# Patient Record
Sex: FEMALE | Race: WHITE | Employment: OTHER | URBAN - METROPOLITAN AREA
[De-identification: names, ages, dates, MRNs, and addresses within clinical notes are randomized per-mention and may not be internally consistent; named-entity substitution may affect disease eponyms.]

---

## 2017-01-09 PROBLEM — Z95.2 AORTIC VALVE REPLACED: Status: ACTIVE | Noted: 2017-01-09

## 2017-01-09 PROBLEM — Z79.01 WARFARIN ANTICOAGULATION: Status: ACTIVE | Noted: 2017-01-09

## 2017-01-31 PROBLEM — Z87.39 HISTORY OF GOUT: Status: ACTIVE | Noted: 2017-01-31

## 2017-02-09 ENCOUNTER — APPOINTMENT (OUTPATIENT)
Dept: GENERAL RADIOLOGY | Age: 77
DRG: 270 | End: 2017-02-09
Attending: EMERGENCY MEDICINE
Payer: MEDICARE

## 2017-02-09 ENCOUNTER — HOSPITAL ENCOUNTER (EMERGENCY)
Age: 77
Discharge: HOME | DRG: 270 | End: 2017-02-09
Attending: EMERGENCY MEDICINE
Payer: MEDICARE

## 2017-02-09 VITALS
RESPIRATION RATE: 18 BRPM | HEIGHT: 60 IN | TEMPERATURE: 97.9 F | BODY MASS INDEX: 35.73 KG/M2 | DIASTOLIC BLOOD PRESSURE: 50 MMHG | SYSTOLIC BLOOD PRESSURE: 102 MMHG | OXYGEN SATURATION: 99 % | HEART RATE: 69 BPM | WEIGHT: 182 LBS

## 2017-02-09 DIAGNOSIS — E86.0 DEHYDRATION: Primary | ICD-10-CM

## 2017-02-09 DIAGNOSIS — R53.81 MALAISE AND FATIGUE: ICD-10-CM

## 2017-02-09 DIAGNOSIS — R53.83 MALAISE AND FATIGUE: ICD-10-CM

## 2017-02-09 LAB
ALBUMIN SERPL BCP-MCNC: 2.6 G/DL (ref 3.2–4.6)
ALBUMIN/GLOB SERPL: 1 {RATIO} (ref 1.2–3.5)
ALP SERPL-CCNC: 72 U/L (ref 50–136)
ALT SERPL-CCNC: 18 U/L (ref 12–65)
ANION GAP BLD CALC-SCNC: 5 MMOL/L (ref 7–16)
AST SERPL W P-5'-P-CCNC: 32 U/L (ref 15–37)
BASOPHILS # BLD AUTO: 0 K/UL (ref 0–0.2)
BASOPHILS # BLD: 0 % (ref 0–2)
BILIRUB SERPL-MCNC: 0.5 MG/DL (ref 0.2–1.1)
BNP SERPL-MCNC: 180 PG/ML
BUN SERPL-MCNC: 29 MG/DL (ref 8–23)
CALCIUM SERPL-MCNC: 8.2 MG/DL (ref 8.3–10.4)
CHLORIDE SERPL-SCNC: 104 MMOL/L (ref 98–107)
CO2 SERPL-SCNC: 34 MMOL/L (ref 21–32)
CREAT SERPL-MCNC: 1.68 MG/DL (ref 0.6–1)
DIFFERENTIAL METHOD BLD: ABNORMAL
EOSINOPHIL # BLD: 0.2 K/UL (ref 0–0.8)
EOSINOPHIL NFR BLD: 3 % (ref 0.5–7.8)
ERYTHROCYTE [DISTWIDTH] IN BLOOD BY AUTOMATED COUNT: 15.8 % (ref 11.9–14.6)
GLOBULIN SER CALC-MCNC: 2.7 G/DL (ref 2.3–3.5)
GLUCOSE SERPL-MCNC: 95 MG/DL (ref 65–100)
HCT VFR BLD AUTO: 28.4 % (ref 35.8–46.3)
HGB BLD-MCNC: 8.6 G/DL (ref 11.7–15.4)
IMM GRANULOCYTES # BLD: 0 K/UL (ref 0–0.5)
IMM GRANULOCYTES NFR BLD AUTO: 0.2 % (ref 0–5)
INR PPP: 1.4 (ref 0.9–1.2)
LACTATE BLD-SCNC: 0.8 MMOL/L (ref 0.5–1.9)
LYMPHOCYTES # BLD AUTO: 17 % (ref 13–44)
LYMPHOCYTES # BLD: 0.8 K/UL (ref 0.5–4.6)
MAGNESIUM SERPL-MCNC: 2.3 MG/DL (ref 1.8–2.4)
MCH RBC QN AUTO: 30.3 PG (ref 26.1–32.9)
MCHC RBC AUTO-ENTMCNC: 30.3 G/DL (ref 31.4–35)
MCV RBC AUTO: 100 FL (ref 79.6–97.8)
MONOCYTES # BLD: 0.3 K/UL (ref 0.1–1.3)
MONOCYTES NFR BLD AUTO: 6 % (ref 4–12)
NEUTS SEG # BLD: 3.5 K/UL (ref 1.7–8.2)
NEUTS SEG NFR BLD AUTO: 74 % (ref 43–78)
PLATELET # BLD AUTO: 121 K/UL (ref 150–450)
PMV BLD AUTO: 10.9 FL (ref 10.8–14.1)
POTASSIUM SERPL-SCNC: 3.5 MMOL/L (ref 3.5–5.1)
PROCALCITONIN SERPL-MCNC: <0.1 NG/ML
PROT SERPL-MCNC: 5.3 G/DL (ref 6.3–8.2)
PROTHROMBIN TIME: 15.3 SEC (ref 9.6–12)
RBC # BLD AUTO: 2.84 M/UL (ref 4.05–5.25)
SODIUM SERPL-SCNC: 143 MMOL/L (ref 136–145)
TROPONIN I BLD-MCNC: 0.01 NG/ML (ref 0–0.08)
TROPONIN I BLD-MCNC: 0.02 NG/ML (ref 0–0.08)
WBC # BLD AUTO: 4.7 K/UL (ref 4.3–11.1)

## 2017-02-09 RX ORDER — PROMETHAZINE HYDROCHLORIDE 25 MG/1
25 TABLET ORAL
Qty: 12 TAB | Refills: 0 | Status: SHIPPED | OUTPATIENT
Start: 2017-02-09 | End: 2017-04-03 | Stop reason: SDUPTHER

## 2017-02-09 RX ADMIN — SODIUM CHLORIDE 1000 ML: 900 INJECTION, SOLUTION INTRAVENOUS at 19:42

## 2017-02-09 NOTE — ED TRIAGE NOTES
Pt c/o generalized weakness \"all week\". Reports headache and eye pain. Admits to chest pain. Sent from PCP office with hypotension.

## 2017-02-09 NOTE — ED PROVIDER NOTES
HPI Comments: Presents from primary care doctor's office with complaint of low blood pressure. Patient reports she doesn't feel well but otherwise her symptoms are relatively vague. Family reports she's been getting increasingly weak over the past week. She has a history of chronic chest pain and shortness of breath reports increased worsening shortness of breath over the past week. She reports feeling jittery on her insides and that her vision occasionally feels like it has floaters. She is on oxygen all the time. Her torsemide was also recently increased. Patient is a 77 y.o. female presenting with fatigue. The history is provided by the patient. Fatigue   This is a new problem. The current episode started more than 1 week ago. The problem has been gradually worsening. There was no focality noted. Primary symptoms include visual change. Pertinent negatives include no focal weakness, no loss of sensation, no loss of balance, no speech difficulty, no movement disorder, no agitation, no mental status change, no unresponsiveness and no disorientation. There has been no fever. Associated symptoms include shortness of breath and chest pain. Pertinent negatives include no vomiting, no altered mental status and no nausea.         Past Medical History:   Diagnosis Date    Abnormal glucose 8/5/2016    Abscess 8/5/2016    Acute encephalopathy 7/8/2016    Acute renal failure ([address] 12/3/2009    Afib ([address]     Anemia     Ankle swelling 8/5/2016    Anxiety 8/5/2016    Aortic Valve Bioprosthesis Present 3/7/2009    ARF (acute renal failure) ([address] 2/24/2010    Arthritis     Asthma     Atopic dermatitis 8/5/2016    Atrial fibrillation ([address] 3/7/2009    Autonomic orthostatic hypotension 8/5/2016    AV block 10/17/2011    Back pain 8/5/2016    Bradycardia (Symptomatic) 11/7/2011    CAD (coronary artery disease)     Cancer (HCC) 1990     breast (left)    Cardiac pacemaker 11/2/2015    Cardiogenic shock ([facility] 10/17/2011    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    Cellulitis 8/5/2016    Chest pain 8/5/2016    Chronic atrial fibrillation (HCC) 10/17/2011    Chronic depression 8/5/2016    Chronic depression 8/5/2016    Chronic obstructive pulmonary disease ([facility] [place] [facility] 3/8/2009    Chronic pain     CKD (chronic kidney disease) stage 3, GFR 30-59 ml/min 12/4/2009    CKD (chronic kidney disease) stage 3, GFR 30-59 ml/min 12/4/2009    Congestive heart failure (CHF) ([facility] [place] [facility] 11/2/2015    COPD      O2 AT 3 L NC    CRI (chronic renal insufficiency) 8/5/2016    Degeneration of cervical intervertebral disc 8/5/2016    Degenerative arthritis of left knee 2/27/2009    Dehydration 2/7/3156    Diastolic heart failure (HCC)     Digoxin toxicity 2/24/2010    Disorder of sweat glands 8/5/2016    Diverticulosis of large intestine without diverticulitis 2015    Dyspnea 8/5/2016    Eczema 8/5/2016    Epigastric abdominal pain 2/24/2010    GERD (gastroesophageal reflux disease)     Gout 8/5/2016    H/O mitral valve repair, 2003 6/27/2016    Heart failure ([facility] [place] [facility]     HTN (hypertension) 8/5/2016    Hx of colonic polyp 2015     adenoma    Hyperkalemia 10/17/2011    Hyperlipidemia 8/5/2016    Hypertension     Hypokalemia 2/24/2010    Hypothyroidism 12/4/2009    Ill-defined condition      CARPEL TUNNEL SYNDROME, BILAT HANDS    Knee pain 8/5/2016    Leg cramps 8/5/2016    Mitral stenosis with insufficiency 11/2/2015     Prior MV repair 2003.     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    Rheumatic heart disease 11/2/2015    Right hip pain 8/5/2016    RLS (restless legs syndrome) 2016    Sick sinus syndrome ([facility] 2016    Skin infection 2016    Sleep apnea 2015    Tachycardia 2016    Thrombocytopenia, unspecified ([facility] 2012    Thyroid disease     Urticaria 2016    Vertigo 2016       Past Surgical History:   Procedure Laterality Date    Hx appendectomy      Hx cholecystectomy      Hx gyn       hysterectomy still have ovaries    Hx mastectomy       left mastectomy    Hx pacemaker      Hx breast reconstruction      Pr cardiac surg procedure unlist       valve repair and replacement    Hx orthopaedic       knee surgery         Family History:   Problem Relation Age of Onset    Heart Disease Mother     Cancer Father      Throat    Heart Disease Father     Cancer Sister      Breast, Colon    Heart Disease Sister     Breast Cancer Sister 79      from disease    Cancer Maternal Grandmother     Cancer Brother     Diabetes Brother     Heart Disease Brother     Psychiatric Disorder Paternal Grandfather     Cancer Maternal Aunt      Breast    Diabetes Son     Alcohol abuse Neg Hx     Arthritis-rheumatoid Neg Hx     Asthma Neg Hx     Bleeding Prob Neg Hx     Elevated Lipids Neg Hx     Headache Neg Hx     Migraines Neg Hx     Hypertension Neg Hx     Lung Disease Neg Hx     Mental Retardation Neg Hx     Stroke Neg Hx        Social History     Social History    Marital status:      Spouse name: N/A    Number of children: N/A    Years of education: N/A     Occupational History    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    [employer]      6 years     Social History Main Topics    Smoking status: Former Smoker     Types: Cigarettes     Quit date: 1996    Smokeless tobacco: Never Used      Comment: quit     Alcohol use No    Drug use: No    Sexual activity: Not Currently     Other Topics Concern    Not on file     Social History Narrative         ALLERGIES: Adhesive tape;  Benadryl [diphenhydramine hcl]; Demerol [meperidine]; Morphine; Sulfa (sulfonamide antibiotics); and Lorazepam    Review of Systems   Constitutional: Positive for fatigue. Negative for chills and fever. Respiratory: Positive for shortness of breath. Cardiovascular: Positive for chest pain. Gastrointestinal: Negative for nausea and vomiting. Skin: Negative for rash and wound. Neurological: Negative for focal weakness, speech difficulty and loss of balance. Psychiatric/Behavioral: Negative for agitation. All other systems reviewed and are negative. Vitals:    02/09/17 1657   BP: 110/69   Pulse: 65   Resp: 18   Temp: 97.5 °F (36.4 °C)   SpO2: 95%   Weight: 82.6 kg (182 lb)   Height: 5' (1.524 m)            Physical Exam   Constitutional: She is oriented to person, place, and time. She appears well-developed and well-nourished. No distress. HENT:   Head: Normocephalic and atraumatic. Neck: Normal range of motion. Neck supple. Cardiovascular: Normal rate and regular rhythm. Pulmonary/Chest: She has rales (bilateral bases). Abdominal: Soft. She exhibits no distension. There is no tenderness. There is no rebound and no guarding. Musculoskeletal: Normal range of motion. She exhibits edema. Neurological: She is alert and oriented to person, place, and time. No cranial nerve deficit. Skin: Skin is warm and dry. She is not diaphoretic. No erythema. Psychiatric: She has a normal mood and affect. Her behavior is normal.   Nursing note and vitals reviewed. MDM  Number of Diagnoses or Management Options  Dehydration:   Malaise and fatigue:   Diagnosis management comments: Patient's blood pressure was improved with some fluids. I suspect her extra torsemide has caused her some dehydration. She felt better afterwards.   She has a constellation of symptoms that have been going on for a long period of time and there is no source of infection for her low blood pressure and no signs of sepsis I suspect that this is just dehydration. [facility] cardiology was called for a follow-up appointment next week and her extra torsemide was stopped. Her granddaughter who is a nurse here says that she is acting completely normal for her. Discussed at length with patient and family and recommended she start wearing her CARA hose.        Amount and/or Complexity of Data Reviewed  Clinical lab tests: ordered and reviewed  Review and summarize past medical records: yes  Discuss the patient with other providers: yes  Independent visualization of images, tracings, or specimens: yes (A. Fib with no ST elevation occasional pacing and no significant changes found.)    Risk of Complications, Morbidity, and/or Mortality  Presenting problems: high  Diagnostic procedures: moderate  Management options: high    Patient Progress  Patient progress: improved    ED Course       Procedures

## 2017-02-10 ENCOUNTER — PATIENT OUTREACH (OUTPATIENT)
Dept: CASE MANAGEMENT | Age: 77
End: 2017-02-10

## 2017-02-10 LAB
ATRIAL RATE: 73 BPM
CALCULATED P AXIS, ECG09: NORMAL DEGREES
CALCULATED R AXIS, ECG10: 79 DEGREES
CALCULATED T AXIS, ECG11: -92 DEGREES
DIAGNOSIS, 93000: NORMAL
DIASTOLIC BP, ECG02: NORMAL MMHG
P-R INTERVAL, ECG05: NORMAL MS
Q-T INTERVAL, ECG07: 408 MS
QRS DURATION, ECG06: 140 MS
QTC CALCULATION (BEZET), ECG08: 437 MS
SYSTOLIC BP, ECG01: NORMAL MMHG
VENTRICULAR RATE, ECG03: 69 BPM

## 2017-02-10 NOTE — DISCHARGE INSTRUCTIONS
Dehydration: Care Instructions  Your Care Instructions  Dehydration happens when your body loses too much fluid. This might happen when you do not drink enough water or you lose large amounts of fluids from your body because of diarrhea, vomiting, or sweating. Severe dehydration can be life-threatening. Water and minerals called electrolytes help put your body fluids back in balance. Learn the early signs of fluid loss, and drink more fluids to prevent dehydration. Follow-up care is a key part of your treatment and safety. Be sure to make and go to all appointments, and call your doctor if you are having problems. It's also a good idea to know your test results and keep a list of the medicines you take. How can you care for yourself at home? · To prevent dehydration, drink plenty of fluids, enough so that your urine is light yellow or clear like water. Choose water and other caffeine-free clear liquids until you feel better. If you have kidney, heart, or liver disease and have to limit fluids, talk with your doctor before you increase the amount of fluids you drink. · If you do not feel like eating or drinking, try taking small sips of water, sports drinks, or other rehydration drinks. · Get plenty of rest.  To prevent dehydration  · Add more fluids to your diet and daily routine, unless your doctor has told you not to. · During hot weather, drink more fluids. Drink even more fluids if you exercise a lot. Stay away from drinks with alcohol or caffeine. · Watch for the symptoms of dehydration. These include:  ¨ A dry, sticky mouth. ¨ Dark yellow urine, and not much of it. ¨ Dry and sunken eyes. ¨ Feeling very tired. · Learn what problems can lead to dehydration. These include:  ¨ Diarrhea, fever, and vomiting. ¨ Any illness with a fever, such as pneumonia or the flu. ¨ Activities that cause heavy sweating, such as endurance races and heavy outdoor work in hot or humid weather.   ¨ Alcohol or drug abuse or withdrawal.  ¨ Certain medicines, such as cold and allergy pills (antihistamines), diet pills (diuretics), and laxatives. ¨ Certain diseases, such as diabetes, cancer, and heart or kidney disease. When should you call for help? Call 911 anytime you think you may need emergency care. For example, call if:  · You passed out (lost consciousness). Call your doctor now or seek immediate medical care if:  · You are confused and cannot think clearly. · You are dizzy or lightheaded, or you feel like you may faint. · You have signs of needing more fluids. You have sunken eyes and a dry mouth, and you pass only a little dark urine. · You cannot keep fluids down. Watch closely for changes in your health, and be sure to contact your doctor if:  · You are not making tears. · Your skin is very dry and sags slowly back into place after you pinch it. · Your mouth and eyes are very dry. Where can you learn more? Go to http://onesimo-dasia.info/. Enter I129 in the search box to learn more about \"Dehydration: Care Instructions. \"  Current as of: May 27, 2016  Content Version: 11.1  © 6949-1681 [employer], [employer]. Care instructions adapted under license by "[employer] (which disclaims liability or warranty for this information). If you have questions about a medical condition or this instruction, always ask your healthcare professional. [place] [address] any warranty or liability for your use of this information.

## 2017-02-10 NOTE — ED NOTES
I have reviewed discharge instructions with the patient. The patient verbalized understanding. Discharge medications reviewed with patient and appropriate educational materials and side effects teaching were provided. Pt transferred to vehicle via wheelchair.

## 2017-02-12 ENCOUNTER — APPOINTMENT (OUTPATIENT)
Dept: GENERAL RADIOLOGY | Age: 77
DRG: 270 | End: 2017-02-12
Attending: EMERGENCY MEDICINE
Payer: MEDICARE

## 2017-02-12 ENCOUNTER — HOSPITAL ENCOUNTER (INPATIENT)
Age: 77
LOS: 16 days | Discharge: NURSING, REHAB OR LONG-TERM CARE | DRG: 270 | End: 2017-02-28
Attending: EMERGENCY MEDICINE | Admitting: HOSPITALIST
Payer: MEDICARE

## 2017-02-12 ENCOUNTER — APPOINTMENT (OUTPATIENT)
Dept: ULTRASOUND IMAGING | Age: 77
DRG: 270 | End: 2017-02-12
Attending: EMERGENCY MEDICINE
Payer: MEDICARE

## 2017-02-12 DIAGNOSIS — J96.11 CHRONIC RESPIRATORY FAILURE WITH HYPOXIA AND HYPERCAPNIA (HCC): ICD-10-CM

## 2017-02-12 DIAGNOSIS — D61.818 PANCYTOPENIA (HCC): ICD-10-CM

## 2017-02-12 DIAGNOSIS — I50.43 ACUTE ON CHRONIC COMBINED SYSTOLIC AND DIASTOLIC CONGESTIVE HEART FAILURE (HCC): Primary | ICD-10-CM

## 2017-02-12 DIAGNOSIS — R06.09 DYSPNEA ON EXERTION: ICD-10-CM

## 2017-02-12 DIAGNOSIS — J96.12 CHRONIC RESPIRATORY FAILURE WITH HYPOXIA AND HYPERCAPNIA (HCC): ICD-10-CM

## 2017-02-12 DIAGNOSIS — I95.89 OTHER SPECIFIED HYPOTENSION: ICD-10-CM

## 2017-02-12 DIAGNOSIS — I48.20 CHRONIC ATRIAL FIBRILLATION (HCC): Chronic | ICD-10-CM

## 2017-02-12 DIAGNOSIS — R53.1 GENERALIZED WEAKNESS: ICD-10-CM

## 2017-02-12 DIAGNOSIS — I50.41 ACUTE COMBINED SYSTOLIC AND DIASTOLIC CONGESTIVE HEART FAILURE (HCC): ICD-10-CM

## 2017-02-12 PROBLEM — Z79.01 WARFARIN ANTICOAGULATION: Chronic | Status: ACTIVE | Noted: 2017-01-09

## 2017-02-12 PROBLEM — Z95.2 AORTIC VALVE REPLACED: Chronic | Status: ACTIVE | Noted: 2017-01-09

## 2017-02-12 PROBLEM — I50.9 ACUTE CHF (CONGESTIVE HEART FAILURE) (HCC): Status: ACTIVE | Noted: 2017-02-12

## 2017-02-12 PROBLEM — I27.20 PULMONARY HYPERTENSION (HCC): Status: ACTIVE | Noted: 2017-02-12

## 2017-02-12 LAB
ALBUMIN SERPL BCP-MCNC: 2.4 G/DL (ref 3.2–4.6)
ALBUMIN/GLOB SERPL: 0.9 {RATIO} (ref 1.2–3.5)
ALP SERPL-CCNC: 76 U/L (ref 50–136)
ALT SERPL-CCNC: 17 U/L (ref 12–65)
ANION GAP BLD CALC-SCNC: 6 MMOL/L (ref 7–16)
APPEARANCE UR: ABNORMAL
AST SERPL W P-5'-P-CCNC: 27 U/L (ref 15–37)
ATRIAL RATE: 340 BPM
BACTERIA URNS QL MICRO: ABNORMAL /HPF
BASOPHILS # BLD AUTO: 0 K/UL (ref 0–0.2)
BASOPHILS # BLD: 1 % (ref 0–2)
BILIRUB SERPL-MCNC: 0.5 MG/DL (ref 0.2–1.1)
BILIRUB UR QL: NEGATIVE
BNP SERPL-MCNC: 162 PG/ML
BUN SERPL-MCNC: 25 MG/DL (ref 8–23)
CALCIUM SERPL-MCNC: 7.7 MG/DL (ref 8.3–10.4)
CALCULATED P AXIS, ECG09: NORMAL DEGREES
CALCULATED R AXIS, ECG10: 68 DEGREES
CALCULATED T AXIS, ECG11: -65 DEGREES
CASTS URNS QL MICRO: 0 /LPF
CHLORIDE SERPL-SCNC: 106 MMOL/L (ref 98–107)
CO2 SERPL-SCNC: 34 MMOL/L (ref 21–32)
COLOR UR: ABNORMAL
CREAT SERPL-MCNC: 1.48 MG/DL (ref 0.6–1)
CRYSTALS URNS QL MICRO: 0 /LPF
DIAGNOSIS, 93000: NORMAL
DIASTOLIC BP, ECG02: NORMAL MMHG
DIFFERENTIAL METHOD BLD: ABNORMAL
EOSINOPHIL # BLD: 0.2 K/UL (ref 0–0.8)
EOSINOPHIL NFR BLD: 5 % (ref 0.5–7.8)
EPI CELLS #/AREA URNS HPF: ABNORMAL /HPF
ERYTHROCYTE [DISTWIDTH] IN BLOOD BY AUTOMATED COUNT: 15.6 % (ref 11.9–14.6)
FLUAV AG NPH QL IA: NEGATIVE
FLUBV AG NPH QL IA: NEGATIVE
GLOBULIN SER CALC-MCNC: 2.6 G/DL (ref 2.3–3.5)
GLUCOSE SERPL-MCNC: 87 MG/DL (ref 65–100)
GLUCOSE UR STRIP.AUTO-MCNC: NEGATIVE MG/DL
HCT VFR BLD AUTO: 27.8 % (ref 35.8–46.3)
HGB BLD-MCNC: 8.2 G/DL (ref 11.7–15.4)
HGB UR QL STRIP: NEGATIVE
IMM GRANULOCYTES # BLD: 0 K/UL (ref 0–0.5)
IMM GRANULOCYTES NFR BLD AUTO: 0.3 % (ref 0–5)
INR PPP: 1.9 (ref 0.9–1.2)
KETONES UR QL STRIP.AUTO: NEGATIVE MG/DL
LACTATE BLD-SCNC: 1 MMOL/L (ref 0.5–1.9)
LEUKOCYTE ESTERASE UR QL STRIP.AUTO: ABNORMAL
LYMPHOCYTES # BLD AUTO: 16 % (ref 13–44)
LYMPHOCYTES # BLD: 0.5 K/UL (ref 0.5–4.6)
MAGNESIUM SERPL-MCNC: 2.3 MG/DL (ref 1.8–2.4)
MCH RBC QN AUTO: 30.3 PG (ref 26.1–32.9)
MCHC RBC AUTO-ENTMCNC: 29.5 G/DL (ref 31.4–35)
MCV RBC AUTO: 102.6 FL (ref 79.6–97.8)
MONOCYTES # BLD: 0.2 K/UL (ref 0.1–1.3)
MONOCYTES NFR BLD AUTO: 7 % (ref 4–12)
MUCOUS THREADS URNS QL MICRO: ABNORMAL /LPF
NEUTS SEG # BLD: 2.5 K/UL (ref 1.7–8.2)
NEUTS SEG NFR BLD AUTO: 71 % (ref 43–78)
NITRITE UR QL STRIP.AUTO: NEGATIVE
OTHER OBSERVATIONS,UCOM: ABNORMAL
P-R INTERVAL, ECG05: NORMAL MS
PH UR STRIP: 6 [PH] (ref 5–9)
PLATELET # BLD AUTO: 100 K/UL (ref 150–450)
PMV BLD AUTO: 10.4 FL (ref 10.8–14.1)
POTASSIUM SERPL-SCNC: 3.7 MMOL/L (ref 3.5–5.1)
PROCALCITONIN SERPL-MCNC: <0.1 NG/ML
PROT SERPL-MCNC: 5 G/DL (ref 6.3–8.2)
PROT UR STRIP-MCNC: NEGATIVE MG/DL
PROTHROMBIN TIME: 20.8 SEC (ref 9.6–12)
Q-T INTERVAL, ECG07: 422 MS
QRS DURATION, ECG06: 132 MS
QTC CALCULATION (BEZET), ECG08: 477 MS
RBC # BLD AUTO: 2.71 M/UL (ref 4.05–5.25)
RBC #/AREA URNS HPF: ABNORMAL /HPF
SODIUM SERPL-SCNC: 146 MMOL/L (ref 136–145)
SP GR UR REFRACTOMETRY: 1.01
SP GR UR REFRACTOMETRY: 1.01 (ref 1–1.02)
SYSTOLIC BP, ECG01: NORMAL MMHG
TROPONIN I SERPL-MCNC: 0.02 NG/ML (ref 0.02–0.05)
UROBILINOGEN UR QL STRIP.AUTO: 0.2 EU/DL (ref 0.2–1)
VENTRICULAR RATE, ECG03: 77 BPM
WBC # BLD AUTO: 3.4 K/UL (ref 4.3–11.1)
WBC URNS QL MICRO: ABNORMAL /HPF

## 2017-02-12 PROCEDURE — 81015 MICROSCOPIC EXAM OF URINE: CPT | Performed by: EMERGENCY MEDICINE

## 2017-02-12 PROCEDURE — 87804 INFLUENZA ASSAY W/OPTIC: CPT | Performed by: EMERGENCY MEDICINE

## 2017-02-12 PROCEDURE — 83605 ASSAY OF LACTIC ACID: CPT

## 2017-02-12 PROCEDURE — 96361 HYDRATE IV INFUSION ADD-ON: CPT | Performed by: EMERGENCY MEDICINE

## 2017-02-12 PROCEDURE — 87040 BLOOD CULTURE FOR BACTERIA: CPT | Performed by: EMERGENCY MEDICINE

## 2017-02-12 PROCEDURE — 93970 EXTREMITY STUDY: CPT

## 2017-02-12 PROCEDURE — 85610 PROTHROMBIN TIME: CPT | Performed by: EMERGENCY MEDICINE

## 2017-02-12 PROCEDURE — 74011250636 HC RX REV CODE- 250/636: Performed by: EMERGENCY MEDICINE

## 2017-02-12 PROCEDURE — 93005 ELECTROCARDIOGRAM TRACING: CPT | Performed by: EMERGENCY MEDICINE

## 2017-02-12 PROCEDURE — 83735 ASSAY OF MAGNESIUM: CPT | Performed by: EMERGENCY MEDICINE

## 2017-02-12 PROCEDURE — 86580 TB INTRADERMAL TEST: CPT | Performed by: NURSE PRACTITIONER

## 2017-02-12 PROCEDURE — 84145 PROCALCITONIN (PCT): CPT | Performed by: EMERGENCY MEDICINE

## 2017-02-12 PROCEDURE — 99285 EMERGENCY DEPT VISIT HI MDM: CPT | Performed by: EMERGENCY MEDICINE

## 2017-02-12 PROCEDURE — 71010 XR CHEST PORT: CPT

## 2017-02-12 PROCEDURE — 74011250637 HC RX REV CODE- 250/637: Performed by: NURSE PRACTITIONER

## 2017-02-12 PROCEDURE — 65660000000 HC RM CCU STEPDOWN

## 2017-02-12 PROCEDURE — 85025 COMPLETE CBC W/AUTO DIFF WBC: CPT | Performed by: EMERGENCY MEDICINE

## 2017-02-12 PROCEDURE — 74011000258 HC RX REV CODE- 258: Performed by: HOSPITALIST

## 2017-02-12 PROCEDURE — 76450000000

## 2017-02-12 PROCEDURE — 80053 COMPREHEN METABOLIC PANEL: CPT | Performed by: EMERGENCY MEDICINE

## 2017-02-12 PROCEDURE — 81003 URINALYSIS AUTO W/O SCOPE: CPT | Performed by: EMERGENCY MEDICINE

## 2017-02-12 PROCEDURE — 96360 HYDRATION IV INFUSION INIT: CPT | Performed by: EMERGENCY MEDICINE

## 2017-02-12 PROCEDURE — 74011000302 HC RX REV CODE- 302: Performed by: NURSE PRACTITIONER

## 2017-02-12 PROCEDURE — 74011250636 HC RX REV CODE- 250/636: Performed by: NURSE PRACTITIONER

## 2017-02-12 PROCEDURE — 83880 ASSAY OF NATRIURETIC PEPTIDE: CPT | Performed by: EMERGENCY MEDICINE

## 2017-02-12 PROCEDURE — 84484 ASSAY OF TROPONIN QUANT: CPT | Performed by: NURSE PRACTITIONER

## 2017-02-12 RX ORDER — DOCUSATE SODIUM 100 MG/1
100 CAPSULE, LIQUID FILLED ORAL
Status: DISCONTINUED | OUTPATIENT
Start: 2017-02-12 | End: 2017-02-13

## 2017-02-12 RX ORDER — PANTOPRAZOLE SODIUM 40 MG/1
40 TABLET, DELAYED RELEASE ORAL
Status: DISCONTINUED | OUTPATIENT
Start: 2017-02-13 | End: 2017-02-28 | Stop reason: HOSPADM

## 2017-02-12 RX ORDER — POTASSIUM CHLORIDE 1500 MG/1
20 TABLET, FILM COATED, EXTENDED RELEASE ORAL 2 TIMES DAILY
COMMUNITY
End: 2018-01-01

## 2017-02-12 RX ORDER — SPIRONOLACTONE 25 MG/1
25 TABLET ORAL DAILY
Status: DISCONTINUED | OUTPATIENT
Start: 2017-02-13 | End: 2017-02-25

## 2017-02-12 RX ORDER — LEVALBUTEROL INHALATION SOLUTION 1.25 MG/3ML
1.25 SOLUTION RESPIRATORY (INHALATION)
Status: DISCONTINUED | OUTPATIENT
Start: 2017-02-12 | End: 2017-02-24

## 2017-02-12 RX ORDER — LEVOTHYROXINE SODIUM 88 UG/1
88 TABLET ORAL
Status: DISCONTINUED | OUTPATIENT
Start: 2017-02-13 | End: 2017-02-28 | Stop reason: HOSPADM

## 2017-02-12 RX ORDER — SODIUM CHLORIDE 9 MG/ML
50 INJECTION, SOLUTION INTRAVENOUS ONCE
Status: COMPLETED | OUTPATIENT
Start: 2017-02-12 | End: 2017-02-12

## 2017-02-12 RX ORDER — POLYVINYL ALCOHOL 14 MG/ML
1 SOLUTION/ DROPS OPHTHALMIC AS NEEDED
Status: DISCONTINUED | OUTPATIENT
Start: 2017-02-12 | End: 2017-02-28 | Stop reason: HOSPADM

## 2017-02-12 RX ORDER — ACETAMINOPHEN 325 MG/1
650 TABLET ORAL
Status: DISCONTINUED | OUTPATIENT
Start: 2017-02-12 | End: 2017-02-28 | Stop reason: HOSPADM

## 2017-02-12 RX ORDER — SODIUM CHLORIDE 450 MG/100ML
50 INJECTION, SOLUTION INTRAVENOUS CONTINUOUS
Status: DISCONTINUED | OUTPATIENT
Start: 2017-02-12 | End: 2017-02-13

## 2017-02-12 RX ORDER — DIAZEPAM 5 MG/1
2.5 TABLET ORAL
Status: DISCONTINUED | OUTPATIENT
Start: 2017-02-12 | End: 2017-02-12

## 2017-02-12 RX ORDER — SODIUM CHLORIDE 0.9 % (FLUSH) 0.9 %
5-10 SYRINGE (ML) INJECTION EVERY 8 HOURS
Status: DISCONTINUED | OUTPATIENT
Start: 2017-02-12 | End: 2017-02-24 | Stop reason: SDUPTHER

## 2017-02-12 RX ORDER — SODIUM CHLORIDE 0.9 % (FLUSH) 0.9 %
5-10 SYRINGE (ML) INJECTION AS NEEDED
Status: DISCONTINUED | OUTPATIENT
Start: 2017-02-12 | End: 2017-02-28 | Stop reason: HOSPADM

## 2017-02-12 RX ORDER — POTASSIUM CHLORIDE 750 MG/1
20 TABLET, EXTENDED RELEASE ORAL 2 TIMES DAILY WITH MEALS
Status: DISCONTINUED | OUTPATIENT
Start: 2017-02-13 | End: 2017-02-12

## 2017-02-12 RX ORDER — NITROGLYCERIN 80 MG/1
1 PATCH TRANSDERMAL DAILY
COMMUNITY
End: 2018-01-04

## 2017-02-12 RX ORDER — ROPINIROLE 1 MG/1
2 TABLET, FILM COATED ORAL
Status: DISCONTINUED | OUTPATIENT
Start: 2017-02-12 | End: 2017-02-28 | Stop reason: HOSPADM

## 2017-02-12 RX ORDER — HYDROCORTISONE 25 MG/G
CREAM TOPICAL
Status: DISCONTINUED | OUTPATIENT
Start: 2017-02-12 | End: 2017-02-28 | Stop reason: HOSPADM

## 2017-02-12 RX ORDER — ONDANSETRON 2 MG/ML
4 INJECTION INTRAMUSCULAR; INTRAVENOUS
Status: DISCONTINUED | OUTPATIENT
Start: 2017-02-12 | End: 2017-02-28 | Stop reason: HOSPADM

## 2017-02-12 RX ORDER — ROPINIROLE 2 MG/1
2 TABLET, FILM COATED ORAL
COMMUNITY
End: 2017-04-20 | Stop reason: SDUPTHER

## 2017-02-12 RX ORDER — WARFARIN 2.5 MG/1
2.5 TABLET ORAL EVERY EVENING
Status: DISCONTINUED | OUTPATIENT
Start: 2017-02-12 | End: 2017-02-13

## 2017-02-12 RX ORDER — NITROGLYCERIN 80 MG/1
1 PATCH TRANSDERMAL DAILY
Status: DISCONTINUED | OUTPATIENT
Start: 2017-02-13 | End: 2017-02-12 | Stop reason: SDUPTHER

## 2017-02-12 RX ORDER — PROMETHAZINE HYDROCHLORIDE 25 MG/1
25 TABLET ORAL
Status: DISCONTINUED | OUTPATIENT
Start: 2017-02-12 | End: 2017-02-22

## 2017-02-12 RX ORDER — POLYETHYLENE GLYCOL 3350 17 G/17G
17 POWDER, FOR SOLUTION ORAL
Status: DISCONTINUED | OUTPATIENT
Start: 2017-02-12 | End: 2017-02-16

## 2017-02-12 RX ORDER — NITROGLYCERIN 80 MG/1
1 PATCH TRANSDERMAL DAILY
Status: DISCONTINUED | OUTPATIENT
Start: 2017-02-13 | End: 2017-02-13

## 2017-02-12 RX ORDER — SODIUM CHLORIDE 9 MG/ML
50 INJECTION, SOLUTION INTRAVENOUS CONTINUOUS
Status: DISCONTINUED | OUTPATIENT
Start: 2017-02-12 | End: 2017-02-12

## 2017-02-12 RX ORDER — DILTIAZEM HYDROCHLORIDE 240 MG/1
240 CAPSULE, COATED, EXTENDED RELEASE ORAL DAILY
Status: DISCONTINUED | OUTPATIENT
Start: 2017-02-13 | End: 2017-02-14

## 2017-02-12 RX ORDER — SERTRALINE HYDROCHLORIDE 50 MG/1
50 TABLET, FILM COATED ORAL
Status: DISCONTINUED | OUTPATIENT
Start: 2017-02-12 | End: 2017-02-28 | Stop reason: HOSPADM

## 2017-02-12 RX ADMIN — SODIUM CHLORIDE 50 ML/HR: 900 INJECTION, SOLUTION INTRAVENOUS at 19:39

## 2017-02-12 RX ADMIN — SODIUM CHLORIDE 50 ML/HR: 450 INJECTION, SOLUTION INTRAVENOUS at 22:05

## 2017-02-12 RX ADMIN — SODIUM CHLORIDE 1000 ML: 900 INJECTION, SOLUTION INTRAVENOUS at 15:02

## 2017-02-12 RX ADMIN — WARFARIN SODIUM 2.5 MG: 2.5 TABLET ORAL at 20:03

## 2017-02-12 RX ADMIN — SERTRALINE HYDROCHLORIDE 50 MG: 50 TABLET ORAL at 21:45

## 2017-02-12 RX ADMIN — Medication 10 ML: at 21:47

## 2017-02-12 RX ADMIN — ROPINIROLE 2 MG: 2 TABLET, FILM COATED ORAL at 21:45

## 2017-02-12 RX ADMIN — DIAZEPAM 2.5 MG: 5 TABLET ORAL at 21:45

## 2017-02-12 RX ADMIN — TUBERCULIN PURIFIED PROTEIN DERIVATIVE 5 UNITS: 5 INJECTION INTRADERMAL at 19:47

## 2017-02-12 NOTE — H&P
Ms. Elfego Michelle is a very pleasant 76 yo F  Who complains of increased SOb, nonproductive cough and b/l LE swelling in the last 1-2 months. Known with chronic diastolic CHF, CAD, bradycardia s/p pacemaker, , s/p bioprosthetic AVR,Chronic Afib on coumadin, severe pulmonary HTN with PAP 69 mmHg, chronic respiratory failure on NC 3L 24h/7 , COPD, GERD, HTN, HLP, Obesity, BOBBY - not using CPAP. Seen by her cardiologist, Lia Diaz on 1/9/17 and Demadex was increased to 40 mg PO daily, except MWF when was BID. Ms. Elfego Michelle states that her leg swelling improved, although her cough and SOB persisted. Seen by PCP on 1/31  and placed on oral steroids and Albuterol that she couldn't complete because the albuterol made ermias jittery. No improvement with treatment. Seen at [facility] ED on 2/9  with low BP and and thought that she is dehydrated due to increased diuretics and received IV fluids. She felt better for one day, although because of increased in her symptoms she presented to ED today. BP was 80/51 mmHg and received 1L NC bolus in Ed per ED provider. CXR w/o acute pathology. No spikes of fever since her symptoms started. Influenza screen negative. WBC:3.4. Cr:1.48, around her baseline. CV:irregular irregularity. No JVD. +2 pitting edema b/l LE   Resp: Decreased air entry b/l at the base. No wheezing. No rales or crackles. Abd: soft, non-tender, no rebound tenderness. Hold Torsemide until tomorrow. Will get Echocardiogram. Gentle IV hydration at 50 ml/h to keep MAP>85 mmHg. Hold BB due to hypotension. Hold benzodiazepines at bedtime, can exacerbate  - untreated BOBBY and involved in severe pulmonary hypertension. Cardiology and palliative care consulted - appreciate the help.        See full H&P note per FELISHA Centeno MD  02/12/17

## 2017-02-12 NOTE — IP AVS SNAPSHOT
[address] 
[phone] Patient: Ty Coffman MRN: [id number] You are allergic to the following Allergen Reactions Adhesive Tape Rash Benadryl (Diphenhydramine Hcl) Other (comments) Jitters Demerol (Meperidine) Anxiety Morphine Other (comments) Confusion Sulfa (Sulfonamide Antibiotics) Anxiety Lorazepam Anxiety Immunizations Administered for This Admission Name Date  
 TB Skin Test (PPD) Intradermal 2/12/2017 Recent Documentation Height  
  
  
  
  
  
 1.524 m Emergency Contacts Name Discharge Info Relation Home Work Mobile Devante Mcnamara [22] [phone] Nayan Hanna  Other Relative [6] [phone] Dago Bergeron  Other Relative [6] [phone] About your hospitalization You were admitted on:  February 12, 2017 You last received care in the:  [facility] 2 CV STEPDOWN You were discharged on:  February 28, 2017 Unit phone number:  [phone] Why you were hospitalized Your primary diagnosis was:  Acute On Chronic Systolic Heart Failure Due To Valvular Disease (Hcc) Your diagnoses also included:  Pulmonary Hypertension (Hcc), Sleep Apnea, Hypotension, Chronic Diastolic Congestive Heart Failure (Hcc), Aortic Valve Prosthesis Present, Arf (Acute Renal Failure) (Hcc), Aortic Valve Replaced, Chronic Atrial Fibrillation (Hcc), Flo (Obstructive Sleep Apnea), Warfarin Anticoagulation Providers Seen During Your Hospitalizations Provider Role Specialty Primary office phone Dafne Lim MD Attending Provider Emergency Medicine [phone] Jf Loving MD Attending Provider [facility] [phone] January Nicole MD Attending Provider Internal Medicine [phone] Your Primary Care Physician (PCP) Primary Care Physician Office Phone Office Fax [phone] Aubrey Keyes [phone] Follow-up Information Follow up With Details Comments Contact Info [facility] Cardiology Go to Dr. Susan Arnold on March 7 (tuesday) at 1:45PM. [address] [place]  
[address] [facility] [address] [address] [facility] 
[phone] Pamela Sanchez MD On 3/22/2017 10:45 AM [facility] Internal Medicine [place] [address] 
[phone] Jamshid Lomas MD On 3/16/2017 9:45 AM with Dr. Jim Henriquez [address] [facility] [place] [address] 
[phone] Your Appointments Tuesday March 07, 2017  1:15 PM [facility] HOSPITAL FOLLOW-UP with Siobhan Song MD  
[facility] ([address]) [address] [place]  
[address] [place] [facility]  
[phone] Thursday March 16, 2017  9:45 AM EDT Global Post Op with Jamshid Lomas MD  
[facility] [address] [place] [address] [phone] Wednesday March 22, 2017 10:45 AM EDT Follow Up with Pamela Sanchez MD  
[facility] ([address] [facility]) [address]   
[phone] Current Discharge Medication List  
  
START taking these medications Dose & Instructions Dispensing Information Comments Morning Noon Evening Bedtime  
 gabapentin 100 mg capsule Commonly known as:  NEURONTIN Your next dose is: Today, Tomorrow Other:  _________ Dose:  100 mg Take 1 Cap by mouth three (3) times daily for 30 days. Quantity:  90 Cap Refills:  0  
     
   
   
   
  
 guaiFENesin-dextromethorphan 100-10 mg/5 mL syrup Commonly known as:  ROBITUSSIN DM Your next dose is: Today, Tomorrow Other:  _________ Dose:  5 mL Take 5 mL by mouth every six (6) hours as needed for up to 10 days. Quantity:  1 Bottle Refills:  0  
     
   
   
   
  
 hydrocortisone 25 mg Supp Commonly known as:  ANUSOL-HC Your next dose is: Today, Tomorrow Other:  _________ Dose:  25 mg Insert 1 Suppository into rectum every twelve (12) hours for 10 days. Quantity:  20 Suppository Refills:  0 CONTINUE these medications which have CHANGED Dose & Instructions Dispensing Information Comments Morning Noon Evening Bedtime  
 polyethylene glycol 17 gram/dose powder Commonly known as:  Lelan Situ What changed:   
- when to take this 
- reasons to take this Your next dose is: Today, Tomorrow Other:  _________ Dose:  17 g Take 17 g by mouth two (2) times a day for 30 days. Quantity:  1020 g Refills:  0  
     
   
   
   
  
 torsemide 20 mg tablet Commonly known as:  DEMADEX What changed:  how much to take Your next dose is: Today, Tomorrow Other:  _________ Dose:  40 mg Take 2 Tabs by mouth daily. Quantity:  30 Tab Refills:  0  
     
   
   
   
  
 warfarin 5 mg tablet Commonly known as:  COUMADIN What changed:   
- medication strength 
- how much to take 
- additional instructions Your next dose is: Today, Tomorrow Other:  _________ Dose:  5 mg Take 1 Tab by mouth nightly. Quantity:  7 Tab Refills:  0 CONTINUE these medications which have NOT CHANGED Dose & Instructions Dispensing Information Comments Morning Noon Evening Bedtime  
 allopurinol 100 mg tablet Commonly known as:  Irving Night Your next dose is: Today, Tomorrow Other:  _________ Dose:  100 mg Take 100 mg by mouth two (2) times a day. Refills:  0  
     
   
   
   
  
 calcium carbonate 600 mg calcium (1,500 mg) tablet Commonly known as:  Morro Valencia Your next dose is: Today, Tomorrow Other:  _________ Dose:  600 mg Take 600 mg by mouth nightly. Refills:  0  
     
   
   
   
  
 cholecalciferol 1,000 unit Cap Commonly known as:  VITAMIN D3 Your next dose is: Today, Tomorrow Other:  _________ Take  by mouth daily. Refills:  0  
     
   
   
   
  
 diazePAM 5 mg tablet Commonly known as:  VALIUM Your next dose is: Today, Tomorrow Other:  _________ Dose:  2.5 mg Take 0.5 Tabs by mouth nightly. Max Daily Amount: 2.5 mg.  
 Quantity:  10 Tab Refills:  0 DOCUSATE SODIUM Your next dose is: Today, Tomorrow Other:  _________ Dose:  1 Cap Take 1 Cap by mouth two (2) times a day. Refills:  0  
     
   
   
   
  
 hydrocortisone 2.5 % rectal cream  
Commonly known as:  ANUSOL-HC Your next dose is: Today, Tomorrow Other:  _________ Apply small amount to hemorrhoids/perianal skin TID PRN Quantity:  30 g Refills:  3  
     
   
   
   
  
 levothyroxine 88 mcg tablet Commonly known as:  SYNTHROID Your next dose is: Today, Tomorrow Other:  _________ Take  by mouth Daily (before breakfast). Refills:  0  
     
   
   
   
  
 * nitroglycerin 0.4 mg SL tablet Commonly known as:  NITROSTAT Your next dose is: Today, Tomorrow Other:  _________  
   
   
 by SubLINGual route every five (5) minutes as needed for Chest Pain. Refills:  0  
     
   
   
   
  
 * nitroglycerin 0.4 mg/hr Commonly known as:  IHBWDBDZ Your next dose is: Today, Tomorrow Other:  _________ Dose:  1 Patch 1 Patch by TransDERmal route daily. Refills:  0 Omeprazole delayed release 20 mg tablet Commonly known as:  PRILOSEC D/R Your next dose is: Today, Tomorrow Other:  _________  Dose:  20 mg  
 Take 20 mg by mouth daily. prn Refills:  0 OXYGEN-AIR DELIVERY SYSTEMS Your next dose is: Today, Tomorrow Other:  _________  
   
   
 by Does Not Apply route. 2-2.5 lpm cont. Refills:  0  
     
   
   
   
  
 potassium chloride SR 20 mEq tablet Commonly known as:  K-TAB Your next dose is: Today, Tomorrow Other:  _________ Dose:  20 mEq Take 20 mEq by mouth two (2) times a day. Refills:  0  
     
   
   
   
  
 pravastatin 40 mg tablet Commonly known as:  PRAVACHOL Your next dose is: Today, Tomorrow Other:  _________ Dose:  40 mg Take 1 Tab by mouth daily. Indications: hyperlipidemia Quantity:  90 Tab Refills:  3  
     
   
   
   
  
 promethazine 25 mg tablet Commonly known as:  PHENERGAN Your next dose is: Today, Tomorrow Other:  _________ Dose:  25 mg Take 1 Tab by mouth every six (6) hours as needed. Quantity:  12 Tab Refills:  0 [address] Your next dose is: Today, Tomorrow Other:  _________ Apply  to eye. Refills:  0  
     
   
   
   
  
 rOPINIRole 2 mg tablet Commonly known as:  Sherin Eliel Your next dose is: Today, Tomorrow Other:  _________ Dose:  2 mg Take 2 mg by mouth nightly. Refills:  0  
     
   
   
   
  
 spironolactone 25 mg tablet Commonly known as:  ALDACTONE Your next dose is: Today, Tomorrow Other:  _________ Dose:  25 mg Take 1 Tab by mouth daily. Quantity:  90 Tab Refills:  2 STIOLTO RESPIMAT IN Your next dose is: Today, Tomorrow Other:  _________ Take  by inhalation. NEW-PATIENT NOT TAKING, STATES IT MAKES HER JITTERY Refills:  0  
     
   
   
   
  
 VITAMIN B-12 250 mcg tablet Generic drug:  cyanocobalamin Your next dose is: Today, Tomorrow Other:  _________ Dose:  1000 mcg Take 1,000 mcg by mouth daily. Refills:  0  
     
   
   
   
  
 ZOLOFT 50 mg tablet Generic drug:  sertraline Your next dose is: Today, Tomorrow Other:  _________ Dose:  50 mg Take 50 mg by mouth daily. Refills:  0  
     
   
   
   
  
 * Notice: This list has 2 medication(s) that are the same as other medications prescribed for you. Read the directions carefully, and ask your doctor or other care provider to review them with you. STOP taking these medications   
 dilTIAZem  mg ER capsule Commonly known as:  CARDIZEM CD  
   
  
 TOPROL XL 50 mg XL tablet Generic drug:  metoprolol succinate Where to Get Your Medications These medications were sent to [address] [address] Phone:  [phone]  
  gabapentin 100 mg capsule  
 guaiFENesin-dextromethorphan 100-10 mg/5 mL syrup  
 hydrocortisone 25 mg Supp  
 polyethylene glycol 17 gram/dose powder  
 torsemide 20 mg tablet  
 warfarin 5 mg tablet Information on where to get these meds will be given to you by the nurse or doctor. ! Ask your nurse or doctor about these medications  
  diazePAM 5 mg tablet Discharge Instructions Ã¯Â»Â Anticoagulants: After Your Visit Your Care Instructions Your doctor prescribed an anticoagulant medicine. Anticoagulants, often called blood thinners, prevent new blood clots from forming and keep existing clots from getting larger. They do not actually thin the blood, but they make the blood take longer to clot. This lowers the risk of a blood clot moving to the lungs (pulmonary embolism) or moving to the brain and causing a stroke. Blood thinners come in two forms. Heparin is given by shot, either under your skin or through a needle in your vein, and starts working right away. Warfarin (Coumadin) comes in pill form and takes longer to work. Your doctor may have you begin taking both forms at the same time. As soon as the pills start to work, you will stop the shots but continue to take the pills. If you have a blood clot in your leg, you may need to take warfarin for several months. People who have heart conditions such as atrial fibrillation often need to take it for the rest of their lives. The right dose of this medicine is different for each person. You will need regular blood tests to see if your dose is correct. Follow-up care is a key part of your treatment and safety. Be sure to make and go to all appointments, and call your doctor if you are having problems. Itâs also a good idea to know your test results and keep a list of the medicines you take. How do you take blood thinners? Take your medicines exactly as prescribed. Call your doctor if you think you are having a problem with your medicine. Call your doctor if you are not sure what to do if you missed a dose of blood thinner. Your doctor can tell you exactly what to do so you do not take too much or too little blood thinner. Then you will be as safe as possible. Some general rules for what to do if you miss a dose: If you remember it in the same day, take the missed dose. Then go back to your regular schedule. If it is the next day, or almost time to take the next dose, do not take the missed dose. Do not double the dose to make up for the missed one. At your next regularly scheduled time, take your normal dose. If you miss your dose for 2 or more days, call your doctor. To help you stay on schedule, use a calendar to remind you when to take your blood thinner. When you take the medicine, note it on the calendar. If you are going to give yourself shots, your doctor will give you instructions for how to safely inject the medicine. Follow the directions carefully. Do not take any vitamins, over-the-counter medicines, or herbal products without talking to your doctor first. 
Avoid contact sports and other activities that could lead to injury. Make your home safe and take other measures to reduce your risk of falling. Always wear a seat belt while in a car. Do not suddenly change your intake of vitamin Kârich foods, such as broccoli, cabbage, asparagus, lettuce, and spinach. This will help blood thinners work evenly from day to day. Limit alcohol to 2 drinks a day for men and 1 drink a day for women. Alcohol may interfere with blood thinners. It also increases your risk of falls, which can cause bruising and bleeding. Tell your dentist, pharmacist, and other health professionals that you take blood thinners. Wear medical alert jewelry that says you take blood thinners. You can buy this at most [employer]. When should you call for help? Call 911 anytime you think you may need emergency care. For example, call if: You cough up blood. You vomit blood or what looks like coffee grounds. You pass maroon or very bloody stools. Call your doctor now or seek immediate medical care if: 
You have new bruises or blood spots under your skin. You have a nosebleed. Your gums bleed when you brush your teeth. You have blood in your urine. Your stools are black and tarlike or have streaks of blood. You have vaginal bleeding when you are not having your period, or heavy period bleeding. Watch closely for changes in your health, and be sure to contact your doctor if: 
You have questions about your medicine. Where can you learn more? Go to [employer] Enter W251 in the search box to learn more about \"Anticoagulants: After Your Visit. \" Â© 0115-4672 Healthwise, Incorporated. Care instructions adapted under license by Elizabeth Yo (which disclaims liability or warranty for this information).  This care instruction is for use with your licensed healthcare professional. If you have questions about a medical condition or this instruction, always ask your healthcare professional. [address] any warranty or liability for your use of this information. Content Version: 9.4.88684; Last Revised: July 1, 2009 Heart-Healthy Diet: Care Instructions Your Care Instructions A heart-healthy diet has lots of vegetables, fruits, nuts, beans, and whole grains, and is low in salt. It limits foods that are high in saturated fat, such as meats, cheeses, and fried foods. It may be hard to change your diet, but even small changes can lower your risk of heart attack and heart disease. Follow-up care is a key part of your treatment and safety. Be sure to make and go to all appointments, and call your doctor if you are having problems. It's also a good idea to know your test results and keep a list of the medicines you take. How can you care for yourself at home? Watch your portions Learn what a serving is. A \"serving\" and a \"portion\" are not always the same thing. Make sure that you are not eating larger portions than are recommended. For example, a serving of pasta is ½ cup. A serving size of meat is 2 to 3 ounces. A 3-ounce serving is about the size of a deck of cards. Measure serving sizes until you are good at [place]" them. Keep in mind that restaurants often serve portions that are 2 or 3 times the size of one serving. To keep your energy level up and keep you from feeling hungry, eat often but in smaller portions. Eat only the number of calories you need to stay at a healthy weight. If you need to lose weight, eat fewer calories than your body burns (through exercise and other physical activity). Eat more fruits and vegetables Eat a variety of fruit and vegetables every day. Dark green, deep orange, red, or yellow fruits and vegetables are especially good for you. Examples include spinach, carrots, peaches, and berries. Keep carrots, celery, and other veggies handy for snacks. Buy fruit that is in season and store it where you can see it so that you will be tempted to eat it. Cook dishes that have a lot of veggies in them, such as stir-fries and soups. Limit saturated and trans fat Read food labels, and try to avoid saturated and trans fats. They increase your risk of heart disease. Trans fat is found in many processed foods such as cookies and crackers. Use olive or canola oil when you cook. Try cholesterol-lowering spreads, such as Benecol or Take Control. Bake, broil, grill, or steam foods instead of frying them. Choose lean meats instead of high-fat meats such as hot dogs and sausages. Cut off all visible fat when you prepare meat. Eat fish, skinless poultry, and meat alternatives such as soy products instead of high-fat meats. Soy products, such as tofu, may be especially good for your heart. Choose low-fat or fat-free milk and dairy products. Eat fish Eat at least two servings of fish a week. Certain fish, such as salmon and tuna, contain omega-3 fatty acids, which may help reduce your risk of heart attack. Eat foods high in fiber Eat a variety of grain products every day. Include whole-grain foods that have lots of fiber and nutrients. Examples of whole-grain foods include oats, whole wheat bread, and brown rice. Buy whole-grain breads and cereals, instead of white bread or pastries. Limit salt and sodium Limit how much salt and sodium you eat to help lower your blood pressure. Taste food before you salt it. Add only a little salt when you think you need it. With time, your taste buds will adjust to less salt. Eat fewer snack items, fast foods, and other high-salt, processed foods. Check food labels for the amount of sodium in packaged foods. Choose low-sodium versions of canned goods (such as soups, vegetables, and beans). Limit sugar Limit drinks and foods with added sugar. These include candy, desserts, and soda pop. Limit alcohol Limit alcohol to no more than 2 drinks a day for men and 1 drink a day for women. Too much alcohol can cause health problems. When should you call for help? Watch closely for changes in your health, and be sure to contact your doctor if: You would like help planning heart-healthy meals. Where can you learn more? Go to http://onesimo-dasia.info/. Enter V137 in the search box to learn more about \"Heart-Healthy Diet: Care Instructions. \" Current as of: January 27, 2016 Content Version: 11.1 © 7832-4173 [employer]. Care instructions adapted under license by [employer] (which disclaims liability or warranty for this information). If you have questions about a medical condition or this instruction, always ask your healthcare professional. [place] [address] any warranty or liability for your use of this information. Heart Failure: Care Instructions Your Care Instructions Heart failure occurs when your heart does not pump as much blood as the body needs. Failure does not mean that the heart has stopped pumping but rather that it is not pumping as well as it should. Over time, this causes fluid buildup in your lungs and other parts of your body. Fluid buildup can cause shortness of breath, fatigue, swollen ankles, and other problems. By taking medicines regularly, reducing sodium (salt) in your diet, checking your weight every day, and making lifestyle changes, you can feel better and live longer. Follow-up care is a key part of your treatment and safety. Be sure to make and go to all appointments, and call your doctor if you are having problems. It's also a good idea to know your test results and keep a list of the medicines you take. How can you care for yourself at home? Medicines · Be safe with medicines. Take your medicines exactly as prescribed. Call your doctor if you think you are having a problem with your medicine. · Do not take any vitamins, over-the-counter medicine, or herbal products without talking to your doctor first. Rosalie Wilson not take ibuprofen (Advil or Motrin) and naproxen (Aleve) without talking to your doctor first. They could make your heart failure worse. · You may be taking some of the following medicine. ¨ Beta-blockers can slow heart rate, decrease blood pressure, and improve your condition. Taking a beta-blocker may lower your chance of needing to be hospitalized. ¨ Angiotensin-converting enzyme inhibitors (ACEIs) reduce the heart's workload, lower blood pressure, and reduce swelling. Taking an ACEI may lower your chance of needing to be hospitalized again. ¨ Angiotensin II receptor blockers (ARBs) work like ACEIs. Your doctor may prescribe them instead of ACEIs. ¨ Diuretics, also called water pills, reduce swelling. ¨ Potassium supplements replace this important mineral, which is sometimes lost with diuretics. ¨ Aspirin and other blood thinners prevent blood clots, which can cause a stroke or heart attack. You will get more details on the specific medicines your doctor prescribes. Diet · Your doctor may suggest that you limit sodium to 2,000 milligrams (mg) a day or less. That is less than 1 teaspoon of salt a day, including all the salt you eat in cooking or in packaged foods. People get most of their sodium from processed foods. Fast food and restaurant meals also tend to be very high in sodium. · Ask your doctor how much liquid you can drink each day. You may have to limit liquids. Weight · Weigh yourself without clothing at the same time each day. Record your weight. Call your doctor if you gain more than 3 pounds in 2 to 3 days. A sudden weight gain may mean that your heart failure is getting worse. Activity level · Start light exercise (if your doctor says it is okay). Even if you can only do a small amount, exercise will help you get stronger, have more energy, and manage your weight and your stress. Walking is an easy way to get exercise. Start out by walking a little more than you did before. Bit by bit, increase the amount you walk. · When you exercise, watch for signs that your heart is working too hard. You are pushing yourself too hard if you cannot talk while you are exercising. If you become short of breath or dizzy or have chest pain, stop, sit down, and rest. 
· If you feel \"wiped out\" the day after you exercise, walk slower or for a shorter distance until you can work up to a better pace. · Get enough rest at night. Sleeping with 1 or 2 pillows under your upper body and head may help you breathe easier. Lifestyle changes · Do not smoke. Smoking can make a heart condition worse. If you need help quitting, talk to your doctor about stop-smoking programs and medicines. These can increase your chances of quitting for good. Quitting smoking may be the most important step you can take to protect your heart. · Limit alcohol to 2 drinks a day for men and 1 drink a day for women. Too much alcohol can cause health problems. · Avoid getting sick from colds and the flu. Get a pneumococcal vaccine shot. If you have had one before, ask your doctor whether you need another dose. Get a flu shot each year. If you must be around people with colds or the flu, wash your hands often. When should you call for help? Call 911 if you have symptoms of sudden heart failure such as: 
· You have severe trouble breathing. · You cough up pink, foamy mucus. · You have a new irregular or rapid heartbeat. Call your doctor now or seek immediate medical care if: 
· You have new or increased shortness of breath. · You are dizzy or lightheaded, or you feel like you may faint. · You have sudden weight gain, such as 3 pounds or more in 2 to 3 days. · You have increased swelling in your legs, ankles, or feet. · You are suddenly so tired or weak that you cannot do your usual activities. Watch closely for changes in your health, and be sure to contact your doctor if: 
· You develop new symptoms. Where can you learn more? Go to http://onesimo-dasia.info/. Enter E099 in the search box to learn more about \"Heart Failure: Care Instructions. \" Current as of: January 27, 2016 Content Version: 11.1 © 7476-6324 [employer]. Care instructions adapted under license by [employer] (which disclaims liability or warranty for this information). If you have questions about a medical condition or this instruction, always ask your healthcare professional. [address] any warranty or liability for your use of this information. Discharge Orders None ACO Transitions of Care Introducing [facility] [address] offers a voluntary care coordination program to provide high quality service and care to [facility] fee-for-service beneficiaries. [facility] was designed to help you enhance your health and well-being through the following services: ? Transitions of Care  support for individuals who are transitioning from one care setting to another (example: Hospital to home). ? Chronic and Complex Care Coordination  support for individuals and caregivers of those with serious or chronic illnesses or with more than one chronic (ongoing) condition and those who take a number of different medications. If you meet specific medical criteria, a [facility] may call you directly to coordinate your care with your primary care physician and your other care providers.  
 
For questions about the [facility] programs, please, contact your physicians office. For general questions or additional information about Accountable Care Organizations: 
Please visit www.medicare.gov/acos. html or call 1-800-MEDICARE (5-172.556.5537) TTY users should call 8-244.146.1275. [facility] Announcement We are excited to announce that we are making your provider's discharge notes available to you in [facility]. You will see these notes when they are completed and signed by the physician that discharged you from your recent hospital stay. If you have any questions or concerns about any information you see in [facility], please call the Health Information Department where you were seen or reach out to your Primary Care Provider for more information about your plan of care. Introducing [facility]! [facility] introduces [facility] patient portal. Now you can access parts of your medical record, email your doctor's office, and request medication refills online. 1. In your internet browser, go to [email or web address] 2. Click on the First Time User? Click Here link in the Sign In box. You will see the New Member Sign Up page. 3. Enter your [facility] Access Code exactly as it appears below. You will not need to use this code after youve completed the sign-up process. If you do not sign up before the expiration date, you must request a new code. · [facility] Access Code: [email or web address] Expires: 4/9/2017  1:17 PM 
 
4. Enter the last four digits of your Social Security Number (xxxx) and Date of Birth (mm/dd/yyyy) as indicated and click Submit. You will be taken to the next sign-up page. 5. Create a [facility] ID. This will be your [facility] login ID and cannot be changed, so think of one that is secure and easy to remember. 6. Create a [facility] password. You can change your password at any time. 7. Enter your Password Reset Question and Answer. This can be used at a later time if you forget your password. 8. Enter your e-mail address. You will receive e-mail notification when new information is available in [address] 9. Click Sign Up. You can now view and download portions of your medical record. 10. Click the Download Summary menu link to download a portable copy of your medical information. If you have questions, please visit the Frequently Asked Questions section of the [employer] website. Remember, [employer] is NOT to be used for urgent needs. For medical emergencies, dial 911. Now available from your iPhone and Android! General Information Please provide this summary of care documentation to your next provider. Patient Signature:  ____________________________________________________________ Date:  ____________________________________________________________  
  
[facility] Provider Signature:  ____________________________________________________________ Date:  ____________________________________________________________

## 2017-02-12 NOTE — Clinical Note
Patient Class[de-identified] Observation [664] Type of Bed: Medical [8] Reason for Observation: dyspnea, hypotension Admitting Physician: Bang Santa [[id number]] Attending Physician: Bang Santa [[id number]]  Diagnosis: dyspnea, hypotension,

## 2017-02-12 NOTE — IP AVS SNAPSHOT
Current Discharge Medication List  
  
Take these medications at their scheduled times Dose & Instructions Dispensing Information Comments Morning Noon Evening Bedtime  
 allopurinol 100 mg tablet Commonly known as:  Mayi Abbasi Your next dose is: Today, Tomorrow Other:  ____________ Dose:  100 mg Take 100 mg by mouth two (2) times a day. Refills:  0  
     
   
   
   
  
 calcium carbonate 600 mg calcium (1,500 mg) tablet Commonly known as:  Blondie Rich Your next dose is: Today, Tomorrow Other:  ____________ Dose:  600 mg Take 600 mg by mouth nightly. Refills:  0  
     
   
   
   
  
 cholecalciferol 1,000 unit Cap Commonly known as:  VITAMIN D3 Your next dose is: Today, Tomorrow Other:  ____________ Take  by mouth daily. Refills:  0  
     
   
   
   
  
 diazePAM 5 mg tablet Commonly known as:  VALIUM Your next dose is: Today, Tomorrow Other:  ____________ Dose:  2.5 mg Take 0.5 Tabs by mouth nightly. Max Daily Amount: 2.5 mg.  
 Quantity:  10 Tab Refills:  0 DOCUSATE SODIUM Your next dose is: Today, Tomorrow Other:  ____________ Dose:  1 Cap Take 1 Cap by mouth two (2) times a day. Refills:  0  
     
   
   
   
  
 gabapentin 100 mg capsule Commonly known as:  NEURONTIN Your next dose is: Today, Tomorrow Other:  ____________ Dose:  100 mg Take 1 Cap by mouth three (3) times daily for 30 days. Quantity:  90 Cap Refills:  0  
     
   
   
   
  
 hydrocortisone 25 mg Supp Commonly known as:  ANUSOL-HC Your next dose is: Today, Tomorrow Other:  ____________ Dose:  25 mg Insert 1 Suppository into rectum every twelve (12) hours for 10 days. Quantity:  20 Suppository Refills:  0  
     
   
   
   
  
 levothyroxine 88 mcg tablet Commonly known as:  SYNTHROID  
 Your next dose is: Today, Tomorrow Other:  ____________ Take  by mouth Daily (before breakfast). Refills:  0  
     
   
   
   
  
 * nitroglycerin 0.4 mg/hr Commonly known as:  FONOSXBJ Your next dose is: Today, Tomorrow Other:  ____________ Dose:  1 Patch 1 Patch by TransDERmal route daily. Refills:  0 Omeprazole delayed release 20 mg tablet Commonly known as:  PRILOSEC D/R Your next dose is: Today, Tomorrow Other:  ____________ Dose:  20 mg Take 20 mg by mouth daily. prn Refills:  0  
     
   
   
   
  
 polyethylene glycol 17 gram/dose powder Commonly known as:  Auranna Patel Your next dose is: Today, Tomorrow Other:  ____________ Dose:  17 g Take 17 g by mouth two (2) times a day for 30 days. Quantity:  1020 g Refills:  0  
     
   
   
   
  
 potassium chloride SR 20 mEq tablet Commonly known as:  K-TAB Your next dose is: Today, Tomorrow Other:  ____________ Dose:  20 mEq Take 20 mEq by mouth two (2) times a day. Refills:  0  
     
   
   
   
  
 pravastatin 40 mg tablet Commonly known as:  PRAVACHOL Your next dose is: Today, Tomorrow Other:  ____________ Dose:  40 mg Take 1 Tab by mouth daily. Indications: hyperlipidemia Quantity:  90 Tab Refills:  3  
     
   
   
   
  
 rOPINIRole 2 mg tablet Commonly known as:  [address] Your next dose is: Today, Tomorrow Other:  ____________ Dose:  2 mg Take 2 mg by mouth nightly. Refills:  0  
     
   
   
   
  
 spironolactone 25 mg tablet Commonly known as:  ALDACTONE Your next dose is: Today, Tomorrow Other:  ____________ Dose:  25 mg Take 1 Tab by mouth daily. Quantity:  90 Tab Refills:  2  
     
   
   
   
  
 torsemide 20 mg tablet Commonly known as:  DEMADEX Your next dose is: Today, Tomorrow Other:  ____________ Dose:  40 mg Take 2 Tabs by mouth daily. Quantity:  30 Tab Refills:  0  
     
   
   
   
  
 VITAMIN B-12 250 mcg tablet Generic drug:  cyanocobalamin Your next dose is: Today, Tomorrow Other:  ____________ Dose:  1000 mcg Take 1,000 mcg by mouth daily. Refills:  0  
     
   
   
   
  
 warfarin 5 mg tablet Commonly known as:  COUMADIN Your next dose is: Today, Tomorrow Other:  ____________ Dose:  5 mg Take 1 Tab by mouth nightly. Quantity:  7 Tab Refills:  0  
     
   
   
   
  
 ZOLOFT 50 mg tablet Generic drug:  sertraline Your next dose is: Today, Tomorrow Other:  ____________ Dose:  50 mg Take 50 mg by mouth daily. Refills:  0  
     
   
   
   
  
 * Notice: This list has 1 medication(s) that are the same as other medications prescribed for you. Read the directions carefully, and ask your doctor or other care provider to review them with you. Take these medications as needed Dose & Instructions Dispensing Information Comments Morning Noon Evening Bedtime  
 guaiFENesin-dextromethorphan 100-10 mg/5 mL syrup Commonly known as:  ROBITUSSIN DM Your next dose is: Today, Tomorrow Other:  ____________ Dose:  5 mL Take 5 mL by mouth every six (6) hours as needed for up to 10 days. Quantity:  1 Bottle Refills:  0  
     
   
   
   
  
 * nitroglycerin 0.4 mg SL tablet Commonly known as:  NITROSTAT Your next dose is: Today, Tomorrow Other:  ____________  
   
   
 by SubLINGual route every five (5) minutes as needed for Chest Pain. Refills:  0  
     
   
   
   
  
 promethazine 25 mg tablet Commonly known as:  PHENERGAN Your next dose is: Today, Tomorrow Other:  ____________ Dose:  25 mg Take 1 Tab by mouth every six (6) hours as needed. Quantity:  12 Tab Refills:  0  
     
   
   
   
  
 * Notice: This list has 1 medication(s) that are the same as other medications prescribed for you. Read the directions carefully, and ask your doctor or other care provider to review them with you. Take these medications as directed Dose & Instructions Dispensing Information Comments Morning Noon Evening Bedtime  
 hydrocortisone 2.5 % rectal cream  
Commonly known as:  ANUSOL-HC Your next dose is: Today, Tomorrow Other:  ____________ Apply small amount to hemorrhoids/perianal skin TID PRN Quantity:  30 g Refills:  3 OXYGEN-AIR DELIVERY SYSTEMS Your next dose is: Today, Tomorrow Other:  ____________  
   
   
 by Does Not Apply route. 2-2.5 lpm cont. Refills:  0 [address] Your next dose is: Today, Tomorrow Other:  ____________ Apply  to eye. Refills:  0 STIOLTO RESPIMAT IN Your next dose is: Today, Tomorrow Other:  ____________ Take  by inhalation. NEW-PATIENT NOT TAKING, STATES IT MAKES HER JITTERY Refills:  0 Where to Get Your Medications These medications were sent to [address] [address] Phone:  [phone]  
  gabapentin 100 mg capsule  
 guaiFENesin-dextromethorphan 100-10 mg/5 mL syrup  
 hydrocortisone 25 mg Supp  
 polyethylene glycol 17 gram/dose powder  
 torsemide 20 mg tablet  
 warfarin 5 mg tablet Information about where to get these medications is not yet available ! Ask your nurse or doctor about these medications  
  diazePAM 5 mg tablet

## 2017-02-12 NOTE — ED PROVIDER NOTES
HPI Comments: presents with complaint of increased fatigue and low blood pressure. The patient was seen earlier in the week, low blood pressure. She is given fluids and felt a lot better. Son states she did well for a few days but has progressively gotten more weak and dyspneic. States she has difficulty walking more than a few feet without getting short of breath. Weeks ago she did walk to the bathroom again the hallway without issue. She has a history of severe CHF and COPD and is on chronic oxygen therapy. She complains of bilateral lower extremity pain and swelling. Chest has a history of chronic anemia and gets transfusions occasionally. Son lives with her and reports intermittent confusion but currently she is alert and oriented. She just states that she feels weak all over. At last visit her torosemide was decreased from twice a day 3 days week+daily to just daily. I saw the patient last time and her granddaughter is a nurse here. Patient is a 77 y.o. female presenting with fatigue. The history is provided by the patient. Fatigue   This is a recurrent problem. The current episode started 12 to 24 hours ago. The problem has been rapidly worsening. Primary symptoms include disorientation (occasional). Pertinent negatives include no focal weakness. There has been no fever. Associated symptoms include shortness of breath and chest pain (chronic).         Past Medical History:   Diagnosis Date    Abnormal glucose 8/5/2016    Abscess 8/5/2016    Acute encephalopathy 7/8/2016    Acute renal failure ([facility] 12/3/2009    Afib ([facility]     Anemia     Ankle swelling 8/5/2016    Anxiety 8/5/2016    Aortic Valve Bioprosthesis Present 3/7/2009    ARF (acute renal failure) ([facility]) 2/24/2010    Arthritis     Asthma     Atopic dermatitis 8/5/2016    Atrial fibrillation ([facility]) 3/7/2009    Autonomic orthostatic hypotension 8/5/2016    AV block 10/17/2011    Back pain 8/5/2016    Bradycardia (Symptomatic) 11/7/2011    CAD (coronary artery disease)     [facility]     breast (left)    Cardiac pacemaker 11/2/2015    Cardiogenic shock ([facility] 10/17/2011    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    Cellulitis 8/5/2016    Chest pain 8/5/2016    Chronic atrial fibrillation (HCC) 10/17/2011    Chronic depression 8/5/2016    Chronic depression 8/5/2016    Chronic obstructive pulmonary disease ([facility] 3/8/2009    Chronic pain     CKD (chronic kidney disease) stage 3, GFR 30-59 ml/min 12/4/2009    CKD (chronic kidney disease) stage 3, GFR 30-59 ml/min 12/4/2009    Congestive heart failure (CHF) ([facility] 11/2/2015    COPD      O2 AT 3 L NC    CRI (chronic renal insufficiency) 8/5/2016    Degeneration of cervical intervertebral disc 8/5/2016    Degenerative arthritis of left knee 2/27/2009    Dehydration 1/0/4637    Diastolic heart failure (HCC)     Digoxin toxicity 2/24/2010    Disorder of sweat glands 8/5/2016    Diverticulosis of large intestine without diverticulitis 2015    Dyspnea 8/5/2016    Eczema 8/5/2016    Epigastric abdominal pain 2/24/2010    GERD (gastroesophageal reflux disease)     Gout 8/5/2016    H/O mitral valve repair, 2003 6/27/2016    Heart failure ([facility]     HTN (hypertension) 8/5/2016    Hx of colonic polyp 2015     adenoma    Hyperkalemia 10/17/2011    Hyperlipidemia 8/5/2016    Hypertension     Hypokalemia 2/24/2010    Hypothyroidism 12/4/2009    Ill-defined condition      CARPEL TUNNEL SYNDROME, BILAT HANDS    Knee pain 8/5/2016    Leg cramps 8/5/2016    Mitral stenosis with insufficiency 11/2/2015     Prior MV repair 2003.      Nausea and vomiting 2/24/2010    Obesity 11/2/2015    BOBBY (obstructive sleep apnea) 12/4/2009    Osteoarthritis 8/5/2016    Osteopenia 8/5/2016    Other long term (current) drug therapy 8/5/2016    Palpitations 11/2/2015    Rash 8/5/2016    Rectal bleeding 10/27/2011    Rectocele 2015    Respiratory insufficiency 2015    Rheumatic aortic stenosis 2015    Rheumatic heart disease 2015    Right hip pain 2016    RLS (restless legs syndrome) 2016    Sick sinus syndrome ([facility] 2016    Skin infection 2016    Sleep apnea 2015    Tachycardia 2016    Thrombocytopenia, unspecified ([facility] 2012    Thyroid disease     Urticaria 2016    Vertigo 2016       Past Surgical History:   Procedure Laterality Date    Hx appendectomy      Hx cholecystectomy      Hx gyn       hysterectomy still have ovaries    Hx mastectomy       left mastectomy    Hx pacemaker      Hx breast reconstruction      Pr cardiac surg procedure unlist       valve repair and replacement    Hx orthopaedic       knee surgery         Family History:   Problem Relation Age of Onset    Heart Disease Mother     Cancer Father      Throat    Heart Disease Father     Cancer Sister      Breast, Colon    Heart Disease Sister     Breast Cancer Sister 79      from disease    Cancer Maternal Grandmother     Cancer Brother     Diabetes Brother     Heart Disease Brother     Psychiatric Disorder Paternal Grandfather     Cancer Maternal Aunt      Breast    Diabetes Son     Alcohol abuse Neg Hx     Arthritis-rheumatoid Neg Hx     Asthma Neg Hx     Bleeding Prob Neg Hx     Elevated Lipids Neg Hx     Headache Neg Hx     Migraines Neg Hx     Hypertension Neg Hx     Lung Disease Neg Hx     Mental Retardation Neg Hx     Stroke Neg Hx        Social History     Social History    Marital status:      Spouse name: N/A    Number of children: N/A    Years of education: N/A     Occupational History    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    cotton mill      6 years     Social History Main Topics    Smoking status: Former Smoker     Types: Cigarettes     Quit date: 1996    Smokeless tobacco: Never Used      Comment: quit     Alcohol use No    Drug use: No    Sexual activity: Not Currently     Other Topics Concern    Not on file     Social History Narrative         ALLERGIES: Adhesive tape; Benadryl [diphenhydramine hcl]; Demerol [meperidine]; Morphine; Sulfa (sulfonamide antibiotics); and Lorazepam    Review of Systems   Constitutional: Positive for fatigue. Negative for chills and fever. Respiratory: Positive for shortness of breath. Cardiovascular: Positive for chest pain (chronic) and leg swelling. Skin: Negative for rash and wound. Neurological: Negative for focal weakness. All other systems reviewed and are negative. Vitals:    02/12/17 1517 02/12/17 1527 02/12/17 1534 02/12/17 1548   BP: (!) 82/59 (!) 82/59 (!) 88/58 104/49   Pulse: 79 75 77 74   Resp: 20 19 23 24   Temp:       SpO2: 99% 97% 99%    Weight:       Height:                Physical Exam   Constitutional: She is oriented to person, place, and time. She appears well-developed and well-nourished. She appears distressed (mild resp distress). HENT:   Head: Normocephalic and atraumatic. Neck: Normal range of motion. Neck supple. Cardiovascular: Normal rate. An irregularly irregular rhythm present. Pulmonary/Chest: She is in respiratory distress. She has no wheezes. She has rales. Abdominal: Soft. She exhibits no distension. There is no tenderness. There is no rebound and no guarding. Musculoskeletal: Normal range of motion. She exhibits edema (3-4+). Neurological: She is alert and oriented to person, place, and time. No cranial nerve deficit. Skin: Skin is warm and dry. She is not diaphoretic. Psychiatric: She has a normal mood and affect. Her behavior is normal.   Nursing note and vitals reviewed. MDM  Number of Diagnoses or Management Options  Diagnosis management comments: Patient with intravascular dehydration and extravascular volume overload. She certainly seems more dyspneic today and her legs are more swollen.   I have already attempted to manage her as an outpatient and her quick return indicates that this will take more time and med changes and can be done at home. Her chest x-ray is unchanged and there no signs of infection. Her blood pressure responded to a liter of fluid. She will need medical management of her multiple medications and possible diuresis.   Discussed with the hospitalist.       Amount and/or Complexity of Data Reviewed  Clinical lab tests: ordered and reviewed  Decide to obtain previous medical records or to obtain history from someone other than the patient: yes (son)  Review and summarize past medical records: yes (EF 50%, aVR and MVR repair)  Discuss the patient with other providers: yes  Independent visualization of images, tracings, or specimens: yes (afib with  Right bundle  Unchanged from previous evaluation)    Risk of Complications, Morbidity, and/or Mortality  Presenting problems: high  Diagnostic procedures: moderate  Management options: high    Patient Progress  Patient progress: improved    ED Course       Procedures

## 2017-02-12 NOTE — PROGRESS NOTES
Bedside and Verbal shift change report given to Sue Woo RN (oncoming nurse) by self Mary alvarado). Report included the following information SBAR, Recent Results and Med Rec Status.

## 2017-02-12 NOTE — ED TRIAGE NOTES
Pt arrives POV from home c/o generalized weakness and SBP of 85 at home via wrist cuff. Pt denies pain. States seen here recently for same. Also c/o n/v yesterday. Also reports productive cough.

## 2017-02-12 NOTE — PROGRESS NOTES
TRANSFER - IN REPORT:    Verbal report received from Chuy Molina RN on Reina Chowdary being received from ED for routine progression of care. Report consisted of patients Situation, Background, Assessment and Recommendations(SBAR). Information from the following report(s) SBAR, Recent Results, Med Rec Status and Cardiac Rhythm Afib controlled was reviewed. Opportunity for questions and clarification was provided. Assessment completed upon patients arrival to unit and care assumed. Patient received to room 321. Patient connected to monitor and assessment completed. Plan of care reviewed. Patient oriented to room and call light. Patient aware to use call light to communicate any chest pain or needs. Admission skin assessment completed with second RN and reveals the following: No skin breakdown noted. Varicose veins present on bilateral legs. Skin folds noted on abdomen and back. No breakdown noted under folds. Bilateral lower legs present with non-pitting edema, +2.

## 2017-02-13 LAB
ANION GAP BLD CALC-SCNC: 7 MMOL/L (ref 7–16)
BACTERIA SPEC CULT: NORMAL
BASOPHILS # BLD AUTO: 0 K/UL (ref 0–0.2)
BASOPHILS # BLD: 1 % (ref 0–2)
BNP SERPL-MCNC: 164 PG/ML
BUN SERPL-MCNC: 25 MG/DL (ref 8–23)
CALCIUM SERPL-MCNC: 7.6 MG/DL (ref 8.3–10.4)
CHLORIDE SERPL-SCNC: 108 MMOL/L (ref 98–107)
CO2 SERPL-SCNC: 32 MMOL/L (ref 21–32)
CREAT SERPL-MCNC: 1.29 MG/DL (ref 0.6–1)
DIFFERENTIAL METHOD BLD: ABNORMAL
EOSINOPHIL # BLD: 0.1 K/UL (ref 0–0.8)
EOSINOPHIL NFR BLD: 4 % (ref 0.5–7.8)
ERYTHROCYTE [DISTWIDTH] IN BLOOD BY AUTOMATED COUNT: 15.5 % (ref 11.9–14.6)
FERRITIN SERPL-MCNC: 374 NG/ML (ref 8–388)
GLUCOSE SERPL-MCNC: 107 MG/DL (ref 65–100)
HCT VFR BLD AUTO: 26.8 % (ref 35.8–46.3)
HGB BLD-MCNC: 7.9 G/DL (ref 11.7–15.4)
HGB RETIC QN AUTO: 29 PG (ref 29–35)
IMM GRANULOCYTES # BLD: 0 K/UL (ref 0–0.5)
IMM GRANULOCYTES NFR BLD AUTO: 0 % (ref 0–5)
IMM RETICS NFR: 7.4 % (ref 3–15.9)
INR PPP: 1.8 (ref 0.9–1.2)
IRON SATN MFR SERPL: 14 %
IRON SERPL-MCNC: 38 UG/DL (ref 35–150)
LYMPHOCYTES # BLD AUTO: 16 % (ref 13–44)
LYMPHOCYTES # BLD: 0.5 K/UL (ref 0.5–4.6)
MAGNESIUM SERPL-MCNC: 2.3 MG/DL (ref 1.8–2.4)
MCH RBC QN AUTO: 30.5 PG (ref 26.1–32.9)
MCHC RBC AUTO-ENTMCNC: 29.5 G/DL (ref 31.4–35)
MCV RBC AUTO: 103.5 FL (ref 79.6–97.8)
MM INDURATION POC: 0 MM (ref 0–5)
MONOCYTES # BLD: 0.2 K/UL (ref 0.1–1.3)
MONOCYTES NFR BLD AUTO: 6 % (ref 4–12)
NEUTS SEG # BLD: 2.4 K/UL (ref 1.7–8.2)
NEUTS SEG NFR BLD AUTO: 73 % (ref 43–78)
PHOSPHATE SERPL-MCNC: 3.5 MG/DL (ref 2.3–3.7)
PLATELET # BLD AUTO: 99 K/UL (ref 150–450)
PMV BLD AUTO: 10.7 FL (ref 10.8–14.1)
POTASSIUM SERPL-SCNC: 3.6 MMOL/L (ref 3.5–5.1)
PPD POC: NORMAL NEGATIVE
PROTHROMBIN TIME: 20 SEC (ref 9.6–12)
RBC # BLD AUTO: 2.59 M/UL (ref 4.05–5.25)
RETICS # AUTO: 0.08 M/UL (ref 0.02–0.08)
RETICS/RBC NFR AUTO: 2.9 % (ref 0.3–2)
SERVICE CMNT-IMP: NORMAL
SODIUM SERPL-SCNC: 147 MMOL/L (ref 136–145)
TIBC SERPL-MCNC: 265 UG/DL (ref 250–450)
TSH SERPL DL<=0.005 MIU/L-ACNC: 1.27 UIU/ML (ref 0.36–3.74)
WBC # BLD AUTO: 3.3 K/UL (ref 4.3–11.1)

## 2017-02-13 PROCEDURE — 84100 ASSAY OF PHOSPHORUS: CPT | Performed by: NURSE PRACTITIONER

## 2017-02-13 PROCEDURE — 83880 ASSAY OF NATRIURETIC PEPTIDE: CPT | Performed by: NURSE PRACTITIONER

## 2017-02-13 PROCEDURE — 83540 ASSAY OF IRON: CPT | Performed by: HOSPITALIST

## 2017-02-13 PROCEDURE — C8929 TTE W OR WO FOL WCON,DOPPLER: HCPCS

## 2017-02-13 PROCEDURE — 74011250637 HC RX REV CODE- 250/637: Performed by: FAMILY MEDICINE

## 2017-02-13 PROCEDURE — 74011250637 HC RX REV CODE- 250/637: Performed by: HOSPITALIST

## 2017-02-13 PROCEDURE — 97162 PT EVAL MOD COMPLEX 30 MIN: CPT

## 2017-02-13 PROCEDURE — 85046 RETICYTE/HGB CONCENTRATE: CPT | Performed by: EMERGENCY MEDICINE

## 2017-02-13 PROCEDURE — 36415 COLL VENOUS BLD VENIPUNCTURE: CPT | Performed by: NURSE PRACTITIONER

## 2017-02-13 PROCEDURE — 80048 BASIC METABOLIC PNL TOTAL CA: CPT | Performed by: NURSE PRACTITIONER

## 2017-02-13 PROCEDURE — 84443 ASSAY THYROID STIM HORMONE: CPT | Performed by: NURSE PRACTITIONER

## 2017-02-13 PROCEDURE — 74011250636 HC RX REV CODE- 250/636: Performed by: HOSPITALIST

## 2017-02-13 PROCEDURE — 74011250637 HC RX REV CODE- 250/637: Performed by: NURSE PRACTITIONER

## 2017-02-13 PROCEDURE — 82728 ASSAY OF FERRITIN: CPT | Performed by: HOSPITALIST

## 2017-02-13 PROCEDURE — 74011250636 HC RX REV CODE- 250/636: Performed by: NURSE PRACTITIONER

## 2017-02-13 PROCEDURE — 97165 OT EVAL LOW COMPLEX 30 MIN: CPT

## 2017-02-13 PROCEDURE — 85610 PROTHROMBIN TIME: CPT | Performed by: NURSE PRACTITIONER

## 2017-02-13 PROCEDURE — 74011000258 HC RX REV CODE- 258: Performed by: HOSPITALIST

## 2017-02-13 PROCEDURE — 83735 ASSAY OF MAGNESIUM: CPT | Performed by: NURSE PRACTITIONER

## 2017-02-13 PROCEDURE — 85025 COMPLETE CBC W/AUTO DIFF WBC: CPT | Performed by: NURSE PRACTITIONER

## 2017-02-13 PROCEDURE — 65660000000 HC RM CCU STEPDOWN

## 2017-02-13 PROCEDURE — 74011000250 HC RX REV CODE- 250: Performed by: HOSPITALIST

## 2017-02-13 PROCEDURE — 87641 MR-STAPH DNA AMP PROBE: CPT | Performed by: INTERNAL MEDICINE

## 2017-02-13 RX ORDER — WARFARIN 1 MG/1
1 TABLET ORAL
Status: COMPLETED | OUTPATIENT
Start: 2017-02-13 | End: 2017-02-13

## 2017-02-13 RX ORDER — AMOXICILLIN 250 MG
1 CAPSULE ORAL 2 TIMES DAILY
Status: DISCONTINUED | OUTPATIENT
Start: 2017-02-13 | End: 2017-02-21

## 2017-02-13 RX ORDER — DIAZEPAM 5 MG/1
2.5 TABLET ORAL
Status: ON HOLD | COMMUNITY
End: 2017-02-28

## 2017-02-13 RX ORDER — SODIUM CHLORIDE 450 MG/100ML
50 INJECTION, SOLUTION INTRAVENOUS CONTINUOUS
Status: DISCONTINUED | OUTPATIENT
Start: 2017-02-14 | End: 2017-02-14

## 2017-02-13 RX ORDER — SERTRALINE HYDROCHLORIDE 50 MG/1
50 TABLET, FILM COATED ORAL DAILY
COMMUNITY
End: 2017-09-11 | Stop reason: SDUPTHER

## 2017-02-13 RX ORDER — ALLOPURINOL 100 MG/1
100 TABLET ORAL 2 TIMES DAILY
COMMUNITY
End: 2017-06-19 | Stop reason: SDUPTHER

## 2017-02-13 RX ORDER — TRAZODONE HYDROCHLORIDE 50 MG/1
50 TABLET ORAL
Status: DISCONTINUED | OUTPATIENT
Start: 2017-02-13 | End: 2017-02-28 | Stop reason: HOSPADM

## 2017-02-13 RX ORDER — SODIUM CHLORIDE 450 MG/100ML
50 INJECTION, SOLUTION INTRAVENOUS CONTINUOUS
Status: DISCONTINUED | OUTPATIENT
Start: 2017-02-13 | End: 2017-02-13

## 2017-02-13 RX ORDER — METOPROLOL SUCCINATE 50 MG/1
50 TABLET, EXTENDED RELEASE ORAL
COMMUNITY
End: 2017-02-28

## 2017-02-13 RX ORDER — GABAPENTIN 100 MG/1
100 CAPSULE ORAL 3 TIMES DAILY
Status: DISCONTINUED | OUTPATIENT
Start: 2017-02-14 | End: 2017-02-28 | Stop reason: HOSPADM

## 2017-02-13 RX ORDER — TORSEMIDE 20 MG/1
20 TABLET ORAL DAILY
COMMUNITY
End: 2017-02-28

## 2017-02-13 RX ORDER — WARFARIN 2.5 MG/1
2.5 TABLET ORAL
Status: DISCONTINUED | OUTPATIENT
Start: 2017-02-13 | End: 2017-02-14

## 2017-02-13 RX ADMIN — SPIRONOLACTONE 25 MG: 25 TABLET, FILM COATED ORAL at 08:43

## 2017-02-13 RX ADMIN — WARFARIN SODIUM 2.5 MG: 2.5 TABLET ORAL at 22:08

## 2017-02-13 RX ADMIN — PANTOPRAZOLE SODIUM 40 MG: 40 TABLET, DELAYED RELEASE ORAL at 05:59

## 2017-02-13 RX ADMIN — PRAVASTATIN SODIUM 1 TABLET: 20 TABLET ORAL at 12:54

## 2017-02-13 RX ADMIN — Medication 10 ML: at 22:09

## 2017-02-13 RX ADMIN — SODIUM CHLORIDE 50 ML/HR: 450 INJECTION, SOLUTION INTRAVENOUS at 18:00

## 2017-02-13 RX ADMIN — DOCUSATE SODIUM 100 MG: 100 CAPSULE ORAL at 08:47

## 2017-02-13 RX ADMIN — DILTIAZEM HYDROCHLORIDE 240 MG: 240 CAPSULE, COATED, EXTENDED RELEASE ORAL at 08:43

## 2017-02-13 RX ADMIN — SERTRALINE HYDROCHLORIDE 50 MG: 50 TABLET ORAL at 22:08

## 2017-02-13 RX ADMIN — LEVOTHYROXINE SODIUM 88 MCG: 0.09 TABLET ORAL at 05:59

## 2017-02-13 RX ADMIN — Medication 10 ML: at 06:00

## 2017-02-13 RX ADMIN — TRAZODONE HYDROCHLORIDE 50 MG: 50 TABLET ORAL at 22:08

## 2017-02-13 RX ADMIN — PERFLUTREN 1 ML: 6.52 INJECTION, SUSPENSION INTRAVENOUS at 10:07

## 2017-02-13 RX ADMIN — WARFARIN SODIUM 1 MG: 1 TABLET ORAL at 12:54

## 2017-02-13 RX ADMIN — ONDANSETRON 4 MG: 2 INJECTION INTRAMUSCULAR; INTRAVENOUS at 23:34

## 2017-02-13 RX ADMIN — ROPINIROLE 2 MG: 2 TABLET, FILM COATED ORAL at 22:08

## 2017-02-13 RX ADMIN — PRAVASTATIN SODIUM 1 TABLET: 20 TABLET ORAL at 17:01

## 2017-02-13 NOTE — PROGRESS NOTES
Warfarin dosing per pharmacist    Daniel Bedoya is a 77 y.o. female. Height: 5' (152.4 cm)    Weight: 84.6 kg (186 lb 8 oz)    Indication:  Bio-prosthetic valve, A. Fib    Goal INR:  2-3    Home dose:  2.5 mg hs    Risk factors or significant drug interactions:  allopurinol    Other anticoagulants:  none    Daily Monitoring  Date  INR     Warfarin dose HGB              Notes  2/12  1.9  2.5 mg  --  2/13  1.8   2.5 mg + 1 mg X1 7.9    Pharmacy consulted to manage warfarin on 2/13 by Dr. Toña Gonzalez    77 YOF with a pmh of A. Fib and bioprosthetic valve. INR below goal.  Will give a warfarin 1m 1X dose and continue warfarin 2.5 mg hs. Trend INR daily and adjust prn.      Thank you,  Armando Kidd, PharmD, BCPS  Clinical Pharmacist  [phone]

## 2017-02-13 NOTE — PROGRESS NOTES
Cardiology Consult Dictated, job ID #[id number]  We will follow, prognosis poor. Will need palliative measures.     Duncna Paul

## 2017-02-13 NOTE — PROGRESS NOTES
Problem: Self Care Deficits Care Plan (Adult)  Goal: *Acute Goals and Plan of Care (Insert Text)  1. Patient will complete lower body bathing and dressing with supervsion and adaptive equipment as needed. 2. Patient will complete toileting with supervision. 3. Patient will tolerate 30 minutes of OT treatment with 3 rest breaks to increase activity tolerance for ADLs. 4. Patient will complete functional transfers with supervision and adaptive equipment as needed. Timeframe: 7 visits       OCCUPATIONAL THERAPY: Initial Assessment 2/13/2017  INPATIENT: Hospital Day: 2  [place] / [place] / Product Type: Medicare /      NAME/AGE/GENDER: Reina Chowdary is a 77 y.o. female       PRIMARY DIAGNOSIS:  dyspnea, hypotension,  Acute CHF (congestive heart failure) ([facility]) Acute CHF (congestive heart failure) ([facility]) Acute CHF (congestive heart failure) ([facility]        ICD-10: Treatment Diagnosis:        · Generalized Muscle Weakness (M62.81)  · Other lack of cordination (R27.8)  · Dizziness and Giddiness (R42)   Precautions/Allergies:         Adhesive tape; Benadryl [diphenhydramine hcl]; Demerol [meperidine]; Morphine; Sulfa (sulfonamide antibiotics); and Lorazepam       ASSESSMENT:      Ms. Elfego Michelle presents with PT transferring to chair with CGA, agreeable to OT evaluation. Pt is pleasant, oriented and appropriate for age. At baseline, pt is independent with ADLs. Her son is present in the home for bathing secondary to safety. Currently, pt is SOB with an increase in heart rate with activity; requires minimal assistance for ADLs. Pt presents with decreased activity tolerance, and decreased independence for self care, functional mobility, and ADL's. Ms. Elfego Michelle requires skilled OT to maximize independence with self care tasks and functional transfers. Pt left sitting up in chair with all needs in reach, pt instructed to call for assistance; RN aware.         This section established at most recent assessment   PROBLEM LIST (Impairments causing functional limitations):  1. Decreased Strength  2. Decreased ADL/Functional Activities  3. Decreased Transfer Abilities  4. Decreased Ambulation Ability/Technique  5. Decreased Balance  6. Decreased Activity Tolerance  7. Decreased Work Simplification/Energy Conservation Techniques  8. Increased Fatigue  9. Increased Shortness of Breath  10. Decreased [place] with Home Exercise Program    INTERVENTIONS PLANNED: (Benefits and precautions of occupational therapy have been discussed with the patient.)  1. Activities of daily living training  2. Adaptive equipment training  3. Balance training  4. Clothing management  5. Donning&doffing training  6. Group therapy  7. Theraputic activity  8. Theraputic exercise  9. Energy conservation      TREATMENT PLAN: Frequency/Duration: Follow patient 3x/week to address above goals. Rehabilitation Potential For Stated Goals: GOOD      RECOMMENDED REHABILITATION/EQUIPMENT: (at time of discharge pending progress): to be determined. OCCUPATIONAL PROFILE AND HISTORY:   History of Present Injury/Illness (Reason for Referral):  Ms. Christel Kramer is a very pleasant 78 yo F Who complains of increased SOb, nonproductive cough and b/l LE swelling in the last 1-2 months. Known with chronic diastolic CHF, CAD, bradycardia s/p pacemaker, , s/p bioprosthetic AVR,Chronic Afib on coumadin, severe pulmonary HTN with PAP 69 mmHg, chronic respiratory failure on NC 3L 24h/7 , COPD, GERD, HTN, HLP, Obesity, BOBBY - not using CPAP. Seen by her cardiologist, Ibrahima Pyle on 1/9/17 and Demadex was increased to 40 mg PO daily, except MWF when was BID. Ms. Christel Kramer states that her leg swelling improved, although her cough and SOB persisted. Seen by PCP on 1/31 and placed on oral steroids and Albuterol that she couldn't complete because the albuterol made ermias jittery. No improvement with treatment.  Seen at [facility] ED on 2/9 with low BP and and thought that she is dehydrated due to increased diuretics and received IV fluids. She felt better for one day, although because of increased in her symptoms she presented to ED today. BP was 80/51 mmHg and received 1L NC bolus in Ed per ED provider. CXR w/o acute pathology. No spikes of fever since her symptoms started. Influenza screen negative. WBC:3.4. Cr:1.48, around her baseline. CV:irregular irregularity. No JVD. +2 pitting edema b/l LE   Resp: Decreased air entry b/l at the base. No wheezing. No rales or crackles. Abd: soft, non-tender, no rebound tenderness. Past Medical History/Comorbidities:   Ms. Kortney Ballard  has a past medical history of Abnormal glucose (8/5/2016); Abscess (8/5/2016); Acute encephalopathy (7/8/2016); Acute renal failure ([address] (12/3/2009); Afib ([address] Anemia; Ankle swelling (8/5/2016); Anxiety (8/5/2016); Aortic Valve Bioprosthesis Present (3/7/2009); ARF (acute renal failure) ([address] (2/24/2010); Arthritis; Asthma; Atopic dermatitis (8/5/2016); Atrial fibrillation ([address] (3/7/2009); Autonomic orthostatic hypotension (8/5/2016); AV block (10/17/2011); Back pain (8/5/2016); Bradycardia (Symptomatic) (11/7/2011); CAD (coronary artery disease); Cancer ([address] (1990); Cardiac pacemaker (11/2/2015); Cardiogenic shock ([address] (10/17/2011); 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 (8/5/2016); Cellulitis (8/5/2016); Chest pain (8/5/2016); Chronic atrial fibrillation ([address] (10/17/2011); Chronic depression (8/5/2016); Chronic depression (8/5/2016); Chronic obstructive pulmonary disease ([address] (3/8/2009); Chronic pain; CKD (chronic kidney disease) stage 3, GFR 30-59 ml/min (12/4/2009); Congest[facility]) [facility] (11/2/2015); COPD; CRI (chronic renal insufficiency) (8/5/2016); Degeneration of cervical intervertebral disc (8/5/2016); Degenerative arthritis of left knee (2/27/2009); Dehydration (8/5/2016); Diastolic heart failure ([facility] Digoxin toxicity (2/24/2010);  Disorder of sweat glands (8/5/2016); Diverticulosis of large intestine without diverticulitis (2015); Dyspnea (8/5/2016); Eczema (8/5/2016); Epigastric abdominal pain (2/24/2010); GERD (gastroesophageal reflux disease); Gout (8/5/2016); H/O mitral valve repair, 2003 (6/27/2016); Heart failure ([address] HTN (hypertension) (8/5/2016); colonic polyp (2015); Hyperkalemia (10/17/2011); Hyperlipidemia (8/5/2016); Hypertension; Hypokalemia (2/24/2010); Hypothyroidism (12/4/2009); Ill-defined condition; Knee pain (8/5/2016); Leg cramps (8/5/2016); Mitral stenosis with insufficiency (11/2/2015); Nausea and vomiting (2/24/2010); Obesity (11/2/2015); BOBBY (obstructive sleep apnea) (12/4/2009); Osteoarthritis (8/5/2016); Osteopenia (8/5/2016); Other long term (current) drug therapy (8/5/2016); Palpitations (11/2/2015); Rash (8/5/2016); Rectal bleeding (10/27/2011); Rectocele (2015); Respiratory insufficiency (11/2/2015); 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 She also has no past medical history of Aneurysm ([address] Autoimmune disease ([address] Coagulation disorder ([address] DEMENTIA; Diabetes ([address] Endocarditis; Infectious disease; Liver disease; Other ill-defined conditions(799.89); PUD (peptic ulcer disease); Seizures ([facility] Stroke [facility]); or Thromboembolus ([facility] Ms. Aidee Coleman  has a past surgical history that includes appendectomy; cholecystectomy (2005); gyn (1981); mastectomy (1990); pacemaker; breast reconstruction; cardiac surg procedure unlist; and orthopaedic.   Social History/Living Environment:   Home Environment: Private residence  # Steps to Enter: 1  One/Two Story Residence: One story  Living Alone: No  Support Systems: Child(brit), Adventism / gene community, Family member(s), Friends \ neighbors  Patient Expects to be Discharged to[de-identified] Private residence  Current DME Used/Available at Home: Shower chair  Tub or Shower Type: Tub/Shower combination  Prior Level of Function/Work/Activity:  independent      Number of Personal Factors/Comorbidities that affect the Plan of Care: Extensive review of physical, cognitive, and psychosocial performance (3+):  HIGH COMPLEXITY   ASSESSMENT OF OCCUPATIONAL PERFORMANCE[de-identified]   Activities of Daily Living:         Require assistance  Basic ADLs (From Assessment) Complex ADLs (From Assessment)   Basic ADL  Feeding: Independent  Oral Facial Hygiene/Grooming: Setup  Bathing: Minimum assistance  Upper Body Dressing: Setup  Lower Body Dressing: Minimum assistance  Toileting: Contact guard assistance     Grooming/Bathing/Dressing Activities of Daily Living     Cognitive Retraining  Safety/Judgement: Fall prevention                 Functional Transfers  Toilet Transfer : Minimum assistance  Shower Transfer: Minimum assistance     Bed/Mat Mobility  Rolling: Supervision  Supine to Sit: Supervision  Sit to Stand: Contact guard assistance  Bed to Chair: Minimum assistance          Most Recent Physical Functioning:   Gross Assessment:  AROM: Generally decreased, functional  Strength: Generally decreased, functional               Posture:  Posture (WDL): Exceptions to WDL  Posture Assessment:  Forward head  Balance:  Sitting: Intact  Sitting - Static: Good (unsupported)  Sitting - Dynamic: Fair (occasional)  Standing: Impaired  Standing - Static: Fair  Standing - Dynamic : Fair Bed Mobility:  Rolling: Supervision  Supine to Sit: Supervision  Wheelchair Mobility:     Transfers:  Sit to Stand: Contact guard assistance  Stand to Sit: Contact guard assistance  Bed to Chair: Minimum assistance                 Patient Vitals for the past 6 hrs:       BP BP Patient Position SpO2 O2 Flow Rate (L/min) Pulse   02/13/17 0730 120/78 Head of bed elevated (Comment degrees) 98 % - 75   17 0843 - - - 2 l/min -   17 1150 (!) 95/37 Sitting 100 % - 83   17 1304 (!) 86/51 - - - -        Mental Status  Neurologic State: Alert  Orientation Level: Oriented X4  Cognition: Follows commands, Appropriate for age attention/concentration  Perception: Appears intact  Perseveration: No perseveration noted  Safety/Judgement: Fall prevention                               Physical Skills Involved:  1. Balance  2. Mobility  3. Strength  4. Endurance  5. Fine or Gross Motor Coordination Cognitive Skills Affected (resulting in the inability to perform in a timely and safe manner):  1. Problem Solving  2. Learning  3. Remembering Psychosocial Skills Affected:  1. Active Use of Coping Strategies  2. Environmental Adaptations   Number of elements that affect the Plan of Care: 3-5:  MODERATE COMPLEXITY   CLINICAL DECISION MAKIN[address] [place] [id number] AM-PAC 6 Clicks   Basic Mobility Inpatient Short Form  How much help from another person does the patient currently need. .. Total A Lot A Little None   1. Putting on and taking off regular lower body clothing?   [ ] 1   [ ] 2   [X] 3   [ ] 4   2. Bathing (including washing, rinsing, drying)? [ ] 1   [ ] 2   [X] 3   [ ] 4   3. Toileting, which includes using toilet, bedpan or urinal?   [ ] 1   [ ] 2   [X] 3   [ ] 4   4. Putting on and taking off regular upper body clothing?   [ ] 1   [ ] 2   [ ] 3   [X] 4   5. Taking care of personal grooming such as brushing teeth? [ ] 1   [ ] 2   [ ] 3   [X] 4   6. Eating meals? [ ] 1   [ ] 2   [ ] 3   [X] 4   © , Trustees of [address] [place] [id number], under license to [employer]. All rights reserved    Score:  Initial: 21 Most Recent: X (Date: -- )     Interpretation of Tool:  Represents activities that are increasingly more difficult (i.e. Bed mobility, Transfers, Gait).        Score 24 23 22-20 19-15 14-10 9-7 6       Modifier CH CI CJ CK CL CM CN · Self Care:               - CURRENT STATUS:    CJ - 20%-39% impaired, limited or restricted               - GOAL STATUS:           CI - 1%-19% impaired, limited or restricted               - D/C STATUS:                       ---------------To be determined---------------  [place] / [place] / Product Type: Medicare /       Medical Necessity:     · Patient demonstrates good rehab potential due to higher previous functional level. Reason for Services/Other Comments:  · Patient continues to require skilled intervention due to decreased ability to perform self care. Use of outcome tool(s) and clinical judgement create a POC that gives a: MODERATE COMPLEXITY             TREATMENT:   (In addition to Assessment/Re-Assessment sessions the following treatments were rendered)      Pre-treatment Symptoms/Complaints:  Decreased ability to perform ADLs, self care, and functional mobility; decreased tolerance for activities  Pain: Initial:   Pain Intensity 1: 0  Post Session:        Assessment/Reassessment only, no treatment provided today     Braces/Orthotics/Lines/Etc:   · O2 Device: Nasal cannula  Treatment/Session Assessment:    · Response to Treatment:  Agrees to therapy  · Interdisciplinary Collaboration:  · Physical Therapist  · Occupational Therapist  · Registered Nurse  · After treatment position/precautions:  · Up in chair  · Bed/Chair-wheels locked  · Call light within reach  · RN notified  · Compliance with Program/Exercises: Will assess as treatment progresses. · Recommendations/Intent for next treatment session: \"Next visit will focus on advancements to more challenging activities and reduction in assistance provided\".   Total Treatment Duration:  OT Patient Time In/Time Out  Time In: 1020  Time Out: R Tawny Pardo [address], OTR/L

## 2017-02-13 NOTE — CONSULTS
[address]       Name:  Petrona Suresh   MR#:  [id number]   :  1940   Account #:  [de-identified]   Date of Adm:  2017       CARDIOLOGY CONSULTATION     DATE OF CONSULTATION: 2017     REFERRING PHYSICIAN: Dr. Karie Davies     PRIMARY CARDIOLOGIST: Dr. Wong Fuel: Dr. Rivas [place]: Dr. Cassidy Canales: Worsening shortness of breath. HISTORY OF PRESENT ILLNESS: Ms. Kaya Vaughn is a 71-year-old white   female with history of a bioprosthetic aortic valve in  (normal   heart catheterization in ), who has recently been followed   by Dr. Marsha Stubbs in our office for an increased transvalvular   gradient, peak gradient 64 and mean gradient of 46 mmHg by echo   in 2016, and elevated mitral valve gradient status post   mitral valve repair with a low normal ejection fraction. The   patient also has severe pulmonary hypertension. The patient is   felt to be in need of redo cardiac surgery; however, she has   been deemed not to be a redo candidate given her other comorbid   conditions. She was seen by Dr. Marsha Stubbs in the office last month   and her Demadex was increased. She has had ongoing and worsening   shortness of breath. She now has New York Heart Association   class IV symptoms. She has had worsening exertional fatigue and   overall failure to thrive. She lives at home with her son. She   denies any recent fever, chills. She admits to a nonproductive   cough. She does have chronic atrial fibrillation and has been   maintained on Coumadin. She denies any chest pain or pressure. She currently is short of breath at rest with conversation. PAST MEDICAL HISTORY    1. Severe valvular heart disease with prior bioprosthetic aortic   valve replacement and mitral valve repair. 2. Chronic atrial fibrillation. 3. Restless leg syndrome. 4. Obstructive sleep apnea.     5. Hypercholesterolemia. 6. Hypertension. 7. Chronic obstructive pulmonary disease, on home oxygen. 8. Chronic diastolic heart failure. 9. Chronic renal insufficiency. 10. Chronic depression. PAST SURGICAL HISTORY    1. Bioprosthetic aortic valve replacement with prior mitral valve   repair. 2. Pacemaker placement in the past.    3. Prior mastectomy. 4. Knee surgery. 5. Hysterectomy. 6. Cholecystectomy. 7. Prior breast reconstruction. 8. Appendectomy. ALLERGIES   1. BENADRYL. 2. DEMEROL. 3. MORPHINE. 4. SULFA. 5. LORAZEPAM.    HOME MEDICATIONS    1. Potassium. 2. Requip. 3. Zoloft. 4. Aldactone. 5. Demadex 3 days a week. 6. Coumadin. 7. Valium. 8. Pravachol. 9. Diltiazem. 10. Synthroid. 11. Vitamin D3.    CURRENT MEDICATIONS    1. Valium. 2. Cardizem. 3. Synthroid. 4. Protonix. 5. Potassium. 6. Zoloft. 7. Aldactone. 8. Coumadin. SOCIAL HISTORY: The patient lives at home with her son. She does   not smoke or drink alcohol. FAMILY HISTORY: Noncontributory at this time. REVIEW OF SYSTEMS   CONSTITUTIONAL: Positive for malaise and fatigue. Positive for   weakness. HEAD/NECK: No headaches or pain in her neck. EYES: No vision changes. EARS: No hearing changes. CARDIOVASCULAR: No recent palpitations, syncope. No recent chest   pain. RESPIRATORY: Positive for nonproductive cough. The patient   denies any hemoptysis. GASTROINTESTINAL: No recent melena or other bleeding. GENITOURINARY: No history of renal stones, prior history of   chronic renal insufficiency. SKIN: No rash, nonhealing wounds. PSYCHIATRIC: Stable anxiety and depression. HEMATOLOGIC: No history of clotting or bleeding disorders. PHYSICAL EXAMINATION   VITAL SIGNS: Temperature afebrile, pulse 77, respirations 22,   blood pressure 90/50, 97% on 2 liters.     GENERAL: This is a thin, ill-appearing white female lying in bed   with shortness of breath at rest. She is alert and oriented x3. HEENT: Normocephalic, atraumatic, moist mucous membranes. NECK: Positive JVD to the jaw. CARDIOVASCULAR: Irregularly irregular with a 3/6 systolic murmur. PULMONARY: Decreased breath sounds in the bases of both lungs. : No costovertebral tenderness. ABDOMEN: Soft, nontender. EXTREMITIES: No obvious cyanosis, clubbing or swelling. PSYCHIATRIC: Flat affect. NEUROLOGIC: No obvious sensory or motor changes. PERTINENT LABORATORIES: Hemoglobin 8.2, white count 3.4,   platelets 848. INR 1.9. Creatinine 1.5, potassium 3.7. . Lactic acid 1.0. Portable chest x-ray, 02/12/2017, shows no acute findings in the   chest.     A 12-lead EKG: Not able to currently review. ASSESSMENT    1. Shortness of breath. 2. Status post bioprosthetic aortic valve with severe aortic valve   stenosis. 3. Severe pulmonary hypertension. 4. Chronic renal insufficiency. 5. Chronic atrial fibrillation. 6. Chronic anemia. 7. Thrombocytopenia. 8. Hypokalemia. 9. Failure to thrive. 10. Chronic diastolic heart failure. PLAN    1. Shortness of breath. The patient now has New York Heart   Association class IV heart failure symptoms. This is likely from   her severe aortic valve stenosis or significant mitral valve   disease and severe pulmonary hypertension. She has been   clinically worsening over the last several months. She has been   deemed to not be a surgical candidate by Dr. Valentina Vanegas and I think   this is very reasonable. We will need to consult Palliative Care   in the morning and talk more with the patient and the family   about a long-term plan. I view her prognosis as poor at this   time. We will check an echocardiogram in the morning and follow   closely. I would be careful with overdiuresis at this time. We   will reevaluate in the morning. 2. Aortic valve stenosis.  The patient has a prior bioprosthetic   aortic valve with increased transvalvular gradient consistent   with severe AS. She is not a candidate for intervention at this   time. 3. Severe pulmonary hypertension. The patient has severe pulmonary   hypertension, we will reassess with an echo on Monday morning. 4. Chronic renal insufficiency. The patient's creatinine appears   to be at baseline and we will follow. 5. Chronic atrial fibrillation. Continue rate control with   Cardizem and other medications. Will follow closely. 6. Anemia. The patient appears to have chronic anemia. She   continues to be on Coumadin. Will have to follow closely. Her   hemoglobin is currently at 8.2. PROGNOSIS: The patient's overall prognosis seems poor given her   advanced valvular heart disease and severe pulmonary   hypertension, now with New York Heart Association class IV   symptoms. We will follow closely during this hospitalization. Likely will need to move towards palliative care at this time. Thank you very much for this consultation. Will follow along   closely with you.          DO ESPERANZA Alvarado / [place]   D:  02/12/2017   20:26   T:  02/12/2017   21:09   Job #:  [id number]

## 2017-02-13 NOTE — PROGRESS NOTES
Hospitalist Progress Note    2017  Admit Date: 2017  2:32 PM   NAME: Kacie Carrera   :     DOS:              17  MRN:  [id number]   Attending: Jeffrey Roman MD  PCP:  Kelly Mireles MD  Treatment Team: Attending Provider: Jeffrey Roman MD; Consulting Provider: Tala See MD; Consulting Provider: Lily Cortes DO; Utilization Review: Scott Alfaro; Consulting Provider: Michaela Hanna NP    DNR     SUBJECTIVE:   As previously documented: \" Ms. Makayla Ramos is a very pleasant 78 yo F Who complains of increased SOb, nonproductive cough and b/l LE swelling in the last 1-2 months. Known with chronic diastolic CHF, CAD, bradycardia s/p pacemaker, , s/p bioprosthetic AVR,Chronic Afib on coumadin, severe pulmonary HTN with PAP 69 mmHg, chronic respiratory failure on NC 3L 24h/7 , COPD, GERD, HTN, HLP, Obesity, BOBBY - not using CPAP. Seen by her cardiologist, Enma Thompson on 17 and Demadex was increased to 40 mg PO daily, except MWF when was BID. Ms. Makayla Ramos states that her leg swelling improved, although her cough and SOB persisted. Seen by PCP on  and placed on oral steroids and Albuterol that she couldn't complete because the albuterol made ermias jittery. No improvement with treatment. Seen at [facility] ED on  with low BP and and thought that she is dehydrated due to increased diuretics and received IV fluids. She felt better for one day, although because of increased in her symptoms she presented to ED today. BP was 80/51 mmHg and received 1L NC bolus in Ed per ED provider. CXR w/o acute pathology. No spikes of fever since her symptoms started. Influenza screen negative. WBC:3.4. Cr:1.48, around her baseline. \"       17   Ms. Kacie Carrera  Is feeling  A little better. Still with SOB and b/l pitting edema. Continues to have low BP. Denies any chest pain or abdominal pain. Complains of RLS symptoms of lower extremities. Afebrile.  Family at bedside including son and granddaughter. Discussed with Ms. Nicole Hunter and states she wants to be DNR. 10+ ROS reviewed and negative except for positive in HPI.    Allergies   Allergen Reactions    Adhesive Tape Rash    Benadryl [Diphenhydramine Hcl] Other (comments)     Jitters      Demerol [Meperidine] Anxiety    Morphine Other (comments)     Confusion    Sulfa (Sulfonamide Antibiotics) Anxiety    Lorazepam Anxiety     Current Facility-Administered Medications   Medication Dose Route Frequency Provider Last Rate Last Dose    senna-docusate (PERICOLACE) 8.6-50 mg per tablet 1 Tab  1 Tab Oral BID Antionette Ortiz, NP   1 Tab at 02/13/17 1701    warfarin (COUMADIN) 2.5 mg - Pharmacy Dosing  2.5 mg Oral QHS Gege Guerra MD   2.5 mg at 02/13/17 2208    0.45% sodium chloride infusion  50 mL/hr IntraVENous CONTINUOUS Gege Guerra MD 50 mL/hr at 02/13/17 1800 50 mL/hr at 02/13/17 1800    traZODone (DESYREL) tablet 50 mg  50 mg Oral QHS PRN Sharona Elena MD   50 mg at 02/13/17 2208    [START ON 2/14/2017] gabapentin (NEURONTIN) capsule 100 mg  100 mg Oral TID Gege Guerra MD        sodium chloride (NS) flush 5-10 mL  5-10 mL IntraVENous Q8H Jace Grey MD   10 mL at 02/13/17 2209    sodium chloride (NS) flush 5-10 mL  5-10 mL IntraVENous PRN Jace Grey MD        ondansetron [facility]) injection 4 mg  4 mg IntraVENous Q4H PRN Gabriel Montague NP        acetaminophen (TYLENOL) tablet 650 mg  650 mg Oral Q4H PRN Gabriel Montague NP        levalbuterol (XOPENEX) nebulizer soln 1.25 mg/3 mL  1.25 mg Nebulization Q4H PRN Gabriel Montague NP        polyvinyl alcohol (LIQUIFILM TEARS) 1.4 % ophthalmic solution 1 Drop  1 Drop Both Eyes PRN Gabriel Montague NP        hydrocortisone (ANUSOL-HC) 2.5 % rectal cream   PeriANAL BID PRN Gabriel Montague NP        levothyroxine (SYNTHROID) tablet 88 mcg  88 mcg Oral ACB Azeb Pino NP   88 mcg at 02/13/17 0559    pantoprazole (PROTONIX) tablet 40 mg  40 mg Oral ACB Azeb Pino NP   40 mg at 17 0559    polyethylene glycol (MIRALAX) packet 17 g  17 g Oral DAILY PRN Neydamarianela Fisher NP        promethazine (PHENERGAN) tablet 25 mg  25 mg Oral Q6H PRN Mitchell Natalia, FELISHA        rOPINIRole (REQUIP) tablet 2 mg  2 mg Oral QHS Azeb Pino NP   2 mg at 17    sertraline (ZOLOFT) tablet 50 mg  50 mg Oral QHS Azeb Pino NP   50 mg at 17    dilTIAZem CD (CARDIZEM CD) capsule 240 mg  240 mg Oral DAILY [facility], NP   240 mg at 17 0843    spironolactone (ALDACTONE) tablet 25 mg  25 mg Oral DAILY [facility], NP   25 mg at 17 0843             PHYSICAL EXAM     Visit Vitals    /44 (BP Patient Position: Head of bed elevated (Comment degrees))    Pulse 96    Temp 97.1 °F (36.2 °C)    Resp 18    Ht 5' (1.524 m)    Wt 84.6 kg (186 lb 8 oz)    SpO2 99%    Breastfeeding No    BMI 36.42 kg/m2      Temp (24hrs), Av.8 °F (36.6 °C), Min:97.1 °F (36.2 °C), Max:98.3 °F (36.8 °C)    Oxygen Therapy  O2 Sat (%): 99 % (17)  Pulse via Oximetry: 72 beats per minute (17)  O2 Device: Nasal cannula (17)  O2 Flow Rate (L/min): 3 l/min (17)    Intake/Output Summary (Last 24 hours) at 177  Last data filed at 17   Gross per 24 hour   Intake            932.5 ml   Output              850 ml   Net             82.5 ml        Physical Exam:  General:         AAOx3. NAD. Afebrile. Cooperative. Obese. HEENT:               NCAT. No obvious deformity. Nares normal. No drainage  Lungs:                 Decreased air entry b/l at the base. No wheezing/rhonchi/rales  Cardiovascular:   Irregular irregularity. No m/r/g. +2 pitting edema  b/l. +2 PT/DT pulses b/l. Abdomen:       S/nt/nd. Bowel sounds normal. .   Skin:         No rashes or lesions. Not Jaundiced  Neurologic:    AAOx3. CN II- XII grossly WNL.  No gross focal deficit. Moves all extremities  Psychiatric:         Good mood. Normal affect.          DIAGNOSTIC STUDIES      Data Review:   Recent Results (from the past 24 hour(s))   MRSA SCREEN - PCR (NASAL)    Collection Time: 02/13/17 12:50 AM   Result Value Ref Range    Special Requests: NO SPECIAL REQUESTS      Culture result:        MRSA target DNA is not detected (presumptive not colonized with MRSA)   METABOLIC PANEL, BASIC    Collection Time: 02/13/17  6:33 AM   Result Value Ref Range    Sodium 147 (H) 136 - 145 mmol/L    Potassium 3.6 3.5 - 5.1 mmol/L    Chloride 108 (H) 98 - 107 mmol/L    CO2 32 21 - 32 mmol/L    Anion gap 7 7 - 16 mmol/L    Glucose 107 (H) 65 - 100 mg/dL    BUN 25 (H) 8 - 23 MG/DL    Creatinine 1.29 (H) 0.6 - 1.0 MG/DL    GFR est AA 52 (L) >60 ml/min/1.73m2    GFR est non-AA 43 (L) >60 ml/min/1.73m2    Calcium 7.6 (L) 8.3 - 10.4 MG/DL   MAGNESIUM    Collection Time: 02/13/17  6:33 AM   Result Value Ref Range    Magnesium 2.3 1.8 - 2.4 mg/dL   PHOSPHORUS    Collection Time: 02/13/17  6:33 AM   Result Value Ref Range    Phosphorus 3.5 2.3 - 3.7 MG/DL   BNP    Collection Time: 02/13/17  6:33 AM   Result Value Ref Range     pg/mL   TSH 3RD GENERATION    Collection Time: 02/13/17  6:33 AM   Result Value Ref Range    TSH 1.270 0.358 - 3.740 uIU/mL   PROTHROMBIN TIME + INR    Collection Time: 02/13/17  6:33 AM   Result Value Ref Range    Prothrombin time 20.0 (H) 9.6 - 12.0 sec    INR 1.8 (H) 0.9 - 1.2     CBC WITH AUTOMATED DIFF    Collection Time: 02/13/17  6:33 AM   Result Value Ref Range    WBC 3.3 (L) 4.3 - 11.1 K/uL    RBC 2.59 (L) 4.05 - 5.25 M/uL    HGB 7.9 (L) 11.7 - 15.4 g/dL    HCT 26.8 (L) 35.8 - 46.3 %    .5 (H) 79.6 - 97.8 FL    MCH 30.5 26.1 - 32.9 PG    MCHC 29.5 (L) 31.4 - 35.0 g/dL    RDW 15.5 (H) 11.9 - 14.6 %    PLATELET 99 (L) 298 - 450 K/uL    MPV 10.7 (L) 10.8 - 14.1 FL    DF AUTOMATED      NEUTROPHILS 73 43 - 78 %    LYMPHOCYTES 16 13 - 44 %    MONOCYTES 6 4.0 - 12.0 % EOSINOPHILS 4 0.5 - 7.8 %    BASOPHILS 1 0.0 - 2.0 %    IMMATURE GRANULOCYTES 0.0 0.0 - 5.0 %    ABS. NEUTROPHILS 2.4 1.7 - 8.2 K/UL    ABS. LYMPHOCYTES 0.5 0.5 - 4.6 K/UL    ABS. MONOCYTES 0.2 0.1 - 1.3 K/UL    ABS. EOSINOPHILS 0.1 0.0 - 0.8 K/UL    ABS. BASOPHILS 0.0 0.0 - 0.2 K/UL    ABS. IMM. GRANS. 0.0 0.0 - 0.5 K/UL   FERRITIN    Collection Time: 02/13/17  6:33 AM   Result Value Ref Range    Ferritin 374 8 - 388 NG/ML   TRANSFERRIN SATURATION    Collection Time: 02/13/17  6:33 AM   Result Value Ref Range    Iron 38 35 - 150 ug/dL    TIBC 265 250 - 450 ug/dL    Transferrin Saturation 14 (L) >20 %   RETICULOCYTE COUNT    Collection Time: 02/13/17 12:27 PM   Result Value Ref Range    Reticulocyte count 2.9 (H) 0.3 - 2.0 %    Absolute Retic Cnt. 0.0768 0.0164 - 0.0776 M/ul    Immature Retic Fraction 7.4 3.0 - 15.9 %    Retic Hgb Conc. 29 29 - 35 pg   PLEASE READ & DOCUMENT PPD TEST IN 24 HRS    Collection Time: 02/13/17  7:50 PM   Result Value Ref Range    PPD Neg Negative    mm Induration 0 mm     Imaging /Procedures /Studies:    CXR Results  (Last 48 hours)               02/12/17 1604  XR CHEST PORT Final result    Impression:  Impression:  No acute findings in the chest.                       Narrative:  Portable Chest  X-ray  2/12/2017 at 1600 hours       Indication: Productive cough. Altered mental status. Comparison:  2/9/2017       Findings:  Portable AP view demonstrates gross, stable cardiomegaly without   pulmonary edema. No focal infiltrate. No definite pleural effusion. Left lower   lobe difficult to evaluate because of heart size. Sternotomy wires and prosthetic cardiac valves. Cardiac pacemaking device   grossly unchanged. Echocardiogram: SUMMARY:  -  Left ventricle: Systolic function was normal. Ejection fraction was  estimated in the range of 55 % to 60 %. There were no regional wall motion  abnormalities. Wall thickness was mildly to moderately increased.   -  Right ventricle: The ventricle was dilated. Systolic function was reduced. There was severe pulmonary artery hypertension.  -  Left atrium: The atrium was markedly dilated. -  Right atrium: The atrium was markedly dilated. -  Inferior vena cava, hepatic veins: The inferior vena cava was dilated. -  Aortic valve: A bioprosthesis was present. It exhibited stenosis. There   Was moderate stenosis. The aortic valve area by the continuity equation was 1.1   cm2.  -  Mitral valve: There was moderate to marked annular calcification. There   Was moderate-marked calcification. A annular ring prosthesis was present. It  exhibited stenosis and reduced motion. There was moderate stenosis. There was  mild regurgitation. The mitral valve area by pressure half time was 1.7 cm2.  -  Tricuspid valve: There was severe regurgitation. -  Pulmonic valve: There was mild to moderate regurgitation. -  Pericardium: There was a left pleural effusion. Labs and Studies from previous 24 hours have been personally reviewed by myself    ASSESSMENT      Active Hospital Problems    Diagnosis Date Noted    Acute CHF (congestive heart failure) ([facility] [address] 02/12/2017    Pulmonary hypertension ([facility] [address] 02/12/2017    Aortic valve replaced 01/09/2017    Warfarin anticoagulation 01/09/2017    Chronic diastolic congestive heart failure ([address] 09/09/2016    Sleep apnea 11/02/2015    Chronic atrial fibrillation ([address] 10/17/2011    ARF (acute renal failure) ([facility] [address] 02/24/2010    BOBBY (obstructive sleep apnea) 12/04/2009    Aortic Valve Bioprosthesis Present 03/07/2009    Hypotension 03/01/2009       Plan:  SOB:  Likely due to her severe pulmonary hypertension  and possible cor pulmonale along with her known aortic valve stenosis, mitral valve disease. RSVP:65-70 mmHg. Pulmonary HTN: discussed with patient, her son and granddaughter - refused further evaluation inclusive CTA chest and VQ scan. They will like more comfort care efforts.  Will consult palliative care. Hypotension: will place on gentle NS @50 ml/h. BB on hold. Permanent AFib on Cardizem , BB and Coumadin - BB on hold due to low BP    Anemia: stable - check anemia panel. Monitor     RLS: start Gabapentin 100 mg TID    DVT Prophylaxis: Coumadin  CODE Status: DNR - dicussed with Ms. Gaines Discussed with: patient/son family.  Care team   Medical Risk: high  Disposition: pending    Anthony Rivera MD  02/13/17

## 2017-02-13 NOTE — PROGRESS NOTES
[facility] CARDIOLOGY PROGRESS NOTE           2/13/2017 7:51 AM    Admit Date: 2/12/2017      Subjective:   No inc sob      Objective:      Vitals:    02/12/17 1811 02/12/17 2057 02/13/17 0047 02/13/17 0526   BP: 90/53 136/73 (!) 85/51 107/58   Pulse: 77 83 97 97   Resp: 22 20 20 20   Temp: 97.7 °F (36.5 °C) 97.8 °F (36.6 °C) 98.3 °F (36.8 °C) 97.7 °F (36.5 °C)   SpO2: 97% 98% 95% 97%   Weight: 84 kg (185 lb 1.6 oz)   84.6 kg (186 lb 8 oz)   Height: 5' (1.524 m)          Physical Exam:  General-No Acute Distress  Neck- supple, no JVD  CV- irregular rate and rhythm, + systolic murmur  Lung- clear bilaterally  Abd- soft, nontender, nondistended  Ext- no edema bilaterally. Skin- warm and dry    Data Review:   Recent Labs      02/12/17   1450   NA  146*   K  3.7   MG  2.3   BUN  25*   CREA  1.48*   GLU  87   WBC  3.4*   HGB  8.2*   HCT  27.8*   PLT  100*   INR  1.9*   TROIQ  0.02       Assessment/Plan:     Dyspnea  Severe bAVR stenosis  Mitral stenosis  Permanent a fib  Chronic bleeding hemorrhoids  PA htn  Chronic anemia  ///  Cont current rx. Social Service to see. DNR.       Becca Hercules MD  2/13/2017 7:51 AM

## 2017-02-13 NOTE — PROGRESS NOTES
Bedside and Verbal shift change report given to Rodrick Felty, RN (oncoming nurse) by self (offgoing nurse). Report included the following information SBAR, Kardex, MAR and Recent Results.

## 2017-02-13 NOTE — H&P
Hospitalist Admission History and Physical       Chief Complaint   Patient presents with    Fatigue       Subjective:   Patient is a pleasant, alert, oriented chronically ill appearing  77 y.o.  female who presented for the second time in a week complaining of increased shortness of breath, and extreme weakness. She has an extensive cardiac history including CHF, SSS, aortic post bioprosthetic valve, chronic atrial fib on dig and coumadin, AV block, pacemaker, hx cardiogenic shock, mitral valve repair, rheumatic aortic stenosis. She also has CKD 3 and severe COPD on 24/7 home oxygen at 3 liters. Last echo as noted below in July 2016. Of late, her doctors have been adjusting diuretics to keep edema in her legs down while being mindful of her current hypotension. She had been taking torsemide 40 mg bid on M-W-F, which was decreased to 20 mg daily three weeks ago. Family reports more distal edema in the lower legs. Currently, she is so short of breath, it is difficult to converse. She is also morbidly obese. Admission blood pressure was 85/51 with oxygen saturation of 86% on room air. Attentive son, with whom patient lives is at bedside. [address] daughter is an RN in the ER. Additional extensive history as noted below.      Past Medical History   Diagnosis Date    Abnormal glucose 8/5/2016    Abscess 8/5/2016    Acute encephalopathy 7/8/2016    Acute renal failure ([address] 12/3/2009    Afib ([address]     Anemia     Ankle swelling 8/5/2016    Anxiety 8/5/2016    Aortic Valve Bioprosthesis Present 3/7/2009    ARF (acute renal failure) ([address] 2/24/2010    Arthritis     Asthma     Atopic dermatitis 8/5/2016    Atrial fibrillation ([address] 3/7/2009    Autonomic orthostatic hypotension 8/5/2016    AV block 10/17/2011    Back pain 8/5/2016    Bradycardia (Symptomatic) 11/7/2011    CAD (coronary artery disease)     Cancer (HCC) 1990     breast (left)    Cardiac pacemaker 11/2/2015    Cardiogenic shock ([facility] 10/17/2011    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    Cellulitis 8/5/2016    Chest pain 8/5/2016    Chronic atrial fibrillation (HCC) 10/17/2011    Chronic depression 8/5/2016    Chronic depression 8/5/2016    Chronic obstructive pulmonary disease ([facility] 3/8/2009    Chronic pain     CKD (chronic kidney disease) stage 3, GFR 30-59 ml/min 12/4/2009    Congestive heart failure (CHF) ([facility] 11/2/2015    COPD      O2 AT 3 L NC    CRI (chronic renal insufficiency) 8/5/2016    Degeneration of cervical intervertebral disc 8/5/2016    Degenerative arthritis of left knee 2/27/2009    Dehydration 1/7/1624    Diastolic heart failure (HCC)     Digoxin toxicity 2/24/2010    Disorder of sweat glands 8/5/2016    Diverticulosis of large intestine without diverticulitis 2015    Dyspnea 8/5/2016    Eczema 8/5/2016    Epigastric abdominal pain 2/24/2010    GERD (gastroesophageal reflux disease)     Gout 8/5/2016    H/O mitral valve repair, 2003 6/27/2016    Heart failure ([facility]     HTN (hypertension) 8/5/2016    Hx of colonic polyp 2015     adenoma    Hyperkalemia 10/17/2011    Hyperlipidemia 8/5/2016    Hypertension     Hypokalemia 2/24/2010    Hypothyroidism 12/4/2009    Ill-defined condition      CARPEL TUNNEL SYNDROME, BILAT HANDS    Knee pain 8/5/2016    Leg cramps 8/5/2016    Mitral stenosis with insufficiency 11/2/2015     Prior MV repair 2003.     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    Rheumatic heart disease 11/2/2015    Right hip pain 8/5/2016    RLS (restless legs syndrome) 8/5/2016    Sick sinus syndrome ([facility] 2016    Skin infection 2016    Sleep apnea 2015    Tachycardia 2016    Thrombocytopenia, unspecified ([facility] 2012    Thyroid disease     Urticaria 2016    Vertigo 2016      Past Surgical History   Procedure Laterality Date    Hx appendectomy      Hx cholecystectomy      Hx gyn  1981     hysterectomy still have ovaries    Hx mastectomy       left mastectomy    Hx pacemaker      Hx breast reconstruction      Pr cardiac surg procedure unlist       valve repair and replacement    Hx orthopaedic       knee surgery      Family History   Problem Relation Age of Onset    Heart Disease Mother     Cancer Father      Throat    Heart Disease Father     Cancer Sister      Breast, Colon    Heart Disease Sister     Breast Cancer Sister 79      from disease    Cancer Maternal Grandmother     Cancer Brother     Diabetes Brother     Heart Disease Brother     Psychiatric Disorder Paternal Grandfather     Cancer Maternal Aunt      Breast    Diabetes Son     Alcohol abuse Neg Hx     Arthritis-rheumatoid Neg Hx     Asthma Neg Hx     Bleeding Prob Neg Hx     Elevated Lipids Neg Hx     Headache Neg Hx     Migraines Neg Hx     Hypertension Neg Hx     Lung Disease Neg Hx     Mental Retardation Neg Hx     Stroke Neg Hx      Social History   Substance Use Topics    Smoking status: Former Smoker     Types: Cigarettes     Quit date: 1996    Smokeless tobacco: Never Used      Comment: quit     Alcohol use No      History   Drug Use No     Allergies   Allergen Reactions    Adhesive Tape Rash    Benadryl [Diphenhydramine Hcl] Other (comments)     Jitters      Demerol [Meperidine] Anxiety    Morphine Other (comments)     Confusion    Sulfa (Sulfonamide Antibiotics) Anxiety    Lorazepam Anxiety     Prior to Admission medications    Medication Sig Start Date End Date Taking?  Authorizing Provider   nitroglycerin (NITRODUR) 0.4 mg/hr 1 Patch by TransDERmal route daily. Yes Historical Provider   potassium chloride SR (K-TAB) 20 mEq tablet Take 20 mEq by mouth two (2) times a day. Yes Historical Provider   rOPINIRole (REQUIP) 2 mg tablet Take 2 mg by mouth nightly. Yes Historical Provider   TIOTROPIUM BR/OLODATEROL HCL (STIOLTO RESPIMAT IN) Take  by inhalation. NEW-PATIENT NOT TAKING, STATES IT MAKES HER JITTERY   Yes Historical Provider   promethazine (PHENERGAN) 25 mg tablet Take 1 Tab by mouth every six (6) hours as needed. 2/9/17   Ayad Moore MD   sertraline (ZOLOFT) 50 mg tablet Take 1 Tab by mouth daily. Patient taking differently: Take 50 mg by mouth nightly. 1/31/17   Joe Mathis MD   spironolactone (ALDACTONE) 25 mg tablet Take 1 Tab by mouth daily. 1/31/17   Joe Mathis MD   allopurinol (ZYLOPRIM) 100 mg tablet Take 2 Tabs by mouth daily. Patient taking differently: Take 100 mg by mouth two (2) times a day. 1/31/17   Joe Mathis MD   torsemide (DEMADEX) 20 mg tablet 2 po  q day, and 2 tabs BID on M, W, F  Patient taking differently: Take 20 mg by mouth daily. 2 po  q day, and 2 tabs BID on M, W, F 1/31/17   Joe Mathis MD   hydrocortisone (ANUSOL-HC) 2.5 % rectal cream Apply small amount to hemorrhoids/perianal skin TID PRN 1/17/17   Shiv Taylor MD   warfarin (COUMADIN) 2.5 mg tablet Take 2.5 mg by mouth nightly. Use as directed     Historical Provider   diazePAM (VALIUM) 5 mg tablet Take 0.5 Tabs by mouth two (2) times daily as needed for Anxiety. Max Daily Amount: 5 mg. Indications: ANXIETY  Patient taking differently: Take 2.5 mg by mouth nightly. Indications: ANXIETY 12/21/16   Laura Lieberman MD   pravastatin (PRAVACHOL) 40 mg tablet Take 1 Tab by mouth daily. Indications: hyperlipidemia 12/9/16   Laura Lieberman MD   dilTIAZem CD (CARDIZEM CD) 240 mg ER capsule Take 1 Cap by mouth daily.  12/9/16   Laura Lieberman MD   polyethylene glycol (MIRALAX) 17 gram/dose powder Take 17 g by mouth daily as needed. Historical Provider   calcium carbonate (CALTREX) 600 mg (1,500 mg) tablet Take 600 mg by mouth nightly. Historical Provider   levothyroxine (SYNTHROID) 88 mcg tablet Take  by mouth Daily (before breakfast). Historical Provider   cholecalciferol (VITAMIN D3) 1,000 unit cap Take  by mouth daily. Historical Provider   CARBOXYMETHYLCELLULOS/GLYCERIN (REFRESH OPTIVE OP) Apply  to eye. Historical Provider   DOCUSATE SODIUM Take 1 Cap by mouth two (2) times a day. Historical Provider   metoprolol succinate (TOPROL-XL) 50 mg XL tablet Take 1 Tab by mouth daily. Indications: PREVENTION OF A. FIB POST CARDIO-THORACIC SURGERY  Patient taking differently: Take 50 mg by mouth nightly. Indications: PREVENTION OF A. FIB POST CARDIO-THORACIC SURGERY 5/9/16   Alena Sarmiento MD   Omeprazole delayed release (PRILOSEC D/R) 20 mg tablet Take 20 mg by mouth daily. prn     Historical Provider   OXYGEN-AIR DELIVERY SYSTEMS by Does Not Apply route. 2-2.5 lpm cont. Historical Provider   cyanocobalamin (VITAMIN B-12) 250 mcg tablet Take 1,000 mcg by mouth daily. Quita Shipman MD   nitroglycerin (NITROSTAT) 0.4 mg SL tablet by SubLINGual route every five (5) minutes as needed for Chest Pain.     Quita Shipman MD     PHP:  [id number] Radha Spann MD  Cardiology:  [facility]    Review of Systems  Patient is unable    Objective:     Data Review:   Recent Results (from the past 24 hour(s))   POC LACTIC ACID    Collection Time: 02/12/17  2:46 PM   Result Value Ref Range    Lactic Acid (POC) 1.0 0.5 - 1.9 mmol/L   EKG, 12 LEAD, INITIAL    Collection Time: 02/12/17  2:48 PM   Result Value Ref Range    Systolic BP  mmHg    Diastolic BP  mmHg    Ventricular Rate 77 BPM    Atrial Rate 340 BPM    P-R Interval  ms    QRS Duration 132 ms    Q-T Interval 422 ms    QTC Calculation (Bezet) 477 ms    Calculated P Axis  degrees    Calculated R Axis 68 degrees    Calculated T Axis -65 degrees    Diagnosis !! AGE AND GENDER SPECIFIC ECG ANALYSIS !! Atrial fibrillation  Right bundle branch block  Abnormal ECG  When compared with ECG of 09-FEB-2017 16:52,  No significant change was found  Confirmed by [facility] ROD MD (), RAMONA COLE ([id number]) on 2/12/2017 8:38:00 PM     CBC WITH AUTOMATED DIFF    Collection Time: 02/12/17  2:50 PM   Result Value Ref Range    WBC 3.4 (L) 4.3 - 11.1 K/uL    RBC 2.71 (L) 4.05 - 5.25 M/uL    HGB 8.2 (L) 11.7 - 15.4 g/dL    HCT 27.8 (L) 35.8 - 46.3 %    .6 (H) 79.6 - 97.8 FL    MCH 30.3 26.1 - 32.9 PG    MCHC 29.5 (L) 31.4 - 35.0 g/dL    RDW 15.6 (H) 11.9 - 14.6 %    PLATELET 860 (L) 852 - 450 K/uL    MPV 10.4 (L) 10.8 - 14.1 FL    DF AUTOMATED      NEUTROPHILS 71 43 - 78 %    LYMPHOCYTES 16 13 - 44 %    MONOCYTES 7 4.0 - 12.0 %    EOSINOPHILS 5 0.5 - 7.8 %    BASOPHILS 1 0.0 - 2.0 %    IMMATURE GRANULOCYTES 0.3 0.0 - 5.0 %    ABS. NEUTROPHILS 2.5 1.7 - 8.2 K/UL    ABS. LYMPHOCYTES 0.5 0.5 - 4.6 K/UL    ABS. MONOCYTES 0.2 0.1 - 1.3 K/UL    ABS. EOSINOPHILS 0.2 0.0 - 0.8 K/UL    ABS. BASOPHILS 0.0 0.0 - 0.2 K/UL    ABS. IMM. GRANS. 0.0 0.0 - 0.5 K/UL   METABOLIC PANEL, COMPREHENSIVE    Collection Time: 02/12/17  2:50 PM   Result Value Ref Range    Sodium 146 (H) 136 - 145 mmol/L    Potassium 3.7 3.5 - 5.1 mmol/L    Chloride 106 98 - 107 mmol/L    CO2 34 (H) 21 - 32 mmol/L    Anion gap 6 (L) 7 - 16 mmol/L    Glucose 87 65 - 100 mg/dL    BUN 25 (H) 8 - 23 MG/DL    Creatinine 1.48 (H) 0.6 - 1.0 MG/DL    GFR est AA 44 (L) >60 ml/min/1.73m2    GFR est non-AA 37 (L) >60 ml/min/1.73m2    Calcium 7.7 (L) 8.3 - 10.4 MG/DL    Bilirubin, total 0.5 0.2 - 1.1 MG/DL    ALT (SGPT) 17 12 - 65 U/L    AST (SGOT) 27 15 - 37 U/L    Alk.  phosphatase 76 50 - 136 U/L    Protein, total 5.0 (L) 6.3 - 8.2 g/dL    Albumin 2.4 (L) 3.2 - 4.6 g/dL    Globulin 2.6 2.3 - 3.5 g/dL    A-G Ratio 0.9 (L) 1.2 - 3.5     PROCALCITONIN    Collection Time: 02/12/17  2:50 PM   Result Value Ref Range    Procalcitonin <0.1 ng/mL MAGNESIUM    Collection Time: 02/12/17  2:50 PM   Result Value Ref Range    Magnesium 2.3 1.8 - 2.4 mg/dL   BNP    Collection Time: 02/12/17  2:50 PM   Result Value Ref Range     pg/mL   PROTHROMBIN TIME + INR    Collection Time: 02/12/17  2:50 PM   Result Value Ref Range    Prothrombin time 20.8 (H) 9.6 - 12.0 sec    INR 1.9 (H) 0.9 - 1.2     TROPONIN I    Collection Time: 02/12/17  2:50 PM   Result Value Ref Range    Troponin-I, Qt. 0.02 0.02 - 0.05 NG/ML   INFLUENZA A & B AG (RAPID TEST)    Collection Time: 02/12/17  3:28 PM   Result Value Ref Range    Influenza A Ag NEGATIVE  NEG      Influenza B Ag NEGATIVE  NEG     URINALYSIS W/ RFLX MICROSCOPIC    Collection Time: 02/12/17  3:45 PM   Result Value Ref Range    Color STRAW      Appearance CLOUDY      Specific gravity 1.009 1.001 - 1.023      Specific gravity 1.009      pH (UA) 6.0 5.0 - 9.0      Protein NEGATIVE  NEG mg/dL    Glucose NEGATIVE  mg/dL    Ketone NEGATIVE  NEG mg/dL    Bilirubin NEGATIVE  NEG      Blood NEGATIVE  NEG      Urobilinogen 0.2 0.2 - 1.0 EU/dL    Nitrites NEGATIVE  NEG      Leukocyte Esterase TRACE (A) NEG     URINE MICROSCOPIC    Collection Time: 02/12/17  3:45 PM   Result Value Ref Range    WBC 0-3 0 /hpf    RBC 0-3 0 /hpf    Epithelial cells 5-10 0 /hpf    Bacteria TRACE 0 /hpf    Casts 0 0 /lpf    Crystals 0 0 /LPF    Mucus 1+ (H) 0 /lpf    Other observations RESULTS VERIFIED MANUALLY     2/9/17:  CXR:  There are hazy infiltrates in both lung bases. Small effusions may also be present. There is stable cardia megaly. Sternotomy changes and pacemaker are present. IMPRESSION: Bibasilar infiltrates and effusions, likely congestive failure     2/12/17:  PCXR:  Portable AP view demonstrates gross, stable cardiomegaly without pulmonary edema. No focal infiltrate. No definite pleural effusion. Left lower lobe difficult to evaluate because of heart size. Sternotomy wires and prosthetic cardiac valves.  Cardiac pacemaking device grossly unchanged. Impression: No acute findings in the chest.    2/12/17:  BILATERAL VENOUS ULTRASOUND: The bilateral common femoral, profunda, greater saphenous, femoral, popliteal,  posterior tibial, and peroneal veins are visualized and appear unremarkable. They demonstrate normal Doppler signal and compressibility. IMPRESSION: No sonographic evidence of DVT      ECHOCARDIOGRAM: 7/19/16:  Low-normal left ventricular systolic function. Mild concentric LV hypertrophy. Dilated right ventricle. Pacemaker in the right heart. Biatrial enlargement. There is a mildly to moderately stenotic mechanical aortic valve prosthesis with an elevated transvalvular gradient. There is a mechanical aortic valve prosthesis with mild regurgitation. There is mild mitral regurgitation. PHYSICAL EXAM:    Visit Vitals    /73 (BP 1 Location: Right arm, BP Patient Position: Head of bed elevated (Comment degrees))  Comment (BP Patient Position): 30 degrees    Pulse 83    Temp 97.8 °F (36.6 °C)    Resp 20    Ht 5' (1.524 m)    Wt 84 kg (185 lb 1.6 oz)    SpO2 98%    BMI 36.15 kg/m2     General appearance: Oriented and alert, cooperative, short of breath with minimal conversation. Morbidly obese. Chronically ill appearing. Head: Normocephalic, without obvious abnormality, atraumatic  Eyes: conjunctivae/corneas clear. PERRL  Neck: supple, symmetrical, trachea midline, mild JVD  Lungs: Diminished throughout with rales and wet sounding chest.    Heart: Irregular rate and rhythm, S1, S2 normal, + murmur, + click  Abdomen: Pendulous, soft, non-tender. Bowel sounds normal. No masses,  no organomegaly. Denies  or GI problems  Extremities: bilateral lower extremity pitting edema, +3-4, left greater than right.   Upper extremities normal, atraumatic, no cyanosis or edema  Skin: Skin color pale, texture, turgor normal. No rashes or lesions  Neurologic: Grossly normal, no unilateral deficit    Assessment:   Generalized weakness  Hypoxia  SOB  Acute on chronic diastolic CHF   Persistent Hypotension   Aortic Valve Bioprosthesis Present   BOBBY   Acute on chronic renal failure  Chronic atrial fibrillation on coumadin and dig  Aortic valve replaced   Coagulopathy on coumdin  Pulmonary hypertension   Pacemaker  Morbid obesity  Chronic anemia    Plan:   Admit to telemetry for Dr Renee Hutchison  Cardiology consult, discussed with Dr Santosh Boo at 3 liters cont  Echocardiogram in am  Repeat labs in am   1/2 NS at 50 ml/hr  SCDs for DVT prophylaxis  Palliative care consult  Full code  Home meds reviewed and reconciled by me  Consult case management and SW for discharge planning  xopenex aerosols prn  PPD  PT, OT consults  Lactic acid q 2 hours until less than 2.0  Overnight oximetry  Strict I+O  Coumadin 2.5 q HS

## 2017-02-13 NOTE — PROGRESS NOTES
Problem: Nutrition Deficit  Goal: *Optimize nutritional status  Nutrition:  Nutrition Consult for General Nutrition Management & Supplements. (FELISHA Pino)         Assessment:  Anthropometrics:  Ht - 5'0\", wgt - 84.6 kg (3rd floor telemetry 2/13/17), BMI - 36.2 c/w obesity class II, edema - 1+. Macronutrient Needs:  Estimated calorie needs - 7265-2625 sayra/day (20-22 sayra/kg/day) (166% IBW)  Estimated protein needs - 36-45 gm pro/day (0.8-1 gm pro/kgIBW/day) (GFR 43 ml/min)  Intake/Comparative Standards:              No data available. Pertinent Labs:              Sodium 147, BUN 25, creatinine 1.29, GFR 43. Pertinent Medications:              Aldactone, Pericolace. Diet:              Cardiac. Food/Nutrition History:              77year old lady with a h/o valvular disease, s/p AVR admitted with CHF. The patient reports that she \"just ballooned up\" in regards to her weight. She reports that she doesn't cook with salt. She also reports difficulty \"keeping food down\". She describes eating a few bites, \"but the food coming back up\". Her intake at lunch was diminished due to her dislike of the items on her tray. I brought her a turkey sandwich meal and Ensure Enlive and she proceeded to start to eat it. Her skin is intact and she turns herself. Diagnosis (Nutrition): Inadequate oral intake related to decreased ability to consume sufficient oral intake as evidenced by SOB and complaints of difficulty swallowing with s/s of a stricture/hernia. Intervention:  Meals and Snacks: Cardiac. A copy of the menu was given to the patient to assist her in choosing appropriate meals to optimize her oral intake. Medical Food Supplement Therapy: Commercial Beverage: Strawberry Ensure Enlive with all trays. Monitoring/Evaluation:  Monitor clinical course, POC, oral intake. Jamilah Webster.  [facility]  [phone]

## 2017-02-13 NOTE — PROGRESS NOTES
Problem: Mobility Impaired (Adult and Pediatric)  Goal: *Acute Goals and Plan of Care (Insert Text)  LTG:  (1.)Ms. Bogdan Siegel will move from supine to sit and sit to supine and roll side to side in bed with INDEPENDENT within 7 day(s). (2.)Ms. Bogdan Siegel will transfer from bed to chair and chair to bed with MODIFIED INDEPENDENCE using the least restrictive device within 5 day(s). (3.)Ms. Bogdan Siegel will ambulate with MODIFIED INDEPENDENCE for 50 feet with the least restrictive device within 5 day(s). (4.)Ms. Bogdan Siegel will perform standing static and dynamic balance activities x 8 minutes with SUPERVISION to improve safety within 5 day(s). (5.)Ms. Bogdan Siegel will tolerate 25 minutes of therapeutic activity/exercise with one rest break within 5 day(s). ________________________________________________________________________________________________      PHYSICAL THERAPY: INITIAL ASSESSMENT, AM 2/13/2017  INPATIENT: Hospital Day: 2  [place] / [place] / Product Type: Medicare /      NAME/AGE/GENDER: Shira Marquez is a 77 y.o. female       PRIMARY DIAGNOSIS: dyspnea, hypotension,  Acute CHF (congestive heart failure) ([facility]) Acute CHF (congestive heart failure) ([facility]) Acute CHF (congestive heart failure) ([facility]        ICD-10: Treatment Diagnosis:       · Generalized Muscle Weakness (M62.81)  · shortness of breath   Precaution/Allergies:  Adhesive tape; Benadryl [diphenhydramine hcl]; Demerol [meperidine]; Morphine; Sulfa (sulfonamide antibiotics); and Lorazepam       ASSESSMENT:      Ms. Bogdan Siegel presents with shortness of breath with activity and overall general fatigue. Patient was admitted due to above and was supine in bed on arrival to room and agreeable to PT evaluation. Patient was able to perform bed mobility with head of bed elevated and supervision. Patient needs additional time for all mobility due to shortness of breath with all activity. Patient ambulated with rolling walker in room on 3L O2. Patient's HR started at 75 bpm, but increased to 120s during mobility. Patient is close to baseline independence; but would benefit from continued skilled acute PT and  PT follow up at discharge. This section established at most recent assessment   PROBLEM LIST (Impairments causing functional limitations):  1. Decreased Strength  2. Decreased ADL/Functional Activities  3. Decreased Transfer Abilities  4. Decreased Ambulation Ability/Technique  5. Decreased Balance  6. Decreased Activity Tolerance  7. Increased Fatigue  8. Increased Shortness of Breath  9. Decreased Knowledge of Precautions  10. Decreased [place] with Home Exercise Program    INTERVENTIONS PLANNED: (Benefits and precautions of physical therapy have been discussed with the patient.)  1. Balance Exercise  2. Bed Mobility  3. Gait Training  4. Home Exercise Program (HEP)  5. Therapeutic Activites  6. Therapeutic Exercise/Strengthening  7. Transfer Training  8. Group Therapy      TREATMENT PLAN: Frequency/Duration: 3 times a week for duration of hospital stay  Rehabilitation Potential For Stated Goals: EXCELLENT      RECOMMENDED REHABILITATION/EQUIPMENT: (at time of discharge pending progress): Continue Skilled Therapy and Home Health: Physical Therapy. HISTORY:   History of Present Injury/Illness (Reason for Referral):  Per H&P:Ms. Ziyad Peña is a very pleasant 76 yo F Who complains of increased SOb, nonproductive cough and b/l LE swelling in the last 1-2 months. Known with chronic diastolic CHF, CAD, bradycardia s/p pacemaker, , s/p bioprosthetic AVR,Chronic Afib on coumadin, severe pulmonary HTN with PAP 69 mmHg, chronic respiratory failure on NC 3L 24h/7 , COPD, GERD, HTN, HLP, Obesity, BOBBY - not using CPAP. Seen by her cardiologist, Phu Ramos on 1/9/17 and Demadex was increased to 40 mg PO daily, except MWF when was BID. Ms. Ziyad Peña states that her leg swelling improved, although her cough and SOB persisted.  Seen by PCP on 1/31 and placed on oral steroids and Albuterol that she couldn't complete because the albuterol made ermias jittery. No improvement with treatment. Seen at [facility] ED on 2/9 with low BP and and thought that she is dehydrated due to increased diuretics and received IV fluids. She felt better for one day, although because of increased in her symptoms she presented to ED today. BP was 80/51 mmHg and received 1L NC bolus in Ed per ED provider. CXR w/o acute pathology. No spikes of fever since her symptoms started. Influenza screen negative. WBC:3.4. Cr:1.48, around her baseline. CV:irregular irregularity. No JVD. +2 pitting edema b/l LE   Resp: Decreased air entry b/l at the base. No wheezing. No rales or crackles. Abd: soft, non-tender, no rebound tenderness. Hold Torsemide until tomorrow. Will get Echocardiogram. Gentle IV hydration at 50 ml/h to keep MAP>85 mmHg. Hold BB due to hypotension. Hold benzodiazepines at bedtime, can exacerbate - untreated BOBBY and involved in severe pulmonary hypertension. Cardiology and palliative care consulted - appreciate the help. Past Medical History/Comorbidities:   Ms. Hai Brambila  has a past medical history of Abnormal glucose (8/5/2016); Abscess (8/5/2016); Acute encephalopathy (7/8/2016); Acute renal failure ([address] (12/3/2009); Afib ([address] Anemia; Ankle swelling (8/5/2016); Anxiety (8/5/2016); Aortic Valve Bioprosthesis Present (3/7/2009); ARF (acute renal failure) ([address] (2/24/2010); Arthritis; Asthma; Atopic dermatitis (8/5/2016); Atrial fibrillation ([address] (3/7/2009); Autonomic orthostatic hypotension (8/5/2016); AV block (10/17/2011); Back pain (8/5/2016); Bradycardia (Symptomatic) (11/7/2011); CAD (coronary artery disease); Cancer ([address] (1990); Cardiac pacemaker (11/2/2015); Cardiogenic shock ([address] (10/17/2011); 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 (8/5/2016); Cellulitis (8/5/2016); Chest pain (8/5/2016); Chronic atrial fibrillation ([facility] (10/17/2011);  Chronic depression (8/5/2016); Chronic depression (8/5/2016); Chronic obstructive pulmonary disease ([facility] (3/8/2009); Chronic pain; CKD (chronic kidney disease) stage 3, GFR 30-59 ml/min (12/4/2009); Congestive heart failure (CHF) ([facility] (11/2/2015); COPD; CRI (chronic renal insufficiency) (8/5/2016); Degeneration of cervical intervertebral disc (8/5/2016); Degenerative arthritis of left knee (2/27/2009); Dehydration (8/5/2016); Diastolic heart failure ([facility] Digoxin toxicity (2/24/2010); Disorder of sweat glands (8/5/2016); Diverticulosis of large intestine without diverticulitis (2015); Dyspnea (8/5/2016); Eczema (8/5/2016); Epigastric abdominal pain (2/24/2010); GERD (gastroesophageal reflux disease); Gout (8/5/2016); H/O mitral valve repair, 2003 (6/27/2016); Heart failure ([facility] HTN (hypertension) (8/5/2016); colonic polyp (2015); Hyperkalemia (10/17/2011); Hyperlipidemia (8/5/2016); Hypertension; Hypokalemia (2/24/2010); Hypothyroidism (12/4/2009); Ill-defined condition; Knee pain (8/5/2016); Leg cramps (8/5/2016); Mitral stenosis with insufficiency (11/2/2015); Nausea and vomiting (2/24/2010); Obesity (11/2/2015); BOBBY (obstructive sleep apnea) (12/4/2009); Osteoarthritis (8/5/2016); Osteopenia (8/5/2016); Other long term (current) drug therapy (8/5/2016); Palpitations (11/2/2015); Rash (8/5/2016); Rectal bleeding (10/27/2011); Rectocele (2015); Respiratory insufficiency (11/2/2015); 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 She also has no past medical history of Aneurysm ([address] Autoimmune disease ([address] Coagulation disorder ([address] DEMENTIA; Diabetes ([address] Endocarditis;  Infectious disease; Liver disease; Other ill-defined conditions(799.89); PUD (peptic ulcer disease); Seizures ([facility] Stroke [facility]); or Thromboembolus ([facility] Ms. Ely Worthington  has a past surgical history that includes appendectomy; cholecystectomy (2005); gyn (1981); mastectomy (1990); pacemaker; breast reconstruction; cardiac surg procedure unlist; and orthopaedic. Social History/Living Environment:   Home Environment: Private residence  # Steps to Enter: 1  One/Two Story Residence: One story  Living Alone: No  Support Systems: Child(brit), Congregation / gene community, Family member(s), Friends \ neighbors  Patient Expects to be Discharged to[de-identified] Private residence  Current DME Used/Available at Home: Shower chair  Tub or Shower Type: Tub/Shower combination  Prior Level of Function/Work/Activity:  Patient lives with son who works during the day. Patient has family and neighbors that would be able to check on patient. Personal Factors:          Age:  77 y.o. Number of Personal Factors/Comorbidities that affect the Plan of Care: 3+: HIGH COMPLEXITY   EXAMINATION:   Most Recent Physical Functioning:   Gross Assessment:  AROM: Generally decreased, functional  Strength: Generally decreased, functional  Coordination: Generally decreased, functional  Sensation: Intact (pt reports restless leg syndrome)               Posture:  Posture (WDL): Exceptions to WDL  Posture Assessment:  Forward head  Balance:  Sitting: Intact  Sitting - Static: Good (unsupported)  Sitting - Dynamic: Fair (occasional)  Standing: Impaired  Standing - Static: Fair  Standing - Dynamic : Fair Bed Mobility:  Rolling: Supervision  Supine to Sit: Supervision  Wheelchair Mobility:     Transfers:  Sit to Stand: Contact guard assistance  Stand to Sit: Contact guard assistance  Bed to Chair: Minimum assistance  Gait:     Base of Support: Widened  Speed/Carol: Slow  Step Length: Left shortened;Right shortened  Distance (ft): 15 Feet (ft)  Assistive Device: Walker, rolling  Ambulation - Level of Assistance: Contact guard assistance  Interventions: Verbal cues; Tactile cues; Safety awareness training       Body Structures Involved:  1. Heart  2. Lungs Body Functions Affected:  1. Cardio  2. Movement Related Activities and Participation Affected:  1. Mobility  2. Self Care  3. Domestic Life   Number of elements that affect the Plan of Care: 4+: HIGH COMPLEXITY   CLINICAL PRESENTATION:   Presentation: Evolving clinical presentation with changing clinical characteristics: MODERATE COMPLEXITY   CLINICAL DECISION MAKIN [facility] Mobility Inpatient Short Form  How much difficulty does the patient currently have. .. Unable A Lot A Little None   1. Turning over in bed (including adjusting bedclothes, sheets and blankets)? [ ] 1   [ ] 2   [ ] 3   [X] 4   2. Sitting down on and standing up from a chair with arms ( e.g., wheelchair, bedside commode, etc.)   [ ] 1   [ ] 2   [X] 3   [ ] 4   3. Moving from lying on back to sitting on the side of the bed? [ ] 1   [ ] 2   [ ] 3   [X] 4   How much help from another person does the patient currently need. .. Total A Lot A Little None   4. Moving to and from a bed to a chair (including a wheelchair)? [ ] 1   [ ] 2   [X] 3   [ ] 4   5. Need to walk in hospital room? [ ] 1   [ ] 2   [X] 3   [ ] 4   6. Climbing 3-5 steps with a railing? [ ] 1   [X] 2   [ ] 3   [ ] 4   © , Trustees of [address] [place] [address], under license to [employer]. All rights reserved    Score:  Initial: 19 Most Recent: X (Date: -- )     Interpretation of Tool:  Represents activities that are increasingly more difficult (i.e. Bed mobility, Transfers, Gait).        Score 24 23 22-20 19-15 14-10 9-7 6       Modifier CH CI CJ CK CL CM CN         · Mobility - Walking and Moving Around:               - CURRENT STATUS:    CK - 40%-59% impaired, limited or restricted               - GOAL STATUS:           CJ - 20%-39% impaired, limited or restricted               - D/C STATUS:                       ---------------To be determined---------------  [place] / [place] / Product Type: Medicare /       Medical Necessity:     · Patient demonstrates good rehab potential due to higher previous functional level. · Skilled intervention continues to be required due to decline in overall functional mobility and activity tolerance. Reason for Services/Other Comments:  · Patient continues to require present interventions due to patient's inability to perform functional mobility at previous indepencence level. Use of outcome tool(s) and clinical judgement create a POC that gives a: Questionable prediction of patient's progress: MODERATE COMPLEXITY                 TREATMENT:   (In addition to Assessment/Re-Assessment sessions the following treatments were rendered)   Pre-treatment Symptoms/Complaints:  \"I can try\"  Pain: Initial:   Pain Intensity 1: 0  Post Session:  No pain noted, shortness of breath with mobility      Assessment/Reassessment only, no treatment provided today     Braces/Orthotics/Lines/Etc:   · O2 Device: Nasal cannula  Treatment/Session Assessment:    · Response to Treatment:  Tolerated well, [facility]  · Interdisciplinary Collaboration:  · Physical Therapist  · Registered Nurse  · After treatment position/precautions:  · Up in chair  · Bed/Chair-wheels locked  · Call light within reach  · RN notified  · Compliance with Program/Exercises: compliant all of the time. · Recommendations/Intent for next treatment session: \"Next visit will focus on advancements to more challenging activities and reduction in assistance provided\".   Total Treatment Duration:  PT Patient Time In/Time Out  Time In: 1004  Time Out: 1020     Ruthie Garcia DPT

## 2017-02-13 NOTE — PROGRESS NOTES
Bedside and Verbal shift change report given to self (oncoming nurse) by Yovana Snow (offgoing nurse). Report included the following information SBAR, Kardex, MAR and Recent Results.

## 2017-02-13 NOTE — CONSULTS
Palliative Care    Patient: Susanna Proctor MRN: [id number]  SSN: [id number]    YOB: 1940  Age: 77 y.o. Sex: female       Date of Request: 2/12/2017  Date of Consult:  2/13/2017  Reason for Consult:  family support and education and goals of care  Requesting Physician: Nava Thornton NP     Assessment/Plan:     Principal Diagnosis:    Dyspnea  R06.00  Additional Diagnoses:   · Constipation, Unspecified  K59.00  · Debility, Unspecified  R53.81  · Fatigue, Lethargy  R53.83  · Counseling, Encounter for Medical Advice  Z71.9  · Encounter for Palliative Care  Z51.5    Palliative Performance Scale (PPS):  PPS: 79    Medical Decision Making:   Reviewed and summarized chart from admission to present  Discussed case with appropriate providers. Reviewed laboratory and x-ray data: CBC, BMP, BNP, UA, CXR, US    Patient sitting in recliner, no family present. Introduced the role of palliative care and events of hospitalization. Patient with good understanding of her multiple, chronic, incurrable conditions. Patient states she did not realize that she is quite as advances as she is. I asked patient about her goals. She states she \"thanks the Lord\" every day that she gets up. She feels that she still has a purpose. She gets a lot of fulfillment and juliet out of talking to other people, and helping them if she can. Patient lives with her son. Per patient, she will have home health after discharge. Will talk more with her when family is present about long-term goals. Patient reports dyspnea, but states this has \"eased\" just by sitting and talking with someone. Patient with complaints of constipation in the setting of hemorrhoids. Will change Colace prn to Pericolace BID. Will continue to follow. Will discuss findings with members of the interdisciplinary team.      Thank you for this referral.   The Palliative Care team is available from 8 am to 4:30 pm Monday-Friday.   Medical management during other hours is per the primary attending service. .    Subjective:     History obtained from:  Patient, Family and Chart    Chief Complaint: Acute on chronic heart failure and dyspnea  History of Present Illness:  Ms. Radha Sommers is a 78 yo female with PMH of severe aortic stenosis, CAD, a fib, COPD, CKD, heart failure, GERD, gout, arthritis, HTN, severe pulmonary HTN, HLD, BOBBY, rheumatic heart disease, debility, and other history as listed below. Patient presented to [facility] ER from home on 2/12 with hypotension, one day history of nausea/vomiting and productive cough. She also reported dyspnea. Patient with lower extremity edema. Patient admitted for further management of heart failure.      Advance Directive: No       Code Status:  Full Code            Health Care Power of : Unknown- will address next visit    Past Medical History   Diagnosis Date    Abnormal glucose 8/5/2016    Abscess 8/5/2016    Acute encephalopathy 7/8/2016    Acute renal failure ([address] 12/3/2009    Afib ([address]     Anemia     Ankle swelling 8/5/2016    Anxiety 8/5/2016    Aortic Valve Bioprosthesis Present 3/7/2009    ARF (acute renal failure) ([facility]) 2/24/2010    Arthritis     Asthma     Atopic dermatitis 8/5/2016    Atrial fibrillation ([facility]) 3/7/2009    Autonomic orthostatic hypotension 8/5/2016    AV block 10/17/2011    Back pain 8/5/2016    Bradycardia (Symptomatic) 11/7/2011    CAD (coronary artery disease)     Cancer ([facility]) 1990     breast (left)    Cardiac pacemaker 11/2/2015    Cardiogenic shock ([facility] 10/17/2011    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    Cellulitis 8/5/2016    Chest pain 8/5/2016    Chronic atrial fibrillation ([address] 10/17/2011    Chronic depression 8/5/2016    Chronic depression 8/5/2016    Chronic obstructive pulmonary disease (HCC) 3/8/2009    Chronic pain     CKD (chronic kidney disease) stage 3, GFR 30-59 ml/min 12/4/2009    Congestive heart failure (CHF) ([address] 11/2/2015    COPD      O2 AT 3 L NC    CRI (chronic renal insufficiency) 8/5/2016    Degeneration of cervical intervertebral disc 8/5/2016    Degenerative arthritis of left knee 2/27/2009    Dehydration 5/3/6121    Diastolic heart failure (HCC)     Digoxin toxicity 2/24/2010    Disorder of sweat glands 8/5/2016    Diverticulosis of large intestine without diverticulitis 2015    Dyspnea 8/5/2016    Eczema 8/5/2016    Epigastric abdominal pain 2/24/2010    GERD (gastroesophageal reflux disease)     Gout 8/5/2016    H/O mitral valve repair, 2003 6/27/2016    Heart failure ([address]     HTN (hypertension) 8/5/2016    Hx of colonic polyp 2015     adenoma    Hyperkalemia 10/17/2011    Hyperlipidemia 8/5/2016    Hypertension     Hypokalemia 2/24/2010    Hypothyroidism 12/4/2009    Ill-defined condition      CARPEL TUNNEL SYNDROME, BILAT HANDS    Knee pain 8/5/2016    Leg cramps 8/5/2016    Mitral stenosis with insufficiency 11/2/2015     Prior MV repair 2003.     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    Rheumatic heart disease 11/2/2015    Right hip pain 8/5/2016    RLS (restless legs syndrome) 8/5/2016    Sick sinus syndrome ([address] 2/13/2016    Skin infection 8/5/2016    Sleep apnea 11/2/2015    Tachycardia 6/27/2016    Thrombocytopenia, unspecified ([address] 8/22/2012    Thyroid disease     Urticaria 8/5/2016    Vertigo 8/5/2016      Past Surgical History   Procedure Laterality Date    Hx appendectomy      Hx cholecystectomy  2005    Hx gyn  1981     hysterectomy still have ovaries    Hx mastectomy  1990     left mastectomy    Hx pacemaker      Hx breast reconstruction      Pr cardiac surg procedure unlist       valve repair and replacement    Hx orthopaedic       knee surgery     Family History   Problem Relation Age of Onset    Heart Disease Mother     Cancer Father      Throat    Heart Disease Father     Cancer Sister      Breast, Colon    Heart Disease Sister     Breast Cancer Sister 79      from disease    Cancer Maternal Grandmother     Cancer Brother     Diabetes Brother     Heart Disease Brother     Psychiatric Disorder Paternal Grandfather     Cancer Maternal Aunt      Breast    Diabetes Son     Alcohol abuse Neg Hx     Arthritis-rheumatoid Neg Hx     Asthma Neg Hx     Bleeding Prob Neg Hx     Elevated Lipids Neg Hx     Headache Neg Hx     Migraines Neg Hx     Hypertension Neg Hx     Lung Disease Neg Hx     Mental Retardation Neg Hx     Stroke Neg Hx       Social History   Substance Use Topics    Smoking status: Former Smoker     Types: Cigarettes     Quit date: 1996    Smokeless tobacco: Never Used      Comment: quit     Alcohol use No     Prior to Admission medications    Medication Sig Start Date End Date Taking? Authorizing Provider   allopurinol (ZYLOPRIM) 100 mg tablet Take 100 mg by mouth two (2) times a day. Yes Historical Provider   diazePAM (VALIUM) 5 mg tablet Take 2.5 mg by mouth nightly. Yes Historical Provider   metoprolol succinate (TOPROL XL) 50 mg XL tablet Take 50 mg by mouth nightly. Yes Historical Provider   sertraline (ZOLOFT) 50 mg tablet Take 50 mg by mouth daily. Yes Historical Provider   torsemide (DEMADEX) 20 mg tablet Take 20 mg by mouth daily. Yes Historical Provider   nitroglycerin (NITRODUR) 0.4 mg/hr 1 Patch by TransDERmal route daily. Yes Historical Provider   potassium chloride SR (K-TAB) 20 mEq tablet Take 20 mEq by mouth two (2) times a day. Yes Historical Provider   rOPINIRole (REQUIP) 2 mg tablet Take 2 mg by mouth nightly.    Yes Historical Provider TIOTROPIUM BR/OLODATEROL HCL (STIOLTO RESPIMAT IN) Take  by inhalation. NEW-PATIENT NOT TAKING, STATES IT MAKES HER JITTERY   Yes Historical Provider   promethazine (PHENERGAN) 25 mg tablet Take 1 Tab by mouth every six (6) hours as needed. 2/9/17  Yes Jeanmarie Keller MD   spironolactone (ALDACTONE) 25 mg tablet Take 1 Tab by mouth daily. 1/31/17  Yes Javier Solorzano MD   hydrocortisone (ANUSOL-HC) 2.5 % rectal cream Apply small amount to hemorrhoids/perianal skin TID PRN 1/17/17  Yes Ana Fernandez MD   warfarin (COUMADIN) 2.5 mg tablet Take 2.5 mg by mouth nightly. Use as directed    Yes Historical Provider   pravastatin (PRAVACHOL) 40 mg tablet Take 1 Tab by mouth daily. Indications: hyperlipidemia 12/9/16  Yes Azalia Hu MD   dilTIAZem CD (CARDIZEM CD) 240 mg ER capsule Take 1 Cap by mouth daily. 12/9/16  Yes Azalia Hu MD   polyethylene glycol (MIRALAX) 17 gram/dose powder Take 17 g by mouth daily as needed. Yes Historical Provider   calcium carbonate (CALTREX) 600 mg (1,500 mg) tablet Take 600 mg by mouth nightly. Yes Historical Provider   levothyroxine (SYNTHROID) 88 mcg tablet Take  by mouth Daily (before breakfast). Yes Historical Provider   cholecalciferol (VITAMIN D3) 1,000 unit cap Take  by mouth daily. Yes Historical Provider   CARBOXYMETHYLCELLULOS/GLYCERIN (REFRESH OPTIVE OP) Apply  to eye. Yes Historical Provider   DOCUSATE SODIUM Take 1 Cap by mouth two (2) times a day. Yes Historical Provider   Omeprazole delayed release (PRILOSEC D/R) 20 mg tablet Take 20 mg by mouth daily. prn    Yes Historical Provider   OXYGEN-AIR DELIVERY SYSTEMS by Does Not Apply route. 2-2.5 lpm cont. Yes Historical Provider   cyanocobalamin (VITAMIN B-12) 250 mcg tablet Take 1,000 mcg by mouth daily. Yes Quita Shipman MD   nitroglycerin (NITROSTAT) 0.4 mg SL tablet by SubLINGual route every five (5) minutes as needed for Chest Pain.    Yes Quita Shipman MD       Allergies   Allergen Reactions    Adhesive Tape Rash    Benadryl [Diphenhydramine Hcl] Other (comments)     Jitters      Demerol [Meperidine] Anxiety    Morphine Other (comments)     Confusion    Sulfa (Sulfonamide Antibiotics) Anxiety    Lorazepam Anxiety        Review of Systems:  A comprehensive review of systems was negative except for:   Constitutional: Positive for fatigue. Respiratory: Positive for dyspnea with any exertion. Gastrointestinal: Positive for constipation. Objective:     Visit Vitals    /78 (BP 1 Location: Right arm, BP Patient Position: Head of bed elevated (Comment degrees))    Pulse 75    Temp 97.9 °F (36.6 °C)    Resp 20    Ht 5' (1.524 m)    Wt 84.6 kg (186 lb 8 oz)    SpO2 98%    Breastfeeding No    BMI 36.42 kg/m2        Physical Exam:    General:  Elderly and debilitated appearing. Cooperative. No acute distress. Eyes:  Conjunctivae/corneas clear    Nose: Nares normal. Septum midline. O2 via NC. Neck: Supple, symmetrical, trachea midline. Lungs:   Diminished bilaterally, unlabored. Heart:  Regular rate and rhythm, no murmur    Abdomen:   Soft, non-tender, non-distended   Extremities: Normal, atraumatic, no cyanosis. 2+ edema. Skin: Skin color, texture, turgor normal. No rash. Neurologic: Nonfocal.   Psych: Alert and oriented.       Assessment:     Hospital Problems  Date Reviewed: 2/12/2017          Codes Class Noted POA    * (Principal)Acute CHF (congestive heart failure) ([facility] ICD-10-CM: I50.9  ICD-9-CM: 428.0  2/12/2017 Unknown        Pulmonary hypertension ([facility] ICD-10-CM: I27.2  ICD-9-CM: 416.8  2/12/2017 Yes        Aortic valve replaced (Chronic) ICD-10-CM: Z95.2  ICD-9-CM: V43.3  1/9/2017 Yes        Warfarin anticoagulation (Chronic) ICD-10-CM: Z79.01  ICD-9-CM: V58.61  1/9/2017 Yes        Chronic diastolic congestive heart failure (HCC) (Chronic) ICD-10-CM: I50.32  ICD-9-CM: 428.32, 428.0  9/9/2016 Yes        Sleep apnea ICD-10-CM: G47.30  ICD-9-CM: 780.57 11/2/2015 Yes        Chronic atrial fibrillation (HCC) (Chronic) ICD-10-CM: I48.2  ICD-9-CM: 427.31  10/17/2011 Yes        ARF (acute renal failure) (HCC) (Chronic) ICD-10-CM: N17.9  ICD-9-CM: 584.9  2/24/2010 Yes        BOBBY (obstructive sleep apnea) (Chronic) ICD-10-CM: G47.33  ICD-9-CM: 327.23  12/4/2009 Yes        Aortic Valve Bioprosthesis Present (Chronic) ICD-10-CM: Z95.2  ICD-9-CM: V43.3  3/7/2009 Yes        Hypotension (Chronic) ICD-10-CM: I95.9  ICD-9-CM: 458.9  3/1/2009 Yes              Signed By: Willian Cade NP     February 13, 2017

## 2017-02-14 ENCOUNTER — HOME HEALTH ADMISSION (OUTPATIENT)
Dept: HOME HEALTH SERVICES | Facility: HOME HEALTH | Age: 77
End: 2017-02-14

## 2017-02-14 LAB
ANION GAP BLD CALC-SCNC: 7 MMOL/L (ref 7–16)
BACTERIA SPEC CULT: NORMAL
BACTERIA SPEC CULT: NORMAL
BUN SERPL-MCNC: 22 MG/DL (ref 8–23)
CALCIUM SERPL-MCNC: 8.1 MG/DL (ref 8.3–10.4)
CHLORIDE SERPL-SCNC: 107 MMOL/L (ref 98–107)
CO2 SERPL-SCNC: 32 MMOL/L (ref 21–32)
CREAT SERPL-MCNC: 1.17 MG/DL (ref 0.6–1)
ERYTHROCYTE [DISTWIDTH] IN BLOOD BY AUTOMATED COUNT: 15.1 % (ref 11.9–14.6)
GLUCOSE SERPL-MCNC: 95 MG/DL (ref 65–100)
HCT VFR BLD AUTO: 25.8 % (ref 35.8–46.3)
HGB BLD-MCNC: 7.8 G/DL (ref 11.7–15.4)
INR PPP: 2 (ref 0.9–1.2)
MCH RBC QN AUTO: 30.2 PG (ref 26.1–32.9)
MCHC RBC AUTO-ENTMCNC: 30.2 G/DL (ref 31.4–35)
MCV RBC AUTO: 100 FL (ref 79.6–97.8)
MM INDURATION POC: 0 MM (ref 0–5)
PLATELET # BLD AUTO: 92 K/UL (ref 150–450)
PMV BLD AUTO: 10.8 FL (ref 10.8–14.1)
POTASSIUM SERPL-SCNC: 3.8 MMOL/L (ref 3.5–5.1)
PPD POC: NORMAL NEGATIVE
PROTHROMBIN TIME: 22.1 SEC (ref 9.6–12)
RBC # BLD AUTO: 2.58 M/UL (ref 4.05–5.25)
SERVICE CMNT-IMP: NORMAL
SERVICE CMNT-IMP: NORMAL
SODIUM SERPL-SCNC: 146 MMOL/L (ref 136–145)
WBC # BLD AUTO: 3.2 K/UL (ref 4.3–11.1)

## 2017-02-14 PROCEDURE — 74011000258 HC RX REV CODE- 258: Performed by: HOSPITALIST

## 2017-02-14 PROCEDURE — 74011250637 HC RX REV CODE- 250/637: Performed by: NURSE PRACTITIONER

## 2017-02-14 PROCEDURE — 85610 PROTHROMBIN TIME: CPT | Performed by: NURSE PRACTITIONER

## 2017-02-14 PROCEDURE — 77030012890

## 2017-02-14 PROCEDURE — 74011250637 HC RX REV CODE- 250/637: Performed by: INTERNAL MEDICINE

## 2017-02-14 PROCEDURE — 65660000000 HC RM CCU STEPDOWN

## 2017-02-14 PROCEDURE — 74011250637 HC RX REV CODE- 250/637: Performed by: FAMILY MEDICINE

## 2017-02-14 PROCEDURE — 74011250637 HC RX REV CODE- 250/637: Performed by: HOSPITALIST

## 2017-02-14 PROCEDURE — 74011000250 HC RX REV CODE- 250: Performed by: NURSE PRACTITIONER

## 2017-02-14 PROCEDURE — 85027 COMPLETE CBC AUTOMATED: CPT | Performed by: NURSE PRACTITIONER

## 2017-02-14 PROCEDURE — 36415 COLL VENOUS BLD VENIPUNCTURE: CPT | Performed by: NURSE PRACTITIONER

## 2017-02-14 PROCEDURE — 74011000258 HC RX REV CODE- 258: Performed by: NURSE PRACTITIONER

## 2017-02-14 PROCEDURE — 80048 BASIC METABOLIC PNL TOTAL CA: CPT | Performed by: NURSE PRACTITIONER

## 2017-02-14 RX ORDER — DILTIAZEM HYDROCHLORIDE 30 MG/1
30 TABLET, FILM COATED ORAL EVERY 6 HOURS
Status: DISCONTINUED | OUTPATIENT
Start: 2017-02-14 | End: 2017-02-16

## 2017-02-14 RX ORDER — AMIODARONE HYDROCHLORIDE 200 MG/1
200 TABLET ORAL 2 TIMES DAILY
Status: DISCONTINUED | OUTPATIENT
Start: 2017-02-14 | End: 2017-02-17

## 2017-02-14 RX ORDER — DIAZEPAM 5 MG/1
2.5 TABLET ORAL
Status: DISCONTINUED | OUTPATIENT
Start: 2017-02-14 | End: 2017-02-28 | Stop reason: HOSPADM

## 2017-02-14 RX ORDER — SODIUM CHLORIDE 0.9 % (FLUSH) 0.9 %
5-10 SYRINGE (ML) INJECTION AS NEEDED
Status: DISCONTINUED | OUTPATIENT
Start: 2017-02-14 | End: 2017-02-19

## 2017-02-14 RX ORDER — SODIUM CHLORIDE 0.9 % (FLUSH) 0.9 %
5-10 SYRINGE (ML) INJECTION EVERY 8 HOURS
Status: DISCONTINUED | OUTPATIENT
Start: 2017-02-14 | End: 2017-02-19

## 2017-02-14 RX ORDER — DILTIAZEM HYDROCHLORIDE 120 MG/1
120 CAPSULE, COATED, EXTENDED RELEASE ORAL 2 TIMES DAILY
Status: DISCONTINUED | OUTPATIENT
Start: 2017-02-14 | End: 2017-02-14

## 2017-02-14 RX ADMIN — Medication 10 ML: at 22:00

## 2017-02-14 RX ADMIN — PANTOPRAZOLE SODIUM 40 MG: 40 TABLET, DELAYED RELEASE ORAL at 05:25

## 2017-02-14 RX ADMIN — ROPINIROLE 2 MG: 2 TABLET, FILM COATED ORAL at 21:56

## 2017-02-14 RX ADMIN — Medication 10 ML: at 21:59

## 2017-02-14 RX ADMIN — SODIUM CHLORIDE 50 ML/HR: 450 INJECTION, SOLUTION INTRAVENOUS at 01:36

## 2017-02-14 RX ADMIN — Medication 10 ML: at 05:26

## 2017-02-14 RX ADMIN — DILTIAZEM HYDROCHLORIDE 30 MG: 30 TABLET, FILM COATED ORAL at 11:35

## 2017-02-14 RX ADMIN — DILTIAZEM HYDROCHLORIDE 30 MG: 30 TABLET, FILM COATED ORAL at 17:11

## 2017-02-14 RX ADMIN — PRAVASTATIN SODIUM 1 TABLET: 20 TABLET ORAL at 17:11

## 2017-02-14 RX ADMIN — ACETAMINOPHEN 650 MG: 325 TABLET, FILM COATED ORAL at 11:14

## 2017-02-14 RX ADMIN — DILTIAZEM HYDROCHLORIDE 30 MG: 30 TABLET, FILM COATED ORAL at 23:48

## 2017-02-14 RX ADMIN — DIAZEPAM 2.5 MG: 5 TABLET ORAL at 21:57

## 2017-02-14 RX ADMIN — GABAPENTIN 100 MG: 100 CAPSULE ORAL at 08:20

## 2017-02-14 RX ADMIN — SPIRONOLACTONE 25 MG: 25 TABLET, FILM COATED ORAL at 08:20

## 2017-02-14 RX ADMIN — AMIODARONE HYDROCHLORIDE 200 MG: 200 TABLET ORAL at 10:43

## 2017-02-14 RX ADMIN — LEVOTHYROXINE SODIUM 88 MCG: 0.09 TABLET ORAL at 05:26

## 2017-02-14 RX ADMIN — AMIODARONE HYDROCHLORIDE 200 MG: 200 TABLET ORAL at 17:11

## 2017-02-14 RX ADMIN — GABAPENTIN 100 MG: 100 CAPSULE ORAL at 17:11

## 2017-02-14 RX ADMIN — SERTRALINE HYDROCHLORIDE 50 MG: 50 TABLET ORAL at 21:56

## 2017-02-14 RX ADMIN — GABAPENTIN 100 MG: 100 CAPSULE ORAL at 21:57

## 2017-02-14 RX ADMIN — PRAVASTATIN SODIUM 1 TABLET: 20 TABLET ORAL at 08:20

## 2017-02-14 RX ADMIN — TRAZODONE HYDROCHLORIDE 50 MG: 50 TABLET ORAL at 21:56

## 2017-02-14 RX ADMIN — SODIUM CHLORIDE 2.5 MG/HR: 900 INJECTION, SOLUTION INTRAVENOUS at 01:36

## 2017-02-14 NOTE — PROGRESS NOTES
Bedside and Verbal shift change report given to Darian Ponce RN (oncoming nurse) by self (offgoing nurse). Report included the following information SBAR, Kardex, MAR and Recent Results.

## 2017-02-14 NOTE — PROGRESS NOTES
Palliative Care Progress Note    Patient: Percy Dodson MRN: [id number]  SSN: [id number]    YOB: 1940  Age: 77 y.o. Sex: female       Assessment/Plan:     Chief Complaint/Interval History: Dyspnea at rest     Principal Diagnosis:    · Dyspnea  R06.00    Additional Diagnoses:   · Anxiety  F41.1  · Fatigue, Lethargy  R53.83  · Frailty  R54  · Counseling, Encounter for Medical Advice  Z71.9  · Encounter for Palliative Care  Z51.5    Palliative Performance Scale (PPS)  PPS: 79    Medical Decision Making:   Reviewed and summarized notes over previous 24 hours. Discussed case with appropriate providers: Dr. Andres Dominique  Reviewed laboratory and x-ray data: CBC, BMP    Patient resting in bed, dyspneic at rest.  Patient's son is at the bedside. Patient's son states that he and patient have talked last night and this morning. He states that patient's goal is to get out of the hospital and go home and \"enjoy her life\". Patient affirms these goals/priorities. Patient had mentioned plan for home health to me yesterday. Introduced the hospice philosophy, as patient's goals line up with this, and at some point, patient may not be able to participate in therapies as she'd like to. They verbalize understanding. Current plan is to have home health at discharge with a transition to hospice at some point. Patient does have dyspnea at rest.  She takes Valium 2.5mg at home. Will provide this for anxiety and dyspnea. Reassured patient of our ongoing care. Will continue to follow. Will discuss findings with members of the interdisciplinary team.         More than 50% of this 25 minute visit was spent counseling and coordination of care as outlined above. Subjective:     Review of Systems:  A comprehensive review of systems was negative except for:   Constitutional: Positive for fatigue.   Respiratory: Positive for dyspnea at rest today     Objective:     Visit Vitals    BP 95/55 (BP 1 Location: Right arm, BP Patient Position: Sitting)    Pulse (!) 117    Temp 97.5 °F (36.4 °C)    Resp 16    Ht 5' (1.524 m)    Wt 84.4 kg (186 lb)    SpO2 99%    Breastfeeding No    BMI 36.33 kg/m2       Physical Exam:    General:  Elderly, frail, and debilitated. Cooperative. No acute distress. Eyes:  Conjunctivae/corneas clear    Nose: Nares normal. Septum midline. O2 via NC. Neck: Supple, symmetrical, trachea midline, no JVD   Lungs:   Diminished to auscultation bilaterally, mildly labored. Heart:  Irregular rate and rhythm. Abdomen:   Soft, non-tender, non-distended. Extremities: Normal, atraumatic, no cyanosis. Trace edema. Skin: Skin color, texture, turgor normal. No rash. Neurologic: Nonfocal.   Psych: Alert and oriented.      Signed By: Myron Zee NP     February 14, 2017

## 2017-02-14 NOTE — PROGRESS NOTES
Bedside and Verbal shift change report given to self (oncoming nurse) by Aurelia Jacobs RN (offgoing nurse). Report included the following information SBAR, Kardex, MAR and Recent Results.

## 2017-02-14 NOTE — PROGRESS NOTES
PT Note:  Attempted PT. Patient lying in bed with dyspnea, states she just got back from going to restroom and is unable to participate right now. Will attempt another time/day as schedule permits.   JARRED Tejeda, PT

## 2017-02-14 NOTE — PROGRESS NOTES
[facility] CARDIOLOGY PROGRESS NOTE           2/14/2017 9:38 AM    Admit Date: 2/12/2017      Subjective:   No cp or inc sob    ROS:  Cardiovascular:  As noted above    Objective:      Vitals:    02/13/17 1940 02/14/17 0025 02/14/17 0441 02/14/17 0748   BP: 102/44 119/67 90/57 (!) 85/43   Pulse: 96 97 97 (!) 109   Resp: 18 18 18 18   Temp: 97.1 °F (36.2 °C) 97.4 °F (36.3 °C) 97.7 °F (36.5 °C) 97.8 °F (36.6 °C)   SpO2: 99% 99% 99% 98%   Weight:   84.4 kg (186 lb)    Height:           Physical Exam:  General-No Acute Distress  Neck- supple, no JVD  CV- irrregular rate, + as murmur   Abd- soft, nontender, nondistended  Ext- no edema bilaterally. Skin- warm and dry    Data Review:   Recent Labs      02/14/17   0711  02/13/17   0633  02/12/17   1450   NA  146*  147*  146*   K  3.8  3.6  3.7   MG   --   2.3  2.3   BUN  22  25*  25*   CREA  1.17*  1.29*  1.48*   GLU  95  107*  87   WBC  3.2*  3.3*  3.4*   HGB  7.8*  7.9*  8.2*   HCT  25.8*  26.8*  27.8*   PLT  92*  99*  100*   INR  2.0*  1.8*  1.9*   TROIQ   --    --   0.02       Assessment/Plan:   Dyspnea  Severe bAVR stenosis  Mitral stenosis  Permanent a fib  Chronic bleeding hemorrhoids  PA htn  Chronic anemia  ///  Her hr is poorly controlled. Will add amiodarone for rate control and change diltiazem to po. Warfarin is stopped w/ add of amio to avoid coadulopathy and hopefully consider a switch to Eliquis when her INR drifts to baseline.        Kezia Spears MD  2/14/2017 9:38 AM

## 2017-02-14 NOTE — PROGRESS NOTES
MR called and stated that patients HR was in the 200s for about 30-45 seconds. Patient currently sustaining HR in 120-140s.  FELISHA Spain called and orders obtained for cardizem gtt at low dose of 2.5mg.

## 2017-02-14 NOTE — PROGRESS NOTES
EDUIN new screneing. Pt adm with severe pulmonary hypertension and possible cor pulmonale along with her known aortic valve stenosis, mitral valve disease. Her adult son lives with her. He works during the day and is in and out. Pt reports that she has many [employer] friends and family available to check on her daily. Reports that she ambulates independently at baseline and has not required assistance with ADLs in the past. Affirms that she has family who will assist her if needed. Transportation is provided by sister in law or son. Has a PCP and Rx plan. Home DME: BSC, SC, walker, nebulizer, home oxygen (continuous ) [employer]. Pt states that she wants to return home. PT worked with pt yesterday and recommended  PT.  [facility], PT/OT. Expressed preference for [facility]. Referral made. EDUIN following.

## 2017-02-14 NOTE — PROGRESS NOTES
MD cope, patient requesting medication for sleep, orders obtained from Dr. Carri Copeland for Trazodone 50mg PRN QHS

## 2017-02-14 NOTE — PROGRESS NOTES
Hospitalist Progress Note    2017  Admit Date: 2017  2:32 PM   NAME: Arelis Choi   :     DOS:              17  MRN:  [id number]   Attending: Devon Zhang MD  PCP:  Jenni Waters MD  Treatment Team: Attending Provider: Viviano Favre, MD; Consulting Provider: Joana Joe MD; Consulting Provider: Uche Zepeda DO; Utilization Review: Ela Calvillo; Consulting Provider: Valerio Morejon NP    DNR     SUBJECTIVE:   As previously documented: \" Ms. Ghazal Sweeney is a very pleasant 78 yo F Who complains of increased SOb, nonproductive cough and b/l LE swelling in the last 1-2 months. Known with chronic diastolic CHF, CAD, bradycardia s/p pacemaker, , s/p bioprosthetic AVR,Chronic Afib on coumadin, severe pulmonary HTN with PAP 69 mmHg, chronic respiratory failure on NC 3L 24h/7 , COPD, GERD, HTN, HLP, Obesity, BOBBY - not using CPAP. Seen by her cardiologist, Jennifer Chisholm on 17 and Demadex was increased to 40 mg PO daily, except MWF when was BID. Ms. Ghazal Sweeney states that her leg swelling improved, although her cough and SOB persisted. Seen by PCP on  and placed on oral steroids and Albuterol that she couldn't complete because the albuterol made ermias jittery. No improvement with treatment. Seen at [facility] ED on  with low BP and and thought that she is dehydrated due to increased diuretics and received IV fluids. She felt better for one day, although because of increased in her symptoms she presented to ED today. BP was 80/51 mmHg and received 1L NC bolus in Ed per ED provider. CXR w/o acute pathology. No spikes of fever since her symptoms started. Influenza screen negative. WBC:3.4. Cr:1.48, around her baseline. \"       17   Ms. Arelis Choi  Seen- very drowsy and lethargic. Got some tylenol earlier- states it made her sleepy. Still with SOB and b/l pitting edema. Continues to have low BP. Denies any chest pain or abdominal pain.  Complains of RLS symptoms of lower extremities. Afebrile. Daughter in law at bedside. 10+ ROS reviewed and negative except for positive in HPI.    Allergies   Allergen Reactions    Adhesive Tape Rash    Benadryl [Diphenhydramine Hcl] Other (comments)     Jitters      Demerol [Meperidine] Anxiety    Morphine Other (comments)     Confusion    Sulfa (Sulfonamide Antibiotics) Anxiety    Lorazepam Anxiety     Current Facility-Administered Medications   Medication Dose Route Frequency Provider Last Rate Last Dose    amiodarone (CORDARONE) tablet 200 mg  200 mg Oral BID Paula Kwong MD   200 mg at 02/14/17 1043    sodium chloride (NS) flush 5-10 mL  5-10 mL IntraVENous Thao Fregoso MD        sodium chloride (NS) flush 5-10 mL  5-10 mL IntraVENous PRN Paula Kwong MD        dilTIAZem (CARDIZEM) IR tablet 30 mg  30 mg Oral Q6H Paula Kwong MD   30 mg at 02/14/17 1135    diazePAM (VALIUM) tablet 2.5 mg  2.5 mg Oral Q8H PRN Mohsen Andrews NP        senna-docusate (PERICOLACE) 8.6-50 mg per tablet 1 Tab  1 Tab Oral BID Mohsen Andrews NP   1 Tab at 02/14/17 0820    traZODone (DESYREL) tablet 50 mg  50 mg Oral QHS PRN Amilcar Gaytan MD   50 mg at 02/13/17 2208    gabapentin (NEURONTIN) capsule 100 mg  100 mg Oral TID Rodri Gilmore MD   100 mg at 02/14/17 0820    sodium chloride (NS) flush 5-10 mL  5-10 mL IntraVENous Q8H Yari De Anda MD   10 mL at 02/14/17 0526    sodium chloride (NS) flush 5-10 mL  5-10 mL IntraVENous PRN Yari De Anda MD        ondansetron [facility]) injection 4 mg  4 mg IntraVENous Q4H PRN Jennifer Medel NP   4 mg at 02/13/17 2334    acetaminophen (TYLENOL) tablet 650 mg  650 mg Oral Q4H PRN Jennifer Medel NP   650 mg at 02/14/17 1114    levalbuterol (XOPENEX) nebulizer soln 1.25 mg/3 mL  1.25 mg Nebulization Q4H PRN Elenora Lizy, NP        polyvinyl alcohol (LIQUIFILM TEARS) 1.4 % ophthalmic solution 1 Drop  1 Drop Both Eyes PRN Jennifer Medel NP       [facility] hydrocortisone (ANUSOL-HC) 2.5 % rectal cream   PeriANAL BID PRN Fina Saha NP        levothyroxine (SYNTHROID) tablet 88 mcg  88 mcg Oral ACB Azeb Pino NP   88 mcg at 17 0526    pantoprazole (PROTONIX) tablet 40 mg  40 mg Oral ACB Azeb Pino NP   40 mg at 17 0525    polyethylene glycol (MIRALAX) packet 17 g  17 g Oral DAILY PRN Fina Saha NP        promethazine (PHENERGAN) tablet 25 mg  25 mg Oral Q6H PRN Fina Saha NP        rOPINIRole (REQUIP) tablet 2 mg  2 mg Oral QHS Azeb Pino NP   2 mg at 17 220    sertraline (ZOLOFT) tablet 50 mg  50 mg Oral QHS Azeb Pino NP   50 mg at 17 2208    spironolactone (ALDACTONE) tablet 25 mg  25 mg Oral DAILY Fina Saha NP   25 mg at 17 0820             PHYSICAL EXAM     Visit Vitals    BP 95/55 (BP 1 Location: Right arm, BP Patient Position: Sitting)    Pulse (!) 117    Temp 97.5 °F (36.4 °C)    Resp 16    Ht 5' (1.524 m)    Wt 84.4 kg (186 lb)    SpO2 99%    Breastfeeding No    BMI 36.33 kg/m2      Temp (24hrs), Av.5 °F (36.4 °C), Min:97.1 °F (36.2 °C), Max:97.8 °F (36.6 °C)    Oxygen Therapy  O2 Sat (%): 99 % (17 1118)  Pulse via Oximetry: 72 beats per minute (17 1742)  O2 Device: Room air (17 1118)  O2 Flow Rate (L/min): 3 l/min (17 0820)    Intake/Output Summary (Last 24 hours) at 17 1521  Last data filed at 17 1438   Gross per 24 hour   Intake          1087.92 ml   Output             1525 ml   Net          -437.08 ml        Physical Exam:  General:         AAOx3. NAD. Afebrile. Cooperative. Obese. HEENT:               NCAT. No obvious deformity. Nares normal. No drainage  Lungs:                 Decreased air entry b/l at the base. No wheezing/rhonchi/rales  Cardiovascular:   Irregular irregularity. No m/r/g. +2 pitting edema  b/l. +2 PT/DT pulses b/l. Abdomen:       S/nt/nd.   Bowel sounds normal. .   Skin: No rashes or lesions. Not Jaundiced  Neurologic:    AAOx3. CN II- XII grossly WNL. No gross focal deficit. Moves all extremities  Psychiatric:         Good mood. Normal affect. DIAGNOSTIC STUDIES      Data Review:   Recent Results (from the past 24 hour(s))   PLEASE READ & DOCUMENT PPD TEST IN 24 HRS    Collection Time: 02/13/17  7:50 PM   Result Value Ref Range    PPD Neg Negative    mm Induration 0 mm   PROTHROMBIN TIME + INR    Collection Time: 02/14/17  7:11 AM   Result Value Ref Range    Prothrombin time 22.1 (H) 9.6 - 12.0 sec    INR 2.0 (H) 0.9 - 1.2     CBC W/O DIFF    Collection Time: 02/14/17  7:11 AM   Result Value Ref Range    WBC 3.2 (L) 4.3 - 11.1 K/uL    RBC 2.58 (L) 4.05 - 5.25 M/uL    HGB 7.8 (L) 11.7 - 15.4 g/dL    HCT 25.8 (L) 35.8 - 46.3 %    .0 (H) 79.6 - 97.8 FL    MCH 30.2 26.1 - 32.9 PG    MCHC 30.2 (L) 31.4 - 35.0 g/dL    RDW 15.1 (H) 11.9 - 14.6 %    PLATELET 92 (L) 621 - 450 K/uL    MPV 10.8 10.8 - 51.0 FL   METABOLIC PANEL, BASIC    Collection Time: 02/14/17  7:11 AM   Result Value Ref Range    Sodium 146 (H) 136 - 145 mmol/L    Potassium 3.8 3.5 - 5.1 mmol/L    Chloride 107 98 - 107 mmol/L    CO2 32 21 - 32 mmol/L    Anion gap 7 7 - 16 mmol/L    Glucose 95 65 - 100 mg/dL    BUN 22 8 - 23 MG/DL    Creatinine 1.17 (H) 0.6 - 1.0 MG/DL    GFR est AA 58 (L) >60 ml/min/1.73m2    GFR est non-AA 48 (L) >60 ml/min/1.73m2    Calcium 8.1 (L) 8.3 - 10.4 MG/DL     Imaging /Procedures /Studies:    CXR Results  (Last 48 hours)               02/12/17 1604  XR CHEST PORT Final result    Impression:  Impression:  No acute findings in the chest.                       Narrative:  Portable Chest  X-ray  2/12/2017 at 1600 hours       Indication: Productive cough. Altered mental status. Comparison:  2/9/2017       Findings:  Portable AP view demonstrates gross, stable cardiomegaly without   pulmonary edema. No focal infiltrate. No definite pleural effusion.  Left lower   lobe difficult to evaluate because of heart size. Sternotomy wires and prosthetic cardiac valves. Cardiac pacemaking device   grossly unchanged. Echocardiogram: SUMMARY:  -  Left ventricle: Systolic function was normal. Ejection fraction was  estimated in the range of 55 % to 60 %. There were no regional wall motion  abnormalities. Wall thickness was mildly to moderately increased. -  Right ventricle: The ventricle was dilated. Systolic function was reduced. There was severe pulmonary artery hypertension.  -  Left atrium: The atrium was markedly dilated. -  Right atrium: The atrium was markedly dilated. -  Inferior vena cava, hepatic veins: The inferior vena cava was dilated. -  Aortic valve: A bioprosthesis was present. It exhibited stenosis. There   Was moderate stenosis. The aortic valve area by the continuity equation was 1.1   cm2.  -  Mitral valve: There was moderate to marked annular calcification. There   Was moderate-marked calcification. A annular ring prosthesis was present. It  exhibited stenosis and reduced motion. There was moderate stenosis. There was  mild regurgitation. The mitral valve area by pressure half time was 1.7 cm2.  -  Tricuspid valve: There was severe regurgitation. -  Pulmonic valve: There was mild to moderate regurgitation. -  Pericardium: There was a left pleural effusion.       Labs and Studies from previous 24 hours have been personally reviewed by myself Ray Problems    Diagnosis Date Noted    Acute CHF (congestive heart failure) ([address] 02/12/2017    Pulmonary hypertension ([address] 02/12/2017    Aortic valve replaced 01/09/2017    Warfarin anticoagulation 01/09/2017    Chronic diastolic congestive heart failure ([address] 09/09/2016    Sleep apnea 11/02/2015    Chronic atrial fibrillation ([address] 10/17/2011    ARF (acute renal failure) ([address] 02/24/2010    BOBBY (obstructive sleep apnea) 12/04/2009    Aortic Valve Bioprosthesis Present 03/07/2009    Hypotension 03/01/2009       Plan:  SOB:  Likely due to her severe pulmonary hypertension  and possible cor pulmonale along with her known aortic valve stenosis, mitral valve disease. RSVP:65-70 mmHg. Pulmonary HTN: patient and her family have refused further evaluation inclusive CTA chest and VQ scan. They will like more comfort care efforts. Palliative care- following. Hypotension: continue gentle NS @50 ml/h. BB on hold. Permanent AFib on Cardizem - amiodarone added today by cards, BB and Coumadin - BB on hold due to low BP    Anemia: stable - check anemia panel. Monitor     RLS: start Gabapentin 100 mg TID    DVT Prophylaxis: Coumadin  CODE Status: DNR - dicussed with MsRaeann Perisabelle Discussed with: patient family.  Care team   Medical Risk: high  Disposition: pending-     Devon Zhang MD  02/14/17

## 2017-02-15 LAB
ANION GAP BLD CALC-SCNC: 7 MMOL/L (ref 7–16)
BUN SERPL-MCNC: 24 MG/DL (ref 8–23)
CALCIUM SERPL-MCNC: 8.6 MG/DL (ref 8.3–10.4)
CHLORIDE SERPL-SCNC: 106 MMOL/L (ref 98–107)
CO2 SERPL-SCNC: 32 MMOL/L (ref 21–32)
CREAT SERPL-MCNC: 1.12 MG/DL (ref 0.6–1)
ERYTHROCYTE [DISTWIDTH] IN BLOOD BY AUTOMATED COUNT: 15 % (ref 11.9–14.6)
GLUCOSE SERPL-MCNC: 94 MG/DL (ref 65–100)
HCT VFR BLD AUTO: 30.2 % (ref 35.8–46.3)
HGB BLD-MCNC: 8.8 G/DL (ref 11.7–15.4)
INR PPP: 2.1 (ref 0.9–1.2)
MCH RBC QN AUTO: 30 PG (ref 26.1–32.9)
MCHC RBC AUTO-ENTMCNC: 29.1 G/DL (ref 31.4–35)
MCV RBC AUTO: 103.1 FL (ref 79.6–97.8)
PLATELET # BLD AUTO: 107 K/UL (ref 150–450)
PMV BLD AUTO: 11 FL (ref 10.8–14.1)
POTASSIUM SERPL-SCNC: 4.2 MMOL/L (ref 3.5–5.1)
PROTHROMBIN TIME: 23.5 SEC (ref 9.6–12)
RBC # BLD AUTO: 2.93 M/UL (ref 4.05–5.25)
SODIUM SERPL-SCNC: 145 MMOL/L (ref 136–145)
WBC # BLD AUTO: 3.8 K/UL (ref 4.3–11.1)

## 2017-02-15 PROCEDURE — 74011250637 HC RX REV CODE- 250/637: Performed by: INTERNAL MEDICINE

## 2017-02-15 PROCEDURE — 80048 BASIC METABOLIC PNL TOTAL CA: CPT | Performed by: NURSE PRACTITIONER

## 2017-02-15 PROCEDURE — 65660000000 HC RM CCU STEPDOWN

## 2017-02-15 PROCEDURE — 97530 THERAPEUTIC ACTIVITIES: CPT

## 2017-02-15 PROCEDURE — 74011250637 HC RX REV CODE- 250/637: Performed by: NURSE PRACTITIONER

## 2017-02-15 PROCEDURE — 85610 PROTHROMBIN TIME: CPT | Performed by: NURSE PRACTITIONER

## 2017-02-15 PROCEDURE — 74011250637 HC RX REV CODE- 250/637: Performed by: HOSPITALIST

## 2017-02-15 PROCEDURE — 85027 COMPLETE CBC AUTOMATED: CPT | Performed by: NURSE PRACTITIONER

## 2017-02-15 PROCEDURE — 36415 COLL VENOUS BLD VENIPUNCTURE: CPT | Performed by: NURSE PRACTITIONER

## 2017-02-15 RX ORDER — DIGOXIN 125 MCG
0.12 TABLET ORAL DAILY
Status: DISCONTINUED | OUTPATIENT
Start: 2017-02-15 | End: 2017-02-17

## 2017-02-15 RX ADMIN — Medication 5 ML: at 23:54

## 2017-02-15 RX ADMIN — DILTIAZEM HYDROCHLORIDE 30 MG: 30 TABLET, FILM COATED ORAL at 11:49

## 2017-02-15 RX ADMIN — DIGOXIN 0.12 MG: 125 TABLET ORAL at 15:34

## 2017-02-15 RX ADMIN — AMIODARONE HYDROCHLORIDE 200 MG: 200 TABLET ORAL at 17:29

## 2017-02-15 RX ADMIN — PRAVASTATIN SODIUM 1 TABLET: 20 TABLET ORAL at 17:29

## 2017-02-15 RX ADMIN — Medication 5 ML: at 13:13

## 2017-02-15 RX ADMIN — GABAPENTIN 100 MG: 100 CAPSULE ORAL at 08:37

## 2017-02-15 RX ADMIN — GABAPENTIN 100 MG: 100 CAPSULE ORAL at 15:34

## 2017-02-15 RX ADMIN — LEVOTHYROXINE SODIUM 88 MCG: 0.09 TABLET ORAL at 05:49

## 2017-02-15 RX ADMIN — AMIODARONE HYDROCHLORIDE 200 MG: 200 TABLET ORAL at 08:37

## 2017-02-15 RX ADMIN — ROPINIROLE 2 MG: 2 TABLET, FILM COATED ORAL at 23:55

## 2017-02-15 RX ADMIN — ACETAMINOPHEN 650 MG: 325 TABLET, FILM COATED ORAL at 23:55

## 2017-02-15 RX ADMIN — SPIRONOLACTONE 25 MG: 25 TABLET, FILM COATED ORAL at 08:37

## 2017-02-15 RX ADMIN — DILTIAZEM HYDROCHLORIDE 30 MG: 30 TABLET, FILM COATED ORAL at 05:50

## 2017-02-15 RX ADMIN — Medication 10 ML: at 05:51

## 2017-02-15 RX ADMIN — GABAPENTIN 100 MG: 100 CAPSULE ORAL at 23:55

## 2017-02-15 RX ADMIN — DILTIAZEM HYDROCHLORIDE 30 MG: 30 TABLET, FILM COATED ORAL at 23:55

## 2017-02-15 RX ADMIN — SERTRALINE HYDROCHLORIDE 50 MG: 50 TABLET ORAL at 23:55

## 2017-02-15 RX ADMIN — DILTIAZEM HYDROCHLORIDE 30 MG: 30 TABLET, FILM COATED ORAL at 17:29

## 2017-02-15 RX ADMIN — PANTOPRAZOLE SODIUM 40 MG: 40 TABLET, DELAYED RELEASE ORAL at 05:50

## 2017-02-15 RX ADMIN — PRAVASTATIN SODIUM 1 TABLET: 20 TABLET ORAL at 08:37

## 2017-02-15 NOTE — PROGRESS NOTES
SW spoke with son, Ciro Sanchez. He is agreeable to [id number] [facility], PT/OT with [facility] hosp[facility]. Prefers [facility]. Also provided son with info re non-medical home care agencies in the area to provide supplemental aide services in the home. Will order a BSC and have it delivered to pt's room.

## 2017-02-15 NOTE — PROGRESS NOTES
Bedside and Verbal shift change report given to Monico Chou RN (oncoming nurse) by self (offgoing nurse). Report included the following information SBAR, Kardex, MAR and Recent Results.

## 2017-02-15 NOTE — PROGRESS NOTES
2/15/2017 5:24 PM    Admit Date: 2/12/2017    Admit Diagnosis: dyspnea, hypotension,;Acute CHF (congestive heart failure) *      Subjective:   No cp or sob      Objective:      Visit Vitals    BP 98/72 (BP 1 Location: Left arm, BP Patient Position: Head of bed elevated (Comment degrees))    Pulse (!) 118    Temp 98 °F (36.7 °C)    Resp 20    Ht 5' (1.524 m)    Wt 84.6 kg (186 lb 6.4 oz)    SpO2 96%    Breastfeeding No    BMI 36.4 kg/m2       Physical Exam:  General-Well Developed, Well Nourished, No Acute Distress, Alert & Oriented x 3, appropriate mood. Neck- supple, no JVD  CV- irregular rate and rhythm no MRG  Lung- clear bilaterally  Abd- soft, nontender, nondistended  Ext- no edema bilaterally. Skin- warm and dry        Data Review:   Recent Labs      02/15/17   0627   NA  145   K  4.2   BUN  24*   CREA  1.12*   WBC  3.8*   HGB  8.8*   HCT  30.2*   PLT  107*   INR  2.1*       Assessment/Plan:     Principal Problem:    Acute CHF (congestive heart failure) ([address] (2/12/2017)Improved with current therapy. Will continue medications- bnp in am      Active Problems:    Hypotension (3/1/2009)      Aortic Valve Bioprosthesis Present (3/7/2009)      BOBBY (obstructive sleep apnea) (12/4/2009)      ARF (acute renal failure) ([address] (2/24/2010)      Chronic atrial fibrillation (HCC) (10/17/2011)rate still eleated- not optimal but will try low dose dig- may need to consider pm and avn ablation      Sleep apnea (11/2/2015)      Chronic diastolic congestive heart failure ([address] (9/9/2016)Improved with current therapy.  Will continue medications      Aortic valve replaced (1/9/2017)      Warfarin anticoagulation (1/9/2017)      Pulmonary hypertension ([address] (2/12/2017)

## 2017-02-15 NOTE — PROGRESS NOTES
Palliative Care Progress Note    Patient: Allie Dodson MRN: [id number]  SSN: [id number]    YOB: 1940  Age: 77 y.o. Sex: female       Assessment/Plan:     Chief Complaint/Interval History: Dyspnea at rest     Principal Diagnosis:    · Dyspnea  R06.00    Additional Diagnoses:   · Anxiety  F41.1  · Constipation, Unspecified  K59.00  · Fatigue, Lethargy  R53.83  · Frailty  R54  · Counseling, Encounter for Medical Advice  Z71.9  · Encounter for Palliative Care  Z51.5    Palliative Performance Scale (PPS)  PPS: 79    Medical Decision Making:   Reviewed and summarized notes over previous 24 hours. Discussed case with appropriate providers: SARITA Ortiz  Reviewed laboratory and x-ray data: CBC, BMP    Patient resting in bed, no family present. Patient dyspneic with talking or any exertion, currently rates her dyspnea 6/10. Patient received Valium at bedtime yesterday- she states she slept all night and feels much better. Patient also reports better control of constipation with addition of Pericolace, she had two small BMs yesterday. 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 She is very appreciative of being able to talk and share things things. Encouraged patient to rest.  Discussed with Low Lance. Current plan after discharge is for home health with [facility]. Will continue to follow intermittently. Will discuss findings with members of the interdisciplinary team.         More than 50% of this 35 minute visit was spent counseling and coordination of care as outlined above. Subjective:     Review of Systems:  A comprehensive review of systems was negative except for:   Constitutional: Positive for fatigue. Respiratory: Positive for dyspnea with conversation or any exertion.      Objective:     Visit Vitals    BP (!) 70/39 (BP 1 Location: Left arm, BP Patient Position: Head of bed elevated (Comment degrees))    Pulse (!) 116    Temp 97.9 °F (36.6 °C)    Resp 20    Ht 5' (1.524 m)    Wt 84.6 kg (186 lb 6.4 oz)    SpO2 98%    Breastfeeding No    BMI 36.4 kg/m2       Physical Exam:    General:  Elderly, frail, and debilitated. Cooperative. No acute distress. Eyes:  Conjunctivae/corneas clear    Nose: Nares normal. Septum midline. O2 via NC. Neck: Supple, symmetrical, trachea midline. Lungs:   Diminished to auscultation bilaterally, mildly labored. Heart:  Irregular rate and rhythm. Abdomen:   Soft, non-tender, non-distended. Extremities: Normal, atraumatic, no cyanosis. Trace edema. Skin: Skin color, texture, turgor normal. No rash. Neurologic: Nonfocal.   Psych: Alert and oriented.      Signed By: Willian Cade NP     February 15, 2017

## 2017-02-15 NOTE — PROGRESS NOTES
Hospitalist Progress Note    2/15/2017  Admit Date: 2017  2:32 PM   NAME: Maurilio Link   :  [id number]   DOS:              02/15/17  MRN:  [id number]   Attending: Gil Pedro MD  PCP:  Cristopher Caputo MD  Treatment Team: Attending Provider: Gil Pedro MD; Consulting Provider: Kristie Duverney, MD; Consulting Provider: Krystle Terrell DO; Utilization Review: Jhonatan Horner; Consulting Provider: Malachi Grimaldo NP; Care Manager: SARITA Martini    DNR     SUBJECTIVE:   As previously documented: \" Ms. Bunny Johnston is a very pleasant 78 yo F Who complains of increased SOb, nonproductive cough and b/l LE swelling in the last 1-2 months. Known with chronic diastolic CHF, CAD, bradycardia s/p pacemaker, , s/p bioprosthetic AVR,Chronic Afib on coumadin, severe pulmonary HTN with PAP 69 mmHg, chronic respiratory failure on NC 3L 24h/7 , COPD, GERD, HTN, HLP, Obesity, BOBBY - not using CPAP. Seen by her cardiologist, Gerson Guerrier on 17 and Demadex was increased to 40 mg PO daily, except MWF when was BID. Ms. Bunny Johnston states that her leg swelling improved, although her cough and SOB persisted. Seen by PCP on  and placed on oral steroids and Albuterol that she couldn't complete because the albuterol made ermias jittery. No improvement with treatment. Seen at [facility] ED on  with low BP and and thought that she is dehydrated due to increased diuretics and received IV fluids. She felt better for one day, although because of increased in her symptoms she presented to ED today. BP was 80/51 mmHg and received 1L NC bolus in Ed per ED provider. CXR w/o acute pathology. No spikes of fever since her symptoms started. Influenza screen negative. WBC:3.4. Cr:1.48, around her baseline. \"       02/15/17   Ms. Maurilio Link Awake - alert - feeling better. Heart rate still uncontrolled- oscillating between the 80'- 140's with or without activity.  Nursing states all rate controlled meds given yesterday and none held. Discussed with pt we would like to discharge but if she goes home without hospice will likely be re-admitted shortly because on uncontrolled HR. She wants to go home will think - says she was not aware she could go home with hospice. Continues to have low BP but holding steady. Denies any chest pain or abdominal pain. Afebrile. 10+ ROS reviewed and negative except for positive in HPI.    Allergies   Allergen Reactions    Adhesive Tape Rash    Benadryl [Diphenhydramine Hcl] Other (comments)     Jitters      Demerol [Meperidine] Anxiety    Morphine Other (comments)     Confusion    Sulfa (Sulfonamide Antibiotics) Anxiety    Lorazepam Anxiety     Current Facility-Administered Medications   Medication Dose Route Frequency Provider Last Rate Last Dose    amiodarone (CORDARONE) tablet 200 mg  200 mg Oral BID Marco A Oliver MD   200 mg at 02/15/17 0516    sodium chloride (NS) flush 5-10 mL  5-10 mL IntraVENous Q8H Marco A Oliver MD   5 mL at 02/15/17 1313    sodium chloride (NS) flush 5-10 mL  5-10 mL IntraVENous PRN Marco A Oliver MD        dilTIAZem (CARDIZEM) IR tablet 30 mg  30 mg Oral Q6H Marco A Oliver MD   30 mg at 02/15/17 1149    diazePAM (VALIUM) tablet 2.5 mg  2.5 mg Oral Q8H PRN Love Valente NP   2.5 mg at 02/14/17 2157    senna-docusate (PERICOLACE) 8.6-50 mg per tablet 1 Tab  1 Tab Oral BID Love Valente NP   1 Tab at 02/15/17 3718    traZODone (DESYREL) tablet 50 mg  50 mg Oral QHS PRN Jolie Boston MD   50 mg at 02/14/17 2156    gabapentin (NEURONTIN) capsule 100 mg  100 mg Oral TID Ananya Sanches MD   100 mg at 02/15/17 0837    sodium chloride (NS) flush 5-10 mL  5-10 mL IntraVENous Q8H Dilia Martin MD   10 mL at 02/15/17 0551    sodium chloride (NS) flush 5-10 mL  5-10 mL IntraVENous PRN Dilia Martin MD        ondansetron [facility]) injection 4 mg  4 mg IntraVENous Q4H PRN Vincent Cesar, NP   4 mg at 02/13/17 3444    acetaminophen (TYLENOL) tablet 650 mg  650 mg Oral Q4H PRN Alberta Reid NP   650 mg at 17 1114    levalbuterol (XOPENEX) nebulizer soln 1.25 mg/3 mL  1.25 mg Nebulization Q4H PRN Alberta Reid NP        polyvinyl alcohol (LIQUIFILM TEARS) 1.4 % ophthalmic solution 1 Drop  1 Drop Both Eyes PRN Alberta Reid NP        hydrocortisone (ANUSOL-HC) 2.5 % rectal cream   PeriANAL BID PRN Alberta Reid NP        levothyroxine (SYNTHROID) tablet 88 mcg  88 mcg Oral ACB Azeb Pino NP   88 mcg at 02/15/17 0549    pantoprazole (PROTONIX) tablet 40 mg  40 mg Oral ACB Azeb Pino NP   40 mg at 02/15/17 0550    polyethylene glycol (MIRALAX) packet 17 g  17 g Oral DAILY PRN Alberta Reid NP        promethazine (PHENERGAN) tablet 25 mg  25 mg Oral Q6H PRN Alberta Reid NP        rOPINIRole (REQUIP) tablet 2 mg  2 mg Oral QHS Azeb Pino NP   2 mg at 17 2156    sertraline (ZOLOFT) tablet 50 mg  50 mg Oral QHS Azeb Pino NP   50 mg at 17 2156    spironolactone (ALDACTONE) tablet 25 mg  25 mg Oral DAILY Azeb Pino NP   25 mg at 02/15/17 0837             PHYSICAL EXAM     Visit Vitals    /40 (BP 1 Location: Left arm, BP Patient Position: At rest)    Pulse (!) 126    Temp 97.9 °F (36.6 °C)    Resp 20    Ht 5' (1.524 m)    Wt 84.6 kg (186 lb 6.4 oz)    SpO2 97%    Breastfeeding No    BMI 36.4 kg/m2      Temp (24hrs), Av.7 °F (36.5 °C), Min:97.5 °F (36.4 °C), Max:98.1 °F (36.7 °C)    Oxygen Therapy  O2 Sat (%): 97 % (02/15/17 1130)  Pulse via Oximetry: 72 beats per minute (17 1742)  O2 Device: Nasal cannula (02/15/17 1130)  O2 Flow Rate (L/min): 2 l/min (02/15/17 0837)    Intake/Output Summary (Last 24 hours) at 02/15/17 1317  Last data filed at 02/15/17 1133   Gross per 24 hour   Intake              890 ml   Output              901 ml   Net              -11 ml        Physical Exam:  General:         AAOx3. NAD. Afebrile. Cooperative. Obese. HEENT:               NCAT. No obvious deformity. Nares normal. No drainage  Lungs:                 Decreased air entry b/l at the base. No wheezing/rhonchi/rales  Cardiovascular:   Irregular irregularity. No m/r/g. +2 pitting edema  b/l. +2 PT/DT pulses b/l. Abdomen:       S/nt/nd. Bowel sounds normal. .   Skin:         No rashes or lesions. Not Jaundiced  Neurologic:    AAOx3. CN II- XII grossly WNL. No gross focal deficit. Moves all extremities  Psychiatric:         Good mood. Normal affect.          DIAGNOSTIC STUDIES      Data Review:   Recent Results (from the past 24 hour(s))   PLEASE READ & DOCUMENT PPD TEST IN 48 HRS    Collection Time: 02/14/17  7:50 PM   Result Value Ref Range    PPD NEG Negative    mm Induration 0 mm   PROTHROMBIN TIME + INR    Collection Time: 02/15/17  6:27 AM   Result Value Ref Range    Prothrombin time 23.5 (H) 9.6 - 12.0 sec    INR 2.1 (H) 0.9 - 1.2     CBC W/O DIFF    Collection Time: 02/15/17  6:27 AM   Result Value Ref Range    WBC 3.8 (L) 4.3 - 11.1 K/uL    RBC 2.93 (L) 4.05 - 5.25 M/uL    HGB 8.8 (L) 11.7 - 15.4 g/dL    HCT 30.2 (L) 35.8 - 46.3 %    .1 (H) 79.6 - 97.8 FL    MCH 30.0 26.1 - 32.9 PG    MCHC 29.1 (L) 31.4 - 35.0 g/dL    RDW 15.0 (H) 11.9 - 14.6 %    PLATELET 741 (L) 421 - 450 K/uL    MPV 11.0 10.8 - 91.4 FL   METABOLIC PANEL, BASIC    Collection Time: 02/15/17  6:27 AM   Result Value Ref Range    Sodium 145 136 - 145 mmol/L    Potassium 4.2 3.5 - 5.1 mmol/L    Chloride 106 98 - 107 mmol/L    CO2 32 21 - 32 mmol/L    Anion gap 7 7 - 16 mmol/L    Glucose 94 65 - 100 mg/dL    BUN 24 (H) 8 - 23 MG/DL    Creatinine 1.12 (H) 0.6 - 1.0 MG/DL    GFR est AA >60 >60 ml/min/1.73m2    GFR est non-AA 50 (L) >60 ml/min/1.73m2    Calcium 8.6 8.3 - 10.4 MG/DL     Imaging /Procedures /Studies:    CXR Results  (Last 48 hours)    None        Echocardiogram: SUMMARY:  -  Left ventricle: Systolic function was normal. Ejection fraction was  estimated in the range of 55 % to 60 %. There were no regional wall motion  abnormalities. Wall thickness was mildly to moderately increased. -  Right ventricle: The ventricle was dilated. Systolic function was reduced. There was severe pulmonary artery hypertension.  -  Left atrium: The atrium was markedly dilated. -  Right atrium: The atrium was markedly dilated. -  Inferior vena cava, hepatic veins: The inferior vena cava was dilated. -  Aortic valve: A bioprosthesis was present. It exhibited stenosis. There   Was moderate stenosis. The aortic valve area by the continuity equation was 1.1   cm2.  -  Mitral valve: There was moderate to marked annular calcification. There   Was moderate-marked calcification. A annular ring prosthesis was present. It  exhibited stenosis and reduced motion. There was moderate stenosis. There was  mild regurgitation. The mitral valve area by pressure half time was 1.7 cm2.  -  Tricuspid valve: There was severe regurgitation. -  Pulmonic valve: There was mild to moderate regurgitation. -  Pericardium: There was a left pleural effusion. Labs and Studies from previous 24 hours have been personally reviewed by myself    ASSESSMENT      Active Hospital Problems    Diagnosis Date Noted    Acute CHF (congestive heart failure) ([address] 02/12/2017    Pulmonary hypertension ([address] 02/12/2017    Aortic valve replaced 01/09/2017    Warfarin anticoagulation 01/09/2017    Chronic diastolic congestive heart failure ([address] 09/09/2016    Sleep apnea 11/02/2015    Chronic atrial fibrillation ([address] 10/17/2011    ARF (acute renal failure) ([address] 02/24/2010    BOBBY (obstructive sleep apnea) 12/04/2009    Aortic Valve Bioprosthesis Present 03/07/2009    Hypotension 03/01/2009       Plan:  SOB:  Likely due to her severe pulmonary hypertension  and possible cor pulmonale along with her known aortic valve stenosis, mitral valve disease. RSVP:65-70 mmHg.     Pulmonary HTN: patient and her family have refused further evaluation inclusive CTA chest and VQ scan. They will like more comfort care efforts. Palliative care- following. Hypotension: improved to day- BB in hold- will d/w cardiology trying again    Permanent AFib on Cardizem & amiodarone- there has not been much change in HR- may want to consider low dose lopressor for hr - will d/w cards    Anemia: stable - check anemia panel. Monitor     RLS: continue Gabapentin 100 mg TID    DVT Prophylaxis: Coumadin  CODE Status: DNR  Plan of Care Discussed with: patient family. Care team   Medical Risk: high  Disposition: pending- pt willing to consider home hospice she says- as long as she goes home. Says children want to do any and everything she may need at home.      Elton Almonte MD  02/15/17

## 2017-02-15 NOTE — PROGRESS NOTES
Bedside and Verbal shift change report given to self (oncoming nurse) by  Jeanna Jung RN (offgoing nurse). Report included the following information SBAR, Kardex, MAR and Recent Results.

## 2017-02-15 NOTE — PROGRESS NOTES
's initial visit with Ms. Malka Ma requested by staff. Conveyed care and concern and offered pastoral interventions, including affirmation of gene, guided imagery, and prayer. Ms. Malka Ma expressed appreciation for this visit.      Porfirio Shen 69  Board Certified

## 2017-02-15 NOTE — PROGRESS NOTES
Problem: Mobility Impaired (Adult and Pediatric)  Goal: *Acute Goals and Plan of Care (Insert Text)  LTG:  (1.)Ms. Ziyad Peña will move from supine to sit and sit to supine and roll side to side in bed with INDEPENDENT within 7 day(s). (2.)Ms. Ziyad Peña will transfer from bed to chair and chair to bed with MODIFIED INDEPENDENCE using the least restrictive device within 5 day(s). (3.)Ms. Ziyad Peña will ambulate with MODIFIED INDEPENDENCE for 50 feet with the least restrictive device within 5 day(s). (4.)Ms. Ziyad Peña will perform standing static and dynamic balance activities x 8 minutes with SUPERVISION to improve safety within 5 day(s). (5.)Ms. Ziyad Peña will tolerate 25 minutes of therapeutic activity/exercise with one rest break within 5 day(s). ________________________________________________________________________________________________      PHYSICAL THERAPY: Daily Note, Treatment Day: 1st and PM 2/15/2017  INPATIENT: Hospital Day: 4  [place] / [place] / Product Type: Medicare /      NAME/AGE/GENDER: Jewel Lubin is a 77 y.o. female       PRIMARY DIAGNOSIS: dyspnea, hypotension,  Acute CHF (congestive heart failure) ([facility]) Acute CHF (congestive heart failure) ([facility] Acute CHF (congestive heart failure) ([facility] [address]        ICD-10: Treatment Diagnosis:       · Generalized Muscle Weakness (M62.81)  · shortness of breath   Precaution/Allergies:  Adhesive tape; Benadryl [diphenhydramine hcl]; Demerol [meperidine]; Morphine; Sulfa (sulfonamide antibiotics); and Lorazepam       ASSESSMENT:      Ms. Ziyad Peña presents with shortness of breath with activity and overall general fatigue. Patient was admitted due to above and was supine in bed on arrival to room and agreeable to PT evaluation. Patient was able to perform bed mobility with head of bed elevated and stand by assistance. Patient needs additional time for all mobility due to shortness of breath with all activity.   Patient ambulated with rolling walker in room on 3L O2. Patient's HR started at 75 bpm, but increased to 138 during mobility. She was educated on assistive device use to ambulate into restroom with CGA and verbal cues. She used restroom, performed jose care independently and then ambulated back out to sit edge of bed to rest. O2 sats post activity were 82% on 3 LO2, however improved to 97% with pursed lip breathing and rest. Patient too fatigued to perform exercises or more mobility this PM. No progress noted this session. Patient is close to baseline independence; but would benefit from continued skilled acute PT and  PT follow up at discharge. This section established at most recent assessment   PROBLEM LIST (Impairments causing functional limitations):  1. Decreased Strength  2. Decreased ADL/Functional Activities  3. Decreased Transfer Abilities  4. Decreased Ambulation Ability/Technique  5. Decreased Balance  6. Decreased Activity Tolerance  7. Increased Fatigue  8. Increased Shortness of Breath  9. Decreased Knowledge of Precautions  10. Decreased [place] with Home Exercise Program    INTERVENTIONS PLANNED: (Benefits and precautions of physical therapy have been discussed with the patient.)  1. Balance Exercise  2. Bed Mobility  3. Gait Training  4. Home Exercise Program (HEP)  5. Therapeutic Activites  6. Therapeutic Exercise/Strengthening  7. Transfer Training  8. Group Therapy      TREATMENT PLAN: Frequency/Duration: 3 times a week for duration of hospital stay  Rehabilitation Potential For Stated Goals: EXCELLENT      RECOMMENDED REHABILITATION/EQUIPMENT: (at time of discharge pending progress): Continue Skilled Therapy and Home Health: Physical Therapy. HISTORY:   History of Present Injury/Illness (Reason for Referral):  Per H&P:Ms. Arleen Alva is a very pleasant 78 yo F Who complains of increased SOb, nonproductive cough and b/l LE swelling in the last 1-2 months.  Known with chronic diastolic CHF, CAD, bradycardia s/p pacemaker, , s/p bioprosthetic AVR,Chronic Afib on coumadin, severe pulmonary HTN with PAP 69 mmHg, chronic respiratory failure on NC 3L 24h/7 , COPD, GERD, HTN, HLP, Obesity, BOBBY - not using CPAP. Seen by her cardiologist, Harvinder Conley on 1/9/17 and Demadex was increased to 40 mg PO daily, except MWF when was BID. Ms. Ely Worthington states that her leg swelling improved, although her cough and SOB persisted. Seen by PCP on 1/31 and placed on oral steroids and Albuterol that she couldn't complete because the albuterol made ermias jittery. No improvement with treatment. Seen at [facility] ED on 2/9 with low BP and and thought that she is dehydrated due to increased diuretics and received IV fluids. She felt better for one day, although because of increased in her symptoms she presented to ED today. BP was 80/51 mmHg and received 1L NC bolus in Ed per ED provider. CXR w/o acute pathology. No spikes of fever since her symptoms started. Influenza screen negative. WBC:3.4. Cr:1.48, around her baseline. CV:irregular irregularity. No JVD. +2 pitting edema b/l LE   Resp: Decreased air entry b/l at the base. No wheezing. No rales or crackles. Abd: soft, non-tender, no rebound tenderness. Hold Torsemide until tomorrow. Will get Echocardiogram. Gentle IV hydration at 50 ml/h to keep MAP>85 mmHg. Hold BB due to hypotension. Hold benzodiazepines at bedtime, can exacerbate - untreated BOBBY and involved in severe pulmonary hypertension. Cardiology and palliative care consulted - appreciate the help. Past Medical History/Comorbidities:   Ms. Ely Worthington  has a past medical history of Abnormal glucose (8/5/2016); Abscess (8/5/2016); Acute encephalopathy (7/8/2016); Acute renal failure ([address] (12/3/2009); Afib ([address] Anemia; Ankle swelling (8/5/2016); Anxiety (8/5/2016); Aortic Valve Bioprosthesis Present (3/7/2009); ARF (acute renal failure) ([address] (2/24/2010); Arthritis; Asthma; Atopic dermatitis (8/5/2016);  Atrial fibrillation ([facility] [address] (3/7/2009); Autonomic orthostatic hypotension (8/5/2016); AV block (10/17/2011); Back pain (8/5/2016); Bradycardia (Symptomatic) (11/7/2011); CAD (coronary artery disease); Cancer ([address] (1990); Cardiac pacemaker (11/2/2015); Cardiogenic shock ([address] (10/17/2011); 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 (8/5/2016); Cellulitis (8/5/2016); Chest pain (8/5/2016); Chronic atrial fibrillation ([facility] [address] (10/17/2011); Chronic depression (8/5/2016); Chronic depression (8/5/2016); Chronic obstructive pulmonary disease ([address] (3/8/2009); Chronic pain; CKD (chronic kidney disease) stage 3, GFR 30-59 ml/min (12/4/2009); Congestive heart failure (CHF) ([facility] [address] (11/2/2015); COPD; CRI (chronic renal insufficiency) (8/5/2016); Degeneration of cervical intervertebral disc (8/5/2016); Degenerative arthritis of left knee (2/27/2009); Dehydration (8/5/2016); Diastolic heart failure ([address] Digoxin toxicity (2/24/2010); Disorder of sweat glands (8/5/2016); Diverticulosis of large intestine without diverticulitis (2015); Dyspnea (8/5/2016); Eczema (8/5/2016); Epigastric abdominal pain (2/24/2010); GERD (gastroesophageal reflux disease); Gout (8/5/2016); H/O mitral valve repair, 2003 (6/27/2016); Heart failure ([address] HTN (hypertension) (8/5/2016); colonic polyp (2015); Hyperkalemia (10/17/2011); Hyperlipidemia (8/5/2016); Hypertension; Hypokalemia (2/24/2010); Hypothyroidism (12/4/2009); Ill-defined condition; Knee pain (8/5/2016); Leg cramps (8/5/2016); Mitral stenosis with insufficiency (11/2/2015); Nausea and vomiting (2/24/2010); Obesity (11/2/2015); BOBBY (obstructive sleep apnea) (12/4/2009); Osteoarthritis (8/5/2016); Osteopenia (8/5/2016); Other long term (current) drug therapy (8/5/2016); Palpitations (11/2/2015); Rash (8/5/2016); Rectal bleeding (10/27/2011); Rectocele (2015); Respiratory insufficiency (11/2/2015); 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 She also has no past medical history of Aneurysm ([facility] [address] Autoimmune disease ([facility] [address] Coagulation disorder ([facility] [address] DEMENTIA; Diabetes ([facility] [address] Endocarditis; Infectious disease; Liver disease; Other ill-defined conditions(799.89); PUD (peptic ulcer disease); Seizures ([facility] [address] Stroke [facility]); or Thromboembolus ([facility] [address] Ms. Annabelle Hernandez  has a past surgical history that includes appendectomy; cholecystectomy (2005); gyn (1981); mastectomy (1990); pacemaker; breast reconstruction; cardiac surg procedure unlist; and orthopaedic. Social History/Living Environment:   Home Environment: Private residence  # Steps to Enter: 1  One/Two Story Residence: One story  Living Alone: No  Support Systems: Child(brit), Jewish / gene community, Family member(s), Friends \ neighbors  Patient Expects to be Discharged to[de-identified] Private residence  Current DME Used/Available at Home: Shower chair  Tub or Shower Type: Tub/Shower combination  Prior Level of Function/Work/Activity:  Patient lives with son who works during the day. Patient has family and neighbors that would be able to check on patient. Personal Factors:          Age:  77 y.o.    Number of Personal Factors/Comorbidities that affect the Plan of Care: 3+: HIGH COMPLEXITY   EXAMINATION:   Most Recent Physical Functioning:   Gross Assessment:                  Posture:     Balance:  Sitting: Intact  Standing: Impaired  Standing - Static: Fair  Standing - Dynamic : Fair Bed Mobility:  Rolling: Stand-by asssistance  Supine to Sit: Stand-by asssistance  Sit to Supine: Stand-by asssistance  Scooting: Stand-by asssistance  Interventions: Safety awareness training;Verbal cues  Wheelchair Mobility:     Transfers:  Sit to Stand: Contact guard assistance  Stand to Sit: Contact guard assistance  Interventions: Manual cues; Safety awareness training;Verbal cues; Visual cues  Gait:     Base of Support: Center of gravity altered; Widened  Speed/Carol: Shuffled;Slow;Pace decreased (<100 feet/min)  Step Length: Right shortened;Left shortened  Distance (ft): 15 Feet (ft)  Assistive Device: Walker, rolling  Ambulation - Level of Assistance: Contact guard assistance  Interventions: Safety awareness training;Verbal cues; Visual/Demos       Body Structures Involved:  1. Heart  2. Lungs Body Functions Affected:  1. Cardio  2. Movement Related Activities and Participation Affected:  1. Mobility  2. Self Care  3. Domestic Life   Number of elements that affect the Plan of Care: 4+: HIGH COMPLEXITY   CLINICAL PRESENTATION:   Presentation: Evolving clinical presentation with changing clinical characteristics: MODERATE COMPLEXITY   CLINICAL DECISION MAKIN [facility] Inpatient Short Form  How much difficulty does the patient currently have. .. Unable A Lot A Little None   1. Turning over in bed (including adjusting bedclothes, sheets and blankets)? [ ] 1   [ ] 2   [ ] 3   [X] 4   2. Sitting down on and standing up from a chair with arms ( e.g., wheelchair, bedside commode, etc.)   [ ] 1   [ ] 2   [X] 3   [ ] 4   3. Moving from lying on back to sitting on the side of the bed? [ ] 1   [ ] 2   [ ] 3   [X] 4   How much help from another person does the patient currently need. .. Total A Lot A Little None   4. Moving to and from a bed to a chair (including a wheelchair)? [ ] 1   [ ] 2   [X] 3   [ ] 4   5. Need to walk in hospital room? [ ] 1   [ ] 2   [X] 3   [ ] 4   6. Climbing 3-5 steps with a railing? [ ] 1   [X] 2   [ ] 3   [ ] 4   © , Trustees of [address] [place] [address] [address] [id number], under license to [email or web address].  All rights reserved    Score:  Initial: 19 Most Recent: X (Date: -- )     Interpretation of Tool:  Represents activities that are increasingly more difficult (i.e. Bed mobility, Transfers, Gait). Score 24 23 22-20 19-15 14-10 9-7 6       Modifier CH CI CJ CK CL CM CN         · Mobility - Walking and Moving Around:               - CURRENT STATUS:    CK - 40%-59% impaired, limited or restricted               - GOAL STATUS:           CJ - 20%-39% impaired, limited or restricted               - D/C STATUS:                       ---------------To be determined---------------  [place] / [place] / Product Type: Medicare /       Medical Necessity:     · Patient demonstrates good rehab potential due to higher previous functional level. · Skilled intervention continues to be required due to decline in overall functional mobility and activity tolerance. Reason for Services/Other Comments:  · Patient continues to require present interventions due to patient's inability to perform functional mobility at previous indepencence level. Use of outcome tool(s) and clinical judgement create a POC that gives a: Questionable prediction of patient's progress: MODERATE COMPLEXITY                 TREATMENT:   (In addition to Assessment/Re-Assessment sessions the following treatments were rendered)   Pre-treatment Symptoms/Complaints:  \"I can try\"  Pain: Initial:   Pain Intensity 1: 0  Post Session:  No pain noted, shortness of breath with mobility      Therapeutic Activity: (    10 minutes): Therapeutic activities including Bed transfers, Chair transfers and Ambulation on level ground to improve mobility, strength, balance and activity tolerance. Required minimal Safety awareness training;Verbal cues; Visual/Demos to promote static and dynamic balance in standing and pursed lip breathing.      Braces/Orthotics/Lines/Etc:   · O2 Device: Nasal cannula  Treatment/Session Assessment:    · Response to Treatment:  Tolerated well, agreeable to [facility] PT  · Interdisciplinary Collaboration:  · Physical Therapist  · Registered Nurse  · After treatment position/precautions:  · Supine in bed, Bed/Chair-wheels locked, Bed in low position, Call light within reach, RN notified and Visitors at bedside  · Compliance with Program/Exercises: compliant all of the time. · Recommendations/Intent for next treatment session: \"Next visit will focus on advancements to more challenging activities and reduction in assistance provided\".   Total Treatment Duration:  PT Patient Time In/Time Out  Time In: 2363  Time Out: [address]., DPT

## 2017-02-16 LAB
ANION GAP BLD CALC-SCNC: 4 MMOL/L (ref 7–16)
BASOPHILS # BLD AUTO: 0 K/UL (ref 0–0.2)
BASOPHILS # BLD: 0 % (ref 0–2)
BUN SERPL-MCNC: 26 MG/DL (ref 8–23)
CALCIUM SERPL-MCNC: 8.7 MG/DL (ref 8.3–10.4)
CHLORIDE SERPL-SCNC: 107 MMOL/L (ref 98–107)
CO2 SERPL-SCNC: 34 MMOL/L (ref 21–32)
CREAT SERPL-MCNC: 1.07 MG/DL (ref 0.6–1)
DIFFERENTIAL METHOD BLD: ABNORMAL
EOSINOPHIL # BLD: 0.1 K/UL (ref 0–0.8)
EOSINOPHIL NFR BLD: 3 % (ref 0.5–7.8)
ERYTHROCYTE [DISTWIDTH] IN BLOOD BY AUTOMATED COUNT: 14.7 % (ref 11.9–14.6)
ERYTHROCYTE [DISTWIDTH] IN BLOOD BY AUTOMATED COUNT: 14.8 % (ref 11.9–14.6)
GLUCOSE SERPL-MCNC: 96 MG/DL (ref 65–100)
HCT VFR BLD AUTO: 25.7 % (ref 35.8–46.3)
HCT VFR BLD AUTO: 26.6 % (ref 35.8–46.3)
HGB BLD-MCNC: 7.7 G/DL (ref 11.7–15.4)
HGB BLD-MCNC: 7.9 G/DL (ref 11.7–15.4)
IMM GRANULOCYTES # BLD: 0 K/UL (ref 0–0.5)
IMM GRANULOCYTES NFR BLD AUTO: 0.3 % (ref 0–5)
INR PPP: 1.7 (ref 0.9–1.2)
LYMPHOCYTES # BLD AUTO: 13 % (ref 13–44)
LYMPHOCYTES # BLD: 0.5 K/UL (ref 0.5–4.6)
MCH RBC QN AUTO: 30.4 PG (ref 26.1–32.9)
MCH RBC QN AUTO: 30.4 PG (ref 26.1–32.9)
MCHC RBC AUTO-ENTMCNC: 29.7 G/DL (ref 31.4–35)
MCHC RBC AUTO-ENTMCNC: 30 G/DL (ref 31.4–35)
MCV RBC AUTO: 101.6 FL (ref 79.6–97.8)
MCV RBC AUTO: 102.3 FL (ref 79.6–97.8)
MM INDURATION POC: 0 MM (ref 0–5)
MONOCYTES # BLD: 0.3 K/UL (ref 0.1–1.3)
MONOCYTES NFR BLD AUTO: 7 % (ref 4–12)
NEUTS SEG # BLD: 2.9 K/UL (ref 1.7–8.2)
NEUTS SEG NFR BLD AUTO: 77 % (ref 43–78)
PLATELET # BLD AUTO: 98 K/UL (ref 150–450)
PLATELET # BLD AUTO: 99 K/UL (ref 150–450)
PMV BLD AUTO: 10.5 FL (ref 10.8–14.1)
PMV BLD AUTO: 10.8 FL (ref 10.8–14.1)
POTASSIUM SERPL-SCNC: 4.1 MMOL/L (ref 3.5–5.1)
PPD POC: NORMAL NEGATIVE
PROTHROMBIN TIME: 18.3 SEC (ref 9.6–12)
RBC # BLD AUTO: 2.53 M/UL (ref 4.05–5.25)
RBC # BLD AUTO: 2.6 M/UL (ref 4.05–5.25)
SODIUM SERPL-SCNC: 145 MMOL/L (ref 136–145)
WBC # BLD AUTO: 3.8 K/UL (ref 4.3–11.1)
WBC # BLD AUTO: 3.8 K/UL (ref 4.3–11.1)

## 2017-02-16 PROCEDURE — 74011250637 HC RX REV CODE- 250/637: Performed by: NURSE PRACTITIONER

## 2017-02-16 PROCEDURE — 85027 COMPLETE CBC AUTOMATED: CPT | Performed by: NURSE PRACTITIONER

## 2017-02-16 PROCEDURE — 85610 PROTHROMBIN TIME: CPT | Performed by: NURSE PRACTITIONER

## 2017-02-16 PROCEDURE — 74011000250 HC RX REV CODE- 250: Performed by: NURSE PRACTITIONER

## 2017-02-16 PROCEDURE — 74011250637 HC RX REV CODE- 250/637: Performed by: INTERNAL MEDICINE

## 2017-02-16 PROCEDURE — 97530 THERAPEUTIC ACTIVITIES: CPT

## 2017-02-16 PROCEDURE — 74011000250 HC RX REV CODE- 250: Performed by: INTERNAL MEDICINE

## 2017-02-16 PROCEDURE — 74011250636 HC RX REV CODE- 250/636: Performed by: INTERNAL MEDICINE

## 2017-02-16 PROCEDURE — 97110 THERAPEUTIC EXERCISES: CPT

## 2017-02-16 PROCEDURE — 85025 COMPLETE CBC W/AUTO DIFF WBC: CPT | Performed by: INTERNAL MEDICINE

## 2017-02-16 PROCEDURE — 80048 BASIC METABOLIC PNL TOTAL CA: CPT | Performed by: NURSE PRACTITIONER

## 2017-02-16 PROCEDURE — 36415 COLL VENOUS BLD VENIPUNCTURE: CPT | Performed by: NURSE PRACTITIONER

## 2017-02-16 PROCEDURE — 74011250637 HC RX REV CODE- 250/637: Performed by: HOSPITALIST

## 2017-02-16 PROCEDURE — 65660000000 HC RM CCU STEPDOWN

## 2017-02-16 RX ORDER — METOPROLOL TARTRATE 5 MG/5ML
5 INJECTION INTRAVENOUS ONCE
Status: COMPLETED | OUTPATIENT
Start: 2017-02-16 | End: 2017-02-16

## 2017-02-16 RX ORDER — DILTIAZEM HYDROCHLORIDE 60 MG/1
60 TABLET, FILM COATED ORAL EVERY 6 HOURS
Status: DISCONTINUED | OUTPATIENT
Start: 2017-02-16 | End: 2017-02-17

## 2017-02-16 RX ORDER — FUROSEMIDE 10 MG/ML
40 INJECTION INTRAMUSCULAR; INTRAVENOUS EVERY 12 HOURS
Status: DISCONTINUED | OUTPATIENT
Start: 2017-02-16 | End: 2017-02-19

## 2017-02-16 RX ORDER — FUROSEMIDE 40 MG/1
40 TABLET ORAL 2 TIMES DAILY
Status: DISCONTINUED | OUTPATIENT
Start: 2017-02-16 | End: 2017-02-16

## 2017-02-16 RX ORDER — POLYETHYLENE GLYCOL 3350 17 G/17G
17 POWDER, FOR SOLUTION ORAL DAILY
Status: DISCONTINUED | OUTPATIENT
Start: 2017-02-16 | End: 2017-02-28 | Stop reason: HOSPADM

## 2017-02-16 RX ADMIN — FUROSEMIDE 40 MG: 10 INJECTION, SOLUTION INTRAMUSCULAR; INTRAVENOUS at 13:48

## 2017-02-16 RX ADMIN — POLYETHYLENE GLYCOL 3350 17 G: 17 POWDER, FOR SOLUTION ORAL at 13:48

## 2017-02-16 RX ADMIN — SPIRONOLACTONE 25 MG: 25 TABLET, FILM COATED ORAL at 09:19

## 2017-02-16 RX ADMIN — PANTOPRAZOLE SODIUM 40 MG: 40 TABLET, DELAYED RELEASE ORAL at 09:19

## 2017-02-16 RX ADMIN — DILTIAZEM HYDROCHLORIDE 30 MG: 30 TABLET, FILM COATED ORAL at 13:48

## 2017-02-16 RX ADMIN — Medication 10 ML: at 22:37

## 2017-02-16 RX ADMIN — GABAPENTIN 100 MG: 100 CAPSULE ORAL at 16:21

## 2017-02-16 RX ADMIN — PRAVASTATIN SODIUM 1 TABLET: 20 TABLET ORAL at 17:20

## 2017-02-16 RX ADMIN — Medication 5 ML: at 05:26

## 2017-02-16 RX ADMIN — LEVOTHYROXINE SODIUM 88 MCG: 0.09 TABLET ORAL at 05:29

## 2017-02-16 RX ADMIN — FUROSEMIDE 40 MG: 10 INJECTION, SOLUTION INTRAMUSCULAR; INTRAVENOUS at 22:35

## 2017-02-16 RX ADMIN — AMIODARONE HYDROCHLORIDE 200 MG: 200 TABLET ORAL at 09:19

## 2017-02-16 RX ADMIN — GABAPENTIN 100 MG: 100 CAPSULE ORAL at 22:35

## 2017-02-16 RX ADMIN — ROPINIROLE 2 MG: 2 TABLET, FILM COATED ORAL at 22:35

## 2017-02-16 RX ADMIN — AMIODARONE HYDROCHLORIDE 200 MG: 200 TABLET ORAL at 17:20

## 2017-02-16 RX ADMIN — DIGOXIN 0.12 MG: 125 TABLET ORAL at 09:19

## 2017-02-16 RX ADMIN — SERTRALINE HYDROCHLORIDE 50 MG: 50 TABLET ORAL at 22:35

## 2017-02-16 RX ADMIN — GABAPENTIN 100 MG: 100 CAPSULE ORAL at 09:19

## 2017-02-16 RX ADMIN — POLYVINYL ALCOHOL 1 DROP: 14 SOLUTION/ DROPS OPHTHALMIC at 13:51

## 2017-02-16 RX ADMIN — PRAVASTATIN SODIUM 1 TABLET: 20 TABLET ORAL at 09:19

## 2017-02-16 RX ADMIN — DILTIAZEM HYDROCHLORIDE 30 MG: 30 TABLET, FILM COATED ORAL at 05:27

## 2017-02-16 RX ADMIN — METOPROLOL TARTRATE 5 MG: 5 INJECTION INTRAVENOUS at 16:05

## 2017-02-16 RX ADMIN — DILTIAZEM HYDROCHLORIDE 60 MG: 60 TABLET, FILM COATED ORAL at 17:20

## 2017-02-16 NOTE — PROGRESS NOTES
Problem: Mobility Impaired (Adult and Pediatric)  Goal: *Acute Goals and Plan of Care (Insert Text)  LTG:  (1.)Ms. Christel Kramer will move from supine to sit and sit to supine and roll side to side in bed with INDEPENDENT within 7 day(s). (2.)Ms. Christel Kramer will transfer from bed to chair and chair to bed with MODIFIED INDEPENDENCE using the least restrictive device within 5 day(s). (3.)Ms. Christel Kramer will ambulate with MODIFIED INDEPENDENCE for 50 feet with the least restrictive device within 5 day(s). (4.)Ms. Christel Kramer will perform standing static and dynamic balance activities x 8 minutes with SUPERVISION to improve safety within 5 day(s). (5.)Ms. Christel Kramer will tolerate 25 minutes of therapeutic activity/exercise with one rest break within 5 day(s). ________________________________________________________________________________________________      PHYSICAL THERAPY: Daily Note, Treatment Day: 2nd and AM 2/16/2017  INPATIENT: Hospital Day: 5  [place] / [place] / Product Type: Medicare /      NAME/AGE/GENDER: Chano Suresh is a 77 y.o. female       PRIMARY DIAGNOSIS: dyspnea, hypotension,  Acute CHF (congestive heart failure) ([facility]) Acute CHF (congestive heart failure) ([facility]) Acute CHF (congestive heart failure) ([facility]        ICD-10: Treatment Diagnosis:       · Generalized Muscle Weakness (M62.81)  · shortness of breath   Precaution/Allergies:  Adhesive tape; Benadryl [diphenhydramine hcl]; Demerol [meperidine]; Morphine; Sulfa (sulfonamide antibiotics); and Lorazepam       ASSESSMENT:      Ms. Christel Kramer presents with shortness of breath with activity and overall general fatigue. Patient was admitted due to above and was sitting in chair on arrival to room and agreeable to PT. Patient stood with SBA and ambulated 25' with rolling walker in room on 3L O2. Patient's HR increased to 135 during mobility.   Patient rested then perform LE exercises, with extra time for rest breaks and cueing for pursed lip breathing. SpO2 100%. Slight progress with gait/activity tolerance this session. Will continue to progress towards goals. This section established at most recent assessment   PROBLEM LIST (Impairments causing functional limitations):  1. Decreased Strength  2. Decreased ADL/Functional Activities  3. Decreased Transfer Abilities  4. Decreased Ambulation Ability/Technique  5. Decreased Balance  6. Decreased Activity Tolerance  7. Increased Fatigue  8. Increased Shortness of Breath  9. Decreased Knowledge of Precautions  10. Decreased Myra with Home Exercise Program    INTERVENTIONS PLANNED: (Benefits and precautions of physical therapy have been discussed with the patient.)  1. Balance Exercise  2. Bed Mobility  3. Gait Training  4. Home Exercise Program (HEP)  5. Therapeutic Activites  6. Therapeutic Exercise/Strengthening  7. Transfer Training  8. Group Therapy      TREATMENT PLAN: Frequency/Duration: 3 times a week for duration of hospital stay  Rehabilitation Potential For Stated Goals: EXCELLENT      RECOMMENDED REHABILITATION/EQUIPMENT: (at time of discharge pending progress): Continue Skilled Therapy. HISTORY:   History of Present Injury/Illness (Reason for Referral):  Per H&P:Ms. Radha Sommers is a very pleasant 78 yo F Who complains of increased SOb, nonproductive cough and b/l LE swelling in the last 1-2 months. Known with chronic diastolic CHF, CAD, bradycardia s/p pacemaker, , s/p bioprosthetic AVR,Chronic Afib on coumadin, severe pulmonary HTN with PAP 69 mmHg, chronic respiratory failure on NC 3L 24h/7 , COPD, GERD, HTN, HLP, Obesity, BOBBY - not using CPAP. Seen by her cardiologist, Vale Jimenez on 1/9/17 and Demadex was increased to 40 mg PO daily, except MWF when was BID. Ms. Radha Sommers states that her leg swelling improved, although her cough and SOB persisted.  Seen by PCP on 1/31 and placed on oral steroids and Albuterol that she couldn't complete because the albuterol made ermias jittery. No improvement with treatment. Seen at [facility] ED on 2/9 with low BP and and thought that she is dehydrated due to increased diuretics and received IV fluids. She felt better for one day, although because of increased in her symptoms she presented to ED today. BP was 80/51 mmHg and received 1L NC bolus in Ed per ED provider. CXR w/o acute pathology. No spikes of fever since her symptoms started. Influenza screen negative. WBC:3.4. Cr:1.48, around her baseline. CV:irregular irregularity. No JVD. +2 pitting edema b/l LE   Resp: Decreased air entry b/l at the base. No wheezing. No rales or crackles. Abd: soft, non-tender, no rebound tenderness. Hold Torsemide until tomorrow. Will get Echocardiogram. Gentle IV hydration at 50 ml/h to keep MAP>85 mmHg. Hold BB due to hypotension. Hold benzodiazepines at bedtime, can exacerbate - untreated BOBBY and involved in severe pulmonary hypertension. Cardiology and palliative care consulted - appreciate the help. Past Medical History/Comorbidities:   Ms. Noble Peacock  has a past medical history of Abnormal glucose (8/5/2016); Abscess (8/5/2016); Acute encephalopathy (7/8/2016); Acute renal failure ([address] (12/3/2009); Afib ([address] Anemia; Ankle swelling (8/5/2016); Anxiety (8/5/2016); Aortic Valve Bioprosthesis Present (3/7/2009); ARF (acute renal failure) ([address] (2/24/2010); Arthritis; Asthma; Atopic dermatitis (8/5/2016); Atrial fibrillation ([address] (3/7/2009); Autonomic orthostatic hypotension (8/5/2016); AV block (10/17/2011); Back pain (8/5/2016); Bradycardia (Symptomatic) (11/7/2011); CAD (coronary artery disease); Cancer ([address] (1990); Cardiac pacemaker (11/2/2015); Cardiogenic shock ([address] (10/17/2011); 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 (8/5/2016); Cellulitis (8/5/2016); Chest pain (8/5/2016); Chronic [facility] fibrillation ([facility] (10/17/2011); Chronic depression (8/5/2016); Chronic depression (8/5/2016);  Chronic obstructive pulmonary disease (HCC) (3/8/2009); Chronic pain; CKD (chronic kidney disease) stage 3, GFR 30-59 ml/min (12/4/2009); Congestive heart failure (CHF) ([facility] (11/2/2015); COPD; CRI (chronic renal insufficiency) (8/5/2016); Degeneration of cervical intervertebral disc (8/5/2016); Degenerative arthritis of left knee (2/27/2009); Dehydration (8/5/2016); Diastolic heart failure ([facility] Digoxin toxicity (2/24/2010); Disorder of sweat glands (8/5/2016); Diverticulosis of large intestine without diverticulitis (2015); Dyspnea (8/5/2016); Eczema (8/5/2016); Epigastric abdominal pain (2/24/2010); GERD (gastroesophageal reflux disease); Gout (8/5/2016); H/O mitral valve repair, 2003 (6/27/2016); Heart failure ([facility] HTN (hypertension) (8/5/2016); colonic polyp (2015); Hyperkalemia (10/17/2011); Hyperlipidemia (8/5/2016); Hypertension; Hypokalemia (2/24/2010); Hypothyroidism (12/4/2009); Ill-defined condition; Knee pain (8/5/2016); Leg cramps (8/5/2016); Mitral stenosis with insufficiency (11/2/2015); Nausea and vomiting (2/24/2010); Obesity (11/2/2015); BOBBY (obstructive sleep apnea) (12/4/2009); Osteoarthritis (8/5/2016); Osteopenia (8/5/2016); Other long term (current) drug therapy (8/5/2016); Palpitations (11/2/2015); Rash (8/5/2016); Rectal bleeding (10/27/2011); Rectocele (2015); Respiratory insufficiency (11/2/2015); 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 She also has no past medical history of Aneurysm ([facility] Autoimmune disease ([facility] Coagulation disorder ([facility] DEMENTIA; Diabetes ([address] Endocarditis; Infectious disease; Liver disease; Other ill-defined conditions(799.89); PUD (peptic ulcer disease);  Seizures ([address] Stroke [facility]); or Thromboembolus ([facility] Ms. Donald Chávez  has a past surgical history that includes appendectomy; cholecystectomy (2005); gyn (1981); mastectomy (1990); pacemaker; breast reconstruction; cardiac surg procedure unlist; and orthopaedic. Social History/Living Environment:   Home Environment: Private residence  # Steps to Enter: 1  One/Two Story Residence: One story  Living Alone: No  Support Systems: Child(brit), [employer] / gene community, Family member(s), Friends \ neighbors  Patient Expects to be Discharged to[de-identified] Private residence  Current DME Used/Available at Home: Shower chair  Tub or Shower Type: Tub/Shower combination  Prior Level of Function/Work/Activity:  Patient lives with son who works during the day. Patient has family and neighbors that would be able to check on patient. Personal Factors:          Age:  77 y.o. Number of Personal Factors/Comorbidities that affect the Plan of Care: 3+: HIGH COMPLEXITY   EXAMINATION:   Most Recent Physical Functioning:   Gross Assessment:                  Posture:     Balance:  Sitting: Intact  Standing: Impaired  Standing - Static: Fair  Standing - Dynamic : Fair Bed Mobility:     Wheelchair Mobility:     Transfers:  Sit to Stand: Stand-by asssistance  Stand to Sit: Stand-by asssistance  Duration: 15 Minutes  Gait:     Base of Support: Center of gravity altered  Speed/Carol: Slow;Shuffled  Step Length: Right shortened;Left shortened  Distance (ft): 25 Feet (ft)  Assistive Device: Walker, rolling  Ambulation - Level of Assistance: Stand-by asssistance  Interventions: Safety awareness training;Verbal cues       Body Structures Involved:  1. Heart  2. Lungs Body Functions Affected:  1. Cardio  2. Movement Related Activities and Participation Affected:  1. Mobility  2. Self Care  3.  Domestic Life   Number of elements that affect the Plan of Care: 4+: HIGH COMPLEXITY   CLINICAL PRESENTATION:   Presentation: Evolving clinical presentation with changing clinical characteristics: MODERATE COMPLEXITY   CLINICAL DECISION MAKIN[address] [place] [id number] AM-PAC 6 Clicks   Basic Mobility Inpatient Short Form  How much difficulty does the patient currently have. .. Unable A Lot A Little None   1. Turning over in bed (including adjusting bedclothes, sheets and blankets)? [ ] 1   [ ] 2   [ ] 3   [X] 4   2. Sitting down on and standing up from a chair with arms ( e.g., wheelchair, bedside commode, etc.)   [ ] 1   [ ] 2   [X] 3   [ ] 4   3. Moving from lying on back to sitting on the side of the bed? [ ] 1   [ ] 2   [ ] 3   [X] 4   How much help from another person does the patient currently need. .. Total A Lot A Little None   4. Moving to and from a bed to a chair (including a wheelchair)? [ ] 1   [ ] 2   [X] 3   [ ] 4   5. Need to walk in hospital room? [ ] 1   [ ] 2   [X] 3   [ ] 4   6. Climbing 3-5 steps with a railing? [ ] 1   [X] 2   [ ] 3   [ ] 4   © 2007, Trustees of [address] [place] [id number], under license to [employer]. All rights reserved    Score:  Initial: 19 Most Recent: X (Date: -- )     Interpretation of Tool:  Represents activities that are increasingly more difficult (i.e. Bed mobility, Transfers, Gait). Score 24 23 22-20 19-15 14-10 9-7 6       Modifier CH CI CJ CK CL CM CN         · Mobility - Walking and Moving Around:               - CURRENT STATUS:    CK - 40%-59% impaired, limited or restricted               - GOAL STATUS:           CJ - 20%-39% impaired, limited or restricted               - D/C STATUS:                       ---------------To be determined---------------  [place] / [place] / Product Type: Medicare /       Medical Necessity:     · Patient demonstrates good rehab potential due to higher previous functional level. · Skilled intervention continues to be required due to decline in overall functional mobility and activity tolerance.   Reason for Services/Other Comments:  · Patient continues to require present interventions due to patient's inability to perform functional mobility at previous indepencence level. Use of outcome tool(s) and clinical judgement create a POC that gives a: Questionable prediction of patient's progress: MODERATE COMPLEXITY                 TREATMENT:   (In addition to Assessment/Re-Assessment sessions the following treatments were rendered)   Pre-treatment Symptoms/Complaints:   Pain: Initial:   Pain Intensity 1: 0  Post Session:  No pain noted, shortness of breath with mobility      Therapeutic Activity: (  15 Minutes ):  Therapeutic activities including Chair transfers and Ambulation on level ground to improve mobility, strength, balance and activity tolerance. Required minimal Safety awareness training;Verbal cues to promote static and dynamic balance in standing and pursed lip breathing. Therapeutic Exercise: (10 Minutes):  Exercises per grid below to improve mobility, strength and coordination. Required minimal visual and verbal cues to promote proper body breathing techniques and to stay on task. Progressed complexity of movement as indicated. DATE: 2/16/17       Ambulation        Hip Flexion x10 AB       Long Arc Quads x10 AB       Knee Squeezes        Ankle DF/PF x10 AB                                        Key:  A=active, AA=active assisted, P=passive, B=bilaterally, R=right, L=left   DF=dorsiflexion, PF=plantarflexion      Braces/Orthotics/Lines/Etc:   · O2 Device: Nasal cannula  Treatment/Session Assessment:    · Response to Treatment:  Dyspneic with minimal exertion. · Interdisciplinary Collaboration:  · Physical Therapist  · Registered Nurse  · After treatment position/precautions:  · Up in chair, Bed/Chair-wheels locked, Call light within reach and Family at bedside  · Compliance with Program/Exercises: compliant all of the time. · Recommendations/Intent for next treatment session:   \"Next visit will focus on advancements to more challenging activities and reduction in assistance provided\".   Total Treatment Duration:  PT Patient Time In/Time Out  Time In: 1048  Time Out: 1100 Garret Soliz, PT

## 2017-02-16 NOTE — PROGRESS NOTES
Problem: Falls - Risk of  Goal: *Absence of falls  Outcome: Progressing Towards Goal  Patient Alert, oriented x3,  proper use of call light, pages for assistance before getting  OOB. Nonskid socks on feet prior to getting OOB. Staff compliant with keeping bed in low, locked position; with both left and right upper side rails raised/ elevated. Bedside table and personal items within reach: Telephone, eye glasses, Lip Balm, Water Pitcher, Reading Material and Kleenex.

## 2017-02-16 NOTE — PROGRESS NOTES
Assisted OOB, up to [facility], dyspnea on exertion, heart rate 120's increased to 140's. Pivoted from [facility] back to bed, fairly tolerated activity.

## 2017-02-16 NOTE — PROGRESS NOTES
Patient has order for IP Consult to Cardiac Rehab for diagnosis of heart failure with EF = 55-60%. Patient will not be seen at hospital today but will be contacted after discharge if referral to [address] [place] [address] is received with a qualifying diagnosis.

## 2017-02-16 NOTE — PROGRESS NOTES
Lopressor 5 mg SIVP  Given per MD order: Dr. Humaira Messer on floor. Pt. Informed of POC and verbalizes understanding to call with any assistance OOB, SOB, dizziness, discomfort, or any concerns. Family at bedside. NPO after MN for AV Ablation. Afib controlled at 70 after lopressor IV. Continue to monitor. VS flow sheet.

## 2017-02-16 NOTE — PROGRESS NOTES
Hospitalist Progress Note    2017  Admit Date: 2017  2:32 PM   NAME: Milagros Ugalde   :     DOS:              17  MRN:  [id number]   Attending: Lisa Castro MD  PCP:  Sirisha Mayers MD  Treatment Team: Attending Provider: Lisa Castro MD; Consulting Provider: Ania Hermosillo MD; Consulting Provider: Clau Carbajal DO; Utilization Review: Radha Dewey; Consulting Provider: Yecenia Layne NP; Care Manager: Luz Marina Acosta; Consulting Provider: Susana Painter MD    DNR     SUBJECTIVE:   As previously documented: \" Ms. Rod Jaquez is a very pleasant 76 yo F Who complains of increased SOb, nonproductive cough and b/l LE swelling in the last 1-2 months. Known with chronic diastolic CHF, CAD, bradycardia s/p pacemaker, , s/p bioprosthetic AVR,Chronic Afib on coumadin, severe pulmonary HTN with PAP 69 mmHg, chronic respiratory failure on NC 3L 24h/7 , COPD, GERD, HTN, HLP, Obesity, BOBBY - not using CPAP. Seen by her cardiologist, Radha Hayes on 17 and Demadex was increased to 40 mg PO daily, except MWF when was BID. Ms. Rod Jaquez states that her leg swelling improved, although her cough and SOB persisted. Seen by PCP on  and placed on oral steroids and Albuterol that she couldn't complete because the albuterol made ermias jittery. No improvement with treatment. Seen at [facility] ED on  with low BP and and thought that she is dehydrated due to increased diuretics and received IV fluids. She felt better for one day, although because of increased in her symptoms she presented to ED today. BP was 80/51 mmHg and received 1L NC bolus in Ed per ED provider. CXR w/o acute pathology. No spikes of fever since her symptoms started. Influenza screen negative. WBC:3.4. Cr:1.48, around her baseline. \"       17   Ms. [address] alert - states she is feeling better.  Heart rate still uncontrolled- only minimally improved after dig- now in the 80's to 126 yesterday she was oscillating between the 80'- 140's with or without activity. We were opting for home with hospice but the pt was re-evaluated by cardiology today - plan for an ablation of the av node with electrophysiology in an attempt to control the heart rate better and decrease meds. Continues to have low BP. Denies any chest pain or abdominal pain. Afebrile. 10+ ROS reviewed and negative except for positive in HPI.    Allergies   Allergen Reactions    Adhesive Tape Rash    Benadryl [Diphenhydramine Hcl] Other (comments)     Jitters      Demerol [Meperidine] Anxiety    Morphine Other (comments)     Confusion    Sulfa (Sulfonamide Antibiotics) Anxiety    Lorazepam Anxiety     Current Facility-Administered Medications   Medication Dose Route Frequency Provider Last Rate Last Dose    polyethylene glycol (MIRALAX) packet 17 g  17 g Oral DAILY Iman Jaimes NP   17 g at 02/16/17 1348    furosemide (LASIX) injection 40 mg  40 mg IntraVENous MD Yifan   40 mg at 02/16/17 1348    dilTIAZem (CARDIZEM) IR tablet 60 mg  60 mg Oral Q6H Theoplis Cabot, MD        digoxin (LANOXIN) tablet 0.125 mg  0.125 mg Oral DAILY Wing uGthrie MD   0.125 mg at 02/16/17 0919    amiodarone (CORDARONE) tablet 200 mg  200 mg Oral BID Juliet Estes MD   200 mg at 02/16/17 0919    sodium chloride (NS) flush 5-10 mL  5-10 mL IntraVENous Q8H Juliet Estes MD   5 mL at 02/16/17 0526    sodium chloride (NS) flush 5-10 mL  5-10 mL IntraVENous PRN Juliet Estes MD        diazePAM (VALIUM) tablet 2.5 mg  2.5 mg Oral Q8H PRN Myron Zee NP   2.5 mg at 02/14/17 2157    senna-docusate (PERICOLACE) 8.6-50 mg per tablet 1 Tab  1 Tab Oral BID Myron Zee NP   1 Tab at 02/16/17 0919    traZODone (DESYREL) tablet 50 mg  50 mg Oral QHS PRN Dick Redman MD   50 mg at 02/14/17 2156    gabapentin (NEURONTIN) capsule 100 mg  100 mg Oral TID Helen Nevarez MD   100 mg at 02/16/17 1621    sodium chloride (NS) flush 5-10 mL  5-10 mL IntraVENous Q8H Jennifer Aguilar MD   10 mL at 02/15/17 0551    sodium chloride (NS) flush 5-10 mL  5-10 mL IntraVENous PRN Jennifer Aguilar MD        ondansetron [facility]) injection 4 mg  4 mg IntraVENous Q4H PRN Aliser Beverlyet, NP   4 mg at 17 2334    acetaminophen (TYLENOL) tablet 650 mg  650 mg Oral Q4H PRN Ana Paulet, NP   650 mg at 02/15/17 2355    levalbuterol (XOPENEX) nebulizer soln 1.25 mg/3 mL  1.25 mg Nebulization Q4H PRN Isidor Hatchet, NP        polyvinyl alcohol (LIQUIFILM TEARS) 1.4 % ophthalmic solution 1 Drop  1 Drop Both Eyes PRN Isidor Hatchet, NP   1 Drop at 17 1351    hydrocortisone (ANUSOL-HC) 2.5 % rectal cream   PeriANAL BID PRN Isidor Hatchet, NP        levothyroxine (SYNTHROID) tablet 88 mcg  88 mcg Oral ACB Azeb Pino NP   88 mcg at 17 0529    pantoprazole (PROTONIX) tablet 40 mg  40 mg Oral ACB Azeb Pino NP   40 mg at 17 0919    promethazine (PHENERGAN) tablet 25 mg  25 mg Oral Q6H PRN Isidor Hatchet, NP        rOPINIRole (REQUIP) tablet 2 mg  2 mg Oral QHS Azeb Pino NP   2 mg at 02/15/17 235    sertraline (ZOLOFT) tablet 50 mg  50 mg Oral QHS Azeb Pino, NP   50 mg at 02/15/17 235    spironolactone (ALDACTONE) tablet 25 mg  25 mg Oral DAILY Aliser Hatchet, NP   25 mg at 17 0919             PHYSICAL EXAM     Visit Vitals    BP (!) 82/46    Pulse 71    Temp 97.4 °F (36.3 °C)    Resp 20    Ht 5' (1.524 m)    Wt 84.5 kg (186 lb 6.4 oz)    SpO2 94%    Breastfeeding No    BMI 36.4 kg/m2      Temp (24hrs), Av.8 °F (36.6 °C), Min:97.4 °F (36.3 °C), Max:98 °F (36.7 °C)    Oxygen Therapy  O2 Sat (%): 94 % (17 1120)  Pulse via Oximetry: 72 beats per minute (17 1742)  O2 Device: Nasal cannula (17 1120)  O2 Flow Rate (L/min): 2 l/min (02/15/17 2000)    Intake/Output Summary (Last 24 hours) at 17 1632  Last data filed at 02/16/17 1524   Gross per 24 hour   Intake              670 ml   Output             1100 ml   Net             -430 ml        Physical Exam:  General:         AAOx3. NAD. Afebrile. Cooperative. Obese. HEENT:               NCAT. No obvious deformity. Nares normal. No drainage  Lungs:                 Decreased air entry b/l at the base. Wheezing but improved  Cardiovascular:   Irregular irregularity. No m/r/g. +2 pitting edema  b/l. +2 PT/DT pulses b/l. Abdomen:       S/nt/nd. Bowel sounds normal. .   Skin:         No rashes or lesions. Not Jaundiced  Neurologic:    AAOx3. CN II- XII grossly WNL. No gross focal deficit. Moves all extremities  Psychiatric:         Good mood. Normal affect.          DIAGNOSTIC STUDIES      Data Review:   Recent Results (from the past 24 hour(s))   PLEASE READ & DOCUMENT PPD TEST IN 72 HRS    Collection Time: 02/15/17  7:30 PM   Result Value Ref Range    PPD neg Negative    mm Induration 0 mm   PROTHROMBIN TIME + INR    Collection Time: 02/16/17  6:26 AM   Result Value Ref Range    Prothrombin time 18.3 (H) 9.6 - 12.0 sec    INR 1.7 (H) 0.9 - 1.2     CBC W/O DIFF    Collection Time: 02/16/17  6:26 AM   Result Value Ref Range    WBC 3.8 (L) 4.3 - 11.1 K/uL    RBC 2.53 (L) 4.05 - 5.25 M/uL    HGB 7.7 (L) 11.7 - 15.4 g/dL    HCT 25.7 (L) 35.8 - 46.3 %    .6 (H) 79.6 - 97.8 FL    MCH 30.4 26.1 - 32.9 PG    MCHC 30.0 (L) 31.4 - 35.0 g/dL    RDW 14.7 (H) 11.9 - 14.6 %    PLATELET 99 (L) 267 - 450 K/uL    MPV 10.5 (L) 10.8 - 12.5 FL   METABOLIC PANEL, BASIC    Collection Time: 02/16/17  6:26 AM   Result Value Ref Range    Sodium 145 136 - 145 mmol/L    Potassium 4.1 3.5 - 5.1 mmol/L    Chloride 107 98 - 107 mmol/L    CO2 34 (H) 21 - 32 mmol/L    Anion gap 4 (L) 7 - 16 mmol/L    Glucose 96 65 - 100 mg/dL    BUN 26 (H) 8 - 23 MG/DL    Creatinine 1.07 (H) 0.6 - 1.0 MG/DL    GFR est AA >60 >60 ml/min/1.73m2    GFR est non-AA 53 (L) >60 ml/min/1.73m2    Calcium 8.7 8.3 - 10.4 MG/DL   CBC WITH AUTOMATED DIFF    Collection Time: 02/16/17 10:52 AM   Result Value Ref Range    WBC 3.8 (L) 4.3 - 11.1 K/uL    RBC 2.60 (L) 4.05 - 5.25 M/uL    HGB 7.9 (L) 11.7 - 15.4 g/dL    HCT 26.6 (L) 35.8 - 46.3 %    .3 (H) 79.6 - 97.8 FL    MCH 30.4 26.1 - 32.9 PG    MCHC 29.7 (L) 31.4 - 35.0 g/dL    RDW 14.8 (H) 11.9 - 14.6 %    PLATELET 98 (L) 024 - 450 K/uL    MPV 10.8 10.8 - 14.1 FL    DF AUTOMATED      NEUTROPHILS 77 43 - 78 %    LYMPHOCYTES 13 13 - 44 %    MONOCYTES 7 4.0 - 12.0 %    EOSINOPHILS 3 0.5 - 7.8 %    BASOPHILS 0 0.0 - 2.0 %    IMMATURE GRANULOCYTES 0.3 0.0 - 5.0 %    ABS. NEUTROPHILS 2.9 1.7 - 8.2 K/UL    ABS. LYMPHOCYTES 0.5 0.5 - 4.6 K/UL    ABS. MONOCYTES 0.3 0.1 - 1.3 K/UL    ABS. EOSINOPHILS 0.1 0.0 - 0.8 K/UL    ABS. BASOPHILS 0.0 0.0 - 0.2 K/UL    ABS. IMM. GRANS. 0.0 0.0 - 0.5 K/UL     Imaging /Procedures /Studies:    CXR Results  (Last 48 hours)    None        Echocardiogram: SUMMARY:  -  Left ventricle: Systolic function was normal. Ejection fraction was  estimated in the range of 55 % to 60 %. There were no regional wall motion  abnormalities. Wall thickness was mildly to moderately increased. -  Right ventricle: The ventricle was dilated. Systolic function was reduced. There was severe pulmonary artery hypertension.  -  Left atrium: The atrium was markedly dilated. -  Right atrium: The atrium was markedly dilated. -  Inferior vena cava, hepatic veins: The inferior vena cava was dilated. -  Aortic valve: A bioprosthesis was present. It exhibited stenosis. There   Was moderate stenosis. The aortic valve area by the continuity equation was 1.1   cm2.  -  Mitral valve: There was moderate to marked annular calcification. There   Was moderate-marked calcification. A annular ring prosthesis was present. It  exhibited stenosis and reduced motion. There was moderate stenosis. There was  mild regurgitation.  The mitral valve area by pressure half time was 1.7 cm2.  -  Tricuspid valve: There was severe regurgitation. -  Pulmonic valve: There was mild to moderate regurgitation. -  Pericardium: There was a left pleural effusion. Labs and Studies from previous 24 hours have been personally reviewed by myself    ASSESSMENT      Active Hospital Problems    Diagnosis Date Noted    Acute CHF (congestive heart failure) ([facility] 02/12/2017    Pulmonary hypertension ([facility] 02/12/2017    Aortic valve replaced 01/09/2017    Warfarin anticoagulation 01/09/2017    Chronic diastolic congestive heart failure ([facility] 09/09/2016    Sleep apnea 11/02/2015    Chronic atrial fibrillation ([facility] 10/17/2011    ARF (acute renal failure) ([facility] 02/24/2010    BOBBY (obstructive sleep apnea) 12/04/2009    Aortic Valve Bioprosthesis Present 03/07/2009    Hypotension 03/01/2009       Plan:  SOB:  Likely due to her severe pulmonary hypertension  and possible cor pulmonale along with her known aortic valve stenosis, mitral valve disease. RSVP:65-70 mmHg. And compounded with pulmonary edema- additional lasix given by cardiology today    Pulmonary HTN: patient and her family had initially refused further evaluation inclusive CTA chest and VQ scan. They wanted more comfort care efforts. And Palliative care- following. However they have agreed for AV node ablation with cardiology tomorrow     Hypotension: still low - BB in hold-     Permanent AFib on Cardizem & amiodarone- there has not been much change in HR- despite dig yesterday- planing for av node ablation tomorrow    Anemia: stable - will Monitor     RLS: continue Gabapentin 100 mg TID    DVT Prophylaxis: Coumadin  CODE Status: DNR  Plan of Care Discussed with: patient family. Care team   Medical Risk: high  Disposition: pending- pt willing to consider home hospice she says- as long as she goes home. Says children want to do any and everything she may need at home.      Yair Alfonso MD  02/16/17

## 2017-02-16 NOTE — PROGRESS NOTES
Patient resting quietly in bed. Slept at long intervals throughout the shift, no distress noted during hourly bedside checks. No request or needs at present.

## 2017-02-16 NOTE — PROGRESS NOTES
Pt. Without c/o this a.m. Dyspnea noted with exertion. Admitting diagnosis Acute CHF and Afib. Afib at 110 on the monitor. Continuation of care.

## 2017-02-16 NOTE — PROGRESS NOTES
[facility] CARDIOLOGY PROGRESS NOTE           2/16/2017 1:26 PM    Admit Date: 2/12/2017      Subjective:   No cp or inc sob    ROS:  Cardiovascular:  As noted above    Objective:      Vitals:    02/16/17 0037 02/16/17 0503 02/16/17 0725 02/16/17 1120   BP: 111/70 94/57 112/56 117/60   Pulse: (!) 120 (!) 112 (!) 117 (!) 115   Resp: 20 20 20 20   Temp: 97.8 °F (36.6 °C) 98 °F (36.7 °C) 97.6 °F (36.4 °C) 97.4 °F (36.3 °C)   SpO2: 97% 97% 97% 94%   Weight:  84.5 kg (186 lb 6.4 oz)     Height:           Physical Exam:  General-No Acute Distress  Neck- supple, no JVD  CV- irregular rate and rhythm, mrapid  Lung- clear bilaterally  Abd- soft, nontender, nondistended  Ext- + thigh pitting edema bilaterally. Skin- warm and dry    Data Review:   Recent Labs      02/16/17   1052  02/16/17   0626  02/15/17   0627   NA   --   145  145   K   --   4.1  4.2   BUN   --   26*  24*   CREA   --   1.07*  1.12*   GLU   --   96  94   WBC  3.8*  3.8*  3.8*   HGB  7.9*  7.7*  8.8*   HCT  26.6*  25.7*  30.2*   PLT  98*  99*  107*   INR   --   1.7*  2.1*       Assessment/Plan:     Principal Problem:    Acute CHF (congestive heart failure) ([facility]) (2/12/2017)    Active Problems:    Hypotension (3/1/2009)      Aortic Valve Bioprosthesis Present (3/7/2009)      BOBBY (obstructive sleep apnea) (12/4/2009)      ARF (acute renal failure) ([address] (2/24/2010)      Chronic atrial fibrillation (HCC) (10/17/2011)      Sleep apnea (11/2/2015)      Chronic diastolic congestive heart failure ([address] (9/9/2016)      Aortic valve replaced (1/9/2017)      Warfarin anticoagulation (1/9/2017)      Pulmonary hypertension ([address] (2/12/2017)      ///  She is fluid overloaded. Her heart rate is poorly controlled  Lasix is resumed.   EP to see to consider an AV node ablation    Jackeline Sanderson MD  2/16/2017 1:26 PM

## 2017-02-16 NOTE — PROGRESS NOTES
Bedside and Verbal shift change report given to Self (oncoming nurse) by Kandace Lucas RN (offgoing nurse). Report included the following information Kardex, Intake/Output, MAR, Recent Results and Cardiac Rhythm A-FIB, Controlled to uncontrolled. Amari Lindo

## 2017-02-16 NOTE — PROGRESS NOTES
Palliative Care Progress Note    Patient: Ty Coffman MRN: [id number]  SSN: [id number]    YOB: 1940  Age: 77 y.o. Sex: female       Assessment/Plan:     Chief Complaint/Interval History: Dyspnea at rest, lower extremity edema; irregular heart rhythm; c/o constipation     Principal Diagnosis:    · Dyspnea  R06.00-reports improvement    Additional Diagnoses:   · Anxiety  F41.1-reports improvement: continue low dose valium  · Constipation, Unspecified  K59.00  · Fatigue, Lethargy  R53.83  · Frailty  R54  · Counseling, Encounter for Medical Advice  Z71.9  · Encounter for Palliative Care  Z51.5  · Constipation: start bowel regimen; pericolace BID with miralax daily until BM, then miralax prn    Palliative Performance Scale (PPS)  PPS: 60    Medical Decision Making:   Reviewed and summarized notes over previous 24 hours. Discussed case with appropriate providers: SARITA Berumen  Reviewed laboratory and x-ray data: CBC, BMP    Current plan is for pt to be dc home with HH/bridge to hospice. Pt states that she has been told by all her doctors that her organs are \"bad\". She states she was told that she need to \"get her ducks in a row\". She states that this doesn't bother her. She feels as if she is able to give meaningful gifts to her family and make plans for how she wants to spend the rest of her life. Will discuss findings with members of the interdisciplinary team.       More than 50% of this 35 minute visit was spent counseling and coordination of care as outlined above. Subjective:     Review of Systems:  A comprehensive review of systems was negative except for:   Constitutional: Positive for fatigue. Respiratory: Positive for dyspnea with conversation or any exertion.   Cardiac: lower extremity edema  GI: reports hard stools/still constipated     Objective:     Visit Vitals    /56 (BP 1 Location: Right arm, BP Patient Position: Head of bed elevated (Comment degrees))    Pulse (!) 117    Temp 97.6 °F (36.4 °C)    Resp 20    Ht 5' (1.524 m)    Wt 186 lb 6.4 oz (84.5 kg)    SpO2 97%    Breastfeeding No    BMI 36.4 kg/m2       Physical Exam:    General:  Elderly, frail, and debilitated. Cooperative. No acute distress. UP in chair. Eyes:  Conjunctivae/corneas clear    Nose: Nares normal. Septum midline. O2 via NC. Neck: Supple, symmetrical, trachea midline. Lungs:   Diminished to auscultation bilaterally, mildly labored with speech   Heart:  Irregular rate and rhythm. Abdomen:   Soft, non-tender, non-distended. Extremities: Normal, atraumatic, no cyanosis. 2+ pitting bilateral lower extremity edema; LUE edema   Skin: Skin color, texture, turgor normal. No rash.    Neurologic: Nonfocal.   Psych: Alert and orientedx3     Signed By: Vasu Karimi NP     February 16, 2017

## 2017-02-17 ENCOUNTER — ANESTHESIA EVENT (OUTPATIENT)
Dept: SURGERY | Age: 77
DRG: 270 | End: 2017-02-17
Payer: MEDICARE

## 2017-02-17 ENCOUNTER — ANESTHESIA (OUTPATIENT)
Dept: SURGERY | Age: 77
DRG: 270 | End: 2017-02-17
Payer: MEDICARE

## 2017-02-17 LAB
ANION GAP BLD CALC-SCNC: 9 MMOL/L (ref 7–16)
BACTERIA SPEC CULT: NORMAL
BACTERIA SPEC CULT: NORMAL
BASOPHILS # BLD AUTO: 0 K/UL (ref 0–0.2)
BASOPHILS # BLD: 0 % (ref 0–2)
BUN SERPL-MCNC: 32 MG/DL (ref 8–23)
CALCIUM SERPL-MCNC: 8.6 MG/DL (ref 8.3–10.4)
CHLORIDE SERPL-SCNC: 104 MMOL/L (ref 98–107)
CO2 SERPL-SCNC: 33 MMOL/L (ref 21–32)
CREAT SERPL-MCNC: 1.06 MG/DL (ref 0.6–1)
DIFFERENTIAL METHOD BLD: ABNORMAL
EOSINOPHIL # BLD: 0.1 K/UL (ref 0–0.8)
EOSINOPHIL NFR BLD: 4 % (ref 0.5–7.8)
ERYTHROCYTE [DISTWIDTH] IN BLOOD BY AUTOMATED COUNT: 14.6 % (ref 11.9–14.6)
GLUCOSE SERPL-MCNC: 76 MG/DL (ref 65–100)
HCT VFR BLD AUTO: 28.6 % (ref 35.8–46.3)
HGB BLD-MCNC: 8.3 G/DL (ref 11.7–15.4)
IMM GRANULOCYTES # BLD: 0 K/UL (ref 0–0.5)
IMM GRANULOCYTES NFR BLD AUTO: 0.3 % (ref 0–5)
INR PPP: 1.4 (ref 0.9–1.2)
LYMPHOCYTES # BLD AUTO: 16 % (ref 13–44)
LYMPHOCYTES # BLD: 0.6 K/UL (ref 0.5–4.6)
MAGNESIUM SERPL-MCNC: 2.4 MG/DL (ref 1.8–2.4)
MCH RBC QN AUTO: 29.7 PG (ref 26.1–32.9)
MCHC RBC AUTO-ENTMCNC: 29 G/DL (ref 31.4–35)
MCV RBC AUTO: 102.5 FL (ref 79.6–97.8)
MONOCYTES # BLD: 0.2 K/UL (ref 0.1–1.3)
MONOCYTES NFR BLD AUTO: 7 % (ref 4–12)
NEUTS SEG # BLD: 2.6 K/UL (ref 1.7–8.2)
NEUTS SEG NFR BLD AUTO: 73 % (ref 43–78)
PLATELET # BLD AUTO: 116 K/UL (ref 150–450)
PMV BLD AUTO: 10.7 FL (ref 10.8–14.1)
POTASSIUM SERPL-SCNC: 3.8 MMOL/L (ref 3.5–5.1)
PROTHROMBIN TIME: 15 SEC (ref 9.6–12)
RBC # BLD AUTO: 2.79 M/UL (ref 4.05–5.25)
SERVICE CMNT-IMP: NORMAL
SERVICE CMNT-IMP: NORMAL
SODIUM SERPL-SCNC: 146 MMOL/L (ref 136–145)
WBC # BLD AUTO: 3.5 K/UL (ref 4.3–11.1)

## 2017-02-17 PROCEDURE — 36415 COLL VENOUS BLD VENIPUNCTURE: CPT | Performed by: NURSE PRACTITIONER

## 2017-02-17 PROCEDURE — 74011250636 HC RX REV CODE- 250/636: Performed by: INTERNAL MEDICINE

## 2017-02-17 PROCEDURE — C1894 INTRO/SHEATH, NON-LASER: HCPCS

## 2017-02-17 PROCEDURE — 74011250637 HC RX REV CODE- 250/637: Performed by: INTERNAL MEDICINE

## 2017-02-17 PROCEDURE — 93650 ICAR CATH ABLTJ AV NODE FUNC: CPT

## 2017-02-17 PROCEDURE — 76060000032 HC ANESTHESIA 0.5 TO 1 HR: Performed by: INTERNAL MEDICINE

## 2017-02-17 PROCEDURE — 74011250637 HC RX REV CODE- 250/637: Performed by: NURSE PRACTITIONER

## 2017-02-17 PROCEDURE — 74011250637 HC RX REV CODE- 250/637: Performed by: HOSPITALIST

## 2017-02-17 PROCEDURE — 83735 ASSAY OF MAGNESIUM: CPT | Performed by: INTERNAL MEDICINE

## 2017-02-17 PROCEDURE — 80048 BASIC METABOLIC PNL TOTAL CA: CPT | Performed by: INTERNAL MEDICINE

## 2017-02-17 PROCEDURE — 65660000000 HC RM CCU STEPDOWN

## 2017-02-17 PROCEDURE — 77010033678 HC OXYGEN DAILY

## 2017-02-17 PROCEDURE — 94760 N-INVAS EAR/PLS OXIMETRY 1: CPT

## 2017-02-17 PROCEDURE — 4A0234Z MEASUREMENT OF CARDIAC ELECTRICAL ACTIVITY, PERCUTANEOUS APPROACH: ICD-10-PCS | Performed by: INTERNAL MEDICINE

## 2017-02-17 PROCEDURE — 02583ZZ DESTRUCTION OF CONDUCTION MECHANISM, PERCUTANEOUS APPROACH: ICD-10-PCS | Performed by: INTERNAL MEDICINE

## 2017-02-17 PROCEDURE — 4A023FZ MEASUREMENT OF CARDIAC RHYTHM, PERCUTANEOUS APPROACH: ICD-10-PCS | Performed by: INTERNAL MEDICINE

## 2017-02-17 PROCEDURE — 74011250636 HC RX REV CODE- 250/636

## 2017-02-17 PROCEDURE — C1733 CATH, EP, OTHR THAN COOL-TIP: HCPCS

## 2017-02-17 PROCEDURE — 93279 PRGRMG DEV EVAL PM/LDLS PM: CPT

## 2017-02-17 PROCEDURE — 85610 PROTHROMBIN TIME: CPT | Performed by: NURSE PRACTITIONER

## 2017-02-17 PROCEDURE — 85025 COMPLETE CBC W/AUTO DIFF WBC: CPT | Performed by: INTERNAL MEDICINE

## 2017-02-17 RX ORDER — MAGNESIUM CITRATE
296 SOLUTION, ORAL ORAL AS NEEDED
Status: DISCONTINUED | OUTPATIENT
Start: 2017-02-17 | End: 2017-02-28 | Stop reason: HOSPADM

## 2017-02-17 RX ORDER — PROPOFOL 10 MG/ML
INJECTION, EMULSION INTRAVENOUS
Status: DISCONTINUED | OUTPATIENT
Start: 2017-02-17 | End: 2017-02-17 | Stop reason: HOSPADM

## 2017-02-17 RX ORDER — SODIUM CHLORIDE 0.9 % (FLUSH) 0.9 %
5-10 SYRINGE (ML) INJECTION AS NEEDED
Status: DISCONTINUED | OUTPATIENT
Start: 2017-02-17 | End: 2017-02-19

## 2017-02-17 RX ORDER — SODIUM CHLORIDE, SODIUM LACTATE, POTASSIUM CHLORIDE, CALCIUM CHLORIDE 600; 310; 30; 20 MG/100ML; MG/100ML; MG/100ML; MG/100ML
INJECTION, SOLUTION INTRAVENOUS
Status: DISCONTINUED | OUTPATIENT
Start: 2017-02-17 | End: 2017-02-17 | Stop reason: HOSPADM

## 2017-02-17 RX ORDER — PROPOFOL 10 MG/ML
INJECTION, EMULSION INTRAVENOUS AS NEEDED
Status: DISCONTINUED | OUTPATIENT
Start: 2017-02-17 | End: 2017-02-17 | Stop reason: HOSPADM

## 2017-02-17 RX ORDER — SODIUM CHLORIDE 0.9 % (FLUSH) 0.9 %
5-10 SYRINGE (ML) INJECTION EVERY 8 HOURS
Status: DISCONTINUED | OUTPATIENT
Start: 2017-02-17 | End: 2017-02-19

## 2017-02-17 RX ADMIN — GABAPENTIN 100 MG: 100 CAPSULE ORAL at 22:35

## 2017-02-17 RX ADMIN — Medication 10 ML: at 05:46

## 2017-02-17 RX ADMIN — PROPOFOL 10 MG: 10 INJECTION, EMULSION INTRAVENOUS at 17:41

## 2017-02-17 RX ADMIN — SODIUM CHLORIDE, SODIUM LACTATE, POTASSIUM CHLORIDE, CALCIUM CHLORIDE: 600; 310; 30; 20 INJECTION, SOLUTION INTRAVENOUS at 17:35

## 2017-02-17 RX ADMIN — PRAVASTATIN SODIUM 1 TABLET: 20 TABLET ORAL at 08:50

## 2017-02-17 RX ADMIN — FUROSEMIDE 40 MG: 10 INJECTION, SOLUTION INTRAMUSCULAR; INTRAVENOUS at 22:34

## 2017-02-17 RX ADMIN — Medication 10 ML: at 22:36

## 2017-02-17 RX ADMIN — PROPOFOL 25 MCG/KG/MIN: 10 INJECTION, EMULSION INTRAVENOUS at 17:41

## 2017-02-17 RX ADMIN — Medication: at 12:30

## 2017-02-17 RX ADMIN — Medication 5 ML: at 14:39

## 2017-02-17 RX ADMIN — DILTIAZEM HYDROCHLORIDE 60 MG: 60 TABLET, FILM COATED ORAL at 01:04

## 2017-02-17 RX ADMIN — LEVOTHYROXINE SODIUM 88 MCG: 0.09 TABLET ORAL at 05:45

## 2017-02-17 RX ADMIN — PANTOPRAZOLE SODIUM 40 MG: 40 TABLET, DELAYED RELEASE ORAL at 08:50

## 2017-02-17 RX ADMIN — Medication 10 ML: at 09:25

## 2017-02-17 RX ADMIN — ACETAMINOPHEN 650 MG: 325 TABLET, FILM COATED ORAL at 23:35

## 2017-02-17 RX ADMIN — SERTRALINE HYDROCHLORIDE 50 MG: 50 TABLET ORAL at 22:34

## 2017-02-17 RX ADMIN — DILTIAZEM HYDROCHLORIDE 60 MG: 60 TABLET, FILM COATED ORAL at 05:46

## 2017-02-17 RX ADMIN — ROPINIROLE 2 MG: 2 TABLET, FILM COATED ORAL at 22:34

## 2017-02-17 RX ADMIN — GABAPENTIN 100 MG: 100 CAPSULE ORAL at 08:50

## 2017-02-17 NOTE — PROGRESS NOTES
Palliative Care Progress Note    Patient: Marcy Sauceda MRN: [id number]  SSN: [id number]    YOB: 1940  Age: 77 y.o. Sex: female       Assessment/Plan:     Chief Complaint/Interval History: Dyspnea at rest, lower extremity edema; irregular heart rhythm; c/o constipation; for AV node ablation today; has had hypotension. Principal Diagnosis:    · Dyspnea  R06.00-reports improvement    Additional Diagnoses:   · Anxiety  F41.1-reports improvement: continue low dose valium  · Constipation, Unspecified  K59.00  · Fatigue, Lethargy  R53.83  · Frailty  R54  · Counseling, Encounter for Medical Advice  Z71.9  · Encounter for Palliative Care  Z51.5  · Constipation:  pericolace BID with miralax daily until BM, then miralax prn; pt takes mag citrate at home prn which she states is effective. Will order this prn. Palliative Performance Scale (PPS)  PPS: 60    Medical Decision Making:   Reviewed and summarized notes over previous 24 hours. Discussed case with appropriate providers: SARITA Arce  Reviewed laboratory and x-ray data: CBC, BMP    Current plan remains for pt to be dc home with HH/bridge to hospice. Pt to have AV node ablation today. Will discuss findings with members of the interdisciplinary team.       More than 50% of this 15 minute visit was spent counseling and coordination of care as outlined above. Subjective:     Review of Systems:  A comprehensive review of systems was negative except for:   Constitutional: Positive for fatigue. Respiratory: Positive for dyspnea with conversation or any exertion. Cardiac: lower extremity edema  GI: reports hard stools/still constipated     Objective:     Visit Vitals    BP (!) 99/39    Pulse 89    Temp 97.2 °F (36.2 °C)    Resp 18    Ht 5' (1.524 m)    Wt 181 lb 8 oz (82.3 kg)    SpO2 99%    Breastfeeding No    BMI 35.45 kg/m2       Physical Exam:    General:  Elderly, frail, and debilitated. Cooperative. No acute distress. Multiple family members at bedside. Eyes:  Conjunctivae/corneas clear    Nose: Nares normal. Septum midline. O2 via NC. Neck: Supple, symmetrical, trachea midline. Lungs:   Diminished to auscultation bilaterally, mildly labored with speech   Heart:  Irregular rate and rhythm. Abdomen:   Soft, non-tender, non-distended. Extremities: Normal, atraumatic, no cyanosis. 2+ pitting bilateral lower extremity edema; LUE edema   Skin: Skin color, texture, turgor normal. No rash.    Neurologic: Nonfocal.   Psych: Alert and orientedx3     Signed By: Mariela Lindsay NP     February 17, 2017

## 2017-02-17 NOTE — PROCEDURES
PRE-ELECTROPHYSIOLOGY STUDY DIAGNOSES  1. Atrial Fibrillation  2. Tachy/Wil syndrome  3. Previous device implant     PROCEDURE PERFORMED  1. AV Node ablation. 2. Nitza-Procedure Device reprogramming     Anesthesia: MAC     Estimated Blood Loss: Less than 10 mL     Specimens: * No specimens in log *      PROCEDURE IN DETAIL: The patient was brought into the EP lab in a fasting  state. The right groin was prepped and draped in the usual  sterile fashion. Access was obtained in the right femoral vein via the  Seldinger technique over which an 8 -[place] sheath was placed. Through the 8-[place] sheath, a large curved 8 mm Blazer II ablation catheter was  placed. The AV node was mapped and ablated with 61 W of energy, 60 degree heat for  60 seconds. The patient was monitored for 10 minutes. The device was reprogrammed.        The sheaths were then pulled. Hemostasis was achieved.  The patient recovered from their sedation, transferred from the lab in a stable condition.     IMPRESSION: Successful catheter mapping ablation of AV node to form complete AV Block

## 2017-02-17 NOTE — PROGRESS NOTES
[facility] CARDIOLOGY PROGRESS NOTE           2/17/2017 8:02 AM    Admit Date: 2/12/2017      Subjective:   No cp or inc sob    ROS:  Cardiovascular:  As noted above    Objective:      Vitals:    02/16/17 1730 02/16/17 2004 02/17/17 0047 02/17/17 0520   BP: 97/58 102/48 101/50 104/59   Pulse: 87 70 84 96   Resp: 20 14 19 18   Temp: 97 °F (36.1 °C) 97.5 °F (36.4 °C) 96.9 °F (36.1 °C) 97.1 °F (36.2 °C)   SpO2: 96% 97% 97% 95%   Weight:    82.3 kg (181 lb 8 oz)   Height:           Physical Exam:  General-No Acute Distress  Neck- supple, no JVD  CV- irregular rate and rhythm no MRG  Lung- clear bilaterally  Abd- soft, nontender, nondistended  Ext- no edema bilaterally.   Skin- warm and dry    Data Review:   Recent Labs      02/17/17   0616  02/16/17   1052  02/16/17   0626  02/15/17   0627   NA   --    --   145  145   K   --    --   4.1  4.2   BUN   --    --   26*  24*   CREA   --    --   1.07*  1.12*   GLU   --    --   96  94   WBC   --   3.8*  3.8*  3.8*   HGB   --   7.9*  7.7*  8.8*   HCT   --   26.6*  25.7*  30.2*   PLT   --   98*  99*  107*   INR  1.4*   --   1.7*  2.1*       Assessment/Plan:     Principal Problem:    Acute CHF (congestive heart failure) ([facility]) (2/12/2017)    Active Problems:    Hypotension (3/1/2009)      Aortic Valve Bioprosthesis Present (3/7/2009)      BOBBY (obstructive sleep apnea) (12/4/2009)      ARF (acute renal failure) ([facility]) (2/24/2010)      Chronic atrial fibrillation ([facility]) (10/17/2011)      Sleep apnea (11/2/2015)      Chronic diastolic congestive heart failure ([facility] [address] (9/9/2016)      Aortic valve replaced (1/9/2017)      Warfarin anticoagulation (1/9/2017)      Pulmonary hypertension ([facility] (2/12/2017)    ///  Her HR is down this AM but an AVNA is still going to be helpful for overall management and medication withdrawal.      Leonela Perdomo MD  2/17/2017 8:02 AM

## 2017-02-17 NOTE — PROGRESS NOTES
[address]  Face to Face Encounter    Patients Name: Jovan Lincoln    YOB: 1940    Ordering Physician: Dr. Nancy Orosco  Primary Diagnosis: dyspnea, hypotension,  Acute CHF (congestive heart failure) ([facility] [address]  A-FIB    Date of Face to Face:   2/17/2017                                  Face to Face Encounter findings are related to primary reason for home care:   yes. 1. I certify that the patient needs intermittent care as follows: skilled nursing care:  skilled observation/assessment, patient education ; PT/OT - eval and treat; bridge to hospice    2. I certify that this patient is homebound, that is: 1) patient requires the use of a walker device, special transportation, or assistance of another to leave the home; or 2) patient's condition makes leaving the home medically contraindicated; and 3) patient has a normal inability to leave the home and leaving the home requires considerable and taxing effort. Patient may leave the home for infrequent and short duration for medical reasons, and occasional absences for non-medical reasons. Homebound status is due to the following functional limitations: Patient with strength deficits limiting the performance of all ADL's without caregiver assistance or the use of an assistive device. 3. I certify that this patient is under my care and that I, or a nurse practitioner or  [id number], or clinical nurse specialist, or certified nurse midwife, working with me, had a Face-to-Face Encounter that meets the physician Face-to-Face Encounter requirements. The following are the clinical findings from the [address] [place] [address] encounter that support the need for skilled services and is a summary of the encounter:  See hospital chart.       SARITA Castro  2/17/2017      THE FOLLOWING TO BE COMPLETED BY THE COMMUNITY PHYSICIAN:    I concur with the findings described above from the [facility] encounter that this patient is homebound and in need of a skilled service.     Certifying Physician: _____________________________________      Printed Certifying Physician Name: _____________________________________    Date: _________________

## 2017-02-17 NOTE — PROGRESS NOTES
Problem: Nutrition Deficit  Goal: *Optimize nutritional status  Nutrition  Reason for assessment: F/U day 5  Assessment:   Diet order(s): NPO for procedure  Food/Nutrition Patient History:  Patient reports that her appetite is \"much better\" compared to her appetite upon admission. Supplementation was not added to diet order. Patient reports that her son has brought ensure from home. Patient is NPO for AV node ablation today. Labs are unremarkable for glucose 76, K 3.8. Patient is receiving 40 mg lasix BID. RN notes patient with 3+ pitting edema. Anthropometrics:Height: 5' (152.4 cm),  Weight: 82.3 kg (181 lb 8 oz), Weight Source: Standing scale (comment), Body mass index is 35.45 kg/(m^2). BMI class of obesity class II. Macronutrient Needs:*revised  Estimated calorie needs - 8442-7935 sayra/day (18-22 sayra/kg/day)   Estimated protein needs - 36-45 gm pro/day (0.8-1 gm pro/kgIBW/day) (GFR 54 ml/min)  Intake/Comparative Standards: Per RD meal rounds: NPO. Average intake for past 5 day(s)/10 recorded meal(s): 72%. This potentially meets ~97% of kcal and ~100% of protein needs     Nutrition Diagnosis: No nutrition diagnosis at this time.       Intervention:  Meals and snacks: Per MD  Nutrition Supplement Therapy: add ensure enlive BID     Alberta Pelayo, [address] [place] [address], , [phone]

## 2017-02-17 NOTE — PROGRESS NOTES
Report received from [facility]. Procedural findings communicated. Intra procedural  medication administration reviewed. Progression of care discussed.      Patient received into [facility] 7 post AV node ablation    Access site without bleeding or swelling yes    Dressing dry and intact yes    Patient instructed to limit movement to right lower extremity    Routine post procedural vital signs and site assessment initiated yes

## 2017-02-17 NOTE — PROGRESS NOTES
TRANSFER - OUT REPORT:    Verbal report given to Dereck Andujar RN(name) on Kindra Hernández  being transferred to telemetry room 321(unit) for routine progression of care       Report consisted of patients Situation, Background, Assessment and   Recommendations(SBAR). Information from the following report(s) Procedure Summary was reviewed with the receiving nurse. Lines:   Peripheral IV 02/12/17 Right Antecubital (Active)   Site Assessment Clean, dry, & intact 2/17/2017  2:38 PM   Phlebitis Assessment 0 2/17/2017  2:38 PM   Infiltration Assessment 0 2/17/2017  2:38 PM   Dressing Status Old drainage;Dry; Intact 2/17/2017  2:38 PM   Dressing Type Transparent;Tape 2/17/2017  2:38 PM   Hub Color/Line Status Patent; Flushed 2/17/2017  2:38 PM   Alcohol Cap Used No 2/17/2017  2:38 PM        Opportunity for questions and clarification was provided. AMOS Tran and Max Amador RN verified right groin site prior to patient transported back to room 321, site with no bleeding, no swelling, no hematoma. Dressing clean, dry and intact.

## 2017-02-17 NOTE — PROGRESS NOTES
Hospitalist Progress Note    2017  Admit Date: 2017  2:32 PM   NAME: Michael Bennett   :     DOS:              17  MRN:  [id number]   Attending: Veronica Alegre MD  PCP:  Tramaine Romano MD  Treatment Team: Attending Provider: Veronica Alegre MD; Consulting Provider: Jenise Curiel MD; Consulting Provider: Chantel Mcmullen DO; Utilization Review: Jaspal Parker; Consulting Provider: Sharron Frankel NP; Care Manager: Elizabeth Zamudio; Consulting Provider: Rusty Galvez MD    DNR     SUBJECTIVE:   As previously documented: \" Ms. Tramaine Hayes is a very pleasant 76 yo F Who complains of increased SOb, nonproductive cough and b/l LE swelling in the last 1-2 months. Known with chronic diastolic CHF, CAD, bradycardia s/p pacemaker, , s/p bioprosthetic AVR,Chronic Afib on coumadin, severe pulmonary HTN with PAP 69 mmHg, chronic respiratory failure on NC 3L 24h/ , COPD, GERD, HTN, HLP, Obesity, BOBBY - not using CPAP. Seen by her cardiologist, Cassie Joy on 17 and Demadex was increased to 40 mg PO daily, except MWF when was BID. Ms. Tramaine Hayes states that her leg swelling improved, although her cough and SOB persisted. Seen by PCP on  and placed on oral steroids and Albuterol that she couldn't complete because the albuterol made ermias jittery. No improvement with treatment. Seen at [facility] ED on  with low BP and and thought that she is dehydrated due to increased diuretics and received IV fluids. She felt better for one day, although because of increased in her symptoms she presented to ED today. BP was 80/51 mmHg and received 1L NC bolus in Ed per ED provider. CXR w/o acute pathology. No spikes of fever since her symptoms started. Influenza screen negative. WBC:3.4. Cr:1.48, around her baseline. \"       17   Ms. [address] alert - states she is feeling better. Heart rate is improved- 80's to low 100's.  We were opting for home with hospice but the pt was re-evaluated by cardiology - plan for an ablation of the av node with electrophysiology today in an attempt to control the heart rate better and decrease meds. Continues to have low BP. Denies any chest pain or abdominal pain. Afebrile. 10+ ROS reviewed and negative except for positive in HPI.    Allergies   Allergen Reactions    Adhesive Tape Rash    Benadryl [Diphenhydramine Hcl] Other (comments)     Jitters      Demerol [Meperidine] Anxiety    Morphine Other (comments)     Confusion    Sulfa (Sulfonamide Antibiotics) Anxiety    Lorazepam Anxiety     Current Facility-Administered Medications   Medication Dose Route Frequency Provider Last Rate Last Dose    lip protectant (BLISTEX) ointment   Topical PRN Gil Pedro MD        magnesium citrate solution 296 mL  296 mL Oral PRN Mariela Lindsay NP        polyethylene glycol (MIRALAX) packet 17 g  17 g Oral DAILY Mariela Lindsay NP   Stopped at 02/17/17 0731    furosemide (LASIX) injection 40 mg  40 mg IntraVENous Q12H Hank Arias MD   Stopped at 02/17/17 1019    dilTIAZem (CARDIZEM) IR tablet 60 mg  60 mg Oral Q6H Jenifer Esquivel MD   Stopped at 02/17/17 1200    digoxin (LANOXIN) tablet 0.125 mg  0.125 mg Oral DAILY Pratibha Jacobs MD   Stopped at 02/17/17 0730    amiodarone (CORDARONE) tablet 200 mg  200 mg Oral BID Hank Arias MD   Stopped at 02/17/17 0730    sodium chloride (NS) flush 5-10 mL  5-10 mL IntraVENous Q8H Hank Arias MD   5 mL at 02/17/17 1439    sodium chloride (NS) flush 5-10 mL  5-10 mL IntraVENous PRN Hank Arias MD   10 mL at 02/17/17 0925    diazePAM (VALIUM) tablet 2.5 mg  2.5 mg Oral Q8H PRN Malachi Grimaldo NP   2.5 mg at 02/14/17 2157    senna-docusate (PERICOLACE) 8.6-50 mg per tablet 1 Tab  1 Tab Oral BID Malachi Grimaldo NP   1 Tab at 02/17/17 0850    traZODone (DESYREL) tablet 50 mg  50 mg Oral QHS PRN Sharri Sagastume MD   50 mg at 02/14/17 2156    gabapentin (NEURONTIN) capsule 100 mg  100 mg Oral TID Ld Shultz MD   100 mg at 17 0850    sodium chloride (NS) flush 5-10 mL  5-10 mL IntraVENous Q8H Carlos Enrique Lake MD   10 mL at 17 0546    sodium chloride (NS) flush 5-10 mL  5-10 mL IntraVENous PRN Carlos Enrique Lake MD        ondansetron [facility]) injection 4 mg  4 mg IntraVENous Q4H PRN Satya Pen, NP   4 mg at 17 2334    acetaminophen (TYLENOL) tablet 650 mg  650 mg Oral Q4H PRN Satya Pen, NP   650 mg at 02/15/17 2355    levalbuterol (XOPENEX) nebulizer soln 1.25 mg/3 mL  1.25 mg Nebulization Q4H PRN Satya Pen, NP        polyvinyl alcohol (LIQUIFILM TEARS) 1.4 % ophthalmic solution 1 Drop  1 Drop Both Eyes PRN Satya Pen, NP   1 Drop at 17 1351    hydrocortisone (ANUSOL-HC) 2.5 % rectal cream   PeriANAL BID PRN Satya Pen, NP        levothyroxine (SYNTHROID) tablet 88 mcg  88 mcg Oral ACB Azeb Pino NP   88 mcg at 17 0545    pantoprazole (PROTONIX) tablet 40 mg  40 mg Oral ACB Azeb Pino NP   40 mg at 17 0850    promethazine (PHENERGAN) tablet 25 mg  25 mg Oral Q6H PRN Satya Pen, NP        rOPINIRole (REQUIP) tablet 2 mg  2 mg Oral QHS Satya Pen, NP   2 mg at 17 2235    sertraline (ZOLOFT) tablet 50 mg  50 mg Oral QHS Azeb Pino NP   50 mg at 17 2235    spironolactone (ALDACTONE) tablet 25 mg  25 mg Oral DAILY Satya Collins, NP   Stopped at 17 1020             PHYSICAL EXAM     Visit Vitals    BP 93/46 (BP 1 Location: Right arm, BP Patient Position: At rest)    Pulse 84    Temp 98 °F (36.7 °C)    Resp 16    Ht 5' (1.524 m)    Wt 82.3 kg (181 lb 8 oz)    SpO2 99%    Breastfeeding No    BMI 35.45 kg/m2      Temp (24hrs), Av.3 °F (36.3 °C), Min:96.9 °F (36.1 °C), Max:98 °F (36.7 °C)    Oxygen Therapy  O2 Sat (%): 99 % (17 1337)  Pulse via Oximetry: 72 beats per minute (17 1742)  O2 Device: Nasal cannula (02/17/17 0845)  O2 Flow Rate (L/min): 2 l/min (02/17/17 0845)    Intake/Output Summary (Last 24 hours) at 02/17/17 1512  Last data filed at 02/17/17 1013   Gross per 24 hour   Intake              300 ml   Output             1740 ml   Net            -1440 ml        Physical Exam:  General:         AAOx3. NAD. Afebrile. Cooperative. Obese. HEENT:               NCAT. No obvious deformity. Nares normal. No drainage  Lungs:                 Decreased air entry b/l at the base. Wheezing but improved  Cardiovascular:   Irregular irregularity. No m/r/g. +2 pitting edema  b/l. +2 PT/DT pulses b/l. Abdomen:       S/nt/nd. Bowel sounds normal. .   Skin:         No rashes or lesions. Not Jaundiced  Neurologic:    AAOx3. CN II- XII grossly WNL. No gross focal deficit. Moves all extremities  Psychiatric:         Good mood. Normal affect. DIAGNOSTIC STUDIES      Data Review:   Recent Results (from the past 24 hour(s))   PROTHROMBIN TIME + INR    Collection Time: 02/17/17  6:16 AM   Result Value Ref Range    Prothrombin time 15.0 (H) 9.6 - 12.0 sec    INR 1.4 (H) 0.9 - 1.2     CBC WITH AUTOMATED DIFF    Collection Time: 02/17/17 10:24 AM   Result Value Ref Range    WBC 3.5 (L) 4.3 - 11.1 K/uL    RBC 2.79 (L) 4.05 - 5.25 M/uL    HGB 8.3 (L) 11.7 - 15.4 g/dL    HCT 28.6 (L) 35.8 - 46.3 %    .5 (H) 79.6 - 97.8 FL    MCH 29.7 26.1 - 32.9 PG    MCHC 29.0 (L) 31.4 - 35.0 g/dL    RDW 14.6 11.9 - 14.6 %    PLATELET 892 (L) 438 - 450 K/uL    MPV 10.7 (L) 10.8 - 14.1 FL    DF AUTOMATED      NEUTROPHILS 73 43 - 78 %    LYMPHOCYTES 16 13 - 44 %    MONOCYTES 7 4.0 - 12.0 %    EOSINOPHILS 4 0.5 - 7.8 %    BASOPHILS 0 0.0 - 2.0 %    IMMATURE GRANULOCYTES 0.3 0.0 - 5.0 %    ABS. NEUTROPHILS 2.6 1.7 - 8.2 K/UL    ABS. LYMPHOCYTES 0.6 0.5 - 4.6 K/UL    ABS. MONOCYTES 0.2 0.1 - 1.3 K/UL    ABS. EOSINOPHILS 0.1 0.0 - 0.8 K/UL    ABS. BASOPHILS 0.0 0.0 - 0.2 K/UL    ABS. IMM.  GRANS. 0.0 0.0 - 0.5 K/UL   METABOLIC PANEL, BASIC Collection Time: 02/17/17 10:24 AM   Result Value Ref Range    Sodium 146 (H) 136 - 145 mmol/L    Potassium 3.8 3.5 - 5.1 mmol/L    Chloride 104 98 - 107 mmol/L    CO2 33 (H) 21 - 32 mmol/L    Anion gap 9 7 - 16 mmol/L    Glucose 76 65 - 100 mg/dL    BUN 32 (H) 8 - 23 MG/DL    Creatinine 1.06 (H) 0.6 - 1.0 MG/DL    GFR est AA >60 >60 ml/min/1.73m2    GFR est non-AA 54 (L) >60 ml/min/1.73m2    Calcium 8.6 8.3 - 10.4 MG/DL   MAGNESIUM    Collection Time: 02/17/17 10:24 AM   Result Value Ref Range    Magnesium 2.4 1.8 - 2.4 mg/dL     Imaging /Procedures /Studies:    CXR Results  (Last 48 hours)    None        Echocardiogram: SUMMARY:  -  Left ventricle: Systolic function was normal. Ejection fraction was  estimated in the range of 55 % to 60 %. There were no regional wall motion  abnormalities. Wall thickness was mildly to moderately increased. -  Right ventricle: The ventricle was dilated. Systolic function was reduced. There was severe pulmonary artery hypertension.  -  Left atrium: The atrium was markedly dilated. -  Right atrium: The atrium was markedly dilated. -  Inferior vena cava, hepatic veins: The inferior vena cava was dilated. -  Aortic valve: A bioprosthesis was present. It exhibited stenosis. There   Was moderate stenosis. The aortic valve area by the continuity equation was 1.1   cm2.  -  Mitral valve: There was moderate to marked annular calcification. There   Was moderate-marked calcification. A annular ring prosthesis was present. It  exhibited stenosis and reduced motion. There was moderate stenosis. There was  mild regurgitation. The mitral valve area by pressure half time was 1.7 cm2.  -  Tricuspid valve: There was severe regurgitation. -  Pulmonic valve: There was mild to moderate regurgitation. -  Pericardium: There was a left pleural effusion.       Labs and Studies from previous 24 hours have been personally reviewed by myself    ASSESSMENT      Active Hospital Problems    Diagnosis Date Noted    Acute CHF (congestive heart failure) ([facility] [address] 02/12/2017    Pulmonary hypertension ([facility] [address] 02/12/2017    Aortic valve replaced 01/09/2017    Warfarin anticoagulation 01/09/2017    Chronic diastolic congestive heart failure ([facility] [address] 09/09/2016    Sleep apnea 11/02/2015    Chronic atrial fibrillation ([facility] [address] 10/17/2011    [facility] 02/24/2010    BOBBY (obstructive sleep apnea) 12/04/2009    Aortic Valve Bioprosthesis Present 03/07/2009    Hypotension 03/01/2009       Plan:  SOB:  Likely due to her severe pulmonary hypertension  and possible cor pulmonale along with her known aortic valve stenosis, mitral valve disease. RSVP:65-70 mmHg. And compounded with pulmonary edema- improved    Pulmonary HTN: patient and her family had initially refused further evaluation inclusive CTA chest and VQ scan. They wanted more comfort care efforts. And Palliative care- following. However they have agreed for AV node ablation with cardiology today    Hypotension: still low - BB in hold-     Permanent AFib on Cardizem & amiodarone and digoxin- HR is improved but we are planning for av node ablation today to try and reduce meds    Anemia: stable - will Monitor     RLS: continue Gabapentin 100 mg TID    DVT Prophylaxis: Coumadin  CODE Status: DNR  Plan of Care Discussed with: patient family. Care team   Medical Risk: high  Disposition: pending- pt willing to consider home hospice she says- as long as she goes home. Says children want to do any and everything she may need at home.      Darlen Pallas, MD  02/17/17

## 2017-02-17 NOTE — ANESTHESIA PREPROCEDURE EVALUATION
Anesthetic History   No history of anesthetic complications            Review of Systems / Medical History  Patient summary reviewed and pertinent labs reviewed    Pulmonary    COPD: severe      Shortness of breath (Severe Pulm Htn)         Neuro/Psych         Psychiatric history     Cardiovascular    Hypertension        Dysrhythmias : SVT  Pacemaker and CAD    Exercise tolerance: <4 METS  Comments: Preserved EF  S/p AVR and MVR with subsequent Mod AS and Mod MS,  Severe TR,    GI/Hepatic/Renal                Endo/Other      Hypothyroidism  Obesity, arthritis and anemia     Other Findings   Comments: 3L Oxygen at home           Physical Exam    Airway  Mallampati: II  TM Distance: 4 - 6 cm  Neck ROM: normal range of motion   Mouth opening: Normal     Cardiovascular    Rhythm: irregular  Rate: normal         Dental  No notable dental hx       Pulmonary    Rhonchi:bilateral  Decreased breath sounds: bibasilar           Abdominal  GI exam deferred       Other Findings            Anesthetic Plan    ASA: 4  Anesthesia type: MAC          Induction: Intravenous  Anesthetic plan and risks discussed with: Patient and Family      Discussed extensively with Ms. Kate Tejeda, her son, and Dr. Segundo Ott.  Will plan extremely light MAC while preserving spontaneous ventilation     Anesthetic History               Review of Systems / Medical History  Patient summary reviewed and pertinent labs reviewed    Pulmonary    COPD (home O2): severe               Neuro/Psych              Cardiovascular    Hypertension: well controlled  Valvular problems/murmurs (S/P AVR, severe TR, mod MR and mod MS): tricuspid insufficiency, mitral stenosis and mitral insufficiency      Dysrhythmias : atrial fibrillation  Pacemaker (has AICD)         GI/Hepatic/Renal     GERD: well controlled           Endo/Other      Hypothyroidism: well controlled       Other Findings            Physical Exam    Airway  Mallampati: II  TM Distance: 4 - 6 cm  Neck ROM: normal range of motion   Mouth opening: Normal     Cardiovascular    Rhythm: irregular  Rate: abnormal         Dental    Dentition: Full lower dentures and Full upper dentures     Pulmonary  Breath sounds clear to auscultation               Abdominal         Other Findings            Anesthetic Plan    ASA: 4  Anesthesia type: total IV anesthesia          Induction: Intravenous  Anesthetic plan and risks discussed with: Patient            Anesthetic History               Review of Systems / Medical History  Patient summary reviewed and pertinent labs reviewed    Pulmonary    COPD (home O2): severe               Neuro/Psych              Cardiovascular    Hypertension: well controlled  Valvular problems/murmurs (S/P AVR, severe TR, mod MR and mod MS): tricuspid insufficiency, mitral stenosis and mitral insufficiency      Dysrhythmias : atrial fibrillation  Pacemaker (has AICD)         GI/Hepatic/Renal     GERD: well controlled           Endo/Other      Hypothyroidism: well controlled       Other Findings            Physical Exam    Airway  Mallampati: II  TM Distance: 4 - 6 cm  Neck ROM: normal range of motion   Mouth opening: Normal     Cardiovascular    Rhythm: irregular  Rate: abnormal         Dental    Dentition: Full lower dentures and Full upper dentures     Pulmonary  Breath sounds clear to auscultation               Abdominal         Other Findings            Anesthetic Plan    ASA: 4  Anesthesia type: total IV anesthesia          Induction: Intravenous  Anesthetic plan and risks discussed with: Patient            Anesthetic History               Review of Systems / Medical History  Patient summary reviewed and pertinent labs reviewed    Pulmonary    COPD (home O2): severe               Neuro/Psych              Cardiovascular    Hypertension: well controlled  Valvular problems/murmurs (S/P AVR, severe TR, mod MR and mod MS): tricuspid insufficiency, mitral stenosis and mitral insufficiency      Dysrhythmias : atrial fibrillation  Pacemaker (has AICD)         GI/Hepatic/Renal     GERD: well controlled           Endo/Other      Hypothyroidism: well controlled       Other Findings            Physical Exam    Airway  Mallampati: II  TM Distance: 4 - 6 cm  Neck ROM: normal range of motion   Mouth opening: Normal     Cardiovascular    Rhythm: irregular  Rate: abnormal         Dental    Dentition: Full lower dentures and Full upper dentures     Pulmonary  Breath sounds clear to auscultation               Abdominal         Other Findings            Anesthetic Plan    ASA: 4  Anesthesia type: total IV anesthesia          Induction: Intravenous  Anesthetic plan and risks discussed with: Patient

## 2017-02-17 NOTE — PROGRESS NOTES
Patients BP re-assessed and was noted to be 99/39. Dr. Jaye Emanuel was paged.    Allison Mckeon NP notified of patient's BP Lasix and aldactone still on hold currently and will discuss with Dr. Jaye Emanuel

## 2017-02-17 NOTE — PROGRESS NOTES
TRANSFER - OUT REPORT:    Verbal report given to AMOS Murdock on Joel Maldonado  being transferred to [facility] for ordered procedure       Report consisted of patients Situation, Background, Assessment and Recommendations(SBAR). Information from the following report(s) SBAR, MAR, Recent Results and Cardiac Rhythm Afib was reviewed with the receiving nurse. Opportunity for questions and clarification was provided.

## 2017-02-17 NOTE — PROGRESS NOTES
Spoke with [facility] NP and notified her of BP 86/47. Orders received to reassess pt's BP and evaluate medication administration at that time. Currently pt has not received aldactone or IVP lasix, the pt did receive Cardizem IR 60mg at 0500 this am with a SBP in the low 100s. Will continue to monitor the pt closely.

## 2017-02-17 NOTE — CONSULTS
[facility] Cardiology/Electrophyiology Consult                Date of  Admission: 2/12/2017  2:32 PM         CC/Reason for consult: Atrial Fibrillation      Daniel Bedoya is a 77 y.o. female admitted for dyspnea, hypotension,;Acute CHF (congestive heart failure) *. Patient with perm A. Fib. She has had difficult to control rates and is unable to tolerate meds secondary to hypotension.     Patient Active Problem List   Diagnosis Code    Degenerative arthritis of left knee M17.9    Hypotension I95.9    Aortic Valve Bioprosthesis Present Z95.2    Chronic obstructive pulmonary disease (HCC) J44.9    CKD (chronic kidney disease) stage 3, GFR 30-59 ml/min N18.3    Hypothyroidism E03.9    BOBBY (obstructive sleep apnea) G47.33    Nausea and vomiting R11.2    Epigastric abdominal pain R10.13    Digoxin toxicity T46.0X1A    ARF (acute renal failure) ([facility]) N17.9    Hypokalemia E87.6    AV block I44.30    Cardiogenic shock ([facility]) R57.0    Chronic atrial fibrillation ([facility]) I48.2    Hyperkalemia E87.5    Rectal bleeding K62.5    Anemia D64.9    Bradycardia (Symptomatic) R00.1    Thrombocytopenia, unspecified ([facility]) D69.6    Obesity E66.9    Mitral stenosis with insufficiency I05.2    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    Rheumatic heart disease I09.9    Sleep apnea G47.30    Palpitations R00.2    Respiratory insufficiency R06.89    Cardiac pacemaker Z95.0    Sick sinus syndrome (HCC) X64.2    Diastolic heart failure ([facility]) I50.30    Tachycardia R00.0    H/O mitral valve repair, 2003 Z98.890    Acute encephalopathy G93.40    CRI (chronic renal insufficiency) N18.9    Chronic depression F32.9    Osteopenia M85.80    RLS (restless legs syndrome) G25.81    Dyspnea R06.00    Hyperlipidemia E78.5    Gout M10.9    Anxiety F41.9    CAD (coronary artery disease) I25.10    HTN (hypertension) I10    GERD (gastroesophageal reflux disease) K21.9    Right hip pain M25.551    Edema R60.9    Chest pain R07.9  Other long term (current) drug therapy Z79.899    Localized edema R60.0    Chronic diastolic congestive heart failure (HCC) I50.32    Iron deficiency anemia D50.9    Non morbid obesity due to excess calories E66.09    Hypoxemia R09.02    Aortic valve replaced Z95.2    Warfarin anticoagulation Z79.01    History of gout Z87.39    Acute CHF (congestive heart failure) (HCC) I50.9    Pulmonary hypertension (HCC) I27.2       Past Medical History   Diagnosis Date    Abnormal glucose 8/5/2016    Abscess 8/5/2016    Acute encephalopathy 7/8/2016    Acute renal failure ([address] 12/3/2009    Afib ([address]     Anemia     Ankle swelling 8/5/2016    Anxiety 8/5/2016    Aortic Valve Bioprosthesis Present 3/7/2009    ARF (acute renal failure) ([address] 2/24/2010    Arthritis     Asthma     Atopic dermatitis 8/5/2016    Atrial fibrillation (HCC) 3/7/2009    Autonomic orthostatic hypotension 8/5/2016    AV block 10/17/2011    Back pain 8/5/2016    Bradycardia (Symptomatic) 11/7/2011    CAD (coronary artery disease)     Cancer (HCC) 1990     breast (left)    Cardiac pacemaker 11/2/2015    Cardiogenic shock ([address] 10/17/2011    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    Cellulitis 8/5/2016    Chest pain 8/5/2016    Chronic atrial fibrillation ([address] 10/17/2011    Chronic depression 8/5/2016    Chronic depression 8/5/2016    Chronic obstructive pulmonary disease ([address] 3/8/2009    Chronic pain     CKD (chronic kidney disease) stage 3, GFR 30-59 ml/min 12/4/2009    Congestive heart failure (CHF) ([address] 11/2/2015    COPD      O2 AT 3 L NC    CRI (chronic renal insufficiency) 8/5/2016    Degeneration of cervical intervertebral disc 8/5/2016    Degenerative arthritis of left knee 2/27/2009    Dehydration 5/7/9599    Diastolic heart failure ([address]     Digoxin toxicity 2/24/2010    Disorder of sweat glands 8/5/2016    Diverticulosis of large intestine without diverticulitis 2015    Dyspnea 8/5/2016    Eczema 8/5/2016    Epigastric abdominal pain 2/24/2010    GERD (gastroesophageal reflux disease)     Gout 8/5/2016    H/O mitral valve repair, 2003 6/27/2016    Heart failure ([address]     HTN (hypertension) 8/5/2016    Hx of colonic polyp 2015     adenoma    Hyperkalemia 10/17/2011    Hyperlipidemia 8/5/2016    Hypertension     Hypokalemia 2/24/2010    Hypothyroidism 12/4/2009    Ill-defined condition      CARPEL TUNNEL SYNDROME, BILAT HANDS    Knee pain 8/5/2016    Leg cramps 8/5/2016    Mitral stenosis with insufficiency 11/2/2015     Prior MV repair 2003.     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    Rheumatic heart disease 11/2/2015    Right hip pain 8/5/2016    RLS (restless legs syndrome) 8/5/2016    Sick sinus syndrome ([address] 2/13/2016    Skin infection 8/5/2016    Sleep apnea 11/2/2015    Tachycardia 6/27/2016    Thrombocytopenia, unspecified ([address] 8/22/2012    Thyroid disease     Urticaria 8/5/2016    Vertigo 8/5/2016      Past Surgical History   Procedure Laterality Date    Hx appendectomy      Hx cholecystectomy  2005    Hx gyn  1981     hysterectomy still have ovaries    Hx mastectomy  1990     left mastectomy    Hx pacemaker      Hx breast reconstruction      Pr cardiac surg procedure unlist       valve repair and replacement    Hx orthopaedic       knee surgery     Allergies   Allergen Reactions    Adhesive Tape Rash    Benadryl [Diphenhydramine Hcl] Other (comments)     Jitters      Demerol [Meperidine] Anxiety    Morphine Other (comments)     Confusion    Sulfa (Sulfonamide Antibiotics) Anxiety    Lorazepam Anxiety      Family History   Problem Relation Age of Onset    Heart Disease Mother     Cancer Father      Throat    Heart Disease Father     Cancer Sister      Breast, Colon    Heart Disease Sister     Breast Cancer Sister 79      from disease    Cancer Maternal Grandmother     Cancer Brother     Diabetes Brother     Heart Disease Brother     Psychiatric Disorder Paternal Grandfather     Cancer Maternal Aunt      Breast    Diabetes Son     Alcohol abuse Neg Hx     Arthritis-rheumatoid Neg Hx     Asthma Neg Hx     Bleeding Prob Neg Hx     Elevated Lipids Neg Hx     Headache Neg Hx     Migraines Neg Hx     Hypertension Neg Hx     Lung Disease Neg Hx     Mental Retardation Neg Hx     Stroke Neg Hx         Current Facility-Administered Medications   Medication Dose Route Frequency    polyethylene glycol (MIRALAX) packet 17 g  17 g Oral DAILY    furosemide (LASIX) injection 40 mg  40 mg IntraVENous Q12H    dilTIAZem (CARDIZEM) IR tablet 60 mg  60 mg Oral Q6H    digoxin (LANOXIN) tablet 0.125 mg  0.125 mg Oral DAILY    amiodarone (CORDARONE) tablet 200 mg  200 mg Oral BID    sodium chloride (NS) flush 5-10 mL  5-10 mL IntraVENous Q8H    sodium chloride (NS) flush 5-10 mL  5-10 mL IntraVENous PRN    diazePAM (VALIUM) tablet 2.5 mg  2.5 mg Oral Q8H PRN    senna-docusate (PERICOLACE) 8.6-50 mg per tablet 1 Tab  1 Tab Oral BID    traZODone (DESYREL) tablet 50 mg  50 mg Oral QHS PRN    gabapentin (NEURONTIN) capsule 100 mg  100 mg Oral TID    sodium chloride (NS) flush 5-10 mL  5-10 mL IntraVENous Q8H    sodium chloride (NS) flush 5-10 mL  5-10 mL IntraVENous PRN    ondansetron (ZOFRAN) injection 4 mg  4 mg IntraVENous Q4H PRN    acetaminophen (TYLENOL) tablet 650 mg  650 mg Oral Q4H PRN    levalbuterol (XOPENEX) nebulizer soln 1.25 mg/3 mL  1.25 mg Nebulization Q4H PRN    polyvinyl alcohol (LIQUIFILM TEARS) 1.4 % ophthalmic solution 1 Drop  1 Drop Both Eyes PRN    hydrocortisone (ANUSOL-HC) 2.5 % rectal cream   PeriANAL BID PRN    levothyroxine (SYNTHROID) tablet 88 mcg  88 mcg Oral ACB    pantoprazole (PROTONIX) tablet 40 mg  40 mg Oral ACB    promethazine (PHENERGAN) tablet 25 mg  25 mg Oral Q6H PRN    rOPINIRole (REQUIP) tablet 2 mg  2 mg Oral QHS    sertraline (ZOLOFT) tablet 50 mg  50 mg Oral QHS    spironolactone (ALDACTONE) tablet 25 mg  25 mg Oral DAILY       Review of Symptoms:  General: no recent weight loss/gain, weakness, fatigue, fever or chills  Skin: no rashes, lumps, or other skin changes  HEENT: no headache, dizziness, lightheadedness, vision changes, hearing changes, tinnitus, vertigo, sinus pressure/pain, bleeding gums, sore throat, or hoarseness  Neck: no swollen glands, goiter, pain or stiffness  Respiratory: no cough, sputum, hemoptysis, dyspnea, wheezing  Cardiovascular: no chest pain or discomfort, palpitations, dyspnea, orthopnea, paroxysmal nocturnal dyspnea, peripheral edema  Gastrointestinal: no trouble swallowing, heartburn, change of appetite, nausea, change in bowel habits, pain with defecation, rectal bleeding or black/tarry stools, hemorrhoids, constipation, diarrhea, abdominal pain, jaundice, liver or gallbladder problems  Urinary: no frequency, urgency , hematuria, burning/pain with urination, recent flank pain, polyuria, nocturia, or difficulty urinating  Genital: no vaginal or pelvic infections  Peripheral Vascular: no claudication, leg cramps, prior DVTs, swelling of calves, legs, or feet, color change, or swelling with redness or tenderness  Musculoskeletal: no muscle or joint pain/stiffness, joint swelling, erythema of joints, or back pain  Psychiatric: no depression, mental disorders, or excessive stress  Neurological: no sensory or motor loss, seizures, syncope, tremors, numbness, tingling, no changes in mood, attention, or speech, no changes in orientation, memory, insight, or judgment. no headache, dizziness, vertigo. Hematologic: no anemia, easy bruising or bleeding  Endocrine: no thyroid problems, heat or cold intolerance, excessive sweating, polyuria, polydipsia, or history of diabetes. Physical Exam  Vitals:    02/16/17 1627 02/16/17 1631 02/16/17 1720 02/16/17 1730   BP: (!) 85/48 (!) 82/46 99/55 97/58   Pulse: 76 71 78 87   Resp: 20   20   Temp:    97 °F (36.1 °C)   SpO2:    96%   Weight:       Height:           Physical Exam:  General appearance - Alert, well appearing, and in no distress   Mental status - Affect appropriate to mood. Eyes - Sclerae anicteric,  ENMT - Hearing grossly normal bilaterally, Dental hygiene good. Neck - Carotids upstroke normal bilaterally, no bruits, no JVD. Resp - Clear to auscultation, no wheezes, rales or rhonchi, symmetric air entry. Heart - Normal rate, irregular rhythm, normal S1, S2, no murmurs, rubs, clicks or gallops. GI - Soft, nontender, nondistended, no masses or organomegaly. Neurological - Grossly intact - normal speech, no focal findings  Musculoskeletal - No joint tenderness, deformity or swelling, no muscular tenderness noted. Extremities - Peripheral pulses normal, no pedal edema, no clubbing or cyanosis. Skin - Normal coloration and turgor. Psych -  oriented to person, place, and time.          Labs:   Recent Labs      02/16/17   1052  02/16/17   0626  02/15/17   0627   NA   --   145  145   K   --   4.1  4.2   BUN   --   26*  24*   CREA   --   1.07*  1.12*   GLU   --   96  94   WBC  3.8*  3.8*  3.8*   HGB  7.9*  7.7*  8.8*   HCT  26.6*  25.7*  30.2*   PLT  98*  99*  107*   INR   --   1.7*  2.1*        Assessment:      Principal Problem:    Acute CHF (congestive heart failure) ([facility]) (2/12/2017)    Active Problems:    Hypotension (3/1/2009)      Aortic Valve Bioprosthesis Present (3/7/2009)      BOBBY (obstructive sleep apnea) (12/4/2009)      ARF (acute renal failure) ([facility]) (2/24/2010)      Chronic atrial fibrillation (HCC) (10/17/2011)      Sleep apnea (11/2/2015)      Chronic diastolic congestive heart failure ([facility] (9/9/2016)      Aortic valve replaced (1/9/2017)      Warfarin anticoagulation (1/9/2017)      Pulmonary hypertension ([facility] (2/12/2017)           Plan:   1. Atrial Fibrillation. Difficult to control rates and unable to tolerate meds secondary to hypotension. Plan for AV Node ablation of Friday. We discussed the ablation. I described the procedures in detail including risks, alternatives, and benefits. We also discussed that risks include bleeding, vascular damage, stroke, MI, esophageal damage, cardiac perforation, and even death. Thank you very much for this referral. We appreciate the opportunity to participate in this patient's care. We will follow along with above stated plan. Gerson Jalloh. Faith Walker M.D., F.A.C.C, F.H.R.S.   Cardiology/Electrophysiology

## 2017-02-18 LAB
ANION GAP BLD CALC-SCNC: 10 MMOL/L (ref 7–16)
BASOPHILS # BLD AUTO: 0 K/UL (ref 0–0.2)
BASOPHILS # BLD: 0 % (ref 0–2)
BUN SERPL-MCNC: 29 MG/DL (ref 8–23)
CALCIUM SERPL-MCNC: 8.4 MG/DL (ref 8.3–10.4)
CHLORIDE SERPL-SCNC: 107 MMOL/L (ref 98–107)
CO2 SERPL-SCNC: 31 MMOL/L (ref 21–32)
CREAT SERPL-MCNC: 0.96 MG/DL (ref 0.6–1)
DIFFERENTIAL METHOD BLD: ABNORMAL
EOSINOPHIL # BLD: 0 K/UL (ref 0–0.8)
EOSINOPHIL NFR BLD: 0 % (ref 0.5–7.8)
ERYTHROCYTE [DISTWIDTH] IN BLOOD BY AUTOMATED COUNT: 14.8 % (ref 11.9–14.6)
GLUCOSE SERPL-MCNC: 93 MG/DL (ref 65–100)
HCT VFR BLD AUTO: 26.5 % (ref 35.8–46.3)
HGB BLD-MCNC: 7.9 G/DL (ref 11.7–15.4)
IMM GRANULOCYTES # BLD: 0 K/UL (ref 0–0.5)
IMM GRANULOCYTES NFR BLD AUTO: 0.2 % (ref 0–5)
INR PPP: 1.2 (ref 0.9–1.2)
LYMPHOCYTES # BLD AUTO: 8 % (ref 13–44)
LYMPHOCYTES # BLD: 0.4 K/UL (ref 0.5–4.6)
MAGNESIUM SERPL-MCNC: 2.1 MG/DL (ref 1.8–2.4)
MCH RBC QN AUTO: 30.5 PG (ref 26.1–32.9)
MCHC RBC AUTO-ENTMCNC: 29.8 G/DL (ref 31.4–35)
MCV RBC AUTO: 102.3 FL (ref 79.6–97.8)
MONOCYTES # BLD: 0.2 K/UL (ref 0.1–1.3)
MONOCYTES NFR BLD AUTO: 3 % (ref 4–12)
NEUTS SEG # BLD: 4.9 K/UL (ref 1.7–8.2)
NEUTS SEG NFR BLD AUTO: 89 % (ref 43–78)
PLATELET # BLD AUTO: 114 K/UL (ref 150–450)
PMV BLD AUTO: 10.7 FL (ref 10.8–14.1)
POTASSIUM SERPL-SCNC: 3.6 MMOL/L (ref 3.5–5.1)
PROTHROMBIN TIME: 13.4 SEC (ref 9.6–12)
RBC # BLD AUTO: 2.59 M/UL (ref 4.05–5.25)
SODIUM SERPL-SCNC: 148 MMOL/L (ref 136–145)
WBC # BLD AUTO: 5.5 K/UL (ref 4.3–11.1)

## 2017-02-18 PROCEDURE — 74011250637 HC RX REV CODE- 250/637: Performed by: HOSPITALIST

## 2017-02-18 PROCEDURE — 80048 BASIC METABOLIC PNL TOTAL CA: CPT | Performed by: NURSE PRACTITIONER

## 2017-02-18 PROCEDURE — 97530 THERAPEUTIC ACTIVITIES: CPT

## 2017-02-18 PROCEDURE — 85610 PROTHROMBIN TIME: CPT | Performed by: NURSE PRACTITIONER

## 2017-02-18 PROCEDURE — 74011250637 HC RX REV CODE- 250/637: Performed by: NURSE PRACTITIONER

## 2017-02-18 PROCEDURE — 83735 ASSAY OF MAGNESIUM: CPT | Performed by: NURSE PRACTITIONER

## 2017-02-18 PROCEDURE — 36415 COLL VENOUS BLD VENIPUNCTURE: CPT | Performed by: NURSE PRACTITIONER

## 2017-02-18 PROCEDURE — 74011250636 HC RX REV CODE- 250/636: Performed by: INTERNAL MEDICINE

## 2017-02-18 PROCEDURE — 85025 COMPLETE CBC W/AUTO DIFF WBC: CPT | Performed by: NURSE PRACTITIONER

## 2017-02-18 PROCEDURE — 65660000000 HC RM CCU STEPDOWN

## 2017-02-18 RX ADMIN — ROPINIROLE 2 MG: 2 TABLET, FILM COATED ORAL at 21:39

## 2017-02-18 RX ADMIN — FUROSEMIDE 40 MG: 10 INJECTION, SOLUTION INTRAMUSCULAR; INTRAVENOUS at 09:13

## 2017-02-18 RX ADMIN — GABAPENTIN 100 MG: 100 CAPSULE ORAL at 17:20

## 2017-02-18 RX ADMIN — ACETAMINOPHEN 650 MG: 325 TABLET, FILM COATED ORAL at 20:11

## 2017-02-18 RX ADMIN — PRAVASTATIN SODIUM 1 TABLET: 20 TABLET ORAL at 17:20

## 2017-02-18 RX ADMIN — PANTOPRAZOLE SODIUM 40 MG: 40 TABLET, DELAYED RELEASE ORAL at 09:16

## 2017-02-18 RX ADMIN — Medication 5 ML: at 20:12

## 2017-02-18 RX ADMIN — FUROSEMIDE 40 MG: 10 INJECTION, SOLUTION INTRAMUSCULAR; INTRAVENOUS at 20:11

## 2017-02-18 RX ADMIN — SERTRALINE HYDROCHLORIDE 50 MG: 50 TABLET ORAL at 21:39

## 2017-02-18 RX ADMIN — GABAPENTIN 100 MG: 100 CAPSULE ORAL at 21:39

## 2017-02-18 RX ADMIN — Medication 10 ML: at 17:42

## 2017-02-18 RX ADMIN — SPIRONOLACTONE 25 MG: 25 TABLET, FILM COATED ORAL at 09:16

## 2017-02-18 RX ADMIN — Medication 5 ML: at 05:04

## 2017-02-18 RX ADMIN — GABAPENTIN 100 MG: 100 CAPSULE ORAL at 09:16

## 2017-02-18 RX ADMIN — LEVOTHYROXINE SODIUM 88 MCG: 0.09 TABLET ORAL at 05:03

## 2017-02-18 RX ADMIN — Medication 5 ML: at 13:27

## 2017-02-18 RX ADMIN — PRAVASTATIN SODIUM 1 TABLET: 20 TABLET ORAL at 09:16

## 2017-02-18 RX ADMIN — POLYETHYLENE GLYCOL 3350 17 G: 17 POWDER, FOR SOLUTION ORAL at 09:16

## 2017-02-18 NOTE — ANESTHESIA POSTPROCEDURE EVALUATION
Post-Anesthesia Evaluation and Assessment    Patient: Reina Chowdary MRN: [id number]  SSN: [id number]    YOB: 1940  Age: 77 y.o. Sex: female       Cardiovascular Function/Vital Signs  Visit Vitals    BP 96/52 (BP 1 Location: Right arm, BP Patient Position: Supine)    Pulse 81    Temp 36.3 °C (97.3 °F)    Resp 17    Ht 5' (1.524 m)    Wt 82.3 kg (181 lb 8 oz)    SpO2 96%    Breastfeeding No    BMI 35.45 kg/m2       Patient is status post MAC anesthesia for Procedure(s):  A-V NODE ABLATION . Nausea/Vomiting: None    Postoperative hydration reviewed and adequate. Pain:  Pain Scale 1: Numeric (0 - 10) (02/17/17 1820)  Pain Intensity 1: 0 (02/17/17 1820)   Managed    Neurological Status:   Neuro  Neurologic State: Alert (02/17/17 0845)  Orientation Level: Oriented X4 (02/17/17 0845)  Cognition: Follows commands (02/17/17 0845)   At baseline    Mental Status and Level of Consciousness: Arousable    Pulmonary Status:   O2 Device: Nasal cannula (02/17/17 1820)   Adequate oxygenation and airway patent    Complications related to anesthesia: None    Post-anesthesia assessment completed.  No concerns    Signed By: Boone Lopez MD     February 17, 2017

## 2017-02-18 NOTE — PROGRESS NOTES
Problem: Mobility Impaired (Adult and Pediatric)  Goal: *Acute Goals and Plan of Care (Insert Text)  LTG:  (1.)Ms. Donald Chávez will move from supine to sit and sit to supine and roll side to side in bed with INDEPENDENT within 7 day(s). (2.)Ms. Donald Chávez will transfer from bed to chair and chair to bed with MODIFIED INDEPENDENCE using the least restrictive device within 5 day(s). (3.)Ms. Donald Chávez will ambulate with MODIFIED INDEPENDENCE for 50 feet with the least restrictive device within 5 day(s). (4.)Ms. Donald Chávez will perform standing static and dynamic balance activities x 8 minutes with SUPERVISION to improve safety within 5 day(s). (5.)Ms. Donald Chávez will tolerate 25 minutes of therapeutic activity/exercise with one rest break within 5 day(s). ________________________________________________________________________________________________      PHYSICAL THERAPY: Daily Note, Treatment Day: 3rd and PM 2/18/2017  INPATIENT: Hospital Day: 7  [place] / [place] / Product Type: Medicare /      NAME/AGE/GENDER: Yeimy Medrano is a 77 y.o. female       PRIMARY DIAGNOSIS: dyspnea, hypotension,  Acute CHF (congestive heart failure) ([facility])  A-FIB Acute CHF (congestive heart failure) ([facility]) Acute CHF (congestive heart failure) ([facility])  Procedure(s) (LRB):  A-V NODE ABLATION  (N/A)  1 Day Post-Op  ICD-10: Treatment Diagnosis:       · Generalized Muscle Weakness (M62.81)  · shortness of breath   Precaution/Allergies:  Adhesive tape; Benadryl [diphenhydramine hcl]; Demerol [meperidine]; Morphine; Sulfa (sulfonamide antibiotics); and Lorazepam       ASSESSMENT:      Ms. Donald Chávez presents with shortness of breath with activity and overall general fatigue. Patient was admitted due to above and was supine in bed on arrival to room and agreeable to PT with encouragement. Patient got to EOB with CGA and stood with SBA and ambulated 39' with rolling walker on 3L O2.  Right LE seemed to buckle slightly during gt once but pt able to recover without difficulty. Patient rested at EOB then perform bilateral LE exercises, with extra time for rest breaks and cueing for pursed lip breathing. SpO2 96%. Returned supine with SBA. Slight progress with gait/activity tolerance this session. Will continue to progress towards goals. This section established at most recent assessment   PROBLEM LIST (Impairments causing functional limitations):  1. Decreased Strength  2. Decreased ADL/Functional Activities  3. Decreased Transfer Abilities  4. Decreased Ambulation Ability/Technique  5. Decreased Balance  6. Decreased Activity Tolerance  7. Increased Fatigue  8. Increased Shortness of Breath  9. Decreased Knowledge of Precautions  10. Decreased [place] with Home Exercise Program    INTERVENTIONS PLANNED: (Benefits and precautions of physical therapy have been discussed with the patient.)  1. Balance Exercise  2. Bed Mobility  3. Gait Training  4. Home Exercise Program (HEP)  5. Therapeutic Activites  6. Therapeutic Exercise/Strengthening  7. Transfer Training  8. Group Therapy      TREATMENT PLAN: Frequency/Duration: 3 times a week for duration of hospital stay  Rehabilitation Potential For Stated Goals: EXCELLENT      RECOMMENDED REHABILITATION/EQUIPMENT: (at time of discharge pending progress): Continue Skilled Therapy. HISTORY:   History of Present Injury/Illness (Reason for Referral):  Per H&P:Ms. Lucille Thapa is a very pleasant 76 yo F Who complains of increased SOb, nonproductive cough and b/l LE swelling in the last 1-2 months. Known with chronic diastolic CHF, CAD, bradycardia s/p pacemaker, , s/p bioprosthetic AVR,Chronic Afib on coumadin, severe pulmonary HTN with PAP 69 mmHg, chronic respiratory failure on NC 3L 24h/7 , COPD, GERD, HTN, HLP, Obesity, BOBBY - not using CPAP. Seen by her cardiologist, oJhanna Briggs on 1/9/17 and Demadex was increased to 40 mg PO daily, except MWF when was BID.  Ms. Lucille Thapa states that her leg swelling improved, although her cough and SOB persisted. Seen by PCP on 1/31 and placed on oral steroids and Albuterol that she couldn't complete because the albuterol made ermias jittery. No improvement with treatment. Seen at [facility] ED on 2/9 with low BP and and thought that she is dehydrated due to increased diuretics and received IV fluids. She felt better for one day, although because of increased in her symptoms she presented to ED today. BP was 80/51 mmHg and received 1L NC bolus in Ed per ED provider. CXR w/o acute pathology. No spikes of fever since her symptoms started. Influenza screen negative. WBC:3.4. Cr:1.48, around her baseline. CV:irregular irregularity. No JVD. +2 pitting edema b/l LE   Resp: Decreased air entry b/l at the base. No wheezing. No rales or crackles. Abd: soft, non-tender, no rebound tenderness. Hold Torsemide until tomorrow. Will get Echocardiogram. Gentle IV hydration at 50 ml/h to keep MAP>85 mmHg. Hold BB due to hypotension. Hold benzodiazepines at bedtime, can exacerbate - untreated BOBBY and involved in severe pulmonary hypertension. Cardiology and palliative care consulted - appreciate the help. Past Medical History/Comorbidities:   Ms. 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/2016); AV block (10/17/2011); Back pain (8/5/2016); Bradycardia (Symptomatic) (11/7/2011); CAD (coronary artery disease); Cancer ([address] (1990); Cardiac pacemaker (11/2/2015); Cardiogenic shock ([facility] (10/17/2011); 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 (8/5/2016); Cellulitis (8/5/2016);  Chest pain (8/5/2016); Chronic atrial fibrillation ([facility] (10/17/2011); Chronic depression (8/5/2016); Chronic depression (8/5/2016); Chronic obstructive pulmonary disease ([facility] (3/8/2009); Chronic pain; CKD (chronic kidney disease) stage 3, GFR 30-59 ml/min (12/4/2009); Congestive heart failure (CHF) ([facility] (11/2/2015); COPD; CRI (chronic renal insufficiency) (8/5/2016); Degeneration of cervical intervertebral disc (8/5/2016); Degenerative arthritis of left knee (2/27/2009); Dehydration (8/5/2016); Diastolic heart failure ([facility] Digoxin toxicity (2/24/2010); Disorder of sweat glands (8/5/2016); Diverticulosis of large intestine without diverticulitis (2015); Dyspnea (8/5/2016); Eczema (8/5/2016); Epigastric abdominal pain (2/24/2010); GERD (gastroesophageal reflux disease); Gout (8/5/2016); H/O mitral valve repair, 2003 (6/27/2016); Heart failure ([facility] HTN (hypertension) (8/5/2016); colonic polyp (2015); Hyperkalemia (10/17/2011); Hyperlipidemia (8/5/2016); Hypertension; Hypokalemia (2/24/2010); Hypothyroidism (12/4/2009); Ill-defined condition; Knee pain (8/5/2016); Leg cramps (8/5/2016); Mitral stenosis with insufficiency (11/2/2015); Nausea and vomiting (2/24/2010); Obesity (11/2/2015); BOBBY (obstructive sleep apnea) (12/4/2009); Osteoarthritis (8/5/2016); Osteopenia (8/5/2016); Other long term (current) drug therapy (8/5/2016); Palpitations (11/2/2015); Rash (8/5/2016); Rectal bleeding (10/27/2011); Rectocele (2015); Respiratory insufficiency (11/2/2015); 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 She also has no past medical history of Aneurysm ([facility] Autoimmune disease ([facility]  Coagulation disorder ([facility] DEMENTIA; Diabetes ([facility] Endocarditis; Infectious disease; Liver disease; Other ill-defined conditions(799.89); PUD (peptic ulcer disease); Seizures ([facility] Stroke [facility]); or Thromboembolus ([facility] Ms. Dick Peraza  has a past surgical history that includes appendectomy; cholecystectomy (2005); gyn (1981); mastectomy (1990); pacemaker; breast reconstruction; cardiac surg procedure unlist; and orthopaedic. Social History/Living Environment:   Home Environment: Private residence  # Steps to Enter: 1  One/Two Story Residence: One story  Living Alone: No  Support Systems: Child(brit), Christianity / gene community, Family member(s), Friends \ neighbors  Patient Expects to be Discharged to[de-identified] Private residence  Current DME Used/Available at Home: Shower chair  Tub or Shower Type: Tub/Shower combination  Prior Level of Function/Work/Activity:  Patient lives with son who works during the day. Patient has family and neighbors that would be able to check on patient. Personal Factors:          Age:  77 y.o. Number of Personal Factors/Comorbidities that affect the Plan of Care: 3+: HIGH COMPLEXITY   EXAMINATION:   Most Recent Physical Functioning:   Gross Assessment:                  Posture:  Posture (WDL): Exceptions to WDL  Posture Assessment: Forward head, Rounded shoulders  Balance:  Sitting: Intact  Sitting - Static: Good (unsupported)  Sitting - Dynamic: Fair (occasional)  Standing: Impaired  Standing - Static: Fair  Standing - Dynamic : Fair Bed Mobility:  Supine to Sit: Stand-by asssistance  Sit to Supine: Contact guard assistance  Wheelchair Mobility:     Transfers:  Sit to Stand: Contact guard assistance  Stand to Sit: Stand-by asssistance  Gait:     Base of Support: Center of gravity altered  Speed/Carol: Shuffled; Slow  Step Length: Left shortened;Right shortened  Distance (ft): 45 Feet (ft)  Assistive Device: Walker, rolling  Ambulation - Level of Assistance: Contact guard assistance  Interventions: Safety awareness training;Verbal cues       Body Structures Involved:  1. Heart  2. Lungs Body Functions Affected:  1. Cardio  2. Movement Related Activities and Participation Affected:  1. Mobility  2. Self Care  3. Domestic Life   Number of elements that affect the Plan of Care: 4+: HIGH COMPLEXITY   CLINICAL PRESENTATION:   Presentation: Evolving clinical presentation with changing clinical characteristics: MODERATE COMPLEXITY   CLINICAL DECISION MAKIN [facility] Mobility Inpatient Short Form  How much difficulty does the patient currently have. .. Unable A Lot A Little None   1. Turning over in bed (including adjusting bedclothes, sheets and blankets)? [ ] 1   [ ] 2   [ ] 3   [X] 4   2. Sitting down on and standing up from a chair with arms ( e.g., wheelchair, bedside commode, etc.)   [ ] 1   [ ] 2   [X] 3   [ ] 4   3. Moving from lying on back to sitting on the side of the bed? [ ] 1   [ ] 2   [ ] 3   [X] 4   How much help from another person does the patient currently need. .. Total A Lot A Little None   4. Moving to and from a bed to a chair (including a wheelchair)? [ ] 1   [ ] 2   [X] 3   [ ] 4   5. Need to walk in hospital room? [ ] 1   [ ] 2   [X] 3   [ ] 4   6. Climbing 3-5 steps with a railing? [ ] 1   [X] 2   [ ] 3   [ ] 4   © , Trustees of [address] [place], [place] [address], under license to [employer]. All rights reserved    Score:  Initial: 19 Most Recent: X (Date: -- )     Interpretation of Tool:  Represents activities that are increasingly more difficult (i.e. Bed mobility, Transfers, Gait).        Score 24 23 22-20 19-15 14-10 9-7 6       Modifier CH CI CJ CK CL CM CN         · Mobility - Walking and Moving Around:               - CURRENT STATUS:    CK - 40%-59% impaired, limited or restricted               - GOAL STATUS:           CJ - 20%-39% impaired, limited or restricted               - D/C STATUS:                       ---------------To be determined---------------  [place] / [place] / Product Type: Medicare /       Medical Necessity:     · Patient demonstrates good rehab potential due to higher previous functional level. · Skilled intervention continues to be required due to decline in overall functional mobility and activity tolerance. Reason for Services/Other Comments:  · Patient continues to require present interventions due to patient's inability to perform functional mobility at previous indepencence level. Use of outcome tool(s) and clinical judgement create a POC that gives a: Questionable prediction of patient's progress: MODERATE COMPLEXITY                 TREATMENT:      Pre-treatment Symptoms/Complaints:   Pain: Initial: 0     Post Session: C/o of some pain in right groin but did not rate, shortness of breath with mobility      Therapeutic Activity: ( 25 min   ):  Therapeutic activities including bed transfers, LE ex and Ambulation on level ground to improve mobility, strength, balance and activity tolerance. Required minimal Safety awareness training;Verbal cues to promote static and dynamic balance in standing and pursed lip breathing. Therapeutic Exercise: ( ):  Exercises per grid below to improve mobility, strength and coordination. Required minimal visual and verbal cues to promote proper body breathing techniques and to stay on task. Progressed complexity of movement as indicated. DATE: 2/16/17 2/18/17      Ambulation        Hip Flexion x10 AB        Long Arc Quads x10 AB X 15 B      Knee Squeezes        Ankle DF/PF x10 AB X 15 B                                       Key:  A=active, AA=active assisted, P=passive, B=bilaterally, R=right, L=left   DF=dorsiflexion, PF=plantarflexion      Braces/Orthotics/Lines/Etc:   · O2 Device: Nasal cannula 3L/min  Treatment/Session Assessment:    · Response to Treatment:  Dyspneic with minimal exertion.   · Interdisciplinary Collaboration:  · Physical Therapy Assistant and Registered Nurse  · After treatment position/precautions:  · Supine in bed, Bed/Chair-wheels locked, Call light within reach, RN notified and Family at bedside  · Compliance with Program/Exercises: compliant all of the time. · Recommendations/Intent for next treatment session: \"Next visit will focus on advancements to more challenging activities and reduction in assistance provided\".   Total Treatment Duration:  PT Patient Time In/Time Out  Time In: 1450  Time Out: 400 Monica Fournier, PTA

## 2017-02-18 NOTE — PROGRESS NOTES
Hospitalist Progress Note    2017  Admit Date: 2017  2:32 PM   NAME: Ron Arboleda   :  [id number]   DOS:              17  MRN:  [id number]   Attending: Bassam Corral MD  PCP:  Bassem Egan MD  Treatment Team: Attending Provider: Bassam Corral MD; Consulting Provider: Romina Harris MD; Consulting Provider: Alpesh Choudhary DO; Utilization Review: Karon Gaytan; Consulting Provider: Mohsen Andrews NP; Care Manager: Patrick Krueger; Consulting Provider: Ciro Cooper MD    DNR     SUBJECTIVE:   As previously documented: \" Ms. Ernesto Monroe is a very pleasant 78 yo F Who complains of increased SOb, nonproductive cough and b/l LE swelling in the last 1-2 months. Known with chronic diastolic CHF, CAD, bradycardia s/p pacemaker, , s/p bioprosthetic AVR,Chronic Afib on coumadin, severe pulmonary HTN with PAP 69 mmHg, chronic respiratory failure on NC 3L 24h/ , COPD, GERD, HTN, HLP, Obesity, BOBBY - not using CPAP. Seen by her cardiologist, Poli Paulino on 17 and Demadex was increased to 40 mg PO daily, except MWF when was BID. Ms. Ernesto Monroe states that her leg swelling improved, although her cough and SOB persisted. Seen by PCP on  and placed on oral steroids and Albuterol that she couldn't complete because the albuterol made ermias jittery. No improvement with treatment. Seen at [facility] ED on  with low BP and and thought that she is dehydrated due to increased diuretics and received IV fluids. She felt better for one day, although because of increased in her symptoms she presented to ED today. BP was 80/51 mmHg and received 1L NC bolus in Ed per ED provider. CXR w/o acute pathology. No spikes of fever since her symptoms started. Influenza screen negative. WBC:3.4. Cr:1.48, around her baseline. \"       17   Ms. [address] alert - states she is feeling better. Heart rate is improved- 70's s/p ablation. BP improved as well.       10+ ROS reviewed and negative except for positive in HPI.    Allergies   Allergen Reactions    Adhesive Tape Rash    Benadryl [Diphenhydramine Hcl] Other (comments)     Jitters      Demerol [Meperidine] Anxiety    Morphine Other (comments)     Confusion    Sulfa (Sulfonamide Antibiotics) Anxiety    Lorazepam Anxiety     Current Facility-Administered Medications   Medication Dose Route Frequency Provider Last Rate Last Dose    lip protectant (BLISTEX) ointment   Topical PRN Bernice Acevedo MD        magnesium citrate solution 296 mL  296 mL Oral PRN Selma Bunting, NP        sodium chloride (NS) flush 5-10 mL  5-10 mL IntraVENous Q8H Linda Lion MD   5 mL at 02/18/17 1327    sodium chloride (NS) flush 5-10 mL  5-10 mL IntraVENous PRN Linda Lion MD        polyethylene glycol (MIRALAX) packet 17 g  17 g Oral DAILY Selma Bunting, NP   17 g at 02/18/17 0916    furosemide (LASIX) injection 40 mg  40 mg IntraVENous MD Yifan   40 mg at 02/18/17 0913    sodium chloride (NS) flush 5-10 mL  5-10 mL IntraVENous Q8H Chadwick Siu MD   5 mL at 02/18/17 0504    sodium chloride (NS) flush 5-10 mL  5-10 mL IntraVENous PRN Chadwick Siu MD   10 mL at 02/17/17 4645    diazePAM (VALIUM) tablet 2.5 mg  2.5 mg Oral Q8H PRN Chris Alegre NP   2.5 mg at 02/14/17 2157    senna-docusate (PERICOLACE) 8.6-50 mg per tablet 1 Tab  1 Tab Oral BID Chris Alegre NP   1 Tab at 02/18/17 3274    traZODone (DESYREL) tablet 50 mg  50 mg Oral QHS PRN Jaleel Corey MD   50 mg at 02/14/17 2156    gabapentin (NEURONTIN) capsule 100 mg  100 mg Oral TID Madhav Zayas MD   100 mg at 02/18/17 0916    sodium chloride (NS) flush 5-10 mL  5-10 mL IntraVENous Q8H Sonia Hopkins MD   10 mL at 02/17/17 0546    sodium chloride (NS) flush 5-10 mL  5-10 mL IntraVENous PRN Sonia Hopkins MD        ondansetron [facility]) injection 4 mg  4 mg IntraVENous Q4H PRN Fina Saha NP   4 mg at 02/13/17 1832    acetaminophen (TYLENOL) tablet 650 mg  650 mg Oral Q4H PRN Isidor Hatchet, NP   650 mg at 17 2335    levalbuterol (XOPENEX) nebulizer soln 1.25 mg/3 mL  1.25 mg Nebulization Q4H PRN Isidor Hatchet, NP        polyvinyl alcohol (LIQUIFILM TEARS) 1.4 % ophthalmic solution 1 Drop  1 Drop Both Eyes PRN Isidor Hatchet, NP   1 Drop at 17 1351    hydrocortisone (ANUSOL-HC) 2.5 % rectal cream   PeriANAL BID PRN Isidor Hatchet, FELISHA        levothyroxine (SYNTHROID) tablet 88 mcg  88 mcg Oral ACB Azeb Pino NP   88 mcg at 17 0503    pantoprazole (PROTONIX) tablet 40 mg  40 mg Oral ACB Azeb Pino NP   40 mg at 17 0916    promethazine (PHENERGAN) tablet 25 mg  25 mg Oral Q6H PRN Isidor Hatchet, NP        rOPINIRole (REQUIP) tablet 2 mg  2 mg Oral QHS Azeb Pino NP   2 mg at 17 2234    sertraline (ZOLOFT) tablet 50 mg  50 mg Oral QHS Azeb Pino NP   50 mg at 17 2234    spironolactone (ALDACTONE) tablet 25 mg  25 mg Oral DAILY Azeb Pino NP   25 mg at 17 0916             PHYSICAL EXAM     Visit Vitals    /69 (BP 1 Location: Left arm, BP Patient Position: Sitting)    Pulse 84    Temp 97.6 °F (36.4 °C)    Resp 17    Ht 5' (1.524 m)    Wt 83.2 kg (183 lb 8 oz)    SpO2 99%    Breastfeeding No    BMI 35.84 kg/m2      Temp (24hrs), Av.6 °F (36.4 °C), Min:97.3 °F (36.3 °C), Max:97.7 °F (36.5 °C)    Oxygen Therapy  O2 Sat (%): 99 % (17 1135)  Pulse via Oximetry: 86 beats per minute (17 2020)  O2 Device: Nasal cannula (17 09)  O2 Flow Rate (L/min): 2 l/min (17 0923)    Intake/Output Summary (Last 24 hours) at 17 1512  Last data filed at 17 1315   Gross per 24 hour   Intake               50 ml   Output              900 ml   Net             -850 ml        Physical Exam:  General:         AAOx3. NAD. Afebrile. Cooperative. Obese. HEENT:               NCAT.  No obvious deformity. Nares normal. No drainage  Lungs:                 Decreased air entry b/l at the base. Wheezing but improved  Cardiovascular:   Irregular irregularity. No m/r/g. +2 pitting edema  b/l. +2 PT/DT pulses b/l. Abdomen:       S/nt/nd. Bowel sounds normal. .   Skin:         No rashes or lesions. Not Jaundiced  Neurologic:    AAOx3. CN II- XII grossly WNL. No gross focal deficit. Moves all extremities  Psychiatric:         Good mood. Normal affect. DIAGNOSTIC STUDIES      Data Review:   Recent Results (from the past 24 hour(s))   PROTHROMBIN TIME + INR    Collection Time: 02/18/17  6:32 AM   Result Value Ref Range    Prothrombin time 13.4 (H) 9.6 - 12.0 sec    INR 1.2 0.9 - 1.2     CBC WITH AUTOMATED DIFF    Collection Time: 02/18/17  6:32 AM   Result Value Ref Range    WBC 5.5 4.3 - 11.1 K/uL    RBC 2.59 (L) 4.05 - 5.25 M/uL    HGB 7.9 (L) 11.7 - 15.4 g/dL    HCT 26.5 (L) 35.8 - 46.3 %    .3 (H) 79.6 - 97.8 FL    MCH 30.5 26.1 - 32.9 PG    MCHC 29.8 (L) 31.4 - 35.0 g/dL    RDW 14.8 (H) 11.9 - 14.6 %    PLATELET 198 (L) 813 - 450 K/uL    MPV 10.7 (L) 10.8 - 14.1 FL    DF AUTOMATED      NEUTROPHILS 89 (H) 43 - 78 %    LYMPHOCYTES 8 (L) 13 - 44 %    MONOCYTES 3 (L) 4.0 - 12.0 %    EOSINOPHILS 0 (L) 0.5 - 7.8 %    BASOPHILS 0 0.0 - 2.0 %    IMMATURE GRANULOCYTES 0.2 0.0 - 5.0 %    ABS. NEUTROPHILS 4.9 1.7 - 8.2 K/UL    ABS. LYMPHOCYTES 0.4 (L) 0.5 - 4.6 K/UL    ABS. MONOCYTES 0.2 0.1 - 1.3 K/UL    ABS. EOSINOPHILS 0.0 0.0 - 0.8 K/UL    ABS. BASOPHILS 0.0 0.0 - 0.2 K/UL    ABS. IMM.  GRANS. 0.0 0.0 - 0.5 K/UL   METABOLIC PANEL, BASIC    Collection Time: 02/18/17  6:32 AM   Result Value Ref Range    Sodium 148 (H) 136 - 145 mmol/L    Potassium 3.6 3.5 - 5.1 mmol/L    Chloride 107 98 - 107 mmol/L    CO2 31 21 - 32 mmol/L    Anion gap 10 7 - 16 mmol/L    Glucose 93 65 - 100 mg/dL    BUN 29 (H) 8 - 23 MG/DL    Creatinine 0.96 0.6 - 1.0 MG/DL    GFR est AA >60 >60 ml/min/1.73m2    GFR est non-AA >60 >60 ml/min/1.73m2    Calcium 8.4 8.3 - 10.4 MG/DL   MAGNESIUM    Collection Time: 02/18/17  6:32 AM   Result Value Ref Range    Magnesium 2.1 1.8 - 2.4 mg/dL     Imaging /Procedures /Studies:    CXR Results  (Last 48 hours)    None        Echocardiogram: SUMMARY:  -  Left ventricle: Systolic function was normal. Ejection fraction was  estimated in the range of 55 % to 60 %. There were no regional wall motion  abnormalities. Wall thickness was mildly to moderately increased. -  Right ventricle: The ventricle was dilated. Systolic function was reduced. There was severe pulmonary artery hypertension.  -  Left atrium: The atrium was markedly dilated. -  Right atrium: The atrium was markedly dilated. -  Inferior vena cava, hepatic veins: The inferior vena cava was dilated. -  Aortic valve: A bioprosthesis was present. It exhibited stenosis. There   Was moderate stenosis. The aortic valve area by the continuity equation was 1.1   cm2.  -  Mitral valve: There was moderate to marked annular calcification. There   Was moderate-marked calcification. A annular ring prosthesis was present. It  exhibited stenosis and reduced motion. There was moderate stenosis. There was  mild regurgitation. The mitral valve area by pressure half time was 1.7 cm2.  -  Tricuspid valve: There was severe regurgitation. -  Pulmonic valve: There was mild to moderate regurgitation. -  Pericardium: There was a left pleural effusion.       Labs and Studies from previous 24 hours have been personally reviewed by myself Ray Prather    Diagnosis Date Noted    Acute CHF (congestive heart failure) ([facility] 02/12/2017    Pulmonary hypertension ([facility] 02/12/2017    Aortic valve replaced 01/09/2017    Warfarin anticoagulation 01/09/2017    Chronic diastolic congestive heart failure ([address] 09/09/2016    Sleep apnea 11/02/2015    Chronic atrial fibrillation ([address] 10/17/2011    ARF (acute renal failure) ([address] 02/24/2010    BOBBY (obstructive sleep apnea) 12/04/2009    Aortic Valve Bioprosthesis Present 03/07/2009    Hypotension 03/01/2009       Plan:  SOB:  Likely due to her severe pulmonary hypertension  and possible cor pulmonale along with her known aortic valve stenosis, mitral valve disease. RSVP:65-70 mmHg. And compounded with pulmonary edema- improved    Pulmonary HTN: patient and her family had initially refused further evaluation inclusive CTA chest and VQ scan. They wanted more comfort care efforts. And Palliative care- following. However they have agreed for AV node ablation with cardiology and that went well. Hypotension: BP is currently improved. Permanent AFib - resolved s/p ablation     Anemia: stable - will Monitor     RLS: continue Gabapentin 100 mg TID    Hypernatremia- encourage free water intake. Fluid restrict to 1.5L daily    DVT Prophylaxis: held because of anemia  CODE Status: DNR  Plan of Care Discussed with: patient family.  Care team   Medical Risk: high  Disposition: pending- likely home with home health when ok from cardiology point of view    Comfort Lester MD  02/18/17

## 2017-02-18 NOTE — PROGRESS NOTES
[facility] CARDIOLOGY PROGRESS NOTE           2/18/2017 11:18 AM    Admit Date: 2/12/2017    Admit Diagnosis: dyspnea, hypotension,;Acute CHF (congestive heart failure) *      Subjective:   Patient reports feeling much better. Swelling is improving    Objective:     Vitals:    02/18/17 0508 02/18/17 0546 02/18/17 0720 02/18/17 0913   BP:  113/56 111/54 107/55   Pulse:  82 82 84   Resp:  16 22    Temp:  97.4 °F (36.3 °C) 97.7 °F (36.5 °C)    SpO2:  97% 98%    Weight: 183 lb 8 oz (83.2 kg)      Height:           Physical Exam:  General-Well Developed, Well Nourished, No Acute Distress, Alert & Oriented x 3, appropriate mood. Neck- supple, no JVD  CV- regular rate and rhythm no MRG  Lung- clear bilaterally  Abd- soft, nontender, nondistended  Ext- +1 edema bilaterally.   Skin- warm and dry    Current Facility-Administered Medications   Medication Dose Route Frequency    lip protectant (BLISTEX) ointment   Topical PRN    magnesium citrate solution 296 mL  296 mL Oral PRN    sodium chloride (NS) flush 5-10 mL  5-10 mL IntraVENous Q8H    sodium chloride (NS) flush 5-10 mL  5-10 mL IntraVENous PRN    polyethylene glycol (MIRALAX) packet 17 g  17 g Oral DAILY    furosemide (LASIX) injection 40 mg  40 mg IntraVENous Q12H    sodium chloride (NS) flush 5-10 mL  5-10 mL IntraVENous Q8H    sodium chloride (NS) flush 5-10 mL  5-10 mL IntraVENous PRN    diazePAM (VALIUM) tablet 2.5 mg  2.5 mg Oral Q8H PRN    senna-docusate (PERICOLACE) 8.6-50 mg per tablet 1 Tab  1 Tab Oral BID    traZODone (DESYREL) tablet 50 mg  50 mg Oral QHS PRN    gabapentin (NEURONTIN) capsule 100 mg  100 mg Oral TID    sodium chloride (NS) flush 5-10 mL  5-10 mL IntraVENous Q8H    sodium chloride (NS) flush 5-10 mL  5-10 mL IntraVENous PRN    ondansetron (ZOFRAN) injection 4 mg  4 mg IntraVENous Q4H PRN    acetaminophen (TYLENOL) tablet 650 mg  650 mg Oral Q4H PRN    levalbuterol (XOPENEX) nebulizer soln 1.25 mg/3 mL  1.25 mg Nebulization Q4H PRN    polyvinyl alcohol (LIQUIFILM TEARS) 1.4 % ophthalmic solution 1 Drop  1 Drop Both Eyes PRN    hydrocortisone (ANUSOL-HC) 2.5 % rectal cream   PeriANAL BID PRN    levothyroxine (SYNTHROID) tablet 88 mcg  88 mcg Oral ACB    pantoprazole (PROTONIX) tablet 40 mg  40 mg Oral ACB    promethazine (PHENERGAN) tablet 25 mg  25 mg Oral Q6H PRN    rOPINIRole (REQUIP) tablet 2 mg  2 mg Oral QHS    sertraline (ZOLOFT) tablet 50 mg  50 mg Oral QHS    spironolactone (ALDACTONE) tablet 25 mg  25 mg Oral DAILY     Data Review:   Recent Results (from the past 24 hour(s))   PROTHROMBIN TIME + INR    Collection Time: 02/18/17  6:32 AM   Result Value Ref Range    Prothrombin time 13.4 (H) 9.6 - 12.0 sec    INR 1.2 0.9 - 1.2     CBC WITH AUTOMATED DIFF    Collection Time: 02/18/17  6:32 AM   Result Value Ref Range    WBC 5.5 4.3 - 11.1 K/uL    RBC 2.59 (L) 4.05 - 5.25 M/uL    HGB 7.9 (L) 11.7 - 15.4 g/dL    HCT 26.5 (L) 35.8 - 46.3 %    .3 (H) 79.6 - 97.8 FL    MCH 30.5 26.1 - 32.9 PG    MCHC 29.8 (L) 31.4 - 35.0 g/dL    RDW 14.8 (H) 11.9 - 14.6 %    PLATELET 208 (L) 967 - 450 K/uL    MPV 10.7 (L) 10.8 - 14.1 FL    DF AUTOMATED      NEUTROPHILS 89 (H) 43 - 78 %    LYMPHOCYTES 8 (L) 13 - 44 %    MONOCYTES 3 (L) 4.0 - 12.0 %    EOSINOPHILS 0 (L) 0.5 - 7.8 %    BASOPHILS 0 0.0 - 2.0 %    IMMATURE GRANULOCYTES 0.2 0.0 - 5.0 %    ABS. NEUTROPHILS 4.9 1.7 - 8.2 K/UL    ABS. LYMPHOCYTES 0.4 (L) 0.5 - 4.6 K/UL    ABS. MONOCYTES 0.2 0.1 - 1.3 K/UL    ABS. EOSINOPHILS 0.0 0.0 - 0.8 K/UL    ABS. BASOPHILS 0.0 0.0 - 0.2 K/UL    ABS. IMM.  GRANS. 0.0 0.0 - 0.5 K/UL   METABOLIC PANEL, BASIC    Collection Time: 02/18/17  6:32 AM   Result Value Ref Range    Sodium 148 (H) 136 - 145 mmol/L    Potassium 3.6 3.5 - 5.1 mmol/L    Chloride 107 98 - 107 mmol/L    CO2 31 21 - 32 mmol/L    Anion gap 10 7 - 16 mmol/L    Glucose 93 65 - 100 mg/dL    BUN 29 (H) 8 - 23 MG/DL    Creatinine 0.96 0.6 - 1.0 MG/DL    GFR est AA >60 >60 ml/min/1.73m2    GFR est non-AA >60 >60 ml/min/1.73m2    Calcium 8.4 8.3 - 10.4 MG/DL   MAGNESIUM    Collection Time: 02/18/17  6:32 AM   Result Value Ref Range    Magnesium 2.1 1.8 - 2.4 mg/dL     Assessment:     Principal Problem:    Acute CHF (congestive heart failure) ([address] (2/12/2017)    Active Problems:    Hypotension (3/1/2009)      Aortic Valve Bioprosthesis Present (3/7/2009)      BOBBY (obstructive sleep apnea) (12/4/2009)      ARF (acute renal failure) ([facility] (2/24/2010)      Chronic atrial fibrillation (HCC) (10/17/2011)      Sleep apnea (11/2/2015)      Chronic diastolic congestive heart failure ([address] (9/9/2016)      Aortic valve replaced (1/9/2017)      Warfarin anticoagulation (1/9/2017)      Pulmonary hypertension ([facility] (2/12/2017)      Plan:   1. A. Fib - Rates stable after AV Node ablation. No Anticoagulation secondary to Anemia  2. Acute on Chronic Diastolic heart failure - iv lasix for additional 24 hrs. 3. Anemia - Chronic in nature    Maryanne Bess. Ema Fong M.D., F.A.C.C, F.H.R.S.   Cardiology/Electrophysiology

## 2017-02-19 LAB
ANION GAP BLD CALC-SCNC: 6 MMOL/L (ref 7–16)
BASOPHILS # BLD AUTO: 0 K/UL (ref 0–0.2)
BASOPHILS # BLD: 0 % (ref 0–2)
BUN SERPL-MCNC: 27 MG/DL (ref 8–23)
CALCIUM SERPL-MCNC: 8 MG/DL (ref 8.3–10.4)
CHLORIDE SERPL-SCNC: 104 MMOL/L (ref 98–107)
CO2 SERPL-SCNC: 36 MMOL/L (ref 21–32)
CREAT SERPL-MCNC: 0.97 MG/DL (ref 0.6–1)
DIFFERENTIAL METHOD BLD: ABNORMAL
EOSINOPHIL # BLD: 0.1 K/UL (ref 0–0.8)
EOSINOPHIL NFR BLD: 3 % (ref 0.5–7.8)
ERYTHROCYTE [DISTWIDTH] IN BLOOD BY AUTOMATED COUNT: 14.7 % (ref 11.9–14.6)
GLUCOSE SERPL-MCNC: 101 MG/DL (ref 65–100)
HCT VFR BLD AUTO: 25.5 % (ref 35.8–46.3)
HGB BLD-MCNC: 7.6 G/DL (ref 11.7–15.4)
IMM GRANULOCYTES # BLD: 0 K/UL (ref 0–0.5)
IMM GRANULOCYTES NFR BLD AUTO: 0.7 % (ref 0–5)
INR PPP: 1.2 (ref 0.9–1.2)
LYMPHOCYTES # BLD AUTO: 13 % (ref 13–44)
LYMPHOCYTES # BLD: 0.5 K/UL (ref 0.5–4.6)
MAGNESIUM SERPL-MCNC: 2.2 MG/DL (ref 1.8–2.4)
MCH RBC QN AUTO: 30.2 PG (ref 26.1–32.9)
MCHC RBC AUTO-ENTMCNC: 29.8 G/DL (ref 31.4–35)
MCV RBC AUTO: 101.2 FL (ref 79.6–97.8)
MONOCYTES # BLD: 0.4 K/UL (ref 0.1–1.3)
MONOCYTES NFR BLD AUTO: 9 % (ref 4–12)
NEUTS SEG # BLD: 3.1 K/UL (ref 1.7–8.2)
NEUTS SEG NFR BLD AUTO: 74 % (ref 43–78)
PLATELET # BLD AUTO: 114 K/UL (ref 150–450)
PMV BLD AUTO: 11 FL (ref 10.8–14.1)
POTASSIUM SERPL-SCNC: 3.8 MMOL/L (ref 3.5–5.1)
PROTHROMBIN TIME: 12.6 SEC (ref 9.6–12)
RBC # BLD AUTO: 2.52 M/UL (ref 4.05–5.25)
SODIUM SERPL-SCNC: 146 MMOL/L (ref 136–145)
WBC # BLD AUTO: 4.1 K/UL (ref 4.3–11.1)

## 2017-02-19 PROCEDURE — 85025 COMPLETE CBC W/AUTO DIFF WBC: CPT | Performed by: NURSE PRACTITIONER

## 2017-02-19 PROCEDURE — P9016 RBC LEUKOCYTES REDUCED: HCPCS | Performed by: INTERNAL MEDICINE

## 2017-02-19 PROCEDURE — 80048 BASIC METABOLIC PNL TOTAL CA: CPT | Performed by: NURSE PRACTITIONER

## 2017-02-19 PROCEDURE — 85610 PROTHROMBIN TIME: CPT | Performed by: NURSE PRACTITIONER

## 2017-02-19 PROCEDURE — 36415 COLL VENOUS BLD VENIPUNCTURE: CPT | Performed by: NURSE PRACTITIONER

## 2017-02-19 PROCEDURE — 36430 TRANSFUSION BLD/BLD COMPNT: CPT

## 2017-02-19 PROCEDURE — 74011250637 HC RX REV CODE- 250/637: Performed by: NURSE PRACTITIONER

## 2017-02-19 PROCEDURE — 83735 ASSAY OF MAGNESIUM: CPT | Performed by: NURSE PRACTITIONER

## 2017-02-19 PROCEDURE — 77030013131 HC IV BLD ST ICUM -A

## 2017-02-19 PROCEDURE — 86923 COMPATIBILITY TEST ELECTRIC: CPT | Performed by: INTERNAL MEDICINE

## 2017-02-19 PROCEDURE — 74011250637 HC RX REV CODE- 250/637: Performed by: INTERNAL MEDICINE

## 2017-02-19 PROCEDURE — 65660000000 HC RM CCU STEPDOWN

## 2017-02-19 PROCEDURE — 86900 BLOOD TYPING SEROLOGIC ABO: CPT | Performed by: INTERNAL MEDICINE

## 2017-02-19 PROCEDURE — 74011250637 HC RX REV CODE- 250/637: Performed by: HOSPITALIST

## 2017-02-19 PROCEDURE — 30243N1 TRANSFUSION OF NONAUTOLOGOUS RED BLOOD CELLS INTO CENTRAL VEIN, PERCUTANEOUS APPROACH: ICD-10-PCS | Performed by: INTERNAL MEDICINE

## 2017-02-19 RX ORDER — SODIUM CHLORIDE 9 MG/ML
250 INJECTION, SOLUTION INTRAVENOUS AS NEEDED
Status: DISCONTINUED | OUTPATIENT
Start: 2017-02-19 | End: 2017-02-28 | Stop reason: HOSPADM

## 2017-02-19 RX ORDER — TORSEMIDE 20 MG/1
40 TABLET ORAL DAILY
Status: DISCONTINUED | OUTPATIENT
Start: 2017-02-19 | End: 2017-02-28 | Stop reason: HOSPADM

## 2017-02-19 RX ADMIN — SPIRONOLACTONE 25 MG: 25 TABLET, FILM COATED ORAL at 09:04

## 2017-02-19 RX ADMIN — ROPINIROLE 2 MG: 2 TABLET, FILM COATED ORAL at 21:39

## 2017-02-19 RX ADMIN — ACETAMINOPHEN 650 MG: 325 TABLET, FILM COATED ORAL at 04:53

## 2017-02-19 RX ADMIN — Medication 5 ML: at 15:00

## 2017-02-19 RX ADMIN — LEVOTHYROXINE SODIUM 88 MCG: 0.09 TABLET ORAL at 05:33

## 2017-02-19 RX ADMIN — Medication 10 ML: at 21:39

## 2017-02-19 RX ADMIN — PRAVASTATIN SODIUM 1 TABLET: 20 TABLET ORAL at 09:02

## 2017-02-19 RX ADMIN — Medication 5 ML: at 05:34

## 2017-02-19 RX ADMIN — GABAPENTIN 100 MG: 100 CAPSULE ORAL at 18:15

## 2017-02-19 RX ADMIN — SERTRALINE HYDROCHLORIDE 50 MG: 50 TABLET ORAL at 21:39

## 2017-02-19 RX ADMIN — GABAPENTIN 100 MG: 100 CAPSULE ORAL at 09:04

## 2017-02-19 RX ADMIN — POLYETHYLENE GLYCOL 3350 17 G: 17 POWDER, FOR SOLUTION ORAL at 09:04

## 2017-02-19 RX ADMIN — Medication 5 ML: at 07:56

## 2017-02-19 RX ADMIN — PANTOPRAZOLE SODIUM 40 MG: 40 TABLET, DELAYED RELEASE ORAL at 05:34

## 2017-02-19 RX ADMIN — GABAPENTIN 100 MG: 100 CAPSULE ORAL at 21:39

## 2017-02-19 RX ADMIN — TORSEMIDE 40 MG: 20 TABLET ORAL at 09:02

## 2017-02-19 NOTE — PROGRESS NOTES
[facility] CARDIOLOGY PROGRESS NOTE           2/19/2017 7:57 AM    Admit Date: 2/12/2017      Subjective:   Doing well. Feeling better. Ambulating    ROS:  Cardiovascular:  As noted above    Objective:      Vitals:    02/19/17 0054 02/19/17 0432 02/19/17 0447 02/19/17 0755   BP: 109/67  111/72 96/45   Pulse: 87  84 82   Resp: 16  18 18   Temp: 97.6 °F (36.4 °C)  97.9 °F (36.6 °C) 96.6 °F (35.9 °C)   SpO2: 100%  98% 99%   Weight:  82.4 kg (181 lb 9.6 oz)     Height:           Physical Exam:  General-No Acute Distress    Data Review:   Recent Labs      02/19/17   0605  02/18/17   0632  02/17/17   1024   NA   --   148*  146*   K   --   3.6  3.8   MG   --   2.1  2.4   BUN   --   29*  32*   CREA   --   0.96  1.06*   GLU   --   93  76   WBC  4.1*  5.5  3.5*   HGB  7.6*  7.9*  8.3*   HCT  25.5*  26.5*  28.6*   PLT  114*  114*  116*   INR  1.2  1.2   --        Assessment/Plan:     Dyspnea  Severe bAVR stenosis  Mitral stenosis  Permanent a fib  Chronic bleeding hemorrhoids  PA htn  Chronic anemia  ///  Change diuretic to po. Give 2 units of blood.   Ask Dr. Angelina Mcnally to review re hemorrhoidal options          Becca Hercules MD  2/19/2017 7:57 AM

## 2017-02-19 NOTE — CONSULTS
Gae Goldberg, M.D. Colon and Rectal Surgeon  [facility] [address], [address] [facility] Martin Garibay   Phone: [phone] Fax: Brooke Bethea  MRN: [id number]    Requesting Provider: Dr Susanna Jessica    Primary Care Physician: Aruna Mclean MD    Reason for Consult: Rectal bleeding, anemia, hemorrhoids    HISTORY OF PRESENT ILLNESS:  Yeimy Medrano is well known to me. She is a 77y.o. year old female who I met 9/21/15 for evaluation of rectal bleeding, anemia, and hemorrhoids. The patient reported a several year history of small volume bright red rectal bleeding, which had increased in severity and frequency in the recent past. Because of the bleeding, she was taken off of her Coumadin. She had a previous colonoscopy 5 years prior with hyperplastic polyps but also had a history of polyps. She had no family history of significance. She also reported pain with passage of stools, perianal itching, as well as abdominal pain/cramping. She denied difficulty cleaning. She denied nausea/vomiting. She had moderate, urge related, fecal incontinence to flatus on a daily basis, solid on a daily basis, and liquids on a several times weekly basis. She was wearing pads every day and had a history of 2 episiotomies from vaginal deliveries.     She reported relative constipation with daily bowel movements, but frequently hard stools. She reported straining in the past, improved prior to seeing me. She reported a frequent sense of incomplete evacuation. She reported sitting on the commode 30-40 min. She was taking MiraLax on a regular basis. On exam, she had external hemorrhoids in the right anterior column, with prolapsing internal hemorrhoids associated with it. She had low resting tone and weak squeeze. Anoscopy confirmed internal hemorrhoids, and I placed a hemorrhoidal band on the right anterior column. I gave her a regimen of fiber supplements, stool softeners, laxatives. We talked about medical and surgical treatment options for her hemorrhoids. I recommend colonoscopy as it had been a long time since her last one.     I took her for colonoscopy 10/4/15. She had a fair to poor prep. She had 2 tubular adenomas in the ascending colon and one tubular adenoma in the descending colon. I performed hemorrhoial banding 9/2015 and 9/2016. I recommended she continue a dietary regimen of fiber supplementation, fluid hydration, and MiraLAX. We again banded her RA column 10/6/16. I last saw her 1/31/17 and we banded her RL and LA columns. She is currently hospitalized and recently underwent AV node ablation. She feels much better. Her SOB is improved. I was asked to see her in house regarding her hemorrhoids. She states that she has not seen any bleeding since the last banding. She is currently off blood thinners. She still feels her hemorrhoids \"hanging out. \"  She is working hard not to strain and minimizing time on the commode. She remains on laxatives to keep the stools soft. She has soft \"skinny\" BMs.       REVIEW OF SYSTEMS:   Constitutional: No fevers/chills, +weakness, +fatigue, +lightheadedness, no night sweats, +insomnia, no appetite changes, no weight changes, +memory problems. Eyes: +changes in vision, no diplopia, no tearing, no scotomata, no pain   Ears/Nose/Throat/Mouth:  No change in hearing, no tinnitus, no bleeding, no epistaxis, no nasal discharge, +sinusitis. Respiratory: +cough, +dyspnea, +wheezing, +hemoptysis, +sleep apnea   Cardiovascular: +chest pains, no chest pressure, +chest tightness, no palpitations   Gastrointestinal: No abdominal pains, no bowel habit changes, no nausea, no emesis, no hematochezia, no melena, +cramping, +constipation, no diarrhea, no incontinence, +jaundice, no diverticulosis.  Otherwise per HPI   Genitourinary: No dysuria, no hematuria, +urinary frequency, no nocturia, no recent UTIs   Musculoskeletal: +back/neck pain, +arthritis, +joint pain/swelling, +muscle pains   Skin: No rashes, no itching, no ulcers   Hematologic:  +easy bruising, +anemia   Neurologic: No headaches, no dizziness, no seizures   Psychiatric: No depressive symptoms, +anxiety symptoms, no changes in mood, no substance abuse     PAST MEDICAL HISTORY:  COPD, BOBBY not on CPAP, asthma, HTN, CAD, GERD, depression, diverticulosis, hypothyroid, HLD, anemia, anxiety, gout, RLS, CHF, CKD, allergies, h/o breast cancer, osteoarthritis, personal history of colon polyps, afib, osteoarthritis,     PAST SURGICAL HISTORY:  Lap cholecystectomy, hysterectomy, one ovary removed, appendectomy, cardiac valve repair/replacement, left mastectomy with reconstruction, pacemaker, left TKA, colonoscopy 2015, recent AV ablation    ALLERGIES: tape, Benadryl, Demerol, morphine, Ativan    HOME MEDICATIONS:   Prior to Admission Medications   Prescriptions Last Dose Informant Patient Reported? Taking? CARBOXYMETHYLCELLULOS/GLYCERIN (REFRESH OPTIVE OP) 2/12/2017  Yes Yes   Sig: Apply  to eye. DOCUSATE SODIUM 2/12/2017  Yes Yes   Sig: Take 1 Cap by mouth two (2) times a day. OXYGEN-AIR DELIVERY SYSTEMS 2/12/2017  Yes Yes   Sig: by Does Not Apply route. 2-2.5 lpm cont. Omeprazole delayed release (PRILOSEC D/R) 20 mg tablet 2/12/2017  Yes Yes   Sig: Take 20 mg by mouth daily. prn    TIOTROPIUM BR/OLODATEROL HCL (STIOLTO RESPIMAT IN) 2/12/2017  Yes Yes   Sig: Take  by inhalation. NEW-PATIENT NOT TAKING, STATES IT MAKES HER JITTERY   allopurinol (ZYLOPRIM) 100 mg tablet 2/12/2017 at Unknown time  Yes Yes   Sig: Take 100 mg by mouth two (2) times a day. calcium carbonate (CALTREX) 600 mg (1,500 mg) tablet 2/12/2017  Yes Yes   Sig: Take 600 mg by mouth nightly. cholecalciferol (VITAMIN D3) 1,000 unit cap 2/12/2017  Yes Yes   Sig: Take  by mouth daily. cyanocobalamin (VITAMIN B-12) 250 mcg tablet 2/12/2017  Yes Yes   Sig: Take 1,000 mcg by mouth daily.    diazePAM (VALIUM) 5 mg tablet 2017 at Unknown time  Yes Yes   Sig: Take 2.5 mg by mouth nightly. dilTIAZem CD (CARDIZEM CD) 240 mg ER capsule 2017  No Yes   Sig: Take 1 Cap by mouth daily. hydrocortisone (ANUSOL-HC) 2.5 % rectal cream 2017  No Yes   Sig: Apply small amount to hemorrhoids/perianal skin TID PRN   levothyroxine (SYNTHROID) 88 mcg tablet 2017  Yes Yes   Sig: Take  by mouth Daily (before breakfast). metoprolol succinate (TOPROL XL) 50 mg XL tablet 2017 at Unknown time  Yes Yes   Sig: Take 50 mg by mouth nightly. nitroglycerin (NITRODUR) 0.4 mg/hr 2017  Yes Yes   Si Patch by TransDERmal route daily. nitroglycerin (NITROSTAT) 0.4 mg SL tablet 2017 at Unknown time  Yes Yes   Sig: by SubLINGual route every five (5) minutes as needed for Chest Pain.   polyethylene glycol (MIRALAX) 17 gram/dose powder 2017  Yes Yes   Sig: Take 17 g by mouth daily as needed. potassium chloride SR (K-TAB) 20 mEq tablet 2017  Yes Yes   Sig: Take 20 mEq by mouth two (2) times a day. pravastatin (PRAVACHOL) 40 mg tablet 2017  No Yes   Sig: Take 1 Tab by mouth daily. Indications: hyperlipidemia   promethazine (PHENERGAN) 25 mg tablet 2017  No Yes   Sig: Take 1 Tab by mouth every six (6) hours as needed. rOPINIRole (REQUIP) 2 mg tablet 2017  Yes Yes   Sig: Take 2 mg by mouth nightly. sertraline (ZOLOFT) 50 mg tablet 2017 at Unknown time  Yes Yes   Sig: Take 50 mg by mouth daily. spironolactone (ALDACTONE) 25 mg tablet 2017  No Yes   Sig: Take 1 Tab by mouth daily. torsemide (DEMADEX) 20 mg tablet 2017 at Unknown time  Yes Yes   Sig: Take 20 mg by mouth daily. warfarin (COUMADIN) 2.5 mg tablet 2017  Yes Yes   Sig: Take 2.5 mg by mouth nightly. Use as directed       Facility-Administered Medications: None       SOCIAL HISTORY: single, . Lives in [place]. She has 2 children and 8 grandchildren. She previously worked in a [employer] and later for [employer]. She is currently retired. Previous 3 pack per day x20 year tobacco use. Quit in 1996. No alcohol use. PREVENTION: last colonoscopy January 2010[facility]--polyps removed--5 year recall    FAMILY HISTORY: No colon or rectal cancer. No history of polyps. No breast, prostate, ovary, endometrial, gastric, or pancreatic cancers. Father had throat cancer and heart disease. Mother with heart disease. Brother with DM, CAD    PHYSICAL EXAM:  Blood pressure 108/55, pulse 83, temperature 96.6 °F (35.9 °C), resp. rate 18, height 5' (1.524 m), weight 181 lb 9.6 oz (82.4 kg), SpO2 99 %, not currently breastfeeding. Body mass index is 35.47 kg/(m^2). The patient was evaluated in the presence of a medical assistant  General: Normotensive, in no acute distress, well developed, well nourished appearing. She move slowly and uses continuous oxygen. Looks much better than last time I saw her in the office   Head:  AT/NC, no lesions   Eyes:  PERRLA, EOM's full, conjunctivae clear   Neck:  Supple, no masses, no lymphadenopathy, no thyromegaly, no carotid bruits   Chest:  Lungs diffusely decreased but otherwise clear, no rales, no rhonchi, no wheezes   Heart:  regular, afib, no rubs, no gallops   Abdomen:  Soft, no tenderness, no rebound, no guarding, no masses, nondistended. Well-healed lower midline incision   Back:  Normal curvature, no tenderness. Negative Suarez's punch. Extremities:  FROM, no deformities, no edema, no erythema   Neuro:  Physiologic, oriented x3, full affect, no localizing findings   Skin:  Normal, no rashes, no lesions noted. Rectal: External exam shows no excoriation, no fissures, no fistula openings, no erythema, and no abscesses. There is minimal external hemorrhoidal skin redundancy, with a slight RA prominence. There is no prolapsing component, but the mixed component of the disease still engorges slightly with Valsalva.  Superior gluteal cleft reveals no pits or signs suggestive of prior pilonidal disease. Palpation of the perianal tissues and ischiorectal fossae shows no tenderness or fluctuance. Valsalva reveals no prolapse of tissue. Anal wink is present.      SHONDA/anoscopy deferred. From 1/31/17:\"shows low resting tone and slightly weak voluntary squeeze. Valsalva reveals appropriate relaxation. No presacral masses. Rectocele is small-moderate and  present. Stool in the vault is absent. Stool is heme negative, grossly. Anoscopy was performed to evaluate the internal hemorrhoids. No fissures, no fistula openings, no distal anorectal polyps or masses. Slight irritation in the RL and LA positions. Otherwise, mucosa is unremarkable. \"     Colonoscopy note 10/4/15 reviewed by me--poor prep, ascending polyps 7 and 4 mm, both tubular adenomas. Descending polyp 5 mm, tubular adenoma    ASSESSMENT:  Internal hemorrhoids with complications of prolapse and bleeding--improved   S/p RA banding 9/21/15   S/p RA banding 9/12/16   S/p RA banding 10/6/16   S/p RL and LA banding 1/31/17  Blood in stool, due to above--resolved  Chronic constipation--improved  Personal history of colon polyps  Full incontinence of feces--improved  Anal/rectal pain--  Diffuse abdominal pain    RECOMMENDATIONS:  --she looks as good as she ever has on my exam today. I am glad the cardiac procedure has helped her.  --she currently has no bleeding, so I would not recommend any procedure. Will only consider reintervention for symptom recurrence. --she has no external hemorrhoids and no internal prolapse, despite her report of tissue \"swelling and hanging down. \"  I showed her son and explained it to him and her. I reassured her that everything looks good today. --Continue high-fiber diet, fluid hydration, and stool softeners. Continue twice daily MiraLAX.   --She may feel free to continue to use the over-the-counter creams as she is doing.  --Continue to minimize time on the commode and minimize straining  --continue to work on Kegel exercises regarding her fecal incontinence  --as previously discussed, given her multiple chronic medical comorbidities and impaired pulmonary function, I think any operative intervention would be a significant risk, and I would like to avoid that if at all possible. --Her last colonoscopy was 10/2015, but her prep was poor and she had polyps. Based on the polyps found and the quality of the prep, I recommended a 2-3 year recall. --she wishes to follow up with me in the office on an as-needed basis for recurrence of symptoms. Shiv Taylor MD  2/19/2017       Elements of this note have been created with speech-recognition software. As a result, errors in speech recognition may have occurred.

## 2017-02-19 NOTE — PROGRESS NOTES
Hospitalist Progress Note    2017  Admit Date: 2017  2:32 PM   NAME: Ty Coffman   :     DOS:              17  MRN:  [id number]   Attending: January Nicole MD  PCP:  Ruma Ponce MD  Treatment Team: Attending Provider: January Nicole MD; Consulting Provider: Ananda Ross MD; Consulting Provider: Maryetta Epley, DO; Utilization Review: Gladis Richardson; Consulting Provider: Mirian Orona NP; Care Manager: Anu Sprague; Consulting Provider: Elizabeth Conde MD; Consulting Provider: Shi Mcbride MD    DNR     SUBJECTIVE:   As previously documented: \" Ms. Bogdan Siegel is a very pleasant 78 yo F Who complains of increased SOb, nonproductive cough and b/l LE swelling in the last 1-2 months. Known with chronic diastolic CHF, CAD, bradycardia s/p pacemaker, , s/p bioprosthetic AVR,Chronic Afib on coumadin, severe pulmonary HTN with PAP 69 mmHg, chronic respiratory failure on NC 3L 24h/ , COPD, GERD, HTN, HLP, Obesity, BOBBY - not using CPAP. Seen by her cardiologist, Hussain Purcell on 17 and Demadex was increased to 40 mg PO daily, except MWF when was BID. Ms. Bogdan Siegel states that her leg swelling improved, although her cough and SOB persisted. Seen by PCP on  and placed on oral steroids and Albuterol that she couldn't complete because the albuterol made ermias jittery. No improvement with treatment. Seen at [facility] ED on  with low BP and and thought that she is dehydrated due to increased diuretics and received IV fluids. She felt better for one day, although because of increased in her symptoms she presented to ED today. BP was 80/51 mmHg and received 1L NC bolus in Ed per ED provider. CXR w/o acute pathology. No spikes of fever since her symptoms started. Influenza screen negative. WBC:3.4. Cr:1.48, around her baseline. \"       17   Ms. [address] alert - states she is feeling better. Heart rate is improved- 70's- 90's s/p ablation.  BP improved as well. Lungs are clearer today. 10+ ROS reviewed and negative except for positive in HPI.    Allergies   Allergen Reactions    Adhesive Tape Rash    Benadryl [Diphenhydramine Hcl] Other (comments)     Jitters      Demerol [Meperidine] Anxiety    Morphine Other (comments)     Confusion    Sulfa (Sulfonamide Antibiotics) Anxiety    Lorazepam Anxiety     Current Facility-Administered Medications   Medication Dose Route Frequency Provider Last Rate Last Dose    0.9% sodium chloride infusion 250 mL  250 mL IntraVENous PRN Erika Blackman MD        torsemide [facility]) tablet 40 mg  40 mg Oral DAILY Erika Blackman MD   40 mg at 02/19/17 0902    lip protectant (BLISTEX) ointment   Topical PRN Priscilla Oh MD        magnesium citrate solution 296 mL  296 mL Oral PRN Cherly Harada, NP        sodium chloride (NS) flush 5-10 mL  5-10 mL IntraVENous Q8H Fatimah Owen MD   5 mL at 02/19/17 0534    sodium chloride (NS) flush 5-10 mL  5-10 mL IntraVENous PRN Fatimah Owen MD   5 mL at 02/19/17 0756    polyethylene glycol (MIRALAX) packet 17 g  17 g Oral DAILY Cherly Harada, NP   17 g at 02/19/17 0904    sodium chloride (NS) flush 5-10 mL  5-10 mL IntraVENous Q8H Erika Blackman MD   5 mL at 02/18/17 0504    sodium chloride (NS) flush 5-10 mL  5-10 mL IntraVENous PRN Erika Blackman MD   10 mL at 02/17/17 8710    diazePAM (VALIUM) tablet 2.5 mg  2.5 mg Oral Q8H PRN Gigi Dillon NP   2.5 mg at 02/14/17 2157    senna-docusate (PERICOLACE) 8.6-50 mg per tablet 1 Tab  1 Tab Oral BID Gigi Dillon NP   1 Tab at 02/19/17 0902    traZODone (DESYREL) tablet 50 mg  50 mg Oral QHS PRN Aaron Mcdonnell MD   50 mg at 02/14/17 2156    gabapentin (NEURONTIN) capsule 100 mg  100 mg Oral TID Evelia Merlin, MD   100 mg at 02/19/17 0904    sodium chloride (NS) flush 5-10 mL  5-10 mL IntraVENous Q8H Nika Estevez MD   10 mL at 02/17/17 0546    sodium chloride (NS) flush 5-10 mL  5-10 mL IntraVENous PRN Kolby Knutson MD        ondansetron [facility]) injection 4 mg  4 mg IntraVENous Q4H PRN Oralia Musa NP   4 mg at 17 2334    acetaminophen (TYLENOL) tablet 650 mg  650 mg Oral Q4H PRN Oralia Musa NP   650 mg at 17 0453    levalbuterol (XOPENEX) nebulizer soln 1.25 mg/3 mL  1.25 mg Nebulization Q4H PRN Oralia Musa NP        polyvinyl alcohol (LIQUIFILM TEARS) 1.4 % ophthalmic solution 1 Drop  1 Drop Both Eyes PRN Oralia Musa NP   1 Drop at 17 1351    hydrocortisone (ANUSOL-HC) 2.5 % rectal cream   PeriANAL BID PRN Oralia Musa NP        levothyroxine (SYNTHROID) tablet 88 mcg  88 mcg Oral ACB Azeb Pino NP   88 mcg at 17 0533    pantoprazole (PROTONIX) tablet 40 mg  40 mg Oral ACB Azeb Pino NP   40 mg at 17 0534    promethazine (PHENERGAN) tablet 25 mg  25 mg Oral Q6H PRN Oralia Musa NP        rOPINIRole (REQUIP) tablet 2 mg  2 mg Oral QHS Oralia Musa NP   2 mg at 17 2139    sertraline (ZOLOFT) tablet 50 mg  50 mg Oral QHS Azeb Pino NP   50 mg at 17 2139    spironolactone (ALDACTONE) tablet 25 mg  25 mg Oral DAILY Oralia Musa NP   25 mg at 17 0904             PHYSICAL EXAM     Visit Vitals    /57 (BP 1 Location: Left arm, BP Patient Position: At rest)    Pulse 83    Temp 97.5 °F (36.4 °C)    Resp 18    Ht 5' (1.524 m)    Wt 82.4 kg (181 lb 9.6 oz)    SpO2 100%    Breastfeeding No    BMI 35.47 kg/m2      Temp (24hrs), Av.3 °F (36.3 °C), Min:96.6 °F (35.9 °C), Max:97.9 °F (36.6 °C)    Oxygen Therapy  O2 Sat (%): 100 % (17 1150)  Pulse via Oximetry: 86 beats per minute (17 2020)  O2 Device: Nasal cannula (17 1150)  O2 Flow Rate (L/min): 3.5 l/min (17 0748)    Intake/Output Summary (Last 24 hours) at 17 1229  Last data filed at 17 1142   Gross per 24 hour   Intake             1560 ml   Output 1400 ml   Net              160 ml        Physical Exam:  General:         AAOx3. NAD. Afebrile. Cooperative. Obese. HEENT:               NCAT. No obvious deformity. Nares normal. No drainage  Lungs:                 Decreased air entry b/l at the base. Wheezing much improved  Cardiovascular:   Irregular irregularity. No m/r/g. +2 pitting edema  b/l. +2 PT/DT pulses b/l. Abdomen:       S/nt/nd. Bowel sounds normal. .   Skin:         No rashes or lesions. Not Jaundiced  Neurologic:    AAOx3. CN II- XII grossly WNL. No gross focal deficit. Moves all extremities  Psychiatric:         Good mood. Normal affect. DIAGNOSTIC STUDIES      Data Review:   Recent Results (from the past 24 hour(s))   PROTHROMBIN TIME + INR    Collection Time: 02/19/17  6:05 AM   Result Value Ref Range    Prothrombin time 12.6 (H) 9.6 - 12.0 sec    INR 1.2 0.9 - 1.2     CBC WITH AUTOMATED DIFF    Collection Time: 02/19/17  6:05 AM   Result Value Ref Range    WBC 4.1 (L) 4.3 - 11.1 K/uL    RBC 2.52 (L) 4.05 - 5.25 M/uL    HGB 7.6 (L) 11.7 - 15.4 g/dL    HCT 25.5 (L) 35.8 - 46.3 %    .2 (H) 79.6 - 97.8 FL    MCH 30.2 26.1 - 32.9 PG    MCHC 29.8 (L) 31.4 - 35.0 g/dL    RDW 14.7 (H) 11.9 - 14.6 %    PLATELET 259 (L) 406 - 450 K/uL    MPV 11.0 10.8 - 14.1 FL    DF AUTOMATED      NEUTROPHILS 74 43 - 78 %    LYMPHOCYTES 13 13 - 44 %    MONOCYTES 9 4.0 - 12.0 %    EOSINOPHILS 3 0.5 - 7.8 %    BASOPHILS 0 0.0 - 2.0 %    IMMATURE GRANULOCYTES 0.7 0.0 - 5.0 %    ABS. NEUTROPHILS 3.1 1.7 - 8.2 K/UL    ABS. LYMPHOCYTES 0.5 0.5 - 4.6 K/UL    ABS. MONOCYTES 0.4 0.1 - 1.3 K/UL    ABS. EOSINOPHILS 0.1 0.0 - 0.8 K/UL    ABS. BASOPHILS 0.0 0.0 - 0.2 K/UL    ABS. IMM.  GRANS. 0.0 0.0 - 0.5 K/UL   METABOLIC PANEL, BASIC    Collection Time: 02/19/17  6:05 AM   Result Value Ref Range    Sodium 146 (H) 136 - 145 mmol/L    Potassium 3.8 3.5 - 5.1 mmol/L    Chloride 104 98 - 107 mmol/L    CO2 36 (H) 21 - 32 mmol/L    Anion gap 6 (L) 7 - 16 mmol/L Glucose 101 (H) 65 - 100 mg/dL    BUN 27 (H) 8 - 23 MG/DL    Creatinine 0.97 0.6 - 1.0 MG/DL    GFR est AA >60 >60 ml/min/1.73m2    GFR est non-AA 60 (L) >60 ml/min/1.73m2    Calcium 8.0 (L) 8.3 - 10.4 MG/DL   MAGNESIUM    Collection Time: 02/19/17  6:05 AM   Result Value Ref Range    Magnesium 2.2 1.8 - 2.4 mg/dL     Imaging /Procedures /Studies:    CXR Results  (Last 48 hours)    None        Echocardiogram: SUMMARY:  -  Left ventricle: Systolic function was normal. Ejection fraction was  estimated in the range of 55 % to 60 %. There were no regional wall motion  abnormalities. Wall thickness was mildly to moderately increased. -  Right ventricle: The ventricle was dilated. Systolic function was reduced. There was severe pulmonary artery hypertension.  -  Left atrium: The atrium was markedly dilated. -  Right atrium: The atrium was markedly dilated. -  Inferior vena cava, hepatic veins: The inferior vena cava was dilated. -  Aortic valve: A bioprosthesis was present. It exhibited stenosis. There   Was moderate stenosis. The aortic valve area by the continuity equation was 1.1   cm2.  -  Mitral valve: There was moderate to marked annular calcification. There   Was moderate-marked calcification. A annular ring prosthesis was present. It  exhibited stenosis and reduced motion. There was moderate stenosis. There was  mild regurgitation. The mitral valve area by pressure half time was 1.7 cm2.  -  Tricuspid valve: There was severe regurgitation. -  Pulmonic valve: There was mild to moderate regurgitation. -  Pericardium: There was a left pleural effusion.       Labs and Studies from previous 24 hours have been personally reviewed by myself    Casey Seals 96 Problems    Diagnosis Date Noted    Acute CHF (congestive heart failure) ([facility] 02/12/2017    Pulmonary hypertension ([facility] [address] [facility] 02/12/2017    Aortic valve replaced 01/09/2017    Warfarin anticoagulation 01/09/2017    Chronic diastolic congestive heart failure ([email or web address] 09/09/2016    Sleep apnea 11/02/2015    Chronic atrial fibrillation ([email or web address] 10/17/2011    ARF (acute renal failure) ([facility] 02/24/2010    BOBBY (obstructive sleep apnea) 12/04/2009    Aortic Valve Bioprosthesis Present 03/07/2009    Hypotension 03/01/2009       Plan:  SOB:  Likely due to her severe pulmonary hypertension  and possible cor pulmonale along with her known aortic valve stenosis, mitral valve disease. RSVP:65-70 mmHg. And compounded with pulmonary edema- improved    Pulmonary HTN: patient and her family had initially refused further evaluation inclusive CTA chest and VQ scan. They wanted more comfort care efforts. And Palliative care- following. However they have agreed for AV node ablation with cardiology and that went well. Hypotension: BP is currently improved. Permanent AFib - resolved s/p ablation as per cardiology    Anemia: - being transfused 2 units of prbcs today    RLS: continue Gabapentin 100 mg TID    Hypernatremia- encourage free water intake. Fluid restrict to 1.5L daily    DVT Prophylaxis: held because of anemia  CODE Status: DNR  Plan of Care Discussed with: patient family.  Care team   Medical Risk: high  Disposition: pending- likely home with home health when ok from cardiology point of view    Frederick Severe, MD  02/19/17

## 2017-02-19 NOTE — PROGRESS NOTES
Bedside and Verbal shift change report received from [facility]. Report included the following information SBAR, Kardex, Procedure Summary, Intake/Output, MAR, Recent Results and Cardiac Rhythm PACED.

## 2017-02-19 NOTE — PROGRESS NOTES
Notified by Monitor Room that pt is having NSVT some runs 20 beats long some runs 3-10 beats long. Pt assessed. States she feels fine.  Asymptomatic no needs voiced

## 2017-02-19 NOTE — PROGRESS NOTES
Verbal bedside report given to [address], oncoming RN. Patient's situation, background, assessment and recommendations provided. Opportunity for questions provided. Oncoming RN assumed care of patient.

## 2017-02-19 NOTE — PROGRESS NOTES
Dr. Florencio Krishnamurthy and Margot Brooks, FELISHA notified of patient's EKG changes. No new orders at this time.

## 2017-02-20 ENCOUNTER — APPOINTMENT (OUTPATIENT)
Dept: ULTRASOUND IMAGING | Age: 77
DRG: 270 | End: 2017-02-20
Attending: INTERNAL MEDICINE
Payer: MEDICARE

## 2017-02-20 ENCOUNTER — ANESTHESIA EVENT (OUTPATIENT)
Dept: SURGERY | Age: 77
DRG: 270 | End: 2017-02-20
Payer: MEDICARE

## 2017-02-20 ENCOUNTER — ANESTHESIA (OUTPATIENT)
Dept: SURGERY | Age: 77
DRG: 270 | End: 2017-02-20
Payer: MEDICARE

## 2017-02-20 ENCOUNTER — APPOINTMENT (OUTPATIENT)
Dept: GENERAL RADIOLOGY | Age: 77
DRG: 270 | End: 2017-02-20
Attending: SURGERY
Payer: MEDICARE

## 2017-02-20 LAB
ABO + RH BLD: NORMAL
ABO + RH BLD: NORMAL
APTT PPP: 24.2 SEC (ref 23.5–31.7)
BASOPHILS # BLD AUTO: 0 K/UL (ref 0–0.2)
BASOPHILS # BLD: 0 % (ref 0–2)
BLD PROD TYP BPU: NORMAL
BLD PROD TYP BPU: NORMAL
BLOOD GROUP ANTIBODIES SERPL: NORMAL
BLOOD GROUP ANTIBODIES SERPL: NORMAL
BPU ID: NORMAL
BPU ID: NORMAL
CROSSMATCH RESULT,%XM: NORMAL
CROSSMATCH RESULT,%XM: NORMAL
DIFFERENTIAL METHOD BLD: ABNORMAL
EOSINOPHIL # BLD: 0.1 K/UL (ref 0–0.8)
EOSINOPHIL NFR BLD: 1 % (ref 0.5–7.8)
ERYTHROCYTE [DISTWIDTH] IN BLOOD BY AUTOMATED COUNT: 16.2 % (ref 11.9–14.6)
HCT VFR BLD AUTO: 35.3 % (ref 35.8–46.3)
HCT VFR BLD AUTO: 35.4 % (ref 35.8–46.3)
HGB BLD-MCNC: 10.7 G/DL (ref 11.7–15.4)
HGB BLD-MCNC: 10.8 G/DL (ref 11.7–15.4)
IMM GRANULOCYTES # BLD: 0 K/UL (ref 0–0.5)
IMM GRANULOCYTES NFR BLD AUTO: 0.2 % (ref 0–5)
INR PPP: 1 (ref 0.9–1.2)
LYMPHOCYTES # BLD AUTO: 7 % (ref 13–44)
LYMPHOCYTES # BLD: 0.4 K/UL (ref 0.5–4.6)
MAGNESIUM SERPL-MCNC: 2.2 MG/DL (ref 1.8–2.4)
MCH RBC QN AUTO: 30.4 PG (ref 26.1–32.9)
MCHC RBC AUTO-ENTMCNC: 30.6 G/DL (ref 31.4–35)
MCV RBC AUTO: 99.4 FL (ref 79.6–97.8)
MONOCYTES # BLD: 0.4 K/UL (ref 0.1–1.3)
MONOCYTES NFR BLD AUTO: 7 % (ref 4–12)
NEUTS SEG # BLD: 4.6 K/UL (ref 1.7–8.2)
NEUTS SEG NFR BLD AUTO: 85 % (ref 43–78)
PLATELET # BLD AUTO: 109 K/UL (ref 150–450)
PMV BLD AUTO: 10.8 FL (ref 10.8–14.1)
PROTHROMBIN TIME: 11.4 SEC (ref 9.6–12)
RBC # BLD AUTO: 3.55 M/UL (ref 4.05–5.25)
SPECIMEN EXP DATE BLD: NORMAL
SPECIMEN EXP DATE BLD: NORMAL
STATUS OF UNIT,%ST: NORMAL
STATUS OF UNIT,%ST: NORMAL
UNIT DIVISION, %UDIV: 0
UNIT DIVISION, %UDIV: 0
WBC # BLD AUTO: 5.5 K/UL (ref 4.3–11.1)

## 2017-02-20 PROCEDURE — 74011250636 HC RX REV CODE- 250/636

## 2017-02-20 PROCEDURE — C1757 CATH, THROMBECTOMY/EMBOLECT: HCPCS | Performed by: SURGERY

## 2017-02-20 PROCEDURE — C1769 GUIDE WIRE: HCPCS | Performed by: SURGERY

## 2017-02-20 PROCEDURE — 77030018673: Performed by: SURGERY

## 2017-02-20 PROCEDURE — 77030002996 HC SUT SLK J&J -A: Performed by: SURGERY

## 2017-02-20 PROCEDURE — C1894 INTRO/SHEATH, NON-LASER: HCPCS | Performed by: SURGERY

## 2017-02-20 PROCEDURE — 77030002933 HC SUT MCRYL J&J -A: Performed by: SURGERY

## 2017-02-20 PROCEDURE — 83735 ASSAY OF MAGNESIUM: CPT | Performed by: NURSE PRACTITIONER

## 2017-02-20 PROCEDURE — 86900 BLOOD TYPING SEROLOGIC ABO: CPT | Performed by: ANESTHESIOLOGY

## 2017-02-20 PROCEDURE — 76060000034 HC ANESTHESIA 1.5 TO 2 HR: Performed by: SURGERY

## 2017-02-20 PROCEDURE — 77030011640 HC PAD GRND REM COVD -A: Performed by: SURGERY

## 2017-02-20 PROCEDURE — 77030002986 HC SUT PROL J&J -A: Performed by: SURGERY

## 2017-02-20 PROCEDURE — 74011250637 HC RX REV CODE- 250/637: Performed by: NURSE PRACTITIONER

## 2017-02-20 PROCEDURE — 85610 PROTHROMBIN TIME: CPT | Performed by: NURSE PRACTITIONER

## 2017-02-20 PROCEDURE — 76210000063 HC OR PH I REC FIRST 0.5 HR: Performed by: SURGERY

## 2017-02-20 PROCEDURE — 85018 HEMOGLOBIN: CPT | Performed by: NURSE PRACTITIONER

## 2017-02-20 PROCEDURE — 36415 COLL VENOUS BLD VENIPUNCTURE: CPT | Performed by: NURSE PRACTITIONER

## 2017-02-20 PROCEDURE — 74011250636 HC RX REV CODE- 250/636: Performed by: SURGERY

## 2017-02-20 PROCEDURE — 74011250636 HC RX REV CODE- 250/636: Performed by: INTERNAL MEDICINE

## 2017-02-20 PROCEDURE — 85025 COMPLETE CBC W/AUTO DIFF WBC: CPT | Performed by: INTERNAL MEDICINE

## 2017-02-20 PROCEDURE — 74011000250 HC RX REV CODE- 250: Performed by: SURGERY

## 2017-02-20 PROCEDURE — 76010000162 HC OR TIME 1.5 TO 2 HR INTENSV-TIER 1: Performed by: SURGERY

## 2017-02-20 PROCEDURE — 77030010514 HC APPL CLP LIG COVD -B: Performed by: SURGERY

## 2017-02-20 PROCEDURE — 97530 THERAPEUTIC ACTIVITIES: CPT

## 2017-02-20 PROCEDURE — 74011000250 HC RX REV CODE- 250

## 2017-02-20 PROCEDURE — 85730 THROMBOPLASTIN TIME PARTIAL: CPT | Performed by: INTERNAL MEDICINE

## 2017-02-20 PROCEDURE — 77030034888 HC SUT PROL 2 J&J -B: Performed by: SURGERY

## 2017-02-20 PROCEDURE — 93922 UPR/L XTREMITY ART 2 LEVELS: CPT

## 2017-02-20 PROCEDURE — 77030031139 HC SUT VCRL2 J&J -A: Performed by: SURGERY

## 2017-02-20 PROCEDURE — 65610000006 HC RM INTENSIVE CARE

## 2017-02-20 PROCEDURE — 77030002987 HC SUT PROL J&J -B: Performed by: SURGERY

## 2017-02-20 PROCEDURE — 74011250637 HC RX REV CODE- 250/637: Performed by: INTERNAL MEDICINE

## 2017-02-20 PROCEDURE — 74011250637 HC RX REV CODE- 250/637: Performed by: HOSPITALIST

## 2017-02-20 RX ORDER — CEFAZOLIN SODIUM IN 0.9 % NACL 2 G/50 ML
2 INTRAVENOUS SOLUTION, PIGGYBACK (ML) INTRAVENOUS
Status: COMPLETED | OUTPATIENT
Start: 2017-02-20 | End: 2017-02-20

## 2017-02-20 RX ORDER — ALBUTEROL SULFATE 0.83 MG/ML
2.5 SOLUTION RESPIRATORY (INHALATION) AS NEEDED
Status: DISCONTINUED | OUTPATIENT
Start: 2017-02-20 | End: 2017-02-20 | Stop reason: HOSPADM

## 2017-02-20 RX ORDER — HEPARIN SODIUM 5000 [USP'U]/100ML
18-36 INJECTION, SOLUTION INTRAVENOUS
Status: DISCONTINUED | OUTPATIENT
Start: 2017-02-20 | End: 2017-02-21

## 2017-02-20 RX ORDER — FENTANYL CITRATE 50 UG/ML
INJECTION, SOLUTION INTRAMUSCULAR; INTRAVENOUS AS NEEDED
Status: DISCONTINUED | OUTPATIENT
Start: 2017-02-20 | End: 2017-02-20 | Stop reason: HOSPADM

## 2017-02-20 RX ORDER — PROPOFOL 10 MG/ML
INJECTION, EMULSION INTRAVENOUS AS NEEDED
Status: DISCONTINUED | OUTPATIENT
Start: 2017-02-20 | End: 2017-02-20 | Stop reason: HOSPADM

## 2017-02-20 RX ORDER — BUPIVACAINE HYDROCHLORIDE 2.5 MG/ML
INJECTION, SOLUTION EPIDURAL; INFILTRATION; INTRACAUDAL AS NEEDED
Status: DISCONTINUED | OUTPATIENT
Start: 2017-02-20 | End: 2017-02-20 | Stop reason: HOSPADM

## 2017-02-20 RX ORDER — HEPARIN SODIUM 5000 [USP'U]/ML
INJECTION, SOLUTION INTRAVENOUS; SUBCUTANEOUS AS NEEDED
Status: DISCONTINUED | OUTPATIENT
Start: 2017-02-20 | End: 2017-02-20 | Stop reason: HOSPADM

## 2017-02-20 RX ORDER — HYDROMORPHONE HYDROCHLORIDE 2 MG/ML
0.5 INJECTION, SOLUTION INTRAMUSCULAR; INTRAVENOUS; SUBCUTANEOUS
Status: DISCONTINUED | OUTPATIENT
Start: 2017-02-20 | End: 2017-02-20 | Stop reason: HOSPADM

## 2017-02-20 RX ORDER — SODIUM CHLORIDE, SODIUM LACTATE, POTASSIUM CHLORIDE, CALCIUM CHLORIDE 600; 310; 30; 20 MG/100ML; MG/100ML; MG/100ML; MG/100ML
INJECTION, SOLUTION INTRAVENOUS
Status: DISCONTINUED | OUTPATIENT
Start: 2017-02-20 | End: 2017-02-20 | Stop reason: HOSPADM

## 2017-02-20 RX ORDER — SODIUM CHLORIDE 0.9 % (FLUSH) 0.9 %
5-10 SYRINGE (ML) INJECTION AS NEEDED
Status: DISCONTINUED | OUTPATIENT
Start: 2017-02-20 | End: 2017-02-20 | Stop reason: HOSPADM

## 2017-02-20 RX ORDER — KETOROLAC TROMETHAMINE 15 MG/ML
15 INJECTION, SOLUTION INTRAMUSCULAR; INTRAVENOUS
Status: DISCONTINUED | OUTPATIENT
Start: 2017-02-20 | End: 2017-02-21

## 2017-02-20 RX ORDER — SODIUM CHLORIDE, SODIUM LACTATE, POTASSIUM CHLORIDE, CALCIUM CHLORIDE 600; 310; 30; 20 MG/100ML; MG/100ML; MG/100ML; MG/100ML
50 INJECTION, SOLUTION INTRAVENOUS CONTINUOUS
Status: DISCONTINUED | OUTPATIENT
Start: 2017-02-20 | End: 2017-02-20 | Stop reason: HOSPADM

## 2017-02-20 RX ORDER — LIDOCAINE HYDROCHLORIDE 20 MG/ML
INJECTION, SOLUTION EPIDURAL; INFILTRATION; INTRACAUDAL; PERINEURAL AS NEEDED
Status: DISCONTINUED | OUTPATIENT
Start: 2017-02-20 | End: 2017-02-20 | Stop reason: HOSPADM

## 2017-02-20 RX ORDER — PROPOFOL 10 MG/ML
INJECTION, EMULSION INTRAVENOUS
Status: DISCONTINUED | OUTPATIENT
Start: 2017-02-20 | End: 2017-02-20 | Stop reason: HOSPADM

## 2017-02-20 RX ADMIN — HEPARIN SODIUM AND DEXTROSE 18 UNITS/KG/HR: 5000; 5 INJECTION INTRAVENOUS at 18:25

## 2017-02-20 RX ADMIN — FENTANYL CITRATE 25 MCG: 50 INJECTION, SOLUTION INTRAMUSCULAR; INTRAVENOUS at 20:17

## 2017-02-20 RX ADMIN — PROPOFOL 30 MG: 10 INJECTION, EMULSION INTRAVENOUS at 20:03

## 2017-02-20 RX ADMIN — Medication 5 ML: at 14:00

## 2017-02-20 RX ADMIN — HEPARIN SODIUM 4000 UNITS: 5000 INJECTION, SOLUTION INTRAVENOUS; SUBCUTANEOUS at 20:42

## 2017-02-20 RX ADMIN — PANTOPRAZOLE SODIUM 40 MG: 40 TABLET, DELAYED RELEASE ORAL at 05:24

## 2017-02-20 RX ADMIN — TORSEMIDE 40 MG: 20 TABLET ORAL at 08:35

## 2017-02-20 RX ADMIN — Medication 10 ML: at 05:24

## 2017-02-20 RX ADMIN — CEFAZOLIN 2 G: 1 INJECTION, POWDER, FOR SOLUTION INTRAMUSCULAR; INTRAVENOUS; PARENTERAL at 20:03

## 2017-02-20 RX ADMIN — SODIUM CHLORIDE, SODIUM LACTATE, POTASSIUM CHLORIDE, CALCIUM CHLORIDE: 600; 310; 30; 20 INJECTION, SOLUTION INTRAVENOUS at 19:57

## 2017-02-20 RX ADMIN — SPIRONOLACTONE 25 MG: 25 TABLET, FILM COATED ORAL at 08:35

## 2017-02-20 RX ADMIN — PROPOFOL 50 MCG/KG/MIN: 10 INJECTION, EMULSION INTRAVENOUS at 20:03

## 2017-02-20 RX ADMIN — FENTANYL CITRATE 25 MCG: 50 INJECTION, SOLUTION INTRAMUSCULAR; INTRAVENOUS at 20:13

## 2017-02-20 RX ADMIN — GABAPENTIN 100 MG: 100 CAPSULE ORAL at 08:35

## 2017-02-20 RX ADMIN — LEVOTHYROXINE SODIUM 88 MCG: 0.09 TABLET ORAL at 05:24

## 2017-02-20 RX ADMIN — ACETAMINOPHEN 650 MG: 325 TABLET, FILM COATED ORAL at 14:15

## 2017-02-20 RX ADMIN — POLYETHYLENE GLYCOL 3350 17 G: 17 POWDER, FOR SOLUTION ORAL at 08:36

## 2017-02-20 RX ADMIN — ACETAMINOPHEN 650 MG: 325 TABLET, FILM COATED ORAL at 05:24

## 2017-02-20 RX ADMIN — LIDOCAINE HYDROCHLORIDE 60 MG: 20 INJECTION, SOLUTION EPIDURAL; INFILTRATION; INTRACAUDAL; PERINEURAL at 20:03

## 2017-02-20 RX ADMIN — FENTANYL CITRATE 50 MCG: 50 INJECTION, SOLUTION INTRAMUSCULAR; INTRAVENOUS at 20:08

## 2017-02-20 NOTE — CONSULTS
[address] [facility]   [address], [address] [facility]. [address] FAX: [phone]    Vascular Surgery Consult    Subjective:      Per HPI: Known with chronic diastolic CHF, CAD, bradycardia s/p pacemaker, , s/p bioprosthetic AVR,Chronic Afib on coumadin, severe pulmonary HTN with PAP 69 mmHg, chronic respiratory failure on NC 3L 24h/7 , COPD, GERD, HTN, HLP, Obesity, BOBBY - not using CPAP. Seen by her cardiologist, Vale Jimenez on 1/9/17 and Demadex was increased to 40 mg PO daily, except MWF when was BID. Ms. Radha Sommers states that her leg swelling improved, although her cough and SOB persisted. Seen by PCP on 1/31 and placed on oral steroids and Albuterol that she couldn't complete because the albuterol made ermias jittery. No improvement with treatment. Seen at [facility] ED on 2/9 with low BP and and thought that she is dehydrated due to increased diuretics and received IV fluids. She felt better for one day, although because of increased in her symptoms she presented to ED on 2/12/17. BP was 80/51 mmHg and received 1L NC bolus in Ed per ED provider. CXR w/o acute pathology. No spikes of fever since her symptoms started. Influenza screen negative. WBC:3.4. Cr:1.48, around her baseline. Patient underwent a 1. AV Node ablation. 2. Nitza-Procedure Device reprogramming by Dr. Maureen Quiroz on 2/17/17. She has been doing well until today when her right foot became numb and cold.  We are asked to evaluate the patient.        Past Medical History   Diagnosis Date    Abnormal glucose 8/5/2016    Abscess 8/5/2016    Acute encephalopathy 7/8/2016    Acute renal failure ([address] 12/3/2009    Afib [facility]     Anemia     Ankle swelling 8/5/2016    Anxiety 8/5/2016    Aortic Valve Bioprosthesis Present 3/7/2009    ARF (acute renal failure) (HCC) 2/24/2010    Arthritis     Asthma     Atopic dermatitis 8/5/2016    Atrial fibrillation (HCC) 3/7/2009    Autonomic orthostatic hypotension 8/5/2016    AV block 10/17/2011    Back pain 8/5/2016    Bradycardia (Symptomatic) 11/7/2011    CAD (coronary artery disease)     Cancer (HCC) 1990     breast (left)    Cardiac pacemaker 11/2/2015    Cardiogenic shock (HCC) 10/17/2011    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    Cellulitis 8/5/2016    Chest pain 8/5/2016    Chronic atrial fibrillation (HCC) 10/17/2011    Chronic depression 8/5/2016    Chronic depression 8/5/2016    Chronic obstructive pulmonary disease ([address] 3/8/2009    Chronic pain     CKD (chronic kidney disease) stage 3, GFR 30-59 ml/min 12/4/2009    Congestive heart failure (CHF) ([address] 11/2/2015    COPD      O2 AT 3 L NC    CRI (chronic renal insufficiency) 8/5/2016    Degeneration of cervical intervertebral disc 8/5/2016    Degenerative arthritis of left knee 2/27/2009    Dehydration 3/1/1877    Diastolic heart failure (HCC)     Digoxin toxicity 2/24/2010    Disorder of sweat glands 8/5/2016    Diverticulosis of large intestine without diverticulitis 2015    Dyspnea 8/5/2016    Eczema 8/5/2016    Epigastric abdominal pain 2/24/2010    GERD (gastroesophageal reflux disease)     Gout 8/5/2016    H/O mitral valve repair, 2003 6/27/2016    Heart failure ([address]     HTN (hypertension) 8/5/2016    Hx of colonic polyp 2015     adenoma    Hyperkalemia 10/17/2011    Hyperlipidemia 8/5/2016    Hypertension     Hypokalemia 2/24/2010    Hypothyroidism 12/4/2009    Ill-defined condition      CARPEL TUNNEL SYNDROME, BILAT HANDS    Knee pain 8/5/2016    Leg cramps 8/5/2016    Mitral stenosis with insufficiency 11/2/2015     Prior MV repair 2003.      Nausea and vomiting 2/24/2010    Obesity 11/2/2015    BOBBY (obstructive sleep apnea) 12/4/2009    Osteoarthritis 8/5/2016    Osteopenia 8/5/2016    Other long term (current) drug therapy 8/5/2016    Palpitations 11/2/2015    Rash 8/5/2016    Rectal bleeding 10/27/2011    Rectocele 2015    Respiratory insufficiency 11/2/2015    Rheumatic aortic stenosis 2015    Rheumatic heart disease 2015    Right hip pain 2016    RLS (restless legs syndrome) 2016    Sick sinus syndrome ([facility] 2016    Skin infection 2016    Sleep apnea 2015    Tachycardia 2016    Thrombocytopenia, unspecified ([facility] [address] [facility] 2012    Thyroid disease     Urticaria 2016    Vertigo 2016     Past Surgical History   Procedure Laterality Date    Hx appendectomy      Hx cholecystectomy      Hx gyn       hysterectomy still have ovaries    Hx mastectomy       left mastectomy    Hx pacemaker      Hx breast reconstruction      Pr cardiac surg procedure unlist       valve repair and replacement    Hx orthopaedic       knee surgery      Family History   Problem Relation Age of Onset    Heart Disease Mother     Cancer Father      Throat    Heart Disease Father     Cancer Sister      Breast, Colon    Heart Disease Sister     Breast Cancer Sister 79      from disease    Cancer Maternal Grandmother     Cancer Brother     Diabetes Brother     Heart Disease Brother     Psychiatric Disorder Paternal Grandfather     Cancer Maternal Aunt      Breast    Diabetes Son     Alcohol abuse Neg Hx     Arthritis-rheumatoid Neg Hx     Asthma Neg Hx     Bleeding Prob Neg Hx     Elevated Lipids Neg Hx     Headache Neg Hx     Migraines Neg Hx     Hypertension Neg Hx     Lung Disease Neg Hx     Mental Retardation Neg Hx     Stroke Neg Hx      Social History     Social History    Marital status:      Spouse name: N/A    Number of children: N/A    Years of education: N/A     Occupational History    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    cotton mill      6 years     Social History Main Topics    Smoking status: Former Smoker     Types: Cigarettes     Quit date: 1996    Smokeless tobacco: Never Used      Comment: quit     Alcohol use No    Drug use: No    Sexual activity: Not Currently     Other Topics Concern    None     Social History Narrative      Current Facility-Administered Medications   Medication Dose Route Frequency    meperidine (DEMEROL) injection 12.5 mg  12.5 mg IntraVENous Q4H PRN    0.9% sodium chloride infusion 250 mL  250 mL IntraVENous PRN    torsemide (DEMADEX) tablet 40 mg  40 mg Oral DAILY    lip protectant (BLISTEX) ointment   Topical PRN    magnesium citrate solution 296 mL  296 mL Oral PRN    polyethylene glycol (MIRALAX) packet 17 g  17 g Oral DAILY    diazePAM (VALIUM) tablet 2.5 mg  2.5 mg Oral Q8H PRN    senna-docusate (PERICOLACE) 8.6-50 mg per tablet 1 Tab  1 Tab Oral BID    traZODone (DESYREL) tablet 50 mg  50 mg Oral QHS PRN    gabapentin (NEURONTIN) capsule 100 mg  100 mg Oral TID    sodium chloride (NS) flush 5-10 mL  5-10 mL IntraVENous Q8H    sodium chloride (NS) flush 5-10 mL  5-10 mL IntraVENous PRN    ondansetron (ZOFRAN) injection 4 mg  4 mg IntraVENous Q4H PRN    acetaminophen (TYLENOL) tablet 650 mg  650 mg Oral Q4H PRN    levalbuterol (XOPENEX) nebulizer soln 1.25 mg/3 mL  1.25 mg Nebulization Q4H PRN    polyvinyl alcohol (LIQUIFILM TEARS) 1.4 % ophthalmic solution 1 Drop  1 Drop Both Eyes PRN    hydrocortisone (ANUSOL-HC) 2.5 % rectal cream   PeriANAL BID PRN    levothyroxine (SYNTHROID) tablet 88 mcg  88 mcg Oral ACB    pantoprazole (PROTONIX) tablet 40 mg  40 mg Oral ACB    promethazine (PHENERGAN) tablet 25 mg  25 mg Oral Q6H PRN    rOPINIRole (REQUIP) tablet 2 mg  2 mg Oral QHS    sertraline (ZOLOFT) tablet 50 mg  50 mg Oral QHS    spironolactone (ALDACTONE) tablet 25 mg  25 mg Oral DAILY      Allergies   Allergen Reactions    Adhesive Tape Rash    Benadryl [Diphenhydramine Hcl] Other (comments)     Jitters      Demerol [Meperidine] Anxiety    Morphine Other (comments)     Confusion    Sulfa (Sulfonamide Antibiotics) Anxiety    Lorazepam Anxiety       Review of Systems:  A comprehensive review of systems was negative except for that written in the History of Present Illness. Objective:     Patient Vitals for the past 8 hrs:   BP Temp Pulse Resp SpO2   17 1145 134/89 97.1 °F (36.2 °C) 84 20 96 %     Temp (24hrs), Av.9 °F (36.6 °C), Min:96.9 °F (36.1 °C), Max:98.5 °F (36.9 °C)      Physical Exam:  GENERAL: alert, cooperative, no distress, appears stated age, LUNG: clear to auscultation bilaterally, HEART: regular rate and rhythm, S1, S2 normal, no murmur, click, rub or gallop, EXTREMITIES:  Right foot cool to touch, slightly mottled, unable to palpate a distal pulse. no edema. LLE- warm, normal in color    Assessment/Plan:   Continue present treatment  -Arterial duplex to be performed    Dr. Adelina Baires will review the imaging and make recommendations based on the results. Thank you for allowing us to participate in the care of Mrs. Ghazal Sweeney. We will follow along. I have seen and examined the patient with the Nurse Practitioner and agree with the above assessment and plan. Acute R CFA thrombus likely of cardiac origin given hx of afib not on anticoagulation. R foot profoundly ischemic. Has retained motor function but has digital paraesthesia. Duplex:     RIGHT LOWER EXTREMITY: Segmental occlusion in the distal common femoral artery  with relatively hypoechoic appearance. No echogenic plaque in this area of  occlusion. Proximal superficial femoral artery occlusion. Smooth, linear,  echogenic wall just below the SFA origin consistent with a metallic stent. Retrograde flow in the profunda femoral artery. Low anterograde flow in the  popliteal artery (10 cm/s). No flow in the peroneal, anterior tibial, or  posterior tibial arteries.     LEFT LOWER EXTREMITY: Peak systolic velocities-- CFA = 97, PFA = 49, SFA = 119,  popliteal = 61, peroneal = 40, PTA = 64, KEVIN = 85 cm/sec.  Triphasic waveform  throughout.     ABDOMEN: Maximum aorta diameter 2.0 cm.       IMPRESSION: Distal right common femoral artery thrombotic occlusion with  extension into the superficial femoral artery. Retrograde flow in the right  profunda femoral artery. Exceedingly slow flow in the right popliteal artery. No  flow in the right tibial arteries. Plan to take to OR for Femoral thrombo-embolectomy tonight. Have discussed the procedure in detail with the family and patient and they agree with the plan. Will attempt to use locl anesthesia plus MAC to avoid general anesthetic as she has significant CV co-morbidities. Signed By: Yaneli Vásqeuz NP     February 20, 2017       Elements of this note have been dictated using speech recognition software. As a result, errors of speech recognition may have occurred.

## 2017-02-20 NOTE — PROGRESS NOTES
OT Note:    OT attempted to see patient this afternoon for therapy. Pt was about to work with PT at this time. OT will re-attempt to see patient at a later date/time.     Thanks,  Kathy Rodriguez

## 2017-02-20 NOTE — PROGRESS NOTES
Verbal bedside report given to lavinia Luque RN. Patient's situation, background, assessment and recommendations provided. Opportunity for questions provided. Oncoming RN assumed care of patient.

## 2017-02-20 NOTE — PROGRESS NOTES
Hospitalist Progress Note    2017  Admit Date: 2017  2:32 PM   NAME: Penny Baez   :     DOS:              17  MRN:  [id number]   Attending: Blayne Morton MD  PCP:  Vinnie Solitario MD  Treatment Team: Attending Provider: Blayne Morton MD; Consulting Provider: Gonzalez Dunn MD; Consulting Provider: Yesenia Bush DO; Utilization Review: [facility]; Consulting Provider: Myron Zee NP; Care Manager: Gretta Church, [place]; Consulting Provider: Theoplis Cabot, MD    DNR     SUBJECTIVE:   As previously documented: \" Ms. Benjamin Prieto is a very pleasant 76 yo F Who complains of increased SOb, nonproductive cough and b/l LE swelling in the last 1-2 months. Known with chronic diastolic CHF, CAD, bradycardia s/p pacemaker, , s/p bioprosthetic AVR,Chronic Afib on coumadin, severe pulmonary HTN with PAP 69 mmHg, chronic respiratory failure on NC 3L 24h/ , COPD, GERD, HTN, HLP, Obesity, BOBBY - not using CPAP. Seen by her cardiologist, Mason Roman on 17 and Demadex was increased to 40 mg PO daily, except MWF when was BID. Ms. Benjamin Prieto states that her leg swelling improved, although her cough and SOB persisted. Seen by PCP on  and placed on oral steroids and Albuterol that she couldn't complete because the albuterol made ermias jittery. No improvement with treatment. Seen at [facility] ED on  with low BP and and thought that she is dehydrated due to increased diuretics and received IV fluids. She felt better for one day, although because of increased in her symptoms she presented to ED today. BP was 80/51 mmHg and received 1L NC bolus in Ed per ED provider. CXR w/o acute pathology. No spikes of fever since her symptoms started. Influenza screen negative. WBC:3.4. Cr:1.48, around her baseline. \"     The pt.s' Hr was difficult to control and she and her family were opting for home with hospice.  However an ablation was done in a last ditch effort by Patrick Wright after d/w Dr. Chidi Daniel. This worked and her hr has been improved. Cardiology has been gently diuresing with plans to eventually discharge her home when ready from their standpoint. Unfortunately now with a rt cold foot and foot pain. vascular consulted. 02/20/17   Ms. [address] alert - states she is feeling better. Heart rate is improved- 70's- 90's s/p ablation. Now complaining of sudden onset rt foot and leg pain since this morning. Not like her RLS symptoms. Foot is cold- with a faint pulse. 10+ ROS reviewed and negative except for positive in HPI.    Allergies   Allergen Reactions    Adhesive Tape Rash    Benadryl [Diphenhydramine Hcl] Other (comments)     Jitters      Demerol [Meperidine] Anxiety    Morphine Other (comments)     Confusion    Sulfa (Sulfonamide Antibiotics) Anxiety    Lorazepam Anxiety     Current Facility-Administered Medications   Medication Dose Route Frequency Provider Last Rate Last Dose    meperidine (DEMEROL) injection 12.5 mg  12.5 mg IntraVENous Q4H PRN Frederick Severe, MD        0.9% sodium chloride infusion 250 mL  250 mL IntraVENous PRN MarcoA ntonio Cuevas MD        torsemide [facility]) tablet 40 mg  40 mg Oral DAILY Marco Antonio Cuevas MD   40 mg at 02/20/17 9627    lip protectant (BLISTEX) ointment   Topical PRN Frederick Severe, MD        magnesium citrate solution 296 mL  296 mL Oral PRN Vasu Karimi NP        polyethylene glycol (MIRALAX) packet 17 g  17 g Oral DAILY Vasu Karimi NP   17 g at 02/20/17 1957    diazePAM (VALIUM) tablet 2.5 mg  2.5 mg Oral Q8H PRN Marcellus Quinteros NP   2.5 mg at 02/14/17 2157    senna-docusate (PERICOLACE) 8.6-50 mg per tablet 1 Tab  1 Tab Oral BID Marcellus Quinteros NP   Stopped at 02/19/17 1800    traZODone (DESYREL) tablet 50 mg  50 mg Oral QHS PRN Shady Cordero MD   50 mg at 02/14/17 2156    gabapentin (NEURONTIN) capsule 100 mg  100 mg Oral TID Austin Michael MD   100 mg at 02/20/17 0835    sodium chloride (NS) flush 5-10 mL  5-10 mL IntraVENous Q8H German Newton MD   5 mL at 17 1400    sodium chloride (NS) flush 5-10 mL  5-10 mL IntraVENous PRN German Newton MD        [facility]ron [facility]) injection 4 mg  4 mg IntraVENous Q4H PRN Mary Webster NP   4 mg at 17 2334    acetaminophen (TYLENOL) tablet 650 mg  650 mg Oral Q4H PRN Mary Webster NP   650 mg at 17 1415    levalbuterol (XOPENEX) nebulizer soln 1.25 mg/3 mL  1.25 mg Nebulization Q4H PRN Mary Webster NP        polyvinyl alcohol (LIQUIFILM TEARS) 1.4 % ophthalmic solution 1 Drop  1 Drop Both Eyes PRN Mary Webster NP   1 Drop at 17 1351    hydrocortisone (ANUSOL-HC) 2.5 % rectal cream   PeriANAL BID PRN Mary Webster NP        levothyroxine (SYNTHROID) tablet 88 mcg  88 mcg Oral ACB Azeb Pino NP   88 mcg at 17 0524    pantoprazole (PROTONIX) tablet 40 mg  40 mg Oral FARIDEHB Azeb Pino NP   40 mg at 17 0524    promethazine (PHENERGAN) tablet 25 mg  25 mg Oral Q6H PRN Mary Webster NP        rOPINIRole (REQUIP) tablet 2 mg  2 mg Oral QHS Mary Webster NP   2 mg at 179    sertraline (ZOLOFT) tablet 50 mg  50 mg Oral QHS Azeb Pino NP   50 mg at 17 2139    spironolactone (ALDACTONE) tablet 25 mg  25 mg Oral DAILY Mary Webster NP   25 mg at 17 0835             PHYSICAL EXAM     Visit Vitals    /89 (BP 1 Location: Right arm, BP Patient Position: Sitting)    Pulse 84    Temp 97.1 °F (36.2 °C)    Resp 20    Ht 5' (1.524 m)    Wt 83.5 kg (184 lb)    SpO2 96%    Breastfeeding No    BMI 35.94 kg/m2      Temp (24hrs), Av.9 °F (36.6 °C), Min:96.9 °F (36.1 °C), Max:98.5 °F (36.9 °C)    Oxygen Therapy  O2 Sat (%): 96 % (17 1145)  Pulse via Oximetry: 86 beats per minute (17)  O2 Device: Nasal cannula (17 1145)  O2 Flow Rate (L/min): 3.5 l/min (17 0725)    Intake/Output Summary (Last 24 hours) at 02/20/17 1543  Last data filed at 02/20/17 1115   Gross per 24 hour   Intake              320 ml   Output             1050 ml   Net             -730 ml        Physical Exam:  General:         AAOx3. NAD. Afebrile. Cooperative. Obese. HEENT:               NCAT. No obvious deformity. Nares normal. No drainage  Lungs:                 Decreased air entry b/l at the base. Wheezing much improved  Cardiovascular:   Irregular irregularity. No m/r/g. +2 pitting edema  b/l. +2 PT/DT pulses b/l. Abdomen:       S/nt/nd. Bowel sounds normal. .   Skin:         No rashes or lesions. Not Jaundiced  Neurologic:    AAOx3. CN II- XII grossly WNL. No gross focal deficit. Moves all extremities  Psychiatric:         Good mood. Normal affect. DIAGNOSTIC STUDIES      Data Review:   Recent Results (from the past 24 hour(s))   PROTHROMBIN TIME + INR    Collection Time: 02/20/17  6:10 AM   Result Value Ref Range    Prothrombin time 11.4 9.6 - 12.0 sec    INR 1.0 0.9 - 1.2     MAGNESIUM    Collection Time: 02/20/17  6:10 AM   Result Value Ref Range    Magnesium 2.2 1.8 - 2.4 mg/dL   HGB & HCT    Collection Time: 02/20/17  6:10 AM   Result Value Ref Range    HGB 10.7 (L) 11.7 - 15.4 g/dL    HCT 35.4 (L) 35.8 - 46.3 %     Imaging /Procedures /Studies:    CXR Results  (Last 48 hours)    None        Echocardiogram: SUMMARY:  -  Left ventricle: Systolic function was normal. Ejection fraction was  estimated in the range of 55 % to 60 %. There were no regional wall motion  abnormalities. Wall thickness was mildly to moderately increased. -  Right ventricle: The ventricle was dilated. Systolic function was reduced. There was severe pulmonary artery hypertension.  -  Left atrium: The atrium was markedly dilated. -  Right atrium: The atrium was markedly dilated. -  Inferior vena cava, hepatic veins: The inferior vena cava was dilated. -  Aortic valve: A bioprosthesis was present. It exhibited stenosis.  There Was moderate stenosis. The aortic valve area by the continuity equation was 1.1   cm2.  -  Mitral valve: There was moderate to marked annular calcification. There   Was moderate-marked calcification. A annular ring prosthesis was present. It  exhibited stenosis and reduced motion. There was moderate stenosis. There was  mild regurgitation. The mitral valve area by pressure half time was 1.7 cm2.  -  Tricuspid valve: There was severe regurgitation. -  Pulmonic valve: There was mild to moderate regurgitation. -  Pericardium: There was a left pleural effusion. Labs and Studies from previous 24 hours have been personally reviewed by myself    ASSESSMENT      Active Hospital Problems    Diagnosis Date Noted    Acute CHF (congestive heart failure) ([facility] 02/12/2017    Pulmonary hypertension ([facility] 02/12/2017    Aortic valve replaced 01/09/2017    Warfarin anticoagulation 01/09/2017    Chronic diastolic congestive heart failure ([facility] 09/09/2016    Sleep apnea 11/02/2015    Chronic atrial fibrillation ([facility] 10/17/2011    ARF (acute renal failure) ([facility] 02/24/2010    BOBBY (obstructive sleep apnea) 12/04/2009    Aortic Valve Bioprosthesis Present 03/07/2009    Hypotension 03/01/2009       Plan:  SOB:  Likely due to her severe pulmonary hypertension and possible cor pulmonale along with her known aortic valve stenosis, mitral valve disease. RSVP:65-70 mmHg. And compounded with pulmonary edema- improved    Pulmonary HTN: patient and her family had initially refused further evaluation inclusive CTA chest and VQ scan. They wanted more comfort care efforts. And Palliative care- following. However they have agreed for AV node ablation with cardiology and that went well. Hypotension: BP is currently improved. Permanent AFib - resolved s/p ablation as per cardiology    Anemia: - being transfused 2 units of prbcs today    RLS: continue Gabapentin 100 mg TID    Hypernatremia- encourage free water intake.  Fluid restrict to 1.5L daily    Cold rt foot and pain- demerol, arterial dopplers, vascular consult. DVT Prophylaxis: held because of anemia  CODE Status: DNR  Plan of Care Discussed with: patient family.  Care team   Medical Risk: high  Disposition: pending- family now wants rehab- case mgt aware    Yanira Peters MD  02/20/17

## 2017-02-20 NOTE — PROGRESS NOTES
SW spoke with pt's son. He has changed his mind and now wants pt to go to rehab. Preference expressed for [facility] and [facility]. Cristina Castro. Referrals made. ADDENDUM:   Rehab beds pending at both facilities. MD, please advise re anticipated dc date.

## 2017-02-20 NOTE — PROGRESS NOTES
LMOM for mother to call clinic back to schedule lab only appointment on 12/8 for hgb + lead.   Belinda Borrero RN     Palliative Care Progress Note    Patient: Jovan Seay MRN: [id number]  SSN: [id number]    YOB: 1940  Age: 77 y.o. Sex: female       Assessment/Plan:     Chief Complaint/Interval History: c/o headache today, as well as fatigue; Hgb improved s/p PRBCs and rate controlled s/p ablation. Principal Diagnosis:    · Dyspnea  R06.00-ongoing with exertion    Additional Diagnoses:   · Anxiety  F41.1- continue low dose valium  · Constipation, Unspecified  K59.00-continue miralax and pericolace  · Fatigue, Lethargy  R53.83  · Frailty  R54  · Counseling, Encounter for Medical Advice  Z71.9  · Encounter for Palliative Care  Z51.5    Palliative Performance Scale (PPS)  PPS: 60    Medical Decision Making:   Reviewed and summarized notes over previous 24 hours. Discussed case with appropriate providers: SARITA Mojica, Cherylle Kayser, Pa  Reviewed laboratory and x-ray data: CBC, BMP    Met with pt and son. Plan is currently home with home health. Pt states she thinks that she will eventually have hospice, but wants [facility] with PT first.  Discussed the benefit of this with pt and son who both state that they think this will be what they do. Explained that the use of hospice services does not indicate that pt has \"days\" to live and that many heart failure spend months on hospice with good symptom control and a better quality of life. Will discuss findings with members of the interdisciplinary team.       More than 50% of this 25 minute visit was spent counseling and coordination of care as outlined above. Subjective:     Review of Systems:  A comprehensive review of systems was negative except for:   Constitutional: Positive for fatigue, headache  Respiratory: Positive for dyspnea with conversation or any exertion.   Cardiac: lower extremity edema improved  GI: reports hard stools/still constipated     Objective:     Visit Vitals    BP 93/46 (BP 1 Location: Right arm, BP Patient Position: Head of bed elevated (Comment degrees))    Pulse 84    Temp 96.9 °F (36.1 °C)    Resp 20    Ht 5' (1.524 m)    Wt 83.5 kg (184 lb)    SpO2 97%    Breastfeeding No    BMI 35.94 kg/m2       Physical Exam:    General:  Elderly, frail, and debilitated. Cooperative. No acute distress. Son at bedside. Eyes:  Conjunctivae/corneas clear    Nose: Nares normal. Septum midline. O2 via NC. Neck: Supple, symmetrical, trachea midline. Lungs:   Diminished to auscultation bilaterally, mildly labored with speech   Heart:  Regular rate and rhythm with intermittent pvc's. Abdomen:   Soft, non-tender, non-distended. Extremities: Normal, atraumatic, no cyanosis. Edema to bilateral lower extremities trace. Skin: Skin color, texture, turgor normal. No rash.    Neurologic: Nonfocal.   Psych: Alert and orientedx3     Signed By: Iman Jaimes NP     February 20, 2017

## 2017-02-20 NOTE — PROGRESS NOTES
Patient complaint of increased pain in right foot. Tashi Mullen paged and orders for PRN tylenol to be given and will access when arrival to floor. Patient right leg warm, foot and ankle slightly cool to the touch and pedal pulse present, weak. Will continue to monitor and continue with medical plan of care.

## 2017-02-20 NOTE — PROGRESS NOTES
Problem: Mobility Impaired (Adult and Pediatric)  Goal: *Acute Goals and Plan of Care (Insert Text)  LTG:  (1.)Ms. Donald Chávez will move from supine to sit and sit to supine and roll side to side in bed with INDEPENDENT within 7 day(s). (2.)Ms. Donald Chávez will transfer from bed to chair and chair to bed with MODIFIED INDEPENDENCE using the least restrictive device within 5 day(s). (3.)Ms. Donald Chávez will ambulate with MODIFIED INDEPENDENCE for 50 feet with the least restrictive device within 5 day(s). (4.)Ms. Donald Chávez will perform standing static and dynamic balance activities x 8 minutes with SUPERVISION to improve safety within 5 day(s). (5.)Ms. Donald Chávez will tolerate 25 minutes of therapeutic activity/exercise with one rest break within 5 day(s). ________________________________________________________________________________________________      PHYSICAL THERAPY: Daily Note, Treatment Day: 4th and AM 2/20/2017  INPATIENT: Hospital Day: 9  [place] / [place] / Product Type: Medicare /      NAME/AGE/GENDER: Yeimy Medrano is a 77 y.o. female       PRIMARY DIAGNOSIS: dyspnea, hypotension,  Acute CHF (congestive heart failure) ([facility])  A-FIB Acute CHF (congestive heart failure) (HCC) Acute CHF (congestive heart failure) ([facility])  Procedure(s) (LRB):  A-V NODE ABLATION  (N/A)  3 Days Post-Op  ICD-10: Treatment Diagnosis:       · Generalized Muscle Weakness (M62.81)  · shortness of breath   Precaution/Allergies:  Adhesive tape; Benadryl [diphenhydramine hcl]; Demerol [meperidine]; Morphine; Sulfa (sulfonamide antibiotics); and Lorazepam       ASSESSMENT:      Ms. Donald Chávez presents with shortness of breath with activity and overall general fatigue. Pt is sitting up on EOB on arrival.  She is agreeable to walk. She states her right leg is cramping and going numb. RN aware. Pt ambulated around the end of bed and back 45 feet with CGA. Pt performed ankle pumps.   She was positioned in bed with pillow under legs for comfort. Pt left with family members present and needs in reach. This section established at most recent assessment   PROBLEM LIST (Impairments causing functional limitations):  1. Decreased Strength  2. Decreased ADL/Functional Activities  3. Decreased Transfer Abilities  4. Decreased Ambulation Ability/Technique  5. Decreased Balance  6. Decreased Activity Tolerance  7. Increased Fatigue  8. Increased Shortness of Breath  9. Decreased Knowledge of Precautions  10. Decreased [place] with Home Exercise Program    INTERVENTIONS PLANNED: (Benefits and precautions of physical therapy have been discussed with the patient.)  1. Balance Exercise  2. Bed Mobility  3. Gait Training  4. Home Exercise Program (HEP)  5. Therapeutic Activites  6. Therapeutic Exercise/Strengthening  7. Transfer Training  8. Group Therapy      TREATMENT PLAN: Frequency/Duration: 3 times a week for duration of hospital stay  Rehabilitation Potential For Stated Goals: EXCELLENT      RECOMMENDED REHABILITATION/EQUIPMENT: (at time of discharge pending progress): Continue Skilled Therapy. HISTORY:   History of Present Injury/Illness (Reason for Referral):  Per H&P:Ms. Bogdan Siegel is a very pleasant 76 yo F Who complains of increased SOb, nonproductive cough and b/l LE swelling in the last 1-2 months. Known with chronic diastolic CHF, CAD, bradycardia s/p pacemaker, , s/p bioprosthetic AVR,Chronic Afib on coumadin, severe pulmonary HTN with PAP 69 mmHg, chronic respiratory failure on NC 3L 24h/7 , COPD, GERD, HTN, HLP, Obesity, BOBBY - not using CPAP. Seen by her cardiologist, Hussain Purcell on 1/9/17 and Demadex was increased to 40 mg PO daily, except MWF when was BID. Ms. Bogdan Siegel states that her leg swelling improved, although her cough and SOB persisted. Seen by PCP on 1/31 and placed on oral steroids and Albuterol that she couldn't complete because the albuterol made ermias jittery. No improvement with treatment.  Seen at [facility] ED on 2/9 with low BP and and thought that she is dehydrated due to increased diuretics and received IV fluids. She felt better for one day, although because of increased in her symptoms she presented to ED today. BP was 80/51 mmHg and received 1L NC bolus in Ed per ED provider. CXR w/o acute pathology. No spikes of fever since her symptoms started. Influenza screen negative. WBC:3.4. Cr:1.48, around her baseline. CV:irregular irregularity. No JVD. +2 pitting edema b/l LE   Resp: Decreased air entry b/l at the base. No wheezing. No rales or crackles. Abd: soft, non-tender, no rebound tenderness. Hold Torsemide until tomorrow. Will get Echocardiogram. Gentle IV hydration at 50 ml/h to keep MAP>85 mmHg. Hold BB due to hypotension. Hold benzodiazepines at bedtime, can exacerbate - untreated BOBBY and involved in severe pulmonary hypertension. Cardiology and palliative care consulted - appreciate the help. Past Medical History/Comorbidities:   Ms. Rod Jaquez  has a past medical history of Abnormal glucose (8/5/2016); Abscess (8/5/2016); Acute encephalopathy (7/8/2016); Acute renal failure ([address] (12/3/2009); Afib ([address] Anemia; Ankle swelling (8/5/2016); Anxiety (8/5/2016); Aortic Valve Bioprosthesis Present (3/7/2009); ARF (acute renal failure) ([address] (2/24/2010); Arthritis; Asthma; Atopic dermatitis (8/5/2016); Atrial fibrillation ([address] (3/7/2009); Autonomic orthostatic hypotension (8/5/2016); AV block (10/17/2011); Back pain (8/5/2016); Bradycardia (Symptomatic) (11/7/2011); CAD (coronary artery disease); Cancer ([address] (1990); Cardiac pacemaker (11/2/2015); Cardiogenic shock ([address] (10/17/2011); 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 (8/5/2016); Cellulitis (8/5/2016); Chest pain (8/5/2016); Chronic atrial fibrillation ([facility] (10/17/2011); Chronic depression (8/5/2016); Chronic depression (8/5/2016); Chronic obstructive pulmonary disease ([facility] (3/8/2009);  Chronic pain; CKD (chronic kidney disease) stage 3, GFR 30-59 ml/min (12/4/2009); Congestive heart failure (CHF) ([facility] (11/2/2015); COPD; CRI (chronic renal insufficiency) (8/5/2016); Degeneration of cervical intervertebral disc (8/5/2016); Degenerative arthritis of left knee (2/27/2009); Dehydration (8/5/2016); Diastolic heart failure ([facility] Digoxin toxicity (2/24/2010); Disorder of sweat glands (8/5/2016); Diverticulosis of large intestine without diverticulitis (2015); Dyspnea (8/5/2016); Eczema (8/5/2016); Epigastric abdominal pain (2/24/2010); GERD (gastroesophageal reflux disease); Gout (8/5/2016); H/O mitral valve repair, 2003 (6/27/2016); Heart failure ([facility] HTN (hypertension) (8/5/2016); colonic polyp (2015); Hyperkalemia (10/17/2011); Hyperlipidemia (8/5/2016); Hypertension; Hypokalemia (2/24/2010); Hypothyroidism (12/4/2009); Ill-defined condition; Knee pain (8/5/2016); Leg cramps (8/5/2016); Mitral stenosis with insufficiency (11/2/2015); Nausea and vomiting (2/24/2010); Obesity (11/2/2015); BOBBY (obstructive sleep apnea) (12/4/2009); Osteoarthritis (8/5/2016); Osteopenia (8/5/2016); Other long term (current) drug therapy (8/5/2016); Palpitations (11/2/2015); Rash (8/5/2016); Rectal bleeding (10/27/2011); Rectocele (2015); Respiratory insufficiency (11/2/2015); 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 She also has no past medical history of Aneurysm ([address] Autoimmune disease ([address] Coagulation disorder ([facility] DEMENTIA; Diabetes ([address] Endocarditis; Infectious disease; Liver disease; Other ill-defined conditions(799.89); PUD (peptic ulcer disease); Seizures ([facility] Stroke [facility]); or Thromboembolus ([address]   Ms. Makayla Ramos  has a past surgical history that includes appendectomy; cholecystectomy (2005); gyn (1981); mastectomy (1990); pacemaker; breast reconstruction; cardiac surg procedure unlist; and orthopaedic. Social History/Living Environment:   Home Environment: Private residence  # Steps to Enter: 1  One/Two Story Residence: One story  Living Alone: No  Support Systems: Child(brit), [employer] / gene community, Family member(s), Friends \ neighbors  Patient Expects to be Discharged to[de-identified] Private residence  Current DME Used/Available at Home: Shower chair  Tub or Shower Type: Tub/Shower combination  Prior Level of Function/Work/Activity:  Patient lives with son who works during the day. Patient has family and neighbors that would be able to check on patient. Personal Factors:          Age:  77 y.o. Number of Personal Factors/Comorbidities that affect the Plan of Care: 3+: HIGH COMPLEXITY   EXAMINATION:   Most Recent Physical Functioning:   Gross Assessment:                  Posture:     Balance:    Bed Mobility:  Sit to Supine: Stand-by asssistance  Wheelchair Mobility:     Transfers:  Sit to Stand: Contact guard assistance  Stand to Sit: Stand-by asssistance  Gait:     Base of Support: Center of gravity altered  Speed/Carol: Pace decreased (<100 feet/min); Shuffled  Step Length: Left shortened;Right shortened  Distance (ft): 45 Feet (ft)  Assistive Device: Walker, rolling  Ambulation - Level of Assistance: Contact guard assistance  Interventions: Safety awareness training;Verbal cues       Body Structures Involved:  1. Heart  2. Lungs Body Functions Affected:  1. Cardio  2. Movement Related Activities and Participation Affected:  1. Mobility  2. Self Care  3.  Domestic Life   Number of elements that affect the Plan of Care: 4+: HIGH COMPLEXITY   CLINICAL PRESENTATION:   Presentation: Evolving clinical presentation with changing clinical characteristics: MODERATE COMPLEXITY   CLINICAL DECISION MAKING:   Dejuan Basic Mobility Inpatient Short Form  How much difficulty does the patient currently have. .. Unable A Lot A Little None   1. Turning over in bed (including adjusting bedclothes, sheets and blankets)? [ ] 1   [ ] 2   [ ] 3   [X] 4   2. Sitting down on and standing up from a chair with arms ( e.g., wheelchair, bedside commode, etc.)   [ ] 1   [ ] 2   [X] 3   [ ] 4   3. Moving from lying on back to sitting on the side of the bed? [ ] 1   [ ] 2   [ ] 3   [X] 4   How much help from another person does the patient currently need. .. Total A Lot A Little None   4. Moving to and from a bed to a chair (including a wheelchair)? [ ] 1   [ ] 2   [X] 3   [ ] 4   5. Need to walk in hospital room? [ ] 1   [ ] 2   [X] 3   [ ] 4   6. Climbing 3-5 steps with a railing? [ ] 1   [X] 2   [ ] 3   [ ] 4   © 2007, Trustees of [address] [place] [address], under license to [employer]. All rights reserved    Score:  Initial: 19 Most Recent: X (Date: -- )     Interpretation of Tool:  Represents activities that are increasingly more difficult (i.e. Bed mobility, Transfers, Gait). Score 24 23 22-20 19-15 14-10 9-7 6       Modifier CH CI CJ CK CL CM CN         · Mobility - Walking and Moving Around:               - CURRENT STATUS:    CK - 40%-59% impaired, limited or restricted               - GOAL STATUS:           CJ - 20%-39% impaired, limited or restricted               - D/C STATUS:                       ---------------To be determined---------------  [place] / [place] / Product Type: Medicare /       Medical Necessity:     · Patient demonstrates good rehab potential due to higher previous functional level. · Skilled intervention continues to be required due to decline in overall functional mobility and activity tolerance.   Reason for Services/Other Comments:  · Patient continues to require present interventions due to patient's inability to perform functional mobility at previous indepencence level. Use of outcome tool(s) and clinical judgement create a POC that gives a: Questionable prediction of patient's progress: MODERATE COMPLEXITY                 TREATMENT:      Pre-treatment Symptoms/Complaints:   Pain: Initial: 0     Post Session: C/o of some pain in right groin but did not rate, shortness of breath with mobility      Therapeutic Activity: ( 25 min   ):  Therapeutic activities including bed transfers, LE ex and Ambulation on level ground to improve mobility, strength, balance and activity tolerance. Required minimal Safety awareness training;Verbal cues to promote static and dynamic balance in standing and pursed lip breathing. DATE: 2/16/17 2/18/17 2/20/17     Ambulation        Hip Flexion x10 AB        Long Arc Quads x10 AB X 15 B      Knee Squeezes        Ankle DF/PF x10 AB X 15 B 15 B                                      Key:  A=active, AA=active assisted, P=passive, B=bilaterally, R=right, L=left   DF=dorsiflexion, PF=plantarflexion      Braces/Orthotics/Lines/Etc:   · O2 Device: Nasal cannula 3L/min  Treatment/Session Assessment:    · Response to Treatment:  Dyspneic with minimal exertion. · Interdisciplinary Collaboration:  · Registered Nurse and Certified Nursing Assistant/Patient Care Technician  · After treatment position/precautions:  · Supine in bed, Bed/Chair-wheels locked, Call light within reach, RN notified and Family at bedside  · Compliance with Program/Exercises: compliant all of the time. · Recommendations/Intent for next treatment session: \"Next visit will focus on advancements to more challenging activities and reduction in assistance provided\".   Total Treatment Duration:  PT Patient Time In/Time Out  Time In: 1430  Time Out: 1500     Lion Alanis PTA

## 2017-02-20 NOTE — PROGRESS NOTES
CHF teaching started post introduction to pt/family; aware of diagnosis. Planner/scale @ BS and will follow. Smoking/ ETOH/Illicit drug use cessation covered. Pt/family aware that I can not prescribe nor adjust  medications: 15mins  Palliative Care score:  Start 2L/D Fluid restriction  CHF teaching continues to pt/family. Emphasis on taking prescription meds as ordered, to keep F/U appts and to call MD STAT if any of the following occur:   If you gain 2 lbs in one day or 5 lbs in a week, and short of breath.  If you can not lay flat without developing short of breath or rapid breathing at night; or if it wakes you up. Develop a cough or wheezing.  If you notice swollen hands/feet/ankles or stomach with a bloated/ full feeling.  If you become confused or mentally fuzzy or dizzy.  If you notice a rapid or change in your heart rate.  If you become more exhausted all the time and unable to do the same level of activity without stopping to catch your breath. Drink no more than 8 cups a day in 8 oz. cups. Your Heart can not handle any more. Stay away from salt (limit anything with salt or sodium in it). Limit to 250mg per serving.   Pt/family verbalizes understanding, will follow to reinforce teaching skills: 20 mins

## 2017-02-20 NOTE — PROGRESS NOTES
Bedside and Verbal shift change report received from [facility]. Report included the following information SBAR, Kardex, Procedure Summary, Intake/Output, MAR, Recent Results and Cardiac Rhythm PACED/NSR.

## 2017-02-20 NOTE — PROGRESS NOTES
[facility] CARDIOLOGY PROGRESS NOTE           2/20/2017 8:42 AM    Admit Date: 2/12/2017      Subjective:   She states that she has a headache. Overall,she reports feeling better. ROS:  GEN:  No fever or chills  Cardiovascular:  As noted above:no chest pain or palpitations. Pulmonary:  As noted above:SOB improved. Neuro:  No new focal motor or sensory loss    Objective:      Vitals:    02/19/17 2306 02/20/17 0106 02/20/17 0504 02/20/17 0730   BP: 113/61 110/61 119/67 93/46   Pulse: 82 82 84 84   Resp: 18 18 18 20   Temp: 97.9 °F (36.6 °C) 98.5 °F (36.9 °C) 97.5 °F (36.4 °C) 96.9 °F (36.1 °C)   SpO2: 100% 96% 98% 97%   Weight:  83.5 kg (184 lb)     Height:           Physical Exam:  General-no distress  Neck- supple, no JVD  CV- regular rate and rhythm with 2/6 arsenio  Lung- clear bilaterally  Abd- soft, nontender, nondistended  Ext- 2+ edema bilaterally. Skin- warm and dry  Psychiatric:  Normal mood and affect. Neurologic:  Alert and oriented X 3      Data Review:   Recent Labs      02/20/17   0610  02/19/17   0605  02/18/17   0632   NA   --   146*  148*   K   --   3.8  3.6   MG  2.2  2.2  2.1   BUN   --   27*  29*   CREA   --   0.97  0.96   GLU   --   101*  93   WBC   --   4.1*  5.5   HGB  10.7*  7.6*  7.9*   HCT  35.4*  25.5*  26.5*   PLT   --   114*  114*   INR  1.0  1.2  1.2       TELEMETRY:  V-PACED    Assessment/Plan:     Principal Problem:    Acute CHF (congestive heart failure) ([facility] (2/12/2017): Improved. Continue diuretic therapy. Active Problems:    Hypotension (3/1/2009)      Aortic Valve Bioprosthesis Present (3/7/2009)      BOBBY (obstructive sleep apnea) (12/4/2009)      ARF (acute renal failure) ([facility]) (2/24/2010):resolved      Chronic atrial fibrillation ([facility] (10/17/2011):Rate is controlled.       Sleep apnea (11/2/2015)      Chronic diastolic congestive heart failure ([facility] (9/9/2016)      Aortic valve replaced (1/9/2017)      Warfarin anticoagulation (1/9/2017):off anticoagulation.       Pulmonary hypertension ([facility] (2/12/2017)                Mele Mena MD  2/20/2017 8:42 AM

## 2017-02-21 LAB
ANION GAP BLD CALC-SCNC: 7 MMOL/L (ref 7–16)
APTT PPP: 42.9 SEC (ref 23.5–31.7)
APTT PPP: 97.2 SEC (ref 23.5–31.7)
BUN SERPL-MCNC: 21 MG/DL (ref 8–23)
CALCIUM SERPL-MCNC: 8.5 MG/DL (ref 8.3–10.4)
CHLORIDE SERPL-SCNC: 104 MMOL/L (ref 98–107)
CO2 SERPL-SCNC: 39 MMOL/L (ref 21–32)
CREAT SERPL-MCNC: 0.92 MG/DL (ref 0.6–1)
ERYTHROCYTE [DISTWIDTH] IN BLOOD BY AUTOMATED COUNT: 16 % (ref 11.9–14.6)
GLUCOSE BLD STRIP.AUTO-MCNC: 118 MG/DL (ref 65–100)
GLUCOSE SERPL-MCNC: 98 MG/DL (ref 65–100)
HCT VFR BLD AUTO: 34 % (ref 35.8–46.3)
HCT VFR BLD AUTO: 35.5 % (ref 35.8–46.3)
HGB BLD-MCNC: 10.2 G/DL (ref 11.7–15.4)
HGB BLD-MCNC: 10.6 G/DL (ref 11.7–15.4)
INR PPP: 1.1 (ref 0.9–1.2)
MAGNESIUM SERPL-MCNC: 2.2 MG/DL (ref 1.8–2.4)
MCH RBC QN AUTO: 29.7 PG (ref 26.1–32.9)
MCHC RBC AUTO-ENTMCNC: 29.9 G/DL (ref 31.4–35)
MCV RBC AUTO: 99.4 FL (ref 79.6–97.8)
PLATELET # BLD AUTO: 95 K/UL (ref 150–450)
PMV BLD AUTO: 10.6 FL (ref 10.8–14.1)
POTASSIUM SERPL-SCNC: 3.7 MMOL/L (ref 3.5–5.1)
PROTHROMBIN TIME: 12 SEC (ref 9.6–12)
RBC # BLD AUTO: 3.57 M/UL (ref 4.05–5.25)
SODIUM SERPL-SCNC: 150 MMOL/L (ref 136–145)
WBC # BLD AUTO: 7.4 K/UL (ref 4.3–11.1)

## 2017-02-21 PROCEDURE — 97605 NEG PRS WND THER DME<=50SQCM: CPT

## 2017-02-21 PROCEDURE — 85730 THROMBOPLASTIN TIME PARTIAL: CPT | Performed by: SURGERY

## 2017-02-21 PROCEDURE — 77030019952 HC CANSTR VAC ASST KCON -B

## 2017-02-21 PROCEDURE — 74011250637 HC RX REV CODE- 250/637: Performed by: NURSE PRACTITIONER

## 2017-02-21 PROCEDURE — 74011250636 HC RX REV CODE- 250/636: Performed by: NURSE PRACTITIONER

## 2017-02-21 PROCEDURE — 83735 ASSAY OF MAGNESIUM: CPT | Performed by: NURSE PRACTITIONER

## 2017-02-21 PROCEDURE — 74011250637 HC RX REV CODE- 250/637: Performed by: HOSPITALIST

## 2017-02-21 PROCEDURE — 74011250637 HC RX REV CODE- 250/637: Performed by: INTERNAL MEDICINE

## 2017-02-21 PROCEDURE — 97164 PT RE-EVAL EST PLAN CARE: CPT

## 2017-02-21 PROCEDURE — 80048 BASIC METABOLIC PNL TOTAL CA: CPT | Performed by: NURSE PRACTITIONER

## 2017-02-21 PROCEDURE — 77010033678 HC OXYGEN DAILY

## 2017-02-21 PROCEDURE — 65660000004 HC RM CVT STEPDOWN

## 2017-02-21 PROCEDURE — 74011250636 HC RX REV CODE- 250/636: Performed by: INTERNAL MEDICINE

## 2017-02-21 PROCEDURE — 36415 COLL VENOUS BLD VENIPUNCTURE: CPT | Performed by: EMERGENCY MEDICINE

## 2017-02-21 PROCEDURE — 85610 PROTHROMBIN TIME: CPT | Performed by: NURSE PRACTITIONER

## 2017-02-21 PROCEDURE — 77030019934 HC DRSG VAC ASST KCON -B

## 2017-02-21 PROCEDURE — 74011250636 HC RX REV CODE- 250/636: Performed by: SURGERY

## 2017-02-21 PROCEDURE — 85027 COMPLETE CBC AUTOMATED: CPT | Performed by: EMERGENCY MEDICINE

## 2017-02-21 PROCEDURE — 85018 HEMOGLOBIN: CPT | Performed by: HOSPITALIST

## 2017-02-21 PROCEDURE — 82962 GLUCOSE BLOOD TEST: CPT

## 2017-02-21 PROCEDURE — 74011000258 HC RX REV CODE- 258: Performed by: SURGERY

## 2017-02-21 PROCEDURE — 74750000023 HC WOUND THERAPY

## 2017-02-21 PROCEDURE — 74011250637 HC RX REV CODE- 250/637: Performed by: SURGERY

## 2017-02-21 RX ORDER — HEPARIN SODIUM 5000 [USP'U]/100ML
18-36 INJECTION, SOLUTION INTRAVENOUS
Status: DISCONTINUED | OUTPATIENT
Start: 2017-02-21 | End: 2017-02-21 | Stop reason: SDUPTHER

## 2017-02-21 RX ORDER — SODIUM CHLORIDE 0.9 % (FLUSH) 0.9 %
5-10 SYRINGE (ML) INJECTION EVERY 8 HOURS
Status: DISCONTINUED | OUTPATIENT
Start: 2017-02-21 | End: 2017-02-21

## 2017-02-21 RX ORDER — DEXTROSE MONOHYDRATE 50 MG/ML
50 INJECTION, SOLUTION INTRAVENOUS CONTINUOUS
Status: DISCONTINUED | OUTPATIENT
Start: 2017-02-21 | End: 2017-02-21

## 2017-02-21 RX ORDER — NALOXONE HYDROCHLORIDE 0.4 MG/ML
0.4 INJECTION, SOLUTION INTRAMUSCULAR; INTRAVENOUS; SUBCUTANEOUS AS NEEDED
Status: DISCONTINUED | OUTPATIENT
Start: 2017-02-20 | End: 2017-02-28 | Stop reason: HOSPADM

## 2017-02-21 RX ORDER — ONDANSETRON 2 MG/ML
4 INJECTION INTRAMUSCULAR; INTRAVENOUS
Status: DISCONTINUED | OUTPATIENT
Start: 2017-02-20 | End: 2017-02-21

## 2017-02-21 RX ORDER — SODIUM CHLORIDE 0.9 % (FLUSH) 0.9 %
5-10 SYRINGE (ML) INJECTION AS NEEDED
Status: DISCONTINUED | OUTPATIENT
Start: 2017-02-20 | End: 2017-02-28 | Stop reason: HOSPADM

## 2017-02-21 RX ORDER — ASPIRIN 81 MG/1
81 TABLET ORAL DAILY
Status: DISCONTINUED | OUTPATIENT
Start: 2017-02-21 | End: 2017-02-28 | Stop reason: HOSPADM

## 2017-02-21 RX ORDER — MORPHINE SULFATE 10 MG/ML
5 INJECTION, SOLUTION INTRAMUSCULAR; INTRAVENOUS
Status: DISCONTINUED | OUTPATIENT
Start: 2017-02-20 | End: 2017-02-21

## 2017-02-21 RX ORDER — HEPARIN SODIUM 5000 [USP'U]/100ML
18-36 INJECTION, SOLUTION INTRAVENOUS
Status: DISCONTINUED | OUTPATIENT
Start: 2017-02-21 | End: 2017-02-22

## 2017-02-21 RX ORDER — AMOXICILLIN 250 MG
1 CAPSULE ORAL EVERY 12 HOURS
Status: DISCONTINUED | OUTPATIENT
Start: 2017-02-21 | End: 2017-02-28 | Stop reason: HOSPADM

## 2017-02-21 RX ORDER — DEXTROSE MONOHYDRATE AND SODIUM CHLORIDE 5; .9 G/100ML; G/100ML
1000 INJECTION, SOLUTION INTRAVENOUS CONTINUOUS
Status: DISCONTINUED | OUTPATIENT
Start: 2017-02-21 | End: 2017-02-21

## 2017-02-21 RX ORDER — CEFAZOLIN SODIUM IN 0.9 % NACL 2 G/50 ML
2 INTRAVENOUS SOLUTION, PIGGYBACK (ML) INTRAVENOUS EVERY 8 HOURS
Status: COMPLETED | OUTPATIENT
Start: 2017-02-21 | End: 2017-02-21

## 2017-02-21 RX ORDER — HYDROCODONE BITARTRATE AND ACETAMINOPHEN 5; 325 MG/1; MG/1
1 TABLET ORAL
Status: DISCONTINUED | OUTPATIENT
Start: 2017-02-20 | End: 2017-02-28 | Stop reason: HOSPADM

## 2017-02-21 RX ADMIN — TORSEMIDE 40 MG: 20 TABLET ORAL at 09:35

## 2017-02-21 RX ADMIN — GABAPENTIN 100 MG: 100 CAPSULE ORAL at 17:13

## 2017-02-21 RX ADMIN — Medication 10 ML: at 13:46

## 2017-02-21 RX ADMIN — GABAPENTIN 100 MG: 100 CAPSULE ORAL at 09:35

## 2017-02-21 RX ADMIN — ROPINIROLE 2 MG: 2 TABLET, FILM COATED ORAL at 21:16

## 2017-02-21 RX ADMIN — Medication 10 ML: at 00:18

## 2017-02-21 RX ADMIN — Medication 10 ML: at 06:46

## 2017-02-21 RX ADMIN — SERTRALINE HYDROCHLORIDE 50 MG: 50 TABLET ORAL at 00:16

## 2017-02-21 RX ADMIN — SODIUM CHLORIDE 250 ML: 900 INJECTION, SOLUTION INTRAVENOUS at 13:57

## 2017-02-21 RX ADMIN — Medication 10 ML: at 00:17

## 2017-02-21 RX ADMIN — STANDARDIZED SENNA CONCENTRATE AND DOCUSATE SODIUM 1 TABLET: 8.6; 5 TABLET, FILM COATED ORAL at 21:17

## 2017-02-21 RX ADMIN — PANTOPRAZOLE SODIUM 40 MG: 40 TABLET, DELAYED RELEASE ORAL at 06:46

## 2017-02-21 RX ADMIN — ROPINIROLE 2 MG: 2 TABLET, FILM COATED ORAL at 00:16

## 2017-02-21 RX ADMIN — HEPARIN SODIUM 18 UNITS/KG/HR: 10000 INJECTION, SOLUTION INTRAVENOUS; SUBCUTANEOUS at 04:14

## 2017-02-21 RX ADMIN — SPIRONOLACTONE 25 MG: 25 TABLET, FILM COATED ORAL at 09:36

## 2017-02-21 RX ADMIN — SERTRALINE HYDROCHLORIDE 50 MG: 50 TABLET ORAL at 21:16

## 2017-02-21 RX ADMIN — CEFAZOLIN 2 G: 1 INJECTION, POWDER, FOR SOLUTION INTRAMUSCULAR; INTRAVENOUS; PARENTERAL at 04:14

## 2017-02-21 RX ADMIN — POLYETHYLENE GLYCOL 3350 17 G: 17 POWDER, FOR SOLUTION ORAL at 09:35

## 2017-02-21 RX ADMIN — LEVOTHYROXINE SODIUM 88 MCG: 0.09 TABLET ORAL at 06:45

## 2017-02-21 RX ADMIN — HEPARIN SODIUM AND DEXTROSE 22 UNITS/KG/HR: 5000; 5 INJECTION INTRAVENOUS at 21:21

## 2017-02-21 RX ADMIN — PRAVASTATIN SODIUM 1 TABLET: 20 TABLET ORAL at 09:35

## 2017-02-21 RX ADMIN — ASPIRIN 81 MG: 81 TABLET, COATED ORAL at 09:35

## 2017-02-21 RX ADMIN — GABAPENTIN 100 MG: 100 CAPSULE ORAL at 00:16

## 2017-02-21 RX ADMIN — GABAPENTIN 100 MG: 100 CAPSULE ORAL at 21:16

## 2017-02-21 RX ADMIN — DEXTROSE MONOHYDRATE AND SODIUM CHLORIDE 1000 ML: 5; .9 INJECTION, SOLUTION INTRAVENOUS at 01:51

## 2017-02-21 RX ADMIN — Medication 10 ML: at 21:17

## 2017-02-21 RX ADMIN — CEFAZOLIN 2 G: 1 INJECTION, POWDER, FOR SOLUTION INTRAMUSCULAR; INTRAVENOUS; PARENTERAL at 13:52

## 2017-02-21 NOTE — WOUND CARE
Patient seen for start of VAC therapy over intact right groin incision. Noted small bloody drainage at distal end. Patient tolerated well. Will continue to follow. Patient stated surgeon wanted it on for 5 days. Will remove when ordered.

## 2017-02-21 NOTE — ANESTHESIA PREPROCEDURE EVALUATION
Anesthetic History   No history of anesthetic complications            Review of Systems / Medical History  Patient summary reviewed and pertinent labs reviewed    Pulmonary    COPD: severe    Sleep apnea  Shortness of breath (Severe Pulm Htn)         Neuro/Psych         Psychiatric history     Cardiovascular    Hypertension        Dysrhythmias : SVT  Pacemaker and CAD    Exercise tolerance: <4 METS  Comments: Preserved EF  S/p AVR and MVR with subsequent Mod AS and Mod MS,  Severe TR,    GI/Hepatic/Renal                Endo/Other      Hypothyroidism  Obesity, arthritis and anemia     Other Findings   Comments: 3L Oxygen at home           Physical Exam    Airway  Mallampati: II  TM Distance: 4 - 6 cm  Neck ROM: normal range of motion   Mouth opening: Normal     Cardiovascular    Rhythm: irregular  Rate: normal         Dental  No notable dental hx       Pulmonary    Rhonchi:bilateral  Decreased breath sounds: bibasilar           Abdominal  GI exam deferred       Other Findings            Anesthetic Plan    ASA: 4  Anesthesia type: MAC          Induction: Intravenous  Anesthetic plan and risks discussed with: Patient and Family      Discussed extensively with Ms. Bogdan Siegel, her son, and Dr. Peggy Greenfield.  Will plan extremely light MAC while preserving spontaneous ventilation     Anesthetic History               Review of Systems / Medical History  Patient summary reviewed and pertinent labs reviewed    Pulmonary    COPD (home O2): severe               Neuro/Psych              Cardiovascular    Hypertension: well controlled  Valvular problems/murmurs (S/P AVR, severe TR, mod MR and mod MS): tricuspid insufficiency, mitral stenosis and mitral insufficiency      Dysrhythmias : atrial fibrillation  Pacemaker (has AICD)         GI/Hepatic/Renal     GERD: well controlled           Endo/Other      Hypothyroidism: well controlled       Other Findings            Physical Exam    Airway  Mallampati: II  TM Distance: 4 - 6 cm  Neck ROM: normal range of motion   Mouth opening: Normal     Cardiovascular    Rhythm: irregular  Rate: abnormal         Dental    Dentition: Full lower dentures and Full upper dentures     Pulmonary  Breath sounds clear to auscultation               Abdominal         Other Findings            Anesthetic Plan    ASA: 4  Anesthesia type: total IV anesthesia          Induction: Intravenous  Anesthetic plan and risks discussed with: Patient            Anesthetic History               Review of Systems / Medical History  Patient summary reviewed and pertinent labs reviewed    Pulmonary    COPD (home O2): severe               Neuro/Psych              Cardiovascular    Hypertension: well controlled  Valvular problems/murmurs (S/P AVR, severe TR, mod MR and mod MS): tricuspid insufficiency, mitral stenosis and mitral insufficiency      Dysrhythmias : atrial fibrillation  Pacemaker (has AICD)         GI/Hepatic/Renal     GERD: well controlled           Endo/Other      Hypothyroidism: well controlled       Other Findings            Physical Exam    Airway  Mallampati: II  TM Distance: 4 - 6 cm  Neck ROM: normal range of motion   Mouth opening: Normal     Cardiovascular    Rhythm: irregular  Rate: abnormal         Dental    Dentition: Full lower dentures and Full upper dentures     Pulmonary  Breath sounds clear to auscultation               Abdominal         Other Findings            Anesthetic Plan    ASA: 4  Anesthesia type: total IV anesthesia          Induction: Intravenous  Anesthetic plan and risks discussed with: Patient            Anesthetic History               Review of Systems / Medical History  Patient summary reviewed and pertinent labs reviewed    Pulmonary    COPD (home O2): severe               Neuro/Psych              Cardiovascular    Hypertension: well controlled  Valvular problems/murmurs (S/P AVR, severe TR, mod MR and mod MS): tricuspid insufficiency, mitral stenosis and mitral insufficiency      Dysrhythmias : atrial fibrillation  Pacemaker (has AICD)         GI/Hepatic/Renal     GERD: well controlled           Endo/Other      Hypothyroidism: well controlled       Other Findings            Physical Exam    Airway  Mallampati: II  TM Distance: 4 - 6 cm  Neck ROM: normal range of motion   Mouth opening: Normal     Cardiovascular    Rhythm: irregular  Rate: abnormal         Dental    Dentition: Full lower dentures and Full upper dentures     Pulmonary  Breath sounds clear to auscultation               Abdominal         Other Findings            Anesthetic Plan    ASA: 4  Anesthesia type: total IV anesthesia          Induction: Intravenous  Anesthetic plan and risks discussed with: Patient

## 2017-02-21 NOTE — PROGRESS NOTES
Hospitalist Progress Note    2017  Admit Date: 2017  2:32 PM   NAME: Arelis Choi   :     DOS:              17  MRN:  [id number]   Attending: Marylee Alamin, MD  PCP:  Jenni Waters MD  Treatment Team: Attending Provider: Devon Zhang MD; Consulting Provider: Joana Joe MD; Consulting Provider: Uche Zepeda DO; Utilization Review: Ela Calvillo; Consulting Provider: Valerio Morejon NP; Care Manager: Roberto Wiley; Consulting Provider: Yoana Escobedo MD; Consulting Provider: Cheryle Girt, MD    DNR     SUBJECTIVE:   As previously documented: \" Ms. Ghazal Sweeney is a very pleasant 76 yo F who complains of increased SOb, nonproductive cough and b/l LE swelling in the last 1-2 months. Known with chronic diastolic CHF, CAD, bradycardia s/p pacemaker, , s/p bioprosthetic AVR,Chronic Afib on coumadin, severe pulmonary HTN with PAP 69 mmHg, chronic respiratory failure on NC 3L 24h/7 , COPD, GERD, HTN, HLP, Obesity, BOBBY - not using CPAP. Seen by her cardiologist, Jennifer Chisholm on 17 and Demadex was increased to 40 mg PO daily, except MWF when was BID. Ms. Ghazal Sweeney states that her leg swelling improved, although her cough and SOB persisted. Seen by PCP on  and placed on oral steroids and Albuterol that she couldn't complete because the albuterol made ermias jittery. No improvement with treatment. Seen at [facility] ED on  with low BP and and thought that she is dehydrated due to increased diuretics and received IV fluids. She felt better for one day, although because of increased in her symptoms she presented to ED today. BP was 80/51 mmHg and received 1L NC bolus in Ed per ED provider. CXR w/o acute pathology. No spikes of fever since her symptoms started. Influenza screen negative. WBC:3.4. Cr:1.48, around her baseline. Pt.s' Hr was difficult to control and she and her family were opting for home with hospice.  However an ablation was done in a last ditch effort by Kristie Barajas after d/w Dr. Ruben Bosworth. Cardiology has been gently diuresing with plans to eventually discharge her home when ready from their standpoint. Unfortunately  On 2/20 she developed right cold foot and foot pain. vascular consulted and she had thrombectomy on 2/20/2017. .           02/21/17   Ms. Reina Chowdary is feeling better. Antonio any SOB, CP or abdominal . Denies any leg pain. S/p thrombectomy 2/20/2017. Afebrile. No family at bedside      10+ ROS reviewed and negative except for positive in HPI.    Allergies   Allergen Reactions    Adhesive Tape Rash    Benadryl [Diphenhydramine Hcl] Other (comments)     Jitters      Demerol [Meperidine] Anxiety    Morphine Other (comments)     Confusion    Sulfa (Sulfonamide Antibiotics) Anxiety    Lorazepam Anxiety     Current Facility-Administered Medications   Medication Dose Route Frequency Provider Last Rate Last Dose    dextrose 5% and 0.9% NaCl infusion 1,000 mL  1,000 mL IntraVENous CONTINUOUS Neto Amaya MD   Stopped at 02/21/17 0530    sodium chloride (NS) flush 5-10 mL  5-10 mL IntraVENous Q8H Neto Amaya MD   10 mL at 02/21/17 0646    sodium chloride (NS) flush 5-10 mL  5-10 mL IntraVENous PRN Neto Amaya MD        [facility]) injection 0.4 mg  0.4 mg IntraVENous PRN Neto Amaya MD        aspirin delayed-release tablet 81 mg  81 mg Oral DAILY Neto Amaya MD        HYDROcodone-acetaminophen [facility]) 5-325 mg per tablet 1 Tab  1 Tab Oral Q4H PRN Neto Amaya MD        morphine injection 5 mg  5 mg IntraVENous Q4H PRN Neto Amaya MD        ondansetron [facility]) injection 4 mg  4 mg IntraVENous Q4H PRN Neto Amaya MD        ceFAZolin in 0.9% NS (ANCEF) IVPB soln 2 g  2 g IntraVENous Q8H Neto Amaya  mL/hr at 02/21/17 0414 2 g at 02/21/17 0414    heparin (porcine) 25,000 Units in dextrose 5% 500 mL infusion  18-36 Units/kg/hr (Order-Specific) IntraVENous TITRATE Neto Amaya MD 30.1 mL/hr at 02/21/17 0414 18 Units/kg/hr at 02/21/17 0414    ketorolac (TORADOL) injection 15 mg  15 mg IntraVENous Q6H PRN Fernandez Aburto MD        0.9% sodium chloride infusion 250 mL  250 mL IntraVENous PRN Jackeline Sanderson MD        torsemide [facility]) tablet 40 mg  40 mg Oral DAILY Jackeline Sanderson MD   40 mg at 02/20/17 7879    lip protectant (BLISTEX) ointment   Topical PRN Fernandez Aburto MD        magnesium citrate solution 296 mL  296 mL Oral PRN Chino Passy, NP        polyethylene glycol (MIRALAX) packet 17 g  17 g Oral DAILY Chino Passy, NP   17 g at 02/20/17 9547    diazePAM (VALIUM) tablet 2.5 mg  2.5 mg Oral Q8H PRN Antionette Media, NP   2.5 mg at 02/14/17 2157    senna-docusate (PERICOLACE) 8.6-50 mg per tablet 1 Tab  1 Tab Oral BID Antionette Media, NP   Stopped at 02/19/17 1800    traZODone (DESYREL) tablet 50 mg  50 mg Oral QHS PRN Sharona Elena MD   50 mg at 02/14/17 2156    gabapentin (NEURONTIN) capsule 100 mg  100 mg Oral TID Gege Guerra MD   100 mg at 02/21/17 0016    sodium chloride (NS) flush 5-10 mL  5-10 mL IntraVENous Q8H Jace Gery MD   10 mL at 02/21/17 0646    sodium chloride (NS) flush 5-10 mL  5-10 mL IntraVENous PRN Jace Grey MD        ondansetron [facility]) injection 4 mg  4 mg IntraVENous Q4H PRN Gabriel Clari, NP   4 mg at 02/13/17 2334    acetaminophen (TYLENOL) tablet 650 mg  650 mg Oral Q4H PRN Gabriel Clari, NP   650 mg at 02/20/17 1415    levalbuterol (XOPENEX) nebulizer soln 1.25 mg/3 mL  1.25 mg Nebulization Q4H PRN Gabriel Clari, NP        polyvinyl alcohol (LIQUIFILM TEARS) 1.4 % ophthalmic solution 1 Drop  1 Drop Both Eyes PRN Gabriel Clari, NP   1 Drop at 02/16/17 1351    hydrocortisone (ANUSOL-HC) 2.5 % rectal cream   PeriANAL BID PRN Gabriel Montague NP        levothyroxine (SYNTHROID) tablet 88 mcg  88 mcg Oral ACB Azeb Pino NP   88 mcg at 02/21/17 0645    pantoprazole (PROTONIX) tablet 40 mg  40 mg Oral ACB Fernando Membreno JanelleFELISHA savage   40 mg at 17 0646    promethazine (PHENERGAN) tablet 25 mg  25 mg Oral Q6H PRN Yanci Marinelli NP        rOPINIRole (REQUIP) tablet 2 mg  2 mg Oral QHS Azeb PinoFELISHA   2 mg at 17 0016    sertraline (ZOLOFT) tablet 50 mg  50 mg Oral QHS Azeb Pino, NP   50 mg at 17 0016    spironolactone (ALDACTONE) tablet 25 mg  25 mg Oral DAILY Azeb Pino, NP   25 mg at 17 0835         PHYSICAL EXAM     Visit Vitals    /58    Pulse 80    Temp 98 °F (36.7 °C)    Resp 17    Ht 5' (1.524 m)    Wt 77.9 kg (171 lb 11.8 oz)    SpO2 100%    Breastfeeding No    BMI 33.54 kg/m2      Temp (24hrs), Av.8 °F (36.6 °C), Min:96.9 °F (36.1 °C), Max:98.4 °F (36.9 °C)    Oxygen Therapy  O2 Sat (%): 100 % (17 0530)  Pulse via Oximetry: 81 beats per minute (17 0530)  O2 Device: Nasal cannula (17 030)  O2 Flow Rate (L/min): 2 l/min (17 0300)    Intake/Output Summary (Last 24 hours) at 17 8995  Last data filed at 17 0100   Gross per 24 hour   Intake              370 ml   Output             1100 ml   Net             -730 ml        Physical Exam:  General:         AAOx3. NAD. Afebrile. Cooperative   HEENT:               NCAT. No obvious deformity. Nares normal. No drainage  Lungs:                  CTABL. No wheezing/rhonchi/rales NC 2L  Cardiovascular:   RRR. No m/r/g. Trace pedal edema b/l. +2 PT/DT pulses b/l. Abdomen:       S/nt/nd. Bowel sounds normal. .   Skin:         No rashes or lesions. Not Jaundiced  Neurologic:    AAOx3. CN II- XII grossly WNL. No gross focal deficit. Moves all extremities. \  Psychiatric:         Good mood. Normal affect.              DIAGNOSTIC STUDIES      Data Review:   Recent Results (from the past 24 hour(s))   PTT    Collection Time: 17  6:26 PM   Result Value Ref Range    aPTT 24.2 23.5 - 31.7 SEC   CBC WITH AUTOMATED DIFF    Collection Time: 17  6:26 PM   Result Value Ref Range    WBC 5.5 4.3 - 11.1 K/uL    RBC 3.55 (L) 4.05 - 5.25 M/uL    HGB 10.8 (L) 11.7 - 15.4 g/dL    HCT 35.3 (L) 35.8 - 46.3 %    MCV 99.4 (H) 79.6 - 97.8 FL    MCH 30.4 26.1 - 32.9 PG    MCHC 30.6 (L) 31.4 - 35.0 g/dL    RDW 16.2 (H) 11.9 - 14.6 %    PLATELET 450 (L) 532 - 450 K/uL    MPV 10.8 10.8 - 14.1 FL    DF AUTOMATED      NEUTROPHILS 85 (H) 43 - 78 %    LYMPHOCYTES 7 (L) 13 - 44 %    MONOCYTES 7 4.0 - 12.0 %    EOSINOPHILS 1 0.5 - 7.8 %    BASOPHILS 0 0.0 - 2.0 %    IMMATURE GRANULOCYTES 0.2 0.0 - 5.0 %    ABS. NEUTROPHILS 4.6 1.7 - 8.2 K/UL    ABS. LYMPHOCYTES 0.4 (L) 0.5 - 4.6 K/UL    ABS. MONOCYTES 0.4 0.1 - 1.3 K/UL    ABS. EOSINOPHILS 0.1 0.0 - 0.8 K/UL    ABS. BASOPHILS 0.0 0.0 - 0.2 K/UL    ABS. IMM. GRANS. 0.0 0.0 - 0.5 K/UL   TYPE & SCREEN    Collection Time: 02/20/17  7:35 PM   Result Value Ref Range    Crossmatch Expiration 02/23/2017     ABO/Rh(D) A POSITIVE     Antibody screen NEG    PROTHROMBIN TIME + INR    Collection Time: 02/21/17  3:55 AM   Result Value Ref Range    Prothrombin time 12.0 9.6 - 12.0 sec    INR 1.1 0.9 - 1.2     MAGNESIUM    Collection Time: 02/21/17  3:55 AM   Result Value Ref Range    Magnesium 2.2 1.8 - 2.4 mg/dL   METABOLIC PANEL, BASIC    Collection Time: 02/21/17  3:55 AM   Result Value Ref Range    Sodium 150 (H) 136 - 145 mmol/L    Potassium 3.7 3.5 - 5.1 mmol/L    Chloride 104 98 - 107 mmol/L    CO2 39 (H) 21 - 32 mmol/L    Anion gap 7 7 - 16 mmol/L    Glucose 98 65 - 100 mg/dL    BUN 21 8 - 23 MG/DL    Creatinine 0.92 0.6 - 1.0 MG/DL    GFR est AA >60 >60 ml/min/1.73m2    GFR est non-AA >60 >60 ml/min/1.73m2    Calcium 8.5 8.3 - 10.4 MG/DL     Imaging /Procedures /Studies:    CXR Results  (Last 48 hours)                    02/12/17 1604   XR CHEST PORT Final result     Impression:   Impression: No acute findings in the chest.                             Narrative:   Portable Chest X-ray 2/12/2017 at 1600 hours        Indication: Productive cough.  Altered mental status.        Comparison: 2/9/2017        Findings: Portable AP view demonstrates gross, stable cardiomegaly without   pulmonary edema. No focal infiltrate. No definite pleural effusion. Left lower   lobe difficult to evaluate because of heart size. Sternotomy wires and prosthetic cardiac valves. Cardiac pacemaking device   grossly unchanged.                  Echocardiogram: SUMMARY:  -  Left ventricle: Systolic function was normal. Ejection fraction was  estimated in the range of 55 % to 60 %. There were no regional wall motion  abnormalities. Wall thickness was mildly to moderately increased. -  Right ventricle: The ventricle was dilated. Systolic function was reduced. There was severe pulmonary artery hypertension.  -  Left atrium: The atrium was markedly dilated. -  Right atrium: The atrium was markedly dilated. -  Inferior vena cava, hepatic veins: The inferior vena cava was dilated. -  Aortic valve: A bioprosthesis was present. It exhibited stenosis. There   Was moderate stenosis. The aortic valve area by the continuity equation was 1.1   cm2.  -  Mitral valve: There was moderate to marked annular calcification. There   Was moderate-marked calcification. A annular ring prosthesis was present. It  exhibited stenosis and reduced motion. There was moderate stenosis. There was  mild regurgitation. The mitral valve area by pressure half time was 1.7 cm2.  -  Tricuspid valve: There was severe regurgitation. -  Pulmonic valve: There was mild to moderate regurgitation. -  Pericardium: There was a left pleural effusion. US arterial LE 2/20/17 IMPRESSION: Distal right common femoral artery thrombotic occlusion with  extension into the superficial femoral artery. Retrograde flow in the right  profunda femoral artery. Exceedingly slow flow in the right popliteal artery.  No  flow in the right tibial arteries    Labs and Studies from previous 24 hours have been personally reviewed by myself    ASSESSMENT Active Hospital Problems    Diagnosis Date Noted    Acute CHF (congestive heart failure) ([facility] 02/12/2017    [facility] 02/12/2017    Aortic valve replaced 01/09/2017    Warfarin anticoagulation 01/09/2017    Chronic diastolic congestive heart failure ([facility] 09/09/2016    Sleep apnea 11/02/2015    Chronic atrial fibrillation ([facility] 10/17/2011    ARF (acute renal failure) ([facility] 02/24/2010    BOBBY (obstructive sleep apnea) 12/04/2009    Aortic Valve Bioprosthesis Present 03/07/2009    Hypotension 03/01/2009       Plan:  SOB: Likely due to her severe pulmonary hypertension and possible cor pulmonale along with her known aortic valve stenosis, mitral valve disease. RSVP:65-70 mmHg. Improved with diuretics     Pulmonary HTN: patient and her family had initially refused further evaluation inclusive CTA chest and VQ scan. They wanted more comfort care efforts. Palliative care- following. However they have agreed for AV node ablation with cardiology      Hypotension: resolved     Permanent AFib - resolved s/p ablation and pacemaker as per cardiology. Will restart coumadin per vascular surgery/cardiology    Bioprostetic AV stenosis     Anemia: - s/p 2PRBC transfusion 2/19  - stable  - serial H/H while on heparin ggt     RLS: continue Gabapentin 100 mg TID     Hypernatremia- encourage free water intake. Fluid restrict to 1.5L daily     R CFA thrombosis - s/p thrombectomy  2/20/17. On heparin ggt. appreciate vascular surgery help.       DVT Prophylaxis: heparin ggt  CODE Status: DNR  Plan of Care Discussed with: patient.  Care team   Medical Risk: high  Disposition: pending rehabehab- case mgt aware        Ld Shultz MD  02/21/17

## 2017-02-21 NOTE — PROGRESS NOTES
Palliative Care Progress Note    Patient: Liliana Martinez MRN: [id number]  SSN: [id number]    YOB: 1940  Age: 77 y.o. Sex: female       Assessment/Plan:     Chief Complaint/Interval History: s/p thrombectomy of right leg. Reports she feels better      Principal Diagnosis:    · Debility, Unspecified  R53.81    Additional Diagnoses:   · Anxiety  F41.1- continue low dose valium  · Constipation, Unspecified  K59.00-continue miralax and pericolace  · Dyspnea R06.00  · Fatigue, Lethargy  R53.83  · Frailty  R54  · Counseling, Encounter for Medical Advice  Z71.9  · Encounter for Palliative Care  Z51.5    Palliative Performance Scale (PPS)  PPS: 60    Medical Decision Making:   Reviewed and summarized notes over previous 24 hours. Discussed case with appropriate providers:  Reviewed laboratory and x-ray data: CBC, BMP    Pt sitting in chair, no distress noted. Pt reports she feels a lot better today. Noted events from yesterday- pt developed ischemia in the right leg, and underwent a right thrombectomy with vascular surgery last night. She denies pain at present, and right leg is currently warm and pink. Noted plans for STR post discharge. Will continue to follow. Will discuss findings with members of the interdisciplinary team.       More than 50% of this 15 minute visit was spent counseling and coordination of care as outlined above. Subjective:     Review of Systems:  A comprehensive review of systems was negative except for:   Constitutional: Positive for fatigue  Respiratory: Positive for dyspnea with conversation or any exertion. Objective:     Visit Vitals    /60    Pulse 83    Temp 98.4 °F (36.9 °C)    Resp 15    Ht 5' (1.524 m)    Wt 77.9 kg (171 lb 11.8 oz)    SpO2 100%    Breastfeeding No    BMI 33.54 kg/m2       Physical Exam:    General:  Elderly, frail, and debilitated. Cooperative. No acute distress.     Eyes:  Conjunctivae/corneas clear    Nose: Nares normal. Septum midline. O2 via NC. Neck: Supple, symmetrical, trachea midline. Lungs:   Diminished to auscultation bilaterally, mildly labored with speech   Heart:  Regular rate and rhythm with intermittent pvc's. Abdomen:   Soft, non-tender, non-distended. Extremities: Normal, atraumatic, no cyanosis. Edema to bilateral lower extremities trace. Skin: Skin color, texture, turgor normal. No rash.    Neurologic: Nonfocal.   Psych: Alert and oriented x3     Signed By: Judit Bond NP     February 21, 2017

## 2017-02-21 NOTE — PROGRESS NOTES
Problem: Mobility Impaired (Adult and Pediatric)  Goal: *Acute Goals and Plan of Care (Insert Text)  Goals Updated 2/21/17  LTG:  (1.)Ms. Kate Tejeda will move from supine to sit and sit to supine and roll side to side in bed with INDEPENDENT within 7 day(s). (2.)Ms. Kate Tejeda will transfer from bed to chair and chair to bed with MODIFIED INDEPENDENCE using the least restrictive device within 7 day(s). (3.)Ms. Kate Tejeda will ambulate with MODIFIED INDEPENDENCE for 100 feet with the least restrictive device within 7 day(s). (4.)Ms. Kate Tejeda will perform standing static and dynamic balance activities x 8 minutes with SUPERVISION to improve safety within 7 day(s). (5.)Ms. Kate Tejeda will tolerate 25 minutes of therapeutic activity/exercise with one rest break within 5 day(s). ________________________________________________________________________________________________      PHYSICAL THERAPY: Daily Note, Re-evaluation and PM 2/21/2017  INPATIENT: Hospital Day: 10  [place] / [place] / Product Type: Medicare /      NAME/AGE/GENDER: Nanette Herrera is a 77 y.o. female       PRIMARY DIAGNOSIS: dyspnea, hypotension,  Acute CHF (congestive heart failure) ([facility])  A-FIB  Ischemic foot Acute CHF (congestive heart failure) ([facility]) Acute CHF (congestive heart failure) ([facility])  Procedure(s) (LRB):  THROMBECTOMY/EMBOLECTOMY LOWER EXTREMITY (Right)  1 Day Post-Op  ICD-10: Treatment Diagnosis:       · Generalized Muscle Weakness (M62.81)  · shortness of breath   Precaution/Allergies:  Adhesive tape; Benadryl [diphenhydramine hcl]; Demerol [meperidine]; Morphine; Sulfa (sulfonamide antibiotics); and Lorazepam       ASSESSMENT:      Ms. Kate Tejeda is now status post Thrombectomy on R LE and is stable again for PT treatment. Patient was supine in bed with wound care RN performing wound vac. Patient was agreeable to mobility now with vac in place.   Patient commented mild pain at surgical site, but noted R LE feeling better today and having no numbness. Patient returned to bed and positioned with needs in reach. Patient's goals updated, will continue to plan of care and also rehab post acute care stay. This section established at most recent assessment   PROBLEM LIST (Impairments causing functional limitations):  1. Decreased Strength  2. Decreased ADL/Functional Activities  3. Decreased Transfer Abilities  4. Decreased Ambulation Ability/Technique  5. Decreased Balance  6. Decreased Activity Tolerance  7. Increased Fatigue  8. Increased Shortness of Breath  9. Decreased Knowledge of Precautions  10. Decreased [place] with Home Exercise Program    INTERVENTIONS PLANNED: (Benefits and precautions of physical therapy have been discussed with the patient.)  1. Balance Exercise  2. Bed Mobility  3. Gait Training  4. Home Exercise Program (HEP)  5. Therapeutic Activites  6. Therapeutic Exercise/Strengthening  7. Transfer Training  8. Group Therapy      TREATMENT PLAN: Frequency/Duration: 3 times a week for duration of hospital stay  Rehabilitation Potential For Stated Goals: EXCELLENT      RECOMMENDED REHABILITATION/EQUIPMENT: (at time of discharge pending progress): Continue Skilled Therapy. HISTORY:   History of Present Injury/Illness (Reason for Referral):  Per H&P:Ms. Twyla Ponce is a very pleasant 76 yo F Who complains of increased SOb, nonproductive cough and b/l LE swelling in the last 1-2 months. Known with chronic diastolic CHF, CAD, bradycardia s/p pacemaker, , s/p bioprosthetic AVR,Chronic Afib on coumadin, severe pulmonary HTN with PAP 69 mmHg, chronic respiratory failure on NC 3L 24h/7 , COPD, GERD, HTN, HLP, Obesity, BOBBY - not using CPAP. Seen by her cardiologist, Shruti Cornell on 1/9/17 and Demadex was increased to 40 mg PO daily, except MWF when was BID. Ms. Twyla Ponce states that her leg swelling improved, although her cough and SOB persisted.  Seen by PCP on 1/31 and placed on oral steroids and Albuterol that she couldn't complete because the albuterol made ermias jittery. No improvement with treatment. Seen at [facility] ED on 2/9 with low BP and and thought that she is dehydrated due to increased diuretics and received IV fluids. She felt better for one day, although because of increased in her symptoms she presented to ED today. BP was 80/51 mmHg and received 1L NC bolus in Ed per ED provider. CXR w/o acute pathology. No spikes of fever since her symptoms started. Influenza screen negative. WBC:3.4. Cr:1.48, around her baseline. CV:irregular irregularity. No JVD. +2 pitting edema b/l LE   Resp: Decreased air entry b/l at the base. No wheezing. No rales or crackles. Abd: soft, non-tender, no rebound tenderness. Hold Torsemide until tomorrow. Will get Echocardiogram. Gentle IV hydration at 50 ml/h to keep MAP>85 mmHg. Hold BB due to hypotension. Hold benzodiazepines at bedtime, can exacerbate - untreated BOBBY and involved in severe pulmonary hypertension. Cardiology and palliative care consulted - appreciate the help. Past Medical History/Comorbidities:   Ms. Elfego Michelle  has a past medical history of Abnormal glucose (8/5/2016); Abscess (8/5/2016); Acute encephalopathy (7/8/2016); Acute renal failure ([address] (12/3/2009); Afib ([address] Anemia; Ankle swelling (8/5/2016); Anxiety (8/5/2016); Aortic Valve Bioprosthesis Present (3/7/2009); ARF (acute renal failure) ([address] (2/24/2010); Arthritis; Asthma; Atopic dermatitis (8/5/2016); Atrial fibrillation ([address] (3/7/2009); Autonomic orthostatic hypotension (8/5/2016); AV block (10/17/2011); Back pain (8/5/2016); Bradycardia (Symptomatic) (11/7/2011); CAD (coronary artery disease); Cancer ([address] (1990); Cardiac pacemaker (11/2/2015); Cardiogenic shock ([address] (10/17/2011); 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 (8/5/2016); Cellulitis (8/5/2016); Chest pain (8/5/2016); Chronic atrial fibrillation ([facility] (10/17/2011); Chronic depression (8/5/2016);  Chronic depression (8/5/2016); Chronic obstructive pulmonary disease ([facility] (3/8/2009); Chronic pain; CKD (chronic kidney disease) stage 3, GFR 30-59 ml/min (12/4/2009); Congestive heart failure (CHF) ([facility] (11/2/2015); COPD; CRI (chronic renal insufficiency) (8/5/2016); Degeneration of cervical intervertebral disc (8/5/2016); Degenerative arthritis of left knee (2/27/2009); Dehydration (8/5/2016); Diastolic heart failure ([facility] Digoxin toxicity (2/24/2010); Disorder of sweat glands (8/5/2016); Diverticulosis of large intestine without diverticulitis (2015); Dyspnea (8/5/2016); Eczema (8/5/2016); Epigastric abdominal pain (2/24/2010); GERD (gastroesophageal reflux disease); Gout (8/5/2016); H/O mitral valve repair, 2003 (6/27/2016); Heart failure ([facility] HTN (hypertension) (8/5/2016); colonic polyp (2015); Hyperkalemia (10/17/2011); Hyperlipidemia (8/5/2016); Hypertension; Hypokalemia (2/24/2010); Hypothyroidism (12/4/2009); Ill-defined condition; Knee pain (8/5/2016); Leg cramps (8/5/2016); Mitral stenosis with insufficiency (11/2/2015); Nausea and vomiting (2/24/2010); Obesity (11/2/2015); BOBBY (obstructive sleep apnea) (12/4/2009); Osteoarthritis (8/5/2016); Osteopenia (8/5/2016); Other long term (current) drug therapy (8/5/2016); Palpitations (11/2/2015); Rash (8/5/2016); Rectal bleeding (10/27/2011); Rectocele (2015); Respiratory insufficiency (11/2/2015); 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 She also has no past medical history of Aneurysm ([facility] Autoimmune disease ([facility] Coagulation disorder ([facility] DEMENTIA; Diabetes ([facility] Endocarditis; Infectious disease; Liver disease;  Other ill-defined conditions(799.89); PUD (peptic ulcer disease); Seizures ([facility] Stroke [facility]); or Thromboembolus ([facility] Ms. Ziyad Peña  has a past surgical history that includes appendectomy; cholecystectomy (2005); gyn (1981); mastectomy (1990); pacemaker; breast reconstruction; cardiac surg procedure unlist; and orthopaedic. Social History/Living Environment:   Home Environment: Private residence  # Steps to Enter: 1  One/Two Story Residence: One story  Living Alone: No  Support Systems: Child(brit), Gnosticism / gene community, Family member(s), Friends \ neighbors  Patient Expects to be Discharged to[de-identified] Private residence  Current DME Used/Available at Home: Shower chair  Tub or Shower Type: Tub/Shower combination  Prior Level of Function/Work/Activity:  Patient lives with son who works during the day. Patient has family and neighbors that would be able to check on patient. Personal Factors:          Age:  77 y.o. Number of Personal Factors/Comorbidities that affect the Plan of Care: 3+: HIGH COMPLEXITY   EXAMINATION:   Most Recent Physical Functioning:   Gross Assessment:  AROM: Generally decreased, functional  Strength: Generally decreased, functional  Coordination: Generally decreased, functional  Sensation: Intact (R leg sensation back to normal)               Posture:  Posture (WDL): Exceptions to WDL  Posture Assessment:  Forward head, Rounded shoulders  Balance:  Sitting: Intact  Sitting - Static: Good (unsupported)  Standing: Impaired  Standing - Static: Fair  Standing - Dynamic : Fair Bed Mobility:  Rolling: Minimum assistance  Supine to Sit: Minimum assistance  Wheelchair Mobility:     Transfers:  Sit to Stand: Contact guard assistance  Stand to Sit: Contact guard assistance  Gait:     Base of Support: Widened  Speed/Carol: Slow  Step Length: Right shortened;Left shortened  Distance (ft): 15 Feet (ft)  Assistive Device: Walker, rolling  Ambulation - Level of Assistance: Contact guard assistance  Interventions: Safety awareness training;Verbal cues    PT Reassessment Table:   Initial Physical Functioning: Most Recent Physical Functioning:   Gross Assessment:  AROM: Generally decreased, functional  Strength: Generally decreased, functional  Coordination: Generally decreased, functional  Sensation: Intact (pt reports restless leg syndrome) Gross Assessment:   AROM: Generally decreased, functional  Strength: Generally decreased, functional  Coordination: Generally decreased, functional  Sensation: Intact (R leg sensation back to normal)   Posture:   Posture (WDL): Exceptions to WDL  Posture Assessment: Forward head Posture:   Posture (WDL): Exceptions to WDL  Posture Assessment:  Forward head;Rounded shoulders   Balance:   Sitting: Intact  Sitting - Static: Good (unsupported)  Sitting - Dynamic: Fair (occasional)  Standing: Impaired  Standing - Static: Fair  Standing - Dynamic : Fair Balance:   Sitting: Intact  Sitting - Static: Good (unsupported)  Standing: Impaired  Standing - Static: Fair  Standing - Dynamic : Fair   Bed Mobility:   Rolling: Supervision  Supine to Sit: Supervision  Sit to Supine: Stand-by asssistance  Scooting: Stand-by asssistance  Interventions: Safety awareness training, Verbal cues Bed Mobility:   Rolling: Minimum assistance  Supine to Sit: Minimum assistance   Wheelchair Mobility:    Wheelchair Mobility:      Transfers:   Sit to Stand: Contact guard assistance  Stand to Sit: Contact guard assistance  Bed to Chair: Minimum assistance  Interventions: Manual cues, Safety awareness training, Verbal cues, Visual cues  Duration: 15 Minutes Transfers:   Sit to Stand: Contact guard assistance  Stand to Sit: Contact guard assistance   Gait:   Base of Support: Widened  Speed/Carol: Slow  Step Length: Left shortened, Right shortened  Ambulation - Level of Assistance: Contact guard assistance  Distance (ft): 15 Feet (ft)  Assistive Device: Walker, rolling  Interventions: Verbal cues, Tactile cues, Safety awareness training Gait:   Base of Support: Widened  Speed/Carol: Slow  Step Length: Right shortened;Left shortened  Ambulation - Level of Assistance: Contact guard assistance  Distance (ft): 15 Feet (ft)  Assistive Device: Walker, rolling  Interventions: Safety awareness training;Verbal cues      Body Structures Involved:  1. Heart  2. Lungs Body Functions Affected:  1. Cardio  2. Movement Related Activities and Participation Affected:  1. Mobility  2. Self Care  3. Domestic Life   Number of elements that affect the Plan of Care: 4+: HIGH COMPLEXITY   CLINICAL PRESENTATION:   Presentation: Evolving clinical presentation with changing clinical characteristics: MODERATE COMPLEXITY   CLINICAL DECISION MAKIN [facility] Mobility Inpatient Short Form  How much difficulty does the patient currently have. .. Unable A Lot A Little None   1. Turning over in bed (including adjusting bedclothes, sheets and blankets)? [ ] 1   [ ] 2   [ ] 3   [X] 4   2. Sitting down on and standing up from a chair with arms ( e.g., wheelchair, bedside commode, etc.)   [ ] 1   [ ] 2   [X] 3   [ ] 4   3. Moving from lying on back to sitting on the side of the bed? [ ] 1   [ ] 2   [ ] 3   [X] 4   How much help from another person does the patient currently need. .. Total A Lot A Little None   4. Moving to and from a bed to a chair (including a wheelchair)? [ ] 1   [ ] 2   [X] 3   [ ] 4   5. Need to walk in hospital room? [ ] 1   [ ] 2   [X] 3   [ ] 4   6. Climbing 3-5 steps with a railing? [ ] 1   [X] 2   [ ] 3   [ ] 4   © , Trustees of [address] [place] [address] [address] [address], under license to [employer]. All rights reserved    Score:  Initial: 19 Most Recent: X (Date: -- )     Interpretation of Tool:  Represents activities that are increasingly more difficult (i.e. Bed mobility, Transfers, Gait).        Score 24 23 22-20 19-15 14-10 9-7 6       Modifier CH CI CJ CK CL CM CN         · Mobility - Walking and Moving Around:               - CURRENT STATUS:    CK - 40%-59% impaired, limited or restricted               - GOAL STATUS:           CJ - 20%-39% impaired, limited or restricted               - D/C STATUS:                       ---------------To be determined---------------  [place] / [place] / Product Type: Medicare /       Medical Necessity:     · Patient demonstrates good rehab potential due to higher previous functional level. · Skilled intervention continues to be required due to decline in overall functional mobility and activity tolerance. Reason for Services/Other Comments:  · Patient continues to require present interventions due to patient's inability to perform functional mobility at previous indepencence level. Use of outcome tool(s) and clinical judgement create a POC that gives a: Questionable prediction of patient's progress: MODERATE COMPLEXITY                 TREATMENT:      Pre-treatment Symptoms/Complaints: \"I can give it a try\"  Pain: Initial: 0  Pain Intensity 1: 2  Pain Intervention(s) 1: Ambulation/Increased Activity, Nurse notified, Repositioned  Post Session: mild groin pain noted      Therapeutic Activity: (    ):  Therapeutic activities including bed transfers, LE ex and Ambulation on level ground to improve mobility, strength, balance and activity tolerance. Required minimal Safety awareness training;Verbal cues to promote static and dynamic balance in standing and pursed lip breathing.      DATE: 2/16/17 2/18/17 2/20/17     Ambulation        Hip Flexion x10 AB        Long Arc Quads x10 AB X 15 B      Knee Squeezes        Ankle DF/PF x10 AB X 15 B 15 B                                      Key:  A=active, AA=active assisted, P=passive, B=bilaterally, R=right, L=left   DF=dorsiflexion, PF=plantarflexion      Braces/Orthotics/Lines/Etc:   · O2 Device: Nasal cannula 3L/min  Treatment/Session Assessment:    · Response to Treatment:  Dyspneic with minimal exertion. · Interdisciplinary Collaboration:  · Registered Nurse and Certified Nursing Assistant/Patient Care Technician  · After treatment position/precautions:  · Supine in bed, Bed/Chair-wheels locked, Call light within reach, RN notified and Family at bedside  · Compliance with Program/Exercises: compliant all of the time. · Recommendations/Intent for next treatment session: \"Next visit will focus on advancements to more challenging activities and reduction in assistance provided\".   Total Treatment Duration:  PT Patient Time In/Time Out  Time In: 1445  Time Out: [address] DPT

## 2017-02-21 NOTE — PROGRESS NOTES
IV heparin rate has been adjusted based on the most recent PTT results. Lab Results   Component Value Date/Time    aPTT 42.9 02/21/2017 11:22 AM   Rate increased by 4 units. New dose 22 units. No bolus given. Next PTT in 6 hours.

## 2017-02-21 NOTE — PROGRESS NOTES
Bedside and Verbal shift change report given to self (oncoming nurse) by Laurie Westbrook RN (offgoing nurse). Report included the following information SBAR, Kardex, MAR and Recent Results.      Pt to go to surgery soon

## 2017-02-21 NOTE — PROGRESS NOTES
Bedside shift report received from [facility], RN, for continued care. Lines and wounds verified.

## 2017-02-21 NOTE — PROGRESS NOTES
TRANSFER - IN REPORT:    Verbal report received from Elkin Rossi RN(name) on Luca Ortiz  being received from CVICU(unit) for routine post - op      Report consisted of patients Situation, Background, Assessment and   Recommendations(SBAR). Information from the following report(s) SBAR, Kardex, OR Summary, MAR and Recent Results was reviewed with the receiving nurse. Opportunity for questions and clarification was provided. Assessment completed upon patients arrival to unit and care assumed. Dual skin assessment with Esther Ramirez RN. Allevyn peeled back then reapplied. No redness or breakdown noted to sacrum. Has incision with dressing intact to right groin. Small red scratch to center of back.

## 2017-02-21 NOTE — PROGRESS NOTES
[facility] Ramon   [address] [place] [address] FAX: [phone]        Vascular Surgery Progress Note    Admit Date: 2017  POD 1 Day Post-Op    Procedure:  Procedure(s):  THROMBECTOMY/EMBOLECTOMY LOWER EXTREMITY      Subjective:     Patient has no new complaints. Patient states she feels much better today and is having no pain. Objective:     Blood pressure 117/60, pulse 83, temperature 98.4 °F (36.9 °C), resp. rate 15, height 5' (1.524 m), weight 171 lb 11.8 oz (77.9 kg), SpO2 100 %, not currently breastfeeding.     Temp (24hrs), Av °F (36.7 °C), Min:97.1 °F (36.2 °C), Max:98.4 °F (36.9 °C)      Physical Exam:  GENERAL: alert, cooperative, no distress, appears stated age, LUNG:  clear to auscultation bilaterally, HEART:  regular rate and rhythm, S1, S2 normal, no murmur, click, rub or gallop, INCISION:  right leg and groin-wound vac to be placed and EXTREMITIES:  Right Lower Extremity:  warm foot, palpable pulses    Labs:   Recent Labs      17   0710  17   0355   HGB  10.6*   --    WBC  7.4   --    K   --   3.7   GLU   --   98       Data Review: images and reports reviewed  Current Facility-Administered Medications   Medication Dose Route Frequency    sodium chloride (NS) flush 5-10 mL  5-10 mL IntraVENous Q8H    sodium chloride (NS) flush 5-10 mL  5-10 mL IntraVENous PRN    naloxone (NARCAN) injection 0.4 mg  0.4 mg IntraVENous PRN    aspirin delayed-release tablet 81 mg  81 mg Oral DAILY    HYDROcodone-acetaminophen (NORCO) 5-325 mg per tablet 1 Tab  1 Tab Oral Q4H PRN    morphine injection 5 mg  5 mg IntraVENous Q4H PRN    ondansetron (ZOFRAN) injection 4 mg  4 mg IntraVENous Q4H PRN    ceFAZolin in 0.9% NS (ANCEF) IVPB soln 2 g  2 g IntraVENous Q8H    heparin (porcine) 25,000 Units in dextrose 5% 500 mL infusion  18-36 Units/kg/hr (Order-Specific) IntraVENous TITRATE    dextrose 5% infusion  50 mL/hr IntraVENous CONTINUOUS    ketorolac (TORADOL) injection 15 mg  15 mg IntraVENous Q6H PRN    0.9% sodium chloride infusion 250 mL  250 mL IntraVENous PRN    torsemide (DEMADEX) tablet 40 mg  40 mg Oral DAILY    lip protectant (BLISTEX) ointment   Topical PRN    magnesium citrate solution 296 mL  296 mL Oral PRN    polyethylene glycol (MIRALAX) packet 17 g  17 g Oral DAILY    diazePAM (VALIUM) tablet 2.5 mg  2.5 mg Oral Q8H PRN    senna-docusate (PERICOLACE) 8.6-50 mg per tablet 1 Tab  1 Tab Oral BID    traZODone (DESYREL) tablet 50 mg  50 mg Oral QHS PRN    gabapentin (NEURONTIN) capsule 100 mg  100 mg Oral TID    sodium chloride (NS) flush 5-10 mL  5-10 mL IntraVENous Q8H    sodium chloride (NS) flush 5-10 mL  5-10 mL IntraVENous PRN    ondansetron (ZOFRAN) injection 4 mg  4 mg IntraVENous Q4H PRN    acetaminophen (TYLENOL) tablet 650 mg  650 mg Oral Q4H PRN    levalbuterol (XOPENEX) nebulizer soln 1.25 mg/3 mL  1.25 mg Nebulization Q4H PRN    polyvinyl alcohol (LIQUIFILM TEARS) 1.4 % ophthalmic solution 1 Drop  1 Drop Both Eyes PRN    hydrocortisone (ANUSOL-HC) 2.5 % rectal cream   PeriANAL BID PRN    levothyroxine (SYNTHROID) tablet 88 mcg  88 mcg Oral ACB    pantoprazole (PROTONIX) tablet 40 mg  40 mg Oral ACB    promethazine (PHENERGAN) tablet 25 mg  25 mg Oral Q6H PRN    rOPINIRole (REQUIP) tablet 2 mg  2 mg Oral QHS    sertraline (ZOLOFT) tablet 50 mg  50 mg Oral QHS    spironolactone (ALDACTONE) tablet 25 mg  25 mg Oral DAILY     Assessment:     Principal Problem:    Acute CHF (congestive heart failure) ([facility]) (2/12/2017)    Active Problems:    Hypotension (3/1/2009)      Aortic Valve Bioprosthesis Present (3/7/2009)      BOBBY (obstructive sleep apnea) (12/4/2009)      ARF (acute renal failure) ([facility] (2/24/2010)      Chronic atrial fibrillation (H[facility]) (10/17/2011)      Sleep apnea (11/2/2015)      Chronic diastolic congestive heart failure ([address] (9/9/2016)      Aortic valve replaced (1/9/2017)      Warfarin anticoagulation (1/9/2017)      Pulmonary hypertension ([facility] (2/12/2017)        Plan/Recommendations/Medical Decision Making:   Continue present treatment   -[facility] restart Coumadin. Will leave that up to cardiology to decide on when to restart Coumadin    Elements of this note have been dictated using speech recognition software. As a result, errors of speech recognition may have occurred.

## 2017-02-21 NOTE — PROGRESS NOTES
Occupational Therapy Note:  Therapist is discharging patient from OT at this time due to decline in medical status and transfer to CVICU. Please reconsult OT when MD deems patient appropriate for continued services. Thank you.   Andrew Ramos OTR/L

## 2017-02-21 NOTE — PROGRESS NOTES
[facility] CARDIOLOGY PROGRESS NOTE           2/21/2017 7:28 AM    Admit Date: 2/12/2017      Subjective:   Feels better. Right leg feels better. ROS:  GEN:  No fever or chills  Cardiovascular:  As noted above:no chest pain or palpitations. Pulmonary:  As noted above:no SOB. Neuro:  No new focal motor or sensory loss    Objective:      Vitals:    02/21/17 0457 02/21/17 0523 02/21/17 0524 02/21/17 0530   BP:  114/62  110/58   Pulse: 83  85 80   Resp:       Temp:       SpO2: 96%  99% 100%   Weight:       Height:           Physical Exam:  General-no distress. Sitting in chair. Neck- supple, no JVD  CV- regular rate and rhythm no MRG  Lung- clear bilaterally  Abd- soft, nontender, nondistended  Ext- trace edema bilaterally. Skin- warm and dry  Psychiatric:  Normal mood and affect. Neurologic:  Alert and oriented X 3      Data Review:   Recent Labs      02/21/17   0355  02/20/17   1826  02/20/17   0610  02/19/17   0605   NA  150*   --    --   146*   K  3.7   --    --   3.8   MG  2.2   --   2.2  2.2   BUN  21   --    --   27*   CREA  0.92   --    --   0.97   GLU  98   --    --   101*   WBC   --   5.5   --   4.1*   HGB   --   10.8*  10.7*  7.6*   HCT   --   35.3*  35.4*  25.5*   PLT   --   109*   --   114*   INR  1.1   --   1.0  1.2       TELEMETRY:  V-paced. Assessment/Plan:     Principal Problem:    Acute CHF (congestive heart failure) ([facility] [address] (2/12/2017):improved. Continue diuretics. Active Problems:    Hypotension (3/1/2009):resolved. Bioprosthetic AV stenosis      Right CFA thrombosis:s/p thrombectomy. Appears stable. Anemia due to GI Bleeding. Hgb appears stable. Chronic atrial fibrillation (HCC) (10/17/2011):s/p AV rubin ablation with pacemaker:stable. Resume Warfarin when ok with vascular surgery. Target INR: 2-3.       Sleep apnea (11/2/2015)                       Pulmonary hypertension ([address] (2/12/2017)                Gabino Underwood MD  2/21/2017 7:28 AM

## 2017-02-21 NOTE — PROGRESS NOTES
PT Note:  Therapist is discontinuing physical therapy at this time due to transfer to CVICU following surgery. Ms. Rosales Like physical therapy goals were not met. Please reorder PT when our services are again appropriate. Thank you.   Cesario Perea DPT  2/21/2017

## 2017-02-21 NOTE — PROGRESS NOTES
TRANSFER - IN REPORT:    Verbal report received from [address] (name) on Marcy Sauceda  being received from PACU (unit) for routine post - op      Report consisted of patients Situation, Background, Assessment and   Recommendations(SBAR). Information from the following report(s) SBAR, Kardex, OR Summary, Procedure Summary, Intake/Output, MAR, Accordion and Recent Results was reviewed with the receiving nurse. Opportunity for questions and clarification was provided. Assessment completed upon patients arrival to unit and care assumed.

## 2017-02-21 NOTE — BRIEF OP NOTE
BRIEF OPERATIVE NOTE    Date of Procedure: 2/20/2017   Preoperative Diagnosis: Ischemic foot  Postoperative Diagnosis: Ischemic foot    Procedure(s):  Right Femoral THROMBECTOMY/EMBOLECTOMY    Surgeon(s) and Role:     * Jerri Bradley MD - Primary            Surgical Staff:  Circ-1: Kwesi Lujan RN  Scrub Tech-1: Orville Michelle  Scrub Tech-2: Fern Church  Event Time In   Incision Start 2016   Incision Close 2130     Anesthesia: General   Estimated Blood Loss: 50 cc  Specimens: * No specimens in log *   Findings: Acute thrombus R CFA   Complications: None  Implants: * No implants in log *

## 2017-02-21 NOTE — PROGRESS NOTES
Bedside shift report given to Candace Hidalgo RN, for continued care. Lines, gtts, and wounds verified.

## 2017-02-21 NOTE — PROGRESS NOTES
TRANSFER - OUT REPORT:    Verbal report given to Esme Nieto RN (name) on Kindra Hernández  being transferred to [facility] (unit) for routine progression of care       Report consisted of patients Situation, Background, Assessment and   Recommendations(SBAR). Information from the following report(s) SBAR, Kardex, OR Summary, Intake/Output, MAR, Accordion, Recent Results, Med Rec Status and Cardiac Rhythm paced was reviewed with the receiving nurse. Lines:   Peripheral IV 02/20/17 Right Arm (Active)   Site Assessment Clean, dry, & intact 2/21/2017 10:00 AM   Phlebitis Assessment 0 2/21/2017 10:00 AM   Infiltration Assessment 0 2/21/2017 10:00 AM   Dressing Status Clean, dry, & intact; Occlusive 2/21/2017 10:00 AM   Dressing Type Zahra;Transparent 2/21/2017 10:00 AM   Hub Color/Line Status Pink; Infusing;Patent 2/21/2017 10:00 AM   Action Taken Dressing changed; Tubing changed 2/21/2017  9:29 AM   Alcohol Cap Used No 2/20/2017 10:05 PM        Opportunity for questions and clarification was provided.       Patient transported with:   Monitor  O2 @ 2 liters

## 2017-02-21 NOTE — ROUTINE PROCESS
TRANSFER - OUT REPORT:    Verbal report given to [address] [place] [address] on Marcy Sauceda  being transferred to [facility]-CVICU for routine post - op       Report consisted of patients Situation, Background, Assessment and   Recommendations(SBAR). Information from the following report(s) SBAR, Kardex, OR Summary, Procedure Summary, Intake/Output and MAR was reviewed with the receiving nurse. Lines:   Peripheral IV 02/12/17 Right Antecubital (Active)   Site Assessment Clean, dry, & intact 2/20/2017 10:05 PM   Phlebitis Assessment 0 2/20/2017 10:05 PM   Infiltration Assessment 0 2/20/2017 10:05 PM   Dressing Status Clean, dry, & intact 2/20/2017 10:05 PM   Dressing Type Transparent;Tape 2/20/2017 10:05 PM   Hub Color/Line Status Infusing 2/20/2017 10:05 PM   Action Taken Dressing changed 2/20/2017  5:24 AM   Alcohol Cap Used No 2/20/2017 10:05 PM       Peripheral IV 02/20/17 Right Arm (Active)   Site Assessment Clean, dry, & intact 2/20/2017 10:05 PM   Phlebitis Assessment 0 2/20/2017 10:05 PM   Infiltration Assessment 0 2/20/2017 10:05 PM   Dressing Status Clean, dry, & intact 2/20/2017 10:05 PM   Dressing Type Transparent;Tape 2/20/2017 10:05 PM   Hub Color/Line Status Infusing 2/20/2017 10:05 PM   Alcohol Cap Used No 2/20/2017 10:05 PM        Opportunity for questions and clarification was provided. Patient transported with:   O2 @ 2 liters  Registered Nurse   Cardiac Monitor    VTE prophylaxis orders have not been written for Marcy Sauceda. Patient and family given floor number and nurses name. Family updated re: pt status after security code verified.

## 2017-02-21 NOTE — ANESTHESIA POSTPROCEDURE EVALUATION
Post-Anesthesia Evaluation and Assessment    Patient: Germain Brown MRN: [id number]  SSN: [id number]    YOB: 1940  Age: 77 y.o. Sex: female       Cardiovascular Function/Vital Signs  Visit Vitals    /60    Pulse 81    Temp 36.6 °C (97.9 °F)    Resp 16    Ht 5' (1.524 m)    Wt 83.5 kg (184 lb)    SpO2 98%    Breastfeeding No    BMI 35.94 kg/m2       Patient is status post MAC anesthesia for Procedure(s):  THROMBECTOMY/EMBOLECTOMY LOWER EXTREMITY. Nausea/Vomiting: None    Postoperative hydration reviewed and adequate. Pain:  Pain Scale 1: Visual (02/20/17 2140)  Pain Intensity 1: 0 (02/20/17 2140)   Managed    Neurological Status:   Neuro (WDL): Within Defined Limits (02/20/17 2140)  Neuro  Neurologic State: Appropriate for age; Alert (02/20/17 1620)  Orientation Level: Oriented X4 (02/20/17 1620)  Cognition: Appropriate decision making; Appropriate for age attention/concentration; Appropriate safety awareness; Follows commands (02/20/17 1620)  Speech: Clear (02/20/17 1620)   At baseline    Mental Status and Level of Consciousness: at baseline    Pulmonary Status:   O2 Device: Nasal cannula (02/20/17 2140)   Adequate oxygenation and airway patent    Complications related to anesthesia: None    Post-anesthesia assessment completed.  No concerns    Signed By: Cheryle Silver, MD     February 20, 2017

## 2017-02-22 ENCOUNTER — APPOINTMENT (OUTPATIENT)
Dept: GENERAL RADIOLOGY | Age: 77
DRG: 270 | End: 2017-02-22
Attending: INTERNAL MEDICINE
Payer: MEDICARE

## 2017-02-22 LAB
ANION GAP BLD CALC-SCNC: 8 MMOL/L (ref 7–16)
APTT PPP: >110 SEC (ref 23.5–31.7)
BNP SERPL-MCNC: 380 PG/ML
BUN SERPL-MCNC: 22 MG/DL (ref 8–23)
CALCIUM SERPL-MCNC: 8.3 MG/DL (ref 8.3–10.4)
CHLORIDE SERPL-SCNC: 98 MMOL/L (ref 98–107)
CO2 SERPL-SCNC: 37 MMOL/L (ref 21–32)
CREAT SERPL-MCNC: 0.94 MG/DL (ref 0.6–1)
ERYTHROCYTE [DISTWIDTH] IN BLOOD BY AUTOMATED COUNT: 15.3 % (ref 11.9–14.6)
GLUCOSE SERPL-MCNC: 110 MG/DL (ref 65–100)
HCT VFR BLD AUTO: 32.2 % (ref 35.8–46.3)
HCT VFR BLD AUTO: 37.9 % (ref 35.8–46.3)
HGB BLD-MCNC: 11.4 G/DL (ref 11.7–15.4)
HGB BLD-MCNC: 9.5 G/DL (ref 11.7–15.4)
INR PPP: 1.1 (ref 0.9–1.2)
MAGNESIUM SERPL-MCNC: 2.2 MG/DL (ref 1.8–2.4)
MCH RBC QN AUTO: 29 PG (ref 26.1–32.9)
MCHC RBC AUTO-ENTMCNC: 29.5 G/DL (ref 31.4–35)
MCV RBC AUTO: 98.2 FL (ref 79.6–97.8)
PLATELET # BLD AUTO: 101 K/UL (ref 150–450)
PMV BLD AUTO: 11 FL (ref 10.8–14.1)
POTASSIUM SERPL-SCNC: 4.4 MMOL/L (ref 3.5–5.1)
PROTHROMBIN TIME: 11.9 SEC (ref 9.6–12)
RBC # BLD AUTO: 3.28 M/UL (ref 4.05–5.25)
SODIUM SERPL-SCNC: 143 MMOL/L (ref 136–145)
WBC # BLD AUTO: 5.9 K/UL (ref 4.3–11.1)

## 2017-02-22 PROCEDURE — 83735 ASSAY OF MAGNESIUM: CPT | Performed by: NURSE PRACTITIONER

## 2017-02-22 PROCEDURE — 85027 COMPLETE CBC AUTOMATED: CPT | Performed by: HOSPITALIST

## 2017-02-22 PROCEDURE — 36415 COLL VENOUS BLD VENIPUNCTURE: CPT | Performed by: NURSE PRACTITIONER

## 2017-02-22 PROCEDURE — 80048 BASIC METABOLIC PNL TOTAL CA: CPT | Performed by: NURSE PRACTITIONER

## 2017-02-22 PROCEDURE — 77030020886

## 2017-02-22 PROCEDURE — 65660000000 HC RM CCU STEPDOWN

## 2017-02-22 PROCEDURE — 74011250637 HC RX REV CODE- 250/637: Performed by: HOSPITALIST

## 2017-02-22 PROCEDURE — 74011250637 HC RX REV CODE- 250/637: Performed by: NURSE PRACTITIONER

## 2017-02-22 PROCEDURE — 74011250637 HC RX REV CODE- 250/637: Performed by: INTERNAL MEDICINE

## 2017-02-22 PROCEDURE — 83880 ASSAY OF NATRIURETIC PEPTIDE: CPT | Performed by: INTERNAL MEDICINE

## 2017-02-22 PROCEDURE — 85018 HEMOGLOBIN: CPT | Performed by: HOSPITALIST

## 2017-02-22 PROCEDURE — 94760 N-INVAS EAR/PLS OXIMETRY 1: CPT

## 2017-02-22 PROCEDURE — 04CK3ZZ EXTIRPATION OF MATTER FROM RIGHT FEMORAL ARTERY, PERCUTANEOUS APPROACH: ICD-10-PCS | Performed by: SURGERY

## 2017-02-22 PROCEDURE — 77010033678 HC OXYGEN DAILY

## 2017-02-22 PROCEDURE — 74011250637 HC RX REV CODE- 250/637: Performed by: SURGERY

## 2017-02-22 PROCEDURE — 85730 THROMBOPLASTIN TIME PARTIAL: CPT | Performed by: EMERGENCY MEDICINE

## 2017-02-22 PROCEDURE — 74750000023 HC WOUND THERAPY

## 2017-02-22 PROCEDURE — 85610 PROTHROMBIN TIME: CPT | Performed by: EMERGENCY MEDICINE

## 2017-02-22 PROCEDURE — 71020 XR CHEST PA LAT: CPT

## 2017-02-22 RX ORDER — WARFARIN 2.5 MG/1
2.5 TABLET ORAL
Status: DISCONTINUED | OUTPATIENT
Start: 2017-02-22 | End: 2017-02-22

## 2017-02-22 RX ADMIN — GABAPENTIN 100 MG: 100 CAPSULE ORAL at 08:00

## 2017-02-22 RX ADMIN — Medication 10 ML: at 04:05

## 2017-02-22 RX ADMIN — GABAPENTIN 100 MG: 100 CAPSULE ORAL at 21:38

## 2017-02-22 RX ADMIN — PANTOPRAZOLE SODIUM 40 MG: 40 TABLET, DELAYED RELEASE ORAL at 06:00

## 2017-02-22 RX ADMIN — GABAPENTIN 100 MG: 100 CAPSULE ORAL at 16:48

## 2017-02-22 RX ADMIN — SPIRONOLACTONE 25 MG: 25 TABLET, FILM COATED ORAL at 08:00

## 2017-02-22 RX ADMIN — ACETAMINOPHEN 650 MG: 325 TABLET, FILM COATED ORAL at 19:25

## 2017-02-22 RX ADMIN — APIXABAN 5 MG: 5 TABLET, FILM COATED ORAL at 21:38

## 2017-02-22 RX ADMIN — POLYVINYL ALCOHOL 1 DROP: 14 SOLUTION/ DROPS OPHTHALMIC at 12:13

## 2017-02-22 RX ADMIN — Medication 10 ML: at 21:39

## 2017-02-22 RX ADMIN — ASPIRIN 81 MG: 81 TABLET, COATED ORAL at 08:00

## 2017-02-22 RX ADMIN — ACETAMINOPHEN 650 MG: 325 TABLET, FILM COATED ORAL at 00:10

## 2017-02-22 RX ADMIN — APIXABAN 5 MG: 5 TABLET, FILM COATED ORAL at 11:32

## 2017-02-22 RX ADMIN — SERTRALINE HYDROCHLORIDE 50 MG: 50 TABLET ORAL at 21:38

## 2017-02-22 RX ADMIN — ROPINIROLE 2 MG: 2 TABLET, FILM COATED ORAL at 21:38

## 2017-02-22 RX ADMIN — Medication 10 ML: at 13:45

## 2017-02-22 RX ADMIN — TORSEMIDE 40 MG: 20 TABLET ORAL at 08:00

## 2017-02-22 RX ADMIN — ACETAMINOPHEN 650 MG: 325 TABLET, FILM COATED ORAL at 08:08

## 2017-02-22 RX ADMIN — LEVOTHYROXINE SODIUM 88 MCG: 0.09 TABLET ORAL at 06:00

## 2017-02-22 NOTE — PROGRESS NOTES
Bedside shift report received from [facility] (offgoing nurse). Report given to Leola Beatty RN. Vital signs stable. No complaints noted. Patient in stable condition.

## 2017-02-22 NOTE — PROGRESS NOTES
Palliative Care Progress Note    Patient: Blaise Rouse MRN: [id number]  SSN: [id number]    YOB: 1940  Age: 77 y.o. Sex: female       Assessment/Plan:     Chief Complaint/Interval History: reports she feels great     Principal Diagnosis:    · Debility, Unspecified  R53.81    Additional Diagnoses:   · Anxiety  F41.1- continue low dose valium  · Constipation, Unspecified  K59.00-continue miralax and pericolace  · Dyspnea R06.00  · Fatigue, Lethargy  R53.83  · Frailty  R54  · Counseling, Encounter for Medical Advice  Z71.9  · Encounter for Palliative Care  Z51.5    Palliative Performance Scale (PPS)  PPS: 60    Medical Decision Making:   Reviewed and summarized notes over previous 24 hours. Discussed case with appropriate providers:  Reviewed laboratory and x-ray data: CBC, BMP    Pt sitting in chair, no distress noted. Son at bedside. Pt reports she feels great today, and is anticipating discharge to STR at [facility] soon. Reviewed options of home health and hospice when STR is completed. She voiced understanding. We will sign off- thank you for allowing us to participate in Ms Radha Sommers' care. Will discuss findings with members of the interdisciplinary team.       More than 50% of this 15 minute visit was spent counseling and coordination of care as outlined above. Subjective:     Review of Systems:  A comprehensive review of systems was negative except for:   Constitutional: Positive for fatigue  Respiratory: Positive for dyspnea with exertion. Objective:     Visit Vitals    /57    Pulse 82    Temp 98.8 °F (37.1 °C)    Resp 18    Ht 5' (1.524 m)    Wt 85.6 kg (188 lb 11.2 oz)    SpO2 99%    Breastfeeding No    BMI 36.85 kg/m2       Physical Exam:    General:  Elderly, frail, and debilitated. Cooperative. No acute distress. Eyes:  Conjunctivae/corneas clear    Nose: Nares normal. Septum midline. O2 via NC. Neck: Supple, symmetrical, trachea midline.    Lungs: Diminished to auscultation bilaterally   Heart:  Regular rate and rhythm with intermittent pvc's. Abdomen:   Soft, non-tender, non-distended. Extremities: Normal, atraumatic, no cyanosis. Edema to bilateral lower extremities trace. Skin: Skin color, texture, turgor normal. No rash.    Neurologic: Nonfocal.   Psych: Alert and oriented x3     Signed By: Moni Perez NP     February 22, 2017

## 2017-02-22 NOTE — PROGRESS NOTES
Discussed with family regarding which facility they would like. [facility] is closer to home, so they would like there. Per oswaldo Whatleyison, they will have bed. Awaiting INR level to return to normal, on Heparin. SW/CM will continue to follow for d/c needs.

## 2017-02-22 NOTE — PROGRESS NOTES
Patient voices desire to start Eliquis rather than coumadin. Dr. Edmond Saliva notified. Orders given.

## 2017-02-22 NOTE — PROGRESS NOTES
[facility] CARDIOLOGY PROGRESS NOTE           2/22/2017 8:55 AM    Admit Date: 2/12/2017      Subjective:   She states that she feels better. She admits to mild cough with phlegm production. ROS:  GEN:  No fever or chills  Cardiovascular:  As noted above:no chest pain or palpitations. Pulmonary:  As noted above  Neuro:  No new focal motor or sensory loss    Objective:      Vitals:    02/21/17 2318 02/22/17 0013 02/22/17 0359 02/22/17 0710   BP: 127/84  117/62 109/60   Pulse: 84  83 86   Resp: 18  18 18   Temp: 97.5 °F (36.4 °C)  97.6 °F (36.4 °C) 98.6 °F (37 °C)   SpO2: 98%  99% 98%   Weight:  85.6 kg (188 lb 11.2 oz)     Height:  5' (1.524 m)         Physical Exam:  General-no distress  Neck- supple, no JVD  CV- regular rate and rhythm no MRG  Lung- rhonchi bilaterally  Abd- soft, nontender, nondistended  Ext- trace edema bilaterally. Skin- warm and dry  Psychiatric:  Normal mood and affect. Neurologic:  Alert and oriented X 3      Data Review:   Recent Labs      02/22/17   0346  02/21/17   2120  02/21/17   0710  02/21/17   0355   NA  143   --    --   150*   K  4.4   --    --   3.7   MG  2.2   --    --   2.2   BUN  22   --    --   21   CREA  0.94   --    --   0.92   GLU  110*   --    --   98   WBC  5.9   --   7.4   --    HGB  9.5*  10.2*  10.6*   --    HCT  32.2*  34.0*  35.5*   --    PLT  101*   --   95*   --    INR  1.1   --    --   1.1       TELEMETRY:  V-paced. Assessment/Plan:     Principal Problem:    Acute CHF (congestive heart failure) ([facility] [address] (2/12/2017): Mild cough. Check CXR and BNP. Active Problems      Aortic Valve Bioprosthesis Present (3/7/2009):stenosis:stable and unchanged. .      Right CFA thrombosis:s/p thrombectomy. Chronic atrial fibrillation (HCC) (10/17/2011):s/p AV rubin ablation with Pacemaker:stable. Restarting Warfarin. Warfarin anticoagulation (1/9/2017):Restart Warfarin. Pharmacy to dose. Target INR :2-3. Stop Heparin drip when ok with Vascular surgery after right CFA thrombectomy. Family are considering switching Warfarin to Eliquis per patient.       Pulmonary hypertension ([facility] [address] (2/12/2017)                Samir Dos Santos MD  2/22/2017 8:55 AM

## 2017-02-22 NOTE — PROGRESS NOTES
[address] FAX: [phone]        Vascular Surgery Progress Note    Admit Date: 2017  POD 2 Day Post-Op    Procedure:  Procedure(s):  THROMBECTOMY/EMBOLECTOMY LOWER EXTREMITY      Subjective:     Patient has no new complaints. Patient reports that she feels really good today. Objective:     Blood pressure 118/57, pulse 82, temperature 98.8 °F (37.1 °C), resp. rate 18, height 5' (1.524 m), weight 188 lb 11.2 oz (85.6 kg), SpO2 99 %, not currently breastfeeding.     Temp (24hrs), Av °F (36.7 °C), Min:97.5 °F (36.4 °C), Max:98.8 °F (37.1 °C)      Physical Exam:  GENERAL: alert, cooperative, no distress, appears stated age, LUNG:  clear to auscultation bilaterally, HEART:  regular rate and rhythm, S1, S2 normal, no murmur, click, rub or gallop, INCISION:  Right groin wound vac in place and EXTREMITIES:  Right Lower Extremity:  warm foot, palpable pulses    Labs:   Recent Labs      17   0346   HGB  9.5*   WBC  5.9   K  4.4   GLU  110*       Data Review: images and reports reviewed  Current Facility-Administered Medications   Medication Dose Route Frequency    apixaban (ELIQUIS) tablet 5 mg  5 mg Oral BID    sodium chloride (NS) flush 5-10 mL  5-10 mL IntraVENous PRN    naloxone (NARCAN) injection 0.4 mg  0.4 mg IntraVENous PRN    aspirin delayed-release tablet 81 mg  81 mg Oral DAILY    HYDROcodone-acetaminophen (NORCO) 5-325 mg per tablet 1 Tab  1 Tab Oral Q4H PRN    senna-docusate (PERICOLACE) 8.6-50 mg per tablet 1 Tab  1 Tab Oral Q12H    0.9% sodium chloride infusion 250 mL  250 mL IntraVENous PRN    torsemide (DEMADEX) tablet 40 mg  40 mg Oral DAILY    lip protectant (BLISTEX) ointment   Topical PRN    magnesium citrate solution 296 mL  296 mL Oral PRN    polyethylene glycol (MIRALAX) packet 17 g  17 g Oral DAILY    diazePAM (VALIUM) tablet 2.5 mg  2.5 mg Oral Q8H PRN    traZODone (DESYREL) tablet 50 mg  50 mg Oral QHS PRN  gabapentin (NEURONTIN) capsule 100 mg  100 mg Oral TID    sodium chloride (NS) flush 5-10 mL  5-10 mL IntraVENous Q8H    sodium chloride (NS) flush 5-10 mL  5-10 mL IntraVENous PRN    ondansetron (ZOFRAN) injection 4 mg  4 mg IntraVENous Q4H PRN    acetaminophen (TYLENOL) tablet 650 mg  650 mg Oral Q4H PRN    levalbuterol (XOPENEX) nebulizer soln 1.25 mg/3 mL  1.25 mg Nebulization Q4H PRN    polyvinyl alcohol (LIQUIFILM TEARS) 1.4 % ophthalmic solution 1 Drop  1 Drop Both Eyes PRN    hydrocortisone (ANUSOL-HC) 2.5 % rectal cream   PeriANAL BID PRN    levothyroxine (SYNTHROID) tablet 88 mcg  88 mcg Oral ACB    pantoprazole (PROTONIX) tablet 40 mg  40 mg Oral ACB    rOPINIRole (REQUIP) tablet 2 mg  2 mg Oral QHS    sertraline (ZOLOFT) tablet 50 mg  50 mg Oral QHS    spironolactone (ALDACTONE) tablet 25 mg  25 mg Oral DAILY     Assessment:     Principal Problem:    Acute CHF (congestive heart failure) (HCC) (2/12/2017)    Active Problems:    Hypotension (3/1/2009)      Aortic Valve Bioprosthesis Present (3/7/2009)      BOBBY (obstructive sleep apnea) (12/4/2009)      ARF (acute renal failure) ([facility] [address] (2/24/2010)      Chronic atrial fibrillation (HCC) (10/17/2011)      Sleep apnea (11/2/2015)      Chronic diastolic congestive heart failure ([address] (9/9/2016)      Aortic valve replaced (1/9/2017)      Warfarin anticoagulation (1/9/2017)      Pulmonary hypertension ([address] (2/12/2017)        Plan/Recommendations/Medical Decision Making:   Continue present treatment   -Ok by Vascular to start Eliquis. Elements of this note have been dictated using speech recognition software. As a result, errors of speech recognition may have occurred.

## 2017-02-22 NOTE — PROGRESS NOTES
[address] [facility] notified of pt not needing bed, as they have chosen [place].

## 2017-02-22 NOTE — PROGRESS NOTES
Problem: Falls - Risk of  Goal: *Absence of falls  Outcome: Progressing Towards Goal  Pt progressing towards goal. No falls since admission. Bed low and locked. Call light within reach. Side rails x 2. Gripper socks applied. Personal belongings within reach. Pt verbalizes understanding to call for assistance. Goal: *Knowledge of fall prevention  Outcome: Progressing Towards Goal  Fall precautions in place. Red socks on. Instructed to only get OOB with assistance. Voiced understanding. Call light in reach.

## 2017-02-22 NOTE — PROGRESS NOTES
Jennifer Medel NP stated to keep patient on Heparin drip and have Cardiology reevaluate the patient in the morning in regards to the Eliquis.

## 2017-02-22 NOTE — PROGRESS NOTES
Marilyn Amos NP with vascular regarding heparin drip as patient is to start apaxiban today. States she will follow up with nurse after consulting with Dr. Cristi Goss. Instructed to continue heparin and hold apxiban until further instruction given.

## 2017-02-22 NOTE — PROGRESS NOTES
Called lab, PTT still not resulted. Requested that lab come redraw the PTT if it was not going to work. Was told that they would send someone up.

## 2017-02-22 NOTE — OP NOTES
[address]   OPERATIVE REPORT       Name:  Carleen Zavala   MR#:  [id number]   :  1940   Account #:  [de-identified]   Date of Adm:  2017       DATE OF SURGERY: 2017    PREOPERATIVE DIAGNOSIS: Acute right lower extremity ischemia. POSTOPERATIVE DIAGNOSIS: Acute right lower extremity ischemia. PROCEDURE: Right femoral artery exploration,   thrombectomy/embolectomy. SURGEON: Norma Bolden MD.    ANESTHESIA: General endotracheal.    ESTIMATED BLOOD LOSS: 50 mL. DRAINS: None. SPECIMENS: None. COMPLICATIONS: None. INDICATIONS: This is an 71-year-old with history of atrial   fibrillation who was in the hospital following an   electrophysiology study several days earlier. She ultimately had   been off of her Coumadin due to fall risks and developed acute   sudden onset right lower extremity pain and numbness and   coolness. She was found by ultrasound to confirm a femoral   artery thrombus. She was taken emergently to the operating room. DESCRIPTION OF PROCEDURE: The patient was brought to the   operating room, placed on the operating table in supine   position. Following adequate general endotracheal anesthesia and   a timeout procedure, the groins were draped and prepped in   sterile fashion. A vertical incision was made in the right groin. The wound was   deepened using Bovie to control bleeding. The dissection was   continued downward to the level of the common femoral artery. The common femoral artery was found to be pulsatile proximally;   however, there was no pulse at the level of its bifurcation or   in the SFA or profunda. The patient was systemically   heparinized. Next, the common femoral, profunda, and SFA were encircled with   vessel loops in Mcnair fashion.  Following adequate circulation   time for the heparin effect, the common femoral artery was   occluded proximally and a transverse arteriotomy was made just   above the bifurcation of the common femoral artery. There was a   large amount of organized thrombus at this level. The clot was   removed until there was no further clot identified through the   arteriotomy. Next, #4 Christopher catheter was advanced down the   profunda and SFA with return of organized thrombus from both   arteries. This was continued until there was no further debris   retrieved. There was good backbleeding from both arteries. Heparinized saline was instilled into both. Next, the Christopher   was passed proximally to make sure there was no proximal   thrombus and there was none seen. There was excellent pulsatile   inflow. Next, the transverse arteriotomy was reapproximated with running   5-0 Prolene suture. Prior to completion of closure, the native   vessels were flushed and back bled. The closure was then   completed and flow was restored. At this point, there was   excellent Doppler flow to the level of the ankle. Hemostasis was   achieved and the wound was closed in layers of Vicryl suture. Sterile dry dressings were applied. The patient was awakened from anesthesia and transferred to the   recovery room in stable condition. The patient tolerated the   procedure well. No complications. All needle and sponge counts   were correct.         MD Kylie Ray / [place]   D:  02/22/2017   12:03   T:  02/22/2017   12:54   Job #:  [id number]

## 2017-02-22 NOTE — PROGRESS NOTES
Lab called before [facility] downtime to notify them of timed 0300 PTT. Arianna Grimes, lab confirmed 0300 PTT.

## 2017-02-22 NOTE — PROGRESS NOTES
Hospitalist Progress Note    Subjective:   Daily Progress Note: 2/22/2017 12:00 PM    Hospital course through 2/21:  Ms. Radha Sommers is a very pleasant 78 yo F who complains of increased SOb, nonproductive cough and b/l LE swelling in the last 1-2 months. Known with chronic diastolic CHF, CAD, bradycardia s/p pacemaker, , s/p bioprosthetic AVR,Chronic Afib on coumadin, severe pulmonary HTN with PAP 69 mmHg, chronic respiratory failure on NC 3L 24h/7 , COPD, GERD, HTN, HLP, Obesity, BOBBY - not using CPAP. Seen by her cardiologist, Vale Jimenez on 1/9/17 and Demadex was increased to 40 mg PO daily, except MWF when was BID. Ms. Radha Sommers states that her leg swelling improved, although her cough and SOB persisted. Seen by PCP on 1/31 and placed on oral steroids and Albuterol that she couldn't complete because the albuterol made ermias jittery. No improvement with treatment. Seen at [facility] ED on 2/9 with low BP and and thought that she is dehydrated due to increased diuretics and received IV fluids. She felt better for one day, although because of increased in her symptoms she presented to ED today. BP was 80/51 mmHg and received 1L NC bolus in Ed per ED provider. CXR w/o acute pathology. No spikes of fever since her symptoms started. Influenza screen negative. WBC:3.4. Cr:1.48, around her baseline. Pt.s' Hr was difficult to control and she and her family were opting for home with hospice. However an ablation was done in a last ditch effort by [facility] after d/w Dr. Perm Elizabeth. Cardiology has been gently diuresing with plans to eventually discharge her home when ready from their standpoint. Unfortunately On 2/20 she developed right cold foot and foot pain. vascular consulted and she had thrombectomy on 2/20/2017. No acute events overnight. States her breathing is at baseline. Slightly red around wound site. Denies sob, chest pain, nausea, uncontrolled pain. Oldest son at bedside.      Current Facility-Administered Medications Medication Dose Route Frequency    apixaban (ELIQUIS) tablet 5 mg  5 mg Oral BID    sodium chloride (NS) flush 5-10 mL  5-10 mL IntraVENous PRN    naloxone (NARCAN) injection 0.4 mg  0.4 mg IntraVENous PRN    aspirin delayed-release tablet 81 mg  81 mg Oral DAILY    HYDROcodone-acetaminophen (NORCO) 5-325 mg per tablet 1 Tab  1 Tab Oral Q4H PRN    senna-docusate (PERICOLACE) 8.6-50 mg per tablet 1 Tab  1 Tab Oral Q12H    0.9% sodium chloride infusion 250 mL  250 mL IntraVENous PRN    torsemide (DEMADEX) tablet 40 mg  40 mg Oral DAILY    lip protectant (BLISTEX) ointment   Topical PRN    magnesium citrate solution 296 mL  296 mL Oral PRN    polyethylene glycol (MIRALAX) packet 17 g  17 g Oral DAILY    diazePAM (VALIUM) tablet 2.5 mg  2.5 mg Oral Q8H PRN    traZODone (DESYREL) tablet 50 mg  50 mg Oral QHS PRN    gabapentin (NEURONTIN) capsule 100 mg  100 mg Oral TID    sodium chloride (NS) flush 5-10 mL  5-10 mL IntraVENous Q8H    sodium chloride (NS) flush 5-10 mL  5-10 mL IntraVENous PRN    ondansetron (ZOFRAN) injection 4 mg  4 mg IntraVENous Q4H PRN    acetaminophen (TYLENOL) tablet 650 mg  650 mg Oral Q4H PRN    levalbuterol (XOPENEX) nebulizer soln 1.25 mg/3 mL  1.25 mg Nebulization Q4H PRN    polyvinyl alcohol (LIQUIFILM TEARS) 1.4 % ophthalmic solution 1 Drop  1 Drop Both Eyes PRN    hydrocortisone (ANUSOL-HC) 2.5 % rectal cream   PeriANAL BID PRN    levothyroxine (SYNTHROID) tablet 88 mcg  88 mcg Oral ACB    pantoprazole (PROTONIX) tablet 40 mg  40 mg Oral ACB    rOPINIRole (REQUIP) tablet 2 mg  2 mg Oral QHS    sertraline (ZOLOFT) tablet 50 mg  50 mg Oral QHS    spironolactone (ALDACTONE) tablet 25 mg  25 mg Oral DAILY        Review of Systems  A comprehensive 12 point ROS was obtained and findings negative unless stated otherwise in above HPI    Objective:     Visit Vitals    /57    Pulse 82    Temp 98.8 °F (37.1 °C)    Resp 18    Ht 5' (1.524 m)    Wt 85.6 kg (188 lb 11.2 oz)    SpO2 99%    Breastfeeding No    BMI 36.85 kg/m2    O2 Flow Rate (L/min): 2 l/min O2 Device: Nasal cannula    Temp (24hrs), Av °F (36.7 °C), Min:97.5 °F (36.4 °C), Max:98.8 °F (37.1 °C)          190 -  0700  In: 9485 [P.O.:1200; I.V.:1290]  Out: 2150 [Urine:1950]    General: awake, alert, oriented, cooperative, no acute distress  Eyes; non icteric, EOMI  Neck; supple  CV: RRR  Pulm; non labored, normal effort, no accessory muscle use  Abd; soft, non tender    Additional comments: all labs, notes and studies from the past 24 hours have been personally reviewed today by me.      Data Review    Recent Results (from the past 24 hour(s))   GLUCOSE, POC    Collection Time: 17  4:11 PM   Result Value Ref Range    Glucose (POC) 118 (H) 65 - 100 mg/dL   PTT    Collection Time: 17  9:20 PM   Result Value Ref Range    aPTT 97.2 (HH) 23.5 - 31.7 SEC   HGB & HCT    Collection Time: 17  9:20 PM   Result Value Ref Range    HGB 10.2 (L) 11.7 - 15.4 g/dL    HCT 34.0 (L) 35.8 - 46.3 %   MAGNESIUM    Collection Time: 17  3:46 AM   Result Value Ref Range    Magnesium 2.2 1.8 - 2.4 mg/dL   CBC W/O DIFF    Collection Time: 17  3:46 AM   Result Value Ref Range    WBC 5.9 4.3 - 11.1 K/uL    RBC 3.28 (L) 4.05 - 5.25 M/uL    HGB 9.5 (L) 11.7 - 15.4 g/dL    HCT 32.2 (L) 35.8 - 46.3 %    MCV 98.2 (H) 79.6 - 97.8 FL    MCH 29.0 26.1 - 32.9 PG    MCHC 29.5 (L) 31.4 - 35.0 g/dL    RDW 15.3 (H) 11.9 - 14.6 %    PLATELET 026 (L) 241 - 450 K/uL    MPV 11.0 10.8 - 99.4 FL   METABOLIC PANEL, BASIC    Collection Time: 17  3:46 AM   Result Value Ref Range    Sodium 143 136 - 145 mmol/L    Potassium 4.4 3.5 - 5.1 mmol/L    Chloride 98 98 - 107 mmol/L    CO2 37 (H) 21 - 32 mmol/L    Anion gap 8 7 - 16 mmol/L    Glucose 110 (H) 65 - 100 mg/dL    BUN 22 8 - 23 MG/DL    Creatinine 0.94 0.6 - 1.0 MG/DL    GFR est AA >60 >60 ml/min/1.73m2    GFR est non-AA >60 >60 ml/min/1.73m2    Calcium 8.3 8.3 - 10.4 MG/DL   PROTHROMBIN TIME + INR    Collection Time: 02/22/17  3:46 AM   Result Value Ref Range    Prothrombin time 11.9 9.6 - 12.0 sec    INR 1.1 0.9 - 1.2     PTT    Collection Time: 02/22/17  3:46 AM   Result Value Ref Range    aPTT >110.0 (HH) 23.5 - 31.7 SEC   BNP    Collection Time: 02/22/17  3:46 AM   Result Value Ref Range     pg/mL         Assessment/Plan:     Principal Problem:    Acute CHF (congestive heart failure) ([address] (2/12/2017)    Active Problems:    Hypotension (3/1/2009)      Aortic Valve Bioprosthesis Present (3/7/2009)      BOBBY (obstructive sleep apnea) (12/4/2009)      ARF (acute renal failure) ([address] (2/24/2010)      Chronic atrial fibrillation (HCC) (10/17/2011)      Sleep apnea (11/2/2015)      Chronic diastolic congestive heart failure ([address] (9/9/2016)      Aortic valve replaced (1/9/2017)      Warfarin anticoagulation (1/9/2017)      Pulmonary hypertension ([address] (2/12/2017)    -now off heparin drip and on eliquis. Hg stable at 9.5.   -consultants appreciated. -needs STR- patient and family desire [facility]. Stable for DC when bed available.      Care Plan discussed with: the patient, her son, her care team.       Signed By: Jonas Berumen MD     February 22, 2017

## 2017-02-22 NOTE — PROGRESS NOTES
IV heparin rate has been adjusted based on the most recent PTT results per protocol.     Lab Results   Component Value Date/Time    aPTT 97.2 02/21/2017 09:20 PM

## 2017-02-22 NOTE — PROGRESS NOTES
Called lab again, edith stated \"the blood has started rerunning itself for the 3rd time, which usually means the PTT will be high. \" Edith also stated that she would update primary RN as soon as PTT resulted. Results unknown.

## 2017-02-22 NOTE — PROGRESS NOTES
Robby Robertson, lab called to say after 6th try to run PTT, PTT resulted at 153. 6. Heparin drip stopped and will be held for 1 hour and then the rate will be decreased by 3 units/kg/hr per Heparin protocol.

## 2017-02-22 NOTE — PROGRESS NOTES
Bedside shift report given to Enedina Hughes (oncoming nurse) by Nusrat Franks RN (offgoing nurse). Bedside shift report included the following information: SBAR, Kardex, ED Summary, MAR, and Recent Results.  \

## 2017-02-23 ENCOUNTER — APPOINTMENT (OUTPATIENT)
Dept: GENERAL RADIOLOGY | Age: 77
DRG: 270 | End: 2017-02-23
Attending: INTERNAL MEDICINE
Payer: MEDICARE

## 2017-02-23 LAB
ANION GAP BLD CALC-SCNC: 8 MMOL/L (ref 7–16)
BASOPHILS # BLD AUTO: 0 K/UL (ref 0–0.2)
BASOPHILS # BLD: 1 % (ref 0–2)
BUN SERPL-MCNC: 25 MG/DL (ref 8–23)
CALCIUM SERPL-MCNC: 8 MG/DL (ref 8.3–10.4)
CHLORIDE SERPL-SCNC: 97 MMOL/L (ref 98–107)
CO2 SERPL-SCNC: 38 MMOL/L (ref 21–32)
CREAT SERPL-MCNC: 0.83 MG/DL (ref 0.6–1)
DIFFERENTIAL METHOD BLD: ABNORMAL
EOSINOPHIL # BLD: 0.1 K/UL (ref 0–0.8)
EOSINOPHIL NFR BLD: 4 % (ref 0.5–7.8)
ERYTHROCYTE [DISTWIDTH] IN BLOOD BY AUTOMATED COUNT: 15 % (ref 11.9–14.6)
GLUCOSE SERPL-MCNC: 88 MG/DL (ref 65–100)
HCT VFR BLD AUTO: 28.3 % (ref 35.8–46.3)
HCT VFR BLD AUTO: 28.6 % (ref 35.8–46.3)
HCT VFR BLD AUTO: 29.2 % (ref 35.8–46.3)
HGB BLD-MCNC: 8.5 G/DL (ref 11.7–15.4)
HGB BLD-MCNC: 8.8 G/DL (ref 11.7–15.4)
HGB BLD-MCNC: 9 G/DL (ref 11.7–15.4)
IMM GRANULOCYTES # BLD: 0 K/UL (ref 0–0.5)
IMM GRANULOCYTES NFR BLD AUTO: 0.8 % (ref 0–5)
INR PPP: 1.1 (ref 0.9–1.2)
LYMPHOCYTES # BLD AUTO: 8 % (ref 13–44)
LYMPHOCYTES # BLD: 0.3 K/UL (ref 0.5–4.6)
MAGNESIUM SERPL-MCNC: 2.1 MG/DL (ref 1.8–2.4)
MCH RBC QN AUTO: 29.5 PG (ref 26.1–32.9)
MCHC RBC AUTO-ENTMCNC: 30 G/DL (ref 31.4–35)
MCV RBC AUTO: 98.3 FL (ref 79.6–97.8)
MONOCYTES # BLD: 0.3 K/UL (ref 0.1–1.3)
MONOCYTES NFR BLD AUTO: 8 % (ref 4–12)
NEUTS SEG # BLD: 3.1 K/UL (ref 1.7–8.2)
NEUTS SEG NFR BLD AUTO: 78 % (ref 43–78)
PHOSPHATE SERPL-MCNC: 2.4 MG/DL (ref 2.3–3.7)
PLATELET # BLD AUTO: 103 K/UL (ref 150–450)
PMV BLD AUTO: 11.9 FL (ref 10.8–14.1)
POTASSIUM SERPL-SCNC: 3.5 MMOL/L (ref 3.5–5.1)
PROTHROMBIN TIME: 12 SEC (ref 9.6–12)
RBC # BLD AUTO: 2.88 M/UL (ref 4.05–5.25)
SODIUM SERPL-SCNC: 143 MMOL/L (ref 136–145)
WBC # BLD AUTO: 3.9 K/UL (ref 4.3–11.1)

## 2017-02-23 PROCEDURE — 74750000023 HC WOUND THERAPY

## 2017-02-23 PROCEDURE — 74011250637 HC RX REV CODE- 250/637: Performed by: INTERNAL MEDICINE

## 2017-02-23 PROCEDURE — 83735 ASSAY OF MAGNESIUM: CPT | Performed by: NURSE PRACTITIONER

## 2017-02-23 PROCEDURE — 74011000258 HC RX REV CODE- 258: Performed by: SURGERY

## 2017-02-23 PROCEDURE — 97168 OT RE-EVAL EST PLAN CARE: CPT

## 2017-02-23 PROCEDURE — 94760 N-INVAS EAR/PLS OXIMETRY 1: CPT

## 2017-02-23 PROCEDURE — 94640 AIRWAY INHALATION TREATMENT: CPT

## 2017-02-23 PROCEDURE — 74011250637 HC RX REV CODE- 250/637: Performed by: NURSE PRACTITIONER

## 2017-02-23 PROCEDURE — 74011000250 HC RX REV CODE- 250: Performed by: NURSE PRACTITIONER

## 2017-02-23 PROCEDURE — 85018 HEMOGLOBIN: CPT | Performed by: INTERNAL MEDICINE

## 2017-02-23 PROCEDURE — 77010033678 HC OXYGEN DAILY

## 2017-02-23 PROCEDURE — 71010 XR CHEST PORT: CPT

## 2017-02-23 PROCEDURE — 74011250637 HC RX REV CODE- 250/637: Performed by: SURGERY

## 2017-02-23 PROCEDURE — 65660000000 HC RM CCU STEPDOWN

## 2017-02-23 PROCEDURE — 85025 COMPLETE CBC W/AUTO DIFF WBC: CPT | Performed by: NURSE PRACTITIONER

## 2017-02-23 PROCEDURE — 36415 COLL VENOUS BLD VENIPUNCTURE: CPT | Performed by: NURSE PRACTITIONER

## 2017-02-23 PROCEDURE — 85610 PROTHROMBIN TIME: CPT | Performed by: NURSE PRACTITIONER

## 2017-02-23 PROCEDURE — 80048 BASIC METABOLIC PNL TOTAL CA: CPT | Performed by: NURSE PRACTITIONER

## 2017-02-23 PROCEDURE — 74011250636 HC RX REV CODE- 250/636: Performed by: SURGERY

## 2017-02-23 PROCEDURE — 84100 ASSAY OF PHOSPHORUS: CPT | Performed by: NURSE PRACTITIONER

## 2017-02-23 PROCEDURE — 74011250637 HC RX REV CODE- 250/637: Performed by: HOSPITALIST

## 2017-02-23 PROCEDURE — 74011250636 HC RX REV CODE- 250/636: Performed by: INTERNAL MEDICINE

## 2017-02-23 PROCEDURE — 94664 DEMO&/EVAL PT USE INHALER: CPT

## 2017-02-23 RX ORDER — GUAIFENESIN/DEXTROMETHORPHAN 100-10MG/5
5 SYRUP ORAL
Status: DISCONTINUED | OUTPATIENT
Start: 2017-02-23 | End: 2017-02-28 | Stop reason: HOSPADM

## 2017-02-23 RX ORDER — POTASSIUM CHLORIDE 20 MEQ/1
40 TABLET, EXTENDED RELEASE ORAL
Status: COMPLETED | OUTPATIENT
Start: 2017-02-23 | End: 2017-02-23

## 2017-02-23 RX ORDER — FUROSEMIDE 10 MG/ML
40 INJECTION INTRAMUSCULAR; INTRAVENOUS ONCE
Status: COMPLETED | OUTPATIENT
Start: 2017-02-23 | End: 2017-02-23

## 2017-02-23 RX ADMIN — Medication 10 ML: at 12:12

## 2017-02-23 RX ADMIN — SPIRONOLACTONE 25 MG: 25 TABLET, FILM COATED ORAL at 09:10

## 2017-02-23 RX ADMIN — Medication 10 ML: at 05:42

## 2017-02-23 RX ADMIN — GUAIFENESIN AND DEXTROMETHORPHAN 5 ML: 100; 10 SYRUP ORAL at 18:13

## 2017-02-23 RX ADMIN — POTASSIUM CHLORIDE 40 MEQ: 20 TABLET, EXTENDED RELEASE ORAL at 12:09

## 2017-02-23 RX ADMIN — LEVOTHYROXINE SODIUM 88 MCG: 0.09 TABLET ORAL at 05:41

## 2017-02-23 RX ADMIN — PIPERACILLIN SODIUM,TAZOBACTAM SODIUM 3.38 G: 3; .375 INJECTION, POWDER, FOR SOLUTION INTRAVENOUS at 21:00

## 2017-02-23 RX ADMIN — Medication 10 ML: at 21:02

## 2017-02-23 RX ADMIN — LEVALBUTEROL HYDROCHLORIDE 1.25 MG: 1.25 SOLUTION RESPIRATORY (INHALATION) at 19:41

## 2017-02-23 RX ADMIN — LEVALBUTEROL HYDROCHLORIDE 1.25 MG: 1.25 SOLUTION RESPIRATORY (INHALATION) at 02:16

## 2017-02-23 RX ADMIN — ROPINIROLE 2 MG: 2 TABLET, FILM COATED ORAL at 21:02

## 2017-02-23 RX ADMIN — TORSEMIDE 40 MG: 20 TABLET ORAL at 09:09

## 2017-02-23 RX ADMIN — SERTRALINE HYDROCHLORIDE 50 MG: 50 TABLET ORAL at 21:02

## 2017-02-23 RX ADMIN — ASPIRIN 81 MG: 81 TABLET, COATED ORAL at 12:09

## 2017-02-23 RX ADMIN — GABAPENTIN 100 MG: 100 CAPSULE ORAL at 15:04

## 2017-02-23 RX ADMIN — FUROSEMIDE 40 MG: 10 INJECTION, SOLUTION INTRAMUSCULAR; INTRAVENOUS at 12:09

## 2017-02-23 RX ADMIN — PIPERACILLIN SODIUM,TAZOBACTAM SODIUM 3.38 G: 3; .375 INJECTION, POWDER, FOR SOLUTION INTRAVENOUS at 12:18

## 2017-02-23 RX ADMIN — GABAPENTIN 100 MG: 100 CAPSULE ORAL at 21:02

## 2017-02-23 RX ADMIN — GABAPENTIN 100 MG: 100 CAPSULE ORAL at 09:10

## 2017-02-23 RX ADMIN — LEVALBUTEROL HYDROCHLORIDE 1.25 MG: 1.25 SOLUTION RESPIRATORY (INHALATION) at 23:33

## 2017-02-23 RX ADMIN — BENZOCAINE AND MENTHOL 1 LOZENGE: 15; 2.6 LOZENGE ORAL at 01:50

## 2017-02-23 RX ADMIN — ACETAMINOPHEN 650 MG: 325 TABLET, FILM COATED ORAL at 09:09

## 2017-02-23 RX ADMIN — PANTOPRAZOLE SODIUM 40 MG: 40 TABLET, DELAYED RELEASE ORAL at 05:41

## 2017-02-23 RX ADMIN — POLYETHYLENE GLYCOL 3350 17 G: 17 POWDER, FOR SOLUTION ORAL at 09:09

## 2017-02-23 RX ADMIN — GUAIFENESIN AND DEXTROMETHORPHAN 5 ML: 100; 10 SYRUP ORAL at 12:17

## 2017-02-23 NOTE — PROGRESS NOTES
2 small blanchable bruised areas noted on right buttock. Patient has numerous small bruises throughout extremities. Applied barrier cream to buttocks and removed and replaced Allevyn. Will continue to monitor.

## 2017-02-23 NOTE — PROGRESS NOTES
Patient experiencing persistent cough, Xopenex PRN tx given. Cepacol lozenges provided. Patient states that \"cough just won't go away. \"

## 2017-02-23 NOTE — PROGRESS NOTES
Problem: Nutrition Deficit  Goal: *Optimize nutritional status  Nutrition  Reason for assessment: F/U day 11 and Nutrition Consult related to food preferences. Assessment:   Diet order(s): NPO 2/17, Cardiac with supplements 2/17, NPO 2/21, Cardiac consistent CHO with supplements 2/21; Cardiac 2/23  Food/Nutrition Patient History: Patient reports that her appetite is doing well but she is having difficulty chewing foods due to ill-fitting dentures. She reports she has been drinking ~1 1/2-2 Ensures/day. Patient started drinking an Ensure while dietetic intern visited with her. She reports her son brought her an [employer] biscuit with gravy, a chicken pot pie and yogurt yesterday. Patient was receptive to a mechanical soft diet to be easier to chew, and wanted yogurt with breakfast every day. Patient with upper and lower extremity edema 2+. Per MD, patient with 2L/day fluid restriction. Anthropometrics:Height: 5' (152.4 cm), Weight: 86.2 kg (190 lb), Weight Source: Standing scale, Body mass index is 37.1 kg/(m^2). BMI class of obesity class II. Macronutrient Needs:  Estimated calorie needs - 5002-0777 sayra/day (18-22 sayra/kg/day)   Estimated protein needs - 36-45 gm pro/day (0.8-1 gm pro/kgIBW/day) (GFR 54 ml/min)    Intake/Comparative Standards: Average intake for past 7 day(s)/9 recorded meal(s): 47%. Additional kcal and protein from Ensure Enlive intake 1.5/day is ~500 kcal and ~30 g protein. This potentially meets ~90% of kcal and >100% of protein needs and includes nutritional value of meals + Ensure Enlive 1.5/day. Dietetic intern acknowledges recorded meal intake may not reflect the entirety of what patient is consuming due to family providing multiple meals/day. Nutrition Diagnosis: Difficulty chewing related to ill-fitting dentures as evidenced by patient report of inability to chew food provided by hospital.       Intervention:  Meals and snacks:  Add regular yogurt to breakfast (peach or strawberry); change diet to mechanical soft  Nutrition Supplement Therapy: Continue Ensure Enlive BID     Louise Sports, Dietetic Intern

## 2017-02-23 NOTE — PROGRESS NOTES
Problem: Self Care Deficits Care Plan (Adult)  Goal: *Acute Goals and Plan of Care (Insert Text)  1. Patient will complete lower body bathing and dressing with supervsion and adaptive equipment as needed. 2. Patient will complete toileting with supervision. 3. Patient will tolerate 30 minutes of OT treatment with 3 rest breaks to increase activity tolerance for ADLs. 4. Patient will complete functional transfers with supervision and adaptive equipment as needed. Timeframe: 7 visits       OCCUPATIONAL THERAPY: Re-evaluation 2/23/2017  INPATIENT: Hospital Day: 12  [place] / Product Type: Medicare /      NAME/AGE/GENDER: Benedicto Zuleta is a 77 y.o. female       PRIMARY DIAGNOSIS:  dyspnea, hypotension,  Acute CHF (congestive heart failure) ([facility])  A-FIB  Ischemic foot Acute CHF (congestive heart failure) ([facility]) Acute CHF (congestive heart failure) ([facility])  Procedure(s) (LRB):  THROMBECTOMY/EMBOLECTOMY LOWER EXTREMITY (Right)  3 Days Post-Op  ICD-10: Treatment Diagnosis:        · Generalized Muscle Weakness (M62.81)  · Other lack of cordination (R27.8)  · Dizziness and Giddiness (R42)   Precautions/Allergies:         Adhesive tape; Benadryl [diphenhydramine hcl]; Demerol [meperidine]; Morphine; Sulfa (sulfonamide antibiotics); and Lorazepam       ASSESSMENT:      Ms. Morel Guardian presents post CVICU for OT re-assessment. Goals are appropriate. Re-assessment only performed secondary to pt's current medical status; no mobility at this time; RN aware. Pt is pleasant, oriented and appropriate for age. At baseline, pt is independent with ADLs. Her son is present in the home for bathing secondary to safety. Currently, pt presents with decreased activity tolerance, and decreased independence for self care, functional mobility, and ADL's.  She is able feed herself independently though states she is having difficulty chewing secondary to recent weight loss causing her denture to require adjustment; informed RN. Pt requests grooming/ADL activities for next session. Ms. Lucille Thapa requires skilled OT to maximize independence with self care tasks and functional transfers. Pt left sitting up in chair with all needs in reach, pt instructed to call for assistance; RN aware. This section established at most recent assessment   PROBLEM LIST (Impairments causing functional limitations):  1. Decreased Strength  2. Decreased ADL/Functional Activities  3. Decreased Transfer Abilities  4. Decreased Ambulation Ability/Technique  5. Decreased Balance  6. Decreased Activity Tolerance  7. Decreased Work Simplification/Energy Conservation Techniques  8. Increased Fatigue  9. Increased Shortness of Breath  10. Decreased [place] with Home Exercise Program    INTERVENTIONS PLANNED: (Benefits and precautions of occupational therapy have been discussed with the patient.)  1. Activities of daily living training  2. Adaptive equipment training  3. Balance training  4. Clothing management  5. Donning&doffing training  6. Group therapy  7. Theraputic activity  8. Theraputic exercise  9. Energy conservation      TREATMENT PLAN: Frequency/Duration: Follow patient 3x/week to address above goals. Rehabilitation Potential For Stated Goals: GOOD      RECOMMENDED REHABILITATION/EQUIPMENT: (at time of discharge pending progress): to be determined. OCCUPATIONAL PROFILE AND HISTORY:   History of Present Injury/Illness (Reason for Referral):  Ms. Lucille Thapa is a very pleasant 78 yo F Who complains of increased SOb, nonproductive cough and b/l LE swelling in the last 1-2 months. Known with chronic diastolic CHF, CAD, bradycardia s/p pacemaker, , s/p bioprosthetic AVR,Chronic Afib on coumadin, severe pulmonary HTN with PAP 69 mmHg, chronic respiratory failure on NC 3L 24h/7 , COPD, GERD, HTN, HLP, Obesity, BOBBY - not using CPAP.  Seen by her cardiologist, Johanna Briggs on 1/9/17 and Demadex was increased to 40 mg PO daily, except MWF when was BID. Ms. Candie Encarnacion states that her leg swelling improved, although her cough and SOB persisted. Seen by PCP on 1/31 and placed on oral steroids and Albuterol that she couldn't complete because the albuterol made ermias jittery. No improvement with treatment. Seen at [facility] ED on 2/9 with low BP and and thought that she is dehydrated due to increased diuretics and received IV fluids. She felt better for one day, although because of increased in her symptoms she presented to ED today. BP was 80/51 mmHg and received 1L NC bolus in Ed per ED provider. CXR w/o acute pathology. No spikes of fever since her symptoms started. Influenza screen negative. WBC:3.4. Cr:1.48, around her baseline. CV:irregular irregularity. No JVD. +2 pitting edema b/l LE   Resp: Decreased air entry b/l at the base. No wheezing. No rales or crackles. Abd: soft, non-tender, no rebound tenderness. Past Medical History/Comorbidities:   Ms. 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/2016); AV block (10/17/2011); Back pain (8/5/2016); Bradycardia (Symptomatic) (11/7/2011); CAD (coronary artery disease); Cancer ([address] (1990); Cardiac pacemaker (11/2/2015); Cardiogenic shock ([address] (10/17/2011); 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 (8/5/2016); Cellulitis (8/5/2016); Chest pain (8/5/2016); Chronic atrial fibrillation ([facility] (10/17/2011); Chronic depression (8/5/2016); Chronic depression (8/5/2016); Chronic obstructive pulmonary disease ([facility] (3/8/2009);  Chronic pain; CKD (chronic kidney disease) stage 3, GFR 30-59 ml/min (12/4/2009); Congestive heart failure (CHF) ([facility] (11/2/2015); COPD; CRI (chronic renal insufficiency) (8/5/2016); Degeneration of cervical intervertebral disc (8/5/2016); Degenerative arthritis of left knee (2/27/2009); Dehydration (8/5/2016); Diastolic heart failure ([facility] Digoxin toxicity (2/24/2010); Disorder of sweat glands (8/5/2016); Diverticulosis of large intestine without diverticulitis (2015); Dyspnea (8/5/2016); Eczema (8/5/2016); Epigastric abdominal pain (2/24/2010); GERD (gastroesophageal reflux disease); Gout (8/5/2016); H/O mitral valve repair, 2003 (6/27/2016); Heart failure ([facility] HTN (hypertension) (8/5/2016); colonic polyp (2015); Hyperkalemia (10/17/2011); Hyperlipidemia (8/5/2016); Hypertension; Hypokalemia (2/24/2010); Hypothyroidism (12/4/2009); Ill-defined condition; Knee pain (8/5/2016); Leg cramps (8/5/2016); Mitral stenosis with insufficiency (11/2/2015); Nausea and vomiting (2/24/2010); Obesity (11/2/2015); BOBBY (obstructive sleep apnea) (12/4/2009); Osteoarthritis (8/5/2016); Osteopenia (8/5/2016); Other long term (current) drug therapy (8/5/2016); Palpitations (11/2/2015); Rash (8/5/2016); Rectal bleeding (10/27/2011); Rectocele (2015); Respiratory insufficiency (11/2/2015); 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 (2/13/2016); Skin infection (8/5/2016); Sleep apnea (11/2/2015); Tachycardia (6/27/2016); Thrombocytopenia, unspecified ([address] (8/22/2012); Thyroid disease; Urticaria (8/5/2016); and Vertigo (8/5/2016). She also has no past medical history of Aneurysm ([address] Autoimmune disease ([address] Coagulation disorder ([address] DEMENTIA; Diabetes ([address] Endocarditis; Infectious disease; Liver disease; Other ill-defined conditions(799.89); PUD (peptic ulcer disease); Seizures ([facility] Stroke [facility]); or Thromboembolus ([address]   Ms. Josep Borrero  has a past surgical history that includes appendectomy; cholecystectomy (2005); gyn (1981); mastectomy (1990); pacemaker; breast reconstruction; cardiac surg procedure unlist; and orthopaedic. Social History/Living Environment:   Home Environment: Private residence  # Steps to Enter: 1  One/Two Story Residence: One story  Living Alone: No  Support Systems: Child(brit), Cheondoism / gene community, Family member(s), Friends \ neighbors  Patient Expects to be Discharged to[de-identified] Private residence  Current DME Used/Available at Home: Shower chair  Tub or Shower Type: Tub/Shower combination  Prior Level of Function/Work/Activity:  independent      Number of Personal Factors/Comorbidities that affect the Plan of Care: Extensive review of physical, cognitive, and psychosocial performance (3+):  HIGH COMPLEXITY   ASSESSMENT OF OCCUPATIONAL PERFORMANCE[de-identified]   Activities of Daily Living:         Require assistance  Basic ADLs (From Assessment) Complex ADLs (From Assessment)   Basic ADL  Feeding: Independent  Oral Facial Hygiene/Grooming: Setup  Bathing: Minimum assistance  Upper Body Dressing: Setup  Lower Body Dressing: Minimum assistance  Toileting: Contact guard assistance     Grooming/Bathing/Dressing Activities of Daily Living     Cognitive Retraining  Safety/Judgement: Fall prevention                       Bed/Mat Mobility  Sit to Stand: Contact guard assistance          Most Recent Physical Functioning:   Gross Assessment:  AROM: Generally decreased, functional  Strength: Generally decreased, functional  Coordination: Within functional limits               Posture:  Posture (WDL): Exceptions to WDL  Posture Assessment:  Forward head, Rounded shoulders  Balance:  Sitting: Intact  Sitting - Static: Good (unsupported)  Sitting - Dynamic: Fair (occasional)  Standing: Impaired  Standing - Static: Fair  Standing - Dynamic : Fair Bed Mobility:     Wheelchair Mobility:     Transfers:  Sit to Stand: Contact guard assistance  Stand to Sit: Contact guard assistance Patient Vitals for the past 6 hrs:   BP SpO2 Pulse   17 1100 101/52 97 % 85   17 1502 115/61 100 % 83        Mental Status  Neurologic State: Alert  Orientation Level: Oriented X4  Cognition: Follows commands  Perception: Appears intact  Perseveration: No perseveration noted  Safety/Judgement: Fall prevention                               Physical Skills Involved:  1. Balance  2. Mobility  3. Strength  4. Endurance  5. Fine or Gross Motor Coordination Cognitive Skills Affected (resulting in the inability to perform in a timely and safe manner):  1. Problem Solving  2. Learning  3. Remembering Psychosocial Skills Affected:  1. Active Use of Coping Strategies  2. Environmental Adaptations   Number of elements that affect the Plan of Care: 3-5:  MODERATE COMPLEXITY   CLINICAL DECISION MAKIN[address] [place] [address] AM-PAC 6 Clicks   Basic Mobility Inpatient Short Form  How much help from another person does the patient currently need. .. Total A Lot A Little None   1. Putting on and taking off regular lower body clothing?   [ ] 1   [ ] 2   [X] 3   [ ] 4   2. Bathing (including washing, rinsing, drying)? [ ] 1   [ ] 2   [X] 3   [ ] 4   3. Toileting, which includes using toilet, bedpan or urinal?   [ ] 1   [ ] 2   [X] 3   [ ] 4   4. Putting on and taking off regular upper body clothing?   [ ] 1   [ ] 2   [ ] 3   [X] 4   5. Taking care of personal grooming such as brushing teeth? [ ] 1   [ ] 2   [ ] 3   [X] 4   6. Eating meals? [ ] 1   [ ] 2   [ ] 3   [X] 4   © , Trustees of [address] [place] [address], under license to [employer]. All rights reserved    Score:  Initial: 21 Most Recent: X (Date: -- )     Interpretation of Tool:  Represents activities that are increasingly more difficult (i.e. Bed mobility, Transfers, Gait).        Score 24 23 22-20 19-15 14-10 9-7 6       Modifier CH CI CJ CK CL CM CN         · Self Care:               - CURRENT STATUS:    CJ - 20%-39% impaired, limited or restricted               - GOAL STATUS:           CI - 1%-19% impaired, limited or restricted               - D/C STATUS:                       ---------------To be determined---------------  [place] / [place] / Product Type: Medicare /       Medical Necessity:     · Patient demonstrates good rehab potential due to higher previous functional level. Reason for Services/Other Comments:  · Patient continues to require skilled intervention due to decreased ability to perform self care. Use of outcome tool(s) and clinical judgement create a POC that gives a: MODERATE COMPLEXITY             TREATMENT:   (In addition to Assessment/Re-Assessment sessions the following treatments were rendered)      Pre-treatment Symptoms/Complaints:  Decreased ability to perform ADLs, self care, and functional mobility; decreased tolerance for activities  Pain: Initial:   Pain Intensity 1: 0  Post Session:        Assessment/Reassessment only, no treatment provided today     Braces/Orthotics/Lines/Etc:   · O2 Device: Nasal cannula  Treatment/Session Assessment:    · Response to Treatment:  Agrees to therapy  · Interdisciplinary Collaboration:  · Physical Therapist  · Occupational Therapist  · Registered Nurse  · After treatment position/precautions:  · Up in chair  · Bed/Chair-wheels locked  · Call light within reach  · RN notified  · Compliance with Program/Exercises: Will assess as treatment progresses. · Recommendations/Intent for next treatment session: \"Next visit will focus on advancements to more challenging activities and reduction in assistance provided\".   Total Treatment Duration: 25 minutes  OT Patient Time In/Time Out  Time In: 1125  Time Out: Mo Arias OTR/L

## 2017-02-23 NOTE — PROGRESS NOTES
Hospitalist Progress Note    Subjective:   Daily Progress Note: 2/23/2017 12:00 PM    Hospital course through 2/21:  Ms. Lenna Canavan is a very pleasant 78 yo F who complains of increased SOb, nonproductive cough and b/l LE swelling in the last 1-2 months. Known with chronic diastolic CHF, CAD, bradycardia s/p pacemaker, , s/p bioprosthetic AVR,Chronic Afib on coumadin, severe pulmonary HTN with PAP 69 mmHg, chronic respiratory failure on NC 3L 24h/7 , COPD, GERD, HTN, HLP, Obesity, BOBBY - not using CPAP. Seen by her cardiologist, Evelina Harrell on 1/9/17 and Demadex was increased to 40 mg PO daily, except MWF when was BID. Ms. Lenna Canavan states that her leg swelling improved, although her cough and SOB persisted. Seen by PCP on 1/31 and placed on oral steroids and Albuterol that she couldn't complete because the albuterol made ermias jittery. No improvement with treatment. Seen at [facility] ED on 2/9 with low BP and and thought that she is dehydrated due to increased diuretics and received IV fluids. She felt better for one day, although because of increased in her symptoms she presented to ED today. BP was 80/51 mmHg and received 1L NC bolus in Ed per ED provider. CXR w/o acute pathology. No spikes of fever since her symptoms started. Influenza screen negative. WBC:3.4. Cr:1.48, around her baseline. Pt.s' Hr was difficult to control and she and her family were opting for home with hospice. However an ablation was done in a last ditch effort by Francisco Javier Mckeon after d/w Dr. Shady Gallego. Cardiology has been gently diuresing with plans to eventually discharge her home when ready from their standpoint. Unfortunately On 2/20 she developed right cold foot and foot pain. vascular consulted and she had thrombectomy on 2/20/2017. No acute events overnight. States her breathing is at baseline. Slightly red around wound site. Denies sob, chest pain, nausea, uncontrolled pain. Oldest son at bedside.      Current Facility-Administered Medications Medication Dose Route Frequency    benzocaine-menthol (CEPACOL) lozenge  1 Lozenge Oral Q2H PRN    furosemide (LASIX) injection 40 mg  40 mg IntraVENous ONCE    potassium chloride (K-DUR, KLOR-CON) SR tablet 40 mEq  40 mEq Oral NOW    guaiFENesin-dextromethorphan (ROBITUSSIN DM) 100-10 mg/5 mL syrup 5 mL  5 mL Oral Q6H PRN    sodium chloride (NS) flush 5-10 mL  5-10 mL IntraVENous PRN    naloxone (NARCAN) injection 0.4 mg  0.4 mg IntraVENous PRN    aspirin delayed-release tablet 81 mg  81 mg Oral DAILY    HYDROcodone-acetaminophen (NORCO) 5-325 mg per tablet 1 Tab  1 Tab Oral Q4H PRN    senna-docusate (PERICOLACE) 8.6-50 mg per tablet 1 Tab  1 Tab Oral Q12H    0.9% sodium chloride infusion 250 mL  250 mL IntraVENous PRN    torsemide (DEMADEX) tablet 40 mg  40 mg Oral DAILY    lip protectant (BLISTEX) ointment   Topical PRN    magnesium citrate solution 296 mL  296 mL Oral PRN    polyethylene glycol (MIRALAX) packet 17 g  17 g Oral DAILY    diazePAM (VALIUM) tablet 2.5 mg  2.5 mg Oral Q8H PRN    traZODone (DESYREL) tablet 50 mg  50 mg Oral QHS PRN    gabapentin (NEURONTIN) capsule 100 mg  100 mg Oral TID    sodium chloride (NS) flush 5-10 mL  5-10 mL IntraVENous Q8H    sodium chloride (NS) flush 5-10 mL  5-10 mL IntraVENous PRN    ondansetron (ZOFRAN) injection 4 mg  4 mg IntraVENous Q4H PRN    acetaminophen (TYLENOL) tablet 650 mg  650 mg Oral Q4H PRN    levalbuterol (XOPENEX) nebulizer soln 1.25 mg/3 mL  1.25 mg Nebulization Q4H PRN    polyvinyl alcohol (LIQUIFILM TEARS) 1.4 % ophthalmic solution 1 Drop  1 Drop Both Eyes PRN    hydrocortisone (ANUSOL-HC) 2.5 % rectal cream   PeriANAL BID PRN    levothyroxine (SYNTHROID) tablet 88 mcg  88 mcg Oral ACB    pantoprazole (PROTONIX) tablet 40 mg  40 mg Oral ACB    rOPINIRole (REQUIP) tablet 2 mg  2 mg Oral QHS    sertraline (ZOLOFT) tablet 50 mg  50 mg Oral QHS    spironolactone (ALDACTONE) tablet 25 mg  25 mg Oral DAILY        Review of Systems  A comprehensive 12 point ROS was obtained and findings negative unless stated otherwise in above HPI    Objective:     Visit Vitals    /66    Pulse 83    Temp 98.2 °F (36.8 °C)    Resp 18    Ht 5' (1.524 m)    Wt 86.2 kg (190 lb)    SpO2 99%    Breastfeeding No    BMI 37.11 kg/m2    O2 Flow Rate (L/min): 2 l/min O2 Device: Nasal cannula    Temp (24hrs), Av.4 °F (36.9 °C), Min:97.6 °F (36.4 °C), Max:99.5 °F (37.5 °C)          1901 -  0700  In: 1238.6 [P.O.:925; I.V.:313.6]  Out: 2702 [Urine:2700]    General: awake, alert, oriented, cooperative, no acute distress  Eyes; non icteric, EOMI  Neck; supple  CV: RRR  Pulm; non labored, normal effort, no accessory muscle use  Abd; soft, non tender    Additional comments: all labs, notes and studies from the past 24 hours have been personally reviewed today by me. Data Review    Recent Results (from the past 24 hour(s))   HGB & HCT    Collection Time: 17  3:03 PM   Result Value Ref Range    HGB 11.4 (L) 11.7 - 15.4 g/dL    HCT 37.9 35.8 - 46.3 %   PROTHROMBIN TIME + INR    Collection Time: 17  3:50 AM   Result Value Ref Range    Prothrombin time 12.0 9.6 - 12.0 sec    INR 1.1 0.9 - 1.2     MAGNESIUM    Collection Time: 17  3:50 AM   Result Value Ref Range    Magnesium 2.1 1.8 - 2.4 mg/dL   CBC WITH AUTOMATED DIFF    Collection Time: 17  3:50 AM   Result Value Ref Range    WBC 3.9 (L) 4.3 - 11.1 K/uL    RBC 2.88 (L) 4.05 - 5.25 M/uL    HGB 8.5 (L) 11.7 - 15.4 g/dL    HCT 28.3 (L) 35.8 - 46.3 %    MCV 98.3 (H) 79.6 - 97.8 FL    MCH 29.5 26.1 - 32.9 PG    MCHC 30.0 (L) 31.4 - 35.0 g/dL    RDW 15.0 (H) 11.9 - 14.6 %    PLATELET 864 (L) 575 - 450 K/uL    MPV 11.9 10.8 - 14.1 FL    DF AUTOMATED      NEUTROPHILS 78 43 - 78 %    LYMPHOCYTES 8 (L) 13 - 44 %    MONOCYTES 8 4.0 - 12.0 %    EOSINOPHILS 4 0.5 - 7.8 %    BASOPHILS 1 0.0 - 2.0 %    IMMATURE GRANULOCYTES 0.8 0.0 - 5.0 %    ABS.  NEUTROPHILS 3.1 1.7 - 8.2 K/UL ABS. LYMPHOCYTES 0.3 (L) 0.5 - 4.6 K/UL    ABS. MONOCYTES 0.3 0.1 - 1.3 K/UL    ABS. EOSINOPHILS 0.1 0.0 - 0.8 K/UL    ABS. BASOPHILS 0.0 0.0 - 0.2 K/UL    ABS. IMM. GRANS. 0.0 0.0 - 0.5 K/UL   METABOLIC PANEL, BASIC    Collection Time: 02/23/17  3:50 AM   Result Value Ref Range    Sodium 143 136 - 145 mmol/L    Potassium 3.5 3.5 - 5.1 mmol/L    Chloride 97 (L) 98 - 107 mmol/L    CO2 38 (H) 21 - 32 mmol/L    Anion gap 8 7 - 16 mmol/L    Glucose 88 65 - 100 mg/dL    BUN 25 (H) 8 - 23 MG/DL    Creatinine 0.83 0.6 - 1.0 MG/DL    GFR est AA >60 >60 ml/min/1.73m2    GFR est non-AA >60 >60 ml/min/1.73m2    Calcium 8.0 (L) 8.3 - 10.4 MG/DL   PHOSPHORUS    Collection Time: 02/23/17  3:50 AM   Result Value Ref Range    Phosphorus 2.4 2.3 - 3.7 MG/DL   HGB & HCT    Collection Time: 02/23/17 10:30 AM   Result Value Ref Range    HGB 8.8 (L) 11.7 - 15.4 g/dL    HCT 28.6 (L) 35.8 - 46.3 %         Assessment/Plan:     Principal Problem:    Acute CHF (congestive heart failure) ([facility]) (2/12/2017)    Active Problems:    Hypotension (3/1/2009)      Aortic Valve Bioprosthesis Present (3/7/2009)      BOBBY (obstructive sleep apnea) (12/4/2009)      ARF (acute renal failure) ([facility]) (2/24/2010)      Chronic atrial fibrillation ([facility]) (10/17/2011)      Sleep apnea (11/2/2015)      Chronic diastolic congestive heart failure ([facility]) (9/9/2016)      Aortic valve replaced (1/9/2017)      Warfarin anticoagulation (1/9/2017)      Pulmonary hypertension ([facility]) (2/12/2017)    Cellulitis of Thigh    -Eliquis held due to low Hb. Will need to be resumed.     -consultants appreciated.   -Add Zosyn to treat Cellulitis, suspected PNA  -Give extra lasix today, on nebs and oxygen.   -Vascular to assess Rt Thigh catheter site.   -needs STR- patient and family desire [address]  Stable for DC when bed available, hopefully in am.     Care Plan discussed with: the patient, her care team.       Signed By: Sol Gonzales MD     February 23, 2017

## 2017-02-23 NOTE — PROGRESS NOTES
Bedside shift report given to Scar Hernandez (oncoming nurse) by Digna Gill RN (offgoing nurse). Bedside shift report included the following information: SBAR, Kardex, ED Summary, MAR, and Recent Results.

## 2017-02-23 NOTE — PROGRESS NOTES
Area surrounding incision site appears ecchymotic and edematous. Area is warm to touch. Edith Rhodes NP notified. She and Dr. Maloney Kidney to come assess. Will continue to monitor.

## 2017-02-23 NOTE — PROGRESS NOTES
[facility] CARDIOLOGY PROGRESS NOTE           2/23/2017 10:53 AM    Admit Date: 2/12/2017    Subjective:   Some SOB this AM, Hb dropped, swelling in leg. No CP. Reviewed with hospitalist at the bedside. ROS:  GEN:  No fever or chills  Cardiovascular:  As noted above  Pulmonary:  As noted above  Neuro:  No new focal motor or sensory loss    Objective:      Vitals:    02/23/17 0153 02/23/17 0219 02/23/17 0421 02/23/17 0735   BP:   103/57 104/66   Pulse:   82 83   Resp:   18 18   Temp:   98.4 °F (36.9 °C) 98.2 °F (36.8 °C)   SpO2:  99% 100% 99%   Weight: 86.2 kg (190 lb)      Height: 5' (1.524 m)          Physical Exam:  General-A and O x 3  Neck- supple, no JVD  CV- regular rate and rhythm no MRG  Lung- clear bilaterally  Abd- soft, nontender, nondistended  Ext- mild edema bilaterally. Skin- warm and dry  Psychiatric:  Normal mood and affect. Neurologic:  Alert and oriented X 3      Data Review:   Recent Labs      02/23/17   1030  02/23/17   0350   02/22/17   0346   NA   --   143   --   143   K   --   3.5   --   4.4   MG   --   2.1   --   2.2   BUN   --   25*   --   22   CREA   --   0.83   --   0.94   GLU   --   88   --   110*   WBC   --   3.9*   --   5.9   HGB  8.8*  8.5*   < >  9.5*   HCT  28.6*  28.3*   < >  32.2*   PLT   --   103*   --   101*   INR   --   1.1   --   1.1    < > = values in this interval not displayed. TELEMETRY:  paced    Assessment/Plan:     Principal Problem:    Acute CHF (congestive heart failure) ([facility] (2/12/2017): agree with IV diuretics today, check labs in the AM.  She has severe valve dz, options limited. May need palliative measures in the near future. Active Problems:    Hypotension (3/1/2009)      Aortic Valve Bioprosthesis Present (3/7/2009): worsening AS, med mgmt.   Deemed not a surgical candidate      BOBBY (obstructive sleep apnea) (12/4/2009)      ARF (acute renal failure) ([facility] [address] (2/24/2010): follow, check labs in the AM with IV lasix today      Chronic atrial fibrillation ([facility] (10/17/2011): s/p AVN ablation, rate controlled, PPM in place. HOLDING eliquis today with drop in the Hb. Did get dose of eliquis yesterday      Sleep apnea (11/2/2015)      Chronic diastolic congestive heart failure ([facility] (9/9/2016): IV diuretics today, check AM labs, folllow BP      Aortic valve replaced (1/9/2017)      Pulmonary hypertension ([facility] (2/12/2017): severe, follow    Anemia: worsening, vascular surgery and hospitalist seeing now. Hold eliquis, follow labs, re-check Hb.     Prognosis: guarded at this time          Yesenia Bush,   2/23/2017 10:53 AM

## 2017-02-23 NOTE — PROGRESS NOTES
Problem: Mobility Impaired (Adult and Pediatric)  Goal: *Acute Goals and Plan of Care (Insert Text)  LTG:  (1.)Ms. Jeannie Varma will move from supine to sit and sit to supine and roll side to side in bed with INDEPENDENT within 7 day(s). (2.)Ms. Jeannie Varma will transfer from bed to chair and chair to bed with MODIFIED INDEPENDENCE using the least restrictive device within 5 day(s). (3.)Ms. Jeannie Varma will ambulate with MODIFIED INDEPENDENCE for 50 feet with the least restrictive device within 5 day(s). (4.)Ms. Jeannie Varma will perform standing static and dynamic balance activities x 8 minutes with SUPERVISION to improve safety within 5 day(s). (5.)Ms. Jeannie Varma will tolerate 25 minutes of therapeutic activity/exercise with one rest break within 5 day(s). ________________________________________________________________________________________________      Physical Therapy Note:     MD stated to hold therapy today secondary to a decrease in hemeglobin and overall medical status. Will attempt to see patient tomorrow as time and schedule permits. Thank you.       Steven Virgen, PTA

## 2017-02-23 NOTE — PROGRESS NOTES
Patient complains of persistent cough \"all night\" and is coughing frequently during assessment. States that cough sometimes produces  \"brown or red stuff\". Tan mucus noted by nurse. Contacted respiratory to suggest humidification of O2. Patient states she has been using Cepacol lozenges, but they are not helping. Will continue to monitor.

## 2017-02-23 NOTE — PROGRESS NOTES
[address] [place] FAX: [phone]        Vascular Surgery Progress Note    Admit Date: 2017  POD 3 Day Post-Op    Procedure:  Procedure(s):  THROMBECTOMY/EMBOLECTOMY LOWER EXTREMITY      Subjective:     Patient has no new complaints. Patient reports that she feels good today. She reports her right leg sore. Objective:     Blood pressure 104/66, pulse 83, temperature 98.2 °F (36.8 °C), resp. rate 18, height 5' (1.524 m), weight 190 lb (86.2 kg), SpO2 99 %, not currently breastfeeding.     Temp (24hrs), Av.4 °F (36.9 °C), Min:97.6 °F (36.4 °C), Max:99.5 °F (37.5 °C)      Physical Exam:  GENERAL: alert, cooperative, no distress, appears stated age, LUNG:  clear to auscultation bilaterally, HEART:  regular rate and rhythm, S1, S2 normal, no murmur, click, rub or gallop, INCISION:  Right groin wound vac in place, edematous, erythema noted and bruising noted to the lateral aspect of her right upper thigh and EXTREMITIES:  Right Lower Extremity:  warm foot, palpable pulses    Labs:   Recent Labs      17   1030  17   0350   HGB  8.8*  8.5*   WBC   --   3.9*   K   --   3.5   GLU   --   88       Data Review: images and reports reviewed  Current Facility-Administered Medications   Medication Dose Route Frequency    benzocaine-menthol (CEPACOL) lozenge  1 Lozenge Oral Q2H PRN    furosemide (LASIX) injection 40 mg  40 mg IntraVENous ONCE    potassium chloride (K-DUR, KLOR-CON) SR tablet 40 mEq  40 mEq Oral NOW    guaiFENesin-dextromethorphan (ROBITUSSIN DM) 100-10 mg/5 mL syrup 5 mL  5 mL Oral Q6H PRN    sodium chloride (NS) flush 5-10 mL  5-10 mL IntraVENous PRN    naloxone (NARCAN) injection 0.4 mg  0.4 mg IntraVENous PRN    aspirin delayed-release tablet 81 mg  81 mg Oral DAILY    HYDROcodone-acetaminophen (NORCO) 5-325 mg per tablet 1 Tab  1 Tab Oral Q4H PRN    senna-docusate (PERICOLACE) 8.6-50 mg per tablet 1 Tab  1 Tab Oral Q12H    0.9% sodium chloride infusion 250 mL  250 mL IntraVENous PRN    torsemide (DEMADEX) tablet 40 mg  40 mg Oral DAILY    lip protectant (BLISTEX) ointment   Topical PRN    magnesium citrate solution 296 mL  296 mL Oral PRN    polyethylene glycol (MIRALAX) packet 17 g  17 g Oral DAILY    diazePAM (VALIUM) tablet 2.5 mg  2.5 mg Oral Q8H PRN    traZODone (DESYREL) tablet 50 mg  50 mg Oral QHS PRN    gabapentin (NEURONTIN) capsule 100 mg  100 mg Oral TID    sodium chloride (NS) flush 5-10 mL  5-10 mL IntraVENous Q8H    sodium chloride (NS) flush 5-10 mL  5-10 mL IntraVENous PRN    ondansetron (ZOFRAN) injection 4 mg  4 mg IntraVENous Q4H PRN    acetaminophen (TYLENOL) tablet 650 mg  650 mg Oral Q4H PRN    levalbuterol (XOPENEX) nebulizer soln 1.25 mg/3 mL  1.25 mg Nebulization Q4H PRN    polyvinyl alcohol (LIQUIFILM TEARS) 1.4 % ophthalmic solution 1 Drop  1 Drop Both Eyes PRN    hydrocortisone (ANUSOL-HC) 2.5 % rectal cream   PeriANAL BID PRN    levothyroxine (SYNTHROID) tablet 88 mcg  88 mcg Oral ACB    pantoprazole (PROTONIX) tablet 40 mg  40 mg Oral ACB    rOPINIRole (REQUIP) tablet 2 mg  2 mg Oral QHS    sertraline (ZOLOFT) tablet 50 mg  50 mg Oral QHS    spironolactone (ALDACTONE) tablet 25 mg  25 mg Oral DAILY     Assessment:     Principal Problem:    Acute CHF (congestive heart failure) ([facility]) (2/12/2017)    Active Problems:    Hypotension (3/1/2009)      Aortic Valve Bioprosthesis Present (3/7/2009)      BOBBY (obstructive sleep apnea) (12/4/2009)      ARF (acute renal failure) ([facility] [address] (2/24/2010)      Chronic atrial fibrillation ([facility]) (10/17/2011)      Sleep apnea (11/2/2015)      Chronic diastolic congestive heart failure ([facility] [address] (9/9/2016)      Aortic valve replaced (1/9/2017)      Warfarin anticoagulation (1/9/2017)      Pulmonary hypertension ([facility] [address] (2/12/2017)        Plan/Recommendations/Medical Decision Making:   Continue present treatment   -Patient needs to be anticoagulated once her hemoglobin is stable. -Will start antibiotics prophylactically-? Mild cellulitis to right thigh. No hematoma noted. -Continue wound vac therapy    Elements of this note have been dictated using speech recognition software. As a result, errors of speech recognition may have occurred.

## 2017-02-23 NOTE — PROGRESS NOTES
Bedside shift report received from Radha Chinchilla, [facility] (offgoing nurse). Report given to Thalia Mary RN. Vital signs stable. No complaints noted. Patient in stable condition.

## 2017-02-24 ENCOUNTER — APPOINTMENT (OUTPATIENT)
Dept: GENERAL RADIOLOGY | Age: 77
DRG: 270 | End: 2017-02-24
Attending: INTERNAL MEDICINE
Payer: MEDICARE

## 2017-02-24 LAB
ALBUMIN SERPL BCP-MCNC: 2.1 G/DL (ref 3.2–4.6)
ALBUMIN/GLOB SERPL: 0.8 {RATIO} (ref 1.2–3.5)
ALP SERPL-CCNC: 89 U/L (ref 50–136)
ALT SERPL-CCNC: 11 U/L (ref 12–65)
ANION GAP BLD CALC-SCNC: 3 MMOL/L (ref 7–16)
AST SERPL W P-5'-P-CCNC: 33 U/L (ref 15–37)
BILIRUB SERPL-MCNC: 1 MG/DL (ref 0.2–1.1)
BUN SERPL-MCNC: 25 MG/DL (ref 8–23)
CALCIUM SERPL-MCNC: 8.4 MG/DL (ref 8.3–10.4)
CHLORIDE SERPL-SCNC: 99 MMOL/L (ref 98–107)
CO2 SERPL-SCNC: 44 MMOL/L (ref 21–32)
CREAT SERPL-MCNC: 1.01 MG/DL (ref 0.6–1)
ERYTHROCYTE [DISTWIDTH] IN BLOOD BY AUTOMATED COUNT: 15 % (ref 11.9–14.6)
GLOBULIN SER CALC-MCNC: 2.8 G/DL (ref 2.3–3.5)
GLUCOSE SERPL-MCNC: 117 MG/DL (ref 65–100)
HCT VFR BLD AUTO: 28.3 % (ref 35.8–46.3)
HGB BLD-MCNC: 8.7 G/DL (ref 11.7–15.4)
INR PPP: 1 (ref 0.9–1.2)
MAGNESIUM SERPL-MCNC: 2.2 MG/DL (ref 1.8–2.4)
MCH RBC QN AUTO: 29.9 PG (ref 26.1–32.9)
MCHC RBC AUTO-ENTMCNC: 30.7 G/DL (ref 31.4–35)
MCV RBC AUTO: 97.3 FL (ref 79.6–97.8)
PLATELET # BLD AUTO: 99 K/UL (ref 150–450)
PMV BLD AUTO: 11.2 FL (ref 10.8–14.1)
POTASSIUM SERPL-SCNC: 3.8 MMOL/L (ref 3.5–5.1)
PROT SERPL-MCNC: 4.9 G/DL (ref 6.3–8.2)
PROTHROMBIN TIME: 10.9 SEC (ref 9.6–12)
RBC # BLD AUTO: 2.91 M/UL (ref 4.05–5.25)
SODIUM SERPL-SCNC: 146 MMOL/L (ref 136–145)
WBC # BLD AUTO: 5.3 K/UL (ref 4.3–11.1)

## 2017-02-24 PROCEDURE — 83735 ASSAY OF MAGNESIUM: CPT | Performed by: NURSE PRACTITIONER

## 2017-02-24 PROCEDURE — 74011000250 HC RX REV CODE- 250: Performed by: NURSE PRACTITIONER

## 2017-02-24 PROCEDURE — 97535 SELF CARE MNGMENT TRAINING: CPT

## 2017-02-24 PROCEDURE — 36569 INSJ PICC 5 YR+ W/O IMAGING: CPT | Performed by: INTERNAL MEDICINE

## 2017-02-24 PROCEDURE — 74011250636 HC RX REV CODE- 250/636: Performed by: INTERNAL MEDICINE

## 2017-02-24 PROCEDURE — 74011250637 HC RX REV CODE- 250/637: Performed by: NURSE PRACTITIONER

## 2017-02-24 PROCEDURE — 74011250637 HC RX REV CODE- 250/637: Performed by: FAMILY MEDICINE

## 2017-02-24 PROCEDURE — 77030018719 HC DRSG PTCH ANTIMIC J&J -A

## 2017-02-24 PROCEDURE — 76937 US GUIDE VASCULAR ACCESS: CPT

## 2017-02-24 PROCEDURE — 74011250637 HC RX REV CODE- 250/637: Performed by: INTERNAL MEDICINE

## 2017-02-24 PROCEDURE — 97530 THERAPEUTIC ACTIVITIES: CPT

## 2017-02-24 PROCEDURE — 74011250637 HC RX REV CODE- 250/637: Performed by: SURGERY

## 2017-02-24 PROCEDURE — 94640 AIRWAY INHALATION TREATMENT: CPT

## 2017-02-24 PROCEDURE — 85027 COMPLETE CBC AUTOMATED: CPT | Performed by: INTERNAL MEDICINE

## 2017-02-24 PROCEDURE — 94760 N-INVAS EAR/PLS OXIMETRY 1: CPT

## 2017-02-24 PROCEDURE — 74011000258 HC RX REV CODE- 258: Performed by: SURGERY

## 2017-02-24 PROCEDURE — 74011250636 HC RX REV CODE- 250/636: Performed by: SURGERY

## 2017-02-24 PROCEDURE — 65660000000 HC RM CCU STEPDOWN

## 2017-02-24 PROCEDURE — 74011250637 HC RX REV CODE- 250/637: Performed by: HOSPITALIST

## 2017-02-24 PROCEDURE — 80053 COMPREHEN METABOLIC PANEL: CPT | Performed by: NURSE PRACTITIONER

## 2017-02-24 PROCEDURE — 71010 XR CHEST PORT: CPT

## 2017-02-24 PROCEDURE — 85610 PROTHROMBIN TIME: CPT | Performed by: NURSE PRACTITIONER

## 2017-02-24 PROCEDURE — 02HV33Z INSERTION OF INFUSION DEVICE INTO SUPERIOR VENA CAVA, PERCUTANEOUS APPROACH: ICD-10-PCS | Performed by: INTERNAL MEDICINE

## 2017-02-24 PROCEDURE — C1751 CATH, INF, PER/CENT/MIDLINE: HCPCS

## 2017-02-24 PROCEDURE — 77030018786 HC NDL GD F/USND BARD -B

## 2017-02-24 PROCEDURE — 74011000250 HC RX REV CODE- 250: Performed by: INTERNAL MEDICINE

## 2017-02-24 PROCEDURE — 74750000023 HC WOUND THERAPY

## 2017-02-24 PROCEDURE — 36415 COLL VENOUS BLD VENIPUNCTURE: CPT | Performed by: NURSE PRACTITIONER

## 2017-02-24 RX ORDER — POTASSIUM CHLORIDE 20 MEQ/1
40 TABLET, EXTENDED RELEASE ORAL
Status: COMPLETED | OUTPATIENT
Start: 2017-02-24 | End: 2017-02-24

## 2017-02-24 RX ORDER — SODIUM CHLORIDE 0.9 % (FLUSH) 0.9 %
20 SYRINGE (ML) INJECTION AS NEEDED
Status: DISCONTINUED | OUTPATIENT
Start: 2017-02-24 | End: 2017-02-28 | Stop reason: HOSPADM

## 2017-02-24 RX ORDER — FUROSEMIDE 10 MG/ML
40 INJECTION INTRAMUSCULAR; INTRAVENOUS ONCE
Status: COMPLETED | OUTPATIENT
Start: 2017-02-24 | End: 2017-02-24

## 2017-02-24 RX ORDER — HEPARIN 100 UNIT/ML
600 SYRINGE INTRAVENOUS EVERY 8 HOURS
Status: DISCONTINUED | OUTPATIENT
Start: 2017-02-24 | End: 2017-02-28 | Stop reason: HOSPADM

## 2017-02-24 RX ORDER — BENZONATATE 100 MG/1
100 CAPSULE ORAL
Status: DISCONTINUED | OUTPATIENT
Start: 2017-02-24 | End: 2017-02-28 | Stop reason: HOSPADM

## 2017-02-24 RX ORDER — HEPARIN 100 UNIT/ML
600 SYRINGE INTRAVENOUS AS NEEDED
Status: DISCONTINUED | OUTPATIENT
Start: 2017-02-24 | End: 2017-02-28 | Stop reason: HOSPADM

## 2017-02-24 RX ORDER — ACETAZOLAMIDE 250 MG/1
250 TABLET ORAL 3 TIMES DAILY
Status: DISCONTINUED | OUTPATIENT
Start: 2017-02-24 | End: 2017-02-28 | Stop reason: HOSPADM

## 2017-02-24 RX ORDER — SODIUM CHLORIDE 0.9 % (FLUSH) 0.9 %
20 SYRINGE (ML) INJECTION EVERY 8 HOURS
Status: DISCONTINUED | OUTPATIENT
Start: 2017-02-24 | End: 2017-02-28 | Stop reason: HOSPADM

## 2017-02-24 RX ORDER — LEVALBUTEROL INHALATION SOLUTION 1.25 MG/3ML
1.25 SOLUTION RESPIRATORY (INHALATION)
Status: DISCONTINUED | OUTPATIENT
Start: 2017-02-24 | End: 2017-02-28 | Stop reason: HOSPADM

## 2017-02-24 RX ADMIN — Medication 10 ML: at 11:13

## 2017-02-24 RX ADMIN — PIPERACILLIN SODIUM,TAZOBACTAM SODIUM 3.38 G: 3; .375 INJECTION, POWDER, FOR SOLUTION INTRAVENOUS at 11:09

## 2017-02-24 RX ADMIN — LEVALBUTEROL HYDROCHLORIDE 1.25 MG: 1.25 SOLUTION RESPIRATORY (INHALATION) at 16:09

## 2017-02-24 RX ADMIN — ACETAZOLAMIDE 250 MG: 250 TABLET ORAL at 17:14

## 2017-02-24 RX ADMIN — LEVALBUTEROL HYDROCHLORIDE 1.25 MG: 1.25 SOLUTION RESPIRATORY (INHALATION) at 04:24

## 2017-02-24 RX ADMIN — STANDARDIZED SENNA CONCENTRATE AND DOCUSATE SODIUM 1 TABLET: 8.6; 5 TABLET, FILM COATED ORAL at 19:43

## 2017-02-24 RX ADMIN — BENZONATATE 100 MG: 100 CAPSULE ORAL at 22:48

## 2017-02-24 RX ADMIN — STANDARDIZED SENNA CONCENTRATE AND DOCUSATE SODIUM 1 TABLET: 8.6; 5 TABLET, FILM COATED ORAL at 11:12

## 2017-02-24 RX ADMIN — Medication 10 ML: at 19:48

## 2017-02-24 RX ADMIN — PIPERACILLIN SODIUM,TAZOBACTAM SODIUM 3.38 G: 3; .375 INJECTION, POWDER, FOR SOLUTION INTRAVENOUS at 04:39

## 2017-02-24 RX ADMIN — ACETAZOLAMIDE 250 MG: 250 TABLET ORAL at 19:43

## 2017-02-24 RX ADMIN — TRAZODONE HYDROCHLORIDE 50 MG: 50 TABLET ORAL at 22:48

## 2017-02-24 RX ADMIN — PANTOPRAZOLE SODIUM 40 MG: 40 TABLET, DELAYED RELEASE ORAL at 04:40

## 2017-02-24 RX ADMIN — FUROSEMIDE 40 MG: 10 INJECTION, SOLUTION INTRAMUSCULAR; INTRAVENOUS at 19:14

## 2017-02-24 RX ADMIN — GABAPENTIN 100 MG: 100 CAPSULE ORAL at 19:43

## 2017-02-24 RX ADMIN — LEVALBUTEROL HYDROCHLORIDE 1.25 MG: 1.25 SOLUTION RESPIRATORY (INHALATION) at 08:40

## 2017-02-24 RX ADMIN — HYDROCODONE BITARTRATE AND ACETAMINOPHEN 1 TABLET: 5; 325 TABLET ORAL at 22:48

## 2017-02-24 RX ADMIN — ROPINIROLE 2 MG: 2 TABLET, FILM COATED ORAL at 19:38

## 2017-02-24 RX ADMIN — POLYETHYLENE GLYCOL 3350 17 G: 17 POWDER, FOR SOLUTION ORAL at 11:12

## 2017-02-24 RX ADMIN — TORSEMIDE 40 MG: 20 TABLET ORAL at 13:23

## 2017-02-24 RX ADMIN — LEVALBUTEROL HYDROCHLORIDE 1.25 MG: 1.25 SOLUTION RESPIRATORY (INHALATION) at 19:53

## 2017-02-24 RX ADMIN — Medication 20 ML: at 23:17

## 2017-02-24 RX ADMIN — LEVOTHYROXINE SODIUM 88 MCG: 0.09 TABLET ORAL at 04:39

## 2017-02-24 RX ADMIN — LEVALBUTEROL HYDROCHLORIDE 1.25 MG: 1.25 SOLUTION RESPIRATORY (INHALATION) at 11:40

## 2017-02-24 RX ADMIN — GUAIFENESIN AND DEXTROMETHORPHAN 5 ML: 100; 10 SYRUP ORAL at 19:48

## 2017-02-24 RX ADMIN — Medication 600 UNITS: at 22:48

## 2017-02-24 RX ADMIN — GABAPENTIN 100 MG: 100 CAPSULE ORAL at 17:14

## 2017-02-24 RX ADMIN — GABAPENTIN 100 MG: 100 CAPSULE ORAL at 11:12

## 2017-02-24 RX ADMIN — Medication 10 ML: at 04:47

## 2017-02-24 RX ADMIN — PIPERACILLIN SODIUM,TAZOBACTAM SODIUM 3.38 G: 3; .375 INJECTION, POWDER, FOR SOLUTION INTRAVENOUS at 19:20

## 2017-02-24 RX ADMIN — POTASSIUM CHLORIDE 40 MEQ: 20 TABLET, EXTENDED RELEASE ORAL at 11:12

## 2017-02-24 RX ADMIN — ASPIRIN 81 MG: 81 TABLET, COATED ORAL at 11:12

## 2017-02-24 RX ADMIN — SERTRALINE HYDROCHLORIDE 50 MG: 50 TABLET ORAL at 19:44

## 2017-02-24 RX ADMIN — HYDROCODONE BITARTRATE AND ACETAMINOPHEN 1 TABLET: 5; 325 TABLET ORAL at 19:19

## 2017-02-24 NOTE — PROGRESS NOTES
Bedside shift report received from [place] (offgoing nurse). Report given to Suleman Bravo RN. Vital signs stable. No complaints noted. Patient in stable condition.

## 2017-02-24 NOTE — PROGRESS NOTES
Call placed to Dr. Kary Julien to verify medications. He stated he was on his way back and to wait with meds to discuss with him. Rafa Brady

## 2017-02-24 NOTE — PROGRESS NOTES
Pt not ready for d/c per DR. John Grace. Can d/c when ready to [facility], packet will be on CVSD. SW/CM will continue to follow d/c needs. Will need transport by [facility] per family.

## 2017-02-24 NOTE — PROGRESS NOTES
Hospitalist Progress Note    Subjective:   Daily Progress Note: 2/24/2017 12:00 PM    Hospital course through 2/21:  Ms. Jordan Owens is a very pleasant 76 yo F who complains of increased SOb, nonproductive cough and b/l LE swelling in the last 1-2 months. Known with chronic diastolic CHF, CAD, bradycardia s/p pacemaker, , s/p bioprosthetic AVR,Chronic Afib on coumadin, severe pulmonary HTN with PAP 69 mmHg, chronic respiratory failure on NC 3L 24h/7 , COPD, GERD, HTN, HLP, Obesity, BOBBY - not using CPAP. Seen by her cardiologist, Jesenia Julien on 1/9/17 and Demadex was increased to 40 mg PO daily, except MWF when was BID. Ms. Jordan Owens states that her leg swelling improved, although her cough and SOB persisted. Seen by PCP on 1/31 and placed on oral steroids and Albuterol that she couldn't complete because the albuterol made ermias jittery. No improvement with treatment. Seen at [facility] ED on 2/9 with low BP and and thought that she is dehydrated due to increased diuretics and received IV fluids. She felt better for one day, although because of increased in her symptoms she presented to ED today. BP was 80/51 mmHg and received 1L NC bolus in Ed per ED provider. CXR w/o acute pathology. No spikes of fever since her symptoms started. Influenza screen negative. WBC:3.4. Cr:1.48, around her baseline. Pt.s' Hr was difficult to control and she and her family were opting for home with hospice. However an ablation was done in a last ditch effort by Rita Parsons after d/w Dr. Ifeoma Blakely. Cardiology has been gently diuresing with plans to eventually discharge her home when ready from their standpoint. Unfortunately On 2/20 she developed right cold foot and foot pain. vascular consulted and she had thrombectomy on 2/20/2017.    2/24: No acute events overnight. Still SOB, No chest pain, nausea or pain. Eliquis on hold for drop in Hb.       Current Facility-Administered Medications   Medication Dose Route Frequency    acetaZOLAMIDE (DIAMOX) 250 mg in sterile water (preservative free) 2.5 mL injection  250 mg IntraVENous ONCE    furosemide (LASIX) injection 40 mg  40 mg IntraVENous ONCE    potassium chloride (K-DUR, KLOR-CON) SR tablet 40 mEq  40 mEq Oral NOW    acetaZOLAMIDE (DIAMOX) tablet 250 mg  250 mg Oral TID    benzocaine-menthol (CEPACOL) lozenge  1 Lozenge Oral Q2H PRN    guaiFENesin-dextromethorphan (ROBITUSSIN DM) 100-10 mg/5 mL syrup 5 mL  5 mL Oral Q6H PRN    piperacillin-tazobactam (ZOSYN) 3.375 g in 0.9% sodium chloride (MBP/ADV) 100 mL  3.375 g IntraVENous Q8H    sodium chloride (NS) flush 5-10 mL  5-10 mL IntraVENous PRN    naloxone (NARCAN) injection 0.4 mg  0.4 mg IntraVENous PRN    aspirin delayed-release tablet 81 mg  81 mg Oral DAILY    HYDROcodone-acetaminophen (NORCO) 5-325 mg per tablet 1 Tab  1 Tab Oral Q4H PRN    senna-docusate (PERICOLACE) 8.6-50 mg per tablet 1 Tab  1 Tab Oral Q12H    0.9% sodium chloride infusion 250 mL  250 mL IntraVENous PRN    torsemide (DEMADEX) tablet 40 mg  40 mg Oral DAILY    lip protectant (BLISTEX) ointment   Topical PRN    magnesium citrate solution 296 mL  296 mL Oral PRN    polyethylene glycol (MIRALAX) packet 17 g  17 g Oral DAILY    diazePAM (VALIUM) tablet 2.5 mg  2.5 mg Oral Q8H PRN    traZODone (DESYREL) tablet 50 mg  50 mg Oral QHS PRN    gabapentin (NEURONTIN) capsule 100 mg  100 mg Oral TID    sodium chloride (NS) flush 5-10 mL  5-10 mL IntraVENous Q8H    sodium chloride (NS) flush 5-10 mL  5-10 mL IntraVENous PRN    ondansetron (ZOFRAN) injection 4 mg  4 mg IntraVENous Q4H PRN    acetaminophen (TYLENOL) tablet 650 mg  650 mg Oral Q4H PRN    levalbuterol (XOPENEX) nebulizer soln 1.25 mg/3 mL  1.25 mg Nebulization Q4H PRN    polyvinyl alcohol (LIQUIFILM TEARS) 1.4 % ophthalmic solution 1 Drop  1 Drop Both Eyes PRN    hydrocortisone (ANUSOL-HC) 2.5 % rectal cream   PeriANAL BID PRN    levothyroxine (SYNTHROID) tablet 88 mcg  88 mcg Oral ACB    pantoprazole (PROTONIX) tablet 40 mg  40 mg Oral ACB    rOPINIRole (REQUIP) tablet 2 mg  2 mg Oral QHS    sertraline (ZOLOFT) tablet 50 mg  50 mg Oral QHS    spironolactone (ALDACTONE) tablet 25 mg  25 mg Oral DAILY        Review of Systems  A comprehensive 12 point ROS was obtained and findings negative unless stated otherwise in above HPI    Objective:     Visit Vitals    BP 99/56    Pulse 83    Temp 98.6 °F (37 °C)    Resp 18    Ht 5' (1.524 m)    Wt 85 kg (187 lb 6.4 oz)    SpO2 97%    Breastfeeding No    BMI 36.6 kg/m2    O2 Flow Rate (L/min): 2.5 l/min O2 Device: Nasal cannula    Temp (24hrs), Av.1 °F (36.7 °C), Min:97.8 °F (36.6 °C), Max:98.6 °F (37 °C)       07 -  1900  In: -   Out: 250 [Urine:250]   190 -  0700  In: 750 [P.O.:750]  Out: 2401 [Urine:2400]    General: awake, alert, oriented, cooperative, no acute distress  Eyes; non icteric, EOMI  CV: RRR  Pulm; non labored, normal effort, no accessory muscle use. B/l crackles  Abd; soft, non tender  Extremities: Rt Groin swollen and warm    Additional comments: all labs, notes and studies from the past 24 hours have been personally reviewed today by me.      Data Review    Recent Results (from the past 24 hour(s))   HGB & HCT    Collection Time: 17 10:30 AM   Result Value Ref Range    HGB 8.8 (L) 11.7 - 15.4 g/dL    HCT 28.6 (L) 35.8 - 46.3 %   HGB & HCT    Collection Time: 17  8:20 PM   Result Value Ref Range    HGB 9.0 (L) 11.7 - 15.4 g/dL    HCT 29.2 (L) 35.8 - 46.3 %   PROTHROMBIN TIME + INR    Collection Time: 17  4:10 AM   Result Value Ref Range    Prothrombin time 10.9 9.6 - 12.0 sec    INR 1.0 0.9 - 1.2     MAGNESIUM    Collection Time: 17  4:10 AM   Result Value Ref Range    Magnesium 2.2 1.8 - 2.4 mg/dL   METABOLIC PANEL, COMPREHENSIVE    Collection Time: 17  4:10 AM   Result Value Ref Range    Sodium 146 (H) 136 - 145 mmol/L    Potassium 3.8 3.5 - 5.1 mmol/L    Chloride 99 98 - 107 mmol/L    CO2 44 (HH) 21 - 32 mmol/L    Anion gap 3 (L) 7 - 16 mmol/L    Glucose 117 (H) 65 - 100 mg/dL    BUN 25 (H) 8 - 23 MG/DL    Creatinine 1.01 (H) 0.6 - 1.0 MG/DL    GFR est AA >60 >60 ml/min/1.73m2    GFR est non-AA 57 (L) >60 ml/min/1.73m2    Calcium 8.4 8.3 - 10.4 MG/DL    Bilirubin, total 1.0 0.2 - 1.1 MG/DL    ALT (SGPT) 11 (L) 12 - 65 U/L    AST (SGOT) 33 15 - 37 U/L    Alk. phosphatase 89 50 - 136 U/L    Protein, total 4.9 (L) 6.3 - 8.2 g/dL    Albumin 2.1 (L) 3.2 - 4.6 g/dL    Globulin 2.8 2.3 - 3.5 g/dL    A-G Ratio 0.8 (L) 1.2 - 3.5           Assessment/Plan:     Principal Problem:    Acute CHF (congestive heart failure) ([facility]) (2/12/2017)    Active Problems:    Hypotension (3/1/2009)      Aortic Valve Bioprosthesis Present (3/7/2009)      BOBBY (obstructive sleep apnea) (12/4/2009)      ARF (acute renal failure) ([facility] [address] (2/24/2010)      Chronic atrial fibrillation ([facility]) (10/17/2011)      Sleep apnea (11/2/2015)      Chronic diastolic congestive heart failure ([address] (9/9/2016)      Aortic valve replaced (1/9/2017)      Warfarin anticoagulation (1/9/2017)      Pulmonary hypertension ([address] (2/12/2017)    Cellulitis of Thigh    -Eliquis held due to low Hb. Will need to be resumed, repeat CBC today  -On Zosyn to treat Cellulitis, suspected PNA  -Give extra lasix today again per cards, on nebs and oxygen.   -Vascular following Rt Thigh catheter site   -needs STR- patient and family desire [facility]. Has Bed when medically stable.  -Start Diamox for Met Alkalosis.      Care Plan discussed with: the patient, her care team.       Signed By: Warden Bridgett MD     February 24, 2017

## 2017-02-24 NOTE — PROGRESS NOTES
Bedside and Verbal shift change report given to [facility] (oncoming nurse) by Marysol Avina (offgoing nurse). Report included the following information Kardex, ED Summary, OR Summary, Intake/Output and Recent Results.

## 2017-02-24 NOTE — ROUTINE PROCESS
CHF teaching completed to pt/son , verbalize understanding.  Planner @ BS and scale @ rehab/home: 1 hr total

## 2017-02-24 NOTE — PROGRESS NOTES
Call placed to Dr. Dimas Cruz to see if he wanted to have the anesthesiologist on call to insert the IJ. Dr. Dimas Cruz stated he wants an EJ, if unable to obtain he will talk with patients son later this afternoon before deciding on a whether to get a central line. Personnel in ED unable to access an EJ. Will follow up with physician after he meets with patients son to determine what, if any, access we will obtain.

## 2017-02-24 NOTE — PROGRESS NOTES
Problem: Mobility Impaired (Adult and Pediatric)  Goal: *Acute Goals and Plan of Care (Insert Text)  Goals Updated 2/21/17  LTG:  (1.)Ms. Ziyad Peña will move from supine to sit and sit to supine and roll side to side in bed with INDEPENDENT within 7 day(s). (2.)Ms. Ziyad Peña will transfer from bed to chair and chair to bed with MODIFIED INDEPENDENCE using the least restrictive device within 7 day(s). (3.)Ms. Ziyad Peña will ambulate with MODIFIED INDEPENDENCE for 100 feet with the least restrictive device within 7 day(s). (4.)Ms. Ziyad Peña will perform standing static and dynamic balance activities x 8 minutes with SUPERVISION to improve safety within 7 day(s). (5.)Ms. Ziyad Peña will tolerate 25 minutes of therapeutic activity/exercise with one rest break within 5 day(s). ________________________________________________________________________________________________      PHYSICAL THERAPY: Daily Note, Treatment Day: 1st and AM 2/24/2017  INPATIENT: Hospital Day: 13  [place] / Product Type: Medicare /      NAME/AGE/GENDER: Jewel Lubin is a 77 y.o. female       PRIMARY DIAGNOSIS: dyspnea, hypotension,  Acute CHF (congestive heart failure) ([facility])  A-FIB  Ischemic foot Acute CHF (congestive heart failure) ([facility]) Acute CHF (congestive heart failure) ([facility])  Procedure(s) (LRB):  THROMBECTOMY/EMBOLECTOMY LOWER EXTREMITY (Right)  4 Days Post-Op  ICD-10: Treatment Diagnosis:       · Generalized Muscle Weakness (M62.81)  · shortness of breath   Precaution/Allergies:  Adhesive tape; Benadryl [diphenhydramine hcl]; Demerol [meperidine]; Morphine; Sulfa (sulfonamide antibiotics); and Lorazepam       ASSESSMENT:      Ms. Ziyad Peña presents sitting up in bedside chair agreeable to treatment. She stood with CGA and increased her ambulation with rolling walker and verbal cues for safety. Her gait is slow and shuffled. She did well with ambulation today. Good progress with mobility noted.        This section established at most recent assessment   PROBLEM LIST (Impairments causing functional limitations):  1. Decreased Strength  2. Decreased ADL/Functional Activities  3. Decreased Transfer Abilities  4. Decreased Ambulation Ability/Technique  5. Decreased Balance  6. Decreased Activity Tolerance  7. Increased Fatigue  8. Increased Shortness of Breath  9. Decreased Knowledge of Precautions  10. Decreased [place] with Home Exercise Program    INTERVENTIONS PLANNED: (Benefits and precautions of physical therapy have been discussed with the patient.)  1. Balance Exercise  2. Bed Mobility  3. Gait Training  4. Home Exercise Program (HEP)  5. Therapeutic Activites  6. Therapeutic Exercise/Strengthening  7. Transfer Training  8. Group Therapy      TREATMENT PLAN: Frequency/Duration: 3 times a week for duration of hospital stay  Rehabilitation Potential For Stated Goals: EXCELLENT      RECOMMENDED REHABILITATION/EQUIPMENT: (at time of discharge pending progress): Continue Skilled Therapy. HISTORY:   History of Present Injury/Illness (Reason for Referral):  Per H&P:Ms. Jade Squires is a very pleasant 76 yo F Who complains of increased SOb, nonproductive cough and b/l LE swelling in the last 1-2 months. Known with chronic diastolic CHF, CAD, bradycardia s/p pacemaker, , s/p bioprosthetic AVR,Chronic Afib on coumadin, severe pulmonary HTN with PAP 69 mmHg, chronic respiratory failure on NC 3L 24h/7 , COPD, GERD, HTN, HLP, Obesity, BOBBY - not using CPAP. Seen by her cardiologist, Mara Marte on 1/9/17 and Demadex was increased to 40 mg PO daily, except MWF when was BID. Ms. Jade Squires states that her leg swelling improved, although her cough and SOB persisted. Seen by PCP on 1/31 and placed on oral steroids and Albuterol that she couldn't complete because the albuterol made ermias jittery. No improvement with treatment.  Seen at [facility] ED on 2/9 with low BP and and thought that she is dehydrated due to increased diuretics and received IV fluids. She felt better for one day, although because of increased in her symptoms she presented to ED today. BP was 80/51 mmHg and received 1L NC bolus in Ed per ED provider. CXR w/o acute pathology. No spikes of fever since her symptoms started. Influenza screen negative. WBC:3.4. Cr:1.48, around her baseline. CV:irregular irregularity. No JVD. +2 pitting edema b/l LE   Resp: Decreased air entry b/l at the base. No wheezing. No rales or crackles. Abd: soft, non-tender, no rebound tenderness. Hold Torsemide until tomorrow. Will get Echocardiogram. Gentle IV hydration at 50 ml/h to keep MAP>85 mmHg. Hold BB due to hypotension. Hold benzodiazepines at bedtime, can exacerbate - untreated BOBBY and involved in severe pulmonary hypertension. Cardiology and palliative care consulted - appreciate the help. Past Medical History/Comorbidities:   Ms. Ghazal Sweeney  has a past medical history of Abnormal glucose (8/5/2016); Abscess (8/5/2016); Acute encephalopathy (7/8/2016); Acute renal failure ([address] (12/3/2009); Afib ([address] Anemia; Ankle swelling (8/5/2016); Anxiety (8/5/2016); Aortic Valve Bioprosthesis Present (3/7/2009); ARF (acute renal failure) ([address] (2/24/2010); Arthritis; Asthma; Atopic dermatitis (8/5/2016); Atrial fibrillation ([address] (3/7/2009); Autonomic orthostatic hypotension (8/5/2016); AV block (10/17/2011); Back pain (8/5/2016); Bradycardia (Symptomatic) (11/7/2011); CAD (coronary artery disease); Cancer ([address] (1990); Cardiac pacemaker (11/2/2015); Cardiogenic shock ([address] (10/17/2011); 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 (8/5/2016); Cellulitis (8/5/2016); Chest pain (8/5/2016); Chronic atrial fibrillation ([address] (10/17/2011); Chronic depression (8/5/2016); Chronic depression (8/5/2016); Chronic obstructive pulmonary disease ([facility] (3/8/2009); Chronic pain; CKD (chronic kidney disease) stage 3, GFR 30-59 ml/min (12/4/2009);  Congestive heart failure (CHF) ([facility] (11/2/2015); COPD; CRI (chronic renal insufficiency) (8/5/2016); Degeneration of cervical intervertebral disc (8/5/2016); Degenerative arthritis of left knee (2/27/2009); Dehydration (8/5/2016); Diastolic heart failure ([facility] Digoxin toxicity (2/24/2010); Disorder of sweat glands (8/5/2016); Diverticulosis of large intestine without diverticulitis (2015); Dyspnea (8/5/2016); Eczema (8/5/2016); Epigastric abdominal pain (2/24/2010); GERD (gastroesophageal reflux disease); Gout (8/5/2016); H/O mitral valve repair, 2003 (6/27/2016); Heart failure ([facility] HTN (hypertension) (8/5/2016); colonic polyp (2015); Hyperkalemia (10/17/2011); Hyperlipidemia (8/5/2016); Hypertension; Hypokalemia (2/24/2010); Hypothyroidism (12/4/2009); Ill-defined condition; Knee pain (8/5/2016); Leg cramps (8/5/2016); Mitral stenosis with insufficiency (11/2/2015); Nausea and vomiting (2/24/2010); Obesity (11/2/2015); BOBBY (obstructive sleep apnea) (12/4/2009); Osteoarthritis (8/5/2016); Osteopenia (8/5/2016); Other long term (current) drug therapy (8/5/2016); Palpitations (11/2/2015); Rash (8/5/2016); Rectal bleeding (10/27/2011); Rectocele (2015); Respiratory insufficiency (11/2/2015); 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 She also has no past medical history of Aneurysm ([facility] Autoimmune disease ([facility] Coagulation disorder ([facility] DEMENTIA; Diabetes ([facility] Endocarditis; Infectious disease; Liver disease; Other ill-defined conditions(799.89); PUD (peptic ulcer disease); Seizures ([facility] Stroke [facility]); or Thromboembolus ([facility]   Ms. Bogdan Siegel  has a past surgical history that includes appendectomy; cholecystectomy (2005); gyn (1981); mastectomy (1990); pacemaker; breast reconstruction; cardiac surg procedure unlist; and orthopaedic. Social History/Living Environment:   Home Environment: Private residence  # Steps to Enter: 1  One/Two Story Residence: One story  Living Alone: No  Support Systems: Child(brit), Hinduism / gene community, Family member(s), Friends \ neighbors  Patient Expects to be Discharged to[de-identified] Private residence  Current DME Used/Available at Home: Shower chair  Tub or Shower Type: Tub/Shower combination  Prior Level of Function/Work/Activity:  Patient lives with son who works during the day. Patient has family and neighbors that would be able to check on patient. Personal Factors:          Age:  77 y.o. Number of Personal Factors/Comorbidities that affect the Plan of Care: 3+: HIGH COMPLEXITY   EXAMINATION:   Most Recent Physical Functioning:   Gross Assessment:                  Posture:  Posture (WDL): Exceptions to WDL  Posture Assessment: Forward head  Balance:  Sitting: Intact  Sitting - Static: Good (unsupported)  Sitting - Dynamic: Good (unsupported)  Standing: Impaired  Standing - Static: Fair  Standing - Dynamic : Fair Bed Mobility:     Wheelchair Mobility:     Transfers:  Sit to Stand: Contact guard assistance  Stand to Sit: Contact guard assistance  Gait:     Base of Support: Widened  Speed/Carol: Slow;Shuffled  Step Length: Left shortened;Right shortened  Distance (ft): 100 Feet (ft)  Assistive Device: Walker, rolling  Ambulation - Level of Assistance: Contact guard assistance  Interventions: Safety awareness training;Verbal cues      Body Structures Involved:  1. Heart  2. Lungs Body Functions Affected:  1. Cardio  2. Movement Related Activities and Participation Affected:  1. Mobility  2. Self Care  3.  Domestic Life   Number of elements that affect the Plan of Care: 4+: HIGH COMPLEXITY   CLINICAL PRESENTATION:   Presentation: Evolving clinical presentation with changing clinical characteristics: MODERATE COMPLEXITY CLINICAL DECISION MAKIN[address] [place] [id number] AM-PAC 6 Clicks   Basic Mobility Inpatient Short Form  How much difficulty does the patient currently have. .. Unable A Lot A Little None   1. Turning over in bed (including adjusting bedclothes, sheets and blankets)? [ ] 1   [ ] 2   [ ] 3   [X] 4   2. Sitting down on and standing up from a chair with arms ( e.g., wheelchair, bedside commode, etc.)   [ ] 1   [ ] 2   [X] 3   [ ] 4   3. Moving from lying on back to sitting on the side of the bed? [ ] 1   [ ] 2   [ ] 3   [X] 4   How much help from another person does the patient currently need. .. Total A Lot A Little None   4. Moving to and from a bed to a chair (including a wheelchair)? [ ] 1   [ ] 2   [X] 3   [ ] 4   5. Need to walk in hospital room? [ ] 1   [ ] 2   [X] 3   [ ] 4   6. Climbing 3-5 steps with a railing? [ ] 1   [X] 2   [ ] 3   [ ] 4   © 2007, Trustees of [address] [place] [address] [address] [id number], under license to [id number]. All rights reserved    Score:  Initial: 19 Most Recent: X (Date: -- )     Interpretation of Tool:  Represents activities that are increasingly more difficult (i.e. Bed mobility, Transfers, Gait). Score 24 23 22-20 19-15 14-10 9-7 6       Modifier CH CI CJ CK CL CM CN         · Mobility - Walking and Moving Around:               - CURRENT STATUS:    CK - 40%-59% impaired, limited or restricted               - GOAL STATUS:           CJ - 20%-39% impaired, limited or restricted               - D/C STATUS:                       ---------------To be determined---------------  [place] / [place] / Product Type: Medicare /       Medical Necessity:     · Patient demonstrates good rehab potential due to higher previous functional level. · Skilled intervention continues to be required due to decline in overall functional mobility and activity tolerance.   Reason for Services/Other Comments:  · Patient continues to require present interventions due to patient's inability to perform functional mobility at previous indepencence level. Use of outcome tool(s) and clinical judgement create a POC that gives a: Questionable prediction of patient's progress: MODERATE COMPLEXITY                 TREATMENT:      Pre-treatment Symptoms/Complaints: \"I am proud of myself. \"  Pain: Initial: 0  Pain Intensity 1: 0  Post Session: No pain complaints noted      Therapeutic Activity: (   23 minutes ):  Therapeutic activities including Chair transfers and Ambulation on level ground to improve mobility, strength, balance and activity tolerance. Required minimal Safety awareness training;Verbal cues to promote static and dynamic balance in standing and pursed lip breathing. DATE: 2/16/17 2/18/17 2/20/17     Ambulation        Hip Flexion x10 AB        Long Arc Quads x10 AB X 15 B      Knee Squeezes        Ankle DF/PF x10 AB X 15 B 15 B                                      Key:  A=active, AA=active assisted, P=passive, B=bilaterally, R=right, L=left   DF=dorsiflexion, PF=plantarflexion      Braces/Orthotics/Lines/Etc:   · O2 Device: Nasal cannula 3L/min  Treatment/Session Assessment:    · Response to Treatment:  Dyspneic with minimal exertion. · Interdisciplinary Collaboration:  · Registered Nurse and Certified Nursing Assistant/Patient Care Technician  · After treatment position/precautions:  · Supine in bed, Bed/Chair-wheels locked, Call light within reach, RN notified and Family at bedside  · Compliance with Program/Exercises: compliant all of the time. · Recommendations/Intent for next treatment session: \"Next visit will focus on advancements to more challenging activities and reduction in assistance provided\".   Total Treatment Duration:  PT Patient Time In/Time Out  Time In: 1020  Time Out: [facility]

## 2017-02-24 NOTE — PROGRESS NOTES
Chuy Ramon   [address] [place] [address], [address] [place] [address]. . [address] FAX: [phone]         VASCULAR SURGERY FLOOR PROGRESS NOTE    Admit Date: 2017  POD: 4 Days Post-Op    Procedure:  Procedure(s):  THROMBECTOMY/EMBOLECTOMY LOWER EXTREMITY    Subjective:     Patient has no new complaints. Objective:     Vitals:  Blood pressure 106/56, pulse 80, temperature 97.8 °F (36.6 °C), resp. rate 20, height 5' (1.524 m), weight 187 lb 6.4 oz (85 kg), SpO2 97 %, not currently breastfeeding. Temp (24hrs), Av.1 °F (36.7 °C), Min:97.8 °F (36.6 °C), Max:98.6 °F (37 °C)      Intake / Output:    Intake/Output Summary (Last 24 hours) at 17 0710  Last data filed at 17 0421   Gross per 24 hour   Intake              350 ml   Output             1451 ml   Net            -1101 ml       Physical Exam:    Constitutional: she appears well-developed. No distress. HENT:   Head: Atraumatic. Eyes: Pupils are equal, round, and reactive to light. Neck: Normal range of motion. Cardiovascular: Regular rhythm. Pulmonary/Chest: Effort normal and breath sounds normal. No respiratory distress. Abdominal: Soft. Bowel sounds are normal. she exhibits no distension. There is no tenderness. There is no guarding. No hernia. Musculoskeletal: Normal range of motion. Neurological: She is alert. CN II- XII grossly intact  Vascular: Right groin soft, palpable pedal pulses    Labs:   Recent Labs      17   0410  17   2020  17   1030  17   0350   17   0346   HGB   --   9.0*  8.8*  8.5*   < >  9.5*   WBC   --    --    --   3.9*   --   5.9   K  3.8   --    --   3.5   --   4.4   GLU  117*   --    --   88   --   110*    < > = values in this interval not displayed.        Data Review     Assessment:     Patient Active Problem List    Diagnosis Date Noted    Acute CHF (congestive heart failure) ([address] 2017    Pulmonary hypertension ([facility] 2017    History of gout 2017    Aortic valve replaced 01/09/2017    Warfarin anticoagulation 01/09/2017    Non morbid obesity due to excess calories 11/14/2016    Hypoxemia 11/14/2016    Localized edema 09/09/2016    Chronic diastolic congestive heart failure (HCC) 09/09/2016    Iron deficiency anemia 09/09/2016    CRI (chronic renal insufficiency) 08/05/2016    Chronic depression 08/05/2016    Osteopenia 08/05/2016    RLS (restless legs syndrome) 08/05/2016    Dyspnea 08/05/2016    Hyperlipidemia 08/05/2016    Gout 08/05/2016    Anxiety 08/05/2016    CAD (coronary artery disease) 08/05/2016    HTN (hypertension) 08/05/2016    GERD (gastroesophageal reflux disease) 08/05/2016    Right hip pain 08/05/2016    Edema 08/05/2016    Chest pain 08/05/2016    Other long term (current) drug therapy 08/05/2016    Acute encephalopathy 07/08/2016    Tachycardia 06/27/2016    H/O mitral valve repair, 2003 98/91/4203    Diastolic heart failure (HCC)     Sick sinus syndrome ([facility] 02/13/2016    Obesity 11/02/2015    Mitral stenosis with insufficiency 11/02/2015    Rheumatic aortic stenosis 11/02/2015    Rheumatic heart disease 11/02/2015    Sleep apnea 11/02/2015    Palpitations 11/02/2015    Respiratory insufficiency 11/02/2015    Cardiac pacemaker 11/02/2015    Thrombocytopenia, unspecified ([address] 08/22/2012    Bradycardia (Symptomatic) 11/07/2011    Rectal bleeding 10/27/2011    Anemia 10/27/2011    AV block 10/17/2011    Cardiogenic shock (HCC) 10/17/2011    Chronic atrial fibrillation (HCC) 10/17/2011    Hyperkalemia 10/17/2011    Nausea and vomiting 02/24/2010    Epigastric abdominal pain 02/24/2010    Digoxin toxicity 02/24/2010    ARF (acute renal failure) ([address] 02/24/2010    Hypokalemia 02/24/2010    CKD (chronic kidney disease) stage 3, GFR 30-59 ml/min 12/04/2009    Hypothyroidism 12/04/2009    BOBBY (obstructive sleep apnea) 12/04/2009    Chronic obstructive pulmonary disease ([address] 03/08/2009    Aortic Valve Bioprosthesis Present 03/07/2009    Hypotension 03/01/2009    Degenerative arthritis of left knee 02/27/2009       Plan/Recommendations/Medical Decision Making:     Patient hemodynamically stable, with right groin wound VAC intact no drainage no signs of hematoma, patient's hemoglobin stable above 9, from vascular standpoint can restart anticoagulation  -Right groin and right posterior thigh some redness does not look cellulitic, does not renae, no white count no pain on touch, however on antibiotics okay from vascular standpoint    Elements of this note have been dictated using speech recognition software. As a result, errors of speech recognition may have occurred.

## 2017-02-24 NOTE — PROGRESS NOTES
Per Dr. Karen Malagon, placed an order for a PICC line.  Called and left a message for the vascular access team.

## 2017-02-24 NOTE — PROGRESS NOTES
Problem: Self Care Deficits Care Plan (Adult)  Goal: *Acute Goals and Plan of Care (Insert Text)  1. Patient will complete lower body bathing and dressing with supervsion and adaptive equipment as needed. 2. Patient will complete toileting with supervision. 3. Patient will tolerate 30 minutes of OT treatment with 3 rest breaks to increase activity tolerance for ADLs. 4. Patient will complete functional transfers with supervision and adaptive equipment as needed. Timeframe: 7 visits       OCCUPATIONAL THERAPY: Daily Note, Treatment Day: 1st and PM 2/24/2017  INPATIENT: Hospital Day: 13  [place] / [place] / Product Type: Medicare /      NAME/AGE/GENDER: Daniel Bedoya is a 77 y.o. female       PRIMARY DIAGNOSIS:  dyspnea, hypotension,  Acute CHF (congestive heart failure) ([facility])  A-FIB  Ischemic foot Acute CHF (congestive heart failure) (HCC) Acute CHF (congestive heart failure) ([facility])  Procedure(s) (LRB):  THROMBECTOMY/EMBOLECTOMY LOWER EXTREMITY (Right)  4 Days Post-Op  ICD-10: Treatment Diagnosis:        · Generalized Muscle Weakness (M62.81)  · Other lack of cordination (R27.8)  · Dizziness and Giddiness (R42)   Precautions/Allergies:         Adhesive tape; Benadryl [diphenhydramine hcl]; Demerol [meperidine]; Morphine; Sulfa (sulfonamide antibiotics); and Lorazepam       ASSESSMENT:   Ms. Candie Encarnacion was admitted for the above diagnosis. Pt presents sitting up in chair with family present in room. Pt was introduced to and practiced using adaptive equipment (reacher, sock aid, long shoe horn, and dressing stick) to assist with LB dressing. Pt able to don/doff socks using equipment. Family mentioned they would purchase equipment from [employer]. Pt was assisted with shaving her \"whiskers\" off her chin (dependent) due to using safety razor. Pt was left in chair with family in room. Will continue to benefit from skilled OT during stay.     This section established at most recent assessment   PROBLEM LIST (Impairments causing functional limitations):  1. Decreased Strength  2. Decreased ADL/Functional Activities  3. Decreased Transfer Abilities  4. Decreased Ambulation Ability/Technique  5. Decreased Balance  6. Decreased Activity Tolerance  7. Decreased Work Simplification/Energy Conservation Techniques  8. Increased Fatigue  9. Increased Shortness of Breath  10. Decreased [place] with Home Exercise Program    INTERVENTIONS PLANNED: (Benefits and precautions of occupational therapy have been discussed with the patient.)  1. Activities of daily living training  2. Adaptive equipment training  3. Balance training  4. Clothing management  5. Donning&doffing training  6. Group therapy  7. Theraputic activity  8. Theraputic exercise  9. Energy conservation      TREATMENT PLAN: Frequency/Duration: Follow patient 3x/week to address above goals. Rehabilitation Potential For Stated Goals: GOOD      RECOMMENDED REHABILITATION/EQUIPMENT: (at time of discharge pending progress): to be determined. OCCUPATIONAL PROFILE AND HISTORY:   History of Present Injury/Illness (Reason for Referral):  Ms. Hai Brambila is a very pleasant 76 yo F Who complains of increased SOb, nonproductive cough and b/l LE swelling in the last 1-2 months. Known with chronic diastolic CHF, CAD, bradycardia s/p pacemaker, , s/p bioprosthetic AVR,Chronic Afib on coumadin, severe pulmonary HTN with PAP 69 mmHg, chronic respiratory failure on NC 3L 24h/7 , COPD, GERD, HTN, HLP, Obesity, BOBBY - not using CPAP. Seen by her cardiologist, Antonia Conner on 1/9/17 and Demadex was increased to 40 mg PO daily, except MWF when was BID. Ms. Hai Brambila states that her leg swelling improved, although her cough and SOB persisted. Seen by PCP on 1/31 and placed on oral steroids and Albuterol that she couldn't complete because the albuterol made ermias jittery. No improvement with treatment.  Seen at [facility] ED on 2/9 with low BP and and thought that she is dehydrated due to increased diuretics and received IV fluids. She felt better for one day, although because of increased in her symptoms she presented to ED today. BP was 80/51 mmHg and received 1L NC bolus in Ed per ED provider. CXR w/o acute pathology. No spikes of fever since her symptoms started. Influenza screen negative. WBC:3.4. Cr:1.48, around her baseline. CV:irregular irregularity. No JVD. +2 pitting edema b/l LE   Resp: Decreased air entry b/l at the base. No wheezing. No rales or crackles. Abd: soft, non-tender, no rebound tenderness. Past Medical History/Comorbidities:   Ms. 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); AV block (10/17/2011); Back pain (8/5/2016); Bradycardia (Symptomatic) (11/7/2011); CAD (coronary artery disease); Cancer ([address] (1990); Cardiac pacemaker (11/2/2015); Cardiogenic shock ([address] (10/17/2011); 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 (8/5/2016); Cellulitis (8/5/2016); Chest pain (8/5/2016); Chronic atrial fibrillation ([address] (10/17/2011); Chronic depression (8/5/2016); Chronic depression (8/5/2016); Chronic obstructive pulmonary disease ([address] (3/8/2009); Chronic pain; CKD (chronic kidney disease) stage 3, GFR 30-59 ml/min (12/4/2009); Congestive heart failure ([facility]F) ([facility] (11/2/2015); COPD; CRI (chronic renal insufficiency) (8/5/2016); Degeneration of cervical intervertebral disc (8/5/2016); Degenerative arthritis of left knee (2/27/2009); Dehydration (8/5/2016); Diastolic heart failure ([facility] Digoxin toxicity (2/24/2010);  Disorder of sweat glands (8/5/2016); Diverticulosis of large intestine without diverticulitis (2015); Dyspnea (8/5/2016); Eczema (8/5/2016); Epigastric abdominal pain (2/24/2010); GERD (gastroesophageal reflux disease); Gout (8/5/2016); H/O mitral valve repair, 2003 (6/27/2016); Heart failure ([address] HTN (hypertension) (8/5/2016); colonic polyp (2015); Hyperkalemia (10/17/2011); Hyperlipidemia (8/5/2016); Hypertension; Hypokalemia (2/24/2010); Hypothyroidism (12/4/2009); Ill-defined condition; Knee pain (8/5/2016); Leg cramps (8/5/2016); Mitral stenosis with insufficiency (11/2/2015); Nausea and vomiting (2/24/2010); Obesity (11/2/2015); BOBBY (obstructive sleep apnea) (12/4/2009); Osteoarthritis (8/5/2016); Osteopenia (8/5/2016); Other long term (current) drug therapy (8/5/2016); Palpitations (11/2/2015); Rash (8/5/2016); Rectal bleeding (10/27/2011); Rectocele (2015); Respiratory insufficiency (11/2/2015); 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 She also has no past medical history of Aneurysm ([address] Autoimmune disease ([address] Coagulation disorder ([address] DEMENTIA; Diabetes ([address] Endocarditis; Infectious disease; Liver disease; Other ill-defined conditions(799.89); PUD (peptic ulcer disease); Seizures ([facility] Stroke [facility]); or Thromboembolus ([facility] Ms. Twyla Ponce  has a past surgical history that includes appendectomy; cholecystectomy (2005); gyn (1981); mastectomy (1990); pacemaker; breast reconstruction; cardiac surg procedure unlist; and orthopaedic.   Social History/Living Environment:   Home Environment: Private residence  # Steps to Enter: 1  One/Two Story Residence: One story  Living Alone: No  Support Systems: Child(brit), Congregational / gene community, Family member(s), Friends \ neighbors  Patient Expects to be Discharged to[de-identified] Private residence  Current DME Used/Available at Home: Shower chair  Tub or Shower Type: Tub/Shower combination  Prior Level of Function/Work/Activity:  independent      Number of Personal Factors/Comorbidities that affect the Plan of Care: Extensive review of physical, cognitive, and psychosocial performance (3+):  HIGH COMPLEXITY   ASSESSMENT OF OCCUPATIONAL PERFORMANCE[de-identified]   Activities of Daily Living:         Require assistance  Basic ADLs (From Assessment) Complex ADLs (From Assessment)   Basic ADL  Feeding: Independent  Oral Facial Hygiene/Grooming: Setup  Bathing: Minimum assistance  Upper Body Dressing: Setup  Lower Body Dressing: Minimum assistance  Toileting: Contact guard assistance     Grooming/Bathing/Dressing Activities of Daily Living   Grooming  Shaving: Total assistance (dependent) (due to using safety razor;) Cognitive Retraining  Safety/Judgement: Awareness of environment                     Lower Body Dressing Assistance  Socks: Supervision/set-up  Leg Crossed Method Used: Yes (at times for doffing socks)  Position Performed: Seated in chair  Cues: Doff; Don  Adaptive Equipment Used: Dressing stick; Long handled shoe horn;Reacher;Sock aid Bed/Mat Mobility  Sit to Stand: Contact guard assistance          Most Recent Physical Functioning:   Gross Assessment:                  Posture:  Posture (WDL): Exceptions to WDL  Posture Assessment:  Forward head  Balance:  Sitting: Intact  Sitting - Static: Good (unsupported)  Sitting - Dynamic: Good (unsupported)  Standing: Impaired  Standing - Static: Fair  Standing - Dynamic : Fair Bed Mobility:     Wheelchair Mobility:     Transfers:  Sit to Stand: Contact guard assistance  Stand to Sit: Contact guard assistance                 Patient Vitals for the past 6 hrs:   BP BP Patient Position SpO2 O2 Flow Rate (L/min) Pulse   02/24/17 1012 - - 97 % 2.5 l/min -   17 1055 117/56 Sitting 99 % 2.5 l/min 83   17 1140 - - 96 % 2.5 l/min -   17 1323 101/52 - - - 80        Mental Status  Neurologic State: Alert, Appropriate for age  Orientation Level: Oriented X4  Cognition: Follows commands  Perception: Appears intact  Perseveration: No perseveration noted  Safety/Judgement: Awareness of environment                               Physical Skills Involved:  1. Balance  2. Mobility  3. Strength  4. Endurance  5. Fine or Gross Motor Coordination Cognitive Skills Affected (resulting in the inability to perform in a timely and safe manner):  1. Problem Solving  2. Learning  3. Remembering Psychosocial Skills Affected:  1. Active Use of Coping Strategies  2. Environmental Adaptations   Number of elements that affect the Plan of Care: 3-5:  MODERATE COMPLEXITY   CLINICAL DECISION MAKIN[address] [place] [address] AM-PAC 6 Clicks   Basic Mobility Inpatient Short Form  How much help from another person does the patient currently need. .. Total A Lot A Little None   1. Putting on and taking off regular lower body clothing?   [ ] 1   [ ] 2   [X] 3   [ ] 4   2. Bathing (including washing, rinsing, drying)? [ ] 1   [ ] 2   [X] 3   [ ] 4   3. Toileting, which includes using toilet, bedpan or urinal?   [ ] 1   [ ] 2   [X] 3   [ ] 4   4. Putting on and taking off regular upper body clothing?   [ ] 1   [ ] 2   [ ] 3   [X] 4   5. Taking care of personal grooming such as brushing teeth? [ ] 1   [ ] 2   [ ] 3   [X] 4   6. Eating meals? [ ] 1   [ ] 2   [ ] 3   [X] 4   © , Trustees of [address] [place] [id number], under license to [employer]. All rights reserved    Score:  Initial: 21 Most Recent: X (Date: -- )     Interpretation of Tool:  Represents activities that are increasingly more difficult (i.e. Bed mobility, Transfers, Gait).        Score 24 23 22-20 19-15 14-10 9-7 6       Modifier CH CI CJ CK CL CM CN         · Self Care:               - CURRENT STATUS: CJ - 20%-39% impaired, limited or restricted               - GOAL STATUS:           CI - 1%-19% impaired, limited or restricted               - D/C STATUS:                       ---------------To be determined---------------  [place] / [place] / Product Type: Medicare /       Medical Necessity:     · Patient demonstrates good rehab potential due to higher previous functional level. Reason for Services/Other Comments:  · Patient continues to require skilled intervention due to decreased ability to perform self care. Use of outcome tool(s) and clinical judgement create a POC that gives a: MODERATE COMPLEXITY             TREATMENT:   (In addition to Assessment/Re-Assessment sessions the following treatments were rendered)      Pre-treatment Symptoms/Complaints:  Decreased ability to perform ADLs, self care, and functional mobility; decreased tolerance for activities  Pain: Initial:   Pain Intensity 1: 0  Post Session:        Self Care: (15 minutes): Procedure(s) (per grid) utilized to improve and/or restore self-care/home management as related to dressing. Required minimal visual, verbal and tactile cueing to facilitate activities of daily living skills and compensatory activities. Braces/Orthotics/Lines/Etc:   · O2 Device: Nasal cannula  Treatment/Session Assessment:    · Response to Treatment:  Agrees to therapy  · Interdisciplinary Collaboration:  · Certified Occupational Therapy Assistant and Registered Nurse  · After treatment position/precautions:  · Up in chair, Bed/Chair-wheels locked, Call light within reach and Family at bedside  · Compliance with Program/Exercises: Will assess as treatment progresses. · Recommendations/Intent for next treatment session: \"Next visit will focus on advancements to more challenging activities and reduction in assistance provided\".   Total Treatment Duration: 25 minutes  OT Patient Time In/Time Out  Time In: 8347  Time Out: Colleen Escobedo

## 2017-02-24 NOTE — PROGRESS NOTES
Bedside shift report given to Bibiana Antunez RN (oncoming nurse) by Patria Servin RN (offgoing nurse). Bedside shift report included the following information: SBAR, Kardex, ED Summary, MAR, and Recent Results.

## 2017-02-24 NOTE — PROGRESS NOTES
[facility] CARDIOLOGY PROGRESS NOTE           2/24/2017 10:53 AM    Admit Date: 2/12/2017    Subjective:   Some SOB again this AM, Hb stable, swelling in leg. No CP.      ROS:  GEN:  No fever or chills  Cardiovascular:  As noted above  Pulmonary:  As noted above  Neuro:  No new focal motor or sensory loss    Objective:      Vitals:    02/24/17 0421 02/24/17 0424 02/24/17 0442 02/24/17 0749   BP: 106/56   99/56   Pulse: 80   83   Resp: 20   18   Temp: 97.8 °F (36.6 °C)   98.6 °F (37 °C)   SpO2: 97% 97%  97%   Weight:   85 kg (187 lb 6.4 oz)    Height:   5' (1.524 m)        Physical Exam:  General-A and O x 3  Neck- supple, no JVD  CV- regular rate and rhythm no MRG  Lung- clear bilaterally  Abd- soft, nontender, nondistended  Ext- mild edema bilaterally. Skin- warm and dry  Psychiatric:  Normal mood and affect. Neurologic:  Alert and oriented X 3      Data Review:   Recent Labs      02/24/17   0410  02/23/17 2020 02/23/17   1030  02/23/17   0350   02/22/17   0346   NA  146*   --    --   143   --   143   K  3.8   --    --   3.5   --   4.4   MG  2.2   --    --   2.1   --   2.2   BUN  25*   --    --   25*   --   22   CREA  1.01*   --    --   0.83   --   0.94   GLU  117*   --    --   88   --   110*   WBC   --    --    --   3.9*   --   5.9   HGB   --   9.0*  8.8*  8.5*   < >  9.5*   HCT   --   29.2*  28.6*  28.3*   < >  32.2*   PLT   --    --    --   103*   --   101*   INR  1.0   --    --   1.1   --   1.1    < > = values in this interval not displayed. TELEMETRY:  paced    Assessment/Plan:     Principal Problem:    Acute CHF (congestive heart failure) ([address] (2/12/2017): will give another dose of IV diuretics today, check labs in the AM.  She has severe valve dz, options limited. May need palliative measures in the near future. Active Problems:    Hypotension (3/1/2009)      Aortic Valve Bioprosthesis Present (3/7/2009): worsening AS, med mgmt.   Deemed not a surgical candidate BOBBY (obstructive sleep apnea) (12/4/2009)      ARF (acute renal failure) ([facility] (2/24/2010): follow, check labs in the AM with IV lasix today      Chronic atrial fibrillation ([address] (10/17/2011): s/p AVN ablation, rate controlled, PPM in place. HOLDING eliquis today with drop in the Hb. Re-start likely tomorrow. Sleep apnea (11/2/2015)      Chronic diastolic congestive heart failure ([address] (9/9/2016): IV diuretics today, check AM labs, folllow BP      Aortic valve replaced (1/9/2017)      Pulmonary hypertension ([facility] (2/12/2017): severe, follow    Anemia: stable,need back on eliquis soon.      Prognosis: guarded at this time          Jackey Jeans, DO  2/24/2017 9:17 AM

## 2017-02-25 ENCOUNTER — APPOINTMENT (OUTPATIENT)
Dept: GENERAL RADIOLOGY | Age: 77
DRG: 270 | End: 2017-02-25
Attending: INTERNAL MEDICINE
Payer: MEDICARE

## 2017-02-25 PROBLEM — I38 ACUTE ON CHRONIC SYSTOLIC HEART FAILURE DUE TO VALVULAR DISEASE (HCC): Status: ACTIVE | Noted: 2017-02-12

## 2017-02-25 PROBLEM — I50.23 ACUTE ON CHRONIC SYSTOLIC HEART FAILURE DUE TO VALVULAR DISEASE (HCC): Status: ACTIVE | Noted: 2017-02-12

## 2017-02-25 LAB
ALBUMIN SERPL BCP-MCNC: 1.9 G/DL (ref 3.2–4.6)
ALBUMIN/GLOB SERPL: 0.7 {RATIO} (ref 1.2–3.5)
ALP SERPL-CCNC: 82 U/L (ref 50–136)
ALT SERPL-CCNC: 18 U/L (ref 12–65)
ANION GAP BLD CALC-SCNC: 6 MMOL/L (ref 7–16)
AST SERPL W P-5'-P-CCNC: 47 U/L (ref 15–37)
BILIRUB SERPL-MCNC: 1 MG/DL (ref 0.2–1.1)
BNP SERPL-MCNC: 281 PG/ML
BUN SERPL-MCNC: 29 MG/DL (ref 8–23)
CALCIUM SERPL-MCNC: 8.2 MG/DL (ref 8.3–10.4)
CHLORIDE SERPL-SCNC: 97 MMOL/L (ref 98–107)
CO2 SERPL-SCNC: 41 MMOL/L (ref 21–32)
CREAT SERPL-MCNC: 1.05 MG/DL (ref 0.6–1)
GLOBULIN SER CALC-MCNC: 2.8 G/DL (ref 2.3–3.5)
GLUCOSE SERPL-MCNC: 99 MG/DL (ref 65–100)
HCT VFR BLD AUTO: 25 % (ref 35.8–46.3)
HCT VFR BLD AUTO: 27.3 % (ref 35.8–46.3)
HGB BLD-MCNC: 7.5 G/DL (ref 11.7–15.4)
HGB BLD-MCNC: 8 G/DL (ref 11.7–15.4)
MAGNESIUM SERPL-MCNC: 2.4 MG/DL (ref 1.8–2.4)
POTASSIUM SERPL-SCNC: 3.6 MMOL/L (ref 3.5–5.1)
PROT SERPL-MCNC: 4.7 G/DL (ref 6.3–8.2)
SODIUM SERPL-SCNC: 144 MMOL/L (ref 136–145)

## 2017-02-25 PROCEDURE — 94760 N-INVAS EAR/PLS OXIMETRY 1: CPT

## 2017-02-25 PROCEDURE — 74011250637 HC RX REV CODE- 250/637: Performed by: HOSPITALIST

## 2017-02-25 PROCEDURE — 74011000250 HC RX REV CODE- 250: Performed by: INTERNAL MEDICINE

## 2017-02-25 PROCEDURE — 94640 AIRWAY INHALATION TREATMENT: CPT

## 2017-02-25 PROCEDURE — 85018 HEMOGLOBIN: CPT | Performed by: NURSE PRACTITIONER

## 2017-02-25 PROCEDURE — 97530 THERAPEUTIC ACTIVITIES: CPT

## 2017-02-25 PROCEDURE — 83880 ASSAY OF NATRIURETIC PEPTIDE: CPT | Performed by: INTERNAL MEDICINE

## 2017-02-25 PROCEDURE — 74011250637 HC RX REV CODE- 250/637: Performed by: INTERNAL MEDICINE

## 2017-02-25 PROCEDURE — 74011250637 HC RX REV CODE- 250/637: Performed by: NURSE PRACTITIONER

## 2017-02-25 PROCEDURE — 36430 TRANSFUSION BLD/BLD COMPNT: CPT

## 2017-02-25 PROCEDURE — 71020 XR CHEST PA LAT: CPT

## 2017-02-25 PROCEDURE — 74011250636 HC RX REV CODE- 250/636: Performed by: SURGERY

## 2017-02-25 PROCEDURE — 74011250637 HC RX REV CODE- 250/637: Performed by: SURGERY

## 2017-02-25 PROCEDURE — 36415 COLL VENOUS BLD VENIPUNCTURE: CPT | Performed by: INTERNAL MEDICINE

## 2017-02-25 PROCEDURE — 74750000023 HC WOUND THERAPY

## 2017-02-25 PROCEDURE — 77010033678 HC OXYGEN DAILY

## 2017-02-25 PROCEDURE — 77030012891

## 2017-02-25 PROCEDURE — 36592 COLLECT BLOOD FROM PICC: CPT

## 2017-02-25 PROCEDURE — 86923 COMPATIBILITY TEST ELECTRIC: CPT | Performed by: INTERNAL MEDICINE

## 2017-02-25 PROCEDURE — 83735 ASSAY OF MAGNESIUM: CPT | Performed by: NURSE PRACTITIONER

## 2017-02-25 PROCEDURE — 74011250636 HC RX REV CODE- 250/636: Performed by: INTERNAL MEDICINE

## 2017-02-25 PROCEDURE — 74011000258 HC RX REV CODE- 258: Performed by: SURGERY

## 2017-02-25 PROCEDURE — 86900 BLOOD TYPING SEROLOGIC ABO: CPT | Performed by: INTERNAL MEDICINE

## 2017-02-25 PROCEDURE — 80053 COMPREHEN METABOLIC PANEL: CPT | Performed by: NURSE PRACTITIONER

## 2017-02-25 PROCEDURE — P9016 RBC LEUKOCYTES REDUCED: HCPCS | Performed by: INTERNAL MEDICINE

## 2017-02-25 PROCEDURE — 65660000000 HC RM CCU STEPDOWN

## 2017-02-25 RX ORDER — FUROSEMIDE 10 MG/ML
40 INJECTION INTRAMUSCULAR; INTRAVENOUS ONCE
Status: COMPLETED | OUTPATIENT
Start: 2017-02-25 | End: 2017-02-25

## 2017-02-25 RX ORDER — SODIUM CHLORIDE 9 MG/ML
250 INJECTION, SOLUTION INTRAVENOUS AS NEEDED
Status: DISCONTINUED | OUTPATIENT
Start: 2017-02-25 | End: 2017-02-28 | Stop reason: HOSPADM

## 2017-02-25 RX ORDER — POTASSIUM CHLORIDE 20 MEQ/1
40 TABLET, EXTENDED RELEASE ORAL 2 TIMES DAILY
Status: COMPLETED | OUTPATIENT
Start: 2017-02-25 | End: 2017-02-25

## 2017-02-25 RX ADMIN — BENZONATATE 100 MG: 100 CAPSULE ORAL at 19:48

## 2017-02-25 RX ADMIN — Medication 20 ML: at 13:59

## 2017-02-25 RX ADMIN — ACETAZOLAMIDE 250 MG: 250 TABLET ORAL at 16:32

## 2017-02-25 RX ADMIN — ROPINIROLE 2 MG: 2 TABLET, FILM COATED ORAL at 19:48

## 2017-02-25 RX ADMIN — Medication 600 UNITS: at 04:58

## 2017-02-25 RX ADMIN — ACETAZOLAMIDE 250 MG: 250 TABLET ORAL at 21:14

## 2017-02-25 RX ADMIN — GABAPENTIN 100 MG: 100 CAPSULE ORAL at 09:25

## 2017-02-25 RX ADMIN — POTASSIUM CHLORIDE 40 MEQ: 20 TABLET, EXTENDED RELEASE ORAL at 11:26

## 2017-02-25 RX ADMIN — POTASSIUM CHLORIDE 40 MEQ: 20 TABLET, EXTENDED RELEASE ORAL at 17:15

## 2017-02-25 RX ADMIN — Medication 20 ML: at 04:47

## 2017-02-25 RX ADMIN — LEVALBUTEROL HYDROCHLORIDE 1.25 MG: 1.25 SOLUTION RESPIRATORY (INHALATION) at 11:22

## 2017-02-25 RX ADMIN — SERTRALINE HYDROCHLORIDE 50 MG: 50 TABLET ORAL at 19:48

## 2017-02-25 RX ADMIN — HYDROCODONE BITARTRATE AND ACETAMINOPHEN 1 TABLET: 5; 325 TABLET ORAL at 06:03

## 2017-02-25 RX ADMIN — PANTOPRAZOLE SODIUM 40 MG: 40 TABLET, DELAYED RELEASE ORAL at 04:47

## 2017-02-25 RX ADMIN — Medication 600 UNITS: at 19:49

## 2017-02-25 RX ADMIN — GABAPENTIN 100 MG: 100 CAPSULE ORAL at 16:32

## 2017-02-25 RX ADMIN — STANDARDIZED SENNA CONCENTRATE AND DOCUSATE SODIUM 1 TABLET: 8.6; 5 TABLET, FILM COATED ORAL at 09:25

## 2017-02-25 RX ADMIN — LEVOTHYROXINE SODIUM 88 MCG: 0.09 TABLET ORAL at 04:47

## 2017-02-25 RX ADMIN — GABAPENTIN 100 MG: 100 CAPSULE ORAL at 19:49

## 2017-02-25 RX ADMIN — LEVALBUTEROL HYDROCHLORIDE 1.25 MG: 1.25 SOLUTION RESPIRATORY (INHALATION) at 19:38

## 2017-02-25 RX ADMIN — LEVALBUTEROL HYDROCHLORIDE 1.25 MG: 1.25 SOLUTION RESPIRATORY (INHALATION) at 15:00

## 2017-02-25 RX ADMIN — FUROSEMIDE 40 MG: 10 INJECTION, SOLUTION INTRAMUSCULAR; INTRAVENOUS at 09:24

## 2017-02-25 RX ADMIN — ASPIRIN 81 MG: 81 TABLET, COATED ORAL at 09:25

## 2017-02-25 RX ADMIN — Medication 20 ML: at 19:49

## 2017-02-25 RX ADMIN — ACETAZOLAMIDE 250 MG: 250 TABLET ORAL at 09:25

## 2017-02-25 RX ADMIN — ACETAMINOPHEN 650 MG: 325 TABLET, FILM COATED ORAL at 19:48

## 2017-02-25 RX ADMIN — BENZONATATE 100 MG: 100 CAPSULE ORAL at 02:50

## 2017-02-25 RX ADMIN — TORSEMIDE 40 MG: 20 TABLET ORAL at 09:25

## 2017-02-25 RX ADMIN — Medication 600 UNITS: at 14:00

## 2017-02-25 RX ADMIN — STANDARDIZED SENNA CONCENTRATE AND DOCUSATE SODIUM 1 TABLET: 8.6; 5 TABLET, FILM COATED ORAL at 19:48

## 2017-02-25 RX ADMIN — LEVALBUTEROL HYDROCHLORIDE 1.25 MG: 1.25 SOLUTION RESPIRATORY (INHALATION) at 08:30

## 2017-02-25 RX ADMIN — GUAIFENESIN AND DEXTROMETHORPHAN 5 ML: 100; 10 SYRUP ORAL at 06:03

## 2017-02-25 RX ADMIN — PIPERACILLIN SODIUM,TAZOBACTAM SODIUM 3.38 G: 3; .375 INJECTION, POWDER, FOR SOLUTION INTRAVENOUS at 02:50

## 2017-02-25 NOTE — PROGRESS NOTES
Called the lab to check on the H&H sent at 1140. They are showing it in process. Also checked on [employer], she stated Stephane Rae has the order and will be by to draw the blood and verify the band.

## 2017-02-25 NOTE — PROGRESS NOTES
Bedside and Verbal shift change report given to April RN (oncoming nurse) by Luz Marina Sanches (offgoing nurse). Report included the following information Kardex, OR Summary, Intake/Output, MAR and Recent Results.

## 2017-02-25 NOTE — PROGRESS NOTES
Hospitalist Progress Note    Subjective:   Daily Progress Note: 2/25/2017 12:00 PM    Hospital course through 2/21:  Ms. Annabelle Hernandez is a very pleasant 76 yo F  Known with chronic diastolic CHF, CAD, bradycardia s/p pacemaker, s/p bioprosthetic AVR, Aortic Stenosis and Mitral Regurg, Chronic Afib on coumadin, severe pulmonary HTN with PAP 69 mmHg, chronic respiratory failure on NC 3L 24h/7 , COPD, GERD, HTN, HLP, Obesity, BOBBY - not using CPAP presented to [facility] ED on 2/9 with low BP and and thought that she is dehydrated due to increased diuretics and received IV fluids. She felt better for one day, although because of increased in her symptoms she presented to ED today. BP was 80/51 mmHg and received 1L NS bolus in Ed per ED provider. WBC:3.4. Cr:1.48, around her baseline. Pt.s' HR was difficult to control and she and her family were opting for home with hospice. However an ablation was done in a last ditch effort by Dr. Lui Backbone after d/w Dr. Yu Daniels. Cardiology has been diuresing with plans to eventually discharge her home when ready from their standpoint. Unfortunately On 2/20 she developed right cold foot and foot pain. Vascular consulted and she had thrombectomy on 2/20/2017. Became anemic and developed Cellulitis of Rt thigh post procedure. Seen by Vascular and Cards. ELiquis being held due to low Hb.    2/25: No acute events overnight. Still mild SOB with cough but feels better.     Current Facility-Administered Medications   Medication Dose Route Frequency    acetaZOLAMIDE (DIAMOX) tablet 250 mg  250 mg Oral TID    levalbuterol (XOPENEX) nebulizer soln 1.25 mg/3 mL  1.25 mg Nebulization Q4H RT    benzonatate (TESSALON) capsule 100 mg  100 mg Oral TID PRN    sodium chloride (NS) flush 20 mL  20 mL InterCATHeter Q8H    heparin (porcine) pf 600 Units  600 Units InterCATHeter Q8H    sodium chloride (NS) flush 20 mL  20 mL InterCATHeter PRN    heparin (porcine) pf 600 Units  600 Units InterCATHeter PRN    benzocaine-menthol (CEPACOL) lozenge  1 Lozenge Oral Q2H PRN    guaiFENesin-dextromethorphan (ROBITUSSIN DM) 100-10 mg/5 mL syrup 5 mL  5 mL Oral Q6H PRN    piperacillin-tazobactam (ZOSYN) 3.375 g in 0.9% sodium chloride (MBP/ADV) 100 mL  3.375 g IntraVENous Q8H    sodium chloride (NS) flush 5-10 mL  5-10 mL IntraVENous PRN    naloxone (NARCAN) injection 0.4 mg  0.4 mg IntraVENous PRN    aspirin delayed-release tablet 81 mg  81 mg Oral DAILY    HYDROcodone-acetaminophen (NORCO) 5-325 mg per tablet 1 Tab  1 Tab Oral Q4H PRN    senna-docusate (PERICOLACE) 8.6-50 mg per tablet 1 Tab  1 Tab Oral Q12H    0.9% sodium chloride infusion 250 mL  250 mL IntraVENous PRN    torsemide (DEMADEX) tablet 40 mg  40 mg Oral DAILY    lip protectant (BLISTEX) ointment   Topical PRN    magnesium citrate solution 296 mL  296 mL Oral PRN    polyethylene glycol (MIRALAX) packet 17 g  17 g Oral DAILY    diazePAM (VALIUM) tablet 2.5 mg  2.5 mg Oral Q8H PRN    traZODone (DESYREL) tablet 50 mg  50 mg Oral QHS PRN    gabapentin (NEURONTIN) capsule 100 mg  100 mg Oral TID    sodium chloride (NS) flush 5-10 mL  5-10 mL IntraVENous PRN    ondansetron (ZOFRAN) injection 4 mg  4 mg IntraVENous Q4H PRN    acetaminophen (TYLENOL) tablet 650 mg  650 mg Oral Q4H PRN    polyvinyl alcohol (LIQUIFILM TEARS) 1.4 % ophthalmic solution 1 Drop  1 Drop Both Eyes PRN    hydrocortisone (ANUSOL-HC) 2.5 % rectal cream   PeriANAL BID PRN    levothyroxine (SYNTHROID) tablet 88 mcg  88 mcg Oral ACB    pantoprazole (PROTONIX) tablet 40 mg  40 mg Oral ACB    rOPINIRole (REQUIP) tablet 2 mg  2 mg Oral QHS    sertraline (ZOLOFT) tablet 50 mg  50 mg Oral QHS    spironolactone (ALDACTONE) tablet 25 mg  25 mg Oral DAILY        Review of Systems  A comprehensive 12 point ROS was obtained and findings negative unless stated otherwise in above HPI    Objective:     Visit Vitals    /75    Pulse 80    Temp 98.2 °F (36.8 °C)    Resp 18    Ht 5' (1.524 m)    Wt 88.9 kg (196 lb)    SpO2 99%    Breastfeeding No    BMI 38.28 kg/m2    O2 Flow Rate (L/min): 2 l/min O2 Device: Nasal cannula, Humidifier    Temp (24hrs), Av.1 °F (36.7 °C), Min:98 °F (36.7 °C), Max:98.3 °F (36.8 °C)      701 - 1900  In: 443.2 [P.O.:320; I.V.:123.2]  Out: 200 [Urine:200]  1901 -  07  In: 1975 [P.O.:1550; I.V.:425]  Out:  [Urine:2000]    General: awake, alert, oriented, cooperative, no acute distress  Eyes; non icteric, EOMI  CV: RRR  Pulm; non labored, normal effort, no accessory muscle use. B/l crackles  Abd; soft, non tender  Extremities: Rt Groin swollen and warm    Additional comments: all labs, notes and studies from the past 24 hours have been personally reviewed today by me. Data Review    Recent Results (from the past 24 hour(s))   CBC W/O DIFF    Collection Time: 17 11:38 AM   Result Value Ref Range    WBC 5.3 4.3 - 11.1 K/uL    RBC 2.91 (L) 4.05 - 5.25 M/uL    HGB 8.7 (L) 11.7 - 15.4 g/dL    HCT 28.3 (L) 35.8 - 46.3 %    MCV 97.3 79.6 - 97.8 FL    MCH 29.9 26.1 - 32.9 PG    MCHC 30.7 (L) 31.4 - 35.0 g/dL    RDW 15.0 (H) 11.9 - 14.6 %    PLATELET 99 (L) 385 - 450 K/uL    MPV 11.2 10.8 - 14.1 FL   MAGNESIUM    Collection Time: 17  4:00 AM   Result Value Ref Range    Magnesium 2.4 1.8 - 2.4 mg/dL   METABOLIC PANEL, COMPREHENSIVE    Collection Time: 17  4:00 AM   Result Value Ref Range    Sodium 144 136 - 145 mmol/L    Potassium 3.6 3.5 - 5.1 mmol/L    Chloride 97 (L) 98 - 107 mmol/L    CO2 41 (HH) 21 - 32 mmol/L    Anion gap 6 (L) 7 - 16 mmol/L    Glucose 99 65 - 100 mg/dL    BUN 29 (H) 8 - 23 MG/DL    Creatinine 1.05 (H) 0.6 - 1.0 MG/DL    GFR est AA >60 >60 ml/min/1.73m2    GFR est non-AA 54 (L) >60 ml/min/1.73m2    Calcium 8.2 (L) 8.3 - 10.4 MG/DL    Bilirubin, total 1.0 0.2 - 1.1 MG/DL    ALT (SGPT) 18 12 - 65 U/L    AST (SGOT) 47 (H) 15 - 37 U/L    Alk.  phosphatase 82 50 - 136 U/L    Protein, total 4.7 (L) 6.3 - 8.2 g/dL    Albumin 1.9 (L) 3.2 - 4.6 g/dL    Globulin 2.8 2.3 - 3.5 g/dL    A-G Ratio 0.7 (L) 1.2 - 3.5     BNP    Collection Time: 02/25/17  4:00 AM   Result Value Ref Range     pg/mL   HGB & HCT    Collection Time: 02/25/17  4:00 AM   Result Value Ref Range    HGB 7.5 (L) 11.7 - 15.4 g/dL    HCT 25.0 (L) 35.8 - 46.3 %         Assessment/Plan:     Principal Problem:    Acute on chronic systolic heart failure due to valvular disease ([address] (2/12/2017)    Active Problems:    Hypotension (3/1/2009)      Aortic Valve Bioprosthesis Present (3/7/2009)      BOBBY (obstructive sleep apnea) (12/4/2009)      ARF (acute renal failure) ([address] (2/24/2010)      Chronic atrial fibrillation (HCC) (10/17/2011)      Sleep apnea (11/2/2015)      Chronic diastolic congestive heart failure ([address] (9/9/2016)      Aortic valve replaced (1/9/2017)      Warfarin anticoagulation (1/9/2017)      Pulmonary hypertension ([address] (2/12/2017)    Cellulitis of Thigh    -Eliquis held due to low Hb. Will need to be resumed soon after Blood transfusion today. -DC Abx  -Give extra lasix today with blood, on nebs and oxygen.   -Vascular following Rt Thigh catheter site   -needs STR- patient and family desire [id number] Bed when medically stable. Family wants to try aggressive diuresis for now but is open to comfort care discussion if no improvement.  -On Diamox for Met Alkalosis. PICC placed.     Care Plan discussed with: the patient, her care team.       Signed By: Niurka Laura MD     February 25, 2017

## 2017-02-25 NOTE — PROGRESS NOTES
[facility] CARDIOLOGY PROGRESS NOTE           2/25/2017 10:53 AM    Admit Date: 2/12/2017    Subjective:     Some issues with cough; stable dyspnea. Feels better since AVN ablation     ROS:  GEN:  No fever or chills  Cardiovascular:  As noted above  Pulmonary:  As noted above  Neuro:  No new focal motor or sensory loss    Objective:      Vitals:    02/25/17 0705 02/25/17 0831 02/25/17 0924 02/25/17 1122   BP: 105/75  125/82 144/89   Pulse: 80  85 81   Resp: 18   18   Temp: 98.2 °F (36.8 °C)   97.3 °F (36.3 °C)   SpO2: 99% 99%  100%   Weight:       Height:           Physical Exam:  General-A and O x 3  Neck- supple, no JVD  CV- regular rate and rhythm; 2/6 TERRI; diminished S2  Lung- occ wheeze; coarse at bases  Abd- soft, nontender, nondistended  Ext- trace to 1+ edema bilaterally. Skin- warm and dry  Psychiatric:  Normal mood and affect. Neurologic:  Alert and oriented X 3      Data Review:   Recent Labs      02/25/17   1140  02/25/17   0400  02/24/17   1138  02/24/17   0410   02/23/17   0350   NA   --   144   --   146*   --   143   K   --   3.6   --   3.8   --   3.5   MG   --   2.4   --   2.2   --   2.1   BUN   --   29*   --   25*   --   25*   CREA   --   1.05*   --   1.01*   --   0.83   GLU   --   99   --   117*   --   88   WBC   --    --   5.3   --    --   3.9*   HGB  8.0*  7.5*  8.7*   --    < >  8.5*   HCT  27.3*  25.0*  28.3*   --    < >  28.3*   PLT   --    --   99*   --    --   103*   INR   --    --    --   1.0   --   1.1    < > = values in this interval not displayed. TELEMETRY:  paced    Assessment/Plan:     Principal Problem:    Acute CHF (congestive heart failure) ([address] (2/12/2017): stable; She has severe valve dz, options limited. May need palliative measures in the near future. Active Problems:    Hypotension (3/1/2009)      Aortic Valve Bioprosthesis Present (3/7/2009): worsening AS, med mgmt.   Deemed not a surgical candidate      BOBBY (obstructive sleep apnea) (12/4/2009)      ARF (acute renal failure) ([address] (2/24/2010): resolved      Chronic atrial fibrillation ([address] (10/17/2011): s/p AVN ablation, rate controlled, PPM in place. HOLDING eliquis today with drop in the Hb. Re-start likely tomorrow. Sleep apnea (11/2/2015)      Aortic valve replaced (1/9/2017)      Pulmonary hypertension ([facility] (2/12/2017): severe, follow    Anemia: stable, need back on eliquis soon. If stable Hb, plan start in am    If Hb stays stable, plan restart eliquis in am. Has severe prosthetic AS and limited options.  Poor prognosis      Tyree Serna MD  2/25/2017 1:17 PM

## 2017-02-25 NOTE — PROGRESS NOTES
Bedside and Verbal shift change report given to Javed Lacy RN (oncoming nurse) by Foreign Leroy (offgoing nurse). Report included the following information Kardex, OR Summary, Intake/Output, MAR and Recent Results.

## 2017-02-25 NOTE — PROGRESS NOTES
Problem: Mobility Impaired (Adult and Pediatric)  Goal: *Acute Goals and Plan of Care (Insert Text)  Goals Updated 2/21/17  LTG:  (1.)Ms. Josep Borrero will move from supine to sit and sit to supine and roll side to side in bed with INDEPENDENT within 7 day(s). (2.)Ms. Josep Borrero will transfer from bed to chair and chair to bed with MODIFIED INDEPENDENCE using the least restrictive device within 7 day(s). (3.)Ms. Josep Borrero will ambulate with MODIFIED INDEPENDENCE for 100 feet with the least restrictive device within 7 day(s). (4.)Ms. Josep Borrero will perform standing static and dynamic balance activities x 8 minutes with SUPERVISION to improve safety within 7 day(s). (5.)Ms. Josep Borrero will tolerate 25 minutes of therapeutic activity/exercise with one rest break within 5 day(s). ________________________________________________________________________________________________      PHYSICAL THERAPY: Daily Note, Treatment Day: 1st and PM 2/25/2017  INPATIENT: Hospital Day: 14  [place] / Product Type: Medicare /      NAME/AGE/GENDER: Sayra Stern is a 77 y.o. female       PRIMARY DIAGNOSIS: dyspnea, hypotension,  Acute CHF (congestive heart failure) (HCC)  A-FIB  Ischemic foot Acute on chronic systolic heart failure due to valvular disease (HCC) Acute on chronic systolic heart failure due to valvular disease (HCC)  Procedure(s) (LRB):  THROMBECTOMY/EMBOLECTOMY LOWER EXTREMITY (Right)  5 Days Post-Op  ICD-10: Treatment Diagnosis:       · Generalized Muscle Weakness (M62.81)  · shortness of breath   Precaution/Allergies:  Adhesive tape; Benadryl [diphenhydramine hcl]; Demerol [meperidine]; Morphine; Sulfa (sulfonamide antibiotics); and Lorazepam       ASSESSMENT:      Ms. Josep Borrero presents sitting up in bedside chair agreeable to treatment. She stood with CGA and increased her ambulation with rolling walker and verbal cues for safety to 120'. Her gait is slow and shuffled.  She did well with ambulation. Good progress with mobility noted. This section established at most recent assessment   PROBLEM LIST (Impairments causing functional limitations):  1. Decreased Strength  2. Decreased ADL/Functional Activities  3. Decreased Transfer Abilities  4. Decreased Ambulation Ability/Technique  5. Decreased Balance  6. Decreased Activity Tolerance  7. Increased Fatigue  8. Increased Shortness of Breath  9. Decreased Knowledge of Precautions  10. Decreased [place] with Home Exercise Program    INTERVENTIONS PLANNED: (Benefits and precautions of physical therapy have been discussed with the patient.)  1. Balance Exercise  2. Bed Mobility  3. Gait Training  4. Home Exercise Program (HEP)  5. Therapeutic Activites  6. Therapeutic Exercise/Strengthening  7. Transfer Training  8. Group Therapy      TREATMENT PLAN: Frequency/Duration: 3 times a week for duration of hospital stay  Rehabilitation Potential For Stated Goals: EXCELLENT      RECOMMENDED REHABILITATION/EQUIPMENT: (at time of discharge pending progress): Continue Skilled Therapy. HISTORY:   History of Present Injury/Illness (Reason for Referral):  Per H&P:Ms. Makayla Ramos is a very pleasant 78 yo F Who complains of increased SOb, nonproductive cough and b/l LE swelling in the last 1-2 months. Known with chronic diastolic CHF, CAD, bradycardia s/p pacemaker, , s/p bioprosthetic AVR,Chronic Afib on coumadin, severe pulmonary HTN with PAP 69 mmHg, chronic respiratory failure on NC 3L 24h/7 , COPD, GERD, HTN, HLP, Obesity, BOBBY - not using CPAP. Seen by her cardiologist, Enma Thompson on 1/9/17 and Demadex was increased to 40 mg PO daily, except MWF when was BID. Ms. Makayla Ramos states that her leg swelling improved, although her cough and SOB persisted. Seen by PCP on 1/31 and placed on oral steroids and Albuterol that she couldn't complete because the albuterol made ermias jittery. No improvement with treatment.  Seen at [facility] ED on 2/9 with low BP and and thought that she is dehydrated due to increased diuretics and received IV fluids. She felt better for one day, although because of increased in her symptoms she presented to ED today. BP was 80/51 mmHg and received 1L NC bolus in Ed per ED provider. CXR w/o acute pathology. No spikes of fever since her symptoms started. Influenza screen negative. WBC:3.4. Cr:1.48, around her baseline. CV:irregular irregularity. No JVD. +2 pitting edema b/l LE   Resp: Decreased air entry b/l at the base. No wheezing. No rales or crackles. Abd: soft, non-tender, no rebound tenderness. Hold Torsemide until tomorrow. Will get Echocardiogram. Gentle IV hydration at 50 ml/h to keep MAP>85 mmHg. Hold BB due to hypotension. Hold benzodiazepines at bedtime, can exacerbate - untreated BOBBY and involved in severe pulmonary hypertension. Cardiology and palliative care consulted - appreciate the help. Past Medical History/Comorbidities:   Ms. Radha Sommers  has a past medical history of Abnormal glucose (8/5/2016); Abscess (8/5/2016); Acute encephalopathy (7/8/2016); Acute renal failure ([address] (12/3/2009); Afib ([address] Anemia; Ankle swelling (8/5/2016); Anxiety (8/5/2016); Aortic Valve Bioprosthesis Present (3/7/2009); ARF (acute renal failure) ([address] (2/24/2010); Arthritis; Asthma; Atopic dermatitis (8/5/2016); Atrial fibrillation ([address] (3/7/2009); Autonomic orthostatic hypotension (8/5/2016); AV block (10/17/2011); Back pain (8/5/2016); Bradycardia (Symptomatic) (11/7/2011); CAD (coronary artery disease); Cancer ([address] (1990); Cardiac pacemaker (11/2/2015); Cardiogenic shock ([address] (10/17/2011); 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 (8/5/2016); Cellulitis (8/5/2016); Chest pain (8/5/2016); Chronic atrial fibrillation ([address] (10/17/2011); Chronic depression (8/5/2016); Chronic depression (8/5/2016); Chronic obstructive pulmonary disease ([facility] (3/8/2009);  Chronic pain; CKD (chronic kidney disease) stage 3, GFR 30-59 ml/min (12/4/2009); Congestive heart failure (CHF) ([facility] (11/2/2015); COPD; CRI (chronic renal insufficiency) (8/5/2016); Degeneration of cervical intervertebral disc (8/5/2016); Degenerative arthritis of left knee (2/27/2009); Dehydration (8/5/2016); Diastolic heart failure ([facility] Digoxin toxicity (2/24/2010); Disorder of sweat glands (8/5/2016); Diverticulosis of large intestine without diverticulitis (2015); Dyspnea (8/5/2016); Eczema (8/5/2016); Epigastric abdominal pain (2/24/2010); GERD (gastroesophageal reflux disease); Gout (8/5/2016); H/O mitral valve repair, 2003 (6/27/2016); Heart failure ([facility] HTN (hypertension) (8/5/2016); colonic polyp (2015); Hyperkalemia (10/17/2011); Hyperlipidemia (8/5/2016); Hypertension; Hypokalemia (2/24/2010); Hypothyroidism (12/4/2009); Ill-defined condition; Knee pain (8/5/2016); Leg cramps (8/5/2016); Mitral stenosis with insufficiency (11/2/2015); Nausea and vomiting (2/24/2010); Obesity (11/2/2015); BOBBY (obstructive sleep apnea) (12/4/2009); Osteoarthritis (8/5/2016); Osteopenia (8/5/2016); Other long term (current) drug therapy (8/5/2016); Palpitations (11/2/2015); Rash (8/5/2016); Rectal bleeding (10/27/2011); Rectocele (2015); Respiratory insufficiency (11/2/2015); 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 She also has no past medical history of Aneurysm ([address] Autoimmune disease ([address] Coagulation disorder ([address] DEMENTIA; Diabetes ([address] Endocarditis; Infectious disease; Liver disease; Other ill-defined conditions(799.89); PUD (peptic ulcer disease); Seizures ([address] Stroke [facility]); or Thromboembolus ([address]   Ms. Ernesto Monroe  has a past surgical history that includes appendectomy; cholecystectomy (2005); gyn (1981); mastectomy (1990); pacemaker; breast reconstruction; cardiac surg procedure unlist; and orthopaedic. Social History/Living Environment:   Home Environment: Private residence  # Steps to Enter: 1  One/Two Story Residence: One story  Living Alone: No  Support Systems: Child(brit), Sabianism / gene community, Family member(s), Friends \ neighbors  Patient Expects to be Discharged to[de-identified] Private residence  Current DME Used/Available at Home: Shower chair  Tub or Shower Type: Tub/Shower combination  Prior Level of Function/Work/Activity:  Patient lives with son who works during the day. Patient has family and neighbors that would be able to check on patient. Personal Factors:          Age:  77 y.o. Number of Personal Factors/Comorbidities that affect the Plan of Care: 3+: HIGH COMPLEXITY   EXAMINATION:   Most Recent Physical Functioning:   Gross Assessment:                  Posture:  Posture (WDL): Exceptions to WDL  Posture Assessment: Forward head, Rounded shoulders  Balance:  Sitting: Intact  Sitting - Static: Good (unsupported)  Standing: Impaired  Standing - Static: Fair  Standing - Dynamic : Fair Bed Mobility:     Wheelchair Mobility:     Transfers:  Sit to Stand: Stand-by asssistance  Stand to Sit: Stand-by asssistance  Gait:     Base of Support: Widened  Speed/Carol: Shuffled; Slow  Step Length: Left shortened;Right shortened  Distance (ft): 120 Feet (ft)  Assistive Device: Walker, rolling  Ambulation - Level of Assistance: Contact guard assistance  Interventions: Safety awareness training;Verbal cues      Body Structures Involved:  1. Heart  2. Lungs Body Functions Affected:  1. Cardio  2. Movement Related Activities and Participation Affected:  1. Mobility  2. Self Care  3.  Domestic Life   Number of elements that affect the Plan of Care: 4+: HIGH COMPLEXITY   CLINICAL PRESENTATION:   Presentation: Evolving clinical presentation with changing clinical characteristics: MODERATE COMPLEXITY   CLINICAL DECISION MAKIN[address] [place] [id number] AM-PAC 6 Clicks   Basic Mobility Inpatient Short Form  How much difficulty does the patient currently have. .. Unable A Lot A Little None   1. Turning over in bed (including adjusting bedclothes, sheets and blankets)? [ ] 1   [ ] 2   [ ] 3   [X] 4   2. Sitting down on and standing up from a chair with arms ( e.g., wheelchair, bedside commode, etc.)   [ ] 1   [ ] 2   [X] 3   [ ] 4   3. Moving from lying on back to sitting on the side of the bed? [ ] 1   [ ] 2   [ ] 3   [X] 4   How much help from another person does the patient currently need. .. Total A Lot A Little None   4. Moving to and from a bed to a chair (including a wheelchair)? [ ] 1   [ ] 2   [X] 3   [ ] 4   5. Need to walk in hospital room? [ ] 1   [ ] 2   [X] 3   [ ] 4   6. Climbing 3-5 steps with a railing? [ ] 1   [X] 2   [ ] 3   [ ] 4   © 2007, Trustees of [address] [place] [id number], under license to [employer] All rights reserved    Score:  Initial: 19 Most Recent: X (Date: -- )     Interpretation of Tool:  Represents activities that are increasingly more difficult (i.e. Bed mobility, Transfers, Gait). Score 24 23 22-20 19-15 14-10 9-7 6       Modifier CH CI CJ CK CL CM CN         · Mobility - Walking and Moving Around:               - CURRENT STATUS:    CK - 40%-59% impaired, limited or restricted               - GOAL STATUS:           CJ - 20%-39% impaired, limited or restricted               - D/C STATUS:                       ---------------To be determined---------------  [place] / Product Type: Medicare /       Medical Necessity:     · Patient demonstrates good rehab potential due to higher previous functional level. · Skilled intervention continues to be required due to decline in overall functional mobility and activity tolerance.   Reason for Services/Other Comments:  · Patient continues to require present interventions due to patient's inability to perform functional mobility at previous indepencence level. Use of outcome tool(s) and clinical judgement create a POC that gives a: Questionable prediction of patient's progress: MODERATE COMPLEXITY                 TREATMENT:      Pre-treatment Symptoms/Complaints: \"I am proud of myself. \"  Pain: Initial: 0     Post Session: No pain complaints noted      Therapeutic Activity: (   23 minutes ):  Therapeutic activities including Chair transfers and Ambulation on level ground to improve mobility, strength, balance and activity tolerance. Required minimal Safety awareness training;Verbal cues to promote static and dynamic balance in standing and pursed lip breathing. DATE: 2/16/17 2/18/17 2/20/17     Ambulation        Hip Flexion x10 AB        Long Arc Quads x10 AB X 15 B      Knee Squeezes        Ankle DF/PF x10 AB X 15 B 15 B                                      Key:  A=active, AA=active assisted, P=passive, B=bilaterally, R=right, L=left   DF=dorsiflexion, PF=plantarflexion      Braces/Orthotics/Lines/Etc:   · O2 Device: Nasal cannula 3L/min  Treatment/Session Assessment:    · Response to Treatment:  Dyspneic with minimal exertion. · Interdisciplinary Collaboration:  · Registered Nurse and Certified Nursing Assistant/Patient Care Technician  · After treatment position/precautions:  · Supine in bed, Bed/Chair-wheels locked, Call light within reach, RN notified and Family at bedside  · Compliance with Program/Exercises: compliant all of the time. · Recommendations/Intent for next treatment session: \"Next visit will focus on advancements to more challenging activities and reduction in assistance provided\".   Total Treatment Duration:  PT Patient Time In/Time Out  Time In: 1530  Time Out: [address] [facility]

## 2017-02-25 NOTE — PROGRESS NOTES
Verbal & Bedside shift change report given to Karl Hall (oncoming nurse) by Sher Pierson (offgoing nurse). Report given with SBAR, Kardex, Intake/Output, MAR and Recent Results.

## 2017-02-25 NOTE — PROGRESS NOTES
PICC PLACEMENT NOTE    PRE-PROCEDURE VERIFICATION    Correct Patient: Yes (Time out preformed)REED WARNER RN IN AGREEMENT WITH TIME OUT. Consent Procedure: Yes  Appropriate Site: Yes    Temperature: Temp: 98 °F (36.7 °C), Temperature Source: Temp Source: Temporal    Recent Labs      02/24/17   1138  02/24/17   0410   02/22/17   0346   BUN   --   25*   < >  22   CREA   --   1.01*   < >  0.94   PLT  99*   --    < >  101*   INR   --   1.0   < >  1.1   APTT   --    --    --   >110.0*   WBC  5.3   --    < >  5.9    < > = values in this interval not displayed. Allergies: Adhesive tape; Benadryl [diphenhydramine hcl]; Demerol [meperidine]; Morphine; Sulfa (sulfonamide antibiotics); and Lorazepam    Education materials for PICC Care given to family: yes. See Patient Education activity for further details. PICC Booklet placed on bedside table. PROCEDURE DETAIL  A double lumen PICC line was started for antibiotic therapy. The following documentation is in addition to the PICC properties in the lines/airways flowsheet :  Lot #: [id number]  xylocaine used: yes  Mid-Arm Circumference: 32 (cm)  Internal Catheter Length: 39 (cm)  Internal Catheter Total Length: 39 (cm)  Vein Selection for PICC:right basilic  Central Line Bundle followed yes  Complication Related to Insertion: none  Ports flushed with positive blood return in each port. Guidewires removed intact prior to chest x-ray. The placement was verified by X-ray: yes. The x-ray results state the tip location is on the right side and the tip overlies the middle superior vena cava. Primary nurse notified.     Line is okay to use: yes      Jacob Young, RN

## 2017-02-25 NOTE — PROGRESS NOTES
POD #4 s/p R CFA embolectomy  No c/o except cough  R foot feels fine  Blood pressure 144/89, pulse 81, temperature 97.3 °F (36.3 °C), resp. rate 18, height 5' (1.524 m), weight 196 lb (88.9 kg), SpO2 100 %, not currently breastfeeding. VAC in place R groin  R foot - palpable DP, no ischemic changes (CARA removed and replaced for exam). No new recommendations.

## 2017-02-25 NOTE — PROGRESS NOTES
Bedside and Verbal shift change report given to Dimitri Givens (oncoming nurse) by Elham Miner RN (offgoing nurse). Report included the following information Kardex, OR Summary, Intake/Output, MAR and Recent Results.

## 2017-02-26 LAB
ANION GAP BLD CALC-SCNC: 8 MMOL/L (ref 7–16)
BUN SERPL-MCNC: 34 MG/DL (ref 8–23)
CALCIUM SERPL-MCNC: 8.4 MG/DL (ref 8.3–10.4)
CHLORIDE SERPL-SCNC: 99 MMOL/L (ref 98–107)
CO2 SERPL-SCNC: 36 MMOL/L (ref 21–32)
CREAT SERPL-MCNC: 1.17 MG/DL (ref 0.6–1)
GLUCOSE SERPL-MCNC: 128 MG/DL (ref 65–100)
HCT VFR BLD AUTO: 28.9 % (ref 35.8–46.3)
HCT VFR BLD AUTO: 29.5 % (ref 35.8–46.3)
HGB BLD-MCNC: 8.8 G/DL (ref 11.7–15.4)
HGB BLD-MCNC: 8.9 G/DL (ref 11.7–15.4)
MAGNESIUM SERPL-MCNC: 2.3 MG/DL (ref 1.8–2.4)
POTASSIUM SERPL-SCNC: 3.7 MMOL/L (ref 3.5–5.1)
SODIUM SERPL-SCNC: 143 MMOL/L (ref 136–145)

## 2017-02-26 PROCEDURE — 74011250637 HC RX REV CODE- 250/637: Performed by: INTERNAL MEDICINE

## 2017-02-26 PROCEDURE — 74011250637 HC RX REV CODE- 250/637: Performed by: HOSPITALIST

## 2017-02-26 PROCEDURE — 94760 N-INVAS EAR/PLS OXIMETRY 1: CPT

## 2017-02-26 PROCEDURE — 74750000023 HC WOUND THERAPY

## 2017-02-26 PROCEDURE — 80048 BASIC METABOLIC PNL TOTAL CA: CPT | Performed by: INTERNAL MEDICINE

## 2017-02-26 PROCEDURE — 77010033678 HC OXYGEN DAILY

## 2017-02-26 PROCEDURE — 83735 ASSAY OF MAGNESIUM: CPT | Performed by: NURSE PRACTITIONER

## 2017-02-26 PROCEDURE — 74011250636 HC RX REV CODE- 250/636: Performed by: INTERNAL MEDICINE

## 2017-02-26 PROCEDURE — 36592 COLLECT BLOOD FROM PICC: CPT

## 2017-02-26 PROCEDURE — 94640 AIRWAY INHALATION TREATMENT: CPT

## 2017-02-26 PROCEDURE — 74011250637 HC RX REV CODE- 250/637: Performed by: SURGERY

## 2017-02-26 PROCEDURE — 74011250637 HC RX REV CODE- 250/637: Performed by: NURSE PRACTITIONER

## 2017-02-26 PROCEDURE — 65660000000 HC RM CCU STEPDOWN

## 2017-02-26 PROCEDURE — 74011000250 HC RX REV CODE- 250: Performed by: INTERNAL MEDICINE

## 2017-02-26 PROCEDURE — 85018 HEMOGLOBIN: CPT | Performed by: NURSE PRACTITIONER

## 2017-02-26 RX ORDER — FUROSEMIDE 10 MG/ML
40 INJECTION INTRAMUSCULAR; INTRAVENOUS ONCE
Status: COMPLETED | OUTPATIENT
Start: 2017-02-26 | End: 2017-02-26

## 2017-02-26 RX ADMIN — LEVALBUTEROL HYDROCHLORIDE 1.25 MG: 1.25 SOLUTION RESPIRATORY (INHALATION) at 15:10

## 2017-02-26 RX ADMIN — LEVALBUTEROL HYDROCHLORIDE 1.25 MG: 1.25 SOLUTION RESPIRATORY (INHALATION) at 20:06

## 2017-02-26 RX ADMIN — GUAIFENESIN AND DEXTROMETHORPHAN 5 ML: 100; 10 SYRUP ORAL at 11:13

## 2017-02-26 RX ADMIN — ROPINIROLE 2 MG: 2 TABLET, FILM COATED ORAL at 20:17

## 2017-02-26 RX ADMIN — ACETAZOLAMIDE 250 MG: 250 TABLET ORAL at 17:08

## 2017-02-26 RX ADMIN — Medication 20 ML: at 13:25

## 2017-02-26 RX ADMIN — GABAPENTIN 100 MG: 100 CAPSULE ORAL at 09:22

## 2017-02-26 RX ADMIN — BENZONATATE 100 MG: 100 CAPSULE ORAL at 17:07

## 2017-02-26 RX ADMIN — Medication 600 UNITS: at 04:21

## 2017-02-26 RX ADMIN — Medication 600 UNITS: at 20:18

## 2017-02-26 RX ADMIN — STANDARDIZED SENNA CONCENTRATE AND DOCUSATE SODIUM 1 TABLET: 8.6; 5 TABLET, FILM COATED ORAL at 20:17

## 2017-02-26 RX ADMIN — LEVALBUTEROL HYDROCHLORIDE 1.25 MG: 1.25 SOLUTION RESPIRATORY (INHALATION) at 07:00

## 2017-02-26 RX ADMIN — LEVALBUTEROL HYDROCHLORIDE 1.25 MG: 1.25 SOLUTION RESPIRATORY (INHALATION) at 11:03

## 2017-02-26 RX ADMIN — SERTRALINE HYDROCHLORIDE 50 MG: 50 TABLET ORAL at 20:17

## 2017-02-26 RX ADMIN — APIXABAN 5 MG: 5 TABLET, FILM COATED ORAL at 09:27

## 2017-02-26 RX ADMIN — Medication 20 ML: at 04:21

## 2017-02-26 RX ADMIN — Medication 20 ML: at 20:17

## 2017-02-26 RX ADMIN — LEVOTHYROXINE SODIUM 88 MCG: 0.09 TABLET ORAL at 04:22

## 2017-02-26 RX ADMIN — Medication 600 UNITS: at 13:25

## 2017-02-26 RX ADMIN — GABAPENTIN 100 MG: 100 CAPSULE ORAL at 20:17

## 2017-02-26 RX ADMIN — PANTOPRAZOLE SODIUM 40 MG: 40 TABLET, DELAYED RELEASE ORAL at 04:22

## 2017-02-26 RX ADMIN — LEVALBUTEROL HYDROCHLORIDE 1.25 MG: 1.25 SOLUTION RESPIRATORY (INHALATION) at 03:43

## 2017-02-26 RX ADMIN — APIXABAN 5 MG: 5 TABLET, FILM COATED ORAL at 17:08

## 2017-02-26 RX ADMIN — BENZONATATE 100 MG: 100 CAPSULE ORAL at 11:13

## 2017-02-26 RX ADMIN — ACETAZOLAMIDE 250 MG: 250 TABLET ORAL at 20:17

## 2017-02-26 RX ADMIN — ACETAZOLAMIDE 250 MG: 250 TABLET ORAL at 09:24

## 2017-02-26 RX ADMIN — GUAIFENESIN AND DEXTROMETHORPHAN 5 ML: 100; 10 SYRUP ORAL at 17:06

## 2017-02-26 RX ADMIN — GABAPENTIN 100 MG: 100 CAPSULE ORAL at 17:07

## 2017-02-26 RX ADMIN — FUROSEMIDE 40 MG: 10 INJECTION, SOLUTION INTRAMUSCULAR; INTRAVENOUS at 13:24

## 2017-02-26 RX ADMIN — TORSEMIDE 40 MG: 20 TABLET ORAL at 09:22

## 2017-02-26 NOTE — PROGRESS NOTES
Bedside and Verbal shift change report given to Ketan Brown (oncoming nurse) by Kylee Mcconnell (offgoing nurse). Report included the following information SBAR, Kardex, Intake/Output, MAR, Recent Results and Med Rec Status.

## 2017-02-26 NOTE — PROGRESS NOTES
Bedside and Verbal shift change report given to Tomasa Riddle (oncoming nurse) by April (offgoing nurse). Report included the following information SBAR, Kardex, Intake/Output, MAR, Recent Results and Med Rec Status.

## 2017-02-26 NOTE — PROGRESS NOTES
POD #5  S/p R fem embolectomy  No new c/o - continues to have cough, leg swelling  Blood pressure 121/60, pulse 80, temperature 97.7 °F (36.5 °C), resp. rate 18, height 5' (1.524 m), weight 195 lb 6.4 oz (88.6 kg), SpO2 100 %, not currently breastfeeding.     VAC in place  R foot is warm, not ischemic, non-tender    Spoke with family  Continue present therapy

## 2017-02-26 NOTE — PROGRESS NOTES
Hospitalist Progress Note    Subjective:   Daily Progress Note: 2/26/2017 12:00 PM    Hospital course through 2/21:  Ms. Benjamin Prieot is a very pleasant 76 yo F  Known with chronic diastolic CHF, CAD, bradycardia s/p pacemaker, s/p bioprosthetic AVR, Aortic Stenosis and Mitral Regurg, Chronic Afib on coumadin, severe pulmonary HTN with PAP 69 mmHg, chronic respiratory failure on NC 3L 24h/7 , COPD, GERD, HTN, HLP, Obesity, BOBBY - not using CPAP presented to [facility] ED on 2/9 with low BP and and thought that she is dehydrated due to increased diuretics and received IV fluids. She felt better for one day, although because of increased in her symptoms she presented to ED today. BP was 80/51 mmHg and received 1L NS bolus in Ed per ED provider. WBC:3.4. Cr:1.48, around her baseline. Pt.s' HR was difficult to control and she and her family were opting for home with hospice. However an ablation was done in a last ditch effort by Dr. Ash Duff after d/w Dr. Terry Avila. Cardiology has been diuresing with plans to eventually discharge her home when ready from their standpoint. Unfortunately On 2/20 she developed right cold foot and foot pain. Vascular consulted and she had thrombectomy on 2/20/2017. Became anemic and developed Cellulitis of Rt thigh post procedure. Seen by Vascular and Cards. ELiquis being held due to low Hb.    2/26: No acute events overnight. Feels better, mild SOB with cough. Rt thigh has large bruise on lateral side.     Current Facility-Administered Medications   Medication Dose Route Frequency    apixaban (ELIQUIS) tablet 5 mg  5 mg Oral BID    0.9% sodium chloride infusion 250 mL  250 mL IntraVENous PRN    acetaZOLAMIDE (DIAMOX) tablet 250 mg  250 mg Oral TID    levalbuterol (XOPENEX) nebulizer soln 1.25 mg/3 mL  1.25 mg Nebulization Q4H RT    benzonatate (TESSALON) capsule 100 mg  100 mg Oral TID PRN    sodium chloride (NS) flush 20 mL  20 mL InterCATHeter Q8H    heparin (porcine) pf 600 Units  600 Units InterCATHeter Q8H    sodium chloride (NS) flush 20 mL  20 mL InterCATHeter PRN    heparin (porcine) pf 600 Units  600 Units InterCATHeter PRN    benzocaine-menthol (CEPACOL) lozenge  1 Lozenge Oral Q2H PRN    guaiFENesin-dextromethorphan (ROBITUSSIN DM) 100-10 mg/5 mL syrup 5 mL  5 mL Oral Q6H PRN    sodium chloride (NS) flush 5-10 mL  5-10 mL IntraVENous PRN    naloxone (NARCAN) injection 0.4 mg  0.4 mg IntraVENous PRN    aspirin delayed-release tablet 81 mg  81 mg Oral DAILY    HYDROcodone-acetaminophen (NORCO) 5-325 mg per tablet 1 Tab  1 Tab Oral Q4H PRN    senna-docusate (PERICOLACE) 8.6-50 mg per tablet 1 Tab  1 Tab Oral Q12H    0.9% sodium chloride infusion 250 mL  250 mL IntraVENous PRN    torsemide (DEMADEX) tablet 40 mg  40 mg Oral DAILY    lip protectant (BLISTEX) ointment   Topical PRN    magnesium citrate solution 296 mL  296 mL Oral PRN    polyethylene glycol (MIRALAX) packet 17 g  17 g Oral DAILY    diazePAM (VALIUM) tablet 2.5 mg  2.5 mg Oral Q8H PRN    traZODone (DESYREL) tablet 50 mg  50 mg Oral QHS PRN    gabapentin (NEURONTIN) capsule 100 mg  100 mg Oral TID    sodium chloride (NS) flush 5-10 mL  5-10 mL IntraVENous PRN    ondansetron (ZOFRAN) injection 4 mg  4 mg IntraVENous Q4H PRN    acetaminophen (TYLENOL) tablet 650 mg  650 mg Oral Q4H PRN    polyvinyl alcohol (LIQUIFILM TEARS) 1.4 % ophthalmic solution 1 Drop  1 Drop Both Eyes PRN    hydrocortisone (ANUSOL-HC) 2.5 % rectal cream   PeriANAL BID PRN    levothyroxine (SYNTHROID) tablet 88 mcg  88 mcg Oral ACB    pantoprazole (PROTONIX) tablet 40 mg  40 mg Oral ACB    rOPINIRole (REQUIP) tablet 2 mg  2 mg Oral QHS    sertraline (ZOLOFT) tablet 50 mg  50 mg Oral QHS        Review of Systems  A comprehensive 12 point ROS was obtained and findings negative unless stated otherwise in above HPI    Objective:     Visit Vitals    /60    Pulse 80    Temp 97.7 °F (36.5 °C)    Resp 18    Ht 5' (1.524 m)    Wt 88.6 kg (195 lb 6.4 oz)    SpO2 100%    Breastfeeding No    BMI 38.16 kg/m2    O2 Flow Rate (L/min): 3 l/min O2 Device: Nasal cannula, Humidifier    Temp (24hrs), Av °F (36.7 °C), Min:97 °F (36.1 °C), Max:98.6 °F (37 °C)      701 - 1900  In: 120 [P.O.:120]  Out: 300 [Urine:300]  1901 -  0700  In: 1943.2 [P.O.:1520; I.V.:423.2]  Out: 2350 [Urine:2350]    General: awake, alert, oriented, cooperative, no acute distress  Eyes; non icteric, EOMI  CV: RRR  Pulm; non labored, normal effort, no accessory muscle use. B/l crackles  Abd; soft, non tender  Extremities: Rt Groin swollen and warm    Additional comments: all labs, notes and studies from the past 24 hours have been personally reviewed today by me.      Data Review    Recent Results (from the past 24 hour(s))   TYPE & CROSSMATCH    Collection Time: 17  2:10 PM   Result Value Ref Range    Crossmatch Expiration 2017     ABO/Rh(D) A POSITIVE     Antibody screen NEG     Unit number [id number]     Blood component type RC LR AS5     Unit division 00     Status of unit TRANSFUSED     Crossmatch result Compatible    MAGNESIUM    Collection Time: 17  3:50 AM   Result Value Ref Range    Magnesium 2.3 1.8 - 2.4 mg/dL   HGB & HCT    Collection Time: 17  3:50 AM   Result Value Ref Range    HGB 8.8 (L) 11.7 - 15.4 g/dL    HCT 28.9 (L) 35.8 - 46.3 %         Assessment/Plan:     Principal Problem:    Acute on chronic systolic heart failure due to valvular disease ([facility] (2017)    Active Problems:    Hypotension (3/1/2009)      Aortic Valve Bioprosthesis Present (3/7/2009)      BOBBY (obstructive sleep apnea) (2009)      ARF (acute renal failure) ([facility] (2010)      Chronic atrial fibrillation (HCC) (10/17/2011)      Sleep apnea (2015)      Chronic diastolic congestive heart failure ([facility] (2016)      Aortic valve replaced (2017)      Warfarin anticoagulation (2017)      Pulmonary hypertension ([facility] (2/12/2017)    Cellulitis of Thigh    -Eliquis resumed today. Hb 8.8 post transfusion.  -Give extra lasix 40mg IV, on nebs and oxygen. CXR in am.   -Vascular following Rt Thigh catheter site   -needs STR- patient and family desire [address] Has Bed when medically stable. Family wants to try aggressive diuresis for now but is open to comfort care discussion if no improvement.  -On Diamox for Met Alkalosis. PICC placed.     Care Plan discussed with: the patient, her care team.       Signed By: Rashida Steiner MD     February 26, 2017

## 2017-02-26 NOTE — PROGRESS NOTES
[facility] CARDIOLOGY PROGRESS NOTE           2/26/2017 9:15 AM    Admit Date: 2/12/2017    Subjective:     Some issues with cough; stable dyspnea. ROS:  GEN:  No fever or chills  Cardiovascular:  As noted above  Pulmonary:  As noted above  Neuro:  No new focal motor or sensory loss    Objective:      Vitals:    02/26/17 0400 02/26/17 0500 02/26/17 0700 02/26/17 0720   BP: 110/55   107/52   Pulse: 83   86   Resp: 18   18   Temp: 97 °F (36.1 °C)   98.2 °F (36.8 °C)   SpO2: 94%  97% 97%   Weight:  88.6 kg (195 lb 6.4 oz)     Height:           Physical Exam:  General-A and O x 3  Neck- supple, no JVD  CV- regular rate and rhythm; 2/6 TERRI; diminished S2  Lung- occ wheeze; coarse at bases  Abd- soft, nontender, nondistended  Ext- trace to 1+ edema bilaterally. Skin- warm and dry  Psychiatric:  Normal mood and affect. Neurologic:  Alert and oriented X 3      Data Review:   Recent Labs      02/26/17   0350  02/25/17   1140  02/25/17   0400  02/24/17   1138  02/24/17   0410   NA   --    --   144   --   146*   K   --    --   3.6   --   3.8   MG  2.3   --   2.4   --   2.2   BUN   --    --   29*   --   25*   CREA   --    --   1.05*   --   1.01*   GLU   --    --   99   --   117*   WBC   --    --    --   5.3   --    HGB  8.8*  8.0*  7.5*  8.7*   --    HCT  28.9*  27.3*  25.0*  28.3*   --    PLT   --    --    --   99*   --    INR   --    --    --    --   1.0       TELEMETRY:  paced    Assessment/Plan:     Principal Problem:    Acute CHF (congestive heart failure) ([facility] (2/12/2017): stable; She has severe valve dz, options limited. May need palliative measures in the near future. Active Problems:    Hypotension (3/1/2009)      Aortic Valve Bioprosthesis Present (3/7/2009): worsening AS, med mgmt.   Deemed not a surgical candidate      BOBBY (obstructive sleep apnea) (12/4/2009)      ARF (acute renal failure) ([facility] (2/24/2010): resolved      Chronic atrial fibrillation ([facility] (10/17/2011): s/p AVN ablation, rate controlled, PPM in place. Restart eliquis today      Sleep apnea (11/2/2015)      Aortic valve replaced (1/9/2017)      Pulmonary hypertension ([facility] (2/12/2017): severe, follow    Anemia: stable, reassess on AC    Hb stable, restart eliquis. Has severe prosthetic AS and limited options.  Poor prognosis long term      Abena Palacios MD  2/26/2017 9:15AM

## 2017-02-27 ENCOUNTER — APPOINTMENT (OUTPATIENT)
Dept: GENERAL RADIOLOGY | Age: 77
DRG: 270 | End: 2017-02-27
Attending: INTERNAL MEDICINE
Payer: MEDICARE

## 2017-02-27 LAB
HCT VFR BLD AUTO: 29 % (ref 35.8–46.3)
HCT VFR BLD AUTO: 29.1 % (ref 35.8–46.3)
HGB BLD-MCNC: 8.6 G/DL (ref 11.7–15.4)
HGB BLD-MCNC: 8.7 G/DL (ref 11.7–15.4)
INR PPP: 1 (ref 0.9–1.2)
MAGNESIUM SERPL-MCNC: 2.7 MG/DL (ref 1.8–2.4)
PROTHROMBIN TIME: 10.7 SEC (ref 9.6–12)

## 2017-02-27 PROCEDURE — 74750000023 HC WOUND THERAPY

## 2017-02-27 PROCEDURE — 74011250637 HC RX REV CODE- 250/637: Performed by: NURSE PRACTITIONER

## 2017-02-27 PROCEDURE — 94760 N-INVAS EAR/PLS OXIMETRY 1: CPT

## 2017-02-27 PROCEDURE — 77010033678 HC OXYGEN DAILY

## 2017-02-27 PROCEDURE — 74011250637 HC RX REV CODE- 250/637: Performed by: SURGERY

## 2017-02-27 PROCEDURE — 65270000029 HC RM PRIVATE

## 2017-02-27 PROCEDURE — 74011250637 HC RX REV CODE- 250/637: Performed by: INTERNAL MEDICINE

## 2017-02-27 PROCEDURE — 97530 THERAPEUTIC ACTIVITIES: CPT

## 2017-02-27 PROCEDURE — P9016 RBC LEUKOCYTES REDUCED: HCPCS | Performed by: INTERNAL MEDICINE

## 2017-02-27 PROCEDURE — 97535 SELF CARE MNGMENT TRAINING: CPT

## 2017-02-27 PROCEDURE — 74011250637 HC RX REV CODE- 250/637: Performed by: HOSPITALIST

## 2017-02-27 PROCEDURE — 85018 HEMOGLOBIN: CPT | Performed by: INTERNAL MEDICINE

## 2017-02-27 PROCEDURE — 94640 AIRWAY INHALATION TREATMENT: CPT

## 2017-02-27 PROCEDURE — 74011000250 HC RX REV CODE- 250: Performed by: INTERNAL MEDICINE

## 2017-02-27 PROCEDURE — 71020 XR CHEST PA LAT: CPT

## 2017-02-27 PROCEDURE — 85610 PROTHROMBIN TIME: CPT | Performed by: INTERNAL MEDICINE

## 2017-02-27 PROCEDURE — 74011250636 HC RX REV CODE- 250/636: Performed by: INTERNAL MEDICINE

## 2017-02-27 PROCEDURE — 36592 COLLECT BLOOD FROM PICC: CPT

## 2017-02-27 PROCEDURE — 36430 TRANSFUSION BLD/BLD COMPNT: CPT

## 2017-02-27 PROCEDURE — 83735 ASSAY OF MAGNESIUM: CPT | Performed by: NURSE PRACTITIONER

## 2017-02-27 PROCEDURE — 74011000258 HC RX REV CODE- 258: Performed by: INTERNAL MEDICINE

## 2017-02-27 RX ORDER — FUROSEMIDE 10 MG/ML
40 INJECTION INTRAMUSCULAR; INTRAVENOUS ONCE
Status: COMPLETED | OUTPATIENT
Start: 2017-02-27 | End: 2017-02-27

## 2017-02-27 RX ORDER — SODIUM CHLORIDE 9 MG/ML
250 INJECTION, SOLUTION INTRAVENOUS AS NEEDED
Status: DISCONTINUED | OUTPATIENT
Start: 2017-02-27 | End: 2017-02-28 | Stop reason: HOSPADM

## 2017-02-27 RX ORDER — HYDROCORTISONE ACETATE 25 MG/1
25 SUPPOSITORY RECTAL EVERY 12 HOURS
Status: DISCONTINUED | OUTPATIENT
Start: 2017-02-27 | End: 2017-02-28 | Stop reason: HOSPADM

## 2017-02-27 RX ADMIN — ROPINIROLE 2 MG: 2 TABLET, FILM COATED ORAL at 20:13

## 2017-02-27 RX ADMIN — ASPIRIN 81 MG: 81 TABLET, COATED ORAL at 10:49

## 2017-02-27 RX ADMIN — BENZONATATE 100 MG: 100 CAPSULE ORAL at 11:14

## 2017-02-27 RX ADMIN — Medication 20 ML: at 20:13

## 2017-02-27 RX ADMIN — STANDARDIZED SENNA CONCENTRATE AND DOCUSATE SODIUM 1 TABLET: 8.6; 5 TABLET, FILM COATED ORAL at 10:50

## 2017-02-27 RX ADMIN — PANTOPRAZOLE SODIUM 40 MG: 40 TABLET, DELAYED RELEASE ORAL at 05:56

## 2017-02-27 RX ADMIN — SODIUM CHLORIDE 100 ML: 900 INJECTION, SOLUTION INTRAVENOUS at 21:33

## 2017-02-27 RX ADMIN — ACETAMINOPHEN 650 MG: 325 TABLET, FILM COATED ORAL at 18:00

## 2017-02-27 RX ADMIN — Medication 20 ML: at 17:54

## 2017-02-27 RX ADMIN — LEVALBUTEROL HYDROCHLORIDE 1.25 MG: 1.25 SOLUTION RESPIRATORY (INHALATION) at 15:58

## 2017-02-27 RX ADMIN — PSYLLIUM HUSK 1 PACKET: 3.4 POWDER ORAL at 17:50

## 2017-02-27 RX ADMIN — Medication 20 ML: at 05:57

## 2017-02-27 RX ADMIN — HYDROCORTISONE ACETATE 25 MG: 25 SUPPOSITORY RECTAL at 20:20

## 2017-02-27 RX ADMIN — GUAIFENESIN AND DEXTROMETHORPHAN 5 ML: 100; 10 SYRUP ORAL at 17:59

## 2017-02-27 RX ADMIN — Medication 600 UNITS: at 05:57

## 2017-02-27 RX ADMIN — GABAPENTIN 100 MG: 100 CAPSULE ORAL at 17:50

## 2017-02-27 RX ADMIN — FUROSEMIDE 40 MG: 10 INJECTION, SOLUTION INTRAMUSCULAR; INTRAVENOUS at 22:27

## 2017-02-27 RX ADMIN — ACETAZOLAMIDE 250 MG: 250 TABLET ORAL at 20:13

## 2017-02-27 RX ADMIN — LEVALBUTEROL HYDROCHLORIDE 1.25 MG: 1.25 SOLUTION RESPIRATORY (INHALATION) at 20:48

## 2017-02-27 RX ADMIN — LEVALBUTEROL HYDROCHLORIDE 1.25 MG: 1.25 SOLUTION RESPIRATORY (INHALATION) at 08:48

## 2017-02-27 RX ADMIN — LEVOTHYROXINE SODIUM 88 MCG: 0.09 TABLET ORAL at 05:56

## 2017-02-27 RX ADMIN — GABAPENTIN 100 MG: 100 CAPSULE ORAL at 20:14

## 2017-02-27 RX ADMIN — BENZONATATE 100 MG: 100 CAPSULE ORAL at 18:00

## 2017-02-27 RX ADMIN — ACETAZOLAMIDE 250 MG: 250 TABLET ORAL at 17:50

## 2017-02-27 RX ADMIN — SERTRALINE HYDROCHLORIDE 50 MG: 50 TABLET ORAL at 20:14

## 2017-02-27 RX ADMIN — ACETAZOLAMIDE 250 MG: 250 TABLET ORAL at 10:50

## 2017-02-27 RX ADMIN — HYDROCORTISONE ACETATE 25 MG: 25 SUPPOSITORY RECTAL at 11:59

## 2017-02-27 RX ADMIN — Medication 600 UNITS: at 20:13

## 2017-02-27 RX ADMIN — GABAPENTIN 100 MG: 100 CAPSULE ORAL at 10:50

## 2017-02-27 RX ADMIN — LEVALBUTEROL HYDROCHLORIDE 1.25 MG: 1.25 SOLUTION RESPIRATORY (INHALATION) at 11:27

## 2017-02-27 RX ADMIN — TORSEMIDE 40 MG: 20 TABLET ORAL at 10:48

## 2017-02-27 RX ADMIN — GUAIFENESIN AND DEXTROMETHORPHAN 5 ML: 100; 10 SYRUP ORAL at 05:00

## 2017-02-27 RX ADMIN — LEVALBUTEROL HYDROCHLORIDE 1.25 MG: 1.25 SOLUTION RESPIRATORY (INHALATION) at 03:42

## 2017-02-27 RX ADMIN — STANDARDIZED SENNA CONCENTRATE AND DOCUSATE SODIUM 1 TABLET: 8.6; 5 TABLET, FILM COATED ORAL at 20:13

## 2017-02-27 NOTE — PROGRESS NOTES
[address] [place] FAX: [phone]        Vascular Surgery Progress Note    Admit Date: 2017  POD 7 Days Post-Op    Procedure:  Procedure(s):  THROMBECTOMY/EMBOLECTOMY LOWER EXTREMITY      Subjective:     Patient has no new complaints. Patient performing breathing treatment. Family member at bedside. Objective:     Blood pressure 120/56, pulse 81, temperature 97.3 °F (36.3 °C), resp. rate 18, height 5' (1.524 m), weight 184 lb 12.8 oz (83.8 kg), SpO2 93 %, not currently breastfeeding. Temp (24hrs), Av.5 °F (36.9 °C), Min:97.3 °F (36.3 °C), Max:99.8 °F (37.7 °C)      Physical Exam:  GENERAL: alert, cooperative, no distress, appears stated age, LUNG:  Diminished in the bases, HEART:  regular rate and rhythm, S1, S2 normal, no murmur, click, rub or gallop, INCISION:  Right groin wound vac in place, and bruising noted to the lateral aspect of her right upper thigh and EXTREMITIES:  Right Lower Extremity:  warm foot, palpable pulses    Labs:   Recent Labs      17   2350  17   1230   17   1138   HGB  8.7*  8.9*   < >  8.7*   WBC   --    --    --   5.3   K   --   3.7   < >   --    GLU   --   128*   < >   --     < > = values in this interval not displayed.        Data Review: images and reports reviewed  Current Facility-Administered Medications   Medication Dose Route Frequency    hydrocortisone (ANUSOL-HC) suppository 25 mg  25 mg Rectal Q12H    psyllium husk-aspartame (METAMUCIL FIBER) packet 1 Packet  1 Packet Oral BID    0.9% sodium chloride infusion 250 mL  250 mL IntraVENous PRN    acetaZOLAMIDE (DIAMOX) tablet 250 mg  250 mg Oral TID    levalbuterol (XOPENEX) nebulizer soln 1.25 mg/3 mL  1.25 mg Nebulization Q4H RT    benzonatate (TESSALON) capsule 100 mg  100 mg Oral TID PRN    sodium chloride (NS) flush 20 mL  20 mL InterCATHeter Q8H    heparin (porcine) pf 600 Units  600 Units InterCATHeter Q8H    sodium chloride (NS) flush 20 mL  20 mL InterCATHeter PRN    heparin (porcine) pf 600 Units  600 Units InterCATHeter PRN    benzocaine-menthol (CEPACOL) lozenge  1 Lozenge Oral Q2H PRN    guaiFENesin-dextromethorphan (ROBITUSSIN DM) 100-10 mg/5 mL syrup 5 mL  5 mL Oral Q6H PRN    sodium chloride (NS) flush 5-10 mL  5-10 mL IntraVENous PRN    naloxone (NARCAN) injection 0.4 mg  0.4 mg IntraVENous PRN    aspirin delayed-release tablet 81 mg  81 mg Oral DAILY    HYDROcodone-acetaminophen (NORCO) 5-325 mg per tablet 1 Tab  1 Tab Oral Q4H PRN    senna-docusate (PERICOLACE) 8.6-50 mg per tablet 1 Tab  1 Tab Oral Q12H    0.9% sodium chloride infusion 250 mL  250 mL IntraVENous PRN    torsemide (DEMADEX) tablet 40 mg  40 mg Oral DAILY    lip protectant (BLISTEX) ointment   Topical PRN    magnesium citrate solution 296 mL  296 mL Oral PRN    polyethylene glycol (MIRALAX) packet 17 g  17 g Oral DAILY    diazePAM (VALIUM) tablet 2.5 mg  2.5 mg Oral Q8H PRN    traZODone (DESYREL) tablet 50 mg  50 mg Oral QHS PRN    gabapentin (NEURONTIN) capsule 100 mg  100 mg Oral TID    sodium chloride (NS) flush 5-10 mL  5-10 mL IntraVENous PRN    ondansetron (ZOFRAN) injection 4 mg  4 mg IntraVENous Q4H PRN    acetaminophen (TYLENOL) tablet 650 mg  650 mg Oral Q4H PRN    polyvinyl alcohol (LIQUIFILM TEARS) 1.4 % ophthalmic solution 1 Drop  1 Drop Both Eyes PRN    hydrocortisone (ANUSOL-HC) 2.5 % rectal cream   PeriANAL BID PRN    levothyroxine (SYNTHROID) tablet 88 mcg  88 mcg Oral ACB    pantoprazole (PROTONIX) tablet 40 mg  40 mg Oral ACB    rOPINIRole (REQUIP) tablet 2 mg  2 mg Oral QHS    sertraline (ZOLOFT) tablet 50 mg  50 mg Oral QHS     Assessment:     Principal Problem:    Acute on chronic systolic heart failure due to valvular disease (HCC) (2/12/2017)    Active Problems:    Hypotension (3/1/2009)      Aortic Valve Bioprosthesis Present (3/7/2009)      BOBBY (obstructive sleep apnea) (12/4/2009)      ARF (acute renal failure) ([facility] (2/24/2010)      Chronic atrial fibrillation ([facility] (10/17/2011)      Sleep apnea (11/2/2015)      Chronic diastolic congestive heart failure ([facility] (9/9/2016)      Aortic valve replaced (1/9/2017)      Warfarin anticoagulation (1/9/2017)      Pulmonary hypertension ([facility] (2/12/2017)        Plan/Recommendations/Medical Decision Making:   Continue present treatment   -Cardiology to order anticoagulation meds once H/H is stable  -Continue wound vac therapy- Wound care to change vac today. If incision is healed, will order dry gauze dressings. Elements of this note have been dictated using speech recognition software. As a result, errors of speech recognition may have occurred.

## 2017-02-27 NOTE — PROGRESS NOTES
Verbal bedside report received from Diane Keller RN. PM assessment complete. Assisted to bathroom. Brief placed on patient due to occasional incontinence.

## 2017-02-27 NOTE — WOUND CARE
Wound VAC dressing removed per order Yossi Weir Standard, NP, incision well approximated, well healing, Dry Gauze applied with paper tape, change daily. Rosario Perez updated. Will monitor.

## 2017-02-27 NOTE — PROGRESS NOTES
Pt may d/c today, spoke with son. States okay to go to [place]. Will need transport with Buddy Almeida.

## 2017-02-27 NOTE — CONSULTS
Erna Chamberlain M.D. Colon and Rectal Surgeon  [place] [address], [address] [facility], [facility] [place]   Phone: [phone] Fax: Dorian Hernadez  MRN: [id number]    Requesting Provider: Dr Yanely Macedo    Primary Care Physician: Jose J Chris MD    Reason for Consult: Rectal bleeding, anemia, hemorrhoids    HISTORY OF PRESENT ILLNESS:  Daniel Bedoya is well known to me. She is a 77y.o. year old female who I met 9/21/15 for evaluation of rectal bleeding, anemia, and hemorrhoids. The patient reported a several year history of small volume bright red rectal bleeding, which had increased in severity and frequency in the recent past. Because of the bleeding, she was taken off of her Coumadin. She had a previous colonoscopy 5 years prior with hyperplastic polyps but also had a history of polyps. She had no family history of significance. She also reported pain with passage of stools, perianal itching, as well as abdominal pain/cramping. She denied difficulty cleaning. She denied nausea/vomiting. She had moderate, urge related, fecal incontinence to flatus on a daily basis, solid on a daily basis, and liquids on a several times weekly basis. She was wearing pads every day and had a history of 2 episiotomies from vaginal deliveries.     She reported relative constipation with daily bowel movements, but frequently hard stools. She reported straining in the past, improved prior to seeing me. She reported a frequent sense of incomplete evacuation. She reported sitting on the commode 30-40 min. She was taking MiraLax on a regular basis. On exam, she had external hemorrhoids in the right anterior column, with prolapsing internal hemorrhoids associated with it. She had low resting tone and weak squeeze. Anoscopy confirmed internal hemorrhoids, and I placed a hemorrhoidal band on the right anterior column.  I gave her a regimen of fiber supplements, stool softeners, laxatives. We talked about medical and surgical treatment options for her hemorrhoids. I recommend colonoscopy as it had been a long time since her last one.     I took her for colonoscopy 10/4/15. She had a fair to poor prep. She had 2 tubular adenomas in the ascending colon and one tubular adenoma in the descending colon. I performed hemorrhoial banding 9/2015 and 9/2016. I recommended she continue a dietary regimen of fiber supplementation, fluid hydration, and MiraLAX. We again banded her RA column 10/6/16. I last saw her 1/31/17 and we banded her RL and LA columns. I saw her 2/19/17 earlier this hospitalization. At that time, she had not had any hemorrhoidal bleeding. She had chronic complaints of \"hemorrhoids hanging out\" but given the minimal disease and absence of bleeding, I recommended conservative therapy. Since then, she developed right leg arterial occlusion and underwent embolectomy/thrombectomy. She has been recovering appropriately. By report, she had bright red bleeding yesterday. She is back on Eliquis. She has had nonbloody stools today. She still feels her hemorrhoids \"hanging out. \"  She is working hard not to strain and minimizing time on the commode. She remains on laxatives to keep the stools soft. She has soft \"skinny\" BMs.       REVIEW OF SYSTEMS:   Constitutional: No fevers/chills, +weakness, +fatigue, +lightheadedness, no night sweats, +insomnia, no appetite changes, no weight changes, +memory problems. Eyes: +changes in vision, no diplopia, no tearing, no scotomata, no pain   Ears/Nose/Throat/Mouth:  No change in hearing, no tinnitus, no bleeding, no epistaxis, no nasal discharge, +sinusitis.    Respiratory: +cough, +dyspnea, +wheezing, +hemoptysis, +sleep apnea   Cardiovascular: +chest pains, no chest pressure, +chest tightness, no palpitations   Gastrointestinal: No abdominal pains, no bowel habit changes, no nausea, no emesis, +hematochezia, no melena, +cramping, +constipation, no diarrhea, no incontinence, +jaundice, no diverticulosis. Otherwise per HPI   Genitourinary: No dysuria, no hematuria, +urinary frequency, no nocturia, no recent UTIs   Musculoskeletal: +back/neck pain, +arthritis, +joint pain/swelling, +muscle pains   Skin: No rashes, no itching, no ulcers   Hematologic:  +easy bruising, +anemia   Neurologic: No headaches, no dizziness, no seizures   Psychiatric: No depressive symptoms, +anxiety symptoms, no changes in mood, no substance abuse     PAST MEDICAL HISTORY:  COPD, BOBBY not on CPAP, asthma, HTN, CAD, GERD, depression, diverticulosis, hypothyroid, HLD, anemia, anxiety, gout, RLS, CHF, CKD, allergies, h/o breast cancer, osteoarthritis, personal history of colon polyps, afib, osteoarthritis,     PAST SURGICAL HISTORY:  Lap cholecystectomy, hysterectomy, one ovary removed, appendectomy, cardiac valve repair/replacement, left mastectomy with reconstruction, pacemaker, left TKA, colonoscopy 2015, recent AV ablation    ALLERGIES: tape, Benadryl, Demerol, morphine, Ativan    HOME MEDICATIONS:   Prior to Admission Medications   Prescriptions Last Dose Informant Patient Reported? Taking? CARBOXYMETHYLCELLULOS/GLYCERIN (REFRESH OPTIVE OP) 2/12/2017  Yes Yes   Sig: Apply  to eye. DOCUSATE SODIUM 2/12/2017  Yes Yes   Sig: Take 1 Cap by mouth two (2) times a day. OXYGEN-AIR DELIVERY SYSTEMS 2/12/2017  Yes Yes   Sig: by Does Not Apply route. 2-2.5 lpm cont. Omeprazole delayed release (PRILOSEC D/R) 20 mg tablet 2/12/2017  Yes Yes   Sig: Take 20 mg by mouth daily. prn    TIOTROPIUM BR/OLODATEROL HCL (STIOLTO RESPIMAT IN) 2/12/2017  Yes Yes   Sig: Take  by inhalation. NEW-PATIENT NOT TAKING, STATES IT MAKES HER JITTERY   allopurinol (ZYLOPRIM) 100 mg tablet 2/12/2017 at Unknown time  Yes Yes   Sig: Take 100 mg by mouth two (2) times a day. calcium carbonate (CALTREX) 600 mg (1,500 mg) tablet 2/12/2017  Yes Yes   Sig: Take 600 mg by mouth nightly. cholecalciferol (VITAMIN D3) 1,000 unit cap 2017  Yes Yes   Sig: Take  by mouth daily. cyanocobalamin (VITAMIN B-12) 250 mcg tablet 2017  Yes Yes   Sig: Take 1,000 mcg by mouth daily. diazePAM (VALIUM) 5 mg tablet 2017 at Unknown time  Yes Yes   Sig: Take 2.5 mg by mouth nightly. dilTIAZem CD (CARDIZEM CD) 240 mg ER capsule 2017  No Yes   Sig: Take 1 Cap by mouth daily. hydrocortisone (ANUSOL-HC) 2.5 % rectal cream 2017  No Yes   Sig: Apply small amount to hemorrhoids/perianal skin TID PRN   levothyroxine (SYNTHROID) 88 mcg tablet 2017  Yes Yes   Sig: Take  by mouth Daily (before breakfast). metoprolol succinate (TOPROL XL) 50 mg XL tablet 2017 at Unknown time  Yes Yes   Sig: Take 50 mg by mouth nightly. nitroglycerin (NITRODUR) 0.4 mg/hr 2017  Yes Yes   Si Patch by TransDERmal route daily. nitroglycerin (NITROSTAT) 0.4 mg SL tablet 2017 at Unknown time  Yes Yes   Sig: by SubLINGual route every five (5) minutes as needed for Chest Pain.   polyethylene glycol (MIRALAX) 17 gram/dose powder 2017  Yes Yes   Sig: Take 17 g by mouth daily as needed. potassium chloride SR (K-TAB) 20 mEq tablet 2017  Yes Yes   Sig: Take 20 mEq by mouth two (2) times a day. pravastatin (PRAVACHOL) 40 mg tablet 2017  No Yes   Sig: Take 1 Tab by mouth daily. Indications: hyperlipidemia   promethazine (PHENERGAN) 25 mg tablet 2017  No Yes   Sig: Take 1 Tab by mouth every six (6) hours as needed. rOPINIRole (REQUIP) 2 mg tablet 2017  Yes Yes   Sig: Take 2 mg by mouth nightly. sertraline (ZOLOFT) 50 mg tablet 2017 at Unknown time  Yes Yes   Sig: Take 50 mg by mouth daily. spironolactone (ALDACTONE) 25 mg tablet 2017  No Yes   Sig: Take 1 Tab by mouth daily. torsemide (DEMADEX) 20 mg tablet 2017 at Unknown time  Yes Yes   Sig: Take 20 mg by mouth daily.    warfarin (COUMADIN) 2.5 mg tablet 2017  Yes Yes   Sig: Take 2.5 mg by mouth nightly. Use as directed       Facility-Administered Medications: None       SOCIAL HISTORY: single, . Lives in [place]. She has 2 children and 8 grandchildren. 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 She is currently retired. Previous 3 pack per day x20 year tobacco use. Quit in 1996. No alcohol use. PREVENTION: last colonoscopy January 2010[facility]--polyps removed--5 year recall    FAMILY HISTORY: No colon or rectal cancer. No history of polyps. No breast, prostate, ovary, endometrial, gastric, or pancreatic cancers. Father had throat cancer and heart disease. Mother with heart disease. Brother with DM, CAD    PHYSICAL EXAM:  Blood pressure 120/56, pulse 81, temperature 97.3 °F (36.3 °C), resp. rate 18, height 5' (1.524 m), weight 184 lb 12.8 oz (83.8 kg), SpO2 93 %, not currently breastfeeding. Body mass index is 36.09 kg/(m^2). The patient was evaluated in the presence of a medical assistant  General: Normotensive, in no acute distress, well developed, well nourished appearing. She move slowly and uses continuous oxygen. Looks much better than last time I saw her in the office   Head:  AT/NC, no lesions   Eyes:  PERRLA, EOM's full, conjunctivae clear   Neck:  Supple, no masses, no lymphadenopathy, no thyromegaly, no carotid bruits   Chest:  Lungs diffusely decreased but otherwise clear, no rales, no rhonchi, no wheezes   Heart:  regular, afib, no rubs, no gallops   Abdomen:  Soft, no tenderness, no rebound, no guarding, no masses, nondistended. Well-healed lower midline incision   Back:  Normal curvature, no tenderness. Negative Suarez's punch. Extremities:  FROM, no deformities, no edema, no erythema   Neuro:  Physiologic, oriented x3, full affect, no localizing findings   Skin:  Normal, no rashes, no lesions noted. Rectal: External exam shows no excoriation, no fissures, no fistula openings, no erythema, and no abscesses.  There is minimal external hemorrhoidal skin redundancy, with a slight RA prominence. There is no prolapsing component, but the mixed component of the disease still engorges moderately with Valsalva. Superior gluteal cleft reveals no pits or signs suggestive of prior pilonidal disease. Palpation of the perianal tissues and ischiorectal fossae shows no tenderness or fluctuance. Valsalva reveals no prolapse of tissue. Anal wink is present.      SHONDA shows low resting tone and slightly weak voluntary squeeze. Valsalva reveals appropriate relaxation. No presacral masses. Rectocele is small-moderate and  present. Stool in the vault is soft and mushy. Stool is heme negative, grossly. Stool is brown/green today. Anoscopy was performed to evaluate the internal hemorrhoids. No fissures, no fistula openings, no distal anorectal polyps or masses. No irritation seen today        Colonoscopy note 10/4/15 reviewed by me--poor prep, ascending polyps 7 and 4 mm, both tubular adenomas. Descending polyp 5 mm, tubular adenoma    ASSESSMENT:  Internal hemorrhoids with complications of prolapse and bleeding--improved   S/p RA banding 9/21/15   S/p RA banding 9/12/16   S/p RA banding 10/6/16   S/p RL and LA banding 1/31/17  Blood in stool, due to above--resolved  Chronic constipation--improved  Personal history of colon polyps  Full incontinence of feces--improved  Anal/rectal pain--  Diffuse abdominal pain    RECOMMENDATIONS:  --she looks as good as she ever has on my exam today. I am glad the cardiac procedure has helped her.  --she currently has brown stool in her rectum. She is back on blood thinners. I would not recommend any procedure at this time. The bleeding yesterday could have been related to her being left too long on the commode, or possibly slight firming of her stools related to her fluid restriction. Will only consider reintervention for symptom recurrence/persistence.   --she has no external hemorrhoids and no internal prolapse, despite her report of tissue \"swelling and hanging down. \"  I showed her son and explained it to him and her. I reassured her that everything looks good today. I think her complaints stem from the hemorrhoid engorgement with valsalva. --continue BID anusol suppositories x 10 days. --Continue high-fiber diet, fluid hydration, and stool softeners. Continue twice daily MiraLAX. --She may feel free to continue to use the over-the-counter creams as she is doing.  --Continue to minimize time on the commode and minimize straining  --continue to work on Kegel exercises regarding her fecal incontinence  --as previously discussed, given her multiple chronic medical comorbidities and impaired pulmonary function, I think any operative intervention would be a significant risk, and I would like to avoid that if at all possible. --Her last colonoscopy was 10/2015, but her prep was poor and she had polyps. Based on the polyps found and the quality of the prep, I recommended a 2-3 year recall. --she wishes to follow up with me in the office on an as-needed basis for recurrence of symptoms. --I will see her again in the hospital on an as-needed basis      Josefa Shanks MD  2/27/2017       Elements of this note have been created with speech-recognition software. As a result, errors in speech recognition may have occurred.

## 2017-02-27 NOTE — PROGRESS NOTES
Primary RN, Leigha called [facility] Explained that be had been given up due to family request of [facility]. Will contact [facility] to see if bed available. Pt may have to go to [facility] and then transfer when bed available.

## 2017-02-27 NOTE — PROGRESS NOTES
[facility] CARDIOLOGY PROGRESS NOTE           2/27/2017 9:15 AM    Admit Date: 2/12/2017    Subjective:     Some issues with cough; stable dyspnea. Long term prognosis is poor. Anemic gi to see. ROS:  GEN:  No fever or chills  Cardiovascular:  As noted above  Pulmonary:  As noted above  Neuro:  No new focal motor or sensory loss    Objective:      Vitals:    02/26/17 2355 02/27/17 0343 02/27/17 0557 02/27/17 0720   BP: 114/60 112/58  121/76   Pulse: 74 72  86   Resp: 18 18 17   Temp: 98.4 °F (36.9 °C) 98.1 °F (36.7 °C)  99 °F (37.2 °C)   SpO2: 98% 99%  96%   Weight:   83.8 kg (184 lb 12.8 oz)    Height:           Physical Exam:  General-A and O x 3  Neck- supple, no JVD  CV- regular rate and rhythm; 2/6 TERRI; diminished S2  Lung- occ wheeze; coarse at bases  Abd- soft, nontender, nondistended  Ext- trace to 1+ edema bilaterally. Skin- warm and dry  Psychiatric:  Normal mood and affect. Neurologic:  Alert and oriented X 3      Data Review:   Recent Labs      02/27/17   0600  02/26/17   2350  02/26/17   1230  02/26/17   0350   02/25/17   0400   02/24/17   1138   NA   --    --   143   --    --   144   --    --    K   --    --   3.7   --    --   3.6   --    --    MG  2.7*   --    --   2.3   --   2.4   < >   --    BUN   --    --   34*   --    --   29*   --    --    CREA   --    --   1.17*   --    --   1.05*   --    --    GLU   --    --   128*   --    --   99   --    --    WBC   --    --    --    --    --    --    --   5.3   HGB   --   8.7*  8.9*  8.8*   < >  7.5*   --   8.7*   HCT   --   29.0*  29.5*  28.9*   < >  25.0*   --   28.3*   PLT   --    --    --    --    --    --    --   99*    < > = values in this interval not displayed. TELEMETRY:  paced    Assessment/Plan:     Principal Problem:    Acute CHF (congestive heart failure) ([address] (2/12/2017): stable; She has severe valve dz, options limited. May need palliative measures in the near future.       Active Problems:    Hypotension (3/1/2009) The current medical regimen is effective;  continue present plan and medications. Aortic Valve Bioprosthesis Present (3/7/2009): worsening AS, med mgmt. Deemed not a surgical candidate      BOBBY (obstructive sleep apnea) (12/4/2009)      ARF (acute renal failure) ([address] (2/24/2010): resolved      Chronic atrial fibrillation ([address] (10/17/2011): s/p AVN ablation, rate controlled, PPM in place. Restart eliquis today      Sleep apnea (11/2/2015)      Aortic valve replaced (1/9/2017)      Pulmonary hypertension ([address] (2/12/2017): severe, follow    Anemia: stable, reassess on AC    Hb stable, restart eliquis. Has severe prosthetic AS and limited options.  Poor prognosis long term      Mike Banerjee MD  2/27/2017 9:15AM

## 2017-02-27 NOTE — PROGRESS NOTES
Problem: Self Care Deficits Care Plan (Adult)  Goal: *Acute Goals and Plan of Care (Insert Text)  1. Patient will complete lower body bathing and dressing with supervsion and adaptive equipment as needed. 2. Patient will complete toileting with supervision. 3. Patient will tolerate 30 minutes of OT treatment with 3 rest breaks to increase activity tolerance for ADLs. 4. Patient will complete functional transfers with supervision and adaptive equipment as needed. Timeframe: 7 visits       OCCUPATIONAL THERAPY: Daily Note, Treatment Day: 2nd and PM    2/27/2017  INPATIENT: Hospital Day: 16  [place] / [place] / Product Type: Medicare /      NAME/AGE/GENDER: Ty Coffman is a 77 y.o. female       PRIMARY DIAGNOSIS:  dyspnea, hypotension,  Acute CHF (congestive heart failure) (HCC)  A-FIB  Ischemic foot Acute on chronic systolic heart failure due to valvular disease (HCC) Acute on chronic systolic heart failure due to valvular disease (HCC)  Procedure(s) (LRB):  THROMBECTOMY/EMBOLECTOMY LOWER EXTREMITY (Right)  7 Days Post-Op  ICD-10: Treatment Diagnosis:        · Generalized Muscle Weakness (M62.81)  · Other lack of cordination (R27.8)  · Dizziness and Giddiness (R42)   Precautions/Allergies:         Adhesive tape; Benadryl [diphenhydramine hcl]; Demerol [meperidine]; Morphine; Sulfa (sulfonamide antibiotics); and Lorazepam       ASSESSMENT:      Ms. Bogdan Siegel was presented up in the chair upon arrival. Pt agreeable to treatment completing sit to stand with minimal assist and verbal cues and a rolling walker. Pt completed functional mobility in her room and in the hallway with additional time and several standing rest breaks many of which pt would stop and speak with multiple staff members. Pt returned to room and required minimal assistance with bathroom mobility and toilet transfers. Pt needed to sit for a while per pt to have a BM. PCT was notified.  Pt's doctor entered the room during above and was informed by PCT and therapist that pt's BM was mostly blood. Pt left with PCT and RN. Will follow. This section established at most recent assessment   PROBLEM LIST (Impairments causing functional limitations):  1. Decreased Strength  2. Decreased ADL/Functional Activities  3. Decreased Transfer Abilities  4. Decreased Ambulation Ability/Technique  5. Decreased Balance  6. Decreased Activity Tolerance  7. Decreased Work Simplification/Energy Conservation Techniques  8. Increased Fatigue  9. Increased Shortness of Breath  10. Decreased [place] with Home Exercise Program    INTERVENTIONS PLANNED: (Benefits and precautions of occupational therapy have been discussed with the patient.)  1. Activities of daily living training  2. Adaptive equipment training  3. Balance training  4. Clothing management  5. Donning&doffing training  6. Group therapy  7. Theraputic activity  8. Theraputic exercise  9. Energy conservation      TREATMENT PLAN: Frequency/Duration: Follow patient 3x/week to address above goals. Rehabilitation Potential For Stated Goals: GOOD      RECOMMENDED REHABILITATION/EQUIPMENT: (at time of discharge pending progress): to be determined. OCCUPATIONAL PROFILE AND HISTORY:   History of Present Injury/Illness (Reason for Referral):  Ms. Bunny Johnston is a very pleasant 76 yo F Who complains of increased SOb, nonproductive cough and b/l LE swelling in the last 1-2 months. Known with chronic diastolic CHF, CAD, bradycardia s/p pacemaker, , s/p bioprosthetic AVR,Chronic Afib on coumadin, severe pulmonary HTN with PAP 69 mmHg, chronic respiratory failure on NC 3L 24h/7 , COPD, GERD, HTN, HLP, Obesity, BOBBY - not using CPAP. Seen by her cardiologist, Gerson Guerrier on 1/9/17 and Demadex was increased to 40 mg PO daily, except MWF when was BID. Ms. Bunny Johnston states that her leg swelling improved, although her cough and SOB persisted.  Seen by PCP on 1/31 and placed on oral steroids and Albuterol that she couldn't complete because the albuterol made ermias jittery. No improvement with treatment. Seen at [facility] ED on 2/9 with low BP and and thought that she is dehydrated due to increased diuretics and received IV fluids. She felt better for one day, although because of increased in her symptoms she presented to ED today. BP was 80/51 mmHg and received 1L NC bolus in Ed per ED provider. CXR w/o acute pathology. No spikes of fever since her symptoms started. Influenza screen negative. WBC:3.4. Cr:1.48, around her baseline. CV:irregular irregularity. No JVD. +2 pitting edema b/l LE   Resp: Decreased air entry b/l at the base. No wheezing. No rales or crackles. Abd: soft, non-tender, no rebound tenderness. Past Medical History/Comorbidities:   Ms. Jordan Leonard  has a past medical history of Abnormal glucose (8/5/2016); Abscess (8/5/2016); Acute encephalopathy (7/8/2016); Acute renal failure ([address] (12/3/2009); Afib ([address] Anemia; Ankle swelling (8/5/2016); Anxiety (8/5/2016); Aortic Valve Bioprosthesis Present (3/7/2009); ARF (acute renal failure) ([address] (2/24/2010); Arthritis; Asthma; Atopic dermatitis (8/5/2016); Atrial fibrillation ([address] (3/7/2009); Autonomic orthostatic hypotension (8/5/2016); AV block (10/17/2011); Back pain (8/5/2016); Bradycardia (Symptomatic) (11/7/2011); CAD (coronary artery disease); Cancer ([address] (1990); Cardiac pacemaker (11/2/2015); Cardiogenic shock ([address] (10/17/2011); 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 (8/5/2016); Cellulitis (8/5/2016); Chest pain (8/5/2016); Chronic atrial fibrillation ([address] (10/17/2011); Chronic depression (8/5/2016); Chronic depression (8/5/2016); Chronic obstructive pulmonary disease ([facility] (3/8/2009); Chronic pain; CKD (chronic kidney disease) stage 3, GFR 30-59 ml/min (12/4/2009); Congestive heart failure (CHF) ([facility] (11/2/2015); COPD; CRI (chronic renal insufficiency) (8/5/2016);  Degeneration of cervical intervertebral disc (8/5/2016); Degenerative arthritis of left knee (2/27/2009); Dehydration (8/5/2016); Diastolic heart failure ([address] Digoxin toxicity (2/24/2010); Disorder of sweat glands (8/5/2016); Diverticulosis of large intestine without diverticulitis (2015); Dyspnea (8/5/2016); Eczema (8/5/2016); Epigastric abdominal pain (2/24/2010); GERD (gastroesophageal reflux disease); Gout (8/5/2016); H/O mitral valve repair, 2003 (6/27/2016); Heart failure ([address] HTN (hypertension) (8/5/2016); colonic polyp (2015); Hyperkalemia (10/17/2011); Hyperlipidemia (8/5/2016); Hypertension; Hypokalemia (2/24/2010); Hypothyroidism (12/4/2009); Ill-defined condition; Knee pain (8/5/2016); Leg cramps (8/5/2016); Mitral stenosis with insufficiency (11/2/2015); Nausea and vomiting (2/24/2010); Obesity (11/2/2015); BOBBY (obstructive sleep apnea) (12/4/2009); Osteoarthritis (8/5/2016); Osteopenia (8/5/2016); Other long term (current) drug therapy (8/5/2016); Palpitations (11/2/2015); Rash (8/5/2016); Rectal bleeding (10/27/2011); Rectocele (2015); Respiratory insufficiency (11/2/2015); 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 She also has no past medical history of Aneurysm ([address] Autoimmune disease ([address] Coagulation disorder ([address] DEMENTIA; Diabetes ([address] Endocarditis; Infectious disease; Liver disease; Other ill-defined conditions(799.89); PUD (peptic ulcer disease); Seizures ([facility] Stroke [facility]); or Thromboembolus ([facility]   Ms. Donald Chávez  has a past surgical history that includes appendectomy; cholecystectomy (2005); gyn (1981); mastectomy (1990); pacemaker; breast reconstruction; cardiac surg procedure unlist; and orthopaedic. Social History/Living Environment:   Home Environment: Private residence  # Steps to Enter: 1  One/Two Story Residence: One story  Living Alone: No  Support Systems: Child(brit), Nondenominational / gene community, Family member(s), Friends \ neighbors  Patient Expects to be Discharged to[de-identified] Private residence  Current DME Used/Available at Home: Shower chair  Tub or Shower Type: Tub/Shower combination  Prior Level of Function/Work/Activity:  independent      Number of Personal Factors/Comorbidities that affect the Plan of Care: Extensive review of physical, cognitive, and psychosocial performance (3+):  HIGH COMPLEXITY   ASSESSMENT OF OCCUPATIONAL PERFORMANCE[de-identified]   Activities of Daily Living:         Require assistance  Basic ADLs (From Assessment) Complex ADLs (From Assessment)   Basic ADL  Feeding: Independent  Oral Facial Hygiene/Grooming: Setup  Bathing: Minimum assistance  Upper Body Dressing: Setup  Lower Body Dressing: Minimum assistance  Toileting: Contact guard assistance     Grooming/Bathing/Dressing Activities of Daily Living                 Toileting  Toileting Assistance: Minimum assistance  Bowel Hygiene: Minimum assistance  Clothing Management: Contact guard assistance   Upper Body Dressing Assistance  Dressing Assistance: Contact guard assistance (robe) Functional Transfers  Bathroom Mobility: Minimum assistance  Toilet Transfer : Contact guard assistance     Bed/Mat Mobility  Sit to Stand: Stand-by asssistance          Most Recent Physical Functioning:   Gross Assessment:                  Posture:  Posture (WDL): Exceptions to WDL  Posture Assessment:  Forward head, Rounded shoulders  Balance:  Sitting: Intact  Sitting - Static: Good (unsupported)  Standing: Impaired  Standing - Static: Fair  Standing - Dynamic : Fair Bed Mobility:     Wheelchair Mobility:     Transfers:  Sit to Stand: Stand-by asssistance  Stand to Sit: Stand-by asssistance                 Patient Vitals for the past 6 hrs:   BP SpO2 O2 Flow Rate (L/min) Pulse   17 1103 120/56 98 % - 81   17 1129 - 93 % 2 l/min -   17 1438 115/58 98 % - 83   17 1600 - 97 % 2 l/min -        Mental Status  Neurologic State: Alert  Orientation Level: Oriented X4  Cognition: Appropriate decision making, Appropriate for age attention/concentration, Appropriate safety awareness  Perception: Appears intact  Perseveration: No perseveration noted  Safety/Judgement: Awareness of environment                               Physical Skills Involved:  1. Balance  2. Mobility  3. Strength  4. Endurance  5. Fine or Gross Motor Coordination Cognitive Skills Affected (resulting in the inability to perform in a timely and safe manner):  1. Problem Solving  2. Learning  3. Remembering Psychosocial Skills Affected:  1. Active Use of Coping Strategies  2. Environmental Adaptations   Number of elements that affect the Plan of Care: 3-5:  MODERATE COMPLEXITY   CLINICAL DECISION MAKIN[address] [place] [id number] AM-PAC 6 Clicks   Basic Mobility Inpatient Short Form  How much help from another person does the patient currently need. .. Total A Lot A Little None   1. Putting on and taking off regular lower body clothing?   [ ] 1   [ ] 2   [X] 3   [ ] 4   2. Bathing (including washing, rinsing, drying)? [ ] 1   [ ] 2   [X] 3   [ ] 4   3. Toileting, which includes using toilet, bedpan or urinal?   [ ] 1   [ ] 2   [X] 3   [ ] 4   4. Putting on and taking off regular upper body clothing?   [ ] 1   [ ] 2   [ ] 3   [X] 4   5. Taking care of personal grooming such as brushing teeth? [ ] 1   [ ] 2   [ ] 3   [X] 4   6. Eating meals? [ ] 1   [ ] 2   [ ] 3   [X] 4   © , Trustees of [address] [address], under license to [employer]. All rights reserved    Score:  Initial: 21 Most Recent: X (Date: -- )     Interpretation of Tool:  Represents activities that are increasingly more difficult (i.e. Bed mobility, Transfers, Gait).        Score 24 23 22-20 19-15 14-10 9-7 6       Modifier CH CI CJ CK CL CM CN         · Self Care:               - CURRENT STATUS:    CJ - 20%-39% impaired, limited or restricted               - GOAL STATUS:           CI - 1%-19% impaired, limited or restricted               - D/C STATUS:                       ---------------To be determined---------------  [place] / Product Type: Medicare /       Medical Necessity:     · Patient demonstrates good rehab potential due to higher previous functional level. Reason for Services/Other Comments:  · Patient continues to require skilled intervention due to decreased ability to perform self care. Use of outcome tool(s) and clinical judgement create a POC that gives a: MODERATE COMPLEXITY             TREATMENT:   (In addition to Assessment/Re-Assessment sessions the following treatments were rendered)      Pre-treatment Symptoms/Complaints:  Decreased ability to perform ADLs, self care, and functional mobility; decreased tolerance for activities  Pain: Initial:   Pain Intensity 1: 0  Post Session:        Therapeutic Activity: (15 minutes): Therapeutic activities including Toilet transfers and static/dynamic standing to improve mobility and strength. Required CGA/min a  to promote dynamic balance in standing. Braces/Orthotics/Lines/Etc:   · O2 Device: Nasal cannula  Treatment/Session Assessment:    · Response to Treatment:  Agrees to therapy  · Interdisciplinary Collaboration:  · Certified Occupational Therapy Assistant, Registered Nurse, Physician and Certified Nursing Assistant/Patient Care Technician  · After treatment position/precautions:  · Caregiver at bedside, Call light within reach, RN notified, MD at bedside and RN and PCT in bathroom with pt  · Compliance with Program/Exercises: Will assess as treatment progresses. · Recommendations/Intent for next treatment session:   \"Next visit will focus on advancements to more challenging activities and reduction in assistance provided\".   Total Treatment Duration:  OT Patient Time In/Time Out  Time In: 1509  Time Out: [address]

## 2017-02-27 NOTE — PROGRESS NOTES
Patient assisted to bsc. Small formed bowel movement with copious amounts of bright red blood. MD paged.

## 2017-02-27 NOTE — PROGRESS NOTES
Hospitalist Progress Note    Subjective:   Daily Progress Note: 2/27/2017 12:00 PM    Hospital course through 2/21:  Ms. Ely Worthington is a very pleasant 76 yo F  Known with chronic diastolic CHF, CAD, bradycardia s/p pacemaker, s/p bioprosthetic AVR, Aortic Stenosis and Mitral Regurg, Chronic Afib on coumadin, severe pulmonary HTN with PAP 69 mmHg, chronic respiratory failure on NC 3L 24h/7 , COPD, GERD, HTN, HLP, Obesity, BOBBY - not using CPAP presented to [facility] ED on 2/9 with low BP and and thought that she is dehydrated due to increased diuretics and received IV fluids. She felt better for one day, although because of increased in her symptoms she presented to ED today. BP was 80/51 mmHg and received 1L NS bolus in Ed per ED provider. WBC:3.4. Cr:1.48, around her baseline. Pt.s' HR was difficult to control and she and her family were opting for home with hospice. However an ablation was done in a last ditch effort by Dr. Guillen Going after d/w Dr. Alex Mention. Cardiology has been diuresing with plans to eventually discharge her home when ready from their standpoint. Unfortunately On 2/20 she developed right cold foot and foot pain. Vascular consulted and she had thrombectomy on 2/20/2017. Became anemic and developed Cellulitis of Rt thigh post procedure. Seen by Vascular and Cards. ELiquis being held due to low Hb.    2/27: Breathing better, had One bloody BM last night and another episode of BRBPR today.      Current Facility-Administered Medications   Medication Dose Route Frequency    hydrocortisone (ANUSOL-HC) suppository 25 mg  25 mg Rectal Q12H    psyllium husk-aspartame (METAMUCIL FIBER) packet 1 Packet  1 Packet Oral BID    0.9% sodium chloride infusion 250 mL  250 mL IntraVENous PRN    0.9% sodium chloride infusion 250 mL  250 mL IntraVENous PRN    acetaZOLAMIDE (DIAMOX) tablet 250 mg  250 mg Oral TID    levalbuterol (XOPENEX) nebulizer soln 1.25 mg/3 mL  1.25 mg Nebulization Q4H RT    benzonatate (TESSALON) capsule 100 mg  100 mg Oral TID PRN    sodium chloride (NS) flush 20 mL  20 mL InterCATHeter Q8H    heparin (porcine) pf 600 Units  600 Units InterCATHeter Q8H    sodium chloride (NS) flush 20 mL  20 mL InterCATHeter PRN    heparin (porcine) pf 600 Units  600 Units InterCATHeter PRN    benzocaine-menthol (CEPACOL) lozenge  1 Lozenge Oral Q2H PRN    guaiFENesin-dextromethorphan (ROBITUSSIN DM) 100-10 mg/5 mL syrup 5 mL  5 mL Oral Q6H PRN    sodium chloride (NS) flush 5-10 mL  5-10 mL IntraVENous PRN    naloxone (NARCAN) injection 0.4 mg  0.4 mg IntraVENous PRN    aspirin delayed-release tablet 81 mg  81 mg Oral DAILY    HYDROcodone-acetaminophen (NORCO) 5-325 mg per tablet 1 Tab  1 Tab Oral Q4H PRN    senna-docusate (PERICOLACE) 8.6-50 mg per tablet 1 Tab  1 Tab Oral Q12H    0.9% sodium chloride infusion 250 mL  250 mL IntraVENous PRN    torsemide (DEMADEX) tablet 40 mg  40 mg Oral DAILY    lip protectant (BLISTEX) ointment   Topical PRN    magnesium citrate solution 296 mL  296 mL Oral PRN    polyethylene glycol (MIRALAX) packet 17 g  17 g Oral DAILY    diazePAM (VALIUM) tablet 2.5 mg  2.5 mg Oral Q8H PRN    traZODone (DESYREL) tablet 50 mg  50 mg Oral QHS PRN    gabapentin (NEURONTIN) capsule 100 mg  100 mg Oral TID    sodium chloride (NS) flush 5-10 mL  5-10 mL IntraVENous PRN    ondansetron (ZOFRAN) injection 4 mg  4 mg IntraVENous Q4H PRN    acetaminophen (TYLENOL) tablet 650 mg  650 mg Oral Q4H PRN    polyvinyl alcohol (LIQUIFILM TEARS) 1.4 % ophthalmic solution 1 Drop  1 Drop Both Eyes PRN    hydrocortisone (ANUSOL-HC) 2.5 % rectal cream   PeriANAL BID PRN    levothyroxine (SYNTHROID) tablet 88 mcg  88 mcg Oral ACB    pantoprazole (PROTONIX) tablet 40 mg  40 mg Oral ACB    rOPINIRole (REQUIP) tablet 2 mg  2 mg Oral QHS    sertraline (ZOLOFT) tablet 50 mg  50 mg Oral QHS        Review of Systems  A comprehensive 12 point ROS was obtained and findings negative unless stated otherwise in above HPI    Objective:     Visit Vitals    /58    Pulse 83    Temp 97.6 °F (36.4 °C)    Resp 18    Ht 5' (1.524 m)    Wt 83.8 kg (184 lb 12.8 oz)    SpO2 97%    Breastfeeding No    BMI 36.09 kg/m2    O2 Flow Rate (L/min): 2 l/min O2 Device: Nasal cannula    Temp (24hrs), Av.1 °F (36.7 °C), Min:97.3 °F (36.3 °C), Max:99 °F (37.2 °C)      701 - 1900  In: -   Out: 900 [Urine:900]  1901 - 700  In: 560 [P.O.:560]  Out: 4301 [Urine:1450]    General: awake, alert, oriented, cooperative, no acute distress  Eyes; non icteric, EOMI  Head: AT/NC  CV: RRR  Pulm; non labored, normal effort, no accessory muscle use. B/l crackles  Abd; soft, non tender  Extremities: Rt Groin swelling improved  Neuro: AAO X 3, non focal    Additional comments: all labs, notes and studies from the past 24 hours have been personally reviewed today by me.      Data Review    Recent Results (from the past 24 hour(s))   HGB & HCT    Collection Time: 17 11:50 PM   Result Value Ref Range    HGB 8.7 (L) 11.7 - 15.4 g/dL    HCT 29.0 (L) 35.8 - 46.3 %   MAGNESIUM    Collection Time: 17  6:00 AM   Result Value Ref Range    Magnesium 2.7 (H) 1.8 - 2.4 mg/dL   HGB & HCT    Collection Time: 17  1:00 PM   Result Value Ref Range    HGB 8.6 (L) 11.7 - 15.4 g/dL    HCT 29.1 (L) 35.8 - 46.3 %         Assessment/Plan:     Principal Problem:    Acute on chronic systolic heart failure due to valvular disease ([facility] (2017)    Active Problems:    Hypotension (3/1/2009)      Aortic Valve Bioprosthesis Present (3/7/2009)      BOBBY (obstructive sleep apnea) (2009)      ARF (acute renal failure) ([address] (2010)      Chronic atrial fibrillation (HCC) (10/17/2011)      Sleep apnea (2015)      Chronic diastolic congestive heart failure ([facility] (2016)      Aortic valve replaced (2017)      Warfarin anticoagulation (2017)      Pulmonary hypertension ([facility] (2017)    Cellulitis of Thigh    -Still on hold given Rectal bleeding, Hb 8.6, give one unit of prbc today  -Give extra lasix 40mg IV, on nebs and oxygen.   -Vascular following Rt Thigh catheter site   -needs STR- patient and family desire [facility] Bed when medically stable. Family open to comfort care discussion if no improvement. Esvin hold her Discharge for today.  -On Diamox for Met Alkalosis. PICC placed.     Care Plan discussed with: the patient, her care team.       Signed By: Beti Casanova MD     February 27, 2017

## 2017-02-27 NOTE — PROGRESS NOTES
Received message that pt son now states that they prefer [facility] for rehab. Per chart review referral was made to [facility] and they may have a bed so will alert coworker to follow up Monday am about availability.

## 2017-02-28 VITALS
DIASTOLIC BLOOD PRESSURE: 65 MMHG | WEIGHT: 192.5 LBS | HEIGHT: 60 IN | HEART RATE: 84 BPM | SYSTOLIC BLOOD PRESSURE: 125 MMHG | BODY MASS INDEX: 37.79 KG/M2 | OXYGEN SATURATION: 98 % | TEMPERATURE: 97.9 F | RESPIRATION RATE: 18 BRPM

## 2017-02-28 LAB
ABO + RH BLD: NORMAL
BLD PROD TYP BPU: NORMAL
BLD PROD TYP BPU: NORMAL
BLOOD GROUP ANTIBODIES SERPL: NORMAL
BPU ID: NORMAL
BPU ID: NORMAL
CROSSMATCH RESULT,%XM: NORMAL
CROSSMATCH RESULT,%XM: NORMAL
HCT VFR BLD AUTO: 30.8 % (ref 35.8–46.3)
HGB BLD-MCNC: 9.4 G/DL (ref 11.7–15.4)
MAGNESIUM SERPL-MCNC: 2.5 MG/DL (ref 1.8–2.4)
POTASSIUM SERPL-SCNC: 3 MMOL/L (ref 3.5–5.1)
SPECIMEN EXP DATE BLD: NORMAL
STATUS OF UNIT,%ST: NORMAL
STATUS OF UNIT,%ST: NORMAL
UNIT DIVISION, %UDIV: 0
UNIT DIVISION, %UDIV: 0

## 2017-02-28 PROCEDURE — 74011250637 HC RX REV CODE- 250/637: Performed by: NURSE PRACTITIONER

## 2017-02-28 PROCEDURE — 74011250637 HC RX REV CODE- 250/637: Performed by: SURGERY

## 2017-02-28 PROCEDURE — 74011000250 HC RX REV CODE- 250: Performed by: INTERNAL MEDICINE

## 2017-02-28 PROCEDURE — 77010033678 HC OXYGEN DAILY

## 2017-02-28 PROCEDURE — 85018 HEMOGLOBIN: CPT | Performed by: NURSE PRACTITIONER

## 2017-02-28 PROCEDURE — 94760 N-INVAS EAR/PLS OXIMETRY 1: CPT

## 2017-02-28 PROCEDURE — 94640 AIRWAY INHALATION TREATMENT: CPT

## 2017-02-28 PROCEDURE — 84132 ASSAY OF SERUM POTASSIUM: CPT | Performed by: NURSE PRACTITIONER

## 2017-02-28 PROCEDURE — 97110 THERAPEUTIC EXERCISES: CPT

## 2017-02-28 PROCEDURE — 83735 ASSAY OF MAGNESIUM: CPT | Performed by: NURSE PRACTITIONER

## 2017-02-28 PROCEDURE — 74011250636 HC RX REV CODE- 250/636: Performed by: INTERNAL MEDICINE

## 2017-02-28 PROCEDURE — 97530 THERAPEUTIC ACTIVITIES: CPT

## 2017-02-28 PROCEDURE — 74011250637 HC RX REV CODE- 250/637: Performed by: HOSPITALIST

## 2017-02-28 PROCEDURE — 74011250637 HC RX REV CODE- 250/637: Performed by: INTERNAL MEDICINE

## 2017-02-28 PROCEDURE — 36592 COLLECT BLOOD FROM PICC: CPT

## 2017-02-28 RX ORDER — POLYETHYLENE GLYCOL 3350 17 G/17G
17 POWDER, FOR SOLUTION ORAL 2 TIMES DAILY
Qty: 1020 G | Refills: 0 | Status: SHIPPED | OUTPATIENT
Start: 2017-02-28 | End: 2017-03-30

## 2017-02-28 RX ORDER — WARFARIN SODIUM 5 MG/1
5 TABLET ORAL
Status: DISCONTINUED | OUTPATIENT
Start: 2017-02-28 | End: 2017-02-28 | Stop reason: HOSPADM

## 2017-02-28 RX ORDER — POTASSIUM CHLORIDE 20 MEQ/1
40 TABLET, EXTENDED RELEASE ORAL 2 TIMES DAILY
Status: DISCONTINUED | OUTPATIENT
Start: 2017-02-28 | End: 2017-02-28

## 2017-02-28 RX ORDER — POTASSIUM CHLORIDE 20 MEQ/1
40 TABLET, EXTENDED RELEASE ORAL
Status: DISCONTINUED | OUTPATIENT
Start: 2017-02-28 | End: 2017-02-28 | Stop reason: HOSPADM

## 2017-02-28 RX ORDER — TORSEMIDE 20 MG/1
40 TABLET ORAL DAILY
Qty: 30 TAB | Refills: 0 | Status: SHIPPED | OUTPATIENT
Start: 2017-02-28 | End: 2017-12-11 | Stop reason: SDUPTHER

## 2017-02-28 RX ORDER — GABAPENTIN 100 MG/1
100 CAPSULE ORAL 3 TIMES DAILY
Qty: 90 CAP | Refills: 0 | Status: SHIPPED | OUTPATIENT
Start: 2017-02-28 | End: 2017-03-30

## 2017-02-28 RX ORDER — DIAZEPAM 5 MG/1
2.5 TABLET ORAL
Qty: 10 TAB | Refills: 0 | Status: SHIPPED | OUTPATIENT
Start: 2017-02-28 | End: 2017-12-11

## 2017-02-28 RX ORDER — HYDROCORTISONE ACETATE 25 MG/1
25 SUPPOSITORY RECTAL EVERY 12 HOURS
Qty: 20 SUPPOSITORY | Refills: 0 | Status: SHIPPED | OUTPATIENT
Start: 2017-02-28 | End: 2017-03-10

## 2017-02-28 RX ORDER — WARFARIN SODIUM 5 MG/1
5 TABLET ORAL
Qty: 7 TAB | Refills: 0 | Status: SHIPPED | OUTPATIENT
Start: 2017-02-28 | End: 2017-12-11 | Stop reason: SDUPTHER

## 2017-02-28 RX ORDER — GUAIFENESIN/DEXTROMETHORPHAN 100-10MG/5
5 SYRUP ORAL
Qty: 1 BOTTLE | Refills: 0 | Status: SHIPPED | OUTPATIENT
Start: 2017-02-28 | End: 2017-03-10

## 2017-02-28 RX ADMIN — Medication 20 ML: at 05:59

## 2017-02-28 RX ADMIN — PSYLLIUM HUSK 1 PACKET: 3.4 POWDER ORAL at 08:58

## 2017-02-28 RX ADMIN — STANDARDIZED SENNA CONCENTRATE AND DOCUSATE SODIUM 1 TABLET: 8.6; 5 TABLET, FILM COATED ORAL at 08:57

## 2017-02-28 RX ADMIN — LEVALBUTEROL HYDROCHLORIDE 1.25 MG: 1.25 SOLUTION RESPIRATORY (INHALATION) at 11:13

## 2017-02-28 RX ADMIN — TORSEMIDE 40 MG: 20 TABLET ORAL at 08:56

## 2017-02-28 RX ADMIN — ASPIRIN 81 MG: 81 TABLET, COATED ORAL at 08:57

## 2017-02-28 RX ADMIN — LEVOTHYROXINE SODIUM 88 MCG: 0.09 TABLET ORAL at 05:58

## 2017-02-28 RX ADMIN — HYDROCORTISONE ACETATE 25 MG: 25 SUPPOSITORY RECTAL at 08:56

## 2017-02-28 RX ADMIN — GABAPENTIN 100 MG: 100 CAPSULE ORAL at 08:57

## 2017-02-28 RX ADMIN — PANTOPRAZOLE SODIUM 40 MG: 40 TABLET, DELAYED RELEASE ORAL at 05:58

## 2017-02-28 RX ADMIN — LEVALBUTEROL HYDROCHLORIDE 1.25 MG: 1.25 SOLUTION RESPIRATORY (INHALATION) at 08:17

## 2017-02-28 RX ADMIN — POTASSIUM CHLORIDE 40 MEQ: 20 TABLET, EXTENDED RELEASE ORAL at 14:08

## 2017-02-28 RX ADMIN — Medication 600 UNITS: at 06:03

## 2017-02-28 RX ADMIN — POLYETHYLENE GLYCOL 3350 17 G: 17 POWDER, FOR SOLUTION ORAL at 08:58

## 2017-02-28 RX ADMIN — ACETAZOLAMIDE 250 MG: 250 TABLET ORAL at 08:57

## 2017-02-28 NOTE — PROGRESS NOTES
Bedside and Verbal shift change report given to Dimitri Givens (oncoming nurse) by Mouna Choudhary RN (offgoing nurse). Report included the following information Kardex, OR Summary, Intake/Output and Recent Results.

## 2017-02-28 NOTE — PROGRESS NOTES
[facility] CARDIOLOGY PROGRESS NOTE           2/28/2017 7:50 AM    Admit Date: 2/12/2017      Subjective:   She is nearing discharge after a long hospital stay for CHF, chronic AF with AVN ablation (2/17/17), rectal bleeding from hemorrhoids (now banded). She has AVR with prosthetic stenosis, prior MV repair with elevated mean gradient, low normal LV EF, RV failure and severe pulmonary HTN. She also had right femoral vein thrombectomy - wound vac removed yesterday. ROS:  Cardiovascular:  As noted above    Objective:      Vitals:    02/28/17 0206 02/28/17 0306 02/28/17 0555 02/28/17 0706   BP: 97/54 99/57  106/60   Pulse: 80 80  82   Resp: 18 18  18   Temp: 97.8 °F (36.6 °C) 97.4 °F (36.3 °C)  97.4 °F (36.3 °C)   SpO2: 93% 94%  98%   Weight:   87.3 kg (192 lb 8 oz)    Height:           Physical Exam:  General-No Acute Distress  Neck- supple, no JVD  CV- irregular rate and rhythm no MRG  Lung- scattered basilar crackles. Abd- soft, nontender, nondistended  Ext- no edema bilaterally. Skin- warm and dry    Data Review:   Recent Labs      02/28/17   0603  02/27/17   2019  02/27/17   1300  02/27/17   0600   02/26/17   1230   NA   --    --    --    --    --   143   K   --    --    --    --    --   3.7   MG  2.5*   --    --   2.7*   --    --    BUN   --    --    --    --    --   34*   CREA   --    --    --    --    --   1.17*   GLU   --    --    --    --    --   128*   HGB  9.4*   --   8.6*   --    < >  8.9*   HCT  30.8*   --   29.1*   --    < >  29.5*   INR   --   1.0   --    --    --    --     < > = values in this interval not displayed. Assessment/Plan:     Principal Problem:    Acute on chronic diastolic heart failure due to valvular disease ([address] (2/12/2017)    Active Problems:    Hypotension (3/1/2009) - resolved.        Aortic Valve Bioprosthesis Present (3/7/2009)      BOBBY (obstructive sleep apnea) (12/4/2009)      ARF (acute renal failure) (HCC) (2/24/2010)      Chronic atrial fibrillation ([facility] (10/17/2011) - post AVN ablation. Permanent pacemaker. Sleep apnea (11/2/2015)      Chronic diastolic congestive heart failure ([facility] (9/9/2016)      Aortic valve replaced (1/9/2017) - stenotic prosthesis. Post MV repair - residual elevated MV gradient. Warfarin anticoagulation (1/9/2017) - on hold. Pulmonary hypertension ([facility] (2/12/2017) - severe. Anemia post rectal bleeding. Comment:  Would resume warfarin with target INR 2-3. Monitor for recurrent    Rectal bleed, anemia. Would not use Eliquis in patient with valvular AF. Overall prognosis is guarded, but would continue supportive care. I will see her in F/U within 1-2 weeks post op.          Murray Holm MD  2/28/2017 7:50 AM

## 2017-02-28 NOTE — PROGRESS NOTES
Son changed mind and wants [facility], does not want to go to [place] since his dad  there. [facility] can offer bed they state today. Room 330B. Report line [phone]. Packet ready. Son, now states he can take mother. Primary RN, Eliud Torres aware.

## 2017-02-28 NOTE — PROGRESS NOTES
Problem: Mobility Impaired (Adult and Pediatric)  Goal: *Acute Goals and Plan of Care (Insert Text)  Goals Updated 2/21/17  LTG:  (1.)Ms. Malka Ma will move from supine to sit and sit to supine and roll side to side in bed with INDEPENDENT within 7 day(s). (2.)Ms. Malka Ma will transfer from bed to chair and chair to bed with MODIFIED INDEPENDENCE using the least restrictive device within 7 day(s). (3.)Ms. Malka Ma will ambulate with MODIFIED INDEPENDENCE for 100 feet with the least restrictive device within 7 day(s). (4.)Ms. Malka Ma will perform standing static and dynamic balance activities x 8 minutes with SUPERVISION to improve safety within 7 day(s). (5.)Ms. Malka Ma will tolerate 25 minutes of therapeutic activity/exercise with one rest break within 5 day(s). ________________________________________________________________________________________________      PHYSICAL THERAPY: Daily Note, Treatment Day: 2nd and AM 2/28/2017  INPATIENT: Hospital Day: 17  [place] / [place] / Product Type: Medicare /      NAME/AGE/GENDER: Olivia Mills is a 77 y.o. female       PRIMARY DIAGNOSIS: dyspnea, hypotension,  Acute CHF (congestive heart failure) (HCC)  A-FIB  Ischemic foot Acute on chronic systolic heart failure due to valvular disease (HCC) Acute on chronic systolic heart failure due to valvular disease (HCC)  Procedure(s) (LRB):  THROMBECTOMY/EMBOLECTOMY LOWER EXTREMITY (Right)  8 Days Post-Op  ICD-10: Treatment Diagnosis:       · Generalized Muscle Weakness (M62.81)  · shortness of breath   Precaution/Allergies:  Adhesive tape; Benadryl [diphenhydramine hcl]; Demerol [meperidine]; Morphine; Sulfa (sulfonamide antibiotics); and Lorazepam       ASSESSMENT:      Ms. Malka Ma presents lying supine in bed agreeable to treatment. She needed CGA to get to edge of bed. She stood and transferred to bedside commode with CGA/min assist. She then stood and needed max assist to be cleaned up.  She was then agreeable to ambulate into hallway with CGA and verbal cues for safety. Good progress noted with activity tolerance. She needed standing rest breaks during ambulation secondary to fatigue. This section established at most recent assessment   PROBLEM LIST (Impairments causing functional limitations):  1. Decreased Strength  2. Decreased ADL/Functional Activities  3. Decreased Transfer Abilities  4. Decreased Ambulation Ability/Technique  5. Decreased Balance  6. Decreased Activity Tolerance  7. Increased Fatigue  8. Increased Shortness of Breath  9. Decreased Knowledge of Precautions  10. Decreased [place] with Home Exercise Program    INTERVENTIONS PLANNED: (Benefits and precautions of physical therapy have been discussed with the patient.)  1. Balance Exercise  2. Bed Mobility  3. Gait Training  4. Home Exercise Program (HEP)  5. Therapeutic Activites  6. Therapeutic Exercise/Strengthening  7. Transfer Training  8. Group Therapy      TREATMENT PLAN: Frequency/Duration: 3 times a week for duration of hospital stay  Rehabilitation Potential For Stated Goals: EXCELLENT      RECOMMENDED REHABILITATION/EQUIPMENT: (at time of discharge pending progress): Continue Skilled Therapy. HISTORY:   History of Present Injury/Illness (Reason for Referral):  Per H&P:Ms. Bunny Johnston is a very pleasant 76 yo F Who complains of increased SOb, nonproductive cough and b/l LE swelling in the last 1-2 months. Known with chronic diastolic CHF, CAD, bradycardia s/p pacemaker, , s/p bioprosthetic AVR,Chronic Afib on coumadin, severe pulmonary HTN with PAP 69 mmHg, chronic respiratory failure on NC 3L 24h/7 , COPD, GERD, HTN, HLP, Obesity, BOBBY - not using CPAP. Seen by her cardiologist, Gerson Guerrier on 1/9/17 and Demadex was increased to 40 mg PO daily, except MWF when was BID. Ms. Bunny Johnston states that her leg swelling improved, although her cough and SOB persisted.  Seen by PCP on 1/31 and placed on oral steroids and Albuterol that she couldn't complete because the albuterol made ermias jittery. No improvement with treatment. Seen at [facility] ED on 2/9 with low BP and and thought that she is dehydrated due to increased diuretics and received IV fluids. She felt better for one day, although because of increased in her symptoms she presented to ED today. BP was 80/51 mmHg and received 1L NC bolus in Ed per ED provider. CXR w/o acute pathology. No spikes of fever since her symptoms started. Influenza screen negative. WBC:3.4. Cr:1.48, around her baseline. CV:irregular irregularity. No JVD. +2 pitting edema b/l LE   Resp: Decreased air entry b/l at the base. No wheezing. No rales or crackles. Abd: soft, non-tender, no rebound tenderness. Hold Torsemide until tomorrow. Will get Echocardiogram. Gentle IV hydration at 50 ml/h to keep MAP>85 mmHg. Hold BB due to hypotension. Hold benzodiazepines at bedtime, can exacerbate - untreated BOBBY and involved in severe pulmonary hypertension. Cardiology and palliative care consulted - appreciate the help. Past Medical History/Comorbidities:   Ms. 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/2016); AV block (10/17/2011); Back pain (8/5/2016); Bradycardia (Symptomatic) (11/7/2011); CAD (coronary artery disease); Cancer ([address] (1990); Cardiac pacemaker (11/2/2015); Cardiogenic shock ([address] (10/17/2011); 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 (8/5/2016); Cellulitis (8/5/2016); Chest pain (8/5/2016); Chronic atrial fibrillation ([facility] (10/17/2011); Chronic depression (8/5/2016);  Chronic depression (8/5/2016); Chronic obstructive pulmonary disease ([facility] (3/8/2009); Chronic pain; CKD (chronic kidney disease) stage 3, GFR 30-59 ml/min (12/4/2009); Congestive heart failure (CHF) ([facility] (11/2/2015); COPD; CRI (chronic renal insufficiency) (8/5/2016); Degeneration of cervical intervertebral disc (8/5/2016); Degenerative arthritis of left knee (2/27/2009); Dehydration (8/5/2016); Diastolic heart failure ([facility] Digoxin toxicity (2/24/2010); Disorder of sweat glands (8/5/2016); Diverticulosis of large intestine without diverticulitis (2015); Dyspnea (8/5/2016); Eczema (8/5/2016); Epigastric abdominal pain (2/24/2010); GERD (gastroesophageal reflux disease); Gout (8/5/2016); H/O mitral valve repair, 2003 (6/27/2016); Heart failure ([facility] HTN (hypertension) (8/5/2016); colonic polyp (2015); Hyperkalemia (10/17/2011); Hyperlipidemia (8/5/2016); Hypertension; Hypokalemia (2/24/2010); Hypothyroidism (12/4/2009); Ill-defined condition; Knee pain (8/5/2016); Leg cramps (8/5/2016); Mitral stenosis with insufficiency (11/2/2015); Nausea and vomiting (2/24/2010); Obesity (11/2/2015); BOBBY (obstructive sleep apnea) (12/4/2009); Osteoarthritis (8/5/2016); Osteopenia (8/5/2016); Other long term (current) drug therapy (8/5/2016); Palpitations (11/2/2015); Rash (8/5/2016); Rectal bleeding (10/27/2011); Rectocele (2015); Respiratory insufficiency (11/2/2015); 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 She also has no past medical history of Aneurysm ([facility] Autoimmune disease ([facility] Coagulation disorder ([facility] DEMENTIA; Diabetes ([facility] Endocarditis; Infectious disease; Liver disease;  Other ill-defined conditions(799.89); PUD (peptic ulcer disease); Seizures ([facility] Stroke [facility]); or Thromboembolus ([facility] Ms. Kaya Vaughn  has a past surgical history that includes appendectomy; cholecystectomy (2005); gyn (1981); mastectomy (1990); pacemaker; breast reconstruction; cardiac surg procedure unlist; and orthopaedic. Social History/Living Environment:   Home Environment: Private residence  # Steps to Enter: 1  One/Two Story Residence: One story  Living Alone: No  Support Systems: Child(brit), Jew / gene community, Family member(s), Friends \ neighbors  Patient Expects to be Discharged to[de-identified] Private residence  Current DME Used/Available at Home: Shower chair  Tub or Shower Type: Tub/Shower combination  Prior Level of Function/Work/Activity:  Patient lives with son who works during the day. Patient has family and neighbors that would be able to check on patient. Personal Factors:          Age:  77 y.o. Number of Personal Factors/Comorbidities that affect the Plan of Care: 3+: HIGH COMPLEXITY   EXAMINATION:   Most Recent Physical Functioning:   Gross Assessment:                  Posture:  Posture (WDL): Exceptions to WDL  Posture Assessment: Forward head, Rounded shoulders  Balance:  Sitting: Intact  Sitting - Static: Good (unsupported)  Sitting - Dynamic: Good (unsupported)  Standing: Impaired  Standing - Static: Fair  Standing - Dynamic : Fair Bed Mobility:  Supine to Sit: Contact guard assistance  Scooting: Stand-by asssistance  Interventions: Safety awareness training;Verbal cues  Wheelchair Mobility:     Transfers:  Sit to Stand: Stand-by asssistance  Stand to Sit: Stand-by asssistance  Gait:     Base of Support: Widened  Speed/Carol: Shuffled; Slow  Step Length: Left shortened;Right shortened  Distance (ft): 150 Feet (ft)  Assistive Device: Walker, rolling  Ambulation - Level of Assistance: Contact guard assistance  Interventions: Safety awareness training;Verbal cues      Body Structures Involved:  1. Heart  2. Lungs Body Functions Affected:  1. Cardio  2. Movement Related Activities and Participation Affected:  1. Mobility  2. Self Care  3. Domestic Life   Number of elements that affect the Plan of Care: 4+: HIGH COMPLEXITY   CLINICAL PRESENTATION:   Presentation: Evolving clinical presentation with changing clinical characteristics: MODERATE COMPLEXITY   CLINICAL DECISION MAKIN [facility] Mobility Inpatient Short Form  How much difficulty does the patient currently have. .. Unable A Lot A Little None   1. Turning over in bed (including adjusting bedclothes, sheets and blankets)? [ ] 1   [ ] 2   [ ] 3   [X] 4   2. Sitting down on and standing up from a chair with arms ( e.g., wheelchair, bedside commode, etc.)   [ ] 1   [ ] 2   [X] 3   [ ] 4   3. Moving from lying on back to sitting on the side of the bed? [ ] 1   [ ] 2   [ ] 3   [X] 4   How much help from another person does the patient currently need. .. Total A Lot A Little None   4. Moving to and from a bed to a chair (including a wheelchair)? [ ] 1   [ ] 2   [X] 3   [ ] 4   5. Need to walk in hospital room? [ ] 1   [ ] 2   [X] 3   [ ] 4   6. Climbing 3-5 steps with a railing? [ ] 1   [X] 2   [ ] 3   [ ] 4   © , Trustees of [address] [place] [address], under license to [employer]. All rights reserved    Score:  Initial: 19 Most Recent: X (Date: -- )     Interpretation of Tool:  Represents activities that are increasingly more difficult (i.e. Bed mobility, Transfers, Gait).        Score 24 23 22-20 19-15 14-10 9-7 6       Modifier CH CI CJ CK CL CM CN         · Mobility - Walking and Moving Around:               - CURRENT STATUS:    CK - 40%-59% impaired, limited or restricted               - GOAL STATUS:           CJ - 20%-39% impaired, limited or restricted               - D/C STATUS:                       ---------------To be determined---------------  [place] / [place] / Product Type: Medicare /       Medical Necessity:     · Patient demonstrates good rehab potential due to higher previous functional level. · Skilled intervention continues to be required due to decline in overall functional mobility and activity tolerance. Reason for Services/Other Comments:  · Patient continues to require present interventions due to patient's inability to perform functional mobility at previous indepencence level. Use of outcome tool(s) and clinical judgement create a POC that gives a: Questionable prediction of patient's progress: MODERATE COMPLEXITY                 TREATMENT:      Pre-treatment Symptoms/Complaints: \"I did okay. \"  Pain: Initial: 0  Pain Intensity 1: 0  Post Session: No pain complaints noted      Therapeutic Activity: (   25 minutes ):  Therapeutic activities including toilet transfers, Chair transfers and Ambulation on level ground to improve mobility, strength, balance and activity tolerance. Required minimal Safety awareness training;Verbal cues to promote static and dynamic balance in standing and pursed lip breathing. Therapeutic Exercise: (15 Minutes):  Exercises per grid below to improve mobility, strength and activity tolerance. Required minimal verbal cues to promote proper body posture and promote proper body breathing techniques. Progressed repetitions and complexity of movement as indicated. DATE: 2/16/17 2/18/17 2/20/17 2-28-17    Ambulation        Hip Flexion x10 AB    x20    Long Arc Quads x10 AB X 15 B  x20    Knee Squeezes        Ankle DF/PF x10 AB X 15 B 15 B x20                                     Key:  A=active, AA=active assisted, P=passive, B=bilaterally, R=right, L=left   DF=dorsiflexion, PF=plantarflexion      Braces/Orthotics/Lines/Etc:   · O2 Device: Nasal cannula 3L/min  Treatment/Session Assessment:    · Response to Treatment:  Dyspneic with minimal exertion.   · Interdisciplinary Collaboration:  · Physical Therapy Assistant, Registered Nurse and Certified Nursing Assistant/Patient Care Technician  · After treatment position/precautions:  · Up in chair, Bed/Chair-wheels locked, Call light within reach and RN notified  · Compliance with Program/Exercises: compliant all of the time. · Recommendations/Intent for next treatment session: \"Next visit will focus on advancements to more challenging activities and reduction in assistance provided\".   Total Treatment Duration:  PT Patient Time In/Time Out  Time In: 1020  Time Out: 60 [facility], hospitals

## 2017-02-28 NOTE — PROGRESS NOTES
[address] FAX: [phone]        Vascular Surgery Progress Note    Admit Date: 2017  POD 8 Days Post-Op    Procedure:  Procedure(s):  THROMBECTOMY/EMBOLECTOMY LOWER EXTREMITY      Subjective:     Patient has no new complaints. Patient seen sitting in bed eating an orange. Objective:     Blood pressure 114/69, pulse 86, temperature 97.4 °F (36.3 °C), resp. rate 18, height 5' (1.524 m), weight 192 lb 8 oz (87.3 kg), SpO2 98 %, not currently breastfeeding. Temp (24hrs), Av.6 °F (36.4 °C), Min:97.1 °F (36.2 °C), Max:98.1 °F (36.7 °C)      Physical Exam:  GENERAL: alert, cooperative, no distress, appears stated age, LUNG:  Diminished in the bases, HEART:  regular rate and rhythm, S1, S2 normal, no murmur, click, rub or gallop, INCISION: dry gauze dressing in place, incision line intact. No bleeding or pus like drainage. Bruising noted to the lateral aspect of her right upper thigh and EXTREMITIES:  Right Lower Extremity:  warm foot, palpable pulses, edema 2+ bilaterally. Labs:   Recent Labs      17   0603   17   1230   HGB  9.4*   < >  8.9*   K   --    --   3.7   GLU   --    --   128*    < > = values in this interval not displayed.        Data Review: images and reports reviewed  Current Facility-Administered Medications   Medication Dose Route Frequency    warfarin (COUMADIN) tablet 5 mg  5 mg Oral QHS    hydrocortisone (ANUSOL-HC) suppository 25 mg  25 mg Rectal Q12H    psyllium husk-aspartame (METAMUCIL FIBER) packet 1 Packet  1 Packet Oral BID    0.9% sodium chloride infusion 250 mL  250 mL IntraVENous PRN    0.9% sodium chloride infusion 250 mL  250 mL IntraVENous PRN    0.9% sodium chloride infusion 250 mL  250 mL IntraVENous PRN    acetaZOLAMIDE (DIAMOX) tablet 250 mg  250 mg Oral TID    levalbuterol (XOPENEX) nebulizer soln 1.25 mg/3 mL  1.25 mg Nebulization Q4H RT    benzonatate (TESSALON) capsule 100 mg  100 mg Oral TID PRN    sodium chloride (NS) flush 20 mL  20 mL InterCATHeter Q8H    heparin (porcine) pf 600 Units  600 Units InterCATHeter Q8H    sodium chloride (NS) flush 20 mL  20 mL InterCATHeter PRN    heparin (porcine) pf 600 Units  600 Units InterCATHeter PRN    benzocaine-menthol (CEPACOL) lozenge  1 Lozenge Oral Q2H PRN    guaiFENesin-dextromethorphan (ROBITUSSIN DM) 100-10 mg/5 mL syrup 5 mL  5 mL Oral Q6H PRN    sodium chloride (NS) flush 5-10 mL  5-10 mL IntraVENous PRN    naloxone (NARCAN) injection 0.4 mg  0.4 mg IntraVENous PRN    aspirin delayed-release tablet 81 mg  81 mg Oral DAILY    HYDROcodone-acetaminophen (NORCO) 5-325 mg per tablet 1 Tab  1 Tab Oral Q4H PRN    senna-docusate (PERICOLACE) 8.6-50 mg per tablet 1 Tab  1 Tab Oral Q12H    0.9% sodium chloride infusion 250 mL  250 mL IntraVENous PRN    torsemide (DEMADEX) tablet 40 mg  40 mg Oral DAILY    lip protectant (BLISTEX) ointment   Topical PRN    magnesium citrate solution 296 mL  296 mL Oral PRN    polyethylene glycol (MIRALAX) packet 17 g  17 g Oral DAILY    diazePAM (VALIUM) tablet 2.5 mg  2.5 mg Oral Q8H PRN    traZODone (DESYREL) tablet 50 mg  50 mg Oral QHS PRN    gabapentin (NEURONTIN) capsule 100 mg  100 mg Oral TID    sodium chloride (NS) flush 5-10 mL  5-10 mL IntraVENous PRN    ondansetron (ZOFRAN) injection 4 mg  4 mg IntraVENous Q4H PRN    acetaminophen (TYLENOL) tablet 650 mg  650 mg Oral Q4H PRN    polyvinyl alcohol (LIQUIFILM TEARS) 1.4 % ophthalmic solution 1 Drop  1 Drop Both Eyes PRN    hydrocortisone (ANUSOL-HC) 2.5 % rectal cream   PeriANAL BID PRN    levothyroxine (SYNTHROID) tablet 88 mcg  88 mcg Oral ACB    pantoprazole (PROTONIX) tablet 40 mg  40 mg Oral ACB    rOPINIRole (REQUIP) tablet 2 mg  2 mg Oral QHS    sertraline (ZOLOFT) tablet 50 mg  50 mg Oral QHS     Assessment:     Principal Problem:    Acute on chronic systolic heart failure due to valvular disease ([facility] (2/12/2017)    Active Problems:    Hypotension (3/1/2009)      Aortic Valve Bioprosthesis Present (3/7/2009)      BOBBY (obstructive sleep apnea) (12/4/2009)      ARF (acute renal failure) ([address] (2/24/2010)      Chronic atrial fibrillation ([address] (10/17/2011)      Sleep apnea (11/2/2015)      Chronic diastolic congestive heart failure ([address] (9/9/2016)      Aortic valve replaced (1/9/2017)      Warfarin anticoagulation (1/9/2017)      Pulmonary hypertension ([address] (2/12/2017)        Plan/Recommendations/Medical Decision Making:   Continue present treatment   -Cardiology to manage anticoagulation medications  -Continue dressing changes to right groin    Elements of this note have been dictated using speech recognition software. As a result, errors of speech recognition may have occurred.

## 2017-02-28 NOTE — DISCHARGE INSTRUCTIONS
Ã¯Â»Â¿  Anticoagulants: After Your Visit  Your Care Instructions  Your doctor prescribed an anticoagulant medicine. Anticoagulants, often called blood thinners, prevent new blood clots from forming and keep existing clots from getting larger. They do not actually thin the blood, but they make the blood take longer to clot. This lowers the risk of a blood clot moving to the lungs (pulmonary embolism) or moving to the brain and causing a stroke. Blood thinners come in two forms. Heparin is given by shot, either under your skin or through a needle in your vein, and starts working right away. Warfarin (Coumadin) comes in pill form and takes longer to work. Your doctor may have you begin taking both forms at the same time. As soon as the pills start to work, you will stop the shots but continue to take the pills. If you have a blood clot in your leg, you may need to take warfarin for several months. People who have heart conditions such as atrial fibrillation often need to take it for the rest of their lives. The right dose of this medicine is different for each person. You will need regular blood tests to see if your dose is correct. Follow-up care is a key part of your treatment and safety. Be sure to make and go to all appointments, and call your doctor if you are having problems. Itâs also a good idea to know your test results and keep a list of the medicines you take. How do you take blood thinners? Take your medicines exactly as prescribed. Call your doctor if you think you are having a problem with your medicine. Call your doctor if you are not sure what to do if you missed a dose of blood thinner. Your doctor can tell you exactly what to do so you do not take too much or too little blood thinner. Then you will be as safe as possible. Some general rules for what to do if you miss a dose: If you remember it in the same day, take the missed dose. Then go back to your regular schedule.   If it is the next day, or almost time to take the next dose, do not take the missed dose. Do not double the dose to make up for the missed one. At your next regularly scheduled time, take your normal dose. If you miss your dose for 2 or more days, call your doctor. To help you stay on schedule, use a calendar to remind you when to take your blood thinner. When you take the medicine, note it on the calendar. If you are going to give yourself shots, your doctor will give you instructions for how to safely inject the medicine. Follow the directions carefully. Do not take any vitamins, over-the-counter medicines, or herbal products without talking to your doctor first.  Avoid contact sports and other activities that could lead to injury. Make your home safe and take other measures to reduce your risk of falling. Always wear a seat belt while in a car. Do not suddenly change your intake of vitamin Kârich foods, such as broccoli, cabbage, asparagus, lettuce, and spinach. This will help blood thinners work evenly from day to day. Limit alcohol to 2 drinks a day for men and 1 drink a day for women. Alcohol may interfere with blood thinners. It also increases your risk of falls, which can cause bruising and bleeding. Tell your dentist, pharmacist, and other health professionals that you take blood thinners. Wear medical alert jewelry that says you take blood thinners. You can buy this at most drugstores. When should you call for help? Call 911 anytime you think you may need emergency care. For example, call if:  You cough up blood. You vomit blood or what looks like coffee grounds. You pass maroon or very bloody stools. Call your doctor now or seek immediate medical care if:  You have new bruises or blood spots under your skin. You have a nosebleed. Your gums bleed when you brush your teeth. You have blood in your urine. Your stools are black and tarlike or have streaks of blood.   You have vaginal bleeding when you are not having your period, or heavy period bleeding. Watch closely for changes in your health, and be sure to contact your doctor if:  You have questions about your medicine. Where can you learn more? Go to [employer]  Enter W925 in the search box to learn more about \"Anticoagulants: After Your Visit. \"   Â© 2025-9711 [employer]. Care instructions adapted under license by [employer] (which disclaims liability or warranty for this information). This care instruction is for use with your licensed healthcare professional. If you have questions about a medical condition or this instruction, always ask your healthcare professional. [place] [address] any warranty or liability for your use of this information. Content Version: 9.0.28154; Last Revised: July 1, 2009     Heart-Healthy Diet: Care Instructions  Your Care Instructions    A heart-healthy diet has lots of vegetables, fruits, nuts, beans, and whole grains, and is low in salt. It limits foods that are high in saturated fat, such as meats, cheeses, and fried foods. It may be hard to change your diet, but even small changes can lower your risk of heart attack and heart disease. Follow-up care is a key part of your treatment and safety. Be sure to make and go to all appointments, and call your doctor if you are having problems. It's also a good idea to know your test results and keep a list of the medicines you take. How can you care for yourself at home? Watch your portions  Learn what a serving is. A \"serving\" and a \"portion\" are not always the same thing. Make sure that you are not eating larger portions than are recommended. For example, a serving of pasta is ½ cup. A serving size of meat is 2 to 3 ounces. A 3-ounce serving is about the size of a deck of cards. Measure serving sizes until you are good at [place]" them.  Keep in mind that restaurants often serve portions that are 2 or 3 times the size of one serving. To keep your energy level up and keep you from feeling hungry, eat often but in smaller portions. Eat only the number of calories you need to stay at a healthy weight. If you need to lose weight, eat fewer calories than your body burns (through exercise and other physical activity). Eat more fruits and vegetables  Eat a variety of fruit and vegetables every day. Dark green, deep orange, red, or yellow fruits and vegetables are especially good for you. Examples include spinach, carrots, peaches, and berries. Keep carrots, celery, and other veggies handy for snacks. Buy fruit that is in season and store it where you can see it so that you will be tempted to eat it. Cook dishes that have a lot of veggies in them, such as stir-fries and soups. Limit saturated and trans fat  Read food labels, and try to avoid saturated and trans fats. They increase your risk of heart disease. Trans fat is found in many processed foods such as cookies and crackers. Use olive or canola oil when you cook. Try cholesterol-lowering spreads, such as Benecol or Take Control. Bake, broil, grill, or steam foods instead of frying them. Choose lean meats instead of high-fat meats such as hot dogs and sausages. Cut off all visible fat when you prepare meat. Eat fish, skinless poultry, and meat alternatives such as soy products instead of high-fat meats. Soy products, such as tofu, may be especially good for your heart. Choose low-fat or fat-free milk and dairy products. Eat fish  Eat at least two servings of fish a week. Certain fish, such as salmon and tuna, contain omega-3 fatty acids, which may help reduce your risk of heart attack. Eat foods high in fiber  Eat a variety of grain products every day. Include whole-grain foods that have lots of fiber and nutrients. Examples of whole-grain foods include oats, whole wheat bread, and brown rice. Buy whole-grain breads and cereals, instead of white bread or pastries.   Limit salt and sodium  Limit how much salt and sodium you eat to help lower your blood pressure. Taste food before you salt it. Add only a little salt when you think you need it. With time, your taste buds will adjust to less salt. Eat fewer snack items, fast foods, and other high-salt, processed foods. Check food labels for the amount of sodium in packaged foods. Choose low-sodium versions of canned goods (such as soups, vegetables, and beans). Limit sugar  Limit drinks and foods with added sugar. These include candy, desserts, and soda pop. Limit alcohol  Limit alcohol to no more than 2 drinks a day for men and 1 drink a day for women. Too much alcohol can cause health problems. When should you call for help? Watch closely for changes in your health, and be sure to contact your doctor if:  You would like help planning heart-healthy meals. Where can you learn more? Go to http://onesimo-dasia.info/. Enter V137 in the search box to learn more about \"Heart-Healthy Diet: Care Instructions. \"  Current as of: January 27, 2016  Content Version: 11.1  © 4641-7497 [employer]. Care instructions adapted under license by [employer] (which disclaims liability or warranty for this information). If you have questions about a medical condition or this instruction, always ask your healthcare professional. [place] [address] any warranty or liability for your use of this information. Heart Failure: Care Instructions  Your Care Instructions    Heart failure occurs when your heart does not pump as much blood as the body needs. Failure does not mean that the heart has stopped pumping but rather that it is not pumping as well as it should. Over time, this causes fluid buildup in your lungs and other parts of your body. Fluid buildup can cause shortness of breath, fatigue, swollen ankles, and other problems.  By taking medicines regularly, reducing sodium (salt) in your diet, checking your weight every day, and making lifestyle changes, you can feel better and live longer. Follow-up care is a key part of your treatment and safety. Be sure to make and go to all appointments, and call your doctor if you are having problems. It's also a good idea to know your test results and keep a list of the medicines you take. How can you care for yourself at home? Medicines  · Be safe with medicines. Take your medicines exactly as prescribed. Call your doctor if you think you are having a problem with your medicine. · Do not take any vitamins, over-the-counter medicine, or herbal products without talking to your doctor first. [place] Score not take ibuprofen (Advil or Motrin) and naproxen (Aleve) without talking to your doctor first. They could make your heart failure worse. · You may be taking some of the following medicine. ¨ Beta-blockers can slow heart rate, decrease blood pressure, and improve your condition. Taking a beta-blocker may lower your chance of needing to be hospitalized. ¨ Angiotensin-converting enzyme inhibitors (ACEIs) reduce the heart's workload, lower blood pressure, and reduce swelling. Taking an ACEI may lower your chance of needing to be hospitalized again. ¨ Angiotensin II receptor blockers (ARBs) work like ACEIs. Your doctor may prescribe them instead of ACEIs. ¨ Diuretics, also called water pills, reduce swelling. ¨ Potassium supplements replace this important mineral, which is sometimes lost with diuretics. ¨ Aspirin and other blood thinners prevent blood clots, which can cause a stroke or heart attack. You will get more details on the specific medicines your doctor prescribes. Diet  · Your doctor may suggest that you limit sodium to 2,000 milligrams (mg) a day or less. That is less than 1 teaspoon of salt a day, including all the salt you eat in cooking or in packaged foods. People get most of their sodium from processed foods.  Fast food and restaurant meals also tend to be very high in sodium. · Ask your doctor how much liquid you can drink each day. You may have to limit liquids. Weight  · Weigh yourself without clothing at the same time each day. Record your weight. Call your doctor if you gain more than 3 pounds in 2 to 3 days. A sudden weight gain may mean that your heart failure is getting worse. Activity level  · Start light exercise (if your doctor says it is okay). Even if you can only do a small amount, exercise will help you get stronger, have more energy, and manage your weight and your stress. Walking is an easy way to get exercise. Start out by walking a little more than you did before. Bit by bit, increase the amount you walk. · When you exercise, watch for signs that your heart is working too hard. You are pushing yourself too hard if you cannot talk while you are exercising. If you become short of breath or dizzy or have chest pain, stop, sit down, and rest.  · If you feel \"wiped out\" the day after you exercise, walk slower or for a shorter distance until you can work up to a better pace. · Get enough rest at night. Sleeping with 1 or 2 pillows under your upper body and head may help you breathe easier. Lifestyle changes  · Do not smoke. Smoking can make a heart condition worse. If you need help quitting, talk to your doctor about stop-smoking programs and medicines. These can increase your chances of quitting for good. Quitting smoking may be the most important step you can take to protect your heart. · Limit alcohol to 2 drinks a day for men and 1 drink a day for women. Too much alcohol can cause health problems. · Avoid getting sick from colds and the flu. Get a pneumococcal vaccine shot. If you have had one before, ask your doctor whether you need another dose. Get a flu shot each year. If you must be around people with colds or the flu, wash your hands often. When should you call for help?   Call 911 if you have symptoms of sudden heart failure such as:  · You have severe trouble breathing. · You cough up pink, foamy mucus. · You have a new irregular or rapid heartbeat. Call your doctor now or seek immediate medical care if:  · You have new or increased shortness of breath. · You are dizzy or lightheaded, or you feel like you may faint. · You have sudden weight gain, such as 3 pounds or more in 2 to 3 days. · You have increased swelling in your legs, ankles, or feet. · You are suddenly so tired or weak that you cannot do your usual activities. Watch closely for changes in your health, and be sure to contact your doctor if:  · You develop new symptoms. Where can you learn more? Go to http://onesimo-dasia.info/. Enter N414 in the search box to learn more about \"Heart Failure: Care Instructions. \"  Current as of: January 27, 2016  Content Version: 11.1  © 0963-0038 [employer]. Care instructions adapted under license by [employer] (which disclaims liability or warranty for this information). If you have questions about a medical condition or this instruction, always ask your healthcare professional. [place] [address] any warranty or liability for your use of this information.

## 2017-02-28 NOTE — DISCHARGE SUMMARY
Hospitalist Discharge Summary     Patient ID:  Penny Baez  [id number]  83 y.o.  1940  Admit date: 2/12/2017  2:32 PM  Discharge date and time: 2/28/2017  Attending: Blayne Morton MD  PCP:  Vinnie Solitario MD  Treatment Team: Attending Provider: Blayne Mortno MD; Consulting Provider: Gonzalez Dunn MD; Consulting Provider: Yesenia Bush DO; [facility] [place]; Consulting Provider: Theoplis Cabot, MD; Consulting Provider: Rafia Holt MD; Care Manager: Anastasia Velasquez RN; Consulting Provider: Scott Acevedo MD    Principal Diagnosis Acute on chronic systolic heart failure due to valvular disease [facility])   Principal Problem:    Acute on chronic systolic heart failure due to valvular disease ([address] (2/12/2017)    Active Problems:    Hypotension (3/1/2009)      Aortic Valve Bioprosthesis Present (3/7/2009)      BOBBY (obstructive sleep apnea) (12/4/2009)      ARF (acute renal failure) ([address] (2/24/2010)      Chronic atrial fibrillation ([address] (10/17/2011)      Sleep apnea (11/2/2015)      Chronic diastolic congestive heart failure ([address] (9/9/2016)      Aortic valve replaced (1/9/2017)      Warfarin anticoagulation (1/9/2017)      Pulmonary hypertension ([address] (2/12/2017)       HPI:    Ms. Benjamin Prieto is a very pleasant 76 yo F Known with chronic diastolic CHF, CAD, bradycardia s/p pacemaker, s/p bioprosthetic AVR, Aortic Stenosis and Mitral Regurg, Chronic Afib on coumadin, severe pulmonary HTN with PAP 69 mmHg, chronic respiratory failure on NC 3L 24h/7 , COPD, GERD, HTN, HLP, Obesity, BOBBY - not using CPAP presented to [facility] ED on 2/9 with low BP and and thought that she is dehydrated due to increased diuretics and received IV fluids. She felt better for one day, although because of increased in her symptoms she presented to ED today. BP was 80/51 mmHg and received 1L NS bolus in Ed per ED provider. WBC:3.4. Cr:1.48, around her baseline.     Hospital Course:    Pt.s' HR was difficult to control and she and her family were opting for home with hospice. However an ablation was done in a last ditch effort by Dr. Maureen Quiroz after d/w Dr. Prem Elizabeth. Cardiology has been diuresing with plans to eventually discharge her home when ready from their standpoint. Unfortunately On 2/20 she developed right cold foot and foot pain. Vascular consulted and she had thrombectomy on 2/20/2017. Became anemic and developed Cellulitis of Rt thigh post procedure. Seen by Vascular and Cards. ELiquis was started briefly but later stopped as not indicated for Valvular A Fib and also concern for Rectal bleeding causing anemia with Hb doen to 8.4 requiring 1uprbc. Dr Amy Witt switched her back on Warfarin. She will need close follow up with cardiology. Also seen by Dr. Ciro Parson and no treatable hemorrhoidal leisions were seen on his exam. Recommended anusol suppository for 10 days. Also her cardiazem and metolprolol were stopped by cards and Torsemide increased to 40mg per cards. Both Pt and her son understand risks of bleeding while being on coumadin. She has been deemed Poor Prognosis by Cards due to AVR with prosthetic stenosis, prior MV repair with elevated mean gradient, low normal LV EF, RV failure and severe pulmonary HTN. They are aware of poor prognosis and would like to consider hospice if pt decompensates any further during her stay at [address] [place] [address]. Plan discussed with pt/family and all are in agreement with the plan. All questions answered. Pt was stable at time of discharge. Follow up as per below. Significant Diagnostic Imaging:     Doppler US leg:    IMPRESSION: Distal right common femoral artery thrombotic occlusion with  extension into the superficial femoral artery. Retrograde flow in the right  profunda femoral artery. Exceedingly slow flow in the right popliteal artery. No  flow in the right tibial arteries.      Labs: Results:       Chemistry Recent Labs      02/26/17   1230   GLU 128*   NA  143   K  3.7   CL  99   CO2  36*   BUN  34*   CREA  1.17*   CA  8.4   AGAP  8      CBC w/Diff Recent Labs      02/28/17   0603  02/27/17   1300  02/26/17   2350   HGB  9.4*  8.6*  8.7*   HCT  30.8*  29.1*  29.0*      Cardiac Enzymes No results for input(s): CPK, CKND1, VICKY in the last 72 hours. No lab exists for component: CKRMB, TROIP   Coagulation Recent Labs      02/27/17   2019   PTP  10.7   INR  1.0       Last A1c No results found for: HBA1C, HGBE8, JDF0JBKS, IUY7DWGB   Lipid Panel Lab Results   Component Value Date/Time    Cholesterol, total 163 01/31/2017 10:59 AM    HDL Cholesterol 58 01/31/2017 10:59 AM    LDL, calculated 82 01/31/2017 10:59 AM    VLDL, calculated 23 01/31/2017 10:59 AM    Triglyceride 116 01/31/2017 10:59 AM    CHOL/HDL Ratio 2.1 12/03/2009 05:30 AM      BNP No results for input(s): BNPP in the last 72 hours. Liver Enzymes No results for input(s): TP, ALB, TBIL, AP, SGOT, GPT in the last 72 hours.     No lab exists for component: DBIL   Thyroid Studies Lab Results   Component Value Date/Time    T4, Total 7.7 01/31/2017 10:59 AM    TSH 1.270 02/13/2017 06:33 AM            Discharge Exam:  Patient Vitals for the past 24 hrs:   Temp Pulse Resp BP SpO2   02/28/17 0851 - 86 - 114/69 -   02/28/17 0817 - - - - 98 %   02/28/17 0706 97.4 °F (36.3 °C) 82 18 106/60 98 %   02/28/17 0306 97.4 °F (36.3 °C) 80 18 99/57 94 %   02/28/17 0206 97.8 °F (36.6 °C) 80 18 97/54 93 %   02/28/17 0111 97.4 °F (36.3 °C) 78 18 92/50 94 %   02/28/17 0106 97.2 °F (36.2 °C) 80 18 92/51 94 %   02/28/17 0006 98.1 °F (36.7 °C) 82 18 101/58 94 %   02/27/17 2306 97.6 °F (36.4 °C) 80 18 91/50 93 %   02/27/17 2206 97.1 °F (36.2 °C) 83 18 102/55 94 %   02/27/17 2138 97.9 °F (36.6 °C) 86 18 102/57 98 %   02/27/17 2048 - - - - 95 %   02/27/17 1943 - - - - 98 %   02/27/17 1833 97.8 °F (36.6 °C) 83 18 133/78 98 %   02/27/17 1600 - - - - 97 %   02/27/17 1438 97.6 °F (36.4 °C) 83 18 115/58 98 %   02/27/17 1129 - - - - 93 %     Visit Vitals    /69    Pulse 86    Temp 97.4 °F (36.3 °C)    Resp 18    Ht 5' (1.524 m)    Wt 87.3 kg (192 lb 8 oz)    SpO2 98%    Breastfeeding No    BMI 37.6 kg/m2     General appearance: alert, cooperative, no distress  Lungs: CTABL  Heart: RRR, no m/r/g  Abdomen: soft, non-tender. Bowel sounds normal.   Extremities: no cyanosis. Neurologic: Grossly normal    Disposition: Rehab  Discharge Condition: stable  Patient Instructions: Activity as tolerated  Cardiac Diet   Current Discharge Medication List      START taking these medications    Details   gabapentin (NEURONTIN) 100 mg capsule Take 1 Cap by mouth three (3) times daily for 30 days. Qty: 90 Cap, Refills: 0      guaiFENesin-dextromethorphan (ROBITUSSIN DM) 100-10 mg/5 mL syrup Take 5 mL by mouth every six (6) hours as needed for up to 10 days. Qty: 1 Bottle, Refills: 0      hydrocortisone (ANUSOL-HC) 25 mg supp Insert 1 Suppository into rectum every twelve (12) hours for 10 days. Qty: 20 Suppository, Refills: 0         CONTINUE these medications which have CHANGED    Details   diazePAM (VALIUM) 5 mg tablet Take 0.5 Tabs by mouth nightly. Max Daily Amount: 2.5 mg.  Qty: 10 Tab, Refills: 0      torsemide (DEMADEX) 20 mg tablet Take 2 Tabs by mouth daily. Qty: 30 Tab, Refills: 0      warfarin (COUMADIN) 5 mg tablet Take 1 Tab by mouth nightly. Qty: 7 Tab, Refills: 0      polyethylene glycol (MIRALAX) 17 gram/dose powder Take 17 g by mouth two (2) times a day for 30 days. Qty: 1020 g, Refills: 0         CONTINUE these medications which have NOT CHANGED    Details   allopurinol (ZYLOPRIM) 100 mg tablet Take 100 mg by mouth two (2) times a day. sertraline (ZOLOFT) 50 mg tablet Take 50 mg by mouth daily. nitroglycerin (NITRODUR) 0.4 mg/hr 1 Patch by TransDERmal route daily. potassium chloride SR (K-TAB) 20 mEq tablet Take 20 mEq by mouth two (2) times a day.       rOPINIRole (REQUIP) 2 mg tablet Take 2 mg by mouth nightly. TIOTROPIUM BR/OLODATEROL HCL (STIOLTO RESPIMAT IN) Take  by inhalation. NEW-PATIENT NOT TAKING, STATES IT MAKES HER JITTERY      promethazine (PHENERGAN) 25 mg tablet Take 1 Tab by mouth every six (6) hours as needed. Qty: 12 Tab, Refills: 0      spironolactone (ALDACTONE) 25 mg tablet Take 1 Tab by mouth daily. Qty: 90 Tab, Refills: 2    Associated Diagnoses: Congestive heart failure, unspecified congestive heart failure chronicity, unspecified congestive heart failure type (HCC)      hydrocortisone (ANUSOL-HC) 2.5 % rectal cream Apply small amount to hemorrhoids/perianal skin TID PRN  Qty: 30 g, Refills: 3      pravastatin (PRAVACHOL) 40 mg tablet Take 1 Tab by mouth daily. Indications: hyperlipidemia  Qty: 90 Tab, Refills: 3    Associated Diagnoses: Hyperlipidemia, unspecified hyperlipidemia type      calcium carbonate (CALTREX) 600 mg (1,500 mg) tablet Take 600 mg by mouth nightly. levothyroxine (SYNTHROID) 88 mcg tablet Take  by mouth Daily (before breakfast). cholecalciferol (VITAMIN D3) 1,000 unit cap Take  by mouth daily. CARBOXYMETHYLCELLULOS/GLYCERIN (REFRESH OPTIVE OP) Apply  to eye. DOCUSATE SODIUM Take 1 Cap by mouth two (2) times a day. Omeprazole delayed release (PRILOSEC D/R) 20 mg tablet Take 20 mg by mouth daily. prn     Associated Diagnoses: Anemia; Atrial fibrillation (HCC)      OXYGEN-AIR DELIVERY SYSTEMS by Does Not Apply route. 2-2.5 lpm cont. Associated Diagnoses: Anemia; Atrial fibrillation (HCC)      cyanocobalamin (VITAMIN B-12) 250 mcg tablet Take 1,000 mcg by mouth daily. nitroglycerin (NITROSTAT) 0.4 mg SL tablet by SubLINGual route every five (5) minutes as needed for Chest Pain.          STOP taking these medications       metoprolol succinate (TOPROL XL) 50 mg XL tablet Comments:   Reason for Stopping:         dilTIAZem CD (CARDIZEM CD) 240 mg ER capsule Comments:   Reason for Stopping:               Vaccinations:   Pt screened by nursing for pneumococcal and influenza vaccination needs at discharge, will be ordered if needed and pt consented. Immunization History   Administered Date(s) Administered    Influenza High Dose Vaccine PF 09/04/2015, 10/24/2016    Influenza Vaccine Split 12/04/2009    Pneumococcal Polysaccharide (PPSV-23) 08/04/2014    Pneumococcal Vaccine (Unspecified Type) 10/27/2011    TB Skin Test (PPD) Intradermal 02/12/2017    Zoster Vaccine, Live 01/01/2012       Follow-up Information     Follow up With Details Comments [facility] Go to Dr. Jaclyn Kamara on March 7 (tuesday) at 1:45PM. [address]   [address] [address]  300 Eliane Borjas MD   [address] Internal [address]  [phone]            Time spent in the discharge process was 35 minutes.       Signed By: Chantelle Sandhu MD     February 28, 2017

## 2017-02-28 NOTE — PROGRESS NOTES
Per Loco Cook at [facility], left right upper arm PICC in place. Discharge instructions explained to family and patient. Discharge packet for rehab taped shut. Patient escorted to discharge area by hospital staff.

## 2017-03-01 ENCOUNTER — PATIENT OUTREACH (OUTPATIENT)
Dept: CASE MANAGEMENT | Age: 77
End: 2017-03-01

## 2017-03-01 NOTE — ROUTINE PROCESS
CHF F/U call to pt's phone, talked to son and reinforced teaching:  PT ID#:   Date:3/1   Question YES NO COMMENTS   Low sodium diet, questions? x     Any short of breath?  x    Fluid restriction: how much?/  questions   x     Problems sleeping?    x    What medications do you take?   x     What water pill do you take?   x     Do you feel like you are making progress?   x     Do you have any question about DC instructions?  x    How was the care you received during your admission? x     Were you kept informed of your plan of care? x     Could we have done anything else to improve your stay?  x    Can you contact a Nurse 24/7 , if any question or problems occur/ Who? x     Have a PCP/cardio. Appointment? Robert Antonio? x     Did you weigh today?  How much do you weigh?   x     Have you gained any weight?    x      What did have for dinner last night? x  In limits

## 2017-03-01 NOTE — PROGRESS NOTES
This note will not be viewable in [address] Patient discharged to [address] [place] [address]  ([facility]) 02/28/2017, follow up call scheduled in 21 days post acute discharge to home. No TOM Outreach at this time.

## 2017-03-05 ENCOUNTER — HOSPITAL ENCOUNTER (EMERGENCY)
Age: 77
Discharge: HOME | End: 2017-03-05
Attending: EMERGENCY MEDICINE
Payer: MEDICARE

## 2017-03-05 VITALS
BODY MASS INDEX: 34.36 KG/M2 | HEIGHT: 60 IN | TEMPERATURE: 98 F | HEART RATE: 89 BPM | SYSTOLIC BLOOD PRESSURE: 130 MMHG | OXYGEN SATURATION: 94 % | DIASTOLIC BLOOD PRESSURE: 62 MMHG | WEIGHT: 175 LBS | RESPIRATION RATE: 18 BRPM

## 2017-03-05 DIAGNOSIS — D68.9 COAGULOPATHY (HCC): ICD-10-CM

## 2017-03-05 DIAGNOSIS — K62.5 RECTAL BLEEDING: Primary | ICD-10-CM

## 2017-03-05 LAB
ANION GAP BLD CALC-SCNC: 5 MMOL/L (ref 7–16)
BASOPHILS # BLD AUTO: 0 K/UL (ref 0–0.2)
BASOPHILS # BLD: 1 % (ref 0–2)
BUN SERPL-MCNC: 23 MG/DL (ref 8–23)
CALCIUM SERPL-MCNC: 9 MG/DL (ref 8.3–10.4)
CHLORIDE SERPL-SCNC: 103 MMOL/L (ref 98–107)
CO2 SERPL-SCNC: 37 MMOL/L (ref 21–32)
CREAT SERPL-MCNC: 1.18 MG/DL (ref 0.6–1)
DIFFERENTIAL METHOD BLD: ABNORMAL
EOSINOPHIL # BLD: 0.2 K/UL (ref 0–0.8)
EOSINOPHIL NFR BLD: 4 % (ref 0.5–7.8)
ERYTHROCYTE [DISTWIDTH] IN BLOOD BY AUTOMATED COUNT: 14.2 % (ref 11.9–14.6)
GLUCOSE SERPL-MCNC: 116 MG/DL (ref 65–100)
HCT VFR BLD AUTO: 31 % (ref 35.8–46.3)
HGB BLD-MCNC: 9.7 G/DL (ref 11.7–15.4)
IMM GRANULOCYTES # BLD: 0 K/UL (ref 0–0.5)
IMM GRANULOCYTES NFR BLD AUTO: 0.5 % (ref 0–5)
INR PPP: 3.8 (ref 0.9–1.2)
LYMPHOCYTES # BLD AUTO: 15 % (ref 13–44)
LYMPHOCYTES # BLD: 0.9 K/UL (ref 0.5–4.6)
MCH RBC QN AUTO: 29.5 PG (ref 26.1–32.9)
MCHC RBC AUTO-ENTMCNC: 31.3 G/DL (ref 31.4–35)
MCV RBC AUTO: 94.2 FL (ref 79.6–97.8)
MONOCYTES # BLD: 0.3 K/UL (ref 0.1–1.3)
MONOCYTES NFR BLD AUTO: 6 % (ref 4–12)
NEUTS SEG # BLD: 4.5 K/UL (ref 1.7–8.2)
NEUTS SEG NFR BLD AUTO: 74 % (ref 43–78)
PLATELET # BLD AUTO: 241 K/UL (ref 150–450)
PMV BLD AUTO: 10.2 FL (ref 10.8–14.1)
POTASSIUM SERPL-SCNC: 3.8 MMOL/L (ref 3.5–5.1)
PROTHROMBIN TIME: 41.9 SEC (ref 9.6–12)
RBC # BLD AUTO: 3.29 M/UL (ref 4.05–5.25)
SODIUM SERPL-SCNC: 145 MMOL/L (ref 136–145)
WBC # BLD AUTO: 6 K/UL (ref 4.3–11.1)

## 2017-03-05 PROCEDURE — 80048 BASIC METABOLIC PNL TOTAL CA: CPT | Performed by: EMERGENCY MEDICINE

## 2017-03-05 PROCEDURE — 74011000250 HC RX REV CODE- 250: Performed by: EMERGENCY MEDICINE

## 2017-03-05 PROCEDURE — 99284 EMERGENCY DEPT VISIT MOD MDM: CPT | Performed by: EMERGENCY MEDICINE

## 2017-03-05 PROCEDURE — 94640 AIRWAY INHALATION TREATMENT: CPT

## 2017-03-05 PROCEDURE — 85025 COMPLETE CBC W/AUTO DIFF WBC: CPT | Performed by: EMERGENCY MEDICINE

## 2017-03-05 PROCEDURE — 85610 PROTHROMBIN TIME: CPT | Performed by: EMERGENCY MEDICINE

## 2017-03-05 RX ORDER — LEVALBUTEROL INHALATION SOLUTION 1.25 MG/3ML
1.25 SOLUTION RESPIRATORY (INHALATION)
Status: COMPLETED | OUTPATIENT
Start: 2017-03-05 | End: 2017-03-05

## 2017-03-05 RX ADMIN — LEVALBUTEROL HYDROCHLORIDE 1.25 MG: 1.25 SOLUTION RESPIRATORY (INHALATION) at 14:25

## 2017-03-05 NOTE — ED TRIAGE NOTES
Pt arrive via EMS coming from [facility] c/o GI bleed. Staff states toilet was full of bright red blood. Pt takes coumadin. VSS per EMS.

## 2017-03-05 NOTE — ED PROVIDER NOTES
Patient is a 77 y.o. female presenting with anal bleeding. The history is provided by the patient and a relative. Rectal Bleeding    This is a recurrent problem. The current episode started 6 to 12 hours ago. The stool is described as bloody coated. Associated symptoms include diarrhea. Pertinent negatives include no abdominal pain, no abdominal distention, no chills and no fever. She has tried nothing for the symptoms. Her past medical history is significant for nursing home resident.         Past Medical History:   Diagnosis Date    Abnormal glucose 8/5/2016    Abscess 8/5/2016    Acute encephalopathy 7/8/2016    Acute renal failure ([address] 12/3/2009    Afib ([address]     Anemia     Ankle swelling 8/5/2016    Anxiety 8/5/2016    Aortic Valve Bioprosthesis Present 3/7/2009    ARF (acute renal failure) ([address] 2/24/2010    Arthritis     Asthma     Atopic dermatitis 8/5/2016    Atrial fibrillation (HCC) 3/7/2009    Autonomic orthostatic hypotension 8/5/2016    AV block 10/17/2011    Back pain 8/5/2016    Bradycardia (Symptomatic) 11/7/2011    CAD (coronary artery disease)     Cancer (HCC) 1990    breast (left)    Cardiac pacemaker 11/2/2015    Cardiogenic shock ([address] 10/17/2011    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    Cellulitis 8/5/2016    Chest pain 8/5/2016    Chronic atrial fibrillation ([address] 10/17/2011    Chronic depression 8/5/2016    Chronic depression 8/5/2016    Chronic obstructive pulmonary disease ([address] 3/8/2009    Chronic pain     CKD (chronic kidney disease) stage 3, GFR 30-59 ml/min 12/4/2009    Congestive heart failure (CHF) ([address] 11/2/2015    COPD     O2 AT 3 L NC    CRI (chronic renal insufficiency) 8/5/2016    Degeneration of cervical intervertebral disc 8/5/2016    Degenerative arthritis of left knee 2/27/2009    Dehydration 0/6/5623    Diastolic heart failure (HCC)     Digoxin toxicity 2/24/2010    Disorder of sweat glands 8/5/2016    Diverticulosis of large intestine without diverticulitis 2015    Dyspnea 8/5/2016    Eczema 8/5/2016    Epigastric abdominal pain 2/24/2010    GERD (gastroesophageal reflux disease)     Gout 8/5/2016    H/O mitral valve repair, 2003 6/27/2016    Heart failure ([address]     HTN (hypertension) 8/5/2016    Hx of colonic polyp 2015    adenoma    Hyperkalemia 10/17/2011    Hyperlipidemia 8/5/2016    Hypertension     Hypokalemia 2/24/2010    Hypothyroidism 12/4/2009    Ill-defined condition     CARPEL TUNNEL SYNDROME, BILAT HANDS    Knee pain 8/5/2016    Leg cramps 8/5/2016    Mitral stenosis with insufficiency 11/2/2015    Prior MV repair 2003.     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    Rheumatic heart disease 11/2/2015    Right hip pain 8/5/2016    RLS (restless legs syndrome) 8/5/2016    Sick sinus syndrome ([address] 2/13/2016    Skin infection 8/5/2016    Sleep apnea 11/2/2015    Tachycardia 6/27/2016    Thrombocytopenia, unspecified ([address] 8/22/2012    Thyroid disease     Urticaria 8/5/2016    Vertigo 8/5/2016       Past Surgical History:   Procedure Laterality Date    CARDIAC SURG PROCEDURE UNLIST      valve repair and replacement    HX APPENDECTOMY      HX BREAST RECONSTRUCTION      HX CHOLECYSTECTOMY  2005    HX GYN  1981    hysterectomy still have ovaries    HX MASTECTOMY  1990    left mastectomy    HX ORTHOPAEDIC      knee surgery    HX PACEMAKER           Family History:   Problem Relation Age of Onset    Heart Disease Mother     Cancer Father      Throat    Heart Disease Father     Cancer Sister      Breast, Colon    Heart Disease Sister    Verkadi Urena Breast Cancer Sister 79      from disease    Cancer Maternal Grandmother     Cancer Brother     Diabetes Brother     Heart Disease Brother     Psychiatric Disorder Paternal Grandfather     Cancer Maternal Aunt      Breast    Diabetes Son     Alcohol abuse Neg Hx     Arthritis-rheumatoid Neg Hx     Asthma Neg Hx     Bleeding Prob Neg Hx     Elevated Lipids Neg Hx     Headache Neg Hx     Migraines Neg Hx     Hypertension Neg Hx     Lung Disease Neg Hx     Mental Retardation Neg Hx     Stroke Neg Hx        Social History     Social History    Marital status:      Spouse name: N/A    Number of children: N/A    Years of education: N/A     Occupational History    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 Negative for chills and fever. HENT: Negative. Negative for congestion, ear pain, postnasal drip and rhinorrhea. Eyes: Negative for pain and visual disturbance. Respiratory: Negative for cough and wheezing. Cardiovascular: Negative for chest pain and leg swelling. Gastrointestinal: Positive for anal bleeding and diarrhea. Negative for abdominal distention and abdominal pain. Endocrine: Negative. Negative for polydipsia, polyphagia and polyuria. Genitourinary: Negative. Negative for difficulty urinating, flank pain and frequency. Musculoskeletal: Negative. Negative for arthralgias and myalgias. Skin: Negative. Neurological: Negative.   Negative for dizziness and headaches. Hematological: Negative. Vitals:    03/05/17 1340 03/05/17 1341 03/05/17 1409 03/05/17 1428   BP: 138/66 133/60     Pulse: 83 83 84    Resp:       Temp:       SpO2: 97% 98% 99% 100%   Weight:       Height:                Physical Exam   Constitutional: She is oriented to person, place, and time. She appears well-developed and well-nourished. Non-toxic appearance. She does not have a sickly appearance. She does not appear ill. No distress. HENT:   Head: Normocephalic and atraumatic. Cardiovascular: Intact distal pulses. Pulmonary/Chest: Effort normal.   Abdominal: Soft. Genitourinary:         Genitourinary Comments: Potential bleeding source   Neurological: She is alert and oriented to person, place, and time. Skin: Skin is warm and dry. Psychiatric: She has a normal mood and affect. Her behavior is normal.   Nursing note and vitals reviewed. MDM  Number of Diagnoses or Management Options  Coagulopathy [facility]):   Rectal bleeding:   Diagnosis management comments: Recurrent rectal bleeding. On Coumadin, but can not come off this medication due to comorbidity. Paged surgery to discuss. Brittany Alcala from surgical consultants returned call and will let office know that we need short term follow up this week. Family to call office to arrange in AM.    INR slightly elevated. To hold next 24hrs. Had additional BM in ER and negative for blood on recheck.        Amount and/or Complexity of Data Reviewed  Clinical lab tests: ordered and reviewed (Results for orders placed or performed during the hospital encounter of 03/05/17  -CBC WITH AUTOMATED DIFF       Result                                            Value                         Ref Range                       WBC                                               6.0                           4.3 - 11.1 K/uL                 RBC                                               3.29 (L)                      4.05 - 5.25 M/uL HGB                                               9.7 (L)                       11.7 - 15.4 g/dL                HCT                                               31.0 (L)                      35.8 - 46.3 %                   MCV                                               94.2                          79.6 - 97.8 FL                  MCH                                               29.5                          26.1 - 32.9 PG                  MCHC                                              31.3 (L)                      31.4 - 35.0 g/dL                RDW                                               14.2                          11.9 - 14.6 %                   PLATELET                                          241                           150 - 450 K/uL                  MPV                                               10.2 (L)                      10.8 - 14.1 FL                  DF                                                AUTOMATED                                                     NEUTROPHILS                                       74                            43 - 78 %                       LYMPHOCYTES                                       15                            13 - 44 %                       MONOCYTES                                         6                             4.0 - 12.0 %                    EOSINOPHILS                                       4                             0.5 - 7.8 %                     BASOPHILS                                         1                             0.0 - 2.0 %                     IMMATURE GRANULOCYTES                             0.5                           0.0 - 5.0 %                     ABS. NEUTROPHILS                                  4.5                           1.7 - 8.2 K/UL                  ABS.  LYMPHOCYTES                                  0.9                           0.5 - 4.6 K/UL                  ABS. MONOCYTES 0.3                           0.1 - 1.3 K/UL                  ABS. EOSINOPHILS                                  0.2                           0.0 - 0.8 K/UL                  ABS. BASOPHILS                                    0.0                           0.0 - 0.2 K/UL                  ABS. IMM.  GRANS.                                  0.0                           0.0 - 0.5 K/UL             -METABOLIC PANEL, BASIC       Result                                            Value                         Ref Range                       Sodium                                            145                           136 - 145 mmol/L                Potassium                                         3.8                           3.5 - 5.1 mmol/L                Chloride                                          103                           98 - 107 mmol/L                 CO2                                               37 (H)                        21 - 32 mmol/L                  Anion gap                                         5 (L)                         7 - 16 mmol/L                   Glucose                                           116 (H)                       65 - 100 mg/dL                  BUN                                               23                            8 - 23 MG/DL                    Creatinine                                        1.18 (H)                      0.6 - 1.0 MG/DL                 GFR est AA                                        57 (L)                        >60 ml/min/1.73m2               GFR est non-AA                                    47 (L)                        >60 ml/min/1.73m2               Calcium                                           9.0                           8.3 - 10.4 MG/DL           )      ED Course       Procedures

## 2017-03-05 NOTE — DISCHARGE INSTRUCTIONS
Rectal Bleeding: Care Instructions  Your Care Instructions  Rectal bleeding in small amounts is common. You may see red spotting on toilet paper or drops of blood in the toilet. Rectal bleeding has many possible causes, from something as minor as hemorrhoids to something as serious as colon cancer. You may need more tests to find the cause of your bleeding. Follow-up care is a key part of your treatment and safety. Be sure to make and go to all appointments, and call your doctor if you are having problems. Its also a good idea to know your test results and keep a list of the medicines you take. How can you care for yourself at home? · Avoid aspirin and other nonsteroidal anti-inflammatory drugs (NSAIDs), such as ibuprofen (Advil, Motrin) and naproxen (Aleve). They can cause you to bleed more. Ask your doctor if you can take acetaminophen (Tylenol). Read and follow all instructions on the label. · Use a stool softener that contains bran or psyllium. You can save money by buying bran or psyllium (available in bulk at most health food stores) and sprinkling it on foods or stirring it into fruit juice. You can also use a product such as Metamucil or Citrucel. · Take your medicines exactly as directed. Call your doctor if you think you are having a problem with your medicine. When should you call for help? Call 911 anytime you think you may need emergency care. For example, call if:  · You passed out (lost consciousness). · You pass maroon or very bloody stools. · You vomit blood or what looks like coffee grounds. Call your doctor now or seek immediate medical care if:  · You have severe belly pain. · You have increased bleeding. · You are dizzy or lightheaded, or you feel like you may faint. · Your stools are black and tarlike. Watch closely for changes in your health, and be sure to contact your doctor if:  · You lose weight and do not know why. · You feel tired all the time.   · Your bleeding does not decrease after 2 days. Where can you learn more? Go to http://onesimo-dasia.info/. Enter X883 in the search box to learn more about \"Rectal Bleeding: Care Instructions. \"  Current as of: August 9, 2016  Content Version: 11.1  © 5375-5959 [employer]. Care instructions adapted under license by [employer] (which disclaims liability or warranty for this information). If you have questions about a medical condition or this instruction, always ask your healthcare professional. [address] any warranty or liability for your use of this information.

## 2017-03-06 ENCOUNTER — PATIENT OUTREACH (OUTPATIENT)
Dept: CASE MANAGEMENT | Age: 77
End: 2017-03-06

## 2017-03-06 NOTE — PROGRESS NOTES
Patient is an ED D/C. No TOM call placed as patient was returned d/c to [facility]. 21 day SNF f/u call already scheduled as stated by previous CC on 3/1/17. Karon Sanders LPN/ Care Coordinator  [address] [place]  / [place], [address] [facility]  [email or web address] [facility] note will not be viewable in [address]

## 2017-03-22 PROBLEM — Z98.890 H/O ATRIOVENTRICULAR NODAL ABLATION: Status: ACTIVE | Noted: 2017-03-22

## 2017-03-23 ENCOUNTER — HOSPITAL ENCOUNTER (OUTPATIENT)
Dept: INFUSION THERAPY | Age: 77
End: 2017-03-23

## 2017-03-29 ENCOUNTER — HOME HEALTH ADMISSION (OUTPATIENT)
Dept: HOME HEALTH SERVICES | Facility: HOME HEALTH | Age: 77
End: 2017-03-29
Payer: MEDICARE

## 2017-03-30 ENCOUNTER — PATIENT OUTREACH (OUTPATIENT)
Dept: CASE MANAGEMENT | Age: 77
End: 2017-03-30

## 2017-03-30 NOTE — PROGRESS NOTES
Ambulatory Care Manager received referral from PCP. Pt is currently at [place] [id number] with anticipated dc date of 3/31/17. RNCM will outreach to pt on 4/3/17 for TOM.

## 2017-04-03 ENCOUNTER — PATIENT OUTREACH (OUTPATIENT)
Dept: CASE MANAGEMENT | Age: 77
End: 2017-04-03

## 2017-04-03 NOTE — PROGRESS NOTES
This note will not be viewable in [address] Care Needs: RNCM spoke with   and pt regarding TOM to home and CCM services, both are agreeable to services. Pt transitioned from [employer] to home on Saturday 4/1/17. Pt lives with      and does not drive. Pts family provides transportation. Pt has O2 from [employer] at 2.5ltrs. Pt to be seen by [facility] but has not scheduled appt at this time. Pt aware to contact RNCM if she does not hear from [facility] in the next couple days. Pt has Part D Plan through [facility]. Pt has f/u with PCP on 4/20/17, labs at 1pm today.  will transport. Pt states she needs a new rx for phenergan, and a bar in the 2nd bathroom. She has a bathtub bench to use. Pt didn't have time to review medications today due to upcoming appt.  states he didn't have any new prescriptions upon transition to home from rehab. Next Steps: RNCM scheduled f/u  in 3days to f/u medications. Pt and  have RNCM contact information for questions or concerns.

## 2017-04-04 ENCOUNTER — HOME CARE VISIT (OUTPATIENT)
Dept: SCHEDULING | Facility: HOME HEALTH | Age: 77
End: 2017-04-04
Payer: MEDICARE

## 2017-04-04 PROCEDURE — G0299 HHS/HOSPICE OF RN EA 15 MIN: HCPCS

## 2017-04-04 PROCEDURE — 400013 HH SOC

## 2017-04-04 PROCEDURE — 3331090001 HH PPS REVENUE CREDIT

## 2017-04-04 PROCEDURE — 3331090002 HH PPS REVENUE DEBIT

## 2017-04-05 VITALS
RESPIRATION RATE: 20 BRPM | SYSTOLIC BLOOD PRESSURE: 110 MMHG | TEMPERATURE: 98.2 F | OXYGEN SATURATION: 98 % | HEART RATE: 84 BPM | DIASTOLIC BLOOD PRESSURE: 70 MMHG

## 2017-04-05 PROCEDURE — 3331090001 HH PPS REVENUE CREDIT

## 2017-04-05 PROCEDURE — 3331090002 HH PPS REVENUE DEBIT

## 2017-04-06 ENCOUNTER — HOME CARE VISIT (OUTPATIENT)
Dept: SCHEDULING | Facility: HOME HEALTH | Age: 77
End: 2017-04-06
Payer: MEDICARE

## 2017-04-06 ENCOUNTER — PATIENT OUTREACH (OUTPATIENT)
Dept: CASE MANAGEMENT | Age: 77
End: 2017-04-06

## 2017-04-06 VITALS
OXYGEN SATURATION: 97 % | TEMPERATURE: 97.6 F | HEART RATE: 72 BPM | DIASTOLIC BLOOD PRESSURE: 60 MMHG | SYSTOLIC BLOOD PRESSURE: 110 MMHG | RESPIRATION RATE: 18 BRPM

## 2017-04-06 VITALS
OXYGEN SATURATION: 97 % | HEART RATE: 80 BPM | SYSTOLIC BLOOD PRESSURE: 124 MMHG | DIASTOLIC BLOOD PRESSURE: 74 MMHG | RESPIRATION RATE: 18 BRPM

## 2017-04-06 PROCEDURE — 3331090001 HH PPS REVENUE CREDIT

## 2017-04-06 PROCEDURE — G0152 HHCP-SERV OF OT,EA 15 MIN: HCPCS

## 2017-04-06 PROCEDURE — G0151 HHCP-SERV OF PT,EA 15 MIN: HCPCS

## 2017-04-06 PROCEDURE — 3331090002 HH PPS REVENUE DEBIT

## 2017-04-06 NOTE — PROGRESS NOTES
This note will not be viewable in [address] RNCM attempted to reach pt for f/u CCM services, went straight to vm which states \"vm has not been setup\". Will continue attempts to reach pt.

## 2017-04-07 ENCOUNTER — HOME CARE VISIT (OUTPATIENT)
Dept: SCHEDULING | Facility: HOME HEALTH | Age: 77
End: 2017-04-07
Payer: MEDICARE

## 2017-04-07 ENCOUNTER — PATIENT OUTREACH (OUTPATIENT)
Dept: CASE MANAGEMENT | Age: 77
End: 2017-04-07

## 2017-04-07 VITALS
OXYGEN SATURATION: 97 % | TEMPERATURE: 97.7 F | RESPIRATION RATE: 22 BRPM | SYSTOLIC BLOOD PRESSURE: 110 MMHG | DIASTOLIC BLOOD PRESSURE: 78 MMHG | HEART RATE: 80 BPM

## 2017-04-07 PROCEDURE — G0299 HHS/HOSPICE OF RN EA 15 MIN: HCPCS

## 2017-04-07 PROCEDURE — 3331090001 HH PPS REVENUE CREDIT

## 2017-04-07 PROCEDURE — G0158 HHC OT ASSISTANT EA 15: HCPCS

## 2017-04-07 PROCEDURE — 3331090002 HH PPS REVENUE DEBIT

## 2017-04-08 VITALS
DIASTOLIC BLOOD PRESSURE: 56 MMHG | OXYGEN SATURATION: 98 % | SYSTOLIC BLOOD PRESSURE: 100 MMHG | HEART RATE: 59 BPM | RESPIRATION RATE: 18 BRPM

## 2017-04-08 PROCEDURE — 3331090002 HH PPS REVENUE DEBIT

## 2017-04-08 PROCEDURE — 3331090001 HH PPS REVENUE CREDIT

## 2017-04-09 PROCEDURE — 3331090001 HH PPS REVENUE CREDIT

## 2017-04-09 PROCEDURE — 3331090002 HH PPS REVENUE DEBIT

## 2017-04-10 ENCOUNTER — HOME CARE VISIT (OUTPATIENT)
Dept: SCHEDULING | Facility: HOME HEALTH | Age: 77
End: 2017-04-10
Payer: MEDICARE

## 2017-04-10 VITALS
DIASTOLIC BLOOD PRESSURE: 60 MMHG | SYSTOLIC BLOOD PRESSURE: 100 MMHG | RESPIRATION RATE: 18 BRPM | OXYGEN SATURATION: 98 % | HEART RATE: 76 BPM

## 2017-04-10 PROCEDURE — 3331090001 HH PPS REVENUE CREDIT

## 2017-04-10 PROCEDURE — G0158 HHC OT ASSISTANT EA 15: HCPCS

## 2017-04-10 PROCEDURE — 3331090002 HH PPS REVENUE DEBIT

## 2017-04-11 ENCOUNTER — HOME CARE VISIT (OUTPATIENT)
Dept: SCHEDULING | Facility: HOME HEALTH | Age: 77
End: 2017-04-11
Payer: MEDICARE

## 2017-04-11 ENCOUNTER — HOME CARE VISIT (OUTPATIENT)
Dept: HOME HEALTH SERVICES | Facility: HOME HEALTH | Age: 77
End: 2017-04-11
Payer: MEDICARE

## 2017-04-11 VITALS
TEMPERATURE: 98.2 F | RESPIRATION RATE: 18 BRPM | HEART RATE: 80 BPM | SYSTOLIC BLOOD PRESSURE: 110 MMHG | DIASTOLIC BLOOD PRESSURE: 60 MMHG | OXYGEN SATURATION: 94 %

## 2017-04-11 PROCEDURE — 3331090001 HH PPS REVENUE CREDIT

## 2017-04-11 PROCEDURE — G0157 HHC PT ASSISTANT EA 15: HCPCS

## 2017-04-11 PROCEDURE — 3331090002 HH PPS REVENUE DEBIT

## 2017-04-12 ENCOUNTER — HOME CARE VISIT (OUTPATIENT)
Dept: SCHEDULING | Facility: HOME HEALTH | Age: 77
End: 2017-04-12
Payer: MEDICARE

## 2017-04-12 VITALS
RESPIRATION RATE: 16 BRPM | OXYGEN SATURATION: 97 % | DIASTOLIC BLOOD PRESSURE: 74 MMHG | HEART RATE: 72 BPM | SYSTOLIC BLOOD PRESSURE: 138 MMHG

## 2017-04-12 PROCEDURE — G0152 HHCP-SERV OF OT,EA 15 MIN: HCPCS

## 2017-04-12 PROCEDURE — 3331090001 HH PPS REVENUE CREDIT

## 2017-04-12 PROCEDURE — 3331090002 HH PPS REVENUE DEBIT

## 2017-04-13 PROCEDURE — 3331090001 HH PPS REVENUE CREDIT

## 2017-04-13 PROCEDURE — 3331090002 HH PPS REVENUE DEBIT

## 2017-04-14 ENCOUNTER — HOME CARE VISIT (OUTPATIENT)
Dept: SCHEDULING | Facility: HOME HEALTH | Age: 77
End: 2017-04-14
Payer: MEDICARE

## 2017-04-14 PROCEDURE — 3331090001 HH PPS REVENUE CREDIT

## 2017-04-14 PROCEDURE — 3331090002 HH PPS REVENUE DEBIT

## 2017-04-14 PROCEDURE — G0151 HHCP-SERV OF PT,EA 15 MIN: HCPCS

## 2017-04-15 PROCEDURE — 3331090001 HH PPS REVENUE CREDIT

## 2017-04-15 PROCEDURE — 3331090002 HH PPS REVENUE DEBIT

## 2017-04-16 PROCEDURE — 3331090001 HH PPS REVENUE CREDIT

## 2017-04-16 PROCEDURE — 3331090002 HH PPS REVENUE DEBIT

## 2017-04-17 VITALS
DIASTOLIC BLOOD PRESSURE: 62 MMHG | RESPIRATION RATE: 18 BRPM | OXYGEN SATURATION: 98 % | SYSTOLIC BLOOD PRESSURE: 118 MMHG | TEMPERATURE: 98 F | HEART RATE: 68 BPM

## 2017-04-17 PROCEDURE — 3331090001 HH PPS REVENUE CREDIT

## 2017-04-17 PROCEDURE — 3331090002 HH PPS REVENUE DEBIT

## 2017-04-18 ENCOUNTER — HOME CARE VISIT (OUTPATIENT)
Dept: SCHEDULING | Facility: HOME HEALTH | Age: 77
End: 2017-04-18
Payer: MEDICARE

## 2017-04-18 VITALS
DIASTOLIC BLOOD PRESSURE: 68 MMHG | HEART RATE: 81 BPM | OXYGEN SATURATION: 99 % | TEMPERATURE: 96.4 F | SYSTOLIC BLOOD PRESSURE: 118 MMHG | RESPIRATION RATE: 18 BRPM

## 2017-04-18 VITALS
OXYGEN SATURATION: 99 % | HEART RATE: 81 BPM | SYSTOLIC BLOOD PRESSURE: 118 MMHG | RESPIRATION RATE: 18 BRPM | DIASTOLIC BLOOD PRESSURE: 68 MMHG | TEMPERATURE: 96.4 F

## 2017-04-18 PROCEDURE — 3331090001 HH PPS REVENUE CREDIT

## 2017-04-18 PROCEDURE — G0299 HHS/HOSPICE OF RN EA 15 MIN: HCPCS

## 2017-04-18 PROCEDURE — 3331090002 HH PPS REVENUE DEBIT

## 2017-04-18 PROCEDURE — G0157 HHC PT ASSISTANT EA 15: HCPCS

## 2017-04-19 PROCEDURE — 3331090002 HH PPS REVENUE DEBIT

## 2017-04-19 PROCEDURE — 3331090001 HH PPS REVENUE CREDIT

## 2017-04-20 PROCEDURE — 3331090001 HH PPS REVENUE CREDIT

## 2017-04-20 PROCEDURE — 3331090002 HH PPS REVENUE DEBIT

## 2017-04-21 ENCOUNTER — HOME CARE VISIT (OUTPATIENT)
Dept: SCHEDULING | Facility: HOME HEALTH | Age: 77
End: 2017-04-21
Payer: MEDICARE

## 2017-04-21 ENCOUNTER — PATIENT OUTREACH (OUTPATIENT)
Dept: CASE MANAGEMENT | Age: 77
End: 2017-04-21

## 2017-04-21 VITALS
SYSTOLIC BLOOD PRESSURE: 110 MMHG | HEART RATE: 80 BPM | RESPIRATION RATE: 17 BRPM | OXYGEN SATURATION: 98 % | DIASTOLIC BLOOD PRESSURE: 58 MMHG | TEMPERATURE: 97.7 F

## 2017-04-21 PROCEDURE — G0157 HHC PT ASSISTANT EA 15: HCPCS

## 2017-04-21 PROCEDURE — 3331090002 HH PPS REVENUE DEBIT

## 2017-04-21 PROCEDURE — 3331090001 HH PPS REVENUE CREDIT

## 2017-04-21 NOTE — PROGRESS NOTES
This note will not be viewable in [address] Care Needs: RNCM spoke with pt regarding [facility] services. Pt will be discharged from [facility] next wk. Pt states she received a great report from her [address]. She is tender where her incision is, but it has healed great. RNCM reminded pt to keep her legs elevated when she notices swelling. Pt verbalized understanding. She remains on O2 at 2.5ltrs. She is pacing her activities, doing such things as laundry. Her son assists her as needed. Pt reports cough with clear phlegm that sometimes causes her to gag. She still has runny nose right and is taking claritin. She says she has a lot of ear wax. RNCM discussed signs and symptoms to report to physician such as weight gain of 3lb/more in a day, productive cough with color, fever, SOB, chest pain. Pt verbalizes understanding. Next Steps: RNCM scheduled f/u in 2wks. Pt has RNCM contact information for questions or concerns.

## 2017-04-22 PROCEDURE — 3331090002 HH PPS REVENUE DEBIT

## 2017-04-22 PROCEDURE — 3331090001 HH PPS REVENUE CREDIT

## 2017-04-23 PROCEDURE — 3331090002 HH PPS REVENUE DEBIT

## 2017-04-23 PROCEDURE — 3331090001 HH PPS REVENUE CREDIT

## 2017-04-24 ENCOUNTER — HOME CARE VISIT (OUTPATIENT)
Dept: SCHEDULING | Facility: HOME HEALTH | Age: 77
End: 2017-04-24
Payer: MEDICARE

## 2017-04-24 VITALS
SYSTOLIC BLOOD PRESSURE: 122 MMHG | DIASTOLIC BLOOD PRESSURE: 62 MMHG | RESPIRATION RATE: 18 BRPM | OXYGEN SATURATION: 98 % | HEART RATE: 81 BPM | TEMPERATURE: 96.3 F

## 2017-04-24 PROCEDURE — G0299 HHS/HOSPICE OF RN EA 15 MIN: HCPCS

## 2017-04-24 PROCEDURE — 3331090001 HH PPS REVENUE CREDIT

## 2017-04-24 PROCEDURE — 3331090002 HH PPS REVENUE DEBIT

## 2017-04-25 ENCOUNTER — HOME CARE VISIT (OUTPATIENT)
Dept: SCHEDULING | Facility: HOME HEALTH | Age: 77
End: 2017-04-25
Payer: MEDICARE

## 2017-04-25 VITALS
DIASTOLIC BLOOD PRESSURE: 60 MMHG | TEMPERATURE: 96.8 F | SYSTOLIC BLOOD PRESSURE: 110 MMHG | HEART RATE: 78 BPM | RESPIRATION RATE: 18 BRPM | OXYGEN SATURATION: 95 %

## 2017-04-25 PROCEDURE — 3331090001 HH PPS REVENUE CREDIT

## 2017-04-25 PROCEDURE — 3331090003 HH PPS REVENUE ADJ

## 2017-04-25 PROCEDURE — G0151 HHCP-SERV OF PT,EA 15 MIN: HCPCS

## 2017-04-25 PROCEDURE — 3331090002 HH PPS REVENUE DEBIT

## 2017-05-04 ENCOUNTER — PATIENT OUTREACH (OUTPATIENT)
Dept: CASE MANAGEMENT | Age: 77
End: 2017-05-04

## 2017-05-04 NOTE — PROGRESS NOTES
This note will not be viewable in [address] Care Needs: RNCM spoke with pt regarding [facility] services. Pt reports she has had a swimmy head. She says it has been going on a long time and she is drinking more water and going slow when changing positions in hopes that these things will prevent this. She says she is cautious upon rising to make sure her legs are working. She is using her walker. She has been discharged from [facility]. She remains on O2 at 2.5ltrs. Pt denies cough, SOB, CP, swelling. She has cardiology appt on 5/28/17. RNCM discussed signs and symptoms to report to physician such as weight gain of 3lb/more in a day, productive cough with color, fever, SOB, chest pain. Pt verbalizes understanding. Pt reports constipation. She has tried prunes and miralax without results. She is using a flavored liquid laxative and may try enema if no results soon. Next Steps: RNCM scheduled f/u in 2wks. Pt has RNCM contact information for questions or concerns.

## 2017-05-18 ENCOUNTER — PATIENT OUTREACH (OUTPATIENT)
Dept: CASE MANAGEMENT | Age: 77
End: 2017-05-18

## 2017-05-18 NOTE — PROGRESS NOTES
This note will not be viewable in [address] Care Needs: RNCM spoke with pt regarding CCM services. Pt reports she is taking one day at a time, difficulty with heart racing. Pt has appt with cardiologist in 8 days. RNCM confirmed cardiology visit with pt for 5/26/17. Pt was SOB but was just getting dress without O2 on. She remains on O2 at 2.5ltrs. She reports she turns it up to 3ltrs occasionally when needed, then turns back down to 2.5ltrs. Pt reports swelling in legs that goes down when she elevates them. Pt is aware of signs and symptoms to report to physician and when to seek emergency assistance. Pt is pacing her activities. She is taking Miralax 3x/wk with 2 stool softeners to prevent/relieve constipation. Pt reports she is planning trip to [place] on 6/29/17 with her son. She plans to stay a little over 2 wks. Pt confirmed with [facility] they will provide O2 from an office located near her sons home in [place]. Next Steps: RNCM scheduled f/u in 3wks. Pt has RNCM contact information for questions or concerns.

## 2017-06-08 ENCOUNTER — PATIENT OUTREACH (OUTPATIENT)
Dept: CASE MANAGEMENT | Age: 77
End: 2017-06-08

## 2017-06-08 NOTE — PROGRESS NOTES
This note will not be viewable in [address] RNCM attempted to reach pt for f/u CCM services. RNCM left vm message with contact information, will continue attempts to reach pt.

## 2017-06-15 ENCOUNTER — PATIENT OUTREACH (OUTPATIENT)
Dept: CASE MANAGEMENT | Age: 77
End: 2017-06-15

## 2017-06-15 NOTE — PROGRESS NOTES
This note will not be viewable in [address] RNLORIE spoke briefly with pt's son Lily Blackwood who states pt is doing well, and is returning from her trip to [place] today. RNCM will outreach to pt in one wk.

## 2017-06-22 ENCOUNTER — PATIENT OUTREACH (OUTPATIENT)
Dept: CASE MANAGEMENT | Age: 77
End: 2017-06-22

## 2017-06-22 NOTE — PROGRESS NOTES
This note will not be viewable in [address] RNCM attempted to reach pt regarding f/u CCM services, pt recently seen at PCP office with Bronchitis. Will continue attempts to reach pt.

## 2017-06-28 ENCOUNTER — PATIENT OUTREACH (OUTPATIENT)
Dept: CASE MANAGEMENT | Age: 77
End: 2017-06-28

## 2017-06-28 NOTE — PROGRESS NOTES
This note will not be viewable in [address] Care Needs: RNCM spoke with pt regarding [facility] services. Pt reports she is having good days and bad days, but her phlegm has started again. She loses her breath and has prod cough of clear sputum. She has not called because it is clear. Pt doesnt go outside because it \"shuts her down\". She wakes during the night because of cough. She remains on O2 at 2.5-3ltrs. She turns it up to 3ltrs occasionally when needed, then turns back down to 2.5ltrs. She reports left leg swelling that goes down with elevation. Pt is aware of signs and symptoms to report to physician and when to seek emergency assistance. RN discussed respiratory medications, confirms pt is taking as prescribed. Pt states she doesnt use nebulizer medication because of how it made her feel. Pts son checks on her daily and manages her medications/appts. Next Steps: Pt to [facility] PCP office in 2 days if symptoms continue. RNCM scheduled f/u of resp symptoms. Pt has RNCM contact information for questions or concerns.

## 2017-07-02 ENCOUNTER — APPOINTMENT (OUTPATIENT)
Dept: ULTRASOUND IMAGING | Age: 77
End: 2017-07-02
Attending: EMERGENCY MEDICINE
Payer: MEDICARE

## 2017-07-02 ENCOUNTER — HOSPITAL ENCOUNTER (EMERGENCY)
Age: 77
Discharge: HOME | End: 2017-07-02
Attending: EMERGENCY MEDICINE
Payer: MEDICARE

## 2017-07-02 ENCOUNTER — APPOINTMENT (OUTPATIENT)
Dept: GENERAL RADIOLOGY | Age: 77
End: 2017-07-02
Attending: EMERGENCY MEDICINE
Payer: MEDICARE

## 2017-07-02 VITALS
SYSTOLIC BLOOD PRESSURE: 113 MMHG | RESPIRATION RATE: 16 BRPM | BODY MASS INDEX: 34.36 KG/M2 | OXYGEN SATURATION: 99 % | HEIGHT: 60 IN | WEIGHT: 175 LBS | DIASTOLIC BLOOD PRESSURE: 59 MMHG | TEMPERATURE: 97.6 F | HEART RATE: 81 BPM

## 2017-07-02 DIAGNOSIS — M25.561 ARTHRALGIA OF RIGHT LOWER LEG: Primary | ICD-10-CM

## 2017-07-02 DIAGNOSIS — M25.561 ACUTE PAIN OF RIGHT KNEE: ICD-10-CM

## 2017-07-02 LAB
ALBUMIN SERPL BCP-MCNC: 2.6 G/DL (ref 3.2–4.6)
ALBUMIN/GLOB SERPL: 0.8 {RATIO} (ref 1.2–3.5)
ALP SERPL-CCNC: 85 U/L (ref 50–136)
ALT SERPL-CCNC: 19 U/L (ref 12–65)
ANION GAP BLD CALC-SCNC: 5 MMOL/L (ref 7–16)
AST SERPL W P-5'-P-CCNC: 35 U/L (ref 15–37)
BACTERIA URNS QL MICRO: NORMAL /HPF
BASOPHILS # BLD AUTO: 0 K/UL (ref 0–0.2)
BASOPHILS # BLD: 1 % (ref 0–2)
BILIRUB SERPL-MCNC: 0.7 MG/DL (ref 0.2–1.1)
BUN SERPL-MCNC: 27 MG/DL (ref 8–23)
CALCIUM SERPL-MCNC: 9 MG/DL (ref 8.3–10.4)
CASTS URNS QL MICRO: NORMAL /LPF
CHLORIDE SERPL-SCNC: 97 MMOL/L (ref 98–107)
CO2 SERPL-SCNC: 42 MMOL/L (ref 21–32)
CREAT SERPL-MCNC: 1.24 MG/DL (ref 0.6–1)
DIFFERENTIAL METHOD BLD: ABNORMAL
EOSINOPHIL # BLD: 0.1 K/UL (ref 0–0.8)
EOSINOPHIL NFR BLD: 2 % (ref 0.5–7.8)
EPI CELLS #/AREA URNS HPF: NORMAL /HPF
ERYTHROCYTE [DISTWIDTH] IN BLOOD BY AUTOMATED COUNT: 14.4 % (ref 11.9–14.6)
GLOBULIN SER CALC-MCNC: 3.1 G/DL (ref 2.3–3.5)
GLUCOSE SERPL-MCNC: 89 MG/DL (ref 65–100)
HCT VFR BLD AUTO: 26.4 % (ref 35.8–46.3)
HGB BLD-MCNC: 8 G/DL (ref 11.7–15.4)
IMM GRANULOCYTES # BLD: 0 K/UL (ref 0–0.5)
IMM GRANULOCYTES NFR BLD AUTO: 0 % (ref 0–5)
INR PPP: 1.6 (ref 0.9–1.2)
LYMPHOCYTES # BLD AUTO: 14 % (ref 13–44)
LYMPHOCYTES # BLD: 0.5 K/UL (ref 0.5–4.6)
MCH RBC QN AUTO: 29.4 PG (ref 26.1–32.9)
MCHC RBC AUTO-ENTMCNC: 30.3 G/DL (ref 31.4–35)
MCV RBC AUTO: 97.1 FL (ref 79.6–97.8)
MONOCYTES # BLD: 0.2 K/UL (ref 0.1–1.3)
MONOCYTES NFR BLD AUTO: 5 % (ref 4–12)
NEUTS SEG # BLD: 3.1 K/UL (ref 1.7–8.2)
NEUTS SEG NFR BLD AUTO: 78 % (ref 43–78)
PLATELET # BLD AUTO: 125 K/UL (ref 150–450)
PMV BLD AUTO: 10.3 FL (ref 10.8–14.1)
POTASSIUM SERPL-SCNC: 3.6 MMOL/L (ref 3.5–5.1)
PROT SERPL-MCNC: 5.7 G/DL (ref 6.3–8.2)
PROTHROMBIN TIME: 17.1 SEC (ref 9.6–12)
RBC # BLD AUTO: 2.72 M/UL (ref 4.05–5.25)
RBC #/AREA URNS HPF: NORMAL /HPF
SODIUM SERPL-SCNC: 144 MMOL/L (ref 136–145)
WBC # BLD AUTO: 3.9 K/UL (ref 4.3–11.1)
WBC URNS QL MICRO: NORMAL /HPF

## 2017-07-02 PROCEDURE — 85025 COMPLETE CBC W/AUTO DIFF WBC: CPT | Performed by: EMERGENCY MEDICINE

## 2017-07-02 PROCEDURE — 80053 COMPREHEN METABOLIC PANEL: CPT | Performed by: EMERGENCY MEDICINE

## 2017-07-02 PROCEDURE — 81015 MICROSCOPIC EXAM OF URINE: CPT | Performed by: EMERGENCY MEDICINE

## 2017-07-02 PROCEDURE — 85610 PROTHROMBIN TIME: CPT | Performed by: EMERGENCY MEDICINE

## 2017-07-02 PROCEDURE — 73562 X-RAY EXAM OF KNEE 3: CPT

## 2017-07-02 PROCEDURE — 81003 URINALYSIS AUTO W/O SCOPE: CPT | Performed by: EMERGENCY MEDICINE

## 2017-07-02 PROCEDURE — 99284 EMERGENCY DEPT VISIT MOD MDM: CPT | Performed by: EMERGENCY MEDICINE

## 2017-07-02 PROCEDURE — 93971 EXTREMITY STUDY: CPT

## 2017-07-02 NOTE — ED NOTES
Patient is resting on the stretcher. Patient denies needs at this time. Patient's respirations are even and unlabored. Stretcher in low position. Side rails x 2 for safety. Will continue to monitor. Family at bedside.

## 2017-07-02 NOTE — ED PROVIDER NOTES
HPI Comments: Patient is on Coumadin for A. Fib. Recently had right lower extremity arterial occlusion needing surgery. Has a well-healed scar in the right groin. Has mild pain around that scar. Yesterday developed some right lateral knee pain going into the calfalong with some right inner thigh pain that is worse with standing. No swelling of the right leg. It is warm. Has good distal pulses. Patient is a 69 y.o. female presenting with leg pain. The history is provided by the patient. No  was used. Leg Pain    This is a new problem. The current episode started yesterday. The problem occurs constantly. The problem has been gradually worsening. The pain is present in the right knee, right lower leg and right upper leg. The quality of the pain is described as aching. The pain is moderate. Pertinent negatives include no numbness, full range of motion, no back pain and no neck pain. The symptoms are aggravated by palpation, movement and standing. She has tried nothing for the symptoms. There has been no history of extremity trauma.         Past Medical History:   Diagnosis Date    Abnormal glucose 8/5/2016    Abscess 8/5/2016    Acute encephalopathy 7/8/2016    Acute renal failure ([address] 12/3/2009    Afib ([address]     Anemia     Ankle swelling 8/5/2016    Anxiety 8/5/2016    Aortic Valve Bioprosthesis Present 3/7/2009    ARF (acute renal failure) ([address] 2/24/2010    Arthritis     Asthma     Atopic dermatitis 8/5/2016    Atrial fibrillation ([address] 3/7/2009    Autonomic orthostatic hypotension 8/5/2016    AV block 10/17/2011    Back pain 8/5/2016    Bradycardia (Symptomatic) 11/7/2011    CAD (coronary artery disease)     Cancer (HCC) 1990    breast (left)    Cardiac pacemaker 11/2/2015    Cardiogenic shock ([address] 10/17/2011    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    Cellulitis 8/5/2016    Chest pain 8/5/2016    Chronic atrial fibrillation ([address] 10/17/2011    Chronic depression 8/5/2016    Chronic depression 8/5/2016    Chronic kidney disease     Chronic obstructive pulmonary disease ([address] 3/8/2009    Chronic pain     CKD (chronic kidney disease) stage 3, GFR 30-59 ml/min 12/4/2009    Congestive heart failure (CHF) ([address] 11/2/2015    COPD     O2 AT 3 L NC    CRI (chronic renal insufficiency) 8/5/2016    Degeneration of cervical intervertebral disc 8/5/2016    Degenerative arthritis of left knee 2/27/2009    Dehydration 6/0/2601    Diastolic heart failure (HCC)     Digoxin toxicity 2/24/2010    Disorder of sweat glands 8/5/2016    Diverticulosis of large intestine without diverticulitis 2015    Dyspnea 8/5/2016    Eczema 8/5/2016    Epigastric abdominal pain 2/24/2010    GERD (gastroesophageal reflux disease)     Gout 8/5/2016    H/O mitral valve repair, 2003 6/27/2016    Heart failure ([address]     HTN (hypertension) 8/5/2016    Hx of colonic polyp 2015    adenoma    Hyperkalemia 10/17/2011    Hyperlipidemia 8/5/2016    Hypertension     Hypokalemia 2/24/2010    Hypothyroidism 12/4/2009    Ill-defined condition     CARPEL TUNNEL SYNDROME, BILAT HANDS    Knee pain 8/5/2016    Leg cramps 8/5/2016    Mitral stenosis with insufficiency 11/2/2015    Prior MV repair 2003.     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    Rheumatic heart disease 11/2/2015    Right hip pain 8/5/2016    RLS (restless legs syndrome) 8/5/2016    Sick sinus syndrome ([address] 2/13/2016    Skin infection 8/5/2016    Sleep apnea 11/2/2015    Tachycardia 6/27/2016    Thrombocytopenia, unspecified ([address] 8/22/2012    Thromboembolus [address]     2017    Thyroid disease     Urticaria 2016    Vertigo 2016       Past Surgical History:   Procedure Laterality Date    CARDIAC SURG PROCEDURE UNLIST      valve repair and replacement    CHEST SURGERY PROCEDURE UNLISTED      HX APPENDECTOMY      HX BREAST RECONSTRUCTION      HX CHOLECYSTECTOMY      HX GYN  1981    hysterectomy still have ovaries    HX MASTECTOMY      left mastectomy    HX ORTHOPAEDIC      knee surgery    HX PACEMAKER      VASCULAR SURGERY PROCEDURE UNLIST Right 2017    LE thrombectomy         Family History:   Problem Relation Age of Onset    Heart Disease Mother     Cancer Father      Throat    Heart Disease Father     Cancer Sister      Breast, Colon    Heart Disease Sister     Breast Cancer Sister 79      from disease    Cancer Maternal Grandmother     Cancer Brother     Diabetes Brother     Heart Disease Brother     Psychiatric Disorder Paternal Grandfather     Cancer Maternal Aunt      Breast    Diabetes Son     Alcohol abuse Neg Hx     Arthritis-rheumatoid Neg Hx     Asthma Neg Hx     Bleeding Prob Neg Hx     Elevated Lipids Neg Hx     Headache Neg Hx     Migraines Neg Hx     Hypertension Neg Hx     Lung Disease Neg Hx     Mental Retardation Neg Hx     Stroke Neg Hx        Social History     Social History    Marital status:      Spouse name: N/A    Number of children: N/A    Years of education: N/A     Occupational History    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 HENT: Negative for rhinorrhea and sore throat. Eyes: Negative for pain and redness. Respiratory: Negative for chest tightness, shortness of breath and wheezing. Cardiovascular: Negative for chest pain and leg swelling. Gastrointestinal: Negative for abdominal pain, diarrhea, nausea and vomiting. Genitourinary: Negative for dysuria and hematuria. Musculoskeletal: Positive for arthralgias and gait problem. Negative for back pain, joint swelling, neck pain and neck stiffness. Skin: Negative for color change, rash and wound. Neurological: Negative for weakness, numbness and headaches. Vitals:    07/02/17 1311 07/02/17 1328   BP: 112/58    Pulse: 84    Resp: 17    Temp: 97.6 °F (36.4 °C)    SpO2: 93% 96%   Weight: 79.4 kg (175 lb)    Height: 5' (1.524 m)             Physical Exam   Constitutional: She is oriented to person, place, and time. She appears well-developed and well-nourished. HENT:   Head: Normocephalic and atraumatic. Neck: Normal range of motion. Neck supple. Cardiovascular: Normal rate and regular rhythm. No murmur heard. Pulmonary/Chest: Effort normal and breath sounds normal. She has no wheezes. Abdominal: Soft. Bowel sounds are normal. There is no tenderness. Musculoskeletal: Normal range of motion. She exhibits tenderness (TTP inner upper right thigh. TTP lateral right knee. mild tTP right lateral calf. no juanita. distal pulse and sensation itnact. FROM at hip, knee, and ankle. pain at knee with movement. ). She exhibits no edema. Neurological: She is alert and oriented to person, place, and time. She exhibits normal muscle tone. Skin: Skin is warm and dry. Nursing note and vitals reviewed. MDM  Number of Diagnoses or Management Options  Diagnosis management comments: Knee pain and leg soreness after recent increase in activity at home.  Will discharge. Amount and/or Complexity of Data Reviewed  Clinical lab tests: ordered and reviewed  Tests in the radiology section of CPT®: ordered and reviewed  Tests in the medicine section of CPT®: ordered and reviewed    Patient Progress  Patient progress: stable    ED Course       Procedures      Results Include:    Recent Results (from the past 24 hour(s))   CBC WITH AUTOMATED DIFF    Collection Time: 07/02/17  2:00 PM   Result Value Ref Range    WBC 3.9 (L) 4.3 - 11.1 K/uL    RBC 2.72 (L) 4.05 - 5.25 M/uL    HGB 8.0 (L) 11.7 - 15.4 g/dL    HCT 26.4 (L) 35.8 - 46.3 %    MCV 97.1 79.6 - 97.8 FL    MCH 29.4 26.1 - 32.9 PG    MCHC 30.3 (L) 31.4 - 35.0 g/dL    RDW 14.4 11.9 - 14.6 %    PLATELET 237 (L) 984 - 450 K/uL    MPV 10.3 (L) 10.8 - 14.1 FL    DF AUTOMATED      NEUTROPHILS 78 43 - 78 %    LYMPHOCYTES 14 13 - 44 %    MONOCYTES 5 4.0 - 12.0 %    EOSINOPHILS 2 0.5 - 7.8 %    BASOPHILS 1 0.0 - 2.0 %    IMMATURE GRANULOCYTES 0.0 0.0 - 5.0 %    ABS. NEUTROPHILS 3.1 1.7 - 8.2 K/UL    ABS. LYMPHOCYTES 0.5 0.5 - 4.6 K/UL    ABS. MONOCYTES 0.2 0.1 - 1.3 K/UL    ABS. EOSINOPHILS 0.1 0.0 - 0.8 K/UL    ABS. BASOPHILS 0.0 0.0 - 0.2 K/UL    ABS. IMM. GRANS. 0.0 0.0 - 0.5 K/UL   PROTHROMBIN TIME + INR    Collection Time: 07/02/17  2:00 PM   Result Value Ref Range    Prothrombin time 17.1 (H) 9.6 - 12.0 sec    INR 1.6 (H) 0.9 - 1.2     METABOLIC PANEL, COMPREHENSIVE    Collection Time: 07/02/17  2:00 PM   Result Value Ref Range    Sodium 144 136 - 145 mmol/L    Potassium 3.6 3.5 - 5.1 mmol/L    Chloride 97 (L) 98 - 107 mmol/L    CO2 42 (HH) 21 - 32 mmol/L    Anion gap 5 (L) 7 - 16 mmol/L    Glucose 89 65 - 100 mg/dL    BUN 27 (H) 8 - 23 MG/DL    Creatinine 1.24 (H) 0.6 - 1.0 MG/DL    GFR est AA 54 (L) >60 ml/min/1.73m2    GFR est non-AA 45 (L) >60 ml/min/1.73m2    Calcium 9.0 8.3 - 10.4 MG/DL    Bilirubin, total 0.7 0.2 - 1.1 MG/DL    ALT (SGPT) 19 12 - 65 U/L    AST (SGOT) 35 15 - 37 U/L    Alk.  phosphatase 85 50 - 136 U/L    Protein, total 5.7 (L) 6.3 - 8.2 g/dL    Albumin 2.6 (L) 3.2 - 4.6 g/dL    Globulin 3.1 2.3 - 3.5 g/dL    A-G Ratio 0.8 (L) 1.2 - 3.5     URINE MICROSCOPIC    Collection Time: 07/02/17  2:10 PM   Result Value Ref Range    WBC 0-3 0 /hpf    RBC 5-10 0 /hpf    Epithelial cells 0-3 0 /hpf    Bacteria TRACE 0 /hpf    Casts 0-3 0 /lpf      DUPLEX LOWER EXT VENOUS RIGHT (Final result) Result time: 07/02/17 15:05:32     Final result by Sonia Shaffer MD (07/02/17 15:05:32)     Impression:     Impression: No evidence of right lower extremity deep venous thrombosis.     Narrative:     Examination:  Grayscale and color doppler ultrasound of right lower extremity. History: Right lower extremity swelling. Comparison: None available    Findings: The right common femoral, femoral, popliteal, peroneal, and posterior  tibial veins demonstrate normal compressibility and color-flow. No evidence of  deep venous thrombosis. No abnormal fluid collection or Baker's cyst.                 XR KNEE RT 3 V (Final result) Result time: 07/02/17 15:06:25     Final result by Sonia Shaffer MD (07/02/17 15:06:25)     Impression:     Impression:  No evidence of acute injury.  Mild medial compartment  osteoarthritis.     Narrative:     Exam:  Right knee radiographs    History:  pain, pain, 77 years Female right knee and upper calf pain since last  week increasing ? this Am    Comparison: None available    Findings:  No evidence of acute fracture or dislocation.  There is mild medial  compartment joint space narrowing with mild tricompartmental marginal  osteophytosis.  Normal alignment, joint spaces otherwise preserved.  Mild  diffuse osteopenia.  No evidence of knee joint effusion.  Mild quadriceps  enthesopathy.  Visualized soft tissues otherwise unremarkable.

## 2017-07-02 NOTE — ED TRIAGE NOTES
Patient here with c/o right knee and upper calf pain since this Am Reports it as a \"catch\". Pain worse with attempting to straighten leg. Hx of arterial occulusion in same leg. Recently, 6/15/17, rode in car from [place] back home to [place]. Takes coumadin r/t afib, artifical heart valve, etc.  Patient also admits to pain at site of embolectomy done earlier this year.

## 2017-07-02 NOTE — ED NOTES
I have reviewed medications, follow up provider options, and discharge instructions with the patient. The patient verbalized understanding. Copy of discharge information given to patient upon discharge. Patient discharged in no distress. Patient taken to waiting area via wheelchair.  No questions at this time

## 2017-07-02 NOTE — DISCHARGE INSTRUCTIONS
Joint Pain: Care Instructions  Your Care Instructions  Many people have small aches and pains from overuse or injury to muscles and joints. Joint injuries often happen during sports or recreation, work tasks, or projects around the home. An overuse injury can happen when you put too much stress on a joint or when you do an activity that stresses the joint over and over, such as using the computer or rowing a boat. You can take action at home to help your muscles and joints get better. You should feel better in 1 to 2 weeks, but it can take 3 months or more to heal completely. Follow-up care is a key part of your treatment and safety. Be sure to make and go to all appointments, and call your doctor if you are having problems. It's also a good idea to know your test results and keep a list of the medicines you take. How can you care for yourself at home? · Do not put weight on the injured joint for at least a day or two. · For the first day or two after an injury, do not take hot showers or baths, and do not use hot packs. The heat could make swelling worse. · Put ice or a cold pack on the sore joint for 10 to 20 minutes at a time. Try to do this every 1 to 2 hours for the next 3 days (when you are awake) or until the swelling goes down. Put a thin cloth between the ice and your skin. · Wrap the injury in an elastic bandage. Do not wrap it too tightly because this can cause more swelling. · Prop up the sore joint on a pillow when you ice it or anytime you sit or lie down during the next 3 days. Try to keep it above the level of your heart. This will help reduce swelling. · Take an over-the-counter pain medicine, such as acetaminophen (Tylenol), ibuprofen (Advil, Motrin), or naproxen (Aleve). Read and follow all instructions on the label. · After 1 or 2 days of rest, begin moving the joint gently.  While the joint is still healing, you can begin to exercise using activities that do not strain or hurt the painful joint. When should you call for help? Call your doctor now or seek immediate medical care if:  · You have signs of infection, such as:  ¨ Increased pain, swelling, warmth, and redness. ¨ Red streaks leading from the joint. ¨ A fever. Watch closely for changes in your health, and be sure to contact your doctor if:  · Your movement or symptoms are not getting better after 1 to 2 weeks of home treatment. Where can you learn more? Go to http://onesimo-dasia.info/. Enter P205 in the search box to learn more about \"Joint Pain: Care Instructions. \"  Current as of: March 21, 2017  Content Version: 11.3  © 6108-6266 [employer]. Care instructions adapted under license by [employer] (which disclaims liability or warranty for this information). If you have questions about a medical condition or this instruction, always ask your healthcare professional. [place] [address] any warranty or liability for your use of this information. Musculoskeletal Pain: Care Instructions  Your Care Instructions  Different problems with the bones, muscles, nerves, ligaments, and tendons in the body can cause pain. One or more areas of your body may ache or burn. Or they may feel tired, stiff, or sore. The medical term for this type of pain is musculoskeletal pain. It can have many different causes. Sometimes the pain is caused by an injury such as a strain or sprain. Or you might have pain from using one part of your body in the same way over and over again. This is called overuse. In some cases, the cause of the pain is another health problem such as arthritis or fibromyalgia. The doctor will examine you and ask you questions about your health to help find the cause of your pain. Blood tests or imaging tests like an X-ray may also be helpful. But sometimes doctors can't find a cause of the pain.   Treatment depends on your symptoms and the cause of the pain, if known.  The doctor has checked you carefully, but problems can develop later. If you notice any problems or new symptoms, get medical treatment right away. Follow-up care is a key part of your treatment and safety. Be sure to make and go to all appointments, and call your doctor if you are having problems. It's also a good idea to know your test results and keep a list of the medicines you take. How can you care for yourself at home? · Rest until you feel better. · Do not do anything that makes the pain worse. Return to exercise gradually if you feel better and your doctor says it's okay. · Be safe with medicines. Read and follow all instructions on the label. ¨ If the doctor gave you a prescription medicine for pain, take it as prescribed. ¨ If you are not taking a prescription pain medicine, ask your doctor if you can take an over-the-counter medicine. · Put ice or a cold pack on the area for 10 to 20 minutes at a time to ease pain. Put a thin cloth between the ice and your skin. When should you call for help? Call your doctor now or seek immediate medical care if:  · You have new pain, or your pain gets worse. · You have new symptoms such as a fever, a rash, or chills. Watch closely for changes in your health, and be sure to contact your doctor if:  · You do not get better as expected. Where can you learn more? Go to http://onesimo-dasia.info/. Enter V522 in the search box to learn more about \"Musculoskeletal Pain: Care Instructions. \"  Current as of: October 14, 2016  Content Version: 11.3  © 5374-3314 Rev. Care instructions adapted under license by [employer] (which disclaims liability or warranty for this information). If you have questions about a medical condition or this instruction, always ask your healthcare professional. [address] any warranty or liability for your use of this information.

## 2017-07-03 ENCOUNTER — PATIENT OUTREACH (OUTPATIENT)
Dept: CASE MANAGEMENT | Age: 77
End: 2017-07-03

## 2017-07-03 NOTE — PROGRESS NOTES
This note will not be viewable in [address] Patient engaged with RN case manager, Cheryl Van. Will forward chart.

## 2017-07-03 NOTE — PROGRESS NOTES
This note will not be viewable in [address] RNCM attempted to reach pt for ED TOM and CCM f/u. Pt with ED visit for knee pain on 7/2/17 needing PCP f/u. RNCM will continue attempts to reach pt.

## 2017-07-05 ENCOUNTER — PATIENT OUTREACH (OUTPATIENT)
Dept: CASE MANAGEMENT | Age: 77
End: 2017-07-05

## 2017-07-05 NOTE — PROGRESS NOTES
This note will not be viewable in [address] Care Needs: RNCM attempted to reach pt for f/u CCM services, and TOM from ED for knee pain. RNCM left 2nd  message with pt requesting to return call to Eugene Bennett, contact information provided. RNCM also spoke briefly with son who stated he was is presently working, and requested to return call to Eugene Bennett . RNCM will continue attempts to reach pt.

## 2017-07-07 ENCOUNTER — PATIENT OUTREACH (OUTPATIENT)
Dept: CASE MANAGEMENT | Age: 77
End: 2017-07-07

## 2017-07-07 NOTE — PROGRESS NOTES
This note will not be viewable in [address] ED Transition of Care Discharge Follow-up Questionnaire   Date/Time of Call:   7/7/17  1630   What was the patient seen in the ED for? knee pain   Does the patient understand his/her diagnosis and/or treatment and what happened during the ED visit? Yes; no questions   Did the patient receive discharge instructions from the ED? Yes   Review any discharge instructions (see notes in [facility]). Ask patient if they understand these. Do they have any questions? Pt verbalizes understanding, no questions. Were home services ordered (nursing, PT, OT, ST, etc.)? No but pt would like to receive PT services. Will request at PCP appt on Monday 7/10. If so, has the first visit occurred? If not, why? (Assist with coordination of services if necessary.)  Yes     Was any DME ordered? No new DME. Pt is currently on 2.5-3ltrs. If so, has it been received? If not, why?  (Assist with coordination of arranging DME orders if necessary. )   NA   Complete a review of all medications (new, continued and discontinued meds per the D/C instructions and medication tab in [facility]). Review of medications completed. No new rxs. Pt says extrastrength Tylenol not helping knee pain but doesnt want any new meds. Were all new prescriptions filled? If not, why?  (Assist with obtainment of medications if necessary. )   NA   Does the patient understand the purpose and dosing instructions for all medications? (If patient has questions, provide explanation and education.)  Yes    Does the patient have any problems in performing ADLs? (If patient is unable to perform ADLs  what is the limiting factor(s)? Do they have a support system that can assist? If no support system is present, discuss possible assistance that they may be able to obtain.) Pt is independent for ADLs but gets SOB easily. RNCM discussed options for hired caregivers and personal emergency response systems. Does the patient have all follow-up appointments scheduled? Has transportation been arranged? [facility] follow-up should be within 7 days of discharge; all others should have PCP follow-up within 7 days of discharge; follow-ups with other specialists as appropriate or ordered.) f/u Dr. Eric Matias  7/10/17 at [address] [place] [address] will provide transportation. Any other questions or concerns expressed by the patient?  no   Schedule next appointment with CHAPITO JESSICA Coordinator as appropriate or refer to RN Case Manager/  as appropriate. RNCM scheduled f/u 7/11/17.     TOM Call Completed By: Ten Chase, RN, BSN, CCM

## 2017-07-21 ENCOUNTER — PATIENT OUTREACH (OUTPATIENT)
Dept: CASE MANAGEMENT | Age: 77
End: 2017-07-21

## 2017-07-21 NOTE — PROGRESS NOTES
This note will not be viewable in [address] RNCM attempted to reach for f/u CCM services, RNCM left  with contact information. Will continue attempts to reach pt.

## 2017-07-24 ENCOUNTER — APPOINTMENT (OUTPATIENT)
Dept: PHYSICAL THERAPY | Age: 77
End: 2017-07-24
Attending: INTERNAL MEDICINE

## 2017-08-16 ENCOUNTER — PATIENT OUTREACH (OUTPATIENT)
Dept: CASE MANAGEMENT | Age: 77
End: 2017-08-16

## 2017-08-16 NOTE — PROGRESS NOTES
This note will not be viewable in [address] Pt reports she is having a hard time breathing right now with SOB ambulating from room to room. Pt reports clear thick phlegm and left leg swelling that is not new. Pt has not been outside yesterday or today, and her air condition is running well. RNCM reviewed pt medications. Pt states Xopenex treatment made her feel like she was climbing the walls so her son will not administer to her. She is using her nose spray, and mucinex. RNCM attempted to reach PCP office while on phone with pt, however office closed at noon today. Pt performed breathing exercises through her nose, and laid down while on the phone with RNCM. RNCM encouraged pt to seek emergency assistance if needed, and also prayed with pt on phone. Pt is to contact PCP office in am to report symptoms and request appt. Pt was breathing better by the closing of our conversation. Next Steps: Rehana Russell will f/u with pt for outcome of appt. Pt is to contact RNCM for issues or concerns.

## 2017-09-01 ENCOUNTER — PATIENT OUTREACH (OUTPATIENT)
Dept: CASE MANAGEMENT | Age: 77
End: 2017-09-01

## 2017-09-01 NOTE — PROGRESS NOTES
This note will not be viewable in [address] Care Needs: RNCM spoke with pt regarding [facility] services. Pt reports her breathing has improved since using nebulizer treatments. She reports she is using ½ the dose due to it making her feel like she was climbing the [facility] [place]. Pt believes she will have breathing problems the rest of her life. She says she has had some discussion with her son about pacing her activities because he tries to get her to do exercise. RNCM reviewed signs and symptoms to reports to physician. RNCM encouraged pt to report at first observance to prevent emergent episodes. Pt verbalizes understanding. Pt reports her tempt normally runs on lower side of 96 to 97. Her son Priscilla Petersen manages her medications using a pill planner, and ensures she is in compliance with medications. Her other son also checks on her 3-4 days/wk. She says she is broken out on her shoulder, top of her breast, and edge of her hair and it is extremely itchy. Pt is using cream for dry skin and reports it is helping the itch. Pt states she has not had shingles before but wonders if the rash is shingles. RNCM explained signs and symptoms of shingles and encouraged pt to have rash examined. Pt reports she was using a new skin cleanser and thinks rash may be due to cleanser. She has since quit using the cleanser. Next Steps: Sophia will graduate pt at this time. Pt is to contact RNCM for issues or concerns.

## 2017-10-12 ENCOUNTER — HOSPITAL ENCOUNTER (OUTPATIENT)
Dept: LAB | Age: 77
Discharge: HOME | End: 2017-10-12
Payer: MEDICARE

## 2017-10-12 ENCOUNTER — HOSPITAL ENCOUNTER (EMERGENCY)
Age: 77
Discharge: HOME | End: 2017-10-12
Attending: EMERGENCY MEDICINE
Payer: MEDICARE

## 2017-10-12 ENCOUNTER — APPOINTMENT (OUTPATIENT)
Dept: GENERAL RADIOLOGY | Age: 77
End: 2017-10-12
Attending: STUDENT IN AN ORGANIZED HEALTH CARE EDUCATION/TRAINING PROGRAM
Payer: MEDICARE

## 2017-10-12 VITALS
BODY MASS INDEX: 34.36 KG/M2 | RESPIRATION RATE: 20 BRPM | WEIGHT: 175 LBS | TEMPERATURE: 98 F | SYSTOLIC BLOOD PRESSURE: 106 MMHG | HEIGHT: 60 IN | HEART RATE: 82 BPM | OXYGEN SATURATION: 99 % | DIASTOLIC BLOOD PRESSURE: 54 MMHG

## 2017-10-12 DIAGNOSIS — D50.0 IRON DEFICIENCY ANEMIA DUE TO CHRONIC BLOOD LOSS: ICD-10-CM

## 2017-10-12 DIAGNOSIS — I50.23 ACUTE ON CHRONIC SYSTOLIC HEART FAILURE DUE TO VALVULAR DISEASE (HCC): Primary | ICD-10-CM

## 2017-10-12 DIAGNOSIS — D64.9 ANEMIA, UNSPECIFIED TYPE: ICD-10-CM

## 2017-10-12 DIAGNOSIS — I38 ACUTE ON CHRONIC SYSTOLIC HEART FAILURE DUE TO VALVULAR DISEASE (HCC): Primary | ICD-10-CM

## 2017-10-12 DIAGNOSIS — I50.32 CHRONIC DIASTOLIC CONGESTIVE HEART FAILURE (HCC): Chronic | ICD-10-CM

## 2017-10-12 LAB
ALBUMIN SERPL-MCNC: 2.6 G/DL (ref 3.2–4.6)
ALBUMIN/GLOB SERPL: 0.9 {RATIO} (ref 1.2–3.5)
ALP SERPL-CCNC: 85 U/L (ref 50–136)
ALT SERPL-CCNC: 20 U/L (ref 12–65)
ANION GAP SERPL CALC-SCNC: 8 MMOL/L (ref 7–16)
AST SERPL-CCNC: 41 U/L (ref 15–37)
ATRIAL RATE: 81 BPM
BASOPHILS # BLD: 0 K/UL (ref 0–0.2)
BASOPHILS # BLD: 0 K/UL (ref 0–0.2)
BASOPHILS NFR BLD: 1 % (ref 0–2)
BASOPHILS NFR BLD: 1 % (ref 0–2)
BILIRUB SERPL-MCNC: 1 MG/DL (ref 0.2–1.1)
BNP SERPL-MCNC: 179 PG/ML
BUN SERPL-MCNC: 20 MG/DL (ref 8–23)
CALCIUM SERPL-MCNC: 8.9 MG/DL (ref 8.3–10.4)
CALCULATED R AXIS, ECG10: -71 DEGREES
CALCULATED T AXIS, ECG11: 100 DEGREES
CHLORIDE SERPL-SCNC: 102 MMOL/L (ref 98–107)
CO2 SERPL-SCNC: 34 MMOL/L (ref 21–32)
CREAT SERPL-MCNC: 1.15 MG/DL (ref 0.6–1)
DIAGNOSIS, 93000: NORMAL
DIFFERENTIAL METHOD BLD: ABNORMAL
DIFFERENTIAL METHOD BLD: ABNORMAL
EOSINOPHIL # BLD: 0.1 K/UL (ref 0–0.8)
EOSINOPHIL # BLD: 0.1 K/UL (ref 0–0.8)
EOSINOPHIL NFR BLD: 2 % (ref 0.5–7.8)
EOSINOPHIL NFR BLD: 2 % (ref 0.5–7.8)
ERYTHROCYTE [DISTWIDTH] IN BLOOD BY AUTOMATED COUNT: 15 % (ref 11.9–14.6)
ERYTHROCYTE [DISTWIDTH] IN BLOOD BY AUTOMATED COUNT: 15.6 % (ref 11.9–14.6)
FERRITIN SERPL-MCNC: 83 NG/ML (ref 8–388)
GLOBULIN SER CALC-MCNC: 3 G/DL (ref 2.3–3.5)
GLUCOSE SERPL-MCNC: 86 MG/DL (ref 65–100)
HCT VFR BLD AUTO: 26 % (ref 35.8–46.3)
HCT VFR BLD AUTO: 26.1 % (ref 35.8–46.3)
HGB BLD-MCNC: 7.7 G/DL (ref 11.7–15.4)
HGB BLD-MCNC: 7.9 G/DL (ref 11.7–15.4)
IMM GRANULOCYTES # BLD: 0 K/UL (ref 0–0.5)
IMM GRANULOCYTES NFR BLD: 0 % (ref 0–5)
INR PPP: 2.3 (ref 0.9–1.2)
IRON SATN MFR SERPL: 11 %
IRON SERPL-MCNC: 47 UG/DL (ref 35–150)
LYMPHOCYTES # BLD: 0.4 K/UL (ref 0.5–4.6)
LYMPHOCYTES # BLD: 0.4 K/UL (ref 0.5–4.6)
LYMPHOCYTES NFR BLD: 8 % (ref 13–44)
LYMPHOCYTES NFR BLD: 9 % (ref 13–44)
MCH RBC QN AUTO: 29.2 PG (ref 26.1–32.9)
MCH RBC QN AUTO: 29.7 PG (ref 26.1–32.9)
MCHC RBC AUTO-ENTMCNC: 29.6 G/DL (ref 31.4–35)
MCHC RBC AUTO-ENTMCNC: 30.3 G/DL (ref 31.4–35)
MCV RBC AUTO: 100.4 FL (ref 79.6–97.8)
MCV RBC AUTO: 96.3 FL (ref 79.6–97.8)
MONOCYTES # BLD: 0.2 K/UL (ref 0.1–1.3)
MONOCYTES # BLD: 0.2 K/UL (ref 0.1–1.3)
MONOCYTES NFR BLD: 4 % (ref 4–12)
MONOCYTES NFR BLD: 5 % (ref 4–12)
NEUTS SEG # BLD: 3.6 K/UL (ref 1.7–8.2)
NEUTS SEG # BLD: 3.7 K/UL (ref 1.7–8.2)
NEUTS SEG NFR BLD: 84 % (ref 43–78)
NEUTS SEG NFR BLD: 84 % (ref 43–78)
NRBC # BLD: 0 K/UL (ref 0–0.2)
NRBC BLD-RTO: 0 PER 100 WBC (ref 0–2)
PLATELET # BLD AUTO: 113 K/UL (ref 150–450)
PLATELET # BLD AUTO: 167 K/UL (ref 150–450)
PMV BLD AUTO: 11.3 FL (ref 10.8–14.1)
PMV BLD AUTO: 11.4 FL (ref 10.8–14.1)
POTASSIUM SERPL-SCNC: 3.6 MMOL/L (ref 3.5–5.1)
PROT SERPL-MCNC: 5.6 G/DL (ref 6.3–8.2)
PROTHROMBIN TIME: 25.3 SEC (ref 9.6–12)
Q-T INTERVAL, ECG07: 472 MS
QRS DURATION, ECG06: 180 MS
QTC CALCULATION (BEZET), ECG08: 557 MS
RBC # BLD AUTO: 2.59 M/UL (ref 4.05–5.25)
RBC # BLD AUTO: 2.71 M/UL (ref 4.05–5.25)
SODIUM SERPL-SCNC: 144 MMOL/L (ref 136–145)
TIBC SERPL-MCNC: 436 UG/DL (ref 250–450)
VENTRICULAR RATE, ECG03: 84 BPM
WBC # BLD AUTO: 4.2 K/UL (ref 4.3–11.1)
WBC # BLD AUTO: 4.4 K/UL (ref 4.3–11.1)

## 2017-10-12 PROCEDURE — 71010 XR CHEST PORT: CPT

## 2017-10-12 PROCEDURE — 86923 COMPATIBILITY TEST ELECTRIC: CPT | Performed by: STUDENT IN AN ORGANIZED HEALTH CARE EDUCATION/TRAINING PROGRAM

## 2017-10-12 PROCEDURE — 85610 PROTHROMBIN TIME: CPT | Performed by: STUDENT IN AN ORGANIZED HEALTH CARE EDUCATION/TRAINING PROGRAM

## 2017-10-12 PROCEDURE — 86900 BLOOD TYPING SEROLOGIC ABO: CPT | Performed by: STUDENT IN AN ORGANIZED HEALTH CARE EDUCATION/TRAINING PROGRAM

## 2017-10-12 PROCEDURE — 36430 TRANSFUSION BLD/BLD COMPNT: CPT

## 2017-10-12 PROCEDURE — 99285 EMERGENCY DEPT VISIT HI MDM: CPT | Performed by: EMERGENCY MEDICINE

## 2017-10-12 PROCEDURE — 74011000250 HC RX REV CODE- 250: Performed by: EMERGENCY MEDICINE

## 2017-10-12 PROCEDURE — P9016 RBC LEUKOCYTES REDUCED: HCPCS | Performed by: STUDENT IN AN ORGANIZED HEALTH CARE EDUCATION/TRAINING PROGRAM

## 2017-10-12 PROCEDURE — 94640 AIRWAY INHALATION TREATMENT: CPT

## 2017-10-12 PROCEDURE — 85025 COMPLETE CBC W/AUTO DIFF WBC: CPT | Performed by: STUDENT IN AN ORGANIZED HEALTH CARE EDUCATION/TRAINING PROGRAM

## 2017-10-12 PROCEDURE — 96361 HYDRATE IV INFUSION ADD-ON: CPT | Performed by: EMERGENCY MEDICINE

## 2017-10-12 PROCEDURE — 80053 COMPREHEN METABOLIC PANEL: CPT | Performed by: STUDENT IN AN ORGANIZED HEALTH CARE EDUCATION/TRAINING PROGRAM

## 2017-10-12 PROCEDURE — 83880 ASSAY OF NATRIURETIC PEPTIDE: CPT | Performed by: STUDENT IN AN ORGANIZED HEALTH CARE EDUCATION/TRAINING PROGRAM

## 2017-10-12 PROCEDURE — 96374 THER/PROPH/DIAG INJ IV PUSH: CPT | Performed by: EMERGENCY MEDICINE

## 2017-10-12 PROCEDURE — 93005 ELECTROCARDIOGRAM TRACING: CPT | Performed by: STUDENT IN AN ORGANIZED HEALTH CARE EDUCATION/TRAINING PROGRAM

## 2017-10-12 PROCEDURE — 74011250636 HC RX REV CODE- 250/636: Performed by: EMERGENCY MEDICINE

## 2017-10-12 RX ORDER — SODIUM CHLORIDE 9 MG/ML
250 INJECTION, SOLUTION INTRAVENOUS AS NEEDED
Status: DISCONTINUED | OUTPATIENT
Start: 2017-10-12 | End: 2017-10-13 | Stop reason: HOSPADM

## 2017-10-12 RX ORDER — SODIUM CHLORIDE 0.9 % (FLUSH) 0.9 %
5-10 SYRINGE (ML) INJECTION AS NEEDED
Status: DISCONTINUED | OUTPATIENT
Start: 2017-10-12 | End: 2017-10-13 | Stop reason: HOSPADM

## 2017-10-12 RX ORDER — FUROSEMIDE 10 MG/ML
40 INJECTION INTRAMUSCULAR; INTRAVENOUS
Status: COMPLETED | OUTPATIENT
Start: 2017-10-12 | End: 2017-10-12

## 2017-10-12 RX ORDER — IPRATROPIUM BROMIDE AND ALBUTEROL SULFATE 2.5; .5 MG/3ML; MG/3ML
3 SOLUTION RESPIRATORY (INHALATION)
Status: COMPLETED | OUTPATIENT
Start: 2017-10-12 | End: 2017-10-12

## 2017-10-12 RX ORDER — SODIUM CHLORIDE 0.9 % (FLUSH) 0.9 %
5-10 SYRINGE (ML) INJECTION EVERY 8 HOURS
Status: DISCONTINUED | OUTPATIENT
Start: 2017-10-12 | End: 2017-10-13 | Stop reason: HOSPADM

## 2017-10-12 RX ADMIN — IPRATROPIUM BROMIDE AND ALBUTEROL SULFATE 3 ML: .5; 3 SOLUTION RESPIRATORY (INHALATION) at 17:55

## 2017-10-12 RX ADMIN — FUROSEMIDE 40 MG: 10 INJECTION, SOLUTION INTRAMUSCULAR; INTRAVENOUS at 21:47

## 2017-10-12 RX ADMIN — SODIUM CHLORIDE 250 ML: 9 INJECTION, SOLUTION INTRAVENOUS at 18:24

## 2017-10-12 NOTE — ED TRIAGE NOTES
Patient sent from hematologist office due to hgb of 7.7 with shortness of breath, weakness, and back and flank pain.

## 2017-10-12 NOTE — ED PROVIDER NOTES
HPI Comments: Per nurses notes: \"Patient sent from hematologist office due to hgb of 7.7 with shortness of breath, weakness, and back and flank pain. \"   Originally was going to schedule transfusion for tomorrow, but due to suspected fluid overload as well, sent to ED. Patient is a 69 y.o. female presenting with abnormal lab results. The history is provided by the patient and a relative. Abnormal Lab Results   This is a new problem. The current episode started 3 to 5 hours ago. The problem occurs constantly. The problem has not changed since onset. Associated symptoms include shortness of breath. Pertinent negatives include no chest pain, no abdominal pain and no headaches. The symptoms are aggravated by walking and exertion. Nothing relieves the symptoms. She has tried nothing for the symptoms. The treatment provided no relief.         Past Medical History:   Diagnosis Date    Abnormal glucose 8/5/2016    Abscess 8/5/2016    Acute encephalopathy 7/8/2016    Acute renal failure ([address] 12/3/2009    Afib ([address]     Anemia     Ankle swelling 8/5/2016    Anxiety 8/5/2016    Aortic Valve Bioprosthesis Present 3/7/2009    ARF (acute renal failure) ([address] 2/24/2010    Arthritis     Asthma     Atopic dermatitis 8/5/2016    Atrial fibrillation (HCC) 3/7/2009    Autonomic orthostatic hypotension 8/5/2016    AV block 10/17/2011    Back pain 8/5/2016    Bradycardia (Symptomatic) 11/7/2011    CAD (coronary artery disease)     Cancer (HCC) 1990    breast (left)    Cardiac pacemaker 11/2/2015    Cardiogenic shock ([address] 10/17/2011    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    Cellulitis 8/5/2016    Chest pain 8/5/2016    Chronic atrial fibrillation ([address] 10/17/2011    Chronic depression 8/5/2016    Chronic depression 8/5/2016    Chronic kidney disease     Chronic obstructive pulmonary disease (HCC) 3/8/2009    Chronic pain     CKD (chronic kidney disease) stage 3, GFR 30-59 ml/min 12/4/2009    Congestive heart failure (CHF) ([address] 11/2/2015    COPD     O2 AT 3 L NC    CRI (chronic renal insufficiency) 8/5/2016    Degeneration of cervical intervertebral disc 8/5/2016    Degenerative arthritis of left knee 2/27/2009    Dehydration 5/3/2276    Diastolic heart failure (HCC)     Digoxin toxicity 2/24/2010    Disorder of sweat glands 8/5/2016    Diverticulosis of large intestine without diverticulitis 2015    Dyspnea 8/5/2016    Eczema 8/5/2016    Epigastric abdominal pain 2/24/2010    GERD (gastroesophageal reflux disease)     Gout 8/5/2016    H/O mitral valve repair, 2003 6/27/2016    Heart failure ([address]     HTN (hypertension) 8/5/2016    Hx of colonic polyp 2015    adenoma    Hyperkalemia 10/17/2011    Hyperlipidemia 8/5/2016    Hypertension     Hypokalemia 2/24/2010    Hypothyroidism 12/4/2009    Ill-defined condition     CARPEL TUNNEL SYNDROME, BILAT HANDS    Knee pain 8/5/2016    Leg cramps 8/5/2016    Mitral stenosis with insufficiency 11/2/2015    Prior MV repair 2003.     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    Rheumatic heart disease 11/2/2015    Right hip pain 8/5/2016    RLS (restless legs syndrome) 8/5/2016    Sick sinus syndrome ([address] 2/13/2016    Skin infection 8/5/2016    Sleep apnea 11/2/2015    Tachycardia 6/27/2016    Thrombocytopenia, unspecified 8/22/2012    Thromboembolus ([address]     02/2017    Thyroid disease     Urticaria 8/5/2016    Vertigo 8/5/2016       Past Surgical History:   Procedure Laterality Date    CARDIAC SURG PROCEDURE UNLIST      valve repair and replacement    CHEST SURGERY PROCEDURE UNLISTED      HX APPENDECTOMY      HX BREAST RECONSTRUCTION      HX CHOLECYSTECTOMY  2005    HX GYN  1981    hysterectomy still have ovaries    HX MASTECTOMY      left mastectomy    HX ORTHOPAEDIC      knee surgery    HX PACEMAKER      VASCULAR SURGERY PROCEDURE UNLIST Right 2017    LE thrombectomy         Family History:   Problem Relation Age of Onset    Heart Disease Mother     Cancer Father      Throat    Heart Disease Father     Cancer Sister      Breast, Colon    Heart Disease Sister     Breast Cancer Sister 79      from disease    Cancer Maternal Grandmother     Cancer Brother     Diabetes Brother     Heart Disease Brother     Psychiatric Disorder Paternal Grandfather     Cancer Maternal Aunt      Breast    Diabetes Son     Alcohol abuse Neg Hx     Arthritis-rheumatoid Neg Hx     Asthma Neg Hx     Bleeding Prob Neg Hx     Elevated Lipids Neg Hx     Headache Neg Hx     Migraines Neg Hx     Hypertension Neg Hx     Lung Disease Neg Hx     Mental Retardation Neg Hx     Stroke Neg Hx        Social History     Social History    Marital status:      Spouse name: N/A    Number of children: N/A    Years of education: N/A     Occupational History    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    [employer]      6 years     Social History Main Topics    Smoking status: Former Smoker     Types: Cigarettes     Quit date: 1996    Smokeless tobacco: Former User      Comment: quit     Alcohol use No    Drug use: No    Sexual activity: Not Currently     Other Topics Concern    Not on file     Social History Narrative         ALLERGIES: Adhesive tape; Benadryl [diphenhydramine hcl]; Demerol [meperidine]; Morphine; Sulfa (sulfonamide antibiotics); and Lorazepam    Review of Systems   Constitutional: Negative for chills and fever. Respiratory: Positive for shortness of breath. Cardiovascular: Negative for chest pain. Gastrointestinal: Negative for abdominal pain. Neurological: Positive for dizziness (comes and goes for a long time). Negative for headaches. All other systems reviewed and are negative. Vitals:    10/12/17 1403 10/12/17 1556   BP: 118/58 124/63   Pulse: 81 82   Resp: 20 18   Temp: 97.8 °F (36.6 °C)    SpO2: 100% 100%   Weight: 79.4 kg (175 lb)    Height: 5' (1.524 m)             Physical Exam   Constitutional: She is oriented to person, place, and time. She appears well-developed and well-nourished. She appears distressed (mild). HENT:   Head: Normocephalic and atraumatic. Right Ear: Tympanic membrane and external ear normal.   Left Ear: Tympanic membrane and external ear normal.   Mouth/Throat: Oropharynx is clear and moist.   Eyes: Conjunctivae and EOM are normal. Pupils are equal, round, and reactive to light. Neck: Normal range of motion. Neck supple. No tracheal deviation present. Cardiovascular: Normal rate, regular rhythm, normal heart sounds and intact distal pulses. Exam reveals no gallop and no friction rub. No murmur heard. Pulmonary/Chest: Effort normal. Tachypnea noted. No respiratory distress. She has no decreased breath sounds. She has no wheezes. She has no rhonchi. She has rales (mild at bases). Abdominal: Soft. Bowel sounds are normal. She exhibits no distension and no mass. There is no hepatosplenomegaly. There is no tenderness. There is no rebound and no guarding. Musculoskeletal: Normal range of motion. She exhibits edema (1+ bilat LE). Lymphadenopathy:     She has no cervical adenopathy. Neurological: She is alert and oriented to person, place, and time. She displays normal reflexes. No cranial nerve deficit. Skin: Skin is warm and dry. No rash noted. She is not diaphoretic. No erythema. Psychiatric: She has a normal mood and affect. Nursing note and vitals reviewed.        MDM  Number of Diagnoses or Management Options  Acute on chronic systolic heart failure due to valvular disease [facility]): established and worsening  Anemia, unspecified type: established and worsening     Amount and/or Complexity of Data Reviewed  Clinical lab tests: ordered and reviewed  Tests in the radiology section of CPT®: ordered and reviewed  Decide to obtain previous medical records or to obtain history from someone other than the patient: yes  Obtain history from someone other than the patient: yes  Review and summarize past medical records: yes  Independent visualization of images, tracings, or specimens: yes    Risk of Complications, Morbidity, and/or Mortality  Presenting problems: high  Diagnostic procedures: moderate  Management options: moderate    Patient Progress  Patient progress: improved    ED Course       Procedures      The patient was observed in the ED. Patient received one unit of blood without incident or complication. Results Reviewed:      Recent Results (from the past 24 hour(s))   AMB POC PT/INR    Collection Time: 10/12/17 10:03 AM   Result Value Ref Range    VALID INTERNAL CONTROL POC Yes     Prothrombin time (POC)  seconds    INR POC 2.7    CBC WITH AUTOMATED DIFF    Collection Time: 10/12/17 11:08 AM   Result Value Ref Range    WBC 4.4 4.3 - 11.1 K/uL    RBC 2.59 (L) 4.05 - 5.25 M/uL    HGB 7.7 (L) 11.7 - 15.4 g/dL    HCT 26.0 (L) 35.8 - 46.3 %    .4 (H) 79.6 - 97.8 FL    MCH 29.7 26.1 - 32.9 PG    MCHC 29.6 (L) 31.4 - 35.0 g/dL    RDW 15.0 (H) 11.9 - 14.6 %    PLATELET 854 (L) 235 - 450 K/uL    MPV 11.4 10.8 - 14.1 FL    ABSOLUTE NRBC 0.00 0.0 - 0.2 K/uL    DF AUTOMATED      NEUTROPHILS 84 (H) 43 - 78 %    LYMPHOCYTES 8 (L) 13 - 44 %    MONOCYTES 5 4.0 - 12.0 %    EOSINOPHILS 2 0.5 - 7.8 %    BASOPHILS 1 0.0 - 2.0 %    ABS. NEUTROPHILS 3.7 1.7 - 8.2 K/UL    ABS. LYMPHOCYTES 0.4 (L) 0.5 - 4.6 K/UL    ABS. MONOCYTES 0.2 0.1 - 1.3 K/UL    ABS. EOSINOPHILS 0.1 0.0 - 0.8 K/UL    ABS.  BASOPHILS 0.0 0.0 - 0.2 K/UL    NRBC 0.0 0.0 - 2.0  WBC   FERRITIN    Collection Time: 10/12/17 11:08 AM   Result Value Ref Range    Ferritin 83 8 - 388 NG/ML   TRANSFERRIN SATURATION    Collection Time: 10/12/17 11:08 AM   Result Value Ref Range    Iron 47 35 - 150 ug/dL    TIBC 436 250 - 450 ug/dL    Transferrin Saturation 11 (L) >20 %   PATHOLOGIST REVIEW SMEARS    Collection Time: 10/12/17 11:08 AM   Result Value Ref Range    PATHOLOGIST REVIEW PENDING    EKG, 12 LEAD, INITIAL    Collection Time: 10/12/17  2:14 PM   Result Value Ref Range    Ventricular Rate 84 BPM    Atrial Rate 81 BPM    QRS Duration 180 ms    Q-T Interval 472 ms    QTC Calculation (Bezet) 557 ms    Calculated R Axis -71 degrees    Calculated T Axis 100 degrees    Diagnosis       Ventricular-paced rhythm with occasional Premature ventricular complexes  Abnormal ECG  When compared with ECG of 12-FEB-2017 14:48,  Electronic ventricular pacemaker has replaced Atrial fibrillation  Confirmed by [facility] ROD MD (), RAMONA COLE ([id number]) on 10/12/2017 8:14:34 PM     CBC WITH AUTOMATED DIFF    Collection Time: 10/12/17  2:45 PM   Result Value Ref Range    WBC 4.2 (L) 4.3 - 11.1 K/uL    RBC 2.71 (L) 4.05 - 5.25 M/uL    HGB 7.9 (L) 11.7 - 15.4 g/dL    HCT 26.1 (L) 35.8 - 46.3 %    MCV 96.3 79.6 - 97.8 FL    MCH 29.2 26.1 - 32.9 PG    MCHC 30.3 (L) 31.4 - 35.0 g/dL    RDW 15.6 (H) 11.9 - 14.6 %    PLATELET 189 329 - 399 K/uL    MPV 11.3 10.8 - 14.1 FL    DF AUTOMATED      NEUTROPHILS 84 (H) 43 - 78 %    LYMPHOCYTES 9 (L) 13 - 44 %    MONOCYTES 4 4.0 - 12.0 %    EOSINOPHILS 2 0.5 - 7.8 %    BASOPHILS 1 0.0 - 2.0 %    IMMATURE GRANULOCYTES 0.0 0.0 - 5.0 %    ABS. NEUTROPHILS 3.6 1.7 - 8.2 K/UL    ABS. LYMPHOCYTES 0.4 (L) 0.5 - 4.6 K/UL    ABS. MONOCYTES 0.2 0.1 - 1.3 K/UL    ABS. EOSINOPHILS 0.1 0.0 - 0.8 K/UL    ABS. BASOPHILS 0.0 0.0 - 0.2 K/UL    ABS. IMM.  GRANS. 0.0 0.0 - 0.5 K/UL   METABOLIC PANEL, COMPREHENSIVE    Collection Time: 10/12/17  2:45 PM   Result Value Ref Range    Sodium 144 136 - 145 mmol/L    Potassium 3.6 3.5 - 5.1 mmol/L    Chloride 102 98 - 107 mmol/L    CO2 34 (H) 21 - 32 mmol/L    Anion gap 8 7 - 16 mmol/L    Glucose 86 65 - 100 mg/dL    BUN 20 8 - 23 MG/DL    Creatinine 1.15 (H) 0.6 - 1.0 MG/DL    GFR est AA 59 (L) >60 ml/min/1.73m2    GFR est non-AA 49 (L) >60 ml/min/1.73m2    Calcium 8.9 8.3 - 10.4 MG/DL    Bilirubin, total 1.0 0.2 - 1.1 MG/DL    ALT (SGPT) 20 12 - 65 U/L    AST (SGOT) 41 (H) 15 - 37 U/L    Alk. phosphatase 85 50 - 136 U/L    Protein, total 5.6 (L) 6.3 - 8.2 g/dL    Albumin 2.6 (L) 3.2 - 4.6 g/dL    Globulin 3.0 2.3 - 3.5 g/dL    A-G Ratio 0.9 (L) 1.2 - 3.5     PROTHROMBIN TIME + INR    Collection Time: 10/12/17  2:45 PM   Result Value Ref Range    Prothrombin time 25.3 (H) 9.6 - 12.0 sec    INR 2.3 (H) 0.9 - 1.2     BNP    Collection Time: 10/12/17  2:45 PM   Result Value Ref Range     pg/mL   TYPE & SCREEN    Collection Time: 10/12/17  3:11 PM   Result Value Ref Range    Crossmatch Expiration 10/15/2017     ABO/Rh(D) A POSITIVE     Antibody screen NEG     Unit number [id number]     Blood component type RC LR AS5     Unit division 00     Status of unit ISSUED     Crossmatch result Compatible      XR CHEST PORT   Final Result   IMPRESSION: Cardiomegaly with vascular congestion          I discussed the results of all labs, procedures, radiographs, and treatments with the patient and available family. Treatment plan is agreed upon and the patient is ready for discharge. All voiced understanding of the discharge plan and medication instructions or changes as appropriate. Questions about treatment in the ED were answered. All were encouraged to return should symptoms worsen or new problems develop.

## 2017-10-13 LAB
ABO + RH BLD: NORMAL
BLD PROD TYP BPU: NORMAL
BLOOD GROUP ANTIBODIES SERPL: NORMAL
BPU ID: NORMAL
CROSSMATCH RESULT,%XM: NORMAL
EPO SERPL-ACNC: 49.5 MIU/ML (ref 2.6–18.5)
SPECIMEN EXP DATE BLD: NORMAL
STATUS OF UNIT,%ST: NORMAL
UNIT DIVISION, %UDIV: 0

## 2017-10-13 NOTE — DISCHARGE INSTRUCTIONS
Anemia: Care Instructions  Your Care Instructions    Anemia is a low level of red blood cells, which carry oxygen throughout your body. Many things can cause anemia. Lack of iron is one of the most common causes. Your body needs iron to make hemoglobin, a substance in red blood cells that carries oxygen from the lungs to your body's cells. Without enough iron, the body produces fewer and smaller red blood cells. As a result, your body's cells do not get enough oxygen, and you feel tired and weak. And you may have trouble concentrating. Bleeding is the most common cause of a lack of iron. You may have heavy menstrual bleeding or bleeding caused by conditions such as ulcers, hemorrhoids, or cancer. Regular use of aspirin or other anti-inflammatory medicines (such as ibuprofen) also can cause bleeding in some people. A lack of iron in your diet also can cause anemia, especially at times when the body needs more iron, such as during pregnancy, infancy, and the teen years. Your doctor may have prescribed iron pills. It may take several months of treatment for your iron levels to return to normal. Your doctor also may suggest that you eat foods that are rich in iron, such as meat and beans. There are many other causes of anemia. It is not always due to a lack of iron. Finding the specific cause of your anemia will help your doctor find the right treatment for you. Follow-up care is a key part of your treatment and safety. Be sure to make and go to all appointments, and call your doctor if you are having problems. It's also a good idea to know your test results and keep a list of the medicines you take. How can you care for yourself at home? · Take your medicines exactly as prescribed. Call your doctor if you think you are having a problem with your medicine. · If your doctor recommends iron pills, take them as directed:  ¨ Try to take the pills on an empty stomach about 1 hour before or 2 hours after meals. But you may need to take iron with food to avoid an upset stomach. ¨ Do not take antacids or drink milk or caffeine drinks (such as coffee, tea, or cola) at the same time or within 2 hours of the time that you take your iron. They can make it hard for your body to absorb the iron. ¨ Vitamin C (from food or supplements) helps your body absorb iron. Try taking iron pills with a glass of orange juice or some other food that is high in vitamin C, such as citrus fruits. ¨ Iron pills may cause stomach problems, such as heartburn, nausea, diarrhea, constipation, and cramps. Be sure to drink plenty of fluids, and include fruits, vegetables, and fiber in your diet each day. Iron pills often make your bowel movements dark or green. ¨ If you forget to take an iron pill, do not take a double dose of iron the next time you take a pill. ¨ Keep iron pills out of the reach of small children. An overdose of iron can be very dangerous. · Follow your doctor's advice about eating iron-rich foods. These include red meat, shellfish, poultry, eggs, beans, raisins, whole-grain bread, and leafy green vegetables. · Steam vegetables to help them keep their iron content. When should you call for help? Call 911 anytime you think you may need emergency care. For example, call if:  · You have symptoms of a heart attack. These may include:  ¨ Chest pain or pressure, or a strange feeling in the chest.  ¨ Sweating. ¨ Shortness of breath. ¨ Nausea or vomiting. ¨ Pain, pressure, or a strange feeling in the back, neck, jaw, or upper belly or in one or both shoulders or arms. ¨ Lightheadedness or sudden weakness. ¨ A fast or irregular heartbeat. After you call 911, the  may tell you to chew 1 adult-strength or 2 to 4 low-dose aspirin. Wait for an ambulance. Do not try to drive yourself. · You passed out (lost consciousness).   Call your doctor now or seek immediate medical care if:  · You have new or increased shortness of breath. · You are dizzy or lightheaded, or you feel like you may faint. · Your fatigue and weakness continue or get worse. · You have any abnormal bleeding, such as:  ¨ Nosebleeds. ¨ Vaginal bleeding that is different (heavier, more frequent, at a different time of the month) than what you are used to. ¨ Bloody or black stools, or rectal bleeding. ¨ Bloody or pink urine. Watch closely for changes in your health, and be sure to contact your doctor if:  · You do not get better as expected. Where can you learn more? Go to http://onesimo-dasia.info/. Enter R301 in the search box to learn more about \"Anemia: Care Instructions. \"  Current as of: October 13, 2016  Content Version: 11.3  © 2960-7216 [employer]. Care instructions adapted under license by [employer] (which disclaims liability or warranty for this information). If you have questions about a medical condition or this instruction, always ask your healthcare professional. [place] [address] any warranty or liability for your use of this information. Learning About Blood Transfusions  What is a blood transfusion? Blood transfusion is a medical treatment to replace the blood or parts of blood that your body has lost. The blood goes through a tube from a bag to an intravenous (IV) catheter and into your vein. You may need a blood transfusion after losing blood from an injury, a major surgery, an illness that causes bleeding, or an illness that destroys blood cells. Transfusions are also used to give you the parts of blood--such as platelets, plasma, or substances that cause clotting--that your body needs to fight an illness or stop bleeding. How is a blood transfusion done? Before you receive a blood transfusion, your blood is tested to find out what your blood type is. Blood or blood parts that are a match with your blood type are ordered by your doctor.  Blood is typed as A, B, AB, or O. It is also typed as Rh-positive or Rh-negative. The blood you are getting is checked and rechecked to make sure that it's the right type for you. A sample of your blood is mixed with a sample of the blood you will receive to check for problems. Before actually giving you the transfusion, a doctor and nurses will look at the label on the package of blood and compare it to your hospital ID bracelet and medical records. The transfusion begins only when all agree that this is the correct blood and that you are the correct person to receive it. To receive the transfusion, you will have an intravenous (IV) catheter inserted into a vein. A tube connects the catheter to the bag containing the blood, which is placed higher than your body. The blood then flows slowly into your vein. A doctor or nurse will check you several times during the transfusion to watch for a reaction or other problems. What are the possible risks? Blood transfusions have many benefits and are often life-saving. But they also have a few risks. Possible risks include:  · Your body's reaction to receiving new blood. This may include:  ¨ Fever. ¨ Allergic reactions. ¨ Breathing problems. · An infection from the blood. This risk is small because of the strict rules placed on handling and storing blood. Getting a viral infection, such as HIV or hepatitis B or C, through blood transfusions has become very rare. The U.S. Food and Drug Administration (FDA) enforces strict guidelines on the collection, testing, storage, and use of blood. · Getting the wrong blood type by accident. Severe reactions, which can be life-threatening, are very rare. What can you expect after a blood transfusion? Here are some things you can do at home to help prevent infection at the transfusion site:  · Wash the area daily with warm, soapy water, and pat it dry. Don't use hydrogen peroxide or alcohol, which can slow healing.  You may cover the area with a gauze bandage if it weeps or rubs against clothing. Change the bandage every day. · Keep the area clean and dry. Follow-up care is a key part of your treatment and safety. Be sure to make and go to all appointments, and call your doctor if you are having problems. It's also a good idea to know your test results and keep a list of the medicines you take. When should you call for help? Call 911 anytime you think you may need emergency care. For example, call if:  · You have severe trouble breathing. Call your doctor now or seek immediate medical care if:  · You have a fever. · You feel weaker or more tired than usual.  · You have a yellow tint to your skin or the whites of your eyes. Watch closely for changes in your health, and be sure to contact your doctor if you have any problems. Where can you learn more? Go to http://onesimoIntelliWheelsdasia.info/. Enter V588 in the search box to learn more about \"Learning About Blood Transfusions. \"  Current as of: October 13, 2016  Content Version: 11.3  © 2935-7577 [employer]. Care instructions adapted under license by [employer] (which disclaims liability or warranty for this information). If you have questions about a medical condition or this instruction, always ask your healthcare professional. [place] [address] any warranty or liability for your use of this information. Heart Failure: Care Instructions  Your Care Instructions    Heart failure occurs when your heart does not pump as much blood as the body needs. Failure does not mean that the heart has stopped pumping but rather that it is not pumping as well as it should. Over time, this causes fluid buildup in your lungs and other parts of your body. Fluid buildup can cause shortness of breath, fatigue, swollen ankles, and other problems.  By taking medicines regularly, reducing sodium (salt) in your diet, checking your weight every day, and making lifestyle changes, you can feel better and live longer. Follow-up care is a key part of your treatment and safety. Be sure to make and go to all appointments, and call your doctor if you are having problems. It's also a good idea to know your test results and keep a list of the medicines you take. How can you care for yourself at home? Medicines  · Be safe with medicines. Take your medicines exactly as prescribed. Call your doctor if you think you are having a problem with your medicine. · Do not take any vitamins, over-the-counter medicine, or herbal products without talking to your doctor first. Dolores Hipps not take ibuprofen (Advil or Motrin) and naproxen (Aleve) without talking to your doctor first. They could make your heart failure worse. · You may be taking some of the following medicine. ¨ Beta-blockers can slow heart rate, decrease blood pressure, and improve your condition. Taking a beta-blocker may lower your chance of needing to be hospitalized. ¨ Angiotensin-converting enzyme inhibitors (ACEIs) reduce the heart's workload, lower blood pressure, and reduce swelling. Taking an ACEI may lower your chance of needing to be hospitalized again. ¨ Angiotensin II receptor blockers (ARBs) work like ACEIs. Your doctor may prescribe them instead of ACEIs. ¨ Diuretics, also called water pills, reduce swelling. ¨ Potassium supplements replace this important mineral, which is sometimes lost with diuretics. ¨ Aspirin and other blood thinners prevent blood clots, which can cause a stroke or heart attack. You will get more details on the specific medicines your doctor prescribes. Diet  · Your doctor may suggest that you limit sodium to 2,000 milligrams (mg) a day or less. That is less than 1 teaspoon of salt a day, including all the salt you eat in cooking or in packaged foods. People get most of their sodium from processed foods. Fast food and restaurant meals also tend to be very high in sodium.   · Ask your doctor how much liquid you can drink each day. You may have to limit liquids. Weight  · Weigh yourself without clothing at the same time each day. Record your weight. Call your doctor if you have a sudden weight gain, such as more than 2 to 3 pounds in a day or 5 pounds in a week. (Your doctor may suggest a different range of weight gain.) A sudden weight gain may mean that your heart failure is getting worse. Activity level  · Start light exercise (if your doctor says it is okay). Even if you can only do a small amount, exercise will help you get stronger, have more energy, and manage your weight and your stress. Walking is an easy way to get exercise. Start out by walking a little more than you did before. Bit by bit, increase the amount you walk. · When you exercise, watch for signs that your heart is working too hard. You are pushing yourself too hard if you cannot talk while you are exercising. If you become short of breath or dizzy or have chest pain, stop, sit down, and rest.  · If you feel \"wiped out\" the day after you exercise, walk slower or for a shorter distance until you can work up to a better pace. · Get enough rest at night. Sleeping with 1 or 2 pillows under your upper body and head may help you breathe easier. Lifestyle changes  · Do not smoke. Smoking can make a heart condition worse. If you need help quitting, talk to your doctor about stop-smoking programs and medicines. These can increase your chances of quitting for good. Quitting smoking may be the most important step you can take to protect your heart. · Limit alcohol to 2 drinks a day for men and 1 drink a day for women. Too much alcohol can cause health problems. · Avoid getting sick from colds and the flu. Get a pneumococcal vaccine shot. If you have had one before, ask your doctor whether you need another dose. Get a flu shot each year. If you must be around people with colds or the flu, wash your hands often.   When should you call for help?  Call 911 if you have symptoms of sudden heart failure such as:  · You have severe trouble breathing. · You cough up pink, foamy mucus. · You have a new irregular or rapid heartbeat. Call your doctor now or seek immediate medical care if:  · You have new or increased shortness of breath. · You are dizzy or lightheaded, or you feel like you may faint. · You have sudden weight gain, such as more than 2 to 3 pounds in a day or 5 pounds in a week. (Your doctor may suggest a different range of weight gain.)  · You have increased swelling in your legs, ankles, or feet. · You are suddenly so tired or weak that you cannot do your usual activities. Watch closely for changes in your health, and be sure to contact your doctor if:  · You develop new symptoms. Where can you learn more? Go to http://onesimo-dasia.info/. Enter R979 in the search box to learn more about \"Heart Failure: Care Instructions. \"  Current as of: April 3, 2017  Content Version: 11.3  © 8520-5113 [employer]. Care instructions adapted under license by [employer] (which disclaims liability or warranty for this information). If you have questions about a medical condition or this instruction, always ask your healthcare professional. [address] any warranty or liability for your use of this information.

## 2017-10-13 NOTE — ED NOTES
I have reviewed discharge instructions with the patient. The patient verbalized understanding. Patient left ED via Discharge Method: wheelchair to Home with Son. Opportunity for questions and clarification provided. Patient given 0 scripts.

## 2017-10-14 LAB — PATH REV BLD -IMP: NORMAL

## 2017-10-16 ENCOUNTER — PATIENT OUTREACH (OUTPATIENT)
Dept: CASE MANAGEMENT | Age: 77
End: 2017-10-16

## 2017-10-16 NOTE — PROGRESS NOTES
Date/Time of Call:       10/13/17 12:47PM   What was the patient seen in the ED for? Patient was seen in ED for diagnosis of Acute on chronic systolic heart failure due to valvular disease   Does the patient understand his/her diagnosis and/or treatment and what happened during the ED visit? Spoke with son who stated understanding of treatment and diagnosis. Did the patient receive discharge instructions from the ED? Son stated receiving d/c instructions from the ED. Review any discharge instructions (see notes in [facility]). Ask patient if they understand these. Do they have any questions? Son and Care Coordinator reviewed DC instructions. Son stated understanding and no questions asked. Were home services ordered (nursing, PT, OT, ST, etc.)? No HH ordered at d/c   If so, has the first visit occurred? If not, why? (Assist with coordination of services if necessary.) N/A   DME ordered at d/c? No DME ordered at d/c. If so, has it been received? If not, why?  (Assist with coordination of arranging DME orders if necessary.) N/A   Complete a review of all medications (new, continued and discontinued meds per the D/C instructions and medication tab in [facility]). Review of medications has been completed by Son and Care Coordinator. Were all new prescriptions filled? If not, why?  (Assist with obtainment of medications if necessary.) N/A   Does the patient understand the purpose and dosing instructions for all medications? (If patient has questions, provide explanation and education.) Son stated understanding of purpose, and dosing instructions for all medications. Does the patient have any problems in performing ADLs? (If patient is unable to perform ADLs  what is the limiting factor(s)?   Do they have a support system that can assist? If no support system is present, discuss possible assistance that they may be able to obtain.) Son stated patient is independent with all ADLs. Does the patient have all follow-up appointments scheduled? Has transportation been arranged? 7 day f/up with PCP?    7-14 day f/up with specialist?    If f/up has not been made  what actions has the care coordinator made to accomplish this? Has transportation been arranged? [facility] follow-up should be within 7 days of discharge; all others should have PCP follow-up within 7 days of discharge; follow-ups with other specialists as appropriate or ordered.) Son advised on protocol for PCP f/u and declined assistance in scheduling. Son did state he will call and schedule on Monday. Any other questions or concerns expressed by the patient? No other questions asked. No further needs identified. Gratitude expressed for care and call. TOM Call Completed By: Jenise White LPN   Care Coordinator  [facility]          This note will not be viewable in [address]

## 2017-11-13 ENCOUNTER — HOSPITAL ENCOUNTER (OUTPATIENT)
Dept: LAB | Age: 77
Discharge: HOME | End: 2017-11-13
Payer: MEDICARE

## 2017-11-13 DIAGNOSIS — D64.9 ANEMIA, UNSPECIFIED TYPE: ICD-10-CM

## 2017-11-13 LAB
ALBUMIN SERPL-MCNC: 2.4 G/DL (ref 3.2–4.6)
ALBUMIN/GLOB SERPL: 0.9 {RATIO} (ref 1.2–3.5)
ALP SERPL-CCNC: 88 U/L (ref 50–136)
ALT SERPL-CCNC: 20 U/L (ref 12–65)
ANION GAP SERPL CALC-SCNC: 5 MMOL/L (ref 7–16)
AST SERPL-CCNC: 36 U/L (ref 15–37)
BASOPHILS # BLD: 0 K/UL (ref 0–0.2)
BASOPHILS NFR BLD: 0 % (ref 0–2)
BILIRUB SERPL-MCNC: 0.9 MG/DL (ref 0.2–1.1)
BUN SERPL-MCNC: 22 MG/DL (ref 8–23)
CALCIUM SERPL-MCNC: 8.6 MG/DL (ref 8.3–10.4)
CHLORIDE SERPL-SCNC: 103 MMOL/L (ref 98–107)
CO2 SERPL-SCNC: 36 MMOL/L (ref 21–32)
CREAT SERPL-MCNC: 1.29 MG/DL (ref 0.6–1)
DIFFERENTIAL METHOD BLD: ABNORMAL
EOSINOPHIL # BLD: 0.1 K/UL (ref 0–0.8)
EOSINOPHIL NFR BLD: 2 % (ref 0.5–7.8)
ERYTHROCYTE [DISTWIDTH] IN BLOOD BY AUTOMATED COUNT: 16 % (ref 11.9–14.6)
FERRITIN SERPL-MCNC: 75 NG/ML (ref 8–388)
GLOBULIN SER CALC-MCNC: 2.8 G/DL (ref 2.3–3.5)
GLUCOSE SERPL-MCNC: 101 MG/DL (ref 65–100)
HCT VFR BLD AUTO: 23.2 % (ref 35.8–46.3)
HGB BLD-MCNC: 7 G/DL (ref 11.7–15.4)
HGB RETIC QN AUTO: 27 PG (ref 29–35)
IMM RETICS NFR: 21.4 % (ref 3–15.9)
IRON SATN MFR SERPL: 8 %
IRON SERPL-MCNC: 28 UG/DL (ref 35–150)
LYMPHOCYTES # BLD: 0.6 K/UL (ref 0.5–4.6)
LYMPHOCYTES NFR BLD: 12 % (ref 13–44)
MCH RBC QN AUTO: 29.3 PG (ref 26.1–32.9)
MCHC RBC AUTO-ENTMCNC: 30.2 G/DL (ref 31.4–35)
MCV RBC AUTO: 97.1 FL (ref 79.6–97.8)
MONOCYTES # BLD: 0.3 K/UL (ref 0.1–1.3)
MONOCYTES NFR BLD: 7 % (ref 4–12)
NEUTS SEG # BLD: 3.8 K/UL (ref 1.7–8.2)
NEUTS SEG NFR BLD: 79 % (ref 43–78)
NRBC # BLD: 0 K/UL (ref 0–0.2)
PLATELET # BLD AUTO: 145 K/UL (ref 150–450)
PMV BLD AUTO: 10.5 FL (ref 10.8–14.1)
POTASSIUM SERPL-SCNC: 4 MMOL/L (ref 3.5–5.1)
PROT SERPL-MCNC: 5.2 G/DL (ref 6.3–8.2)
RBC # BLD AUTO: 2.39 M/UL (ref 4.05–5.25)
RETICS # AUTO: 0.13 M/UL (ref 0.03–0.1)
RETICS/RBC NFR AUTO: 5.6 % (ref 0.3–2)
SODIUM SERPL-SCNC: 144 MMOL/L (ref 136–145)
TIBC SERPL-MCNC: 334 UG/DL (ref 250–450)
WBC # BLD AUTO: 4.8 K/UL (ref 4.3–11.1)

## 2017-11-13 PROCEDURE — 82668 ASSAY OF ERYTHROPOIETIN: CPT | Performed by: INTERNAL MEDICINE

## 2017-11-13 PROCEDURE — 85046 RETICYTE/HGB CONCENTRATE: CPT | Performed by: INTERNAL MEDICINE

## 2017-11-13 PROCEDURE — 36415 COLL VENOUS BLD VENIPUNCTURE: CPT | Performed by: INTERNAL MEDICINE

## 2017-11-13 PROCEDURE — 83540 ASSAY OF IRON: CPT | Performed by: INTERNAL MEDICINE

## 2017-11-13 PROCEDURE — 82728 ASSAY OF FERRITIN: CPT | Performed by: INTERNAL MEDICINE

## 2017-11-13 PROCEDURE — 80053 COMPREHEN METABOLIC PANEL: CPT | Performed by: INTERNAL MEDICINE

## 2017-11-13 PROCEDURE — 83090 ASSAY OF HOMOCYSTEINE: CPT | Performed by: INTERNAL MEDICINE

## 2017-11-13 PROCEDURE — 86900 BLOOD TYPING SEROLOGIC ABO: CPT | Performed by: INTERNAL MEDICINE

## 2017-11-13 PROCEDURE — 86920 COMPATIBILITY TEST SPIN: CPT | Performed by: INTERNAL MEDICINE

## 2017-11-13 PROCEDURE — 85025 COMPLETE CBC W/AUTO DIFF WBC: CPT | Performed by: INTERNAL MEDICINE

## 2017-11-13 PROCEDURE — 86923 COMPATIBILITY TEST ELECTRIC: CPT | Performed by: INTERNAL MEDICINE

## 2017-11-14 LAB
EPO SERPL-ACNC: 76.6 MIU/ML (ref 2.6–18.5)
HCYS SERPL-SCNC: 8.2 UMOL/L (ref 0–15)

## 2017-11-14 RX ORDER — SODIUM CHLORIDE 9 MG/ML
250 INJECTION, SOLUTION INTRAVENOUS AS NEEDED
Status: CANCELLED | OUTPATIENT
Start: 2017-11-14

## 2017-11-15 ENCOUNTER — HOSPITAL ENCOUNTER (OUTPATIENT)
Dept: INFUSION THERAPY | Age: 77
Discharge: HOME | End: 2017-11-15
Payer: MEDICARE

## 2017-11-15 VITALS
OXYGEN SATURATION: 94 % | TEMPERATURE: 98 F | DIASTOLIC BLOOD PRESSURE: 48 MMHG | SYSTOLIC BLOOD PRESSURE: 104 MMHG | RESPIRATION RATE: 18 BRPM | HEART RATE: 84 BPM

## 2017-11-15 PROCEDURE — 36430 TRANSFUSION BLD/BLD COMPNT: CPT

## 2017-11-15 PROCEDURE — P9016 RBC LEUKOCYTES REDUCED: HCPCS | Performed by: INTERNAL MEDICINE

## 2017-11-15 PROCEDURE — 74011250636 HC RX REV CODE- 250/636: Performed by: INTERNAL MEDICINE

## 2017-11-15 PROCEDURE — 77030018667 ADMN ST IV BLD FENW -A

## 2017-11-15 RX ORDER — SODIUM CHLORIDE 0.9 % (FLUSH) 0.9 %
5-10 SYRINGE (ML) INJECTION AS NEEDED
Status: DISCONTINUED | OUTPATIENT
Start: 2017-11-15 | End: 2017-11-19 | Stop reason: HOSPADM

## 2017-11-15 RX ORDER — SODIUM CHLORIDE 9 MG/ML
250 INJECTION, SOLUTION INTRAVENOUS AS NEEDED
Status: DISCONTINUED | OUTPATIENT
Start: 2017-11-15 | End: 2017-11-19 | Stop reason: HOSPADM

## 2017-11-15 RX ADMIN — SODIUM CHLORIDE 250 ML: 900 INJECTION, SOLUTION INTRAVENOUS at 08:15

## 2017-11-15 NOTE — PROGRESS NOTES
Arrived to the [facility]. Bld completed. Patient tolerated well. Any issues or concerns during appointment: no.  Patient aware of next infusion appointment on 12/4 (date) at 80 (time). Discharged home via wheelchair.

## 2017-11-16 LAB
ABO + RH BLD: NORMAL
BLD PROD TYP BPU: NORMAL
BLD PROD TYP BPU: NORMAL
BLOOD GROUP ANTIBODIES SERPL: NORMAL
BPU ID: NORMAL
BPU ID: NORMAL
CROSSMATCH RESULT,%XM: NORMAL
CROSSMATCH RESULT,%XM: NORMAL
SPECIMEN EXP DATE BLD: NORMAL
STATUS OF UNIT,%ST: NORMAL
STATUS OF UNIT,%ST: NORMAL
UNIT DIVISION, %UDIV: 0
UNIT DIVISION, %UDIV: 0

## 2017-12-04 ENCOUNTER — HOSPITAL ENCOUNTER (OUTPATIENT)
Dept: LAB | Age: 77
Discharge: HOME | End: 2017-12-04
Payer: MEDICARE

## 2017-12-04 ENCOUNTER — HOSPITAL ENCOUNTER (OUTPATIENT)
Dept: INFUSION THERAPY | Age: 77
End: 2017-12-04

## 2017-12-04 DIAGNOSIS — D64.9 ANEMIA, UNSPECIFIED TYPE: ICD-10-CM

## 2017-12-04 PROBLEM — Z98.890 S/P MITRAL VALVE REPAIR: Status: ACTIVE | Noted: 2017-12-04

## 2017-12-04 PROBLEM — Z95.0 PACEMAKER: Status: ACTIVE | Noted: 2017-12-04

## 2017-12-04 PROBLEM — I74.3 EMBOLUS OF FEMORAL ARTERY (HCC): Status: ACTIVE | Noted: 2017-12-04

## 2017-12-04 LAB
ALBUMIN SERPL-MCNC: 2.4 G/DL (ref 3.2–4.6)
ALBUMIN/GLOB SERPL: 0.9 {RATIO}
ALP SERPL-CCNC: 108 U/L (ref 50–136)
ALT SERPL-CCNC: 19 U/L (ref 12–65)
ANION GAP SERPL CALC-SCNC: 4 MMOL/L
AST SERPL-CCNC: 33 U/L (ref 15–37)
BASOPHILS # BLD: 0 K/UL (ref 0–0.2)
BASOPHILS NFR BLD: 1 % (ref 0–2)
BILIRUB SERPL-MCNC: 0.9 MG/DL (ref 0.2–1.1)
BUN SERPL-MCNC: 21 MG/DL (ref 8–23)
CALCIUM SERPL-MCNC: 8.6 MG/DL (ref 8.3–10.4)
CHLORIDE SERPL-SCNC: 98 MMOL/L (ref 98–107)
CO2 SERPL-SCNC: 38 MMOL/L (ref 21–32)
CREAT SERPL-MCNC: 1.1 MG/DL (ref 0.6–1)
DIFFERENTIAL METHOD BLD: ABNORMAL
EOSINOPHIL # BLD: 0.2 K/UL (ref 0–0.8)
EOSINOPHIL NFR BLD: 4 % (ref 0.5–7.8)
ERYTHROCYTE [DISTWIDTH] IN BLOOD BY AUTOMATED COUNT: 15 % (ref 11.9–14.6)
FERRITIN SERPL-MCNC: 183 NG/ML (ref 8–388)
GLOBULIN SER CALC-MCNC: 2.8 G/DL
GLUCOSE SERPL-MCNC: 78 MG/DL (ref 65–100)
HCT VFR BLD AUTO: 29.8 % (ref 35.8–46.3)
HGB BLD-MCNC: 8.9 G/DL (ref 11.7–15.4)
HGB RETIC QN AUTO: 31 PG (ref 29–35)
IMM RETICS NFR: 15 % (ref 3–15.9)
IRON SATN MFR SERPL: 20 %
IRON SERPL-MCNC: 59 UG/DL (ref 35–150)
LYMPHOCYTES # BLD: 0.4 K/UL (ref 0.5–4.6)
LYMPHOCYTES NFR BLD: 8 % (ref 13–44)
MCH RBC QN AUTO: 29.2 PG (ref 26.1–32.9)
MCHC RBC AUTO-ENTMCNC: 29.9 G/DL (ref 31.4–35)
MCV RBC AUTO: 97.7 FL (ref 79.6–97.8)
MONOCYTES # BLD: 0.3 K/UL (ref 0.1–1.3)
MONOCYTES NFR BLD: 7 % (ref 4–12)
NEUTS SEG # BLD: 3.6 K/UL (ref 1.7–8.2)
NEUTS SEG NFR BLD: 80 % (ref 43–78)
PLATELET # BLD AUTO: 142 K/UL (ref 150–450)
PMV BLD AUTO: 11 FL (ref 10.8–14.1)
POTASSIUM SERPL-SCNC: 3.6 MMOL/L (ref 3.5–5.1)
PROT SERPL-MCNC: 5.2 G/DL (ref 6.3–8.2)
RBC # BLD AUTO: 3.05 M/UL (ref 4.05–5.25)
RETICS # AUTO: 0.06 M/UL (ref 0.03–0.1)
RETICS/RBC NFR AUTO: 2 % (ref 0.3–2)
SODIUM SERPL-SCNC: 140 MMOL/L (ref 136–145)
TIBC SERPL-MCNC: 299 UG/DL (ref 250–450)
WBC # BLD AUTO: 4.5 K/UL (ref 4.3–11.1)

## 2017-12-04 PROCEDURE — 82728 ASSAY OF FERRITIN: CPT | Performed by: INTERNAL MEDICINE

## 2017-12-04 PROCEDURE — 80053 COMPREHEN METABOLIC PANEL: CPT | Performed by: INTERNAL MEDICINE

## 2017-12-04 PROCEDURE — 85025 COMPLETE CBC W/AUTO DIFF WBC: CPT | Performed by: INTERNAL MEDICINE

## 2017-12-04 PROCEDURE — 86920 COMPATIBILITY TEST SPIN: CPT | Performed by: INTERNAL MEDICINE

## 2017-12-04 PROCEDURE — 85046 RETICYTE/HGB CONCENTRATE: CPT | Performed by: INTERNAL MEDICINE

## 2017-12-04 PROCEDURE — 83540 ASSAY OF IRON: CPT | Performed by: INTERNAL MEDICINE

## 2017-12-04 PROCEDURE — 36415 COLL VENOUS BLD VENIPUNCTURE: CPT | Performed by: INTERNAL MEDICINE

## 2017-12-04 PROCEDURE — 86900 BLOOD TYPING SEROLOGIC ABO: CPT | Performed by: INTERNAL MEDICINE

## 2018-01-01 ENCOUNTER — HOME CARE VISIT (OUTPATIENT)
Dept: SCHEDULING | Facility: HOME HEALTH | Age: 78
End: 2018-01-01
Payer: MEDICARE

## 2018-01-01 ENCOUNTER — PATIENT OUTREACH (OUTPATIENT)
Dept: CASE MANAGEMENT | Age: 78
End: 2018-01-01

## 2018-01-01 ENCOUNTER — HOSPITAL ENCOUNTER (INPATIENT)
Age: 78
LOS: 4 days | Discharge: HOME | DRG: 266 | End: 2018-02-03
Attending: INTERNAL MEDICINE | Admitting: INTERNAL MEDICINE
Payer: MEDICARE

## 2018-01-01 ENCOUNTER — APPOINTMENT (OUTPATIENT)
Dept: CT IMAGING | Age: 78
DRG: 286 | End: 2018-01-01
Attending: INTERNAL MEDICINE
Payer: MEDICARE

## 2018-01-01 ENCOUNTER — HOME CARE VISIT (OUTPATIENT)
Dept: HOSPICE | Facility: HOSPICE | Age: 78
End: 2018-01-01
Payer: MEDICARE

## 2018-01-01 ENCOUNTER — HOME HEALTH ADMISSION (OUTPATIENT)
Dept: HOME HEALTH SERVICES | Facility: HOME HEALTH | Age: 78
End: 2018-01-01

## 2018-01-01 ENCOUNTER — APPOINTMENT (OUTPATIENT)
Dept: GENERAL RADIOLOGY | Age: 78
DRG: 286 | End: 2018-01-01
Attending: PHYSICIAN ASSISTANT
Payer: MEDICARE

## 2018-01-01 ENCOUNTER — TELEPHONE (OUTPATIENT)
Dept: OTHER | Age: 78
End: 2018-01-01

## 2018-01-01 ENCOUNTER — HOSPITAL ENCOUNTER (OUTPATIENT)
Dept: LAB | Age: 78
Discharge: HOME | End: 2018-09-25
Payer: OTHER MISCELLANEOUS

## 2018-01-01 ENCOUNTER — HOSPICE ADMISSION (OUTPATIENT)
Dept: HOSPICE | Facility: HOSPICE | Age: 78
End: 2018-01-01
Payer: MEDICARE

## 2018-01-01 ENCOUNTER — TELEPHONE (OUTPATIENT)
Dept: HOME HEALTH SERVICES | Facility: HOME HEALTH | Age: 78
End: 2018-01-01

## 2018-01-01 ENCOUNTER — HOSPITAL ENCOUNTER (INPATIENT)
Age: 78
LOS: 8 days | Discharge: HOME | End: 2018-11-16
Attending: INTERNAL MEDICINE | Admitting: INTERNAL MEDICINE

## 2018-01-01 ENCOUNTER — APPOINTMENT (OUTPATIENT)
Dept: GENERAL RADIOLOGY | Age: 78
DRG: 286 | End: 2018-01-01
Attending: INTERNAL MEDICINE
Payer: MEDICARE

## 2018-01-01 ENCOUNTER — ANESTHESIA (OUTPATIENT)
Dept: SURGERY | Age: 78
DRG: 266 | End: 2018-01-01
Payer: MEDICARE

## 2018-01-01 ENCOUNTER — APPOINTMENT (OUTPATIENT)
Dept: GENERAL RADIOLOGY | Age: 78
DRG: 291 | End: 2018-01-01
Attending: PHYSICAL MEDICINE & REHABILITATION
Payer: MEDICARE

## 2018-01-01 ENCOUNTER — HOSPITAL ENCOUNTER (OUTPATIENT)
Dept: LAB | Age: 78
Discharge: HOME | End: 2018-04-29
Payer: OTHER MISCELLANEOUS

## 2018-01-01 ENCOUNTER — APPOINTMENT (OUTPATIENT)
Dept: GENERAL RADIOLOGY | Age: 78
DRG: 291 | End: 2018-01-01
Attending: EMERGENCY MEDICINE
Payer: MEDICARE

## 2018-01-01 ENCOUNTER — HOSPITAL ENCOUNTER (INPATIENT)
Age: 78
LOS: 11 days | Discharge: HOSPICE | DRG: 189 | End: 2018-03-19
Attending: INTERNAL MEDICINE | Admitting: INTERNAL MEDICINE
Payer: MEDICARE

## 2018-01-01 ENCOUNTER — APPOINTMENT (OUTPATIENT)
Dept: GENERAL RADIOLOGY | Age: 78
DRG: 291 | End: 2018-01-01
Attending: INTERNAL MEDICINE
Payer: MEDICARE

## 2018-01-01 ENCOUNTER — HOSPITAL ENCOUNTER (INPATIENT)
Age: 78
LOS: 6 days | Discharge: NURSING, REHAB OR LONG-TERM CARE | DRG: 291 | End: 2018-02-13
Attending: EMERGENCY MEDICINE | Admitting: INTERNAL MEDICINE
Payer: MEDICARE

## 2018-01-01 ENCOUNTER — HOSPITAL ENCOUNTER (OUTPATIENT)
Age: 78
Setting detail: OUTPATIENT SURGERY
Discharge: HOME | DRG: 189 | End: 2018-03-08
Attending: INTERNAL MEDICINE | Admitting: INTERNAL MEDICINE
Payer: MEDICARE

## 2018-01-01 ENCOUNTER — HOSPITAL ENCOUNTER (OUTPATIENT)
Dept: LAB | Age: 78
Discharge: HOME | End: 2018-10-17
Payer: MEDICARE

## 2018-01-01 ENCOUNTER — APPOINTMENT (OUTPATIENT)
Dept: GENERAL RADIOLOGY | Age: 78
DRG: 189 | End: 2018-01-01
Attending: INTERNAL MEDICINE
Payer: MEDICARE

## 2018-01-01 ENCOUNTER — APPOINTMENT (OUTPATIENT)
Dept: ULTRASOUND IMAGING | Age: 78
DRG: 266 | End: 2018-01-01
Attending: INTERNAL MEDICINE
Payer: MEDICARE

## 2018-01-01 ENCOUNTER — HOSPITAL ENCOUNTER (INPATIENT)
Age: 78
LOS: 23 days | Discharge: ANOTHER HOSPITAL | DRG: 291 | End: 2018-03-08
Attending: PHYSICAL MEDICINE & REHABILITATION | Admitting: PHYSICAL MEDICINE & REHABILITATION
Payer: MEDICARE

## 2018-01-01 ENCOUNTER — APPOINTMENT (OUTPATIENT)
Dept: GENERAL RADIOLOGY | Age: 78
DRG: 286 | End: 2018-01-01
Attending: NURSE PRACTITIONER
Payer: MEDICARE

## 2018-01-01 ENCOUNTER — HOME CARE VISIT (OUTPATIENT)
Dept: SCHEDULING | Facility: HOME HEALTH | Age: 78
End: 2018-01-01

## 2018-01-01 ENCOUNTER — HOSPITAL ENCOUNTER (INPATIENT)
Age: 78
LOS: 5 days | Discharge: HOME | End: 2018-09-25
Attending: INTERNAL MEDICINE | Admitting: INTERNAL MEDICINE

## 2018-01-01 ENCOUNTER — APPOINTMENT (OUTPATIENT)
Dept: GENERAL RADIOLOGY | Age: 78
DRG: 266 | End: 2018-01-01
Attending: INTERNAL MEDICINE
Payer: MEDICARE

## 2018-01-01 ENCOUNTER — DOCUMENTATION ONLY (OUTPATIENT)
Dept: OTHER | Age: 78
End: 2018-01-01

## 2018-01-01 ENCOUNTER — APPOINTMENT (OUTPATIENT)
Dept: GENERAL RADIOLOGY | Age: 78
DRG: 189 | End: 2018-01-01
Attending: NURSE PRACTITIONER
Payer: MEDICARE

## 2018-01-01 ENCOUNTER — HOSPITAL ENCOUNTER (INPATIENT)
Age: 78
LOS: 4 days | Discharge: HOME HEALTH | DRG: 286 | End: 2018-01-26
Attending: INTERNAL MEDICINE | Admitting: INTERNAL MEDICINE
Payer: MEDICARE

## 2018-01-01 ENCOUNTER — HOSPITAL ENCOUNTER (OUTPATIENT)
Dept: LAB | Age: 78
Discharge: HOME | End: 2018-11-13

## 2018-01-01 ENCOUNTER — APPOINTMENT (OUTPATIENT)
Dept: INTERVENTIONAL RADIOLOGY/VASCULAR | Age: 78
DRG: 291 | End: 2018-01-01
Attending: PHYSICAL MEDICINE & REHABILITATION
Payer: MEDICARE

## 2018-01-01 ENCOUNTER — ANESTHESIA EVENT (OUTPATIENT)
Dept: SURGERY | Age: 78
DRG: 266 | End: 2018-01-01
Payer: MEDICARE

## 2018-01-01 VITALS
HEART RATE: 81 BPM | RESPIRATION RATE: 18 BRPM | SYSTOLIC BLOOD PRESSURE: 96 MMHG | TEMPERATURE: 96.5 F | DIASTOLIC BLOOD PRESSURE: 60 MMHG

## 2018-01-01 VITALS
RESPIRATION RATE: 16 BRPM | OXYGEN SATURATION: 100 % | SYSTOLIC BLOOD PRESSURE: 156 MMHG | HEIGHT: 60 IN | BODY MASS INDEX: 33.57 KG/M2 | WEIGHT: 171 LBS | HEART RATE: 81 BPM | TEMPERATURE: 97.7 F | DIASTOLIC BLOOD PRESSURE: 64 MMHG

## 2018-01-01 VITALS
TEMPERATURE: 97.4 F | OXYGEN SATURATION: 99 % | HEART RATE: 83 BPM | RESPIRATION RATE: 21 BRPM | SYSTOLIC BLOOD PRESSURE: 100 MMHG | DIASTOLIC BLOOD PRESSURE: 67 MMHG

## 2018-01-01 VITALS
SYSTOLIC BLOOD PRESSURE: 116 MMHG | RESPIRATION RATE: 22 BRPM | OXYGEN SATURATION: 98 % | HEART RATE: 78 BPM | TEMPERATURE: 97.1 F | DIASTOLIC BLOOD PRESSURE: 62 MMHG

## 2018-01-01 VITALS
HEART RATE: 78 BPM | RESPIRATION RATE: 19 BRPM | SYSTOLIC BLOOD PRESSURE: 104 MMHG | OXYGEN SATURATION: 97 % | RESPIRATION RATE: 26 BRPM | WEIGHT: 162 LBS | DIASTOLIC BLOOD PRESSURE: 64 MMHG | DIASTOLIC BLOOD PRESSURE: 58 MMHG | BODY MASS INDEX: 31.64 KG/M2 | OXYGEN SATURATION: 98 % | TEMPERATURE: 98.3 F | HEART RATE: 78 BPM | SYSTOLIC BLOOD PRESSURE: 92 MMHG

## 2018-01-01 VITALS
SYSTOLIC BLOOD PRESSURE: 107 MMHG | RESPIRATION RATE: 20 BRPM | HEART RATE: 80 BPM | OXYGEN SATURATION: 95 % | WEIGHT: 169.4 LBS | DIASTOLIC BLOOD PRESSURE: 71 MMHG | HEIGHT: 60 IN | SYSTOLIC BLOOD PRESSURE: 138 MMHG | WEIGHT: 162.1 LBS | RESPIRATION RATE: 20 BRPM | HEART RATE: 84 BPM | OXYGEN SATURATION: 98 % | BODY MASS INDEX: 31.83 KG/M2 | DIASTOLIC BLOOD PRESSURE: 86 MMHG | BODY MASS INDEX: 33.08 KG/M2 | TEMPERATURE: 97.4 F | TEMPERATURE: 98.1 F

## 2018-01-01 VITALS
TEMPERATURE: 98 F | HEART RATE: 80 BPM | RESPIRATION RATE: 18 BRPM | DIASTOLIC BLOOD PRESSURE: 70 MMHG | SYSTOLIC BLOOD PRESSURE: 126 MMHG

## 2018-01-01 VITALS
SYSTOLIC BLOOD PRESSURE: 110 MMHG | TEMPERATURE: 97.2 F | RESPIRATION RATE: 19 BRPM | OXYGEN SATURATION: 97 % | DIASTOLIC BLOOD PRESSURE: 62 MMHG | HEART RATE: 80 BPM

## 2018-01-01 VITALS
DIASTOLIC BLOOD PRESSURE: 64 MMHG | OXYGEN SATURATION: 98 % | SYSTOLIC BLOOD PRESSURE: 128 MMHG | WEIGHT: 167 LBS | HEART RATE: 86 BPM | BODY MASS INDEX: 32.61 KG/M2 | RESPIRATION RATE: 24 BRPM | TEMPERATURE: 98.4 F

## 2018-01-01 VITALS
TEMPERATURE: 97.1 F | SYSTOLIC BLOOD PRESSURE: 110 MMHG | DIASTOLIC BLOOD PRESSURE: 60 MMHG | HEART RATE: 72 BPM | OXYGEN SATURATION: 98 % | RESPIRATION RATE: 20 BRPM

## 2018-01-01 VITALS
BODY MASS INDEX: 31.41 KG/M2 | HEART RATE: 82 BPM | DIASTOLIC BLOOD PRESSURE: 66 MMHG | RESPIRATION RATE: 22 BRPM | SYSTOLIC BLOOD PRESSURE: 126 MMHG | WEIGHT: 160 LBS | HEIGHT: 60 IN

## 2018-01-01 VITALS
DIASTOLIC BLOOD PRESSURE: 68 MMHG | OXYGEN SATURATION: 99 % | TEMPERATURE: 97.3 F | SYSTOLIC BLOOD PRESSURE: 106 MMHG | HEART RATE: 86 BPM | RESPIRATION RATE: 19 BRPM

## 2018-01-01 VITALS
RESPIRATION RATE: 19 BRPM | SYSTOLIC BLOOD PRESSURE: 102 MMHG | OXYGEN SATURATION: 99 % | TEMPERATURE: 98.3 F | HEART RATE: 82 BPM | DIASTOLIC BLOOD PRESSURE: 62 MMHG

## 2018-01-01 VITALS
TEMPERATURE: 97.7 F | HEART RATE: 86 BPM | OXYGEN SATURATION: 98 % | RESPIRATION RATE: 24 BRPM | DIASTOLIC BLOOD PRESSURE: 68 MMHG | SYSTOLIC BLOOD PRESSURE: 98 MMHG

## 2018-01-01 VITALS
OXYGEN SATURATION: 97 % | RESPIRATION RATE: 22 BRPM | HEART RATE: 82 BPM | DIASTOLIC BLOOD PRESSURE: 78 MMHG | SYSTOLIC BLOOD PRESSURE: 112 MMHG

## 2018-01-01 VITALS
OXYGEN SATURATION: 99 % | RESPIRATION RATE: 21 BRPM | SYSTOLIC BLOOD PRESSURE: 78 MMHG | HEART RATE: 82 BPM | DIASTOLIC BLOOD PRESSURE: 47 MMHG

## 2018-01-01 VITALS
DIASTOLIC BLOOD PRESSURE: 62 MMHG | TEMPERATURE: 97.6 F | RESPIRATION RATE: 22 BRPM | OXYGEN SATURATION: 99 % | HEART RATE: 78 BPM | SYSTOLIC BLOOD PRESSURE: 110 MMHG

## 2018-01-01 VITALS
OXYGEN SATURATION: 98 % | SYSTOLIC BLOOD PRESSURE: 106 MMHG | TEMPERATURE: 98.6 F | RESPIRATION RATE: 19 BRPM | DIASTOLIC BLOOD PRESSURE: 62 MMHG

## 2018-01-01 VITALS
TEMPERATURE: 97.4 F | RESPIRATION RATE: 16 BRPM | SYSTOLIC BLOOD PRESSURE: 118 MMHG | DIASTOLIC BLOOD PRESSURE: 70 MMHG | HEART RATE: 68 BPM

## 2018-01-01 VITALS — SYSTOLIC BLOOD PRESSURE: 110 MMHG | HEART RATE: 83 BPM | RESPIRATION RATE: 17 BRPM | DIASTOLIC BLOOD PRESSURE: 62 MMHG

## 2018-01-01 VITALS
TEMPERATURE: 97.8 F | DIASTOLIC BLOOD PRESSURE: 78 MMHG | HEART RATE: 80 BPM | SYSTOLIC BLOOD PRESSURE: 106 MMHG | OXYGEN SATURATION: 90 %

## 2018-01-01 VITALS
OXYGEN SATURATION: 99 % | SYSTOLIC BLOOD PRESSURE: 96 MMHG | HEART RATE: 82 BPM | RESPIRATION RATE: 20 BRPM | TEMPERATURE: 98.7 F | DIASTOLIC BLOOD PRESSURE: 56 MMHG

## 2018-01-01 VITALS
OXYGEN SATURATION: 96 % | DIASTOLIC BLOOD PRESSURE: 68 MMHG | TEMPERATURE: 97.7 F | SYSTOLIC BLOOD PRESSURE: 104 MMHG | RESPIRATION RATE: 20 BRPM | HEART RATE: 84 BPM

## 2018-01-01 VITALS
HEART RATE: 82 BPM | DIASTOLIC BLOOD PRESSURE: 50 MMHG | SYSTOLIC BLOOD PRESSURE: 87 MMHG | RESPIRATION RATE: 16 BRPM | TEMPERATURE: 96 F

## 2018-01-01 VITALS
RESPIRATION RATE: 22 BRPM | TEMPERATURE: 97.6 F | SYSTOLIC BLOOD PRESSURE: 100 MMHG | HEART RATE: 80 BPM | DIASTOLIC BLOOD PRESSURE: 58 MMHG | OXYGEN SATURATION: 97 %

## 2018-01-01 VITALS
HEART RATE: 70 BPM | OXYGEN SATURATION: 97 % | TEMPERATURE: 97.5 F | RESPIRATION RATE: 18 BRPM | DIASTOLIC BLOOD PRESSURE: 65 MMHG | BODY MASS INDEX: 33.69 KG/M2 | SYSTOLIC BLOOD PRESSURE: 101 MMHG | WEIGHT: 172.5 LBS

## 2018-01-01 VITALS
HEART RATE: 70 BPM | OXYGEN SATURATION: 99 % | DIASTOLIC BLOOD PRESSURE: 64 MMHG | SYSTOLIC BLOOD PRESSURE: 104 MMHG | RESPIRATION RATE: 24 BRPM

## 2018-01-01 VITALS
DIASTOLIC BLOOD PRESSURE: 60 MMHG | OXYGEN SATURATION: 99 % | SYSTOLIC BLOOD PRESSURE: 106 MMHG | RESPIRATION RATE: 19 BRPM | TEMPERATURE: 99 F

## 2018-01-01 VITALS
SYSTOLIC BLOOD PRESSURE: 106 MMHG | RESPIRATION RATE: 24 BRPM | HEART RATE: 82 BPM | DIASTOLIC BLOOD PRESSURE: 68 MMHG | OXYGEN SATURATION: 96 % | TEMPERATURE: 98.5 F

## 2018-01-01 VITALS
SYSTOLIC BLOOD PRESSURE: 110 MMHG | DIASTOLIC BLOOD PRESSURE: 70 MMHG | RESPIRATION RATE: 21 BRPM | HEART RATE: 80 BPM | OXYGEN SATURATION: 98 %

## 2018-01-01 VITALS
SYSTOLIC BLOOD PRESSURE: 95 MMHG | WEIGHT: 176.6 LBS | RESPIRATION RATE: 20 BRPM | HEIGHT: 60 IN | HEART RATE: 82 BPM | DIASTOLIC BLOOD PRESSURE: 53 MMHG | TEMPERATURE: 97.3 F | OXYGEN SATURATION: 100 % | BODY MASS INDEX: 34.67 KG/M2

## 2018-01-01 VITALS
RESPIRATION RATE: 22 BRPM | TEMPERATURE: 97.4 F | SYSTOLIC BLOOD PRESSURE: 128 MMHG | HEART RATE: 66 BPM | DIASTOLIC BLOOD PRESSURE: 78 MMHG

## 2018-01-01 VITALS
SYSTOLIC BLOOD PRESSURE: 110 MMHG | OXYGEN SATURATION: 93 % | TEMPERATURE: 97.9 F | HEART RATE: 74 BPM | RESPIRATION RATE: 24 BRPM | DIASTOLIC BLOOD PRESSURE: 60 MMHG

## 2018-01-01 VITALS
TEMPERATURE: 99 F | DIASTOLIC BLOOD PRESSURE: 78 MMHG | HEART RATE: 82 BPM | RESPIRATION RATE: 22 BRPM | SYSTOLIC BLOOD PRESSURE: 132 MMHG

## 2018-01-01 VITALS
SYSTOLIC BLOOD PRESSURE: 102 MMHG | OXYGEN SATURATION: 96 % | RESPIRATION RATE: 18 BRPM | HEART RATE: 80 BPM | DIASTOLIC BLOOD PRESSURE: 60 MMHG | TEMPERATURE: 98.7 F

## 2018-01-01 VITALS
BODY MASS INDEX: 32.42 KG/M2 | WEIGHT: 166 LBS | DIASTOLIC BLOOD PRESSURE: 66 MMHG | RESPIRATION RATE: 20 BRPM | OXYGEN SATURATION: 98 % | SYSTOLIC BLOOD PRESSURE: 112 MMHG | HEART RATE: 72 BPM

## 2018-01-01 VITALS
HEART RATE: 84 BPM | OXYGEN SATURATION: 98 % | BODY MASS INDEX: 31.64 KG/M2 | SYSTOLIC BLOOD PRESSURE: 104 MMHG | TEMPERATURE: 96.3 F | WEIGHT: 162 LBS | RESPIRATION RATE: 24 BRPM | DIASTOLIC BLOOD PRESSURE: 58 MMHG

## 2018-01-01 VITALS
TEMPERATURE: 98.6 F | DIASTOLIC BLOOD PRESSURE: 62 MMHG | HEART RATE: 78 BPM | RESPIRATION RATE: 22 BRPM | OXYGEN SATURATION: 97 % | SYSTOLIC BLOOD PRESSURE: 100 MMHG

## 2018-01-01 VITALS
TEMPERATURE: 98.2 F | DIASTOLIC BLOOD PRESSURE: 58 MMHG | SYSTOLIC BLOOD PRESSURE: 100 MMHG | RESPIRATION RATE: 22 BRPM | OXYGEN SATURATION: 98 % | HEART RATE: 80 BPM

## 2018-01-01 VITALS
HEART RATE: 78 BPM | SYSTOLIC BLOOD PRESSURE: 112 MMHG | TEMPERATURE: 98.6 F | OXYGEN SATURATION: 98 % | DIASTOLIC BLOOD PRESSURE: 68 MMHG | RESPIRATION RATE: 20 BRPM

## 2018-01-01 VITALS
HEART RATE: 82 BPM | RESPIRATION RATE: 24 BRPM | OXYGEN SATURATION: 99 % | SYSTOLIC BLOOD PRESSURE: 94 MMHG | DIASTOLIC BLOOD PRESSURE: 66 MMHG | TEMPERATURE: 98.4 F

## 2018-01-01 VITALS
SYSTOLIC BLOOD PRESSURE: 108 MMHG | HEART RATE: 72 BPM | OXYGEN SATURATION: 94 % | BODY MASS INDEX: 29.29 KG/M2 | WEIGHT: 150 LBS | RESPIRATION RATE: 20 BRPM | DIASTOLIC BLOOD PRESSURE: 62 MMHG

## 2018-01-01 VITALS
HEART RATE: 68 BPM | TEMPERATURE: 98.8 F | DIASTOLIC BLOOD PRESSURE: 61 MMHG | RESPIRATION RATE: 24 BRPM | SYSTOLIC BLOOD PRESSURE: 133 MMHG

## 2018-01-01 VITALS
DIASTOLIC BLOOD PRESSURE: 62 MMHG | SYSTOLIC BLOOD PRESSURE: 100 MMHG | RESPIRATION RATE: 20 BRPM | OXYGEN SATURATION: 96 % | TEMPERATURE: 98.3 F

## 2018-01-01 VITALS
TEMPERATURE: 97.9 F | HEART RATE: 78 BPM | OXYGEN SATURATION: 99 % | SYSTOLIC BLOOD PRESSURE: 106 MMHG | RESPIRATION RATE: 18 BRPM | DIASTOLIC BLOOD PRESSURE: 48 MMHG

## 2018-01-01 VITALS
SYSTOLIC BLOOD PRESSURE: 112 MMHG | HEART RATE: 80 BPM | RESPIRATION RATE: 22 BRPM | TEMPERATURE: 97.5 F | OXYGEN SATURATION: 98 % | DIASTOLIC BLOOD PRESSURE: 60 MMHG

## 2018-01-01 VITALS
SYSTOLIC BLOOD PRESSURE: 106 MMHG | HEART RATE: 80 BPM | RESPIRATION RATE: 24 BRPM | OXYGEN SATURATION: 99 % | TEMPERATURE: 98 F | DIASTOLIC BLOOD PRESSURE: 54 MMHG

## 2018-01-01 VITALS
HEART RATE: 80 BPM | RESPIRATION RATE: 22 BRPM | OXYGEN SATURATION: 96 % | SYSTOLIC BLOOD PRESSURE: 100 MMHG | DIASTOLIC BLOOD PRESSURE: 58 MMHG | TEMPERATURE: 98.7 F

## 2018-01-01 VITALS — OXYGEN SATURATION: 96 % | DIASTOLIC BLOOD PRESSURE: 62 MMHG | HEART RATE: 66 BPM | SYSTOLIC BLOOD PRESSURE: 110 MMHG

## 2018-01-01 VITALS
HEART RATE: 84 BPM | TEMPERATURE: 98 F | DIASTOLIC BLOOD PRESSURE: 60 MMHG | SYSTOLIC BLOOD PRESSURE: 106 MMHG | RESPIRATION RATE: 26 BRPM | OXYGEN SATURATION: 99 %

## 2018-01-01 VITALS
DIASTOLIC BLOOD PRESSURE: 72 MMHG | RESPIRATION RATE: 20 BRPM | SYSTOLIC BLOOD PRESSURE: 118 MMHG | TEMPERATURE: 98.1 F | HEART RATE: 80 BPM

## 2018-01-01 VITALS — TEMPERATURE: 98.3 F | OXYGEN SATURATION: 98 % | RESPIRATION RATE: 24 BRPM

## 2018-01-01 VITALS — RESPIRATION RATE: 20 BRPM | DIASTOLIC BLOOD PRESSURE: 62 MMHG | HEART RATE: 84 BPM | SYSTOLIC BLOOD PRESSURE: 100 MMHG

## 2018-01-01 VITALS
TEMPERATURE: 98.2 F | HEART RATE: 82 BPM | DIASTOLIC BLOOD PRESSURE: 68 MMHG | RESPIRATION RATE: 19 BRPM | OXYGEN SATURATION: 99 % | SYSTOLIC BLOOD PRESSURE: 118 MMHG

## 2018-01-01 VITALS
OXYGEN SATURATION: 96 % | HEART RATE: 78 BPM | DIASTOLIC BLOOD PRESSURE: 62 MMHG | SYSTOLIC BLOOD PRESSURE: 102 MMHG | RESPIRATION RATE: 21 BRPM

## 2018-01-01 VITALS
TEMPERATURE: 97.8 F | SYSTOLIC BLOOD PRESSURE: 100 MMHG | DIASTOLIC BLOOD PRESSURE: 58 MMHG | HEART RATE: 82 BPM | OXYGEN SATURATION: 98 % | RESPIRATION RATE: 22 BRPM

## 2018-01-01 DIAGNOSIS — I35.0 NONRHEUMATIC AORTIC VALVE STENOSIS: Chronic | ICD-10-CM

## 2018-01-01 DIAGNOSIS — J90 PLEURAL EFFUSION: ICD-10-CM

## 2018-01-01 DIAGNOSIS — Z95.2 S/P TAVR (TRANSCATHETER AORTIC VALVE REPLACEMENT): ICD-10-CM

## 2018-01-01 DIAGNOSIS — J96.22 ACUTE ON CHRONIC RESPIRATORY FAILURE WITH HYPERCAPNIA (HCC): ICD-10-CM

## 2018-01-01 DIAGNOSIS — J96.11 CHRONIC RESPIRATORY FAILURE WITH HYPOXIA (HCC): Chronic | ICD-10-CM

## 2018-01-01 DIAGNOSIS — G89.29 CHRONIC LEFT SHOULDER PAIN: Primary | ICD-10-CM

## 2018-01-01 DIAGNOSIS — R09.02 HYPOXIA: ICD-10-CM

## 2018-01-01 DIAGNOSIS — I27.20 PULMONARY HYPERTENSION (HCC): Chronic | ICD-10-CM

## 2018-01-01 DIAGNOSIS — K21.9 GASTROESOPHAGEAL REFLUX DISEASE WITHOUT ESOPHAGITIS: Chronic | ICD-10-CM

## 2018-01-01 DIAGNOSIS — E03.9 ACQUIRED HYPOTHYROIDISM: Chronic | ICD-10-CM

## 2018-01-01 DIAGNOSIS — I50.9 ACUTE ON CHRONIC CONGESTIVE HEART FAILURE, UNSPECIFIED CONGESTIVE HEART FAILURE TYPE: Primary | ICD-10-CM

## 2018-01-01 DIAGNOSIS — I48.20 CHRONIC ATRIAL FIBRILLATION (HCC): Chronic | ICD-10-CM

## 2018-01-01 DIAGNOSIS — B96.20 E. COLI UTI: ICD-10-CM

## 2018-01-01 DIAGNOSIS — I50.9 ACUTE ON CHRONIC CONGESTIVE HEART FAILURE, UNSPECIFIED CONGESTIVE HEART FAILURE TYPE: ICD-10-CM

## 2018-01-01 DIAGNOSIS — I50.23 ACUTE ON CHRONIC SYSTOLIC CONGESTIVE HEART FAILURE (HCC): ICD-10-CM

## 2018-01-01 DIAGNOSIS — I50.32 CHRONIC DIASTOLIC HEART FAILURE (HCC): Chronic | ICD-10-CM

## 2018-01-01 DIAGNOSIS — F41.9 ANXIETY: Primary | Chronic | ICD-10-CM

## 2018-01-01 DIAGNOSIS — I25.10 CORONARY ARTERY DISEASE INVOLVING NATIVE CORONARY ARTERY OF NATIVE HEART WITHOUT ANGINA PECTORIS: Chronic | ICD-10-CM

## 2018-01-01 DIAGNOSIS — I35.0 AORTIC STENOSIS, SEVERE: ICD-10-CM

## 2018-01-01 DIAGNOSIS — I50.9 CONGESTIVE HEART FAILURE, UNSPECIFIED CONGESTIVE HEART FAILURE CHRONICITY, UNSPECIFIED CONGESTIVE HEART FAILURE TYPE: ICD-10-CM

## 2018-01-01 DIAGNOSIS — J90 PLEURAL EFFUSION, BILATERAL: ICD-10-CM

## 2018-01-01 DIAGNOSIS — I10 ESSENTIAL HYPERTENSION: Chronic | ICD-10-CM

## 2018-01-01 DIAGNOSIS — Z66 DNR (DO NOT RESUSCITATE): ICD-10-CM

## 2018-01-01 DIAGNOSIS — Z98.890 H/O MITRAL VALVE REPAIR: Chronic | ICD-10-CM

## 2018-01-01 DIAGNOSIS — Z95.0 CARDIAC PACEMAKER: Chronic | ICD-10-CM

## 2018-01-01 DIAGNOSIS — J96.01 ACUTE RESPIRATORY FAILURE WITH HYPOXIA AND HYPERCARBIA (HCC): ICD-10-CM

## 2018-01-01 DIAGNOSIS — F41.9 ANXIETY: Chronic | ICD-10-CM

## 2018-01-01 DIAGNOSIS — Z95.2 AORTIC VALVE REPLACED: Chronic | ICD-10-CM

## 2018-01-01 DIAGNOSIS — I50.32 CHRONIC DIASTOLIC CONGESTIVE HEART FAILURE (HCC): Chronic | ICD-10-CM

## 2018-01-01 DIAGNOSIS — J96.02 ACUTE RESPIRATORY FAILURE WITH HYPOXIA AND HYPERCARBIA (HCC): ICD-10-CM

## 2018-01-01 DIAGNOSIS — J96.02 ACUTE RESPIRATORY FAILURE WITH HYPERCAPNIA (HCC): ICD-10-CM

## 2018-01-01 DIAGNOSIS — M1A.9XX0 CHRONIC GOUT WITHOUT TOPHUS, UNSPECIFIED CAUSE, UNSPECIFIED SITE: Chronic | ICD-10-CM

## 2018-01-01 DIAGNOSIS — J96.21 ACUTE ON CHRONIC RESPIRATORY FAILURE WITH HYPOXIA (HCC): ICD-10-CM

## 2018-01-01 DIAGNOSIS — G47.33 OSA (OBSTRUCTIVE SLEEP APNEA): Chronic | ICD-10-CM

## 2018-01-01 DIAGNOSIS — T82.857A STENOSIS OF PROSTHETIC AORTIC VALVE: ICD-10-CM

## 2018-01-01 DIAGNOSIS — J90 PLEURAL EFFUSION, RIGHT: ICD-10-CM

## 2018-01-01 DIAGNOSIS — I50.22 SYSTOLIC CHF, CHRONIC (HCC): Chronic | ICD-10-CM

## 2018-01-01 DIAGNOSIS — I50.33 ACUTE ON CHRONIC DIASTOLIC HEART FAILURE (HCC): ICD-10-CM

## 2018-01-01 DIAGNOSIS — J96.01 ACUTE RESPIRATORY FAILURE WITH HYPOXIA (HCC): Primary | ICD-10-CM

## 2018-01-01 DIAGNOSIS — I73.9 PERIPHERAL ARTERIAL DISEASE (HCC): Chronic | ICD-10-CM

## 2018-01-01 DIAGNOSIS — N39.0 E. COLI UTI: ICD-10-CM

## 2018-01-01 DIAGNOSIS — N39.0 URINARY TRACT INFECTION WITHOUT HEMATURIA, SITE UNSPECIFIED: ICD-10-CM

## 2018-01-01 DIAGNOSIS — I50.32 CHRONIC DIASTOLIC HEART FAILURE (HCC): ICD-10-CM

## 2018-01-01 DIAGNOSIS — M25.512 CHRONIC LEFT SHOULDER PAIN: Primary | ICD-10-CM

## 2018-01-01 DIAGNOSIS — J81.0 ACUTE PULMONARY EDEMA (HCC): ICD-10-CM

## 2018-01-01 DIAGNOSIS — J44.9 CHRONIC OBSTRUCTIVE PULMONARY DISEASE, UNSPECIFIED COPD TYPE (HCC): Chronic | ICD-10-CM

## 2018-01-01 DIAGNOSIS — J96.01 ACUTE RESPIRATORY FAILURE WITH HYPOXIA (HCC): ICD-10-CM

## 2018-01-01 DIAGNOSIS — J90 PLEURAL EFFUSION, LEFT: ICD-10-CM

## 2018-01-01 DIAGNOSIS — D69.6 THROMBOCYTOPENIA (HCC): Chronic | ICD-10-CM

## 2018-01-01 DIAGNOSIS — D64.9 ANEMIA, UNSPECIFIED TYPE: Chronic | ICD-10-CM

## 2018-01-01 DIAGNOSIS — D72.829 LEUKOCYTOSIS, UNSPECIFIED TYPE: ICD-10-CM

## 2018-01-01 LAB
ABO + RH BLD: NORMAL
ACID FAST STN SPEC: NEGATIVE
ACID FAST STN SPEC: NEGATIVE
ALBUMIN SERPL-MCNC: 2 G/DL (ref 3.2–4.6)
ALBUMIN SERPL-MCNC: 2.1 G/DL (ref 3.2–4.6)
ALBUMIN SERPL-MCNC: 2.3 G/DL (ref 3.2–4.6)
ALBUMIN/GLOB SERPL: 0.7 {RATIO} (ref 1.2–3.5)
ALBUMIN/GLOB SERPL: 0.8 {RATIO} (ref 1.2–3.5)
ALP SERPL-CCNC: 156 U/L (ref 50–136)
ALP SERPL-CCNC: 89 U/L (ref 50–136)
ALT SERPL-CCNC: 21 U/L (ref 12–65)
ALT SERPL-CCNC: 22 U/L (ref 12–65)
ANION GAP SERPL CALC-SCNC: 12 MMOL/L (ref 7–16)
ANION GAP SERPL CALC-SCNC: 3 MMOL/L (ref 7–16)
ANION GAP SERPL CALC-SCNC: 3 MMOL/L (ref 7–16)
ANION GAP SERPL CALC-SCNC: 4 MMOL/L (ref 7–16)
ANION GAP SERPL CALC-SCNC: 5 MMOL/L (ref 7–16)
ANION GAP SERPL CALC-SCNC: 6 MMOL/L (ref 7–16)
ANION GAP SERPL CALC-SCNC: 7 MMOL/L (ref 7–16)
ANION GAP SERPL CALC-SCNC: 9 MMOL/L (ref 7–16)
ANION GAP SERPL CALC-SCNC: ABNORMAL MMOL/L (ref 7–16)
APPEARANCE FLD: CLEAR
APPEARANCE FLD: NORMAL
APPEARANCE UR: ABNORMAL
APPEARANCE UR: ABNORMAL
APPEARANCE UR: CLEAR
ARTERIAL PATENCY WRIST A: ABNORMAL
ARTERIAL PATENCY WRIST A: ABNORMAL
ARTERIAL PATENCY WRIST A: POSITIVE
AST SERPL-CCNC: 37 U/L (ref 15–37)
AST SERPL-CCNC: 60 U/L (ref 15–37)
ATRIAL RATE: 0 BPM
ATRIAL RATE: 111 BPM
ATRIAL RATE: 163 BPM
ATRIAL RATE: 66 BPM
ATRIAL RATE: 79 BPM
ATRIAL RATE: 80 BPM
ATRIAL RATE: 81 BPM
BACTERIA SPEC CULT: ABNORMAL
BACTERIA SPEC CULT: ABNORMAL
BACTERIA SPEC CULT: NORMAL
BACTERIA URNS QL MICRO: 0 /HPF
BACTERIA URNS QL MICRO: ABNORMAL /HPF
BASE EXCESS BLD CALC-SCNC: 10 MMOL/L
BASE EXCESS BLD CALC-SCNC: 10 MMOL/L
BASE EXCESS BLDA CALC-SCNC: 14.2 MMOL/L (ref 0–3)
BASE EXCESS BLDA CALC-SCNC: 14.7 MMOL/L (ref 0–3)
BASE EXCESS BLDA CALC-SCNC: 14.7 MMOL/L (ref 0–3)
BASE EXCESS BLDA CALC-SCNC: 24.1 MMOL/L (ref 0–3)
BASE EXCESS BLDA CALC-SCNC: 24.3 MMOL/L (ref 0–3)
BASOPHILS # BLD: 0 K/UL (ref 0–0.2)
BASOPHILS # BLD: 0.1 K/UL (ref 0–0.2)
BASOPHILS # BLD: 0.1 K/UL (ref 0–0.2)
BASOPHILS NFR BLD: 0 % (ref 0–2)
BASOPHILS NFR BLD: 1 % (ref 0–2)
BDY SITE: ABNORMAL
BILIRUB SERPL-MCNC: 0.7 MG/DL (ref 0.2–1.1)
BILIRUB SERPL-MCNC: 0.8 MG/DL (ref 0.2–1.1)
BILIRUB SERPL-MCNC: 0.8 MG/DL (ref 0.2–1.1)
BILIRUB UR QL: NEGATIVE
BLD PROD TYP BPU: NORMAL
BLOOD GROUP ANTIBODIES SERPL: NORMAL
BNP SERPL-MCNC: 147 PG/ML
BNP SERPL-MCNC: 202 PG/ML
BNP SERPL-MCNC: 235 PG/ML
BNP SERPL-MCNC: 237 PG/ML
BNP SERPL-MCNC: 334 PG/ML
BNP SERPL-MCNC: 345 PG/ML
BNP SERPL-MCNC: 450 PG/ML
BPU ID: NORMAL
BUN SERPL-MCNC: 21 MG/DL (ref 8–23)
BUN SERPL-MCNC: 21 MG/DL (ref 8–23)
BUN SERPL-MCNC: 24 MG/DL (ref 8–23)
BUN SERPL-MCNC: 25 MG/DL (ref 8–23)
BUN SERPL-MCNC: 28 MG/DL (ref 8–23)
BUN SERPL-MCNC: 28 MG/DL (ref 8–23)
BUN SERPL-MCNC: 29 MG/DL (ref 8–23)
BUN SERPL-MCNC: 30 MG/DL (ref 8–23)
BUN SERPL-MCNC: 31 MG/DL (ref 8–23)
BUN SERPL-MCNC: 31 MG/DL (ref 8–23)
BUN SERPL-MCNC: 32 MG/DL (ref 8–23)
BUN SERPL-MCNC: 32 MG/DL (ref 8–23)
BUN SERPL-MCNC: 33 MG/DL (ref 8–23)
BUN SERPL-MCNC: 35 MG/DL (ref 8–23)
BUN SERPL-MCNC: 35 MG/DL (ref 8–23)
BUN SERPL-MCNC: 36 MG/DL (ref 8–23)
BUN SERPL-MCNC: 38 MG/DL (ref 8–23)
BUN SERPL-MCNC: 39 MG/DL (ref 8–23)
BUN SERPL-MCNC: 40 MG/DL (ref 8–23)
BUN SERPL-MCNC: 41 MG/DL (ref 8–23)
BUN SERPL-MCNC: 41 MG/DL (ref 8–23)
BUN SERPL-MCNC: 44 MG/DL (ref 8–23)
BUN SERPL-MCNC: 44 MG/DL (ref 8–23)
BUN SERPL-MCNC: 45 MG/DL (ref 8–23)
BUN SERPL-MCNC: 45 MG/DL (ref 8–23)
BUN SERPL-MCNC: 47 MG/DL (ref 8–23)
BUN SERPL-MCNC: 47 MG/DL (ref 8–23)
BUN SERPL-MCNC: 48 MG/DL (ref 8–23)
BUN SERPL-MCNC: 51 MG/DL (ref 8–23)
BUN SERPL-MCNC: 57 MG/DL (ref 8–23)
BUN SERPL-MCNC: 57 MG/DL (ref 8–23)
BUN SERPL-MCNC: 62 MG/DL (ref 8–23)
BUN SERPL-MCNC: 62 MG/DL (ref 8–23)
BUN SERPL-MCNC: 64 MG/DL (ref 8–23)
BUN SERPL-MCNC: 67 MG/DL (ref 8–23)
BUN SERPL-MCNC: 67 MG/DL (ref 8–23)
BUN SERPL-MCNC: 68 MG/DL (ref 8–23)
BUN SERPL-MCNC: 68 MG/DL (ref 8–23)
CA-I BLD-MCNC: 1.2 MMOL/L (ref 1.12–1.32)
CA-I BLD-MCNC: 1.22 MMOL/L (ref 1.12–1.32)
CALCIUM SERPL-MCNC: 7 MG/DL (ref 8.3–10.4)
CALCIUM SERPL-MCNC: 7.7 MG/DL (ref 8.3–10.4)
CALCIUM SERPL-MCNC: 7.9 MG/DL (ref 8.3–10.4)
CALCIUM SERPL-MCNC: 8 MG/DL (ref 8.3–10.4)
CALCIUM SERPL-MCNC: 8 MG/DL (ref 8.3–10.4)
CALCIUM SERPL-MCNC: 8.1 MG/DL (ref 8.3–10.4)
CALCIUM SERPL-MCNC: 8.2 MG/DL (ref 8.3–10.4)
CALCIUM SERPL-MCNC: 8.3 MG/DL (ref 8.3–10.4)
CALCIUM SERPL-MCNC: 8.4 MG/DL (ref 8.3–10.4)
CALCIUM SERPL-MCNC: 8.5 MG/DL (ref 8.3–10.4)
CALCIUM SERPL-MCNC: 8.5 MG/DL (ref 8.3–10.4)
CALCIUM SERPL-MCNC: 8.6 MG/DL (ref 8.3–10.4)
CALCIUM SERPL-MCNC: 8.7 MG/DL (ref 8.3–10.4)
CALCIUM SERPL-MCNC: 8.8 MG/DL (ref 8.3–10.4)
CALCIUM SERPL-MCNC: 8.9 MG/DL (ref 8.3–10.4)
CALCIUM SERPL-MCNC: 9 MG/DL (ref 8.3–10.4)
CALCULATED R AXIS, ECG10: -65 DEGREES
CALCULATED R AXIS, ECG10: -67 DEGREES
CALCULATED R AXIS, ECG10: -70 DEGREES
CALCULATED R AXIS, ECG10: -70 DEGREES
CALCULATED R AXIS, ECG10: -71 DEGREES
CALCULATED R AXIS, ECG10: -75 DEGREES
CALCULATED R AXIS, ECG10: -79 DEGREES
CALCULATED T AXIS, ECG11: 100 DEGREES
CALCULATED T AXIS, ECG11: 90 DEGREES
CALCULATED T AXIS, ECG11: 93 DEGREES
CALCULATED T AXIS, ECG11: 94 DEGREES
CALCULATED T AXIS, ECG11: 97 DEGREES
CALCULATED T AXIS, ECG11: 97 DEGREES
CALCULATED T AXIS, ECG11: 99 DEGREES
CASTS URNS QL MICRO: ABNORMAL /LPF
CHLORIDE SERPL-SCNC: 100 MMOL/L (ref 98–107)
CHLORIDE SERPL-SCNC: 101 MMOL/L (ref 98–107)
CHLORIDE SERPL-SCNC: 102 MMOL/L (ref 98–107)
CHLORIDE SERPL-SCNC: 103 MMOL/L (ref 98–107)
CHLORIDE SERPL-SCNC: 103 MMOL/L (ref 98–107)
CHLORIDE SERPL-SCNC: 104 MMOL/L (ref 98–107)
CHLORIDE SERPL-SCNC: 104 MMOL/L (ref 98–107)
CHLORIDE SERPL-SCNC: 108 MMOL/L (ref 98–107)
CHLORIDE SERPL-SCNC: 93 MMOL/L (ref 98–107)
CHLORIDE SERPL-SCNC: 94 MMOL/L (ref 98–107)
CHLORIDE SERPL-SCNC: 95 MMOL/L (ref 98–107)
CHLORIDE SERPL-SCNC: 97 MMOL/L (ref 98–107)
CHLORIDE SERPL-SCNC: 97 MMOL/L (ref 98–107)
CHLORIDE SERPL-SCNC: 98 MMOL/L (ref 98–107)
CHLORIDE SERPL-SCNC: 98 MMOL/L (ref 98–107)
CHLORIDE SERPL-SCNC: 99 MMOL/L (ref 98–107)
CHLORIDE SERPL-SCNC: 99 MMOL/L (ref 98–107)
CHOLEST SERPL-MCNC: 119 MG/DL
CHOLESTEROL, FLD, CHOF1L: 14 MG/DL
CHYLOMICRON SCREEN, CHYLMT: ABNORMAL
CO2 SERPL-SCNC: 28 MMOL/L (ref 21–32)
CO2 SERPL-SCNC: 32 MMOL/L (ref 21–32)
CO2 SERPL-SCNC: 32 MMOL/L (ref 21–32)
CO2 SERPL-SCNC: 33 MMOL/L (ref 21–32)
CO2 SERPL-SCNC: 34 MMOL/L (ref 21–32)
CO2 SERPL-SCNC: 35 MMOL/L (ref 21–32)
CO2 SERPL-SCNC: 35 MMOL/L (ref 21–32)
CO2 SERPL-SCNC: 37 MMOL/L (ref 21–32)
CO2 SERPL-SCNC: 38 MMOL/L (ref 21–32)
CO2 SERPL-SCNC: 39 MMOL/L (ref 21–32)
CO2 SERPL-SCNC: 40 MMOL/L (ref 21–32)
CO2 SERPL-SCNC: 41 MMOL/L (ref 21–32)
CO2 SERPL-SCNC: 41 MMOL/L (ref 21–32)
CO2 SERPL-SCNC: 42 MMOL/L (ref 21–32)
CO2 SERPL-SCNC: >45 MMOL/L (ref 21–32)
COHGB MFR BLD: 0.3 % (ref 0.5–1.5)
COHGB MFR BLD: 0.4 % (ref 0.5–1.5)
COHGB MFR BLD: 1.5 % (ref 0.5–1.5)
COHGB MFR BLD: 1.5 % (ref 0.5–1.5)
COHGB MFR BLD: 1.8 % (ref 0.5–1.5)
COLOR FLD: NORMAL
COLOR FLD: YELLOW
COLOR UR: YELLOW
CREAT SERPL-MCNC: 0.85 MG/DL (ref 0.6–1)
CREAT SERPL-MCNC: 0.92 MG/DL (ref 0.6–1)
CREAT SERPL-MCNC: 0.95 MG/DL (ref 0.6–1)
CREAT SERPL-MCNC: 0.95 MG/DL (ref 0.6–1)
CREAT SERPL-MCNC: 0.96 MG/DL (ref 0.6–1)
CREAT SERPL-MCNC: 0.98 MG/DL (ref 0.6–1)
CREAT SERPL-MCNC: 0.99 MG/DL (ref 0.6–1)
CREAT SERPL-MCNC: 1 MG/DL (ref 0.6–1)
CREAT SERPL-MCNC: 1 MG/DL (ref 0.6–1)
CREAT SERPL-MCNC: 1.01 MG/DL (ref 0.6–1)
CREAT SERPL-MCNC: 1.02 MG/DL (ref 0.6–1)
CREAT SERPL-MCNC: 1.03 MG/DL (ref 0.6–1)
CREAT SERPL-MCNC: 1.04 MG/DL (ref 0.6–1)
CREAT SERPL-MCNC: 1.06 MG/DL (ref 0.6–1)
CREAT SERPL-MCNC: 1.1 MG/DL (ref 0.6–1)
CREAT SERPL-MCNC: 1.1 MG/DL (ref 0.6–1)
CREAT SERPL-MCNC: 1.11 MG/DL (ref 0.6–1)
CREAT SERPL-MCNC: 1.12 MG/DL (ref 0.6–1)
CREAT SERPL-MCNC: 1.14 MG/DL (ref 0.6–1)
CREAT SERPL-MCNC: 1.15 MG/DL (ref 0.6–1)
CREAT SERPL-MCNC: 1.15 MG/DL (ref 0.6–1)
CREAT SERPL-MCNC: 1.17 MG/DL (ref 0.6–1)
CREAT SERPL-MCNC: 1.17 MG/DL (ref 0.6–1)
CREAT SERPL-MCNC: 1.19 MG/DL (ref 0.6–1)
CREAT SERPL-MCNC: 1.19 MG/DL (ref 0.6–1)
CREAT SERPL-MCNC: 1.21 MG/DL (ref 0.6–1)
CREAT SERPL-MCNC: 1.23 MG/DL (ref 0.6–1)
CREAT SERPL-MCNC: 1.25 MG/DL (ref 0.6–1)
CREAT SERPL-MCNC: 1.26 MG/DL (ref 0.6–1)
CREAT SERPL-MCNC: 1.28 MG/DL (ref 0.6–1)
CREAT SERPL-MCNC: 1.31 MG/DL (ref 0.6–1)
CREAT SERPL-MCNC: 1.31 MG/DL (ref 0.6–1)
CREAT SERPL-MCNC: 1.33 MG/DL (ref 0.6–1)
CREAT SERPL-MCNC: 1.34 MG/DL (ref 0.6–1)
CREAT SERPL-MCNC: 1.35 MG/DL (ref 0.6–1)
CREAT SERPL-MCNC: 1.4 MG/DL (ref 0.6–1)
CREAT SERPL-MCNC: 1.59 MG/DL (ref 0.6–1)
CREAT SERPL-MCNC: 1.76 MG/DL (ref 0.6–1)
CREAT SERPL-MCNC: 1.92 MG/DL (ref 0.6–1)
CROSSMATCH RESULT,%XM: NORMAL
CRYSTALS URNS QL MICRO: 0 /LPF
CRYSTALS URNS QL MICRO: 0 /LPF
DIAGNOSIS, 93000: NORMAL
DIFFERENTIAL METHOD BLD: ABNORMAL
DO-HGB BLD-MCNC: 2 % (ref 0–5)
DO-HGB BLD-MCNC: 3 % (ref 0–5)
DO-HGB BLD-MCNC: 3 % (ref 0–5)
DO-HGB BLD-MCNC: 4 % (ref 0–5)
DO-HGB BLD-MCNC: 4 % (ref 0–5)
EOSINOPHIL # BLD: 0 K/UL (ref 0–0.8)
EOSINOPHIL # BLD: 0 K/UL (ref 0–0.8)
EOSINOPHIL # BLD: 0.1 K/UL (ref 0–0.8)
EOSINOPHIL # BLD: 0.2 K/UL (ref 0–0.8)
EOSINOPHIL NFR BLD: 0 % (ref 0.5–7.8)
EOSINOPHIL NFR BLD: 0 % (ref 0.5–7.8)
EOSINOPHIL NFR BLD: 1 % (ref 0.5–7.8)
EOSINOPHIL NFR BLD: 2 % (ref 0.5–7.8)
EOSINOPHIL NFR BLD: 3 % (ref 0.5–7.8)
EOSINOPHIL NFR BLD: 4 % (ref 0.5–7.8)
EOSINOPHIL NFR BLD: 5 % (ref 0.5–7.8)
EPI CELLS #/AREA URNS HPF: ABNORMAL /HPF
ERYTHROCYTE [DISTWIDTH] IN BLOOD BY AUTOMATED COUNT: 16.3 % (ref 11.9–14.6)
ERYTHROCYTE [DISTWIDTH] IN BLOOD BY AUTOMATED COUNT: 16.6 % (ref 11.9–14.6)
ERYTHROCYTE [DISTWIDTH] IN BLOOD BY AUTOMATED COUNT: 16.6 % (ref 11.9–14.6)
ERYTHROCYTE [DISTWIDTH] IN BLOOD BY AUTOMATED COUNT: 16.7 % (ref 11.9–14.6)
ERYTHROCYTE [DISTWIDTH] IN BLOOD BY AUTOMATED COUNT: 16.8 % (ref 11.9–14.6)
ERYTHROCYTE [DISTWIDTH] IN BLOOD BY AUTOMATED COUNT: 16.8 % (ref 11.9–14.6)
ERYTHROCYTE [DISTWIDTH] IN BLOOD BY AUTOMATED COUNT: 16.9 % (ref 11.9–14.6)
ERYTHROCYTE [DISTWIDTH] IN BLOOD BY AUTOMATED COUNT: 17.1 % (ref 11.9–14.6)
ERYTHROCYTE [DISTWIDTH] IN BLOOD BY AUTOMATED COUNT: 17.2 % (ref 11.9–14.6)
ERYTHROCYTE [DISTWIDTH] IN BLOOD BY AUTOMATED COUNT: 17.3 % (ref 11.9–14.6)
ERYTHROCYTE [DISTWIDTH] IN BLOOD BY AUTOMATED COUNT: 17.4 % (ref 11.9–14.6)
ERYTHROCYTE [DISTWIDTH] IN BLOOD BY AUTOMATED COUNT: 17.7 % (ref 11.9–14.6)
ERYTHROCYTE [DISTWIDTH] IN BLOOD BY AUTOMATED COUNT: 17.9 % (ref 11.9–14.6)
ERYTHROCYTE [DISTWIDTH] IN BLOOD BY AUTOMATED COUNT: 18 % (ref 11.9–14.6)
ERYTHROCYTE [DISTWIDTH] IN BLOOD BY AUTOMATED COUNT: 18.1 % (ref 11.9–14.6)
ERYTHROCYTE [DISTWIDTH] IN BLOOD BY AUTOMATED COUNT: 18.3 % (ref 11.9–14.6)
ERYTHROCYTE [DISTWIDTH] IN BLOOD BY AUTOMATED COUNT: 18.6 % (ref 11.9–14.6)
EST. AVERAGE GLUCOSE BLD GHB EST-MCNC: ABNORMAL MG/DL
FERRITIN SERPL-MCNC: 122 NG/ML (ref 8–388)
FLUAV AG NPH QL IA: NEGATIVE
FLUBV AG NPH QL IA: NEGATIVE
FUNGUS CULTURE, RFCO2T: NORMAL
FUNGUS CULTURE, RFCO2T: NORMAL
FUNGUS SMEAR, RFCO1T: NORMAL
FUNGUS SMEAR, RFCO1T: NORMAL
FUNGUS SPEC CULT: NORMAL
FUNGUS SPEC CULT: NORMAL
FUNGUS STAIN, 188244: NORMAL
FUNGUS STAIN, 188244: NORMAL
GAS FLOW.O2 O2 DELIVERY SYS: 1 L/MIN
GAS FLOW.O2 O2 DELIVERY SYS: 2 L/MIN
GAS FLOW.O2 O2 DELIVERY SYS: 4 L/MIN
GLOBULIN SER CALC-MCNC: 2.5 G/DL (ref 2.3–3.5)
GLOBULIN SER CALC-MCNC: 2.9 G/DL (ref 2.3–3.5)
GLUCOSE BLD STRIP.AUTO-MCNC: 113 MG/DL (ref 65–100)
GLUCOSE BLD STRIP.AUTO-MCNC: 119 MG/DL (ref 65–100)
GLUCOSE BLD STRIP.AUTO-MCNC: 97 MG/DL (ref 65–100)
GLUCOSE BLD STRIP.AUTO-MCNC: 99 MG/DL (ref 65–100)
GLUCOSE BLD STRIP.AUTO-MCNC: 99 MG/DL (ref 65–100)
GLUCOSE FLD-MCNC: 143 MG/DL
GLUCOSE FLD-MCNC: NORMAL MG/DL
GLUCOSE SERPL-MCNC: 102 MG/DL (ref 65–100)
GLUCOSE SERPL-MCNC: 102 MG/DL (ref 65–100)
GLUCOSE SERPL-MCNC: 103 MG/DL (ref 65–100)
GLUCOSE SERPL-MCNC: 104 MG/DL (ref 65–100)
GLUCOSE SERPL-MCNC: 104 MG/DL (ref 65–100)
GLUCOSE SERPL-MCNC: 105 MG/DL (ref 65–100)
GLUCOSE SERPL-MCNC: 106 MG/DL (ref 65–100)
GLUCOSE SERPL-MCNC: 106 MG/DL (ref 65–100)
GLUCOSE SERPL-MCNC: 107 MG/DL (ref 65–100)
GLUCOSE SERPL-MCNC: 107 MG/DL (ref 65–100)
GLUCOSE SERPL-MCNC: 108 MG/DL (ref 65–100)
GLUCOSE SERPL-MCNC: 108 MG/DL (ref 65–100)
GLUCOSE SERPL-MCNC: 109 MG/DL (ref 65–100)
GLUCOSE SERPL-MCNC: 112 MG/DL (ref 65–100)
GLUCOSE SERPL-MCNC: 114 MG/DL (ref 65–100)
GLUCOSE SERPL-MCNC: 115 MG/DL (ref 65–100)
GLUCOSE SERPL-MCNC: 135 MG/DL (ref 65–100)
GLUCOSE SERPL-MCNC: 160 MG/DL (ref 65–100)
GLUCOSE SERPL-MCNC: 81 MG/DL (ref 65–100)
GLUCOSE SERPL-MCNC: 81 MG/DL (ref 65–100)
GLUCOSE SERPL-MCNC: 82 MG/DL (ref 65–100)
GLUCOSE SERPL-MCNC: 83 MG/DL (ref 65–100)
GLUCOSE SERPL-MCNC: 83 MG/DL (ref 65–100)
GLUCOSE SERPL-MCNC: 84 MG/DL (ref 65–100)
GLUCOSE SERPL-MCNC: 84 MG/DL (ref 65–100)
GLUCOSE SERPL-MCNC: 87 MG/DL (ref 65–100)
GLUCOSE SERPL-MCNC: 87 MG/DL (ref 65–100)
GLUCOSE SERPL-MCNC: 88 MG/DL (ref 65–100)
GLUCOSE SERPL-MCNC: 89 MG/DL (ref 65–100)
GLUCOSE SERPL-MCNC: 89 MG/DL (ref 65–100)
GLUCOSE SERPL-MCNC: 90 MG/DL (ref 65–100)
GLUCOSE SERPL-MCNC: 91 MG/DL (ref 65–100)
GLUCOSE SERPL-MCNC: 92 MG/DL (ref 65–100)
GLUCOSE SERPL-MCNC: 92 MG/DL (ref 65–100)
GLUCOSE SERPL-MCNC: 93 MG/DL (ref 65–100)
GLUCOSE SERPL-MCNC: 98 MG/DL (ref 65–100)
GLUCOSE SERPL-MCNC: 98 MG/DL (ref 65–100)
GLUCOSE UR STRIP.AUTO-MCNC: NEGATIVE MG/DL
GRAM STN SPEC: NORMAL
HBA1C MFR BLD: <4.8 % (ref 4.8–6)
HCO3 BLD-SCNC: 35.5 MMOL/L (ref 22–26)
HCO3 BLD-SCNC: 36 MMOL/L (ref 22–26)
HCO3 BLDA-SCNC: 41 MMOL/L (ref 22–26)
HCO3 BLDA-SCNC: 41 MMOL/L (ref 22–26)
HCO3 BLDA-SCNC: 42 MMOL/L (ref 22–26)
HCO3 BLDA-SCNC: 51 MMOL/L (ref 22–26)
HCO3 BLDA-SCNC: 52 MMOL/L (ref 22–26)
HCT VFR BLD AUTO: 23.7 % (ref 35.8–46.3)
HCT VFR BLD AUTO: 23.7 % (ref 35.8–46.3)
HCT VFR BLD AUTO: 24.8 % (ref 35.8–46.3)
HCT VFR BLD AUTO: 24.9 % (ref 35.8–46.3)
HCT VFR BLD AUTO: 25.1 % (ref 35.8–46.3)
HCT VFR BLD AUTO: 25.9 % (ref 35.8–46.3)
HCT VFR BLD AUTO: 26.4 % (ref 35.8–46.3)
HCT VFR BLD AUTO: 26.6 % (ref 35.8–46.3)
HCT VFR BLD AUTO: 26.7 % (ref 35.8–46.3)
HCT VFR BLD AUTO: 26.8 % (ref 35.8–46.3)
HCT VFR BLD AUTO: 27.3 % (ref 35.8–46.3)
HCT VFR BLD AUTO: 27.4 % (ref 35.8–46.3)
HCT VFR BLD AUTO: 27.4 % (ref 35.8–46.3)
HCT VFR BLD AUTO: 27.8 % (ref 35.8–46.3)
HCT VFR BLD AUTO: 27.8 % (ref 35.8–46.3)
HCT VFR BLD AUTO: 27.9 % (ref 35.8–46.3)
HCT VFR BLD AUTO: 28.2 % (ref 35.8–46.3)
HCT VFR BLD AUTO: 28.5 % (ref 35.8–46.3)
HCT VFR BLD AUTO: 28.7 % (ref 35.8–46.3)
HCT VFR BLD AUTO: 28.8 % (ref 35.8–46.3)
HCT VFR BLD AUTO: 29 % (ref 35.8–46.3)
HCT VFR BLD AUTO: 29.3 % (ref 35.8–46.3)
HCT VFR BLD AUTO: 29.8 % (ref 35.8–46.3)
HCT VFR BLD AUTO: 29.8 % (ref 35.8–46.3)
HCT VFR BLD AUTO: 30.4 % (ref 35.8–46.3)
HCT VFR BLD AUTO: 31.3 % (ref 35.8–46.3)
HCT VFR BLD AUTO: 31.7 % (ref 35.8–46.3)
HCT VFR BLD AUTO: 32 % (ref 35.8–46.3)
HCT VFR BLD AUTO: 32.5 % (ref 35.8–46.3)
HCT VFR BLD AUTO: 32.9 % (ref 35.8–46.3)
HCT VFR BLD AUTO: 33.1 % (ref 35.8–46.3)
HCT VFR BLD AUTO: 33.5 % (ref 35.8–46.3)
HCT VFR BLD AUTO: 34.2 % (ref 35.8–46.3)
HCT VFR BLD AUTO: 34.3 % (ref 35.8–46.3)
HDLC SERPL-MCNC: 63 MG/DL (ref 40–60)
HDLC SERPL: 1.9 {RATIO}
HEMOCCULT STL QL: POSITIVE
HGB BLD-MCNC: 10.2 G/DL (ref 11.7–15.4)
HGB BLD-MCNC: 10.3 G/DL (ref 11.7–15.4)
HGB BLD-MCNC: 7.1 G/DL (ref 11.7–15.4)
HGB BLD-MCNC: 7.4 G/DL (ref 11.7–15.4)
HGB BLD-MCNC: 7.4 G/DL (ref 11.7–15.4)
HGB BLD-MCNC: 7.5 G/DL (ref 11.7–15.4)
HGB BLD-MCNC: 7.5 G/DL (ref 11.7–15.4)
HGB BLD-MCNC: 7.7 G/DL (ref 11.7–15.4)
HGB BLD-MCNC: 7.7 G/DL (ref 11.7–15.4)
HGB BLD-MCNC: 7.9 G/DL (ref 11.7–15.4)
HGB BLD-MCNC: 8 G/DL (ref 11.7–15.4)
HGB BLD-MCNC: 8.1 G/DL (ref 11.7–15.4)
HGB BLD-MCNC: 8.1 G/DL (ref 11.7–15.4)
HGB BLD-MCNC: 8.2 G/DL (ref 11.7–15.4)
HGB BLD-MCNC: 8.2 G/DL (ref 11.7–15.4)
HGB BLD-MCNC: 8.3 G/DL (ref 11.7–15.4)
HGB BLD-MCNC: 8.3 G/DL (ref 11.7–15.4)
HGB BLD-MCNC: 8.4 G/DL (ref 11.7–15.4)
HGB BLD-MCNC: 8.5 G/DL (ref 11.7–15.4)
HGB BLD-MCNC: 8.7 G/DL (ref 11.7–15.4)
HGB BLD-MCNC: 8.7 G/DL (ref 11.7–15.4)
HGB BLD-MCNC: 8.8 G/DL (ref 11.7–15.4)
HGB BLD-MCNC: 8.9 G/DL (ref 11.7–15.4)
HGB BLD-MCNC: 9.1 G/DL (ref 11.7–15.4)
HGB BLD-MCNC: 9.2 G/DL (ref 11.7–15.4)
HGB BLD-MCNC: 9.5 G/DL (ref 11.7–15.4)
HGB BLD-MCNC: 9.7 G/DL (ref 11.7–15.4)
HGB BLD-MCNC: 9.8 G/DL (ref 11.7–15.4)
HGB BLDMV-MCNC: 10 GM/DL (ref 11.7–15)
HGB BLDMV-MCNC: 7.7 GM/DL (ref 11.7–15)
HGB BLDMV-MCNC: 8.2 GM/DL (ref 11.7–15)
HGB BLDMV-MCNC: 9.3 GM/DL (ref 11.7–15)
HGB BLDMV-MCNC: 9.8 GM/DL (ref 11.7–15)
HGB UR QL STRIP: ABNORMAL
HGB UR QL STRIP: NEGATIVE
HGB UR QL STRIP: NEGATIVE
IMM GRANULOCYTES # BLD: 0 K/UL (ref 0–0.5)
IMM GRANULOCYTES # BLD: 0.1 K/UL (ref 0–0.5)
IMM GRANULOCYTES NFR BLD AUTO: 0 % (ref 0–5)
INR PPP: 1.1
INR PPP: 1.2
INR PPP: 1.3
INR PPP: 1.4
INR PPP: 1.5
INR PPP: 1.7
INR PPP: 2.2
INR PPP: 3.2
INR PPP: 5.9
INR PPP: 6.4
IRON SATN MFR SERPL: 15 %
IRON SERPL-MCNC: 51 UG/DL (ref 35–150)
KETONES UR QL STRIP.AUTO: NEGATIVE MG/DL
LACTATE SERPL-SCNC: 0.7 MMOL/L (ref 0.4–2)
LDH FLD L TO P-CCNC: 42 U/L
LDH FLD L TO P-CCNC: 47 U/L
LDH FLD L TO P-CCNC: NORMAL U/L
LDLC SERPL CALC-MCNC: 43.6 MG/DL
LEUKOCYTE ESTERASE UR QL STRIP.AUTO: ABNORMAL
LEUKOCYTE ESTERASE UR QL STRIP.AUTO: ABNORMAL
LEUKOCYTE ESTERASE UR QL STRIP.AUTO: NEGATIVE
LIPID PROFILE,FLP: ABNORMAL
LYMPHOCYTES # BLD: 0.2 K/UL (ref 0.5–4.6)
LYMPHOCYTES # BLD: 0.4 K/UL (ref 0.5–4.6)
LYMPHOCYTES # BLD: 0.5 K/UL (ref 0.5–4.6)
LYMPHOCYTES # BLD: 0.5 K/UL (ref 0.5–4.6)
LYMPHOCYTES # BLD: 0.6 K/UL (ref 0.5–4.6)
LYMPHOCYTES # BLD: 0.7 K/UL (ref 0.5–4.6)
LYMPHOCYTES NFR BLD: 1 % (ref 13–44)
LYMPHOCYTES NFR BLD: 10 % (ref 13–44)
LYMPHOCYTES NFR BLD: 11 % (ref 13–44)
LYMPHOCYTES NFR BLD: 12 % (ref 13–44)
LYMPHOCYTES NFR BLD: 15 % (ref 13–44)
LYMPHOCYTES NFR BLD: 16 % (ref 13–44)
LYMPHOCYTES NFR BLD: 3 % (ref 13–44)
LYMPHOCYTES NFR BLD: 7 % (ref 13–44)
LYMPHOCYTES NFR BLD: 7 % (ref 13–44)
LYMPHOCYTES NFR BLD: 8 % (ref 13–44)
LYMPHOCYTES NFR BLD: 8 % (ref 13–44)
LYMPHOCYTES NFR FLD: 44 %
MAGNESIUM SERPL-MCNC: 1.5 MG/DL (ref 1.8–2.4)
MAGNESIUM SERPL-MCNC: 1.9 MG/DL (ref 1.8–2.4)
MAGNESIUM SERPL-MCNC: 2 MG/DL (ref 1.8–2.4)
MAGNESIUM SERPL-MCNC: 2 MG/DL (ref 1.8–2.4)
MAGNESIUM SERPL-MCNC: 2.1 MG/DL (ref 1.8–2.4)
MAGNESIUM SERPL-MCNC: 2.2 MG/DL (ref 1.8–2.4)
MAGNESIUM SERPL-MCNC: 2.3 MG/DL (ref 1.8–2.4)
MAGNESIUM SERPL-MCNC: 2.4 MG/DL (ref 1.8–2.4)
MAGNESIUM SERPL-MCNC: 2.4 MG/DL (ref 1.8–2.4)
MAGNESIUM SERPL-MCNC: 2.5 MG/DL (ref 1.8–2.4)
MCH RBC QN AUTO: 28.5 PG (ref 26.1–32.9)
MCH RBC QN AUTO: 28.6 PG (ref 26.1–32.9)
MCH RBC QN AUTO: 28.7 PG (ref 26.1–32.9)
MCH RBC QN AUTO: 28.8 PG (ref 26.1–32.9)
MCH RBC QN AUTO: 28.8 PG (ref 26.1–32.9)
MCH RBC QN AUTO: 28.9 PG (ref 26.1–32.9)
MCH RBC QN AUTO: 29 PG (ref 26.1–32.9)
MCH RBC QN AUTO: 29.1 PG (ref 26.1–32.9)
MCH RBC QN AUTO: 29.2 PG (ref 26.1–32.9)
MCH RBC QN AUTO: 29.3 PG (ref 26.1–32.9)
MCH RBC QN AUTO: 29.3 PG (ref 26.1–32.9)
MCH RBC QN AUTO: 29.4 PG (ref 26.1–32.9)
MCH RBC QN AUTO: 29.5 PG (ref 26.1–32.9)
MCH RBC QN AUTO: 29.6 PG (ref 26.1–32.9)
MCH RBC QN AUTO: 29.7 PG (ref 26.1–32.9)
MCH RBC QN AUTO: 29.8 PG (ref 26.1–32.9)
MCHC RBC AUTO-ENTMCNC: 29 G/DL (ref 31.4–35)
MCHC RBC AUTO-ENTMCNC: 29.3 G/DL (ref 31.4–35)
MCHC RBC AUTO-ENTMCNC: 29.5 G/DL (ref 31.4–35)
MCHC RBC AUTO-ENTMCNC: 29.6 G/DL (ref 31.4–35)
MCHC RBC AUTO-ENTMCNC: 29.7 G/DL (ref 31.4–35)
MCHC RBC AUTO-ENTMCNC: 29.7 G/DL (ref 31.4–35)
MCHC RBC AUTO-ENTMCNC: 29.8 G/DL (ref 31.4–35)
MCHC RBC AUTO-ENTMCNC: 29.9 G/DL (ref 31.4–35)
MCHC RBC AUTO-ENTMCNC: 30 G/DL (ref 31.4–35)
MCHC RBC AUTO-ENTMCNC: 30.1 G/DL (ref 31.4–35)
MCHC RBC AUTO-ENTMCNC: 30.1 G/DL (ref 31.4–35)
MCHC RBC AUTO-ENTMCNC: 30.2 G/DL (ref 31.4–35)
MCHC RBC AUTO-ENTMCNC: 30.3 G/DL (ref 31.4–35)
MCHC RBC AUTO-ENTMCNC: 31.2 G/DL (ref 31.4–35)
MCV RBC AUTO: 100.3 FL (ref 79.6–97.8)
MCV RBC AUTO: 100.3 FL (ref 79.6–97.8)
MCV RBC AUTO: 94.8 FL (ref 79.6–97.8)
MCV RBC AUTO: 95.6 FL (ref 79.6–97.8)
MCV RBC AUTO: 95.8 FL (ref 79.6–97.8)
MCV RBC AUTO: 95.9 FL (ref 79.6–97.8)
MCV RBC AUTO: 96.1 FL (ref 79.6–97.8)
MCV RBC AUTO: 96.2 FL (ref 79.6–97.8)
MCV RBC AUTO: 96.3 FL (ref 79.6–97.8)
MCV RBC AUTO: 96.4 FL (ref 79.6–97.8)
MCV RBC AUTO: 96.9 FL (ref 79.6–97.8)
MCV RBC AUTO: 97 FL (ref 79.6–97.8)
MCV RBC AUTO: 97.1 FL (ref 79.6–97.8)
MCV RBC AUTO: 97.3 FL (ref 79.6–97.8)
MCV RBC AUTO: 97.5 FL (ref 79.6–97.8)
MCV RBC AUTO: 97.7 FL (ref 79.6–97.8)
MCV RBC AUTO: 97.9 FL (ref 79.6–97.8)
MCV RBC AUTO: 98.2 FL (ref 79.6–97.8)
MCV RBC AUTO: 98.2 FL (ref 79.6–97.8)
MCV RBC AUTO: 98.3 FL (ref 79.6–97.8)
MCV RBC AUTO: 98.7 FL (ref 79.6–97.8)
MCV RBC AUTO: 98.7 FL (ref 79.6–97.8)
MCV RBC AUTO: 98.9 FL (ref 79.6–97.8)
MCV RBC AUTO: 99 FL (ref 79.6–97.8)
MCV RBC AUTO: 99.2 FL (ref 79.6–97.8)
METHGB MFR BLD: 0.1 % (ref 0–1.5)
METHGB MFR BLD: 0.4 % (ref 0–1.5)
METHGB MFR BLD: 0.5 % (ref 0–1.5)
METHGB MFR BLD: 0.5 % (ref 0–1.5)
METHGB MFR BLD: 0.6 % (ref 0–1.5)
MM INDURATION POC: 0 MM (ref 0–5)
MONOCYTES # BLD: 0.1 K/UL (ref 0.1–1.3)
MONOCYTES # BLD: 0.2 K/UL (ref 0.1–1.3)
MONOCYTES # BLD: 0.3 K/UL (ref 0.1–1.3)
MONOCYTES # BLD: 0.4 K/UL (ref 0.1–1.3)
MONOCYTES # BLD: 0.5 K/UL (ref 0.1–1.3)
MONOCYTES # BLD: 0.5 K/UL (ref 0.1–1.3)
MONOCYTES NFR BLD: 3 % (ref 4–12)
MONOCYTES NFR BLD: 5 % (ref 4–12)
MONOCYTES NFR BLD: 6 % (ref 4–12)
MONOCYTES NFR BLD: 6 % (ref 4–12)
MONOCYTES NFR BLD: 7 % (ref 4–12)
MONOCYTES NFR BLD: 8 % (ref 4–12)
MUCOUS THREADS URNS QL MICRO: 0 /LPF
MUCOUS THREADS URNS QL MICRO: ABNORMAL /LPF
MYCOBACTERIUM SPEC QL CULT: NEGATIVE
MYCOBACTERIUM SPEC QL CULT: NEGATIVE
NEUTROPHILS NFR FLD: 56 %
NEUTS SEG # BLD: 11.9 K/UL (ref 1.7–8.2)
NEUTS SEG # BLD: 17.3 K/UL (ref 1.7–8.2)
NEUTS SEG # BLD: 2.8 K/UL (ref 1.7–8.2)
NEUTS SEG # BLD: 3 K/UL (ref 1.7–8.2)
NEUTS SEG # BLD: 3.1 K/UL (ref 1.7–8.2)
NEUTS SEG # BLD: 3.3 K/UL (ref 1.7–8.2)
NEUTS SEG # BLD: 3.6 K/UL (ref 1.7–8.2)
NEUTS SEG # BLD: 3.6 K/UL (ref 1.7–8.2)
NEUTS SEG # BLD: 3.8 K/UL (ref 1.7–8.2)
NEUTS SEG # BLD: 4.2 K/UL (ref 1.7–8.2)
NEUTS SEG # BLD: 4.5 K/UL (ref 1.7–8.2)
NEUTS SEG # BLD: 4.9 K/UL (ref 1.7–8.2)
NEUTS SEG # BLD: 7.2 K/UL (ref 1.7–8.2)
NEUTS SEG NFR BLD: 75 % (ref 43–78)
NEUTS SEG NFR BLD: 76 % (ref 43–78)
NEUTS SEG NFR BLD: 77 % (ref 43–78)
NEUTS SEG NFR BLD: 78 % (ref 43–78)
NEUTS SEG NFR BLD: 82 % (ref 43–78)
NEUTS SEG NFR BLD: 83 % (ref 43–78)
NEUTS SEG NFR BLD: 85 % (ref 43–78)
NEUTS SEG NFR BLD: 94 % (ref 43–78)
NEUTS SEG NFR BLD: 96 % (ref 43–78)
NITRITE UR QL STRIP.AUTO: NEGATIVE
NUC CELL # FLD: 125 /CU MM
NUC CELL # FLD: <100 /CU MM
OXYHGB MFR BLDA: 94.5 % (ref 94–97)
OXYHGB MFR BLDA: 94.6 % (ref 94–97)
OXYHGB MFR BLDA: 95.1 % (ref 94–97)
OXYHGB MFR BLDA: 95.8 % (ref 94–97)
OXYHGB MFR BLDA: 96.2 % (ref 94–97)
P-R INTERVAL, ECG05: 232 MS
PCO2 BLD: 65.6 MMHG (ref 35–45)
PCO2 BLD: 73 MMHG (ref 35–45)
PCO2 BLDA: 63 MMHG (ref 35–45)
PCO2 BLDA: 65 MMHG (ref 35–45)
PCO2 BLDA: 66 MMHG (ref 35–45)
PCO2 BLDA: 70 MMHG (ref 35–45)
PCO2 BLDA: 81 MMHG (ref 35–45)
PH BLD: 7.3 [PH] (ref 7.35–7.45)
PH BLD: 7.34 [PH] (ref 7.35–7.45)
PH BLDA: 7.4 [PH] (ref 7.35–7.45)
PH BLDA: 7.41 [PH] (ref 7.35–7.45)
PH BLDA: 7.43 [PH] (ref 7.35–7.45)
PH BLDA: 7.43 [PH] (ref 7.35–7.45)
PH BLDA: 7.5 [PH] (ref 7.35–7.45)
PH UR STRIP: 5.5 [PH] (ref 5–9)
PH UR STRIP: 5.5 [PH] (ref 5–9)
PH UR STRIP: 6 [PH] (ref 5–9)
PH UR STRIP: 7 [PH] (ref 5–9)
PH UR STRIP: 7.5 [PH] (ref 5–9)
PHOSPHATE SERPL-MCNC: 3.2 MG/DL (ref 2.3–3.7)
PLATELET # BLD AUTO: 103 K/UL (ref 150–450)
PLATELET # BLD AUTO: 107 K/UL (ref 150–450)
PLATELET # BLD AUTO: 109 K/UL (ref 150–450)
PLATELET # BLD AUTO: 113 K/UL (ref 150–450)
PLATELET # BLD AUTO: 117 K/UL (ref 150–450)
PLATELET # BLD AUTO: 125 K/UL (ref 150–450)
PLATELET # BLD AUTO: 127 K/UL (ref 150–450)
PLATELET # BLD AUTO: 137 K/UL (ref 150–450)
PLATELET # BLD AUTO: 144 K/UL (ref 150–450)
PLATELET # BLD AUTO: 149 K/UL (ref 150–450)
PLATELET # BLD AUTO: 162 K/UL (ref 150–450)
PLATELET # BLD AUTO: 41 K/UL (ref 150–450)
PLATELET # BLD AUTO: 65 K/UL (ref 150–450)
PLATELET # BLD AUTO: 70 K/UL (ref 150–450)
PLATELET # BLD AUTO: 72 K/UL (ref 150–450)
PLATELET # BLD AUTO: 74 K/UL (ref 150–450)
PLATELET # BLD AUTO: 74 K/UL (ref 150–450)
PLATELET # BLD AUTO: 78 K/UL (ref 150–450)
PLATELET # BLD AUTO: 83 K/UL (ref 150–450)
PLATELET # BLD AUTO: 84 K/UL (ref 150–450)
PLATELET # BLD AUTO: 86 K/UL (ref 150–450)
PLATELET # BLD AUTO: 87 K/UL (ref 150–450)
PLATELET # BLD AUTO: 87 K/UL (ref 150–450)
PLATELET # BLD AUTO: 89 K/UL (ref 150–450)
PLATELET # BLD AUTO: 90 K/UL (ref 150–450)
PLATELET # BLD AUTO: 97 K/UL (ref 150–450)
PLATELET # BLD AUTO: 99 K/UL (ref 150–450)
PMV BLD AUTO: 10.3 FL (ref 10.8–14.1)
PMV BLD AUTO: 11 FL (ref 10.8–14.1)
PMV BLD AUTO: 11.1 FL (ref 10.8–14.1)
PMV BLD AUTO: 11.1 FL (ref 10.8–14.1)
PMV BLD AUTO: 11.2 FL (ref 10.8–14.1)
PMV BLD AUTO: 11.3 FL (ref 10.8–14.1)
PMV BLD AUTO: 11.4 FL (ref 10.8–14.1)
PMV BLD AUTO: 11.5 FL (ref 10.8–14.1)
PMV BLD AUTO: 11.6 FL (ref 10.8–14.1)
PMV BLD AUTO: 11.7 FL (ref 10.8–14.1)
PMV BLD AUTO: 11.8 FL (ref 10.8–14.1)
PMV BLD AUTO: 12 FL (ref 10.8–14.1)
PMV BLD AUTO: 12 FL (ref 10.8–14.1)
PMV BLD AUTO: 12.1 FL (ref 10.8–14.1)
PO2 BLD: 181 MMHG (ref 80–105)
PO2 BLD: 267 MMHG (ref 80–105)
PO2 BLDA: 101 MMHG (ref 80–105)
PO2 BLDA: 82 MMHG (ref 80–105)
PO2 BLDA: 83 MMHG (ref 80–105)
PO2 BLDA: 89 MMHG (ref 80–105)
PO2 BLDA: 93 MMHG (ref 80–105)
POTASSIUM BLD-SCNC: 5 MMOL/L (ref 3.5–5.1)
POTASSIUM BLD-SCNC: 5 MMOL/L (ref 3.5–5.1)
POTASSIUM SERPL-SCNC: 3 MMOL/L (ref 3.5–5.1)
POTASSIUM SERPL-SCNC: 3 MMOL/L (ref 3.5–5.1)
POTASSIUM SERPL-SCNC: 3.1 MMOL/L (ref 3.5–5.1)
POTASSIUM SERPL-SCNC: 3.1 MMOL/L (ref 3.5–5.1)
POTASSIUM SERPL-SCNC: 3.3 MMOL/L (ref 3.5–5.1)
POTASSIUM SERPL-SCNC: 3.4 MMOL/L (ref 3.5–5.1)
POTASSIUM SERPL-SCNC: 3.5 MMOL/L (ref 3.5–5.1)
POTASSIUM SERPL-SCNC: 3.6 MMOL/L (ref 3.5–5.1)
POTASSIUM SERPL-SCNC: 3.7 MMOL/L (ref 3.5–5.1)
POTASSIUM SERPL-SCNC: 3.8 MMOL/L (ref 3.5–5.1)
POTASSIUM SERPL-SCNC: 3.8 MMOL/L (ref 3.5–5.1)
POTASSIUM SERPL-SCNC: 3.9 MMOL/L (ref 3.5–5.1)
POTASSIUM SERPL-SCNC: 4 MMOL/L (ref 3.5–5.1)
POTASSIUM SERPL-SCNC: 4.2 MMOL/L (ref 3.5–5.1)
POTASSIUM SERPL-SCNC: 4.3 MMOL/L (ref 3.5–5.1)
POTASSIUM SERPL-SCNC: 4.4 MMOL/L (ref 3.5–5.1)
POTASSIUM SERPL-SCNC: 4.5 MMOL/L (ref 3.5–5.1)
POTASSIUM SERPL-SCNC: 4.5 MMOL/L (ref 3.5–5.1)
POTASSIUM SERPL-SCNC: 4.6 MMOL/L (ref 3.5–5.1)
POTASSIUM SERPL-SCNC: 4.9 MMOL/L (ref 3.5–5.1)
POTASSIUM SERPL-SCNC: 5.4 MMOL/L (ref 3.5–5.1)
PPD POC: NEGATIVE NEGATIVE
PREALB SERPL-MCNC: 21 MG/DL (ref 18–35.7)
PROCALCITONIN SERPL-MCNC: 1.4 NG/ML
PROT FLD-MCNC: <0.8 G/DL
PROT FLD-MCNC: <0.8 G/DL
PROT FLD-MCNC: NORMAL G/DL
PROT SERPL-MCNC: 4.6 G/DL (ref 6.3–8.2)
PROT SERPL-MCNC: 4.9 G/DL (ref 6.3–8.2)
PROT UR STRIP-MCNC: 30 MG/DL
PROT UR STRIP-MCNC: NEGATIVE MG/DL
PROTHROMBIN TIME: 13.3 SEC (ref 11.5–14.5)
PROTHROMBIN TIME: 15.1 SEC (ref 11.5–14.5)
PROTHROMBIN TIME: 15.4 SEC (ref 11.5–14.5)
PROTHROMBIN TIME: 16.8 SEC (ref 11.5–14.5)
PROTHROMBIN TIME: 17.1 SEC (ref 11.5–14.5)
PROTHROMBIN TIME: 19.4 SEC (ref 11.5–14.5)
PROTHROMBIN TIME: 23.9 SEC (ref 11.5–14.5)
PROTHROMBIN TIME: 32.1 SEC (ref 11.5–14.5)
PROTHROMBIN TIME: 53.2 SEC (ref 11.5–14.5)
PROTHROMBIN TIME: 56.6 SEC (ref 11.5–14.5)
Q-T INTERVAL, ECG07: 434 MS
Q-T INTERVAL, ECG07: 448 MS
Q-T INTERVAL, ECG07: 448 MS
Q-T INTERVAL, ECG07: 456 MS
Q-T INTERVAL, ECG07: 458 MS
Q-T INTERVAL, ECG07: 458 MS
Q-T INTERVAL, ECG07: 466 MS
QRS DURATION, ECG06: 172 MS
QRS DURATION, ECG06: 176 MS
QRS DURATION, ECG06: 178 MS
QRS DURATION, ECG06: 178 MS
QRS DURATION, ECG06: 180 MS
QTC CALCULATION (BEZET), ECG08: 500 MS
QTC CALCULATION (BEZET), ECG08: 523 MS
QTC CALCULATION (BEZET), ECG08: 526 MS
QTC CALCULATION (BEZET), ECG08: 529 MS
QTC CALCULATION (BEZET), ECG08: 535 MS
QTC CALCULATION (BEZET), ECG08: 537 MS
QTC CALCULATION (BEZET), ECG08: 554 MS
RBC # BLD AUTO: 2.46 M/UL (ref 4.05–5.25)
RBC # BLD AUTO: 2.48 M/UL (ref 4.05–5.25)
RBC # BLD AUTO: 2.57 M/UL (ref 4.05–5.25)
RBC # BLD AUTO: 2.59 M/UL (ref 4.05–5.25)
RBC # BLD AUTO: 2.61 M/UL (ref 4.05–5.25)
RBC # BLD AUTO: 2.7 M/UL (ref 4.05–5.25)
RBC # BLD AUTO: 2.76 M/UL (ref 4.05–5.25)
RBC # BLD AUTO: 2.79 M/UL (ref 4.05–5.25)
RBC # BLD AUTO: 2.85 M/UL (ref 4.05–5.25)
RBC # BLD AUTO: 2.86 M/UL (ref 4.05–5.25)
RBC # BLD AUTO: 2.87 M/UL (ref 4.05–5.25)
RBC # BLD AUTO: 2.94 M/UL (ref 4.05–5.25)
RBC # BLD AUTO: 2.97 M/UL (ref 4.05–5.25)
RBC # BLD AUTO: 2.98 M/UL (ref 4.05–5.25)
RBC # BLD AUTO: 3.02 M/UL (ref 4.05–5.25)
RBC # BLD AUTO: 3.07 M/UL (ref 4.05–5.25)
RBC # BLD AUTO: 3.07 M/UL (ref 4.05–5.25)
RBC # BLD AUTO: 3.3 M/UL (ref 4.05–5.25)
RBC # BLD AUTO: 3.38 M/UL (ref 4.05–5.25)
RBC # BLD AUTO: 3.38 M/UL (ref 4.05–5.25)
RBC # BLD AUTO: 3.43 M/UL (ref 4.05–5.25)
RBC # BLD AUTO: 3.53 M/UL (ref 4.05–5.25)
RBC # BLD AUTO: 3.62 M/UL (ref 4.05–5.25)
RBC # FLD: NORMAL /CU MM
RBC # FLD: NORMAL /CU MM
RBC #/AREA URNS HPF: >100 /HPF
RBC #/AREA URNS HPF: ABNORMAL /HPF
REFLEX TO ID, RFCO3T: NORMAL
REFLEX TO ID, RFCO3T: NORMAL
RESP RATE: 12
RESP RATE: 12
SAO2 % BLD: 100 % (ref 95–98)
SAO2 % BLD: 96 % (ref 92–98.5)
SAO2 % BLD: 96 % (ref 92–98.5)
SAO2 % BLD: 97 % (ref 92–98.5)
SAO2 % BLD: 97 % (ref 92–98.5)
SAO2 % BLD: 98 % (ref 92–98.5)
SAO2 % BLD: 99 % (ref 95–98)
SERVICE CMNT-IMP: ABNORMAL
SERVICE CMNT-IMP: NORMAL
SODIUM BLD-SCNC: 138 MMOL/L (ref 136–145)
SODIUM BLD-SCNC: 138 MMOL/L (ref 136–145)
SODIUM SERPL-SCNC: 141 MMOL/L (ref 136–145)
SODIUM SERPL-SCNC: 142 MMOL/L (ref 136–145)
SODIUM SERPL-SCNC: 143 MMOL/L (ref 136–145)
SODIUM SERPL-SCNC: 144 MMOL/L (ref 136–145)
SODIUM SERPL-SCNC: 145 MMOL/L (ref 136–145)
SODIUM SERPL-SCNC: 146 MMOL/L (ref 136–145)
SODIUM SERPL-SCNC: 147 MMOL/L (ref 136–145)
SODIUM SERPL-SCNC: 148 MMOL/L (ref 136–145)
SODIUM SERPL-SCNC: 149 MMOL/L (ref 136–145)
SODIUM SERPL-SCNC: 151 MMOL/L (ref 136–145)
SP GR UR REFRACTOMETRY: 1.01 (ref 1–1.02)
SPECIMEN EXP DATE BLD: NORMAL
SPECIMEN PREPARATION: NORMAL
SPECIMEN PREPARATION: NORMAL
SPECIMEN SOURCE FLD: NORMAL
SPECIMEN SOURCE: ABNORMAL
SPECIMEN SOURCE: NORMAL
STATUS OF UNIT,%ST: NORMAL
TIBC SERPL-MCNC: 333 UG/DL (ref 250–450)
TRANSFERRIN SERPL-MCNC: 259 MG/DL (ref 202–364)
TRIGL FLD-MCNC: 63 MG/DL
TRIGL SERPL-MCNC: 62 MG/DL (ref 35–150)
TROPONIN I BLD-MCNC: 0.07 NG/ML (ref 0.02–0.05)
TSH SERPL DL<=0.005 MIU/L-ACNC: 3.59 UIU/ML (ref 0.36–3.74)
UA: UC IF INDICATED,UAUC: ABNORMAL
UNIT DIVISION, %UDIV: 0
UROBILINOGEN UR QL STRIP.AUTO: 0.2 EU/DL (ref 0.2–1)
UROBILINOGEN UR QL STRIP.AUTO: 1 EU/DL (ref 0.2–1)
VENTILATION MODE VENT: ABNORMAL
VENTRICULAR RATE, ECG03: 80 BPM
VENTRICULAR RATE, ECG03: 80 BPM
VENTRICULAR RATE, ECG03: 81 BPM
VENTRICULAR RATE, ECG03: 82 BPM
VENTRICULAR RATE, ECG03: 82 BPM
VENTRICULAR RATE, ECG03: 83 BPM
VENTRICULAR RATE, ECG03: 88 BPM
VLDLC SERPL CALC-MCNC: 12.4 MG/DL (ref 6–23)
WBC # BLD AUTO: 12.7 K/UL (ref 4.3–11.1)
WBC # BLD AUTO: 18.1 K/UL (ref 4.3–11.1)
WBC # BLD AUTO: 2.7 K/UL (ref 4.3–11.1)
WBC # BLD AUTO: 3.6 K/UL (ref 4.3–11.1)
WBC # BLD AUTO: 3.9 K/UL (ref 4.3–11.1)
WBC # BLD AUTO: 4 K/UL (ref 4.3–11.1)
WBC # BLD AUTO: 4.1 K/UL (ref 4.3–11.1)
WBC # BLD AUTO: 4.3 K/UL (ref 4.3–11.1)
WBC # BLD AUTO: 4.5 K/UL (ref 4.3–11.1)
WBC # BLD AUTO: 4.6 K/UL (ref 4.3–11.1)
WBC # BLD AUTO: 4.7 K/UL (ref 4.3–11.1)
WBC # BLD AUTO: 4.7 K/UL (ref 4.3–11.1)
WBC # BLD AUTO: 4.9 K/UL (ref 4.3–11.1)
WBC # BLD AUTO: 4.9 K/UL (ref 4.3–11.1)
WBC # BLD AUTO: 5 K/UL (ref 4.3–11.1)
WBC # BLD AUTO: 5.1 K/UL (ref 4.3–11.1)
WBC # BLD AUTO: 5.2 K/UL (ref 4.3–11.1)
WBC # BLD AUTO: 5.2 K/UL (ref 4.3–11.1)
WBC # BLD AUTO: 5.3 K/UL (ref 4.3–11.1)
WBC # BLD AUTO: 5.4 K/UL (ref 4.3–11.1)
WBC # BLD AUTO: 5.6 K/UL (ref 4.3–11.1)
WBC # BLD AUTO: 5.9 K/UL (ref 4.3–11.1)
WBC # BLD AUTO: 8.5 K/UL (ref 4.3–11.1)
WBC URNS QL MICRO: ABNORMAL /HPF

## 2018-01-01 PROCEDURE — 80048 BASIC METABOLIC PNL TOTAL CA: CPT | Performed by: PHYSICAL MEDICINE & REHABILITATION

## 2018-01-01 PROCEDURE — 99232 SBSQ HOSP IP/OBS MODERATE 35: CPT | Performed by: INTERNAL MEDICINE

## 2018-01-01 PROCEDURE — 94660 CPAP INITIATION&MGMT: CPT

## 2018-01-01 PROCEDURE — 0651 HSPC ROUTINE HOME CARE

## 2018-01-01 PROCEDURE — G0299 HHS/HOSPICE OF RN EA 15 MIN: HCPCS

## 2018-01-01 PROCEDURE — 65270000029 HC RM PRIVATE

## 2018-01-01 PROCEDURE — 36415 COLL VENOUS BLD VENIPUNCTURE: CPT | Performed by: INTERNAL MEDICINE

## 2018-01-01 PROCEDURE — 74011250637 HC RX REV CODE- 250/637: Performed by: NURSE PRACTITIONER

## 2018-01-01 PROCEDURE — 97530 THERAPEUTIC ACTIVITIES: CPT

## 2018-01-01 PROCEDURE — 87205 SMEAR GRAM STAIN: CPT | Performed by: INTERNAL MEDICINE

## 2018-01-01 PROCEDURE — 85027 COMPLETE CBC AUTOMATED: CPT | Performed by: INTERNAL MEDICINE

## 2018-01-01 PROCEDURE — 94640 AIRWAY INHALATION TREATMENT: CPT

## 2018-01-01 PROCEDURE — 74011000250 HC RX REV CODE- 250: Performed by: NURSE PRACTITIONER

## 2018-01-01 PROCEDURE — 77030005318 HC CATH ELECTRD PACE TMP BARD -C

## 2018-01-01 PROCEDURE — G0156 HHCP-SVS OF AIDE,EA 15 MIN: HCPCS

## 2018-01-01 PROCEDURE — 85025 COMPLETE CBC W/AUTO DIFF WBC: CPT | Performed by: PHYSICAL MEDICINE & REHABILITATION

## 2018-01-01 PROCEDURE — 82803 BLOOD GASES ANY COMBINATION: CPT

## 2018-01-01 PROCEDURE — T4527 ADULT SIZE PULL-ON LG: HCPCS

## 2018-01-01 PROCEDURE — 32555 ASPIRATE PLEURA W/ IMAGING: CPT | Performed by: INTERNAL MEDICINE

## 2018-01-01 PROCEDURE — 74011250636 HC RX REV CODE- 250/636: Performed by: NURSE PRACTITIONER

## 2018-01-01 PROCEDURE — 93005 ELECTROCARDIOGRAM TRACING: CPT | Performed by: INTERNAL MEDICINE

## 2018-01-01 PROCEDURE — 77010033711 HC HIGH FLOW OXYGEN

## 2018-01-01 PROCEDURE — 71045 X-RAY EXAM CHEST 1 VIEW: CPT

## 2018-01-01 PROCEDURE — 36245 INS CATH ABD/L-EXT ART 1ST: CPT

## 2018-01-01 PROCEDURE — 0655 HSPC INPATIENT RESPITE

## 2018-01-01 PROCEDURE — 83735 ASSAY OF MAGNESIUM: CPT | Performed by: INTERNAL MEDICINE

## 2018-01-01 PROCEDURE — 99232 SBSQ HOSP IP/OBS MODERATE 35: CPT | Performed by: PHYSICAL MEDICINE & REHABILITATION

## 2018-01-01 PROCEDURE — A9270 NON-COVERED ITEM OR SERVICE: HCPCS

## 2018-01-01 PROCEDURE — 80048 BASIC METABOLIC PNL TOTAL CA: CPT | Performed by: INTERNAL MEDICINE

## 2018-01-01 PROCEDURE — 97110 THERAPEUTIC EXERCISES: CPT

## 2018-01-01 PROCEDURE — 36592 COLLECT BLOOD FROM PICC: CPT

## 2018-01-01 PROCEDURE — 74011250637 HC RX REV CODE- 250/637: Performed by: INTERNAL MEDICINE

## 2018-01-01 PROCEDURE — HOSPICE MEDICATION HC HH HOSPICE MEDICATION

## 2018-01-01 PROCEDURE — 77030002916 HC SUT ETHLN J&J -A

## 2018-01-01 PROCEDURE — 77010033678 HC OXYGEN DAILY

## 2018-01-01 PROCEDURE — 99233 SBSQ HOSP IP/OBS HIGH 50: CPT | Performed by: INTERNAL MEDICINE

## 2018-01-01 PROCEDURE — 74011000258 HC RX REV CODE- 258: Performed by: INTERNAL MEDICINE

## 2018-01-01 PROCEDURE — 89050 BODY FLUID CELL COUNT: CPT | Performed by: INTERNAL MEDICINE

## 2018-01-01 PROCEDURE — 65310000000 HC RM PRIVATE REHAB

## 2018-01-01 PROCEDURE — 84311 SPECTROPHOTOMETRY: CPT | Performed by: INTERNAL MEDICINE

## 2018-01-01 PROCEDURE — 36430 TRANSFUSION BLD/BLD COMPNT: CPT

## 2018-01-01 PROCEDURE — 97535 SELF CARE MNGMENT TRAINING: CPT

## 2018-01-01 PROCEDURE — 30233N1 TRANSFUSION OF NONAUTOLOGOUS RED BLOOD CELLS INTO PERIPHERAL VEIN, PERCUTANEOUS APPROACH: ICD-10-PCS | Performed by: INTERNAL MEDICINE

## 2018-01-01 PROCEDURE — 36600 WITHDRAWAL OF ARTERIAL BLOOD: CPT

## 2018-01-01 PROCEDURE — 85018 HEMOGLOBIN: CPT | Performed by: INTERNAL MEDICINE

## 2018-01-01 PROCEDURE — 77030002996 HC SUT SLK J&J -A: Performed by: INTERNAL MEDICINE

## 2018-01-01 PROCEDURE — 97166 OT EVAL MOD COMPLEX 45 MIN: CPT

## 2018-01-01 PROCEDURE — 74011250637 HC RX REV CODE- 250/637: Performed by: PHYSICAL MEDICINE & REHABILITATION

## 2018-01-01 PROCEDURE — 97116 GAIT TRAINING THERAPY: CPT

## 2018-01-01 PROCEDURE — C1769 GUIDE WIRE: HCPCS

## 2018-01-01 PROCEDURE — 81001 URINALYSIS AUTO W/SCOPE: CPT | Performed by: NURSE PRACTITIONER

## 2018-01-01 PROCEDURE — 77030014007 HC SPNG HEMSTAT J&J -B

## 2018-01-01 PROCEDURE — 99233 SBSQ HOSP IP/OBS HIGH 50: CPT | Performed by: PHYSICAL MEDICINE & REHABILITATION

## 2018-01-01 PROCEDURE — 83880 ASSAY OF NATRIURETIC PEPTIDE: CPT | Performed by: INTERNAL MEDICINE

## 2018-01-01 PROCEDURE — 74011250636 HC RX REV CODE- 250/636

## 2018-01-01 PROCEDURE — 94760 N-INVAS EAR/PLS OXIMETRY 1: CPT

## 2018-01-01 PROCEDURE — 83735 ASSAY OF MAGNESIUM: CPT | Performed by: NURSE PRACTITIONER

## 2018-01-01 PROCEDURE — G0155 HHCP-SVS OF CSW,EA 15 MIN: HCPCS

## 2018-01-01 PROCEDURE — 84134 ASSAY OF PREALBUMIN: CPT | Performed by: INTERNAL MEDICINE

## 2018-01-01 PROCEDURE — 74011250636 HC RX REV CODE- 250/636: Performed by: INTERNAL MEDICINE

## 2018-01-01 PROCEDURE — 77030019605

## 2018-01-01 PROCEDURE — 76210000017 HC OR PH I REC 1.5 TO 2 HR: Performed by: INTERNAL MEDICINE

## 2018-01-01 PROCEDURE — 74011000250 HC RX REV CODE- 250: Performed by: INTERNAL MEDICINE

## 2018-01-01 PROCEDURE — 74011000302 HC RX REV CODE- 302: Performed by: INTERNAL MEDICINE

## 2018-01-01 PROCEDURE — 93308 TTE F-UP OR LMTD: CPT

## 2018-01-01 PROCEDURE — 76937 US GUIDE VASCULAR ACCESS: CPT

## 2018-01-01 PROCEDURE — 85018 HEMOGLOBIN: CPT | Performed by: PHYSICAL MEDICINE & REHABILITATION

## 2018-01-01 PROCEDURE — 99239 HOSP IP/OBS DSCHRG MGMT >30: CPT | Performed by: PHYSICAL MEDICINE & REHABILITATION

## 2018-01-01 PROCEDURE — 80053 COMPREHEN METABOLIC PANEL: CPT | Performed by: EMERGENCY MEDICINE

## 2018-01-01 PROCEDURE — 80048 BASIC METABOLIC PNL TOTAL CA: CPT | Performed by: NURSE PRACTITIONER

## 2018-01-01 PROCEDURE — 65660000000 HC RM CCU STEPDOWN

## 2018-01-01 PROCEDURE — C1760 CLOSURE DEV, VASC: HCPCS

## 2018-01-01 PROCEDURE — 36415 COLL VENOUS BLD VENIPUNCTURE: CPT | Performed by: PHYSICAL MEDICINE & REHABILITATION

## 2018-01-01 PROCEDURE — 96374 THER/PROPH/DIAG INJ IV PUSH: CPT | Performed by: EMERGENCY MEDICINE

## 2018-01-01 PROCEDURE — 80048 BASIC METABOLIC PNL TOTAL CA: CPT | Performed by: PHYSICIAN ASSISTANT

## 2018-01-01 PROCEDURE — 74011636637 HC RX REV CODE- 636/637: Performed by: NURSE PRACTITIONER

## 2018-01-01 PROCEDURE — 99223 1ST HOSP IP/OBS HIGH 75: CPT | Performed by: PHYSICAL MEDICINE & REHABILITATION

## 2018-01-01 PROCEDURE — 84466 ASSAY OF TRANSFERRIN: CPT | Performed by: PHYSICAL MEDICINE & REHABILITATION

## 2018-01-01 PROCEDURE — 99343 PR HOME VST NEW PATIENT MOD-HI SEVERITY 45 MINUTES: CPT | Performed by: INTERNAL MEDICINE

## 2018-01-01 PROCEDURE — 81015 MICROSCOPIC EXAM OF URINE: CPT | Performed by: NURSE PRACTITIONER

## 2018-01-01 PROCEDURE — 93454 CORONARY ARTERY ANGIO S&I: CPT

## 2018-01-01 PROCEDURE — 83605 ASSAY OF LACTIC ACID: CPT | Performed by: NURSE PRACTITIONER

## 2018-01-01 PROCEDURE — 87102 FUNGUS ISOLATION CULTURE: CPT | Performed by: INTERNAL MEDICINE

## 2018-01-01 PROCEDURE — 81001 URINALYSIS AUTO W/SCOPE: CPT

## 2018-01-01 PROCEDURE — C1729 CATH, DRAINAGE: HCPCS | Performed by: INTERNAL MEDICINE

## 2018-01-01 PROCEDURE — C1887 CATHETER, GUIDING: HCPCS

## 2018-01-01 PROCEDURE — 36415 COLL VENOUS BLD VENIPUNCTURE: CPT | Performed by: NURSE PRACTITIONER

## 2018-01-01 PROCEDURE — 97150 GROUP THERAPEUTIC PROCEDURES: CPT

## 2018-01-01 PROCEDURE — 83735 ASSAY OF MAGNESIUM: CPT | Performed by: PHYSICIAN ASSISTANT

## 2018-01-01 PROCEDURE — 74011000250 HC RX REV CODE- 250: Performed by: PHYSICAL MEDICINE & REHABILITATION

## 2018-01-01 PROCEDURE — 83735 ASSAY OF MAGNESIUM: CPT | Performed by: PHYSICAL MEDICINE & REHABILITATION

## 2018-01-01 PROCEDURE — C1751 CATH, INF, PER/CENT/MIDLINE: HCPCS

## 2018-01-01 PROCEDURE — 74011250636 HC RX REV CODE- 250/636: Performed by: EMERGENCY MEDICINE

## 2018-01-01 PROCEDURE — 85025 COMPLETE CBC W/AUTO DIFF WBC: CPT | Performed by: NURSE PRACTITIONER

## 2018-01-01 PROCEDURE — 77030005510 HC CATH URETH FOL M BARD -A

## 2018-01-01 PROCEDURE — 93005 ELECTROCARDIOGRAM TRACING: CPT | Performed by: EMERGENCY MEDICINE

## 2018-01-01 PROCEDURE — 83615 LACTATE (LD) (LDH) ENZYME: CPT | Performed by: INTERNAL MEDICINE

## 2018-01-01 PROCEDURE — 76604 US EXAM CHEST: CPT | Performed by: INTERNAL MEDICINE

## 2018-01-01 PROCEDURE — 77030013687 HC GD NDL BARD -B

## 2018-01-01 PROCEDURE — 77030018836 HC SOL IRR NACL ICUM -A: Performed by: INTERNAL MEDICINE

## 2018-01-01 PROCEDURE — 77030013131 HC IV BLD ST ICUM -A

## 2018-01-01 PROCEDURE — 84145 PROCALCITONIN (PCT): CPT | Performed by: EMERGENCY MEDICINE

## 2018-01-01 PROCEDURE — P9016 RBC LEUKOCYTES REDUCED: HCPCS | Performed by: NURSE PRACTITIONER

## 2018-01-01 PROCEDURE — 77030032490 HC SLV COMPR SCD KNE COVD -B

## 2018-01-01 PROCEDURE — 86920 COMPATIBILITY TEST SPIN: CPT | Performed by: INTERNAL MEDICINE

## 2018-01-01 PROCEDURE — 77030037468 HC VLV AORT EDWRD -L: Performed by: INTERNAL MEDICINE

## 2018-01-01 PROCEDURE — 88112 CYTOPATH CELL ENHANCE TECH: CPT | Performed by: INTERNAL MEDICINE

## 2018-01-01 PROCEDURE — 76040000025: Performed by: INTERNAL MEDICINE

## 2018-01-01 PROCEDURE — 85610 PROTHROMBIN TIME: CPT | Performed by: INTERNAL MEDICINE

## 2018-01-01 PROCEDURE — 36556 INSERT NON-TUNNEL CV CATH: CPT

## 2018-01-01 PROCEDURE — 74011000258 HC RX REV CODE- 258: Performed by: NURSE PRACTITIONER

## 2018-01-01 PROCEDURE — 3336590001 HSPC ROOM AND BOARD

## 2018-01-01 PROCEDURE — P9016 RBC LEUKOCYTES REDUCED: HCPCS | Performed by: INTERNAL MEDICINE

## 2018-01-01 PROCEDURE — 82664 ELECTROPHORETIC TEST: CPT | Performed by: INTERNAL MEDICINE

## 2018-01-01 PROCEDURE — 76450000000

## 2018-01-01 PROCEDURE — 86580 TB INTRADERMAL TEST: CPT | Performed by: INTERNAL MEDICINE

## 2018-01-01 PROCEDURE — 84478 ASSAY OF TRIGLYCERIDES: CPT | Performed by: INTERNAL MEDICINE

## 2018-01-01 PROCEDURE — 80048 BASIC METABOLIC PNL TOTAL CA: CPT

## 2018-01-01 PROCEDURE — 99152 MOD SED SAME PHYS/QHP 5/>YRS: CPT

## 2018-01-01 PROCEDURE — 74011250637 HC RX REV CODE- 250/637: Performed by: PHYSICIAN ASSISTANT

## 2018-01-01 PROCEDURE — 36569 INSJ PICC 5 YR+ W/O IMAGING: CPT | Performed by: NURSE PRACTITIONER

## 2018-01-01 PROCEDURE — 76010000197 HC CV SURG 1.5 TO 2 HR INTENSV-TIER 1: Performed by: INTERNAL MEDICINE

## 2018-01-01 PROCEDURE — 85025 COMPLETE CBC W/AUTO DIFF WBC: CPT | Performed by: INTERNAL MEDICINE

## 2018-01-01 PROCEDURE — A4927 NON-STERILE GLOVES: HCPCS

## 2018-01-01 PROCEDURE — 77030011256 HC DRSG MEPILEX <16IN NO BORD MOLN -A

## 2018-01-01 PROCEDURE — 0W9B3ZZ DRAINAGE OF LEFT PLEURAL CAVITY, PERCUTANEOUS APPROACH: ICD-10-PCS | Performed by: INTERNAL MEDICINE

## 2018-01-01 PROCEDURE — 87086 URINE CULTURE/COLONY COUNT: CPT | Performed by: INTERNAL MEDICINE

## 2018-01-01 PROCEDURE — C8929 TTE W OR WO FOL WCON,DOPPLER: HCPCS

## 2018-01-01 PROCEDURE — 77030012893

## 2018-01-01 PROCEDURE — C1894 INTRO/SHEATH, NON-LASER: HCPCS

## 2018-01-01 PROCEDURE — 77001 FLUOROGUIDE FOR VEIN DEVICE: CPT

## 2018-01-01 PROCEDURE — 80061 LIPID PANEL: CPT | Performed by: PHYSICIAN ASSISTANT

## 2018-01-01 PROCEDURE — 74011636320 HC RX REV CODE- 636/320: Performed by: INTERNAL MEDICINE

## 2018-01-01 PROCEDURE — 83540 ASSAY OF IRON: CPT | Performed by: PHYSICAL MEDICINE & REHABILITATION

## 2018-01-01 PROCEDURE — 82945 GLUCOSE OTHER FLUID: CPT | Performed by: INTERNAL MEDICINE

## 2018-01-01 PROCEDURE — 85027 COMPLETE CBC AUTOMATED: CPT | Performed by: PHYSICIAN ASSISTANT

## 2018-01-01 PROCEDURE — 84295 ASSAY OF SERUM SODIUM: CPT

## 2018-01-01 PROCEDURE — 87088 URINE BACTERIA CULTURE: CPT | Performed by: PHYSICAL MEDICINE & REHABILITATION

## 2018-01-01 PROCEDURE — 85027 COMPLETE CBC AUTOMATED: CPT | Performed by: NURSE PRACTITIONER

## 2018-01-01 PROCEDURE — 87086 URINE CULTURE/COLONY COUNT: CPT | Performed by: PHYSICAL MEDICINE & REHABILITATION

## 2018-01-01 PROCEDURE — C1769 GUIDE WIRE: HCPCS | Performed by: INTERNAL MEDICINE

## 2018-01-01 PROCEDURE — 75630 X-RAY AORTA LEG ARTERIES: CPT

## 2018-01-01 PROCEDURE — 87116 MYCOBACTERIA CULTURE: CPT | Performed by: INTERNAL MEDICINE

## 2018-01-01 PROCEDURE — 77030021668 HC NEB PREFIL KT VYRM -A

## 2018-01-01 PROCEDURE — 77030004559 HC CATH ANGI DX SUPT CARD -B

## 2018-01-01 PROCEDURE — 85610 PROTHROMBIN TIME: CPT | Performed by: EMERGENCY MEDICINE

## 2018-01-01 PROCEDURE — 82962 GLUCOSE BLOOD TEST: CPT

## 2018-01-01 PROCEDURE — 84157 ASSAY OF PROTEIN OTHER: CPT | Performed by: INTERNAL MEDICINE

## 2018-01-01 PROCEDURE — 97161 PT EVAL LOW COMPLEX 20 MIN: CPT

## 2018-01-01 PROCEDURE — 86900 BLOOD TYPING SEROLOGIC ABO: CPT | Performed by: NURSE PRACTITIONER

## 2018-01-01 PROCEDURE — 81003 URINALYSIS AUTO W/O SCOPE: CPT | Performed by: PHYSICAL MEDICINE & REHABILITATION

## 2018-01-01 PROCEDURE — 87206 SMEAR FLUORESCENT/ACID STAI: CPT | Performed by: INTERNAL MEDICINE

## 2018-01-01 PROCEDURE — 92610 EVALUATE SWALLOWING FUNCTION: CPT

## 2018-01-01 PROCEDURE — 85610 PROTHROMBIN TIME: CPT | Performed by: NURSE PRACTITIONER

## 2018-01-01 PROCEDURE — 99239 HOSP IP/OBS DSCHRG MGMT >30: CPT | Performed by: INTERNAL MEDICINE

## 2018-01-01 PROCEDURE — 71046 X-RAY EXAM CHEST 2 VIEWS: CPT

## 2018-01-01 PROCEDURE — 82272 OCCULT BLD FECES 1-3 TESTS: CPT | Performed by: PHYSICAL MEDICINE & REHABILITATION

## 2018-01-01 PROCEDURE — 99285 EMERGENCY DEPT VISIT HI MDM: CPT | Performed by: EMERGENCY MEDICINE

## 2018-01-01 PROCEDURE — B2111ZZ FLUOROSCOPY OF MULTIPLE CORONARY ARTERIES USING LOW OSMOLAR CONTRAST: ICD-10-PCS | Performed by: INTERNAL MEDICINE

## 2018-01-01 PROCEDURE — 0W9B3ZX DRAINAGE OF LEFT PLEURAL CAVITY, PERCUTANEOUS APPROACH, DIAGNOSTIC: ICD-10-PCS | Performed by: INTERNAL MEDICINE

## 2018-01-01 PROCEDURE — 76040000019: Performed by: INTERNAL MEDICINE

## 2018-01-01 PROCEDURE — 76060000034 HC ANESTHESIA 1.5 TO 2 HR: Performed by: INTERNAL MEDICINE

## 2018-01-01 PROCEDURE — 02H633Z INSERTION OF INFUSION DEVICE INTO RIGHT ATRIUM, PERCUTANEOUS APPROACH: ICD-10-PCS | Performed by: RADIOLOGY

## 2018-01-01 PROCEDURE — 77030013438 HC CBL PCMKR REMG -B: Performed by: INTERNAL MEDICINE

## 2018-01-01 PROCEDURE — 77030027138 HC INCENT SPIROMETER -A

## 2018-01-01 PROCEDURE — 99223 1ST HOSP IP/OBS HIGH 75: CPT | Performed by: INTERNAL MEDICINE

## 2018-01-01 PROCEDURE — 83880 ASSAY OF NATRIURETIC PEPTIDE: CPT

## 2018-01-01 PROCEDURE — 87040 BLOOD CULTURE FOR BACTERIA: CPT | Performed by: NURSE PRACTITIONER

## 2018-01-01 PROCEDURE — T4541 LARGE DISPOSABLE UNDERPAD: HCPCS

## 2018-01-01 PROCEDURE — 80053 COMPREHEN METABOLIC PANEL: CPT | Performed by: PHYSICIAN ASSISTANT

## 2018-01-01 PROCEDURE — 97162 PT EVAL MOD COMPLEX 30 MIN: CPT

## 2018-01-01 PROCEDURE — 74011250636 HC RX REV CODE- 250/636: Performed by: PHYSICAL MEDICINE & REHABILITATION

## 2018-01-01 PROCEDURE — 74011250636 HC RX REV CODE- 250/636: Performed by: RADIOLOGY

## 2018-01-01 PROCEDURE — 30233N1 TRANSFUSION OF NONAUTOLOGOUS RED BLOOD CELLS INTO PERIPHERAL VEIN, PERCUTANEOUS APPROACH: ICD-10-PCS | Performed by: PHYSICAL MEDICINE & REHABILITATION

## 2018-01-01 PROCEDURE — 83880 ASSAY OF NATRIURETIC PEPTIDE: CPT | Performed by: PHYSICAL MEDICINE & REHABILITATION

## 2018-01-01 PROCEDURE — 3336500001 HSPC ELECTION

## 2018-01-01 PROCEDURE — 94010 BREATHING CAPACITY TEST: CPT

## 2018-01-01 PROCEDURE — 88305 TISSUE EXAM BY PATHOLOGIST: CPT | Performed by: INTERNAL MEDICINE

## 2018-01-01 PROCEDURE — B3101ZZ FLUOROSCOPY OF THORACIC AORTA USING LOW OSMOLAR CONTRAST: ICD-10-PCS | Performed by: INTERNAL MEDICINE

## 2018-01-01 PROCEDURE — 75571 CT HRT W/O DYE W/CA TEST: CPT

## 2018-01-01 PROCEDURE — 87804 INFLUENZA ASSAY W/OPTIC: CPT | Performed by: EMERGENCY MEDICINE

## 2018-01-01 PROCEDURE — C1729 CATH, DRAINAGE: HCPCS

## 2018-01-01 PROCEDURE — 0W993ZZ DRAINAGE OF RIGHT PLEURAL CAVITY, PERCUTANEOUS APPROACH: ICD-10-PCS | Performed by: INTERNAL MEDICINE

## 2018-01-01 PROCEDURE — 83036 HEMOGLOBIN GLYCOSYLATED A1C: CPT | Performed by: INTERNAL MEDICINE

## 2018-01-01 PROCEDURE — 82247 BILIRUBIN TOTAL: CPT | Performed by: INTERNAL MEDICINE

## 2018-01-01 PROCEDURE — 74011000250 HC RX REV CODE- 250: Performed by: RADIOLOGY

## 2018-01-01 PROCEDURE — 4A023N7 MEASUREMENT OF CARDIAC SAMPLING AND PRESSURE, LEFT HEART, PERCUTANEOUS APPROACH: ICD-10-PCS | Performed by: INTERNAL MEDICINE

## 2018-01-01 PROCEDURE — 0W993ZX DRAINAGE OF RIGHT PLEURAL CAVITY, PERCUTANEOUS APPROACH, DIAGNOSTIC: ICD-10-PCS | Performed by: INTERNAL MEDICINE

## 2018-01-01 PROCEDURE — 75716 ARTERY X-RAYS ARMS/LEGS: CPT

## 2018-01-01 PROCEDURE — 51702 INSERT TEMP BLADDER CATH: CPT | Performed by: EMERGENCY MEDICINE

## 2018-01-01 PROCEDURE — 77030004534 HC CATH ANGI DX INFN CARD -A

## 2018-01-01 PROCEDURE — 85027 COMPLETE CBC AUTOMATED: CPT | Performed by: PHYSICAL MEDICINE & REHABILITATION

## 2018-01-01 PROCEDURE — 74011000258 HC RX REV CODE- 258: Performed by: PHYSICAL MEDICINE & REHABILITATION

## 2018-01-01 PROCEDURE — 36415 COLL VENOUS BLD VENIPUNCTURE: CPT | Performed by: PHYSICIAN ASSISTANT

## 2018-01-01 PROCEDURE — 77030013120 HC ELECTRD PD DEFIB ZOLL -F: Performed by: INTERNAL MEDICINE

## 2018-01-01 PROCEDURE — 82040 ASSAY OF SERUM ALBUMIN: CPT | Performed by: INTERNAL MEDICINE

## 2018-01-01 PROCEDURE — 74011250636 HC RX REV CODE- 250/636: Performed by: PHYSICIAN ASSISTANT

## 2018-01-01 PROCEDURE — 77030012891

## 2018-01-01 PROCEDURE — 86900 BLOOD TYPING SEROLOGIC ABO: CPT | Performed by: INTERNAL MEDICINE

## 2018-01-01 PROCEDURE — 86920 COMPATIBILITY TEST SPIN: CPT | Performed by: NURSE PRACTITIONER

## 2018-01-01 PROCEDURE — 77030021907 HC KT URIN FOL O&M -A

## 2018-01-01 PROCEDURE — 85025 COMPLETE CBC W/AUTO DIFF WBC: CPT | Performed by: EMERGENCY MEDICINE

## 2018-01-01 PROCEDURE — 84443 ASSAY THYROID STIM HORMONE: CPT | Performed by: PHYSICIAN ASSISTANT

## 2018-01-01 PROCEDURE — 74011250636 HC RX REV CODE- 250/636: Performed by: ANESTHESIOLOGY

## 2018-01-01 PROCEDURE — 82728 ASSAY OF FERRITIN: CPT | Performed by: PHYSICAL MEDICINE & REHABILITATION

## 2018-01-01 PROCEDURE — 02RF38Z REPLACEMENT OF AORTIC VALVE WITH ZOOPLASTIC TISSUE, PERCUTANEOUS APPROACH: ICD-10-PCS | Performed by: INTERNAL MEDICINE

## 2018-01-01 PROCEDURE — 87070 CULTURE OTHR SPECIMN AEROBIC: CPT | Performed by: PHYSICAL MEDICINE & REHABILITATION

## 2018-01-01 PROCEDURE — 65610000006 HC RM INTENSIVE CARE

## 2018-01-01 PROCEDURE — 93880 EXTRACRANIAL BILAT STUDY: CPT

## 2018-01-01 PROCEDURE — 81001 URINALYSIS AUTO W/SCOPE: CPT | Performed by: PHYSICAL MEDICINE & REHABILITATION

## 2018-01-01 PROCEDURE — 83880 ASSAY OF NATRIURETIC PEPTIDE: CPT | Performed by: PHYSICIAN ASSISTANT

## 2018-01-01 PROCEDURE — 77030018719 HC DRSG PTCH ANTIMIC J&J -A

## 2018-01-01 PROCEDURE — 99221 1ST HOSP IP/OBS SF/LOW 40: CPT | Performed by: PHYSICAL MEDICINE & REHABILITATION

## 2018-01-01 PROCEDURE — 84100 ASSAY OF PHOSPHORUS: CPT | Performed by: INTERNAL MEDICINE

## 2018-01-01 PROCEDURE — 87186 SC STD MICRODIL/AGAR DIL: CPT | Performed by: PHYSICAL MEDICINE & REHABILITATION

## 2018-01-01 PROCEDURE — 77030011640 HC PAD GRND REM COVD -A: Performed by: INTERNAL MEDICINE

## 2018-01-01 PROCEDURE — 83880 ASSAY OF NATRIURETIC PEPTIDE: CPT | Performed by: EMERGENCY MEDICINE

## 2018-01-01 PROCEDURE — 74174 CTA ABD&PLVS W/CONTRAST: CPT

## 2018-01-01 PROCEDURE — 87641 MR-STAPH DNA AMP PROBE: CPT | Performed by: INTERNAL MEDICINE

## 2018-01-01 PROCEDURE — 84484 ASSAY OF TROPONIN QUANT: CPT

## 2018-01-01 PROCEDURE — 81001 URINALYSIS AUTO W/SCOPE: CPT | Performed by: INTERNAL MEDICINE

## 2018-01-01 PROCEDURE — 77030027688 HC DRSG MEPILEX 16-48IN NO BORD MOLN -A

## 2018-01-01 DEVICE — VALVE AORTIC SAPEIN 3 23MM -- COMMANDER SYS 9600TFX: Type: IMPLANTABLE DEVICE | Site: AORTIC VALVE | Status: FUNCTIONAL

## 2018-01-01 RX ORDER — POLYETHYLENE GLYCOL 3350 17 G/17G
17 POWDER, FOR SOLUTION ORAL DAILY
Status: DISCONTINUED | OUTPATIENT
Start: 2018-01-01 | End: 2018-01-01

## 2018-01-01 RX ORDER — SODIUM CHLORIDE 0.9 % (FLUSH) 0.9 %
10 SYRINGE (ML) INJECTION
Status: COMPLETED | OUTPATIENT
Start: 2018-01-01 | End: 2018-01-01

## 2018-01-01 RX ORDER — SPIRONOLACTONE 25 MG/1
25 TABLET ORAL DAILY
Status: DISCONTINUED | OUTPATIENT
Start: 2018-01-01 | End: 2018-01-01 | Stop reason: HOSPADM

## 2018-01-01 RX ORDER — ACETAMINOPHEN 325 MG/1
500 TABLET ORAL EVERY 6 HOURS
Status: DISCONTINUED | OUTPATIENT
Start: 2018-01-01 | End: 2018-01-01 | Stop reason: SDUPTHER

## 2018-01-01 RX ORDER — SODIUM CHLORIDE, SODIUM LACTATE, POTASSIUM CHLORIDE, CALCIUM CHLORIDE 600; 310; 30; 20 MG/100ML; MG/100ML; MG/100ML; MG/100ML
INJECTION, SOLUTION INTRAVENOUS
Status: DISCONTINUED | OUTPATIENT
Start: 2018-01-01 | End: 2018-01-01 | Stop reason: HOSPADM

## 2018-01-01 RX ORDER — HYDROMORPHONE HYDROCHLORIDE 2 MG/1
0.5 TABLET ORAL
Status: DISCONTINUED | OUTPATIENT
Start: 2018-01-01 | End: 2018-01-01 | Stop reason: HOSPADM

## 2018-01-01 RX ORDER — ASPIRIN 81 MG/1
81 TABLET ORAL DAILY
Status: DISCONTINUED | OUTPATIENT
Start: 2018-01-01 | End: 2018-01-01

## 2018-01-01 RX ORDER — HYDROCORTISONE ACETATE 25 MG/1
25 SUPPOSITORY RECTAL EVERY 12 HOURS
Status: DISCONTINUED | OUTPATIENT
Start: 2018-01-01 | End: 2018-01-01 | Stop reason: HOSPADM

## 2018-01-01 RX ORDER — FUROSEMIDE 10 MG/ML
80 INJECTION INTRAMUSCULAR; INTRAVENOUS 2 TIMES DAILY
Status: COMPLETED | OUTPATIENT
Start: 2018-01-01 | End: 2018-01-01

## 2018-01-01 RX ORDER — FUROSEMIDE 80 MG/1
80 TABLET ORAL EVERY 12 HOURS
Qty: 1 TAB | Refills: 0 | Status: SHIPPED
Start: 2018-01-01 | End: 2018-01-01

## 2018-01-01 RX ORDER — PREDNISONE 10 MG/1
10 TABLET ORAL DAILY
Status: DISCONTINUED | OUTPATIENT
Start: 2018-01-01 | End: 2018-01-01 | Stop reason: HOSPADM

## 2018-01-01 RX ORDER — FUROSEMIDE 40 MG/1
80 TABLET ORAL DAILY
Status: DISCONTINUED | OUTPATIENT
Start: 2018-01-01 | End: 2018-01-01

## 2018-01-01 RX ORDER — CEFPODOXIME PROXETIL 200 MG/1
200 TABLET, FILM COATED ORAL DAILY
Status: DISCONTINUED | OUTPATIENT
Start: 2018-01-01 | End: 2018-01-01 | Stop reason: HOSPADM

## 2018-01-01 RX ORDER — VANCOMYCIN/0.9 % SOD CHLORIDE 1.5G/250ML
1500 PLASTIC BAG, INJECTION (ML) INTRAVENOUS ONCE
Status: COMPLETED | OUTPATIENT
Start: 2018-01-01 | End: 2018-01-01

## 2018-01-01 RX ORDER — ALLOPURINOL 100 MG/1
100 TABLET ORAL DAILY
Status: DISCONTINUED | OUTPATIENT
Start: 2018-01-01 | End: 2018-01-01 | Stop reason: HOSPADM

## 2018-01-01 RX ORDER — POTASSIUM CHLORIDE 20 MEQ/1
40 TABLET, EXTENDED RELEASE ORAL DAILY
Status: DISCONTINUED | OUTPATIENT
Start: 2018-01-01 | End: 2018-01-01

## 2018-01-01 RX ORDER — FUROSEMIDE 10 MG/ML
40 INJECTION INTRAMUSCULAR; INTRAVENOUS EVERY 12 HOURS
Status: DISCONTINUED | OUTPATIENT
Start: 2018-01-01 | End: 2018-01-01

## 2018-01-01 RX ORDER — SODIUM CHLORIDE 9 MG/ML
50 INJECTION, SOLUTION INTRAVENOUS CONTINUOUS
Status: DISPENSED | OUTPATIENT
Start: 2018-01-01 | End: 2018-01-01

## 2018-01-01 RX ORDER — SERTRALINE HYDROCHLORIDE 100 MG/1
100 TABLET, FILM COATED ORAL DAILY
Status: DISCONTINUED | OUTPATIENT
Start: 2018-01-01 | End: 2018-01-01 | Stop reason: HOSPADM

## 2018-01-01 RX ORDER — SODIUM CHLORIDE 0.9 % (FLUSH) 0.9 %
5-10 SYRINGE (ML) INJECTION EVERY 8 HOURS
Status: CANCELLED | OUTPATIENT
Start: 2018-01-01

## 2018-01-01 RX ORDER — FENTANYL CITRATE 50 UG/ML
25-100 INJECTION, SOLUTION INTRAMUSCULAR; INTRAVENOUS
Status: DISCONTINUED | OUTPATIENT
Start: 2018-01-01 | End: 2018-01-01

## 2018-01-01 RX ORDER — METOPROLOL SUCCINATE 25 MG/1
25 TABLET, EXTENDED RELEASE ORAL DAILY
Status: DISCONTINUED | OUTPATIENT
Start: 2018-01-01 | End: 2018-01-01 | Stop reason: HOSPADM

## 2018-01-01 RX ORDER — HYDROCORTISONE 25 MG/G
CREAM TOPICAL
Status: CANCELLED | OUTPATIENT
Start: 2018-01-01

## 2018-01-01 RX ORDER — DIAZEPAM 5 MG/1
5 TABLET ORAL DAILY
Status: DISCONTINUED | OUTPATIENT
Start: 2018-01-01 | End: 2018-01-01

## 2018-01-01 RX ORDER — BISACODYL 5 MG
5 TABLET, DELAYED RELEASE (ENTERIC COATED) ORAL DAILY PRN
Status: DISCONTINUED | OUTPATIENT
Start: 2018-01-01 | End: 2018-01-01 | Stop reason: HOSPADM

## 2018-01-01 RX ORDER — NYSTATIN 100000 [USP'U]/G
POWDER TOPICAL 2 TIMES DAILY
Qty: 1 BOTTLE | Refills: 0 | Status: SHIPPED
Start: 2018-01-01 | End: 2018-01-01

## 2018-01-01 RX ORDER — POTASSIUM CHLORIDE 20 MEQ/1
40 TABLET, EXTENDED RELEASE ORAL DAILY
Status: CANCELLED | OUTPATIENT
Start: 2018-01-01

## 2018-01-01 RX ORDER — DIAZEPAM 5 MG/1
5 TABLET ORAL
Status: DISCONTINUED | OUTPATIENT
Start: 2018-01-01 | End: 2018-01-01 | Stop reason: HOSPADM

## 2018-01-01 RX ORDER — CEFAZOLIN SODIUM/WATER 2 G/20 ML
2 SYRINGE (ML) INTRAVENOUS
Status: COMPLETED | OUTPATIENT
Start: 2018-01-01 | End: 2018-01-01

## 2018-01-01 RX ORDER — BENZONATATE 100 MG/1
200 CAPSULE ORAL
Status: DISCONTINUED | OUTPATIENT
Start: 2018-01-01 | End: 2018-01-01 | Stop reason: HOSPADM

## 2018-01-01 RX ORDER — METOPROLOL SUCCINATE 50 MG/1
50 TABLET, EXTENDED RELEASE ORAL DAILY
Status: DISCONTINUED | OUTPATIENT
Start: 2018-01-01 | End: 2018-01-01

## 2018-01-01 RX ORDER — SERTRALINE HYDROCHLORIDE 50 MG/1
100 TABLET, FILM COATED ORAL DAILY
Status: DISCONTINUED | OUTPATIENT
Start: 2018-01-01 | End: 2018-01-01 | Stop reason: HOSPADM

## 2018-01-01 RX ORDER — LEVOTHYROXINE SODIUM 88 UG/1
88 TABLET ORAL
Status: DISCONTINUED | OUTPATIENT
Start: 2018-01-01 | End: 2018-01-01 | Stop reason: HOSPADM

## 2018-01-01 RX ORDER — METOPROLOL SUCCINATE 25 MG/1
25 TABLET, EXTENDED RELEASE ORAL DAILY
Status: CANCELLED | OUTPATIENT
Start: 2018-01-01

## 2018-01-01 RX ORDER — POTASSIUM CHLORIDE 20 MEQ/1
20 TABLET, EXTENDED RELEASE ORAL 2 TIMES DAILY
Status: DISCONTINUED | OUTPATIENT
Start: 2018-01-01 | End: 2018-01-01

## 2018-01-01 RX ORDER — ALLOPURINOL 100 MG/1
100 TABLET ORAL 2 TIMES DAILY
Qty: 10 TAB | Refills: 0 | Status: SHIPPED | OUTPATIENT
Start: 2018-01-01 | End: 2018-01-01

## 2018-01-01 RX ORDER — FLUCONAZOLE 100 MG/1
200 TABLET ORAL DAILY
Status: COMPLETED | OUTPATIENT
Start: 2018-01-01 | End: 2018-01-01

## 2018-01-01 RX ORDER — HYDROMORPHONE HYDROCHLORIDE 5 MG/5ML
0.5 SOLUTION ORAL
Qty: 1 ML | Refills: 0 | Status: SHIPPED
Start: 2018-01-01

## 2018-01-01 RX ORDER — SPIRONOLACTONE 25 MG/1
25 TABLET ORAL DAILY
Status: DISCONTINUED | OUTPATIENT
Start: 2018-01-01 | End: 2018-01-01

## 2018-01-01 RX ORDER — POLYETHYLENE GLYCOL 3350 17 G/17G
17 POWDER, FOR SOLUTION ORAL DAILY
Status: DISCONTINUED | OUTPATIENT
Start: 2018-01-01 | End: 2018-01-01 | Stop reason: HOSPADM

## 2018-01-01 RX ORDER — SODIUM CHLORIDE 0.9 % (FLUSH) 0.9 %
5-10 SYRINGE (ML) INJECTION EVERY 8 HOURS
Status: DISCONTINUED | OUTPATIENT
Start: 2018-01-01 | End: 2018-01-01 | Stop reason: HOSPADM

## 2018-01-01 RX ORDER — ACETAZOLAMIDE 250 MG/1
250 TABLET ORAL DAILY
Status: ON HOLD | COMMUNITY
End: 2018-01-01

## 2018-01-01 RX ORDER — FUROSEMIDE 40 MG/1
40 TABLET ORAL DAILY
Qty: 30 TAB | Refills: 1 | Status: SHIPPED | OUTPATIENT
Start: 2018-01-01 | End: 2018-01-01

## 2018-01-01 RX ORDER — GUAIFENESIN 100 MG/5ML
81 LIQUID (ML) ORAL DAILY
Status: DISCONTINUED | OUTPATIENT
Start: 2018-01-01 | End: 2018-01-01

## 2018-01-01 RX ORDER — FUROSEMIDE 10 MG/ML
40 INJECTION INTRAMUSCULAR; INTRAVENOUS ONCE
Status: COMPLETED | OUTPATIENT
Start: 2018-01-01 | End: 2018-01-01

## 2018-01-01 RX ORDER — LEVOTHYROXINE SODIUM 88 UG/1
88 TABLET ORAL
Status: CANCELLED | OUTPATIENT
Start: 2018-01-01

## 2018-01-01 RX ORDER — METOPROLOL SUCCINATE 25 MG/1
25 TABLET, EXTENDED RELEASE ORAL DAILY
Qty: 1 TAB | Refills: 0 | Status: SHIPPED
Start: 2018-01-01 | End: 2018-01-01

## 2018-01-01 RX ORDER — AZELASTINE 1 MG/ML
1 SPRAY, METERED NASAL 2 TIMES DAILY
Status: DISCONTINUED | OUTPATIENT
Start: 2018-01-01 | End: 2018-01-01 | Stop reason: HOSPADM

## 2018-01-01 RX ORDER — GUAIFENESIN 100 MG/5ML
81 LIQUID (ML) ORAL DAILY
COMMUNITY
End: 2018-01-01 | Stop reason: CLARIF

## 2018-01-01 RX ORDER — MELATONIN
1000 DAILY
Status: DISCONTINUED | OUTPATIENT
Start: 2018-01-01 | End: 2018-01-01 | Stop reason: HOSPADM

## 2018-01-01 RX ORDER — ONDANSETRON 2 MG/ML
4 INJECTION INTRAMUSCULAR; INTRAVENOUS ONCE
Status: DISCONTINUED | OUTPATIENT
Start: 2018-01-01 | End: 2018-01-01 | Stop reason: HOSPADM

## 2018-01-01 RX ORDER — HEPARIN SODIUM 200 [USP'U]/100ML
1000 INJECTION, SOLUTION INTRAVENOUS AS NEEDED
Status: DISCONTINUED | OUTPATIENT
Start: 2018-01-01 | End: 2018-01-01 | Stop reason: HOSPADM

## 2018-01-01 RX ORDER — ROPINIROLE 1 MG/1
2 TABLET, FILM COATED ORAL 2 TIMES DAILY
Status: DISCONTINUED | OUTPATIENT
Start: 2018-01-01 | End: 2018-01-01 | Stop reason: HOSPADM

## 2018-01-01 RX ORDER — SODIUM CHLORIDE 0.9 % (FLUSH) 0.9 %
5-10 SYRINGE (ML) INJECTION AS NEEDED
Status: DISCONTINUED | OUTPATIENT
Start: 2018-01-01 | End: 2018-01-01 | Stop reason: HOSPADM

## 2018-01-01 RX ORDER — PRAVASTATIN SODIUM 20 MG/1
40 TABLET ORAL DAILY
Status: DISCONTINUED | OUTPATIENT
Start: 2018-01-01 | End: 2018-01-01 | Stop reason: HOSPADM

## 2018-01-01 RX ORDER — METOLAZONE 5 MG/1
5 TABLET ORAL DAILY
Status: DISCONTINUED | OUTPATIENT
Start: 2018-01-01 | End: 2018-01-01

## 2018-01-01 RX ORDER — POTASSIUM CHLORIDE 20 MEQ/1
40 TABLET, EXTENDED RELEASE ORAL DAILY
Status: DISCONTINUED | OUTPATIENT
Start: 2018-01-01 | End: 2018-01-01 | Stop reason: HOSPADM

## 2018-01-01 RX ORDER — LANOLIN ALCOHOL/MO/W.PET/CERES
400 CREAM (GRAM) TOPICAL DAILY
Status: DISCONTINUED | OUTPATIENT
Start: 2018-01-01 | End: 2018-01-01 | Stop reason: HOSPADM

## 2018-01-01 RX ORDER — FUROSEMIDE 10 MG/ML
40 INJECTION INTRAMUSCULAR; INTRAVENOUS 2 TIMES DAILY
Status: DISCONTINUED | OUTPATIENT
Start: 2018-01-01 | End: 2018-01-01

## 2018-01-01 RX ORDER — DIAZEPAM 2 MG/1
2 TABLET ORAL
Status: DISCONTINUED | OUTPATIENT
Start: 2018-01-01 | End: 2018-01-01 | Stop reason: HOSPADM

## 2018-01-01 RX ORDER — CIPROFLOXACIN 500 MG/1
500 TABLET ORAL EVERY 12 HOURS
COMMUNITY
End: 2018-01-01

## 2018-01-01 RX ORDER — PANTOPRAZOLE SODIUM 40 MG/1
40 TABLET, DELAYED RELEASE ORAL
Status: DISCONTINUED | OUTPATIENT
Start: 2018-01-01 | End: 2018-01-01 | Stop reason: HOSPADM

## 2018-01-01 RX ORDER — ASPIRIN 81 MG/1
81 TABLET ORAL DAILY
Status: DISCONTINUED | OUTPATIENT
Start: 2018-01-01 | End: 2018-01-01 | Stop reason: HOSPADM

## 2018-01-01 RX ORDER — DIAZEPAM 5 MG/1
2.5 TABLET ORAL DAILY
Qty: 1 TAB | Refills: 0 | Status: SHIPPED
Start: 2018-01-01

## 2018-01-01 RX ORDER — SODIUM CHLORIDE 0.9 % (FLUSH) 0.9 %
5-10 SYRINGE (ML) INJECTION EVERY 8 HOURS
Status: DISCONTINUED | OUTPATIENT
Start: 2018-01-01 | End: 2018-01-01 | Stop reason: SDUPTHER

## 2018-01-01 RX ORDER — DIAZEPAM 5 MG/1
2.5 TABLET ORAL
Status: DISCONTINUED | OUTPATIENT
Start: 2018-01-01 | End: 2018-01-01

## 2018-01-01 RX ORDER — POTASSIUM CHLORIDE 20 MEQ/1
40 TABLET, EXTENDED RELEASE ORAL
Status: COMPLETED | OUTPATIENT
Start: 2018-01-01 | End: 2018-01-01

## 2018-01-01 RX ORDER — PHENOL/SODIUM PHENOLATE
20 AEROSOL, SPRAY (ML) MUCOUS MEMBRANE DAILY
Status: DISCONTINUED | OUTPATIENT
Start: 2018-01-01 | End: 2018-01-01 | Stop reason: HOSPADM

## 2018-01-01 RX ORDER — FUROSEMIDE 40 MG/1
40 TABLET ORAL 2 TIMES DAILY
Status: DISCONTINUED | OUTPATIENT
Start: 2018-01-01 | End: 2018-01-01 | Stop reason: HOSPADM

## 2018-01-01 RX ORDER — FUROSEMIDE 40 MG/1
80 TABLET ORAL EVERY 12 HOURS
Status: DISCONTINUED | OUTPATIENT
Start: 2018-01-01 | End: 2018-01-01 | Stop reason: HOSPADM

## 2018-01-01 RX ORDER — LIDOCAINE HYDROCHLORIDE 20 MG/ML
60-200 INJECTION, SOLUTION INFILTRATION; PERINEURAL
Status: DISCONTINUED | OUTPATIENT
Start: 2018-01-01 | End: 2018-01-01

## 2018-01-01 RX ORDER — FUROSEMIDE 40 MG/1
40 TABLET ORAL DAILY
Status: DISCONTINUED | OUTPATIENT
Start: 2018-01-01 | End: 2018-01-01 | Stop reason: HOSPADM

## 2018-01-01 RX ORDER — POTASSIUM CHLORIDE 20 MEQ/1
40 TABLET, EXTENDED RELEASE ORAL DAILY
Qty: 1 TAB | Refills: 0 | Status: SHIPPED
Start: 2018-01-01 | End: 2018-01-01

## 2018-01-01 RX ORDER — MIDAZOLAM HYDROCHLORIDE 1 MG/ML
.5-2 INJECTION, SOLUTION INTRAMUSCULAR; INTRAVENOUS
Status: DISCONTINUED | OUTPATIENT
Start: 2018-01-01 | End: 2018-01-01

## 2018-01-01 RX ORDER — ROPINIROLE 2 MG/1
2 TABLET, FILM COATED ORAL 2 TIMES DAILY
Status: DISCONTINUED | OUTPATIENT
Start: 2018-01-01 | End: 2018-01-01 | Stop reason: HOSPADM

## 2018-01-01 RX ORDER — VANCOMYCIN HYDROCHLORIDE
1250 EVERY 24 HOURS
Status: DISCONTINUED | OUTPATIENT
Start: 2018-01-01 | End: 2018-01-01

## 2018-01-01 RX ORDER — SPIRONOLACTONE 25 MG/1
25 TABLET ORAL DAILY
Status: CANCELLED | OUTPATIENT
Start: 2018-01-01

## 2018-01-01 RX ORDER — CIPROFLOXACIN 500 MG/1
500 TABLET ORAL EVERY 12 HOURS
Status: DISCONTINUED | OUTPATIENT
Start: 2018-01-01 | End: 2018-01-01 | Stop reason: HOSPADM

## 2018-01-01 RX ORDER — DIAZEPAM 5 MG/1
5 TABLET ORAL
Qty: 1 TAB | Refills: 0 | Status: SHIPPED
Start: 2018-01-01

## 2018-01-01 RX ORDER — FUROSEMIDE 40 MG/1
80 TABLET ORAL ONCE
Status: COMPLETED | OUTPATIENT
Start: 2018-01-01 | End: 2018-01-01

## 2018-01-01 RX ORDER — LIDOCAINE HYDROCHLORIDE 20 MG/ML
INJECTION, SOLUTION INFILTRATION; PERINEURAL AS NEEDED
Status: DISCONTINUED | OUTPATIENT
Start: 2018-01-01 | End: 2018-01-01 | Stop reason: HOSPADM

## 2018-01-01 RX ORDER — FUROSEMIDE 40 MG/1
80 TABLET ORAL EVERY 12 HOURS
Status: DISCONTINUED | OUTPATIENT
Start: 2018-01-01 | End: 2018-01-01

## 2018-01-01 RX ORDER — TORSEMIDE 20 MG/1
40 TABLET ORAL DAILY
Status: DISCONTINUED | OUTPATIENT
Start: 2018-01-01 | End: 2018-01-01

## 2018-01-01 RX ORDER — SODIUM CHLORIDE 9 MG/ML
250 INJECTION, SOLUTION INTRAVENOUS AS NEEDED
Status: DISCONTINUED | OUTPATIENT
Start: 2018-01-01 | End: 2018-01-01 | Stop reason: SDUPTHER

## 2018-01-01 RX ORDER — GUAIFENESIN 100 MG/5ML
81 LIQUID (ML) ORAL DAILY
Qty: 30 TAB | Refills: 1 | Status: SHIPPED | OUTPATIENT
Start: 2018-01-01 | End: 2018-01-01

## 2018-01-01 RX ORDER — CEFAZOLIN SODIUM/WATER 2 G/20 ML
2 SYRINGE (ML) INTRAVENOUS
Status: DISCONTINUED | OUTPATIENT
Start: 2018-01-01 | End: 2018-01-01

## 2018-01-01 RX ORDER — SODIUM CHLORIDE 0.9 % (FLUSH) 0.9 %
5-10 SYRINGE (ML) INJECTION AS NEEDED
Status: DISCONTINUED | OUTPATIENT
Start: 2018-01-01 | End: 2018-01-01

## 2018-01-01 RX ORDER — FUROSEMIDE 40 MG/1
80 TABLET ORAL 2 TIMES DAILY
Status: DISCONTINUED | OUTPATIENT
Start: 2018-01-01 | End: 2018-01-01

## 2018-01-01 RX ORDER — ACETAMINOPHEN 500 MG/1
500 CAPSULE, LIQUID FILLED ORAL EVERY 12 HOURS
Status: DISCONTINUED | OUTPATIENT
Start: 2018-01-01 | End: 2018-01-01 | Stop reason: HOSPADM

## 2018-01-01 RX ORDER — SODIUM CHLORIDE, SODIUM LACTATE, POTASSIUM CHLORIDE, CALCIUM CHLORIDE 600; 310; 30; 20 MG/100ML; MG/100ML; MG/100ML; MG/100ML
75 INJECTION, SOLUTION INTRAVENOUS CONTINUOUS
Status: DISCONTINUED | OUTPATIENT
Start: 2018-01-01 | End: 2018-01-01 | Stop reason: HOSPADM

## 2018-01-01 RX ORDER — WARFARIN SODIUM 5 MG/1
5 TABLET ORAL EVERY EVENING
Status: DISCONTINUED | OUTPATIENT
Start: 2018-01-01 | End: 2018-01-01

## 2018-01-01 RX ORDER — SODIUM CHLORIDE 9 MG/ML
250 INJECTION, SOLUTION INTRAVENOUS AS NEEDED
Status: DISCONTINUED | OUTPATIENT
Start: 2018-01-01 | End: 2018-01-01 | Stop reason: HOSPADM

## 2018-01-01 RX ORDER — PRAVASTATIN SODIUM 20 MG/1
40 TABLET ORAL DAILY
Status: CANCELLED | OUTPATIENT
Start: 2018-01-01

## 2018-01-01 RX ORDER — PHYTONADIONE 5 MG/1
5 TABLET ORAL
Status: COMPLETED | OUTPATIENT
Start: 2018-01-01 | End: 2018-01-01

## 2018-01-01 RX ORDER — IPRATROPIUM BROMIDE AND ALBUTEROL SULFATE 2.5; .5 MG/3ML; MG/3ML
3 SOLUTION RESPIRATORY (INHALATION)
Status: DISCONTINUED | OUTPATIENT
Start: 2018-01-01 | End: 2018-01-01

## 2018-01-01 RX ORDER — DIAZEPAM 5 MG/1
2.5 TABLET ORAL DAILY
Status: DISCONTINUED | OUTPATIENT
Start: 2018-01-01 | End: 2018-01-01 | Stop reason: HOSPADM

## 2018-01-01 RX ORDER — HYDROCORTISONE 25 MG/G
CREAM TOPICAL
Status: DISCONTINUED | OUTPATIENT
Start: 2018-01-01 | End: 2018-01-01 | Stop reason: HOSPADM

## 2018-01-01 RX ORDER — HEPARIN SODIUM 5000 [USP'U]/ML
5000 INJECTION, SOLUTION INTRAVENOUS; SUBCUTANEOUS EVERY 8 HOURS
Status: DISCONTINUED | OUTPATIENT
Start: 2018-01-01 | End: 2018-01-01

## 2018-01-01 RX ORDER — SODIUM CHLORIDE 9 MG/ML
250 INJECTION, SOLUTION INTRAVENOUS AS NEEDED
Status: DISCONTINUED | OUTPATIENT
Start: 2018-01-01 | End: 2018-01-01

## 2018-01-01 RX ORDER — CIPROFLOXACIN 500 MG/1
500 TABLET ORAL EVERY 24 HOURS
Status: DISCONTINUED | OUTPATIENT
Start: 2018-01-01 | End: 2018-01-01

## 2018-01-01 RX ORDER — SERTRALINE HYDROCHLORIDE 100 MG/1
100 TABLET, FILM COATED ORAL DAILY
Status: CANCELLED | OUTPATIENT
Start: 2018-01-01

## 2018-01-01 RX ORDER — GUAIFENESIN 100 MG/5ML
81 LIQUID (ML) ORAL DAILY
Status: DISCONTINUED | OUTPATIENT
Start: 2018-01-01 | End: 2018-01-01 | Stop reason: SDUPTHER

## 2018-01-01 RX ORDER — FUROSEMIDE 10 MG/ML
60 INJECTION INTRAMUSCULAR; INTRAVENOUS EVERY 12 HOURS
Status: DISCONTINUED | OUTPATIENT
Start: 2018-01-01 | End: 2018-01-01

## 2018-01-01 RX ORDER — HEPARIN SODIUM 1000 [USP'U]/ML
INJECTION, SOLUTION INTRAVENOUS; SUBCUTANEOUS AS NEEDED
Status: DISCONTINUED | OUTPATIENT
Start: 2018-01-01 | End: 2018-01-01 | Stop reason: HOSPADM

## 2018-01-01 RX ORDER — SODIUM CHLORIDE 0.9 % (FLUSH) 0.9 %
10 SYRINGE (ML) INJECTION AS NEEDED
Status: DISCONTINUED | OUTPATIENT
Start: 2018-01-01 | End: 2018-01-01 | Stop reason: HOSPADM

## 2018-01-01 RX ORDER — ROPINIROLE 2 MG/1
2 TABLET, FILM COATED ORAL 2 TIMES DAILY
Status: CANCELLED | OUTPATIENT
Start: 2018-01-01

## 2018-01-01 RX ORDER — ALLOPURINOL 100 MG/1
100 TABLET ORAL
Status: DISCONTINUED | OUTPATIENT
Start: 2018-01-01 | End: 2018-01-01 | Stop reason: HOSPADM

## 2018-01-01 RX ORDER — ONDANSETRON 2 MG/ML
4 INJECTION INTRAMUSCULAR; INTRAVENOUS
Status: DISCONTINUED | OUTPATIENT
Start: 2018-01-01 | End: 2018-01-01 | Stop reason: HOSPADM

## 2018-01-01 RX ORDER — BENZONATATE 100 MG/1
100 CAPSULE ORAL
Status: DISCONTINUED | OUTPATIENT
Start: 2018-01-01 | End: 2018-01-01 | Stop reason: HOSPADM

## 2018-01-01 RX ORDER — HYDROXYZINE PAMOATE 25 MG/1
12.5 CAPSULE ORAL
Status: DISCONTINUED | OUTPATIENT
Start: 2018-01-01 | End: 2018-01-01

## 2018-01-01 RX ORDER — GUAIFENESIN 100 MG/5ML
81 LIQUID (ML) ORAL DAILY
Status: DISCONTINUED | OUTPATIENT
Start: 2018-01-01 | End: 2018-01-01 | Stop reason: HOSPADM

## 2018-01-01 RX ORDER — FUROSEMIDE 10 MG/ML
80 INJECTION INTRAMUSCULAR; INTRAVENOUS 2 TIMES DAILY
Status: DISCONTINUED | OUTPATIENT
Start: 2018-01-01 | End: 2018-01-01

## 2018-01-01 RX ORDER — SODIUM CHLORIDE 0.9 % (FLUSH) 0.9 %
10 SYRINGE (ML) INJECTION EVERY 8 HOURS
Status: DISCONTINUED | OUTPATIENT
Start: 2018-01-01 | End: 2018-01-01 | Stop reason: HOSPADM

## 2018-01-01 RX ORDER — LEVALBUTEROL INHALATION SOLUTION 0.63 MG/3ML
0.63 SOLUTION RESPIRATORY (INHALATION)
Status: DISCONTINUED | OUTPATIENT
Start: 2018-01-01 | End: 2018-01-01 | Stop reason: HOSPADM

## 2018-01-01 RX ORDER — GLYCERIN ADULT
1 SUPPOSITORY, RECTAL RECTAL
Status: DISCONTINUED | OUTPATIENT
Start: 2018-01-01 | End: 2018-01-01

## 2018-01-01 RX ORDER — METOPROLOL SUCCINATE 25 MG/1
25 TABLET, EXTENDED RELEASE ORAL DAILY
Status: DISCONTINUED | OUTPATIENT
Start: 2018-01-01 | End: 2018-01-01

## 2018-01-01 RX ORDER — PANTOPRAZOLE SODIUM 40 MG/1
40 TABLET, DELAYED RELEASE ORAL
Status: CANCELLED | OUTPATIENT
Start: 2018-01-01

## 2018-01-01 RX ORDER — ALLOPURINOL 100 MG/1
100 TABLET ORAL 2 TIMES DAILY
Status: DISCONTINUED | OUTPATIENT
Start: 2018-01-01 | End: 2018-01-01 | Stop reason: HOSPADM

## 2018-01-01 RX ORDER — FUROSEMIDE 40 MG/1
20 TABLET ORAL 2 TIMES DAILY
Status: DISCONTINUED | OUTPATIENT
Start: 2018-01-01 | End: 2018-01-01

## 2018-01-01 RX ORDER — SODIUM CHLORIDE 9 MG/ML
50 INJECTION, SOLUTION INTRAVENOUS CONTINUOUS
Status: DISCONTINUED | OUTPATIENT
Start: 2018-01-01 | End: 2018-01-01

## 2018-01-01 RX ORDER — ACETAMINOPHEN 325 MG/1
650 TABLET ORAL
Status: DISCONTINUED | OUTPATIENT
Start: 2018-01-01 | End: 2018-01-01 | Stop reason: HOSPADM

## 2018-01-01 RX ORDER — LIDOCAINE HYDROCHLORIDE 10 MG/ML
0.1 INJECTION INFILTRATION; PERINEURAL AS NEEDED
Status: DISCONTINUED | OUTPATIENT
Start: 2018-01-01 | End: 2018-01-01 | Stop reason: HOSPADM

## 2018-01-01 RX ORDER — ALLOPURINOL 100 MG/1
100 TABLET ORAL 2 TIMES DAILY
Status: DISCONTINUED | OUTPATIENT
Start: 2018-01-01 | End: 2018-01-01

## 2018-01-01 RX ORDER — MUPIROCIN 20 MG/G
OINTMENT TOPICAL 2 TIMES DAILY
Status: COMPLETED | OUTPATIENT
Start: 2018-01-01 | End: 2018-01-01

## 2018-01-01 RX ORDER — MORPHINE SULFATE 2 MG/ML
2 INJECTION, SOLUTION INTRAMUSCULAR; INTRAVENOUS
Status: DISCONTINUED | OUTPATIENT
Start: 2018-01-01 | End: 2018-01-01 | Stop reason: HOSPADM

## 2018-01-01 RX ORDER — HYDROCORTISONE 25 MG/G
CREAM TOPICAL
Status: DISCONTINUED | OUTPATIENT
Start: 2018-01-01 | End: 2018-01-01

## 2018-01-01 RX ORDER — FLUTICASONE PROPIONATE 50 MCG
2 SPRAY, SUSPENSION (ML) NASAL DAILY
Status: DISCONTINUED | OUTPATIENT
Start: 2018-01-01 | End: 2018-01-01 | Stop reason: HOSPADM

## 2018-01-01 RX ORDER — SERTRALINE HYDROCHLORIDE 25 MG/1
100 TABLET, FILM COATED ORAL DAILY
Status: DISCONTINUED | OUTPATIENT
Start: 2018-01-01 | End: 2018-01-01 | Stop reason: HOSPADM

## 2018-01-01 RX ORDER — ALLOPURINOL 100 MG/1
100 TABLET ORAL 2 TIMES DAILY
Status: CANCELLED | OUTPATIENT
Start: 2018-01-01

## 2018-01-01 RX ORDER — IPRATROPIUM BROMIDE AND ALBUTEROL SULFATE 2.5; .5 MG/3ML; MG/3ML
3 SOLUTION RESPIRATORY (INHALATION)
Qty: 30 NEBULE | Refills: 0 | Status: SHIPPED
Start: 2018-01-01 | End: 2018-01-01

## 2018-01-01 RX ORDER — FUROSEMIDE 40 MG/1
80 TABLET ORAL EVERY 12 HOURS
Status: CANCELLED | OUTPATIENT
Start: 2018-01-01

## 2018-01-01 RX ORDER — LIDOCAINE 4 G/100G
PATCH TOPICAL
Qty: 1 PATCH | Refills: 0 | Status: SHIPPED
Start: 2018-01-01 | End: 2018-01-01

## 2018-01-01 RX ORDER — NITROGLYCERIN 0.4 MG/1
0.4 TABLET SUBLINGUAL
Status: DISCONTINUED | OUTPATIENT
Start: 2018-01-01 | End: 2018-01-01 | Stop reason: SDUPTHER

## 2018-01-01 RX ORDER — SODIUM CHLORIDE 0.9 % (FLUSH) 0.9 %
5-10 SYRINGE (ML) INJECTION EVERY 8 HOURS
Status: DISCONTINUED | OUTPATIENT
Start: 2018-01-01 | End: 2018-01-01

## 2018-01-01 RX ORDER — FUROSEMIDE 10 MG/ML
80 INJECTION INTRAMUSCULAR; INTRAVENOUS
Status: COMPLETED | OUTPATIENT
Start: 2018-01-01 | End: 2018-01-01

## 2018-01-01 RX ORDER — CLOPIDOGREL BISULFATE 75 MG/1
300 TABLET ORAL ONCE
Status: COMPLETED | OUTPATIENT
Start: 2018-01-01 | End: 2018-01-01

## 2018-01-01 RX ORDER — ACETAMINOPHEN 325 MG/1
500 TABLET ORAL EVERY 6 HOURS
Status: DISCONTINUED | OUTPATIENT
Start: 2018-01-01 | End: 2018-01-01 | Stop reason: HOSPADM

## 2018-01-01 RX ORDER — NYSTATIN 100000 [USP'U]/G
POWDER TOPICAL 2 TIMES DAILY
Status: DISCONTINUED | OUTPATIENT
Start: 2018-01-01 | End: 2018-01-01 | Stop reason: HOSPADM

## 2018-01-01 RX ORDER — SENNOSIDES 8.6 MG/1
1 TABLET ORAL 2 TIMES DAILY
Status: DISCONTINUED | OUTPATIENT
Start: 2018-01-01 | End: 2018-01-01 | Stop reason: HOSPADM

## 2018-01-01 RX ORDER — PROMETHAZINE HYDROCHLORIDE 25 MG/1
25 TABLET ORAL
Status: DISCONTINUED | OUTPATIENT
Start: 2018-01-01 | End: 2018-01-01 | Stop reason: HOSPADM

## 2018-01-01 RX ORDER — DIAZEPAM 5 MG/1
5 TABLET ORAL
Status: DISCONTINUED | OUTPATIENT
Start: 2018-01-01 | End: 2018-01-01

## 2018-01-01 RX ORDER — ACETAMINOPHEN 500 MG/1
500 CAPSULE, LIQUID FILLED ORAL EVERY 12 HOURS
Qty: 1 CAP | Refills: 0 | Status: SHIPPED
Start: 2018-01-01 | End: 2019-01-01

## 2018-01-01 RX ORDER — METOLAZONE 5 MG/1
5 TABLET ORAL DAILY
Status: DISCONTINUED | OUTPATIENT
Start: 2018-01-01 | End: 2018-01-01 | Stop reason: HOSPADM

## 2018-01-01 RX ORDER — LIDOCAINE 4 G/100G
1 PATCH TOPICAL DAILY PRN
Status: DISCONTINUED | OUTPATIENT
Start: 2018-01-01 | End: 2018-01-01 | Stop reason: HOSPADM

## 2018-01-01 RX ORDER — HYDROCORTISONE 25 MG/G
CREAM TOPICAL 4 TIMES DAILY
Status: DISCONTINUED | OUTPATIENT
Start: 2018-01-01 | End: 2018-01-01 | Stop reason: HOSPADM

## 2018-01-01 RX ORDER — POTASSIUM CHLORIDE 20 MEQ/1
40 TABLET, EXTENDED RELEASE ORAL 2 TIMES DAILY
Status: DISCONTINUED | OUTPATIENT
Start: 2018-01-01 | End: 2018-01-01

## 2018-01-01 RX ORDER — METOLAZONE 2.5 MG/1
2.5 TABLET ORAL DAILY
Status: DISCONTINUED | OUTPATIENT
Start: 2018-01-01 | End: 2018-01-01

## 2018-01-01 RX ORDER — LEVALBUTEROL INHALATION SOLUTION 1.25 MG/3ML
1.25 SOLUTION RESPIRATORY (INHALATION)
Status: DISCONTINUED | OUTPATIENT
Start: 2018-01-01 | End: 2018-01-01 | Stop reason: HOSPADM

## 2018-01-01 RX ORDER — ACETAZOLAMIDE 250 MG/1
250 TABLET ORAL DAILY
Status: DISCONTINUED | OUTPATIENT
Start: 2018-01-01 | End: 2018-01-01 | Stop reason: HOSPADM

## 2018-01-01 RX ORDER — TORSEMIDE 20 MG/1
40 TABLET ORAL 2 TIMES DAILY
Status: DISCONTINUED | OUTPATIENT
Start: 2018-01-01 | End: 2018-01-01 | Stop reason: HOSPADM

## 2018-01-01 RX ORDER — NITROGLYCERIN 0.4 MG/1
0.4 TABLET SUBLINGUAL
Status: DISCONTINUED | OUTPATIENT
Start: 2018-01-01 | End: 2018-01-01 | Stop reason: HOSPADM

## 2018-01-01 RX ORDER — MAG HYDROX/ALUMINUM HYD/SIMETH 200-200-20
30 SUSPENSION, ORAL (FINAL DOSE FORM) ORAL
Status: DISCONTINUED | OUTPATIENT
Start: 2018-01-01 | End: 2018-01-01 | Stop reason: HOSPADM

## 2018-01-01 RX ORDER — LANOLIN ALCOHOL/MO/W.PET/CERES
400 CREAM (GRAM) TOPICAL DAILY
COMMUNITY
Start: 2018-01-01 | End: 2018-01-01 | Stop reason: SDDI

## 2018-01-01 RX ORDER — HEPARIN SODIUM 200 [USP'U]/100ML
3 INJECTION, SOLUTION INTRAVENOUS CONTINUOUS
Status: DISCONTINUED | OUTPATIENT
Start: 2018-01-01 | End: 2018-01-01

## 2018-01-01 RX ORDER — WARFARIN 2.5 MG/1
2.5 TABLET ORAL EVERY EVENING
Status: DISCONTINUED | OUTPATIENT
Start: 2018-01-01 | End: 2018-01-01

## 2018-01-01 RX ORDER — METOLAZONE 5 MG/1
5 TABLET ORAL ONCE
Status: COMPLETED | OUTPATIENT
Start: 2018-01-01 | End: 2018-01-01

## 2018-01-01 RX ORDER — FUROSEMIDE 10 MG/ML
80 INJECTION INTRAMUSCULAR; INTRAVENOUS ONCE
Status: COMPLETED | OUTPATIENT
Start: 2018-01-01 | End: 2018-01-01

## 2018-01-01 RX ORDER — FUROSEMIDE 40 MG/1
40 TABLET ORAL
Status: DISCONTINUED | OUTPATIENT
Start: 2018-01-01 | End: 2018-01-01 | Stop reason: HOSPADM

## 2018-01-01 RX ORDER — POLYETHYLENE GLYCOL 3350 17 G/17G
17 POWDER, FOR SOLUTION ORAL DAILY
Status: DISCONTINUED | OUTPATIENT
Start: 2018-01-01 | End: 2018-01-01 | Stop reason: SDUPTHER

## 2018-01-01 RX ORDER — HYDROCORTISONE 25 MG/G
1 CREAM TOPICAL
Status: DISCONTINUED | OUTPATIENT
Start: 2018-01-01 | End: 2018-01-01 | Stop reason: HOSPADM

## 2018-01-01 RX ORDER — HYDROXYZINE 25 MG/1
12.5 TABLET, FILM COATED ORAL
Status: DISCONTINUED | OUTPATIENT
Start: 2018-01-01 | End: 2018-01-01 | Stop reason: HOSPADM

## 2018-01-01 RX ORDER — MORPHINE SULFATE 10 MG/ML
1 INJECTION, SOLUTION INTRAMUSCULAR; INTRAVENOUS
Status: DISCONTINUED | OUTPATIENT
Start: 2018-01-01 | End: 2018-01-01 | Stop reason: HOSPADM

## 2018-01-01 RX ORDER — SODIUM CHLORIDE 0.9 % (FLUSH) 0.9 %
5-10 SYRINGE (ML) INJECTION AS NEEDED
Status: CANCELLED | OUTPATIENT
Start: 2018-01-01

## 2018-01-01 RX ORDER — IPRATROPIUM BROMIDE AND ALBUTEROL SULFATE 2.5; .5 MG/3ML; MG/3ML
3 SOLUTION RESPIRATORY (INHALATION)
Status: DISCONTINUED | OUTPATIENT
Start: 2018-01-01 | End: 2018-01-01 | Stop reason: HOSPADM

## 2018-01-01 RX ORDER — LORATADINE 10 MG/1
10 TABLET ORAL DAILY
Status: DISCONTINUED | OUTPATIENT
Start: 2018-01-01 | End: 2018-01-01

## 2018-01-01 RX ORDER — ROPINIROLE 2 MG/1
2 TABLET, FILM COATED ORAL
Status: DISCONTINUED | OUTPATIENT
Start: 2018-01-01 | End: 2018-01-01 | Stop reason: HOSPADM

## 2018-01-01 RX ORDER — METOPROLOL TARTRATE 25 MG/1
12.5 TABLET, FILM COATED ORAL EVERY 12 HOURS
Status: DISCONTINUED | OUTPATIENT
Start: 2018-01-01 | End: 2018-01-01 | Stop reason: HOSPADM

## 2018-01-01 RX ORDER — FUROSEMIDE 80 MG/1
80 TABLET ORAL DAILY
Qty: 30 TAB | Refills: 1 | Status: SHIPPED | OUTPATIENT
Start: 2018-01-01 | End: 2018-01-01

## 2018-01-01 RX ORDER — HYDROXYZINE 25 MG/1
12.5 TABLET, FILM COATED ORAL
Qty: 1 TAB | Refills: 0 | Status: SHIPPED
Start: 2018-01-01 | End: 2018-01-01

## 2018-01-01 RX ORDER — ACETAMINOPHEN 325 MG/1
650 TABLET ORAL
Status: CANCELLED | OUTPATIENT
Start: 2018-01-01

## 2018-01-01 RX ORDER — SODIUM CHLORIDE 0.9 % (FLUSH) 0.9 %
5 SYRINGE (ML) INJECTION EVERY 8 HOURS
Status: DISCONTINUED | OUTPATIENT
Start: 2018-01-01 | End: 2018-01-01 | Stop reason: HOSPADM

## 2018-01-01 RX ORDER — LIDOCAINE HYDROCHLORIDE 20 MG/ML
1-15 INJECTION, SOLUTION INFILTRATION; PERINEURAL
Status: DISCONTINUED | OUTPATIENT
Start: 2018-01-01 | End: 2018-01-01 | Stop reason: HOSPADM

## 2018-01-01 RX ORDER — POTASSIUM CHLORIDE 1500 MG/1
20 TABLET, FILM COATED, EXTENDED RELEASE ORAL 2 TIMES DAILY
COMMUNITY
Start: 2018-01-01 | End: 2019-01-01 | Stop reason: ALTCHOICE

## 2018-01-01 RX ORDER — CIPROFLOXACIN 500 MG/1
500 TABLET ORAL EVERY 12 HOURS
Status: DISCONTINUED | OUTPATIENT
Start: 2018-01-01 | End: 2018-01-01

## 2018-01-01 RX ORDER — PROTAMINE SULFATE 10 MG/ML
INJECTION, SOLUTION INTRAVENOUS AS NEEDED
Status: DISCONTINUED | OUTPATIENT
Start: 2018-01-01 | End: 2018-01-01 | Stop reason: HOSPADM

## 2018-01-01 RX ORDER — POTASSIUM CHLORIDE 1500 MG/1
20 TABLET, FILM COATED, EXTENDED RELEASE ORAL 2 TIMES DAILY
Status: DISCONTINUED | OUTPATIENT
Start: 2018-01-01 | End: 2018-01-01 | Stop reason: HOSPADM

## 2018-01-01 RX ORDER — ACETAMINOPHEN 325 MG/1
500 TABLET ORAL EVERY 12 HOURS
Status: DISCONTINUED | OUTPATIENT
Start: 2018-01-01 | End: 2018-01-01

## 2018-01-01 RX ORDER — POLYETHYLENE GLYCOL 3350 17 G/17G
17 POWDER, FOR SOLUTION ORAL 2 TIMES DAILY
Status: DISCONTINUED | OUTPATIENT
Start: 2018-01-01 | End: 2018-01-01 | Stop reason: HOSPADM

## 2018-01-01 RX ORDER — SODIUM CHLORIDE 9 MG/ML
75 INJECTION, SOLUTION INTRAVENOUS CONTINUOUS
Status: DISCONTINUED | OUTPATIENT
Start: 2018-01-01 | End: 2018-01-01

## 2018-01-01 RX ORDER — POLYETHYLENE GLYCOL 3350 17 G/17G
17 POWDER, FOR SOLUTION ORAL DAILY
Status: CANCELLED | OUTPATIENT
Start: 2018-01-01

## 2018-01-01 RX ORDER — SODIUM CHLORIDE, SODIUM LACTATE, POTASSIUM CHLORIDE, CALCIUM CHLORIDE 600; 310; 30; 20 MG/100ML; MG/100ML; MG/100ML; MG/100ML
1000 INJECTION, SOLUTION INTRAVENOUS CONTINUOUS
Status: DISCONTINUED | OUTPATIENT
Start: 2018-01-01 | End: 2018-01-01 | Stop reason: HOSPADM

## 2018-01-01 RX ORDER — DIAZEPAM 5 MG/1
2.5 TABLET ORAL ONCE
Status: COMPLETED | OUTPATIENT
Start: 2018-01-01 | End: 2018-01-01

## 2018-01-01 RX ORDER — HYDROCODONE BITARTRATE AND ACETAMINOPHEN 5; 325 MG/1; MG/1
1 TABLET ORAL
Status: DISCONTINUED | OUTPATIENT
Start: 2018-01-01 | End: 2018-01-01 | Stop reason: HOSPADM

## 2018-01-01 RX ORDER — HYDROXYZINE PAMOATE 25 MG/1
25 CAPSULE ORAL
Status: DISCONTINUED | OUTPATIENT
Start: 2018-01-01 | End: 2018-01-01 | Stop reason: HOSPADM

## 2018-01-01 RX ORDER — SODIUM CHLORIDE 0.9 % (FLUSH) 0.9 %
10 SYRINGE (ML) INJECTION EVERY 8 HOURS
Status: DISCONTINUED | OUTPATIENT
Start: 2018-01-01 | End: 2018-01-01

## 2018-01-01 RX ORDER — ACETAMINOPHEN 500 MG
500 TABLET ORAL EVERY 6 HOURS
Status: DISCONTINUED | OUTPATIENT
Start: 2018-01-01 | End: 2018-01-01

## 2018-01-01 RX ORDER — CALCIUM CARBONATE 200(500)MG
600 TABLET,CHEWABLE ORAL EVERY EVENING
Status: DISCONTINUED | OUTPATIENT
Start: 2018-01-01 | End: 2018-01-01 | Stop reason: HOSPADM

## 2018-01-01 RX ORDER — WARFARIN 2.5 MG/1
2.5 TABLET ORAL EVERY EVENING
Status: DISCONTINUED | OUTPATIENT
Start: 2018-01-01 | End: 2018-01-01 | Stop reason: HOSPADM

## 2018-01-01 RX ORDER — CEFAZOLIN SODIUM IN 0.9 % NACL 2 G/50 ML
2 INTRAVENOUS SOLUTION, PIGGYBACK (ML) INTRAVENOUS
Status: DISCONTINUED | OUTPATIENT
Start: 2018-01-01 | End: 2018-01-01

## 2018-01-01 RX ORDER — FUROSEMIDE 10 MG/ML
40 INJECTION INTRAMUSCULAR; INTRAVENOUS 2 TIMES DAILY
Status: DISCONTINUED | OUTPATIENT
Start: 2018-01-01 | End: 2018-01-01 | Stop reason: HOSPADM

## 2018-01-01 RX ORDER — IODIXANOL 320 MG/ML
INJECTION, SOLUTION INTRAVASCULAR AS NEEDED
Status: DISCONTINUED | OUTPATIENT
Start: 2018-01-01 | End: 2018-01-01 | Stop reason: HOSPADM

## 2018-01-01 RX ORDER — TORSEMIDE 20 MG/1
40 TABLET ORAL 2 TIMES DAILY
Qty: 60 TAB | Refills: 2 | Status: SHIPPED | OUTPATIENT
Start: 2018-01-01 | End: 2018-01-01

## 2018-01-01 RX ORDER — FLUTICASONE PROPIONATE 50 MCG
1 SPRAY, SUSPENSION (ML) NASAL DAILY
Status: DISCONTINUED | OUTPATIENT
Start: 2018-01-01 | End: 2018-01-01 | Stop reason: HOSPADM

## 2018-01-01 RX ORDER — LANOLIN ALCOHOL/MO/W.PET/CERES
1000 CREAM (GRAM) TOPICAL DAILY
Status: DISCONTINUED | OUTPATIENT
Start: 2018-01-01 | End: 2018-01-01

## 2018-01-01 RX ORDER — CIPROFLOXACIN 2 MG/ML
400 INJECTION, SOLUTION INTRAVENOUS EVERY 12 HOURS
Status: DISCONTINUED | OUTPATIENT
Start: 2018-01-01 | End: 2018-01-01 | Stop reason: ALTCHOICE

## 2018-01-01 RX ORDER — ACETAMINOPHEN 500 MG
500 TABLET ORAL ONCE
Status: DISCONTINUED | OUTPATIENT
Start: 2018-01-01 | End: 2018-01-01 | Stop reason: HOSPADM

## 2018-01-01 RX ORDER — HYDROMORPHONE HYDROCHLORIDE 5 MG/5ML
0.5 SOLUTION ORAL
Status: DISCONTINUED | OUTPATIENT
Start: 2018-01-01 | End: 2018-01-01 | Stop reason: HOSPADM

## 2018-01-01 RX ORDER — LIDOCAINE 4 G/100G
1 PATCH TOPICAL EVERY 12 HOURS
Status: DISCONTINUED | OUTPATIENT
Start: 2018-01-01 | End: 2018-01-01

## 2018-01-01 RX ORDER — CLOPIDOGREL BISULFATE 75 MG/1
75 TABLET ORAL DAILY
Status: DISCONTINUED | OUTPATIENT
Start: 2018-01-01 | End: 2018-01-01

## 2018-01-01 RX ORDER — ROPINIROLE 2 MG/1
2 TABLET, FILM COATED ORAL 2 TIMES DAILY
Status: DISCONTINUED | OUTPATIENT
Start: 2018-01-01 | End: 2018-01-01

## 2018-01-01 RX ORDER — PROPOFOL 10 MG/ML
INJECTION, EMULSION INTRAVENOUS
Status: DISCONTINUED | OUTPATIENT
Start: 2018-01-01 | End: 2018-01-01 | Stop reason: HOSPADM

## 2018-01-01 RX ORDER — DIAZEPAM 5 MG/1
5 TABLET ORAL DAILY
Status: DISCONTINUED | OUTPATIENT
Start: 2018-01-01 | End: 2018-01-01 | Stop reason: HOSPADM

## 2018-01-01 RX ORDER — NALOXONE HYDROCHLORIDE 0.4 MG/ML
0.1 INJECTION, SOLUTION INTRAMUSCULAR; INTRAVENOUS; SUBCUTANEOUS AS NEEDED
Status: DISCONTINUED | OUTPATIENT
Start: 2018-01-01 | End: 2018-01-01 | Stop reason: HOSPADM

## 2018-01-01 RX ORDER — DIAZEPAM 5 MG/1
5 TABLET ORAL
Status: CANCELLED | OUTPATIENT
Start: 2018-01-01

## 2018-01-01 RX ORDER — POTASSIUM CHLORIDE 20 MEQ/1
20 TABLET, EXTENDED RELEASE ORAL 2 TIMES DAILY
Status: DISCONTINUED | OUTPATIENT
Start: 2018-01-01 | End: 2018-01-01 | Stop reason: HOSPADM

## 2018-01-01 RX ADMIN — PRAVASTATIN SODIUM 40 MG: 20 TABLET ORAL at 09:18

## 2018-01-01 RX ADMIN — Medication 10 ML: at 16:48

## 2018-01-01 RX ADMIN — ROPINIROLE HYDROCHLORIDE 2 MG: 2 TABLET, FILM COATED ORAL at 08:06

## 2018-01-01 RX ADMIN — ROPINIROLE HYDROCHLORIDE 2 MG: 1 TABLET, FILM COATED ORAL at 16:44

## 2018-01-01 RX ADMIN — FUROSEMIDE 80 MG: 40 TABLET ORAL at 09:08

## 2018-01-01 RX ADMIN — PRAVASTATIN SODIUM 40 MG: 20 TABLET ORAL at 08:07

## 2018-01-01 RX ADMIN — Medication 500 MG: at 00:50

## 2018-01-01 RX ADMIN — POTASSIUM CHLORIDE 20 MEQ: 20 TABLET, EXTENDED RELEASE ORAL at 17:13

## 2018-01-01 RX ADMIN — PRAVASTATIN SODIUM 40 MG: 20 TABLET ORAL at 08:11

## 2018-01-01 RX ADMIN — PRAVASTATIN SODIUM 40 MG: 20 TABLET ORAL at 09:16

## 2018-01-01 RX ADMIN — SERTRALINE HYDROCHLORIDE 100 MG: 100 TABLET ORAL at 05:53

## 2018-01-01 RX ADMIN — FUROSEMIDE 80 MG: 40 TABLET ORAL at 08:46

## 2018-01-01 RX ADMIN — SERTRALINE HYDROCHLORIDE 100 MG: 50 TABLET ORAL at 09:04

## 2018-01-01 RX ADMIN — ROPINIROLE 2 MG: 2 TABLET, FILM COATED ORAL at 21:30

## 2018-01-01 RX ADMIN — CIPROFLOXACIN HYDROCHLORIDE 500 MG: 500 TABLET, FILM COATED ORAL at 21:33

## 2018-01-01 RX ADMIN — ALLOPURINOL 100 MG: 100 TABLET ORAL at 09:15

## 2018-01-01 RX ADMIN — DIAZEPAM 5 MG: 5 TABLET ORAL at 20:42

## 2018-01-01 RX ADMIN — Medication 10 ML: at 21:34

## 2018-01-01 RX ADMIN — FUROSEMIDE 80 MG: 40 TABLET ORAL at 08:08

## 2018-01-01 RX ADMIN — ALLOPURINOL 100 MG: 100 TABLET ORAL at 18:15

## 2018-01-01 RX ADMIN — ALLOPURINOL 100 MG: 100 TABLET ORAL at 17:14

## 2018-01-01 RX ADMIN — HEPARIN SODIUM 5000 UNITS: 5000 INJECTION, SOLUTION INTRAVENOUS; SUBCUTANEOUS at 15:44

## 2018-01-01 RX ADMIN — ACETAMINOPHEN 487.5 MG: 325 TABLET, FILM COATED ORAL at 02:00

## 2018-01-01 RX ADMIN — PRAVASTATIN SODIUM 40 MG: 20 TABLET ORAL at 09:46

## 2018-01-01 RX ADMIN — SENNOSIDES 8.6 MG: 8.6 TABLET, FILM COATED ORAL at 08:24

## 2018-01-01 RX ADMIN — PRAVASTATIN SODIUM 40 MG: 20 TABLET ORAL at 08:15

## 2018-01-01 RX ADMIN — Medication 1 AMPULE: at 09:29

## 2018-01-01 RX ADMIN — SPIRONOLACTONE 25 MG: 25 TABLET, FILM COATED ORAL at 08:41

## 2018-01-01 RX ADMIN — POTASSIUM CHLORIDE 20 MEQ: 20 TABLET, EXTENDED RELEASE ORAL at 08:56

## 2018-01-01 RX ADMIN — Medication 500 MG: at 17:45

## 2018-01-01 RX ADMIN — POLYETHYLENE GLYCOL 3350 17 G: 17 POWDER, FOR SOLUTION ORAL at 09:32

## 2018-01-01 RX ADMIN — Medication 1 AMPULE: at 09:02

## 2018-01-01 RX ADMIN — ALLOPURINOL 100 MG: 100 TABLET ORAL at 08:10

## 2018-01-01 RX ADMIN — PANTOPRAZOLE SODIUM 40 MG: 40 TABLET, DELAYED RELEASE ORAL at 05:55

## 2018-01-01 RX ADMIN — SERTRALINE HYDROCHLORIDE 100 MG: 100 TABLET ORAL at 09:25

## 2018-01-01 RX ADMIN — OMEPRAZOLE 20 MG: 20 TABLET, DELAYED RELEASE ORAL at 08:45

## 2018-01-01 RX ADMIN — METOPROLOL SUCCINATE 25 MG: 25 TABLET, EXTENDED RELEASE ORAL at 08:47

## 2018-01-01 RX ADMIN — ACETAMINOPHEN 487.5 MG: 325 TABLET, FILM COATED ORAL at 20:00

## 2018-01-01 RX ADMIN — Medication 500 MG: at 11:37

## 2018-01-01 RX ADMIN — HEPARIN SODIUM 5000 UNITS: 5000 INJECTION, SOLUTION INTRAVENOUS; SUBCUTANEOUS at 05:26

## 2018-01-01 RX ADMIN — METOPROLOL TARTRATE 12.5 MG: 25 TABLET ORAL at 08:41

## 2018-01-01 RX ADMIN — POTASSIUM CHLORIDE 40 MEQ: 20 TABLET, EXTENDED RELEASE ORAL at 17:01

## 2018-01-01 RX ADMIN — ALLOPURINOL 100 MG: 100 TABLET ORAL at 08:03

## 2018-01-01 RX ADMIN — PANTOPRAZOLE SODIUM 40 MG: 40 TABLET, DELAYED RELEASE ORAL at 05:43

## 2018-01-01 RX ADMIN — Medication 5 ML: at 05:07

## 2018-01-01 RX ADMIN — WARFARIN SODIUM 5 MG: 5 TABLET ORAL at 17:44

## 2018-01-01 RX ADMIN — Medication 1 AMPULE: at 08:51

## 2018-01-01 RX ADMIN — ROPINIROLE HYDROCHLORIDE 2 MG: 2 TABLET, FILM COATED ORAL at 20:44

## 2018-01-01 RX ADMIN — METOPROLOL SUCCINATE 25 MG: 25 TABLET, EXTENDED RELEASE ORAL at 08:18

## 2018-01-01 RX ADMIN — VANCOMYCIN HYDROCHLORIDE 1250 MG: 10 INJECTION, POWDER, LYOPHILIZED, FOR SOLUTION INTRAVENOUS at 05:12

## 2018-01-01 RX ADMIN — ALLOPURINOL 100 MG: 100 TABLET ORAL at 10:09

## 2018-01-01 RX ADMIN — SENNOSIDES 8.6 MG: 8.6 TABLET, FILM COATED ORAL at 08:54

## 2018-01-01 RX ADMIN — POTASSIUM CHLORIDE 40 MEQ: 20 TABLET, EXTENDED RELEASE ORAL at 08:08

## 2018-01-01 RX ADMIN — SENNOSIDES 8.6 MG: 8.6 TABLET, FILM COATED ORAL at 18:13

## 2018-01-01 RX ADMIN — Medication 400 MG: at 08:32

## 2018-01-01 RX ADMIN — SPIRONOLACTONE 25 MG: 25 TABLET, FILM COATED ORAL at 10:07

## 2018-01-01 RX ADMIN — FUROSEMIDE 80 MG: 10 INJECTION, SOLUTION INTRAMUSCULAR; INTRAVENOUS at 17:00

## 2018-01-01 RX ADMIN — ROPINIROLE HYDROCHLORIDE 2 MG: 2 TABLET, FILM COATED ORAL at 17:54

## 2018-01-01 RX ADMIN — PANTOPRAZOLE SODIUM 40 MG: 40 TABLET, DELAYED RELEASE ORAL at 05:44

## 2018-01-01 RX ADMIN — Medication 10 ML: at 20:37

## 2018-01-01 RX ADMIN — LEVOTHYROXINE SODIUM 88 MCG: 88 TABLET ORAL at 06:07

## 2018-01-01 RX ADMIN — CIPROFLOXACIN HYDROCHLORIDE 500 MG: 500 TABLET, FILM COATED ORAL at 09:06

## 2018-01-01 RX ADMIN — METOLAZONE 2.5 MG: 2.5 TABLET ORAL at 10:52

## 2018-01-01 RX ADMIN — ALLOPURINOL 100 MG: 100 TABLET ORAL at 18:00

## 2018-01-01 RX ADMIN — ROPINIROLE HYDROCHLORIDE 2 MG: 2 TABLET, FILM COATED ORAL at 08:50

## 2018-01-01 RX ADMIN — POTASSIUM CHLORIDE 40 MEQ: 20 TABLET, EXTENDED RELEASE ORAL at 08:36

## 2018-01-01 RX ADMIN — FUROSEMIDE 80 MG: 40 TABLET ORAL at 11:12

## 2018-01-01 RX ADMIN — Medication 10 ML: at 06:00

## 2018-01-01 RX ADMIN — SENNOSIDES 8.6 MG: 8.6 TABLET, FILM COATED ORAL at 17:20

## 2018-01-01 RX ADMIN — ROPINIROLE HYDROCHLORIDE 2 MG: 2 TABLET, FILM COATED ORAL at 08:11

## 2018-01-01 RX ADMIN — POLYETHYLENE GLYCOL 3350 17 G: 17 POWDER, FOR SOLUTION ORAL at 08:28

## 2018-01-01 RX ADMIN — PRAVASTATIN SODIUM 40 MG: 20 TABLET ORAL at 11:11

## 2018-01-01 RX ADMIN — FUROSEMIDE 80 MG: 40 TABLET ORAL at 09:32

## 2018-01-01 RX ADMIN — FUROSEMIDE 40 MG: 40 TABLET ORAL at 08:49

## 2018-01-01 RX ADMIN — Medication 1 AMPULE: at 21:30

## 2018-01-01 RX ADMIN — LEVOTHYROXINE SODIUM 88 MCG: 88 TABLET ORAL at 05:43

## 2018-01-01 RX ADMIN — FLUTICASONE PROPIONATE 2 SPRAY: 50 SPRAY, METERED NASAL at 09:34

## 2018-01-01 RX ADMIN — LEVOTHYROXINE SODIUM 88 MCG: 88 TABLET ORAL at 05:47

## 2018-01-01 RX ADMIN — MUPIROCIN: 20 OINTMENT TOPICAL at 17:44

## 2018-01-01 RX ADMIN — CIPROFLOXACIN HYDROCHLORIDE 500 MG: 500 TABLET, FILM COATED ORAL at 10:28

## 2018-01-01 RX ADMIN — PRAVASTATIN SODIUM 40 MG: 20 TABLET ORAL at 10:09

## 2018-01-01 RX ADMIN — Medication 10 ML: at 05:25

## 2018-01-01 RX ADMIN — LEVOTHYROXINE SODIUM 88 MCG: 88 TABLET ORAL at 05:03

## 2018-01-01 RX ADMIN — CEFTRIAXONE SODIUM 1 G: 1 INJECTION, POWDER, FOR SOLUTION INTRAMUSCULAR; INTRAVENOUS at 21:39

## 2018-01-01 RX ADMIN — PANTOPRAZOLE SODIUM 40 MG: 40 TABLET, DELAYED RELEASE ORAL at 06:14

## 2018-01-01 RX ADMIN — ROPINIROLE HYDROCHLORIDE 2 MG: 2 TABLET, FILM COATED ORAL at 06:00

## 2018-01-01 RX ADMIN — NYSTATIN: 100000 POWDER TOPICAL at 21:00

## 2018-01-01 RX ADMIN — FUROSEMIDE 40 MG: 40 TABLET ORAL at 22:52

## 2018-01-01 RX ADMIN — METOPROLOL SUCCINATE 25 MG: 25 TABLET, EXTENDED RELEASE ORAL at 08:21

## 2018-01-01 RX ADMIN — Medication 400 MG: at 09:14

## 2018-01-01 RX ADMIN — Medication 10 ML: at 14:00

## 2018-01-01 RX ADMIN — Medication 1 AMPULE: at 08:30

## 2018-01-01 RX ADMIN — POLYETHYLENE GLYCOL 3350 17 G: 17 POWDER, FOR SOLUTION ORAL at 08:09

## 2018-01-01 RX ADMIN — ALLOPURINOL 100 MG: 100 TABLET ORAL at 16:46

## 2018-01-01 RX ADMIN — DIAZEPAM 2.5 MG: 5 TABLET ORAL at 08:22

## 2018-01-01 RX ADMIN — ACETAMINOPHEN 500 MG: 500 CAPSULE, LIQUID FILLED ORAL at 08:42

## 2018-01-01 RX ADMIN — PREDNISONE 10 MG: 10 TABLET ORAL at 08:41

## 2018-01-01 RX ADMIN — APIXABAN 5 MG: 5 TABLET, FILM COATED ORAL at 08:20

## 2018-01-01 RX ADMIN — SERTRALINE HYDROCHLORIDE 100 MG: 100 TABLET ORAL at 08:30

## 2018-01-01 RX ADMIN — Medication 1 AMPULE: at 21:33

## 2018-01-01 RX ADMIN — METOPROLOL SUCCINATE 25 MG: 25 TABLET, EXTENDED RELEASE ORAL at 09:06

## 2018-01-01 RX ADMIN — PRAVASTATIN SODIUM 40 MG: 20 TABLET ORAL at 08:14

## 2018-01-01 RX ADMIN — PANTOPRAZOLE SODIUM 40 MG: 40 TABLET, DELAYED RELEASE ORAL at 05:13

## 2018-01-01 RX ADMIN — Medication 400 MG: at 10:07

## 2018-01-01 RX ADMIN — ALLOPURINOL 100 MG: 100 TABLET ORAL at 05:52

## 2018-01-01 RX ADMIN — PANTOPRAZOLE SODIUM 40 MG: 40 TABLET, DELAYED RELEASE ORAL at 05:46

## 2018-01-01 RX ADMIN — ROPINIROLE HYDROCHLORIDE 2 MG: 2 TABLET, FILM COATED ORAL at 10:10

## 2018-01-01 RX ADMIN — ROPINIROLE 2 MG: 2 TABLET, FILM COATED ORAL at 09:20

## 2018-01-01 RX ADMIN — ACETAMINOPHEN 487.5 MG: 325 TABLET, FILM COATED ORAL at 08:00

## 2018-01-01 RX ADMIN — NYSTATIN: 100000 POWDER TOPICAL at 09:29

## 2018-01-01 RX ADMIN — ROPINIROLE 2 MG: 2 TABLET, FILM COATED ORAL at 08:45

## 2018-01-01 RX ADMIN — FUROSEMIDE 40 MG: 10 INJECTION, SOLUTION INTRAMUSCULAR; INTRAVENOUS at 17:08

## 2018-01-01 RX ADMIN — METOPROLOL SUCCINATE 25 MG: 25 TABLET, EXTENDED RELEASE ORAL at 08:19

## 2018-01-01 RX ADMIN — METOPROLOL TARTRATE 12.5 MG: 25 TABLET ORAL at 23:02

## 2018-01-01 RX ADMIN — ROPINIROLE HYDROCHLORIDE 2 MG: 2 TABLET, FILM COATED ORAL at 17:48

## 2018-01-01 RX ADMIN — LEVOTHYROXINE SODIUM 88 MCG: 88 TABLET ORAL at 08:56

## 2018-01-01 RX ADMIN — Medication 10 ML: at 05:47

## 2018-01-01 RX ADMIN — Medication 10 ML: at 05:37

## 2018-01-01 RX ADMIN — ROPINIROLE 2 MG: 2 TABLET, FILM COATED ORAL at 17:33

## 2018-01-01 RX ADMIN — POTASSIUM CHLORIDE 20 MEQ: 1500 TABLET, FILM COATED, EXTENDED RELEASE ORAL at 09:30

## 2018-01-01 RX ADMIN — ROPINIROLE HYDROCHLORIDE 2 MG: 2 TABLET, FILM COATED ORAL at 08:34

## 2018-01-01 RX ADMIN — PANTOPRAZOLE SODIUM 40 MG: 40 TABLET, DELAYED RELEASE ORAL at 05:42

## 2018-01-01 RX ADMIN — LEVOTHYROXINE SODIUM 88 MCG: 88 TABLET ORAL at 05:13

## 2018-01-01 RX ADMIN — Medication 5 ML: at 21:36

## 2018-01-01 RX ADMIN — PRAVASTATIN SODIUM 40 MG: 20 TABLET ORAL at 08:22

## 2018-01-01 RX ADMIN — FUROSEMIDE 40 MG: 40 TABLET ORAL at 20:47

## 2018-01-01 RX ADMIN — FUROSEMIDE 40 MG: 40 TABLET ORAL at 17:13

## 2018-01-01 RX ADMIN — NYSTATIN: 100000 POWDER TOPICAL at 08:43

## 2018-01-01 RX ADMIN — FUROSEMIDE 80 MG: 10 INJECTION, SOLUTION INTRAMUSCULAR; INTRAVENOUS at 17:33

## 2018-01-01 RX ADMIN — SPIRONOLACTONE 25 MG: 25 TABLET, FILM COATED ORAL at 09:48

## 2018-01-01 RX ADMIN — Medication 1 AMPULE: at 21:31

## 2018-01-01 RX ADMIN — SPIRONOLACTONE 25 MG: 25 TABLET, FILM COATED ORAL at 11:12

## 2018-01-01 RX ADMIN — METOPROLOL SUCCINATE 25 MG: 25 TABLET, EXTENDED RELEASE ORAL at 09:18

## 2018-01-01 RX ADMIN — SERTRALINE HYDROCHLORIDE 100 MG: 100 TABLET ORAL at 10:00

## 2018-01-01 RX ADMIN — ROPINIROLE 2 MG: 2 TABLET, FILM COATED ORAL at 09:27

## 2018-01-01 RX ADMIN — Medication 1 AMPULE: at 10:33

## 2018-01-01 RX ADMIN — Medication 1 AMPULE: at 09:18

## 2018-01-01 RX ADMIN — ALLOPURINOL 100 MG: 100 TABLET ORAL at 08:47

## 2018-01-01 RX ADMIN — POTASSIUM CHLORIDE 20 MEQ: 1500 TABLET, FILM COATED, EXTENDED RELEASE ORAL at 21:30

## 2018-01-01 RX ADMIN — POTASSIUM CHLORIDE 20 MEQ: 1500 TABLET, FILM COATED, EXTENDED RELEASE ORAL at 08:45

## 2018-01-01 RX ADMIN — SPIRONOLACTONE 25 MG: 25 TABLET, FILM COATED ORAL at 10:29

## 2018-01-01 RX ADMIN — FUROSEMIDE 80 MG: 40 TABLET ORAL at 09:48

## 2018-01-01 RX ADMIN — METOPROLOL SUCCINATE 25 MG: 25 TABLET, EXTENDED RELEASE ORAL at 09:17

## 2018-01-01 RX ADMIN — LEVOTHYROXINE SODIUM 88 MCG: 88 TABLET ORAL at 09:18

## 2018-01-01 RX ADMIN — HEPARIN SODIUM 5000 UNITS: 5000 INJECTION, SOLUTION INTRAVENOUS; SUBCUTANEOUS at 22:19

## 2018-01-01 RX ADMIN — POTASSIUM CHLORIDE 20 MEQ: 1500 TABLET, FILM COATED, EXTENDED RELEASE ORAL at 08:02

## 2018-01-01 RX ADMIN — ROPINIROLE HYDROCHLORIDE 2 MG: 1 TABLET, FILM COATED ORAL at 18:25

## 2018-01-01 RX ADMIN — PREDNISONE 10 MG: 10 TABLET ORAL at 09:25

## 2018-01-01 RX ADMIN — Medication 1 AMPULE: at 21:10

## 2018-01-01 RX ADMIN — Medication 5 ML: at 21:13

## 2018-01-01 RX ADMIN — ALLOPURINOL 100 MG: 100 TABLET ORAL at 08:15

## 2018-01-01 RX ADMIN — POTASSIUM CHLORIDE 20 MEQ: 20 TABLET, EXTENDED RELEASE ORAL at 17:43

## 2018-01-01 RX ADMIN — ALLOPURINOL 100 MG: 100 TABLET ORAL at 10:02

## 2018-01-01 RX ADMIN — DIAZEPAM 5 MG: 5 TABLET ORAL at 09:14

## 2018-01-01 RX ADMIN — LEVOTHYROXINE SODIUM 88 MCG: 88 TABLET ORAL at 04:54

## 2018-01-01 RX ADMIN — PRAVASTATIN SODIUM 40 MG: 20 TABLET ORAL at 08:56

## 2018-01-01 RX ADMIN — CHOLECALCIFEROL TAB 25 MCG (1000 UNIT) 1000 UNITS: 25 TAB at 08:49

## 2018-01-01 RX ADMIN — Medication 10 ML: at 05:18

## 2018-01-01 RX ADMIN — Medication 1 AMPULE: at 22:28

## 2018-01-01 RX ADMIN — METOLAZONE 5 MG: 5 TABLET ORAL at 08:18

## 2018-01-01 RX ADMIN — ACETAMINOPHEN 650 MG: 325 TABLET ORAL at 21:03

## 2018-01-01 RX ADMIN — Medication 1 AMPULE: at 08:28

## 2018-01-01 RX ADMIN — SPIRONOLACTONE 25 MG: 25 TABLET, FILM COATED ORAL at 08:49

## 2018-01-01 RX ADMIN — DIAZEPAM 5 MG: 5 TABLET ORAL at 08:56

## 2018-01-01 RX ADMIN — ACETAMINOPHEN 325 MG: 325 TABLET ORAL at 09:31

## 2018-01-01 RX ADMIN — LEVALBUTEROL HYDROCHLORIDE 1.25 MG: 1.25 SOLUTION RESPIRATORY (INHALATION) at 17:46

## 2018-01-01 RX ADMIN — SENNOSIDES 8.6 MG: 8.6 TABLET, FILM COATED ORAL at 09:12

## 2018-01-01 RX ADMIN — PRAVASTATIN SODIUM 40 MG: 20 TABLET ORAL at 08:21

## 2018-01-01 RX ADMIN — PANTOPRAZOLE SODIUM 40 MG: 40 TABLET, DELAYED RELEASE ORAL at 05:11

## 2018-01-01 RX ADMIN — POTASSIUM CHLORIDE 40 MEQ: 20 TABLET, EXTENDED RELEASE ORAL at 09:59

## 2018-01-01 RX ADMIN — APIXABAN 2.5 MG: 2.5 TABLET, FILM COATED ORAL at 08:25

## 2018-01-01 RX ADMIN — POLYETHYLENE GLYCOL 3350 17 G: 17 POWDER, FOR SOLUTION ORAL at 08:42

## 2018-01-01 RX ADMIN — DIAZEPAM 2.5 MG: 5 TABLET ORAL at 21:40

## 2018-01-01 RX ADMIN — Medication 20 MG: at 08:19

## 2018-01-01 RX ADMIN — POLYETHYLENE GLYCOL 3350 17 G: 17 POWDER, FOR SOLUTION ORAL at 08:10

## 2018-01-01 RX ADMIN — ACETAMINOPHEN 487.5 MG: 325 TABLET, FILM COATED ORAL at 14:06

## 2018-01-01 RX ADMIN — METOPROLOL SUCCINATE 25 MG: 25 TABLET, EXTENDED RELEASE ORAL at 08:08

## 2018-01-01 RX ADMIN — PRAVASTATIN SODIUM 40 MG: 20 TABLET ORAL at 08:26

## 2018-01-01 RX ADMIN — CEFPODOXIME PROXETIL 200 MG: 200 TABLET, FILM COATED ORAL at 08:14

## 2018-01-01 RX ADMIN — SERTRALINE HYDROCHLORIDE 100 MG: 100 TABLET ORAL at 10:29

## 2018-01-01 RX ADMIN — SPIRONOLACTONE 25 MG: 25 TABLET, FILM COATED ORAL at 09:25

## 2018-01-01 RX ADMIN — Medication 400 MG: at 08:39

## 2018-01-01 RX ADMIN — DIAZEPAM 5 MG: 5 TABLET ORAL at 21:15

## 2018-01-01 RX ADMIN — POLYETHYLENE GLYCOL 3350 17 G: 17 POWDER, FOR SOLUTION ORAL at 08:15

## 2018-01-01 RX ADMIN — SENNOSIDES 8.6 MG: 8.6 TABLET, FILM COATED ORAL at 09:25

## 2018-01-01 RX ADMIN — POTASSIUM CHLORIDE 40 MEQ: 20 TABLET, EXTENDED RELEASE ORAL at 17:52

## 2018-01-01 RX ADMIN — SERTRALINE HYDROCHLORIDE 100 MG: 25 TABLET ORAL at 08:20

## 2018-01-01 RX ADMIN — ALLOPURINOL 100 MG: 100 TABLET ORAL at 08:21

## 2018-01-01 RX ADMIN — FLUCONAZOLE 200 MG: 100 TABLET ORAL at 08:12

## 2018-01-01 RX ADMIN — FUROSEMIDE 40 MG: 40 TABLET ORAL at 21:54

## 2018-01-01 RX ADMIN — PANTOPRAZOLE SODIUM 40 MG: 40 TABLET, DELAYED RELEASE ORAL at 06:06

## 2018-01-01 RX ADMIN — DIAZEPAM 5 MG: 5 TABLET ORAL at 21:34

## 2018-01-01 RX ADMIN — Medication 10 ML: at 06:01

## 2018-01-01 RX ADMIN — FUROSEMIDE 40 MG: 40 TABLET ORAL at 08:16

## 2018-01-01 RX ADMIN — OMEPRAZOLE 20 MG: 20 TABLET, DELAYED RELEASE ORAL at 08:30

## 2018-01-01 RX ADMIN — ROPINIROLE HYDROCHLORIDE 2 MG: 1 TABLET, FILM COATED ORAL at 10:01

## 2018-01-01 RX ADMIN — LEVALBUTEROL HYDROCHLORIDE 1.25 MG: 1.25 SOLUTION RESPIRATORY (INHALATION) at 09:18

## 2018-01-01 RX ADMIN — FUROSEMIDE 40 MG: 40 TABLET ORAL at 23:02

## 2018-01-01 RX ADMIN — Medication 10 ML: at 04:57

## 2018-01-01 RX ADMIN — FLUTICASONE PROPIONATE 1 SPRAY: 50 SPRAY, METERED NASAL at 08:50

## 2018-01-01 RX ADMIN — Medication 1 AMPULE: at 11:12

## 2018-01-01 RX ADMIN — PANTOPRAZOLE SODIUM 40 MG: 40 TABLET, DELAYED RELEASE ORAL at 05:56

## 2018-01-01 RX ADMIN — ROPINIROLE HYDROCHLORIDE 2 MG: 2 TABLET, FILM COATED ORAL at 17:24

## 2018-01-01 RX ADMIN — SERTRALINE HYDROCHLORIDE 100 MG: 50 TABLET ORAL at 08:33

## 2018-01-01 RX ADMIN — Medication 1 AMPULE: at 22:00

## 2018-01-01 RX ADMIN — CIPROFLOXACIN HYDROCHLORIDE 500 MG: 500 TABLET, FILM COATED ORAL at 08:07

## 2018-01-01 RX ADMIN — Medication 500 MG: at 17:27

## 2018-01-01 RX ADMIN — Medication 10 ML: at 13:34

## 2018-01-01 RX ADMIN — LEVALBUTEROL HYDROCHLORIDE 0.63 MG: 0.63 SOLUTION RESPIRATORY (INHALATION) at 15:33

## 2018-01-01 RX ADMIN — ACETAMINOPHEN 500 MG: 500 CAPSULE, LIQUID FILLED ORAL at 21:28

## 2018-01-01 RX ADMIN — APIXABAN 2.5 MG: 2.5 TABLET, FILM COATED ORAL at 21:30

## 2018-01-01 RX ADMIN — VERAPAMIL HYDROCHLORIDE 2 ML: 2.5 INJECTION INTRAVENOUS at 12:59

## 2018-01-01 RX ADMIN — FUROSEMIDE 40 MG: 10 INJECTION, SOLUTION INTRAMUSCULAR; INTRAVENOUS at 16:49

## 2018-01-01 RX ADMIN — ASPIRIN 81 MG 81 MG: 81 TABLET ORAL at 05:53

## 2018-01-01 RX ADMIN — POTASSIUM CHLORIDE 40 MEQ: 20 TABLET, EXTENDED RELEASE ORAL at 10:07

## 2018-01-01 RX ADMIN — METOPROLOL SUCCINATE 25 MG: 25 TABLET, EXTENDED RELEASE ORAL at 08:34

## 2018-01-01 RX ADMIN — ROPINIROLE HYDROCHLORIDE 2 MG: 2 TABLET, FILM COATED ORAL at 08:23

## 2018-01-01 RX ADMIN — Medication 5 ML: at 06:42

## 2018-01-01 RX ADMIN — PANTOPRAZOLE SODIUM 40 MG: 40 TABLET, DELAYED RELEASE ORAL at 05:27

## 2018-01-01 RX ADMIN — Medication 10 ML: at 21:01

## 2018-01-01 RX ADMIN — Medication 1 AMPULE: at 22:03

## 2018-01-01 RX ADMIN — PANTOPRAZOLE SODIUM 40 MG: 40 TABLET, DELAYED RELEASE ORAL at 05:52

## 2018-01-01 RX ADMIN — ALLOPURINOL 100 MG: 100 TABLET ORAL at 08:23

## 2018-01-01 RX ADMIN — Medication 5 ML: at 05:32

## 2018-01-01 RX ADMIN — SPIRONOLACTONE 25 MG: 25 TABLET, FILM COATED ORAL at 10:09

## 2018-01-01 RX ADMIN — CEFTRIAXONE SODIUM 1 G: 1 INJECTION, POWDER, FOR SOLUTION INTRAMUSCULAR; INTRAVENOUS at 21:45

## 2018-01-01 RX ADMIN — ROPINIROLE HYDROCHLORIDE 2 MG: 2 TABLET, FILM COATED ORAL at 17:11

## 2018-01-01 RX ADMIN — PRAVASTATIN SODIUM 40 MG: 20 TABLET ORAL at 08:53

## 2018-01-01 RX ADMIN — FUROSEMIDE 80 MG: 40 TABLET ORAL at 21:09

## 2018-01-01 RX ADMIN — LEVOTHYROXINE SODIUM 88 MCG: 88 TABLET ORAL at 05:56

## 2018-01-01 RX ADMIN — PRAVASTATIN SODIUM 40 MG: 20 TABLET ORAL at 05:52

## 2018-01-01 RX ADMIN — ROPINIROLE HYDROCHLORIDE 2 MG: 2 TABLET, FILM COATED ORAL at 18:53

## 2018-01-01 RX ADMIN — ROPINIROLE HYDROCHLORIDE 2 MG: 2 TABLET, FILM COATED ORAL at 18:23

## 2018-01-01 RX ADMIN — ACETAMINOPHEN 487.5 MG: 325 TABLET, FILM COATED ORAL at 01:31

## 2018-01-01 RX ADMIN — APIXABAN 5 MG: 5 TABLET, FILM COATED ORAL at 08:11

## 2018-01-01 RX ADMIN — SPIRONOLACTONE 25 MG: 25 TABLET, FILM COATED ORAL at 08:16

## 2018-01-01 RX ADMIN — ALLOPURINOL 100 MG: 100 TABLET ORAL at 08:40

## 2018-01-01 RX ADMIN — CIPROFLOXACIN HYDROCHLORIDE 500 MG: 500 TABLET, FILM COATED ORAL at 21:01

## 2018-01-01 RX ADMIN — CEFPODOXIME PROXETIL 200 MG: 200 TABLET, FILM COATED ORAL at 08:56

## 2018-01-01 RX ADMIN — ACETAMINOPHEN 487.5 MG: 325 TABLET, FILM COATED ORAL at 08:47

## 2018-01-01 RX ADMIN — POLYETHYLENE GLYCOL 3350 17 G: 17 POWDER, FOR SOLUTION ORAL at 11:12

## 2018-01-01 RX ADMIN — Medication 10 ML: at 21:35

## 2018-01-01 RX ADMIN — Medication 10 ML: at 06:09

## 2018-01-01 RX ADMIN — BISACODYL 5 MG: 5 TABLET, COATED ORAL at 15:42

## 2018-01-01 RX ADMIN — LEVALBUTEROL HYDROCHLORIDE 0.63 MG: 0.63 SOLUTION RESPIRATORY (INHALATION) at 12:00

## 2018-01-01 RX ADMIN — SPIRONOLACTONE 25 MG: 25 TABLET, FILM COATED ORAL at 08:42

## 2018-01-01 RX ADMIN — ASPIRIN 81 MG 81 MG: 81 TABLET ORAL at 09:32

## 2018-01-01 RX ADMIN — Medication 1000 MCG: at 08:17

## 2018-01-01 RX ADMIN — FUROSEMIDE 80 MG: 10 INJECTION, SOLUTION INTRAMUSCULAR; INTRAVENOUS at 08:29

## 2018-01-01 RX ADMIN — ALLOPURINOL 100 MG: 100 TABLET ORAL at 08:48

## 2018-01-01 RX ADMIN — ALLOPURINOL 100 MG: 100 TABLET ORAL at 17:13

## 2018-01-01 RX ADMIN — ALLOPURINOL 100 MG: 100 TABLET ORAL at 09:17

## 2018-01-01 RX ADMIN — ACETAMINOPHEN 487.5 MG: 325 TABLET, FILM COATED ORAL at 21:25

## 2018-01-01 RX ADMIN — ALLOPURINOL 100 MG: 100 TABLET ORAL at 08:25

## 2018-01-01 RX ADMIN — FUROSEMIDE 40 MG: 40 TABLET ORAL at 14:46

## 2018-01-01 RX ADMIN — Medication 5 ML: at 17:31

## 2018-01-01 RX ADMIN — SERTRALINE HYDROCHLORIDE 100 MG: 100 TABLET ORAL at 09:32

## 2018-01-01 RX ADMIN — SERTRALINE HYDROCHLORIDE 100 MG: 50 TABLET ORAL at 09:25

## 2018-01-01 RX ADMIN — ACETAMINOPHEN 500 MG: 500 CAPSULE, LIQUID FILLED ORAL at 23:01

## 2018-01-01 RX ADMIN — SENNOSIDES 8.6 MG: 8.6 TABLET, FILM COATED ORAL at 08:41

## 2018-01-01 RX ADMIN — Medication 1 AMPULE: at 08:43

## 2018-01-01 RX ADMIN — Medication 10 ML: at 21:42

## 2018-01-01 RX ADMIN — FUROSEMIDE 80 MG: 40 TABLET ORAL at 17:43

## 2018-01-01 RX ADMIN — DIAZEPAM 5 MG: 5 TABLET ORAL at 21:31

## 2018-01-01 RX ADMIN — Medication 1 AMPULE: at 09:54

## 2018-01-01 RX ADMIN — FUROSEMIDE 80 MG: 40 TABLET ORAL at 14:12

## 2018-01-01 RX ADMIN — FUROSEMIDE 40 MG: 40 TABLET ORAL at 09:53

## 2018-01-01 RX ADMIN — ALLOPURINOL 100 MG: 100 TABLET ORAL at 09:53

## 2018-01-01 RX ADMIN — PRAVASTATIN SODIUM 40 MG: 20 TABLET ORAL at 10:29

## 2018-01-01 RX ADMIN — PANTOPRAZOLE SODIUM 40 MG: 40 TABLET, DELAYED RELEASE ORAL at 05:00

## 2018-01-01 RX ADMIN — SPIRONOLACTONE 25 MG: 25 TABLET, FILM COATED ORAL at 08:48

## 2018-01-01 RX ADMIN — METOLAZONE 5 MG: 5 TABLET ORAL at 10:41

## 2018-01-01 RX ADMIN — VANCOMYCIN HYDROCHLORIDE 1250 MG: 10 INJECTION, POWDER, LYOPHILIZED, FOR SOLUTION INTRAVENOUS at 05:03

## 2018-01-01 RX ADMIN — FUROSEMIDE 40 MG: 40 TABLET ORAL at 09:25

## 2018-01-01 RX ADMIN — Medication 10 ML: at 14:39

## 2018-01-01 RX ADMIN — SPIRONOLACTONE 25 MG: 25 TABLET, FILM COATED ORAL at 10:00

## 2018-01-01 RX ADMIN — ALLOPURINOL 100 MG: 100 TABLET ORAL at 08:42

## 2018-01-01 RX ADMIN — APIXABAN 2.5 MG: 2.5 TABLET, FILM COATED ORAL at 21:57

## 2018-01-01 RX ADMIN — SPIRONOLACTONE 25 MG: 25 TABLET, FILM COATED ORAL at 09:04

## 2018-01-01 RX ADMIN — DIAZEPAM 2.5 MG: 5 TABLET ORAL at 21:28

## 2018-01-01 RX ADMIN — OMEPRAZOLE 20 MG: 20 TABLET, DELAYED RELEASE ORAL at 09:00

## 2018-01-01 RX ADMIN — PANTOPRAZOLE SODIUM 40 MG: 40 TABLET, DELAYED RELEASE ORAL at 07:02

## 2018-01-01 RX ADMIN — Medication 400 MG: at 08:06

## 2018-01-01 RX ADMIN — Medication 400 MG: at 08:54

## 2018-01-01 RX ADMIN — DIAZEPAM 2.5 MG: 5 TABLET ORAL at 21:29

## 2018-01-01 RX ADMIN — PRAVASTATIN SODIUM 40 MG: 20 TABLET ORAL at 09:14

## 2018-01-01 RX ADMIN — PANTOPRAZOLE SODIUM 40 MG: 40 TABLET, DELAYED RELEASE ORAL at 05:19

## 2018-01-01 RX ADMIN — LEVOTHYROXINE SODIUM 88 MCG: 88 TABLET ORAL at 05:34

## 2018-01-01 RX ADMIN — METOLAZONE 5 MG: 5 TABLET ORAL at 08:51

## 2018-01-01 RX ADMIN — ROPINIROLE HYDROCHLORIDE 2 MG: 1 TABLET, FILM COATED ORAL at 09:53

## 2018-01-01 RX ADMIN — FUROSEMIDE 40 MG: 40 TABLET ORAL at 08:33

## 2018-01-01 RX ADMIN — POTASSIUM CHLORIDE 40 MEQ: 20 TABLET, EXTENDED RELEASE ORAL at 09:25

## 2018-01-01 RX ADMIN — CLOPIDOGREL BISULFATE 75 MG: 75 TABLET ORAL at 09:32

## 2018-01-01 RX ADMIN — DIAZEPAM 5 MG: 5 TABLET ORAL at 21:47

## 2018-01-01 RX ADMIN — DIAZEPAM 2.5 MG: 5 TABLET ORAL at 08:16

## 2018-01-01 RX ADMIN — LEVOTHYROXINE SODIUM 88 MCG: 88 TABLET ORAL at 05:19

## 2018-01-01 RX ADMIN — Medication 400 MG: at 09:17

## 2018-01-01 RX ADMIN — CIPROFLOXACIN HYDROCHLORIDE 500 MG: 500 TABLET, FILM COATED ORAL at 00:21

## 2018-01-01 RX ADMIN — SENNOSIDES 8.6 MG: 8.6 TABLET, FILM COATED ORAL at 08:00

## 2018-01-01 RX ADMIN — SERTRALINE HYDROCHLORIDE 100 MG: 50 TABLET ORAL at 08:13

## 2018-01-01 RX ADMIN — POTASSIUM CHLORIDE 40 MEQ: 20 TABLET, EXTENDED RELEASE ORAL at 08:48

## 2018-01-01 RX ADMIN — ROPINIROLE HYDROCHLORIDE 2 MG: 2 TABLET, FILM COATED ORAL at 18:25

## 2018-01-01 RX ADMIN — FUROSEMIDE 80 MG: 40 TABLET ORAL at 09:06

## 2018-01-01 RX ADMIN — ALLOPURINOL 100 MG: 100 TABLET ORAL at 21:34

## 2018-01-01 RX ADMIN — METOPROLOL SUCCINATE 25 MG: 25 TABLET, EXTENDED RELEASE ORAL at 10:29

## 2018-01-01 RX ADMIN — FUROSEMIDE 80 MG: 10 INJECTION, SOLUTION INTRAMUSCULAR; INTRAVENOUS at 17:52

## 2018-01-01 RX ADMIN — SPIRONOLACTONE 25 MG: 25 TABLET, FILM COATED ORAL at 09:05

## 2018-01-01 RX ADMIN — METOPROLOL SUCCINATE 25 MG: 25 TABLET, EXTENDED RELEASE ORAL at 08:24

## 2018-01-01 RX ADMIN — ALLOPURINOL 100 MG: 100 TABLET ORAL at 17:18

## 2018-01-01 RX ADMIN — Medication 10 ML: at 21:30

## 2018-01-01 RX ADMIN — Medication 5 ML: at 21:01

## 2018-01-01 RX ADMIN — TORSEMIDE 40 MG: 20 TABLET ORAL at 08:15

## 2018-01-01 RX ADMIN — Medication 10 ML: at 09:26

## 2018-01-01 RX ADMIN — PRAVASTATIN SODIUM 40 MG: 20 TABLET ORAL at 10:01

## 2018-01-01 RX ADMIN — POLYETHYLENE GLYCOL 3350 17 G: 17 POWDER, FOR SOLUTION ORAL at 08:20

## 2018-01-01 RX ADMIN — PANTOPRAZOLE SODIUM 40 MG: 40 TABLET, DELAYED RELEASE ORAL at 06:09

## 2018-01-01 RX ADMIN — PRAVASTATIN SODIUM 40 MG: 20 TABLET ORAL at 10:00

## 2018-01-01 RX ADMIN — DIAZEPAM 5 MG: 5 TABLET ORAL at 21:43

## 2018-01-01 RX ADMIN — ROPINIROLE 2 MG: 2 TABLET, FILM COATED ORAL at 08:41

## 2018-01-01 RX ADMIN — FUROSEMIDE 80 MG: 40 TABLET ORAL at 08:50

## 2018-01-01 RX ADMIN — POTASSIUM CHLORIDE 40 MEQ: 20 TABLET, EXTENDED RELEASE ORAL at 08:41

## 2018-01-01 RX ADMIN — POTASSIUM CHLORIDE 20 MEQ: 1500 TABLET, FILM COATED, EXTENDED RELEASE ORAL at 17:40

## 2018-01-01 RX ADMIN — MUPIROCIN: 20 OINTMENT TOPICAL at 09:02

## 2018-01-01 RX ADMIN — SENNOSIDES 8.6 MG: 8.6 TABLET, FILM COATED ORAL at 21:30

## 2018-01-01 RX ADMIN — FUROSEMIDE 40 MG: 40 TABLET ORAL at 20:43

## 2018-01-01 RX ADMIN — ASPIRIN 81 MG: 81 TABLET ORAL at 08:47

## 2018-01-01 RX ADMIN — METOLAZONE 2.5 MG: 5 TABLET ORAL at 09:21

## 2018-01-01 RX ADMIN — DIAZEPAM 2 MG: 2 TABLET ORAL at 22:07

## 2018-01-01 RX ADMIN — SENNOSIDES 8.6 MG: 8.6 TABLET, FILM COATED ORAL at 08:33

## 2018-01-01 RX ADMIN — LEVOTHYROXINE SODIUM 88 MCG: 88 TABLET ORAL at 06:30

## 2018-01-01 RX ADMIN — CIPROFLOXACIN HYDROCHLORIDE 500 MG: 500 TABLET, FILM COATED ORAL at 09:53

## 2018-01-01 RX ADMIN — SODIUM CHLORIDE, SODIUM LACTATE, POTASSIUM CHLORIDE, CALCIUM CHLORIDE: 600; 310; 30; 20 INJECTION, SOLUTION INTRAVENOUS at 10:33

## 2018-01-01 RX ADMIN — POLYETHYLENE GLYCOL 3350 17 G: 17 POWDER, FOR SOLUTION ORAL at 08:11

## 2018-01-01 RX ADMIN — Medication 10 ML: at 22:04

## 2018-01-01 RX ADMIN — ASPIRIN 81 MG 81 MG: 81 TABLET ORAL at 09:25

## 2018-01-01 RX ADMIN — Medication 10 ML: at 21:49

## 2018-01-01 RX ADMIN — ALLOPURINOL 100 MG: 100 TABLET ORAL at 17:11

## 2018-01-01 RX ADMIN — ASPIRIN 81 MG 81 MG: 81 TABLET ORAL at 09:24

## 2018-01-01 RX ADMIN — SENNOSIDES 8.6 MG: 8.6 TABLET, FILM COATED ORAL at 17:45

## 2018-01-01 RX ADMIN — ROPINIROLE HYDROCHLORIDE 2 MG: 2 TABLET, FILM COATED ORAL at 08:48

## 2018-01-01 RX ADMIN — ALLOPURINOL 100 MG: 100 TABLET ORAL at 08:07

## 2018-01-01 RX ADMIN — FUROSEMIDE 40 MG: 40 TABLET ORAL at 21:31

## 2018-01-01 RX ADMIN — LEVALBUTEROL HYDROCHLORIDE 1.25 MG: 1.25 SOLUTION RESPIRATORY (INHALATION) at 02:57

## 2018-01-01 RX ADMIN — SERTRALINE HYDROCHLORIDE 100 MG: 100 TABLET ORAL at 09:44

## 2018-01-01 RX ADMIN — ROPINIROLE HYDROCHLORIDE 2 MG: 1 TABLET, FILM COATED ORAL at 16:26

## 2018-01-01 RX ADMIN — ACETAMINOPHEN 650 MG: 325 TABLET ORAL at 21:26

## 2018-01-01 RX ADMIN — SPIRONOLACTONE 25 MG: 25 TABLET, FILM COATED ORAL at 09:00

## 2018-01-01 RX ADMIN — ALLOPURINOL 100 MG: 100 TABLET ORAL at 09:00

## 2018-01-01 RX ADMIN — FUROSEMIDE 40 MG: 40 TABLET ORAL at 21:33

## 2018-01-01 RX ADMIN — APIXABAN 5 MG: 5 TABLET, FILM COATED ORAL at 09:22

## 2018-01-01 RX ADMIN — ROPINIROLE 2 MG: 2 TABLET, FILM COATED ORAL at 17:20

## 2018-01-01 RX ADMIN — SERTRALINE HYDROCHLORIDE 100 MG: 100 TABLET ORAL at 08:49

## 2018-01-01 RX ADMIN — DIAZEPAM 2.5 MG: 5 TABLET ORAL at 21:20

## 2018-01-01 RX ADMIN — SERTRALINE HYDROCHLORIDE 100 MG: 100 TABLET ORAL at 10:01

## 2018-01-01 RX ADMIN — POTASSIUM CHLORIDE 20 MEQ: 1500 TABLET, FILM COATED, EXTENDED RELEASE ORAL at 08:20

## 2018-01-01 RX ADMIN — METOPROLOL SUCCINATE 25 MG: 25 TABLET, EXTENDED RELEASE ORAL at 09:28

## 2018-01-01 RX ADMIN — ALLOPURINOL 100 MG: 100 TABLET ORAL at 17:34

## 2018-01-01 RX ADMIN — LEVOTHYROXINE SODIUM 88 MCG: 88 TABLET ORAL at 09:29

## 2018-01-01 RX ADMIN — POTASSIUM CHLORIDE 20 MEQ: 20 TABLET, EXTENDED RELEASE ORAL at 09:46

## 2018-01-01 RX ADMIN — PANTOPRAZOLE SODIUM 40 MG: 40 TABLET, DELAYED RELEASE ORAL at 05:21

## 2018-01-01 RX ADMIN — POTASSIUM CHLORIDE 20 MEQ: 1500 TABLET, FILM COATED, EXTENDED RELEASE ORAL at 17:32

## 2018-01-01 RX ADMIN — Medication 5 ML: at 22:00

## 2018-01-01 RX ADMIN — FUROSEMIDE 40 MG: 10 INJECTION, SOLUTION INTRAMUSCULAR; INTRAVENOUS at 14:38

## 2018-01-01 RX ADMIN — Medication 1 AMPULE: at 20:47

## 2018-01-01 RX ADMIN — LEVOTHYROXINE SODIUM 88 MCG: 88 TABLET ORAL at 06:15

## 2018-01-01 RX ADMIN — Medication 10 ML: at 21:18

## 2018-01-01 RX ADMIN — FUROSEMIDE 40 MG: 40 TABLET ORAL at 21:47

## 2018-01-01 RX ADMIN — Medication 20 MG: at 09:00

## 2018-01-01 RX ADMIN — POTASSIUM CHLORIDE 40 MEQ: 20 TABLET, EXTENDED RELEASE ORAL at 08:10

## 2018-01-01 RX ADMIN — POLYETHYLENE GLYCOL 3350 17 G: 17 POWDER, FOR SOLUTION ORAL at 08:06

## 2018-01-01 RX ADMIN — LEVOTHYROXINE SODIUM 88 MCG: 88 TABLET ORAL at 05:05

## 2018-01-01 RX ADMIN — SERTRALINE HYDROCHLORIDE 100 MG: 25 TABLET ORAL at 08:19

## 2018-01-01 RX ADMIN — Medication 1 AMPULE: at 21:36

## 2018-01-01 RX ADMIN — ROPINIROLE HYDROCHLORIDE 2 MG: 1 TABLET, FILM COATED ORAL at 09:32

## 2018-01-01 RX ADMIN — ACETAMINOPHEN 487.5 MG: 325 TABLET, FILM COATED ORAL at 08:10

## 2018-01-01 RX ADMIN — FUROSEMIDE 40 MG: 10 INJECTION, SOLUTION INTRAMUSCULAR; INTRAVENOUS at 15:20

## 2018-01-01 RX ADMIN — METOLAZONE 5 MG: 5 TABLET ORAL at 08:29

## 2018-01-01 RX ADMIN — PANTOPRAZOLE SODIUM 40 MG: 40 TABLET, DELAYED RELEASE ORAL at 06:23

## 2018-01-01 RX ADMIN — LIDOCAINE HYDROCHLORIDE 200 MG: 20 INJECTION, SOLUTION INFILTRATION; PERINEURAL at 15:55

## 2018-01-01 RX ADMIN — ROPINIROLE HYDROCHLORIDE 2 MG: 2 TABLET, FILM COATED ORAL at 08:47

## 2018-01-01 RX ADMIN — ACETAMINOPHEN 650 MG: 325 TABLET, FILM COATED ORAL at 01:56

## 2018-01-01 RX ADMIN — FLUTICASONE PROPIONATE 1 SPRAY: 50 SPRAY, METERED NASAL at 09:47

## 2018-01-01 RX ADMIN — Medication 10 ML: at 21:41

## 2018-01-01 RX ADMIN — SERTRALINE HYDROCHLORIDE 100 MG: 25 TABLET ORAL at 08:33

## 2018-01-01 RX ADMIN — Medication 400 MG: at 09:05

## 2018-01-01 RX ADMIN — LEVOTHYROXINE SODIUM 88 MCG: 88 TABLET ORAL at 05:17

## 2018-01-01 RX ADMIN — FUROSEMIDE 80 MG: 40 TABLET ORAL at 08:52

## 2018-01-01 RX ADMIN — ASPIRIN 81 MG 81 MG: 81 TABLET ORAL at 11:12

## 2018-01-01 RX ADMIN — POTASSIUM CHLORIDE 40 MEQ: 20 TABLET, EXTENDED RELEASE ORAL at 08:58

## 2018-01-01 RX ADMIN — OMEPRAZOLE 20 MG: 20 TABLET, DELAYED RELEASE ORAL at 08:26

## 2018-01-01 RX ADMIN — Medication 10 ML: at 22:20

## 2018-01-01 RX ADMIN — APIXABAN 5 MG: 5 TABLET, FILM COATED ORAL at 20:36

## 2018-01-01 RX ADMIN — Medication 1 AMPULE: at 00:20

## 2018-01-01 RX ADMIN — SPIRONOLACTONE 25 MG: 25 TABLET, FILM COATED ORAL at 09:32

## 2018-01-01 RX ADMIN — DIAZEPAM 2.5 MG: 5 TABLET ORAL at 07:54

## 2018-01-01 RX ADMIN — SERTRALINE HYDROCHLORIDE 100 MG: 25 TABLET ORAL at 09:26

## 2018-01-01 RX ADMIN — FUROSEMIDE 80 MG: 10 INJECTION, SOLUTION INTRAMUSCULAR; INTRAVENOUS at 17:34

## 2018-01-01 RX ADMIN — ALLOPURINOL 100 MG: 100 TABLET ORAL at 09:05

## 2018-01-01 RX ADMIN — FLUTICASONE PROPIONATE 1 SPRAY: 50 SPRAY, METERED NASAL at 09:06

## 2018-01-01 RX ADMIN — SPIRONOLACTONE 25 MG: 25 TABLET, FILM COATED ORAL at 09:53

## 2018-01-01 RX ADMIN — MIDAZOLAM HYDROCHLORIDE 0.5 MG: 1 INJECTION, SOLUTION INTRAMUSCULAR; INTRAVENOUS at 12:55

## 2018-01-01 RX ADMIN — METOPROLOL SUCCINATE 50 MG: 50 TABLET, EXTENDED RELEASE ORAL at 09:48

## 2018-01-01 RX ADMIN — FUROSEMIDE 40 MG: 40 TABLET ORAL at 09:44

## 2018-01-01 RX ADMIN — Medication 10 ML: at 13:03

## 2018-01-01 RX ADMIN — SERTRALINE HYDROCHLORIDE 100 MG: 50 TABLET ORAL at 08:42

## 2018-01-01 RX ADMIN — ALLOPURINOL 100 MG: 100 TABLET ORAL at 22:34

## 2018-01-01 RX ADMIN — DIAZEPAM 5 MG: 5 TABLET ORAL at 09:24

## 2018-01-01 RX ADMIN — SPIRONOLACTONE 25 MG: 25 TABLET, FILM COATED ORAL at 08:21

## 2018-01-01 RX ADMIN — PRAVASTATIN SODIUM 40 MG: 20 TABLET ORAL at 08:48

## 2018-01-01 RX ADMIN — METOPROLOL SUCCINATE 25 MG: 25 TABLET, EXTENDED RELEASE ORAL at 08:25

## 2018-01-01 RX ADMIN — ROPINIROLE HYDROCHLORIDE 2 MG: 1 TABLET, FILM COATED ORAL at 17:45

## 2018-01-01 RX ADMIN — POLYETHYLENE GLYCOL 3350 17 G: 17 POWDER, FOR SOLUTION ORAL at 09:25

## 2018-01-01 RX ADMIN — ACETAMINOPHEN 650 MG: 325 TABLET ORAL at 10:12

## 2018-01-01 RX ADMIN — PRAVASTATIN SODIUM 40 MG: 20 TABLET ORAL at 09:30

## 2018-01-01 RX ADMIN — POTASSIUM CHLORIDE 20 MEQ: 1500 TABLET, FILM COATED, EXTENDED RELEASE ORAL at 17:20

## 2018-01-01 RX ADMIN — ASPIRIN 81 MG 81 MG: 81 TABLET ORAL at 08:30

## 2018-01-01 RX ADMIN — ALLOPURINOL 100 MG: 100 TABLET ORAL at 18:24

## 2018-01-01 RX ADMIN — Medication 1 AMPULE: at 08:14

## 2018-01-01 RX ADMIN — Medication 1 AMPULE: at 14:45

## 2018-01-01 RX ADMIN — DIAZEPAM 5 MG: 5 TABLET ORAL at 07:25

## 2018-01-01 RX ADMIN — ROPINIROLE HYDROCHLORIDE 2 MG: 2 TABLET, FILM COATED ORAL at 08:14

## 2018-01-01 RX ADMIN — Medication 5 ML: at 13:42

## 2018-01-01 RX ADMIN — Medication 1 AMPULE: at 08:34

## 2018-01-01 RX ADMIN — OMEPRAZOLE 20 MG: 20 TABLET, DELAYED RELEASE ORAL at 08:20

## 2018-01-01 RX ADMIN — LEVOTHYROXINE SODIUM 88 MCG: 88 TABLET ORAL at 08:44

## 2018-01-01 RX ADMIN — LEVOTHYROXINE SODIUM 88 MCG: 88 TABLET ORAL at 08:15

## 2018-01-01 RX ADMIN — POTASSIUM CHLORIDE 20 MEQ: 20 TABLET, EXTENDED RELEASE ORAL at 05:53

## 2018-01-01 RX ADMIN — ALLOPURINOL 100 MG: 100 TABLET ORAL at 16:44

## 2018-01-01 RX ADMIN — ALLOPURINOL 100 MG: 100 TABLET ORAL at 08:45

## 2018-01-01 RX ADMIN — ROPINIROLE HYDROCHLORIDE 2 MG: 2 TABLET, FILM COATED ORAL at 09:23

## 2018-01-01 RX ADMIN — APIXABAN 5 MG: 5 TABLET, FILM COATED ORAL at 20:25

## 2018-01-01 RX ADMIN — ALLOPURINOL 100 MG: 100 TABLET ORAL at 18:53

## 2018-01-01 RX ADMIN — ASPIRIN 81 MG 81 MG: 81 TABLET ORAL at 08:56

## 2018-01-01 RX ADMIN — POTASSIUM CHLORIDE 40 MEQ: 20 TABLET, EXTENDED RELEASE ORAL at 10:10

## 2018-01-01 RX ADMIN — FUROSEMIDE 80 MG: 10 INJECTION, SOLUTION INTRAMUSCULAR; INTRAVENOUS at 17:46

## 2018-01-01 RX ADMIN — Medication 5 ML: at 10:01

## 2018-01-01 RX ADMIN — Medication 10 ML: at 06:14

## 2018-01-01 RX ADMIN — DIAZEPAM 2 MG: 2 TABLET ORAL at 21:03

## 2018-01-01 RX ADMIN — SENNOSIDES 8.6 MG: 8.6 TABLET, FILM COATED ORAL at 08:34

## 2018-01-01 RX ADMIN — Medication 1 AMPULE: at 21:45

## 2018-01-01 RX ADMIN — CIPROFLOXACIN HYDROCHLORIDE 500 MG: 500 TABLET, FILM COATED ORAL at 23:28

## 2018-01-01 RX ADMIN — Medication 10 ML: at 14:40

## 2018-01-01 RX ADMIN — ALLOPURINOL 100 MG: 100 TABLET ORAL at 08:51

## 2018-01-01 RX ADMIN — SERTRALINE HYDROCHLORIDE 100 MG: 25 TABLET ORAL at 09:16

## 2018-01-01 RX ADMIN — APIXABAN 2.5 MG: 2.5 TABLET, FILM COATED ORAL at 22:00

## 2018-01-01 RX ADMIN — SPIRONOLACTONE 25 MG: 25 TABLET, FILM COATED ORAL at 08:30

## 2018-01-01 RX ADMIN — SERTRALINE HYDROCHLORIDE 100 MG: 25 TABLET ORAL at 08:42

## 2018-01-01 RX ADMIN — Medication 1 AMPULE: at 08:08

## 2018-01-01 RX ADMIN — Medication 5 ML: at 16:06

## 2018-01-01 RX ADMIN — ACETAMINOPHEN 487.5 MG: 325 TABLET, FILM COATED ORAL at 20:26

## 2018-01-01 RX ADMIN — Medication 5 ML: at 13:28

## 2018-01-01 RX ADMIN — Medication 10 ML: at 05:27

## 2018-01-01 RX ADMIN — Medication 81 MG: at 08:49

## 2018-01-01 RX ADMIN — SPIRONOLACTONE 25 MG: 25 TABLET, FILM COATED ORAL at 08:51

## 2018-01-01 RX ADMIN — FUROSEMIDE 80 MG: 40 TABLET ORAL at 08:22

## 2018-01-01 RX ADMIN — CEFPODOXIME PROXETIL 200 MG: 200 TABLET, FILM COATED ORAL at 09:32

## 2018-01-01 RX ADMIN — Medication 1 AMPULE: at 10:05

## 2018-01-01 RX ADMIN — POTASSIUM CHLORIDE 20 MEQ: 1500 TABLET, FILM COATED, EXTENDED RELEASE ORAL at 08:51

## 2018-01-01 RX ADMIN — APIXABAN 2.5 MG: 2.5 TABLET, FILM COATED ORAL at 21:17

## 2018-01-01 RX ADMIN — CEFPODOXIME PROXETIL 200 MG: 200 TABLET, FILM COATED ORAL at 05:52

## 2018-01-01 RX ADMIN — PRAVASTATIN SODIUM 40 MG: 20 TABLET ORAL at 08:49

## 2018-01-01 RX ADMIN — POLYETHYLENE GLYCOL 3350 17 G: 17 POWDER, FOR SOLUTION ORAL at 10:28

## 2018-01-01 RX ADMIN — FUROSEMIDE 40 MG: 10 INJECTION, SOLUTION INTRAMUSCULAR; INTRAVENOUS at 08:23

## 2018-01-01 RX ADMIN — METOPROLOL SUCCINATE 25 MG: 25 TABLET, EXTENDED RELEASE ORAL at 09:00

## 2018-01-01 RX ADMIN — Medication 500 MG: at 17:39

## 2018-01-01 RX ADMIN — FUROSEMIDE 40 MG: 10 INJECTION, SOLUTION INTRAMUSCULAR; INTRAVENOUS at 16:16

## 2018-01-01 RX ADMIN — Medication 1 AMPULE: at 10:13

## 2018-01-01 RX ADMIN — DIAZEPAM 5 MG: 5 TABLET ORAL at 22:15

## 2018-01-01 RX ADMIN — PERFLUTREN 1 ML: 6.52 INJECTION, SUSPENSION INTRAVENOUS at 14:00

## 2018-01-01 RX ADMIN — ALLOPURINOL 100 MG: 100 TABLET ORAL at 08:46

## 2018-01-01 RX ADMIN — PRAVASTATIN SODIUM 40 MG: 20 TABLET ORAL at 08:23

## 2018-01-01 RX ADMIN — DIAZEPAM 2 MG: 2 TABLET ORAL at 21:33

## 2018-01-01 RX ADMIN — ALLOPURINOL 100 MG: 100 TABLET ORAL at 17:40

## 2018-01-01 RX ADMIN — LEVOTHYROXINE SODIUM 88 MCG: 88 TABLET ORAL at 07:52

## 2018-01-01 RX ADMIN — LEVOTHYROXINE SODIUM 88 MCG: 88 TABLET ORAL at 05:40

## 2018-01-01 RX ADMIN — Medication 10 ML: at 13:22

## 2018-01-01 RX ADMIN — ACETAMINOPHEN 500 MG: 500 CAPSULE, LIQUID FILLED ORAL at 22:31

## 2018-01-01 RX ADMIN — PREDNISONE 10 MG: 10 TABLET ORAL at 08:22

## 2018-01-01 RX ADMIN — POLYETHYLENE GLYCOL 3350 17 G: 17 POWDER, FOR SOLUTION ORAL at 08:32

## 2018-01-01 RX ADMIN — Medication 1 AMPULE: at 21:00

## 2018-01-01 RX ADMIN — FLUTICASONE PROPIONATE 1 SPRAY: 50 SPRAY, METERED NASAL at 08:23

## 2018-01-01 RX ADMIN — CEFPODOXIME PROXETIL 200 MG: 200 TABLET, FILM COATED ORAL at 08:35

## 2018-01-01 RX ADMIN — FUROSEMIDE 40 MG: 40 TABLET ORAL at 08:41

## 2018-01-01 RX ADMIN — CIPROFLOXACIN HYDROCHLORIDE 500 MG: 500 TABLET, FILM COATED ORAL at 12:43

## 2018-01-01 RX ADMIN — LEVOTHYROXINE SODIUM 88 MCG: 88 TABLET ORAL at 05:25

## 2018-01-01 RX ADMIN — PRAVASTATIN SODIUM 40 MG: 20 TABLET ORAL at 08:18

## 2018-01-01 RX ADMIN — POTASSIUM CHLORIDE 20 MEQ: 20 TABLET, EXTENDED RELEASE ORAL at 18:02

## 2018-01-01 RX ADMIN — Medication 1 AMPULE: at 20:28

## 2018-01-01 RX ADMIN — METOPROLOL SUCCINATE 25 MG: 25 TABLET, EXTENDED RELEASE ORAL at 08:49

## 2018-01-01 RX ADMIN — SERTRALINE HYDROCHLORIDE 100 MG: 50 TABLET ORAL at 08:00

## 2018-01-01 RX ADMIN — Medication 10 ML: at 22:33

## 2018-01-01 RX ADMIN — ACETAMINOPHEN 487.5 MG: 325 TABLET, FILM COATED ORAL at 00:59

## 2018-01-01 RX ADMIN — POTASSIUM CHLORIDE 40 MEQ: 20 TABLET, EXTENDED RELEASE ORAL at 09:16

## 2018-01-01 RX ADMIN — DIAZEPAM 5 MG: 5 TABLET ORAL at 21:16

## 2018-01-01 RX ADMIN — METOPROLOL SUCCINATE 25 MG: 25 TABLET, EXTENDED RELEASE ORAL at 09:25

## 2018-01-01 RX ADMIN — Medication 500 MG: at 12:25

## 2018-01-01 RX ADMIN — ALLOPURINOL 100 MG: 100 TABLET ORAL at 08:31

## 2018-01-01 RX ADMIN — Medication 1 AMPULE: at 23:41

## 2018-01-01 RX ADMIN — POLYETHYLENE GLYCOL 3350 17 G: 17 POWDER, FOR SOLUTION ORAL at 08:51

## 2018-01-01 RX ADMIN — ALLOPURINOL 100 MG: 100 TABLET ORAL at 17:45

## 2018-01-01 RX ADMIN — MUPIROCIN: 20 OINTMENT TOPICAL at 17:46

## 2018-01-01 RX ADMIN — LEVOTHYROXINE SODIUM 88 MCG: 88 TABLET ORAL at 05:26

## 2018-01-01 RX ADMIN — SPIRONOLACTONE 25 MG: 25 TABLET, FILM COATED ORAL at 08:56

## 2018-01-01 RX ADMIN — PANTOPRAZOLE SODIUM 40 MG: 40 TABLET, DELAYED RELEASE ORAL at 06:42

## 2018-01-01 RX ADMIN — FUROSEMIDE 40 MG: 40 TABLET ORAL at 08:45

## 2018-01-01 RX ADMIN — ROPINIROLE 2 MG: 2 TABLET, FILM COATED ORAL at 08:21

## 2018-01-01 RX ADMIN — ACETAMINOPHEN 487.5 MG: 325 TABLET, FILM COATED ORAL at 08:33

## 2018-01-01 RX ADMIN — ACETAMINOPHEN 500 MG: 500 CAPSULE, LIQUID FILLED ORAL at 21:31

## 2018-01-01 RX ADMIN — ACETAMINOPHEN 487.5 MG: 325 TABLET, FILM COATED ORAL at 20:43

## 2018-01-01 RX ADMIN — ACETAMINOPHEN 650 MG: 325 TABLET ORAL at 00:37

## 2018-01-01 RX ADMIN — DIAZEPAM 2.5 MG: 5 TABLET ORAL at 21:11

## 2018-01-01 RX ADMIN — SERTRALINE HYDROCHLORIDE 100 MG: 25 TABLET ORAL at 08:29

## 2018-01-01 RX ADMIN — POLYETHYLENE GLYCOL 3350 17 G: 17 POWDER, FOR SOLUTION ORAL at 09:45

## 2018-01-01 RX ADMIN — ROPINIROLE HYDROCHLORIDE 2 MG: 2 TABLET, FILM COATED ORAL at 17:30

## 2018-01-01 RX ADMIN — ALLOPURINOL 100 MG: 100 TABLET ORAL at 18:02

## 2018-01-01 RX ADMIN — CEFTRIAXONE SODIUM 1 G: 1 INJECTION, POWDER, FOR SOLUTION INTRAMUSCULAR; INTRAVENOUS at 22:46

## 2018-01-01 RX ADMIN — LEVOTHYROXINE SODIUM 88 MCG: 88 TABLET ORAL at 07:02

## 2018-01-01 RX ADMIN — ALLOPURINOL 100 MG: 100 TABLET ORAL at 09:30

## 2018-01-01 RX ADMIN — POLYETHYLENE GLYCOL 3350 17 G: 17 POWDER, FOR SOLUTION ORAL at 09:09

## 2018-01-01 RX ADMIN — POTASSIUM CHLORIDE 40 MEQ: 20 TABLET, EXTENDED RELEASE ORAL at 08:18

## 2018-01-01 RX ADMIN — FUROSEMIDE 40 MG: 40 TABLET ORAL at 08:53

## 2018-01-01 RX ADMIN — METOPROLOL SUCCINATE 25 MG: 25 TABLET, EXTENDED RELEASE ORAL at 10:42

## 2018-01-01 RX ADMIN — POLYETHYLENE GLYCOL 3350 17 G: 17 POWDER, FOR SOLUTION ORAL at 08:14

## 2018-01-01 RX ADMIN — ROPINIROLE HYDROCHLORIDE 2 MG: 2 TABLET, FILM COATED ORAL at 17:43

## 2018-01-01 RX ADMIN — LEVALBUTEROL HYDROCHLORIDE 0.63 MG: 0.63 SOLUTION RESPIRATORY (INHALATION) at 07:16

## 2018-01-01 RX ADMIN — POTASSIUM CHLORIDE 20 MEQ: 1500 TABLET, FILM COATED, EXTENDED RELEASE ORAL at 17:22

## 2018-01-01 RX ADMIN — OMEPRAZOLE 20 MG: 20 TABLET, DELAYED RELEASE ORAL at 08:46

## 2018-01-01 RX ADMIN — DIAZEPAM 2.5 MG: 5 TABLET ORAL at 22:00

## 2018-01-01 RX ADMIN — DIAZEPAM 2 MG: 2 TABLET ORAL at 20:34

## 2018-01-01 RX ADMIN — Medication 1 AMPULE: at 08:57

## 2018-01-01 RX ADMIN — Medication 1 AMPULE: at 08:55

## 2018-01-01 RX ADMIN — SENNOSIDES 8.6 MG: 8.6 TABLET, FILM COATED ORAL at 08:45

## 2018-01-01 RX ADMIN — ALLOPURINOL 100 MG: 100 TABLET ORAL at 16:21

## 2018-01-01 RX ADMIN — FUROSEMIDE 80 MG: 40 TABLET ORAL at 08:47

## 2018-01-01 RX ADMIN — METOPROLOL SUCCINATE 25 MG: 25 TABLET, EXTENDED RELEASE ORAL at 09:15

## 2018-01-01 RX ADMIN — SPIRONOLACTONE 25 MG: 25 TABLET, FILM COATED ORAL at 09:18

## 2018-01-01 RX ADMIN — Medication 1 AMPULE: at 21:08

## 2018-01-01 RX ADMIN — CIPROFLOXACIN HYDROCHLORIDE 500 MG: 500 TABLET, FILM COATED ORAL at 21:02

## 2018-01-01 RX ADMIN — FUROSEMIDE 40 MG: 10 INJECTION, SOLUTION INTRAMUSCULAR; INTRAVENOUS at 22:19

## 2018-01-01 RX ADMIN — ROPINIROLE HYDROCHLORIDE 2 MG: 2 TABLET, FILM COATED ORAL at 08:24

## 2018-01-01 RX ADMIN — CIPROFLOXACIN HYDROCHLORIDE 500 MG: 500 TABLET, FILM COATED ORAL at 11:12

## 2018-01-01 RX ADMIN — Medication 1 AMPULE: at 09:45

## 2018-01-01 RX ADMIN — Medication 10 ML: at 15:00

## 2018-01-01 RX ADMIN — SPIRONOLACTONE 25 MG: 25 TABLET, FILM COATED ORAL at 08:54

## 2018-01-01 RX ADMIN — CEFTRIAXONE SODIUM 1 G: 1 INJECTION, POWDER, FOR SOLUTION INTRAMUSCULAR; INTRAVENOUS at 21:25

## 2018-01-01 RX ADMIN — ALLOPURINOL 100 MG: 100 TABLET ORAL at 17:17

## 2018-01-01 RX ADMIN — SERTRALINE HYDROCHLORIDE 100 MG: 100 TABLET ORAL at 08:56

## 2018-01-01 RX ADMIN — SERTRALINE HYDROCHLORIDE 100 MG: 50 TABLET ORAL at 08:15

## 2018-01-01 RX ADMIN — MUPIROCIN: 20 OINTMENT TOPICAL at 08:19

## 2018-01-01 RX ADMIN — POTASSIUM CHLORIDE 20 MEQ: 1500 TABLET, FILM COATED, EXTENDED RELEASE ORAL at 20:37

## 2018-01-01 RX ADMIN — HYDROCORTISONE 2.5%: 25 CREAM TOPICAL at 21:53

## 2018-01-01 RX ADMIN — Medication 10 ML: at 14:30

## 2018-01-01 RX ADMIN — Medication 10 ML: at 22:40

## 2018-01-01 RX ADMIN — SERTRALINE HYDROCHLORIDE 100 MG: 100 TABLET ORAL at 09:31

## 2018-01-01 RX ADMIN — LEVOTHYROXINE SODIUM 88 MCG: 88 TABLET ORAL at 05:38

## 2018-01-01 RX ADMIN — ROPINIROLE HYDROCHLORIDE 2 MG: 2 TABLET, FILM COATED ORAL at 08:30

## 2018-01-01 RX ADMIN — SERTRALINE HYDROCHLORIDE 100 MG: 100 TABLET ORAL at 08:15

## 2018-01-01 RX ADMIN — Medication 400 MG: at 08:47

## 2018-01-01 RX ADMIN — SENNOSIDES 8.6 MG: 8.6 TABLET, FILM COATED ORAL at 09:03

## 2018-01-01 RX ADMIN — LEVOTHYROXINE SODIUM 88 MCG: 88 TABLET ORAL at 08:28

## 2018-01-01 RX ADMIN — MUPIROCIN: 20 OINTMENT TOPICAL at 05:51

## 2018-01-01 RX ADMIN — FUROSEMIDE 80 MG: 40 TABLET ORAL at 09:59

## 2018-01-01 RX ADMIN — ROPINIROLE HYDROCHLORIDE 2 MG: 2 TABLET, FILM COATED ORAL at 08:07

## 2018-01-01 RX ADMIN — POLYETHYLENE GLYCOL 3350 17 G: 17 POWDER, FOR SOLUTION ORAL at 08:49

## 2018-01-01 RX ADMIN — Medication 5 ML: at 14:00

## 2018-01-01 RX ADMIN — ALLOPURINOL 100 MG: 100 TABLET ORAL at 08:19

## 2018-01-01 RX ADMIN — SPIRONOLACTONE 25 MG: 25 TABLET, FILM COATED ORAL at 09:17

## 2018-01-01 RX ADMIN — METOLAZONE 2.5 MG: 2.5 TABLET ORAL at 08:19

## 2018-01-01 RX ADMIN — Medication 10 ML: at 20:31

## 2018-01-01 RX ADMIN — PRAVASTATIN SODIUM 40 MG: 20 TABLET ORAL at 08:41

## 2018-01-01 RX ADMIN — SERTRALINE HYDROCHLORIDE 100 MG: 25 TABLET ORAL at 08:46

## 2018-01-01 RX ADMIN — APIXABAN 5 MG: 5 TABLET, FILM COATED ORAL at 08:12

## 2018-01-01 RX ADMIN — FUROSEMIDE 40 MG: 40 TABLET ORAL at 13:57

## 2018-01-01 RX ADMIN — FUROSEMIDE 40 MG: 40 TABLET ORAL at 14:05

## 2018-01-01 RX ADMIN — APIXABAN 5 MG: 5 TABLET, FILM COATED ORAL at 20:57

## 2018-01-01 RX ADMIN — SPIRONOLACTONE 25 MG: 25 TABLET, FILM COATED ORAL at 09:24

## 2018-01-01 RX ADMIN — POTASSIUM CHLORIDE 20 MEQ: 20 TABLET, EXTENDED RELEASE ORAL at 08:23

## 2018-01-01 RX ADMIN — FUROSEMIDE 80 MG: 40 TABLET ORAL at 20:36

## 2018-01-01 RX ADMIN — METOLAZONE 5 MG: 5 TABLET ORAL at 09:29

## 2018-01-01 RX ADMIN — SPIRONOLACTONE 25 MG: 25 TABLET, FILM COATED ORAL at 08:12

## 2018-01-01 RX ADMIN — ROPINIROLE HYDROCHLORIDE 2 MG: 2 TABLET, FILM COATED ORAL at 18:06

## 2018-01-01 RX ADMIN — POTASSIUM CHLORIDE 40 MEQ: 20 TABLET, EXTENDED RELEASE ORAL at 08:31

## 2018-01-01 RX ADMIN — Medication 10 ML: at 09:49

## 2018-01-01 RX ADMIN — WATER 500 MG: 1 INJECTION INTRAMUSCULAR; INTRAVENOUS; SUBCUTANEOUS at 17:14

## 2018-01-01 RX ADMIN — METOLAZONE 5 MG: 5 TABLET ORAL at 08:44

## 2018-01-01 RX ADMIN — Medication 500 MG: at 05:50

## 2018-01-01 RX ADMIN — POTASSIUM CHLORIDE 40 MEQ: 20 TABLET, EXTENDED RELEASE ORAL at 17:42

## 2018-01-01 RX ADMIN — Medication 1 AMPULE: at 09:00

## 2018-01-01 RX ADMIN — Medication 10 ML: at 05:26

## 2018-01-01 RX ADMIN — FLUTICASONE PROPIONATE 2 SPRAY: 50 SPRAY, METERED NASAL at 09:02

## 2018-01-01 RX ADMIN — PRAVASTATIN SODIUM 40 MG: 20 TABLET ORAL at 09:08

## 2018-01-01 RX ADMIN — CEFTRIAXONE SODIUM 1 G: 1 INJECTION, POWDER, FOR SOLUTION INTRAMUSCULAR; INTRAVENOUS at 21:35

## 2018-01-01 RX ADMIN — ROPINIROLE HYDROCHLORIDE 2 MG: 2 TABLET, FILM COATED ORAL at 21:34

## 2018-01-01 RX ADMIN — SERTRALINE HYDROCHLORIDE 100 MG: 25 TABLET ORAL at 08:08

## 2018-01-01 RX ADMIN — Medication 10 ML: at 05:42

## 2018-01-01 RX ADMIN — Medication 5 ML: at 05:19

## 2018-01-01 RX ADMIN — SENNOSIDES 8.6 MG: 8.6 TABLET, FILM COATED ORAL at 08:21

## 2018-01-01 RX ADMIN — METOPROLOL SUCCINATE 25 MG: 25 TABLET, EXTENDED RELEASE ORAL at 08:52

## 2018-01-01 RX ADMIN — Medication 1 AMPULE: at 21:34

## 2018-01-01 RX ADMIN — ALLOPURINOL 100 MG: 100 TABLET ORAL at 08:12

## 2018-01-01 RX ADMIN — HEPARIN SODIUM 9000 UNITS: 1000 INJECTION, SOLUTION INTRAVENOUS; SUBCUTANEOUS at 11:32

## 2018-01-01 RX ADMIN — APIXABAN 5 MG: 5 TABLET, FILM COATED ORAL at 21:09

## 2018-01-01 RX ADMIN — ROPINIROLE HYDROCHLORIDE 2 MG: 2 TABLET, FILM COATED ORAL at 22:35

## 2018-01-01 RX ADMIN — NYSTATIN: 100000 POWDER TOPICAL at 09:31

## 2018-01-01 RX ADMIN — ROPINIROLE HYDROCHLORIDE 2 MG: 1 TABLET, FILM COATED ORAL at 18:13

## 2018-01-01 RX ADMIN — POTASSIUM CHLORIDE 20 MEQ: 1500 TABLET, FILM COATED, EXTENDED RELEASE ORAL at 08:26

## 2018-01-01 RX ADMIN — POTASSIUM CHLORIDE 40 MEQ: 20 TABLET, EXTENDED RELEASE ORAL at 11:11

## 2018-01-01 RX ADMIN — Medication 400 MG: at 09:32

## 2018-01-01 RX ADMIN — LEVOTHYROXINE SODIUM 88 MCG: 88 TABLET ORAL at 06:09

## 2018-01-01 RX ADMIN — SODIUM BICARBONATE 2 ML: 0.2 INJECTION, SOLUTION INTRAVENOUS at 15:55

## 2018-01-01 RX ADMIN — FUROSEMIDE 80 MG: 40 TABLET ORAL at 10:02

## 2018-01-01 RX ADMIN — ALLOPURINOL 100 MG: 100 TABLET ORAL at 09:46

## 2018-01-01 RX ADMIN — LEVALBUTEROL HYDROCHLORIDE 0.63 MG: 0.63 SOLUTION RESPIRATORY (INHALATION) at 15:26

## 2018-01-01 RX ADMIN — IPRATROPIUM BROMIDE AND ALBUTEROL SULFATE 3 ML: .5; 3 SOLUTION RESPIRATORY (INHALATION) at 20:40

## 2018-01-01 RX ADMIN — FUROSEMIDE 80 MG: 40 TABLET ORAL at 08:06

## 2018-01-01 RX ADMIN — POLYETHYLENE GLYCOL 3350 17 G: 17 POWDER, FOR SOLUTION ORAL at 08:33

## 2018-01-01 RX ADMIN — DIAZEPAM 5 MG: 5 TABLET ORAL at 21:32

## 2018-01-01 RX ADMIN — ROPINIROLE HYDROCHLORIDE 2 MG: 2 TABLET, FILM COATED ORAL at 05:52

## 2018-01-01 RX ADMIN — Medication 1 AMPULE: at 09:17

## 2018-01-01 RX ADMIN — FUROSEMIDE 80 MG: 40 TABLET ORAL at 09:31

## 2018-01-01 RX ADMIN — HYDROCORTISONE 2.5%: 25 CREAM TOPICAL at 13:34

## 2018-01-01 RX ADMIN — POTASSIUM CHLORIDE 40 MEQ: 20 TABLET, EXTENDED RELEASE ORAL at 08:22

## 2018-01-01 RX ADMIN — PANTOPRAZOLE SODIUM 40 MG: 40 TABLET, DELAYED RELEASE ORAL at 09:45

## 2018-01-01 RX ADMIN — ROPINIROLE HYDROCHLORIDE 2 MG: 2 TABLET, FILM COATED ORAL at 17:08

## 2018-01-01 RX ADMIN — Medication 10 ML: at 00:21

## 2018-01-01 RX ADMIN — FUROSEMIDE 80 MG: 40 TABLET ORAL at 09:14

## 2018-01-01 RX ADMIN — ROPINIROLE HYDROCHLORIDE 2 MG: 2 TABLET, FILM COATED ORAL at 18:02

## 2018-01-01 RX ADMIN — SPIRONOLACTONE 25 MG: 25 TABLET, FILM COATED ORAL at 09:27

## 2018-01-01 RX ADMIN — POTASSIUM CHLORIDE 20 MEQ: 1500 TABLET, FILM COATED, EXTENDED RELEASE ORAL at 17:28

## 2018-01-01 RX ADMIN — CIPROFLOXACIN HYDROCHLORIDE 500 MG: 500 TABLET, FILM COATED ORAL at 10:02

## 2018-01-01 RX ADMIN — PANTOPRAZOLE SODIUM 40 MG: 40 TABLET, DELAYED RELEASE ORAL at 05:38

## 2018-01-01 RX ADMIN — HEPARIN 3 ML/HR: 100 SYRINGE at 13:01

## 2018-01-01 RX ADMIN — Medication 10 ML: at 20:40

## 2018-01-01 RX ADMIN — SERTRALINE HYDROCHLORIDE 100 MG: 25 TABLET ORAL at 08:23

## 2018-01-01 RX ADMIN — LEVALBUTEROL HYDROCHLORIDE 0.63 MG: 0.63 SOLUTION RESPIRATORY (INHALATION) at 11:03

## 2018-01-01 RX ADMIN — ASPIRIN 81 MG 81 MG: 81 TABLET ORAL at 09:17

## 2018-01-01 RX ADMIN — APIXABAN 2.5 MG: 2.5 TABLET, FILM COATED ORAL at 21:11

## 2018-01-01 RX ADMIN — HYDROCORTISONE 2.5%: 25 CREAM TOPICAL at 17:15

## 2018-01-01 RX ADMIN — FUROSEMIDE 80 MG: 10 INJECTION, SOLUTION INTRAMUSCULAR; INTRAVENOUS at 08:27

## 2018-01-01 RX ADMIN — APIXABAN 5 MG: 5 TABLET, FILM COATED ORAL at 08:14

## 2018-01-01 RX ADMIN — Medication 10 ML: at 05:38

## 2018-01-01 RX ADMIN — SENNOSIDES 8.6 MG: 8.6 TABLET, FILM COATED ORAL at 17:42

## 2018-01-01 RX ADMIN — METOLAZONE 2.5 MG: 2.5 TABLET ORAL at 08:58

## 2018-01-01 RX ADMIN — LEVALBUTEROL HYDROCHLORIDE 0.63 MG: 0.63 SOLUTION RESPIRATORY (INHALATION) at 16:44

## 2018-01-01 RX ADMIN — ROPINIROLE HYDROCHLORIDE 2 MG: 2 TABLET, FILM COATED ORAL at 05:38

## 2018-01-01 RX ADMIN — FUROSEMIDE 80 MG: 40 TABLET ORAL at 21:16

## 2018-01-01 RX ADMIN — SPIRONOLACTONE 25 MG: 25 TABLET, FILM COATED ORAL at 10:43

## 2018-01-01 RX ADMIN — APIXABAN 5 MG: 5 TABLET, FILM COATED ORAL at 21:28

## 2018-01-01 RX ADMIN — Medication 5 ML: at 06:00

## 2018-01-01 RX ADMIN — APIXABAN 5 MG: 5 TABLET, FILM COATED ORAL at 09:25

## 2018-01-01 RX ADMIN — CEFTRIAXONE SODIUM 1 G: 1 INJECTION, POWDER, FOR SOLUTION INTRAMUSCULAR; INTRAVENOUS at 21:05

## 2018-01-01 RX ADMIN — LEVOTHYROXINE SODIUM 88 MCG: 88 TABLET ORAL at 07:41

## 2018-01-01 RX ADMIN — DIAZEPAM 2.5 MG: 5 TABLET ORAL at 08:40

## 2018-01-01 RX ADMIN — ALLOPURINOL 100 MG: 100 TABLET ORAL at 17:48

## 2018-01-01 RX ADMIN — SERTRALINE HYDROCHLORIDE 100 MG: 25 TABLET ORAL at 08:48

## 2018-01-01 RX ADMIN — Medication 1 AMPULE: at 09:04

## 2018-01-01 RX ADMIN — SERTRALINE HYDROCHLORIDE 100 MG: 50 TABLET ORAL at 08:21

## 2018-01-01 RX ADMIN — PREDNISONE 10 MG: 10 TABLET ORAL at 08:53

## 2018-01-01 RX ADMIN — POTASSIUM CHLORIDE 20 MEQ: 1500 TABLET, FILM COATED, EXTENDED RELEASE ORAL at 17:17

## 2018-01-01 RX ADMIN — SPIRONOLACTONE 25 MG: 25 TABLET, FILM COATED ORAL at 08:23

## 2018-01-01 RX ADMIN — APIXABAN 5 MG: 5 TABLET, FILM COATED ORAL at 08:49

## 2018-01-01 RX ADMIN — APIXABAN 5 MG: 5 TABLET, FILM COATED ORAL at 08:08

## 2018-01-01 RX ADMIN — ACETAMINOPHEN 487.5 MG: 325 TABLET, FILM COATED ORAL at 14:19

## 2018-01-01 RX ADMIN — APIXABAN 5 MG: 5 TABLET, FILM COATED ORAL at 09:16

## 2018-01-01 RX ADMIN — ROPINIROLE HYDROCHLORIDE 2 MG: 2 TABLET, FILM COATED ORAL at 08:46

## 2018-01-01 RX ADMIN — PANTOPRAZOLE SODIUM 40 MG: 40 TABLET, DELAYED RELEASE ORAL at 05:33

## 2018-01-01 RX ADMIN — ROPINIROLE HYDROCHLORIDE 2 MG: 2 TABLET, FILM COATED ORAL at 08:18

## 2018-01-01 RX ADMIN — SPIRONOLACTONE 25 MG: 25 TABLET, FILM COATED ORAL at 09:14

## 2018-01-01 RX ADMIN — Medication 1 AMPULE: at 20:44

## 2018-01-01 RX ADMIN — FUROSEMIDE 80 MG: 40 TABLET ORAL at 18:53

## 2018-01-01 RX ADMIN — DIAZEPAM 5 MG: 5 TABLET ORAL at 21:05

## 2018-01-01 RX ADMIN — APIXABAN 2.5 MG: 2.5 TABLET, FILM COATED ORAL at 08:19

## 2018-01-01 RX ADMIN — METOPROLOL SUCCINATE 25 MG: 25 TABLET, EXTENDED RELEASE ORAL at 08:20

## 2018-01-01 RX ADMIN — DIAZEPAM 5 MG: 5 TABLET ORAL at 20:44

## 2018-01-01 RX ADMIN — Medication 20 MG: at 09:19

## 2018-01-01 RX ADMIN — SERTRALINE HYDROCHLORIDE 100 MG: 50 TABLET ORAL at 08:44

## 2018-01-01 RX ADMIN — TORSEMIDE 40 MG: 20 TABLET ORAL at 08:56

## 2018-01-01 RX ADMIN — Medication 1 AMPULE: at 21:43

## 2018-01-01 RX ADMIN — Medication 1 AMPULE: at 21:16

## 2018-01-01 RX ADMIN — LEVOTHYROXINE SODIUM 88 MCG: 88 TABLET ORAL at 05:21

## 2018-01-01 RX ADMIN — Medication 10 ML: at 22:07

## 2018-01-01 RX ADMIN — SERTRALINE HYDROCHLORIDE 100 MG: 25 TABLET ORAL at 08:14

## 2018-01-01 RX ADMIN — METOPROLOL SUCCINATE 25 MG: 25 TABLET, EXTENDED RELEASE ORAL at 10:02

## 2018-01-01 RX ADMIN — ROPINIROLE HYDROCHLORIDE 2 MG: 1 TABLET, FILM COATED ORAL at 09:25

## 2018-01-01 RX ADMIN — Medication 1 AMPULE: at 12:54

## 2018-01-01 RX ADMIN — MUPIROCIN: 20 OINTMENT TOPICAL at 17:45

## 2018-01-01 RX ADMIN — WARFARIN SODIUM 2.5 MG: 2.5 TABLET ORAL at 17:43

## 2018-01-01 RX ADMIN — HEPARIN SODIUM 2000 UNITS: 200 INJECTION, SOLUTION INTRAVENOUS at 15:56

## 2018-01-01 RX ADMIN — CIPROFLOXACIN HYDROCHLORIDE 500 MG: 500 TABLET, FILM COATED ORAL at 22:27

## 2018-01-01 RX ADMIN — APIXABAN 5 MG: 5 TABLET, FILM COATED ORAL at 08:27

## 2018-01-01 RX ADMIN — ASPIRIN 81 MG 81 MG: 81 TABLET ORAL at 09:00

## 2018-01-01 RX ADMIN — ASPIRIN 81 MG 81 MG: 81 TABLET ORAL at 08:23

## 2018-01-01 RX ADMIN — Medication 81 MG: at 08:33

## 2018-01-01 RX ADMIN — FUROSEMIDE 80 MG: 40 TABLET ORAL at 21:26

## 2018-01-01 RX ADMIN — CALCIUM CARBONATE 600 MG: 500 TABLET, CHEWABLE ORAL at 17:08

## 2018-01-01 RX ADMIN — FUROSEMIDE 80 MG: 40 TABLET ORAL at 08:24

## 2018-01-01 RX ADMIN — ALLOPURINOL 100 MG: 100 TABLET ORAL at 17:08

## 2018-01-01 RX ADMIN — LEVOTHYROXINE SODIUM 88 MCG: 88 TABLET ORAL at 08:24

## 2018-01-01 RX ADMIN — SPIRONOLACTONE 25 MG: 25 TABLET, FILM COATED ORAL at 08:19

## 2018-01-01 RX ADMIN — METOPROLOL SUCCINATE 25 MG: 25 TABLET, EXTENDED RELEASE ORAL at 12:13

## 2018-01-01 RX ADMIN — Medication 10 ML: at 05:28

## 2018-01-01 RX ADMIN — ROPINIROLE HYDROCHLORIDE 2 MG: 2 TABLET, FILM COATED ORAL at 17:14

## 2018-01-01 RX ADMIN — APIXABAN 5 MG: 5 TABLET, FILM COATED ORAL at 21:26

## 2018-01-01 RX ADMIN — ASPIRIN 81 MG: 81 TABLET, COATED ORAL at 08:47

## 2018-01-01 RX ADMIN — ASPIRIN 81 MG: 81 TABLET, COATED ORAL at 08:42

## 2018-01-01 RX ADMIN — ROPINIROLE HYDROCHLORIDE 2 MG: 2 TABLET, FILM COATED ORAL at 09:08

## 2018-01-01 RX ADMIN — Medication 5 ML: at 18:26

## 2018-01-01 RX ADMIN — LIDOCAINE HYDROCHLORIDE 60 MG: 20 INJECTION, SOLUTION INFILTRATION; PERINEURAL at 12:55

## 2018-01-01 RX ADMIN — PANTOPRAZOLE SODIUM 40 MG: 40 TABLET, DELAYED RELEASE ORAL at 08:49

## 2018-01-01 RX ADMIN — POLYETHYLENE GLYCOL 3350 17 G: 17 POWDER, FOR SOLUTION ORAL at 09:30

## 2018-01-01 RX ADMIN — FUROSEMIDE 40 MG: 40 TABLET ORAL at 09:27

## 2018-01-01 RX ADMIN — ALLOPURINOL 100 MG: 100 TABLET ORAL at 09:25

## 2018-01-01 RX ADMIN — ALLOPURINOL 100 MG: 100 TABLET ORAL at 17:10

## 2018-01-01 RX ADMIN — FUROSEMIDE 40 MG: 40 TABLET ORAL at 08:43

## 2018-01-01 RX ADMIN — ROPINIROLE HYDROCHLORIDE 2 MG: 2 TABLET, FILM COATED ORAL at 21:29

## 2018-01-01 RX ADMIN — SPIRONOLACTONE 25 MG: 25 TABLET, FILM COATED ORAL at 08:47

## 2018-01-01 RX ADMIN — Medication 1 AMPULE: at 20:15

## 2018-01-01 RX ADMIN — ACETAMINOPHEN 500 MG: 500 CAPSULE, LIQUID FILLED ORAL at 08:03

## 2018-01-01 RX ADMIN — CIPROFLOXACIN HYDROCHLORIDE 500 MG: 500 TABLET, FILM COATED ORAL at 09:17

## 2018-01-01 RX ADMIN — ASPIRIN 81 MG 81 MG: 81 TABLET ORAL at 10:02

## 2018-01-01 RX ADMIN — CIPROFLOXACIN HYDROCHLORIDE 500 MG: 500 TABLET, FILM COATED ORAL at 10:07

## 2018-01-01 RX ADMIN — SENNOSIDES 8.6 MG: 8.6 TABLET, FILM COATED ORAL at 17:21

## 2018-01-01 RX ADMIN — ROPINIROLE HYDROCHLORIDE 2 MG: 2 TABLET, FILM COATED ORAL at 21:18

## 2018-01-01 RX ADMIN — PRAVASTATIN SODIUM 40 MG: 20 TABLET ORAL at 09:31

## 2018-01-01 RX ADMIN — CHOLECALCIFEROL TAB 25 MCG (1000 UNIT) 1000 UNITS: 25 TAB at 08:23

## 2018-01-01 RX ADMIN — Medication 10 ML: at 05:56

## 2018-01-01 RX ADMIN — FUROSEMIDE 40 MG: 40 TABLET ORAL at 10:07

## 2018-01-01 RX ADMIN — Medication 1 AMPULE: at 20:57

## 2018-01-01 RX ADMIN — LEVALBUTEROL HYDROCHLORIDE 0.63 MG: 0.63 SOLUTION RESPIRATORY (INHALATION) at 07:11

## 2018-01-01 RX ADMIN — SPIRONOLACTONE 25 MG: 25 TABLET, FILM COATED ORAL at 08:15

## 2018-01-01 RX ADMIN — Medication 1 AMPULE: at 08:24

## 2018-01-01 RX ADMIN — Medication 1 AMPULE: at 21:58

## 2018-01-01 RX ADMIN — POLYETHYLENE GLYCOL 3350 17 G: 17 POWDER, FOR SOLUTION ORAL at 17:13

## 2018-01-01 RX ADMIN — ROPINIROLE HYDROCHLORIDE 2 MG: 2 TABLET, FILM COATED ORAL at 22:33

## 2018-01-01 RX ADMIN — PRAVASTATIN SODIUM 40 MG: 20 TABLET ORAL at 09:24

## 2018-01-01 RX ADMIN — SPIRONOLACTONE 25 MG: 25 TABLET, FILM COATED ORAL at 15:13

## 2018-01-01 RX ADMIN — ALLOPURINOL 100 MG: 100 TABLET ORAL at 17:54

## 2018-01-01 RX ADMIN — POTASSIUM CHLORIDE 40 MEQ: 20 TABLET, EXTENDED RELEASE ORAL at 08:11

## 2018-01-01 RX ADMIN — ROPINIROLE 2 MG: 2 TABLET, FILM COATED ORAL at 17:16

## 2018-01-01 RX ADMIN — METOPROLOL SUCCINATE 25 MG: 25 TABLET, EXTENDED RELEASE ORAL at 11:57

## 2018-01-01 RX ADMIN — LEVALBUTEROL HYDROCHLORIDE 0.63 MG: 0.63 SOLUTION RESPIRATORY (INHALATION) at 21:04

## 2018-01-01 RX ADMIN — POLYETHYLENE GLYCOL 3350 17 G: 17 POWDER, FOR SOLUTION ORAL at 09:18

## 2018-01-01 RX ADMIN — SERTRALINE HYDROCHLORIDE 100 MG: 100 TABLET ORAL at 11:11

## 2018-01-01 RX ADMIN — ACETAMINOPHEN 487.5 MG: 325 TABLET, FILM COATED ORAL at 13:48

## 2018-01-01 RX ADMIN — ALLOPURINOL 100 MG: 100 TABLET ORAL at 09:31

## 2018-01-01 RX ADMIN — ASPIRIN 81 MG 81 MG: 81 TABLET ORAL at 08:15

## 2018-01-01 RX ADMIN — LEVOTHYROXINE SODIUM 88 MCG: 88 TABLET ORAL at 09:43

## 2018-01-01 RX ADMIN — ACETAMINOPHEN 500 MG: 500 CAPSULE, LIQUID FILLED ORAL at 08:23

## 2018-01-01 RX ADMIN — PHYTONADIONE 5 MG: 5 TABLET ORAL at 08:28

## 2018-01-01 RX ADMIN — Medication 10 ML: at 21:31

## 2018-01-01 RX ADMIN — Medication 1 AMPULE: at 08:53

## 2018-01-01 RX ADMIN — FUROSEMIDE 40 MG: 10 INJECTION, SOLUTION INTRAMUSCULAR; INTRAVENOUS at 08:55

## 2018-01-01 RX ADMIN — DIAZEPAM 5 MG: 5 TABLET ORAL at 20:48

## 2018-01-01 RX ADMIN — DIAZEPAM 5 MG: 5 TABLET ORAL at 08:34

## 2018-01-01 RX ADMIN — METOLAZONE 5 MG: 5 TABLET ORAL at 08:25

## 2018-01-01 RX ADMIN — ROPINIROLE 2 MG: 2 TABLET, FILM COATED ORAL at 20:37

## 2018-01-01 RX ADMIN — DIAZEPAM 5 MG: 5 TABLET ORAL at 21:37

## 2018-01-01 RX ADMIN — POTASSIUM CHLORIDE 40 MEQ: 20 TABLET, EXTENDED RELEASE ORAL at 09:32

## 2018-01-01 RX ADMIN — Medication 1 AMPULE: at 10:15

## 2018-01-01 RX ADMIN — POLYETHYLENE GLYCOL 3350 17 G: 17 POWDER, FOR SOLUTION ORAL at 08:18

## 2018-01-01 RX ADMIN — Medication 20 MG: at 09:27

## 2018-01-01 RX ADMIN — ROPINIROLE HYDROCHLORIDE 2 MG: 1 TABLET, FILM COATED ORAL at 16:36

## 2018-01-01 RX ADMIN — ROPINIROLE HYDROCHLORIDE 2 MG: 1 TABLET, FILM COATED ORAL at 10:06

## 2018-01-01 RX ADMIN — ROPINIROLE HYDROCHLORIDE 2 MG: 1 TABLET, FILM COATED ORAL at 09:05

## 2018-01-01 RX ADMIN — LEVALBUTEROL HYDROCHLORIDE 1.25 MG: 1.25 SOLUTION RESPIRATORY (INHALATION) at 20:54

## 2018-01-01 RX ADMIN — Medication 10 ML: at 17:13

## 2018-01-01 RX ADMIN — Medication 1 AMPULE: at 08:26

## 2018-01-01 RX ADMIN — Medication 1 AMPULE: at 22:18

## 2018-01-01 RX ADMIN — METOPROLOL SUCCINATE 25 MG: 25 TABLET, EXTENDED RELEASE ORAL at 09:54

## 2018-01-01 RX ADMIN — Medication 10 ML: at 22:01

## 2018-01-01 RX ADMIN — ROPINIROLE HYDROCHLORIDE 2 MG: 2 TABLET, FILM COATED ORAL at 17:10

## 2018-01-01 RX ADMIN — ASPIRIN 81 MG: 81 TABLET, COATED ORAL at 08:36

## 2018-01-01 RX ADMIN — Medication 10 ML: at 05:33

## 2018-01-01 RX ADMIN — SENNOSIDES 8.6 MG: 8.6 TABLET, FILM COATED ORAL at 17:27

## 2018-01-01 RX ADMIN — SERTRALINE HYDROCHLORIDE 100 MG: 25 TABLET ORAL at 08:06

## 2018-01-01 RX ADMIN — SERTRALINE HYDROCHLORIDE 100 MG: 25 TABLET ORAL at 08:37

## 2018-01-01 RX ADMIN — ACETAMINOPHEN 500 MG: 500 CAPSULE, LIQUID FILLED ORAL at 08:47

## 2018-01-01 RX ADMIN — IOPAMIDOL 175 ML: 755 INJECTION, SOLUTION INTRAVENOUS at 07:24

## 2018-01-01 RX ADMIN — Medication 10 ML: at 22:47

## 2018-01-01 RX ADMIN — ALLOPURINOL 100 MG: 100 TABLET ORAL at 09:48

## 2018-01-01 RX ADMIN — PANTOPRAZOLE SODIUM 40 MG: 40 TABLET, DELAYED RELEASE ORAL at 05:28

## 2018-01-01 RX ADMIN — SPIRONOLACTONE 25 MG: 25 TABLET, FILM COATED ORAL at 08:07

## 2018-01-01 RX ADMIN — Medication 5 ML: at 05:08

## 2018-01-01 RX ADMIN — CHOLECALCIFEROL TAB 25 MCG (1000 UNIT) 1000 UNITS: 25 TAB at 09:47

## 2018-01-01 RX ADMIN — CIPROFLOXACIN HYDROCHLORIDE 500 MG: 500 TABLET, FILM COATED ORAL at 08:30

## 2018-01-01 RX ADMIN — ROPINIROLE 2 MG: 2 TABLET, FILM COATED ORAL at 17:29

## 2018-01-01 RX ADMIN — Medication 400 MG: at 09:25

## 2018-01-01 RX ADMIN — HYDROMORPHONE HYDROCHLORIDE 0.5 MG: 5 SOLUTION ORAL at 13:02

## 2018-01-01 RX ADMIN — Medication 10 ML: at 17:11

## 2018-01-01 RX ADMIN — METOPROLOL SUCCINATE 25 MG: 25 TABLET, EXTENDED RELEASE ORAL at 08:39

## 2018-01-01 RX ADMIN — ROPINIROLE HYDROCHLORIDE 2 MG: 2 TABLET, FILM COATED ORAL at 06:07

## 2018-01-01 RX ADMIN — ALLOPURINOL 100 MG: 100 TABLET ORAL at 10:00

## 2018-01-01 RX ADMIN — FUROSEMIDE 40 MG: 40 TABLET ORAL at 22:31

## 2018-01-01 RX ADMIN — ROPINIROLE 2 MG: 2 TABLET, FILM COATED ORAL at 17:45

## 2018-01-01 RX ADMIN — LEVOTHYROXINE SODIUM 88 MCG: 88 TABLET ORAL at 06:42

## 2018-01-01 RX ADMIN — Medication 5 ML: at 22:16

## 2018-01-01 RX ADMIN — LEVOTHYROXINE SODIUM 88 MCG: 88 TABLET ORAL at 05:33

## 2018-01-01 RX ADMIN — DIAZEPAM 2.5 MG: 5 TABLET ORAL at 08:41

## 2018-01-01 RX ADMIN — ALLOPURINOL 100 MG: 100 TABLET ORAL at 17:15

## 2018-01-01 RX ADMIN — PRAVASTATIN SODIUM 40 MG: 20 TABLET ORAL at 09:48

## 2018-01-01 RX ADMIN — Medication 1 AMPULE: at 22:01

## 2018-01-01 RX ADMIN — MUPIROCIN: 20 OINTMENT TOPICAL at 09:34

## 2018-01-01 RX ADMIN — FUROSEMIDE 80 MG: 40 TABLET ORAL at 21:05

## 2018-01-01 RX ADMIN — Medication 1 AMPULE: at 08:36

## 2018-01-01 RX ADMIN — ASPIRIN 81 MG: 81 TABLET, COATED ORAL at 09:08

## 2018-01-01 RX ADMIN — POTASSIUM CHLORIDE 40 MEQ: 20 TABLET, EXTENDED RELEASE ORAL at 08:24

## 2018-01-01 RX ADMIN — HYDROCORTISONE ACETATE 25 MG: 25 SUPPOSITORY RECTAL at 10:07

## 2018-01-01 RX ADMIN — ASPIRIN 81 MG 81 MG: 81 TABLET ORAL at 09:46

## 2018-01-01 RX ADMIN — ALLOPURINOL 100 MG: 100 TABLET ORAL at 17:01

## 2018-01-01 RX ADMIN — CLOPIDOGREL BISULFATE 75 MG: 75 TABLET ORAL at 08:15

## 2018-01-01 RX ADMIN — CALCIUM CARBONATE 600 MG: 500 TABLET, CHEWABLE ORAL at 18:02

## 2018-01-01 RX ADMIN — FUROSEMIDE 80 MG: 40 TABLET ORAL at 08:31

## 2018-01-01 RX ADMIN — SPIRONOLACTONE 25 MG: 25 TABLET, FILM COATED ORAL at 14:00

## 2018-01-01 RX ADMIN — ROPINIROLE HYDROCHLORIDE 2 MG: 1 TABLET, FILM COATED ORAL at 10:29

## 2018-01-01 RX ADMIN — SERTRALINE HYDROCHLORIDE 100 MG: 25 TABLET ORAL at 08:10

## 2018-01-01 RX ADMIN — SENNOSIDES 8.6 MG: 8.6 TABLET, FILM COATED ORAL at 17:31

## 2018-01-01 RX ADMIN — METOLAZONE 5 MG: 5 TABLET ORAL at 09:00

## 2018-01-01 RX ADMIN — ALLOPURINOL 100 MG: 100 TABLET ORAL at 17:43

## 2018-01-01 RX ADMIN — ROPINIROLE HYDROCHLORIDE 2 MG: 1 TABLET, FILM COATED ORAL at 18:15

## 2018-01-01 RX ADMIN — METOPROLOL SUCCINATE 25 MG: 25 TABLET, EXTENDED RELEASE ORAL at 08:40

## 2018-01-01 RX ADMIN — Medication 400 MG: at 08:42

## 2018-01-01 RX ADMIN — SPIRONOLACTONE 25 MG: 25 TABLET, FILM COATED ORAL at 09:08

## 2018-01-01 RX ADMIN — ROPINIROLE HYDROCHLORIDE 2 MG: 2 TABLET, FILM COATED ORAL at 10:00

## 2018-01-01 RX ADMIN — Medication 10 ML: at 09:05

## 2018-01-01 RX ADMIN — METOPROLOL SUCCINATE 25 MG: 25 TABLET, EXTENDED RELEASE ORAL at 08:06

## 2018-01-01 RX ADMIN — DIAZEPAM 5 MG: 5 TABLET ORAL at 21:59

## 2018-01-01 RX ADMIN — SERTRALINE HYDROCHLORIDE 100 MG: 50 TABLET ORAL at 08:40

## 2018-01-01 RX ADMIN — SODIUM CHLORIDE 100 ML: 900 INJECTION, SOLUTION INTRAVENOUS at 07:24

## 2018-01-01 RX ADMIN — HYDROCORTISONE 2.5%: 25 CREAM TOPICAL at 13:50

## 2018-01-01 RX ADMIN — VANCOMYCIN HYDROCHLORIDE 1500 MG: 10 INJECTION, POWDER, LYOPHILIZED, FOR SOLUTION INTRAVENOUS at 05:08

## 2018-01-01 RX ADMIN — POTASSIUM CHLORIDE 40 MEQ: 20 TABLET, EXTENDED RELEASE ORAL at 09:14

## 2018-01-01 RX ADMIN — ALLOPURINOL 100 MG: 100 TABLET ORAL at 22:31

## 2018-01-01 RX ADMIN — POLYETHYLENE GLYCOL 3350 17 G: 17 POWDER, FOR SOLUTION ORAL at 09:05

## 2018-01-01 RX ADMIN — METOPROLOL SUCCINATE 25 MG: 25 TABLET, EXTENDED RELEASE ORAL at 08:42

## 2018-01-01 RX ADMIN — OMEPRAZOLE 20 MG: 20 TABLET, DELAYED RELEASE ORAL at 08:52

## 2018-01-01 RX ADMIN — DIAZEPAM 5 MG: 5 TABLET ORAL at 10:06

## 2018-01-01 RX ADMIN — DIAZEPAM 2.5 MG: 5 TABLET ORAL at 08:26

## 2018-01-01 RX ADMIN — Medication 10 ML: at 20:45

## 2018-01-01 RX ADMIN — LEVALBUTEROL HYDROCHLORIDE 0.63 MG: 0.63 SOLUTION RESPIRATORY (INHALATION) at 15:32

## 2018-01-01 RX ADMIN — NYSTATIN: 100000 POWDER TOPICAL at 20:32

## 2018-01-01 RX ADMIN — LEVALBUTEROL HYDROCHLORIDE 1.25 MG: 1.25 SOLUTION RESPIRATORY (INHALATION) at 00:15

## 2018-01-01 RX ADMIN — POTASSIUM CHLORIDE 20 MEQ: 1500 TABLET, FILM COATED, EXTENDED RELEASE ORAL at 08:21

## 2018-01-01 RX ADMIN — Medication 1 AMPULE: at 10:00

## 2018-01-01 RX ADMIN — Medication 400 MG: at 10:29

## 2018-01-01 RX ADMIN — Medication 1 AMPULE: at 21:01

## 2018-01-01 RX ADMIN — PANTOPRAZOLE SODIUM 40 MG: 40 TABLET, DELAYED RELEASE ORAL at 05:23

## 2018-01-01 RX ADMIN — ALLOPURINOL 100 MG: 100 TABLET ORAL at 17:25

## 2018-01-01 RX ADMIN — SERTRALINE HYDROCHLORIDE 100 MG: 100 TABLET ORAL at 09:54

## 2018-01-01 RX ADMIN — LEVOTHYROXINE SODIUM 88 MCG: 88 TABLET ORAL at 05:28

## 2018-01-01 RX ADMIN — SPIRONOLACTONE 25 MG: 25 TABLET, FILM COATED ORAL at 08:34

## 2018-01-01 RX ADMIN — Medication 400 MG: at 09:00

## 2018-01-01 RX ADMIN — SPIRONOLACTONE 25 MG: 25 TABLET, FILM COATED ORAL at 09:31

## 2018-01-01 RX ADMIN — POLYETHYLENE GLYCOL 3350 17 G: 17 POWDER, FOR SOLUTION ORAL at 09:33

## 2018-01-01 RX ADMIN — POTASSIUM CHLORIDE 40 MEQ: 20 TABLET, EXTENDED RELEASE ORAL at 08:14

## 2018-01-01 RX ADMIN — SERTRALINE HYDROCHLORIDE 100 MG: 100 TABLET ORAL at 09:15

## 2018-01-01 RX ADMIN — Medication 1 AMPULE: at 21:55

## 2018-01-01 RX ADMIN — ALLOPURINOL 100 MG: 100 TABLET ORAL at 22:33

## 2018-01-01 RX ADMIN — ALLOPURINOL 100 MG: 100 TABLET ORAL at 17:23

## 2018-01-01 RX ADMIN — FUROSEMIDE 80 MG: 40 TABLET ORAL at 08:07

## 2018-01-01 RX ADMIN — Medication 400 MG: at 08:36

## 2018-01-01 RX ADMIN — Medication 500 MG: at 12:41

## 2018-01-01 RX ADMIN — ASPIRIN 81 MG 81 MG: 81 TABLET ORAL at 11:47

## 2018-01-01 RX ADMIN — ROPINIROLE HYDROCHLORIDE 2 MG: 2 TABLET, FILM COATED ORAL at 17:34

## 2018-01-01 RX ADMIN — ALLOPURINOL 100 MG: 100 TABLET ORAL at 18:26

## 2018-01-01 RX ADMIN — SPIRONOLACTONE 25 MG: 25 TABLET, FILM COATED ORAL at 08:18

## 2018-01-01 RX ADMIN — Medication 81 MG: at 08:47

## 2018-01-01 RX ADMIN — ROPINIROLE 2 MG: 2 TABLET, FILM COATED ORAL at 08:33

## 2018-01-01 RX ADMIN — ASPIRIN 81 MG: 81 TABLET, COATED ORAL at 08:21

## 2018-01-01 RX ADMIN — DIAZEPAM 5 MG: 5 TABLET ORAL at 08:54

## 2018-01-01 RX ADMIN — POTASSIUM CHLORIDE 40 MEQ: 20 TABLET, EXTENDED RELEASE ORAL at 09:08

## 2018-01-01 RX ADMIN — ALLOPURINOL 100 MG: 100 TABLET ORAL at 16:26

## 2018-01-01 RX ADMIN — TORSEMIDE 40 MG: 20 TABLET ORAL at 09:32

## 2018-01-01 RX ADMIN — PRAVASTATIN SODIUM 40 MG: 20 TABLET ORAL at 08:40

## 2018-01-01 RX ADMIN — HYDROXYZINE HYDROCHLORIDE 12.5 MG: 25 TABLET ORAL at 18:03

## 2018-01-01 RX ADMIN — METOLAZONE 2.5 MG: 2.5 TABLET ORAL at 08:49

## 2018-01-01 RX ADMIN — ROPINIROLE HYDROCHLORIDE 2 MG: 2 TABLET, FILM COATED ORAL at 09:46

## 2018-01-01 RX ADMIN — SERTRALINE HYDROCHLORIDE 100 MG: 100 TABLET ORAL at 08:18

## 2018-01-01 RX ADMIN — ALLOPURINOL 100 MG: 100 TABLET ORAL at 05:37

## 2018-01-01 RX ADMIN — PANTOPRAZOLE SODIUM 40 MG: 40 TABLET, DELAYED RELEASE ORAL at 06:44

## 2018-01-01 RX ADMIN — DIAZEPAM 2 MG: 2 TABLET ORAL at 21:02

## 2018-01-01 RX ADMIN — MUPIROCIN: 20 OINTMENT TOPICAL at 08:37

## 2018-01-01 RX ADMIN — CIPROFLOXACIN HYDROCHLORIDE 500 MG: 500 TABLET, FILM COATED ORAL at 08:41

## 2018-01-01 RX ADMIN — PREDNISONE 10 MG: 10 TABLET ORAL at 08:43

## 2018-01-01 RX ADMIN — ROPINIROLE 2 MG: 2 TABLET, FILM COATED ORAL at 08:01

## 2018-01-01 RX ADMIN — ACETAMINOPHEN 650 MG: 325 TABLET ORAL at 21:33

## 2018-01-01 RX ADMIN — PREDNISONE 10 MG: 10 TABLET ORAL at 08:34

## 2018-01-01 RX ADMIN — Medication 1 AMPULE: at 21:17

## 2018-01-01 RX ADMIN — Medication 400 MG: at 09:54

## 2018-01-01 RX ADMIN — PANTOPRAZOLE SODIUM 40 MG: 40 TABLET, DELAYED RELEASE ORAL at 05:25

## 2018-01-01 RX ADMIN — DIAZEPAM 5 MG: 5 TABLET ORAL at 20:37

## 2018-01-01 RX ADMIN — IPRATROPIUM BROMIDE AND ALBUTEROL SULFATE 3 ML: .5; 3 SOLUTION RESPIRATORY (INHALATION) at 06:32

## 2018-01-01 RX ADMIN — LEVOTHYROXINE SODIUM 88 MCG: 88 TABLET ORAL at 06:44

## 2018-01-01 RX ADMIN — Medication 1 AMPULE: at 08:16

## 2018-01-01 RX ADMIN — LEVOTHYROXINE SODIUM 88 MCG: 88 TABLET ORAL at 05:55

## 2018-01-01 RX ADMIN — ACETAMINOPHEN 650 MG: 325 TABLET, FILM COATED ORAL at 06:14

## 2018-01-01 RX ADMIN — METOPROLOL SUCCINATE 25 MG: 25 TABLET, EXTENDED RELEASE ORAL at 09:08

## 2018-01-01 RX ADMIN — CHOLECALCIFEROL TAB 25 MCG (1000 UNIT) 1000 UNITS: 25 TAB at 08:56

## 2018-01-01 RX ADMIN — ROPINIROLE HYDROCHLORIDE 2 MG: 2 TABLET, FILM COATED ORAL at 21:30

## 2018-01-01 RX ADMIN — ALLOPURINOL 100 MG: 100 TABLET ORAL at 08:24

## 2018-01-01 RX ADMIN — LEVOTHYROXINE SODIUM 88 MCG: 88 TABLET ORAL at 05:44

## 2018-01-01 RX ADMIN — POTASSIUM CHLORIDE 40 MEQ: 20 TABLET, EXTENDED RELEASE ORAL at 09:53

## 2018-01-01 RX ADMIN — ALLOPURINOL 100 MG: 100 TABLET ORAL at 18:13

## 2018-01-01 RX ADMIN — POTASSIUM CHLORIDE 40 MEQ: 20 TABLET, EXTENDED RELEASE ORAL at 09:22

## 2018-01-01 RX ADMIN — Medication 1 AMPULE: at 09:16

## 2018-01-01 RX ADMIN — ALLOPURINOL 100 MG: 100 TABLET ORAL at 06:00

## 2018-01-01 RX ADMIN — LEVOTHYROXINE SODIUM 88 MCG: 88 TABLET ORAL at 05:41

## 2018-01-01 RX ADMIN — ROPINIROLE 2 MG: 2 TABLET, FILM COATED ORAL at 17:21

## 2018-01-01 RX ADMIN — SERTRALINE HYDROCHLORIDE 100 MG: 50 TABLET ORAL at 08:41

## 2018-01-01 RX ADMIN — ASPIRIN 81 MG: 81 TABLET, COATED ORAL at 08:07

## 2018-01-01 RX ADMIN — DIAZEPAM 5 MG: 5 TABLET ORAL at 22:01

## 2018-01-01 RX ADMIN — SERTRALINE HYDROCHLORIDE 100 MG: 100 TABLET ORAL at 08:51

## 2018-01-01 RX ADMIN — Medication 400 MG: at 11:12

## 2018-01-01 RX ADMIN — FUROSEMIDE 80 MG: 40 TABLET ORAL at 11:20

## 2018-01-01 RX ADMIN — APIXABAN 2.5 MG: 2.5 TABLET, FILM COATED ORAL at 10:09

## 2018-01-01 RX ADMIN — Medication 1 AMPULE: at 22:13

## 2018-01-01 RX ADMIN — PRAVASTATIN SODIUM 40 MG: 20 TABLET ORAL at 10:06

## 2018-01-01 RX ADMIN — CALCIUM CARBONATE 600 MG: 500 TABLET, CHEWABLE ORAL at 17:13

## 2018-01-01 RX ADMIN — LEVALBUTEROL HYDROCHLORIDE 1.25 MG: 1.25 SOLUTION RESPIRATORY (INHALATION) at 05:47

## 2018-01-01 RX ADMIN — POTASSIUM CHLORIDE 40 MEQ: 20 TABLET, EXTENDED RELEASE ORAL at 08:46

## 2018-01-01 RX ADMIN — ROPINIROLE HYDROCHLORIDE 2 MG: 2 TABLET, FILM COATED ORAL at 17:52

## 2018-01-01 RX ADMIN — PRAVASTATIN SODIUM 40 MG: 20 TABLET ORAL at 08:51

## 2018-01-01 RX ADMIN — SODIUM CHLORIDE, SODIUM LACTATE, POTASSIUM CHLORIDE, AND CALCIUM CHLORIDE: 600; 310; 30; 20 INJECTION, SOLUTION INTRAVENOUS at 10:33

## 2018-01-01 RX ADMIN — Medication 400 MG: at 10:01

## 2018-01-01 RX ADMIN — Medication 1 AMPULE: at 21:18

## 2018-01-01 RX ADMIN — METOLAZONE 2.5 MG: 2.5 TABLET ORAL at 08:21

## 2018-01-01 RX ADMIN — FUROSEMIDE 80 MG: 10 INJECTION, SOLUTION INTRAMUSCULAR; INTRAVENOUS at 21:02

## 2018-01-01 RX ADMIN — ROPINIROLE HYDROCHLORIDE 2 MG: 2 TABLET, FILM COATED ORAL at 08:38

## 2018-01-01 RX ADMIN — FUROSEMIDE 80 MG: 40 TABLET ORAL at 21:59

## 2018-01-01 RX ADMIN — Medication 400 MG: at 08:08

## 2018-01-01 RX ADMIN — PANTOPRAZOLE SODIUM 40 MG: 40 TABLET, DELAYED RELEASE ORAL at 05:47

## 2018-01-01 RX ADMIN — Medication 5 ML: at 17:46

## 2018-01-01 RX ADMIN — Medication 5 ML: at 05:10

## 2018-01-01 RX ADMIN — POTASSIUM CHLORIDE 40 MEQ: 20 TABLET, EXTENDED RELEASE ORAL at 08:25

## 2018-01-01 RX ADMIN — PANTOPRAZOLE SODIUM 40 MG: 40 TABLET, DELAYED RELEASE ORAL at 09:05

## 2018-01-01 RX ADMIN — METOPROLOL TARTRATE 12.5 MG: 25 TABLET ORAL at 21:30

## 2018-01-01 RX ADMIN — Medication 10 ML: at 08:34

## 2018-01-01 RX ADMIN — FUROSEMIDE 40 MG: 40 TABLET ORAL at 08:22

## 2018-01-01 RX ADMIN — PANTOPRAZOLE SODIUM 40 MG: 40 TABLET, DELAYED RELEASE ORAL at 09:48

## 2018-01-01 RX ADMIN — Medication 10 ML: at 06:24

## 2018-01-01 RX ADMIN — Medication 1 AMPULE: at 21:11

## 2018-01-01 RX ADMIN — ALLOPURINOL 100 MG: 100 TABLET ORAL at 08:35

## 2018-01-01 RX ADMIN — PANTOPRAZOLE SODIUM 40 MG: 40 TABLET, DELAYED RELEASE ORAL at 05:41

## 2018-01-01 RX ADMIN — METOPROLOL SUCCINATE 25 MG: 25 TABLET, EXTENDED RELEASE ORAL at 08:51

## 2018-01-01 RX ADMIN — ROPINIROLE HYDROCHLORIDE 2 MG: 2 TABLET, FILM COATED ORAL at 20:34

## 2018-01-01 RX ADMIN — ALLOPURINOL 100 MG: 100 TABLET ORAL at 09:32

## 2018-01-01 RX ADMIN — CLOPIDOGREL BISULFATE 300 MG: 75 TABLET ORAL at 13:33

## 2018-01-01 RX ADMIN — SERTRALINE HYDROCHLORIDE 100 MG: 100 TABLET ORAL at 09:16

## 2018-01-01 RX ADMIN — PANTOPRAZOLE SODIUM 40 MG: 40 TABLET, DELAYED RELEASE ORAL at 05:05

## 2018-01-01 RX ADMIN — ASPIRIN 81 MG 81 MG: 81 TABLET ORAL at 09:53

## 2018-01-01 RX ADMIN — ROPINIROLE HYDROCHLORIDE 2 MG: 1 TABLET, FILM COATED ORAL at 09:14

## 2018-01-01 RX ADMIN — FUROSEMIDE 80 MG: 40 TABLET ORAL at 20:24

## 2018-01-01 RX ADMIN — ALLOPURINOL 100 MG: 100 TABLET ORAL at 06:07

## 2018-01-01 RX ADMIN — METOPROLOL TARTRATE 12.5 MG: 25 TABLET ORAL at 22:31

## 2018-01-01 RX ADMIN — CEFPODOXIME PROXETIL 200 MG: 200 TABLET, FILM COATED ORAL at 17:11

## 2018-01-01 RX ADMIN — LORATADINE 10 MG: 10 TABLET ORAL at 08:11

## 2018-01-01 RX ADMIN — Medication 400 MG: at 09:18

## 2018-01-01 RX ADMIN — Medication 10 ML: at 21:20

## 2018-01-01 RX ADMIN — ASPIRIN 81 MG 81 MG: 81 TABLET ORAL at 10:29

## 2018-01-01 RX ADMIN — Medication 10 ML: at 05:45

## 2018-01-01 RX ADMIN — Medication 1 AMPULE: at 10:07

## 2018-01-01 RX ADMIN — ALLOPURINOL 100 MG: 100 TABLET ORAL at 18:06

## 2018-01-01 RX ADMIN — POTASSIUM CHLORIDE 40 MEQ: 20 TABLET, EXTENDED RELEASE ORAL at 08:06

## 2018-01-01 RX ADMIN — POLYETHYLENE GLYCOL 3350 17 G: 17 POWDER, FOR SOLUTION ORAL at 08:26

## 2018-01-01 RX ADMIN — DIAZEPAM 5 MG: 5 TABLET ORAL at 21:28

## 2018-01-01 RX ADMIN — Medication 400 MG: at 08:24

## 2018-01-01 RX ADMIN — LEVOTHYROXINE SODIUM 88 MCG: 88 TABLET ORAL at 06:00

## 2018-01-01 RX ADMIN — ROPINIROLE 2 MG: 2 TABLET, FILM COATED ORAL at 18:13

## 2018-01-01 RX ADMIN — LEVOTHYROXINE SODIUM 88 MCG: 88 TABLET ORAL at 08:23

## 2018-01-01 RX ADMIN — Medication 1 AMPULE: at 08:07

## 2018-01-01 RX ADMIN — PREDNISONE 10 MG: 10 TABLET ORAL at 08:00

## 2018-01-01 RX ADMIN — Medication 10 ML: at 05:05

## 2018-01-01 RX ADMIN — Medication 10 ML: at 21:55

## 2018-01-01 RX ADMIN — SENNOSIDES 8.6 MG: 8.6 TABLET, FILM COATED ORAL at 20:32

## 2018-01-01 RX ADMIN — PERFLUTREN 1 ML: 6.52 INJECTION, SUSPENSION INTRAVENOUS at 11:51

## 2018-01-01 RX ADMIN — POLYETHYLENE GLYCOL 3350 17 G: 17 POWDER, FOR SOLUTION ORAL at 08:50

## 2018-01-01 RX ADMIN — LEVOTHYROXINE SODIUM 88 MCG: 88 TABLET ORAL at 05:00

## 2018-01-01 RX ADMIN — Medication 10 ML: at 12:57

## 2018-01-01 RX ADMIN — METOPROLOL SUCCINATE 25 MG: 25 TABLET, EXTENDED RELEASE ORAL at 08:33

## 2018-01-01 RX ADMIN — FUROSEMIDE 40 MG: 40 TABLET ORAL at 08:55

## 2018-01-01 RX ADMIN — HYDROCORTISONE 2.5%: 25 CREAM TOPICAL at 09:48

## 2018-01-01 RX ADMIN — ROPINIROLE HYDROCHLORIDE 2 MG: 2 TABLET, FILM COATED ORAL at 18:24

## 2018-01-01 RX ADMIN — APIXABAN 2.5 MG: 2.5 TABLET, FILM COATED ORAL at 08:06

## 2018-01-01 RX ADMIN — LEVOTHYROXINE SODIUM 88 MCG: 88 TABLET ORAL at 05:14

## 2018-01-01 RX ADMIN — ROPINIROLE HYDROCHLORIDE 2 MG: 1 TABLET, FILM COATED ORAL at 09:16

## 2018-01-01 RX ADMIN — Medication 10 ML: at 13:13

## 2018-01-01 RX ADMIN — ROPINIROLE 2 MG: 2 TABLET, FILM COATED ORAL at 17:40

## 2018-01-01 RX ADMIN — IPRATROPIUM BROMIDE AND ALBUTEROL SULFATE 3 ML: .5; 3 SOLUTION RESPIRATORY (INHALATION) at 15:17

## 2018-01-01 RX ADMIN — LEVOTHYROXINE SODIUM 88 MCG: 88 TABLET ORAL at 05:46

## 2018-01-01 RX ADMIN — METOPROLOL TARTRATE 12.5 MG: 25 TABLET ORAL at 08:46

## 2018-01-01 RX ADMIN — ROPINIROLE HYDROCHLORIDE 2 MG: 2 TABLET, FILM COATED ORAL at 17:01

## 2018-01-01 RX ADMIN — SPIRONOLACTONE 25 MG: 25 TABLET, FILM COATED ORAL at 08:11

## 2018-01-01 RX ADMIN — FUROSEMIDE 80 MG: 10 INJECTION, SOLUTION INTRAMUSCULAR; INTRAVENOUS at 08:18

## 2018-01-01 RX ADMIN — PRAVASTATIN SODIUM 40 MG: 20 TABLET ORAL at 08:35

## 2018-01-01 RX ADMIN — LEVOTHYROXINE SODIUM 88 MCG: 88 TABLET ORAL at 08:39

## 2018-01-01 RX ADMIN — ROPINIROLE 2 MG: 2 TABLET, FILM COATED ORAL at 09:30

## 2018-01-01 RX ADMIN — SERTRALINE HYDROCHLORIDE 100 MG: 25 TABLET ORAL at 09:08

## 2018-01-01 RX ADMIN — ROPINIROLE HYDROCHLORIDE 2 MG: 2 TABLET, FILM COATED ORAL at 22:30

## 2018-01-01 RX ADMIN — DIAZEPAM 5 MG: 5 TABLET ORAL at 08:23

## 2018-01-01 RX ADMIN — SENNOSIDES 8.6 MG: 8.6 TABLET, FILM COATED ORAL at 21:25

## 2018-01-01 RX ADMIN — ALLOPURINOL 100 MG: 100 TABLET ORAL at 09:08

## 2018-01-01 RX ADMIN — PANTOPRAZOLE SODIUM 40 MG: 40 TABLET, DELAYED RELEASE ORAL at 08:23

## 2018-01-01 RX ADMIN — HYDROCORTISONE ACETATE 25 MG: 25 SUPPOSITORY RECTAL at 22:13

## 2018-01-01 RX ADMIN — SERTRALINE HYDROCHLORIDE 100 MG: 25 TABLET ORAL at 10:05

## 2018-01-01 RX ADMIN — ROPINIROLE HYDROCHLORIDE 2 MG: 1 TABLET, FILM COATED ORAL at 17:01

## 2018-01-01 RX ADMIN — POLYETHYLENE GLYCOL 3350 17 G: 17 POWDER, FOR SOLUTION ORAL at 08:17

## 2018-01-01 RX ADMIN — FUROSEMIDE 80 MG: 40 TABLET ORAL at 08:35

## 2018-01-01 RX ADMIN — Medication 10 ML: at 21:17

## 2018-01-01 RX ADMIN — DIAZEPAM 5 MG: 5 TABLET ORAL at 08:11

## 2018-01-01 RX ADMIN — METOLAZONE 2.5 MG: 2.5 TABLET ORAL at 08:25

## 2018-01-01 RX ADMIN — METOPROLOL SUCCINATE 25 MG: 25 TABLET, EXTENDED RELEASE ORAL at 08:50

## 2018-01-01 RX ADMIN — ROPINIROLE HYDROCHLORIDE 2 MG: 2 TABLET, FILM COATED ORAL at 08:58

## 2018-01-01 RX ADMIN — ASPIRIN 81 MG 81 MG: 81 TABLET ORAL at 09:15

## 2018-01-01 RX ADMIN — DIAZEPAM 2.5 MG: 5 TABLET ORAL at 08:44

## 2018-01-01 RX ADMIN — FUROSEMIDE 80 MG: 40 TABLET ORAL at 08:58

## 2018-01-01 RX ADMIN — LEVALBUTEROL HYDROCHLORIDE 0.63 MG: 0.63 SOLUTION RESPIRATORY (INHALATION) at 19:12

## 2018-01-01 RX ADMIN — METOLAZONE 5 MG: 5 TABLET ORAL at 08:39

## 2018-01-01 RX ADMIN — LEVALBUTEROL HYDROCHLORIDE 0.63 MG: 0.63 SOLUTION RESPIRATORY (INHALATION) at 22:08

## 2018-01-01 RX ADMIN — Medication 10 ML: at 21:14

## 2018-01-01 RX ADMIN — PREDNISONE 10 MG: 10 TABLET ORAL at 08:45

## 2018-01-01 RX ADMIN — HYDROCORTISONE 2.5%: 25 CREAM TOPICAL at 22:13

## 2018-01-01 RX ADMIN — ROPINIROLE HYDROCHLORIDE 2 MG: 2 TABLET, FILM COATED ORAL at 21:32

## 2018-01-01 RX ADMIN — CIPROFLOXACIN HYDROCHLORIDE 500 MG: 500 TABLET, FILM COATED ORAL at 20:34

## 2018-01-01 RX ADMIN — Medication 400 MG: at 08:22

## 2018-01-01 RX ADMIN — ALLOPURINOL 100 MG: 100 TABLET ORAL at 08:17

## 2018-01-01 RX ADMIN — Medication 1 AMPULE: at 08:10

## 2018-01-01 RX ADMIN — ASPIRIN 81 MG: 81 TABLET, COATED ORAL at 08:31

## 2018-01-01 RX ADMIN — SERTRALINE HYDROCHLORIDE 100 MG: 25 TABLET ORAL at 08:59

## 2018-01-01 RX ADMIN — CIPROFLOXACIN HYDROCHLORIDE 500 MG: 500 TABLET, FILM COATED ORAL at 10:46

## 2018-01-01 RX ADMIN — Medication 10 ML: at 22:13

## 2018-01-01 RX ADMIN — PROTAMINE SULFATE 50 MG: 10 INJECTION, SOLUTION INTRAVENOUS at 12:01

## 2018-01-01 RX ADMIN — PANTOPRAZOLE SODIUM 40 MG: 40 TABLET, DELAYED RELEASE ORAL at 06:00

## 2018-01-01 RX ADMIN — LEVOTHYROXINE SODIUM 88 MCG: 88 TABLET ORAL at 05:15

## 2018-01-01 RX ADMIN — Medication 10 ML: at 22:27

## 2018-01-01 RX ADMIN — POTASSIUM CHLORIDE 40 MEQ: 20 TABLET, EXTENDED RELEASE ORAL at 08:50

## 2018-01-01 RX ADMIN — Medication 1 AMPULE: at 20:37

## 2018-01-01 RX ADMIN — Medication 5 ML: at 20:17

## 2018-01-01 RX ADMIN — ASPIRIN 81 MG 81 MG: 81 TABLET ORAL at 13:48

## 2018-01-01 RX ADMIN — TUBERCULIN PURIFIED PROTEIN DERIVATIVE 5 UNITS: 5 INJECTION, SOLUTION INTRADERMAL at 13:28

## 2018-01-01 RX ADMIN — SERTRALINE HYDROCHLORIDE 100 MG: 100 TABLET ORAL at 08:23

## 2018-01-01 RX ADMIN — Medication 10 ML: at 05:20

## 2018-01-01 RX ADMIN — Medication 10 ML: at 15:32

## 2018-01-01 RX ADMIN — CIPROFLOXACIN HYDROCHLORIDE 500 MG: 500 TABLET, FILM COATED ORAL at 21:24

## 2018-01-01 RX ADMIN — ASPIRIN 81 MG 81 MG: 81 TABLET ORAL at 08:49

## 2018-01-01 RX ADMIN — FUROSEMIDE 80 MG: 40 TABLET ORAL at 10:28

## 2018-01-01 RX ADMIN — APIXABAN 5 MG: 5 TABLET, FILM COATED ORAL at 10:00

## 2018-01-01 RX ADMIN — Medication 1 AMPULE: at 21:24

## 2018-01-01 RX ADMIN — PRAVASTATIN SODIUM 40 MG: 20 TABLET ORAL at 09:52

## 2018-01-01 RX ADMIN — ALLOPURINOL 100 MG: 100 TABLET ORAL at 21:32

## 2018-01-01 RX ADMIN — METOPROLOL TARTRATE 12.5 MG: 25 TABLET ORAL at 08:29

## 2018-01-01 RX ADMIN — LEVOTHYROXINE SODIUM 88 MCG: 88 TABLET ORAL at 05:42

## 2018-01-01 RX ADMIN — FUROSEMIDE 80 MG: 40 TABLET ORAL at 20:57

## 2018-01-01 RX ADMIN — NYSTATIN: 100000 POWDER TOPICAL at 09:00

## 2018-01-01 RX ADMIN — ACETAMINOPHEN 487.5 MG: 325 TABLET, FILM COATED ORAL at 13:57

## 2018-01-01 RX ADMIN — ROPINIROLE HYDROCHLORIDE 2 MG: 2 TABLET, FILM COATED ORAL at 08:16

## 2018-01-01 RX ADMIN — ROPINIROLE HYDROCHLORIDE 2 MG: 2 TABLET, FILM COATED ORAL at 08:20

## 2018-01-01 RX ADMIN — Medication 81 MG: at 08:02

## 2018-01-01 RX ADMIN — DIAZEPAM 5 MG: 5 TABLET ORAL at 23:02

## 2018-01-01 RX ADMIN — ROPINIROLE HYDROCHLORIDE 2 MG: 1 TABLET, FILM COATED ORAL at 09:17

## 2018-01-01 RX ADMIN — FUROSEMIDE 80 MG: 40 TABLET ORAL at 08:14

## 2018-01-01 RX ADMIN — PRAVASTATIN SODIUM 40 MG: 20 TABLET ORAL at 08:46

## 2018-01-01 RX ADMIN — ACETAMINOPHEN 650 MG: 325 TABLET ORAL at 08:41

## 2018-01-01 RX ADMIN — Medication 1 AMPULE: at 21:42

## 2018-01-01 RX ADMIN — FUROSEMIDE 40 MG: 40 TABLET ORAL at 14:19

## 2018-01-01 RX ADMIN — PANTOPRAZOLE SODIUM 40 MG: 40 TABLET, DELAYED RELEASE ORAL at 05:34

## 2018-01-01 RX ADMIN — LEVALBUTEROL HYDROCHLORIDE 1.25 MG: 1.25 SOLUTION RESPIRATORY (INHALATION) at 16:48

## 2018-01-01 RX ADMIN — POTASSIUM CHLORIDE 40 MEQ: 20 TABLET, EXTENDED RELEASE ORAL at 09:17

## 2018-01-01 RX ADMIN — SERTRALINE HYDROCHLORIDE 100 MG: 100 TABLET ORAL at 09:18

## 2018-01-01 RX ADMIN — SPIRONOLACTONE 25 MG: 25 TABLET, FILM COATED ORAL at 09:44

## 2018-01-01 RX ADMIN — POTASSIUM CHLORIDE 20 MEQ: 1500 TABLET, FILM COATED, EXTENDED RELEASE ORAL at 09:19

## 2018-01-01 RX ADMIN — ALLOPURINOL 100 MG: 100 TABLET ORAL at 08:08

## 2018-01-01 RX ADMIN — Medication 500 MG: at 05:43

## 2018-01-01 RX ADMIN — ALLOPURINOL 100 MG: 100 TABLET ORAL at 17:52

## 2018-01-01 RX ADMIN — POTASSIUM CHLORIDE 40 MEQ: 20 TABLET, EXTENDED RELEASE ORAL at 08:07

## 2018-01-01 RX ADMIN — APIXABAN 2.5 MG: 2.5 TABLET, FILM COATED ORAL at 08:42

## 2018-01-01 RX ADMIN — DIAZEPAM 2.5 MG: 5 TABLET ORAL at 21:30

## 2018-01-01 RX ADMIN — Medication 1 AMPULE: at 20:34

## 2018-01-01 RX ADMIN — ROPINIROLE HYDROCHLORIDE 2 MG: 2 TABLET, FILM COATED ORAL at 08:56

## 2018-01-01 RX ADMIN — TORSEMIDE 40 MG: 20 TABLET ORAL at 08:30

## 2018-01-01 RX ADMIN — POLYETHYLENE GLYCOL 3350 17 G: 17 POWDER, FOR SOLUTION ORAL at 09:06

## 2018-01-01 RX ADMIN — FUROSEMIDE 80 MG: 10 INJECTION, SOLUTION INTRAMUSCULAR; INTRAVENOUS at 15:29

## 2018-01-01 RX ADMIN — PANTOPRAZOLE SODIUM 40 MG: 40 TABLET, DELAYED RELEASE ORAL at 06:04

## 2018-01-01 RX ADMIN — ROPINIROLE HYDROCHLORIDE 2 MG: 2 TABLET, FILM COATED ORAL at 09:48

## 2018-01-01 RX ADMIN — Medication 10 ML: at 05:54

## 2018-01-01 RX ADMIN — Medication 10 ML: at 14:47

## 2018-01-01 RX ADMIN — DIAZEPAM 2.5 MG: 5 TABLET ORAL at 21:17

## 2018-01-01 RX ADMIN — SPIRONOLACTONE 25 MG: 25 TABLET, FILM COATED ORAL at 08:45

## 2018-01-01 RX ADMIN — ROPINIROLE HYDROCHLORIDE 2 MG: 2 TABLET, FILM COATED ORAL at 21:33

## 2018-01-01 RX ADMIN — POLYETHYLENE GLYCOL 3350 17 G: 17 POWDER, FOR SOLUTION ORAL at 08:56

## 2018-01-01 RX ADMIN — PRAVASTATIN SODIUM 40 MG: 20 TABLET ORAL at 08:08

## 2018-01-01 RX ADMIN — METOLAZONE 2.5 MG: 2.5 TABLET ORAL at 08:20

## 2018-01-01 RX ADMIN — ACETAMINOPHEN 487.5 MG: 325 TABLET, FILM COATED ORAL at 09:11

## 2018-01-01 RX ADMIN — APIXABAN 5 MG: 5 TABLET, FILM COATED ORAL at 21:37

## 2018-01-01 RX ADMIN — LEVOTHYROXINE SODIUM 88 MCG: 88 TABLET ORAL at 06:23

## 2018-01-01 RX ADMIN — ROPINIROLE 2 MG: 2 TABLET, FILM COATED ORAL at 08:27

## 2018-01-01 RX ADMIN — Medication 20 MG: at 08:41

## 2018-01-01 RX ADMIN — SERTRALINE HYDROCHLORIDE 100 MG: 100 TABLET ORAL at 09:06

## 2018-01-01 RX ADMIN — POLYETHYLENE GLYCOL 3350 17 G: 17 POWDER, FOR SOLUTION ORAL at 08:47

## 2018-01-01 RX ADMIN — ALLOPURINOL 100 MG: 100 TABLET ORAL at 11:12

## 2018-01-01 RX ADMIN — Medication 1 AMPULE: at 09:31

## 2018-01-01 RX ADMIN — Medication 10 ML: at 05:17

## 2018-01-01 RX ADMIN — POLYETHYLENE GLYCOL 3350 17 G: 17 POWDER, FOR SOLUTION ORAL at 09:24

## 2018-01-01 RX ADMIN — ROPINIROLE HYDROCHLORIDE 2 MG: 2 TABLET, FILM COATED ORAL at 09:25

## 2018-01-01 RX ADMIN — CLOPIDOGREL BISULFATE 75 MG: 75 TABLET ORAL at 08:29

## 2018-01-01 RX ADMIN — Medication 400 MG: at 09:08

## 2018-01-01 RX ADMIN — METOPROLOL SUCCINATE 50 MG: 50 TABLET, EXTENDED RELEASE ORAL at 09:30

## 2018-01-01 RX ADMIN — ALLOPURINOL 100 MG: 100 TABLET ORAL at 17:30

## 2018-01-01 RX ADMIN — PANTOPRAZOLE SODIUM 40 MG: 40 TABLET, DELAYED RELEASE ORAL at 08:18

## 2018-01-01 RX ADMIN — Medication 5 ML: at 21:21

## 2018-01-01 RX ADMIN — POTASSIUM CHLORIDE 40 MEQ: 20 TABLET, EXTENDED RELEASE ORAL at 08:55

## 2018-01-01 RX ADMIN — PROPOFOL 25 MCG/KG/MIN: 10 INJECTION, EMULSION INTRAVENOUS at 10:49

## 2018-01-01 RX ADMIN — HYDROCORTISONE 2.5%: 25 CREAM TOPICAL at 08:50

## 2018-01-01 RX ADMIN — POTASSIUM CHLORIDE 20 MEQ: 20 TABLET, EXTENDED RELEASE ORAL at 17:08

## 2018-01-01 RX ADMIN — SERTRALINE HYDROCHLORIDE 100 MG: 100 TABLET ORAL at 09:48

## 2018-01-01 RX ADMIN — Medication 10 ML: at 23:28

## 2018-01-01 RX ADMIN — Medication 81 MG: at 08:18

## 2018-01-01 RX ADMIN — PREDNISONE 10 MG: 10 TABLET ORAL at 08:16

## 2018-01-01 RX ADMIN — POTASSIUM CHLORIDE 20 MEQ: 1500 TABLET, FILM COATED, EXTENDED RELEASE ORAL at 08:41

## 2018-01-01 RX ADMIN — ROPINIROLE HYDROCHLORIDE 2 MG: 1 TABLET, FILM COATED ORAL at 16:21

## 2018-01-01 RX ADMIN — METOLAZONE 5 MG: 5 TABLET ORAL at 08:42

## 2018-01-01 RX ADMIN — POTASSIUM CHLORIDE 40 MEQ: 20 TABLET, EXTENDED RELEASE ORAL at 10:28

## 2018-01-01 RX ADMIN — Medication 10 ML: at 14:10

## 2018-01-01 RX ADMIN — SERTRALINE HYDROCHLORIDE 100 MG: 100 TABLET ORAL at 10:07

## 2018-01-01 RX ADMIN — DIAZEPAM 2.5 MG: 5 TABLET ORAL at 08:52

## 2018-01-01 RX ADMIN — Medication 1 AMPULE: at 21:15

## 2018-01-01 RX ADMIN — Medication 400 MG: at 10:10

## 2018-01-01 RX ADMIN — ALLOPURINOL 100 MG: 100 TABLET ORAL at 08:18

## 2018-01-01 RX ADMIN — Medication 10 ML: at 22:35

## 2018-01-01 RX ADMIN — SENNOSIDES 8.6 MG: 8.6 TABLET, FILM COATED ORAL at 17:16

## 2018-01-01 RX ADMIN — PRAVASTATIN SODIUM 40 MG: 20 TABLET ORAL at 09:00

## 2018-01-01 RX ADMIN — PRAVASTATIN SODIUM 40 MG: 20 TABLET ORAL at 09:33

## 2018-01-01 RX ADMIN — CIPROFLOXACIN HYDROCHLORIDE 500 MG: 500 TABLET, FILM COATED ORAL at 12:38

## 2018-01-01 RX ADMIN — PANTOPRAZOLE SODIUM 40 MG: 40 TABLET, DELAYED RELEASE ORAL at 04:54

## 2018-01-01 RX ADMIN — POLYETHYLENE GLYCOL 3350 17 G: 17 POWDER, FOR SOLUTION ORAL at 12:39

## 2018-01-01 RX ADMIN — ALLOPURINOL 100 MG: 100 TABLET ORAL at 08:06

## 2018-01-01 RX ADMIN — APIXABAN 5 MG: 5 TABLET, FILM COATED ORAL at 21:16

## 2018-01-01 RX ADMIN — FLUCONAZOLE 200 MG: 100 TABLET ORAL at 09:13

## 2018-01-01 RX ADMIN — SPIRONOLACTONE 25 MG: 25 TABLET, FILM COATED ORAL at 08:36

## 2018-01-01 RX ADMIN — ROPINIROLE HYDROCHLORIDE 2 MG: 2 TABLET, FILM COATED ORAL at 08:26

## 2018-01-01 RX ADMIN — FUROSEMIDE 80 MG: 40 TABLET ORAL at 09:16

## 2018-01-01 RX ADMIN — Medication 10 ML: at 05:34

## 2018-01-01 RX ADMIN — FLUCONAZOLE 200 MG: 100 TABLET ORAL at 08:55

## 2018-01-01 RX ADMIN — FUROSEMIDE 80 MG: 40 TABLET ORAL at 08:38

## 2018-01-01 RX ADMIN — ROPINIROLE HYDROCHLORIDE 2 MG: 1 TABLET, FILM COATED ORAL at 11:11

## 2018-01-01 RX ADMIN — PREDNISONE 10 MG: 10 TABLET ORAL at 09:14

## 2018-01-01 RX ADMIN — Medication 1 AMPULE: at 09:12

## 2018-01-01 RX ADMIN — FUROSEMIDE 40 MG: 40 TABLET ORAL at 08:26

## 2018-01-01 RX ADMIN — METOLAZONE 2.5 MG: 2.5 TABLET ORAL at 08:15

## 2018-01-01 RX ADMIN — PRAVASTATIN SODIUM 40 MG: 20 TABLET ORAL at 08:36

## 2018-01-01 RX ADMIN — SPIRONOLACTONE 25 MG: 25 TABLET, FILM COATED ORAL at 10:01

## 2018-01-01 RX ADMIN — APIXABAN 5 MG: 5 TABLET, FILM COATED ORAL at 08:51

## 2018-01-01 RX ADMIN — Medication 1 AMPULE: at 11:17

## 2018-01-01 RX ADMIN — ROPINIROLE HYDROCHLORIDE 2 MG: 1 TABLET, FILM COATED ORAL at 17:30

## 2018-01-01 RX ADMIN — IOPAMIDOL 80 ML: 755 INJECTION, SOLUTION INTRAVENOUS at 13:14

## 2018-01-01 RX ADMIN — POTASSIUM CHLORIDE 40 MEQ: 20 TABLET, EXTENDED RELEASE ORAL at 17:16

## 2018-01-01 RX ADMIN — Medication 1 AMPULE: at 09:24

## 2018-01-01 RX ADMIN — ROPINIROLE HYDROCHLORIDE 2 MG: 2 TABLET, FILM COATED ORAL at 21:41

## 2018-01-01 RX ADMIN — POLYETHYLENE GLYCOL 3350 17 G: 17 POWDER, FOR SOLUTION ORAL at 10:04

## 2018-01-01 RX ADMIN — PREDNISONE 10 MG: 10 TABLET ORAL at 08:31

## 2018-01-01 RX ADMIN — ACETAMINOPHEN 487.5 MG: 325 TABLET, FILM COATED ORAL at 09:22

## 2018-01-01 RX ADMIN — APIXABAN 5 MG: 5 TABLET, FILM COATED ORAL at 22:01

## 2018-01-01 RX ADMIN — ASPIRIN 81 MG: 81 TABLET ORAL at 09:00

## 2018-01-01 RX ADMIN — POTASSIUM CHLORIDE 20 MEQ: 20 TABLET, EXTENDED RELEASE ORAL at 17:11

## 2018-01-01 RX ADMIN — LEVOTHYROXINE SODIUM 88 MCG: 88 TABLET ORAL at 05:11

## 2018-01-01 RX ADMIN — POLYETHYLENE GLYCOL 3350 17 G: 17 POWDER, FOR SOLUTION ORAL at 09:07

## 2018-01-01 RX ADMIN — FUROSEMIDE 80 MG: 40 TABLET ORAL at 08:13

## 2018-01-01 RX ADMIN — APIXABAN 5 MG: 5 TABLET, FILM COATED ORAL at 11:54

## 2018-01-01 RX ADMIN — METOPROLOL SUCCINATE 25 MG: 25 TABLET, EXTENDED RELEASE ORAL at 08:13

## 2018-01-01 RX ADMIN — METOPROLOL SUCCINATE 25 MG: 25 TABLET, EXTENDED RELEASE ORAL at 10:09

## 2018-01-01 RX ADMIN — SERTRALINE HYDROCHLORIDE 100 MG: 25 TABLET ORAL at 08:54

## 2018-01-01 RX ADMIN — ALLOPURINOL 100 MG: 100 TABLET ORAL at 09:18

## 2018-01-01 RX ADMIN — FUROSEMIDE 40 MG: 40 TABLET ORAL at 13:47

## 2018-01-01 RX ADMIN — PRAVASTATIN SODIUM 40 MG: 20 TABLET ORAL at 08:59

## 2018-01-01 RX ADMIN — Medication 1 AMPULE: at 09:47

## 2018-01-01 RX ADMIN — ASPIRIN 81 MG 81 MG: 81 TABLET ORAL at 10:07

## 2018-01-01 RX ADMIN — ALLOPURINOL 100 MG: 100 TABLET ORAL at 10:28

## 2018-01-01 RX ADMIN — Medication 10 ML: at 05:01

## 2018-01-01 RX ADMIN — PRAVASTATIN SODIUM 40 MG: 20 TABLET ORAL at 09:05

## 2018-01-01 RX ADMIN — Medication 81 MG: at 08:43

## 2018-01-01 RX ADMIN — POTASSIUM CHLORIDE 20 MEQ: 1500 TABLET, FILM COATED, EXTENDED RELEASE ORAL at 08:31

## 2018-01-01 RX ADMIN — CIPROFLOXACIN HYDROCHLORIDE 500 MG: 500 TABLET, FILM COATED ORAL at 09:25

## 2018-01-01 RX ADMIN — Medication 5 ML: at 17:01

## 2018-01-01 RX ADMIN — LEVOTHYROXINE SODIUM 88 MCG: 88 TABLET ORAL at 05:53

## 2018-01-01 RX ADMIN — FUROSEMIDE 80 MG: 40 TABLET ORAL at 09:18

## 2018-01-01 RX ADMIN — SPIRONOLACTONE 25 MG: 25 TABLET, FILM COATED ORAL at 08:08

## 2018-01-01 RX ADMIN — FUROSEMIDE 80 MG: 40 TABLET ORAL at 17:15

## 2018-01-01 RX ADMIN — POTASSIUM CHLORIDE 20 MEQ: 1500 TABLET, FILM COATED, EXTENDED RELEASE ORAL at 09:27

## 2018-01-01 RX ADMIN — ASPIRIN 81 MG 81 MG: 81 TABLET ORAL at 09:06

## 2018-01-01 RX ADMIN — POTASSIUM CHLORIDE 20 MEQ: 1500 TABLET, FILM COATED, EXTENDED RELEASE ORAL at 17:45

## 2018-01-01 RX ADMIN — Medication 1 AMPULE: at 21:40

## 2018-01-01 RX ADMIN — Medication 10 ML: at 21:24

## 2018-01-01 RX ADMIN — POLYETHYLENE GLYCOL 3350 17 G: 17 POWDER, FOR SOLUTION ORAL at 10:00

## 2018-01-01 RX ADMIN — OMEPRAZOLE 20 MG: 20 TABLET, DELAYED RELEASE ORAL at 08:00

## 2018-01-01 RX ADMIN — ROPINIROLE HYDROCHLORIDE 2 MG: 2 TABLET, FILM COATED ORAL at 08:41

## 2018-01-01 RX ADMIN — SODIUM CHLORIDE 50 ML/HR: 900 INJECTION, SOLUTION INTRAVENOUS at 17:24

## 2018-01-01 RX ADMIN — Medication 1 AMPULE: at 22:40

## 2018-01-01 RX ADMIN — ALLOPURINOL 100 MG: 100 TABLET ORAL at 18:23

## 2018-01-01 RX ADMIN — CEFTRIAXONE SODIUM 1 G: 1 INJECTION, POWDER, FOR SOLUTION INTRAMUSCULAR; INTRAVENOUS at 21:06

## 2018-01-01 RX ADMIN — PREDNISONE 10 MG: 10 TABLET ORAL at 09:02

## 2018-01-01 RX ADMIN — ALLOPURINOL 100 MG: 100 TABLET ORAL at 09:01

## 2018-01-01 RX ADMIN — PANTOPRAZOLE SODIUM 40 MG: 40 TABLET, DELAYED RELEASE ORAL at 05:15

## 2018-01-01 RX ADMIN — ROPINIROLE HYDROCHLORIDE 2 MG: 2 TABLET, FILM COATED ORAL at 17:17

## 2018-01-01 RX ADMIN — POTASSIUM CHLORIDE 20 MEQ: 1500 TABLET, FILM COATED, EXTENDED RELEASE ORAL at 18:13

## 2018-01-01 RX ADMIN — Medication 1 AMPULE: at 09:26

## 2018-01-01 RX ADMIN — DIAZEPAM 5 MG: 5 TABLET ORAL at 21:26

## 2018-01-01 RX ADMIN — HYDROCORTISONE ACETATE 25 MG: 25 SUPPOSITORY RECTAL at 10:30

## 2018-01-01 RX ADMIN — Medication 81 MG: at 08:25

## 2018-01-01 RX ADMIN — Medication 2 G: at 10:58

## 2018-01-01 RX ADMIN — ROPINIROLE 2 MG: 2 TABLET, FILM COATED ORAL at 08:46

## 2018-01-01 RX ADMIN — SERTRALINE HYDROCHLORIDE 100 MG: 50 TABLET ORAL at 08:52

## 2018-01-01 RX ADMIN — FUROSEMIDE 40 MG: 40 TABLET ORAL at 08:20

## 2018-01-01 RX ADMIN — APIXABAN 2.5 MG: 2.5 TABLET, FILM COATED ORAL at 21:21

## 2018-01-01 RX ADMIN — Medication 1 AMPULE: at 23:28

## 2018-01-01 RX ADMIN — CALCIUM CARBONATE 600 MG: 500 TABLET, CHEWABLE ORAL at 17:43

## 2018-01-01 RX ADMIN — PANTOPRAZOLE SODIUM 40 MG: 40 TABLET, DELAYED RELEASE ORAL at 05:40

## 2018-01-01 RX ADMIN — APIXABAN 2.5 MG: 2.5 TABLET, FILM COATED ORAL at 09:00

## 2018-01-01 RX ADMIN — METOPROLOL SUCCINATE 50 MG: 50 TABLET, EXTENDED RELEASE ORAL at 10:00

## 2018-01-01 RX ADMIN — APIXABAN 5 MG: 5 TABLET, FILM COATED ORAL at 21:15

## 2018-01-01 RX ADMIN — POTASSIUM CHLORIDE 40 MEQ: 20 TABLET, EXTENDED RELEASE ORAL at 10:01

## 2018-01-01 RX ADMIN — ALLOPURINOL 100 MG: 100 TABLET ORAL at 08:49

## 2018-01-01 RX ADMIN — PANTOPRAZOLE SODIUM 40 MG: 40 TABLET, DELAYED RELEASE ORAL at 05:17

## 2018-01-01 RX ADMIN — ALLOPURINOL 100 MG: 100 TABLET ORAL at 10:07

## 2018-01-01 RX ADMIN — HYDROCORTISONE 1 EACH: 25 CREAM TOPICAL at 15:50

## 2018-01-01 RX ADMIN — PANTOPRAZOLE SODIUM 40 MG: 40 TABLET, DELAYED RELEASE ORAL at 08:15

## 2018-01-01 RX ADMIN — SENNOSIDES 8.6 MG: 8.6 TABLET, FILM COATED ORAL at 08:16

## 2018-01-01 RX ADMIN — Medication 10 ML: at 07:24

## 2018-01-01 RX ADMIN — APIXABAN 5 MG: 5 TABLET, FILM COATED ORAL at 21:43

## 2018-01-01 RX ADMIN — PREDNISONE 10 MG: 10 TABLET ORAL at 08:12

## 2018-01-01 RX ADMIN — PRAVASTATIN SODIUM 40 MG: 20 TABLET ORAL at 08:06

## 2018-01-01 RX ADMIN — Medication 500 MG: at 06:30

## 2018-01-01 RX ADMIN — METOLAZONE 5 MG: 5 TABLET ORAL at 09:28

## 2018-01-01 RX ADMIN — PRAVASTATIN SODIUM 40 MG: 20 TABLET ORAL at 09:25

## 2018-01-01 RX ADMIN — DIAZEPAM 2.5 MG: 5 TABLET ORAL at 21:18

## 2018-01-01 RX ADMIN — ASPIRIN 81 MG: 81 TABLET ORAL at 08:17

## 2018-01-01 RX ADMIN — FUROSEMIDE 80 MG: 10 INJECTION, SOLUTION INTRAMUSCULAR; INTRAVENOUS at 08:16

## 2018-01-01 RX ADMIN — LEVOTHYROXINE SODIUM 88 MCG: 88 TABLET ORAL at 05:23

## 2018-01-01 RX ADMIN — ROPINIROLE HYDROCHLORIDE 2 MG: 2 TABLET, FILM COATED ORAL at 09:16

## 2018-01-01 RX ADMIN — HEPARIN SODIUM 1500 UNITS: 1000 INJECTION, SOLUTION INTRAVENOUS; SUBCUTANEOUS at 11:42

## 2018-01-01 RX ADMIN — POTASSIUM CHLORIDE 40 MEQ: 20 TABLET, EXTENDED RELEASE ORAL at 09:06

## 2018-01-01 RX ADMIN — CIPROFLOXACIN HYDROCHLORIDE 500 MG: 500 TABLET, FILM COATED ORAL at 21:03

## 2018-01-01 RX ADMIN — FUROSEMIDE 80 MG: 40 TABLET ORAL at 09:25

## 2018-01-01 RX ADMIN — LEVOTHYROXINE SODIUM 88 MCG: 88 TABLET ORAL at 08:17

## 2018-01-01 RX ADMIN — Medication 400 MG: at 08:18

## 2018-01-01 RX ADMIN — DIAZEPAM 5 MG: 5 TABLET ORAL at 20:24

## 2018-01-01 RX ADMIN — ALLOPURINOL 100 MG: 100 TABLET ORAL at 19:00

## 2018-01-01 RX ADMIN — ALUMINUM HYDROXIDE, MAGNESIUM HYDROXIDE, AND SIMETHICONE 30 ML: 200; 200; 20 SUSPENSION ORAL at 17:40

## 2018-01-01 RX ADMIN — Medication 5 ML: at 18:15

## 2018-01-01 RX ADMIN — SERTRALINE HYDROCHLORIDE 100 MG: 50 TABLET ORAL at 09:13

## 2018-01-01 RX ADMIN — ALLOPURINOL 100 MG: 100 TABLET ORAL at 09:23

## 2018-01-01 RX ADMIN — DIAZEPAM 5 MG: 5 TABLET ORAL at 22:31

## 2018-01-01 RX ADMIN — METOLAZONE 5 MG: 5 TABLET ORAL at 08:47

## 2018-01-01 RX ADMIN — NYSTATIN: 100000 POWDER TOPICAL at 08:59

## 2018-01-01 RX ADMIN — FUROSEMIDE 40 MG: 40 TABLET ORAL at 20:42

## 2018-01-01 RX ADMIN — ROPINIROLE HYDROCHLORIDE 2 MG: 2 TABLET, FILM COATED ORAL at 09:15

## 2018-01-01 RX ADMIN — CIPROFLOXACIN HYDROCHLORIDE 500 MG: 500 TABLET, FILM COATED ORAL at 21:45

## 2018-01-01 RX ADMIN — SPIRONOLACTONE 25 MG: 25 TABLET, FILM COATED ORAL at 08:06

## 2018-01-01 RX ADMIN — Medication 10 ML: at 18:00

## 2018-01-01 RX ADMIN — ROPINIROLE HYDROCHLORIDE 2 MG: 2 TABLET, FILM COATED ORAL at 21:00

## 2018-01-01 RX ADMIN — Medication 10 ML: at 21:45

## 2018-01-01 RX ADMIN — PRAVASTATIN SODIUM 40 MG: 20 TABLET ORAL at 08:33

## 2018-01-01 RX ADMIN — DIAZEPAM 2.5 MG: 5 TABLET ORAL at 00:30

## 2018-01-01 RX ADMIN — MUPIROCIN: 20 OINTMENT TOPICAL at 22:34

## 2018-01-01 RX ADMIN — DIAZEPAM 5 MG: 5 TABLET ORAL at 21:18

## 2018-01-01 RX ADMIN — FLUTICASONE PROPIONATE 2 SPRAY: 50 SPRAY, METERED NASAL at 08:37

## 2018-01-01 RX ADMIN — HEPARIN 3 ML/HR: 100 SYRINGE at 13:00

## 2018-01-01 RX ADMIN — WARFARIN SODIUM 5 MG: 5 TABLET ORAL at 17:43

## 2018-01-01 RX ADMIN — METOPROLOL SUCCINATE 25 MG: 25 TABLET, EXTENDED RELEASE ORAL at 10:07

## 2018-01-01 RX ADMIN — POLYETHYLENE GLYCOL 3350 17 G: 17 POWDER, FOR SOLUTION ORAL at 09:16

## 2018-01-01 RX ADMIN — LEVOTHYROXINE SODIUM 88 MCG: 88 TABLET ORAL at 08:48

## 2018-01-01 RX ADMIN — POTASSIUM CHLORIDE 40 MEQ: 20 TABLET, EXTENDED RELEASE ORAL at 08:23

## 2018-01-01 RX ADMIN — ALLOPURINOL 100 MG: 100 TABLET ORAL at 17:16

## 2018-01-01 RX ADMIN — DIAZEPAM 2.5 MG: 5 TABLET ORAL at 23:09

## 2018-01-01 RX ADMIN — NYSTATIN 15 G: 100000 POWDER TOPICAL at 21:26

## 2018-01-01 RX ADMIN — Medication 10 ML: at 20:48

## 2018-01-01 RX ADMIN — ROPINIROLE HYDROCHLORIDE 2 MG: 2 TABLET, FILM COATED ORAL at 09:31

## 2018-01-01 RX ADMIN — APIXABAN 5 MG: 5 TABLET, FILM COATED ORAL at 21:59

## 2018-01-01 RX ADMIN — ALLOPURINOL 100 MG: 100 TABLET ORAL at 16:36

## 2018-01-01 RX ADMIN — LEVOTHYROXINE SODIUM 88 MCG: 88 TABLET ORAL at 08:49

## 2018-01-01 RX ADMIN — SPIRONOLACTONE 25 MG: 25 TABLET, FILM COATED ORAL at 08:53

## 2018-01-01 RX ADMIN — POLYETHYLENE GLYCOL 3350 17 G: 17 POWDER, FOR SOLUTION ORAL at 10:01

## 2018-01-01 RX ADMIN — SERTRALINE HYDROCHLORIDE 100 MG: 25 TABLET ORAL at 08:12

## 2018-01-01 RX ADMIN — Medication 400 MG: at 12:15

## 2018-01-01 RX ADMIN — POTASSIUM CHLORIDE 20 MEQ: 1500 TABLET, FILM COATED, EXTENDED RELEASE ORAL at 08:46

## 2018-01-01 RX ADMIN — ALLOPURINOL 100 MG: 100 TABLET ORAL at 08:56

## 2018-01-01 RX ADMIN — LEVOTHYROXINE SODIUM 88 MCG: 88 TABLET ORAL at 07:30

## 2018-01-01 RX ADMIN — ROPINIROLE 2 MG: 2 TABLET, FILM COATED ORAL at 08:50

## 2018-01-01 RX ADMIN — SPIRONOLACTONE 25 MG: 25 TABLET, FILM COATED ORAL at 08:26

## 2018-01-01 RX ADMIN — POTASSIUM CHLORIDE 20 MEQ: 20 TABLET, EXTENDED RELEASE ORAL at 08:49

## 2018-01-04 PROBLEM — F33.9 RECURRENT DEPRESSION (HCC): Status: ACTIVE | Noted: 2018-01-04

## 2018-01-08 ENCOUNTER — HOSPITAL ENCOUNTER (OUTPATIENT)
Dept: ULTRASOUND IMAGING | Age: 78
Discharge: HOME | End: 2018-01-08
Attending: INTERNAL MEDICINE
Payer: MEDICARE

## 2018-01-08 DIAGNOSIS — D50.9 IRON DEFICIENCY ANEMIA, UNSPECIFIED IRON DEFICIENCY ANEMIA TYPE: ICD-10-CM

## 2018-01-08 DIAGNOSIS — R10.13 EPIGASTRIC ABDOMINAL PAIN: ICD-10-CM

## 2018-01-08 PROCEDURE — 76700 US EXAM ABDOM COMPLETE: CPT

## 2018-01-08 PROCEDURE — 76705 ECHO EXAM OF ABDOMEN: CPT

## 2018-01-15 ENCOUNTER — HOSPITAL ENCOUNTER (OUTPATIENT)
Dept: LAB | Age: 78
Discharge: HOME | End: 2018-01-15
Payer: MEDICARE

## 2018-01-15 ENCOUNTER — APPOINTMENT (OUTPATIENT)
Dept: INFUSION THERAPY | Age: 78
End: 2018-01-15

## 2018-01-15 DIAGNOSIS — D64.9 ANEMIA, UNSPECIFIED TYPE: ICD-10-CM

## 2018-01-15 DIAGNOSIS — D50.9 IRON DEFICIENCY ANEMIA, UNSPECIFIED IRON DEFICIENCY ANEMIA TYPE: ICD-10-CM

## 2018-01-15 PROBLEM — I50.22 SYSTOLIC CHF, CHRONIC (HCC): Status: ACTIVE | Noted: 2018-01-15

## 2018-01-15 PROBLEM — I07.1 TRICUSPID VALVE INSUFFICIENCY: Status: ACTIVE | Noted: 2018-01-15

## 2018-01-15 LAB
ALBUMIN SERPL-MCNC: 2.5 G/DL (ref 3.2–4.6)
ALBUMIN/GLOB SERPL: 0.8 {RATIO} (ref 1.2–3.5)
ALP SERPL-CCNC: 101 U/L (ref 50–136)
ALT SERPL-CCNC: 28 U/L (ref 12–65)
ANION GAP SERPL CALC-SCNC: 5 MMOL/L (ref 7–16)
AST SERPL-CCNC: 48 U/L (ref 15–37)
BASOPHILS # BLD: 0 K/UL (ref 0–0.2)
BASOPHILS NFR BLD: 1 % (ref 0–2)
BILIRUB SERPL-MCNC: 1 MG/DL (ref 0.2–1.1)
BUN SERPL-MCNC: 21 MG/DL (ref 8–23)
CALCIUM SERPL-MCNC: 9 MG/DL (ref 8.3–10.4)
CHLORIDE SERPL-SCNC: 102 MMOL/L (ref 98–107)
CO2 SERPL-SCNC: 37 MMOL/L (ref 21–32)
CREAT SERPL-MCNC: 1.08 MG/DL (ref 0.6–1)
DIFFERENTIAL METHOD BLD: ABNORMAL
EOSINOPHIL # BLD: 0.1 K/UL (ref 0–0.8)
EOSINOPHIL NFR BLD: 2 % (ref 0.5–7.8)
ERYTHROCYTE [DISTWIDTH] IN BLOOD BY AUTOMATED COUNT: 16.2 % (ref 11.9–14.6)
FERRITIN SERPL-MCNC: 87 NG/ML (ref 8–388)
GLOBULIN SER CALC-MCNC: 3 G/DL (ref 2.3–3.5)
GLUCOSE SERPL-MCNC: 91 MG/DL (ref 65–100)
HCT VFR BLD AUTO: 32.1 % (ref 35.8–46.3)
HGB BLD-MCNC: 9.6 G/DL (ref 11.7–15.4)
HGB RETIC QN AUTO: 33 PG (ref 29–35)
IMM RETICS NFR: 13 % (ref 3–15.9)
IRON SATN MFR SERPL: 13 %
IRON SERPL-MCNC: 52 UG/DL (ref 35–150)
LYMPHOCYTES # BLD: 0.5 K/UL (ref 0.5–4.6)
LYMPHOCYTES NFR BLD: 10 % (ref 13–44)
MCH RBC QN AUTO: 29.7 PG (ref 26.1–32.9)
MCHC RBC AUTO-ENTMCNC: 29.9 G/DL (ref 31.4–35)
MCV RBC AUTO: 99.4 FL (ref 79.6–97.8)
MONOCYTES # BLD: 0.3 K/UL (ref 0.1–1.3)
MONOCYTES NFR BLD: 5 % (ref 4–12)
NEUTS SEG # BLD: 4.6 K/UL (ref 1.7–8.2)
NEUTS SEG NFR BLD: 82 % (ref 43–78)
NRBC # BLD: 0 K/UL (ref 0–0.2)
PLATELET # BLD AUTO: 120 K/UL (ref 150–450)
PMV BLD AUTO: 12.3 FL (ref 10.8–14.1)
POTASSIUM SERPL-SCNC: 3.7 MMOL/L (ref 3.5–5.1)
PROT SERPL-MCNC: 5.5 G/DL (ref 6.3–8.2)
RBC # BLD AUTO: 3.23 M/UL (ref 4.05–5.25)
RETICS # AUTO: 0.09 M/UL (ref 0.03–0.1)
RETICS/RBC NFR AUTO: 2.7 % (ref 0.3–2)
SODIUM SERPL-SCNC: 144 MMOL/L (ref 136–145)
TIBC SERPL-MCNC: 404 UG/DL (ref 250–450)
WBC # BLD AUTO: 5.5 K/UL (ref 4.3–11.1)

## 2018-01-15 PROCEDURE — 82728 ASSAY OF FERRITIN: CPT | Performed by: INTERNAL MEDICINE

## 2018-01-15 PROCEDURE — 80053 COMPREHEN METABOLIC PANEL: CPT | Performed by: INTERNAL MEDICINE

## 2018-01-15 PROCEDURE — 83540 ASSAY OF IRON: CPT | Performed by: INTERNAL MEDICINE

## 2018-01-15 PROCEDURE — 85046 RETICYTE/HGB CONCENTRATE: CPT | Performed by: INTERNAL MEDICINE

## 2018-01-15 PROCEDURE — 36415 COLL VENOUS BLD VENIPUNCTURE: CPT | Performed by: INTERNAL MEDICINE

## 2018-01-15 PROCEDURE — 85025 COMPLETE CBC W/AUTO DIFF WBC: CPT | Performed by: INTERNAL MEDICINE

## 2018-01-16 ENCOUNTER — HOSPITAL ENCOUNTER (OUTPATIENT)
Dept: INFUSION THERAPY | Age: 78
Discharge: HOME | End: 2018-01-16

## 2018-01-19 PROBLEM — T82.857A STENOSIS OF PROSTHETIC AORTIC VALVE: Status: ACTIVE | Noted: 2018-01-19

## 2018-01-22 PROBLEM — I35.0 AORTIC STENOSIS, SEVERE: Status: ACTIVE | Noted: 2018-01-01

## 2018-01-22 NOTE — IP AVS SNAPSHOT
[address] 
[phone] Patient: Peter Sinha MRN: [id number] ECL:[id number] A check alina indicates which time of day the medication should be taken. My Medications CONTINUE taking these medications Instructions Each Dose to Equal  
 Morning Noon Evening Bedtime  
 allopurinol 100 mg tablet Commonly known as:  Kindra Farrar Your last dose was: Your next dose is: Take 1 Tab by mouth two (2) times a day. 100 mg  
    
   
   
   
  
 azelastine 137 mcg (0.1 %) nasal spray Commonly known as:  ASTELIN Your last dose was: Your next dose is:    
   
   
 1 Spray by Both Nostrils route two (2) times a day. Use in each nostril as directed 1 Spray  
    
   
   
   
  
 benzonatate 200 mg capsule Commonly known as:  TESSALON Your last dose was: Your next dose is: Take 200 mg by mouth three (3) times daily as needed for Cough. 200 mg  
    
   
   
   
  
 calcium carbonate 600 mg calcium (1,500 mg) tablet Commonly known as:  Onalee Silvius Your last dose was: Your next dose is: Take 600 mg by mouth nightly. 600 mg  
    
   
   
   
  
 cholecalciferol 1,000 unit Cap Commonly known as:  VITAMIN D3 Your last dose was: Your next dose is: Take  by mouth daily. diazePAM 5 mg tablet Commonly known as:  VALIUM Your last dose was: Your next dose is: Take 1 Tab by mouth daily. Max Daily Amount: 5 mg.  
 5 mg DOCUSATE SODIUM Your last dose was: Your next dose is: Take 1 Cap by mouth two (2) times a day. 1 Cap  
    
   
   
   
  
 fluticasone 50 mcg/actuation nasal spray Commonly known as:  Karson Perez Your last dose was: Your next dose is: 1 Welch by Both Nostrils route daily. 1 Spray  
    
   
   
   
  
 guaiFENesin  mg ER tablet Commonly known as:  Wesly & Wesly Your last dose was: Your next dose is: Take 1 Tab by mouth two (2) times a day. 600 mg  
    
   
   
   
  
 hydrocortisone 2.5 % rectal cream  
Commonly known as:  ANUSOL-HC Your last dose was: Your next dose is:    
   
   
 Apply small amount to hemorrhoids/perianal skin TID PRN  
     
   
   
   
  
 levothyroxine 88 mcg tablet Commonly known as:  SYNTHROID Your last dose was: Your next dose is: Take 1 Tab by mouth Daily (before breakfast). 88 mcg  
    
   
   
   
  
 loratadine 10 mg tablet Commonly known as:  Thermon Marker Your last dose was: Your next dose is: Take 10 mg by mouth as needed. 10 mg MIRALAX 17 gram/dose powder Generic drug:  polyethylene glycol Your last dose was: Your next dose is: Take 17 g by mouth daily. 17 g  
    
   
   
   
  
 nitroglycerin 0.4 mg SL tablet Commonly known as:  NITROSTAT Your last dose was: Your next dose is:    
   
   
 by SubLINGual route every five (5) minutes as needed for Chest Pain. omeprazole 20 mg capsule Commonly known as:  PRILOSEC Your last dose was: Your next dose is: TAKE 1 CAPSULE BY MOUTH  DAILY OXYGEN-AIR DELIVERY SYSTEMS Your last dose was: Your next dose is:    
   
   
 by Does Not Apply route. 2-2.5 lpm cont. potassium chloride SR 20 mEq tablet Commonly known as:  K-TAB Your last dose was: Your next dose is: Take 20 mEq by mouth two (2) times a day. 20 mEq  
    
   
   
   
  
 pravastatin 40 mg tablet Commonly known as:  PRAVACHOL Your last dose was: Your next dose is: Take 1 Tab by mouth daily. Indications: hyperlipidemia 40 mg  
    
   
   
   
  
 promethazine 25 mg tablet Commonly known as:  PHENERGAN Your last dose was: Your next dose is: Take 1 Tab by mouth every six (6) hours as needed. 25 mg [address] OP Your last dose was: Your next dose is:    
   
   
 Apply  to eye. REQUIP 2 mg tablet Generic drug:  rOPINIRole Your last dose was: Your next dose is: Take  by mouth two (2) times a day. sertraline 100 mg tablet Commonly known as:  ZOLOFT Your last dose was: Your next dose is: Take 1 Tab by mouth daily. 100 mg  
    
   
   
   
  
 spironolactone 25 mg tablet Commonly known as:  ALDACTONE Your last dose was: Your next dose is: Take 1 Tab by mouth daily. 25 mg  
    
   
   
   
  
 SUPPOSITORY ADULT suppository Generic drug:  glycerin (adult) Your last dose was: Your next dose is: Insert 1 Suppository into rectum as needed. 1 Suppository  
    
   
   
   
  
 torsemide 20 mg tablet Commonly known as:  DEMADEX Your last dose was: Your next dose is: Take 2 Tabs by mouth daily. 40 mg  
    
   
   
   
  
 VITAMIN B-12 250 mcg tablet Generic drug:  cyanocobalamin Your last dose was: Your next dose is: Take 1,000 mcg by mouth daily. 1000 mcg  
    
   
   
   
  
 warfarin 5 mg tablet Commonly known as:  COUMADIN Your last dose was: Your next dose is: Take 1 Tab by mouth nightly. Name brand only 5 mg XOPENEX 1.25 mg/3 mL Nebu Generic drug:  levalbuterol Your last dose was: Your next dose is: 1.25 mg by Nebulization route.   
 1.25 mg

## 2018-01-22 NOTE — H&P
[facility] Cardiology History & Physical      Date of  Admission: 1/22/2018 12:09 PM       SUBJECTIVE:   Kolby Vann is a 69 y.o. female seen for a consultation by Dr. Manan Bond on 1/19 due to severe AS.       HPI:  Patient presents for consultation requested by Dr. Nat Marroquin for evaluation of worsening LV dysfunction and severe prosthetic valve aortic stenosis. She has multiple medical problems including underlying COPD. She had a prior aortic valve replacement and mitral valve repair in 2003. Her symptoms of dyspnea with exertion have been worsening. She was seen by Dr. Nat Marroquin who ordered an echocardiogram.  This showed  reduced LV systolic function (which was a new finding compared to study 2/16 at which time EF was 55-60%).  It shows a severely stenotic bioprosthetic valve with a mean gradient of 43 and peak gradient of 80.  Patient is status post previous mitral valve repair with mild to moderate residual mitral regurgitation. She also appeared to have a sizable left pleural effusion. She was referred to my office for consideration of transcatheter aortic valve intervention. I was able to obtain his prior op note. She underwent a mitral valve repair using a 28 mm Shakira Ely ring. Aortic valve replacement was a 21 mm pericardial valve. The type of valve was not noted in the operative note report. Past Medical History, Past Surgical History, Family history, Social History, and Medications were all reviewed with the patient today and updated as necessary.             Outpatient Prescriptions Marked as Taking for the 1/19/18 encounter (Office Visit) with Acosta Hardin MD   Medication Sig Dispense Refill    rOPINIRole (REQUIP) 2 mg tablet Take  by mouth two (2) times a day.        pravastatin (PRAVACHOL) 40 mg tablet Take 1 Tab by mouth daily. Indications: hyperlipidemia 90 Tab 3    warfarin (COUMADIN) 5 mg tablet Take 1 Tab by mouth nightly.  Name brand only 90 Tab 1    sertraline (ZOLOFT) 100 mg tablet Take 1 Tab by mouth daily. 90 Tab 2    diazePAM (VALIUM) 5 mg tablet Take 1 Tab by mouth daily. Max Daily Amount: 5 mg. 30 Tab 2    torsemide (DEMADEX) 20 mg tablet Take 2 Tabs by mouth daily. 180 Tab 1    omeprazole (PRILOSEC) 20 mg capsule TAKE 1 CAPSULE BY MOUTH  DAILY 90 Cap 3    spironolactone (ALDACTONE) 25 mg tablet Take 1 Tab by mouth daily. 90 Tab 3    levothyroxine (SYNTHROID) 88 mcg tablet Take 1 Tab by mouth Daily (before breakfast). 90 Tab 3    allopurinol (ZYLOPRIM) 100 mg tablet Take 1 Tab by mouth two (2) times a day. 180 Tab 3    guaiFENesin ER (MUCINEX) 600 mg ER tablet Take 1 Tab by mouth two (2) times a day. 20 Tab 0    fluticasone (FLONASE) 50 mcg/actuation nasal spray 1 Mason by Both Nostrils route daily.        azelastine (ASTELIN) 137 mcg (0.1 %) nasal spray 1 Mason by Both Nostrils route two (2) times a day. Use in each nostril as directed 1 Bottle 0    polyethylene glycol (MIRALAX) 17 gram/dose powder Take 17 g by mouth daily.        loratadine (CLARITIN) 10 mg tablet Take 10 mg by mouth as needed.        glycerin, adult, (SUPPOSITORY ADULT) suppository Insert 1 Suppository into rectum as needed.        benzonatate (TESSALON) 200 mg capsule Take 200 mg by mouth three (3) times daily as needed for Cough.        promethazine (PHENERGAN) 25 mg tablet Take 1 Tab by mouth every six (6) hours as needed.  30 Tab 3    levalbuterol (XOPENEX) 1.25 mg/3 mL nebu 1.25 mg by Nebulization route.        potassium chloride SR (K-TAB) 20 mEq tablet Take 20 mEq by mouth two (2) times a day.        hydrocortisone (ANUSOL-HC) 2.5 % rectal cream Apply small amount to hemorrhoids/perianal skin TID PRN 30 g 3    calcium carbonate (CALTREX) 600 mg (1,500 mg) tablet Take 600 mg by mouth nightly.        cholecalciferol (VITAMIN D3) 1,000 unit cap Take  by mouth daily.        CARBOXYMETHYLCELLULOS/GLYCERIN (REFRESH OPTIVE OP) Apply  to eye.        DOCUSATE SODIUM Take 1 Cap by mouth two (2) times a day.        OXYGEN-AIR DELIVERY SYSTEMS by Does Not Apply route. 2-2.5 lpm cont.         cyanocobalamin (VITAMIN B-12) 250 mcg tablet Take 1,000 mcg by mouth daily.        nitroglycerin (NITROSTAT) 0.4 mg SL tablet by SubLINGual route every five (5) minutes as needed for Chest Pain.                   Allergies   Allergen Reactions    Adhesive Tape Rash    Benadryl [Diphenhydramine Hcl] Other (comments)       Jitters    Demerol [Meperidine] Anxiety    Morphine Other (comments)       Confusion    Sulfa (Sulfonamide Antibiotics) Anxiety    Lorazepam Anxiety           Past Medical History:   Diagnosis Date    Abnormal glucose 8/5/2016    Abscess 8/5/2016    Acute encephalopathy 7/8/2016    Acute renal failure ([facility]) 12/3/2009    Afib ([facility])      Anemia      Ankle swelling 8/5/2016    Anxiety 8/5/2016    Aortic Valve Bioprosthesis Present 3/7/2009    ARF (acute renal failure) ([facility]) 2/24/2010    Arthritis      Asthma      Atopic dermatitis 8/5/2016    Atrial fibrillation ([facility]) 3/7/2009    Autonomic orthostatic hypotension 8/5/2016    AV block 10/17/2011    Back pain 8/5/2016    Bradycardia (Symptomatic) 11/7/2011    CAD (coronary artery disease)      Cancer ([facility]) 1990     breast (left)    Cardiac pacemaker 11/2/2015    Cardiogenic shock ([facility]) 10/17/2011    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    Cellulitis 8/5/2016    Chest pain 8/5/2016    Chronic atrial fibrillation ([facility]) 10/17/2011    Chronic depression 8/5/2016    Chronic depression 8/5/2016    Chronic kidney disease      Chronic obstructive pulmonary disease ([facility]) 3/8/2009    Chronic pain      CKD (chronic kidney disease) stage 3, GFR 30-59 ml/min 12/4/2009    Congestive heart failure (CHF) ([address] 11/2/2015    COPD       O2 AT 3 L NC    CRI (chronic renal insufficiency) 8/5/2016    Degeneration of cervical intervertebral disc 8/5/2016    Degenerative arthritis of left knee 2/27/2009    Dehydration 8/1/0137    Diastolic heart failure (HCC)      Digoxin toxicity 2/24/2010    Disorder of sweat glands 8/5/2016    Diverticulosis of large intestine without diverticulitis 2015    Dyspnea 8/5/2016    Eczema 8/5/2016    Epigastric abdominal pain 2/24/2010    GERD (gastroesophageal reflux disease)      Gout 8/5/2016    H/O mitral valve repair, 2003 6/27/2016    Heart failure ([address]      HTN (hypertension) 8/5/2016    Hx of colonic polyp 2015     adenoma    Hyperkalemia 10/17/2011    Hyperlipidemia 8/5/2016    Hypertension      Hypokalemia 2/24/2010    Hypothyroidism 12/4/2009    Ill-defined condition       CARPEL TUNNEL SYNDROME, BILAT HANDS    Knee pain 8/5/2016    Leg cramps 8/5/2016    Mitral stenosis with insufficiency 11/2/2015     Prior MV repair 2003.     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    Rheumatic heart disease 11/2/2015    Right hip pain 8/5/2016    RLS (restless legs syndrome) 8/5/2016    Sick sinus syndrome ([address] 2/13/2016    Skin infection 8/5/2016    Sleep apnea 11/2/2015    Tachycardia 6/27/2016    Thrombocytopenia, unspecified 8/22/2012    Thromboembolus ([address]       02/2017    Thyroid disease      Urticaria 8/5/2016    Vertigo 8/5/2016            Past Surgical History:   Procedure Laterality Date    CARDIAC SURG PROCEDURE UNLIST         valve repair and replacement    CHEST SURGERY PROCEDURE UNLISTED        HX APPENDECTOMY        HX BREAST RECONSTRUCTION        HX CHOLECYSTECTOMY   2005    HX GYN   1981     hysterectomy still have ovaries    HX MASTECTOMY   1990     left mastectomy    HX ORTHOPAEDIC         knee surgery    HX PACEMAKER        VASCULAR SURGERY PROCEDURE UNLIST Right 2017     LE thrombectomy             Family History   Problem Relation Age of Onset    Heart Disease Mother      Cancer Father         Throat    Heart Disease Father      Cancer Sister         Breast, Colon    Heart Disease Sister      Breast Cancer Sister 79        from disease    Cancer Maternal Grandmother      Cancer Brother      Diabetes Brother      Heart Disease Brother      Psychiatric Disorder Paternal Grandfather      Cancer Maternal Aunt         Breast    Diabetes Son      Alcohol abuse Neg Hx      Arthritis-rheumatoid Neg Hx      Asthma Neg Hx      Bleeding Prob Neg Hx      Elevated Lipids Neg Hx      Headache Neg Hx      Migraines Neg Hx      Hypertension Neg Hx      Lung Disease Neg Hx      Mental Retardation Neg Hx      Stroke Neg Hx               Social History   Substance Use Topics    Smoking status: Former Smoker       Types: Cigarettes       Quit date: 1996    Smokeless tobacco: Former User         Comment: quit     Alcohol use No         ROS:     Review of Systems   Constitution: Positive for weakness and malaise/fatigue. Negative for chills and fever. HENT: Negative for hoarse voice. Eyes: Negative for blurred vision and double vision. Cardiovascular: Positive for dyspnea on exertion. Negative for chest pain and irregular heartbeat. Respiratory: Positive for shortness of breath. Negative for cough and hemoptysis. Endocrine: Negative for cold intolerance and heat intolerance. Hematologic/Lymphatic: Negative for adenopathy. Does not bruise/bleed easily. Skin: Negative for color change and itching. Musculoskeletal: Positive for arthritis. Negative for muscle weakness. Gastrointestinal: Negative for bloating and heartburn. Genitourinary: Negative for dysuria and flank pain.    Neurological: Negative for headaches, loss of balance, seizures and tremors. Psychiatric/Behavioral: Negative for depression and memory loss. Allergic/Immunologic: Negative for hives and persistent infections.             PHYSICAL EXAM:  Constitutional: She is oriented to person, place, and time. She appears well-developed. HENT: Head: Normocephalic and atraumatic. Eyes: Conjunctivae are normal. Pupils are equal, round, and reactive to light. Neck: Normal range of motion. No JVD present. No tracheal deviation present. Cardiovascular: Normal rate. An irregularly irregular rhythm present. Exam reveals no gallop and no friction rub. Murmur (Grade II/VI TERRI) heard. Pulmonary/Chest: She has decreased breath sounds in the left middle field and the left lower field. Abdominal: Soft. Bowel sounds are normal. She exhibits no distension. There is no tenderness. Musculoskeletal: She exhibits edema (Trivial). She exhibits no tenderness. Neurological: She is alert and oriented to person, place, and time. No cranial nerve deficit. Coordination normal.   Skin: Skin is warm and dry. No erythema.    Psychiatric: She has a normal mood and affect.           Recent Results          Recent Results (from the past 672 hour(s))   AMB POC PT/INR     Collection Time: 01/08/18  8:11 AM   Result Value Ref Range     VALID INTERNAL CONTROL POC Yes       Prothrombin time (POC)   seconds     INR POC 4.5     CBC WITH AUTOMATED DIFF     Collection Time: 01/15/18 11:02 AM   Result Value Ref Range     WBC 5.5 4.3 - 11.1 K/uL     RBC 3.23 (L) 4.05 - 5.25 M/uL     HGB 9.6 (L) 11.7 - 15.4 g/dL     HCT 32.1 (L) 35.8 - 46.3 %     MCV 99.4 (H) 79.6 - 97.8 FL     MCH 29.7 26.1 - 32.9 PG     MCHC 29.9 (L) 31.4 - 35.0 g/dL     RDW 16.2 (H) 11.9 - 14.6 %     PLATELET 773 (L) 735 - 450 K/uL     MPV 12.3 10.8 - 14.1 FL     ABSOLUTE NRBC 0.00 0.0 - 0.2 K/uL     NEUTROPHILS 82 (H) 43 - 78 %     LYMPHOCYTES 10 (L) 13 - 44 %     MONOCYTES 5 4.0 - 12.0 %     EOSINOPHILS 2 0.5 - 7.8 %     BASOPHILS 1 0.0 - 2.0 %   ABS. NEUTROPHILS 4.6 1.7 - 8.2 K/UL     ABS. LYMPHOCYTES 0.5 0.5 - 4.6 K/UL     ABS. MONOCYTES 0.3 0.1 - 1.3 K/UL     ABS. EOSINOPHILS 0.1 0.0 - 0.8 K/UL     ABS. BASOPHILS 0.0 0.0 - 0.2 K/UL     DF AUTOMATED      FERRITIN     Collection Time: 01/15/18 11:02 AM   Result Value Ref Range     Ferritin 87 8 - 388 NG/ML   RETICULOCYTE COUNT     Collection Time: 01/15/18 11:02 AM   Result Value Ref Range     Reticulocyte count 2.7 (H) 0.3 - 2.0 %     Absolute Retic Cnt. 0.0856 0.026 - 0.095 M/ul     Immature Retic Fraction 13.0 3.0 - 15.9 %     Retic Hgb Conc. 33 29 - 35 pg   TRANSFERRIN SATURATION     Collection Time: 01/15/18 11:02 AM   Result Value Ref Range     Iron 52 35 - 150 ug/dL     TIBC 404 250 - 450 ug/dL     Transferrin Saturation 13 (L) >73 %   METABOLIC PANEL, COMPREHENSIVE     Collection Time: 01/15/18 11:02 AM   Result Value Ref Range     Sodium 144 136 - 145 mmol/L     Potassium 3.7 3.5 - 5.1 mmol/L     Chloride 102 98 - 107 mmol/L     CO2 37 (H) 21 - 32 mmol/L     Anion gap 5 (L) 7 - 16 mmol/L     Glucose 91 65 - 100 mg/dL     BUN 21 8 - 23 MG/DL     Creatinine 1.08 (H) 0.6 - 1.0 MG/DL     GFR est AA >60 >60 ml/min/1.73m2     GFR est non-AA 52 (L) >60 ml/min/1.73m2     Calcium 9.0 8.3 - 10.4 MG/DL     Bilirubin, total 1.0 0.2 - 1.1 MG/DL     ALT (SGPT) 28 12 - 65 U/L     AST (SGOT) 48 (H) 15 - 37 U/L     Alk.  phosphatase 101 50 - 136 U/L     Protein, total 5.5 (L) 6.3 - 8.2 g/dL     Albumin 2.5 (L) 3.2 - 4.6 g/dL     Globulin 3.0 2.3 - 3.5 g/dL     A-G Ratio 0.8 (L) 1.2 - 3.5                 Lab Results   Component Value Date/Time     Cholesterol, total 163 01/31/2017 10:59 AM     HDL Cholesterol 58 01/31/2017 10:59 AM     LDL, calculated 82 01/31/2017 10:59 AM     VLDL, calculated 23 01/31/2017 10:59 AM     Triglyceride 116 01/31/2017 10:59 AM     CHOL/HDL Ratio 2.1 12/03/2009 05:30 AM         ASSESSMENT and PLAN     1. Acute on chronic systolic heart failure due to valvular disease ([facility] [address]  Difficult situation. Patient is obviously in poor health. Currently she is unable to do simple activities around her home and has underlying orthopnea and PND. Will plan hospital admission for diuresis and possible left thoracentesis. If she is able to improve and able to lay flat will proceed with left heart catheterization and workup for transcatheter aortic valve replacement. Discussed with her and her family her overall poor prognosis due to her multiple comorbidities. If she continues to deteriorate by the above efforts, would consider hospice care.     2.  Chronic atrial fibrillation (HCC)  Hold coumadin for above procedures.       3. Stenosis of prosthetic aortic valve  See above.           Amaury Latham MD

## 2018-01-22 NOTE — IP AVS SNAPSHOT
Rafaela Salinas 
 
 
 [address] [facility] 322 [facility] 
[phone] Patient: Lucia Blackwell MRN: [id number] About your hospitalization You were admitted on:  January 22, 2018 You last received care in the:  [facility] 3 TELEMETRY You were discharged on:  January 26, 2018 Why you were hospitalized Your primary diagnosis was:  Acute On Chronic Systolic Congestive Heart Failure (Hcc) Your diagnoses also included: Aortic Stenosis, Severe, Chronic Atrial Fibrillation (Hcc), Stenosis Of Prosthetic Aortic Valve, Peripheral Arterial Disease (Hcc), Chronic Obstructive Pulmonary Disease (Hcc), Pleural Effusion, Thrombocytopenia (Hcc), Anemia, Chronic Respiratory Failure With Hypoxia (Hcc), Hypoxia, Acute Pulmonary Edema (Hcc) Follow-up Information Follow up With Details Comments Contact Info Purnima Valdes MD   [place] [address] 
[phone] Your Scheduled Appointments Monday February 05, 2018  9:45 AM EST  
LAB with [address]  
[address] [place] [address] INSURANCE [facility] Nandoflorida [place] [id number] [place] [address]  
[phone] Monday February 05, 2018 10:15 AM EST Follow Up with Roberto Guerrero MD  
[facility] Hematology and Oncology [facility] MARJORIE/ Ty Trevino [address] [facility] [address]  
[phone] Tuesday February 06, 2018  8:00 AM EST Infusion with [facility] [address] Andreina Freeman [phone] [facility] [address]  
[phone] [address] Thursday February 22, 2018 11:15 AM EST  
REMOTE DEVICE CHECK ORDERS ONLY ENCOUNTER with GVLLE REMOTE PACER 34 [facility] CARDIOLOGY [place] OFFICE ([address]) [address]  
[address] Constance Lemos   
[phone] Please remember THIS REMOTE CHECK IS COMPLETED FROM HOME - YOU WILL NOT COME TO THE OFFICE FOR THIS APPOINTMENT. In preparation for this check, please ensure your monitor box is working appropriately. If your monitor requires you to send a transmission, please make sure it is sent by 11:00AM on this day so we can have it processed and resulted to your doctor without delay. If you have a question or problem with the monitor box, please contact your respective company:   [address] [place] [address]/[employer] - [phone]  [employer]/Home Monitoring - [phone]  [employer] - [phone]  [employer] - [phone]  If you have any further questions or need to move this appointment, we are happy to help and can be reached at [phone]. Monday March 05, 2018  9:45 AM EST Office Visit with Marilynn Carlson MD  
[place] [facility] ([facility] INTERNAL MEDICINE) [address] [place] [address]  
[phone] Discharge Orders None A check alina indicates which time of day the medication should be taken. My Medications CONTINUE taking these medications Instructions Each Dose to Equal  
 Morning Noon Evening Bedtime  
 allopurinol 100 mg tablet Commonly known as:  [place] Card Your last dose was: Your next dose is: Take 1 Tab by mouth two (2) times a day. 100 mg  
    
   
   
   
  
 azelastine 137 mcg (0.1 %) nasal spray Commonly known as:  ASTELIN Your last dose was: Your next dose is:    
   
   
 1 Spray by Both Nostrils route two (2) times a day. Use in each nostril as directed 1 Spray  
    
   
   
   
  
 benzonatate 200 mg capsule Commonly known as:  TESSALON Your last dose was: Your next dose is: Take 200 mg by mouth three (3) times daily as needed for Cough.   
 200 mg  
    
   
   
   
  
 calcium carbonate 600 mg calcium (1,500 mg) tablet Commonly known as:  Latoya Gould Your last dose was: Your next dose is: Take 600 mg by mouth nightly. 600 mg  
    
   
   
   
  
 cholecalciferol 1,000 unit Cap Commonly known as:  VITAMIN D3 Your last dose was: Your next dose is: Take  by mouth daily. diazePAM 5 mg tablet Commonly known as:  VALIUM Your last dose was: Your next dose is: Take 1 Tab by mouth daily. Max Daily Amount: 5 mg.  
 5 mg DOCUSATE SODIUM Your last dose was: Your next dose is: Take 1 Cap by mouth two (2) times a day. 1 Cap  
    
   
   
   
  
 fluticasone 50 mcg/actuation nasal spray Commonly known as:  Carolynn Snider Your last dose was: Your next dose is:    
   
   
 1 Spray by Both Nostrils route daily. 1 Spray  
    
   
   
   
  
 guaiFENesin  mg ER tablet Commonly known as:  Wesly & Wesly Your last dose was: Your next dose is: Take 1 Tab by mouth two (2) times a day. 600 mg  
    
   
   
   
  
 hydrocortisone 2.5 % rectal cream  
Commonly known as:  ANUSOL-HC Your last dose was: Your next dose is:    
   
   
 Apply small amount to hemorrhoids/perianal skin TID PRN  
     
   
   
   
  
 levothyroxine 88 mcg tablet Commonly known as:  SYNTHROID Your last dose was: Your next dose is: Take 1 Tab by mouth Daily (before breakfast). 88 mcg  
    
   
   
   
  
 loratadine 10 mg tablet Commonly known as:  Filemon Mcneill Your last dose was: Your next dose is: Take 10 mg by mouth as needed. 10 mg MIRALAX 17 gram/dose powder Generic drug:  polyethylene glycol Your last dose was: Your next dose is: Take 17 g by mouth daily. 17 g nitroglycerin 0.4 mg SL tablet Commonly known as:  NITROSTAT Your last dose was: Your next dose is:    
   
   
 by SubLINGual route every five (5) minutes as needed for Chest Pain. omeprazole 20 mg capsule Commonly known as:  PRILOSEC Your last dose was: Your next dose is: TAKE 1 CAPSULE BY MOUTH  DAILY OXYGEN-AIR DELIVERY SYSTEMS Your last dose was: Your next dose is:    
   
   
 by Does Not Apply route. 2-2.5 lpm cont. potassium chloride SR 20 mEq tablet Commonly known as:  K-TAB Your last dose was: Your next dose is: Take 20 mEq by mouth two (2) times a day. 20 mEq  
    
   
   
   
  
 pravastatin 40 mg tablet Commonly known as:  PRAVACHOL Your last dose was: Your next dose is: Take 1 Tab by mouth daily. Indications: hyperlipidemia 40 mg  
    
   
   
   
  
 promethazine 25 mg tablet Commonly known as:  PHENERGAN Your last dose was: Your next dose is: Take 1 Tab by mouth every six (6) hours as needed. 25 mg [address] OP Your last dose was: Your next dose is:    
   
   
 Apply  to eye. REQUIP 2 mg tablet Generic drug:  rOPINIRole Your last dose was: Your next dose is: Take  by mouth two (2) times a day. sertraline 100 mg tablet Commonly known as:  ZOLOFT Your last dose was: Your next dose is: Take 1 Tab by mouth daily. 100 mg  
    
   
   
   
  
 spironolactone 25 mg tablet Commonly known as:  ALDACTONE Your last dose was: Your next dose is: Take 1 Tab by mouth daily. 25 mg  
    
   
   
   
  
 SUPPOSITORY ADULT suppository Generic drug:  glycerin (adult) Your last dose was: Your next dose is: Insert 1 Suppository into rectum as needed. 1 Suppository  
    
   
   
   
  
 torsemide 20 mg tablet Commonly known as:  DEMADEX Your last dose was: Your next dose is: Take 2 Tabs by mouth daily. 40 mg  
    
   
   
   
  
 VITAMIN B-12 250 mcg tablet Generic drug:  cyanocobalamin Your last dose was: Your next dose is: Take 1,000 mcg by mouth daily. 1000 mcg  
    
   
   
   
  
 warfarin 5 mg tablet Commonly known as:  COUMADIN Your last dose was: Your next dose is: Take 1 Tab by mouth nightly. Name brand only 5 mg XOPENEX 1.25 mg/3 mL Nebu Generic drug:  levalbuterol Your last dose was: Your next dose is: 1.25 mg by Nebulization route. 1.25 mg Discharge Instructions Transcatheter Aortic Valve Replacement: Before Your Procedure What is transcatheter aortic valve replacement? Transcatheter aortic valve replacement (TAVR) is a procedure to replace the aortic heart valve. Your doctor will use a catheter to put in your new heart valve. You won't need open-heart surgery. TAVR is often done through a cut (incision) in the groin. But sometimes a small cut is made in the chest. Your doctor will use a tube called a catheter and special tools that fit inside it. The doctor puts the catheter into a blood vessel and moves it into the heart. An artificial valve fits inside the catheter. Your doctor will move the new valve into your damaged valve. It will expand and work in place of the old valve. You may be asleep for the procedure, or you may get a sedative that will help you relax. You will have to stay in the hospital for a few days. Follow-up care is a key part of your treatment and safety.  Be sure to make and go to all appointments, and call your doctor if you are having problems. It's also a good idea to know your test results and keep a list of the medicines you take. What happens before the procedure? Preparing for the procedure · Understand exactly what procedure is planned, along with the risks, benefits, and other options. · Tell your doctor ALL the medicines, vitamins, supplements, and herbal remedies you take. Some of these can increase the risk of bleeding or interact with anesthesia. · If you take blood thinners, such as warfarin (Coumadin), clopidogrel (Plavix), or aspirin, be sure to talk to your doctor. He or she will tell you if you should stop taking these medicines before your procedure. Make sure that you understand exactly what your doctor wants you to do. · Your doctor will tell you which medicines to take or stop before your procedure. You may need to stop taking certain medicines a week or more before the procedure. So talk to your doctor as soon as you can. · You will have several tests to get ready. These may include echocardiograms and a CT scan. · If you have an advance directive, let your doctor know. It may include a living will and a durable power of  for health care. Bring a copy to the hospital. If you don't have one, you may want to prepare one. It lets your doctor and loved ones know your health care wishes. Doctors advise that everyone prepare these papers before any type of surgery or procedure. Procedures can be stressful. This information will help you understand what you can expect. And it will help you safely prepare. What happens on the day of the procedure? · Follow the instructions exactly about when to stop eating and drinking. If you don't, your procedure may be canceled. If your doctor told you to take your medicines on the day of the procedure, take them with only a sip of water. ? · Take a bath or shower before you come in for your procedure. Do not apply lotions, perfumes, deodorants, or nail polish. ? · Take off all jewelry and piercings. And take out contact lenses, if you wear them. ? At the hospital or surgery center · Bring a picture ID. ? · You will be kept comfortable and safe by your anesthesia provider. The anesthesia may make you sleep. Or it may just numb the area being worked on. ? · The procedure will take between 2 and 5 hours. The time depends on the size and shape of your arteries and heart. ? · After the procedure, pressure will be applied to the area where the catheter was put in your blood vessel. Then the area may be covered with a bandage or a compression device. This will prevent bleeding. ? · Nurses will check your heart rate and blood pressure. The nurse will also check the catheter site for bleeding. ? · If the catheter was put in your groin, you will need to lie still and keep your leg straight for several hours. The nurse may put a weighted bag on your leg to keep it still. ? · You may have a bruise or a small lump where the catheter was put in your blood vessel. This is normal and will go away. Going home · Be sure you have someone to drive you home. ? · You will be given more specific instructions about recovering from your procedure. They will cover things like diet, follow-up care, and getting back to your normal routine. When should you call your doctor? · You have questions or concerns. ? · You don't understand how to prepare for your procedure. ? · You become ill before the procedure (such as fever, flu, or a cold). ? · You need to reschedule or have changed your mind about having the procedure. Where can you learn more? Go to http://onesimo-dasia.info/. Enter I276 in the search box to learn more about \"Transcatheter Aortic Valve Replacement: Before Your Procedure. \" Current as of: September 21, 2016 Content Version: 11.4 © 8370-9238 Healthwise, Incorporated.  Care instructions adapted under license by [address] (which disclaims liability or warranty for this information). If you have questions about a medical condition or this instruction, always ask your healthcare professional. [address] any warranty or liability for your use of this information. Heart Failure: Care Instructions Your Care Instructions Heart failure occurs when your heart does not pump as much blood as the body needs. Failure does not mean that the heart has stopped pumping but rather that it is not pumping as well as it should. Over time, this causes fluid buildup in your lungs and other parts of your body. Fluid buildup can cause shortness of breath, fatigue, swollen ankles, and other problems. By taking medicines regularly, reducing sodium (salt) in your diet, checking your weight every day, and making lifestyle changes, you can feel better and live longer. Follow-up care is a key part of your treatment and safety. Be sure to make and go to all appointments, and call your doctor if you are having problems. It's also a good idea to know your test results and keep a list of the medicines you take. How can you care for yourself at home? Medicines ? · Be safe with medicines. Take your medicines exactly as prescribed. Call your doctor if you think you are having a problem with your medicine. ? · Do not take any vitamins, over-the-counter medicine, or herbal products without talking to your doctor first. Amanda Kay not take ibuprofen (Advil or Motrin) and naproxen (Aleve) without talking to your doctor first. They could make your heart failure worse. ? · You may be taking some of the following medicine. ¨ Beta-blockers can slow heart rate, decrease blood pressure, and improve your condition. Taking a beta-blocker may lower your chance of needing to be hospitalized.  
¨ Angiotensin-converting enzyme inhibitors (ACEIs) reduce the heart's workload, lower blood pressure, and reduce swelling. Taking an ACEI may lower your chance of needing to be hospitalized again. ¨ Angiotensin II receptor blockers (ARBs) work like ACEIs. Your doctor may prescribe them instead of ACEIs. ¨ Diuretics, also called water pills, reduce swelling. ¨ Potassium supplements replace this important mineral, which is sometimes lost with diuretics. ¨ Aspirin and other blood thinners prevent blood clots, which can cause a stroke or heart attack. ? You will get more details on the specific medicines your doctor prescribes. Diet ? · Your doctor may suggest that you limit sodium to 2,000 milligrams (mg) a day or less. That is less than 1 teaspoon of salt a day, including all the salt you eat in cooking or in packaged foods. People get most of their sodium from processed foods. Fast food and restaurant meals also tend to be very high in sodium. ? · Ask your doctor how much liquid you can drink each day. You may have to limit liquids. ?Weight ? · Weigh yourself without clothing at the same time each day. Record your weight. Call your doctor if you have a sudden weight gain, such as more than 2 to 3 pounds in a day or 5 pounds in a week. (Your doctor may suggest a different range of weight gain.) A sudden weight gain may mean that your heart failure is getting worse. ? Activity level ? · Start light exercise (if your doctor says it is okay). Even if you can only do a small amount, exercise will help you get stronger, have more energy, and manage your weight and your stress. Walking is an easy way to get exercise. Start out by walking a little more than you did before. Bit by bit, increase the amount you walk. ? · When you exercise, watch for signs that your heart is working too hard. You are pushing yourself too hard if you cannot talk while you are exercising.  If you become short of breath or dizzy or have chest pain, stop, sit down, and rest.  
 ? · If you feel \"wiped out\" the day after you exercise, walk slower or for a shorter distance until you can work up to a better pace. ? · Get enough rest at night. Sleeping with 1 or 2 pillows under your upper body and head may help you breathe easier. ? Lifestyle changes ? · Do not smoke. Smoking can make a heart condition worse. If you need help quitting, talk to your doctor about stop-smoking programs and medicines. These can increase your chances of quitting for good. Quitting smoking may be the most important step you can take to protect your heart. ? · Limit alcohol to 2 drinks a day for men and 1 drink a day for women. Too much alcohol can cause health problems. ? · Avoid getting sick from colds and the flu. Get a pneumococcal vaccine shot. If you have had one before, ask your doctor whether you need another dose. Get a flu shot each year. If you must be around people with colds or the flu, wash your hands often. When should you call for help? Call 911 if you have symptoms of sudden heart failure such as: 
? · You have severe trouble breathing. ? · You cough up pink, foamy mucus. ? · You have a new irregular or rapid heartbeat. ?Call your doctor now or seek immediate medical care if: 
? · You have new or increased shortness of breath. ? · You are dizzy or lightheaded, or you feel like you may faint. ? · You have sudden weight gain, such as more than 2 to 3 pounds in a day or 5 pounds in a week. (Your doctor may suggest a different range of weight gain.) ? · You have increased swelling in your legs, ankles, or feet. ? · You are suddenly so tired or weak that you cannot do your usual activities. ? Watch closely for changes in your health, and be sure to contact your doctor if you develop new symptoms. Where can you learn more? Go to http://onesimo-dasia.info/. Enter A689 in the search box to learn more about \"Heart Failure: Care Instructions. \" 
 Current as of: September 21, 2016 Content Version: 11.4 © 1486-0724 Healthwise, Incorporated. Care instructions adapted under license by [employer] (which disclaims liability or warranty for this information). If you have questions about a medical condition or this instruction, always ask your healthcare professional. [address] any warranty or liability for your use of this information. ACO Transitions of Care Introducing [employer] [address] Daphne Piper offers a voluntary care coordination program to provide high quality service and care to [facility] fee-for-service beneficiaries. Saige Kaur was designed to help you enhance your health and well-being through the following services: ? Transitions of Care  support for individuals who are transitioning from one care setting to another (example: Hospital to home). ? Chronic and Complex Care Coordination  support for individuals and caregivers of those with serious or chronic illnesses or with more than one chronic (ongoing) condition and those who take a number of different medications. If you meet specific medical criteria, a [facility] [address] may call you directly to coordinate your care with your primary care physician and your other care providers. For questions about the [facility] programs, please, contact your physicians office. For general questions or additional information about Accountable Care Organizations: 
Please visit www.medicare.gov/acos. html or call 1-800-MEDICARE (6-458.507.6545) TTY users should call 4-797.616.9464. [employer] Announcement We are excited to announce that we are making your provider's discharge notes available to you in [employer].   You will see these notes when they are completed and signed by the physician that discharged you from your recent hospital stay. If you have any questions or concerns about any information you see in MyChart, please call the Health Information Department where you were seen or reach out to your Primary Care Provider for more information about your plan of care. Unresulted Labs-Please follow up with your PCP about these lab tests Order Current Status AFB CULTURE + SMEAR W/RFLX ID FROM CULTURE In process CT CORONARY ART W CA+ In process FUNGUS CULTURE AND SMEAR In process CULTURE, BODY FLUID W GRAM STAIN Preliminary result Providers Seen During Your Hospitalization Provider Specialty Primary office phone Acosta Hardin MD Cardiology [phone] Your Primary Care Physician (PCP) Primary Care Physician Office Phone Office Fax [address] [facility] [phone] You are allergic to the following Allergen Reactions Adhesive Tape Rash Benadryl (Diphenhydramine Hcl) Other (comments) Jitters Demerol (Meperidine) Anxiety Morphine Other (comments) Confusion Sulfa (Sulfonamide Antibiotics) Anxiety Lorazepam Anxiety Recent Documentation Height Weight BMI OB Status Smoking Status 1.524 m 73.5 kg 31.66 kg/m2 Hysterectomy Former Smoker Emergency Contacts Name Discharge Info Relation Home Work Mobile [id number] [employer] CAREGIVER [3] Son [22] [phone] ShondaMarcy cruz(Grandghtr)  Other Relative [6] [phone] RubioDago schwartz(Grandghtr)  Other Relative [6] [phone] Patient Belongings The following personal items are in your possession at time of discharge: 
  Dental Appliances: Uppers, Lowers, With patient  Visual Aid: None      Home Medications: None   Jewelry: None  Clothing: At bedside    Other Valuables: Cell Phone, Other (comment) (tablet) Please provide this summary of care documentation to your next provider. Signatures-by signing, you are acknowledging that this After Visit Summary has been reviewed with you and you have received a copy. Patient Signature:  ____________________________________________________________ Date:  ____________________________________________________________  
  
Migue Payor Provider Signature:  ____________________________________________________________ Date:  ____________________________________________________________

## 2018-01-22 NOTE — PROGRESS NOTES
Patient arrived to room 301 as direct admission, Dr. Mitali Rene paged and notified of patient arrival.

## 2018-01-22 NOTE — PROGRESS NOTES
Bedside and Verbal shift change report given to Eva Goldman RN (oncoming nurse) by self (offgoing nurse). Report included the following information SBAR, Kardex, MAR and Recent Results.

## 2018-01-22 NOTE — PROGRESS NOTES
Problem: Patient Education: Go to Patient Education Activity  Goal: Patient/Family Education  Outcome: Progressing Towards Goal  Fall precautions in place. Yellow socks. Instructed to only get OOB with assistance. Voiced understanding. Call light in reach.

## 2018-01-22 NOTE — PROGRESS NOTES
Dual skin assessment completed with secondary RN, sacrum visualized - skin intact, no breakdown noted. Bilateral heels intact, pitting edema to BLE, small raised boil to right shin, outlined with skin marker.

## 2018-01-22 NOTE — PROGRESS NOTES
MIDLINE Placement Note    PRE-PROCEDURE VERIFICATION  PROCEDURE DETAIL  Time out completed all person present in agreement with time out. A single lumen Midline was started for vascular access and desire for reliable access. The following documentation is in addition to the Midline properties in the lines/airways flowsheet :  Lot #: [id number]  Xylocaine used: yes  Mid-Arm Circumference: 29 (cm)  Internal Catheter Length: 8 (cm)  Internal Catheter Total Length: 8 (cm)  Vein Selection for Midline:right basilic      Line is okay to use: yes.

## 2018-01-23 PROBLEM — I27.20 PULMONARY HYPERTENSION (HCC): Chronic | Status: ACTIVE | Noted: 2017-02-12

## 2018-01-23 PROBLEM — Z98.890 S/P MITRAL VALVE REPAIR: Chronic | Status: ACTIVE | Noted: 2017-12-04

## 2018-01-23 PROBLEM — J90 PLEURAL EFFUSION: Status: ACTIVE | Noted: 2018-01-01

## 2018-01-23 PROBLEM — I07.1 TRICUSPID VALVE INSUFFICIENCY: Chronic | Status: ACTIVE | Noted: 2018-01-15

## 2018-01-23 PROBLEM — R09.02 HYPOXIA: Status: ACTIVE | Noted: 2018-01-01

## 2018-01-23 PROBLEM — I50.22 SYSTOLIC CHF, CHRONIC (HCC): Chronic | Status: ACTIVE | Noted: 2018-01-15

## 2018-01-23 PROBLEM — J96.11 CHRONIC RESPIRATORY FAILURE WITH HYPOXIA (HCC): Chronic | Status: ACTIVE | Noted: 2018-01-01

## 2018-01-23 PROBLEM — Z79.01 WARFARIN ANTICOAGULATION: Chronic | Status: RESOLVED | Noted: 2017-01-09 | Resolved: 2018-01-01

## 2018-01-23 PROBLEM — I73.9 PERIPHERAL ARTERIAL DISEASE (HCC): Chronic | Status: ACTIVE | Noted: 2018-01-01

## 2018-01-23 PROBLEM — Z95.0 PACEMAKER: Status: RESOLVED | Noted: 2017-12-04 | Resolved: 2018-01-01

## 2018-01-23 PROBLEM — Z98.890 H/O ATRIOVENTRICULAR NODAL ABLATION: Chronic | Status: ACTIVE | Noted: 2017-03-22

## 2018-01-23 PROBLEM — I73.9 PERIPHERAL ARTERIAL DISEASE (HCC): Status: ACTIVE | Noted: 2018-01-01

## 2018-01-23 PROBLEM — Z87.39 HISTORY OF GOUT: Status: RESOLVED | Noted: 2017-01-31 | Resolved: 2018-01-01

## 2018-01-23 NOTE — PROGRESS NOTES
Went to see patient and she has left AMA per nursing. Patient telemetry box was found on her bed and nursing has notified security. This note was written on the wrong patient. Please disregard.

## 2018-01-23 NOTE — PROGRESS NOTES
Care Management Interventions  PCP Verified by CM: Yes  Transition of Care Consult (CM Consult): Discharge Planning  Current Support Network: Relative's Home  Confirm Follow Up Transport: Family  Plan discussed with Pt/Family/Caregiver: Yes  Freedom of Choice Offered: Yes   Patient lives with her son in a 1 level home with 1 step to enter. She has a rolling walker that she uses for her mobility at home and she is on home O2. Patient has been to [address] [place] [address] in Feb 2017. PT following. Discharge home with home health vs SNF. CM following.

## 2018-01-23 NOTE — PROGRESS NOTES
Problem: Mobility Impaired (Adult and Pediatric)  Goal: *Acute Goals and Plan of Care (Insert Text)  Goals:  (1.)Ms. Shaylee Saul will move from supine to sit and sit to supine , scoot up and down and roll side to side with INDEPENDENT within 5 day(s). (2.)Ms. Shaylee Saul will transfer from bed to chair and chair to bed with MODIFIED INDEPENDENCE using the least restrictive device within 5 day(s). (3.)Ms. Shaylee Saul will ambulate with MODIFIED INDEPENDENCE for  feet with the least restrictive device within 5 day(s). PHYSICAL THERAPY: Initial Assessment, Treatment Day: Day of Assessment, PM 1/23/2018  INPATIENT: Hospital Day: 2  [place] / [place] / Product Type: Medicare /      NAME/AGE/GENDER: Kelly Barker is a 69 y.o. female   PRIMARY DIAGNOSIS: CHF  Aortic stenosis, severe Acute on chronic systolic congestive heart failure (HCC) Acute on chronic systolic congestive heart failure (HCC)        ICD-10: Treatment Diagnosis:   · Generalized Muscle Weakness (M62.81)  · Difficulty in walking, Not elsewhere classified (R26.2)   Precaution/Allergies:  Adhesive tape; Benadryl [diphenhydramine hcl]; Demerol [meperidine]; Morphine; Sulfa (sulfonamide antibiotics); and Lorazepam      ASSESSMENT:     Ms. Shaylee Saul is a 69year old WF with an admitting diagnosis of CHF. She presents sitting up in the recliner with her son present and both were agreeable to have therapy. She reports she is feeling better and has been sitting up for some time. She is on 5L of O2 with her O2 sats at 98%. She reports she lives with her son in a 1 level home with 1 step to enter. She has a r/walker that she uses for her mobility at home and she is on home O2. She is at modified independent with her bed mobility with good sitting balance. She is supervision with sit to stand with good standing balance with use of the r/walker for BUE support.   She ambulated 25' with the r/walker with supervision on 5L with her O2 sats staying at 98%. She practiced forward, backward and side stepping with light HHA with fair to good balance. She is very knowledgeable regarding her condition and knows her limits well with gait and activity. She was pleasant and cooperative and states that she hope to be able to go home since she is feeling so much better. Ms. Candice Stark would benefit from continued skilled PT to assist in maximizing her functional abilities. This section established at most recent assessment   PROBLEM LIST (Impairments causing functional limitations):  1. Decreased Strength  2. Decreased Transfer Abilities  3. Decreased Ambulation Ability/Technique  4. Decreased Activity Tolerance  5. Increased Fatigue  6. Increased Shortness of Breath   INTERVENTIONS PLANNED: (Benefits and precautions of physical therapy have been discussed with the patient.)  1. Bed Mobility  2. Gait Training  3. Therapeutic Activites  4. Therapeutic Exercise/Strengthening  5. Ultrasound (US)     TREATMENT PLAN: Frequency/Duration: 3 times a week for duration of hospital stay  Rehabilitation Potential For Stated Goals: Good     RECOMMENDED REHABILITATION/EQUIPMENT: (at time of discharge pending progress): Due to the probability of continued deficits (see above) this patient will likely need continued skilled physical therapy after discharge. Equipment:    None at this time              HISTORY:   History of Present Injury/Illness (Reason for Referral):  Patient presents for consultation requested by Dr. Layla Lawson for evaluation of worsening LV dysfunction and severe prosthetic valve aortic stenosis. Arias Agarwal has multiple medical problems including underlying COPD.  She had a prior aortic valve replacement and mitral valve repair in 2003.  Her symptoms of dyspnea with exertion have been worsening.  She was seen by Dr. Layla Lawson who ordered an echocardiogram.  This showed  reduced LV systolic function (which was a new finding compared to study 2/16 at which time EF was 55-60%).  It shows a severely stenotic bioprosthetic valve with a mean gradient of 43 and peak gradient of 85.  Patient is status post previous mitral valve repair with mild to moderate residual mitral regurgitation.  She also appeared to have a sizable left pleural effusion.  She was referred to my office for consideration of transcatheter aortic valve intervention. I was able to obtain his prior op note.  She underwent a mitral valve repair using a 28 mm Shakira Ely ring.  Aortic valve replacement was a 21 mm pericardial valve. The type of valve was not noted in the operative note report. Past Medical History/Comorbidities:   Ms. Guerline Diaz  has a past medical history of Abnormal glucose (8/5/2016); Abscess (8/5/2016); Acute encephalopathy (7/8/2016); Acute renal failure ([address] (12/3/2009); Afib ([address] Anemia; Ankle swelling (8/5/2016); Anxiety (8/5/2016); Aortic Valve Bioprosthesis Present (3/7/2009); ARF (acute renal failure) ([address] (2/24/2010); Arthritis; Asthma; Atopic dermatitis (8/5/2016); Atrial fibrillation ([address] (3/7/2009); Autonomic orthostatic hypotension (8/5/2016); AV block (10/17/2011); Back pain (8/5/2016); Bradycardia (Symptomatic) (11/7/2011); CAD (coronary artery disease); Cancer ([address] (1990); Cardiac pacemaker (11/2/2015); Cardiogenic shock ([address] (10/17/2011); 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 (8/5/2016); Cellulitis (8/5/2016); Chest pain (8/5/2016); Chronic atrial fibrillation ([address] (10/17/2011); Chronic depression (8/5/2016); Chronic obstructive pulmonary disease ([address] (3/8/2009); Chronic pain; CKD (chronic kidney disease) stage 3, GFR 30-59 ml/min (12/4/2009); Congestive heart failure (CHF) ([address] (11/2/2015); Degeneration of cervical intervertebral disc (8/5/2016); Degenerative arthritis of left knee (2/27/2009); Dehydration (8/5/2016); Diastolic heart failure ([facility] Digoxin toxicity (2/24/2010); Disorder of sweat glands (8/5/2016);  Diverticulosis of large intestine without diverticulitis (2015); Dyspnea (8/5/2016); Eczema (8/5/2016); Embolus of femoral artery ([address] (12/4/2017); Epigastric abdominal pain (2/24/2010); GERD (gastroesophageal reflux disease); Gout (8/5/2016); H/O mitral valve repair, 2003 (6/27/2016); Heart failure ([address] colonic polyp (2015); Hyperkalemia (10/17/2011); Hyperlipidemia (8/5/2016); Hypertension; Hypokalemia (2/24/2010); Hypothyroidism (12/4/2009); Ill-defined condition; Knee pain (8/5/2016); Leg cramps (8/5/2016); Mitral stenosis with insufficiency (11/2/2015); Nausea and vomiting (2/24/2010); Obesity (11/2/2015); BOBBY (obstructive sleep apnea) (12/4/2009); Osteoarthritis (8/5/2016); Osteopenia (8/5/2016); Other long term (current) drug therapy (8/5/2016); Palpitations (11/2/2015); Rash (8/5/2016); Rectal bleeding (10/27/2011); Rectocele (2015); Recurrent depression ([address] (1/4/2018); 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 Ms. Yaz Yen  has a past surgical history that includes hx appendectomy; hx cholecystectomy (2005); hx gyn (1981); hx mastectomy (1990); hx pacemaker; hx breast reconstruction; pr cardiac surg procedure unlist; hx orthopaedic; vascular surgery procedure unlist (Right, 02/20/2017); and pr chest surgery procedure unlisted.   Social History/Living Environment:   Home Environment: Private residence  # Steps to Enter: 1  One/Two Story Residence: One story  Living Alone: No  Support Systems: Child(brit)  Patient Expects to be Discharged to[de-identified] Private residence  Current DME Used/Available at Home: Walker, rolling  Prior Level of Function/Work/Activity:  Patient reports that she was ambulating with use of her r/walker on her own at home prior to this admission. Number of Personal Factors/Comorbidities that affect the Plan of Care: 3+: HIGH COMPLEXITY   EXAMINATION:   Most Recent Physical Functioning:   Gross Assessment:  AROM: Within functional limits  PROM: Within functional limits  Strength: Generally decreased, functional  Coordination: Generally decreased, functional  Tone: Normal  Sensation: Intact               Posture:  Posture (WDL): Exceptions to WDL  Posture Assessment: Forward head, Rounded shoulders  Balance:  Sitting: Intact  Standing: Impaired  Standing - Static: Good;Constant support  Standing - Dynamic : Fair Bed Mobility:  Rolling: Modified independent  Supine to Sit: Stand-by asssistance  Sit to Supine: Stand-by asssistance  Scooting: Independent  Wheelchair Mobility:     Transfers:  Sit to Stand: Supervision  Stand to Sit: Supervision  Bed to Chair: Supervision  Gait:     Base of Support: Narrowed  Speed/Carol: Pace decreased (<100 feet/min)  Step Length: Left shortened;Right shortened  Gait Abnormalities: Decreased step clearance  Distance (ft): 25 Feet (ft) (20 2nd walk)  Assistive Device: Walker, rolling  Ambulation - Level of Assistance: Stand-by asssistance  Interventions: Safety awareness training      Body Structures Involved:  1. Heart  2. Lungs  3. Metabolic Body Functions Affected:  1. Cardio  2. Respiratory  3. Metobolic/Endocrine Activities and Participation Affected:  1. General Tasks and Demands  2. Mobility   Number of elements that affect the Plan of Care: 3: MODERATE COMPLEXITY   CLINICAL PRESENTATION:   Presentation: Stable and uncomplicated: LOW COMPLEXITY   CLINICAL DECISION MAKIN [facility] [address] AM-PAC 6 Clicks   Basic Mobility Inpatient Short Form  How much difficulty does the patient currently have. .. Unable A Lot A Little None   1. Turning over in bed (including adjusting bedclothes, sheets and blankets)? [] 1   [] 2   [] 3   [x] 4   2.   Sitting down on and standing up from a chair with arms ( e.g., wheelchair, bedside commode, etc.)   [] 1   [] 2   [] 3   [x] 4   3. Moving from lying on back to sitting on the side of the bed? [] 1   [] 2   [] 3   [x] 4   How much help from another person does the patient currently need. .. Total A Lot A Little None   4. Moving to and from a bed to a chair (including a wheelchair)? [] 1   [] 2   [x] 3   [] 4   5. Need to walk in hospital room? [] 1   [] 2   [x] 3   [] 4   6. Climbing 3-5 steps with a railing? [] 1   [] 2   [x] 3   [] 4   © 2007, Trustees of [address] [place] [address] [address] [id number], under license to [employer]. All rights reserved      Score:  21 Most Recent: X (Date: -- )    Interpretation of Tool:  Represents activities that are increasingly more difficult (i.e. Bed mobility, Transfers, Gait). Score 24 23 22-20 19-15 14-10 9-7 6     Modifier CH CI CJ CK CL CM CN      ? Mobility - Walking and Moving Around:     - CURRENT STATUS: CJ - 20%-39% impaired, limited or restricted    - GOAL STATUS: CI - 1%-19% impaired, limited or restricted    - D/C STATUS:  ---------------To be determined---------------  [place] / Product Type: Medicare /      Medical Necessity:     · Skilled intervention continues to be required due to generalized weakness and low gait endurance. Reason for Services/Other Comments:  · Patient continues to require skilled intervention due to medical complications. Use of outcome tool(s) and clinical judgement create a POC that gives a: Clear prediction of patient's progress: LOW COMPLEXITY            TREATMENT:   (In addition to Assessment/Re-Assessment sessions the following treatments were rendered)   Pre-treatment Symptoms/Complaints:  Patient had no complaints of pain in therapy today.   Pain: Initial:   Pain Intensity 1: 0  Post Session:  0/10     Assessment/Reassessment only, no treatment provided today    Braces/Orthotics/Lines/Etc:   · O2 Device: Nasal cannula 5L with O2 sats staying at 98% at rest and with activity. Treatment/Session Assessment:    · Response to Treatment:  Patient appeared to feel better today and tolerated therapy well. · Interdisciplinary Collaboration:   o Physical Therapist  o Registered Nurse  · After treatment position/precautions:   o Up in chair  o Bed/Chair-wheels locked  o Call light within reach  o RN notified   · Compliance with Program/Exercises: Will assess as treatment progresses. · Recommendations/Intent for next treatment session: \"Next visit will focus on advancements to more challenging activities and reduction in assistance provided\".   Total Treatment Duration:  PT Patient Time In/Time Out  Time In: 4235  Time Out: 2100 [facility]

## 2018-01-23 NOTE — PROGRESS NOTES
Bedside and Verbal shift change report given to self (oncoming nurse) by Taye Scale (offgoing nurse). Report included the following information SBAR, Kardex, MAR and Recent Results.

## 2018-01-23 NOTE — CONSULTS
CONSULT NOTE    Kolby Vann    1/23/2018    Date of Admission:  1/22/2018    The patient's chart is reviewed and the patient is discussed with the staff. Subjective:     Patient is a 69 y.o.  female seen and evaluated at the request of Dr. Manan Bond. She was admitted with acute heart failure. She has been given extra lasix with some improvement in her dyspnea. She has a history of COPD but I do not have any PFTs for review. She is maintained on nebulized Xopenex in addition to 3 to 4 liters of oxygen. She uses a half ampule at a time due to tremors. She uses her nebulizer 6 to 7 times per day. She has chronic dyspnea but this worsened 2 or 3 weeks ago. She has experienced worsening peripheral edema as well. She has severe prosthetic aortic valve stenosis and she is being considered for a TAVR. She reports a chronic cough with some congestion. She has not heard any wheezing. She is followed by hematology for chronic anemia that is felt to be multifactorial. She was last transfused on 1/15/18. Bone marrow biopsy has been deferred thinking that treatment plan would not change (prn transfusions). She now has thrombocytopenia and labs are being repeated. Review of Systems  A comprehensive review of systems was negative except for: Constitutional: positive for none  Eyes: positive for contacts/glasses  Ears, nose, mouth, throat, and face: positive for none  Respiratory: positive for cough, sputum or dyspnea on exertion  Cardiovascular: positive for dyspnea, lower extremity edema  Gastrointestinal: positive for constipation  Genitourinary: positive for none  Integument/breast: positive for history of breast cancer  Hematologic/lymphatic: positive for followed by hematology for anemia.  now with thrombocytopenia  Musculoskeletal: positive for none  Neurological: positive for none  Behvioral/Psych: positive for anxiety and depression  Endocrine: positive for none  Allergic/Immunologic: positive for none    Patient Active Problem List   Diagnosis Code    Degenerative arthritis of left knee M17.12    Aortic Valve Bioprosthesis Present Z95.2    Chronic obstructive pulmonary disease (HCC) J44.9    Hypothyroidism E03.9    BOBBY (obstructive sleep apnea) G47.33    Chronic atrial fibrillation (HCC) I48.2    Anemia D64.9    Thrombocytopenia (HCC) D69.6    Obesity E66.9    Mitral stenosis with insufficiency I05.2    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    Cardiac pacemaker Z95.0    Sick sinus syndrome (HCC) S71.3    Diastolic heart failure (HCC) I50.30    H/O mitral valve repair, 2003 Z98.890    Chronic depression F32.9    Osteopenia M85.80    RLS (restless legs syndrome) G25.81    Hyperlipidemia E78.5    Gout M10.9    Anxiety F41.9    CAD (coronary artery disease) I25.10    HTN (hypertension) I10    GERD (gastroesophageal reflux disease) K21.9    Chronic diastolic congestive heart failure (HCC) I50.32    Aortic valve replaced Z95.2    Acute on chronic systolic congestive heart failure (HCC) I50.23    Pulmonary hypertension I27.20    H/O atrioventricular rubin ablation Z98.890    S/P mitral valve repair Z98.890    Tricuspid valve insufficiency N02.8    Systolic CHF, chronic ([facility]) I50.22    Stenosis of prosthetic aortic valve I35.0    Aortic stenosis, severe I35.0    Peripheral arterial disease (HCC) I73.9    Pleural effusion J90    Chronic respiratory failure with hypoxia ([facility]) J96.11    Hypoxia R09.02         Prior to Admission Medications   Prescriptions Last Dose Informant Patient Reported? Taking? CARBOXYMETHYLCELLULOS/GLYCERIN (REFRESH OPTIVE OP)   Yes Yes   Sig: Apply  to eye. DOCUSATE SODIUM   Yes Yes   Sig: Take 1 Cap by mouth two (2) times a day. OXYGEN-AIR DELIVERY SYSTEMS   Yes Yes   Sig: by Does Not Apply route. 2-2.5 lpm cont. allopurinol (ZYLOPRIM) 100 mg tablet   No Yes   Sig: Take 1 Tab by mouth two (2) times a day. azelastine (ASTELIN) 137 mcg (0.1 %) nasal spray   No Yes   [place] by Both Nostrils route two (2) times a day. Use in each nostril as directed   benzonatate (TESSALON) 200 mg capsule   Yes Yes   Sig: Take 200 mg by mouth three (3) times daily as needed for Cough. calcium carbonate (CALTREX) 600 mg (1,500 mg) tablet   Yes Yes   Sig: Take 600 mg by mouth nightly. cholecalciferol (VITAMIN D3) 1,000 unit cap   Yes Yes   Sig: Take  by mouth daily. cyanocobalamin (VITAMIN B-12) 250 mcg tablet   Yes Yes   Sig: Take 1,000 mcg by mouth daily. diazePAM (VALIUM) 5 mg tablet   No Yes   Sig: Take 1 Tab by mouth daily. Max Daily Amount: 5 mg. fluticasone (FLONASE) 50 mcg/actuation nasal spray   Yes Yes   [place] by Both Nostrils route daily. glycerin, adult, (SUPPOSITORY ADULT) suppository   Yes Yes   Sig: Insert 1 Suppository into rectum as needed. guaiFENesin ER (MUCINEX) 600 mg ER tablet   No Yes   Sig: Take 1 Tab by mouth two (2) times a day. hydrocortisone (ANUSOL-HC) 2.5 % rectal cream   No Yes   Sig: Apply small amount to hemorrhoids/perianal skin TID PRN   levalbuterol (XOPENEX) 1.25 mg/3 mL nebu   Yes Yes   Si.25 mg by Nebulization route. levothyroxine (SYNTHROID) 88 mcg tablet   No Yes   Sig: Take 1 Tab by mouth Daily (before breakfast). loratadine (CLARITIN) 10 mg tablet   Yes Yes   Sig: Take 10 mg by mouth as needed. nitroglycerin (NITROSTAT) 0.4 mg SL tablet   Yes Yes   Sig: by SubLINGual route every five (5) minutes as needed for Chest Pain. omeprazole (PRILOSEC) 20 mg capsule   No Yes   Sig: TAKE 1 CAPSULE BY MOUTH  DAILY   polyethylene glycol (MIRALAX) 17 gram/dose powder   Yes Yes   Sig: Take 17 g by mouth daily. potassium chloride SR (K-TAB) 20 mEq tablet   Yes Yes   Sig: Take 20 mEq by mouth two (2) times a day. pravastatin (PRAVACHOL) 40 mg tablet   No Yes   Sig: Take 1 Tab by mouth daily.  Indications: hyperlipidemia   promethazine (PHENERGAN) 25 mg tablet   No No Sig: Take 1 Tab by mouth every six (6) hours as needed. rOPINIRole (REQUIP) 2 mg tablet   Yes Yes   Sig: Take  by mouth two (2) times a day. sertraline (ZOLOFT) 100 mg tablet   No Yes   Sig: Take 1 Tab by mouth daily. spironolactone (ALDACTONE) 25 mg tablet   No Yes   Sig: Take 1 Tab by mouth daily. torsemide (DEMADEX) 20 mg tablet   No Yes   Sig: Take 2 Tabs by mouth daily. warfarin (COUMADIN) 5 mg tablet   No Yes   Sig: Take 1 Tab by mouth nightly.  Name brand only      Facility-Administered Medications: None       Past Medical History:   Diagnosis Date    Abnormal glucose 8/5/2016    Abscess 8/5/2016    Acute encephalopathy 7/8/2016    Acute renal failure ([address] 12/3/2009    Afib ([address]     Anemia     Ankle swelling 8/5/2016    Anxiety 8/5/2016    Aortic Valve Bioprosthesis Present 3/7/2009    ARF (acute renal failure) ([address] 2/24/2010    Arthritis     Asthma     Atopic dermatitis 8/5/2016    Atrial fibrillation (HCC) 3/7/2009    Autonomic orthostatic hypotension 8/5/2016    AV block 10/17/2011    Back pain 8/5/2016    Bradycardia (Symptomatic) 11/7/2011    CAD (coronary artery disease)     Cancer (HCC) 1990    breast (left)    Cardiac pacemaker 11/2/2015    Cardiogenic shock ([address] 10/17/2011    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    Cellulitis 8/5/2016    Chest pain 8/5/2016    Chronic atrial fibrillation ([address] 10/17/2011    Chronic depression 8/5/2016    Chronic obstructive pulmonary disease ([address] 3/8/2009    Chronic pain     CKD (chronic kidney disease) stage 3, GFR 30-59 ml/min 12/4/2009    Congestive heart failure (CHF) ([address] 11/2/2015    Degeneration of cervical intervertebral disc 8/5/2016    Degenerative arthritis of left knee 2/27/2009    Dehydration 5/0/5110    Diastolic heart failure (HCC)     Digoxin toxicity 2/24/2010    Disorder of sweat glands 8/5/2016    Diverticulosis of large intestine without diverticulitis 2015    Dyspnea 8/5/2016    Eczema 8/5/2016    Embolus of femoral artery (HCC) 12/4/2017    Epigastric abdominal pain 2/24/2010    GERD (gastroesophageal reflux disease)     Gout 8/5/2016    H/O mitral valve repair, 2003 6/27/2016    Heart failure ([address]     Hx of colonic polyp 2015    adenoma    Hyperkalemia 10/17/2011    Hyperlipidemia 8/5/2016    Hypertension     Hypokalemia 2/24/2010    Hypothyroidism 12/4/2009    Ill-defined condition     CARPEL TUNNEL SYNDROME, BILAT HANDS    Knee pain 8/5/2016    Leg cramps 8/5/2016    Mitral stenosis with insufficiency 11/2/2015    Prior MV repair 2003.     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    Right hip pain 8/5/2016    RLS (restless legs syndrome) 8/5/2016    Sick sinus syndrome ([address] 2/13/2016    Skin infection 8/5/2016    Tachycardia 6/27/2016    Thrombocytopenia, unspecified 8/22/2012    Thromboembolus ([address]     02/2017    Thyroid disease     Urticaria 8/5/2016    Vertigo 8/5/2016     Active Ambulatory Problems     Diagnosis Date Noted    Degenerative arthritis of left knee 02/27/2009    Aortic Valve Bioprosthesis Present 03/07/2009    Chronic obstructive pulmonary disease ([address] 03/08/2009    Hypothyroidism 12/04/2009    BOBBY (obstructive sleep apnea) 12/04/2009    Chronic atrial fibrillation ([address] 10/17/2011    Anemia 10/27/2011    Thrombocytopenia ([address] 08/22/2012    Obesity 11/02/2015    Mitral stenosis with insufficiency 11/02/2015    Rheumatic aortic stenosis 11/02/2015    Cardiac pacemaker 11/02/2015    Sick sinus syndrome ([address] 05/17/4248    Diastolic heart failure ([address]     H/O mitral valve repair, 2003 06/27/2016    Chronic depression 08/05/2016    Osteopenia 08/05/2016    RLS (restless legs syndrome) 08/05/2016    Hyperlipidemia 08/05/2016    Gout 08/05/2016    Anxiety 08/05/2016    CAD (coronary artery disease) 08/05/2016    HTN (hypertension) 08/05/2016    GERD (gastroesophageal reflux disease) 08/05/2016    Chronic diastolic congestive heart failure ([address] 09/09/2016    Aortic valve replaced 01/09/2017    Acute on chronic systolic congestive heart failure ([address] 02/12/2017    Pulmonary hypertension 02/12/2017    H/O atrioventricular rubin ablation 03/22/2017    S/P mitral valve repair 12/04/2017    Tricuspid valve insufficiency 86/33/1784    Systolic CHF, chronic ([address] 01/15/2018    Stenosis of prosthetic aortic valve 01/19/2018     Resolved Ambulatory Problems     Diagnosis Date Noted    Hypotension 03/01/2009    Atrial fibrillation ([facility] 03/07/2009    Cough 03/09/2009    Bronchitis 03/09/2009    CKD (chronic kidney disease) stage 3, GFR 30-59 ml/min 12/04/2009    Nausea and vomiting 02/24/2010    Epigastric abdominal pain 02/24/2010    Digoxin toxicity 02/24/2010    ARF (acute renal failure) (HCC) 02/24/2010    Hypokalemia 02/24/2010    AV block 10/17/2011    Cardiogenic shock (HCC) 10/17/2011    Hyperkalemia 10/17/2011    Rectal bleeding 10/27/2011    Bradycardia (Symptomatic) 11/07/2011    Palpitations 11/02/2015    Respiratory insufficiency 11/02/2015    Tachycardia 06/27/2016    Acute encephalopathy 07/08/2016    CRI (chronic renal insufficiency) 08/05/2016    Dyspnea 08/05/2016    Right hip pain 08/05/2016    Edema 08/05/2016    Chest pain 08/05/2016    Other long term (current) drug therapy 08/05/2016    Localized edema 09/09/2016    Iron deficiency anemia 09/09/2016    Non morbid obesity due to excess calories 11/14/2016    Hypoxemia 11/14/2016    Warfarin anticoagulation 01/09/2017    History of gout 01/31/2017    Pacemaker 12/04/2017     Past Medical History:   Diagnosis Date    Abnormal glucose 8/5/2016    Abscess 8/5/2016    Acute encephalopathy 7/8/2016    Acute renal failure ([address] 12/3/2009    Afib ([address]     Anemia     Ankle swelling 8/5/2016    Anxiety 8/5/2016    Aortic Valve Bioprosthesis Present 3/7/2009    ARF (acute renal failure) ([address] 2/24/2010    Arthritis     Asthma     Atopic dermatitis 8/5/2016    Atrial fibrillation (HCC) 3/7/2009    Autonomic orthostatic hypotension 8/5/2016    AV block 10/17/2011    Back pain 8/5/2016    Bradycardia (Symptomatic) 11/7/2011    CAD (coronary artery disease)     Cancer ([address] 1990    Cardiac pacemaker 11/2/2015    Cardiogenic shock (HCC) 10/17/2011    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    Cellulitis 8/5/2016    Chest pain 8/5/2016    Chronic atrial fibrillation ([address] 10/17/2011    Chronic depression 8/5/2016    Chronic obstructive pulmonary disease ([address] 3/8/2009    Chronic pain     CKD (chronic kidney disease) stage 3, GFR 30-59 ml/min 12/4/2009    Congestive heart failure (CHF) ([address] 11/2/2015    Degeneration of cervical intervertebral disc 8/5/2016    Degenerative arthritis of left knee 2/27/2009    Dehydration 7/5/0498    Diastolic heart failure (HCC)     Digoxin toxicity 2/24/2010    Disorder of sweat glands 8/5/2016    Diverticulosis of large intestine without diverticulitis 2015    Dyspnea 8/5/2016    Eczema 8/5/2016    Embolus of femoral artery ([address] 12/4/2017    Epigastric abdominal pain 2/24/2010    GERD (gastroesophageal reflux disease)     Gout 8/5/2016    H/O mitral valve repair, 2003 6/27/2016    Heart failure ([address]     Hx of colonic polyp 2015    Hyperkalemia 10/17/2011    Hyperlipidemia 8/5/2016    Hypertension     Hypokalemia 2/24/2010    Hypothyroidism 12/4/2009    Ill-defined condition     Knee pain 8/5/2016    Leg cramps 8/5/2016    Mitral stenosis with insufficiency 11/2/2015    Nausea and vomiting 2/24/2010    Obesity 2015    BOBBY (obstructive sleep apnea) 2009    Osteoarthritis 2016    Osteopenia 2016    Other long term (current) drug therapy 2016    Palpitations 2015    Rash 2016    Rectal bleeding 10/27/2011    Rectocele 2015    Recurrent depression [facility] 2018    Rheumatic aortic stenosis 2015    Right hip pain 2016    RLS (restless legs syndrome) 2016    Sick sinus syndrome ([address] 2016    Skin infection 2016    Tachycardia 2016    Thrombocytopenia, unspecified 2012    Thromboembolus ([facility]     Thyroid disease     Urticaria 2016    Vertigo 2016     Past Surgical History:   Procedure Laterality Date    CARDIAC SURG PROCEDURE UNLIST      valve repair and replacement    CHEST SURGERY PROCEDURE UNLISTED      HX APPENDECTOMY      HX BREAST RECONSTRUCTION      HX CHOLECYSTECTOMY      HX GYN  1981    hysterectomy still have ovaries    HX MASTECTOMY      left mastectomy    HX ORTHOPAEDIC      knee surgery    HX PACEMAKER      VASCULAR SURGERY PROCEDURE UNLIST Right 2017    LE thrombectomy     Social History     Social History    Marital status:      Spouse name: N/A    Number of children: N/A    Years of education: N/A     Occupational History    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    cotton mill      6 years     Social History Main Topics    Smoking status: Former Smoker     Packs/day: 3.00     Years: 15.00     Types: Cigarettes     Quit date: 1996    Smokeless tobacco: Former User      Comment: quit     Alcohol use No    Drug use: No    Sexual activity: Not Currently     Other Topics Concern    Not on file     Social History Narrative    Lives with her son     Family History   Problem Relation Age of Onset    Heart Disease Mother     Cancer Father      Throat    Heart Disease Father     Cancer Sister      Breast, Colon    Heart Disease Sister     Breast Cancer Sister 79      from disease  Cancer Maternal Grandmother     Cancer Brother     Diabetes Brother     Heart Disease Brother     Psychiatric Disorder Paternal Grandfather     Cancer Maternal Aunt      Breast    Diabetes Son     Alcohol abuse Neg Hx     Arthritis-rheumatoid Neg Hx     Asthma Neg Hx     Bleeding Prob Neg Hx     Elevated Lipids Neg Hx     Headache Neg Hx     Migraines Neg Hx     Hypertension Neg Hx     Lung Disease Neg Hx     Mental Retardation Neg Hx     Stroke Neg Hx      Allergies   Allergen Reactions    Adhesive Tape Rash    Benadryl [Diphenhydramine Hcl] Other (comments)     Jitters      Demerol [Meperidine] Anxiety    Morphine Other (comments)     Confusion    Sulfa (Sulfonamide Antibiotics) Anxiety    Lorazepam Anxiety       Current Facility-Administered Medications   Medication Dose Route Frequency    polyethylene glycol (MIRALAX) packet 17 g  17 g Oral DAILY    levalbuterol (XOPENEX) nebulizer soln 0.63 mg/3 mL  0.63 mg Nebulization QID RT    allopurinol (ZYLOPRIM) tablet 100 mg  100 mg Oral BID    benzonatate (TESSALON) capsule 200 mg  200 mg Oral TID PRN    cholecalciferol (VITAMIN D3) tablet 1,000 Units  1,000 Units Oral DAILY    diazePAM (VALIUM) tablet 5 mg  5 mg Oral DAILY    fluticasone (FLONASE) 50 mcg/actuation nasal spray 1 Spray  1 Spray Both Nostrils DAILY    levothyroxine (SYNTHROID) tablet 88 mcg  88 mcg Oral ACB    nitroglycerin (NITROSTAT) tablet 0.4 mg  0.4 mg SubLINGual Q5MIN PRN    pantoprazole (PROTONIX) tablet 40 mg  40 mg Oral ACB    pravastatin (PRAVACHOL) tablet 40 mg  40 mg Oral DAILY    promethazine (PHENERGAN) tablet 25 mg  25 mg Oral Q6H PRN    rOPINIRole (REQUIP) tablet 2 mg  2 mg Oral BID    sertraline (ZOLOFT) tablet 100 mg  100 mg Oral DAILY    spironolactone (ALDACTONE) tablet 25 mg  25 mg Oral DAILY    sodium chloride (NS) flush 5-10 mL  5-10 mL IntraVENous Q8H    sodium chloride (NS) flush 5-10 mL  5-10 mL IntraVENous PRN    aspirin chewable tablet 81 mg  81 mg Oral DAILY    morphine injection 2 mg  2 mg IntraVENous Q4H PRN    ondansetron (ZOFRAN) injection 4 mg  4 mg IntraVENous Q4H PRN    furosemide (LASIX) injection 40 mg  40 mg IntraVENous BID    potassium chloride (K-DUR, KLOR-CON) SR tablet 20 mEq  20 mEq Oral BID    calcium carbonate (TUMS) chewable tablet 600 mg [elemental]  600 mg Oral QPM    azelastine (ASTELIN) 137mcg/spray nasal spray (Patient Supplied)  1 Spray Both Nostrils BID    alcohol 62% (NOZIN) nasal  1 Ampule  1 Ampule Topical Q12H    sodium chloride (NS) flush 10 mL  10 mL InterCATHeter Q8H    sodium chloride (NS) flush 10 mL  10 mL InterCATHeter PRN         Objective:     Vitals:    01/22/18 2057 01/23/18 0037 01/23/18 0428 01/23/18 0845   BP: 113/57 120/62 102/59 114/61   Pulse: 83 80 81 80   Resp: 17 18 18 18   Temp: 97.5 °F (36.4 °C) 97.5 °F (36.4 °C) 97.6 °F (36.4 °C) 97.3 °F (36.3 °C)   SpO2: 100% 99% 100% 100%   Weight:   164 lb 12.8 oz (74.8 kg)    Height:           PHYSICAL EXAM     Constitutional:  the patient is well developed and in no acute distress  HEENT:  Sclera clear, pupils equal, oral mucosa moist  Lungs: decreased in the bases. Wearing nasal cannula. Clear breath sounds. Respirations even and not labored  Cardiovascular:  RRR with noted systolic murmur  Abd/GI: soft and non-tender; with positive bowel sounds. Ext: warm without cyanosis. There is 1+ lower leg edema. Skin:  no jaundice or rashes, no wounds   Neuro: no gross neuro deficits. Alert and oriented  Musculoskeletal: moves all four extremities. No deformities. Psychiatric: calm.  Does not appear anxious or depressed  Chest X-ray:        Recent Labs      01/23/18   0936  01/23/18   0555  01/22/18   1436   WBC  4.5  2.7*  4.6   HGB  8.3*  8.1*  8.0*   HCT  27.8*  27.4*  26.6*   PLT  127*  41*  109*   INR   --    --   1.3     Recent Labs      01/23/18   0801  01/23/18   0555  01/22/18   1436   NA  144   --   144   K  4.5   --   3.7   CL 101   --   100   GLU  93   --   104*   CO2  34*   --   39*   BUN  25*   --   28*   CREA  1.03*   --   1.15*   MG   --   1.5*  1.9   CA  8.4   --   8.7   ALB   --    --   2.1*   SGOT   --    --   37     No results for input(s): PH, PCO2, PO2, HCO3 in the last 72 hours. Assessment:  (Medical Decision Making)     Hospital Problems  Date Reviewed: 1/4/2018          Codes Class Noted POA    Peripheral arterial disease (HCC) (Chronic) ICD-10-CM: I73.9  ICD-9-CM: 443.9  1/23/2018 Yes        Pleural effusion ICD-10-CM: J90  ICD-9-CM: 511.9  1/23/2018 Yes    Can consider thoracentesis if medical therapy ineffective    Chronic respiratory failure with hypoxia (HCC) (Chronic) ICD-10-CM: J96.11  ICD-9-CM: 518.83, 799.02  1/23/2018 Yes    Overview Signed 1/23/2018 11:17 AM by Elvin Mihcel NP     Wears 3 to 4 liters at home             Hypoxia ICD-10-CM: R09.02  ICD-9-CM: 799.02  1/23/2018 Unknown    Wean o2 as able with diuresis    Aortic stenosis, severe ICD-10-CM: I35.0  ICD-9-CM: 424.1  1/22/2018 Yes        Stenosis of prosthetic aortic valve ICD-10-CM: I35.0  ICD-9-CM: 396.0  1/19/2018 Yes    Overview Signed 1/19/2018  3:18 PM by Yarelis Bahena MD     AVR (10/5/13):  21 mm Pericardial valve. * (Principal)Acute on chronic systolic congestive heart failure ([facility] ICD-10-CM: I50.23  ICD-9-CM: 428.23, 428.0  2/12/2017 Yes        Thrombocytopenia ([facility] ICD-10-CM: D69.6  ICD-9-CM: 287.5  8/22/2012 Yes    Improved today    Anemia (Chronic) ICD-10-CM: D64.9  ICD-9-CM: 285.9  10/27/2011 Yes    Overview Signed 10/27/2011  8:25 AM by Tomy Neal     Acute on chronic               Chronic atrial fibrillation (HCC) (Chronic) ICD-10-CM: I48.2  ICD-9-CM: 427.31  10/17/2011 Yes        Chronic obstructive pulmonary disease ([facility] (Chronic) ICD-10-CM: J44.9  ICD-9-CM: 496  3/8/2009 Yes    On xopenex. No wheezing.   No PFTs on file      Pulmonary hypertension: in presence of valvular heart disease likely owing to this. Diurese cautiously. Plan:  (Medical Decision Making)   1. IV lasix per cardiology. Fluid balance negative 700 mls since admission.  on admission. CXR with bilateral effusions. Dyspnea better today but still worse than usual. Coumadin on hold since last Thursday in case procedures needed. Can US and tap if needed  2. Continue scheduled Xopenex - uses 1/2 dose at home to avoid side effects. No PFTs for review - ? Severity of COPD. On home oxygen \"for years\". No evidence of exacerbation  3. Being evaluated for TAVR per cardiology  4. CBC being repeated to reassess platelet count    Britta Carl MD     I have spoken with and examined the patient. I agree with the above assessment and plan as documented. Mrs. Bibiana Rosas is a 80yoF with a 15pkyr smoking history and reported COPD who has AS, hypervolemia with edema, pleural effusions, hypoxia. Gen: pleasant on 5.5Lpm of O2, dyspneic with speaking  Lungs:  Decreased bilaterally to 1/2 way up  Heart:  RRR,SM   ABd: NTND  Ext: 2+ edema bilaterally, pitting even in thighs    Agree with lasix which seems to be working thus far  Will consider thoracentesis if lasix doesn't resolve the effusions  Continue bronchodilators as scheduled.     No indication for steroids    Britta Carl MD        More than 50% of time documented was spent face-to-face contact with the patient and in the care of the patient on the floor/unit where the patient is located

## 2018-01-23 NOTE — PROGRESS NOTES
[facility] CARDIOLOGY PROGRESS NOTE           1/23/2018 8:37 AM    Admit Date: 1/22/2018      Subjective:   Patient feels dyspnea improved. BP and renal function stable despite diuresis. Platelet count has fallen significantly. She did receive SC heparin. ROS:  Cardiovascular:  As noted above    Objective:      Vitals:    01/22/18 2040 01/22/18 2057 01/23/18 0037 01/23/18 0428   BP:  113/57 120/62 102/59   Pulse:  83 80 81   Resp:  17 18 18   Temp:  97.5 °F (36.4 °C) 97.5 °F (36.4 °C) 97.6 °F (36.4 °C)   SpO2: 99% 100% 99% 100%   Weight:    74.8 kg (164 lb 12.8 oz)   Height:           Physical Exam:  General-No Acute Distress  Neck- supple, no JVD  CV- IRIR with II/VI TERRI  Lung- clear bilaterally  Abd- soft, nontender, nondistended  Ext- trivial edema bilaterally. Skin- warm and dry      Data Review:   Recent Labs      01/23/18   0555  01/22/18   1436   NA   --   144   K   --   3.7   MG  1.5*  1.9   BUN   --   28*   CREA   --   1.15*   GLU   --   104*   WBC  2.7*  4.6   HGB  8.1*  8.0*   HCT  27.4*  26.6*   PLT  41*  109*   INR   --   1.3   TRIGL  62   --    HDL  63*   --       No results found for: FARHAD Lopez    Assessment/Plan:     Principal Problem:    Acute on chronic systolic congestive heart failure ([address] (2/12/2017)    IV lasix. BP low at times limiting B-blocker and ACE-I. Will follow and adjust as needed. Active Problems:    Chronic obstructive pulmonary disease ([address] (3/8/2009)    Pulmonary to see today to evaluate severity of COPD and consider if thoracentesis is indicated. Chronic atrial fibrillation ([address] (10/17/2011)    Rate controlled. No anticoagulation due to GI bleed. Stenosis of prosthetic aortic valve (1/19/2018)    Difficult situation as very poor health. Plan to proceed with diuresis and assessment of pulmonary condition. Based on this will discuss with Valve board if candidate for TAVR.         Aortic stenosis, severe (1/22/2018)    See above. Peripheral arterial disease ([address] (1/23/2018)    Noted on prior US to have occluded Right distal common femoral occlusion. Will need to assess with CTA if felt TAVR candidate. Pleural effusion (1/23/2018)    Pulmonary consult    Thrombocytopenia  Recheck CBC. If stays low will consult hematology. Stop SC Heparin.                Yarelis Bahena MD  1/23/2018 8:37 AM

## 2018-01-24 NOTE — PROGRESS NOTES
Bedside and Verbal shift change report given to self (oncoming nurse) by Ramon Miller RN (offgoing nurse). Report included the following information SBAR, Kardex and MAR.

## 2018-01-24 NOTE — PROGRESS NOTES
[facility] CARDIOLOGY PROGRESS NOTE           1/24/2018   Admit Date: 1/22/2018      Subjective:   Patient feels dyspnea markedly improved. BP and renal function stable with better diuresis overnight. Platelets and Hgb are stable. ROS:  Cardiovascular:  As noted above    Objective:      Vitals:    01/23/18 2027 01/24/18 0048 01/24/18 0427 01/24/18 0711   BP: 153/75 130/81 113/56    Pulse: 80 80 81    Resp: 20 20 22    Temp: 97.3 °F (36.3 °C) 97.5 °F (36.4 °C) 97.2 °F (36.2 °C)    SpO2: 100% 99% 97% 98%   Weight:  75.3 kg (165 lb 14.4 oz)     Height:           Physical Exam:  General-No Acute Distress  Neck- supple, no JVD  CV- IRIR with II/VI TERRI  Lung- clear bilaterally  Abd- soft, nontender, nondistended  Ext- trivial edema bilaterally. Skin- warm and dry      Data Review:   Recent Labs      01/24/18   0500  01/23/18   0936  01/23/18   0801  01/23/18   0555  01/22/18   1436   NA  145   --   144   --   144   K  3.8   --   4.5   --   3.7   MG  2.0   --    --   1.5*  1.9   BUN  24*   --   25*   --   28*   CREA  1.04*   --   1.03*   --   1.15*   GLU  91   --   93   --   104*   WBC  4.6  4.5   --   2.7*  4.6   HGB  8.5*  8.3*   --   8.1*  8.0*   HCT  28.5*  27.8*   --   27.4*  26.6*   PLT  117*  127*   --   41*  109*   INR   --    --    --    --   1.3   TRIGL   --    --    --   62   --    HDL   --    --    --   63*   --       No results found for: Allegra Formica, TNIPOC    Assessment/Plan:     Principal Problem:    Acute on chronic systolic congestive heart failure (HCC) (2/12/2017)    Continue IV lasix. BP stable but with hold on BB and ACE-I to allow ongoing diuresis    Active Problems:    Chronic obstructive pulmonary disease ([address] (3/8/2009)    Pulmonary following      Chronic atrial fibrillation ([facility] (10/17/2011)    Rate controlled. No anticoagulation due to GI bleed. Stenosis of prosthetic aortic valve (1/19/2018)    Difficult situation as very poor health.   Continue diuresis and and will proceed with [facility] today. She has clinically improved. She is very frail and will need to monitor recovery to see if she is appropriate for possible TAVR in future. Aortic stenosis, severe (1/22/2018)    See above. Peripheral arterial disease ([address] (1/23/2018)    Noted on prior US to have occluded Right distal common femoral occlusion.   Will do run off with [facility] today      Pleural effusion (1/23/2018)    Pulmonary consulted and suspect patient may improved with thoracentesis    Thrombocytopenia  This has improved and Hgb is stable             Joel MD Wei  1/24/2018

## 2018-01-24 NOTE — PROGRESS NOTES
Problem: Mobility Impaired (Adult and Pediatric)  Goal: *Acute Goals and Plan of Care (Insert Text)  Goals:  (1.)Ms. Ry Quinn will move from supine to sit and sit to supine , scoot up and down and roll side to side with INDEPENDENT within 5 day(s). (2.)Ms. Ry Quinn will transfer from bed to chair and chair to bed with MODIFIED INDEPENDENCE using the least restrictive device within 5 day(s). (3.)Ms. Ry Quinn will ambulate with MODIFIED INDEPENDENCE for  feet with the least restrictive device within 5 day(s). PHYSICAL THERAPY: Daily Note, Treatment Day: 1st, AM 1/24/2018  INPATIENT: Hospital Day: 3  [place] / [place] / Product Type: Medicare /      NAME/AGE/GENDER: Geraldo Chahal is a 69 y.o. female   PRIMARY DIAGNOSIS: CHF  Aortic stenosis, severe Acute on chronic systolic congestive heart failure (HCC) Acute on chronic systolic congestive heart failure (HCC)        ICD-10: Treatment Diagnosis:   · Generalized Muscle Weakness (M62.81)  · Difficulty in walking, Not elsewhere classified (R26.2)   Precaution/Allergies:  Adhesive tape; Benadryl [diphenhydramine hcl]; Demerol [meperidine]; Morphine; Sulfa (sulfonamide antibiotics); and Lorazepam      ASSESSMENT:     Ms. Ry Quinn is a 69year old WF with an admitting diagnosis of CHF. She presents supine in bed with sone present. She ambulated 30 feet with the rolling walker with supervision on 5L with her O2 sats staying at 98%. She performed therapeutic exercises while sitting in chair with several rest breaks to catch her breath. She then ambulated another 30 feet in room and remained in recliner with all needs in reach and sone present. She is to have a procedure this afternoon. Ms. Ry Quinn would benefit from continued skilled PT to assist in maximizing her functional abilities. This section established at most recent assessment   PROBLEM LIST (Impairments causing functional limitations):  1.  Decreased Strength  2. Decreased Transfer Abilities  3. Decreased Ambulation Ability/Technique  4. Decreased Activity Tolerance  5. Increased Fatigue  6. Increased Shortness of Breath   INTERVENTIONS PLANNED: (Benefits and precautions of physical therapy have been discussed with the patient.)  1. Bed Mobility  2. Gait Training  3. Therapeutic Activites  4. Therapeutic Exercise/Strengthening  5. Ultrasound (US)     TREATMENT PLAN: Frequency/Duration: 3 times a week for duration of hospital stay  Rehabilitation Potential For Stated Goals: Good     RECOMMENDED REHABILITATION/EQUIPMENT: (at time of discharge pending progress): Due to the probability of continued deficits (see above) this patient will likely need continued skilled physical therapy after discharge. Equipment:    None at this time              HISTORY:   History of Present Injury/Illness (Reason for Referral):  Patient presents for consultation requested by Dr. Rachael Vela for evaluation of worsening LV dysfunction and severe prosthetic valve aortic stenosis. Juan Orellana has multiple medical problems including underlying COPD.  She had a prior aortic valve replacement and mitral valve repair in 2003.  Her symptoms of dyspnea with exertion have been worsening. She was seen by Dr. Rachael Vela who ordered an echocardiogram.  This showed  reduced LV systolic function (which was a new finding compared to study 2/16 at which time EF was 55-60%).  It shows a severely stenotic bioprosthetic valve with a mean gradient of 43 and peak gradient of 85.  Patient is status post previous mitral valve repair with mild to moderate residual mitral regurgitation.  She also appeared to have a sizable left pleural effusion.  She was referred to my office for consideration of transcatheter aortic valve intervention. I was able to obtain his prior op note.  She underwent a mitral valve repair using a 28 mm Shakira Ely ring.  Aortic valve replacement was a 21 mm pericardial valve.  The type of valve was not noted in the operative note report. Past Medical History/Comorbidities:   Ms. Rony Cota  has a past medical history of Abnormal glucose (8/5/2016); Abscess (8/5/2016); Acute encephalopathy (7/8/2016); Acute renal failure ([address] (12/3/2009); Afib ([address] Anemia; Ankle swelling (8/5/2016); Anxiety (8/5/2016); Aortic Valve Bioprosthesis Present (3/7/2009); ARF (acute renal failure) ([address] (2/24/2010); Arthritis; Asthma; Atopic dermatitis (8/5/2016); Atrial fibrillation ([address] (3/7/2009); Autonomic orthostatic hypotension (8/5/2016); AV block (10/17/2011); Back pain (8/5/2016); Bradycardia (Symptomatic) (11/7/2011); CAD (coronary artery disease); Cancer ([address] (1990); Cardiac pacemaker (11/2/2015); Cardiogenic shock ([address] (10/17/2011); 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 (8/5/2016); Cellulitis (8/5/2016); Chest pain (8/5/2016); Chronic atrial fibrillation ([address] (10/17/2011); Chronic depression (8/5/2016); Chronic obstructive pulmonary disease ([address] (3/8/2009); Chronic pain; CKD (chronic kidney disease) stage 3, GFR 30-59 ml/min (12/4/2009); Congestive heart failure (CHF) ([address] (11/2/2015); Degeneration of cervical intervertebral disc (8/5/2016); Degenerative arthritis of left knee (2/27/2009); Dehydration (8/5/2016); Diastolic heart failure ([address] Digoxin toxicity (2/24/2010); Disorder of sweat glands (8/5/2016); Diverticulosis of large intestine without diverticulitis (2015); Dyspnea (8/5/2016); Eczema (8/5/2016); Embolus of femoral artery ([address] (12/4/2017); Epigastric abdominal pain (2/24/2010); GERD (gastroesophageal reflux disease); Gout (8/5/2016); H/O mitral valve repair, 2003 (6/27/2016); Heart failure ([facility] colonic polyp (2015); Hyperkalemia (10/17/2011); Hyperlipidemia (8/5/2016); Hypertension; Hypokalemia (2/24/2010); Hypothyroidism (12/4/2009); Ill-defined condition; Knee pain (8/5/2016); Leg cramps (8/5/2016); Mitral stenosis with insufficiency (11/2/2015);  Nausea and vomiting (2/24/2010); Obesity (11/2/2015); BOBBY (obstructive sleep apnea) (12/4/2009); Osteoarthritis (8/5/2016); Osteopenia (8/5/2016); Other long term (current) drug therapy (8/5/2016); Palpitations (11/2/2015); Rash (8/5/2016); Rectal bleeding (10/27/2011); Rectocele (2015); Recurrent depression ([facility] (1/4/2018); 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 Ms. Demetrius Torres  has a past surgical history that includes hx appendectomy; hx cholecystectomy (2005); hx gyn (1981); hx mastectomy (1990); hx pacemaker; hx breast reconstruction; pr cardiac surg procedure unlist; hx orthopaedic; vascular surgery procedure unlist (Right, 02/20/2017); and pr chest surgery procedure unlisted. Social History/Living Environment:   Home Environment: Private residence  # Steps to Enter: 1  One/Two Story Residence: One story  Living Alone: No  Support Systems: Child(brit)  Patient Expects to be Discharged to[de-identified] Private residence  Current DME Used/Available at Home: Walker, rolling  Prior Level of Function/Work/Activity:  Patient reports that she was ambulating with use of her r/walker on her own at home prior to this admission. Number of Personal Factors/Comorbidities that affect the Plan of Care: 3+: HIGH COMPLEXITY   EXAMINATION:   Most Recent Physical Functioning:   Gross Assessment:                  Posture:  Posture Assessment:  Forward head, Rounded shoulders  Balance:    Bed Mobility:  Rolling: Modified independent  Supine to Sit: Stand-by asssistance  Sit to Supine: Stand-by asssistance  Wheelchair Mobility:     Transfers:  Sit to Stand: Supervision  Stand to Sit: Supervision  Bed to Chair: Supervision  Gait:     Base of Support: Narrowed  Speed/[employer] decreased (<100 feet/min)  Step Length: Left shortened;Right shortened  Gait Abnormalities: Decreased step clearance  Distance (ft): 60 Feet (ft) (30 feet x 2 with rolling walker)  Assistive Device: Walker, rolling  Ambulation - Level of Assistance: Stand-by asssistance  Interventions: Safety awareness training      Body Structures Involved:  1. Heart  2. Lungs  3. Metabolic Body Functions Affected:  1. Cardio  2. Respiratory  3. Metobolic/Endocrine Activities and Participation Affected:  1. General Tasks and Demands  2. Mobility   Number of elements that affect the Plan of Care: 3: MODERATE COMPLEXITY   CLINICAL PRESENTATION:   Presentation: Stable and uncomplicated: LOW COMPLEXITY   CLINICAL DECISION MAKIN[address] [place] [id number] AM-PAC 6 Clicks   Basic Mobility Inpatient Short Form  How much difficulty does the patient currently have. .. Unable A Lot A Little None   1. Turning over in bed (including adjusting bedclothes, sheets and blankets)? [] 1   [] 2   [] 3   [x] 4   2. Sitting down on and standing up from a chair with arms ( e.g., wheelchair, bedside commode, etc.)   [] 1   [] 2   [] 3   [x] 4   3. Moving from lying on back to sitting on the side of the bed? [] 1   [] 2   [] 3   [x] 4   How much help from another person does the patient currently need. .. Total A Lot A Little None   4. Moving to and from a bed to a chair (including a wheelchair)? [] 1   [] 2   [x] 3   [] 4   5. Need to walk in hospital room? [] 1   [] 2   [x] 3   [] 4   6. Climbing 3-5 steps with a railing? [] 1   [] 2   [x] 3   [] 4   © , Trustees of [address] [place] [address] [address] [address], under license to [employer]. All rights reserved      Score:  21 Most Recent: X (Date: -- )    Interpretation of Tool:  Represents activities that are increasingly more difficult (i.e. Bed mobility, Transfers, Gait). Score 24 23 22-20 19-15 14-10 9-7 6     Modifier CH CI CJ CK CL CM CN      ?  Mobility - Walking and Moving Around:     - CURRENT STATUS: CJ - 20%-39% impaired, limited or restricted    - GOAL STATUS: CI - 1%-19% impaired, limited or restricted    - D/C STATUS:  ---------------To be determined---------------  [place] / [place] / Product Type: Medicare /      Medical Necessity:     · Skilled intervention continues to be required due to generalized weakness and low gait endurance. Reason for Services/Other Comments:  · Patient continues to require skilled intervention due to medical complications. Use of outcome tool(s) and clinical judgement create a POC that gives a: Clear prediction of patient's progress: LOW COMPLEXITY            TREATMENT:   (In addition to Assessment/Re-Assessment sessions the following treatments were rendered)   Pre-treatment Symptoms/Complaints:  Patient had no complaints of pain in therapy today. Pain: Initial:      Post Session:  0/10     Therapeutic Activity: (    15 minutes ):  Therapeutic activities including Bed transfers, Chair transfers, Toilet transfers and Ambulation on level ground to improve mobility, strength, balance and endurance. Therapeutic Exercise: (  10 minutes ):  Exercises per grid below to improve mobility, strength, balance and endurance . Date:  1-24-18 Date:   Date:     Activity/Exercise Parameters Parameters Parameters   Ankle pumps  X 10 B     Long arc quads X 10 B     Seated marching  X 10 B     Seated hip abduction / adduction X 10 B     Heel raise  X 10 B     Sit to stand  X 5                  Braces/Orthotics/Lines/Etc:   · O2 Device: Nasal cannula 5L with O2 sats staying at 98% at rest and with activity. Treatment/Session Assessment:    · Response to Treatment:  Patient appeared to feel better today and tolerated therapy well.    · Interdisciplinary Collaboration:   o Physical Therapy Assistant  o Registered Nurse  o Certified Nursing Assistant/Patient Care Technician  · After treatment position/precautions:   o Up in chair  o Bed/Chair-wheels locked  o Call light within reach  o RN notified   · Compliance with Program/Exercises: Will assess as treatment progresses. · Recommendations/Intent for next treatment session: \"Next visit will focus on advancements to more challenging activities and reduction in assistance provided\".   Total Treatment Duration:  PT Patient Time In/Time Out  Time In: 0955  Time Out: [address], [place]

## 2018-01-24 NOTE — PROGRESS NOTES
Sadie Conway  Admission Date: 1/22/2018             Daily Progress Note: 1/24/2018    The patient's chart is reviewed and the patient is discussed with the staff. She was admitted with acute heart failure. She has been given extra lasix with some improvement in her dyspnea. She has a history of COPD but I do not have any PFTs for review. She is maintained on nebulized Xopenex in addition to 3 to 4 liters of oxygen. She uses a half ampule at a time due to tremors. She uses her nebulizer 6 to 7 times per day. She has chronic dyspnea but this worsened 2 or 3 weeks ago. She has experienced worsening peripheral edema as well. She has severe prosthetic aortic valve stenosis and she is being considered for a TAVR. She reports a chronic cough with some congestion. She has not heard any wheezing. She is followed by hematology for chronic anemia that is felt to be multifactorial. She was last transfused on 1/15/18. Bone marrow biopsy has been deferred thinking that treatment plan would not change (prn transfusions). She now has thrombocytopenia and labs are being repeated.       Subjective:    Plans for heart cath today. Less short of breath. Can lie flat but restless legs are a problem.       Current Facility-Administered Medications   Medication Dose Route Frequency    polyethylene glycol (MIRALAX) packet 17 g  17 g Oral DAILY    levalbuterol (XOPENEX) nebulizer soln 0.63 mg/3 mL  0.63 mg Nebulization QID RT    bisacodyl (DULCOLAX) tablet 5 mg  5 mg Oral DAILY PRN    allopurinol (ZYLOPRIM) tablet 100 mg  100 mg Oral BID    benzonatate (TESSALON) capsule 200 mg  200 mg Oral TID PRN    cholecalciferol (VITAMIN D3) tablet 1,000 Units  1,000 Units Oral DAILY    diazePAM (VALIUM) tablet 5 mg  5 mg Oral DAILY    fluticasone (FLONASE) 50 mcg/actuation nasal spray 1 Spray  1 Spray Both Nostrils DAILY    levothyroxine (SYNTHROID) tablet 88 mcg  88 mcg Oral ACB    nitroglycerin (NITROSTAT) tablet 0.4 mg  0.4 mg SubLINGual Q5MIN PRN    pantoprazole (PROTONIX) tablet 40 mg  40 mg Oral ACB    pravastatin (PRAVACHOL) tablet 40 mg  40 mg Oral DAILY    promethazine (PHENERGAN) tablet 25 mg  25 mg Oral Q6H PRN    rOPINIRole (REQUIP) tablet 2 mg  2 mg Oral BID    sertraline (ZOLOFT) tablet 100 mg  100 mg Oral DAILY    spironolactone (ALDACTONE) tablet 25 mg  25 mg Oral DAILY    sodium chloride (NS) flush 5-10 mL  5-10 mL IntraVENous Q8H    sodium chloride (NS) flush 5-10 mL  5-10 mL IntraVENous PRN    aspirin chewable tablet 81 mg  81 mg Oral DAILY    morphine injection 2 mg  2 mg IntraVENous Q4H PRN    ondansetron (ZOFRAN) injection 4 mg  4 mg IntraVENous Q4H PRN    potassium chloride (K-DUR, KLOR-CON) SR tablet 20 mEq  20 mEq Oral BID    calcium carbonate (TUMS) chewable tablet 600 mg [elemental]  600 mg Oral QPM    azelastine (ASTELIN) 137mcg/spray nasal spray (Patient Supplied)  1 Spray Both Nostrils BID    alcohol 62% (NOZIN) nasal  1 Ampule  1 Ampule Topical Q12H    sodium chloride (NS) flush 10 mL  10 mL InterCATHeter Q8H    sodium chloride (NS) flush 10 mL  10 mL InterCATHeter PRN         Objective:     Vitals:    01/24/18 0048 01/24/18 0427 01/24/18 0711 01/24/18 0946   BP: 130/81 113/56  126/65   Pulse: 80 81  82   Resp: 20 22  22   Temp: 97.5 °F (36.4 °C) 97.2 °F (36.2 °C)  97.5 °F (36.4 °C)   SpO2: 99% 97% 98% 97%   Weight: 165 lb 14.4 oz (75.3 kg)      Height:         Intake and Output:   01/22 1901 - 01/24 0700  In: 840 [P.O.:840]  Out: 2600 [Urine:2600]  01/24 0701 - 01/24 1900  In: 840 [P.O.:840]  Out: 250 [Urine:250]    Physical Exam:   Constitutional:  the patient is well developed and in no acute distress  HEENT:  Sclera clear, pupils equal, oral mucosa moist  Lungs: clear but decreased in the bases. Wearing nasal cannula. Cardiovascular:  RRR with noted systolic murmur but no rub or gallop   Abd/GI: soft and non-tender; with positive bowel sounds.   Ext: warm without cyanosis. There is a trace amount of lower leg edema. Musculoskeletal: moves all four extremities with equal strength  Skin:  no jaundice or rashes, no wounds   Neuro: no gross neuro deficits   Musculoskeletal: can ambulate. No deformity  Psychiatric: Calm. ROS:  Chronic dyspnea, chronic anemia with multiple transfusions  Lines: peripheral IV     CHEST XRAY: none today    LAB  Recent Labs      01/24/18   0500  01/23/18   0936  01/23/18   0555  01/22/18   1436   WBC  4.6  4.5  2.7*  4.6   HGB  8.5*  8.3*  8.1*  8.0*   HCT  28.5*  27.8*  27.4*  26.6*   PLT  117*  127*  41*  109*   INR   --    --    --   1.3     Recent Labs      01/24/18   0500  01/23/18   0801  01/23/18   0555  01/22/18   1436   NA  145  144   --   144   K  3.8  4.5   --   3.7   CL  100  101   --   100   CO2  38*  34*   --   39*   GLU  91  93   --   104*   BUN  24*  25*   --   28*   CREA  1.04*  1.03*   --   1.15*   MG  2.0   --   1.5*  1.9   ALB   --    --    --   2.1*   SGOT   --    --    --   37       Assessment:  (Medical Decision Making)     Hospital Problems  Date Reviewed: 1/4/2018          Codes Class Noted POA    Peripheral arterial disease (HCC) (Chronic) ICD-10-CM: I73.9  ICD-9-CM: 443.9  1/23/2018 Yes    noted    Pleural effusion ICD-10-CM: J90  ICD-9-CM: 511.9  1/23/2018 Yes    Small.  No throracentesis so far    Chronic respiratory failure with hypoxia (HCC) (Chronic) ICD-10-CM: J96.11  ICD-9-CM: 518.83, 799.02  1/23/2018 Yes    Overview Signed 1/23/2018 11:17 AM by Stephanie Aguirre, NP     Wears 3 to 4 liters at home         Has been on for \"years\"    Hypoxia ICD-10-CM: R09.02  ICD-9-CM: 799.02  1/23/2018 Unknown    As above    Aortic stenosis, severe ICD-10-CM: I35.0  ICD-9-CM: 424.1  1/22/2018 Yes    Being evaluated - previous replacment    Stenosis of prosthetic aortic valve ICD-10-CM: I35.0  ICD-9-CM: 396.0  1/19/2018 Yes    Overview Signed 1/19/2018  3:18 PM by Jeni Haywood MD     AVR (10/5/13):  21 mm Pericardial valve.           As above    * (Principal)Acute on chronic systolic congestive heart failure ([facility] ICD-10-CM: I50.23  ICD-9-CM: 428.23, 428.0  2/12/2017 Yes    Negative 1900 mls with lasix    Thrombocytopenia ([facility] ICD-10-CM: D69.6  ICD-9-CM: 287.5  8/22/2012 Yes    Suspect lab error yesterday - now at baseline    Anemia (Chronic) ICD-10-CM: D64.9  ICD-9-CM: 285.9  10/27/2011 Yes    Overview Signed 10/27/2011  8:25 AM by Jatin Donovan     Acute on chronic           Followed by hematology - prn transfusions    Chronic atrial fibrillation ([facility] (Chronic) ICD-10-CM: I48.2  ICD-9-CM: 427.31  10/17/2011 Yes    Paced per telemetry    Chronic obstructive pulmonary disease ([facility] (Chronic) ICD-10-CM: J44.9  ICD-9-CM: 496  3/8/2009 Yes    ? Severity. No PFTs for review           Plan:  (Medical Decision Making)   1. Heart cath today - considering TAVR  2. IV lasix/aldactone per cardiology. Negative fluid balance, less dyspnea  3. Scheduled Xopenex - uses at home. ? Severity of COPD  4. Reassess oxygen needs at discharge - she uses [employer] and she wants back pack for portable oxygen at home  5. No plans for thoracentesis for now since she is improving with diuresis - reoeat CXR tomorrow. Coumadin on hold since last Thursday  6. Followed by hematology for chronic anemia - low platelet count yesterday was apparent error - now at baseline    Smith Tsang NP     I have spoken with and examined the patient. I agree with the above assessment and plan as documented. Oxygenation improved and patient reports she is feeling more comfortable after lasix. She still requires     Gen:  Awake, pleasant  Lungs:  Decreased in bases  Heart:  RRR with no Murmur/Rubs/Gallops  Abd: NABS  Ext: ++edema    --Continue monitoring oxygenation closely on lasix.   Can perform thoracentesis if needed but diuretics may be effective  --Will need to closely follow renal function given contrast loads planned and diuretics    Sarah Cain MD    More than 50% of time documented was spent face-to-face contact with the patient and in the care of the patient on the floor/unit where the patient is located.

## 2018-01-24 NOTE — PROGRESS NOTES
TRANSFER - OUT REPORT:    Verbal report given to Ruthie RN (name) on Yarelis Zuniga  being transferred to [address] [place] [address] (unit) for routine progression of care       Report consisted of patients Situation, Background, Assessment and   Recommendations(SBAR). Information from the following report(s) SBAR was reviewed with the receiving nurse. Lines:       Opportunity for questions and clarification was provided. Patient transported with:   O2 @ 2 liters  Registered Nurse  Tech         Pt had LHC via [address] Site closed with TR band and 10mL @ 1315. Pt received Versed 0.5mg IV.

## 2018-01-24 NOTE — PROCEDURES
[facility]– [place][note 2] [address] [facility] [place]  MR[facility] [id number]  : 1940  ACCOUNT #: [de-identified]   DATE OF SERVICE: 2018    PRIMARY CARDIOLOGIST:  Dr. Carlitos Figueroa. PRIMARY CARE PHYSICIAN:  Dr. Bernabe Dong. BRIEF HISTORY:  The patient is a 60-year-old female with a history of prior bioprosthetic aortic valve replacement. She has now developed severe bioprosthetic valve degeneration and stenosis, has been struggling with recurrent anemia, thrombocytopenia, congestive heart failure as well as chronic respiratory failure. She was admitted for IV diuresis due to large bilateral pleural effusions. She has clinically improved and referred today for cardiac catheterization and assessment of potential TAVR procedure. PROCEDURE:  After informed consent, the patient was prepped and draped in the usual sterile fashion. The right wrist infiltrated with lidocaine and the right radial artery was accessed via micropuncture technique and a 6-Bolivian sheath was advanced. A 5-Bolivian Tiger catheter was utilized for left and right coronary injection and 5-Bolivian multipurpose catheter was utilized for bilateral selective iliac angiography. CONSCIOUS SEDATION:  Start time 12:59 p.m., end time 1:17 p.m. MEDICATIONS:  Versed 0.5 mg. MONITORING:  RN, Michaela Ordoñez. FINDINGS:  1. Left ventricle:  Not done. 2.  Left main:  Left main is moderate in size. It bifurcates into the LAD and circumflex systems, appears angiographically-normal.  3.  Left anterior descending coronary: This is a relatively large vessel, gives rise to a large mid vessel diagonal.  The entire LAD system appears angiographically-normal.  4.  Left circumflex coronary: This is a relatively large vessel, gives rise to 3 obtuse marginals. The entire system appears angiographically-normal.  5.  Right coronary:   This is a moderate-sized, anatomically-dominant vessel, gives rise to a small posterior descending and moderate posterolateral branch. The entire system appears angiographically-normal.    LEFT ILIOFEMORAL ANGIOGRAPHY:  This demonstrates a large iliofemoral system appearing adequate for TAVR access. RIGHT ILIOFEMORAL ANGIOGRAPHY:  This demonstrates a large iliofemoral system with mild calcification of the ostium of the right iliac artery, otherwise appears adequate for transfemoral aortic valve replacement access. Thank you for allowing us to participate in the care of this patient. If you have questions or concerns, please feel free to contact me.       Colletta Lions, MD MAG / CN  D: 01/24/2018 13:21     T: 01/24/2018 13:54  JOB #: [id number]  CC: Esdras Peterson MD  CC: Don Wellington MD

## 2018-01-24 NOTE — PROGRESS NOTES
Patient states she does not want Nursing home rehab and is agreeable to Home health for nursing, PT/OT and aide at discharge and is agreeable to [address] [place] [address] at d/c. CM following.

## 2018-01-24 NOTE — PROCEDURES
Brief Cardiac Procedure Note    Patient: Jenny Jaramillo MRN: [id number]  SSN: [id number]    YOB: 1940  Age: 69 y.o. Sex: female      Date of Procedure: 1/24/2018     Pre-procedure Diagnosis: Aortic Stenosis    Post-procedure Diagnosis: Aortic Stenosis    Procedure: Left Heart Catheterization    Brief Description of Procedure: See note    Performed By: Rosa Elena Royal MD     Assistants: None    Anesthesia: Moderate Sedation    Estimated Blood Loss: Less than 10 mL      Specimens: None    Implants: None    Findings:     LM:  NML  LAD:  NML  LCx:  NML  RCA:  NML    Bilateral iliofemoral runoff - adequate access    Complications: None    Recommendations: Continue medical therapy.     Signed By: Rosa Elena Royal MD     January 24, 2018

## 2018-01-24 NOTE — ROUTINE PROCESS
Chf teaching started to pt post introduction; emphasis to report worsening dyspnea on 3  L/ NC @home.  Planner @ BS and will follow. :10mins    Continue 2/l/ D ( NPO for ( LHC), Pt reminder of NO Drinking /eating  Palliative care: RATT 29, entered

## 2018-01-24 NOTE — PROGRESS NOTES
Bedside and Verbal shift change report given to AMOS Hendricks (oncoming nurse) by self (offgoing nurse). Report included the following information SBAR, Kardex and MAR.

## 2018-01-24 NOTE — PROGRESS NOTES
Problem: Falls - Risk of  Goal: *Absence of Falls  Document Gregorio Fall Risk and appropriate interventions in the flowsheet.    Outcome: Progressing Towards Goal  Fall Risk Interventions:  Mobility Interventions: Bed/chair exit alarm, OT consult for ADLs, Patient to call before getting OOB, PT Consult for mobility concerns         Medication Interventions: Evaluate medications/consider consulting pharmacy, Patient to call before getting OOB, Teach patient to arise slowly    Elimination Interventions: Call light in reach, Bed/chair exit alarm, Patient to call for help with toileting needs, Toilet paper/wipes in reach, Toileting schedule/hourly rounds    History of Falls Interventions: Bed/chair exit alarm, Door open when patient unattended, Room close to nurse's station

## 2018-01-25 PROBLEM — J81.0 ACUTE PULMONARY EDEMA (HCC): Status: ACTIVE | Noted: 2018-01-01

## 2018-01-25 NOTE — PROGRESS NOTES
Bedside and Verbal shift change report given to Soledad Douglass RN (oncoming nurse) by self Nohemi alvarado). Report included the following information SBAR, Kardex, MAR and Recent Results.

## 2018-01-25 NOTE — ROUTINE PROCESS
CHF teaching started post introduction to pt/family; aware of diagnosis. Planner/scale @ BS and will follow. Smoking/ ETOH/Illicit drug use cessation and maintain a healthy weight covered. Pt/family aware that I can not prescribe nor adjust  medications: 15mins  Palliative Care score:  Start 2L/D Fluid restriction  CHF teaching continues to pt/family. Emphasis on taking prescription meds as ordered, to keep F/U appts and to call MD STAT if any of the following occur:   If you gain 2 lbs in one day or 5 lbs in a week, and short of breath.  If you can not lay flat without developing short of breath or rapid breathing at night; or if it wakes you up. Develop a cough or wheezing.  If you notice swollen hands/feet/ankles or stomach with a bloated/ full feeling.  If you become confused or mentally fuzzy or dizzy.  If you notice a rapid or change in your heart rate.  If you become more exhausted all the time and unable to do the same level of activity without stopping to catch your breath. Drink no more than 8 cups a day in 8 oz. cups. Your Heart can not handle any more. Stay away from salt (limit anything with salt or sodium in it). Limit to 250mg per serving.   Pt/family verbalizes understanding, will follow to reinforce teaching skills: 20 mins    awailting Cardiology for plan

## 2018-01-25 NOTE — PROGRESS NOTES
Problem: Falls - Risk of  Goal: *Absence of Falls  Document Gregorio Fall Risk and appropriate interventions in the flowsheet. Outcome: Progressing Towards Goal  Pt progressing towards goal. No falls since admission. Bed low and locked. Call light within reach. Side rails x 2. Gripper socks applied. Personal belongings within reach. Pt verbalizes understanding to call for assistance.      BED ALARM ON FOR SAFETY    Fall Risk Interventions:  Mobility Interventions: Bed/chair exit alarm         Medication Interventions: Bed/chair exit alarm    Elimination Interventions: Call light in reach, Bed/chair exit alarm    History of Falls Interventions: Bed/chair exit alarm, Door open when patient unattended

## 2018-01-25 NOTE — PROGRESS NOTES
Per patient and family, patient takes ordered Valium at bedtime.   Changed current dose to bedtime instead of am.

## 2018-01-25 NOTE — CONSULTS
[facility] Consult Note       consulted GI Physician: Dr Diogo Laurent    Referring Physician:  Shante Vanessa NP    Consult Date:  1/25/2018    Admit Date:  1/22/2018    Chief Complaint:  Rectal bleeding    Subjective:     History of Present Illness:  Patient 77yo female with PMH CAD, AFib, MVR, CHF, AVR is a with a history of prior bioprosthetic aortic valve replacement seen at the request of Shante Vanessa NP for rectal bleeding. .  She has been struggling with recurrent anemia, thrombocytopenia, congestive heart failure as well as chronic respiratory failure. She was admitted for possible TAVR but had CHF exacerbation with bliateral pleural effusions that required IV diuresis. She underwent LHC 1/24/18 and has since had rectal bleeding with known EH and IH. She has hx of poorly controlled constipation. Dr. Rodri Jackson followed her hemorrhoids for several years and performed multiple bandings. Per his note, she was previously on coumadin but this was discontinued due to recurrent anemia and hemorrhoidal bleeding. Last colonoscopy 10/4/15 by Dr. Rodri Jackson showed fair to poor prep,  2 tubular adenomas in the ascending colon and one tubular adenoma in the descending colon. Hemorrhoial banding was performed in 9/2015, 9/2016, 10/6/16 with banding of  RA column and 1/31/17 with banding of RL and LA columns. She developed right leg arterial occlusion and underwent embolectomy/thrombectomy and was started on Eliquis (later discontinued?) in Feb 2017. Dr. Rodri Jackson saw her in consultation for rectal bleeding at that time but banding was not performed. Per pt and family, she saw Dr. Rodri Jackson just before his move and had repeat banding but I see no evidence of this. She has been taking stool softeners daily but is not using miralax daily. Sounds like she waits until she is severely constipated to take Miralax and if it is ineffective after several doses, she uses Mag Citrate which typically results in diarrhea.      PMH:  Past Medical History:   Diagnosis Date    Abnormal glucose 8/5/2016    Abscess 8/5/2016    Acute encephalopathy 7/8/2016    Acute renal failure ([address] 12/3/2009    Afib ([address]     Anemia     Ankle swelling 8/5/2016    Anxiety 8/5/2016    Aortic Valve Bioprosthesis Present 3/7/2009    ARF (acute renal failure) ([address] 2/24/2010    Arthritis     Asthma     Atopic dermatitis 8/5/2016    Atrial fibrillation (HCC) 3/7/2009    Autonomic orthostatic hypotension 8/5/2016    AV block 10/17/2011    Back pain 8/5/2016    Bradycardia (Symptomatic) 11/7/2011    CAD (coronary artery disease)     Cancer (HCC) 1990    breast (left)    Cardiac pacemaker 11/2/2015    Cardiogenic shock ([address] 10/17/2011    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    Cellulitis 8/5/2016    Chest pain 8/5/2016    Chronic atrial fibrillation ([address] 10/17/2011    Chronic depression 8/5/2016    Chronic obstructive pulmonary disease ([address] 3/8/2009    Chronic pain     CKD (chronic kidney disease) stage 3, GFR 30-59 ml/min 12/4/2009    Congestive heart failure (CHF) ([address] 11/2/2015    Degeneration of cervical intervertebral disc 8/5/2016    Degenerative arthritis of left knee 2/27/2009    Dehydration 5/5/6621    Diastolic heart failure (HCC)     Digoxin toxicity 2/24/2010    Disorder of sweat glands 8/5/2016    Diverticulosis of large intestine without diverticulitis 2015    Dyspnea 8/5/2016    Eczema 8/5/2016    Embolus of femoral artery ([address] 12/4/2017    Epigastric abdominal pain 2/24/2010    GERD (gastroesophageal reflux disease)     Gout 8/5/2016    H/O mitral valve repair, 2003 6/27/2016    Heart failure ([address]     Hx of colonic polyp 2015    adenoma    Hyperkalemia 10/17/2011    Hyperlipidemia 8/5/2016    Hypertension     Hypokalemia 2/24/2010    Hypothyroidism 12/4/2009    Ill-defined condition     CARPEL TUNNEL SYNDROME, BILAT HANDS    Knee pain 8/5/2016    Leg cramps 8/5/2016    Mitral stenosis with insufficiency 11/2/2015    Prior MV repair 2003. 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    Right hip pain 8/5/2016    RLS (restless legs syndrome) 8/5/2016    Sick sinus syndrome ([address] 2/13/2016    Skin infection 8/5/2016    Tachycardia 6/27/2016    Thrombocytopenia, unspecified 8/22/2012    Thromboembolus ([address]     02/2017    Thyroid disease     Urticaria 8/5/2016    Vertigo 8/5/2016       PSH:  Past Surgical History:   Procedure Laterality Date    CARDIAC SURG PROCEDURE UNLIST      valve repair and replacement    CHEST SURGERY PROCEDURE UNLISTED      HX APPENDECTOMY      HX BREAST RECONSTRUCTION      HX CHOLECYSTECTOMY  2005    HX GYN  1981    hysterectomy still have ovaries    HX MASTECTOMY  1990    left mastectomy    HX ORTHOPAEDIC      knee surgery    HX PACEMAKER      VASCULAR SURGERY PROCEDURE UNLIST Right 02/20/2017    LE thrombectomy       Allergies: Allergies   Allergen Reactions    Adhesive Tape Rash    Benadryl [Diphenhydramine Hcl] Other (comments)     Jitters      Demerol [Meperidine] Anxiety    Morphine Other (comments)     Confusion    Sulfa (Sulfonamide Antibiotics) Anxiety    Lorazepam Anxiety       Home Medications:  Prior to Admission medications    Medication Sig Start Date End Date Taking? Authorizing Provider   rOPINIRole (REQUIP) 2 mg tablet Take  by mouth two (2) times a day. Yes Historical Provider   pravastatin (PRAVACHOL) 40 mg tablet Take 1 Tab by mouth daily. Indications: hyperlipidemia 1/4/18  Yes Brigid Zaragoza MD   warfarin (COUMADIN) 5 mg tablet Take 1 Tab by mouth nightly. Name brand only 1/4/18  Yes Keira Handy MD   sertraline (ZOLOFT) 100 mg tablet Take 1 Tab by mouth daily. 1/4/18  Yes Keira Handy MD   diazePAM (VALIUM) 5 mg tablet Take 1 Tab by mouth daily. Max Daily Amount: 5 mg. 1/4/18  Yes Keira Handy MD   torsemide (DEMADEX) 20 mg tablet Take 2 Tabs by mouth daily. 12/12/17  Yes Keira Handy MD   omeprazole (PRILOSEC) 20 mg capsule TAKE 1 CAPSULE BY MOUTH  DAILY 12/11/17  Yes Keira Handy MD   spironolactone (ALDACTONE) 25 mg tablet Take 1 Tab by mouth daily. 9/11/17  Yes Keira Handy MD   levothyroxine (SYNTHROID) 88 mcg tablet Take 1 Tab by mouth Daily (before breakfast). 9/11/17  Yes Keira Handy MD   allopurinol (ZYLOPRIM) 100 mg tablet Take 1 Tab by mouth two (2) times a day. 6/19/17  Yes Vinnie Becerra MD   guaiFENesin ER (MUCINEX) 600 mg ER tablet Take 1 Tab by mouth two (2) times a day. 6/19/17  Yes Vinnie Becerra MD   fluticasone (FLONASE) 50 mcg/actuation nasal spray 1 [place] by Both Nostrils route daily. Yes Historical Provider   azelastine (ASTELIN) 137 mcg (0.1 %) nasal spray 1 [place] by Both Nostrils route two (2) times a day. Use in each nostril as directed 4/20/17  Yes Keira Handy MD   polyethylene glycol (MIRALAX) 17 gram/dose powder Take 17 g by mouth daily. Yes Historical Provider   loratadine (CLARITIN) 10 mg tablet Take 10 mg by mouth as needed. Yes Historical Provider   glycerin, adult, (SUPPOSITORY ADULT) suppository Insert 1 Suppository into rectum as needed. Yes Historical Provider   benzonatate (TESSALON) 200 mg capsule Take 200 mg by mouth three (3) times daily as needed for Cough. Yes Historical Provider   levalbuterol (XOPENEX) 1.25 mg/3 mL nebu 1.25 mg by Nebulization route. Yes Historical Provider   potassium chloride SR (K-TAB) 20 mEq tablet Take 20 mEq by mouth two (2) times a day.    Yes Historical Provider hydrocortisone (ANUSOL-HC) 2.5 % rectal cream Apply small amount to hemorrhoids/perianal skin TID PRN 1/17/17  Yes Armin Paulino MD   calcium carbonate (CALTREX) 600 mg (1,500 mg) tablet Take 600 mg by mouth nightly. Yes Historical Provider   cholecalciferol (VITAMIN D3) 1,000 unit cap Take  by mouth daily. Yes Historical Provider   CARBOXYMETHYLCELLULOS/GLYCERIN (REFRESH OPTIVE OP) Apply  to eye. Yes Historical Provider   DOCUSATE SODIUM Take 1 Cap by mouth two (2) times a day. Yes Historical Provider   OXYGEN-AIR DELIVERY SYSTEMS by Does Not Apply route. 2-2.5 lpm cont. Yes Historical Provider   cyanocobalamin (VITAMIN B-12) 250 mcg tablet Take 1,000 mcg by mouth daily. Yes Quita Shipman MD   nitroglycerin (NITROSTAT) 0.4 mg SL tablet by SubLINGual route every five (5) minutes as needed for Chest Pain. Yes Quita Shipman MD   promethazine (PHENERGAN) 25 mg tablet Take 1 Tab by mouth every six (6) hours as needed.  4/3/17   Keira Handy MD       Hospital Medications:  Current Facility-Administered Medications   Medication Dose Route Frequency    furosemide (LASIX) tablet 40 mg  40 mg Oral ACB&D    polyethylene glycol (MIRALAX) packet 17 g  17 g Oral BID    hydrocortisone (ANUSOL-HC) suppository 25 mg  25 mg Rectal Q12H    hydrocortisone (ANUSOL-HC) 2.5 % rectal cream   PeriANAL QID    levalbuterol (XOPENEX) nebulizer soln 0.63 mg/3 mL  0.63 mg Nebulization QID RT    bisacodyl (DULCOLAX) tablet 5 mg  5 mg Oral DAILY PRN    allopurinol (ZYLOPRIM) tablet 100 mg  100 mg Oral BID    benzonatate (TESSALON) capsule 200 mg  200 mg Oral TID PRN    cholecalciferol (VITAMIN D3) tablet 1,000 Units  1,000 Units Oral DAILY    diazePAM (VALIUM) tablet 5 mg  5 mg Oral DAILY    fluticasone (FLONASE) 50 mcg/actuation nasal spray 1 Spray  1 Spray Both Nostrils DAILY    levothyroxine (SYNTHROID) tablet 88 mcg  88 mcg Oral ACB    nitroglycerin (NITROSTAT) tablet 0.4 mg  0.4 mg SubLINGual Q5MIN PRN    pantoprazole (PROTONIX) tablet 40 mg  40 mg Oral ACB    pravastatin (PRAVACHOL) tablet 40 mg  40 mg Oral DAILY    promethazine (PHENERGAN) tablet 25 mg  25 mg Oral Q6H PRN    rOPINIRole (REQUIP) tablet 2 mg  2 mg Oral BID    sertraline (ZOLOFT) tablet 100 mg  100 mg Oral DAILY    spironolactone (ALDACTONE) tablet 25 mg  25 mg Oral DAILY    sodium chloride (NS) flush 5-10 mL  5-10 mL IntraVENous Q8H    sodium chloride (NS) flush 5-10 mL  5-10 mL IntraVENous PRN    aspirin chewable tablet 81 mg  81 mg Oral DAILY    morphine injection 2 mg  2 mg IntraVENous Q4H PRN    ondansetron (ZOFRAN) injection 4 mg  4 mg IntraVENous Q4H PRN    potassium chloride (K-DUR, KLOR-CON) SR tablet 20 mEq  20 mEq Oral BID    calcium carbonate (TUMS) chewable tablet 600 mg [elemental]  600 mg Oral QPM    azelastine (ASTELIN) 137mcg/spray nasal spray (Patient Supplied)  1 Spray Both Nostrils BID    alcohol 62% (NOZIN) nasal  1 Ampule  1 Ampule Topical Q12H    sodium chloride (NS) flush 10 mL  10 mL InterCATHeter Q8H    sodium chloride (NS) flush 10 mL  10 mL InterCATHeter PRN       Social History:  Social History   Substance Use Topics    Smoking status: Former Smoker     Packs/day: 3.00     Years: 15.00     Types: Cigarettes     Quit date: 1996    Smokeless tobacco: Former User      Comment: quit     Alcohol use No       Pt denies any history of drug use, blood transfusions, or tattoos.     Family History:  Family History   Problem Relation Age of Onset    Heart Disease Mother     Cancer Father      Throat    Heart Disease Father     Cancer Sister      Breast, Colon    Heart Disease Sister     Breast Cancer Sister 79      from disease    Cancer Maternal Grandmother     Cancer Brother     Diabetes Brother     Heart Disease Brother     Psychiatric Disorder Paternal Grandfather     Cancer Maternal Aunt      Breast    Diabetes Son     Alcohol abuse Neg Hx     Arthritis-rheumatoid Neg Hx     Asthma Neg Hx     Bleeding Prob Neg Hx     Elevated Lipids Neg Hx     Headache Neg Hx     Migraines Neg Hx     Hypertension Neg Hx     Lung Disease Neg Hx     Mental Retardation Neg Hx     Stroke Neg Hx        Review of Systems:  A detailed 10 system ROS is obtained, with pertinent positives as listed above. All others are negative. Diet:  Cardiac regular    Objective:     Physical Exam:  Vitals:  Visit Vitals    /67 (BP 1 Location: Right arm, BP Patient Position: At rest)    Pulse 80    Temp 97.3 °F (36.3 °C)    Resp 18    Ht 5' (1.524 m)    Wt 75.4 kg (166 lb 4.8 oz)    SpO2 100%    BMI 32.48 kg/m2     Gen:  Pt is alert, cooperative, no acute distress  Skin:  Extremities and face reveal no rashes. HEENT: Sclerae anicteric. Extra-occular muscles are intact. No oral ulcers. No abnormal pigmentation of the lips. The neck is supple. Cardiovascular: Regular rate and rhythm. No murmurs, gallops, or rubs. Respiratory:  Comfortable breathing with no accessory muscle use. Clear breath sounds anteriorly with no wheezes, rales, or rhonchi. GI:  Abdomen nondistended, soft, and nontender. Normal active bowel sounds. No enlargement of the liver or spleen. No masses palpable. Rectal:  Laxity of rectal sphincter. Mild EH visible without current bleeding. SHONDA causes pain - no overt blood seen. No palpable masses. Musculoskeletal:  No pitting edema of the lower legs. Neurological:  Gross memory appears intact. Patient is alert and oriented. Psychiatric:  Mood appears appropriate with judgement intact.       Laboratory:    Recent Labs      01/25/18   0546  01/24/18   0500  01/23/18   0936  01/23/18   0801  01/23/18   0555  01/22/18   1436   WBC   --   4.6  4.5   --   2.7*  4.6   HGB  8.4*  8.5*  8.3*   --   8.1*  8.0*   HCT  28.5*  28.5*  27.8*   --   27.4*  26.6*   PLT   --   117*  127*   --   41*  109*   MCV   --   96.9  96.9   --   98.2*  96.4   NA  144 145   --   144   --   144   K  3.9  3.8   --   4.5   --   3.7   CL  101  100   --   101   --   100   CO2  37*  38*   --   34*   --   39*   BUN  21  24*   --   25*   --   28*   CREA  0.92  1.04*   --   1.03*   --   1.15*   CA  8.6  8.5   --   8.4   --   8.7   MG  2.2  2.0   --    --   1.5*  1.9   GLU  107*  91   --   93   --   104*   AP   --    --    --    --    --   89   SGOT   --    --    --    --    --   37   ALT   --    --    --    --    --   21   TBILI   --    --    --    --    --   0.8   ALB   --    --    --    --    --   2.1*   TP   --    --    --    --    --   4.6*   PTP   --    --    --    --    --   15.4*   INR   --    --    --    --    --   1.3          Assessment:     Principal Problem:    Acute on chronic systolic congestive heart failure (HCC) (2/12/2017)    Active Problems:    Chronic obstructive pulmonary disease (HCC) (3/8/2009)      Chronic atrial fibrillation (HCC) (10/17/2011)      Anemia (10/27/2011)      Overview: Acute on chronic            Thrombocytopenia (HCC) (8/22/2012)      Stenosis of prosthetic aortic valve (1/19/2018)      Overview: AVR (10/5/13):  21 mm Pericardial valve. Aortic stenosis, severe (1/22/2018)      Peripheral arterial disease ([address] (1/23/2018)      Pleural effusion (1/23/2018)      Chronic respiratory failure with hypoxia ([address] (1/23/2018)      Overview: Wears 3 to 4 liters at home      Hypoxia (1/23/2018)     75yo female with PMH CAD, AFib, MVR, CHF, AVR is a with a history of prior bioprosthetic aortic valve replacement seen at the request of Smita Pinzon NP for rectal bleeding. .  She has been struggling with recurrent anemia, thrombocytopenia, congestive heart failure as well as chronic respiratory failure. She was admitted for possible TAVR but had CHF exacerbation with bliateral pleural effusions that required IV diuresis. She underwent LHC 1/24/18 and has since had rectal bleeding with known EH and IH. She has hx of poorly controlled constipation.   Dae Fenton followed her hemorrhoids for several years and performed multiple bandings. Per his note, she was previously on coumadin but this was discontinued due to recurrent anemia and hemorrhoidal bleeding. Last colonoscopy 10/4/15 by Dr. Dae Fenton showed fair to poor prep,  2 tubular adenomas in the ascending colon and one tubular adenoma in the descending colon. Hemorrhoial banding was performed in 9/2015, 9/2016, 10/6/16 with banding of  RA column and 1/31/17 with banding of RL and LA columns. She developed right leg arterial occlusion and underwent embolectomy/thrombectomy and was started on Eliquis (later discontinued?) in Feb 2017. Dr. Dae Fenton saw her in consultation for rectal bleeding at that time but banding was not performed. Per pt and family, she saw Dr. Dae Fenton just before his move and had repeat banding but I see no evidence of this. She has been taking stool softeners daily but is not using miralax daily. Sounds like she waits until she is severely constipated to take Miralax and if it is ineffective after several doses, she uses Mag Citrate which typically results in diarrhea.            Plan:     - agressively treat constipation. Recommend Miralax 17g BID and titrate as needed to maintain soft formed stool every 1-2 days. She is encouraged to take every day unless she has diarrhea. She can still use Mag citrate prn but the hope is that she will not need this. Constipation can lead to recurrent hemorrhoidal inflammation. Pt verbalizes understanding.  - treat hemorrhoids with anusol suppositories BID and anusol cream QID (can stop this soon if responding)  - if bleeding continues, will  Need surgical evaluation of IH with possible intervention. - monitor H/H  - limit or avoid anticoagulation if possible. [place], [place]      Patient is seen and examined in collaboration with Dr. Toni Melendez. Assessment and plan as per Dr. Toni Melendez.     ATTENDING NOTE:  I have seen the patient and agree with the above assessment and plan. Based on prior colonoscopy, strong suspicion for recurrent bleeding from internal hemorrhoids. Recommend conservative management as outlined above. MiraLax to improve constipation. Start Anusol suppositories and cream. If significant ongoing bleeding, patient will require surgical evaluation for banding of hemorrhoids.      Vandana Pathak MD

## 2018-01-25 NOTE — H&P (VIEW-ONLY)
CONSULT NOTE    Maryan Oh    1/23/2018    Date of Admission:  1/22/2018    The patient's chart is reviewed and the patient is discussed with the staff. Subjective:     Patient is a 69 y.o.  female seen and evaluated at the request of Dr. Margy Obrien. She was admitted with acute heart failure. She has been given extra lasix with some improvement in her dyspnea. She has a history of COPD but I do not have any PFTs for review. She is maintained on nebulized Xopenex in addition to 3 to 4 liters of oxygen. She uses a half ampule at a time due to tremors. She uses her nebulizer 6 to 7 times per day. She has chronic dyspnea but this worsened 2 or 3 weeks ago. She has experienced worsening peripheral edema as well. She has severe prosthetic aortic valve stenosis and she is being considered for a TAVR. She reports a chronic cough with some congestion. She has not heard any wheezing. She is followed by hematology for chronic anemia that is felt to be multifactorial. She was last transfused on 1/15/18. Bone marrow biopsy has been deferred thinking that treatment plan would not change (prn transfusions). She now has thrombocytopenia and labs are being repeated. Review of Systems  A comprehensive review of systems was negative except for: Constitutional: positive for none  Eyes: positive for contacts/glasses  Ears, nose, mouth, throat, and face: positive for none  Respiratory: positive for cough, sputum or dyspnea on exertion  Cardiovascular: positive for dyspnea, lower extremity edema  Gastrointestinal: positive for constipation  Genitourinary: positive for none  Integument/breast: positive for history of breast cancer  Hematologic/lymphatic: positive for followed by hematology for anemia.  now with thrombocytopenia  Musculoskeletal: positive for none  Neurological: positive for none  Behvioral/Psych: positive for anxiety and depression  Endocrine: positive for none  Allergic/Immunologic: positive for none    Patient Active Problem List   Diagnosis Code    Degenerative arthritis of left knee M17.12    Aortic Valve Bioprosthesis Present Z95.2    Chronic obstructive pulmonary disease (HCC) J44.9    Hypothyroidism E03.9    BOBBY (obstructive sleep apnea) G47.33    Chronic atrial fibrillation (HCC) I48.2    Anemia D64.9    Thrombocytopenia (HCC) D69.6    Obesity E66.9    Mitral stenosis with insufficiency I05.2    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    Cardiac pacemaker Z95.0    Sick sinus syndrome (HCC) R20.2    Diastolic heart failure (HCC) I50.30    H/O mitral valve repair, 2003 Z98.890    Chronic depression F32.9    Osteopenia M85.80    RLS (restless legs syndrome) G25.81    Hyperlipidemia E78.5    Gout M10.9    Anxiety F41.9    CAD (coronary artery disease) I25.10    HTN (hypertension) I10    GERD (gastroesophageal reflux disease) K21.9    Chronic diastolic congestive heart failure (HCC) I50.32    Aortic valve replaced Z95.2    Acute on chronic systolic congestive heart failure (HCC) I50.23    Pulmonary hypertension I27.20    H/O atrioventricular rubin ablation Z98.890    S/P mitral valve repair Z98.890    Tricuspid valve insufficiency L22.5    Systolic CHF, chronic ([facility]) I50.22    Stenosis of prosthetic aortic valve I35.0    Aortic stenosis, severe I35.0    Peripheral arterial disease (HCC) I73.9    Pleural effusion J90    Chronic respiratory failure with hypoxia ([facility]) J96.11    Hypoxia R09.02         Prior to Admission Medications   Prescriptions Last Dose Informant Patient Reported? Taking? CARBOXYMETHYLCELLULOS/GLYCERIN (REFRESH OPTIVE OP)   Yes Yes   Sig: Apply  to eye. DOCUSATE SODIUM   Yes Yes   Sig: Take 1 Cap by mouth two (2) times a day. OXYGEN-AIR DELIVERY SYSTEMS   Yes Yes   Sig: by Does Not Apply route. 2-2.5 lpm cont. allopurinol (ZYLOPRIM) 100 mg tablet   No Yes   Sig: Take 1 Tab by mouth two (2) times a day. azelastine (ASTELIN) 137 mcg (0.1 %) nasal spray   No Yes   [place] by Both Nostrils route two (2) times a day. Use in each nostril as directed   benzonatate (TESSALON) 200 mg capsule   Yes Yes   Sig: Take 200 mg by mouth three (3) times daily as needed for Cough. calcium carbonate (CALTREX) 600 mg (1,500 mg) tablet   Yes Yes   Sig: Take 600 mg by mouth nightly. cholecalciferol (VITAMIN D3) 1,000 unit cap   Yes Yes   Sig: Take  by mouth daily. cyanocobalamin (VITAMIN B-12) 250 mcg tablet   Yes Yes   Sig: Take 1,000 mcg by mouth daily. diazePAM (VALIUM) 5 mg tablet   No Yes   Sig: Take 1 Tab by mouth daily. Max Daily Amount: 5 mg. fluticasone (FLONASE) 50 mcg/actuation nasal spray   Yes Yes   [place] by Both Nostrils route daily. glycerin, adult, (SUPPOSITORY ADULT) suppository   Yes Yes   Sig: Insert 1 Suppository into rectum as needed. guaiFENesin ER (MUCINEX) 600 mg ER tablet   No Yes   Sig: Take 1 Tab by mouth two (2) times a day. hydrocortisone (ANUSOL-HC) 2.5 % rectal cream   No Yes   Sig: Apply small amount to hemorrhoids/perianal skin TID PRN   levalbuterol (XOPENEX) 1.25 mg/3 mL nebu   Yes Yes   Si.25 mg by Nebulization route. levothyroxine (SYNTHROID) 88 mcg tablet   No Yes   Sig: Take 1 Tab by mouth Daily (before breakfast). loratadine (CLARITIN) 10 mg tablet   Yes Yes   Sig: Take 10 mg by mouth as needed. nitroglycerin (NITROSTAT) 0.4 mg SL tablet   Yes Yes   Sig: by SubLINGual route every five (5) minutes as needed for Chest Pain. omeprazole (PRILOSEC) 20 mg capsule   No Yes   Sig: TAKE 1 CAPSULE BY MOUTH  DAILY   polyethylene glycol (MIRALAX) 17 gram/dose powder   Yes Yes   Sig: Take 17 g by mouth daily. potassium chloride SR (K-TAB) 20 mEq tablet   Yes Yes   Sig: Take 20 mEq by mouth two (2) times a day. pravastatin (PRAVACHOL) 40 mg tablet   No Yes   Sig: Take 1 Tab by mouth daily.  Indications: hyperlipidemia   promethazine (PHENERGAN) 25 mg tablet   No No Sig: Take 1 Tab by mouth every six (6) hours as needed. rOPINIRole (REQUIP) 2 mg tablet   Yes Yes   Sig: Take  by mouth two (2) times a day. sertraline (ZOLOFT) 100 mg tablet   No Yes   Sig: Take 1 Tab by mouth daily. spironolactone (ALDACTONE) 25 mg tablet   No Yes   Sig: Take 1 Tab by mouth daily. torsemide (DEMADEX) 20 mg tablet   No Yes   Sig: Take 2 Tabs by mouth daily. warfarin (COUMADIN) 5 mg tablet   No Yes   Sig: Take 1 Tab by mouth nightly.  Name brand only      Facility-Administered Medications: None       Past Medical History:   Diagnosis Date    Abnormal glucose 8/5/2016    Abscess 8/5/2016    Acute encephalopathy 7/8/2016    Acute renal failure ([address] 12/3/2009    Afib ([address]     Anemia     Ankle swelling 8/5/2016    Anxiety 8/5/2016    Aortic Valve Bioprosthesis Present 3/7/2009    ARF (acute renal failure) ([address] 2/24/2010    Arthritis     Asthma     Atopic dermatitis 8/5/2016    Atrial fibrillation (HCC) 3/7/2009    Autonomic orthostatic hypotension 8/5/2016    AV block 10/17/2011    Back pain 8/5/2016    Bradycardia (Symptomatic) 11/7/2011    CAD (coronary artery disease)     Cancer (HCC) 1990    breast (left)    Cardiac pacemaker 11/2/2015    Cardiogenic shock ([address] 10/17/2011    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    Cellulitis 8/5/2016    Chest pain 8/5/2016    Chronic atrial fibrillation ([address] 10/17/2011    Chronic depression 8/5/2016    Chronic obstructive pulmonary disease ([address] 3/8/2009    Chronic pain     CKD (chronic kidney disease) stage 3, GFR 30-59 ml/min 12/4/2009    Congestive heart failure (CHF) ([address] 11/2/2015    Degeneration of cervical intervertebral disc 8/5/2016    Degenerative arthritis of left knee 2/27/2009    Dehydration 5/7/0431    Diastolic heart failure (HCC)     Digoxin toxicity 2/24/2010    Disorder of sweat glands 8/5/2016    Diverticulosis of large intestine without diverticulitis 2015    Dyspnea 8/5/2016    Eczema 8/5/2016    Embolus of femoral artery (HCC) 12/4/2017    Epigastric abdominal pain 2/24/2010    GERD (gastroesophageal reflux disease)     Gout 8/5/2016    H/O mitral valve repair, 2003 6/27/2016    Heart failure ([address]     Hx of colonic polyp 2015    adenoma    Hyperkalemia 10/17/2011    Hyperlipidemia 8/5/2016    Hypertension     Hypokalemia 2/24/2010    Hypothyroidism 12/4/2009    Ill-defined condition     CARPEL TUNNEL SYNDROME, BILAT HANDS    Knee pain 8/5/2016    Leg cramps 8/5/2016    Mitral stenosis with insufficiency 11/2/2015    Prior MV repair 2003.     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    Right hip pain 8/5/2016    RLS (restless legs syndrome) 8/5/2016    Sick sinus syndrome ([address] 2/13/2016    Skin infection 8/5/2016    Tachycardia 6/27/2016    Thrombocytopenia, unspecified 8/22/2012    Thromboembolus ([address]     02/2017    Thyroid disease     Urticaria 8/5/2016    Vertigo 8/5/2016     Active Ambulatory Problems     Diagnosis Date Noted    Degenerative arthritis of left knee 02/27/2009    Aortic Valve Bioprosthesis Present 03/07/2009    Chronic obstructive pulmonary disease ([address] 03/08/2009    Hypothyroidism 12/04/2009    BOBBY (obstructive sleep apnea) 12/04/2009    Chronic atrial fibrillation ([address] 10/17/2011    Anemia 10/27/2011    Thrombocytopenia ([address] 08/22/2012    Obesity 11/02/2015    Mitral stenosis with insufficiency 11/02/2015    Rheumatic aortic stenosis 11/02/2015    Cardiac pacemaker 11/02/2015    Sick sinus syndrome ([address] 56/05/9154    Diastolic heart failure ([address]     H/O mitral valve repair, 2003 06/27/2016    Chronic depression 08/05/2016    Osteopenia 08/05/2016    RLS (restless legs syndrome) 08/05/2016    Hyperlipidemia 08/05/2016    Gout 08/05/2016    Anxiety 08/05/2016    CAD (coronary artery disease) 08/05/2016    HTN (hypertension) 08/05/2016    GERD (gastroesophageal reflux disease) 08/05/2016    Chronic diastolic congestive heart failure ([address] 09/09/2016    Aortic valve replaced 01/09/2017    Acute on chronic systolic congestive heart failure ([address] 02/12/2017    Pulmonary hypertension 02/12/2017    H/O atrioventricular rubin ablation 03/22/2017    S/P mitral valve repair 12/04/2017    Tricuspid valve insufficiency 03/03/1583    Systolic CHF, chronic ([facility] 01/15/2018    Stenosis of prosthetic aortic valve 01/19/2018     Resolved Ambulatory Problems     Diagnosis Date Noted    Hypotension 03/01/2009    Atrial fibrillation ([facility] 03/07/2009    Cough 03/09/2009    Bronchitis 03/09/2009    CKD (chronic kidney disease) stage 3, GFR 30-59 ml/min 12/04/2009    Nausea and vomiting 02/24/2010    Epigastric abdominal pain 02/24/2010    Digoxin toxicity 02/24/2010    ARF (acute renal failure) (HCC) 02/24/2010    Hypokalemia 02/24/2010    AV block 10/17/2011    Cardiogenic shock (HCC) 10/17/2011    Hyperkalemia 10/17/2011    Rectal bleeding 10/27/2011    Bradycardia (Symptomatic) 11/07/2011    Palpitations 11/02/2015    Respiratory insufficiency 11/02/2015    Tachycardia 06/27/2016    Acute encephalopathy 07/08/2016    CRI (chronic renal insufficiency) 08/05/2016    Dyspnea 08/05/2016    Right hip pain 08/05/2016    Edema 08/05/2016    Chest pain 08/05/2016    Other long term (current) drug therapy 08/05/2016    Localized edema 09/09/2016    Iron deficiency anemia 09/09/2016    Non morbid obesity due to excess calories 11/14/2016    Hypoxemia 11/14/2016    Warfarin anticoagulation 01/09/2017    History of gout 01/31/2017    Pacemaker 12/04/2017     Past Medical History:   Diagnosis Date    Abnormal glucose 8/5/2016    Abscess 8/5/2016    Acute encephalopathy 7/8/2016    Acute renal failure ([address] 12/3/2009    Afib ([address]     Anemia     Ankle swelling 8/5/2016    Anxiety 8/5/2016    Aortic Valve Bioprosthesis Present 3/7/2009    ARF (acute renal failure) ([address] 2/24/2010    Arthritis     Asthma     Atopic dermatitis 8/5/2016    Atrial fibrillation (HCC) 3/7/2009    Autonomic orthostatic hypotension 8/5/2016    AV block 10/17/2011    Back pain 8/5/2016    Bradycardia (Symptomatic) 11/7/2011    CAD (coronary artery disease)     Cancer ([address] 1990    Cardiac pacemaker 11/2/2015    Cardiogenic shock (HCC) 10/17/2011    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    Cellulitis 8/5/2016    Chest pain 8/5/2016    Chronic atrial fibrillation ([address] 10/17/2011    Chronic depression 8/5/2016    Chronic obstructive pulmonary disease ([address] 3/8/2009    Chronic pain     CKD (chronic kidney disease) stage 3, GFR 30-59 ml/min 12/4/2009    Congestive heart failure (CHF) ([address] 11/2/2015    Degeneration of cervical intervertebral disc 8/5/2016    Degenerative arthritis of left knee 2/27/2009    Dehydration 3/8/6822    Diastolic heart failure (HCC)     Digoxin toxicity 2/24/2010    Disorder of sweat glands 8/5/2016    Diverticulosis of large intestine without diverticulitis 2015    Dyspnea 8/5/2016    Eczema 8/5/2016    Embolus of femoral artery ([address] 12/4/2017    Epigastric abdominal pain 2/24/2010    GERD (gastroesophageal reflux disease)     Gout 8/5/2016    H/O mitral valve repair, 2003 6/27/2016    Heart failure ([address]     Hx of colonic polyp 2015    Hyperkalemia 10/17/2011    Hyperlipidemia 8/5/2016    Hypertension     Hypokalemia 2/24/2010    Hypothyroidism 12/4/2009    Ill-defined condition     Knee pain 8/5/2016    Leg cramps 8/5/2016    Mitral stenosis with insufficiency 11/2/2015    Nausea and vomiting 2/24/2010    Obesity 2015    BOBBY (obstructive sleep apnea) 2009    Osteoarthritis 2016    Osteopenia 2016    Other long term (current) drug therapy 2016    Palpitations 2015    Rash 2016    Rectal bleeding 10/27/2011    Rectocele 2015    Recurrent depression [facility] 2018    Rheumatic aortic stenosis 2015    Right hip pain 2016    RLS (restless legs syndrome) 2016    Sick sinus syndrome ([address] 2016    Skin infection 2016    Tachycardia 2016    Thrombocytopenia, unspecified 2012    Throm[facility]embolus [facility]     Thyroid disease     Urticaria 2016    Vertigo 2016     Past Surgical History:   Procedure Laterality Date    CARDIAC SURG PROCEDURE UNLIST      valve repair and replacement    CHEST SURGERY PROCEDURE UNLISTED      HX APPENDECTOMY      HX BREAST RECONSTRUCTION      HX CHOLECYSTECTOMY      HX GYN  1981    hysterectomy still have ovaries    HX MASTECTOMY      left mastectomy    HX ORTHOPAEDIC      knee surgery    HX PACEMAKER      VASCULAR SURGERY PROCEDURE UNLIST Right 2017    LE thrombectomy     Social History     Social History    Marital status:      Spouse name: N/A    Number of children: N/A    Years of education: N/A     Occupational History    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    cotton mill      6 years     Social History Main Topics    Smoking status: Former Smoker     Packs/day: 3.00     Years: 15.00     Types: Cigarettes     Quit date: 1996    Smokeless tobacco: Former User      Comment: quit     Alcohol use No    Drug use: No    Sexual activity: Not Currently     Other Topics Concern    Not on file     Social History Narrative    Lives with her son     Family History   Problem Relation Age of Onset    Heart Disease Mother     Cancer Father      Throat    Heart Disease Father     Cancer Sister      Breast, Colon    Heart Disease Sister     Breast Cancer Sister 79      from disease  Cancer Maternal Grandmother     Cancer Brother     Diabetes Brother     Heart Disease Brother     Psychiatric Disorder Paternal Grandfather     Cancer Maternal Aunt      Breast    Diabetes Son     Alcohol abuse Neg Hx     Arthritis-rheumatoid Neg Hx     Asthma Neg Hx     Bleeding Prob Neg Hx     Elevated Lipids Neg Hx     Headache Neg Hx     Migraines Neg Hx     Hypertension Neg Hx     Lung Disease Neg Hx     Mental Retardation Neg Hx     Stroke Neg Hx      Allergies   Allergen Reactions    Adhesive Tape Rash    Benadryl [Diphenhydramine Hcl] Other (comments)     Jitters      Demerol [Meperidine] Anxiety    Morphine Other (comments)     Confusion    Sulfa (Sulfonamide Antibiotics) Anxiety    Lorazepam Anxiety       Current Facility-Administered Medications   Medication Dose Route Frequency    polyethylene glycol (MIRALAX) packet 17 g  17 g Oral DAILY    levalbuterol (XOPENEX) nebulizer soln 0.63 mg/3 mL  0.63 mg Nebulization QID RT    allopurinol (ZYLOPRIM) tablet 100 mg  100 mg Oral BID    benzonatate (TESSALON) capsule 200 mg  200 mg Oral TID PRN    cholecalciferol (VITAMIN D3) tablet 1,000 Units  1,000 Units Oral DAILY    diazePAM (VALIUM) tablet 5 mg  5 mg Oral DAILY    fluticasone (FLONASE) 50 mcg/actuation nasal spray 1 Spray  1 Spray Both Nostrils DAILY    levothyroxine (SYNTHROID) tablet 88 mcg  88 mcg Oral ACB    nitroglycerin (NITROSTAT) tablet 0.4 mg  0.4 mg SubLINGual Q5MIN PRN    pantoprazole (PROTONIX) tablet 40 mg  40 mg Oral ACB    pravastatin (PRAVACHOL) tablet 40 mg  40 mg Oral DAILY    promethazine (PHENERGAN) tablet 25 mg  25 mg Oral Q6H PRN    rOPINIRole (REQUIP) tablet 2 mg  2 mg Oral BID    sertraline (ZOLOFT) tablet 100 mg  100 mg Oral DAILY    spironolactone (ALDACTONE) tablet 25 mg  25 mg Oral DAILY    sodium chloride (NS) flush 5-10 mL  5-10 mL IntraVENous Q8H    sodium chloride (NS) flush 5-10 mL  5-10 mL IntraVENous PRN    aspirin chewable tablet 81 mg  81 mg Oral DAILY    morphine injection 2 mg  2 mg IntraVENous Q4H PRN    ondansetron (ZOFRAN) injection 4 mg  4 mg IntraVENous Q4H PRN    furosemide (LASIX) injection 40 mg  40 mg IntraVENous BID    potassium chloride (K-DUR, KLOR-CON) SR tablet 20 mEq  20 mEq Oral BID    calcium carbonate (TUMS) chewable tablet 600 mg [elemental]  600 mg Oral QPM    azelastine (ASTELIN) 137mcg/spray nasal spray (Patient Supplied)  1 Spray Both Nostrils BID    alcohol 62% (NOZIN) nasal  1 Ampule  1 Ampule Topical Q12H    sodium chloride (NS) flush 10 mL  10 mL InterCATHeter Q8H    sodium chloride (NS) flush 10 mL  10 mL InterCATHeter PRN         Objective:     Vitals:    01/22/18 2057 01/23/18 0037 01/23/18 0428 01/23/18 0845   BP: 113/57 120/62 102/59 114/61   Pulse: 83 80 81 80   Resp: 17 18 18 18   Temp: 97.5 °F (36.4 °C) 97.5 °F (36.4 °C) 97.6 °F (36.4 °C) 97.3 °F (36.3 °C)   SpO2: 100% 99% 100% 100%   Weight:   164 lb 12.8 oz (74.8 kg)    Height:           PHYSICAL EXAM     Constitutional:  the patient is well developed and in no acute distress  HEENT:  Sclera clear, pupils equal, oral mucosa moist  Lungs: decreased in the bases. Wearing nasal cannula. Clear breath sounds. Respirations even and not labored  Cardiovascular:  RRR with noted systolic murmur  Abd/GI: soft and non-tender; with positive bowel sounds. Ext: warm without cyanosis. There is 1+ lower leg edema. Skin:  no jaundice or rashes, no wounds   Neuro: no gross neuro deficits. Alert and oriented  Musculoskeletal: moves all four extremities. No deformities. Psychiatric: calm.  Does not appear anxious or depressed  Chest X-ray:        Recent Labs      01/23/18   0936  01/23/18   0555  01/22/18   1436   WBC  4.5  2.7*  4.6   HGB  8.3*  8.1*  8.0*   HCT  27.8*  27.4*  26.6*   PLT  127*  41*  109*   INR   --    --   1.3     Recent Labs      01/23/18   0801  01/23/18   0555  01/22/18   1436   NA  144   --   144   K  4.5   --   3.7   CL 101   --   100   GLU  93   --   104*   CO2  34*   --   39*   BUN  25*   --   28*   CREA  1.03*   --   1.15*   MG   --   1.5*  1.9   CA  8.4   --   8.7   ALB   --    --   2.1*   SGOT   --    --   37     No results for input(s): PH, PCO2, PO2, HCO3 in the last 72 hours. Assessment:  (Medical Decision Making)     Hospital Problems  Date Reviewed: 1/4/2018          Codes Class Noted POA    Peripheral arterial disease (HCC) (Chronic) ICD-10-CM: I73.9  ICD-9-CM: 443.9  1/23/2018 Yes        Pleural effusion ICD-10-CM: J90  ICD-9-CM: 511.9  1/23/2018 Yes    Can consider thoracentesis if medical therapy ineffective    Chronic respiratory failure with hypoxia (HCC) (Chronic) ICD-10-CM: J96.11  ICD-9-CM: 518.83, 799.02  1/23/2018 Yes    Overview Signed 1/23/2018 11:17 AM by Sruthi Raymond NP     Wears 3 to 4 liters at home             Hypoxia ICD-10-CM: R09.02  ICD-9-CM: 799.02  1/23/2018 Unknown    Wean o2 as able with diuresis    Aortic stenosis, severe ICD-10-CM: I35.0  ICD-9-CM: 424.1  1/22/2018 Yes        Stenosis of prosthetic aortic valve ICD-10-CM: I35.0  ICD-9-CM: 396.0  1/19/2018 Yes    Overview Signed 1/19/2018  3:18 PM by Harley Guajardo MD     AVR (10/5/13):  21 mm Pericardial valve. * (Principal)Acute on chronic systolic congestive heart failure ([facility] ICD-10-CM: I50.23  ICD-9-CM: 428.23, 428.0  2/12/2017 Yes        Thrombocytopenia ([facility] ICD-10-CM: D69.6  ICD-9-CM: 287.5  8/22/2012 Yes    Improved today    Anemia (Chronic) ICD-10-CM: D64.9  ICD-9-CM: 285.9  10/27/2011 Yes    Overview Signed 10/27/2011  8:25 AM by Tara Jung     Acute on chronic               Chronic atrial fibrillation (HCC) (Chronic) ICD-10-CM: I48.2  ICD-9-CM: 427.31  10/17/2011 Yes        Chronic obstructive pulmonary disease ([facility] (Chronic) ICD-10-CM: J44.9  ICD-9-CM: 496  3/8/2009 Yes    On xopenex. No wheezing.   No PFTs on file      Pulmonary hypertension: in presence of valvular heart disease likely owing to this. Diurese cautiously. Plan:  (Medical Decision Making)   1. IV lasix per cardiology. Fluid balance negative 700 mls since admission.  on admission. CXR with bilateral effusions. Dyspnea better today but still worse than usual. Coumadin on hold since last Thursday in case procedures needed. Can US and tap if needed  2. Continue scheduled Xopenex - uses 1/2 dose at home to avoid side effects. No PFTs for review - ? Severity of COPD. On home oxygen \"for years\". No evidence of exacerbation  3. Being evaluated for TAVR per cardiology  4. CBC being repeated to reassess platelet count    Deisy Wells MD     I have spoken with and examined the patient. I agree with the above assessment and plan as documented. Mrs. Supa Davila is a 80yoF with a 15pkyr smoking history and reported COPD who has AS, hypervolemia with edema, pleural effusions, hypoxia. Gen: pleasant on 5.5Lpm of O2, dyspneic with speaking  Lungs:  Decreased bilaterally to 1/2 way up  Heart:  RRR,SM   ABd: NTND  Ext: 2+ edema bilaterally, pitting even in thighs    Agree with lasix which seems to be working thus far  Will consider thoracentesis if lasix doesn't resolve the effusions  Continue bronchodilators as scheduled.     No indication for steroids    Deisy Wells MD        More than 50% of time documented was spent face-to-face contact with the patient and in the care of the patient on the floor/unit where the patient is located

## 2018-01-25 NOTE — CONSULTS
Cardiovascular Surgery Consult (TAVR)    Patient: Lucia Blackwell MRN: [id number]  SSN: [id number]    YOB: 1940  Age: 69 y.o. Sex: female      Subjective:      Lucia Blackwell is a 69 y.o. female who we have been consulted regarding TAVR. She was admitted 1/22/18 for decompensated heart failure. She has significant LV systolic dysfunction and severe Prosthetic AV stenosis and  MV Repair 2003 [facility]) with mild/moderate regurgitation in the setting of significant COPD. Recent echocardiogram 1/8/18 shows EF 35%, severely stenotic AV prosthesis with elevated transaortic valve gradient, Prosthetic AV mean gradient 43 mmHg, Mod Mitral valve regurg and Mild MV stenosis, Biatrial enlargement, PPM in right heart. She was admitted to the hospital for decompensated heart failure with the plan to stabilize her respiratory status and IV diuresis prior to cardiac catheterization and Cardiac CT scan in work up for possible TAVR. Heart Catheterization performed yesterday and shows angiographically normal coronaries with R/L ileofemoral systems adequate for transfemoral aortic valve replacement access. Images reviewed by Dr Spencer Edward. Risk Factors: CHF, Prosthetic AV & S/P MV repair (2003), persistent atrial fibrillation with warfarin anticoagulation, PPM, chronic anemia, Severe COPD, pleural effusions, chronic thrombocytopenia, history of R femoral artery thrombectomy/embolectomy.       Past Medical History:   Diagnosis Date    Abnormal glucose 8/5/2016    Abscess 8/5/2016    Acute encephalopathy 7/8/2016    Acute renal failure ([address] 12/3/2009    Afib ([facility])     Anemia     Ankle swelling 8/5/2016    Anxiety 8/5/2016    Aortic Valve Bioprosthesis Present 3/7/2009    ARF (acute renal failure) ([facility]) 2/24/2010    Arthritis     Asthma     Atopic dermatitis 8/5/2016    Atrial fibrillation ([facility]) 3/7/2009    Autonomic orthostatic hypotension 8/5/2016    AV block 10/17/2011    Back pain 8/5/2016    Bradycardia (Symptomatic) 11/7/2011    CAD (coronary artery disease)     Cancer ([address] 1990    breast (left)    Cardiac pacemaker 11/2/2015    Cardiogenic shock ([address] 10/17/2011    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    Cellulitis 8/5/2016    Chest pain 8/5/2016    Chronic atrial fibrillation (HCC) 10/17/2011    Chronic depression 8/5/2016    Chronic obstructive pulmonary disease ([address] 3/8/2009    Chronic pain     CKD (chronic kidney disease) stage 3, GFR 30-59 ml/min 12/4/2009    Congestive heart failure (CHF) ([address] 11/2/2015    Degeneration of cervical intervertebral disc 8/5/2016    Degenerative arthritis of left knee 2/27/2009    Dehydration 1/7/9106    Diastolic heart failure (HCC)     Digoxin toxicity 2/24/2010    Disorder of sweat glands 8/5/2016    Diverticulosis of large intestine without diverticulitis 2015    Dyspnea 8/5/2016    Eczema 8/5/2016    Embolus of femoral artery ([address] 12/4/2017    Epigastric abdominal pain 2/24/2010    GERD (gastroesophageal reflux disease)     Gout 8/5/2016    H/O mitral valve repair, 2003 6/27/2016    Heart failure ([address]     Hx of colonic polyp 2015    adenoma    Hyperkalemia 10/17/2011    Hyperlipidemia 8/5/2016    Hypertension     Hypokalemia 2/24/2010    Hypothyroidism 12/4/2009    Ill-defined condition     CARPEL TUNNEL SYNDROME, BILAT HANDS    Knee pain 8/5/2016    Leg cramps 8/5/2016    Mitral stenosis with insufficiency 11/2/2015    Prior MV repair 2003.     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    Right hip pain 8/5/2016    RLS (restless legs syndrome) 8/5/2016    Sick sinus syndrome ([facility] 2016    Skin infection 2016    Tachycardia 2016    Thrombocytopenia, unspecified 2012    Thromboembolus ([facility]     2017    Thyroid disease     Urticaria 2016    Vertigo 2016     Past Surgical History:   Procedure Laterality Date    CARDIAC SURG PROCEDURE UNLIST      valve repair and replacement    CHEST SURGERY PROCEDURE UNLISTED      HX APPENDECTOMY      HX BREAST RECONSTRUCTION      HX CHOLECYSTECTOMY      HX GYN      hysterectomy still have ovaries    HX MASTECTOMY      left mastectomy    HX ORTHOPAEDIC      knee surgery    HX PACEMAKER      VASCULAR SURGERY PROCEDURE UNLIST Right 2017    LE thrombectomy      Family History   Problem Relation Age of Onset    Heart Disease Mother     Cancer Father      Throat    Heart Disease Father     Cancer Sister      Breast, Colon    Heart Disease Sister     Breast Cancer Sister 79      from disease    Cancer Maternal Grandmother     Cancer Brother     Diabetes Brother     Heart Disease Brother     Psychiatric Disorder Paternal Grandfather     Cancer Maternal Aunt      Breast    Diabetes Son     Alcohol abuse Neg Hx     Arthritis-rheumatoid Neg Hx     Asthma Neg Hx     Bleeding Prob Neg Hx     Elevated Lipids Neg Hx     Headache Neg Hx     Migraines Neg Hx     Hypertension Neg Hx     Lung Disease Neg Hx     Mental Retardation Neg Hx     Stroke Neg Hx      Social History   Substance Use Topics    Smoking status: Former Smoker     Packs/day: 3.00     Years: 15.00     Types: Cigarettes     Quit date: 1996    Smokeless tobacco: Former User      Comment: quit     Alcohol use No      Current Facility-Administered Medications   Medication Dose Route Frequency    furosemide (LASIX) tablet 40 mg  40 mg Oral ACB&D    polyethylene glycol (MIRALAX) packet 17 g  17 g Oral BID    hydrocortisone (ANUSOL-HC) suppository 25 mg  25 mg Rectal Q12H    hydrocortisone (ANUSOL-HC) 2.5 % rectal cream   PeriANAL QID    levalbuterol (XOPENEX) nebulizer soln 0.63 mg/3 mL  0.63 mg Nebulization QID RT    bisacodyl (DULCOLAX) tablet 5 mg  5 mg Oral DAILY PRN    allopurinol (ZYLOPRIM) tablet 100 mg  100 mg Oral BID    benzonatate (TESSALON) capsule 200 mg  200 mg Oral TID PRN    cholecalciferol (VITAMIN D3) tablet 1,000 Units  1,000 Units Oral DAILY    diazePAM (VALIUM) tablet 5 mg  5 mg Oral DAILY    fluticasone (FLONASE) 50 mcg/actuation nasal spray 1 Spray  1 Spray Both Nostrils DAILY    levothyroxine (SYNTHROID) tablet 88 mcg  88 mcg Oral ACB    nitroglycerin (NITROSTAT) tablet 0.4 mg  0.4 mg SubLINGual Q5MIN PRN    pantoprazole (PROTONIX) tablet 40 mg  40 mg Oral ACB    pravastatin (PRAVACHOL) tablet 40 mg  40 mg Oral DAILY    promethazine (PHENERGAN) tablet 25 mg  25 mg Oral Q6H PRN    rOPINIRole (REQUIP) tablet 2 mg  2 mg Oral BID    sertraline (ZOLOFT) tablet 100 mg  100 mg Oral DAILY    spironolactone (ALDACTONE) tablet 25 mg  25 mg Oral DAILY    sodium chloride (NS) flush 5-10 mL  5-10 mL IntraVENous Q8H    sodium chloride (NS) flush 5-10 mL  5-10 mL IntraVENous PRN    aspirin chewable tablet 81 mg  81 mg Oral DAILY    morphine injection 2 mg  2 mg IntraVENous Q4H PRN    ondansetron (ZOFRAN) injection 4 mg  4 mg IntraVENous Q4H PRN    potassium chloride (K-DUR, KLOR-CON) SR tablet 20 mEq  20 mEq Oral BID    calcium carbonate (TUMS) chewable tablet 600 mg [elemental]  600 mg Oral QPM    azelastine (ASTELIN) 137mcg/spray nasal spray (Patient Supplied)  1 Spray Both Nostrils BID    alcohol 62% (NOZIN) nasal  1 Ampule  1 Ampule Topical Q12H    sodium chloride (NS) flush 10 mL  10 mL InterCATHeter Q8H    sodium chloride (NS) flush 10 mL  10 mL InterCATHeter PRN      Allergies   Allergen Reactions    Adhesive Tape Rash    Benadryl [Diphenhydramine Hcl] Other (comments)     Jitters      Demerol [Meperidine] Anxiety    Morphine Other (comments)     Confusion    Sulfa (Sulfonamide Antibiotics) Anxiety    Lorazepam Anxiety       Review of Systems:  Constitutional:   Negative for fevers and unexplained weight loss. Eyes:   Negative for visual disturbance. ENT:   Negative for significant hearing loss and tinnitus. Respiratory:   Negative for hemoptysis. Continuous oxygen therapy, [place]/SOB  Cardiovascular:   Negative except as noted in HPI. Gastrointestinal:   Negative for melena and abdominal pain. + bleeding hemorrhoids   Genitourinary:   Negative for hematuria, renal stones. Integumentary:   Negative for rash or non-healing wounds  Hematologic/Lymphatic:   Negative for excessive bleeding hx or clotting disorder. Chronic anemia, warfarin anticoagulation  Musculoskeletal:  Negative for active, unexplained/severe joint pain. Neurological:   Negative for stroke, TIA.    Behavioral/Psych:   Negative for suicidal ideations.    Endocrine:   Negative for uncontrolled diabetic symptoms including polyuria, polydipsia and poor wound healing. Objective:   Labs, chart, Echocardiogram and heart catheterization reviewed by Dr Margaret Greene   Patient Vitals for the past 8 hrs:   BP Temp Pulse Resp SpO2 Weight   18 1020 115/72 97.8 °F (36.6 °C) 88 20 99 % -   18 0722 - - - - 100 % -   18 0507 108/67 97.3 °F (36.3 °C) 80 18 100 % 166 lb 4.8 oz (75.4 kg)     Temp (24hrs), Av.6 °F (36.4 °C), Min:97.3 °F (36.3 °C), Max:97.8 °F (36.6 °C)    Body mass index is 32.48 kg/(m^2). Physical Exam:  GENERAL: alert, cooperative, no distress, NC intact THROAT & NECK: neck supple and symmetrical.  no JVD no carotid bruits, LUNG:  breathing with abdominal muscles, scattered course sounds, HEART: irregularly irregular rhythm, well healed sternum ABDOMEN: soft, non-tender.  Bowel sounds normal. Aorta non pulsatile  EXTREMITIES:  Upper extremities normal, atraumatic, no cyanosis or edema, bilateral femoral and distal pulses equal and palpable, +1 edema below the knees SKIN: no rash or abnormalities, NEUROLOGIC: AOx3. Gait not evaluated. Motor strength normal and symmetric. Cranial nerves 2-12 grossly intact., PSYCHIATRIC: non focal    Assessment:     69year old female with severe Prosthetic AV stenosis in the setting of significant LV systolic dysfunction, prior  MV Repair 2003 [facility]) with mild/moderate regurgitation and significant COPD    Plan:     Dr Paulette Dunaway saw Mrs. Yaz Yen today and has reviewed her chart, labs, and all imaging. Her STS risk assessment score for re- do SAVR is 16% Mortality rate with a 40% Morbidity rate. TAVR would be her best option for AV Valve replacement as is not a surgical candidate.      Signed By: Myron Rose NP     January 25, 2018

## 2018-01-25 NOTE — PROGRESS NOTES
Bedside and Verbal shift change report given to self (oncoming nurse) by Ambrocio Erwin RN (offgoing nurse). Report included the following information SBAR, Kardex, MAR and Recent Results.  S/p right radial cath site visualized - c/d/i

## 2018-01-25 NOTE — PROCEDURES
PROCEDURE:  DIAGNOSTIC THORACENTESIS, THERAPEUTIC THORACENTESIS       PRE-OP DIAGNOSIS:  R PLEURAL EFFUSION    POST-OP DIAGNOSIS:  R PLEURAL EFFUSION    VOLUME REMOVED:    1200    ANESTHESIA:    LOCAL ANESTHESIA WITH 1% LIDOCAINE 10 CC TOTAL. CHEST ULTRASOUND FINDINGS:    A Turbo-M, Sonosite ultrasound with a 5-16 mHz probe was used to image the chest and localize the pleural effusion on the right chest.    A large anechoic space was seen on the right consistent with an uncomplicated pleural effusion. DESCRIPTION OF PROCEDURE:    After obtaining informed consent and localizing the safest location for thoracentesis, the  7th intercostal space was marked with a blunt, plastic needle cap in the mid scapular line. An RETC AK-0100 Pleral-Seal thoracentesis kit was used to perform the procedure. The skin was cleansed with the supplied chlorhexidine swab and then draped in the usual fashion. Using the previously marked location as a guide, a 22 G 1.5 inch needle was used to inject 1% lidocaine into the skin and subcutaneous tissue, as well as onto the underlying rib and inter-costal muscles. Pleural fluid was aspirated to assure proper location and additional lidocaine was injected into the pleural space prior to removing the anesthesia needle. A 3mm incision was then made with the supplied scalpel in the usual fashion to facilitate the insertion of the thoracentesis needle. The needle with an 8 Thai thoracentesis catheter was then introduced into the chest through the previously made incision in the usual fashion, the rib localized with the needle, and the catheter then marched over the rib into the pleural space. After aspirating fluid, the thoracentesis catheter was then placed into the chest using the needle itself as a trocar. The needle was then removed and the catheter was attached to the supplied tubing without complication.     1200 cc of clear, yellow fluid was aspirated and sent for analysis. Fluid was sent for the following tests:      LDH  Total Protein  Glucose  Cell count with differential  Routine culture and Gram stain  Cytology    Post procedure US confirmed complete drainage of the effusion and presence of lung sliding, ruling out pneumothorax. (34120)    EBL:     <80VE    COMPLICATIONS:    Slight oozing from puncture site following procedure. Addressed with pressure hold.        Keyon Singh MD

## 2018-01-25 NOTE — PROCEDURES
PROCEDURE:  DIAGNOSTIC THORACENTESIS, THERAPEUTIC THORACENTESIS       PRE-OP DIAGNOSIS:  L PLEURAL EFFUSION    POST-OP DIAGNOSIS:  L PLEURAL EFFUSION    VOLUME REMOVED:    900    ANESTHESIA:    LOCAL ANESTHESIA WITH 1% LIDOCAINE 10 CC TOTAL. CHEST ULTRASOUND FINDINGS:    A Turbo-M, Sonosite ultrasound with a 5-16 mHz probe was used to image the chest and localize the pleural effusion on the left chest.    A large anechoic space was seen on the left consistent with an uncomplicated pleural effusion. DESCRIPTION OF PROCEDURE:    After obtaining informed consent and localizing the safest location for thoracentesis, the  8th intercostal space was marked with a blunt, plastic needle cap in the mid scapular line. An [employer] AK-0100 Pleral-Seal thoracentesis kit was used to perform the procedure. The skin was cleansed with the supplied chlorhexidine swab and then draped in the usual fashion. Using the previously marked location as a guide, a 22 G 1.5 inch needle was used to inject 1% lidocaine into the skin and subcutaneous tissue, as well as onto the underlying rib and inter-costal muscles. Pleural fluid was aspirated to assure proper location and additional lidocaine was injected into the pleural space prior to removing the anesthesia needle. A 3mm incision was then made with the supplied scalpel in the usual fashion to facilitate the insertion of the thoracentesis needle. The needle with an 8 Korean thoracentesis catheter was then introduced into the chest through the previously made incision in the usual fashion, the rib localized with the needle, and the catheter then marched over the rib into the pleural space. After aspirating fluid, the thoracentesis catheter was then placed into the chest using the needle itself as a trocar. The needle was then removed and the catheter was attached to the supplied tubing without complication.     900 cc of clear, yellow fluid was aspirated and sent for analysis. Fluid was sent for the following tests:      LDH  Total Protein  Glucose      Post procedure US confirmed complete drainage of the effusion and presence of lung sliding, ruling out pneumothorax.  (68662)    EBL:     <4EE    COMPLICATIONS:    None      Xavier Varela MD

## 2018-01-25 NOTE — PROGRESS NOTES
Pt sat up on side of bed for thoracentesis. Consent obtained. Time out performed. Pts vitals monitored throughout procedure. Left and right ultrasound done and pic taken of pleural fluid. 1200 ml pleural fluid from right side removed. 900 ml pleural fluid from left side removed. Pt tolerated procedure well with no adverse rxn. Specimens sent to the lab x 4 and labeled appropriately per Dr. Maria Luisa Torres. Site dressed appropriately. TRANSFER - OUT REPORT:    Verbal report given to Ruthie TRINIDAD on [facility] in room 301 for routine progression of care       Report consisted of patients Situation, Background, Assessment and   Recommendations(SBAR). Information from the following report(s) Procedure Summary was reviewed with the receiving nurse. Lines:       Opportunity for questions and clarification was provided.

## 2018-01-25 NOTE — PROGRESS NOTES
[facility] CARDIOLOGY PROGRESS NOTE           1/25/2018 8:20 AM    Admit Date: 1/22/2018      Subjective:   Bleeding hemorrhoids no other complaints    ROS:  Cardiovascular:  As noted above    Objective:      Vitals:    01/24/18 2208 01/25/18 0101 01/25/18 0507 01/25/18 0722   BP:  139/83 108/67    Pulse:  83 80    Resp:  19 18    Temp:  97.4 °F (36.3 °C) 97.3 °F (36.3 °C)    SpO2: 100% 100% 100% 100%   Weight:   75.4 kg (166 lb 4.8 oz)    Height:           Physical Exam:  General-No Acute Distress  Neck- supple, no JVD  CV- irregular   Lung- clear bilaterally  Abd- soft, nontender, nondistended  Ext- trace  edema bilaterally. Skin- warm and dry    Data Review:   Recent Labs      01/25/18   0546  01/24/18   0500  01/23/18   0936   01/23/18   0555  01/22/18   1436   NA  144  145   --    < >   --   144   K  3.9  3.8   --    < >   --   3.7   MG  2.2  2.0   --    --   1.5*  1.9   BUN  21  24*   --    < >   --   28*   CREA  0.92  1.04*   --    < >   --   1.15*   GLU  107*  91   --    < >   --   104*   WBC   --   4.6  4.5   --   2.7*  4.6   HGB  8.4*  8.5*  8.3*   --   8.1*  8.0*   HCT  28.5*  28.5*  27.8*   --   27.4*  26.6*   PLT   --   117*  127*   --   41*  109*   INR   --    --    --    --    --   1.3   CHOL   --    --    --    --   119   --    LDLC   --    --    --    --   43.6   --    HDL   --    --    --    --   63*   --     < > = values in this interval not displayed. Assessment/Plan:     Principal Problem:    Acute on chronic systolic congestive heart failure (HCC) (2/12/2017)--EF 30 % , will switch to PO lasix, bp low not currently on BB, ACE or ARB due to low bp. Active Problems:    Chronic obstructive pulmonary disease ([address] (3/8/2009)  Oxygen dependent       Chronic atrial fibrillation (HCC) (10/17/2011)  Off coumadin at present, now with hemm. Bleed      Anemia (10/27/2011)  Stable H and H but with new Hemm.  Bleed--will ask GI to see      Thrombocytopenia ([facility] (8/22/2012)--repeat cbc          Stenosis of prosthetic aortic valve (1/19/2018)        Aortic stenosis, severe (1/22/2018)  Plans for possible TAVR      Peripheral arterial disease ([facility] (1/23/2018)        Pleural effusion (1/23/2018)        Chronic respiratory failure with hypoxia ([address] (1/23/2018)        Hypoxia (1/23/2018)        Joel Navarro NP  1/25/2018 8:20 AM         [facility] CARDIOLOGY     1/25/2018     5:35 PM    I have personally seen and examined Lala Melara with  Jonnie Jones NP. I agree and confirm findings in history, physical exam, and assessment/plan as outlined above with following pertinent additions/exceptions:   Patient notes dyspnea improved. No chest pain. Still appears dyspneic on exam.  PE: CV:  IRIR  L: Coarse BS.   E: No edema. ASS/Plan:  As above. Plan TAVR evaluation if felt reasonable prognosis from pulmonary standpoint. Discussed with Dr. Jessica Little.         Jori Pineda MD

## 2018-01-25 NOTE — INTERVAL H&P NOTE
H&P Update:  Jessica Quintana was seen and examined. History and physical has been reviewed. The patient has been examined.  There have been no significant clinical changes since the completion of the originally dated History and Physical.    Signed By: Shoaib Ortega MD     January 25, 2018 2:44 PM

## 2018-01-25 NOTE — PROGRESS NOTES
Maryan Oh  Admission Date: 1/22/2018             Daily Progress Note: 1/25/2018    The patient's chart is reviewed and the patient is discussed with the staff. She was admitted with acute heart failure. She has been given extra lasix with some improvement in her dyspnea. She has a history of COPD but I do not have any PFTs for review. She is maintained on nebulized Xopenex in addition to 3 to 4 liters of oxygen. She uses a half ampule at a time due to tremors.  She uses her nebulizer 6 to 7 times per day. She has chronic dyspnea but this worsened 2 or 3 weeks ago. She has experienced worsening peripheral edema as well. She has severe prosthetic aortic valve stenosis and she is being considered for a TAVR.  She reports a chronic cough with some congestion. She has not heard any wheezing.      She is followed by hematology for chronic anemia that is felt to be multifactorial. She was last transfused on 1/15/18.  Bone marrow biopsy has been deferred thinking that treatment plan would not change (prn transfusions). She now has thrombocytopenia and labs are being repeated. Subjective:     Patient states that she is feeling better with diuresis. Has been on home O2 for the past 2.5-3 years. Occasional cough. LE edema improving.      Current Facility-Administered Medications   Medication Dose Route Frequency    furosemide (LASIX) tablet 40 mg  40 mg Oral ACB&D    polyethylene glycol (MIRALAX) packet 17 g  17 g Oral BID    hydrocortisone (ANUSOL-HC) suppository 25 mg  25 mg Rectal Q12H    hydrocortisone (ANUSOL-HC) 2.5 % rectal cream   PeriANAL QID    levalbuterol (XOPENEX) nebulizer soln 0.63 mg/3 mL  0.63 mg Nebulization QID RT    bisacodyl (DULCOLAX) tablet 5 mg  5 mg Oral DAILY PRN    allopurinol (ZYLOPRIM) tablet 100 mg  100 mg Oral BID    benzonatate (TESSALON) capsule 200 mg  200 mg Oral TID PRN    cholecalciferol (VITAMIN D3) tablet 1,000 Units  1,000 Units Oral DAILY    diazePAM (VALIUM) tablet 5 mg  5 mg Oral DAILY    fluticasone (FLONASE) 50 mcg/actuation nasal spray 1 Spray  1 Spray Both Nostrils DAILY    levothyroxine (SYNTHROID) tablet 88 mcg  88 mcg Oral ACB    nitroglycerin (NITROSTAT) tablet 0.4 mg  0.4 mg SubLINGual Q5MIN PRN    pantoprazole (PROTONIX) tablet 40 mg  40 mg Oral ACB    pravastatin (PRAVACHOL) tablet 40 mg  40 mg Oral DAILY    promethazine (PHENERGAN) tablet 25 mg  25 mg Oral Q6H PRN    rOPINIRole (REQUIP) tablet 2 mg  2 mg Oral BID    sertraline (ZOLOFT) tablet 100 mg  100 mg Oral DAILY    spironolactone (ALDACTONE) tablet 25 mg  25 mg Oral DAILY    sodium chloride (NS) flush 5-10 mL  5-10 mL IntraVENous Q8H    sodium chloride (NS) flush 5-10 mL  5-10 mL IntraVENous PRN    aspirin chewable tablet 81 mg  81 mg Oral DAILY    morphine injection 2 mg  2 mg IntraVENous Q4H PRN    ondansetron (ZOFRAN) injection 4 mg  4 mg IntraVENous Q4H PRN    potassium chloride (K-DUR, KLOR-CON) SR tablet 20 mEq  20 mEq Oral BID    calcium carbonate (TUMS) chewable tablet 600 mg [elemental]  600 mg Oral QPM    azelastine (ASTELIN) 137mcg/spray nasal spray (Patient Supplied)  1 Spray Both Nostrils BID    alcohol 62% (NOZIN) nasal  1 Ampule  1 Ampule Topical Q12H    sodium chloride (NS) flush 10 mL  10 mL InterCATHeter Q8H    sodium chloride (NS) flush 10 mL  10 mL InterCATHeter PRN       Review of Systems  Constitutional: negative for fever, chills, sweats  Cardiovascular: LE edema.   negative for chest pain, palpitations, syncope  Gastrointestinal: negative for dysphagia, reflux, vomiting, diarrhea, abdominal pain, or melena  Neurologic: negative for focal weakness, numbness, headache    Objective:     Vitals:    01/24/18 2208 01/25/18 0101 01/25/18 0507 01/25/18 0722   BP:  139/83 108/67    Pulse:  83 80    Resp:  19 18    Temp:  97.4 °F (36.3 °C) 97.3 °F (36.3 °C)    SpO2: 100% 100% 100% 100%   Weight:   166 lb 4.8 oz (75.4 kg)    Height: Intake and Output:   01/23 1901 - 01/25 0700  In: 960 [P.O.:960]  Out: 1450 [Urine:1450]       Physical Exam:   Constitution:  the patient is well developed and in no acute distress  EENMT:  Sclera clear, pupils equal, oral mucosa moist  Respiratory: Decreased with mild crackles. Cardiovascular:  RRR without M,G,R  Gastrointestinal: soft and non-tender; with positive bowel sounds. Musculoskeletal: warm without cyanosis. There is 1+ B lower leg edema. Skin:  no jaundice or rashes, no wounds   Neurologic: no gross neuro deficits     Psychiatric:  alert and oriented x ppt    CHEST XRAY:   1/25/18  1. Mild pulmonary vascular congestion. 2. Moderate bilateral pleural effusions. LAB  No lab exists for component: [place]   Recent Labs      01/24/18   0500  01/23/18   0936  01/23/18   0555  01/22/18   1436   WBC  4.6  4.5  2.7*  4.6   HGB  8.5*  8.3*  8.1*  8.0*   HCT  28.5*  27.8*  27.4*  26.6*   PLT  117*  127*  41*  109*   INR   --    --    --   1.3     Recent Labs      01/25/18   0546  01/24/18   0500  01/23/18   0801  01/23/18   0555  01/22/18   1436   NA  144  145  144   --   144   K  3.9  3.8  4.5   --   3.7   CL  101  100  101   --   100   CO2  37*  38*  34*   --   39*   GLU  107*  91  93   --   104*   BUN  21  24*  25*   --   28*   CREA  0.92  1.04*  1.03*   --   1.15*   MG  2.2  2.0   --   1.5*  1.9   CA  8.6  8.5  8.4   --   8.7   ALB   --    --    --    --   2.1*   SGOT   --    --    --    --   37     No results for input(s): PH, PCO2, PO2, HCO3 in the last 72 hours.     Assessment:  (Medical Decision Making)     Hospital Problems  Date Reviewed: 1/4/2018          Codes Class Noted POA    Peripheral arterial disease ([facility] (Chronic) ICD-10-CM: I73.9  ICD-9-CM: 443.9  1/23/2018 Yes        Pleural effusion ICD-10-CM: J90  ICD-9-CM: 511.9  1/23/2018 Yes        Chronic respiratory failure with hypoxia (HCC) (Chronic) ICD-10-CM: J96.11  ICD-9-CM: 518.83, 799.02  1/23/2018 Yes    Overview Signed 1/23/2018 11:17 AM by She Ramos, FELISHA     Wears 3 to 4 liters at home             Hypoxia ICD-10-CM: R09.02  ICD-9-CM: 799.02  1/23/2018 Unknown        Aortic stenosis, severe ICD-10-CM: I35.0  ICD-9-CM: 424.1  1/22/2018 Yes        Stenosis of prosthetic aortic valve ICD-10-CM: I35.0  ICD-9-CM: 396.0  1/19/2018 Yes    Overview Signed 1/19/2018  3:18 PM by Amaury Latham MD     AVR (10/5/13):  21 mm Pericardial valve. * (Principal)Acute on chronic systolic congestive heart failure ([facility] ICD-10-CM: I50.23  ICD-9-CM: 428.23, 428.0  2/12/2017 Yes        Thrombocytopenia ([facility] ICD-10-CM: D69.6  ICD-9-CM: 287.5  8/22/2012 Yes        Anemia (Chronic) ICD-10-CM: D64.9  ICD-9-CM: 285.9  10/27/2011 Yes    Overview Signed 10/27/2011  8:25 AM by Dalia Blanton     Acute on chronic               Chronic atrial fibrillation (HCC) (Chronic) ICD-10-CM: I48.2  ICD-9-CM: 427.31  10/17/2011 Yes        Chronic obstructive pulmonary disease ([facility] (Chronic) ICD-10-CM: J44.9  ICD-9-CM: 392  3/8/2009 Yes            Patient with history of COPD (only spirometry on file with FEV1 of 44% Jan 2017) on chronic O2 at home, also with systolic heart failure and stenotic mechanical aortic valve. Feeling some better with diuresis. Cardiology considering possible repair of valve. Plan:  (Medical Decision Making)   -regarding her jose-procedural pulmonary risk, given her severe COPD with chronic hypoxia she would be at high risk of pulmonary complications. That being said, there are no signs of acute COPD exacerbation and she seems to have been relatively stable over the past 2-3 years. I would not consider her pulmonary condition contra-indication to any necessary cardiac intervention.   -agree with ongoing diuresis. -will look with US to see if effusions amenable to tap to help speed her return to baseline and facilitate possible intervention sooner.        Radha Greenfield MD

## 2018-01-25 NOTE — CONSULTS
Palliative Care    Patient: Isaiah Box MRN: [id number]  SSN: [id number]    YOB: 1940  Age: 69 y.o. Sex: female       Date of Request: 1/24/2018  Date of Consult:  1/25/2018  Reason for Consult:  chf  Requesting Physician: Dr Devon Whiting     Assessment/Plan:     Principal Diagnosis:    Debility, Unspecified  R53.81    Additional Diagnoses:   · Dyspnea  R06.00  · Fatigue, Lethargy  R53.83  · Frailty  R54  · Counseling, Encounter for Medical Advice  Z71.9  · Encounter for Palliative Care  Z51.5    Palliative Performance Scale (PPS):  PPS: 60    Medical Decision Making:   Reviewed and summarized notes from admission to present, as well as previous PC notes   Discussed case with appropriate providers  Reviewed laboratory and x-ray data from admission to present     Pt sitting in chair, no distress noted. Son and sister in law at bedside. Introduced role of PC and reviewed events. Pt has great understanding of her condition. She stated that she needs a new valve, and she hopes it can be done. CV surgery has evaluated her, and is deciding if she is a candidate for TAVR. Pt lives at home with her other son, and states she will be returning there upon discharge. She is not interested in STR. She is receptive to home health support. If TAVR is not an option, Dr Devon Whiting has recommended hospice support. No PC needs identified- we will not plan to follow. Will discuss findings with members of the interdisciplinary team.      Thank you for this referral.         .    Subjective:     History obtained from:  Patient, Family and Chart    Chief Complaint: CHF  History of Present Illness:  Ms Shaylee Saul is a 68 yo  female with PMH of aortic stenosis, atrial fibrillation, CHF, CKD, HTN, HLD, CAD, and other conditions listed below, who was directly admitted from Dr Mckeon's office on 1/22/2018 with c/o worsening dyspnea and orthopnea for several days.   Pt was directly admitted for acute CHF exacerbation. Recent echo revealed severe prosthetic valve aortic stenosis. Pt was diuresed, and taken to the cath lab on 1/24/2018 to evaluate for potential TAVR. Pt was noted to have adequate access for TAVR. CV surgeon evaluated pt today. Pt was also seen by pulmonology, and underwent bilateral thoracentesis for pleural effusions, with a total of 2100 ml of fluid removed. She reports she is feeling a little better s/p procedure. Advance Directive: No       Code Status:  Full Code            Health Care Power of : No - Patient does not have a [address]     Past Medical History:   Diagnosis Date    Abnormal glucose 8/5/2016    Abscess 8/5/2016    Acute encephalopathy 7/8/2016    Acute renal failure ([address] 12/3/2009    Afib ([address]     Anemia     Ankle swelling 8/5/2016    Anxiety 8/5/2016    Aortic Valve Bioprosthesis Present 3/7/2009    ARF (acute renal failure) ([address] 2/24/2010    Arthritis     Asthma     Atopic dermatitis 8/5/2016    Atrial fibrillation (HCC) 3/7/2009    Autonomic orthostatic hypotension 8/5/2016    AV block 10/17/2011    Back pain 8/5/2016    Bradycardia (Symptomatic) 11/7/2011    CAD (coronary artery disease)     Cancer (HCC) 1990    breast (left)    Cardiac pacemaker 11/2/2015    Cardiogenic shock ([address] 10/17/2011    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    Cellulitis 8/5/2016    Chest pain 8/5/2016    Chronic atrial fibrillation ([address] 10/17/2011    Chronic depression 8/5/2016    Chronic obstructive pulmonary disease ([address] 3/8/2009    Chronic pain     CKD (chronic kidney disease) stage 3, GFR 30-59 ml/min 12/4/2009    Congestive heart failure (CHF) ([address] 11/2/2015    Degeneration of cervical intervertebral disc 8/5/2016    Degenerative arthritis of left knee 2/27/2009    Dehydration 7/7/0192    Diastolic heart failure (HCC)     Digoxin toxicity 2/24/2010    Disorder of sweat glands 8/5/2016    Diverticulosis of large intestine without diverticulitis 2015    Dyspnea 8/5/2016    Eczema 8/5/2016    Embolus of femoral artery (HCC) 12/4/2017    Epigastric abdominal pain 2/24/2010    GERD (gastroesophageal reflux disease)     Gout 8/5/2016    H/O mitral valve repair, 2003 6/27/2016    Heart failure ([address]     Hx of colonic polyp 2015    adenoma    Hyperkalemia 10/17/2011    Hyperlipidemia 8/5/2016    Hypertension     Hypokalemia 2/24/2010    Hypothyroidism 12/4/2009    Ill-defined condition     CARPEL TUNNEL SYNDROME, BILAT HANDS    Knee pain 8/5/2016    Leg cramps 8/5/2016    Mitral stenosis with insufficiency 11/2/2015    Prior MV repair 2003.     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    Right hip pain 8/5/2016    RLS (restless legs syndrome) 8/5/2016    Sick sinus syndrome ([address] 2/13/2016    Skin infection 8/5/2016    Tachycardia 6/27/2016    Thrombocytopenia, unspecified 8/22/2012    Thromboembolus ([address]     02/2017    Thyroid disease     Urticaria 8/5/2016    Vertigo 8/5/2016      Past Surgical History:   Procedure Laterality Date    CARDIAC SURG PROCEDURE UNLIST      valve repair and replacement    CHEST SURGERY PROCEDURE UNLISTED      HX APPENDECTOMY      HX BREAST RECONSTRUCTION      HX CHOLECYSTECTOMY  2005    HX GYN  1981    hysterectomy still have ovaries    HX MASTECTOMY  1990    left mastectomy    HX ORTHOPAEDIC      knee surgery    HX PACEMAKER      VASCULAR SURGERY PROCEDURE UNLIST Right 02/20/2017    LE thrombectomy     Family History   Problem Relation Age of Onset    Heart Disease Mother     Cancer Father      Throat    Heart Disease Father     Cancer Sister      Breast, Colon    Heart Disease Sister     Breast Cancer Sister 79      from disease    Cancer Maternal Grandmother     Cancer Brother     Diabetes Brother     Heart Disease Brother     Psychiatric Disorder Paternal Grandfather     Cancer Maternal Aunt      Breast    Diabetes Son     Alcohol abuse Neg Hx     Arthritis-rheumatoid Neg Hx     Asthma Neg Hx     Bleeding Prob Neg Hx     Elevated Lipids Neg Hx     Headache Neg Hx     Migraines Neg Hx     Hypertension Neg Hx     Lung Disease Neg Hx     Mental Retardation Neg Hx     Stroke Neg Hx       Social History   Substance Use Topics    Smoking status: Former Smoker     Packs/day: 3.00     Years: 15.00     Types: Cigarettes     Quit date: 1996    Smokeless tobacco: Former User      Comment: quit     Alcohol use No     Prior to Admission medications    Medication Sig Start Date End Date Taking? Authorizing Provider   rOPINIRole (REQUIP) 2 mg tablet Take  by mouth two (2) times a day. Yes Historical Provider   pravastatin (PRAVACHOL) 40 mg tablet Take 1 Tab by mouth daily. Indications: hyperlipidemia 18  Yes Kate Dewey MD   warfarin (COUMADIN) 5 mg tablet Take 1 Tab by mouth nightly. Name brand only 18  Yes Kate Dewey MD   sertraline (ZOLOFT) 100 mg tablet Take 1 Tab by mouth daily. 18  Yes Kate Dewey MD   diazePAM (VALIUM) 5 mg tablet Take 1 Tab by mouth daily. Max Daily Amount: 5 mg. 18  Yes Kate Dewey MD   torsemide (DEMADEX) 20 mg tablet Take 2 Tabs by mouth daily. 17  Yes Kate Dewey MD   omeprazole (PRILOSEC) 20 mg capsule TAKE 1 CAPSULE BY MOUTH  DAILY 17  Yes Kate Dewey MD   spironolactone (ALDACTONE) 25 mg tablet Take 1 Tab by mouth daily.  17  Yes Kate Dewey MD   levothyroxine (SYNTHROID) 88 mcg tablet Take 1 Tab by mouth Daily (before breakfast). 9/11/17  Yes Brian Kay MD   allopurinol (ZYLOPRIM) 100 mg tablet Take 1 Tab by mouth two (2) times a day. 6/19/17  Yes Paulino Hanson MD   guaiFENesin ER (MUCINEX) 600 mg ER tablet Take 1 Tab by mouth two (2) times a day. 6/19/17  Yes Paulino Hanson MD   fluticasone (FLONASE) 50 mcg/actuation nasal spray 1 [place] by Both Nostrils route daily. Yes Historical Provider   azelastine (ASTELIN) 137 mcg (0.1 %) nasal spray 1 [place] by Both Nostrils route two (2) times a day. Use in each nostril as directed 4/20/17  Yes Brian Kay MD   polyethylene glycol (MIRALAX) 17 gram/dose powder Take 17 g by mouth daily. Yes Historical Provider   loratadine (CLARITIN) 10 mg tablet Take 10 mg by mouth as needed. Yes Historical Provider   glycerin, adult, (SUPPOSITORY ADULT) suppository Insert 1 Suppository into rectum as needed. Yes Historical Provider   benzonatate (TESSALON) 200 mg capsule Take 200 mg by mouth three (3) times daily as needed for Cough. Yes Historical Provider   levalbuterol (XOPENEX) 1.25 mg/3 mL nebu 1.25 mg by Nebulization route. Yes Historical Provider   potassium chloride SR (K-TAB) 20 mEq tablet Take 20 mEq by mouth two (2) times a day. Yes Historical Provider   hydrocortisone (ANUSOL-HC) 2.5 % rectal cream Apply small amount to hemorrhoids/perianal skin TID PRN 1/17/17  Yes Clari Macias MD   calcium carbonate (CALTREX) 600 mg (1,500 mg) tablet Take 600 mg by mouth nightly. Yes Historical Provider   cholecalciferol (VITAMIN D3) 1,000 unit cap Take  by mouth daily. Yes Historical Provider   CARBOXYMETHYLCELLULOS/GLYCERIN (REFRESH OPTIVE OP) Apply  to eye. Yes Historical Provider   DOCUSATE SODIUM Take 1 Cap by mouth two (2) times a day. Yes Historical Provider   OXYGEN-AIR DELIVERY SYSTEMS by Does Not Apply route. 2-2.5 lpm cont. Yes Historical Provider   cyanocobalamin (VITAMIN B-12) 250 mcg tablet Take 1,000 mcg by mouth daily. Yes Phys Umberto, MD   nitroglycerin (NITROSTAT) 0.4 mg SL tablet by SubLINGual route every five (5) minutes as needed for Chest Pain. Yes Quita Shipman, MD   promethazine (PHENERGAN) 25 mg tablet Take 1 Tab by mouth every six (6) hours as needed. 4/3/17   Loco Golden MD       Allergies   Allergen Reactions    Adhesive Tape Rash    Benadryl [Diphenhydramine Hcl] Other (comments)     Jitters      Demerol [Meperidine] Anxiety    Morphine Other (comments)     Confusion    Sulfa (Sulfonamide Antibiotics) Anxiety    Lorazepam Anxiety        Review of Systems:  A comprehensive review of systems was negative except for:   Constitutional: Positive for fatigue. Respiratory: Positive for improved dyspnea      Objective:     Visit Vitals    /72    Pulse 88    Temp 97.8 °F (36.6 °C)    Resp 20    Ht 5' (1.524 m)    Wt 75.4 kg (166 lb 4.8 oz)    SpO2 96%    BMI 32.48 kg/m2        Physical Exam:    General:  Cooperative. No acute distress. Eyes:  Conjunctivae/corneas clear    Nose: Nares normal. Septum midline. O2 via NC    Neck: Supple, symmetrical, trachea midline   Lungs:   Decreased bilaterally, unlabored   Heart:  Regular rate and rhythm   Abdomen:   Soft, non-tender, non-distended   Extremities: Normal, atraumatic, no cyanosis.  BLE edema   Skin: Skin color, texture, turgor normal.   Neurologic: Nonfocal   Psych: Alert and oriented      Assessment:     Hospital Problems  Date Reviewed: 1/4/2018          Codes Class Noted POA    Acute pulmonary edema ([facility] ICD-10-CM: J81.0  ICD-9-CM: 518.4  1/25/2018 Unknown        Peripheral arterial disease ([facility] (Chronic) ICD-10-CM: I73.9  ICD-9-CM: 443.9  1/23/2018 Yes        Pleural effusion ICD-10-CM: J90  ICD-9-CM: 511.9  1/23/2018 Yes        Chronic respiratory failure with hypoxia (HCC) (Chronic) ICD-10-CM: J96.11  ICD-9-CM: 518.83, 799.02  1/23/2018 Yes    Overview Signed 1/23/2018 11:17 AM by Freddy Allison NP     Wears 3 to 4 liters at home Hypoxia ICD-10-CM: R09.02  ICD-9-CM: 799.02  1/23/2018 Unknown        Aortic stenosis, severe ICD-10-CM: I35.0  ICD-9-CM: 424.1  1/22/2018 Yes        Stenosis of prosthetic aortic valve ICD-10-CM: I35.0  ICD-9-CM: 396.0  1/19/2018 Yes    Overview Signed 1/19/2018  3:18 PM by Mirtha Roger MD     AVR (10/5/13):  21 mm Pericardial valve.                * (Principal)Acute on chronic systolic congestive heart failure ([facility] ICD-10-CM: I50.23  ICD-9-CM: 428.23, 428.0  2/12/2017 Yes        Thrombocytopenia ([facility] ICD-10-CM: D69.6  ICD-9-CM: 287.5  8/22/2012 Yes        Anemia (Chronic) ICD-10-CM: D64.9  ICD-9-CM: 285.9  10/27/2011 Yes    Overview Signed 10/27/2011  8:25 AM by José Miguel Carbajal     Acute on chronic               Chronic atrial fibrillation ([facility] (Chronic) ICD-10-CM: I48.2  ICD-9-CM: 427.31  10/17/2011 Yes        Chronic obstructive pulmonary disease ([facility] (Chronic) ICD-10-CM: J44.9  ICD-9-CM: 496  3/8/2009 Yes              Signed By: Leonor Roth NP     January 25, 2018

## 2018-01-26 NOTE — PROGRESS NOTES
Care Management Interventions  PCP Verified by CM: Yes  Mode of Transport at Discharge: Other (see comment) (son)  Transition of Care Consult (CM Consult): [address]: Yes  Physical Therapy Consult: Yes  Current Support Network: Other (son lives with her)  Confirm Follow Up Transport: Family  Plan discussed with Pt/Family/Caregiver: Yes  Freedom of Choice Offered: Yes  Discharge Location  Discharge Placement: Home with home health  Patient to be d/c home today and noted orders for portable tank for home. Notified [address] spoke with [facility] (patient receives her O2 from them). They stated they will start the process but it takes a few weeks to get everything needed. Patient set up with home health for nursing and PT with [facility]. Patient and son voice understanding of instructions.

## 2018-01-26 NOTE — PROGRESS NOTES
Problem: Falls - Risk of  Goal: *Absence of Falls  Document Gregorio Fall Risk and appropriate interventions in the flowsheet. Outcome: Progressing Towards Goal  Pt progressing towards goal. No falls since admission. Bed low and locked. Call light within reach. Side rails x 2. Gripper socks applied. Personal belongings within reach. Pt verbalizes understanding to call for assistance.      Fall Risk Interventions:  Mobility Interventions: Bed/chair exit alarm, Patient to call before getting OOB         Medication Interventions: Bed/chair exit alarm, Patient to call before getting OOB    Elimination Interventions: Call light in reach, Bed/chair exit alarm    History of Falls Interventions: Consult care management for discharge planning

## 2018-01-26 NOTE — PROGRESS NOTES
Discharge instructions reviewed with patient rob mosley. Prescriptions given for no new medication and med info sheets provided for all new medications. Opportunity for questions provided. Patient voiced understanding of all discharge instructions. Patient to receive instructions by Lieutenant Day on Saturday 1-27 regarding TAVR. IVs removed and monitor off at discharge by primary RN.

## 2018-01-26 NOTE — PROGRESS NOTES
Bedside and Verbal shift change report given to self (oncoming nurse) by Bree Quispe RN (offgoing nurse). Report included the following information SBAR, Kardex, MAR and Recent Results. Pt in radiology.

## 2018-01-26 NOTE — PROGRESS NOTES
Lala Melara  Admission Date: 1/22/2018             Daily Progress Note: 1/26/2018    The patient's chart is reviewed and the patient is discussed with the staff. She was admitted with acute heart failure. She has been given extra lasix with some improvement in her dyspnea. She had bilateral thoracenteses on 1/25. She has a history of severe COPD. She is maintained on nebulized Xopenex in addition to 3 to 4 liters of oxygen. She uses a half ampule at a time due to tremors.  She uses her nebulizer 6 to 7 times per day. She has chronic dyspnea but this worsened 2 or 3 weeks ago. She has experienced worsening peripheral edema as well. She has severe prosthetic aortic valve stenosis and she is being considered for a TAVR.  She reports a chronic cough with some congestion. She has not heard any wheezing.      She is followed by hematology for chronic anemia that is felt to be multifactorial. She was last transfused on 1/15/18.  Bone marrow biopsy has been deferred thinking that treatment plan would not change (prn transfusions). She was seen by GI here for rectal bleeding and a bowel regimen was recommended. Subjective:     Feels less short of breath since yesterday. Waiting to talk to cardiology. Had CT scan this morning.      Current Facility-Administered Medications   Medication Dose Route Frequency    furosemide (LASIX) tablet 40 mg  40 mg Oral ACB&D    polyethylene glycol (MIRALAX) packet 17 g  17 g Oral BID    hydrocortisone (ANUSOL-HC) suppository 25 mg  25 mg Rectal Q12H    diazePAM (VALIUM) tablet 5 mg  5 mg Oral QHS    hydrocortisone (ANUSOL-HC) 2.5 % rectal cream   PeriANAL QID    levalbuterol (XOPENEX) nebulizer soln 0.63 mg/3 mL  0.63 mg Nebulization QID RT    bisacodyl (DULCOLAX) tablet 5 mg  5 mg Oral DAILY PRN    allopurinol (ZYLOPRIM) tablet 100 mg  100 mg Oral BID    benzonatate (TESSALON) capsule 200 mg  200 mg Oral TID PRN    cholecalciferol (VITAMIN D3) tablet 1,000 Units  1,000 Units Oral DAILY    fluticasone (FLONASE) 50 mcg/actuation nasal spray 1 Spray  1 Spray Both Nostrils DAILY    levothyroxine (SYNTHROID) tablet 88 mcg  88 mcg Oral ACB    nitroglycerin (NITROSTAT) tablet 0.4 mg  0.4 mg SubLINGual Q5MIN PRN    pantoprazole (PROTONIX) tablet 40 mg  40 mg Oral ACB    pravastatin (PRAVACHOL) tablet 40 mg  40 mg Oral DAILY    promethazine (PHENERGAN) tablet 25 mg  25 mg Oral Q6H PRN    rOPINIRole (REQUIP) tablet 2 mg  2 mg Oral BID    sertraline (ZOLOFT) tablet 100 mg  100 mg Oral DAILY    spironolactone (ALDACTONE) tablet 25 mg  25 mg Oral DAILY    sodium chloride (NS) flush 5-10 mL  5-10 mL IntraVENous Q8H    sodium chloride (NS) flush 5-10 mL  5-10 mL IntraVENous PRN    aspirin chewable tablet 81 mg  81 mg Oral DAILY    morphine injection 2 mg  2 mg IntraVENous Q4H PRN    ondansetron (ZOFRAN) injection 4 mg  4 mg IntraVENous Q4H PRN    potassium chloride (K-DUR, KLOR-CON) SR tablet 20 mEq  20 mEq Oral BID    calcium carbonate (TUMS) chewable tablet 600 mg [elemental]  600 mg Oral QPM    azelastine (ASTELIN) 137mcg/spray nasal spray (Patient Supplied)  1 Spray Both Nostrils BID    alcohol 62% (NOZIN) nasal  1 Ampule  1 Ampule Topical Q12H    sodium chloride (NS) flush 10 mL  10 mL InterCATHeter Q8H    sodium chloride (NS) flush 10 mL  10 mL InterCATHeter PRN         Objective:     Vitals:    01/25/18 2105 01/26/18 0008 01/26/18 0407 01/26/18 0832   BP:  107/68 113/75 139/50   Pulse:  82 81 83   Resp:  19 21 20   Temp:  98 °F (36.7 °C) 98 °F (36.7 °C) 98.1 °F (36.7 °C)   SpO2: 100% 99% 100% 100%   Weight:   162 lb 1.6 oz (73.5 kg)    Height:         Intake and Output:   01/24 1901 - 01/26 0700  In: 480 [P.O.:480]  Out: 2200 [Urine:2200]       Physical Exam:   Constitutional:  the patient is well developed and in no acute distress  HEENT:  Sclera clear, pupils equal, oral mucosa moist  Lungs: clear bilaterally. Wearing nasal cannula. Cardiovascular:  RRR with systolic murmur   Abd/GI: soft and non-tender; with positive bowel sounds. Ext: warm without cyanosis. There is less lower leg edema. Musculoskeletal: moves all four extremities with equal strength  Skin:  no jaundice or rashes, no wounds   Neuro: no gross neuro deficits   Musculoskeletal: can ambulate. No deformity  Psychiatric: Calm. ROS: chronic dyspnea, good appetite  Lines: peripheral IV    CHEST XRAY:        LAB  Recent Labs      01/26/18   0919  01/25/18   0546  01/24/18   0500   WBC  5.4  5.2  4.6   HGB  8.8*  8.4*  8.5*   HCT  29.8*  28.7*  28.5*   PLT  137*  144*  117*     Recent Labs      01/26/18   0551  01/25/18   0546  01/24/18   0500   NA  144  144  145   K  4.4  3.9  3.8   CL  102  101  100   CO2  39*  37*  38*   GLU  107*  107*  91   BUN  21  21  24*   CREA  1.00  0.92  1.04*   MG  2.2  2.2  2.0         Assessment:  (Medical Decision Making)     Hospital Problems  Date Reviewed: 1/4/2018          Codes Class Noted POA    Acute pulmonary edema ([facility] ICD-10-CM: J81.0  ICD-9-CM: 518.4  1/25/2018 Unknown    Negative fluid balance with diuresis - CXR improved    Peripheral arterial disease (HCC) (Chronic) ICD-10-CM: I73.9  ICD-9-CM: 443.9  1/23/2018 Yes    noted    Pleural effusion ICD-10-CM: J90  ICD-9-CM: 511.9  1/23/2018 Yes    Bilateral - s/p thoracenteses    Chronic respiratory failure with hypoxia (HCC) (Chronic) ICD-10-CM: J96.11  ICD-9-CM: 518.83, 799.02  1/23/2018 Yes    Overview Signed 1/23/2018 11:17 AM by Brayan Allison NP     Wears 3 to 4 liters at home         Stable. [address] is DME    Hypoxia ICD-10-CM: R09.02  ICD-9-CM: 799.02  1/23/2018 Unknown    As above    Aortic stenosis, severe ICD-10-CM: I35.0  ICD-9-CM: 424.1  1/22/2018 Yes    Being evaluated for TAVR    Stenosis of prosthetic aortic valve ICD-10-CM: I35.0  ICD-9-CM: 396.0  1/19/2018 Yes    Overview Signed 1/19/2018  3:18 PM by Rito Santa MD     AVR (10/5/13):  21 mm Pericardial valve. As above    * (Principal)Acute on chronic systolic congestive heart failure ([facility] ICD-10-CM: I50.23  ICD-9-CM: 428.23, 428.0  2/12/2017 Yes    Improved with diuresis    Thrombocytopenia ([facility] ICD-10-CM: D69.6  ICD-9-CM: 287.5  8/22/2012 Yes    stable    Anemia (Chronic) ICD-10-CM: D64.9  ICD-9-CM: 285.9  10/27/2011 Yes    Overview Signed 10/27/2011  8:25 AM by Bernardino Goins     Acute on chronic           Followed by hematology - prn transfusions    Chronic atrial fibrillation (HCC) (Chronic) ICD-10-CM: I48.2  ICD-9-CM: 427.31  10/17/2011 Yes    Paced per telemetry    Chronic obstructive pulmonary disease ([facility] (Chronic) ICD-10-CM: J44.9  ICD-9-CM: 496  3/8/2009 Yes    Severe, stable          Plan:  (Medical Decision Making)   1. Bilateral thoracenteses - transudative per studies - negative balance with lasix, less dyspnea. Awaiting decision regarding TAVR. From a COPD/pulmonary standpoint, she is felt to be stable. 2. Scheduled Xopenex - uses at home  3. Chronic respiratory failure with home oxygen - wants a portable tank ordered at discharge - uses [employer], NP    More than 50% of time documented was spent face-to-face contact with the patient and in the care of the patient on the floor/unit where the patient is located.

## 2018-01-26 NOTE — ROUTINE PROCESS
CHF teaching to pt/ family @ BS reinforced from recent teaching.  Pass post test. Planner/scale @ BS : 1 hr total    Requesting HH

## 2018-01-26 NOTE — PROGRESS NOTES
GI DAILY PROGRESS NOTE    Admit Date:  1/22/2018    Today's Date:  1/26/2018    CC:  Rectal bleeding    Subjective:     Patient with known IH/EH who was seen in consultation for rectal bleeding and pain. Today, she reports no further bleeding and reduced pain with anusol supp and cream. hgb is stable. Had BM last evening which was reportedly hard. Pt just returned from CT and evaluated on stretcher.     Medications:   Current Facility-Administered Medications   Medication Dose Route Frequency    furosemide (LASIX) tablet 40 mg  40 mg Oral ACB&D    polyethylene glycol (MIRALAX) packet 17 g  17 g Oral BID    hydrocortisone (ANUSOL-HC) suppository 25 mg  25 mg Rectal Q12H    diazePAM (VALIUM) tablet 5 mg  5 mg Oral QHS    hydrocortisone (ANUSOL-HC) 2.5 % rectal cream   PeriANAL QID    levalbuterol (XOPENEX) nebulizer soln 0.63 mg/3 mL  0.63 mg Nebulization QID RT    bisacodyl (DULCOLAX) tablet 5 mg  5 mg Oral DAILY PRN    allopurinol (ZYLOPRIM) tablet 100 mg  100 mg Oral BID    benzonatate (TESSALON) capsule 200 mg  200 mg Oral TID PRN    cholecalciferol (VITAMIN D3) tablet 1,000 Units  1,000 Units Oral DAILY    fluticasone (FLONASE) 50 mcg/actuation nasal spray 1 Spray  1 Spray Both Nostrils DAILY    levothyroxine (SYNTHROID) tablet 88 mcg  88 mcg Oral ACB    nitroglycerin (NITROSTAT) tablet 0.4 mg  0.4 mg SubLINGual Q5MIN PRN    pantoprazole (PROTONIX) tablet 40 mg  40 mg Oral ACB    pravastatin (PRAVACHOL) tablet 40 mg  40 mg Oral DAILY    promethazine (PHENERGAN) tablet 25 mg  25 mg Oral Q6H PRN    rOPINIRole (REQUIP) tablet 2 mg  2 mg Oral BID    sertraline (ZOLOFT) tablet 100 mg  100 mg Oral DAILY    spironolactone (ALDACTONE) tablet 25 mg  25 mg Oral DAILY    sodium chloride (NS) flush 5-10 mL  5-10 mL IntraVENous Q8H    sodium chloride (NS) flush 5-10 mL  5-10 mL IntraVENous PRN    aspirin chewable tablet 81 mg  81 mg Oral DAILY    morphine injection 2 mg  2 mg IntraVENous Q4H PRN    ondansetron (ZOFRAN) injection 4 mg  4 mg IntraVENous Q4H PRN    potassium chloride (K-DUR, KLOR-CON) SR tablet 20 mEq  20 mEq Oral BID    calcium carbonate (TUMS) chewable tablet 600 mg [elemental]  600 mg Oral QPM    azelastine (ASTELIN) 137mcg/spray nasal spray (Patient Supplied)  1 Spray Both Nostrils BID    alcohol 62% (NOZIN) nasal  1 Ampule  1 Ampule Topical Q12H    sodium chloride (NS) flush 10 mL  10 mL InterCATHeter Q8H    sodium chloride (NS) flush 10 mL  10 mL InterCATHeter PRN       Review of Systems:  ROS was obtained, with pertinent positives as listed above. No chest pain or SOB. Diet:  cardiac    Objective:   Vitals:  Visit Vitals    /75 (BP 1 Location: Left leg, BP Patient Position: Supine)    Pulse 81    Temp 98 °F (36.7 °C)    Resp 21    Ht 5' (1.524 m)    Wt 73.5 kg (162 lb 1.6 oz)    SpO2 100%    BMI 31.66 kg/m2     Intake/Output:     01/24 1901 - 01/26 0700  In: 480 [P.O.:480]  Out: 2200 [Urine:2200]  Exam:  General appearance: alert, cooperative, no distress  Lungs: clear to auscultation bilaterally anteriorly  Heart: regular rate and rhythm  Abdomen: soft, non-tender.  Bowel sounds normal. No masses, no organomegaly  Extremities: extremities normal, atraumatic, no cyanosis or edema  Neuro:  alert and oriented    Data Review (Labs):    Recent Labs      01/26/18   0551  01/25/18   0546  01/24/18   0500  01/23/18   0936   WBC   --   5.2  4.6  4.5   HGB   --   8.4*  8.5*  8.3*   HCT   --   28.7*  28.5*  27.8*   PLT   --   144*  117*  127*   MCV   --   100.3*  96.9  96.9   NA  144  144  145   --    K  4.4  3.9  3.8   --    CL  102  101  100   --    CO2  39*  37*  38*   --    BUN  21  21  24*   --    CREA  1.00  0.92  1.04*   --    CA  8.4  8.6  8.5   --    MG  2.2  2.2  2.0   --    GLU  107*  107*  91   --        Assessment:     Principal Problem:    Acute on chronic systolic congestive heart failure (HCC) (2/12/2017)    Active Problems:    Chronic obstructive pulmonary disease ([address] (3/8/2009)      Chronic atrial fibrillation ([address] (10/17/2011)      Anemia (10/27/2011)      Overview: Acute on chronic            Thrombocytopenia ([address] (8/22/2012)      Stenosis of prosthetic aortic valve (1/19/2018)      Overview: AVR (10/5/13):  21 mm Pericardial valve. Aortic stenosis, severe (1/22/2018)      Peripheral arterial disease ([address] (1/23/2018)      Pleural effusion (1/23/2018)      Chronic respiratory failure with hypoxia ([address] (1/23/2018)      Overview: Wears 3 to 4 liters at home      Hypoxia (1/23/2018)      Acute pulmonary edema ([address] (1/25/2018)     75yo female with PMH CAD, AFib, MVR, CHF, AVR is a with a history of prior bioprosthetic aortic valve replacement seen at the request of Fanta Lester NP for rectal bleeding. Kelsey Daily has been struggling with recurrent anemia, thrombocytopenia, congestive heart failure as well as chronic respiratory failure. She was admitted for possible TAVR but had CHF exacerbation with bliateral pleural effusions that required IV diuresis. She underwent LHC 1/24/18 and has since had rectal bleeding with known EH and IH and has hx of poorly controlled constipation previously followed and treated by Dr. Erasmo Collins. Last colonoscopy 10/4/15 by Dr. Erasmo Collins showed fair to poor prep,  2 tubular adenomas in the ascending colon and one tubular adenoma in the descending colon. Hemorrhoial banding was performed in 9/2015, 9/2016, 10/6/16 with banding of  RA column and 1/31/17 with banding of RL and LA columns.    hgb stable at 8.4 (mcv 100.3)       Plan:     - needs daily bowel regimen here that should be continued at home. Recommend Miralax 17-34g daily to maintain soft formed stools every 1-2 days  - will continue anusol suppositories BID x 14 days total and discontinue steroid cream.  - will sign off and follow up in office. Fabby Chung, [id number]      Patient is seen and examined in collaboration with Dr. Chula Hewitt.   Assessment and plan as per Dr. Melva Raymond. ATTENDING NOTE:  I agree with the above assessment and plan. We will sign off, but do not hesitate to contact us for any additional assistance. We will arrange for a GI follow-up office visit in 4 weeks. Patient will be contacted by our office.     Shelly Fisher MD

## 2018-01-26 NOTE — PROGRESS NOTES
[facility] CARDIOLOGY PROGRESS NOTE           1/26/2018 7:06 AM    Admit Date: 1/22/2018      Subjective:   Slept well, feels much better today, no more rectal bleeding, pulled almost 1 liter off    ROS:  Cardiovascular:  As noted above    Objective:      Vitals:    01/25/18 2020 01/25/18 2105 01/26/18 0008 01/26/18 0407   BP: 105/52  107/68 113/75   Pulse: 81  82 81   Resp: 20 19 21   Temp: 98.1 °F (36.7 °C)  98 °F (36.7 °C) 98 °F (36.7 °C)   SpO2: 100% 100% 99% 100%   Weight:    73.5 kg (162 lb 1.6 oz)   Height:           Physical Exam:  General-No Acute Distress  Neck- supple, no JVD  CV- irregular irregular   Lung- clear bilaterally  Abd- soft, nontender, nondistended  Ext- no edema bilaterally. Skin- warm and dry    Data Review:   Recent Labs      01/26/18   0551  01/25/18   0546  01/24/18   0500   NA  144  144  145   K  4.4  3.9  3.8   MG  2.2  2.2  2.0   BUN  21  21  24*   CREA  1.00  0.92  1.04*   GLU  107*  107*  91   WBC   --   5.2  4.6   HGB   --   8.4*  8.5*   HCT   --   28.7*  28.5*   PLT   --   144*  117*       Assessment/Plan:     Principal Problem:    Acute on chronic systolic congestive heart failure (HCC) (2/12/2017)  As above, BP improved this am, not on BB, ACE or ARB due to low BP     Active Problems:    Chronic obstructive pulmonary disease (HCC) (3/8/2009)  Severe, oxygen dependent       Chronic atrial fibrillation ([address] (10/17/2011)  Rate controlled, off coumadin at present. Need to resume if no further invasive procedures      Anemia (10/27/2011)  Repeat cbc this am, no further rectal bleeding.        Thrombocytopenia ([address] (8/22/2012)   cbc this am      Stenosis of prosthetic aortic valve (1/19/2018)  Valve board to discuss possible TAVR, CT cardiac, angio today       Aortic stenosis, severe (1/22/2018)        Peripheral arterial disease ([address] (1/23/2018)        Pleural effusion (1/23/2018)  S/p bilat thoracentesis      Chronic respiratory failure with hypoxia ([facility] (1/23/2018)  Pulmonary to evaluate for prognosis and tolerability of TAVR       Hypoxia (1/23/2018)          Acute pulmonary edema ([address] (1/25/2018)  Resolved, but ongoing           Meagan Reed NP  1/26/2018 7:06 AM

## 2018-01-26 NOTE — PROGRESS NOTES
Mario Madera  Admission Date: 1/22/2018             Daily Progress Note: 1/26/2018    The patient's chart is reviewed and the patient is discussed with the staff. She was admitted with acute heart failure. She has been given extra lasix with some improvement in her dyspnea. She had bilateral thoracenteses on 1/25. She has a history of severe COPD. She is maintained on nebulized Xopenex in addition to 3 to 4 liters of oxygen. She uses a half ampule at a time due to tremors.  She uses her nebulizer 6 to 7 times per day. She has chronic dyspnea but this worsened 2 or 3 weeks ago. She has experienced worsening peripheral edema as well. She has severe prosthetic aortic valve stenosis and she is being considered for a TAVR.  She reports a chronic cough with some congestion. She has not heard any wheezing.      She is followed by hematology for chronic anemia that is felt to be multifactorial. She was last transfused on 1/15/18.  Bone marrow biopsy has been deferred thinking that treatment plan would not change (prn transfusions). She was seen by GI here for rectal bleeding and a bowel regimen was recommended. Subjective:     Feels less short of breath since yesterday. Waiting to talk to cardiology. Had CT scan this morning.      Current Facility-Administered Medications   Medication Dose Route Frequency    furosemide (LASIX) tablet 40 mg  40 mg Oral ACB&D    polyethylene glycol (MIRALAX) packet 17 g  17 g Oral BID    hydrocortisone (ANUSOL-HC) suppository 25 mg  25 mg Rectal Q12H    diazePAM (VALIUM) tablet 5 mg  5 mg Oral QHS    hydrocortisone (ANUSOL-HC) 2.5 % rectal cream   PeriANAL QID    levalbuterol (XOPENEX) nebulizer soln 0.63 mg/3 mL  0.63 mg Nebulization QID RT    bisacodyl (DULCOLAX) tablet 5 mg  5 mg Oral DAILY PRN    allopurinol (ZYLOPRIM) tablet 100 mg  100 mg Oral BID    benzonatate (TESSALON) capsule 200 mg  200 mg Oral TID PRN    cholecalciferol (VITAMIN D3) tablet 1,000 Units  1,000 Units Oral DAILY    fluticasone (FLONASE) 50 mcg/actuation nasal spray 1 Spray  1 Spray Both Nostrils DAILY    levothyroxine (SYNTHROID) tablet 88 mcg  88 mcg Oral ACB    nitroglycerin (NITROSTAT) tablet 0.4 mg  0.4 mg SubLINGual Q5MIN PRN    pantoprazole (PROTONIX) tablet 40 mg  40 mg Oral ACB    pravastatin (PRAVACHOL) tablet 40 mg  40 mg Oral DAILY    promethazine (PHENERGAN) tablet 25 mg  25 mg Oral Q6H PRN    rOPINIRole (REQUIP) tablet 2 mg  2 mg Oral BID    sertraline (ZOLOFT) tablet 100 mg  100 mg Oral DAILY    spironolactone (ALDACTONE) tablet 25 mg  25 mg Oral DAILY    sodium chloride (NS) flush 5-10 mL  5-10 mL IntraVENous Q8H    sodium chloride (NS) flush 5-10 mL  5-10 mL IntraVENous PRN    aspirin chewable tablet 81 mg  81 mg Oral DAILY    morphine injection 2 mg  2 mg IntraVENous Q4H PRN    ondansetron (ZOFRAN) injection 4 mg  4 mg IntraVENous Q4H PRN    potassium chloride (K-DUR, KLOR-CON) SR tablet 20 mEq  20 mEq Oral BID    calcium carbonate (TUMS) chewable tablet 600 mg [elemental]  600 mg Oral QPM    azelastine (ASTELIN) 137mcg/spray nasal spray (Patient Supplied)  1 Spray Both Nostrils BID    alcohol 62% (NOZIN) nasal  1 Ampule  1 Ampule Topical Q12H    sodium chloride (NS) flush 10 mL  10 mL InterCATHeter Q8H    sodium chloride (NS) flush 10 mL  10 mL InterCATHeter PRN         Objective:     Vitals:    01/25/18 2105 01/26/18 0008 01/26/18 0407 01/26/18 0832   BP:  107/68 113/75 139/50   Pulse:  82 81 83   Resp:  19 21 20   Temp:  98 °F (36.7 °C) 98 °F (36.7 °C) 98.1 °F (36.7 °C)   SpO2: 100% 99% 100% 100%   Weight:   162 lb 1.6 oz (73.5 kg)    Height:         Intake and Output:   01/24 1901 - 01/26 0700  In: 480 [P.O.:480]  Out: 2200 [Urine:2200]       Physical Exam:   Constitutional:  the patient is well developed and in no acute distress  HEENT:  Sclera clear, pupils equal, oral mucosa moist  Lungs: clear bilaterally. Wearing nasal cannula. Cardiovascular:  RRR with systolic murmur   Abd/GI: soft and non-tender; with positive bowel sounds. Ext: warm without cyanosis. There is less lower leg edema. Musculoskeletal: moves all four extremities with equal strength  Skin:  no jaundice or rashes, no wounds   Neuro: no gross neuro deficits   Musculoskeletal: can ambulate. No deformity  Psychiatric: Calm. ROS: chronic dyspnea, good appetite  Lines: peripheral IV    CHEST XRAY:        LAB  Recent Labs      01/26/18   0919  01/25/18   0546  01/24/18   0500   WBC  5.4  5.2  4.6   HGB  8.8*  8.4*  8.5*   HCT  29.8*  28.7*  28.5*   PLT  137*  144*  117*     Recent Labs      01/26/18   0551  01/25/18   0546  01/24/18   0500   NA  144  144  145   K  4.4  3.9  3.8   CL  102  101  100   CO2  39*  37*  38*   GLU  107*  107*  91   BUN  21  21  24*   CREA  1.00  0.92  1.04*   MG  2.2  2.2  2.0         Assessment:  (Medical Decision Making)     Hospital Problems  Date Reviewed: 1/4/2018          Codes Class Noted POA    Acute pulmonary edema ([facility] ICD-10-CM: J81.0  ICD-9-CM: 518.4  1/25/2018 Unknown    Negative fluid balance with diuresis - CXR improved    Peripheral arterial disease (HCC) (Chronic) ICD-10-CM: I73.9  ICD-9-CM: 443.9  1/23/2018 Yes    noted    Pleural effusion ICD-10-CM: J90  ICD-9-CM: 511.9  1/23/2018 Yes    Bilateral - s/p thoracenteses    Chronic respiratory failure with hypoxia (HCC) (Chronic) ICD-10-CM: J96.11  ICD-9-CM: 518.83, 799.02  1/23/2018 Yes    Overview Signed 1/23/2018 11:17 AM by Milagro Lion NP     Wears 3 to 4 liters at home         Stable. [facility] is DME    Hypoxia ICD-10-CM: R09.02  ICD-9-CM: 799.02  1/23/2018 Unknown    As above    Aortic stenosis, severe ICD-10-CM: I35.0  ICD-9-CM: 424.1  1/22/2018 Yes    Being evaluated for TAVR    Stenosis of prosthetic aortic valve ICD-10-CM: I35.0  ICD-9-CM: 396.0  1/19/2018 Yes    Overview Signed 1/19/2018  3:18 PM by Wing Betts MD     AVR (10/5/13):  21 mm Pericardial valve. As above    * (Principal)Acute on chronic systolic congestive heart failure ([facility] ICD-10-CM: I50.23  ICD-9-CM: 428.23, 428.0  2/12/2017 Yes    Improved with diuresis    Thrombocytopenia ([facility] ICD-10-CM: D69.6  ICD-9-CM: 287.5  8/22/2012 Yes    stable    Anemia (Chronic) ICD-10-CM: D64.9  ICD-9-CM: 285.9  10/27/2011 Yes    Overview Signed 10/27/2011  8:25 AM by Kelli Laughlin     Acute on chronic           Followed by hematology - prn transfusions    Chronic atrial fibrillation (HCC) (Chronic) ICD-10-CM: I48.2  ICD-9-CM: 427.31  10/17/2011 Yes    Paced per telemetry    Chronic obstructive pulmonary disease ([facility] (Chronic) ICD-10-CM: J44.9  ICD-9-CM: 496  3/8/2009 Yes    Severe, stable          Plan:  (Medical Decision Making)   1. Bilateral thoracenteses - transudative per studies - negative balance with lasix, less dyspnea. Awaiting decision regarding TAVR. From a COPD/pulmonary standpoint, she is felt to be stable. 2. Scheduled Xopenex - uses at home  3. Chronic respiratory failure with home oxygen - wants a portable tank ordered at discharge - uses [facility]    I have spoken with and examined the patient. I agree with the above assessment and plan as documented. Gen: pleasant on 3lpm  Lungs:  Decreased in bases  Heart:  SM  Abd:NABS  Ext:+edema    --s/p bilateral thoracentesis 1/25 with 900ml removed from left and 1200ml from right.  --continue lasix  --wean O2  --continue bronchodilators, has significant COPD which is stable presently. --we will be available if repeat thoracentesis needed. Please call us if you think this is necessary. Theron Jordan MD      More than 50% of time documented was spent face-to-face contact with the patient and in the care of the patient on the floor/unit where the patient is located.      Addendum:

## 2018-01-26 NOTE — DISCHARGE INSTRUCTIONS
Transcatheter Aortic Valve Replacement: Before Your Procedure  What is transcatheter aortic valve replacement? Transcatheter aortic valve replacement (TAVR) is a procedure to replace the aortic heart valve. Your doctor will use a catheter to put in your new heart valve. You won't need open-heart surgery. TAVR is often done through a cut (incision) in the groin. But sometimes a small cut is made in the chest. Your doctor will use a tube called a catheter and special tools that fit inside it. The doctor puts the catheter into a blood vessel and moves it into the heart. An artificial valve fits inside the catheter. Your doctor will move the new valve into your damaged valve. It will expand and work in place of the old valve. You may be asleep for the procedure, or you may get a sedative that will help you relax. You will have to stay in the hospital for a few days. Follow-up care is a key part of your treatment and safety. Be sure to make and go to all appointments, and call your doctor if you are having problems. It's also a good idea to know your test results and keep a list of the medicines you take. What happens before the procedure? Preparing for the procedure  · Understand exactly what procedure is planned, along with the risks, benefits, and other options. · Tell your doctor ALL the medicines, vitamins, supplements, and herbal remedies you take. Some of these can increase the risk of bleeding or interact with anesthesia. · If you take blood thinners, such as warfarin (Coumadin), clopidogrel (Plavix), or aspirin, be sure to talk to your doctor. He or she will tell you if you should stop taking these medicines before your procedure. Make sure that you understand exactly what your doctor wants you to do. · Your doctor will tell you which medicines to take or stop before your procedure. You may need to stop taking certain medicines a week or more before the procedure.  So talk to your doctor as soon as you can. · You will have several tests to get ready. These may include echocardiograms and a CT scan. · If you have an advance directive, let your doctor know. It may include a living will and a durable power of  for health care. Bring a copy to the hospital. If you don't have one, you may want to prepare one. It lets your doctor and loved ones know your health care wishes. Doctors advise that everyone prepare these papers before any type of surgery or procedure. Procedures can be stressful. This information will help you understand what you can expect. And it will help you safely prepare. What happens on the day of the procedure? · Follow the instructions exactly about when to stop eating and drinking. If you don't, your procedure may be canceled. If your doctor told you to take your medicines on the day of the procedure, take them with only a sip of water. ? · Take a bath or shower before you come in for your procedure. Do not apply lotions, perfumes, deodorants, or nail polish. ? · Take off all jewelry and piercings. And take out contact lenses, if you wear them. ? At the hospital or surgery center   · Bring a picture ID. ? · You will be kept comfortable and safe by your anesthesia provider. The anesthesia may make you sleep. Or it may just numb the area being worked on. ? · The procedure will take between 2 and 5 hours. The time depends on the size and shape of your arteries and heart. ? · After the procedure, pressure will be applied to the area where the catheter was put in your blood vessel. Then the area may be covered with a bandage or a compression device. This will prevent bleeding. ? · Nurses will check your heart rate and blood pressure. The nurse will also check the catheter site for bleeding. ? · If the catheter was put in your groin, you will need to lie still and keep your leg straight for several hours. The nurse may put a weighted bag on your leg to keep it still. ? · You may have a bruise or a small lump where the catheter was put in your blood vessel. This is normal and will go away. Going home   · Be sure you have someone to drive you home. ? · You will be given more specific instructions about recovering from your procedure. They will cover things like diet, follow-up care, and getting back to your normal routine. When should you call your doctor? · You have questions or concerns. ? · You don't understand how to prepare for your procedure. ? · You become ill before the procedure (such as fever, flu, or a cold). ? · You need to reschedule or have changed your mind about having the procedure. Where can you learn more? Go to http://onesimo-dasia.info/. Enter A977 in the search box to learn more about \"Transcatheter Aortic Valve Replacement: Before Your Procedure. \"  Current as of: September 21, 2016  Content Version: 11.4  © 5267-2256 [employer]. Care instructions adapted under license by [employer] (which disclaims liability or warranty for this information). If you have questions about a medical condition or this instruction, always ask your healthcare professional. [place] [address] any warranty or liability for your use of this information. Heart Failure: Care Instructions  Your Care Instructions    Heart failure occurs when your heart does not pump as much blood as the body needs. Failure does not mean that the heart has stopped pumping but rather that it is not pumping as well as it should. Over time, this causes fluid buildup in your lungs and other parts of your body. Fluid buildup can cause shortness of breath, fatigue, swollen ankles, and other problems. By taking medicines regularly, reducing sodium (salt) in your diet, checking your weight every day, and making lifestyle changes, you can feel better and live longer. Follow-up care is a key part of your treatment and safety.  Be sure to make and go to all appointments, and call your doctor if you are having problems. It's also a good idea to know your test results and keep a list of the medicines you take. How can you care for yourself at home? Medicines  ? · Be safe with medicines. Take your medicines exactly as prescribed. Call your doctor if you think you are having a problem with your medicine. ? · Do not take any vitamins, over-the-counter medicine, or herbal products without talking to your doctor first. [place] not take ibuprofen (Advil or Motrin) and naproxen (Aleve) without talking to your doctor first. They could make your heart failure worse. ? · You may be taking some of the following medicine. ¨ Beta-blockers can slow heart rate, decrease blood pressure, and improve your condition. Taking a beta-blocker may lower your chance of needing to be hospitalized. ¨ Angiotensin-converting enzyme inhibitors (ACEIs) reduce the heart's workload, lower blood pressure, and reduce swelling. Taking an ACEI may lower your chance of needing to be hospitalized again. ¨ Angiotensin II receptor blockers (ARBs) work like ACEIs. Your doctor may prescribe them instead of ACEIs. ¨ Diuretics, also called water pills, reduce swelling. ¨ Potassium supplements replace this important mineral, which is sometimes lost with diuretics. ¨ Aspirin and other blood thinners prevent blood clots, which can cause a stroke or heart attack. ? You will get more details on the specific medicines your doctor prescribes. Diet  ? · Your doctor may suggest that you limit sodium to 2,000 milligrams (mg) a day or less. That is less than 1 teaspoon of salt a day, including all the salt you eat in cooking or in packaged foods. People get most of their sodium from processed foods. Fast food and restaurant meals also tend to be very high in sodium. ? · Ask your doctor how much liquid you can drink each day. You may have to limit liquids. ?Weight  ?  · Weigh yourself without clothing at the same time each day. Record your weight. Call your doctor if you have a sudden weight gain, such as more than 2 to 3 pounds in a day or 5 pounds in a week. (Your doctor may suggest a different range of weight gain.) A sudden weight gain may mean that your heart failure is getting worse. ? Activity level  ? · Start light exercise (if your doctor says it is okay). Even if you can only do a small amount, exercise will help you get stronger, have more energy, and manage your weight and your stress. Walking is an easy way to get exercise. Start out by walking a little more than you did before. Bit by bit, increase the amount you walk. ? · When you exercise, watch for signs that your heart is working too hard. You are pushing yourself too hard if you cannot talk while you are exercising. If you become short of breath or dizzy or have chest pain, stop, sit down, and rest.   ? · If you feel \"wiped out\" the day after you exercise, walk slower or for a shorter distance until you can work up to a better pace. ? · Get enough rest at night. Sleeping with 1 or 2 pillows under your upper body and head may help you breathe easier. ? Lifestyle changes  ? · Do not smoke. Smoking can make a heart condition worse. If you need help quitting, talk to your doctor about stop-smoking programs and medicines. These can increase your chances of quitting for good. Quitting smoking may be the most important step you can take to protect your heart. ? · Limit alcohol to 2 drinks a day for men and 1 drink a day for women. Too much alcohol can cause health problems. ? · Avoid getting sick from colds and the flu. Get a pneumococcal vaccine shot. If you have had one before, ask your doctor whether you need another dose. Get a flu shot each year. If you must be around people with colds or the flu, wash your hands often. When should you call for help? Call 911 if you have symptoms of sudden heart failure such as:  ? · You have severe trouble breathing. ? · You cough up pink, foamy mucus. ? · You have a new irregular or rapid heartbeat. ?Call your doctor now or seek immediate medical care if:  ? · You have new or increased shortness of breath. ? · You are dizzy or lightheaded, or you feel like you may faint. ? · You have sudden weight gain, such as more than 2 to 3 pounds in a day or 5 pounds in a week. (Your doctor may suggest a different range of weight gain.)   ? · You have increased swelling in your legs, ankles, or feet. ? · You are suddenly so tired or weak that you cannot do your usual activities. ? Watch closely for changes in your health, and be sure to contact your doctor if you develop new symptoms. Where can you learn more? Go to http://onesimo-dasia.info/. Enter W091 in the search box to learn more about \"Heart Failure: Care Instructions. \"  Current as of: September 21, 2016  Content Version: 11.4  © 2741-4084 [employer]. Care instructions adapted under license by [employer] (which disclaims liability or warranty for this information). If you have questions about a medical condition or this instruction, always ask your healthcare professional. [address] any warranty or liability for your use of this information.

## 2018-01-26 NOTE — PROGRESS NOTES
Bedside and Verbal shift change report given to self (oncoming nurse) by Franklin Cheema RN (offgoing nurse). Report included the following information SBAR, Kardex, MAR and Recent Results.

## 2018-01-26 NOTE — PROGRESS NOTES
CT reviewed - No clinical evidence of pneumonia. Suspect infiltrates reflect atelectasis in context of no fevers, no leukocytosis, chronic bilateral large volume effusions drained yesterday.     Sumit Houston MD

## 2018-01-26 NOTE — PROGRESS NOTES
[address]  Face to Face Encounter    Patients Name: Patti Tortter    YOB: 1940    Ordering Physician: Kamron Benites MD    Primary Diagnosis: Bilateral pleural effusion [J90]    Date of Face to Face:   1/25/2018                            Face to Face Encounter findings are related to primary reason for home care:   yes. 1. I certify that the patient needs intermittent care as follows: skilled nursing care:  skilled observation/assessment, patient education  physical therapy: strengthening  occupational therapy:  ADL safety (ie. cooking, bathing, dressing)    2. I certify that this patient is homebound, that is:patient's condition makes leaving the home medically contraindicated; and 3) patient has a normal inability to leave the home and leaving the home requires considerable and taxing effort. Patient may leave the home for infrequent and short duration for medical reasons, and occasional absences for non-medical reasons. Homebound status is due to the following functional limitations:     3. I certify that this patient is under my care and that I, or a nurse practitioner or  [id number], or clinical nurse specialist, or certified nurse midwife, working with me, had a Face-to-Face Encounter that meets the physician Face-to-Face Encounter requirements. The following are the clinical findings from the [address] [place] [place] [address] encounter that support the need for skilled services and is a summary of the encounter:     See Progress notes and d/c summary       Dane Valverde RN  1/26/2018      THE FOLLOWING TO BE COMPLETED BY THE COMMUNITY PHYSICIAN:    I concur with the findings described above from the [facility] encounter that this patient is homebound and in need of a skilled service.     Certifying Physician: _____________________________________      Printed Certifying Physician Name: _____________________________________    Date: _________________

## 2018-01-26 NOTE — DISCHARGE SUMMARY
Physician Discharge Summary     Patient ID:  Soto Maldonado  [id number]  49 y.o.  1940    Admit date: 1/22/2018    Discharge date and time: 1/26/18    Admitting Physician: Ashleigh Oneal MD     Primary Cardiologist:Dr. Mckeon     Primary Care Fernando Florence MD    Discharge Physician: Darwin Elizabeth NP    Admission Diagnoses: Bilateral pleural effusion [J90]    Discharge Diagnoses:   Patient Active Problem List    Diagnosis Date Noted    Acute pulmonary edema ([address] 01/25/2018    Peripheral arterial disease ([address] 01/23/2018    Pleural effusion 01/23/2018    Chronic respiratory failure with hypoxia ([address] 01/23/2018    Hypoxia 01/23/2018    Aortic stenosis, severe 01/22/2018    Stenosis of prosthetic aortic valve 01/19/2018    Tricuspid valve insufficiency 47/77/0164    Systolic CHF, chronic ([address] 01/15/2018    S/P mitral valve repair 12/04/2017    H/O atrioventricular rubin ablation 03/22/2017    Acute on chronic systolic congestive heart failure ([address] 02/12/2017    Pulmonary hypertension 02/12/2017    Aortic valve replaced 01/09/2017    Chronic diastolic congestive heart failure ([address] 09/09/2016    Chronic depression 08/05/2016    Osteopenia 08/05/2016    RLS (restless legs syndrome) 08/05/2016    Hyperlipidemia 08/05/2016    Gout 08/05/2016    Anxiety 08/05/2016    CAD (coronary artery disease) 08/05/2016    HTN (hypertension) 08/05/2016    GERD (gastroesophageal reflux disease) 08/05/2016    H/O mitral valve repair, 2003 76/32/8608    Diastolic heart failure ([address]     Sick sinus syndrome ([address] 02/13/2016    Obesity 11/02/2015    Mitral stenosis with insufficiency 11/02/2015    Rheumatic aortic stenosis 11/02/2015    Cardiac pacemaker 11/02/2015    Thrombocytopenia ([address] 08/22/2012    Anemia 10/27/2011    Chronic atrial fibrillation ([address] 10/17/2011    Hypothyroidism 12/04/2009    BOBBY (obstructive sleep apnea) 12/04/2009    Chronic obstructive pulmonary disease [facility] 03/08/2009    Aortic Valve Bioprosthesis Present 03/07/2009    Degenerative arthritis of left knee 02/27/2009           Hospital Course: Patient presents for consultation requested by Dr. Karina Og for evaluation of worsening LV dysfunction and severe prosthetic valve aortic stenosis. Leona Suresh has multiple medical problems including underlying COPD.  She had a prior aortic valve replacement and mitral valve repair in 2003.  Her symptoms of dyspnea with exertion have been worsening. She was seen by Dr. Karina Og who ordered an echocardiogram.  This showed  reduced LV systolic function (which was a new finding compared to study 2/16 at which time EF was 55-60%).  It shows a severely stenotic bioprosthetic valve with a mean gradient of 43 and peak gradient of 85.  Patient is status post previous mitral valve repair with mild to moderate residual mitral regurgitation.  She also appeared to have a sizable left pleural effusion.  She was referred to my office for consideration of transcatheter aortic valve intervention. I was able to obtain his prior op note.  She underwent a mitral valve repair using a 28 mm Shakira Ely ring.  Aortic valve replacement was a 21 mm pericardial valve. The type of valve was not noted in the operative note report.       ---------------------------------------------------------------------------------------------------------------------------------------    Pt was admitted for further evaluation and treatment. She was noted to have bilateral pleural effusions, hypoxia. She was seen by pulmonary and successful bilateral thoracentesis performed. Additionally, cardiac cath noted angiographically normal arteries, Cardiac CT also performed in anticipation for TAVR. She was seen and evaluated this afternoon by Dr. Amanda Goss and felt to be acceptable for discharge. No changes in her medication regimen. Plans for readmission Monday with anticipated TAVR on this coming Tuesday. It was discussed with patient the importance of dual platelet therapy, to take this every day and monitor stools for signs and symptoms of GI bleed. Report to us or PCP any dark tarry stools or john blood in stool. Final Laboratory Data:  Recent Results (from the past 24 hour(s))   MAGNESIUM    Collection Time: 01/26/18  5:51 AM   Result Value Ref Range    Magnesium 2.2 1.8 - 2.4 mg/dL   METABOLIC PANEL, BASIC    Collection Time: 01/26/18  5:51 AM   Result Value Ref Range    Sodium 144 136 - 145 mmol/L    Potassium 4.4 3.5 - 5.1 mmol/L    Chloride 102 98 - 107 mmol/L    CO2 39 (H) 21 - 32 mmol/L    Anion gap 3 (L) 7 - 16 mmol/L    Glucose 107 (H) 65 - 100 mg/dL    BUN 21 8 - 23 MG/DL    Creatinine 1.00 0.6 - 1.0 MG/DL    GFR est AA >60 >60 ml/min/1.73m2    GFR est non-AA 57 (L) >60 ml/min/1.73m2    Calcium 8.4 8.3 - 10.4 MG/DL   CBC W/O DIFF    Collection Time: 01/26/18  9:19 AM   Result Value Ref Range    WBC 5.4 4.3 - 11.1 K/uL    RBC 3.02 (L) 4.05 - 5.25 M/uL    HGB 8.8 (L) 11.7 - 15.4 g/dL    HCT 29.8 (L) 35.8 - 46.3 %    MCV 98.7 (H) 79.6 - 97.8 FL    MCH 29.1 26.1 - 32.9 PG    MCHC 29.5 (L) 31.4 - 35.0 g/dL    RDW 16.7 (H) 11.9 - 14.6 %    PLATELET 206 (L) 327 - 450 K/uL    MPV 11.7 10.8 - 14.1 FL       Disposition: home    Patient Instructions:   Current Discharge Medication List      CONTINUE these medications which have NOT CHANGED    Details   rOPINIRole (REQUIP) 2 mg tablet Take  by mouth two (2) times a day. pravastatin (PRAVACHOL) 40 mg tablet Take 1 Tab by mouth daily. Indications: hyperlipidemia  Qty: 90 Tab, Refills: 3    Associated Diagnoses: Hyperlipidemia, unspecified hyperlipidemia type      warfarin (COUMADIN) 5 mg tablet Take 1 Tab by mouth nightly. Name brand only  Qty: 90 Tab, Refills: 1    Associated Diagnoses: Chronic diastolic congestive heart failure (HCC)      sertraline (ZOLOFT) 100 mg tablet Take 1 Tab by mouth daily.   Qty: 90 Tab, Refills: 2 Associated Diagnoses: CALI (generalized anxiety disorder)      diazePAM (VALIUM) 5 mg tablet Take 1 Tab by mouth daily. Max Daily Amount: 5 mg. Qty: 30 Tab, Refills: 2    Associated Diagnoses: CALI (generalized anxiety disorder)      torsemide (DEMADEX) 20 mg tablet Take 2 Tabs by mouth daily. Qty: 180 Tab, Refills: 1      omeprazole (PRILOSEC) 20 mg capsule TAKE 1 CAPSULE BY MOUTH  DAILY  Qty: 90 Cap, Refills: 3    Associated Diagnoses: Gastroesophageal reflux disease without esophagitis      spironolactone (ALDACTONE) 25 mg tablet Take 1 Tab by mouth daily. Qty: 90 Tab, Refills: 3      levothyroxine (SYNTHROID) 88 mcg tablet Take 1 Tab by mouth Daily (before breakfast). Qty: 90 Tab, Refills: 3    Associated Diagnoses: Acquired hypothyroidism      allopurinol (ZYLOPRIM) 100 mg tablet Take 1 Tab by mouth two (2) times a day. Qty: 180 Tab, Refills: 3      guaiFENesin ER (MUCINEX) 600 mg ER tablet Take 1 Tab by mouth two (2) times a day. Qty: 20 Tab, Refills: 0      fluticasone (FLONASE) 50 mcg/actuation nasal spray 1 [place] by Both Nostrils route daily. azelastine (ASTELIN) 137 mcg (0.1 %) nasal spray 1 [place] by Both Nostrils route two (2) times a day. Use in each nostril as directed  Qty: 1 Bottle, Refills: 0    Associated Diagnoses: Eustachian tube dysfunction, bilateral      polyethylene glycol (MIRALAX) 17 gram/dose powder Take 17 g by mouth daily. loratadine (CLARITIN) 10 mg tablet Take 10 mg by mouth as needed. glycerin, adult, (SUPPOSITORY ADULT) suppository Insert 1 Suppository into rectum as needed. benzonatate (TESSALON) 200 mg capsule Take 200 mg by mouth three (3) times daily as needed for Cough. levalbuterol (XOPENEX) 1.25 mg/3 mL nebu 1.25 mg by Nebulization route. potassium chloride SR (K-TAB) 20 mEq tablet Take 20 mEq by mouth two (2) times a day.       hydrocortisone (ANUSOL-HC) 2.5 % rectal cream Apply small amount to hemorrhoids/perianal skin TID PRN  Qty: 30 g, Refills: 3      calcium carbonate (CALTREX) 600 mg (1,500 mg) tablet Take 600 mg by mouth nightly. cholecalciferol (VITAMIN D3) 1,000 unit cap Take  by mouth daily. CARBOXYMETHYLCELLULOS/GLYCERIN (REFRESH OPTIVE OP) Apply  to eye. DOCUSATE SODIUM Take 1 Cap by mouth two (2) times a day. OXYGEN-AIR DELIVERY SYSTEMS by Does Not Apply route. 2-2.5 lpm cont. Associated Diagnoses: Anemia; Atrial fibrillation (HCC)      cyanocobalamin (VITAMIN B-12) 250 mcg tablet Take 1,000 mcg by mouth daily. nitroglycerin (NITROSTAT) 0.4 mg SL tablet by SubLINGual route every five (5) minutes as needed for Chest Pain. promethazine (PHENERGAN) 25 mg tablet Take 1 Tab by mouth every six (6) hours as needed. Qty: 30 Tab, Refills: 3             Referenced discharge instructions provided by nursing for diet and activity. Follow-up:  Primary Cardiologist:as above, TAVR plan. PCP: (Reji Funes MD) in about 4 weeks.     Signed:  Jesus Gabriel NP  1/26/2018  3:33 PM

## 2018-01-26 NOTE — PROGRESS NOTES
Bedside and Verbal shift change report given to Tamika Caba RN (oncoming nurse) by self Linda Night nurse). Report included the following information SBAR, Kardex, MAR and Recent Results.

## 2018-01-26 NOTE — PROGRESS NOTES
Problem: Mobility Impaired (Adult and Pediatric)  Goal: *Acute Goals and Plan of Care (Insert Text)  Goals:  (1.)Ms. Vitaly Warren will move from supine to sit and sit to supine , scoot up and down and roll side to side with INDEPENDENT within 5 day(s). (2.)Ms. Vitaly Warren will transfer from bed to chair and chair to bed with MODIFIED INDEPENDENCE using the least restrictive device within 5 day(s). (3.)Ms. Vitaly Warren will ambulate with MODIFIED INDEPENDENCE for  feet with the least restrictive device within 5 day(s). PHYSICAL THERAPY: Daily Note, Treatment Day: 3rd, AM 1/26/2018  INPATIENT: Hospital Day: 5  [place] / [place] / Product Type: Medicare /      NAME/AGE/GENDER: Nidia Meza is a 69 y.o. female   PRIMARY DIAGNOSIS: Bilateral pleural effusion [J90] Acute on chronic systolic congestive heart failure (HCC) Acute on chronic systolic congestive heart failure (HCC)  Procedure(s) (LRB):  THORACENTESIS (Bilateral)  ULTRASOUND (Bilateral)  1 Day Post-Op  ICD-10: Treatment Diagnosis:   · Generalized Muscle Weakness (M62.81)  · Difficulty in walking, Not elsewhere classified (R26.2)   Precaution/Allergies:  Adhesive tape; Benadryl [diphenhydramine hcl]; Demerol [meperidine]; Morphine; Sulfa (sulfonamide antibiotics); and Lorazepam      ASSESSMENT:     Ms. Vitaly Warren is a 69year old WF with an admitting diagnosis of CHF. She presents supine and happy to walk with me. She is on 3L chronic. She got up, stood and brushed her hair in the bathroom before walking the whole [place] in the hallway. She took her time, took a couple short standing rest breaks but was able to talk the whole time. Her sats were 97% upon return. excellent progress with above goals met. Hope patient will be discharged soon as she would need a re eval for new goals. This section established at most recent assessment   PROBLEM LIST (Impairments causing functional limitations):  1.  Decreased Strength  2. Decreased Transfer Abilities  3. Decreased Ambulation Ability/Technique  4. Decreased Activity Tolerance  5. Increased Fatigue  6. Increased Shortness of Breath   INTERVENTIONS PLANNED: (Benefits and precautions of physical therapy have been discussed with the patient.)  1. Bed Mobility  2. Gait Training  3. Therapeutic Activites  4. Therapeutic Exercise/Strengthening  5. Ultrasound (US)     TREATMENT PLAN: Frequency/Duration: 3 times a week for duration of hospital stay  Rehabilitation Potential For Stated Goals: Good     RECOMMENDED REHABILITATION/EQUIPMENT: (at time of discharge pending progress): Due to the probability of continued deficits (see above) this patient will likely need continued skilled physical therapy after discharge. Equipment:    None at this time              HISTORY:   History of Present Injury/Illness (Reason for Referral):  Patient presents for consultation requested by Dr. Connie Berumen for evaluation of worsening LV dysfunction and severe prosthetic valve aortic stenosis. Noah Gaviria has multiple medical problems including underlying COPD.  She had a prior aortic valve replacement and mitral valve repair in 2003.  Her symptoms of dyspnea with exertion have been worsening. She was seen by Dr. Connie Berumen who ordered an echocardiogram.  This showed  reduced LV systolic function (which was a new finding compared to study 2/16 at which time EF was 55-60%).  It shows a severely stenotic bioprosthetic valve with a mean gradient of 43 and peak gradient of 85.  Patient is status post previous mitral valve repair with mild to moderate residual mitral regurgitation.  She also appeared to have a sizable left pleural effusion.  She was referred to my office for consideration of transcatheter aortic valve intervention. I was able to obtain his prior op note.  She underwent a mitral valve repair using a 28 mm Shakira Ely ring.  Aortic valve replacement was a 21 mm pericardial valve.  The type of valve was not noted in the operative note report. Past Medical History/Comorbidities:   Ms. Karen Li  has a past medical history of Abnormal glucose (8/5/2016); Abscess (8/5/2016); Acute encephalopathy (7/8/2016); Acute renal failure ([address] (12/3/2009); Afib ([address] Anemia; Ankle swelling (8/5/2016); Anxiety (8/5/2016); Aortic Valve Bioprosthesis Present (3/7/2009); ARF (acute renal failure) ([address] (2/24/2010); Arthritis; Asthma; Atopic dermatitis (8/5/2016); Atrial fibrillation ([address] (3/7/2009); Autonomic orthostatic hypotension (8/5/2016); AV block (10/17/2011); Back pain (8/5/2016); Bradycardia (Symptomatic) (11/7/2011); CAD (coronary artery disease); Cancer ([address] (1990); Cardiac pacemaker (11/2/2015); Cardiogenic shock ([address] (10/17/2011); 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 (8/5/2016); Cellulitis (8/5/2016); Chest pain (8/5/2016); Chronic atrial fibrillation ([address] (10/17/2011); Chronic depression (8/5/2016); Chronic obstructive pulmonary disease ([address] (3/8/2009); Chronic pain; CKD (chronic kidney disease) stage 3, GFR 30-59 ml/min (12/4/2009); Congestive heart failure (CHF) ([address] (11/2/2015); Degeneration of cervical intervertebral disc (8/5/2016); Degenerative arthritis of left knee (2/27/2009); Dehydration (8/5/2016); Diastolic heart failure ([address] Digoxin toxicity (2/24/2010); Disorder of sweat glands (8/5/2016); Diverticulosis of large intestine without diverticulitis (2015); Dyspnea (8/5/2016); Eczema (8/5/2016); Embolus of femoral artery ([address] (12/4/2017); Epigastric abdominal pain (2/24/2010); GERD (gastroesophageal reflux disease); Gout (8/5/2016); H/O mitral valve repair, 2003 (6/27/2016); Heart failure ([facility] colonic polyp (2015); Hyperkalemia (10/17/2011); Hyperlipidemia (8/5/2016); Hypertension; Hypokalemia (2/24/2010); Hypothyroidism (12/4/2009); Ill-defined condition; Knee pain (8/5/2016); Leg cramps (8/5/2016); Mitral stenosis with insufficiency (11/2/2015);  Nausea and vomiting (2/24/2010); Obesity (11/2/2015); BOBBY (obstructive sleep apnea) (12/4/2009); Osteoarthritis (8/5/2016); Osteopenia (8/5/2016); Other long term (current) drug therapy (8/5/2016); Palpitations (11/2/2015); Rash (8/5/2016); Rectal bleeding (10/27/2011); Rectocele (2015); Recurrent depression ([facility] (1/4/2018); 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 Ms. Jordan Beyer  has a past surgical history that includes hx appendectomy; hx cholecystectomy (2005); hx gyn (1981); hx mastectomy (1990); hx pacemaker; hx breast reconstruction; pr cardiac surg procedure unlist; hx orthopaedic; vascular surgery procedure unlist (Right, 02/20/2017); and pr chest surgery procedure unlisted. Social History/Living Environment:   Home Environment: Private residence  # Steps to Enter: 1  One/Two Story Residence: One story  Living Alone: No  Support Systems: Child(brit)  Patient Expects to be Discharged to[de-identified] Private residence  Current DME Used/Available at Home: Walker, rolling  Prior Level of Function/Work/Activity:  Patient reports that she was ambulating with use of her r/walker on her own at home prior to this admission.      Number of Personal Factors/Comorbidities that affect the Plan of Care: 3+: HIGH COMPLEXITY   EXAMINATION:   Most Recent Physical Functioning:   Gross Assessment:                  Posture:     Balance:    Bed Mobility:  Supine to Sit: Modified independent  Wheelchair Mobility:     Transfers:  Sit to Stand: Modified independent  Stand to Sit: Independent  Gait:     Speed/Carol: Slow  Distance (ft): 250 Feet (ft)  Assistive Device: Walker, rolling  Ambulation - Level of Assistance: Stand-by asssistance      Body Structures Involved:  1. Heart  2. Lungs  3. Metabolic Body Functions Affected:  1. Cardio  2. Respiratory  3. Metobolic/Endocrine Activities and Participation Affected:  1. General Tasks and Demands  2. Mobility   Number of elements that affect the Plan of Care: 3: MODERATE COMPLEXITY   CLINICAL PRESENTATION:   Presentation: Stable and uncomplicated: LOW COMPLEXITY   CLINICAL DECISION MAKIN[address] [place] [address] AM-PAC 6 Clicks   Basic Mobility Inpatient Short Form  How much difficulty does the patient currently have. .. Unable A Lot A Little None   1. Turning over in bed (including adjusting bedclothes, sheets and blankets)? [] 1   [] 2   [] 3   [x] 4   2. Sitting down on and standing up from a chair with arms ( e.g., wheelchair, bedside commode, etc.)   [] 1   [] 2   [] 3   [x] 4   3. Moving from lying on back to sitting on the side of the bed? [] 1   [] 2   [] 3   [x] 4   How much help from another person does the patient currently need. .. Total A Lot A Little None   4. Moving to and from a bed to a chair (including a wheelchair)? [] 1   [] 2   [x] 3   [] 4   5. Need to walk in hospital room? [] 1   [] 2   [x] 3   [] 4   6. Climbing 3-5 steps with a railing? [] 1   [] 2   [x] 3   [] 4   © , Trustees of [address] [place] [address], under license to "[employer]. All rights reserved      Score:  21 Most Recent: X (Date: -- )    Interpretation of Tool:  Represents activities that are increasingly more difficult (i.e. Bed mobility, Transfers, Gait). Score 24 23 22-20 19-15 14-10 9-7 6     Modifier CH CI CJ CK CL CM CN      ?  Mobility - Walking and Moving Around:     - CURRENT STATUS: CJ - 20%-39% impaired, limited or restricted    - GOAL STATUS: CI - 1%-19% impaired, limited or restricted    - D/C STATUS:  ---------------To be determined---------------  [place] / Product Type: Medicare /      Medical Necessity:     · Skilled intervention continues to be required due to generalized weakness and low gait endurance. Reason for Services/Other Comments:  · Patient continues to require skilled intervention due to medical complications. Use of outcome tool(s) and clinical judgement create a POC that gives a: Clear prediction of patient's progress: LOW COMPLEXITY            TREATMENT:   (In addition to Assessment/Re-Assessment sessions the following treatments were rendered)   Pre-treatment Symptoms/Complaints:  Patient had no complaints of pain in therapy today. Pain: Initial:   Pain Intensity 1: 0  Post Session:  0/10     Therapeutic Activity: (    25 minutes ):  Therapeutic activities including Bed transfers, Chair transfers,  and Ambulation on level ground to improve mobility, strength, balance and endurance. Date:  1-24-18 Date:   Date:     Activity/Exercise Parameters Parameters Parameters   Ankle pumps  X 10 B     Long arc quads X 10 B     Seated marching  X 10 B     Seated hip abduction / adduction X 10 B     Heel raise  X 10 B     Sit to stand  X 5                  Braces/Orthotics/Lines/Etc:   · O2 Device: Nasal cannula 3L with O2 sats staying at 98% at rest and with activity. Treatment/Session Assessment:    · Response to Treatment:  Patient appeared to feel better today and tolerated therapy well. · Interdisciplinary Collaboration:   o Physical Therapy Assistant  o Registered Nurse  · After treatment position/precautions:   o Up in chair  o Bed alarm/tab alert on  o Bed/Chair-wheels locked  o Call light within reach  o RN notified  o Family at bedside   · Compliance with Program/Exercises: excellent  · Recommendations/Intent for next treatment session: \"Next visit will focus on advancements to more challenging activities and reduction in assistance provided\".   Total Treatment Duration:  PT Patient Time In/Time Out  Time In: 1145  Time Out: 1210    Beck Lee PTA

## 2018-01-29 PROBLEM — I35.0 AORTIC STENOSIS: Status: ACTIVE | Noted: 2018-01-01

## 2018-01-29 PROBLEM — I50.20 SYSTOLIC HEART FAILURE (HCC): Status: ACTIVE | Noted: 2018-01-01

## 2018-01-29 NOTE — H&P
[facility] Cardiology H&P    Admitting Cardiologist:Dr. Jeremías Lawson     Primary Cardiologist:Dr Jessica Shrestha     Primary Care Physician:Dr Ruiz    Subjective:     Trudy Cabrales is a 69 y.o. female who I just discharged on Friday. Pleasant 70 yo with severe AS, severe COPD, chronic afib with coumadin held for last week with rectal bleeding and anemia. She has not taken any coumadin now for over one week. She is being readmitted in anticipation for TAVR. She underwent bilateral thoracentesis last Thursday pulling approximately one liter off each lung. She underwent cardiac cath noting no obstructive CAD last week. Echo noted recent decline in EF as well. She has done OK over weekend. Denies any significant worsening of her baseline SOB.      Past Medical History:   Diagnosis Date    Abnormal glucose 8/5/2016    Abscess 8/5/2016    Acute encephalopathy 7/8/2016    Acute renal failure ([address] 12/3/2009    Afib ([address]     Anemia     Ankle swelling 8/5/2016    Anxiety 8/5/2016    Aortic Valve Bioprosthesis Present 3/7/2009    ARF (acute renal failure) ([address] 2/24/2010    Arthritis     Asthma     Atopic dermatitis 8/5/2016    Atrial fibrillation (HCC) 3/7/2009    Autonomic orthostatic hypotension 8/5/2016    AV block 10/17/2011    Back pain 8/5/2016    Bradycardia (Symptomatic) 11/7/2011    CAD (coronary artery disease)     Cancer (HCC) 1990    breast (left)    Cardiac pacemaker 11/2/2015    Cardiogenic shock ([address] 10/17/2011    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    Cellulitis 8/5/2016    Chest pain 8/5/2016    Chronic atrial fibrillation ([address] 10/17/2011    Chronic depression 8/5/2016    Chronic obstructive pulmonary disease ([address] 3/8/2009    Chronic pain     CKD (chronic kidney disease) stage 3, GFR 30-59 ml/min 12/4/2009    Congestive heart failure (CHF) ([address] 11/2/2015    Degeneration of cervical intervertebral disc 8/5/2016    Degenerative arthritis of left knee 2/27/2009    Dehydration 0/1/7233    Diastolic heart failure (HCC)     Digoxin toxicity 2/24/2010    Disorder of sweat glands 8/5/2016    Diverticulosis of large intestine without diverticulitis 2015    Dyspnea 8/5/2016    Eczema 8/5/2016    Embolus of femoral artery (HCC) 12/4/2017    Epigastric abdominal pain 2/24/2010    GERD (gastroesophageal reflux disease)     Gout 8/5/2016    H/O mitral valve repair, 2003 6/27/2016    Heart failure ([address]     Hx of colonic polyp 2015    adenoma    Hyperkalemia 10/17/2011    Hyperlipidemia 8/5/2016    Hypertension     Hypokalemia 2/24/2010    Hypothyroidism 12/4/2009    Ill-defined condition     CARPEL TUNNEL SYNDROME, BILAT HANDS    Knee pain 8/5/2016    Leg cramps 8/5/2016    Mitral stenosis with insufficiency 11/2/2015    Prior MV repair 2003.     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    Right hip pain 8/5/2016    RLS (restless legs syndrome) 8/5/2016    Sick sinus syndrome ([address] 2/13/2016    Skin infection 8/5/2016    Tachycardia 6/27/2016    Thrombocytopenia, unspecified 8/22/2012    Thromboembolus ([address]     02/2017    Thyroid disease     Urticaria 8/5/2016    Vertigo 8/5/2016      Past Surgical History:   Procedure Laterality Date    CARDIAC SURG PROCEDURE UNLIST      valve repair and replacement    CHEST SURGERY PROCEDURE UNLISTED      HX APPENDECTOMY      HX BREAST RECONSTRUCTION      HX CHOLECYSTECTOMY  2005    HX GYN  1981    hysterectomy still have ovaries    HX MASTECTOMY  1990    left mastectomy    HX ORTHOPAEDIC      knee surgery    HX PACEMAKER      VASCULAR SURGERY PROCEDURE UNLIST Right 2017    LE thrombectomy      No current facility-administered medications for this encounter. Allergies   Allergen Reactions    Adhesive Tape Rash    Benadryl [Diphenhydramine Hcl] Other (comments)     Jitters      Demerol [Meperidine] Anxiety    Morphine Other (comments)     Confusion    Sulfa (Sulfonamide Antibiotics) Anxiety    Lorazepam Anxiety      Social History   Substance Use Topics    Smoking status: Former Smoker     Packs/day: 3.00     Years: 15.00     Types: Cigarettes     Quit date: 1996    Smokeless tobacco: Former User      Comment: quit     Alcohol use No      Family History   Problem Relation Age of Onset    Heart Disease Mother     Cancer Father      Throat    Heart Disease Father     Cancer Sister      Breast, Colon    Heart Disease Sister     Breast Cancer Sister 79      from disease    Cancer Maternal Grandmother     Cancer Brother     Diabetes Brother     Heart Disease Brother     Psychiatric Disorder Paternal Grandfather     Cancer Maternal Aunt      Breast    Diabetes Son     Alcohol abuse Neg Hx     Arthritis-rheumatoid Neg Hx     Asthma Neg Hx     Bleeding Prob Neg Hx     Elevated Lipids Neg Hx     Headache Neg Hx     Migraines Neg Hx     Hypertension Neg Hx     Lung Disease Neg Hx     Mental Retardation Neg Hx     Stroke Neg Hx         Review of Systems  Gen: Denies fever, chills, malaise or fatigue. Appetite good. HEENT: Denies frequent headaches, dizzyness, visual disturbances, Neck pain or swallowing difficulty  Lungs:as above   Cardiovascular: as above   GI: Denies hememesis, dark tarry stools, No prior Hx of GI bleed, Denies constipation  : Denies dysuria, no complaints of frequency, nocturia  Heme: No prior bleeding disorders, no prior Cancer  Neuro: Denies prior CVA, TIA. Endocrine: no diabetes, thyroid disorders  Psychiatric: Denies anxiety, or other psychiatric illnesses.      Objective:     Visit Vitals  /65 (BP 1 Location: Right arm, BP Patient Position: Sitting)    Pulse 85    Temp 96.2 °F (35.7 °C)    Resp 20    Ht 5' (1.524 m)    Wt 75.1 kg (165 lb 8 oz)    SpO2 92%    BMI 32.32 kg/m2     General:Alert, cooperative, no distress, appears stated age  Head: Normocephalic, without obvious abnormality, atraumatic. Eyes: Conjunctivae/corneas clear. PERRL, EOMs intact  Nose:Nares normal. Septum midline. Mucosa normal. No drainage or sinus tenderness. Throat: Lips, mucosa, and tongue normal. Teeth and gums normal.   Neck: Supple, symmetrical, trachea midline,  no carotid bruit and no JVD. Lungs:diminished in bases, bibasilar crackles. Chest wall: No tenderness or deformity. Heart: irregular, irregular   Abdomen:Soft, non-tender. Bowel sounds normal. No masses, No organomegaly. Extremities: Extremities normal, atraumatic, no cyanosis or edema. Pulses: 2+ and symmetric all extremities. Skin: Skin color, texture, turgor normal. No rashes or lesions  Lymph nodes: Cervical, supraclavicular, and axillary nodes normal  Neurologic:No focal deficits identified                     Data Review:     pending    Assessment / Plan     Principal Problem: Aortic stenosis, severe (1/22/2018)--admitted for evaluation and possible treatments prior to pursuit of TAVR if deemed acceptable. Will check stat labs, repeat xray to assess for recurrence of pl effusions, PFTs with severe underlying COPD. NYHA class 4 symptoms. Active Problems: Aortic Valve Bioprosthesis Present (3/7/2009)      Chronic obstructive pulmonary disease (HCC) (3/8/2009)--continue home meds, oxygen dependent at home       Chronic atrial fibrillation (HCC) (10/17/2011)--rate controlled, not on rate slowing meds at home with PM in place, coumadin on hold in anticipation of above. Anemia (10/27/2011)--labs pending no further rectal bleeding.        Overview: Acute on chronic            Mitral stenosis with insufficiency (11/2/2015)      Overview: Prior MV repair 2003. Systolic heart failure (HCC) (1/29/2018)--as above, recent echo noted decline in EF. Judicious use of fluids, may need IV diuresis prior to TAVR.                Reji Womack NP

## 2018-01-29 NOTE — PROGRESS NOTES
Patient arrived to telemetry unit in wheelchair with son. Patient resting in bed now with call light in reach. Dual RN skin assessment showed patient's skin to be warm, dry and intact. Patient has a blister marked on R. Koenig. She has present varicose veins in BLE. Patient's sacrum and heels are intact. No other skin issues noted.

## 2018-01-29 NOTE — PROGRESS NOTES
Patient's veins very difficult for RN and phlebotemist. Orders received from Missy Ibarra NP for midline insertion. PICC team informed.

## 2018-01-29 NOTE — CONSULTS
Cardiovascular Surgery Consult (TAVR#2)     Patient: Jessica Quintana MRN: [id number]  SSN: [id number]    YOB: 1940  Age: 69 y.o. Sex: female       Subjective:       Jessica Quintana is a 69 y.o. female who we have been consulted regarding TAVR. She was recently admitted to [facility] on 1/22/18 for decompensated heart failure. She has significant LV systolic dysfunction and severe Prosthetic AV stenosis and  MV Repair 2003 [facility]) with mild/moderate regurgitation in the setting of significant COPD. Recent echocardiogram 1/8/18 shows EF 35%, severely stenotic AV prosthesis with elevated transaortic valve gradient, Prosthetic AV mean gradient 43 mmHg, Mod Mitral valve regurg and Mild MV stenosis, Biatrial enlargement, PPM in right heart.      She was recently admitted to the hospital for decompensated heart failure with the plan to stabilize her respiratory status and IV diuresis prior to cardiac catheterization and Cardiac CT scan in work up for possible TAVR. Discharged three days ago, and readmitted today for planned TAVR tomorrow. Her chronic SOB/BARNHART unchanged over weekend. Bilateral leg edema stable. Heart Catheterization performed 1/24/18 shows angiographically normal coronaries with R/L ileofemoral systems adequate for transfemoral aortic valve replacement access.   Images reviewed by Dr Yana Little.      Risk Factors: CHF, Prosthetic AV & S/P MV repair (2003), persistent atrial fibrillation with warfarin anticoagulation, PPM, chronic anemia, Severe COPD on continuous O2 therapy, pleural effusions, chronic thrombocytopenia, history of R femoral artery thrombectomy/embolectomy.            Past Medical History:   Diagnosis Date    Abnormal glucose 8/5/2016    Abscess 8/5/2016    Acute encephalopathy 7/8/2016    Acute renal failure ([address] 12/3/2009    Afib ([address]      Anemia      Ankle swelling 8/5/2016    Anxiety 8/5/2016    Aortic Valve Bioprosthesis Present 3/7/2009    ARF (acute renal failure) ([address] 2/24/2010    Arthritis      Asthma      Atopic dermatitis 8/5/2016    Atrial fibrillation (HCC) 3/7/2009    Autonomic orthostatic hypotension 8/5/2016    AV block 10/17/2011    Back pain 8/5/2016    Bradycardia (Symptomatic) 11/7/2011    CAD (coronary artery disease)      Cancer (HCC) 1990     breast (left)    Cardiac pacemaker 11/2/2015    Cardiogenic shock (HCC) 10/17/2011    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    Cellulitis 8/5/2016    Chest pain 8/5/2016    Chronic atrial fibrillation ([address] 10/17/2011    Chronic depression 8/5/2016    Chronic obstructive pulmonary disease ([address] 3/8/2009    Chronic pain      CKD (chronic kidney disease) stage 3, GFR 30-59 ml/min 12/4/2009    Congestive heart failure (CHF) ([address] 11/2/2015    Degeneration of cervical intervertebral disc 8/5/2016    Degenerative arthritis of left knee 2/27/2009    Dehydration 1/5/2869    Diastolic heart failure (HCC)      Digoxin toxicity 2/24/2010    Disorder of sweat glands 8/5/2016    Diverticulosis of large intestine without diverticulitis 2015    Dyspnea 8/5/2016    Eczema 8/5/2016    Embolus of femoral artery ([address] 12/4/2017    Epigastric abdominal pain 2/24/2010    GERD (gastroesophageal reflux disease)      Gout 8/5/2016    H/O mitral valve repair, 2003 6/27/2016    Heart failure ([address]      Hx of colonic polyp 2015     adenoma    Hyperkalemia 10/17/2011    Hyperlipidemia 8/5/2016    Hypertension      Hypokalemia 2/24/2010    Hypothyroidism 12/4/2009    Ill-defined condition       CARPEL TUNNEL SYNDROME, BILAT HANDS    Knee pain 8/5/2016    Leg cramps 8/5/2016    Mitral stenosis with insufficiency 11/2/2015     Prior MV repair 2003.      Nausea and vomiting 2/24/2010    Obesity 11/2/2015    BOBBY (obstructive sleep apnea) 12/4/2009    Osteoarthritis 8/5/2016    Osteopenia 8/5/2016    Other long term (current) drug therapy 8/5/2016    Palpitations 2015    Rash 2016    Rectal bleeding 10/27/2011    Rectocele 2015    Recurrent depression ([facility] 2018    Rheumatic aortic stenosis 2015    Right hip pain 2016    RLS (restless legs syndrome) 2016    Sick sinus syndrome ([facility] 2016    Skin infection 2016    Tachycardia 2016    Thrombocytopenia, unspecified 2012    Th[facility]ol[facility]       2017    Thyroid disease      Urticaria 2016    Vertigo 2016            Past Surgical History:   Procedure Laterality Date    CARDIAC SURG PROCEDURE UNLIST         valve repair and replacement    CHEST SURGERY PROCEDURE UNLISTED        HX APPENDECTOMY        HX BREAST RECONSTRUCTION        HX CHOLECYSTECTOMY       HX GYN        hysterectomy still have ovaries    HX MASTECTOMY        left mastectomy    HX ORTHOPAEDIC         knee surgery    HX PACEMAKER        VASCULAR SURGERY PROCEDURE UNLIST Right 2017     LE thrombectomy             Family History   Problem Relation Age of Onset    Heart Disease Mother      Cancer Father         Throat    Heart Disease Father      Cancer Sister         Breast, Colon    Heart Disease Sister      Breast Cancer Sister 79        from disease    Cancer Maternal Grandmother      Cancer Brother      Diabetes Brother      Heart Disease Brother      Psychiatric Disorder Paternal Grandfather      Cancer Maternal Aunt         Breast    Diabetes Son      Alcohol abuse Neg Hx      Arthritis-rheumatoid Neg Hx      Asthma Neg Hx      Bleeding Prob Neg Hx      Elevated Lipids Neg Hx      Headache Neg Hx      Migraines Neg Hx      Hypertension Neg Hx      Lung Disease Neg Hx      Mental Retardation Neg Hx      Stroke Neg Hx               Social History   Substance Use Topics    Smoking status: Former Smoker       Packs/day: 3.00       Years: 15.00       Types: Cigarettes       Quit date: 1996    Smokeless tobacco: Former User         Comment: quit 1996    Alcohol use No             Current Facility-Administered Medications   Medication Dose Route Frequency    furosemide (LASIX) tablet 40 mg  40 mg Oral ACB&D    polyethylene glycol (MIRALAX) packet 17 g  17 g Oral BID    hydrocortisone (ANUSOL-HC) suppository 25 mg  25 mg Rectal Q12H    hydrocortisone (ANUSOL-HC) 2.5 % rectal cream    PeriANAL QID    levalbuterol (XOPENEX) nebulizer soln 0.63 mg/3 mL  0.63 mg Nebulization QID RT    bisacodyl (DULCOLAX) tablet 5 mg  5 mg Oral DAILY PRN    allopurinol (ZYLOPRIM) tablet 100 mg  100 mg Oral BID    benzonatate (TESSALON) capsule 200 mg  200 mg Oral TID PRN    cholecalciferol (VITAMIN D3) tablet 1,000 Units  1,000 Units Oral DAILY    diazePAM (VALIUM) tablet 5 mg  5 mg Oral DAILY    fluticasone (FLONASE) 50 mcg/actuation nasal spray 1 Spray  1 Spray Both Nostrils DAILY    levothyroxine (SYNTHROID) tablet 88 mcg  88 mcg Oral ACB    nitroglycerin (NITROSTAT) tablet 0.4 mg  0.4 mg SubLINGual Q5MIN PRN    pantoprazole (PROTONIX) tablet 40 mg  40 mg Oral ACB    pravastatin (PRAVACHOL) tablet 40 mg  40 mg Oral DAILY    promethazine (PHENERGAN) tablet 25 mg  25 mg Oral Q6H PRN    rOPINIRole (REQUIP) tablet 2 mg  2 mg Oral BID    sertraline (ZOLOFT) tablet 100 mg  100 mg Oral DAILY    spironolactone (ALDACTONE) tablet 25 mg  25 mg Oral DAILY    sodium chloride (NS) flush 5-10 mL  5-10 mL IntraVENous Q8H    sodium chloride (NS) flush 5-10 mL  5-10 mL IntraVENous PRN    aspirin chewable tablet 81 mg  81 mg Oral DAILY    morphine injection 2 mg  2 mg IntraVENous Q4H PRN    ondansetron (ZOFRAN) injection 4 mg  4 mg IntraVENous Q4H PRN    potassium chloride (K-DUR, KLOR-CON) SR tablet 20 mEq  20 mEq Oral BID    calcium carbonate (TUMS) chewable tablet 600 mg [elemental]  600 mg Oral QPM    azelastine (ASTELIN) 137mcg/spray nasal spray (Patient Supplied)  1 Spray Both Nostrils BID    alcohol 62% (NOZIN) nasal  1 Ampule  1 Ampule Topical Q12H    sodium chloride (NS) flush 10 mL  10 mL InterCATHeter Q8H    sodium chloride (NS) flush 10 mL  10 mL InterCATHeter PRN            Allergies   Allergen Reactions    Adhesive Tape Rash    Benadryl [Diphenhydramine Hcl] Other (comments)       Jitters    Demerol [Meperidine] Anxiety    Morphine Other (comments)       Confusion    Sulfa (Sulfonamide Antibiotics) Anxiety    Lorazepam Anxiety         Review of Systems:  Constitutional:   Negative for fevers and unexplained weight loss. Eyes:   Negative for visual disturbance. ENT:   Negative for significant hearing loss and tinnitus. Respiratory:   Negative for hemoptysis. Continuous oxygen therapy, BARNHART/SOB  Cardiovascular:   Negative except as noted in HPI. Gastrointestinal:   Negative for melena and abdominal pain. + bleeding hemorrhoids   Genitourinary:   Negative for hematuria, renal stones. Integumentary:   Negative for rash or non-healing wounds  Hematologic/Lymphatic:   Negative for excessive bleeding hx or clotting disorder. Chronic anemia, warfarin anticoagulation  Musculoskeletal:  Negative for active, unexplained/severe joint pain.   Neurological:   Negative for stroke, TIA.    Behavioral/Psych:   Negative for suicidal ideations.    Endocrine:   Negative for uncontrolled diabetic symptoms including polyuria, polydipsia and poor wound healing.         Objective:   Labs, chart, Echocardiogram and heart catheterization reviewed by Dr Elissa Iqbal /65 (BP 1 Location: Right arm, BP Patient Position: Sitting)    Pulse 85    Temp 96.2 °F (35.7 °C)    Resp 20    Ht 5' (1.524 m)    Wt 165 lb 8 oz (75.1 kg)    SpO2 92%    BMI 32.32 kg/m2        Physical Exam:  GENERAL: alert, cooperative, no distress, NC intact THROAT & NECK: neck supple and symmetrical.  no JVD no carotid bruits, LUNG:  breathing with abdominal muscles, scattered course sounds, HEART: irregularly irregular rhythm, well healed sternum ABDOMEN: soft, non-tender. Bowel sounds normal. Aorta non pulsatile  EXTREMITIES:  Upper extremities normal, atraumatic, no cyanosis or edema, bilateral femoral and distal pulses equal and palpable, bilateral +1 edema below the knees SKIN: no rash or abnormalities, NEUROLOGIC: AOx3. Gait not evaluated. Motor strength normal and symmetric. Cranial nerves 2-12 grossly intact., PSYCHIATRIC: non focal     Assessment:      69year old female with severe Prosthetic AV stenosis in the setting of significant LV systolic dysfunction, prior  MV Repair 2003 [facility]) with mild/moderate regurgitation and significant COPD     Plan:      Dr Dea Curtis saw Mrs. Maya Costa today and has reviewed her chart, labs, and all imaging. Her STS risk assessment score for re- do SAVR is 16% Mortality rate with a 40% Morbidity rate. TAVR would be her best option for AV Valve replacement as she is not a surgical candidate. 1/31/2018     10:16 AM    I have personally seen and examined Ritesh Singh with  ALFONZO Hawthorne. I agree and confirm findings in history, physical exam, and assessment/plan as outlined above, except as noted below.     Dianne Blkae MD

## 2018-01-29 NOTE — PROGRESS NOTES
MIDLINE Placement Note    PRE-PROCEDURE VERIFICATION  PROCEDURE DETAIL  Time out completed all person present in agreement with time out. Deneice Hodgkins RN VAT IN AGREEMENT WITH TIME OUT. A single lumen Midline was started for vascular access. The following documentation is in addition to the Midline properties in the lines/airways flowsheet :  Lot #: [id number]  Xylocaine used: YES  Mid-Arm Circumference: 34 (cm)  Internal Catheter Length: 8 (cm)  Internal Catheter Total Length: 8 (cm)  Vein Selection for Midline:right basilic    Line is okay to use: yes.

## 2018-01-29 NOTE — IP AVS SNAPSHOT
[address] [address] 
[phone] Patient: Patti Trotter MRN: [id number] IE:6/72/[id number] A check alina indicates which time of day the medication should be taken. My Medications CHANGE how you take these medications Instructions Each Dose to Equal  
 Morning Noon Evening Bedtime  
 torsemide 20 mg tablet Commonly known as:  DEMADEX Start taking on:  2/4/2018 What changed:  when to take this Take 2 Tabs by mouth two (2) times a day. 40 mg  
    
  
   
   
   
  
  
 warfarin 5 mg tablet Commonly known as:  COUMADIN What changed:   
- how much to take 
- additional instructions Take 1 Tab by mouth nightly. Name brand only 5 mg CONTINUE taking these medications Instructions Each Dose to Equal  
 Morning Noon Evening Bedtime  
 allopurinol 100 mg tablet Commonly known as:  Mark Croon Take 1 Tab by mouth two (2) times a day. 100 mg  
    
  
   
   
   
  
  
 azelastine 137 mcg (0.1 %) nasal spray Commonly known as:  ASTELIN  
   
 1 [place] by Both Nostrils route two (2) times a day. Use in each nostril as directed 1 Spray  
    
  
   
   
   
  
  
 benzonatate 200 mg capsule Commonly known as:  TESSALON Your next dose is:  AS NEEDED Take 200 mg by mouth three (3) times daily as needed for Cough. 200 mg  
    
   
   
   
  
 calcium carbonate 600 mg calcium (1,500 mg) tablet Commonly known as:  Barrett Hooker Take 600 mg by mouth nightly. 600 mg  
    
   
   
   
  
  
 cholecalciferol 1,000 unit Cap Commonly known as:  VITAMIN D3 Take  by mouth daily. diazePAM 5 mg tablet Commonly known as:  VALIUM Take 1 Tab by mouth daily. Max Daily Amount: 5 mg.  
 5 mg DOCUSATE SODIUM Take 1 Cap by mouth two (2) times a day. 1 Cap fluticasone 50 mcg/actuation nasal spray Commonly known as:  FLONASE  
   
 1 [place] by Both Nostrils route daily. 1 Spray  
    
  
   
   
   
  
 hydrocortisone 2.5 % rectal cream  
Commonly known as:  ANUSOL-HC Your next dose is:  AS NEEDED Apply small amount to hemorrhoids/perianal skin TID PRN  
     
   
   
   
  
 levothyroxine 88 mcg tablet Commonly known as:  SYNTHROID Take 1 Tab by mouth Daily (before breakfast). 88 mcg  
    
  
   
   
   
  
 loratadine 10 mg tablet Commonly known as:  Rollene Ranks Your next dose is:  AS NEEDED Take 10 mg by mouth as needed. 10 mg MIRALAX 17 gram/dose powder Generic drug:  polyethylene glycol Take 17 g by mouth daily. 17 g  
    
  
   
   
   
  
 nitroglycerin 0.4 mg SL tablet Commonly known as:  NITROSTAT Your next dose is:  AS NEEDED  
   
 by SubLINGual route every five (5) minutes as needed for Chest Pain. omeprazole 20 mg capsule Commonly known as:  PRILOSEC  
   
 TAKE 1 CAPSULE BY MOUTH  DAILY OXYGEN-AIR DELIVERY SYSTEMS  
   
 by Does Not Apply route. 2-2.5 lpm cont. pravastatin 40 mg tablet Commonly known as:  PRAVACHOL Take 1 Tab by mouth daily. Indications: hyperlipidemia 40 mg  
    
  
   
   
   
  
 promethazine 25 mg tablet Commonly known as:  PHENERGAN Your next dose is:  AS NEEDED Take 1 Tab by mouth every six (6) hours as needed. 25 mg [address] OP Apply  to eye. REQUIP 2 mg tablet Generic drug:  rOPINIRole Take  by mouth two (2) times a day. sertraline 100 mg tablet Commonly known as:  ZOLOFT Take 1 Tab by mouth daily. 100 mg  
    
  
   
   
   
  
 spironolactone 25 mg tablet Commonly known as:  ALDACTONE Take 1 Tab by mouth daily.   
 25 mg  
    
  
 SUPPOSITORY ADULT suppository Generic drug:  glycerin (adult) Your next dose is:  AS NEEDED Insert 1 Suppository into rectum as needed. 1 Suppository VITAMIN B-12 250 mcg tablet Generic drug:  cyanocobalamin Take 1,000 mcg by mouth daily. 1000 mcg XOPENEX 1.25 mg/3 mL Nebu Generic drug:  levalbuterol 1.25 mg by Nebulization route. 1.25 mg  
    
   
   
   
  
  
STOP taking these medications   
 guaiFENesin  mg ER tablet Commonly known as:  MUCINEX  
   
  
 potassium chloride SR 20 mEq tablet Commonly known as:  K-TAB Where to Get Your Medications Information on where to get these meds will be given to you by the nurse or doctor. ! Ask your nurse or doctor about these medications  
  torsemide 20 mg tablet

## 2018-01-29 NOTE — ANESTHESIA PREPROCEDURE EVALUATION
Anesthetic History   No history of anesthetic complications            Review of Systems / Medical History  Patient summary reviewed and pertinent labs reviewed    Pulmonary    COPD: severe    Sleep apnea  Shortness of breath (Severe Pulm Htn)      Comments: S/p bilateral thoracentesis 2 days ago with 1.5 L and 0.9 L removed   Neuro/Psych         Psychiatric history     Cardiovascular    Hypertension        Dysrhythmias : SVT  Pacemaker and CAD    Exercise tolerance: <4 METS  Comments: Preserved EF  S/p AVR and MVR with subsequent severe AS and Mod MS,  Severe TR, severe pulmonary HTN   GI/Hepatic/Renal         Renal disease: CRI       Endo/Other      Hypothyroidism  Obesity, arthritis and anemia     Other Findings   Comments: 3L Oxygen at home         Physical Exam    Airway  Mallampati: II  TM Distance: 4 - 6 cm  Neck ROM: normal range of motion   Mouth opening: Normal     Cardiovascular    Rhythm: regular  Rate: normal         Dental    Dentition: Full lower dentures and Full upper dentures     Pulmonary      Decreased breath sounds: bibasilar           Abdominal  GI exam deferred       Other Findings            Anesthetic Plan    ASA: 4  Anesthesia type: total IV anesthesia    Monitoring Plan: Arterial line      Induction: Intravenous  Anesthetic plan and risks discussed with: Patient and Son / Daughter      Severe cardiac and pulmonary morbidity. Difficulty completing complete sentences 2/2 SOB. Discussed with family who states this is baseline.  Discussed extreme risk for prolonged ventilation and plan for very light sedation if we proceed     Anesthetic History               Review of Systems / Medical History  Patient summary reviewed and pertinent labs reviewed    Pulmonary    COPD (home O2): severe               Neuro/Psych              Cardiovascular    Hypertension: well controlled  Valvular problems/murmurs (S/P AVR, severe TR, mod MR and mod MS): tricuspid insufficiency, mitral stenosis and mitral insufficiency      Dysrhythmias : atrial fibrillation  Pacemaker (has AICD)         GI/Hepatic/Renal     GERD: well controlled           Endo/Other      Hypothyroidism: well controlled       Other Findings            Physical Exam    Airway  Mallampati: II  TM Distance: 4 - 6 cm  Neck ROM: normal range of motion   Mouth opening: Normal     Cardiovascular    Rhythm: irregular  Rate: abnormal         Dental    Dentition: Full lower dentures and Full upper dentures     Pulmonary  Breath sounds clear to auscultation               Abdominal         Other Findings            Anesthetic Plan    ASA: 4  Anesthesia type: total IV anesthesia          Induction: Intravenous  Anesthetic plan and risks discussed with: Patient            Anesthetic History               Review of Systems / Medical History  Patient summary reviewed and pertinent labs reviewed    Pulmonary    COPD (home O2): severe               Neuro/Psych              Cardiovascular    Hypertension: well controlled  Valvular problems/murmurs (S/P AVR, severe TR, mod MR and mod MS): tricuspid insufficiency, mitral stenosis and mitral insufficiency      Dysrhythmias : atrial fibrillation  Pacemaker (has AICD)         GI/Hepatic/Renal     GERD: well controlled           Endo/Other      Hypothyroidism: well controlled       Other Findings            Physical Exam    Airway  Mallampati: II  TM Distance: 4 - 6 cm  Neck ROM: normal range of motion   Mouth opening: Normal     Cardiovascular    Rhythm: irregular  Rate: abnormal         Dental    Dentition: Full lower dentures and Full upper dentures     Pulmonary  Breath sounds clear to auscultation               Abdominal         Other Findings            Anesthetic Plan    ASA: 4  Anesthesia type: total IV anesthesia          Induction: Intravenous  Anesthetic plan and risks discussed with: Patient            Anesthetic History               Review of Systems / Medical History  Patient summary reviewed and pertinent labs reviewed    Pulmonary    COPD (home O2): severe               Neuro/Psych              Cardiovascular    Hypertension: well controlled  Valvular problems/murmurs (S/P AVR, severe TR, mod MR and mod MS): tricuspid insufficiency, mitral stenosis and mitral insufficiency      Dysrhythmias : atrial fibrillation  Pacemaker (has AICD)         GI/Hepatic/Renal     GERD: well controlled           Endo/Other      Hypothyroidism: well controlled       Other Findings            Physical Exam    Airway  Mallampati: II  TM Distance: 4 - 6 cm  Neck ROM: normal range of motion   Mouth opening: Normal     Cardiovascular    Rhythm: irregular  Rate: abnormal         Dental    Dentition: Full lower dentures and Full upper dentures     Pulmonary  Breath sounds clear to auscultation               Abdominal         Other Findings            Anesthetic Plan    ASA: 4  Anesthesia type: total IV anesthesia          Induction: Intravenous  Anesthetic plan and risks discussed with: Patient

## 2018-01-29 NOTE — IP AVS SNAPSHOT
[facility] 
 
 
 145 [facility] [address] 
[phone] Patient: Dennis Betts MRN: [id number]:[id number] About your hospitalization You were admitted on:  January 29, 2018 You last received care in the:  [facility] 3 CLINICAL OBSERVATION You were discharged on:  February 3, 2018 Why you were hospitalized Your primary diagnosis was: Aortic Stenosis, Severe Your diagnoses also included: Aortic Valve Prosthesis Present, Chronic Obstructive Pulmonary Disease (Hcc), Chronic Atrial Fibrillation (Hcc), Anemia, Mitral Stenosis With Insufficiency, Systolic Heart Failure (Hcc), Aortic Stenosis, S/P Tavr (Transcatheter Aortic Valve Replacement), Acute On Chronic Systolic Congestive Heart Failure (Hcc) Follow-up Information Follow up With Details Comments Contact Info [facility]  Please call if you would like to schedule cardiac rehab orientation. [address] [place] [address] 
[phone] [facility] CARDIOLOGY [place] OFFICE On 3/26/2018 Monday, March 26 at 9:00 am on 1st floor for 30 day labs, 9:30 am echo at the office on 4th floor [address]  
[id number] [place] [address] 
[phone] Hari Kat MD On 3/26/2018 Monday, March 26 at 11:30am with Dr. Becky Torres for 30 day follow up [address] [facility] [address] 
[phone] Hari Kat MD On 2/9/2018 Follow up on Friday, February 9th at 3:00pm THE [facility] [place]) [address] [facility] [address] 
[phone] Reji Funes MD  Call Monday and schedule an appointment with MD within 1 week. KIHEITAI requires this). [place] [address] 
[phone] Malachi Beebe will be contacted for first visit within 24 hours. [address] [facility] 230 [facility] [address] [phone] Your Scheduled Appointments Tuesday February 06, 2018  8:00 AM EST Infusion with [facility] [address] Juan Luis White [phone] [facility] [address]  
[phone] [address] Friday February 09, 2018  3:00 PM EST TRANSITIONAL CARE MANAGEMENT with Juliette Barcenas MD  
[facility] OFFICE ([address] [place] [address]) [address]  
[address]  
[phone] Thursday February 22, 2018 11:15 AM EST  
REMOTE DEVICE CHECK ORDERS ONLY ENCOUNTER with GVLLE REMOTE PACER 34 [facility] OFFICE ([address] [place] [address]) [address]  
[address]  
[phone] Please remember THIS REMOTE CHECK IS COMPLETED FROM HOME - YOU WILL NOT COME TO THE OFFICE FOR THIS APPOINTMENT. In preparation for this check, please ensure your monitor box is working appropriately. If your monitor requires you to send a transmission, please make sure it is sent by 11:00AM on this day so we can have it processed and resulted to your doctor without delay. If you have a question or problem with the monitor box, please contact your respective company:   [address] [place] [address]/[place] - [phone]  [employer] - [phone]  [employer] - [phone]  [employer] [phone]  If you have any further questions or need to move this appointment, we are happy to help and can be reached at [phone]. Monday March 05, 2018  9:45 AM EST Office Visit with Dmitriy Briggs MD  
[place] [facility] ([facility] INTERNAL MEDICINE) [address]  
[phone] Monday March 26, 2018  9:30 AM EDT  
ECHOCARDIOGRAM with RYDER ECHO 63 [facility] OFFICE ([address] [place] [address]) [address]  
[address]  
[phone] Monday March 26, 2018 11:30 AM EDT TransAortic Valve Replacement Follow up with Acosta Hardin MD  
One [facility] ([address] [place] [address]) [address]  
[address]  
[phone] Discharge Orders Procedure Order Date Status Priority Quantity Spec Type Associated Dx CBC W/O DIFF 02/03/18 1615 Normal Routine 1 Whole Blood Comments:  CBC on Tuesday 2/6 Dx: anemia METABOLIC PANEL, BASIC 99/26/89 1615 Future Routine 1 Blood Comments:  BMP/Mg on Tuesday 2/6 Dx: CHF, AS on new dose Demadex PROTHROMBIN TIME + INR 02/03/18 1615 Future Routine 1 Blood Comments:  INR check on Tuesday 2/6- call INR results to [facility] [phone] Dx: AS s/p TAVR A check alina indicates which time of day the medication should be taken. My Medications CHANGE how you take these medications Instructions Each Dose to Equal  
 Morning Noon Evening Bedtime  
 torsemide 20 mg tablet Commonly known as:  DEMADEX Start taking on:  2/4/2018 What changed:  when to take this Take 2 Tabs by mouth two (2) times a day. 40 mg  
    
  
   
   
   
  
  
 warfarin 5 mg tablet Commonly known as:  COUMADIN What changed:   
- how much to take 
- additional instructions Take 1 Tab by mouth nightly. Name brand only 5 mg CONTINUE taking these medications Instructions Each Dose to Equal  
 Morning Noon Evening Bedtime  
 allopurinol 100 mg tablet Commonly known as:  Maryuri Doyle Take 1 Tab by mouth two (2) times a day. 100 mg  
    
  
   
   
   
  
  
 azelastine 137 mcg (0.1 %) nasal spray Commonly known as:  ASTELIN  
   
 1 [place] by Both Nostrils route two (2) times a day. Use in each nostril as directed 1 Spray  
    
  
   
   
   
  
  
 benzonatate 200 mg capsule Commonly known as:  TESSALON Your next dose is:  AS NEEDED Take 200 mg by mouth three (3) times daily as needed for Cough. 200 mg  
    
   
   
   
  
 calcium carbonate 600 mg calcium (1,500 mg) tablet Commonly known as:  Orbie Sasha Take 600 mg by mouth nightly. 600 mg  
    
   
   
   
  
  
 cholecalciferol 1,000 unit Cap Commonly known as:  VITAMIN D3 Take  by mouth daily. diazePAM 5 mg tablet Commonly known as:  VALIUM Take 1 Tab by mouth daily. Max Daily Amount: 5 mg.  
 5 mg DOCUSATE SODIUM Take 1 Cap by mouth two (2) times a day. 1 Cap  
    
  
   
   
   
  
  
 fluticasone 50 mcg/actuation nasal spray Commonly known as:  FLONASE  
   
 1 [place] by Both Nostrils route daily. 1 Spray  
    
  
   
   
   
  
 hydrocortisone 2.5 % rectal cream  
Commonly known as:  ANUSOL-HC Your next dose is:  AS NEEDED Apply small amount to hemorrhoids/perianal skin TID PRN  
     
   
   
   
  
 levothyroxine 88 mcg tablet Commonly known as:  SYNTHROID Take 1 Tab by mouth Daily (before breakfast). 88 mcg  
    
  
   
   
   
  
 loratadine 10 mg tablet Commonly known as:  Emre Bombard Your next dose is:  AS NEEDED Take 10 mg by mouth as needed. 10 mg MIRALAX 17 gram/dose powder Generic drug:  polyethylene glycol Take 17 g by mouth daily. 17 g  
    
  
   
   
   
  
 nitroglycerin 0.4 mg SL tablet Commonly known as:  NITROSTAT Your next dose is:  AS NEEDED  
   
 by SubLINGual route every five (5) minutes as needed for Chest Pain. omeprazole 20 mg capsule Commonly known as:  PRILOSEC  
   
 TAKE 1 CAPSULE BY MOUTH  DAILY OXYGEN-AIR DELIVERY SYSTEMS  
   
 by Does Not Apply route. 2-2.5 lpm cont. pravastatin 40 mg tablet Commonly known as:  PRAVACHOL  
   
 Take 1 Tab by mouth daily. Indications: hyperlipidemia 40 mg  
    
  
   
   
   
  
 promethazine 25 mg tablet Commonly known as:  PHENERGAN Your next dose is:  AS NEEDED Take 1 Tab by mouth every six (6) hours as needed. 25 mg [address] OP Apply  to eye. REQUIP 2 mg tablet Generic drug:  rOPINIRole Take  by mouth two (2) times a day. sertraline 100 mg tablet Commonly known as:  ZOLOFT Take 1 Tab by mouth daily. 100 mg  
    
  
   
   
   
  
 spironolactone 25 mg tablet Commonly known as:  ALDACTONE Take 1 Tab by mouth daily. 25 mg  
    
  
   
   
   
  
 SUPPOSITORY ADULT suppository Generic drug:  glycerin (adult) Your next dose is:  AS NEEDED Insert 1 Suppository into rectum as needed. 1 Suppository VITAMIN B-12 250 mcg tablet Generic drug:  cyanocobalamin Take 1,000 mcg by mouth daily. 1000 mcg XOPENEX 1.25 mg/3 mL Nebu Generic drug:  levalbuterol 1.25 mg by Nebulization route. 1.25 mg  
    
   
   
   
  
  
STOP taking these medications   
 guaiFENesin  mg ER tablet Commonly known as:  MUCINEX  
   
  
 potassium chloride SR 20 mEq tablet Commonly known as:  K-TAB Where to Get Your Medications Information on where to get these meds will be given to you by the nurse or doctor. ! Ask your nurse or doctor about these medications  
  torsemide 20 mg tablet Discharge Instructions HOLD COUMADIN TONIGHT, resume tomorrow night per Dr. Foreign Jenkins. Do not lift, push or pull anything heavier than 10 lbs for the next 2 weeks. You should also avoid any straining, stooping or squatting for 2 weeks. Do not drive for 1 week.   
 
If your groin site starts bleeding or oozing, apply firm pressure with a clean cloth and call [facility] Cardiology at [phone]. You should watch for signs of infection such as increasing areas of redness, fever, or purulent drainage. If you see anything concerning, please call our office. If you experience any severe pain, numbness, color change or temperature change in your leg, please return to the Emergency Room for immediate evaluation. All patients with prosthetic valves, including those who have undergone TAVR, are considered among those at highest risk for endocarditis (infection of a heart valve). You may need to take antibiotics before certain procedures to prevent infection. Please call our office before you have any dental procedures or oral surgery. Do not lift, push or pull anything heavier than 10 lbs for the next 2 weeks. You should also avoid any straining, stooping or squatting for 2 weeks. Do not drive for 1 week. If your groin site starts bleeding or oozing, apply firm pressure with a clean cloth and call [facility] Cardiology at [phone]. You should watch for signs of infection such as increasing areas of redness, fever, or purulent drainage. If you see anything concerning, please call our office. If you experience any severe pain, numbness, color change or temperature change in your leg, please return to the Emergency Room for immediate evaluation. All patients with prosthetic valves, including those who have undergone TAVR, are considered among those at highest risk for endocarditis (infection of a heart valve). You may need to take antibiotics before certain procedures to prevent infection. Please call our office before you have any dental procedures or oral surgery. Transcatheter Aortic Valve Replacement: What to Expect at Home Your Recovery After your aortic valve is replaced, you will spend a few days in the hospital. This will help you get your energy back and make sure you are ready to go home. Most people can return to regular activities in 2 or 3 weeks. Your doctor may give you specific instructions on when you can do your normal activities again. This care sheet gives you a general idea about how long it will take for you to recover. But each person recovers at a different pace. Follow the steps below to feel better as quickly as possible. How can you care for yourself at home? Activity ? · Rest when you feel tired. Diet ? · Eat a heart-healthy diet. If you have not been eating this way, talk to your doctor. You also may want to talk to a dietitian. He or she can help you learn about healthy foods. ? · If your bowel movements are not regular right after the procedure, try to avoid constipation and straining. Drink plenty of water. Your doctor may suggest fiber, a stool softener, or a mild laxative. Medicines ? · Your doctor will tell you if and when you can restart your medicines. He or she will also give you instructions about taking any new medicines. ? · If you take blood thinners, such as warfarin (Coumadin), clopidogrel (Plavix), or aspirin, be sure to talk to your doctor. He or she will tell you if and when to start taking those medicines again. Make sure that you understand exactly what your doctor wants you to do.  
? · Your doctor will likely prescribe blood-thinning medicines. Be sure to get instructions about how to take your medicine safely. Blood thinners can cause serious bleeding problems. ? · Be safe with medicines. Take your medicines exactly as prescribed. Call your doctor if you think you are having a problem with your medicine. ?Other ? · Be sure to tell all of your doctors and your dentist that you have an artificialaortic valve. This is important because you may need to take antibiotics before certainprocedures to prevent infection. Follow-up care is a key part of your treatment and safety.  Be sure to make and go to all appointments, and call your doctor if you are having problems. It's also a good idea to know your test results and keep a list of the medicines you take. When should you call for help? Call 911 anytime you think you may need emergency care. For example, call if: 
? · You passed out (lost consciousness). ? · You have symptoms of a heart attack. These may include: ¨ Chest pain or pressure, or a strange feeling in the chest. 
¨ Sweating. ¨ Shortness of breath. ¨ Nausea or vomiting. ¨ Pain, pressure, or a strange feeling in the back, neck, jaw, or upper belly or in one or both shoulders or arms. ¨ A fast or irregular heartbeat. After you call 911, the  may tell you to chew 1 adult-strength or 2 to 4 low-dose aspirin. Wait for an ambulance. Do not try to drive yourself. ?Call your doctor now or seek immediate medical care if: 
? · You are bleeding from the area where the catheter was put in your artery. ? · You have a fast-growing, painful lump at the catheter site. ? · You have signs of infection, such as: 
¨ Increased pain, swelling, warmth, or redness. ¨ Red streaks leading from the catheter site. ¨ Pus draining from the catheter site. ¨ A fever. ? · Your leg or arm looks blue or feels cold, numb, or tingly. ? Watch closely for changes in your health, and be sure to contact your doctor if you have any problems. Where can you learn more? Go to http://onesimo-dasia.info/. Enter P870 in the search box to learn more about \"Transcatheter Aortic Valve Replacement: What to Expect at Home. \" Current as of: September 21, 2016 Content Version: 11.4 © 1101-8452 [employer]. Care instructions adapted under license by [employer] (which disclaims liability or warranty for this information).  If you have questions about a medical condition or this instruction, always ask your healthcare professional. [employer] disclaims any warranty or liability for your use of this information. Heart-Healthy Diet: Care Instructions Your Care Instructions A heart-healthy diet has lots of vegetables, fruits, nuts, beans, and whole grains, and is low in salt. It limits foods that are high in saturated fat, such as meats, cheeses, and fried foods. It may be hard to change your diet, but even small changes can lower your risk of heart attack and heart disease. Follow-up care is a key part of your treatment and safety. Be sure to make and go to all appointments, and call your doctor if you are having problems. It's also a good idea to know your test results and keep a list of the medicines you take. How can you care for yourself at home? Watch your portions · Learn what a serving is. A \"serving\" and a \"portion\" are not always the same thing. Make sure that you are not eating larger portions than are recommended. For example, a serving of pasta is ½ cup. A serving size of meat is 2 to 3 ounces. A 3-ounce serving is about the size of a deck of cards. Measure serving sizes until you are good at [place]" them. Keep in mind that restaurants often serve portions that are 2 or 3 times the size of one serving. · To keep your energy level up and keep you from feeling hungry, eat often but in smaller portions. · Eat only the number of calories you need to stay at a healthy weight. If you need to lose weight, eat fewer calories than your body burns (through exercise and other physical activity). Eat more fruits and vegetables · Eat a variety of fruit and vegetables every day. Dark green, deep orange, red, or yellow fruits and vegetables are especially good for you. Examples include spinach, carrots, peaches, and berries. · Keep carrots, celery, and other veggies handy for snacks. Buy fruit that is in season and store it where you can see it so that you will be tempted to eat it. · Cook dishes that have a lot of veggies in them, such as stir-fries and soups. Limit saturated and trans fat · Read food labels, and try to avoid saturated and trans fats. They increase your risk of heart disease. Trans fat is found in many processed foods such as cookies and crackers. · Use olive or canola oil when you cook. Try cholesterol-lowering spreads, such as Benecol or Take Control. · Bake, broil, grill, or steam foods instead of frying them. · Choose lean meats instead of high-fat meats such as hot dogs and sausages. Cut off all visible fat when you prepare meat. · Eat fish, skinless poultry, and meat alternatives such as soy products instead of high-fat meats. Soy products, such as tofu, may be especially good for your heart. · Choose low-fat or fat-free milk and dairy products. Eat fish · Eat at least two servings of fish a week. Certain fish, such as salmon and tuna, contain omega-3 fatty acids, which may help reduce your risk of heart attack. Eat foods high in fiber · Eat a variety of grain products every day. Include whole-grain foods that have lots of fiber and nutrients. Examples of whole-grain foods include oats, whole wheat bread, and brown rice. · Buy whole-grain breads and cereals, instead of white bread or pastries. Limit salt and sodium · Limit how much salt and sodium you eat to help lower your blood pressure. · Taste food before you salt it. Add only a little salt when you think you need it. With time, your taste buds will adjust to less salt. · Eat fewer snack items, fast foods, and other high-salt, processed foods. Check food labels for the amount of sodium in packaged foods. · Choose low-sodium versions of canned goods (such as soups, vegetables, and beans). Limit sugar · Limit drinks and foods with added sugar. These include candy, desserts, and soda pop. Limit alcohol · Limit alcohol to no more than 2 drinks a day for men and 1 drink a day for women. Too much alcohol can cause health problems. When should you call for help? Watch closely for changes in your health, and be sure to contact your doctor if: 
? · You would like help planning heart-healthy meals. Where can you learn more? Go to http://onesimo-dasia.info/. Enter V137 in the search box to learn more about \"Heart-Healthy Diet: Care Instructions. \" Current as of: September 21, 2016 Content Version: 11.4 © 2886-8301 [employer]. Care instructions adapted under license by [employer] (which disclaims liability or warranty for this information). If you have questions about a medical condition or this instruction, always ask your healthcare professional. [place] [address] any warranty or liability for your use of this information. DISCHARGE SUMMARY from Nurse PATIENT INSTRUCTIONS: 
 
 
F-face looks uneven A-arms unable to move or move unevenly S-speech slurred or non-existent T-time-call 911 as soon as signs and symptoms begin-DO NOT go Back to bed or wait to see if you get better-TIME IS BRAIN. Warning Signs of HEART ATTACK Call 911 if you have these symptoms: 
? Chest discomfort. Most heart attacks involve discomfort in the center of the chest that lasts more than a few minutes, or that goes away and comes back. It can feel like uncomfortable pressure, squeezing, fullness, or pain. ? Discomfort in other areas of the upper body. Symptoms can include pain or discomfort in one or both arms, the back, neck, jaw, or stomach. ? Shortness of breath with or without chest discomfort. ? Other signs may include breaking out in a cold sweat, nausea, or lightheadedness. Don't wait more than five minutes to call [address]! Fast action can save your life. Calling 911 is almost always the fastest way to get lifesaving treatment. Emergency Medical Services staff can begin treatment when they arrive  up to an hour sooner than if someone gets to the hospital by car. The discharge information has been reviewed with the patient. The patient verbalized understanding. Discharge medications reviewed with the patient and appropriate educational materials and side effects teaching were provided. ___________________________________________________________________________________________________________________________________ ACO Transitions of Care Introducing [employer] [address] Daphne Feliz offers a voluntary care coordination program to provide high quality service and care to [facility] fee-for-service beneficiaries. Joaquin Calderón was designed to help you enhance your health and well-being through the following services: ? Transitions of Care  support for individuals who are transitioning from one care setting to another (example: Hospital to home). ? Chronic and Complex Care Coordination  support for individuals and caregivers of those with serious or chronic illnesses or with more than one chronic (ongoing) condition and those who take a number of different medications. If you meet specific medical criteria, a [address] [place] [address] Rd may call you directly to coordinate your care with your primary care physician and your other care providers. For questions about the [facility] programs, please, contact your physicians office. For general questions or additional information about Accountable Care Organizations: 
Please visit www.medicare.gov/acos. html or call 1-800-MEDICARE (7-738.549.2995) TTY users should call 4-422.601.6802. MindJolt Announcement We are excited to announce that we are making your provider's discharge notes available to you in MindJolt. You will see these notes when they are completed and signed by the physician that discharged you from your recent hospital stay. If you have any questions or concerns about any information you see in MindJolt, please call the Health Information Department where you were seen or reach out to your Primary Care Provider for more information about your plan of care. Providers Seen During Your Hospitalization Provider Specialty Primary office phone Wing Betts MD Cardiology [phone] Your Primary Care Physician (PCP) Primary Care Physician Office Phone Office Fax [address] Aub[address] Keyes [phone] You are allergic to the following Allergen Reactions Adhesive Tape Rash Benadryl (Diphenhydramine Hcl) Other (comments) Jitters Demerol (Meperidine) Anxiety Morphine Other (comments) Confusion Sulfa (Sulfonamide Antibiotics) Anxiety Lorazepam Anxiety Recent Documentation Height Weight BMI OB Status Smoking Status 1.524 m 77.6 kg 33.4 kg/m2 Hysterectomy Former Smoker Emergency Contacts Name Discharge Info Relation Home Work Mobile [id number] [employer] [place] CAREGIVER [3] Son [22] [phone] Marcy Wen(Grandghtr)  Other Relative [6] [phone] RubioDago(Grandghtr)  Other Relative [6] [phone] Patient Belongings The following personal items are in your possession at time of discharge: 
  Dental Appliances: At bedside  Visual Aid: None      Home Medications: None   Jewelry: None  Clothing: None    Other Valuables: None Discharge Instructions Attachments/References CARDIAC REHABILITATION (ENGLISH) Patient Handouts Cardiac Rehabilitation: Care Instructions Your Care Instructions Cardiac rehabilitation is a program for people who have a heart problem, such as a heart attack, heart failure, or a heart valve disease. The program includes exercise, lifestyle changes, education, and emotional support. Cardiac rehab can help you improve the quality of your life through better overall health. It can help you lose weight and feel better about yourself. On your cardiac rehab team, you may have your doctor, a nurse specialist, a dietitian, and a physical therapist. They will design your cardiac rehab program specifically for you. You will learn how to reduce your risk for heart problems, how to manage stress, and how to eat a heart-healthy diet. By the end of the program, you will be ready to maintain a healthier lifestyle on your own. Follow-up care is a key part of your treatment and safety. Be sure to make and go to all appointments, and call your doctor if you are having problems. It's also a good idea to know your test results and keep a list of the medicines you take. How can you care for yourself at home? · Take your medicines exactly as prescribed. Call your doctor if you think you are having a problem with your medicine. You will get more details on the specific medicines your doctor prescribes. · Weigh yourself every day if your doctor tells you to. Watch for sudden weight gain. Weigh yourself on the same scale with the same amount of clothing at the same time of day. · Plan your meals so that you are eating heart-healthy foods. ¨ Eat a variety of foods daily. Fresh fruits and vegetables and whole-grains are good choices. ¨ Limit your fat intake, especially saturated and trans fat. ¨ Limit salt (sodium). ¨ Increase fiber in your diet. ¨ Limit alcohol. · Learn how to take your pulse so that you can track your heart rate during exercise. · Always check with your doctor before you begin a new exercise program. 
· Warm up before you exercise and cool down afterward for at least 15 minutes each. This will help your heart gradually prepare for and recover from exercise and avoid pushing your heart too hard. · Stop exercising if you have any unusual discomfort, such as chest pain. · Do not smoke. Smoking can make heart problems worse. If you need help quitting, talk to your doctor about stop-smoking programs and medicines. These can increase your chances of quitting for good. When should you call for help? Call 911 anytime you think you may need emergency care. For example, call if: 
? · You have severe trouble breathing. ? · You cough up pink, foamy mucus and you have trouble breathing. ? · You have symptoms of a heart attack. These may include: ¨ Chest pain or pressure, or a strange feeling in the chest. 
¨ Sweating. ¨ Shortness of breath. ¨ Nausea or vomiting. ¨ Pain, pressure, or a strange feeling in the back, neck, jaw, or upper belly or in one or both shoulders or arms. ¨ Lightheadedness or sudden weakness. ¨ A fast or irregular heartbeat. After you call 911, the  may tell you to chew 1 adult-strength or 2 to 4 low-dose aspirin. Wait for an ambulance. Do not try to drive yourself. ? · You have angina symptoms (such as chest pain or pressure) that do not go away with rest or are not getting better within 5 minutes after you take a dose of nitroglycerin. ? · You have symptoms of a stroke. These may include: 
¨ Sudden numbness, tingling, weakness, or loss of movement in your face, arm, or leg, especially on only one side of your body. ¨ Sudden vision changes. ¨ Sudden trouble speaking. ¨ Sudden confusion or trouble understanding simple statements. ¨ Sudden problems with walking or balance. ¨ A sudden, severe headache that is different from past headaches. ? · You passed out (lost consciousness). ?Call your doctor now or seek immediate medical care if: 
? · You have new or increased shortness of breath. ? · You are dizzy or lightheaded, or you feel like you may faint. ? · You gain weight suddenly, such as more than 2 to 3 pounds in a day or 5 pounds in a week. (Your doctor may suggest a different range of weight gain.) ? · You have increased swelling in your legs, ankles, or feet. ? Watch closely for changes in your health, and be sure to contact your doctor if you have any problems. Where can you learn more? Go to http://onesimo-dasia.info/. Enter U346 in the search box to learn more about \"Cardiac Rehabilitation: Care Instructions. \" Current as of: September 21, 2016 Content Version: 11.4 © 8510-0873 [employer]. Care instructions adapted under license by [employer] (which disclaims liability or warranty for this information). If you have questions about a medical condition or this instruction, always ask your healthcare professional. [address] any warranty or liability for your use of this information. Please provide this summary of care documentation to your next provider. Signatures-by signing, you are acknowledging that this After Visit Summary has been reviewed with you and you have received a copy. Patient Signature:  ____________________________________________________________ Date:  ____________________________________________________________  
  
Nate Barajas Provider Signature:  ____________________________________________________________ Date:  ____________________________________________________________

## 2018-01-29 NOTE — PROGRESS NOTES
Transport here to take patient to x-ray. Called PICC team to see about time frame for patient's midline placement- per PICC team, they will not be able to come for another hour and a half. Per Damir Paniagua NP- patient ok to transport to x-ray without an active line.

## 2018-01-29 NOTE — PROGRESS NOTES
Please note this patient is presently admitted for surgery to be conducted 1/30. TOM closed due to readmission. Thelma Olszewski, LPN/ Care Coordinator  [facility] [address] / [address]  www.[email or web address]. [facility] note will not be viewable in [address]

## 2018-01-30 PROBLEM — Z95.2 S/P TAVR (TRANSCATHETER AORTIC VALVE REPLACEMENT): Status: ACTIVE | Noted: 2018-01-01

## 2018-01-30 NOTE — OP NOTES
PROCEDURE/OPERATIVE REPORT    Patient: Jihan Marte MRN: [id number]  SSN: [id number]    YOB: 1940  Age: 69 y.o. Sex: female      DATE OF PROCEDURE: 1/30/2018     PROCEDURE: TRANSCATHETER AORTIC VALVE REPLACEMENT VIA THE LEFT FEMORAL ARTERY    INDICATION:   The patient is a 69 y.o. female with severe symptomatic aortic stenosis due to degenerative bioprosthetic sAVR. After a thorough preoperative evaluation by multidisciplinary valve board, the patient was felt to be prohibitive risk for redo surgical AVR. Based on this elevated risk, it was recommended patient proceed with transcatheter aortic valve replacement. This was discussed with patient and after a thorough discussion of all the risks and proposed benefits, the patient agreed to proceed. PRIMARY INTERVENTIONAL CARDIOLOGIST: Scott Howard MD,   MD     PRIMARY CARDIOTHORACIC SURGEON:  Garfield Hartley MD      ASSISTING INTERVENTIONAL CARDIOLOGIST: Rosana Galdamez MD    ANESTHESIOLOGIST: Dr Rosenda Corral:  Escobar Montalvo: Patient was brought to the hybrid operating suite. Time-out performed. Standard monitoring lines placed. Patient underwent moderate sedation by Anesthesiology with continuous monitoring of hemodynamics and oxygenation. Intravenous antibiotics were administered. Patient was prepped and draped in standard, meticulous surgical fashion. Utilizing a Site-rite Ultrasound, the right common femoral artery and vein were identified. A static image was placed on the chart. Under direct ultrasound guidance, a 6-Martiniquais sheath was placed in the right common femoral artery and vein. At this point, a 6-Martiniquais LIMA catheter was advanced and utilized to selectively engage the ostium of the left common iliac artery. Angiography was performed in the AP projection with visualization of the common iliac, external iliac and femoral vessels.  Anatomy was suitable for large bore arterial assess. Under direct visualization, the left common femoral artery was entered with a micropuncture needle. This was then exchanged via a micropuncture catheter for a 6-Namibian sheath.     At this point, a transvenous balloon-tipped pacemaker was advanced via with right femoral venous sheath and placed in the RV apex under fluoroscopic guidance. Pacing was initiated and capture was ensured at 0.5 milliamps. Pacing was suspended and pacemaker was set for maximum output for the remainder of the procedure.      Next a Coplanar view was established at 20 MATTIE  and 18 Cranial by fluoroscopy.      The 6-Namibian left common femoral sheath was then removed and the arteriotomy site was \"pre-closed\" utilizing 2 Perclose devices deployed in a 3 o'clock, 10 o'clock position. Intravenous heparin was administered and ACT was monitored with intermittent heparin administration to maintain a ACT greater than 250. After heparin was given, a 14 Western Vira Ely sheath was successfully placed over a Lunderquist wire under continuous fluoroscopic guidance.      A 5 Sammarinese Amplatz AL1 catheter was then advanced up into the aortic root via the  Ely sheath, and a straight-tip Cordis wire was used to cross the stenotic bioprosthetic aortic valve. The Amplatz catheter was placed in the Left Ventricle, and this was then exchanged for a Amplatz Extra Stiff 0.35 wire with preformed distal curve.     A 23 mm Ely Aurelio S3 valve was then prepped. This was advanced into the descending aorta. The deployment balloon was pulled back within the valve via standard fashion. The valve was then advanced around the aortic arch and across the native aortic valve under fluoroscopy guidance. The delivery sheath was pulled back in standard fashion. At this point, the patient was paced at 190 beats a minute, with appropriate hemodynamic collapse. The valve was deployed in standard fashion with in the degenerated bioprosthetic AVR.  Following valve deployment, a aortogram was performed showing patent coronary arteries and no aortic regurgitation. Transthoracic echocardiogram was performed that showed good position of the aortic valve prosthesis with  no paravalvular regurgitation. Based on the finding above, it was felt that the valve was deployed successfully.       At this point, the Ely sheath was removed, and the Perclose sutures were deployed via standard fashion with good hemostasis. The patient was given IV Protamine and manual pressure was applied for 15 minutes.       The right common femoral artery was then imaged. The sheath was noted to be in the  common femoral artery. A Mynx vascular closure device was deployed with good hemostasis. The pacemaker was  removed, and the venous sheath was removed. Manual pressure applied with good hemostasis.     CONCLUSION: Successful transcatheter aortic valve replacement with a 23 mm Ely Aurelio S3 valve via a left femoral aproach.      NOTE:  Emmanuel Craven. Patt Arriaza MD was present and participated throughout the procedure.     Marti Adame MD

## 2018-01-30 NOTE — ROUTINE PROCESS
TRANSFER - IN REPORT:    Verbal report received from AMOS Hendricks  on Stacey Moyer  being received from 3rd floor  for ordered procedure      Report consisted of patients Situation, Background, Assessment and   Recommendations(SBAR). Information from the following report(s) SBAR, Kardex, Intake/Output, MAR, Recent Results and Med Rec Status was reviewed with the receiving nurse. Screening Assessment for C Diff:     1. Three (3) or more diarrheal (liquid unformed) stools in less than 24 hours  no     2. If yes, has patient off laxatives for more than 24 hours? Not applicable     3. Was a stool specimen sent for C. Difficile toxin A and B? Not applicable     4. Was the patient placed on contact isolation? Not applicable    Opportunity for questions and clarification was provided. Assessment completed upon patients arrival to unit and care assumed.

## 2018-01-30 NOTE — PROGRESS NOTES
Assisted patient out of bed to bathroom. Patient had bm and then assisted patient to chair. Patient tolerated well. Call light within reach. Family at bedside.

## 2018-01-30 NOTE — PROGRESS NOTES
Operative Report    Patient: Deven Salgado MRN: [id number]  SSN: [id number]    YOB: 1940  Age: 69 y.o. Sex: female       Date of Surgery: 1/30/2018     Preoperative Diagnosis: Nonrheumatic aortic valve stenosis [I35.0]     Postoperative Diagnosis: Nonrheumatic aortic valve stenosis [I35.0]     Primary Cardiothoracic Surgeon: Nely Evans MD     Primary Interventional Cardiologist: Lexine Blizzard, MD    Anesthesia: MAC     Procedure: Procedure(s):  TRANSCATHETER AORTIC VALVE REPLACEMENT      Transcatheter aortic valve replacement (23 mm Ely Aurelio 3 transcatheter aortic valve via right common femoral artery percutaneous approach). Findings:  Successful deployment of a 23 mm Ely Aurelio 3 transcatheter aortic valve via right common femoral artery percutaneous approach. good LV function with no perivalvular regurgitation. The patient was in atrial fibrillation at the conclusion of the procedure. Indication for Procedure: The patient is a 69 y.o. female with symptomatic severe aortic stenosis. She was not felt to be a candidate for surgical aortic valve replacement and after discussion at the multidisciplinary valve board transcatheter aortic valve replacement was recommended. After a thorough discussion of all the risks and proposed benefits, the patient agreed to proceed. Procedure in Detail:   Patient premedicated and brought to the operating suite. Time-out performed. Standard monitoring lines placed, and the patient was intubated and placed under general anesthesia without event. Transesophageal echocardiogram was performed, confirming the above valve pathology. Intravenous cefazolin was administered. Patient prepped and draped in standard, meticulous surgical fashion. Ioban drapes were used.     Access to the left common femoral artery and vein was obtained under ultrasound guidance, and 6-Turkish sheaths were placed.  A temporary transvenous right ventricular pacing wire was positioned via the femoral vein. Next, a micropuncture kit was used to gain access to the right common femoral artery and a 6-Australian sheath was placed. This was upsized and the 14-Australian Ely transcatheter heart valve sheath was placed after deploying Perclose devices in a pre-close fashion.      Next, a balloon valvuloplasty was performed. The patient remained hemodynamically stable. The 23mm Aurelio 3 valve was then prepped and mounted onto the delivery system in the correct orientation. This was then positioned in the ascending aorta, and then advanced across the aortic valve and deployed successfully. The details of this are dictated under a separate cover in Dr. Jodi Bradley report. Following deployment, an aortogram and transesophageal echocardiogram were performed, which demonstrated a no aortic regurgitation and a well-seated, well-functioning prosthetic valve in the aortic position. The patient remained hemodynamically stable and in atrial fibrillation. At this point, the delivery system and the sheath were removed from the left common femoral artery, and hemostasis was obtained using the Perclose sutures. Sheaths were also removed from the left common femoral artery and vein. At the conclusion of the procedure, the patient was hemodynamically stable and hemostasis was good. The patient was then transported to the CV ICU in critical but stable condition. At the end of the case, all sharp, sponge, and instrument counts were reported as being correct. Estimated Blood Loss:  minimal    Tourniquet Time: * No tourniquets in log *      Implants:   Implant Name Type Inv.  Item Serial No.  Lot No. LRB No. Used Action   VALVE AORTIC SAPEIN 3 23MM -- COMMANDER SYS [id number] - [id number]   VALVE AORTIC SAPEIN 3 23MM -- COMMANDER SYS [id number]   N/A 1 Implanted               Specimens: * No specimens in log *        Drains: None                Complications: None    Counts: Sponge and needle counts were correct times two.     Signed By:  Mami Tapia MD     January 30, 2018

## 2018-01-30 NOTE — PROGRESS NOTES
Bedside and Verbal shift change report given to self (oncoming nurse) by Shaina Marcum RN (offgoing nurse). Report included the following information SBAR, Kardex, MAR and Recent Results.

## 2018-01-30 NOTE — PERIOP NOTES
TRANSFER - OUT REPORT:    Verbal report given to AMOS Hendricks on Kylie Nagy  being transferred to [facility] for routine post - op       Report consisted of patients Situation, Background, Assessment and   Recommendations(SBAR). Information from the following report(s) Procedure Summary, Intake/Output, MAR, Recent Results and Cardiac Rhythm Paced was reviewed with the receiving nurse. Lines:   Peripheral IV 01/30/18 Right Other(comment) (Active)   Site Assessment Clean, dry, & intact 1/30/2018  1:13 PM   Phlebitis Assessment 0 1/30/2018  1:13 PM   Infiltration Assessment 0 1/30/2018  1:13 PM   Dressing Status Clean, dry, & intact 1/30/2018  1:13 PM   Dressing Type Tape;Transparent 1/30/2018  1:13 PM   Hub Color/Line Status Green; Infusing 1/30/2018  1:13 PM        Opportunity for questions and clarification was provided. Patient transported with:   O2 @ . . liters  Registered Nurse    VTE prophylaxis orders have been written for Kylie Nagy. Patient and family given floor number and nurses name. Family updated re: pt status after security code verified.

## 2018-01-30 NOTE — PROGRESS NOTES
Bedside and verbal shift change report given to Francisco Paul RN (oncoming nurse) by self Geno alvarado). Report included the following information SBAR, Kardex, MAR and Recent Results.   NPO awaiting TAVR this am

## 2018-01-30 NOTE — ANESTHESIA PROCEDURE NOTES
Arterial Line Placement    Start time: 1/30/2018 10:30 AM  End time: 1/30/2018 10:33 AM  Performed by: Petey Wong by: Vanda Hardy     Pre-Procedure  Indications:  Arterial pressure monitoring and blood sampling  Preanesthetic Checklist: patient identified, risks and benefits discussed, anesthesia consent, site marked, patient being monitored, timeout performed and patient being monitored    Timeout Time: 10:30        Procedure:   Prep:  Chlorhexidine  Seldinger Technique?: Yes    Orientation:  Right  Location:  Radial artery  Catheter size:  20 G  Number of attempts:  1    Assessment:   Post-procedure:  Line secured and sterile dressing applied  Patient Tolerance:  Patient tolerated the procedure well with no immediate complications

## 2018-01-30 NOTE — PROGRESS NOTES
TRANSFER - OUT REPORT:    Verbal report given to AMOS Sullivan (name) on Mik Briggs  being transferred to Pre-op (unit) for routine progression of care       Report consisted of patients Situation, Background, Assessment and   Recommendations(SBAR). Information from the following report(s) SBAR, Kardex, [facility] and Recent Results was reviewed with the receiving nurse.     Report called but RN states she will send for transport closer to 8am

## 2018-01-30 NOTE — PROGRESS NOTES
Spoke to Blair in blood bank. Type and crossmatch completed. Blood has been ordered from [facility] and is in progress. Will arrive prior to surgery but per Blair, may be after patient taken to pre-op. Blood consent signed and on front of the chart.

## 2018-01-30 NOTE — PERIOP NOTES
Dr. Lenoria Gilford here to see patient. Per Dr. Nguyễn [place] may be elevated now. May be OOB in 4 hours.

## 2018-01-30 NOTE — PROGRESS NOTES
Care Management Interventions  PCP Verified by CM: Yes  Transition of Care Consult (CM Consult): Discharge Planning  Current Support Network: Other (lives with son)  Confirm Follow Up Transport: Family  Familiar with patient from previous admission. Patient lives with her son in a 1 level home with 1 step to enter. Juan Orellana has a rolling walker that she uses for her mobility at home and she is on home O2 provided by Vaishali Dooley. Patient was to have [facility] home health at discharge but unsure if they saw before she came in so will follow up. Patient S/P TAVR CM following.

## 2018-01-30 NOTE — PERIOP NOTES
Angelica Wilkerson, Representative with [facility] notified that patient is in PACU. Per Mr. Edger Rubinstein, no changes to ICD settings were made for surgery and therefore no action is required.

## 2018-01-30 NOTE — PROGRESS NOTES
Patient very anxious. [facility]. Orders placed and primary RN Rakan Guzmán updated.

## 2018-01-30 NOTE — ANESTHESIA POSTPROCEDURE EVALUATION
Post-Anesthesia Evaluation and Assessment    Patient: Steve Baron MRN: [id number]  SSN: [id number]    YOB: 1940  Age: 69 y.o. Sex: female       Cardiovascular Function/Vital Signs  Visit Vitals    /71 (BP 1 Location: Left leg, BP Patient Position: Head of bed elevated (Comment degrees))    Pulse 82    Temp 36.7 °C (98 °F)    Resp 11    Ht 5' (1.524 m)    Wt 76.4 kg (168 lb 8 oz)    SpO2 100%    BMI 32.91 kg/m2       Patient is status post total IV anesthesia anesthesia for Procedure(s):  TRANSCATHETER AORTIC VALVE REPLACEMENT   . Nausea/Vomiting: None    Postoperative hydration reviewed and adequate. Pain:  Pain Scale 1: Numeric (0 - 10) (01/30/18 1313)  Pain Intensity 1: 0 (01/30/18 1313)   Managed    Neurological Status:   Neuro  Neurologic State: Eyes open spontaneously;Drowsy (01/30/18 1313)  Orientation Level: Oriented to person;Oriented to place (01/30/18 1313)  Cognition: Follows commands (01/30/18 1313)  Speech: Other (comment) (Difficult to understand without dentures) (01/30/18 1313)  LUE Motor Response: Purposeful;Weak (01/30/18 1313)  LLE Motor Response: Purposeful;Weak (01/30/18 1313)  RUE Motor Response: Purposeful;Weak (01/30/18 1313)  RLE Motor Response: Purposeful;Weak (01/30/18 1313)   At baseline    Mental Status and Level of Consciousness: Arousable    Pulmonary Status:   O2 Device: Nasal cannula (01/30/18 1313)   Adequate oxygenation and airway patent    Complications related to anesthesia: None    Post-anesthesia assessment completed.  No concerns    Signed By: Brianna Simpson MD     January 30, 2018

## 2018-01-30 NOTE — PERIOP NOTES
Patient SOB when speaking which makes it difficult to understand her particularly since she has no teeth.

## 2018-01-30 NOTE — PROGRESS NOTES
Problem: Pre-procedure, TAVR  Goal: *Verbalizes Description of Procedure  Outcome: Resolved/Met Date Met: 01/30/18  Patient voiced understanding of procedure  Goal: *Hemodynamically stable  Outcome: Resolved/Met Date Met: 01/30/18  VSS. Anxiety noted and treated with Valium. Goal: Activity/Safety  Outcome: Resolved/Met Date Met: 01/30/18  Patient at baseline for activity level. SOB with activity noted.

## 2018-01-31 NOTE — PROGRESS NOTES
Cardiac Rehab:  Spoke with patient regarding referral to cardiac rehab. Patient meets admission criteria based on TAVR(date1/30/18). Discussed lifestyle modifications to promote cardiac wellness. Patient indicated that she is unsure if she wants to participate in cardiac rehab program.  She is not sure if she is physically strong enough for outpatient exercise. She is also unsure if she could get transportation 2-3 times/week. Encouraged patient to discuss with Dr Devon Whiting and her family. Well will contact Ms Shaylee Saul at a later time to attempt to schedule orientation. Cardiologist is Dr Devon Whiting.

## 2018-01-31 NOTE — PROGRESS NOTES
Problem: Falls - Risk of  Goal: *Absence of Falls  Document Gregoroi Fall Risk and appropriate interventions in the flowsheet.    Outcome: Progressing Towards Goal  Fall Risk Interventions:  Mobility Interventions: Communicate number of staff needed for ambulation/transfer, Patient to call before getting OOB         Medication Interventions: Patient to call before getting OOB         History of Falls Interventions: Door open when patient unattended

## 2018-01-31 NOTE — PROGRESS NOTES
[facility] CARDIOLOGY PROGRESS NOTE           1/31/2018 8:03 AM    Admit Date: 1/29/2018      Subjective:   Weak but much improved overall. She slept well overnight. ROS:  Cardiovascular:  As noted above    Objective:      Vitals:    01/30/18 2059 01/31/18 0124 01/31/18 0257 01/31/18 0528   BP: 135/66 142/59  119/78   Pulse: 82 81  80   Resp: 20 18 18   Temp: 97.3 °F (36.3 °C) 97.5 °F (36.4 °C)  97.3 °F (36.3 °C)   SpO2: 100% 100% 100% 97%   Weight:    76.7 kg (169 lb 1.6 oz)   Height:           Physical Exam:  General-No Acute Distress  Neck- supple, no JVD  CV- irregular, +AS murmur   Lung- clear bilaterally  Abd- soft, nontender, nondistended  Ext- no edema bilaterally. Skin- warm and dry    Data Review:   Recent Labs      01/31/18   0459  01/30/18   0332  01/29/18   1349   NA  143  143  145   K  5.4*  4.9  4.4   MG   --    --   2.2   BUN  35*  30*  31*   CREA  1.28*  1.31*  1.76*   GLU  105*  109*  112*   WBC  5.4   --   5.6   HGB  7.9*   --   9.1*   HCT  26.7*   --   30.4*   PLT  149*   --   162   INR   --    --   1.1       Assessment/Plan:     Principal Problem: Aortic stenosis, severe (1/22/2018)  S/p TAVR--overall doing remarkably well given comorbidities     Active Problems:     Aortic Valve Bioprosthesis Present (3/7/2009)        Chronic obstructive pulmonary disease ([address] (3/8/2009)  Severe, underlying oxygen dependent       Chronic atrial fibrillation ([address] (10/17/2011)   back on coumadin, INR daily--monitoring CBC closely as well with anemia, prior rectal bleeding from hemm      Anemia (10/27/2011)         Mitral stenosis with insufficiency (82/1/1972)        Systolic heart failure ([address] (1/29/2018)  -will continue demadex, hold aldactone with hyperkalemia      Aortic stenosis (1/29/2018)        S/P TAVR (transcatheter aortic valve replacement) (1/30/2018)      Hyperkalemia--stopped supplemental K, hold aldactone          Dariana Owen NP  1/31/2018 8:03 PONCHO         [facility] CARDIOLOGY     1/31/2018     12:00 PM    I have personally seen and examined [place] with  Soco Ruffin NP. I agree and confirm findings in history, physical exam, and assessment/plan as outlined above with following pertinent additions/exceptions:   PAtient feels well. Still dyspneic but feels improved. Echo pending. Worsening anemia. Pe: Cv: RRR with I/VI TERRI. L: Coarse BS.  E: No edema. ASS/Plan:  As above. 1 unit PRBC today. Resume diuretics. Plan to continue plavix until INR therapeutic then coumadin and ASA.      Ashleigh Oneal MD

## 2018-01-31 NOTE — PROGRESS NOTES
Patient's right forearm edematous and red without pain/tenderness. Asked CASPER Del Valle to assess. Order to be placed for Lasix after completion of blood transfusion. Will continue to monitor.

## 2018-01-31 NOTE — PROGRESS NOTES
Bedside shift report given to Divine Elizabeth RN (oncoming nurse) by Doris Zamudio RN (offgoing nurse). Bedside shift report included the following information: SBAR, Kardex, ED Summary, MAR, and Recent Results.

## 2018-02-01 PROBLEM — I50.23 ACUTE ON CHRONIC SYSTOLIC CONGESTIVE HEART FAILURE (HCC): Status: ACTIVE | Noted: 2018-01-01

## 2018-02-01 NOTE — PROGRESS NOTES
Bedside and Verbal shift change report given to Robby Maki RN (oncoming nurse) by self Nancy alvarado). Report included the following information SBAR, Kardex, MAR and Recent Results.

## 2018-02-01 NOTE — PROGRESS NOTES
Verbal bedside report received from Rimma Brice RN. Assumed care of patient. Bilateral groin sites witnessed.   CDI and without hematomas

## 2018-02-01 NOTE — PROGRESS NOTES
Problem: Post-procedure,Day 2/Day of Discharge,TAVR  Goal: *Lungs clear or at baseline  Outcome: Progressing Towards Goal  Pt has COPD and is at baseline with breathing. Per pt   Goal: Activity/Safety  Outcome: Progressing Towards Goal  Pt up in chair.

## 2018-02-01 NOTE — PROGRESS NOTES
Problem: Falls - Risk of  Goal: *Absence of Falls  Document Gregorio Fall Risk and appropriate interventions in the flowsheet.    Outcome: Progressing Towards Goal  Fall Risk Interventions:  Mobility Interventions: Bed/chair exit alarm, Patient to call before getting OOB         Medication Interventions: Bed/chair exit alarm, Patient to call before getting OOB         History of Falls Interventions: Bed/chair exit alarm, Door open when patient unattended        Problem: Post-procedure Day 1,TAVR  Goal: *Stable Cardiac Rhythm  Outcome: Resolved/Met Date Met: 01/31/18  Paced  Goal: *Hemodynamically stable  Outcome: Resolved/Met Date Met: 01/31/18  VSS

## 2018-02-01 NOTE — PROGRESS NOTES
Bedside and Verbal shift change report given to self (oncoming nurse) by Jeffery Benites RN (offgoing nurse). Report included the following information SBAR, Kardex, MAR and Recent Results. Bilateral groin sites WDL.

## 2018-02-01 NOTE — PROGRESS NOTES
[facility] CARDIOLOGY PROGRESS NOTE    2/1/2018 12:12 PM    Admit Date: 1/29/2018    Admit Diagnosis: Nonrheumatic aortic valve stenosis [I35.0]      Subjective:   Stable overnight without angina or palpitations but new LE edema and mild BARNHART/SOB today - see below. .. Vitals stable and controlled. No other complaints overnight. Tolerating meds well. Objective:      Vitals:    01/31/18 2115 02/01/18 0131 02/01/18 0537 02/01/18 0841   BP: 146/77 141/72 137/70 132/62   Pulse: 82 80 81 82   Resp: 19 18 18 20   Temp: 96.7 °F (35.9 °C) 97.3 °F (36.3 °C) 97.4 °F (36.3 °C) 97.7 °F (36.5 °C)   SpO2: 100% 100% 98% 100%   Weight:   77 kg (169 lb 12.8 oz)    Height:           Physical Exam:  Neck- supple, 8-10cm JVD at 45 deg  CV- regular rate and rhythm, 2/6 TERRI  Lung- clear bilaterally anteriorly, decreased and dull bibasilar without wheezing  Abd- soft, nontender, nondistended  Ext- trace-1+ anterior bilateral pitting edema  Skin- warm and dry    Data Review:   Recent Labs      02/01/18   0313  01/31/18   0459   01/29/18   1349   NA  145  143   < >  145   K  4.2  5.4*   < >  4.4   MG   --    --    --   2.2   BUN  31*  35*   < >  31*   CREA  1.10*  1.28*   < >  1.76*   GLU  84  105*   < >  112*   WBC  5.3  5.4   --   5.6   HGB  8.7*  7.9*   --   9.1*   HCT  29.0*  26.7*   --   30.4*   PLT  125*  149*   --   162   INR  1.4   --    --   1.1    < > = values in this interval not displayed. Assessment and Plan:     Principal Problem: Aortic stenosis, severe (1/22/2018)- s/p TAVR, doing well, see below    Active Problems: Aortic Valve Bioprosthesis Present (3/7/2009)      Chronic obstructive pulmonary disease (HCC) - mild SOB today, stable per son and patient report- continue meds, hopeful discharge tomorrow.        Chronic atrial fibrillation (HCC) (10/17/2011)      Anemia- stable, recheck CBC in AM, groin sites clean and dry, intact      Overview: Acute on chronic          Mitral stenosis with insufficiency (11/2/2015)      Overview: Prior MV repair 2003. Systolic heart failure ([address] (1/29/2018)- a little more edema in LE's per son and patient's report today, seems to be working a little hard to breath subjectively. Just got AM demadex PO, give extra IV dose of lasix at 4pm today, elevate legs, reassess in AM for hopeful discharge tomorrow AM.  She and son agree with plan. Check BMP, CBC, Mg and INR in AM      A.  Sobeida Stratton MD  [facility] Cardiology  Pager [phone]

## 2018-02-02 NOTE — PROGRESS NOTES
Bedside and Verbal shift change report given to Mila Cristina RN (oncoming nurse) by self Brittnee alvarado). Report included the following information SBAR, Kardex, MAR and Recent Results.

## 2018-02-02 NOTE — PROGRESS NOTES
Bedside and Verbal shift change report given to self (oncoming nurse) by Gifty Cordova RN (offgoing nurse). Report included the following information SBAR, Kardex, MAR and Recent Results.

## 2018-02-02 NOTE — PROGRESS NOTES
Scheduled dose of Valium removed from pyxis. Patient states she is too lethargic and doesn't want to take it tonight. Valium returned to drawer.

## 2018-02-02 NOTE — PROGRESS NOTES
Problem: Falls - Risk of  Goal: *Absence of Falls  Document Gregorio Fall Risk and appropriate interventions in the flowsheet.    Outcome: Progressing Towards Goal  Fall Risk Interventions:  Mobility Interventions: Bed/chair exit alarm         Medication Interventions: Patient to call before getting OOB         History of Falls Interventions: Bed/chair exit alarm, Door open when patient unattended

## 2018-02-02 NOTE — PROGRESS NOTES
Spoke with [place] concerning pt edema. She had +1 pitting edema on BLE and trace on her arms. In about 2 hours time she developed +2 pitting edema BLE as well as BUE. Pt was just given 80mg IV lasix.

## 2018-02-02 NOTE — PROGRESS NOTES
Problem: Post-procedure,Day 2/Day of Discharge,TAVR  Goal: Activity/Safety  Outcome: Resolved/Met Date Met: 02/02/18  Pt at baseline movement. Up with assist.  Up for meals   Goal: Discharge Planning  Outcome: Progressing Towards Goal  Possible discharge today.   Diuresed yesterday  Goal: Medications  Outcome: Resolved/Met Date Met: 02/02/18  On coumadin and INR 2.2

## 2018-02-02 NOTE — PROGRESS NOTES
[facility] CARDIOLOGY PROGRESS NOTE    2/2/2018 12:12 PM    Admit Date: 1/29/2018    Admit Diagnosis: Nonrheumatic aortic valve stenosis [I35.0]      Subjective:   Patient with persistent dyspnea and mild LE edema. No chest pain. BP stable. INR now 2.2. Objective:      Vitals:    02/01/18 2118 02/02/18 0022 02/02/18 0435 02/02/18 0829   BP: 148/74 134/61 142/71 147/65   Pulse: 78 60 82 83   Resp: 18 16 18    Temp: 97.4 °F (36.3 °C) 97.6 °F (36.4 °C) 98.2 °F (36.8 °C)    SpO2: 95% 96% (!) 10%    Weight:   77.4 kg (170 lb 9.6 oz)    Height:           Physical Exam:  Neck- supple,   CV- regular rate and rhythm, 2/6 TERRI  Lung- clear bilaterally anteriorly, decreased and dull bibasilar without wheezing  Abd- soft, nontender, nondistended  Ext- trace-1+ anterior bilateral pitting edema  Skin- warm and dry    Data Review:   Recent Labs      02/02/18   0409  02/01/18   0313   NA  145  145   K  3.9  4.2   MG  2.1   --    BUN  29*  31*   CREA  1.01*  1.10*   GLU  92  84   WBC  4.9  5.3   HGB  8.5*  8.7*   HCT  28.2*  29.0*   PLT  107*  125*   INR  2.2  1.4       Assessment and Plan:     Principal Problem: Aortic stenosis, severe (1/22/2018)- s/p TAVR. Active Problems: Aortic Valve Bioprosthesis Present (3/7/2009)      Chronic obstructive pulmonary disease (HCC) - Severe COPD. Continue home medications. Chronic atrial fibrillation (HCC) (10/17/2011)  INR therapeutic. Decrease coumadin to 2.5 mg a day. Check home dose in office. Anemia- stable. Mitral stenosis with insufficiency (11/2/2015)      Overview: Prior MV repair 2003. Acute on chronic systolic heart failure ([address] (1/29/2018)- IV lsix this PM.  Likely home in AM with home health.      Ayesha Jaeger MD

## 2018-02-02 NOTE — PROGRESS NOTES
Verbal bedside report given to [facility], lavinia RN. Patient's situation, background, assessment and recommendations provided. Opportunity for questions provided. Oncgabriel RN assumed care of patient. Pt ambulating in the room with her sons present.   Lasix (IV) given at 1600

## 2018-02-03 NOTE — PROGRESS NOTES
Verbal bedside report received from Shivam Hernandez RN. Assumed care of patient.   Pt assisted up to the chair

## 2018-02-03 NOTE — PROGRESS NOTES
Verbal bedside report given to lavinia Carreon RN. Patient's situation, background, assessment and recommendations provided. Opportunity for questions provided. Oncoming RN assumed care of patient.

## 2018-02-03 NOTE — PROGRESS NOTES
[facility] CARDIOLOGY PROGRESS NOTE    2/3/2018 12:12 PM    Admit Date: 1/29/2018    Admit Diagnosis: Nonrheumatic aortic valve stenosis [I35.0]      Subjective:   Patient with persistent dyspnea and mild LE edema. No chest pain. BP stable. INR now 3.2. Objective:      Vitals:    02/03/18 0126 02/03/18 0431 02/03/18 0548 02/03/18 1005   BP: 139/76 144/58  110/61   Pulse: 81 78  83   Resp: 18 20  16   Temp: 97.4 °F (36.3 °C) 97.4 °F (36.3 °C)  97.4 °F (36.3 °C)   SpO2: 96% 97% 99% 100%   Weight:  77.6 kg (171 lb)     Height:           Physical Exam:  Neck- supple,   CV- regular rate and rhythm, 2/6 TERRI  Lung- clear bilaterally anteriorly, decreased and dull bibasilar without wheezing  Abd- soft, nontender, nondistended  Ext- 1+ anterior bilateral pitting edema  Skin- warm and dry    Data Review:   Recent Labs      02/03/18   0419  02/02/18   0409   NA  144  145   K  3.7  3.9   MG   --   2.1   BUN  32*  29*   CREA  0.99  1.01*   GLU  98  92   WBC  5.4  4.9   HGB  8.3*  8.5*   HCT  27.4*  28.2*   PLT  97*  107*   INR  3.2  2.2       Assessment and Plan:     Principal Problem: Aortic stenosis, severe (1/22/2018)- s/p TAVR. Active Problems: Aortic Valve Bioprosthesis Present (3/7/2009)      Chronic obstructive pulmonary disease (HCC) - Severe COPD. Continue home medications. Chronic atrial fibrillation (HCC) (10/17/2011)  INR 3.2. Stop ASA with anemia. Anemia- Worsening anemia. Stop ASA. Hold coumadin tonight. Resume tomorrow. Check INR on Tuesday. Mitral stenosis with insufficiency (11/2/2015)      Overview: Prior MV repair 2003. Acute on chronic systolic heart failure ([address] (1/29/2018)- Increase Demadex to BID. Anemia  Transfuse 1 unit prior to discharge. Lasix after. Home later today. See me on Friday. Check CBC and BMP on Tuesday.      Ayesha Jaeger MD

## 2018-02-03 NOTE — PROGRESS NOTES
Problem: Post-procedure,Day 2/Day of Discharge,TAVR  Goal: Diagnostic Tests/Procedures  Outcome: Progressing Towards Goal  Pt having swelling in the afternoons. Dr. Eddi Hannah.

## 2018-02-03 NOTE — PROGRESS NOTES
Bedside and Verbal shift [facility] Report included the following information SBAR, Kardex, ED Summary, OR Summary, Procedure Summary, Intake/Output, MAR, Recent Results and Cardiac Rhythm paced.

## 2018-02-03 NOTE — PROGRESS NOTES
Bedside and Verbal shift change report received from [facility]. Report included the following information SBAR, Kardex, Intake/Output, MAR, Recent Results and Cardiac Rhythm PACED.

## 2018-02-03 NOTE — PROGRESS NOTES
Care Management Interventions  PCP Verified by CM: Yes  Transition of Care Consult (CM Consult): Discharge Planning, [facility])  [facility] - INPATIENT: Yes  Current Support Network:  Other (lives with son)  Confirm Follow Up Transport: Family  Plan discussed with Pt/Family/Caregiver: Yes  Freedom of Choice Offered: Yes  Discharge Location  Discharge Placement: Home with home health

## 2018-02-03 NOTE — PROGRESS NOTES
Verbal bedside report given to Adeel Amador, oncoming RN. Patient's situation, background, assessment and recommendations provided. Opportunity for questions provided. Oncoming RN assumed care of patient.

## 2018-02-03 NOTE — PROGRESS NOTES
Problem: Falls - Risk of  Goal: *Absence of Falls  Document Gregorio Fall Risk and appropriate interventions in the flowsheet.    Outcome: Progressing Towards Goal  Fall Risk Interventions:  Mobility Interventions: Bed/chair exit alarm, Communicate number of staff needed for ambulation/transfer, Patient to call before getting OOB, PT Consult for mobility concerns         Medication Interventions: Patient to call before getting OOB, Teach patient to arise slowly, Bed/chair exit alarm         History of Falls Interventions: Door open when patient unattended, Bed/chair exit alarm

## 2018-02-03 NOTE — PROGRESS NOTES
[facility] - INPATIENT  Face to Face Encounter    Patients Name: Jeannette Willis    YOB: 1940    Ordering Physician: Noralyn Goodpasture    Primary Diagnosis: Nonrheumatic aortic valve stenosis [I35.0]    Date of Face to Face:   2/3/2018                                  Face to Face Encounter findings are related to primary reason for home care:   yes. 1. I certify that the patient needs intermittent care as follows: skilled nursing care:  teaching/training of CHF and skilled observation/assessment, patient education  physical therapy: strengthening    2. I certify that this patient is homebound, that is: 1) patient requires the use of a walker device, special transportation, or assistance of another to leave the home; or 2) patient's condition makes leaving the home medically contraindicated; and 3) patient has a normal inability to leave the home and leaving the home requires considerable and taxing effort. Patient may leave the home for infrequent and short duration for medical reasons, and occasional absences for non-medical reasons. Homebound status is due to the following functional limitations: Patient with increased shortness of breath and elevated heart rate with ambulation greater than 20 feet limiting patient's ability to ambulate safely within the community. 3. I certify that this patient is under my care and that I, or a nurse practitioner or  [id number], or clinical nurse specialist, or certified nurse midwife, working with me, had a Face-to-Face Encounter that meets the physician Face-to-Face Encounter requirements.   The following are the clinical findings from the [address] [place] [address] encounter that support the need for skilled services and is a summary of the encounter: see hospital medical record    See hospital medical record      Monica Landrum LMSW  2/3/2018      THE FOLLOWING TO BE COMPLETED BY THE COMMUNITY PHYSICIAN:    I concur with the findings described above from the F2F encounter that this patient is homebound and in need of a skilled service.     Certifying Physician: _____________________________________      Printed Certifying Physician Name: _____________________________________    Date: _________________

## 2018-02-03 NOTE — PROGRESS NOTES
Verbal bedside report given to Shaun Shrestha oncoming RN. Patient's situation, background, assessment and recommendations provided. Opportunity for questions provided. Oncoming RN assumed care of patient. Blood IV drip verified at bedside with oncoming RN.

## 2018-02-03 NOTE — DISCHARGE SUMMARY
[facility] Cardiology Discharge Summary     Patient ID:  Tim Patel  [id number]  23 y.o.  1940    Admit date: 1/29/2018    Discharge date:  2/3/2018    Admitting Physician: Alaine Baumgarten, MD     Discharge Physician: Dolores Patel PA-C/Dr. Mckeon    Admission Diagnoses: Nonrheumatic aortic valve stenosis [I35.0]    Discharge Diagnoses:   Patient Active Problem List    Diagnosis Date Noted    Acute on chronic systolic congestive heart failure ([address] 02/01/2018    S/P TAVR (transcatheter aortic valve replacement) 09/52/3623    Systolic heart failure ([address] 01/29/2018    Aortic stenosis 01/29/2018    Acute pulmonary edema ([address] 01/25/2018    Peripheral arterial disease ([address] 01/23/2018    Pleural effusion 01/23/2018    Chronic respiratory failure with hypoxia ([address] 01/23/2018    Hypoxia 01/23/2018    Aortic stenosis, severe 01/22/2018    Stenosis of prosthetic aortic valve 01/19/2018    Tricuspid valve insufficiency 22/10/4260    Systolic CHF, chronic ([address] 01/15/2018    S/P mitral valve repair 12/04/2017    H/O atrioventricular rubin ablation 03/22/2017    Pulmonary hypertension 02/12/2017    Aortic valve replaced 01/09/2017    Chronic diastolic congestive heart failure ([address] 09/09/2016    Chronic depression 08/05/2016    Osteopenia 08/05/2016    RLS (restless legs syndrome) 08/05/2016    Hyperlipidemia 08/05/2016    Gout 08/05/2016    Anxiety 08/05/2016    CAD (coronary artery disease) 08/05/2016    HTN (hypertension) 08/05/2016    GERD (gastroesophageal reflux disease) 08/05/2016    H/O mitral valve repair, 2003 29/25/6328    Diastolic heart failure ([address]     Sick sinus syndrome ([address] 02/13/2016    Obesity 11/02/2015    Mitral stenosis with insufficiency 11/02/2015    Rheumatic aortic stenosis 11/02/2015    Cardiac pacemaker 11/02/2015    Thrombocytopenia ([address] 08/22/2012    Anemia 10/27/2011    Chronic atrial fibrillation ([address] 10/17/2011    Hypothyroidism 12/04/2009    BOBBY (obstructive sleep apnea) 12/04/2009    Chronic obstructive pulmonary disease ([facility] 03/08/2009    Aortic Valve Bioprosthesis Present 03/07/2009    Degenerative arthritis of left knee 02/27/2009       Cardiology Procedures this admission:  Transcatheter aortic valve replacement    Hospital Course: Patient is a 69 y.o. WF with severe AS, severe COPD, chronic afib with coumadin held for last week with rectal bleeding and anemia. She has not taken any coumadin now for over one week. She is being readmitted in anticipation for TAVR. She underwent bilateral thoracentesis last Thursday pulling approximately one liter off each lung. She underwent cardiac cath noting no obstructive CAD last week. Echo noted recent decline in EF as well. She denied any significant worsening of her baseline SOB. She was seen by CTS and felt to be high risk for surgical AVR. The patient was felt to be an appropriate candidate for TAVR. She was admitted for planned TAVR. The patient underwent successful balloon valvuloplasty and transcatheter aortic valve replacement with a 23 mm [place] A[place] 3 valve. The patient was monitored closely in the ICU. Echocardiogram showed normal function of bioprosthesis. She was transferred to telemetry for further monitoring. She has increasing SOB and LE edema and was given IV lasix with improvement. Pt had INR of 3.2 and coumadin will be held tonight and resumed at lower home dose of 2.5 mg tomorrow night. INR will be checked on Tuesday 2/6. ASA was stopped with anemia. Pt had drop in Hgb down to 8.3 and Pt was ordered to get 1 U PRBC's with Lasix 80 mg IV today and then she may go home per Dr. Thalia Hunter. Patient was seen and examined by Dr. Thalia Hunter and determined stable and ready for discharge. Patient was instructed on the importance of medication compliance including Demadex and coumadin therapy for at least 6 months.   Pt given lab slip for CBC and BMP on Tuesday 2/6 and will follow up with Dr. Gabriel Rothman as scheduled on Friday 2/9. A repeat echocardiogram will be obtained in 1 month. Demadex was increased to 40 mg BID at CT.      DISPOSITION: The patient is being discharged home in stable condition on a low saturated fat, low cholesterol and low salt diet. The patient is instructed to advance activities as tolerated to the limit of fatigue or shortness of breath. The patient is instructed to avoid lifting anything heavier than 10 lbs for 2 weeks. The patient is instructed to avoid any straining, stooping or squatting for 2 weeks. The patient is instructed not to drive for 1 week. The patient is instructed to watch the groin site for bleeding/oozing; if seen, the patient is instructed to apply firm pressure with a clean cloth and call [address] Cardiology at [phone]. The patient is instructed to watch for signs of infection which include: increasing area of redness, fever/hot to touch or purulent drainage at the groin site. The patient is instructed not to soak in a bathtub for 7-10 days, but is cleared to shower. The patient is instructed to return to the ER immediately for any severe pain, color change, or temperature change in leg. The patient is informed that prophylaxis for bacterial endocarditis is recommended for high-risk procedures such as all dental procedures that involve manipulation of gingival tissue, the periapical region of teeth, or perforation of the oral mucosa. Discharge Exam:   Visit Vitals    /90    Pulse 81    Temp 97.9 °F (36.6 °C)    Resp 16    Ht 5' (1.524 m)    Wt 77.6 kg (171 lb)    SpO2 100%    BMI 33.4 kg/m2     Patient has been seen by Dr. Gabriel Rothman: see his progress note for exam details.     Recent Results (from the past 24 hour(s))   PROTHROMBIN TIME + INR    Collection Time: 02/03/18  4:19 AM   Result Value Ref Range    Prothrombin time 32.1 (H) 11.5 - 14.5 sec    INR 3.2     CBC W/O DIFF    Collection Time: 02/03/18  4:19 AM   Result Value Ref Range    WBC 5.4 4.3 - 11.1 K/uL    RBC 2.79 (L) 4.05 - 5.25 M/uL    HGB 8.3 (L) 11.7 - 15.4 g/dL    HCT 27.4 (L) 35.8 - 46.3 %    MCV 98.2 (H) 79.6 - 97.8 FL    MCH 29.7 26.1 - 32.9 PG    MCHC 30.3 (L) 31.4 - 35.0 g/dL    RDW 16.3 (H) 11.9 - 14.6 %    PLATELET 97 (L) 925 - 450 K/uL    MPV 11.7 10.8 - 60.8 FL   METABOLIC PANEL, BASIC    Collection Time: 02/03/18  4:19 AM   Result Value Ref Range    Sodium 144 136 - 145 mmol/L    Potassium 3.7 3.5 - 5.1 mmol/L    Chloride 101 98 - 107 mmol/L    CO2 37 (H) 21 - 32 mmol/L    Anion gap 6 (L) 7 - 16 mmol/L    Glucose 98 65 - 100 mg/dL    BUN 32 (H) 8 - 23 MG/DL    Creatinine 0.99 0.6 - 1.0 MG/DL    GFR est AA >60 >60 ml/min/1.73m2    GFR est non-AA 58 (L) >60 ml/min/1.73m2    Calcium 8.4 8.3 - 10.4 MG/DL   EKG, 12 LEAD, INITIAL    Collection Time: 02/03/18  7:37 AM   Result Value Ref Range    Ventricular Rate 80 BPM    Atrial Rate 0 BPM    QRS Duration 180 ms    Q-T Interval 466 ms    QTC Calculation (Bezet) 537 ms    Calculated R Axis -70 degrees    Calculated T Axis 99 degrees    Diagnosis       Electronic ventricular pacemaker  When compared with ECG of 02-FEB-2018 06:14,  Vent. rate has decreased BY   2 BPM  Confirmed by [facility] ROD MD (), RAMONA COLE ([id number]) on 2/3/2018 2:00:48 PM     TYPE + CROSSMATCH    Collection Time: 02/03/18  2:05 PM   Result Value Ref Range    Crossmatch Expiration 02/06/2018     ABO/Rh(D) A POSITIVE     Antibody screen NEG     Unit number [id number]     Blood component type RC LR     Unit division 00     Status of unit ISSUED     Crossmatch result Compatible          Patient Instructions:   Current Discharge Medication List      CONTINUE these medications which have CHANGED    Details   torsemide (DEMADEX) 20 mg tablet Take 2 Tabs by mouth two (2) times a day.   Qty: 60 Tab, Refills: 2         CONTINUE these medications which have NOT CHANGED    Details   rOPINIRole (REQUIP) 2 mg tablet Take  by mouth two (2) times a day.      pravastatin (PRAVACHOL) 40 mg tablet Take 1 Tab by mouth daily. Indications: hyperlipidemia  Qty: 90 Tab, Refills: 3    Associated Diagnoses: Hyperlipidemia, unspecified hyperlipidemia type      warfarin (COUMADIN) 5 mg tablet Take 1 Tab by mouth nightly. Name brand only  Qty: 90 Tab, Refills: 1    Associated Diagnoses: Chronic diastolic congestive heart failure (HCC)      sertraline (ZOLOFT) 100 mg tablet Take 1 Tab by mouth daily. Qty: 90 Tab, Refills: 2    Associated Diagnoses: CALI (generalized anxiety disorder)      diazePAM (VALIUM) 5 mg tablet Take 1 Tab by mouth daily. Max Daily Amount: 5 mg. Qty: 30 Tab, Refills: 2    Associated Diagnoses: CALI (generalized anxiety disorder)      omeprazole (PRILOSEC) 20 mg capsule TAKE 1 CAPSULE BY MOUTH  DAILY  Qty: 90 Cap, Refills: 3    Associated Diagnoses: Gastroesophageal reflux disease without esophagitis      spironolactone (ALDACTONE) 25 mg tablet Take 1 Tab by mouth daily. Qty: 90 Tab, Refills: 3      levothyroxine (SYNTHROID) 88 mcg tablet Take 1 Tab by mouth Daily (before breakfast). Qty: 90 Tab, Refills: 3    Associated Diagnoses: Acquired hypothyroidism      allopurinol (ZYLOPRIM) 100 mg tablet Take 1 Tab by mouth two (2) times a day. Qty: 180 Tab, Refills: 3      fluticasone (FLONASE) 50 mcg/actuation nasal spray 1 [place] by Both Nostrils route daily. azelastine (ASTELIN) 137 mcg (0.1 %) nasal spray 1 [place] by Both Nostrils route two (2) times a day. Use in each nostril as directed  Qty: 1 Bottle, Refills: 0    Associated Diagnoses: Eustachian tube dysfunction, bilateral      polyethylene glycol (MIRALAX) 17 gram/dose powder Take 17 g by mouth daily. loratadine (CLARITIN) 10 mg tablet Take 10 mg by mouth as needed. glycerin, adult, (SUPPOSITORY ADULT) suppository Insert 1 Suppository into rectum as needed. benzonatate (TESSALON) 200 mg capsule Take 200 mg by mouth three (3) times daily as needed for Cough.       promethazine (PHENERGAN) 25 mg tablet Take 1 Tab by mouth every six (6) hours as needed. Qty: 30 Tab, Refills: 3      levalbuterol (XOPENEX) 1.25 mg/3 mL nebu 1.25 mg by Nebulization route. hydrocortisone (ANUSOL-HC) 2.5 % rectal cream Apply small amount to hemorrhoids/perianal skin TID PRN  Qty: 30 g, Refills: 3      calcium carbonate (CALTREX) 600 mg (1,500 mg) tablet Take 600 mg by mouth nightly. cholecalciferol (VITAMIN D3) 1,000 unit cap Take  by mouth daily. CARBOXYMETHYLCELLULOS/GLYCERIN (REFRESH OPTIVE OP) Apply  to eye. DOCUSATE SODIUM Take 1 Cap by mouth two (2) times a day. OXYGEN-AIR DELIVERY SYSTEMS by Does Not Apply route. 2-2.5 lpm cont. Associated Diagnoses: Anemia; Atrial fibrillation (HCC)      cyanocobalamin (VITAMIN B-12) 250 mcg tablet Take 1,000 mcg by mouth daily. nitroglycerin (NITROSTAT) 0.4 mg SL tablet by SubLINGual route every five (5) minutes as needed for Chest Pain.          STOP taking these medications       guaiFENesin ER (MUCINEX) 600 mg ER tablet Comments:   Reason for Stopping:         potassium chloride SR (K-TAB) 20 mEq tablet Comments:   Reason for Stopping:                 Signed:  IVORY Cole  2/3/2018  4:18 PM

## 2018-02-03 NOTE — PROGRESS NOTES
Called blood bank. [facility] has notified lab to draw labs prior pt being able to receive blood (type and cross).   Awaiting lab

## 2018-02-04 NOTE — PROGRESS NOTES
Removed right upper arm midline for discharge. Applied petroleum based ointment and transparent film to site and wrapped with elastic gauze due to bleeding risk. Removed cardiac monitor. Patient prepared for discharge.

## 2018-02-04 NOTE — DISCHARGE INSTRUCTIONS
HOLD COUMADIN TONIGHT, resume tomorrow night per Dr. Becky Torres. Do not lift, push or pull anything heavier than 10 lbs for the next 2 weeks. You should also avoid any straining, stooping or squatting for 2 weeks. Do not drive for 1 week. If your groin site starts bleeding or oozing, apply firm pressure with a clean cloth and call [facility] Cardiology at [phone]. You should watch for signs of infection such as increasing areas of redness, fever, or purulent drainage. If you see anything concerning, please call our office. If you experience any severe pain, numbness, color change or temperature change in your leg, please return to the Emergency Room for immediate evaluation. All patients with prosthetic valves, including those who have undergone TAVR, are considered among those at highest risk for endocarditis (infection of a heart valve). You may need to take antibiotics before certain procedures to prevent infection. Please call our office before you have any dental procedures or oral surgery. Do not lift, push or pull anything heavier than 10 lbs for the next 2 weeks. You should also avoid any straining, stooping or squatting for 2 weeks. Do not drive for 1 week. If your groin site starts bleeding or oozing, apply firm pressure with a clean cloth and call [facility] Cardiology at [phone]. You should watch for signs of infection such as increasing areas of redness, fever, or purulent drainage. If you see anything concerning, please call our office. If you experience any severe pain, numbness, color change or temperature change in your leg, please return to the Emergency Room for immediate evaluation. All patients with prosthetic valves, including those who have undergone TAVR, are considered among those at highest risk for endocarditis (infection of a heart valve). You may need to take antibiotics before certain procedures to prevent infection.   Please call our office before you have any dental procedures or oral surgery. Transcatheter Aortic Valve Replacement: What to Expect at Home  Your Recovery    After your aortic valve is replaced, you will spend a few days in the hospital. This will help you get your energy back and make sure you are ready to go home. Most people can return to regular activities in 2 or 3 weeks. Your doctor may give you specific instructions on when you can do your normal activities again. This care sheet gives you a general idea about how long it will take for you to recover. But each person recovers at a different pace. Follow the steps below to feel better as quickly as possible. How can you care for yourself at home? Activity  ? · Rest when you feel tired. Diet  ? · Eat a heart-healthy diet. If you have not been eating this way, talk to your doctor. You also may want to talk to a dietitian. He or she can help you learn about healthy foods. ? · If your bowel movements are not regular right after the procedure, try to avoid constipation and straining. Drink plenty of water. Your doctor may suggest fiber, a stool softener, or a mild laxative. Medicines  ? · Your doctor will tell you if and when you can restart your medicines. He or she will also give you instructions about taking any new medicines. ? · If you take blood thinners, such as warfarin (Coumadin), clopidogrel (Plavix), or aspirin, be sure to talk to your doctor. He or she will tell you if and when to start taking those medicines again. Make sure that you understand exactly what your doctor wants you to do.   ? · Your doctor will likely prescribe blood-thinning medicines. Be sure to get instructions about how to take your medicine safely. Blood thinners can cause serious bleeding problems. ? · Be safe with medicines. Take your medicines exactly as prescribed. Call your doctor if you think you are having a problem with your medicine. ?Other  ?  · Be sure to tell all of your doctors and your dentist that you have an artificialaortic valve. This is important because you may need to take antibiotics before certainprocedures to prevent infection. Follow-up care is a key part of your treatment and safety. Be sure to make and go to all appointments, and call your doctor if you are having problems. It's also a good idea to know your test results and keep a list of the medicines you take. When should you call for help? Call 911 anytime you think you may need emergency care. For example, call if:  ? · You passed out (lost consciousness). ? · You have symptoms of a heart attack. These may include:  ¨ Chest pain or pressure, or a strange feeling in the chest.  ¨ Sweating. ¨ Shortness of breath. ¨ Nausea or vomiting. ¨ Pain, pressure, or a strange feeling in the back, neck, jaw, or upper belly or in one or both shoulders or arms. ¨ A fast or irregular heartbeat. After you call 911, the  may tell you to chew 1 adult-strength or 2 to 4 low-dose aspirin. Wait for an ambulance. Do not try to drive yourself. ?Call your doctor now or seek immediate medical care if:  ? · You are bleeding from the area where the catheter was put in your artery. ? · You have a fast-growing, painful lump at the catheter site. ? · You have signs of infection, such as:  ¨ Increased pain, swelling, warmth, or redness. ¨ Red streaks leading from the catheter site. ¨ Pus draining from the catheter site. ¨ A fever. ? · Your leg or arm looks blue or feels cold, numb, or tingly. ? Watch closely for changes in your health, and be sure to contact your doctor if you have any problems. Where can you learn more? Go to http://onesimo-dasia.info/. Enter P870 in the search box to learn more about \"Transcatheter Aortic Valve Replacement: What to Expect at Home. \"  Current as of: September 21, 2016  Content Version: 11.4  © 4347-1896 Healthwise, [employer].  Care instructions adapted under license by [address] (which disclaims liability or warranty for this information). If you have questions about a medical condition or this instruction, always ask your healthcare professional. [address] any warranty or liability for your use of this information. Heart-Healthy Diet: Care Instructions  Your Care Instructions    A heart-healthy diet has lots of vegetables, fruits, nuts, beans, and whole grains, and is low in salt. It limits foods that are high in saturated fat, such as meats, cheeses, and fried foods. It may be hard to change your diet, but even small changes can lower your risk of heart attack and heart disease. Follow-up care is a key part of your treatment and safety. Be sure to make and go to all appointments, and call your doctor if you are having problems. It's also a good idea to know your test results and keep a list of the medicines you take. How can you care for yourself at home? Watch your portions  · Learn what a serving is. A \"serving\" and a \"portion\" are not always the same thing. Make sure that you are not eating larger portions than are recommended. For example, a serving of pasta is ½ cup. A serving size of meat is 2 to 3 ounces. A 3-ounce serving is about the size of a deck of cards. Measure serving sizes until you are good at [place]" them. Keep in mind that restaurants often serve portions that are 2 or 3 times the size of one serving. · To keep your energy level up and keep you from feeling hungry, eat often but in smaller portions. · Eat only the number of calories you need to stay at a healthy weight. If you need to lose weight, eat fewer calories than your body burns (through exercise and other physical activity). Eat more fruits and vegetables  · Eat a variety of fruit and vegetables every day. Dark green, deep orange, red, or yellow fruits and vegetables are especially good for you.  Examples include spinach, carrots, peaches, and berries. · Keep carrots, celery, and other veggies handy for snacks. Buy fruit that is in season and store it where you can see it so that you will be tempted to eat it. · Cook dishes that have a lot of veggies in them, such as stir-fries and soups. Limit saturated and trans fat  · Read food labels, and try to avoid saturated and trans fats. They increase your risk of heart disease. Trans fat is found in many processed foods such as cookies and crackers. · Use olive or canola oil when you cook. Try cholesterol-lowering spreads, such as Benecol or Take Control. · Bake, broil, grill, or steam foods instead of frying them. · Choose lean meats instead of high-fat meats such as hot dogs and sausages. Cut off all visible fat when you prepare meat. · Eat fish, skinless poultry, and meat alternatives such as soy products instead of high-fat meats. Soy products, such as tofu, may be especially good for your heart. · Choose low-fat or fat-free milk and dairy products. Eat fish  · Eat at least two servings of fish a week. Certain fish, such as salmon and tuna, contain omega-3 fatty acids, which may help reduce your risk of heart attack. Eat foods high in fiber  · Eat a variety of grain products every day. Include whole-grain foods that have lots of fiber and nutrients. Examples of whole-grain foods include oats, whole wheat bread, and brown rice. · Buy whole-grain breads and cereals, instead of white bread or pastries. Limit salt and sodium  · Limit how much salt and sodium you eat to help lower your blood pressure. · Taste food before you salt it. Add only a little salt when you think you need it. With time, your taste buds will adjust to less salt. · Eat fewer snack items, fast foods, and other high-salt, processed foods. Check food labels for the amount of sodium in packaged foods. · Choose low-sodium versions of canned goods (such as soups, vegetables, and beans).   Limit sugar  · Limit drinks and foods with added sugar. These include candy, desserts, and soda pop. Limit alcohol  · Limit alcohol to no more than 2 drinks a day for men and 1 drink a day for women. Too much alcohol can cause health problems. When should you call for help? Watch closely for changes in your health, and be sure to contact your doctor if:  ? · You would like help planning heart-healthy meals. Where can you learn more? Go to http://onesimo-dasia.info/. Enter V137 in the search box to learn more about \"Heart-Healthy Diet: Care Instructions. \"  Current as of: September 21, 2016  Content Version: 11.4  © 3185-0607 [employer]. Care instructions adapted under license by [employer] (which disclaims liability or warranty for this information). If you have questions about a medical condition or this instruction, always ask your healthcare professional. [place] [address] any warranty or liability for your use of this information. DISCHARGE SUMMARY from Nurse    PATIENT INSTRUCTIONS:    After general anesthesia or intravenous sedation, for 24 hours or while taking prescription Narcotics:  · Limit your activities  · Do not drive and operate hazardous machinery  · Do not make important personal or business decisions  · Do  not drink alcoholic beverages  · If you have not urinated within 8 hours after discharge, please contact your surgeon on call.     Report the following to your surgeon:  · Excessive pain, swelling, redness or odor of or around the surgical area  · Temperature over 100.5  · Nausea and vomiting lasting longer than 4 hours or if unable to take medications  · Any signs of decreased circulation or nerve impairment to extremity: change in color, persistent  numbness, tingling, coldness or increase pain  · Any questions    What to do at Home:  Recommended activity: No lifting, Driving, or Strenuous exercise for 3 days    If you experience any of the following symptoms of unrelieved chest pain or increased shortness of breath or unrelieved pain, please follow up with [facility] at [phone]. *  Please give a list of your current medications to your Primary Care Provider. *  Please update this list whenever your medications are discontinued, doses are      changed, or new medications (including over-the-counter products) are added. *  Please carry medication information at all times in case of emergency situations. These are general instructions for a healthy lifestyle:    No smoking/ No tobacco products/ Avoid exposure to second hand smoke  Surgeon General's Warning:  Quitting smoking now greatly reduces serious risk to your health. Obesity, smoking, and sedentary lifestyle greatly increases your risk for illness    A healthy diet, regular physical exercise & weight monitoring are important for maintaining a healthy lifestyle    You may be retaining fluid if you have a history of heart failure or if you experience any of the following symptoms:  Weight gain of 3 pounds or more overnight or 5 pounds in a week, increased swelling in our hands or feet or shortness of breath while lying flat in bed. Please call your doctor as soon as you notice any of these symptoms; do not wait until your next office visit. Recognize signs and symptoms of STROKE:    F-face looks uneven    A-arms unable to move or move unevenly    S-speech slurred or non-existent    T-time-call 911 as soon as signs and symptoms begin-DO NOT go       Back to bed or wait to see if you get better-TIME IS BRAIN. Warning Signs of HEART ATTACK     Call 911 if you have these symptoms:   Chest discomfort. Most heart attacks involve discomfort in the center of the chest that lasts more than a few minutes, or that goes away and comes back. It can feel like uncomfortable pressure, squeezing, fullness, or pain.  Discomfort in other areas of the upper body.  Symptoms can include pain or discomfort in one or both arms, the back, neck, jaw, or stomach.  Shortness of breath with or without chest discomfort.  Other signs may include breaking out in a cold sweat, nausea, or lightheadedness. Don't wait more than five minutes to call 911 - MINUTES MATTER! Fast action can save your life. Calling 911 is almost always the fastest way to get lifesaving treatment. Emergency Medical Services staff can begin treatment when they arrive -- up to an hour sooner than if someone gets to the hospital by car. The discharge information has been reviewed with the patient. The patient verbalized understanding. Discharge medications reviewed with the patient and appropriate educational materials and side effects teaching were provided.   ___________________________________________________________________________________________________________________________________

## 2018-02-04 NOTE — PROGRESS NOTES
Discharge instructions reviewed with patient and son. No new prescriptions. Opportunity for questions provided. Patient voiced understanding of all discharge instructions.      IVs and heart monitor removed by primary RN

## 2018-02-05 NOTE — TELEPHONE ENCOUNTER
[address] [place] [address] [facility] Pharmacist consult. Mrs. Supa Davila was discharged Saturday with CHF after having a TAVR. I explained my part of the [facility] team.  She said she was amazed how much better she felt after getting the TAVR. I asked to review her discharge medications, but she declined as her son handles all her medications and she just takes what he give her. Each day, he puts her AM medications in one bottle and her PM medications in another. She sounded very . I gave her my name and cell phone number. I asked for her or her son to call me with any medication questions or issues. She said I could call back any time.  2/5/18 [phone]

## 2018-02-05 NOTE — TELEPHONE ENCOUNTER
Heidi Pompa called me back. I explained to him my part of the [place] team.  He said he was good with her medications as she has been on a long time. He wanted to know when [place] nurse coming out. As I did not see her on the schedule in [facility], I called Adilson Taveras in Intake. She checked and verified they had the consult and someone would call in next 24-48 hours. I called Heidi Pompa back with this information.

## 2018-02-05 NOTE — PROGRESS NOTES
Transition of Care Discharge Follow-up Questionnaire   Date/Time of Call:   February 5, 2018 12:18PM   What was the patient hospitalized for? Patient hospitalized for Aortic stenosis,severe             Does the patient understand his/her diagnosis and/or treatment and what happened during the hospitalization? Patient's son states understanding of patient diagnosis and treatment during hospitalization. Did the patient receive discharge instructions? Yes     Review any discharge instructions (see notes in [facility]). Ask patient if they understand these. Do they have any questions? Patient's son states understanding of patient discharge instructions, patient's son states no questions. Were home services ordered (nursing, PT, OT, ST, etc.)? Yes, home health services ordered. If so, has the first visit occurred? If not, why? (Assist with coordination of services if necessary.) No, patient's son was informed that home health agency would contact patient within 24/48 hours. Care Coordinator provided contact information to patient if assistance is needed. Was any DME ordered? No durable medical equipment ordered. If so, has it been received? If not, why?  (Assist with coordination of arranging DME orders if necessary. ) NA         Complete a review of all medications (new, continued and discontinued meds per the D/C instructions and medication tab in [facility]). Care Coordinator reviewed all medications with patient's son per [facility] care. CONTINUE these medications which have CHANGED     Details   torsemide (DEMADEX) 20 mg tablet Take 2 Tabs by mouth two (2) times a day. Qty: 60 Tab, Refills: 2     STOP taking these medications         guaiFENesin ER (MUCINEX) 600 mg ER tablet Comments:   Reason for Stopping:            potassium chloride SR (K-TAB) 20 mEq tablet Comments:   Reason for Stopping:                     Were all new prescriptions filled?   If not, why?  (Assist with obtainment of medications if necessary. ) NA     Does the patient understand the purpose and dosing instructions for all medications? (If patient has questions, provide explanation and education.) Patient's son states understanding of patient current medications and dosing instructions. Care Coordinator educated patient's son on the importance of medication compliance and reporting medication side effects to patient PCP/Specialist.      Does the patient have any problems in performing ADLs? (If patient is unable to perform ADLs  what is the limiting factor(s)? Do they have a support system that can assist? If no support system is present, discuss possible assistance that they may be able to obtain.) Patient's son states patient is independent with ADL's and uses a Walker to assist with ambulation. Patient's son states patient lives with him and he provides assistance to patient when needed. Patient's son states he is monitoring patient weight and patient po intake and output per doctor's order until follow up with Dr. Sydni Shaffer 02/09/2018. Does the patient have all follow-up appointments scheduled? 7 day f/up with PCP?    7-14 day f/up with specialist?    If f/up has not been made  what actions has the care coordinator made to accomplish this? Has transportation been arranged? [facility] follow-up should be within 7 days of discharge; all others should have PCP follow-up within 7 days of discharge; follow-ups with other specialists should be within 7-14 days of discharge.) 2/6/2018 3:20 PM [facility] [address] [facility] Internal Medicine     2/9/2018 9:45 AM Zach Vaca MD [facility] Internal Medicine     2/9/2018 3:00 PM MD [address]     Patient's son states he will accompany patient to appointments and provide transportation. Any other questions or concerns expressed by the patient?      No further needs identified, patient's son instructed to call Care Coordinator if further questions or concerns arise. Patient's son informed of the importance of compliance with follow up appointments with PCP/Specialist.     Schedule next appointment with CHAPITO Villatoro or refer to RN Case Manager/  per the workflow guidelines. When is care coordinators next follow-up call scheduled? If referred for CCM  what RN care manager was the referral assigned? NA          NA      NA             TOM Call Completed By: VICENTE Sosa Help [facility]  Care Coordinator     This note will not be viewable in [address]

## 2018-02-07 PROBLEM — I50.33 ACUTE ON CHRONIC DIASTOLIC HEART FAILURE (HCC): Status: ACTIVE | Noted: 2018-01-01

## 2018-02-07 PROBLEM — J96.00 ACUTE RESPIRATORY FAILURE (HCC): Status: ACTIVE | Noted: 2018-01-01

## 2018-02-07 PROBLEM — J81.0 ACUTE PULMONARY EDEMA (HCC): Status: RESOLVED | Noted: 2018-01-01 | Resolved: 2018-01-01

## 2018-02-07 PROBLEM — I50.23 ACUTE ON CHRONIC SYSTOLIC CONGESTIVE HEART FAILURE (HCC): Status: RESOLVED | Noted: 2018-01-01 | Resolved: 2018-01-01

## 2018-02-07 PROBLEM — D72.829 LEUKOCYTOSIS: Status: ACTIVE | Noted: 2018-01-01

## 2018-02-07 NOTE — ED TRIAGE NOTES
Patient arrives from home for shortness of breath and increased swelling over the last two days. Patient hard cardiac procedure 1/30. Patient on home oxygen 2 liters. Oxygen level 90% for ems. [id number] bp-104/86 p-90 irregular paced.

## 2018-02-07 NOTE — ED PROVIDER NOTES
HPI Comments: Patient present the ER family over concerns of worsening shortness of breath and  Swelling. Patient is status post recent aortic valve replacement. Discharged from the hospital approximately 4 days ago. Son reports some improvement while at home however the past 2 days patient has had worsening swelling as well as increased  Difficulty breathing. Before she is very short of breath upon trying to ambulate. They deny any fever or cough productive of sputum. Patient is a 69 y.o. female presenting with shortness of breath. The history is provided by the patient. Shortness of Breath   This is a recurrent problem. The problem occurs frequently. The current episode started more than 2 days ago. The problem has been gradually worsening. Associated symptoms include leg pain. Pertinent negatives include no fever, no sputum production, no hemoptysis, no wheezing, no abdominal pain, no rash and no leg swelling. Associated medical issues include chronic lung disease and heart failure.         Past Medical History:   Diagnosis Date    Abnormal glucose 8/5/2016    Abscess 8/5/2016    Acute encephalopathy 7/8/2016    Acute renal failure ([address] 12/3/2009    Afib ([address]     Anemia     Ankle swelling 8/5/2016    Anxiety 8/5/2016    Aortic Valve Bioprosthesis Present 3/7/2009    ARF (acute renal failure) ([address] 2/24/2010    Arthritis     Asthma     Atopic dermatitis 8/5/2016    Atrial fibrillation ([address] 3/7/2009    Autonomic orthostatic hypotension 8/5/2016    AV block 10/17/2011    Back pain 8/5/2016    Bradycardia (Symptomatic) 11/7/2011    CAD (coronary artery disease)     Cancer (HCC) 1990    breast (left)    Cardiac pacemaker 11/2/2015    Cardiogenic shock ([address] 10/17/2011    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    Cellulitis 8/5/2016    Chest pain 8/5/2016    Chronic atrial fibrillation ([address] 10/17/2011    Chronic depression 8/5/2016    Chronic obstructive pulmonary disease ([address] 3/8/2009    Chronic pain     CKD (chronic kidney disease) stage 3, GFR 30-59 ml/min 12/4/2009    Congestive heart failure (CHF) ([address] 11/2/2015    Degeneration of cervical intervertebral disc 8/5/2016    Degenerative arthritis of left knee 2/27/2009    Dehydration 9/7/9670    Diastolic heart failure (HCC)     Digoxin toxicity 2/24/2010    Disorder of sweat glands 8/5/2016    Diverticulosis of large intestine without diverticulitis 2015    Dyspnea 8/5/2016    Eczema 8/5/2016    Embolus of femoral artery (HCC) 12/4/2017    Epigastric abdominal pain 2/24/2010    GERD (gastroesophageal reflux disease)     Gout 8/5/2016    H/O mitral valve repair, 2003 6/27/2016    Heart failure ([address]     Hx of colonic polyp 2015    adenoma    Hyperkalemia 10/17/2011    Hyperlipidemia 8/5/2016    Hypertension     Hypokalemia 2/24/2010    Hypothyroidism 12/4/2009    Ill-defined condition     CARPEL TUNNEL SYNDROME, BILAT HANDS    Knee pain 8/5/2016    Leg cramps 8/5/2016    Mitral stenosis with insufficiency 11/2/2015    Prior MV repair 2003.     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    Right hip pain 8/5/2016    RLS (restless legs syndrome) 8/5/2016    Sick sinus syndrome ([address] 2/13/2016    Skin infection 8/5/2016    Tachycardia 6/27/2016    Thrombocytopenia, unspecified 8/22/2012    Thromboembolus ([address]     02/2017    Thyroid disease     Urticaria 8/5/2016    Vertigo 8/5/2016       Past Surgical History:   Procedure Laterality Date    CARDIAC SURG PROCEDURE UNLIST      valve repair and replacement    CHEST SURGERY PROCEDURE UNLISTED      HX APPENDECTOMY      HX BREAST RECONSTRUCTION      HX CHOLECYSTECTOMY  2005    HX GYN  1981    hysterectomy still have ovaries    HX MASTECTOMY      left mastectomy    HX ORTHOPAEDIC      knee surgery    HX PACEMAKER      VASCULAR SURGERY PROCEDURE UNLIST Right 2017    LE thrombectomy         Family History:   Problem Relation Age of Onset    Heart Disease Mother     Cancer Father      Throat    Heart Disease Father     Cancer Sister      Breast, Colon    Heart Disease Sister     Breast Cancer Sister 79      from disease    Cancer Maternal Grandmother     Cancer Brother     Diabetes Brother     Heart Disease Brother     Psychiatric Disorder Paternal Grandfather     Cancer Maternal Aunt      Breast    Diabetes Son     Alcohol abuse Neg Hx     Arthritis-rheumatoid Neg Hx     Asthma Neg Hx     Bleeding Prob Neg Hx     Elevated Lipids Neg Hx     Headache Neg Hx     Migraines Neg Hx     Hypertension Neg Hx     Lung Disease Neg Hx     Mental Retardation Neg Hx     Stroke Neg Hx        Social History     Social History    Marital status:      Spouse name: N/A    Number of children: N/A    Years of education: N/A     Occupational History    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    cotton mill      6 years     Social History Main Topics    Smoking status: Former Smoker     Packs/day: 3.00     Years: 15.00     Types: Cigarettes     Quit date: 1996    Smokeless tobacco: Former User      Comment: quit     Alcohol use No    Drug use: No    Sexual activity: Not Currently     Other Topics Concern    Not on file     Social History Narrative    Lives with her son         ALLERGIES: Adhesive tape; Benadryl [diphenhydramine hcl]; Demerol [meperidine]; Morphine; Sulfa (sulfonamide antibiotics); and Lorazepam    Review of Systems   Constitutional: Negative for fatigue and fever. HENT: Negative for congestion, dental problem, trouble swallowing and voice change.     Eyes: Negative for photophobia and visual disturbance. Respiratory: Positive for shortness of breath. Negative for hemoptysis, sputum production, chest tightness and wheezing. Cardiovascular: Negative for leg swelling. Gastrointestinal: Negative for abdominal pain and anal bleeding. Endocrine: Negative for polydipsia, polyphagia and polyuria. Genitourinary: Negative for flank pain, frequency and urgency. Musculoskeletal: Negative for back pain and gait problem. Skin: Negative for pallor and rash. Allergic/Immunologic: Negative for food allergies and immunocompromised state. Neurological: Positive for light-headedness. Negative for seizures, speech difficulty and numbness. Psychiatric/Behavioral: Negative for behavioral problems and confusion. All other systems reviewed and are negative. Vitals:    02/07/18 1726   BP: 104/53   Pulse: 80   Resp: 18   Temp: 98.3 °F (36.8 °C)            Physical Exam   Constitutional: She appears well-developed and well-nourished. HENT:   Head: Normocephalic and atraumatic. Mouth/Throat: Oropharynx is clear and moist.   Cardiovascular: Normal rate, regular rhythm and normal heart sounds. No murmur heard. Pulmonary/Chest: Effort normal. She has rales. Abdominal: Soft. Bowel sounds are normal. She exhibits no distension. There is no tenderness. Musculoskeletal: She exhibits edema. Neurological: She is alert. Nursing note and vitals reviewed. MDM  Number of Diagnoses or Management Options  Diagnosis management comments: concern for volume overload in this patient  Will obtain chest x-ray, EKG as well as basic labs included BNP    8:46 PM  Labs are significant for white blood cell count of 18,000. BNP is mildly elevated to 30. His x-ray shows pulmonary edema and as well as bilateral pleural effusions.   ABG shows a serum pH is 7.4, PCO2 of 65 and a PO2 of 82 on 4 L    Patient has been placed on BiPAP, I have discussed case with cardiology to evaluate the patient       Amount and/or Complexity of Data Reviewed  Clinical lab tests: ordered and reviewed  Tests in the radiology section of CPT®: ordered and reviewed    Risk of Complications, Morbidity, and/or Mortality  Presenting problems: high  Diagnostic procedures: high  Management options: high    Critical Care  Total time providing critical care: 30-74 minutes (Critical Care Time 60 Minutes: Excluding all billable procedures, time spent at the bedside directing patients resuscitation, updating family, and coordinating care with consultants  )    Patient Progress  Patient progress: stable        ED Course       Procedures    Results Include:    Recent Results (from the past 24 hour(s))   EKG, 12 LEAD, INITIAL    Collection Time: 02/07/18  5:27 PM   Result Value Ref Range    Ventricular Rate 80 BPM    Atrial Rate 163 BPM    QRS Duration 178 ms    Q-T Interval 434 ms    QTC Calculation (Bezet) 500 ms    Calculated R Axis -70 degrees    Calculated T Axis 94 degrees    Diagnosis       !! AGE AND GENDER SPECIFIC ECG ANALYSIS !! Electronic ventricular pacemaker  Abnormal ECG  When compared with ECG of 03-FEB-2018 07:37,  No significant change was found  Confirmed by [facility] ROD MD (), RAMONA COLE ([id number]) on 2/7/2018 8:36:36 PM     CBC WITH AUTOMATED DIFF    Collection Time: 02/07/18  5:32 PM   Result Value Ref Range    WBC 18.1 (H) 4.3 - 11.1 K/uL    RBC 2.79 (L) 4.05 - 5.25 M/uL    HGB 8.1 (L) 11.7 - 15.4 g/dL    HCT 26.8 (L) 35.8 - 46.3 %    MCV 96.1 79.6 - 97.8 FL    MCH 29.0 26.1 - 32.9 PG    MCHC 30.2 (L) 31.4 - 35.0 g/dL    RDW 17.2 (H) 11.9 - 14.6 %    PLATELET 87 (L) 629 - 450 K/uL    MPV 10.3 (L) 10.8 - 14.1 FL    DF AUTOMATED      NEUTROPHILS 96 (H) 43 - 78 %    LYMPHOCYTES 1 (L) 13 - 44 %    MONOCYTES 3 (L) 4.0 - 12.0 %    EOSINOPHILS 0 (L) 0.5 - 7.8 %    BASOPHILS 0 0.0 - 2.0 %    IMMATURE GRANULOCYTES 0 0.0 - 5.0 %    ABS. NEUTROPHILS 17.3 (H) 1.7 - 8.2 K/UL    ABS. LYMPHOCYTES 0.2 (L) 0.5 - 4.6 K/UL    ABS. MONOCYTES 0.5 0.1 - 1.3 K/UL    ABS. EOSINOPHILS 0.0 0.0 - 0.8 K/UL    ABS. BASOPHILS 0.0 0.0 - 0.2 K/UL    ABS. IMM. GRANS. 0.1 0.0 - 0.5 K/UL   METABOLIC PANEL, COMPREHENSIVE    Collection Time: 02/07/18  5:32 PM   Result Value Ref Range    Sodium 144 136 - 145 mmol/L    Potassium 3.6 3.5 - 5.1 mmol/L    Chloride 100 98 - 107 mmol/L    CO2 40 (H) 21 - 32 mmol/L    Anion gap 4 (L) 7 - 16 mmol/L    Glucose 108 (H) 65 - 100 mg/dL    BUN 45 (H) 8 - 23 MG/DL    Creatinine 1.10 (H) 0.6 - 1.0 MG/DL    GFR est AA >60 >60 ml/min/1.73m2    GFR est non-AA 51 (L) >60 ml/min/1.73m2    Calcium 8.6 8.3 - 10.4 MG/DL    Bilirubin, total 0.7 0.2 - 1.1 MG/DL    ALT (SGPT) 22 12 - 65 U/L    AST (SGOT) 60 (H) 15 - 37 U/L    Alk.  phosphatase 156 (H) 50 - 136 U/L    Protein, total 4.9 (L) 6.3 - 8.2 g/dL    Albumin 2.0 (L) 3.2 - 4.6 g/dL    Globulin 2.9 2.3 - 3.5 g/dL    A-G Ratio 0.7 (L) 1.2 - 3.5     BNP    Collection Time: 02/07/18  5:32 PM   Result Value Ref Range     pg/mL   POC TROPONIN-I    Collection Time: 02/07/18  5:32 PM   Result Value Ref Range    Troponin-I (POC) 0.07 (H) 0.02 - 0.05 ng/ml   PROCALCITONIN    Collection Time: 02/07/18  5:32 PM   Result Value Ref Range    Procalcitonin 1.4 ng/mL   PROTHROMBIN TIME + INR    Collection Time: 02/07/18  5:32 PM   Result Value Ref Range    Prothrombin time 53.2 (H) 11.5 - 14.5 sec    INR 5.9 (HH)     BLOOD GAS, ARTERIAL    Collection Time: 02/07/18  7:20 PM   Result Value Ref Range    pH 7.41 7.35 - 7.45      PCO2 65 (H) 35 - 45 mmHg    PO2 82 80 - 105 mmHg    BICARBONATE 41 (H) 22 - 26 mmol/L    BASE EXCESS 14.2 (H) 0 - 3 mmol/L    TOTAL HEMOGLOBIN 8.2 (L) 11.7 - 15.0 GM/DL    O2 SAT 96 92 - 98.5 %    Arterial O2 Hgb 95.1 94 - 97 %    CARBOXYHEMOGLOBIN 0.3 (L) 0.5 - 1.5 %    METHEMOGLOBIN 0.4 0.0 - 1.5 %    DEOXYHEMOGLOBIN 4 0.0 - 5.0 %    SITE LR     ALLENS TEST POSITIVE      MODE NC     O2 FLOW 4.00 L/min    Respiratory comment: Dr. Marshall Elizabeth at 2 7 2018 7 29 46 PM. Read back.

## 2018-02-08 PROBLEM — I50.20 SYSTOLIC HEART FAILURE (HCC): Chronic | Status: ACTIVE | Noted: 2018-01-01

## 2018-02-08 PROBLEM — I35.0 AORTIC STENOSIS, SEVERE: Chronic | Status: ACTIVE | Noted: 2018-01-01

## 2018-02-08 PROBLEM — T82.857A STENOSIS OF PROSTHETIC AORTIC VALVE: Chronic | Status: ACTIVE | Noted: 2018-01-19

## 2018-02-08 PROBLEM — I35.0 AORTIC STENOSIS: Chronic | Status: ACTIVE | Noted: 2018-01-01

## 2018-02-08 NOTE — PROGRESS NOTES
Pharmacokinetic Consult to Pharmacist    Jeannette Brazier is a 69 y.o. female presenting with shortness of breath and peripheral edema. Pt was discharged on 2/3/18 after undergoing balloon valvuloplasty and transcatheter aortic valve replacement. Found to have leukocytosis (WBC 18.1) of unknown etiology. Started on ceftriaxone and vancomycin. Height: 5' (152.4 cm)  Weight: 77.1 kg (169 lb 14.4 oz)  Lab Results   Component Value Date/Time    BUN 45 (H) 02/07/2018 05:32 PM    Creatinine 1.10 (H) 02/07/2018 05:32 PM    WBC 18.1 (H) 02/07/2018 05:32 PM    Procalcitonin 1.4 02/07/2018 05:32 PM    Lactic acid 0.7 02/07/2018 10:41 PM    Lactic Acid (POC) 1.0 02/12/2017 02:46 PM      Estimated Creatinine Clearance: 39.9 mL/min (based on Cr of 1.1). CULTURES:  2/7:  Flu negative  UC & BC x 2 pending    Day 1 of vancomycin. Goal trough is 15-20. Vancomycin dose initiated at 1.5 g once, followed by 1.25 g q24h. Will continue to follow patient.       Thank you,  Jory Ortiz, GeorgeD

## 2018-02-08 NOTE — PROGRESS NOTES
Bilateral Ultra Sound done of the chest, pictures taken and placed on chart Pt's INR 6 will keep track and tap when < 2 per Dr Cindy Dooley.

## 2018-02-08 NOTE — H&P
[facility] Cardiology History & Physical      Date of  Admission: 2/7/2018  5:19 PM     Primary Care Physician: Dr. Rajeev Macedo  Primary Cardiologist: Dr. Alena Akers  Admitting Physician: Dr. Cindy Lieberman    CC: peripheral edema, shortness of breath    HPI:  Delores Ybarra is a 69 y.o. female with past medical history of recent TAVR, HTN, CAD, chronic AFIB on warfarin, anemia, anxiety, and chronic diastolic heart failure who presented to the ER via EMS with complaints of worsening swelling and shortness of breath. Associated symptoms includes difficulty urinating, frequency and subjective fever. Patient was discharged from this facility on 2/3/18 after TAVR procedure on 1/30/18. Per patient and family present at the bedside, patient did well after getting home. The past 2 days, she has been more short of breath. Upon arrival to the ER, CXR shows cardiomegaly with finding consistent with pulmonary edema and worsening bilateral pleural effusions. Labs show WBC on 18.1 with neutrophil 96 and procalcitonin of 1.4. While in the ER she was placed on BiPAP and given 80mg IV lasix. She denies chest pain, palpitations, nausea, vomiting or flu like symptoms.       Past Medical History:   Diagnosis Date    Abnormal glucose 8/5/2016    Abscess 8/5/2016    Acute encephalopathy 7/8/2016    Acute renal failure ([address] 12/3/2009    Afib ([address]     Anemia     Ankle swelling 8/5/2016    Anxiety 8/5/2016    Aortic Valve Bioprosthesis Present 3/7/2009    ARF (acute renal failure) ([address] 2/24/2010    Arthritis     Asthma     Atopic dermatitis 8/5/2016    Atrial fibrillation ([address] 3/7/2009    Autonomic orthostatic hypotension 8/5/2016    AV block 10/17/2011    Back pain 8/5/2016    Bradycardia (Symptomatic) 11/7/2011    CAD (coronary artery disease)     Cancer (HCC) 1990    breast (left)    Cardiac pacemaker 11/2/2015    Cardiogenic shock ([address] 10/17/2011    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    Cellulitis 8/5/2016    Chest pain 8/5/2016    Chronic atrial fibrillation (HCC) 10/17/2011    Chronic depression 8/5/2016    Chronic obstructive pulmonary disease ([address] 3/8/2009    Chronic pain     CKD (chronic kidney disease) stage 3, GFR 30-59 ml/min 12/4/2009    Congestive heart failure (CHF) ([address] 11/2/2015    Degeneration of cervical intervertebral disc 8/5/2016    Degenerative arthritis of left knee 2/27/2009    Dehydration 2/0/1596    Diastolic heart failure (HCC)     Digoxin toxicity 2/24/2010    Disorder of sweat glands 8/5/2016    Diverticulosis of large intestine without diverticulitis 2015    Dyspnea 8/5/2016    Eczema 8/5/2016    Embolus of femoral artery ([facility]) 12/4/2017    Epigastric abdominal pain 2/24/2010    GERD (gastroesophageal reflux disease)     Gout 8/5/2016    H/O mitral valve repair, 2003 6/27/2016    Heart failure ([address]     Hx of colonic polyp 2015    adenoma    Hyperkalemia 10/17/2011    Hyperlipidemia 8/5/2016    Hypertension     Hypokalemia 2/24/2010    Hypothyroidism 12/4/2009    Ill-defined condition     CARPEL TUNNEL SYNDROME, BILAT HANDS    Knee pain 8/5/2016    Leg cramps 8/5/2016    Mitral stenosis with insufficiency 11/2/2015    Prior MV repair 2003.     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    Right hip pain 8/5/2016    RLS (restless legs syndrome) 8/5/2016    Sick sinus syndrome ([address] 2/13/2016    Skin infection 8/5/2016    Tachycardia 6/27/2016    Thrombocytopenia, unspecified 8/22/2012    Thromboembolus ([address]     02/2017    Thyroid disease     Urticaria 8/5/2016    Vertigo 8/5/2016      Past Surgical History:   Procedure Laterality Date    CARDIAC SURG PROCEDURE UNLIST      valve repair and replacement    CHEST SURGERY PROCEDURE UNLISTED      HX APPENDECTOMY      HX BREAST RECONSTRUCTION      HX CHOLECYSTECTOMY  2005    HX GYN  1981    hysterectomy still have ovaries    HX MASTECTOMY      left mastectomy    HX ORTHOPAEDIC      knee surgery    HX PACEMAKER      VASCULAR SURGERY PROCEDURE UNLIST Right 2017    LE thrombectomy       Allergies   Allergen Reactions    Adhesive Tape Rash    Benadryl [Diphenhydramine Hcl] Other (comments)     Jitters      Demerol [Meperidine] Anxiety    Morphine Other (comments)     Confusion    Sulfa (Sulfonamide Antibiotics) Anxiety    Lorazepam Anxiety      Social History     Social History    Marital status:      Spouse name: N/A    Number of children: N/A    Years of education: N/A     Occupational History    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    cotton mill      6 years     Social History Main Topics    Smoking status: Former Smoker     Packs/day: 3.00     Years: 15.00     Types: Cigarettes     Quit date: 1996    Smokeless tobacco: Former User      Comment: quit     Alcohol use No    Drug use: No    Sexual activity: Not Currently     Other Topics Concern    Not on file     Social History Narrative    Lives with her son     Family History   Problem Relation Age of Onset    Heart Disease Mother     Cancer Father      Throat    Heart Disease Father     Cancer Sister      Breast, Colon    Heart Disease Sister     Breast Cancer Sister 79      from disease    Cancer Maternal Grandmother     Cancer Brother     Diabetes Brother     Heart Disease Brother     Psychiatric Disorder Paternal Grandfather     Cancer Maternal Aunt      Breast    Diabetes Son     Alcohol abuse Neg Hx     Arthritis-rheumatoid Neg Hx     Asthma Neg Hx     Bleeding Prob Neg Hx     Elevated Lipids Neg Hx     Headache Neg Hx     Migraines Neg Hx     Hypertension Neg Hx     Lung Disease Neg Hx     Mental Retardation Neg Hx     Stroke Neg Hx         Current Facility-Administered Medications   Medication Dose Route Frequency    sodium chloride (NS) flush 5-10 mL  5-10 mL IntraVENous Q8H    sodium chloride (NS) flush 5-10 mL  5-10 mL IntraVENous PRN    cefTRIAXone (ROCEPHIN) 1 g in 0.9% sodium chloride (MBP/ADV) 50 mL  1 g IntraVENous Q24H     Current Outpatient Prescriptions   Medication Sig    torsemide (DEMADEX) 20 mg tablet Take 2 Tabs by mouth two (2) times a day.  rOPINIRole (REQUIP) 2 mg tablet Take  by mouth two (2) times a day.  pravastatin (PRAVACHOL) 40 mg tablet Take 1 Tab by mouth daily. Indications: hyperlipidemia    warfarin (COUMADIN) 5 mg tablet Take 1 Tab by mouth nightly. Name brand only (Patient taking differently: Take 2.5 mg by mouth nightly. Name brand only)    sertraline (ZOLOFT) 100 mg tablet Take 1 Tab by mouth daily.  diazePAM (VALIUM) 5 mg tablet Take 1 Tab by mouth daily. Max Daily Amount: 5 mg.  omeprazole (PRILOSEC) 20 mg capsule TAKE 1 CAPSULE BY MOUTH  DAILY    spironolactone (ALDACTONE) 25 mg tablet Take 1 Tab by mouth daily.  levothyroxine (SYNTHROID) 88 mcg tablet Take 1 Tab by mouth Daily (before breakfast).  allopurinol (ZYLOPRIM) 100 mg tablet Take 1 Tab by mouth two (2) times a day.  fluticasone (FLONASE) 50 mcg/actuation nasal spray 1 Johnston by Both Nostrils route daily.  azelastine (ASTELIN) 137 mcg (0.1 %) nasal spray 1 Johnston by Both Nostrils route two (2) times a day. Use in each nostril as directed    polyethylene glycol (MIRALAX) 17 gram/dose powder Take 17 g by mouth daily.  loratadine (CLARITIN) 10 mg tablet Take 10 mg by mouth as needed.  glycerin, adult, (SUPPOSITORY ADULT) suppository Insert 1 Suppository into rectum as needed.  benzonatate (TESSALON) 200 mg capsule Take 200 mg by mouth three (3) times daily as needed for Cough.     promethazine (PHENERGAN) 25 mg tablet Take 1 Tab by mouth every six (6) hours as needed.  levalbuterol (XOPENEX) 1.25 mg/3 mL nebu 1.25 mg by Nebulization route.  hydrocortisone (ANUSOL-HC) 2.5 % rectal cream Apply small amount to hemorrhoids/perianal skin TID PRN    calcium carbonate (CALTREX) 600 mg (1,500 mg) tablet Take 600 mg by mouth nightly.  cholecalciferol (VITAMIN D3) 1,000 unit cap Take  by mouth daily.  CARBOXYMETHYLCELLULOS/GLYCERIN (REFRESH OPTIVE OP) Apply  to eye.  DOCUSATE SODIUM Take 1 Cap by mouth two (2) times a day.  OXYGEN-AIR DELIVERY SYSTEMS by Does Not Apply route. 2-2.5 lpm cont.  cyanocobalamin (VITAMIN B-12) 250 mcg tablet Take 1,000 mcg by mouth daily.  nitroglycerin (NITROSTAT) 0.4 mg SL tablet by SubLINGual route every five (5) minutes as needed for Chest Pain. Facility-Administered Medications Ordered in Other Encounters   Medication Dose Route Frequency    0.9% sodium chloride infusion 250 mL  250 mL IntraVENous PRN       Review of Systems    Review of Systems   Constitutional: Positive for fever. Respiratory: Positive for shortness of breath. Cardiovascular: Positive for leg swelling. Genitourinary: Positive for frequency. All other systems reviewed and are negative. Subjective:     Visit Vitals    /53    Pulse 81    Temp 98.3 °F (36.8 °C)    Resp 19    SpO2 100%     Physical Exam   Constitutional: She is oriented to person, place, and time and well-developed, well-nourished, and in no distress. Eyes: Pupils are equal, round, and reactive to light. Neck: Normal range of motion. JVD present. Cardiovascular: Normal rate. Pulmonary/Chest: She has rales. Diminished bases bilaterally. Abdominal: Soft. Bowel sounds are normal.   Musculoskeletal: She exhibits edema. Neurological: She is alert and oriented to person, place, and time. Skin: Skin is warm and dry.    Psychiatric: Affect normal.       Cardiographics  Telemetry: V-paced  ECG: V-paced  Echocardiogram: from 1/31/18  -  Left ventricle: Systolic function was at the lower limits of normal. Ejection fraction was estimated to be 50 %. There were no regional wall motion abnormalities. There was mild concentric hypertrophy. -  Right ventricle: The ventricle was mildly dilated. -  Left atrium: The atrium was markedly dilated. -  Right atrium: The atrium was markedly dilated. -  Inferior vena cava, hepatic veins: The inferior vena cava was moderately dilated. -  Aortic valve: SVi=53.0 and Di=0.47. A bioprosthesis was present. The aortic valve area by the continuity equation was 1.4 cm2.  -  Mitral valve: A bioprosthesis was present. It exhibited stenosis. There was mild to moderate stenosis. There was mild regurgitation. -  Tricuspid valve: There was moderate to severe regurgitation. -  Pulmonic valve: There was moderate regurgitation. Labs:   Recent Results (from the past 24 hour(s))   EKG, 12 LEAD, INITIAL    Collection Time: 02/07/18  5:27 PM   Result Value Ref Range    Ventricular Rate 80 BPM    Atrial Rate 163 BPM    QRS Duration 178 ms    Q-T Interval 434 ms    QTC Calculation (Bezet) 500 ms    Calculated R Axis -70 degrees    Calculated T Axis 94 degrees    Diagnosis       !! AGE AND GENDER SPECIFIC ECG ANALYSIS !!   Electronic ventricular pacemaker  Abnormal ECG  When compared with ECG of 03-FEB-2018 07:37,  No significant change was found  Confirmed by [facility] ROD MD (), RAMONA COLE ([id number]) on 2/7/2018 8:36:36 PM     CBC WITH AUTOMATED DIFF    Collection Time: 02/07/18  5:32 PM   Result Value Ref Range    WBC 18.1 (H) 4.3 - 11.1 K/uL    RBC 2.79 (L) 4.05 - 5.25 M/uL    HGB 8.1 (L) 11.7 - 15.4 g/dL    HCT 26.8 (L) 35.8 - 46.3 %    MCV 96.1 79.6 - 97.8 FL    MCH 29.0 26.1 - 32.9 PG    MCHC 30.2 (L) 31.4 - 35.0 g/dL    RDW 17.2 (H) 11.9 - 14.6 %    PLATELET 87 (L) 984 - 450 K/uL    MPV 10.3 (L) 10.8 - 14.1 FL    DF AUTOMATED      NEUTROPHILS 96 (H) 43 - 78 %    LYMPHOCYTES 1 (L) 13 - 44 %    MONOCYTES 3 (L) 4.0 - 12.0 %    EOSINOPHILS 0 (L) 0.5 - 7.8 %    BASOPHILS 0 0.0 - 2.0 %    IMMATURE GRANULOCYTES 0 0.0 - 5.0 %    ABS. NEUTROPHILS 17.3 (H) 1.7 - 8.2 K/UL    ABS. LYMPHOCYTES 0.2 (L) 0.5 - 4.6 K/UL    ABS. MONOCYTES 0.5 0.1 - 1.3 K/UL    ABS. EOSINOPHILS 0.0 0.0 - 0.8 K/UL    ABS. BASOPHILS 0.0 0.0 - 0.2 K/UL    ABS. IMM. GRANS. 0.1 0.0 - 0.5 K/UL   METABOLIC PANEL, COMPREHENSIVE    Collection Time: 02/07/18  5:32 PM   Result Value Ref Range    Sodium 144 136 - 145 mmol/L    Potassium 3.6 3.5 - 5.1 mmol/L    Chloride 100 98 - 107 mmol/L    CO2 40 (H) 21 - 32 mmol/L    Anion gap 4 (L) 7 - 16 mmol/L    Glucose 108 (H) 65 - 100 mg/dL    BUN 45 (H) 8 - 23 MG/DL    Creatinine 1.10 (H) 0.6 - 1.0 MG/DL    GFR est AA >60 >60 ml/min/1.73m2    GFR est non-AA 51 (L) >60 ml/min/1.73m2    Calcium 8.6 8.3 - 10.4 MG/DL    Bilirubin, total 0.7 0.2 - 1.1 MG/DL    ALT (SGPT) 22 12 - 65 U/L    AST (SGOT) 60 (H) 15 - 37 U/L    Alk.  phosphatase 156 (H) 50 - 136 U/L    Protein, total 4.9 (L) 6.3 - 8.2 g/dL    Albumin 2.0 (L) 3.2 - 4.6 g/dL    Globulin 2.9 2.3 - 3.5 g/dL    A-G Ratio 0.7 (L) 1.2 - 3.5     BNP    Collection Time: 02/07/18  5:32 PM   Result Value Ref Range     pg/mL   POC TROPONIN-I    Collection Time: 02/07/18  5:32 PM   Result Value Ref Range    Troponin-I (POC) 0.07 (H) 0.02 - 0.05 ng/ml   PROCALCITONIN    Collection Time: 02/07/18  5:32 PM   Result Value Ref Range    Procalcitonin 1.4 ng/mL   PROTHROMBIN TIME + INR    Collection Time: 02/07/18  5:32 PM   Result Value Ref Range    Prothrombin time 53.2 (H) 11.5 - 14.5 sec    INR 5.9 ()     BLOOD GAS, ARTERIAL    Collection Time: 02/07/18  7:20 PM   Result Value Ref Range    pH 7.41 7.35 - 7.45      PCO2 65 (H) 35 - 45 mmHg    PO2 82 80 - 105 mmHg    BICARBONATE 41 (H) 22 - 26 mmol/L    BASE EXCESS 14.2 (H) 0 - 3 mmol/L    TOTAL HEMOGLOBIN 8.2 (L) 11.7 - 15.0 GM/DL    O2 SAT 96 92 - 98.5 %    Arterial O2 Hgb 95.1 94 - 97 % CARBOXYHEMOGLOBIN 0.3 (L) 0.5 - 1.5 %    METHEMOGLOBIN 0.4 0.0 - 1.5 %    DEOXYHEMOGLOBIN 4 0.0 - 5.0 %    SITE LR     ALLENS TEST POSITIVE      MODE NC     O2 FLOW 4.00 L/min    Respiratory comment: Dr. Martina Erickson at 2 7 2018 7 29 46 PM. Read back. INFLUENZA A & B AG (RAPID TEST)    Collection Time: 02/07/18  9:00 PM   Result Value Ref Range    Influenza A Ag NEGATIVE  NEG      Influenza B Ag NEGATIVE  NEG         Patient has been seen and examined by Dr. Alistair Roland and he agrees with the following assessment and plan:     Assessment/Plan:        Acute respiratory failure -- admit to ICU if unable to wean off BiPAP. Continue IV Lasix. Consult intensivist for BiPAP management. Hypothyroidism -- continue home medications      Chronic atrial fibrillation -- PPM in place. On warfarin for long term anticoagulation, INP 5.7. Goal INR 2-3. Hold warfarin for now. Monitor daily INRs. Anemia -- chronic. Monitor closely. HTN (hypertension) -- labile at this time. Monitor closely. Acute on chronic diastolic congestive heart failure -- Continue IV Lasix BID. Monitor strict I/Os and daily weights. Pulmonary hypertension       Pleural effusion -- continue Iv diuresis. Consult pulmonary for possible thoracentesis. Overview: Right thoracentesis 1/25/18 - 1200 mls removed      Left thoracentesis 1/25/2018 - 900 mls removed      S/P TAVR (transcatheter aortic valve replacement)       Leukocytosis -- unknown etiology. Blood cultures x2, UA with micro, and lactic acid pending. Start IV rocephin now. Appreciate critical care recommendations.          Danyel Soto NP  2/7/2018 10:22 PM

## 2018-02-08 NOTE — PROGRESS NOTES
Initial visit made to patient and a prayer was provided. Her son who is her care giver was present with her.         [employer]

## 2018-02-08 NOTE — PROGRESS NOTES
Adia Orozco  Admission Date: 2/7/2018             Daily Progress Note: 2/8/2018    The patient's chart is reviewed and the patient is discussed with the staff. Patient with recent TAVR. Readmitted with acute volume overload. preop bilateral effusions requiring bilateral thoracentesis. Oxygen dependent at home - wears 3 liters continuously. Subjective:      She may feel a little less short of breath today. Family at bedside.      Current Facility-Administered Medications   Medication Dose Route Frequency    sodium chloride (NS) flush 5 mL  5 mL InterCATHeter Q8H    sodium chloride (NS) flush 5-10 mL  5-10 mL InterCATHeter PRN    allopurinol (ZYLOPRIM) tablet 100 mg  100 mg Oral BID    hydrocortisone (ANUSOL-HC) 2.5 % rectal cream   PeriANAL TID PRN    levothyroxine (SYNTHROID) tablet 88 mcg  88 mcg Oral ACB    pantoprazole (PROTONIX) tablet 40 mg  40 mg Oral ACB    polyethylene glycol (MIRALAX) packet 17 g  17 g Oral DAILY    pravastatin (PRAVACHOL) tablet 40 mg  40 mg Oral DAILY    rOPINIRole (REQUIP) tablet 2 mg  2 mg Oral BID    sertraline (ZOLOFT) tablet 100 mg  100 mg Oral DAILY    spironolactone (ALDACTONE) tablet 25 mg  25 mg Oral DAILY    alcohol 62% (NOZIN) nasal  1 Ampule  1 Ampule Topical Q12H    [START ON 2/9/2018] vancomycin (VANCOCIN) 1250 mg in  ml infusion  1,250 mg IntraVENous Q24H    0.9% sodium chloride infusion 250 mL  250 mL IntraVENous PRN    furosemide (LASIX) injection 80 mg  80 mg IntraVENous BID    diazePAM (VALIUM) tablet 5 mg  5 mg Oral QHS    acetaminophen (TYLENOL) tablet 650 mg  650 mg Oral Q4H PRN    sodium chloride (NS) flush 5-10 mL  5-10 mL IntraVENous Q8H    sodium chloride (NS) flush 5-10 mL  5-10 mL IntraVENous PRN    cefTRIAXone (ROCEPHIN) 1 g in 0.9% sodium chloride (MBP/ADV) 50 mL  1 g IntraVENous Q24H     Facility-Administered Medications Ordered in Other Encounters   Medication Dose Route Frequency    0.9% sodium chloride infusion 250 mL  250 mL IntraVENous PRN         Objective:     Vitals:    02/08/18 1100 02/08/18 1105 02/08/18 1110 02/08/18 1115   BP: 118/54  125/57 133/63   Pulse: 81 81 80 81   Resp: 17 18 18 18   Temp: 97.6 °F (36.4 °C) 97.6 °F (36.4 °C) 97.5 °F (36.4 °C) 97.8 °F (36.6 °C)   SpO2: 100% 100% 100% 100%   Weight:       Height:         Intake and Output:   02/06 1901 - 02/08 0700  In: 240 [P.O.:240]  Out: 450 [Urine:450]       Physical Exam:   Constitutional:  the patient is well developed and in no acute distress  HEENT:  Sclera clear, pupils equal, oral mucosa moist  Lungs: clear bilaterally - decreased breath sounds from the posterior - some dullness. Oxygen via nasal cannula. Cardiovascular:  RRR without M,G,R. Paced rhythm per telemetry  Abd/GI: soft and non-tender; with positive bowel sounds. Ext: warm without cyanosis. There is 1+ lower leg edema. Musculoskeletal: moves all four extremities with equal strength  Skin:  no jaundice or rashes, no wounds   Neuro: no gross neuro deficits   Musculoskeletal: can ambulate. No deformity  Psychiatric: Calm.      ROS: chronic dyspnea, weakness, fair appetite  Lines: peripheral IV    CHEST XRAY:       LAB  Recent Labs      02/08/18   0442  02/07/18   1732  02/06/18   1620   WBC  12.7*  18.1*   --    HGB  7.1*  8.1*   --    HCT  23.7*  26.8*   --    PLT  84*  87*   --    INR  6.4*  5.9*  4.8     Recent Labs      02/08/18   0442  02/07/18   1732  02/06/18   1624   NA  146*  144  142   K  3.5  3.6  3.7   CL  102  100  96   CO2  39*  40*  32*   GLU  91  108*  110*   BUN  44*  45*  39*   CREA  1.00  1.10*  0.92   MG  2.1   --    --    ALB   --   2.0*   --    SGOT   --   60*   --      Recent Labs      02/07/18   2315  02/07/18   1920   PH  7.43  7.41   PCO2  63*  65*   PO2  101  82   HCO3  41*  41*         Assessment:  (Medical Decision Making)     Hospital Problems  Date Reviewed: 1/4/2018          Codes Class Noted POA    * (Principal)Acute respiratory failure [facility]) ICD-10-CM: J96.00  ICD-9-CM: 518.81  2/7/2018 Yes    A little less SOB today    Leukocytosis ICD-10-CM: A39.716  ICD-9-CM: 288.60  2/7/2018 Yes    improved    Acute on chronic diastolic heart failure (HCC) ICD-10-CM: I50.33  ICD-9-CM: 428.33  2/7/2018 Yes    IV lasix, slightly negative balance since admission    S/P TAVR (transcatheter aortic valve replacement) ICD-10-CM: Z95.2  ICD-9-CM: V43.3  1/30/2018 Yes        Pleural effusion ICD-10-CM: J90  ICD-9-CM: 511.9  1/23/2018 Yes    Overview Signed 1/26/2018 10:16 AM by Joellen Garcia NP     Right thoracentesis 1/25/18 - 1200 mls removed  Left thoracentesis 1/25/2018 - 900 mls removed         CXR reviewed - ? Effusions versus atelectasis/infiltrates    Pulmonary hypertension (Chronic) ICD-10-CM: I27.20  ICD-9-CM: 416.8  2/12/2017 Yes    Contributes to dyspnea    HTN (hypertension) (Chronic) ICD-10-CM: I10  ICD-9-CM: 401.9  8/5/2016 Yes    controlled    Anemia (Chronic) ICD-10-CM: D64.9  ICD-9-CM: 285.9  10/27/2011 Yes    Overview Signed 10/27/2011  8:25 AM by Mally Orellana     Acute on chronic           worse    Chronic atrial fibrillation (HCC) (Chronic) ICD-10-CM: I48.2  ICD-9-CM: 427.31  10/17/2011 Yes    INR up to 6.4. Coumadin on hold    Hypothyroidism (Chronic) ICD-10-CM: E03.9  ICD-9-CM: 244.9  12/4/2009 Yes              Plan:  (Medical Decision Making)   1. US chest today to assess effusions. Coumadin on hold. Monitor INR and do thoracenteses if needed once INR < 1.8  2. Diuresis per cardiology  3. Oxygen dependent prior to admission  4. Transfuse today - reassess labs  5. Day 2 Rocephin. Elevated WBC on admission with PCT 1.4. WBC down today. Blood and urine cultures neg so far. No sputum for culture. Continue for now.  Consider repeat CXR after diuresis/tap (if needed)    Joellen Garcia NP    More than 50% of time documented was spent face-to-face contact with the patient and in the care of the patient on the floor/unit where the patient is located. Lungs : few crackles in left base and slightly decreased in right side. Heart S1 and S2 audible, no murmers or rubs appreciated  Other    Will check with US to see if has enough fluid to drain. Either way will need INR lower. I have spoken with and examined the patient. I have reviewed the history, examination, assessment, and plan and agree with the above. Miguel A Greer MD      This note was signed electronically.

## 2018-02-08 NOTE — PROGRESS NOTES
Care Management Interventions  PCP Verified by CM: Yes  Transition of Care Consult (CM Consult): Home Health  Current Support Network: Other (lives with son)  Confirm Follow Up Transport: Family  Plan discussed with Pt/Family/Caregiver: Yes  Freedom of Choice Offered: Yes  Discharge Location  Discharge Placement: Home with home health  Met with patient for d/c planning. Familiar with patient from previous admissions. Patient lives with her son and assist with her ADL's. She has a rolling walker that she uses and Home O2 with [facility]. She was set up for [facility] but they had not seen patient prior to readmission. Patient does not want rehab but states she is going home with son and home health. Current d/c plan is home with [facility].

## 2018-02-08 NOTE — PROGRESS NOTES
Problem: Mobility Impaired (Adult and Pediatric)  Goal: *Acute Goals and Plan of Care (Insert Text)  Goals:  (1.)Ms. Rena Ching will move from supine to sit and sit to supine , scoot up and down and roll side to side with INDEPENDENT within 7 day(s). (2.)Ms. Rena Ching will transfer from bed to chair and chair to bed with MODIFIED INDEPENDENCE using the least restrictive device within 7 day(s). (3.)Ms. Rena Ching will ambulate with SUPERVISION for 50-75 feet with the least restrictive device within 7 day(s). PHYSICAL THERAPY: Initial Assessment, Treatment Day: Day of Assessment, PM 2/8/2018  INPATIENT: Hospital Day: 2  [place] / Product Type: Medicare /      NAME/AGE/GENDER: Francine Mcnulty is a 69 y.o. female   PRIMARY DIAGNOSIS: Pleural effusion [J90] Acute respiratory failure (HCC) Acute respiratory failure (HCC)  Procedure(s) (LRB):  ULTRASOUND (N/A)  Day of Surgery  ICD-10: Treatment Diagnosis:   · Generalized Muscle Weakness (M62.81)  · Difficulty in walking, Not elsewhere classified (R26.2)  · Other abnormalities of gait and mobility (R26.89)   Precaution/Allergies:  Adhesive tape; Benadryl [diphenhydramine hcl]; Demerol [meperidine]; Morphine; Sulfa (sulfonamide antibiotics); and Lorazepam      ASSESSMENT:     Ms. Rena Ching is a 69year old WF with an admitting diagnosis of Pleural effusion and acute respiratory failure. She presents today sitting up in the bed agreeable to have therapy. Her sister-in-law was at the bedside as well. She is on 3L of O2 with her resting O2 sats at 97%. She states that she lives with her son in a 1 level home with 1 step to enter. She has a r/walker that she uses at home for her mobility and she also states that at times she just uses the furniture to walk about her house. She reports she only does short distance ambulation and is on 3L of O2 at home.   She requires minimal assist with her bed mobility with good sitting balance on the edge of the bed. Sit to stand with use of slight bed elevation is minimal assist.  Her standing balance is fair to good using the r/walker for BUE support. She ambulated in place 10 steps using the r/walker with CGA. She requested to ambulate to the bathroom for a BM and was assisted with minimal assist into the bathroom using the r/walker. She was assisted with hygiene once she finished  her BM, then she assisted with ambulation back to the bed with minimal assist.  Nursing was at the bedside to administer blood, so patient returned to supine with minimal assist.  She was fatigued and SOB from all the exertion for ambulating to the bathroom with her O2 sats dropping slightly to 91%. She was pleasant and cooperative and motivated for therapy. Ms. Yaz Yen appears to be functioning below her prior level of function and would benefit from continued skilled PT to maximize her function. This section established at most recent assessment   PROBLEM LIST (Impairments causing functional limitations):  1. Decreased Strength  2. Decreased Transfer Abilities  3. Decreased Ambulation Ability/Technique  4. Decreased Activity Tolerance  5. Increased Fatigue  6. Increased Shortness of Breath   INTERVENTIONS PLANNED: (Benefits and precautions of physical therapy have been discussed with the patient.)  1. Bed Mobility  2. Gait Training  3. Therapeutic Activites  4. Therapeutic Exercise/Strengthening  5. Transfer Training     TREATMENT PLAN: Frequency/Duration: 3 times a week for duration of hospital stay  Rehabilitation Potential For Stated Goals: Good     RECOMMENDED REHABILITATION/EQUIPMENT: (at time of discharge pending progress): Due to the probability of continued deficits (see above) this patient will likely need continued skilled physical therapy after discharge.   Equipment:    None at this time              HISTORY:   History of Present Injury/Illness (Reason for Referral):  Mary Grace Chavez is a 69 y.o. female with past medical history of recent TAVR, HTN, CAD, chronic AFIB on warfarin, anemia, anxiety, and chronic diastolic heart failure who presented to the ER via EMS with complaints of worsening swelling and shortness of breath. Associated symptoms includes difficulty urinating, frequency and subjective fever. Patient was discharged from this facility on 2/3/18 after TAVR procedure on 1/30/18. Per patient and family present at the bedside, patient did well after getting home. The past 2 days, she has been more short of breath. Upon arrival to the ER, CXR shows cardiomegaly with finding consistent with pulmonary edema and worsening bilateral pleural effusions. Labs show WBC on 18.1 with neutrophil 96 and procalcitonin of 1.4. While in the ER she was placed on BiPAP and given 80mg IV lasix. Past Medical History/Comorbidities:   Ms. Dominic Alfaro  has a past medical history of Abnormal glucose (8/5/2016); Abscess (8/5/2016); Acute encephalopathy (7/8/2016); Acute renal failure ([address] (12/3/2009); Afib ([address] Anemia; Ankle swelling (8/5/2016); Anxiety (8/5/2016); Aortic Valve Bioprosthesis Present (3/7/2009); ARF (acute renal failure) ([address] (2/24/2010); Arthritis; Asthma; Atopic dermatitis (8/5/2016); Atrial fibrillation ([address] (3/7/2009); Autonomic orthostatic hypotension (8/5/2016); AV block (10/17/2011); Back pain (8/5/2016); Bradycardia (Symptomatic) (11/7/2011); CAD (coronary artery disease); Cancer ([address] (1990); Cardiac pacemaker (11/2/2015); Cardiogenic shock ([address] (10/17/2011); 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 (8/5/2016); Cellulitis (8/5/2016); Chest pain (8/5/2016); Chronic atrial fibrillation ([address] (10/17/2011); Chronic depression (8/5/2016); Chronic obstructive pulmonary disease ([address] (3/8/2009); Chronic pain; CKD (chronic kidney disease) stage 3, GFR 30-59 ml/min (12/4/2009); Congestive heart failure (CHF) ([facility] (11/2/2015); Degeneration of cervical intervertebral disc (8/5/2016);  Degenerative arthritis of left knee (2/27/2009); Dehydration (8/5/2016); Diastolic heart failure ([address] Digoxin toxicity (2/24/2010); Disorder of sweat glands (8/5/2016); Diverticulosis of large intestine without diverticulitis (2015); Dyspnea (8/5/2016); Eczema (8/5/2016); Embolus of femoral artery ([address] (12/4/2017); Epigastric abdominal pain (2/24/2010); GERD (gastroesophageal reflux disease); Gout (8/5/2016); H/O mitral valve repair, 2003 (6/27/2016); Heart failure ([address] colonic polyp (2015); Hyperkalemia (10/17/2011); Hyperlipidemia (8/5/2016); Hypertension; Hypokalemia (2/24/2010); Hypothyroidism (12/4/2009); Ill-defined condition; Knee pain (8/5/2016); Leg cramps (8/5/2016); Mitral stenosis with insufficiency (11/2/2015); Nausea and vomiting (2/24/2010); Obesity (11/2/2015); BOBBY (obstructive sleep apnea) (12/4/2009); Osteoarthritis (8/5/2016); Osteopenia (8/5/2016); Other long term (current) drug therapy (8/5/2016); Palpitations (11/2/2015); Rash (8/5/2016); Rectal bleeding (10/27/2011); Rectocele (2015); Recurrent depression ([address] (1/4/2018); 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 Ms. Shira Pierre  has a past surgical history that includes hx appendectomy; hx cholecystectomy (2005); hx gyn (1981); hx mastectomy (1990); hx pacemaker; hx breast reconstruction; pr cardiac surg procedure unlist; hx orthopaedic; vascular surgery procedure unlist (Right, 02/20/2017); and pr chest surgery procedure unlisted.   Social History/Living Environment:   Home Environment: Private residence  # Steps to Enter: 1  One/Two Story Residence: One story  Living Alone: No  Support Systems: Child(brit)  Patient Expects to be Discharged to[de-identified] Private residence  Current DME Used/Available at Home: Oxygen, portable, Walker, rolling  Prior Level of Function/Work/Activity:  Patient reports she has been functioning in her home environment with use of a r/walker and furniture walking at times prior to this admission. She reports she is on 3L of O2 at home. Number of Personal Factors/Comorbidities that affect the Plan of Care: 3+: HIGH COMPLEXITY   EXAMINATION:   Most Recent Physical Functioning:   Gross Assessment:  AROM: Generally decreased, functional  PROM: Generally decreased, functional  Strength: Generally decreased, functional  Coordination: Generally decreased, functional  Tone: Normal  Sensation: Intact               Posture:  Posture (WDL): Exceptions to WDL  Posture Assessment: Forward head, Rounded shoulders, Kyphosis  Balance:  Sitting: Intact  Standing: Impaired  Standing - Static: Fair;Constant support  Standing - Dynamic : Fair Bed Mobility:  Rolling: Modified independent  Supine to Sit: Minimum assistance  Sit to Supine: Minimum assistance  Scooting: Minimum assistance  Wheelchair Mobility:     Transfers:  Sit to Stand: Minimum assistance  Stand to Sit: Minimum assistance  Bed to Chair: Minimum assistance  Gait:     Base of Support: Widened  Speed/Carol: Slow;Shuffled  Step Length: Left shortened;Right shortened  Distance (ft): 8 Feet (ft) (8 x 2 to the bathroom and back)  Assistive Device: Walker, rolling  Ambulation - Level of Assistance: Minimal assistance  Interventions: Safety awareness training;Manual cues; Verbal cues      Body Structures Involved:  1. Lungs  2. Metabolic Body Functions Affected:  1. Respiratory  2. Metobolic/Endocrine Activities and Participation Affected:  1. General Tasks and Demands  2. Mobility  3.  Self Care   Number of elements that affect the Plan of Care: 3: MODERATE COMPLEXITY   CLINICAL PRESENTATION:   Presentation: Evolving clinical presentation with changing clinical characteristics: MODERATE COMPLEXITY   CLINICAL DECISION MAKING: [facility] Mobility Inpatient Short Form  How much difficulty does the patient currently have. .. Unable A Lot A Little None   1. Turning over in bed (including adjusting bedclothes, sheets and blankets)? [] 1   [] 2   [] 3   [x] 4   2. Sitting down on and standing up from a chair with arms ( e.g., wheelchair, bedside commode, etc.)   [] 1   [] 2   [x] 3   [] 4   3. Moving from lying on back to sitting on the side of the bed? [] 1   [] 2   [x] 3   [] 4   How much help from another person does the patient currently need. .. Total A Lot A Little None   4. Moving to and from a bed to a chair (including a wheelchair)? [] 1   [] 2   [x] 3   [] 4   5. Need to walk in hospital room? [] 1   [] 2   [x] 3   [] 4   6. Climbing 3-5 steps with a railing? [] 1   [x] 2   [] 3   [] 4   © 2007, Trustees of [address] [place] [address] [address] [id number], under license to [employer]. All rights reserved      Score:  Initial: 18 Most Recent: X (Date: -- )    Interpretation of Tool:  Represents activities that are increasingly more difficult (i.e. Bed mobility, Transfers, Gait). Score 24 23 22-20 19-15 14-10 9-7 6     Modifier CH CI CJ CK CL CM CN      ? Mobility - Walking and Moving Around:     - CURRENT STATUS: CK - 40%-59% impaired, limited or restricted    - GOAL STATUS: CJ - 20%-39% impaired, limited or restricted    - D/C STATUS:  ---------------To be determined---------------  [place] / [place] / Product Type: Medicare /      Medical Necessity:     · Patient is expected to demonstrate progress in strength, balance and functional technique to decrease assistance required with bed mobility, standing transfers and gait with the r/walker. Reason for Services/Other Comments:  · Patient continues to require skilled intervention due to medical complications.    Use of outcome tool(s) and clinical judgement create a POC that gives a: Clear prediction of patient's progress: LOW COMPLEXITY            TREATMENT:   (In addition to Assessment/Re-Assessment sessions the following treatments were rendered)   Pre-treatment Symptoms/Complaints:  Patient had no complaints of pain in therapy today just fatigues easily  Pain: Initial:   Pain Intensity 1: 0  Post Session:  0/10     Assessment/Reassessment only, no treatment provided today    Braces/Orthotics/Lines/Etc:   · O2 Device: Nasal cannula - 3L with O2 sats at 97% at rest.   Treatment/Session Assessment:    · Response to Treatment:  Patient participated and tolerated therapy well today but fatigues quickly with exertion tasks. · Interdisciplinary Collaboration:   o Physical Therapist  o Registered Nurse  · After treatment position/precautions:   o Supine in bed  o Bed alarm/tab alert on  o Bed/Chair-wheels locked  o Bed in low position  o Call light within reach  o Family at bedside  o Nurse at bedside   · Compliance with Program/Exercises: Will assess as treatment progresses. · Recommendations/Intent for next treatment session: \"Next visit will focus on advancements to more challenging activities and reduction in assistance provided\".   Total Treatment Duration:  PT Patient Time In/Time Out  Time In: 0705  Time Out: Nereida Polanco

## 2018-02-08 NOTE — PROGRESS NOTES
TRANSFER - IN REPORT:    Verbal report received from Eddie Gil RN on Nidia Meza  being received from ED for routine progression of care      Report consisted of patients Situation, Background, Assessment and   Recommendations(SBAR). Information from the following reports was received: Kardex, ED Summary, MAR and Recent Results. Opportunity for questions and clarification was provided. Patient received to room 318. Patient connected to monitor and assessment completed. Plan of care reviewed. Patient oriented to room and call light. Patient aware to use call light to communicate any chest pain or needs. Admission skin assessment completed with second RN and reveals the following: Patient has bruises right posterior neck and BUE, no redness or breakdown noted to sacrum, plus 3 pitting edema noted to BUE and BLE, left groin puncture present, and scars on left breast, midline chest, and right groin.

## 2018-02-08 NOTE — CONSULTS
CONSULT NOTE    Derrick Colby    2/7/2018    Date of Admission:  2/7/2018    The patient's chart is reviewed and the patient is discussed with the staff. Subjective:     Patient is a 69 y.o.  female WF with severe AS, severe COPD, chronic afib  nd anemia. .  She recently  underwent successful balloon valvuloplasty and transcatheter aortic valve replacement with a 23 mm [place] Aurelio 3 valve after being admitted on 1/29/18 . Following the procedure she had increasing edema and sob that responded to iv lasix. She was discharged on 2/3/18 and since discharge was ok until this am when she was noted to be much more sob. She was weak, confused, and there was increased edema. She has had non-productive cough. She was brought to the er and noted to be in resp distress so she was placed on bipap and has been given lasix.     Review of Systems  Constitutional: positive for fatigue  Eyes: negative for visual disturbance  Ears, nose, mouth, throat, and face: negative for sore throat  Cardiovascular: negative for chest pain  Gastrointestinal: negative for nausea and vomiting  Genitourinary:negative for frequency and dysuria  Musculoskeletal:negative for arthralgias  Neurological: negative for headaches    Patient Active Problem List   Diagnosis Code    Degenerative arthritis of left knee M17.12    Aortic Valve Bioprosthesis Present Z95.2    Chronic obstructive pulmonary disease ([address] J44.9    Hypothyroidism E03.9    BOBBY (obstructive sleep apnea) G47.33    Chronic atrial fibrillation (HCC) I48.2    Anemia D64.9    Thrombocytopenia (HCC) D69.6    Obesity E66.9    Mitral stenosis with insufficiency I05.2    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    Cardiac pacemaker Z95.0    Sick sinus syndrome (HCC) N78.2    Diastolic heart failure (HCC) I50.30    H/O mitral valve repair, 2003 Z98.890    Chronic depression F32.9    Osteopenia M85.80    RLS (restless legs syndrome) G25.81    Hyperlipidemia E78.5    Gout M10.9    Anxiety F41.9    CAD (coronary artery disease) I25.10    HTN (hypertension) I10    GERD (gastroesophageal reflux disease) K21.9    Chronic diastolic congestive heart failure (HCC) I50.32    Aortic valve replaced Z95.2    Acute on chronic systolic congestive heart failure (HCC) I50.23    Pulmonary hypertension I27.20    H/O atrioventricular rubin ablation Z98.890    S/P mitral valve repair Z98.890    Tricuspid valve insufficiency M81.0    Systolic CHF, chronic (HCC) I50.22    Stenosis of prosthetic aortic valve I35.0    Aortic stenosis, severe I35.0    Peripheral arterial disease (HCC) I73.9    Pleural effusion J90    Chronic respiratory failure with hypoxia (HCC) J96.11    Hypoxia R09.02    Acute pulmonary edema (HCC) V99.5    Systolic heart failure (HCC) I50.20    Aortic stenosis I35.0    S/P TAVR (transcatheter aortic valve replacement) Z95.2    Acute respiratory failure (HCC) J96.00           Prior to Admission Medications   Prescriptions Last Dose Informant Patient Reported? Taking? CARBOXYMETHYLCELLULOS/GLYCERIN (REFRESH OPTIVE OP)   Yes No   Sig: Apply  to eye. DOCUSATE SODIUM   Yes No   Sig: Take 1 Cap by mouth two (2) times a day. OXYGEN-AIR DELIVERY SYSTEMS   Yes No   Sig: by Does Not Apply route. 2-2.5 lpm cont. allopurinol (ZYLOPRIM) 100 mg tablet   No No   Sig: Take 1 Tab by mouth two (2) times a day. azelastine (ASTELIN) 137 mcg (0.1 %) nasal spray   No No   [place] by Both Nostrils route two (2) times a day. Use in each nostril as directed   benzonatate (TESSALON) 200 mg capsule   Yes No   Sig: Take 200 mg by mouth three (3) times daily as needed for Cough. calcium carbonate (CALTREX) 600 mg (1,500 mg) tablet   Yes No   Sig: Take 600 mg by mouth nightly. cholecalciferol (VITAMIN D3) 1,000 unit cap   Yes No   Sig: Take  by mouth daily.    cyanocobalamin (VITAMIN B-12) 250 mcg tablet   Yes No   Sig: Take 1,000 mcg by mouth daily.   diazePAM (VALIUM) 5 mg tablet   No No   Sig: Take 1 Tab by mouth daily. Max Daily Amount: 5 mg. fluticasone (FLONASE) 50 mcg/actuation nasal spray   Yes No   Si [place] by Both Nostrils route daily. glycerin, adult, (SUPPOSITORY ADULT) suppository   Yes No   Sig: Insert 1 Suppository into rectum as needed. hydrocortisone (ANUSOL-HC) 2.5 % rectal cream   No No   Sig: Apply small amount to hemorrhoids/perianal skin TID PRN   levalbuterol (XOPENEX) 1.25 mg/3 mL nebu   Yes No   Si.25 mg by Nebulization route. levothyroxine (SYNTHROID) 88 mcg tablet   No No   Sig: Take 1 Tab by mouth Daily (before breakfast). loratadine (CLARITIN) 10 mg tablet   Yes No   Sig: Take 10 mg by mouth as needed. nitroglycerin (NITROSTAT) 0.4 mg SL tablet   Yes No   Sig: by SubLINGual route every five (5) minutes as needed for Chest Pain. omeprazole (PRILOSEC) 20 mg capsule   No No   Sig: TAKE 1 CAPSULE BY MOUTH  DAILY   polyethylene glycol (MIRALAX) 17 gram/dose powder   Yes No   Sig: Take 17 g by mouth daily. pravastatin (PRAVACHOL) 40 mg tablet   No No   Sig: Take 1 Tab by mouth daily. Indications: hyperlipidemia   promethazine (PHENERGAN) 25 mg tablet   No No   Sig: Take 1 Tab by mouth every six (6) hours as needed. rOPINIRole (REQUIP) 2 mg tablet   Yes No   Sig: Take  by mouth two (2) times a day. sertraline (ZOLOFT) 100 mg tablet   No No   Sig: Take 1 Tab by mouth daily. spironolactone (ALDACTONE) 25 mg tablet   No No   Sig: Take 1 Tab by mouth daily. torsemide (DEMADEX) 20 mg tablet   No No   Sig: Take 2 Tabs by mouth two (2) times a day. warfarin (COUMADIN) 5 mg tablet   No No   Sig: Take 1 Tab by mouth nightly. Name brand only   Patient taking differently: Take 2.5 mg by mouth nightly.  Name brand only      Facility-Administered Medications: None       Past Medical History:   Diagnosis Date    Abnormal glucose 2016    Abscess 2016    Acute encephalopathy 2016    Acute renal failure ([address] 12/3/2009    Afib ([address]     Anemia     Ankle swelling 8/5/2016    Anxiety 8/5/2016    Aortic Valve Bioprosthesis Present 3/7/2009    ARF (acute renal failure) ([address] 2/24/2010    Arthritis     Asthma     Atopic dermatitis 8/5/2016    Atrial fibrillation (HCC) 3/7/2009    Autonomic orthostatic hypotension 8/5/2016    AV block 10/17/2011    Back pain 8/5/2016    Bradycardia (Symptomatic) 11/7/2011    CAD (coronary artery disease)     Cancer (HCC) 1990    breast (left)    Cardiac pacemaker 11/2/2015    Cardiogenic shock ([address] 10/17/2011    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    Cellulitis 8/5/2016    Chest pain 8/5/2016    Chronic atrial fibrillation ([address] 10/17/2011    Chronic depression 8/5/2016    Chronic obstructive pulmonary disease ([address] 3/8/2009    Chronic pain     CKD (chronic kidney disease) stage 3, GFR 30-59 ml/min 12/4/2009    Congestive heart failure (CHF) ([address] 11/2/2015    Degeneration of cervical intervertebral disc 8/5/2016    Degenerative arthritis of left knee 2/27/2009    Dehydration 2/2/4180    Diastolic heart failure (HCC)     Digoxin toxicity 2/24/2010    Disorder of sweat glands 8/5/2016    Diverticulosis of large intestine without diverticulitis 2015    Dyspnea 8/5/2016    Eczema 8/5/2016    Embolus of femoral artery ([address] 12/4/2017    Epigastric abdominal pain 2/24/2010    GERD (gastroesophageal reflux disease)     Gout 8/5/2016    H/O mitral valve repair, 2003 6/27/2016    Heart failure ([address]     Hx of colonic polyp 2015    adenoma    Hyperkalemia 10/17/2011    Hyperlipidemia 8/5/2016    Hypertension     Hypokalemia 2/24/2010    Hypothyroidism 12/4/2009    Ill-defined condition     CARPEL TUNNEL SYNDROME, BILAT HANDS    Knee pain 8/5/2016    Leg cramps 8/5/2016    Mitral stenosis with insufficiency 11/2/2015    Prior MV repair 2003.      Nausea and vomiting 2/24/2010    Obesity 11/2/2015    BOBBY (obstructive sleep apnea) 2009    Osteoarthritis 2016    Osteopenia 2016    Other long term (current) drug therapy 2016    Palpitations 2015    Rash 2016    Rectal bleeding 10/27/2011    Rectocele     Recurrent depression ([address] 2018    Rheumatic aortic stenosis 2015    Right hip pain 2016    RLS (restless legs syndrome) 2016    Sick sinus syndrome ([address] 2016    Skin infection 2016    Tachycardia 2016    Thrombocytopenia, unspecified 2012    Thromboembolus ([address]     2017    Thyroid disease     Urticaria 2016    Vertigo 2016     Past Surgical History:   Procedure Laterality Date    CARDIAC SURG PROCEDURE UNLIST      valve repair and replacement    CHEST SURGERY PROCEDURE UNLISTED      HX APPENDECTOMY      HX BREAST RECONSTRUCTION      HX CHOLECYSTECTOMY      HX GYN  1981    hysterectomy still have ovaries    HX MASTECTOMY      left mastectomy    HX ORTHOPAEDIC      knee surgery    HX PACEMAKER      VASCULAR SURGERY PROCEDURE UNLIST Right 2017    LE thrombectomy     Social History     Social History    Marital status:      Spouse name: N/A    Number of children: N/A    Years of education: N/A     Occupational History    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    cotton mill      6 years     Social History Main Topics    Smoking status: Former Smoker     Packs/day: 3.00     Years: 15.00     Types: Cigarettes     Quit date: 1996    Smokeless tobacco: Former User      Comment: quit     Alcohol use No    Drug use: No    Sexual activity: Not Currently     Other Topics Concern    Not on file     Social History Narrative    Lives with her son     Family History   Problem Relation Age of Onset    Heart Disease Mother     Cancer Father      Throat    Heart Disease Father     Cancer Sister      Breast, Colon    Heart Disease Sister     Breast Cancer Sister 79      from disease    Cancer Maternal Grandmother     Cancer Brother     Diabetes Brother     Heart Disease Brother     Psychiatric Disorder Paternal Grandfather     Cancer Maternal Aunt      Breast    Diabetes Son     Alcohol abuse Neg Hx     Arthritis-rheumatoid Neg Hx     Asthma Neg Hx     Bleeding Prob Neg Hx     Elevated Lipids Neg Hx     Headache Neg Hx     Migraines Neg Hx     Hypertension Neg Hx     Lung Disease Neg Hx     Mental Retardation Neg Hx     Stroke Neg Hx      Allergies   Allergen Reactions    Adhesive Tape Rash    Benadryl [Diphenhydramine Hcl] Other (comments)     Jitters      Demerol [Meperidine] Anxiety    Morphine Other (comments)     Confusion    Sulfa (Sulfonamide Antibiotics) Anxiety    Lorazepam Anxiety       Current Facility-Administered Medications   Medication Dose Route Frequency    sodium chloride (NS) flush 5-10 mL  5-10 mL IntraVENous Q8H    sodium chloride (NS) flush 5-10 mL  5-10 mL IntraVENous PRN    cefTRIAXone (ROCEPHIN) 1 g in 0.9% sodium chloride (MBP/ADV) 50 mL  1 g IntraVENous Q24H     Current Outpatient Prescriptions   Medication Sig    torsemide (DEMADEX) 20 mg tablet Take 2 Tabs by mouth two (2) times a day.  rOPINIRole (REQUIP) 2 mg tablet Take  by mouth two (2) times a day.  pravastatin (PRAVACHOL) 40 mg tablet Take 1 Tab by mouth daily. Indications: hyperlipidemia    warfarin (COUMADIN) 5 mg tablet Take 1 Tab by mouth nightly. Name brand only (Patient taking differently: Take 2.5 mg by mouth nightly. Name brand only)    sertraline (ZOLOFT) 100 mg tablet Take 1 Tab by mouth daily.  diazePAM (VALIUM) 5 mg tablet Take 1 Tab by mouth daily. Max Daily Amount: 5 mg.  omeprazole (PRILOSEC) 20 mg capsule TAKE 1 CAPSULE BY MOUTH  DAILY    spironolactone (ALDACTONE) 25 mg tablet Take 1 Tab by mouth daily.  levothyroxine (SYNTHROID) 88 mcg tablet Take 1 Tab by mouth Daily (before breakfast).     allopurinol (ZYLOPRIM) 100 mg tablet Take 1 Tab by mouth two (2) times a day.  fluticasone (FLONASE) 50 mcg/actuation nasal spray 1 [place] by Both Nostrils route daily.  azelastine (ASTELIN) 137 mcg (0.1 %) nasal spray 1 [place] by Both Nostrils route two (2) times a day. Use in each nostril as directed    polyethylene glycol (MIRALAX) 17 gram/dose powder Take 17 g by mouth daily.  loratadine (CLARITIN) 10 mg tablet Take 10 mg by mouth as needed.  glycerin, adult, (SUPPOSITORY ADULT) suppository Insert 1 Suppository into rectum as needed.  benzonatate (TESSALON) 200 mg capsule Take 200 mg by mouth three (3) times daily as needed for Cough.  promethazine (PHENERGAN) 25 mg tablet Take 1 Tab by mouth every six (6) hours as needed.  levalbuterol (XOPENEX) 1.25 mg/3 mL nebu 1.25 mg by Nebulization route.  hydrocortisone (ANUSOL-HC) 2.5 % rectal cream Apply small amount to hemorrhoids/perianal skin TID PRN    calcium carbonate (CALTREX) 600 mg (1,500 mg) tablet Take 600 mg by mouth nightly.  cholecalciferol (VITAMIN D3) 1,000 unit cap Take  by mouth daily.  CARBOXYMETHYLCELLULOS/GLYCERIN (REFRESH OPTIVE OP) Apply  to eye.  DOCUSATE SODIUM Take 1 Cap by mouth two (2) times a day.  OXYGEN-AIR DELIVERY SYSTEMS by Does Not Apply route. 2-2.5 lpm cont.  cyanocobalamin (VITAMIN B-12) 250 mcg tablet Take 1,000 mcg by mouth daily.  nitroglycerin (NITROSTAT) 0.4 mg SL tablet by SubLINGual route every five (5) minutes as needed for Chest Pain.      Facility-Administered Medications Ordered in Other Encounters   Medication Dose Route Frequency    0.9% sodium chloride infusion 250 mL  250 mL IntraVENous PRN         Objective:     Vitals:    02/07/18 1726 02/07/18 1902 02/07/18 2003 02/07/18 2102   BP: 104/53 99/48  103/53   Pulse: 80 83  81   Resp: 18 19     Temp: 98.3 °F (36.8 °C)      SpO2:  97% 100%        PHYSICAL EXAM     Constitutional:  the patient is well developed and in no acute distress  EENMT:  Sclera clear, pupils equal, oral mucosa moist  Respiratory:  Basilar crackles with decreased breath sounds  Cardiovascular:  RRR without M,G,R  Gastrointestinal: soft and non-tender; with positive bowel sounds. Musculoskeletal: warm without cyanosis. There is 1+ lower leg edema. Skin:  no jaundice or rashes, no wounds   Neurologic: no gross neuro deficits     Psychiatric:  alert and oriented x 3    CXR:        Recent Labs      02/07/18   1732  02/06/18   1620   WBC  18.1*   --    HGB  8.1*   --    HCT  26.8*   --    PLT  87*   --    INR  5.9*  4.8     Recent Labs      02/07/18   1732  02/06/18   1624   NA  144  142   K  3.6  3.7   CL  100  96   GLU  108*  110*   CO2  40*  32*   BUN  45*  39*   CREA  1.10*  0.92   CA  8.6  8.5*   ALB  2.0*   --    TBILI  0.7   --    ALT  22   --    SGOT  60*   --      Recent Labs      02/07/18   1920   PH  7.41   PCO2  65*   PO2  82   HCO3  41*     No results for input(s): LCAD, LAC in the last 72 hours.     Assessment:  (Medical Decision Making)     Hospital Problems  Date Reviewed: 1/4/2018          Codes Class Noted POA    * (Principal)Acute respiratory failure ([facility] ICD-10-CM: J96.00  ICD-9-CM: 518.81  2/7/2018 Yes    On bipap- appears to be due to fluid overload    S/P TAVR (transcatheter aortic valve replacement) ICD-10-CM: Z95.2  ICD-9-CM: V43.3  1/30/2018 Yes        Pleural effusion ICD-10-CM: J90  ICD-9-CM: 511.9  1/23/2018 Yes    Overview Signed 1/26/2018 10:16 AM by Carmen Obrien NP     Right thoracentesis 1/25/18 - 1200 mls removed  Left thoracentesis 1/25/2018 - 900 mls removed         Bilateral     Pulmonary hypertension (Chronic) ICD-10-CM: I27.20  ICD-9-CM: 416.8  2/12/2017 Yes        HTN (hypertension) (Chronic) ICD-10-CM: I10  ICD-9-CM: 401.9  8/5/2016 Yes        Chronic atrial fibrillation (HCC) (Chronic) ICD-10-CM: I48.2  ICD-9-CM: 427.31  10/17/2011 Yes        Hypothyroidism (Chronic) ICD-10-CM: E03.9  ICD-9-CM: 244.9  12/4/2009 Yes              Plan:  (Medical Decision Making)   1    Hold coumadin- thoracentesis when INR is better  2   bipap this pm in icu  3   Iv lasix  4   Culture  5   Iv antibx  6   procalcitonin  --    More than 50% of the time documented was spent in face-to-face contact with the patient and in the care of the patient on the floor/unit where the patient is located. Thank you very much for this referral.  We appreciate the opportunity to participate in this patient's care. Will follow along with above stated plan.     Claudell Nones, MD

## 2018-02-08 NOTE — PROCEDURES
Summary:    After informed consent was obtained, the patient was positioned upright in the usual fashion.  Ultrasound was used to identify the optimal spot for pleural drainage. Large right and small to moderate left effusion. NO attempt to drain since INR > 6. No rush to tap since comfortable on a few liter.      EBL --none    Patient stable post procedure    No samples sent.     Signed By: Reid Butterfield MD

## 2018-02-08 NOTE — ED NOTES
Unsuccessful IV attemptx1. Pt tolerated well. Per Dr. Sabino Bobby at bedside,it is ok to use left arm for IV access at this time.

## 2018-02-08 NOTE — PROGRESS NOTES
[facility] CARDIOLOGY PROGRESS NOTE           2/8/2018 7:30 AM    Admit Date: 2/7/2018      Subjective:   Patient near baseline this AM.  She is now on 3L NCO2. Hgb is 7.1 and INR > 6. ROS:  Cardiovascular:  As noted above    Objective:      Vitals:    02/08/18 0002 02/08/18 0100 02/08/18 0308 02/08/18 0522   BP: 127/58  122/44 142/71   Pulse: 80  83 84   Resp: 18 18 18   Temp:   97.7 °F (36.5 °C) 97.9 °F (36.6 °C)   SpO2: 100% 99% 100% 100%   Weight:   77.1 kg (169 lb 14.4 oz)    Height:   5' (1.524 m)        Physical Exam:  General-No Acute Distress  Neck- supple, no JVD  CV- regular rate and rhythm no MRG  Lung- clear bilaterally  Abd- soft, nontender, nondistended  Ext- no edema bilaterally. Skin- warm and dry    Data Review:   Recent Labs      02/08/18   0442  02/07/18   1732   NA  146*  144   K  3.5  3.6   MG  2.1   --    BUN  44*  45*   CREA  1.00  1.10*   GLU  91  108*   WBC  12.7*  18.1*   HGB  7.1*  8.1*   HCT  23.7*  26.8*   PLT  84*  87*   INR  6.4*  5.9*       Assessment/Plan:     Principal Problem:    Acute respiratory failure (HCC) (2/7/2018)    IV lasix again today as she will receive 2 units PRBCs. Continue antibiotics and appreciate pulmonary assistance. May need thoracentesis    Active Problems:    Hypothyroidism (12/4/2009)    This is stable on therapy      Chronic atrial fibrillation ([address] (10/17/2011)    [place] and give Vitamin K      Anemia (10/27/2011)    Transfuse 2 units and give vitamin      HTN (hypertension) (8/5/2016)    This is well controlled      Pulmonary hypertension (2/12/2017)    Chronic and stable       Pleural effusion (1/23/2018)    As above      S/P TAVR (transcatheter aortic valve replacement) (1/30/2018)    Exam is normal.  She has recovered well with diuresis. Monitor for now. May need to check limited echo once stable post transfusion.         Leukocytosis (2/7/2018)    On ABx and monitor BLD clx      Acute on chronic diastolic heart failure ([facility] (2/7/2018)    As above - IV diuresis again today          Johanna Dhaliwal MD  2/8/2018 7:30 AM

## 2018-02-08 NOTE — PROGRESS NOTES
Verbal bedside report given to oncoming RN, Elyse Hawkins. Patient's situation, background, assessment and recommendations provided. Opportunity for questions provided. Oncoming RN assumed care of patient.

## 2018-02-08 NOTE — PROGRESS NOTES
Pt is awake and alert and able to cough forcefully on request but unable to produce sputum suitable for resp culture.

## 2018-02-09 PROBLEM — J96.20 ACUTE ON CHRONIC RESPIRATORY FAILURE (HCC): Status: ACTIVE | Noted: 2018-01-01

## 2018-02-09 NOTE — PROGRESS NOTES
Bedside and Verbal shift change report given to self (oncoming nurse) by Marcy Jordan (offgoing nurse). Report included the following information SBAR, Kardex, MAR and Recent Results.

## 2018-02-09 NOTE — PROGRESS NOTES
[facility] CARDIOLOGY PROGRESS NOTE           2/9/2018 7:30 AM    Admit Date: 2/7/2018      Subjective:   Patient near baseline this AM.  She is now on 3L NCO2. Hgb is 10 and INR is 1.7.    ROS:  Cardiovascular:  As noted above    Objective:      Vitals:    02/08/18 1659 02/08/18 2038 02/09/18 0018 02/09/18 0509   BP: 160/76 134/68 131/66 135/69   Pulse: 81 80 82 87   Resp: 21 16 16 16   Temp: 97.6 °F (36.4 °C) 97.6 °F (36.4 °C) 97.3 °F (36.3 °C) 97.4 °F (36.3 °C)   SpO2: 100% 100% 100% 100%   Weight:    79.7 kg (175 lb 9.6 oz)   Height:           Physical Exam:  General-No Acute Distress  Neck- supple, no JVD  CV- regular rate and rhythm no MRG  Lung- clear bilaterally  Abd- soft, nontender, nondistended  Ext- no edema bilaterally. Skin- warm and dry    Data Review:   Recent Labs      02/09/18   0408  02/08/18   0442   NA  146*  146*   K  3.9  3.5   MG  2.1  2.1   BUN  48*  44*   CREA  1.06*  1.00   GLU  83  91   WBC  8.5  12.7*   HGB  10.3*  7.1*   HCT  34.3*  23.7*   PLT  78*  84*   INR  1.7  6.4*       Assessment/Plan:     Principal Problem:    Acute respiratory failure (HCC) (2/7/2018)    Change to PO lasix today. .  Continue antibiotics and appreciate pulmonary assistance. May need thoracentesis    Active Problems:    Hypothyroidism (12/4/2009)    This is stable on therapy      Chronic atrial fibrillation ([address] (10/17/2011)    S/P Vitamin KL and will start Eliquis tomorrow. Patient had severe hemrrhoidal bleed per her son      Anemia (10/27/2011)    Transfused 2 units and give vitamin      HTN (hypertension) (8/5/2016)    This is well controlled      Pulmonary hypertension (2/12/2017)    Chronic and stable       Pleural effusion (1/23/2018)    As above      S/P TAVR (transcatheter aortic valve replacement) (1/30/2018)    Exam is normal.  She has recovered well with diuresis. Monitor for now.         Leukocytosis (2/7/2018)    On ABx and monitor BLD clx      Acute on chronic diastolic heart failure ([facility] (2/7/2018)    As above     PT and consider Kaveh Henderson MD  2/9/2018 7:30 AM

## 2018-02-09 NOTE — PROGRESS NOTES
Joanie Underwood  Admission Date: 2/7/2018             Daily Progress Note: 2/9/2018    The patient's chart is reviewed and the patient is discussed with the staff. 70 yo female with a history of HTN, hypothyroid, chronic dCHF, chronic a.fib, CAD, SSS s/p PPM, COPD, chronic respiratory failure maintained on o2 at 3 lpm, and TAVR on 1/30/18. She presented to the ER with increased sob, CXR findings consistent with pulmonary edema and worsening bilateral effusions. She required BIPAP in the ER but diuresed with lasix and was weaned to [place]. She reported subjective fevers, WBC elevated at 18 and abx were started. We were consulted for possible thoracentesis. Coumadin was held    Subjective:     Currently on O2 at 3 lpm with o2 sat 100%. Continues to feel sob. Cough is only minimally productive - does not feel she has much to cough up. Denies chest pain.       Current Facility-Administered Medications   Medication Dose Route Frequency    furosemide (LASIX) tablet 80 mg  80 mg Oral Q12H    metoprolol succinate (TOPROL-XL) XL tablet 50 mg  50 mg Oral DAILY    sodium chloride (NS) flush 5 mL  5 mL InterCATHeter Q8H    sodium chloride (NS) flush 5-10 mL  5-10 mL InterCATHeter PRN    allopurinol (ZYLOPRIM) tablet 100 mg  100 mg Oral BID    hydrocortisone (ANUSOL-HC) 2.5 % rectal cream   PeriANAL TID PRN    levothyroxine (SYNTHROID) tablet 88 mcg  88 mcg Oral ACB    pantoprazole (PROTONIX) tablet 40 mg  40 mg Oral ACB    polyethylene glycol (MIRALAX) packet 17 g  17 g Oral DAILY    pravastatin (PRAVACHOL) tablet 40 mg  40 mg Oral DAILY    rOPINIRole (REQUIP) tablet 2 mg  2 mg Oral BID    sertraline (ZOLOFT) tablet 100 mg  100 mg Oral DAILY    spironolactone (ALDACTONE) tablet 25 mg  25 mg Oral DAILY    alcohol 62% (NOZIN) nasal  1 Ampule  1 Ampule Topical Q12H    vancomycin (VANCOCIN) 1250 mg in  ml infusion  1,250 mg IntraVENous Q24H    0.9% sodium chloride infusion 250 mL  250 mL IntraVENous PRN    diazePAM (VALIUM) tablet 5 mg  5 mg Oral QHS    acetaminophen (TYLENOL) tablet 650 mg  650 mg Oral Q4H PRN    sodium chloride (NS) flush 5-10 mL  5-10 mL IntraVENous Q8H    sodium chloride (NS) flush 5-10 mL  5-10 mL IntraVENous PRN    cefTRIAXone (ROCEPHIN) 1 g in 0.9% sodium chloride (MBP/ADV) 50 mL  1 g IntraVENous Q24H     Facility-Administered Medications Ordered in Other Encounters   Medication Dose Route Frequency    0.9% sodium chloride infusion 250 mL  250 mL IntraVENous PRN       Review of Systems  Constitutional: negative for fever, chills, sweats  Cardiovascular: negative for chest pain, palpitations, syncope, +++edema  Gastrointestinal:  negative for dysphagia, reflux, vomiting, diarrhea, abdominal pain, or melena  Neurologic:  negative for focal weakness, numbness, headache    Objective:     Vitals:    02/08/18 2038 02/09/18 0018 02/09/18 0509 02/09/18 0852   BP: 134/68 131/66 135/69 137/65   Pulse: 80 82 87 82   Resp: 16 16 16 16   Temp: 97.6 °F (36.4 °C) 97.3 °F (36.3 °C) 97.4 °F (36.3 °C) 96.6 °F (35.9 °C)   SpO2: 100% 100% 100% 100%   Weight:   175 lb 9.6 oz (79.7 kg)    Height:         Intake and Output:   02/07 1901 - 02/09 0700  In: 360 [P.O.:360]  Out: 1300 [Urine:1300]       Physical Exam:   Constitution:  the patient is well developed and in no acute distress  EENMT:  Sclera clear, pupils equal, oral mucosa moist  Respiratory: diminished with crackles bilateral posterior bases, o2 at 3 lpm  Cardiovascular:  RRR without M,G,R  Gastrointestinal: soft and non-tender; with positive bowel sounds. Musculoskeletal: warm without cyanosis.  There is 2+ lower leg edema. / 2+ UE edema  Skin:  no jaundice or rashes, no open wounds   Neurologic: no gross neuro deficits     Psychiatric:  alert and oriented x 3    CXR:   2/7        LAB   Recent Labs      02/09/18   0408  02/08/18   0442  02/07/18   1732   WBC  8.5  12.7*  18.1*   HGB  10.3*  7.1*  8.1*   HCT  34.3*  23.7* 26.8*   PLT  78*  84*  87*   INR  1.7  6.4*  5.9*     Recent Labs      02/09/18   0408  02/08/18   0442  02/07/18   1732   NA  146*  146*  144   K  3.9  3.5  3.6   CL  101  102  100   CO2  39*  39*  40*   GLU  83  91  108*   BUN  48*  44*  45*   CREA  1.06*  1.00  1. 10*   MG  2.1  2.1   --    CA  8.4  8.3  8.6   ALB   --    --   2.0*   TBILI   --    --   0.7   ALT   --    --   22   SGOT   --    --   60*     Recent Labs      02/07/18   2315  02/07/18   1920   PH  7.43  7.41   PCO2  63*  65*   PO2  101  82   HCO3  41*  41*     Recent Labs      02/07/18   2241   LAC  0.7         Assessment:  (Medical Decision Making)     Hospital Problems  Date Reviewed: 2/9/2018          Codes Class Noted POA    * (Principal)Acute on chronic respiratory failure ([facility] [address] ICD-10-CM: J96.20  ICD-9-CM: 518.84  2/7/2018 Yes    Currently on home dose o2      Leukocytosis ICD-10-CM: D72.829  ICD-9-CM: 288.60  2/7/2018 Yes    WBC 18.1 >>> 8.5      Acute on chronic diastolic heart failure (HCC) ICD-10-CM: I50.33  ICD-9-CM: 428.33  2/7/2018 Yes    Remains edematous  Lasix / aldactone  -850 ml / 24 hours - has salamanca catheter  Albumin 2        S/P TAVR (transcatheter aortic valve replacement) ICD-10-CM: Z95.2  ICD-9-CM: V43.3  1/30/2018 Yes    TAVR on 1/30/18      Pleural effusion ICD-10-CM: J90  ICD-9-CM: 511.9  1/23/2018 Yes     Right thoracentesis 1/25/18 - 1200 mls removed  Left thoracentesis 1/25/2018 - 900 mls removed    Coumadin on hold  INR 1.7 today  Consider thoracentesis         Pulmonary hypertension (Chronic) ICD-10-CM: I27.20  ICD-9-CM: 416.8  2/12/2017 Yes        HTN (hypertension) (Chronic) ICD-10-CM: I10  ICD-9-CM: 401.9  8/5/2016 Yes        Anemia (Chronic) ICD-10-CM: D64.9  ICD-9-CM: 285.9  10/27/2011 Yes     Acute on chronic  Improved to 10.3 post transfusion         Chronic atrial fibrillation (HCC) (Chronic) ICD-10-CM: I48.2  ICD-9-CM: 427.31  10/17/2011 Yes    Rate controlled      Hypothyroidism (Chronic) ICD-10-CM: E03.9  ICD-9-CM: 244.9  12/4/2009 Yes           Plan:  (Medical Decision Making)     --Rocephin / Vanc   WBC 18.1 >>> 8.5   BC/UC NGTD  --lasix 80 mg po q 12 hours / aldactone  --INR improved today to 1.7 - consider repeat US with thoracentesis    More than 50% of the time documented was spent in face-to-face contact with the patient and in the care of the patient on the floor/unit where the patient is located. Geraldine Bergeron, FELISHA    Lungs: decreased sounds b/l. No wheezing. Heart: S1 and S2 audible, no murmers or rubs appreciated  Other      Will plan to tap to and possible b/l. I have spoken with and examined the patient. I have reviewed the history, examination, assessment, and plan and agree with the above. Pura Carolina MD      This note was signed electronically.

## 2018-02-09 NOTE — PROGRESS NOTES
Problem: Heart Failure: Day 2  Goal: Diagnostic Test/Procedures  Outcome: Progressing Towards Goal  Pt possible to have thoracentesis today or tomorrow  Goal: Medications  Outcome: Progressing Towards Goal  Changed from IV lasix to oral lasix  Goal: Respiratory  Outcome: Progressing Towards Goal  Remains SOB with exertion

## 2018-02-09 NOTE — PROGRESS NOTES
Patient s/p b/l taps. Left with 550 ml of yellow effusion and noted air end of procedure but suctioned out till stopped. Right with 1000 ml of creamy pink effusion.     CXR pending and will inform Night-Time Intensivist to f/u    Jagjit Van MD

## 2018-02-09 NOTE — PROGRESS NOTES
Problem: Mobility Impaired (Adult and Pediatric)  Goal: *Acute Goals and Plan of Care (Insert Text)  Goals:  (1.)Ms. Rony Cota will move from supine to sit and sit to supine , scoot up and down and roll side to side with INDEPENDENT within 7 day(s). (2.)Ms. Rony Cota will transfer from bed to chair and chair to bed with MODIFIED INDEPENDENCE using the least restrictive device within 7 day(s). (3.)Ms. Rony Cota will ambulate with SUPERVISION for 50-75 feet with the least restrictive device within 7 day(s). PHYSICAL THERAPY: Daily Note, Treatment Day: 1st, PM 2/9/2018  INPATIENT: Hospital Day: 3  [place] / [place] / Product Type: Medicare /      NAME/AGE/GENDER: Jessica Quintana is a 69 y.o. female   PRIMARY DIAGNOSIS: Pleural effusion [J90]  Pleural effusion [J90] Acute on chronic respiratory failure (HCC) Acute on chronic respiratory failure (HCC)  Procedure(s) (LRB):  ULTRASOUND (N/A)  1 Day Post-Op  ICD-10: Treatment Diagnosis:   · Generalized Muscle Weakness (M62.81)  · Difficulty in walking, Not elsewhere classified (R26.2)  · Other abnormalities of gait and mobility (R26.89)   Precaution/Allergies:  Adhesive tape; Benadryl [diphenhydramine hcl]; Demerol [meperidine]; Morphine; Sulfa (sulfonamide antibiotics); and Lorazepam      ASSESSMENT:     Ms. Rony Cota did not participate in bed mobility training today because she was sitting up in the chair. She required min A for transfers and ambulation. A RW was used for standing and walking to promote safety. She was fatigued following a short bout of ambulation. She will benefit from continued PT to improve her functional mobility. She will benefit from short-term rehab placement following hospital D/C to facilitate a return to baseline level of functioning. This section established at most recent assessment   PROBLEM LIST (Impairments causing functional limitations):  1. Decreased Strength  2.  Decreased Transfer Abilities  3. Decreased Ambulation Ability/Technique  4. Decreased Activity Tolerance  5. Increased Fatigue  6. Increased Shortness of Breath   INTERVENTIONS PLANNED: (Benefits and precautions of physical therapy have been discussed with the patient.)  1. Bed Mobility  2. Gait Training  3. Therapeutic Activites  4. Therapeutic Exercise/Strengthening  5. Transfer Training     TREATMENT PLAN: Frequency/Duration: 3 times a week for duration of hospital stay  Rehabilitation Potential For Stated Goals: Good     RECOMMENDED REHABILITATION/EQUIPMENT: (at time of discharge pending progress): Due to the probability of continued deficits (see above) this patient will likely need continued skilled physical therapy after discharge. Equipment:    None at this time              HISTORY:   History of Present Injury/Illness (Reason for Referral):  Álvaro Jones is a 69 y.o. female with past medical history of recent TAVR, HTN, CAD, chronic AFIB on warfarin, anemia, anxiety, and chronic diastolic heart failure who presented to the ER via EMS with complaints of worsening swelling and shortness of breath. Associated symptoms includes difficulty urinating, frequency and subjective fever. Patient was discharged from this facility on 2/3/18 after TAVR procedure on 1/30/18. Per patient and family present at the bedside, patient did well after getting home. The past 2 days, she has been more short of breath. Upon arrival to the ER, CXR shows cardiomegaly with finding consistent with pulmonary edema and worsening bilateral pleural effusions. Labs show WBC on 18.1 with neutrophil 96 and procalcitonin of 1.4. While in the ER she was placed on BiPAP and given 80mg IV lasix. Past Medical History/Comorbidities:   Ms. David Garcia  has a past medical history of Abnormal glucose (8/5/2016); Abscess (8/5/2016); Acute encephalopathy (7/8/2016); Acute renal failure ([address] (12/3/2009); Afib ([facility] [address] Anemia; Ankle swelling (8/5/2016);  Anxiety (8/5/2016); Aortic Valve Bioprosthesis Present (3/7/2009); ARF (acute renal failure) ([address] (2/24/2010); Arthritis; Asthma; Atopic dermatitis (8/5/2016); Atrial fibrillation ([address] (3/7/2009); Autonomic orthostatic hypotension (8/5/2016); AV block (10/17/2011); Back pain (8/5/2016); Bradycardia (Symptomatic) (11/7/2011); CAD (coronary artery disease); Cancer ([address] (1990); Cardiac pacemaker (11/2/2015); Cardiogenic shock ([address] (10/17/2011); 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 (8/5/2016); Cellulitis (8/5/2016); Chest pain (8/5/2016); Chronic atrial fibrillation ([address] (10/17/2011); Chronic depression (8/5/2016); Chronic obstructive pulmonary disease ([address] (3/8/2009); Chronic pain; CKD (chronic kidney disease) stage 3, GFR 30-59 ml/min (12/4/2009); Congestive heart failure (CHF) ([address] (11/2/2015); Degeneration of cervical intervertebral disc (8/5/2016); Degenerative arthritis of left knee (2/27/2009); Dehydration (8/5/2016); Diastolic heart failure ([address] Digoxin toxicity (2/24/2010); Disorder of sweat glands (8/5/2016); Diverticulosis of large intestine without diverticulitis (2015); Dyspnea (8/5/2016); Eczema (8/5/2016); Embolus of femoral artery ([address] (12/4/2017); Epigastric abdominal pain (2/24/2010); GERD (gastroesophageal reflux disease); Gout (8/5/2016); H/O mitral valve repair, 2003 (6/27/2016); Heart failure ([address] colonic polyp (2015); Hyperkalemia (10/17/2011); Hyperlipidemia (8/5/2016); Hypertension; Hypokalemia (2/24/2010); Hypothyroidism (12/4/2009); Ill-defined condition; Knee pain (8/5/2016); Leg cramps (8/5/2016); Mitral stenosis with insufficiency (11/2/2015); Nausea and vomiting (2/24/2010); Obesity (11/2/2015); BOBBY (obstructive sleep apnea) (12/4/2009); Osteoarthritis (8/5/2016); Osteopenia (8/5/2016); Other long term (current) drug therapy (8/5/2016); Palpitations (11/2/2015); Rash (8/5/2016); Rectal bleeding (10/27/2011); Rectocele (2015); Recurrent depression ([facility] (1/4/2018);  Rheumatic aortic stenosis (11/2/2015); Right hip pain (8/5/2016); RLS (restless legs syndrome) (8/5/2016); Sick sinus syndrome ([facility] [address] (2/13/2016); Skin infection (8/5/2016); Tachycardia (6/27/2016); Thrombocytopenia, unspecified (8/22/2012); Thromboembolus ([facility] [address] Thyroid disease; Urticaria (8/5/2016); and Vertigo (8/5/2016). Ms. Candice Stark  has a past surgical history that includes hx appendectomy; hx cholecystectomy (2005); hx gyn (1981); hx mastectomy (1990); hx pacemaker; hx breast reconstruction; pr cardiac surg procedure unlist; hx orthopaedic; vascular surgery procedure unlist (Right, 02/20/2017); and pr chest surgery procedure unlisted. Social History/Living Environment:   Home Environment: Private residence  # Steps to Enter: 1  One/Two Story Residence: One story  Living Alone: No  Support Systems: Child(brit)  Patient Expects to be Discharged to[de-identified] Private residence  Current DME Used/Available at Home: Oxygen, portable, Walker, rolling  Prior Level of Function/Work/Activity:  Patient reports she has been functioning in her home environment with use of a r/walker and furniture walking at times prior to this admission. She reports she is on 3L of O2 at home. Number of Personal Factors/Comorbidities that affect the Plan of Care: 3+: HIGH COMPLEXITY   EXAMINATION:   Most Recent Physical Functioning:   Gross Assessment:  AROM: Grossly decreased, non-functional  Strength: Grossly decreased, non-functional               Posture:  Posture (WDL): Exceptions to WDL  Posture Assessment: Forward head, Rounded shoulders  Balance:  Sitting: Intact  Standing: Impaired Bed Mobility:     Wheelchair Mobility:     Transfers:  Sit to Stand: Minimum assistance  Stand to Sit: Minimum assistance  Bed to Chair: Minimum assistance  Gait:     Distance (ft): 25 Feet (ft)  Assistive Device: Walker, rolling  Ambulation - Level of Assistance: Minimal assistance      Body Structures Involved:  1. Lungs  2.  Metabolic Body Functions Affected:  1. Respiratory  2. Metobolic/Endocrine Activities and Participation Affected:  1. General Tasks and Demands  2. Mobility  3. Self Care   Number of elements that affect the Plan of Care: 3: MODERATE COMPLEXITY   CLINICAL PRESENTATION:   Presentation: Evolving clinical presentation with changing clinical characteristics: MODERATE COMPLEXITY   CLINICAL DECISION MAKIN [facility] Mobility Inpatient Short Form  How much difficulty does the patient currently have. .. Unable A Lot A Little None   1. Turning over in bed (including adjusting bedclothes, sheets and blankets)? [] 1   [] 2   [] 3   [x] 4   2. Sitting down on and standing up from a chair with arms ( e.g., wheelchair, bedside commode, etc.)   [] 1   [] 2   [x] 3   [] 4   3. Moving from lying on back to sitting on the side of the bed? [] 1   [] 2   [x] 3   [] 4   How much help from another person does the patient currently need. .. Total A Lot A Little None   4. Moving to and from a bed to a chair (including a wheelchair)? [] 1   [] 2   [x] 3   [] 4   5. Need to walk in hospital room? [] 1   [] 2   [x] 3   [] 4   6. Climbing 3-5 steps with a railing? [] 1   [x] 2   [] 3   [] 4   © , Trustees of [address] [place] [address], under license to [employer] All rights reserved      Score:  Initial: 18 Most Recent: X (Date: -- )    Interpretation of Tool:  Represents activities that are increasingly more difficult (i.e. Bed mobility, Transfers, Gait). Score 24 23 22-20 19-15 14-10 9-7 6     Modifier CH CI CJ CK CL CM CN      ?  Mobility - Walking and Moving Around:     - CURRENT STATUS: CK - 40%-59% impaired, limited or restricted    - GOAL STATUS: CJ - 20%-39% impaired, limited or restricted    - D/C STATUS:  ---------------To be determined---------------  [place] / [place] / Product Type: Medicare /      Medical Necessity:     · Patient is expected to demonstrate progress in strength, balance and functional technique to decrease assistance required with bed mobility, standing transfers and gait with the r/walker. Reason for Services/Other Comments:  · Patient continues to require skilled intervention due to medical complications. Use of outcome tool(s) and clinical judgement create a POC that gives a: Clear prediction of patient's progress: LOW COMPLEXITY            TREATMENT:   (In addition to Assessment/Re-Assessment sessions the following treatments were rendered)   Pre-treatment Symptoms/Complaints:  Patient had no complaints of pain in therapy today just fatigues easily  Pain: Initial:   Pain Intensity 1: 0  Post Session:  0/10     Therapeutic Activity: (    23 minutes): Therapeutic activities including Chair transfers and Ambulation on level ground to improve mobility, strength, balance and coordination. Required minimal A   to promote motor control of bilateral, lower extremity(s). Braces/Orthotics/Lines/Etc:   · O2 Device: Nasal cannula - 3L with O2 sats at 97% at rest.   Treatment/Session Assessment:    · Response to Treatment:  Patient participated and tolerated therapy well today but fatigues quickly with exertion tasks. · Interdisciplinary Collaboration:   o Physical Therapist  o Registered Nurse  · After treatment position/precautions:   o Supine in bed  o Bed alarm/tab alert on  o Bed/Chair-wheels locked  o Bed in low position  o Call light within reach  o Family at bedside  o Nurse at bedside   · Compliance with Program/Exercises: Will assess as treatment progresses. · Recommendations/Intent for next treatment session: \"Next visit will focus on advancements to more challenging activities and reduction in assistance provided\".   Total Treatment Duration:  PT Patient Time In/Time Out  Time In: 0850  Time Out: [address], PT

## 2018-02-09 NOTE — PROGRESS NOTES
Verbal bedside report given to oncoming RNPatrizia. Patient's situation, background, assessment and recommendations provided. Opportunity for questions provided. Oncoming RN assumed care of patient.

## 2018-02-09 NOTE — PROGRESS NOTES
Pt sat up on side of bed for thoracentesis. Consent obtained. Time out performed. Pts vitals monitored throughout procedure. Left and right ultrasound done and pic taken of pleural fluid. 550 ml pleural fluid from left side removed. 1000 pleural fluid from right side removed. Pt tolerated procedure well with no adverse rxn. Specimens sent to the lab from right side x 3 and labeled appropriately per Dr Kurt Garcia. Site dressed appropriately and report given to pts RN.

## 2018-02-09 NOTE — PROCEDURES
US Guided Thoracentesis Procedure Note--UNILATERAL    Time Out -- Correct patient identified and Everyone Agrees. Procedure:  Right Thoracentesis with ultrasound guidance    Indication:  Right  Pleural effusion     Summary:    After informed consent was obtained, the patient was positioned upright in the usual fashion.  Ultrasound was used to identify the optimal spot for pleural drainage.     The right  intercostal space was anesthetized with 1% Lidocaine to achieve adequate anesthesia.  The pleural space was entered with creamy pink fluid identified.  Lidocaine was instilled within the pleural space as well.  A small stab incision was made at the site of local anesthesia and the thoracentesis catheter was advanced into the pleural space. Approximately 1000 ml of fluid was obtained with ease.  The procedure was terminated after pleural drainage stopped. Air was not aspirated during the procedure.  A bandaid was placed over the incision after adequate hemostasis was achieved.  A CXR is pending.     EBL -- 2 drop    Patient stable post procedure    The pleural fluid will be sent for :protein, LDH, cytology, cell count, AFB, cholesterol and chylomyicons     Signed By: Maia Son MD

## 2018-02-09 NOTE — PROGRESS NOTES
Date of Surgery Update:  Swathi Houston was seen and examined.   No change since just seen      Signed By: Maia Son MD     February 9, 2018 4:53 PM

## 2018-02-09 NOTE — PROGRESS NOTES
Problem: Falls - Risk of  Goal: *Absence of Falls  Document Gregorio Fall Risk and appropriate interventions in the flowsheet.    Outcome: Progressing Towards Goal  Fall Risk Interventions:  Mobility Interventions: Patient to call before getting OOB         Medication Interventions: Teach patient to arise slowly    Elimination Interventions: Call light in reach

## 2018-02-09 NOTE — PROGRESS NOTES
Patient considering rehab would like bed at [facility] and spoke with Merlene Miramontes at admission and they have beds so referral sent for their review. D/c plan is SNF awaiting bed offer.

## 2018-02-10 NOTE — PROGRESS NOTES
Verbal bedside report given to Learta Halsted, oncoming RN. Patient's situation, background, assessment and recommendations provided. Opportunity for questions provided. Oncoming RN assumed care of patient.

## 2018-02-10 NOTE — PROGRESS NOTES
Problem: Falls - Risk of  Goal: *Absence of Falls  Document Gregorio Fall Risk and appropriate interventions in the flowsheet. Outcome: Progressing Towards Goal  Fall Risk Interventions:  Mobility Interventions: Patient to call before getting OOB      Pt progressing towards goal. No falls since admission. Bed low and locked. Call light within reach. Side rails x 2. Gripper socks applied. Personal belongings within reach. Pt verbalizes understanding to call for assistance.    Medication Interventions: Teach patient to arise slowly    Elimination Interventions: Call light in reach

## 2018-02-10 NOTE — PROGRESS NOTES
Fracnine Mcnulty  Admission Date: 2/7/2018             Daily Progress Note: 2/10/2018    The patient's chart is reviewed and the patient is discussed with the staff. 70 yo female with a history of HTN, hypothyroid, chronic dCHF, chronic a.fib, CAD, SSS s/p PPM, COPD, chronic respiratory failure maintained on o2 at 3 lpm, and TAVR on 1/30/18. She presented to the ER with increased sob, CXR findings consistent with pulmonary edema and worsening bilateral effusions. She required BIPAP in the ER but diuresed with lasix and was weaned to [place]. She reported subjective fevers, WBC elevated at 18 and abx were started. We were consulted for possible thoracentesis. Coumadin was held    Subjective:     Currently on O2 at 3 lpm with o2 sat 100%. Cough is only minimally productive - does not feel she has much to cough up. Denies chest pain.   JOSE castellano     Current Facility-Administered Medications   Medication Dose Route Frequency    apixaban (ELIQUIS) tablet 5 mg  5 mg Oral Q12H    furosemide (LASIX) tablet 80 mg  80 mg Oral Q12H    metoprolol succinate (TOPROL-XL) XL tablet 50 mg  50 mg Oral DAILY    tuberculin injection 5 Units  5 Units IntraDERMal ONCE    sodium chloride (NS) flush 5 mL  5 mL InterCATHeter Q8H    sodium chloride (NS) flush 5-10 mL  5-10 mL InterCATHeter PRN    allopurinol (ZYLOPRIM) tablet 100 mg  100 mg Oral BID    hydrocortisone (ANUSOL-HC) 2.5 % rectal cream   PeriANAL TID PRN    levothyroxine (SYNTHROID) tablet 88 mcg  88 mcg Oral ACB    pantoprazole (PROTONIX) tablet 40 mg  40 mg Oral ACB    polyethylene glycol (MIRALAX) packet 17 g  17 g Oral DAILY    pravastatin (PRAVACHOL) tablet 40 mg  40 mg Oral DAILY    rOPINIRole (REQUIP) tablet 2 mg  2 mg Oral BID    sertraline (ZOLOFT) tablet 100 mg  100 mg Oral DAILY    spironolactone (ALDACTONE) tablet 25 mg  25 mg Oral DAILY    alcohol 62% (NOZIN) nasal  1 Ampule  1 Ampule Topical Q12H    vancomycin (VANCOCIN) 1250 mg in  ml infusion  1,250 mg IntraVENous Q24H    0.9% sodium chloride infusion 250 mL  250 mL IntraVENous PRN    diazePAM (VALIUM) tablet 5 mg  5 mg Oral QHS    acetaminophen (TYLENOL) tablet 650 mg  650 mg Oral Q4H PRN    sodium chloride (NS) flush 5-10 mL  5-10 mL IntraVENous Q8H    sodium chloride (NS) flush 5-10 mL  5-10 mL IntraVENous PRN    cefTRIAXone (ROCEPHIN) 1 g in 0.9% sodium chloride (MBP/ADV) 50 mL  1 g IntraVENous Q24H     Facility-Administered Medications Ordered in Other Encounters   Medication Dose Route Frequency    0.9% sodium chloride infusion 250 mL  250 mL IntraVENous PRN       Review of Systems  Constitutional: negative for fever, chills, sweats  Cardiovascular: negative for chest pain, palpitations, syncope, +++edema  Gastrointestinal:  negative for dysphagia, reflux, vomiting, diarrhea, abdominal pain, or melena  Neurologic:  negative for focal weakness, numbness, headache    Objective:     Vitals:    02/09/18 2105 02/10/18 0042 02/10/18 0414 02/10/18 0940   BP: 111/59 109/56 103/55 143/55   Pulse: 82 82 82 88   Resp: 18 21 19 18   Temp: 97.3 °F (36.3 °C) 97.6 °F (36.4 °C) 97.4 °F (36.3 °C) 97.1 °F (36.2 °C)   SpO2: 100% 99% 100% 100%   Weight:   173 lb 3.2 oz (78.6 kg)    Height:         Intake and Output:   02/08 1901 - 02/10 0700  In: 140 [P.O.:140]  Out: 1900 [Urine:1900]       Physical Exam:   Constitution:  the patient is well developed and in no acute distress  EENMT:  Sclera clear, pupils equal, oral mucosa moist  Respiratory: diminished with crackles bilateral posterior bases, o2 at 3 lpm  Cardiovascular:  RRR without M,G,R  Gastrointestinal: soft and non-tender; with positive bowel sounds. Musculoskeletal: warm without cyanosis.  There is 2+ lower leg edema. / 2+ UE edema  Skin:  no jaundice or rashes, no open wounds   Neurologic: no gross neuro deficits     Psychiatric:  alert and oriented x 3    CXR:   2/9      LAB   Recent Labs      02/10/18   0406 02/09/18   0408  02/08/18   0442  02/07/18   1732   WBC  5.9  8.5  12.7*  18.1*   HGB  9.8*  10.3*  7.1*  8.1*   HCT  32.5*  34.3*  23.7*  26.8*   PLT  99*  78*  84*  87*   INR  1.2  1.7  6.4*  5.9*     Recent Labs      02/10/18   0406  02/09/18   0408  02/08/18   0442  02/07/18   1732   NA  146*  146*  146*  144   K  3.5  3.9  3.5  3.6   CL  102  101  102  100   CO2  39*  39*  39*  40*   GLU  91  83  91  108*   BUN  39*  48*  44*  45*   CREA  0.98  1.06*  1.00  1. 10*   MG  2.1  2.1  2.1   --    CA  7.7*  8.4  8.3  8.6   ALB   --    --    --   2.0*   TBILI   --    --    --   0.7   ALT   --    --    --   22   SGOT   --    --    --   60*     Recent Labs      02/07/18   2315  02/07/18   1920   PH  7.43  7.41   PCO2  63*  65*   PO2  101  82   HCO3  41*  41*     Recent Labs      02/07/18   2241   LAC  0.7         Assessment:  (Medical Decision Making)     Hospital Problems  Date Reviewed: 2/9/2018          Codes Class Noted POA    * (Principal)Acute on chronic respiratory failure ([facility] [address] ICD-10-CM: J96.20  ICD-9-CM: 518.84  2/7/2018 Yes    Currently on home dose o2      Leukocytosis ICD-10-CM: D72.829  ICD-9-CM: 288.60  2/7/2018 Yes    WBC 18.1 >>> 5.9      Acute on chronic diastolic heart failure (HCC) ICD-10-CM: I50.33  ICD-9-CM: 428.33  2/7/2018 Yes    Remains edematous  Lasix / aldactone  -1350 ml / 24 hours - has [place] catheter  Albumin 2        S/P TAVR (transcatheter aortic valve replacement) ICD-10-CM: Z95.2  ICD-9-CM: V43.3  1/30/2018 Yes    TAVR on 1/30/18      Pleural effusion ICD-10-CM: J90  ICD-9-CM: 511.9  1/23/2018 Yes     Right thoracentesis 1/25/18 - 1200 mls removed  Left thoracentesis 1/25/2018 - 900 mls removed  Bilateral thoracentesis 2/9/18 - L 550 ml (air aspirated during and at the end of procedure) / R 1000 ml and R creamy pink            Pulmonary hypertension (Chronic) ICD-10-CM: I27.20  ICD-9-CM: 416.8  2/12/2017 Yes        HTN (hypertension) (Chronic) ICD-10-CM: I10  ICD-9-CM: 401.9  8/5/2016 Yes        Anemia (Chronic) ICD-10-CM: D64.9  ICD-9-CM: 285.9  10/27/2011 Yes     Acute on chronic  Improved to 9.8 post transfusion         Chronic atrial fibrillation (HCC) (Chronic) ICD-10-CM: I48.2  ICD-9-CM: 427.31  10/17/2011 Yes    Rate controlled      Hypothyroidism (Chronic) ICD-10-CM: E03.9  ICD-9-CM: 244.9  12/4/2009 Yes         Plt 99    Plan:  (Medical Decision Making)     --Rocephin / Vanc   WBC 18.1 >>> 5.9   BC/UC NGTD   Pleural fluid cx NGTD / fluid studies pending  --lasix 80 mg po q 12 hours / aldactone   1350 ml/24 hours with overall negative fluid balance  --transitioned to Eliquis per Cardiology    More than 50% of the time documented was spent in face-to-face contact with the patient and in the care of the patient on the floor/unit where the patient is located. Lieutenant Heal, NP     Lungs: less crackles  Heart:  RRR with no Murmur/Rubs/Gallops    Additional Comments: The effusion is transudate and likely related to CHF. Will stop Vancomycin. Results for Tonya Lepe (MRN [id number]) as of 2/10/2018 14:34   Ref. Range 1/25/2018 14:20 1/25/2018 14:30 2/9/2018 16:50   BODY FLUID TYPE Latest Units:   PLEURAL FLUID  PLEURAL FLUID   FLUID APPEARANCE Latest Units:   CLEAR  CLOUDY   FLUID COLOR Latest Units:   YELLOW  LIGHT   FLUID RBC CT. Latest Units: /cu mm <59392  60485   FLUID WBC COUNT Latest Units: /cu mm 125  <100   FLD NEUTROPHILS Latest Units: % 56     FLD LYMPHS Latest Units: % 44     Fluid Type: Latest Units:   PLEURAL FLUID PLEURAL FLUID PLEURAL FLUID   Protein total, body fld. Latest Units: g/dL <0.8 <0.8 PENDING   Fluid Type: Latest Units:   PLEURAL FLUID  PLEURAL FLUID   Glucose, body fld. Latest Units: MG/  PENDING   Fluid Type: Latest Units:   PLEURAL FLUID PLEURAL FLUID PLEURAL FLUID   LD, body fld. Latest Units: U/L 47 42 PENDING         I have spoken with and examined the patient. I agree with the above assessment and plan as documented.     Kimmy Dominique, MD

## 2018-02-10 NOTE — PROGRESS NOTES
[facility] CARDIOLOGY PROGRESS NOTE           2/10/2018 7:30 AM    Admit Date: 2/7/2018      Subjective:   Patient near baseline this AM.  She is now on 3L NCO2. Hgb is stable and INR is 1.2.    ROS:  Cardiovascular:  As noted above    Objective:      Vitals:    02/09/18 2105 02/10/18 0042 02/10/18 0414 02/10/18 0940   BP: 111/59 109/56 103/55 143/55   Pulse: 82 82 82 88   Resp: 18 21 19 18   Temp: 97.3 °F (36.3 °C) 97.6 °F (36.4 °C) 97.4 °F (36.3 °C) 97.1 °F (36.2 °C)   SpO2: 100% 99% 100% 100%   Weight:   78.6 kg (173 lb 3.2 oz)    Height:           Physical Exam:  General-No Acute Distress  Neck- supple, no JVD  CV- regular rate and rhythm no MRG  Lung- clear bilaterally  Abd- soft, nontender, nondistended  Ext- no edema bilaterally. Skin- warm and dry    Data Review:   Recent Labs      02/10/18   0406  02/09/18   0408   NA  146*  146*   K  3.5  3.9   MG  2.1  2.1   BUN  39*  48*   CREA  0.98  1.06*   GLU  91  83   WBC  5.9  8.5   HGB  9.8*  10.3*   HCT  32.5*  34.3*   PLT  99*  78*   INR  1.2  1.7       Assessment/Plan:     Principal Problem:    Acute respiratory failure (HCC) (2/7/2018)    Stable on PO lasix. Continue antibiotics and appreciate pulmonary assistance. S/P bilateral thoracentesis    Active Problems:    Hypothyroidism (12/4/2009)    This is stable on therapy      Chronic atrial fibrillation ([address] (10/17/2011)    S/P Vitamin K and will start Eliquis today. Patient had severe hemrrhoidal bleed per her son with labile INR. She may do best with NOAC      Anemia (10/27/2011)    Transfused 2 units and stable       HTN (hypertension) (8/5/2016)    This is well controlled      Pulmonary hypertension (2/12/2017)    Chronic and stable       Pleural effusion (1/23/2018)    As above      S/P TAVR (transcatheter aortic valve replacement) (1/30/2018)    Exam is normal.  She has recovered well with diuresis. Monitor for now.         Leukocytosis (2/7/2018)    On ABx and monitor BLD clx      Acute on chronic diastolic heart failure ([facility] [address] (2/7/2018)    As above     PT and consider Court MD Ondina  2/10/2018 7:30 AM

## 2018-02-10 NOTE — PROGRESS NOTES
Bedside and Verbal shift change report given to self (oncoming nurse) by Lona Bhatia (offgoing nurse). Report included the following information SBAR, Kardex, MAR and Recent Results.

## 2018-02-10 NOTE — PROGRESS NOTES
Bedside and Verbal shift change report given to Selvin Funes RN (oncoming nurse) by self [facility]). Report included the following information SBAR, Kardex, MAR and Recent Results.

## 2018-02-10 NOTE — PROGRESS NOTES
Verbal bedside report given to oncoming RN, Sarah Parker . Patient's situation, background, assessment and recommendations provided. Opportunity for questions provided. Oncoming RN assumed care of patient.

## 2018-02-11 NOTE — PROGRESS NOTES
Problem: Falls - Risk of  Goal: *Absence of Falls  Document Gregorio Fall Risk and appropriate interventions in the flowsheet. Outcome: Progressing Towards Goal  Fall Risk Interventions:  Mobility Interventions: Bed/chair exit alarm, Communicate number of staff needed for ambulation/transfer, Patient to call before getting OOB       Pt progressing towards goal. No falls since admission. Bed low and locked. Call light within reach. Side rails x 2. Gripper socks applied. Personal belongings within reach. Pt verbalizes understanding to call for assistance.    Bed alarm on     Medication Interventions: Bed/chair exit alarm, Patient to call before getting OOB, Teach patient to arise slowly    Elimination Interventions: Bed/chair exit alarm, Call light in reach, Patient to call for help with toileting needs, Toilet paper/wipes in reach

## 2018-02-11 NOTE — PROGRESS NOTES
Problem: Falls - Risk of  Goal: *Absence of Falls  Document Gregorio Fall Risk and appropriate interventions in the flowsheet. Outcome: Progressing Towards Goal  Fall Risk Interventions:  Mobility Interventions: Bed/chair exit alarm, Communicate number of staff needed for ambulation/transfer, Patient to call before getting OOB      Pt progressing towards goal. No falls since admission. Bed low and locked. Call light within reach. Side rails x 2. Gripper socks applied. Personal belongings within reach. Pt verbalizes understanding to call for assistance.      Medication Interventions: Bed/chair exit alarm, Patient to call before getting OOB, Teach patient to arise slowly    Elimination Interventions: Bed/chair exit alarm, Call light in reach, Patient to call for help with toileting needs, Toilet paper/wipes in reach

## 2018-02-11 NOTE — PROGRESS NOTES
Bedside and Verbal shift change report given to Tylor Jones RN (oncoming nurse) by self (offgoing nurse). Report included the following information SBAR, Kardex, MAR and Recent Results.

## 2018-02-11 NOTE — PROGRESS NOTES
Bedside and Verbal shift change report given to self (oncoming nurse) by Helga Block RN (offgoing nurse). Report included the following information SBAR, Kardex, MAR and Recent Results.

## 2018-02-11 NOTE — PROGRESS NOTES
Problem: Mobility Impaired (Adult and Pediatric)  Goal: *Acute Goals and Plan of Care (Insert Text)  Goals:  (1.)Ms. Ry Quinn will move from supine to sit and sit to supine , scoot up and down and roll side to side with INDEPENDENT within 7 day(s). (2.)Ms. Ry Quinn will transfer from bed to chair and chair to bed with MODIFIED INDEPENDENCE using the least restrictive device within 7 day(s). (3.)Ms. Ry Quinn will ambulate with SUPERVISION for 50-75 feet with the least restrictive device within 7 day(s). PHYSICAL THERAPY: Daily Note, Treatment Day: 2nd, PM 2/11/2018  INPATIENT: Hospital Day: 5  [place] / [place] / Product Type: Medicare /      NAME/AGE/GENDER: Geraldo Chahal is a 69 y.o. female   PRIMARY DIAGNOSIS: Pleural effusion [J90]  Pleural effusion [J90] Acute on chronic respiratory failure (HCC) Acute on chronic respiratory failure (HCC)  Procedure(s) (LRB):  ULTRASOUND (Bilateral)  THORACENTESIS (Bilateral)  2 Days Post-Op  ICD-10: Treatment Diagnosis:   · Generalized Muscle Weakness (M62.81)  · Difficulty in walking, Not elsewhere classified (R26.2)  · Other abnormalities of gait and mobility (R26.89)   Precaution/Allergies:  Adhesive tape; Benadryl [diphenhydramine hcl]; Demerol [meperidine]; Morphine; Sulfa (sulfonamide antibiotics); and Lorazepam      ASSESSMENT:     Ms. Ry Quinn is happy to participate. She claims her son is struggling to help take care of her so she will need to go to rehab. She was able to sit up from the bed without assistance and stand into the walker. She walked over to the bathroom and used the toilet independently. After she cleaned up she was able to walk about 40 feet total.  She is off balance, very slow and really unsafe to be mobilizing alone. Rehab really is the best option for her right now. She participated well and left up in the chair to visit with friends. Some progress toward goals made.     This section established at most recent assessment   PROBLEM LIST (Impairments causing functional limitations):  1. Decreased Strength  2. Decreased Transfer Abilities  3. Decreased Ambulation Ability/Technique  4. Decreased Activity Tolerance  5. Increased Fatigue  6. Increased Shortness of Breath   INTERVENTIONS PLANNED: (Benefits and precautions of physical therapy have been discussed with the patient.)  1. Bed Mobility  2. Gait Training  3. Therapeutic Activites  4. Therapeutic Exercise/Strengthening  5. Transfer Training     TREATMENT PLAN: Frequency/Duration: 3 times a week for duration of hospital stay  Rehabilitation Potential For Stated Goals: Good     RECOMMENDED REHABILITATION/EQUIPMENT: (at time of discharge pending progress): Due to the probability of continued deficits (see above) this patient will likely need continued skilled physical therapy after discharge. Equipment:    None at this time              HISTORY:   History of Present Injury/Illness (Reason for Referral):  Alex Knight is a 69 y.o. female with past medical history of recent TAVR, HTN, CAD, chronic AFIB on warfarin, anemia, anxiety, and chronic diastolic heart failure who presented to the ER via EMS with complaints of worsening swelling and shortness of breath. Associated symptoms includes difficulty urinating, frequency and subjective fever. Patient was discharged from this facility on 2/3/18 after TAVR procedure on 1/30/18. Per patient and family present at the bedside, patient did well after getting home. The past 2 days, she has been more short of breath. Upon arrival to the ER, CXR shows cardiomegaly with finding consistent with pulmonary edema and worsening bilateral pleural effusions. Labs show WBC on 18.1 with neutrophil 96 and procalcitonin of 1.4. While in the ER she was placed on BiPAP and given 80mg IV lasix. Past Medical History/Comorbidities:   Ms. Dorothy Lehman  has a past medical history of Abnormal glucose (8/5/2016); Abscess (8/5/2016);  Acute encephalopathy (7/8/2016); Acute renal failure ([address] (12/3/2009); Afib ([address] Anemia; Ankle swelling (8/5/2016); Anxiety (8/5/2016); Aortic Valve Bioprosthesis Present (3/7/2009); ARF (acute renal failure) ([address] (2/24/2010); Arthritis; Asthma; Atopic dermatitis (8/5/2016); Atrial fibrillation ([address] (3/7/2009); Autonomic orthostatic hypotension (8/5/2016); AV block (10/17/2011); Back pain (8/5/2016); Bradycardia (Symptomatic) (11/7/2011); CAD (coronary artery disease); Cancer ([address] (1990); Cardiac pacemaker (11/2/2015); Cardiogenic shock ([address] (10/17/2011); 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 (8/5/2016); Cellulitis (8/5/2016); Chest pain (8/5/2016); Chronic atrial fibrillation ([address] (10/17/2011); Chronic depression (8/5/2016); Chronic obstructive pulmonary disease ([address] (3/8/2009); Chronic pain; CKD (chronic kidney disease) stage 3, GFR 30-59 ml/min (12/4/2009); Congestive heart failure (CHF) ([address] (11/2/2015); Degeneration of cervical intervertebral disc (8/5/2016); Degenerative arthritis of left knee (2/27/2009); Dehydration (8/5/2016); Diastolic heart failure ([address] Digoxin toxicity (2/24/2010); Disorder of sweat glands (8/5/2016); Diverticulosis of large intestine without diverticulitis (2015); Dyspnea (8/5/2016); Eczema (8/5/2016); Embolus of femoral artery ([address] (12/4/2017); Epigastric abdominal pain (2/24/2010); GERD (gastroesophageal reflux disease); Gout (8/5/2016); H/O mitral valve repair, 2003 (6/27/2016); Heart failure ([facility] colonic polyp (2015); Hyperkalemia (10/17/2011); Hyperlipidemia (8/5/2016); Hypertension; Hypokalemia (2/24/2010); Hypothyroidism (12/4/2009); Ill-defined condition; Knee pain (8/5/2016); Leg cramps (8/5/2016); Mitral stenosis with insufficiency (11/2/2015); Nausea and vomiting (2/24/2010); Obesity (11/2/2015); BOBBY (obstructive sleep apnea) (12/4/2009); Osteoarthritis (8/5/2016); Osteopenia (8/5/2016); Other long term (current) drug therapy (8/5/2016);  Palpitations (11/2/2015); Rash (8/5/2016); Rectal bleeding (10/27/2011); Rectocele (2015); Recurrent depression ([facility] (1/4/2018); 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 Ms. Shaylee Saul  has a past surgical history that includes hx appendectomy; hx cholecystectomy (2005); hx gyn (1981); hx mastectomy (1990); hx pacemaker; hx breast reconstruction; pr cardiac surg procedure unlist; hx orthopaedic; vascular surgery procedure unlist (Right, 02/20/2017); and pr chest surgery procedure unlisted. Social History/Living Environment:   Home Environment: Private residence  # Steps to Enter: 1  One/Two Story Residence: One story  Living Alone: No  Support Systems: Child(brit)  Patient Expects to be Discharged to[de-identified] Private residence  Current DME Used/Available at Home: Oxygen, portable, Walker, rolling  Prior Level of Function/Work/Activity:  Patient reports she has been functioning in her home environment with use of a r/walker and furniture walking at times prior to this admission. She reports she is on 3L of O2 at home. Number of Personal Factors/Comorbidities that affect the Plan of Care: 3+: HIGH COMPLEXITY   EXAMINATION:   Most Recent Physical Functioning:   Gross Assessment:                  Posture:     Balance:    Bed Mobility:  Supine to Sit: Modified independent  Scooting: Modified independent  Wheelchair Mobility:     Transfers:  Sit to Stand: Contact guard assistance  Stand to Sit: Contact guard assistance  Gait:     Speed/Carol: Shuffled; Slow  Distance (ft): 40 Feet (ft)  Assistive Device: Walker, rolling  Ambulation - Level of Assistance: Contact guard assistance      Body Structures Involved:  1. Lungs  2.  Metabolic Body Functions Affected:  1. Respiratory  2. Metobolic/Endocrine Activities and Participation Affected:  1. General Tasks and Demands  2. Mobility  3. Self Care   Number of elements that affect the Plan of Care: 3: MODERATE COMPLEXITY   CLINICAL PRESENTATION:   Presentation: Evolving clinical presentation with changing clinical characteristics: MODERATE COMPLEXITY   CLINICAL DECISION MAKIN [facility] Mobility Inpatient Short Form  How much difficulty does the patient currently have. .. Unable A Lot A Little None   1. Turning over in bed (including adjusting bedclothes, sheets and blankets)? [] 1   [] 2   [] 3   [x] 4   2. Sitting down on and standing up from a chair with arms ( e.g., wheelchair, bedside commode, etc.)   [] 1   [] 2   [x] 3   [] 4   3. Moving from lying on back to sitting on the side of the bed? [] 1   [] 2   [x] 3   [] 4   How much help from another person does the patient currently need. .. Total A Lot A Little None   4. Moving to and from a bed to a chair (including a wheelchair)? [] 1   [] 2   [x] 3   [] 4   5. Need to walk in hospital room? [] 1   [] 2   [x] 3   [] 4   6. Climbing 3-5 steps with a railing? [] 1   [x] 2   [] 3   [] 4   © , Trustees of [address] [place] [address], under license to [employer]. All rights reserved      Score:  Initial: 18 Most Recent: X (Date: -- )    Interpretation of Tool:  Represents activities that are increasingly more difficult (i.e. Bed mobility, Transfers, Gait). Score 24 23 22-20 19-15 14-10 9-7 6     Modifier CH CI CJ CK CL CM CN      ?  Mobility - Walking and Moving Around:     - CURRENT STATUS: CK - 40%-59% impaired, limited or restricted    - GOAL STATUS: CJ - 20%-39% impaired, limited or restricted    - D/C STATUS:  ---------------To be determined---------------  [place] / [place] / Product Type: Medicare /      Medical Necessity:     · Patient is expected to demonstrate progress in strength, balance and functional technique to decrease assistance required with bed mobility, standing transfers and gait with the r/walker. Reason for Services/Other Comments:  · Patient continues to require skilled intervention due to medical complications. Use of outcome tool(s) and clinical judgement create a POC that gives a: Clear prediction of patient's progress: LOW COMPLEXITY            TREATMENT:   (In addition to Assessment/Re-Assessment sessions the following treatments were rendered)   Pre-treatment Symptoms/Complaints:  none  Pain: Initial:   Pain Intensity 1: 0  Post Session:  0/10     Therapeutic Activity: (    23 minutes): Therapeutic activities including bed transfers, toilet transfers, Chair transfers and Ambulation on level ground to improve mobility, strength, balance and coordination. Required minimal A   to promote motor control of bilateral, lower extremity(s). Braces/Orthotics/Lines/Etc:   · O2 Device: Nasal cannula - 3L with O2 sats at 97% at rest.   Treatment/Session Assessment:    · Response to Treatment:  Patient participated and tolerated therapy well today but fatigues quickly with exertion tasks. · Interdisciplinary Collaboration:   o Physical Therapy Assistant  o Registered Nurse  · After treatment position/precautions:   o Up in chair  o Bed/Chair-wheels locked  o Bed in low position  o Call light within reach  o RN notified  o Family at bedside   · Compliance with Program/Exercises: Will assess as treatment progresses. · Recommendations/Intent for next treatment session: \"Next visit will focus on advancements to more challenging activities and reduction in assistance provided\".   Total Treatment Duration:  PT Patient Time In/Time Out  Time In: 1320  Time Out: 1345    Minh Lieberman PTA

## 2018-02-11 NOTE — PROGRESS NOTES
[facility] CARDIOLOGY PROGRESS NOTE           2/11/2018 7:30 AM    Admit Date: 2/7/2018      Subjective:   Patient near baseline this AM.  She is now on 3L NCO2. Hgb is stable and INR is 1.2.    ROS:  Cardiovascular:  As noted above    Objective:      Vitals:    02/10/18 2100 02/11/18 0022 02/11/18 0451 02/11/18 0903   BP: 130/82 105/67 114/77 108/53   Pulse: 84 83 83 82   Resp: 20 18 17 16   Temp: 97.3 °F (36.3 °C) 97.5 °F (36.4 °C) 97.5 °F (36.4 °C) 96.2 °F (35.7 °C)   SpO2: 100% 100% 100% 100%   Weight:   78.9 kg (174 lb)    Height:           Physical Exam:  General-No Acute Distress  Neck- supple, no JVD  CV- regular rate and rhythm no MRG  Lung- clear bilaterally  Abd- soft, nontender, nondistended  Ext- no edema bilaterally. Skin- warm and dry    Data Review:   Recent Labs      02/11/18   0354  02/10/18   0406  02/09/18   0408   NA  143  146*  146*   K  3.1*  3.5  3.9   MG  2.1  2.1  2.1   BUN  38*  39*  48*   CREA  0.96  0.98  1.06*   GLU  90  91  83   WBC   --   5.9  8.5   HGB   --   9.8*  10.3*   HCT   --   32.5*  34.3*   PLT   --   99*  78*   INR  1.5  1.2  1.7       Assessment/Plan:     Principal Problem:    Acute respiratory failure (HCC) (2/7/2018)    Stable on PO lasix. Continue antibiotics and appreciate pulmonary assistance. S/P bilateral thoracentesis    Active Problems:    Hypothyroidism (12/4/2009)    This is stable on therapy      Chronic atrial fibrillation ([address] (10/17/2011)    S/P Vitamin K and now on Eliquis. Patient had severe hemorrhoidal bleed per her son with labile INR.   She will hopefully will do better with NOAC      Anemia (10/27/2011)    Transfused 2 units and stable       HTN (hypertension) (8/5/2016)    This is well controlled      Pulmonary hypertension (2/12/2017)    Chronic and stable       Pleural effusion (1/23/2018)    As above      S/P TAVR (transcatheter aortic valve replacement) (1/30/2018)    Exam is normal.  She has recovered well with diuresis. Monitor for now.         Leukocytosis (2/7/2018)    On ABx and monitor BLD clx      Acute on chronic diastolic heart failure ([address] (2/7/2018)    As above     PT and consider Eliezer Asher MD  2/11/2018 7:30 AM

## 2018-02-12 NOTE — PROGRESS NOTES
Bedside and Verbal shift change report given to self (oncoming nurse) by Cornelio Orourke RN (offgoing nurse). Report included the following information SBAR, Kardex, MAR and Recent Results.

## 2018-02-12 NOTE — CONSULTS
Physical Medicine & Rehabilitation Note-consult    Patient: Peter Sinha MRN: [id number]  SSN: [id number]    YOB: 1940  Age: 69 y.o. Sex: female      Admit Date: 2/7/2018  Admitting Physician: Titus Szymanski MD    Medical Decision Making/Plan/Recommend: Functional deficit, mobility, gait impairment. Recommend  acute rehab at [facility]. Admission to inpatient rehab program is reasonable and necessary due to ongoing medical conditions; CHF, volume overload, atrial fibrillation, continued need for continued acute medical management daily. She will benefit from daily multi disciplinary  inpatient rehabilitation program and the availability of all the needed medical and nursing care. Continue acute PT, OT. Continue gait training, transfer training, balance activities, exercises to improve strength and balance to maximize ADLs and functional mobility. Thank you for the opportunity to participate in the care of this patient. Chief Complaint : Gait dysfunction secondary to below. Admit Diagnosis: Pleural effusion [J90]; Pleural effusion [J90]  Acute respiratory failure (HCC) (2/7/2018)  Pleural effusion (1/23/2018)/ S/P bilateral thoracentesis  Hypothyroidism   Chronic atrial fibrillation   HTN   Pulmonary hypertension   S/P TAVR(1/30/2018)  Leukocytosis (2/7/2018) on Abx   Acute on chronic diastolic heart failure   weakness  Pain  DVT risk  Acute blood loss anemia/ GI bleed? Acute Rehab Dx:  Generalized weakness.   Debility    deconditioning  Mobility and ambulation deficits  Self Care/ADL deficits    Medical Dx:  Past Medical History:   Diagnosis Date    Abnormal glucose 8/5/2016    Abscess 8/5/2016    Acute encephalopathy 7/8/2016    Acute renal failure ([address] 12/3/2009    Afib ([address]     Anemia     Ankle swelling 8/5/2016    Anxiety 8/5/2016    Aortic Valve Bioprosthesis Present 3/7/2009    ARF (acute renal failure) ([address] 2/24/2010    Arthritis     Asthma     Atopic dermatitis 8/5/2016    Atrial fibrillation (HCC) 3/7/2009    Autonomic orthostatic hypotension 8/5/2016    AV block 10/17/2011    Back pain 8/5/2016    Bradycardia (Symptomatic) 11/7/2011    CAD (coronary artery disease)     Cancer (HCC) 1990    breast (left)    Cardiac pacemaker 11/2/2015    Cardiogenic shock (HCC) 10/17/2011    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    Cellulitis 8/5/2016    Chest pain 8/5/2016    Chronic atrial fibrillation ([address] 10/17/2011    Chronic depression 8/5/2016    Chronic obstructive pulmonary disease ([address] 3/8/2009    Chronic pain     CKD (chronic kidney disease) stage 3, GFR 30-59 ml/min 12/4/2009    Congestive heart failure (CHF) ([address] 11/2/2015    Degeneration of cervical intervertebral disc 8/5/2016    Degenerative arthritis of left knee 2/27/2009    Dehydration 6/7/3466    Diastolic heart failure (HCC)     Digoxin toxicity 2/24/2010    Disorder of sweat glands 8/5/2016    Diverticulosis of large intestine without diverticulitis 2015    Dyspnea 8/5/2016    Eczema 8/5/2016    Embolus of femoral artery ([address] 12/4/2017    Epigastric abdominal pain 2/24/2010    GERD (gastroesophageal reflux disease)     Gout 8/5/2016    H/O mitral valve repair, 2003 6/27/2016    Heart failure ([address]     Hx of colonic polyp 2015    adenoma    Hyperkalemia 10/17/2011    Hyperlipidemia 8/5/2016    Hypertension     Hypokalemia 2/24/2010    Hypothyroidism 12/4/2009    Ill-defined condition     CARPEL TUNNEL SYNDROME, BILAT HANDS    Knee pain 8/5/2016    Leg cramps 8/5/2016    Mitral stenosis with insufficiency 11/2/2015    Prior MV repair 2003.      Nausea and vomiting 2/24/2010    Obesity 11/2/2015    BOBBY (obstructive sleep apnea) 12/4/2009    Osteoarthritis 8/5/2016    Osteopenia 8/5/2016    Other long term (current) drug therapy 8/5/2016    Palpitations 11/2/2015    Rash 8/5/2016    Rectal bleeding 10/27/2011    Rectocele 2015    Recurrent depression ([facility] 1/4/2018    Rheumatic aortic stenosis 11/2/2015    Right hip pain 8/5/2016    RLS (restless legs syndrome) 8/5/2016    Sick sinus syndrome ([facility] 2/13/2016    Skin infection 8/5/2016    Tachycardia 6/27/2016    Thrombocytopenia, unspecified 8/22/2012    Thromboembolus ([facility]     02/2017    Thyroid disease     Urticaria 8/5/2016    Vertigo 8/5/2016     Subjective:     Date of Evaluation:  February 12, 2018    HPI: Álvaro Jones is a 69 y.o. female patient at [address] [place] [address] who was admitted on 2/7/2018  by Zafar Suarez MD with below mentioned medical history ,is being seen for Physical Medicine and Rehabilitation consult. Álvaro Jones has a history of hypertension chronic diastolic CHF chronic atrial fibrillation and TAV are on 1/30/2018 was brought to the ER with increased shortness of breath, fatigue, and generalize weakness. Chest x-ray findings were consistent with pulmonary edema, worsening bilateral effusions. Patient was placed on BiPAP, and started on diuresis. Patient also had subjective fevers, WBCs were elevated 18K at admission. Patient was started on antibiotics. Cultures are being followed. Patient was noted to have elevated INR, 6.4. Patient was given vitamin K and Inr corrected. Patient states she also noted black stools last several days. She had hemoglobin level of 7.1. She required transfusions to correct her anemia. And patient requires continued monitoring for G.I. Bleed. patient underwent bilateral thoracentesis 2/9/18 for pleural effusions. Patient continued on Lasix for fluid overload and edema. We are consulted to assist with rehab needs and placement. Patient has started to participate in therapies with acute PT, OT. She shows significant generalized weakness, poor activity tolerance limiting her mobility, ambulation and ADLs to below her baseline. PT notes impaired balance, very slow, unsafe to mobilize alone.  She is managing her gait short distance with CGA. Adair Ba is seen and examined today. Medical Records reviewed. Current Level of Function:   bed mobility - mod I , transfers  CGA, decreased balance , ambulation - 36' with RW and CGA. Prior Level of Function/Work/Activity:  Patient reports she has been functioning in her home environment independently with use of RW and furniture walking at times prior to this admission. She reports she is on 3L of O2 at home.       Family History   Problem Relation Age of Onset    Heart Disease Mother     Cancer Father      Throat    Heart Disease Father     Cancer Sister      Breast, Colon    Heart Disease Sister     Breast Cancer Sister 79      from disease    Cancer Maternal Grandmother     Cancer Brother     Diabetes Brother     Heart Disease Brother     Psychiatric Disorder Paternal Grandfather     Cancer Maternal Aunt      Breast    Diabetes Son     Alcohol abuse Neg Hx     Arthritis-rheumatoid Neg Hx     Asthma Neg Hx     Bleeding Prob Neg Hx     Elevated Lipids Neg Hx     Headache Neg Hx     Migraines Neg Hx     Hypertension Neg Hx     Lung Disease Neg Hx     Mental Retardation Neg Hx     Stroke Neg Hx       Social History   Substance Use Topics    Smoking status: Former Smoker     Packs/day: 3.00     Years: 15.00     Types: Cigarettes     Quit date: 1996    Smokeless tobacco: Former User      Comment: quit     Alcohol use No     Past Surgical History:   Procedure Laterality Date    CARDIAC SURG PROCEDURE UNLIST      valve repair and replacement    CHEST SURGERY PROCEDURE UNLISTED      HX APPENDECTOMY      HX BREAST RECONSTRUCTION      HX CHOLECYSTECTOMY  2005    HX GYN  1981    hysterectomy still have ovaries    HX MASTECTOMY      left mastectomy    HX ORTHOPAEDIC      knee surgery    HX PACEMAKER      VASCULAR SURGERY PROCEDURE UNLIST Right 2017    LE thrombectomy      Prior to Admission medications    Medication Sig Start Date End Date Taking? Authorizing Provider   torsemide (DEMADEX) 20 mg tablet Take 2 Tabs by mouth two (2) times a day. 2/4/18   Ruthie Ball PA-C   rOPINIRole (REQUIP) 2 mg tablet Take  by mouth two (2) times a day. Historical Provider   pravastatin (PRAVACHOL) 40 mg tablet Take 1 Tab by mouth daily. Indications: hyperlipidemia 1/4/18   Jacinto Montgomery MD   warfarin (COUMADIN) 5 mg tablet Take 1 Tab by mouth nightly. Name brand only  Patient taking differently: Take 2.5 mg by mouth nightly. Name brand only 1/4/18   Jacinto Montgomery MD   sertraline (ZOLOFT) 100 mg tablet Take 1 Tab by mouth daily. 1/4/18   Jacinto Montgomery MD   diazePAM (VALIUM) 5 mg tablet Take 1 Tab by mouth daily. Max Daily Amount: 5 mg. 1/4/18   Jacinto Montgomery MD   omeprazole (PRILOSEC) 20 mg capsule TAKE 1 CAPSULE BY MOUTH  DAILY 12/11/17   Jacinto Montgomery MD   spironolactone (ALDACTONE) 25 mg tablet Take 1 Tab by mouth daily. 9/11/17   Jacinto Montgomery MD   levothyroxine (SYNTHROID) 88 mcg tablet Take 1 Tab by mouth Daily (before breakfast). 9/11/17   Jacinto Montgomery MD   allopurinol (ZYLOPRIM) 100 mg tablet Take 1 Tab by mouth two (2) times a day. 6/19/17   Pina Valiente MD   fluticasone (FLONASE) 50 mcg/actuation nasal spray 1 [place] by Both Nostrils route daily. Historical Provider   azelastine (ASTELIN) 137 mcg (0.1 %) nasal spray 1 [place] by Both Nostrils route two (2) times a day. Use in each nostril as directed 4/20/17   Jacinto Montgomery MD   polyethylene glycol (MIRALAX) 17 gram/dose powder Take 17 g by mouth daily. Historical Provider   loratadine (CLARITIN) 10 mg tablet Take 10 mg by mouth as needed. Historical Provider   glycerin, adult, (SUPPOSITORY ADULT) suppository Insert 1 Suppository into rectum as needed.     Historical Provider   benzonatate (TESSALON) 200 mg capsule Take 200 mg by mouth three (3) times daily as needed for Cough.    Historical Provider   promethazine (PHENERGAN) 25 mg tablet Take 1 Tab by mouth every six (6) hours as needed. 4/3/17   Roma Spurling, MD   levalbuterol (XOPENEX) 1.25 mg/3 mL nebu 1.25 mg by Nebulization route. Historical Provider   hydrocortisone (ANUSOL-HC) 2.5 % rectal cream Apply small amount to hemorrhoids/perianal skin TID PRN 17   Aliza Arce MD   calcium carbonate (CALTREX) 600 mg (1,500 mg) tablet Take 600 mg by mouth nightly. Historical Provider   cholecalciferol (VITAMIN D3) 1,000 unit cap Take  by mouth daily. Historical Provider   CARBOXYMETHYLCELLULOS/GLYCERIN (REFRESH OPTIVE OP) Apply  to eye. Historical Provider   DOCUSATE SODIUM Take 1 Cap by mouth two (2) times a day. Historical Provider   OXYGEN-AIR DELIVERY SYSTEMS by Does Not Apply route. 2-2.5 lpm cont. Historical Provider   cyanocobalamin (VITAMIN B-12) 250 mcg tablet Take 1,000 mcg by mouth daily. Quita Shipman MD   nitroglycerin (NITROSTAT) 0.4 mg SL tablet by SubLINGual route every five (5) minutes as needed for Chest Pain. Quita Shipman MD     Allergies   Allergen Reactions    Adhesive Tape Rash    Benadryl [Diphenhydramine Hcl] Other (comments)     Jitters      Demerol [Meperidine] Anxiety    Morphine Other (comments)     Confusion    Sulfa (Sulfonamide Antibiotics) Anxiety    Lorazepam Anxiety        Review of Systems: Denies chest pain, shortness of breath, cough, headache, visual problems, abdominal pain, dysurea, calf pain. Pertinent positives are as noted in the medical records and unremarkable otherwise. Objective:     Vitals:  Blood pressure 99/65, pulse 81, temperature 97.1 °F (36.2 °C), resp. rate 18, height 5' (1.524 m), weight 177 lb (80.3 kg), SpO2 96 %.   Temp (24hrs), Av.2 °F (36.2 °C), Min:97 °F (36.1 °C), Max:97.6 °F (36.4 °C)    BMI (calculated): 34.6 (18 0402)   Intake and Output:  02/10 1901 -  0700  In: 480 [P.O.:480]  Out: 1800 [[id number]]    Physical Exam:   General: Alert and age appropriately oriented. Appears comfortable on 2L O2 NC. No acute cardio respiratory distress. HEENT: Normocephalic,no scleral icterus  Oral mucosa moist without cyanosis, No bruit, No JVD. Lungs: + basilar crackles. no wheezing. Respiration even and unlabored   Heart: RRR,  II/VI TERRI  No clicks, rub or gallops   Abdomen: Soft, non-tender, nondistended. Bowel sounds present. No organomegaly. Genitourinary: defered   Neuromuscular:      PERRL, EOMI  Follows simple commands consistently. Able to identify, recall repeat. Mood appropriate. Insight, judgement intact. LUE     Shoulder abduction   5-/5              Elbow flexion:   5-/5               Wrist extension:  5- /5              Finger flexion;  5- /5  RUE    Shoulder abduction: 5- /5                Elbow flexion:  5- /5    Wrist extension: 5- /5   Finger flexion:  5- /5  LLE     Hip flexion:  3+ /5              Knee extension:  4 /5    Ankle dorsiflexion:  5 /5   Ankle plantarflexion:   5/5        RLE     Hip flexion:   3+/5   Knee extension:  4 /5    Ankle dorsiflexion:  5 /5   Ankle plantarflexion:  5 /5  Sensory - intact grossly   No cerebellar signs. Plantars - down going  No atrophy, no fasciculations, no tremors. Skin/extremity: No rashes, no erythema. Calf non tender BLE. 2+ BLE edema. + chronic arthritic joint changes.                                                                                         Labs/Studies:  Recent Results (from the past 67 hour(s))   CULTURE, BODY FLUID W GRAM STAIN    Collection Time: 02/09/18  4:50 PM   Result Value Ref Range    Special Requests: RIGHT      GRAM STAIN 0 TO 3 WBCS/OIF     GRAM STAIN NO DEFINITE ORGANISM SEEN      Culture result: NO GROWTH 2 DAYS     AFB CULTURE + SMEAR W/RFLX ID FROM CULTURE    Collection Time: 02/09/18  4:50 PM   Result Value Ref Range    Source PLEURAL FLUID      AFB Specimen processing Concentration     Acid Fast Smear NEGATIVE       Acid Fast Culture PENDING    GLUCOSE, FLUID    Collection Time: 02/09/18  4:50 PM   Result Value Ref Range    Fluid Type: PLEURAL FLUID      Glucose, body fld. 130 mg/dL MG/DL   LDH, BODY FLUID    Collection Time: 02/09/18  4:50 PM   Result Value Ref Range    Fluid Type: PLEURAL FLUID      LD, body fld. 33 U/L U/L   PROTEIN TOTAL, FLUID    Collection Time: 02/09/18  4:50 PM   Result Value Ref Range    Fluid Type: PLEURAL FLUID      Protein total, body fld.  <0.8 g/dL g/dL   TRIGLYCERIDES, FLUID    Collection Time: 02/09/18  4:50 PM   Result Value Ref Range    Source PLEURAL FLUID      Triglycerides, fluid 63 mg/dL   CELL COUNT, BODY FLUID    Collection Time: 02/09/18  4:50 PM   Result Value Ref Range    BODY FLUID TYPE PLEURAL FLUID      FLUID COLOR LIGHT      FLUID APPEARANCE CLOUDY      FLUID RBC CT. [address] /cu mm    FLUID WBC COUNT <460 /cu mm   METABOLIC PANEL, BASIC    Collection Time: 02/10/18  4:06 AM   Result Value Ref Range    Sodium 146 (H) 136 - 145 mmol/L    Potassium 3.5 3.5 - 5.1 mmol/L    Chloride 102 98 - 107 mmol/L    CO2 39 (H) 21 - 32 mmol/L    Anion gap 5 (L) 7 - 16 mmol/L    Glucose 91 65 - 100 mg/dL    BUN 39 (H) 8 - 23 MG/DL    Creatinine 0.98 0.6 - 1.0 MG/DL    GFR est AA >60 >60 ml/min/1.73m2    GFR est non-AA 59 (L) >60 ml/min/1.73m2    Calcium 7.7 (L) 8.3 - 10.4 MG/DL   MAGNESIUM    Collection Time: 02/10/18  4:06 AM   Result Value Ref Range    Magnesium 2.1 1.8 - 2.4 mg/dL   PROTHROMBIN TIME + INR    Collection Time: 02/10/18  4:06 AM   Result Value Ref Range    Prothrombin time 15.1 (H) 11.5 - 14.5 sec    INR 1.2     CBC WITH AUTOMATED DIFF    Collection Time: 02/10/18  4:06 AM   Result Value Ref Range    WBC 5.9 4.3 - 11.1 K/uL    RBC 3.38 (L) 4.05 - 5.25 M/uL    HGB 9.8 (L) 11.7 - 15.4 g/dL    HCT 32.5 (L) 35.8 - 46.3 %    MCV 96.2 79.6 - 97.8 FL    MCH 29.0 26.1 - 32.9 PG    MCHC 30.2 (L) 31.4 - 35.0 g/dL    RDW 17.7 (H) 11.9 - 14.6 %    PLATELET 99 (L) 125 - 450 K/uL MPV 11.8 10.8 - 14.1 FL    DF AUTOMATED      NEUTROPHILS 82 (H) 43 - 78 %    LYMPHOCYTES 7 (L) 13 - 44 %    MONOCYTES 7 4.0 - 12.0 %    EOSINOPHILS 3 0.5 - 7.8 %    BASOPHILS 1 0.0 - 2.0 %    IMMATURE GRANULOCYTES 0 0.0 - 5.0 %    ABS. NEUTROPHILS 4.9 1.7 - 8.2 K/UL    ABS. LYMPHOCYTES 0.4 (L) 0.5 - 4.6 K/UL    ABS. MONOCYTES 0.4 0.1 - 1.3 K/UL    ABS. EOSINOPHILS 0.2 0.0 - 0.8 K/UL    ABS. BASOPHILS 0.0 0.0 - 0.2 K/UL    ABS. IMM.  GRANS. 0.0 0.0 - 0.5 K/UL   PLEASE READ & DOCUMENT PPD TEST IN 24 HRS    Collection Time: 02/10/18  2:14 PM   Result Value Ref Range    PPD Negative Negative    mm Induration 0 mm   METABOLIC PANEL, BASIC    Collection Time: 02/11/18  3:54 AM   Result Value Ref Range    Sodium 143 136 - 145 mmol/L    Potassium 3.1 (L) 3.5 - 5.1 mmol/L    Chloride 100 98 - 107 mmol/L    CO2 39 (H) 21 - 32 mmol/L    Anion gap 4 (L) 7 - 16 mmol/L    Glucose 90 65 - 100 mg/dL    BUN 38 (H) 8 - 23 MG/DL    Creatinine 0.96 0.6 - 1.0 MG/DL    GFR est AA >60 >60 ml/min/1.73m2    GFR est non-AA >60 >60 ml/min/1.73m2    Calcium 8.1 (L) 8.3 - 10.4 MG/DL   MAGNESIUM    Collection Time: 02/11/18  3:54 AM   Result Value Ref Range    Magnesium 2.1 1.8 - 2.4 mg/dL   PROTHROMBIN TIME + INR    Collection Time: 02/11/18  3:54 AM   Result Value Ref Range    Prothrombin time 17.1 (H) 11.5 - 14.5 sec    INR 1.5     METABOLIC PANEL, BASIC    Collection Time: 02/12/18  3:52 AM   Result Value Ref Range    Sodium 143 136 - 145 mmol/L    Potassium 3.7 3.5 - 5.1 mmol/L    Chloride 99 98 - 107 mmol/L    CO2 39 (H) 21 - 32 mmol/L    Anion gap 5 (L) 7 - 16 mmol/L    Glucose 102 (H) 65 - 100 mg/dL    BUN 35 (H) 8 - 23 MG/DL    Creatinine 0.95 0.6 - 1.0 MG/DL    GFR est AA >60 >60 ml/min/1.73m2    GFR est non-AA >60 >60 ml/min/1.73m2    Calcium 7.9 (L) 8.3 - 10.4 MG/DL   MAGNESIUM    Collection Time: 02/12/18  3:52 AM   Result Value Ref Range    Magnesium 2.1 1.8 - 2.4 mg/dL   CBC WITH AUTOMATED DIFF    Collection Time: 02/12/18  3:52 AM Result Value Ref Range    WBC 4.6 4.3 - 11.1 K/uL    RBC 3.43 (L) 4.05 - 5.25 M/uL    HGB 9.8 (L) 11.7 - 15.4 g/dL    HCT 32.9 (L) 35.8 - 46.3 %    MCV 95.9 79.6 - 97.8 FL    MCH 28.6 26.1 - 32.9 PG    MCHC 29.8 (L) 31.4 - 35.0 g/dL    RDW 16.9 (H) 11.9 - 14.6 %    PLATELET 90 (L) 373 - 450 K/uL    MPV 11.2 10.8 - 14.1 FL    DF AUTOMATED      NEUTROPHILS 77 43 - 78 %    LYMPHOCYTES 12 (L) 13 - 44 %    MONOCYTES 7 4.0 - 12.0 %    EOSINOPHILS 3 0.5 - 7.8 %    BASOPHILS 1 0.0 - 2.0 %    IMMATURE GRANULOCYTES 0 0.0 - 5.0 %    ABS. NEUTROPHILS 3.6 1.7 - 8.2 K/UL    ABS. LYMPHOCYTES 0.6 0.5 - 4.6 K/UL    ABS. MONOCYTES 0.3 0.1 - 1.3 K/UL    ABS. EOSINOPHILS 0.1 0.0 - 0.8 K/UL    ABS. BASOPHILS 0.1 0.0 - 0.2 K/UL    ABS. IMM.  GRANS. 0.0 0.0 - 0.5 K/UL       Functional Assessment:  Reviewed participation and progress in therapies  Gross Assessment  AROM: Grossly decreased, non-functional (02/09/18 1335)  PROM: Generally decreased, functional (02/08/18 1400)  Strength: Grossly decreased, non-functional (02/09/18 1335)  Coordination: Generally decreased, functional (02/08/18 1400)  Tone: Normal (02/08/18 1400)  Sensation: Intact (02/08/18 1400)   Bed Mobility  Rolling: Modified independent (02/08/18 1400)  Supine to Sit: Modified independent (02/11/18 1400)  Sit to Supine: Minimum assistance (02/08/18 1400)  Scooting: Modified independent (02/11/18 1400)   Balance  Sitting: Intact (02/09/18 1335)  Standing: Impaired (02/09/18 1335)  Standing - Static: Fair;Constant support (02/08/18 1400)  Standing - Dynamic : Fair (02/08/18 1400)               Bed/Mat Mobility  Rolling: Modified independent (02/08/18 1400)  Supine to Sit: Modified independent (02/11/18 1400)  Sit to Supine: Minimum assistance (02/08/18 1400)  Sit to Stand: Contact guard assistance (02/11/18 1400)  Bed to Chair: Minimum assistance (02/09/18 1335)  Scooting: Modified independent (02/11/18 1400)     Ambulation:  Gait  Base of Support: Widened (02/08/18 1400)  Speed/Carol: Shuffled; Slow (02/11/18 1400)  Step Length: Left shortened;Right shortened (02/08/18 1400)  Ambulation - Level of Assistance: Contact guard assistance (02/11/18 1400)  Distance (ft): 40 Feet (ft) (02/11/18 1400)  Assistive Device: Walker, rolling (02/11/18 1400)  Interventions: Safety awareness training;Manual cues; Verbal cues (02/08/18 1400)    Impression/Plan:     Principal Problem:    Acute on chronic respiratory failure ([facility] (2/7/2018)    Active Problems:    Hypothyroidism (12/4/2009)      Chronic atrial fibrillation (HCC) (10/17/2011)      Anemia (10/27/2011)      Overview: Acute on chronic            HTN (hypertension) (8/5/2016)      Pulmonary hypertension (2/12/2017)      Pleural effusion (1/23/2018)      Overview: Right thoracentesis 1/25/18 - 1200 mls removed      Left thoracentesis 1/25/2018 - 900 mls removed      Bilateral thoracentesis 2/9/18 - L 550 ml (air aspirated during and at the       end of procedure) / R 1000 ml and R creamy pink       S/P TAVR (transcatheter aortic valve replacement) (1/30/2018)      Leukocytosis (2/7/2018)      Acute on chronic diastolic heart failure ([facility] (2/7/2018)        Current Facility-Administered Medications   Medication Dose Route Frequency Provider Last Rate Last Dose    metoprolol succinate (TOPROL-XL) XL tablet 25 mg  25 mg Oral DAILY Laila Delgado MD   25 mg at 02/12/18 0925    potassium chloride (K-DUR, KLOR-CON) SR tablet 40 mEq  40 mEq Oral DAILY Татьяна Lewis MD   40 mEq at 02/12/18 0925    apixaban (ELIQUIS) tablet 5 mg  5 mg Oral Q12H Татьяна Lewis MD   5 mg at 02/12/18 0925    furosemide (LASIX) tablet 80 mg  80 mg Oral Q12H Татьяна Lewis MD   80 mg at 02/12/18 0925    sodium chloride (NS) flush 5 mL  5 mL InterCATHeter Q8H Tari Okeefe, FELISHA   5 mL at 02/11/18 1001    sodium chloride (NS) flush 5-10 mL  5-10 mL InterCATHeter PRN Zollie Bis, NP        allopurinol (ZYLOPRIM) tablet 100 mg  100 mg Oral BID Sonny Me, NP   100 mg at 02/12/18 0925    hydrocortisone (ANUSOL-HC) 2.5 % rectal cream   PeriANAL TID PRN Sonny Me, NP        levothyroxine (SYNTHROID) tablet 88 mcg  88 mcg Oral ACB Sonny Me, NP   88 mcg at 02/12/18 0513    pantoprazole (PROTONIX) tablet 40 mg  40 mg Oral ACB Sonny Me, NP   40 mg at 02/12/18 0513    polyethylene glycol (MIRALAX) packet 17 g  17 g Oral DAILY Sonny Me, NP   17 g at 02/12/18 0330    pravastatin (PRAVACHOL) tablet 40 mg  40 mg Oral DAILY Sonny Me, NP   40 mg at 02/12/18 1744    rOPINIRole (REQUIP) tablet 2 mg  2 mg Oral BID Sonny Me, NP   2 mg at 02/12/18 5336    sertraline (ZOLOFT) tablet 100 mg  100 mg Oral DAILY Sonny Me, NP   100 mg at 02/12/18 0757    spironolactone (ALDACTONE) tablet 25 mg  25 mg Oral DAILY Sonny Me, NP   25 mg at 02/12/18 0735    alcohol 62% (NOZIN) nasal  1 Ampule  1 Ampule Topical Q12H Sonny Me, NP   1 Ampule at 02/12/18 2511    0.9% sodium chloride infusion 250 mL  250 mL IntraVENous PRN Sonny Me, NP        diazePAM (VALIUM) tablet 5 mg  5 mg Oral QHS Justo Ford MD   5 mg at 02/11/18 2137    acetaminophen (TYLENOL) tablet 650 mg  650 mg Oral Q4H PRN Camilo Robles NP   650 mg at 02/10/18 2126    sodium chloride (NS) flush 5-10 mL  5-10 mL IntraVENous Q8H Willis Nettles MD   10 mL at 02/12/18 0926    sodium chloride (NS) flush 5-10 mL  5-10 mL IntraVENous PRN Amelia Saravia MD        cefTRIAXone (ROCEPHIN) 1 g in 0.9% sodium chloride (MBP/ADV) 50 mL  1 g IntraVENous Q24H Sonny Og,  mL/hr at 02/11/18 2139 1 g at 02/11/18 2139     Facility-Administered Medications Ordered in Other Encounters   Medication Dose Route Frequency Provider Last Rate Last Dose    0.9% sodium chloride infusion 250 mL  250 mL IntraVENous PRN Rich Lemos MD            Recommendations: Recommend acute rehab at [address] [place] [address].  Coordination of rehab/medical care. Counseling of Physical Medicine & Rehab care issues management. Will follow with SW/ /Admissions Coordinators regarding insurance approvals and acceptance. Rehabilitation Management: 1. Devices: Adaptive equipments, Walkers, Type: Rolling Walker  2. Consult:Rehab team including PT, OT   3. Disposition Rehab-discussed with patient/  4. Plexipulse when in bed  5. Thigh-high or knee-high CARA's when out of bed  6. DVT Prophylaxis per primary     Medical Management: Per primary  1. Vital signs per routine  2. Incentive spirometer Q1H while awake  3. Activity: as tolerated; fall precautions. cardiac precaution     Thank you for the opportunity to participate in the care of this patient.   Signed By: Seng Garibay MD     February 12, 2018

## 2018-02-12 NOTE — PROGRESS NOTES
[facility] CARDIOLOGY PROGRESS NOTE           2/12/2018 7:30 AM    Admit Date: 2/7/2018      Subjective:   Patient denies active chest pain or dyspnea. BP low overnight but acceptable yesterday. Renal function stable. ROS:  Cardiovascular:  As noted above    Objective:      Vitals:    02/11/18 2252 02/12/18 0046 02/12/18 0402 02/12/18 0419   BP: (!) 88/60 (!) 87/62  103/57   Pulse: 82 80  83   Resp:  23  18   Temp:  97 °F (36.1 °C)  97.6 °F (36.4 °C)   SpO2:  93%  100%   Weight:   80.3 kg (177 lb)    Height:           Physical Exam:  General-No Acute Distress  Neck- supple, mild JVD  CV- RRR with II/VI TERRI  Lung- clear bilaterally  Abd- soft, nontender, nondistended  Ext- no edema bilaterally. Skin- warm and dry    Data Review:   Recent Labs      02/12/18   0352  02/11/18   0354  02/10/18   0406   NA  143  143  146*   K  3.7  3.1*  3.5   MG  2.1  2.1  2.1   BUN  35*  38*  39*   CREA  0.95  0.96  0.98   GLU  102*  90  91   WBC  4.6   --   5.9   HGB  9.8*   --   9.8*   HCT  32.9*   --   32.5*   PLT  90*   --   99*   INR   --   1.5  1.2       Assessment/Plan:     Principal Problem:    Acute respiratory failure (HCC) (2/7/2018)    Stable on PO lasix. Continue antibiotics and appreciate pulmonary assistance. S/P bilateral thoracentesis    Active Problems:    Hypothyroidism (12/4/2009)    This is stable on therapy      Chronic atrial fibrillation ([address] (10/17/2011)    On Eliquis. V-paced. Anemia (10/27/2011)    Transfused 2 units and stable       HTN (hypertension) (8/5/2016)    This is well controlled      Pulmonary hypertension (2/12/2017)    Chronic and stable       Pleural effusion (1/23/2018)    As above      S/P TAVR (transcatheter aortic valve replacement) (1/30/2018)    Exam is normal.  She has recovered well with diuresis.         Leukocytosis (2/7/2018)    On ABx and monitor BLD clx      Acute on chronic diastolic heart failure ([facility] [address] (2/7/2018)    As above     PT and consider STR.  ? 9th floor.        Bozena Cunha MD  2/12/2018 7:30 AM

## 2018-02-12 NOTE — PROGRESS NOTES
Noted progress notes for today by Dr. Gabriel Rothman that ? 9th floor rehab so order placed for rehab consult. Dr. Madonna Interiano has seen patient and has accepted patient for rehab. States may transfer to 9th floor when ready Tuesday or Wednesday. Spoke with patient and son Zuleyma Woodard and they are in agreement with transfer to 9th floor and are pleased she has been accepted. Discharge plan is transfer to 9th floor when medically ready.

## 2018-02-12 NOTE — PROGRESS NOTES
Marcio Orta  Admission Date: 2/7/2018             Daily Progress Note: 2/12/2018    The patient's chart is reviewed and the patient is discussed with the staff. 70 yo female with a history of HTN, hypothyroid, chronic dCHF, chronic a.fib, CAD, SSS s/p PPM, COPD, chronic respiratory failure maintained on o2 at 3 lpm, and TAVR on 1/30/18. She presented to the ER with increased sob, CXR findings consistent with pulmonary edema and worsening bilateral effusions. She required BIPAP in the ER but diuresed with lasix and was weaned to [place]. She reported subjective fevers, WBC elevated at 18 and abx were started. We were consulted for possible thoracentesis.   Coumadin was held    Subjective:     Feels much better  Currently on O2 at 3 lpm with o2 sat 100%- she on 02 at home      Current Facility-Administered Medications   Medication Dose Route Frequency    metoprolol succinate (TOPROL-XL) XL tablet 25 mg  25 mg Oral DAILY    potassium chloride (K-DUR, KLOR-CON) SR tablet 40 mEq  40 mEq Oral DAILY    apixaban (ELIQUIS) tablet 5 mg  5 mg Oral Q12H    furosemide (LASIX) tablet 80 mg  80 mg Oral Q12H    sodium chloride (NS) flush 5 mL  5 mL InterCATHeter Q8H    sodium chloride (NS) flush 5-10 mL  5-10 mL InterCATHeter PRN    allopurinol (ZYLOPRIM) tablet 100 mg  100 mg Oral BID    hydrocortisone (ANUSOL-HC) 2.5 % rectal cream   PeriANAL TID PRN    levothyroxine (SYNTHROID) tablet 88 mcg  88 mcg Oral ACB    pantoprazole (PROTONIX) tablet 40 mg  40 mg Oral ACB    polyethylene glycol (MIRALAX) packet 17 g  17 g Oral DAILY    pravastatin (PRAVACHOL) tablet 40 mg  40 mg Oral DAILY    rOPINIRole (REQUIP) tablet 2 mg  2 mg Oral BID    sertraline (ZOLOFT) tablet 100 mg  100 mg Oral DAILY    spironolactone (ALDACTONE) tablet 25 mg  25 mg Oral DAILY    alcohol 62% (NOZIN) nasal  1 Ampule  1 Ampule Topical Q12H    0.9% sodium chloride infusion 250 mL  250 mL IntraVENous PRN    diazePAM (VALIUM) tablet 5 mg  5 mg Oral QHS    acetaminophen (TYLENOL) tablet 650 mg  650 mg Oral Q4H PRN    sodium chloride (NS) flush 5-10 mL  5-10 mL IntraVENous Q8H    sodium chloride (NS) flush 5-10 mL  5-10 mL IntraVENous PRN    cefTRIAXone (ROCEPHIN) 1 g in 0.9% sodium chloride (MBP/ADV) 50 mL  1 g IntraVENous Q24H     Facility-Administered Medications Ordered in Other Encounters   Medication Dose Route Frequency    0.9% sodium chloride infusion 250 mL  250 mL IntraVENous PRN       Review of Systems  Constitutional: negative for fever, chills, sweats  Cardiovascular: negative for chest pain, palpitations, syncope, +++edema  Gastrointestinal:  negative for dysphagia, reflux, vomiting, diarrhea, abdominal pain, or melena  Neurologic:  negative for focal weakness, numbness, headache    Objective:     Vitals:    02/11/18 2252 02/12/18 0046 02/12/18 0402 02/12/18 0419   BP: (!) 88/60 (!) 87/62  103/57   Pulse: 82 80  83   Resp:  23  18   Temp:  97 °F (36.1 °C)  97.6 °F (36.4 °C)   SpO2:  93%  100%   Weight:   177 lb (80.3 kg)    Height:         Intake and Output:   02/10 1901 - 02/12 0700  In: 480 [P.O.:480]  Out: 1800 [Urine:1800]       Physical Exam:   Constitution:  the patient is well developed and in no acute distress  EENMT:  Sclera clear, pupils equal, oral mucosa moist  Respiratory: diminished with crackles bilateral posterior bases, o2 at 3 lpm  Cardiovascular:  RRR without M,G,R  Gastrointestinal: soft and non-tender; with positive bowel sounds. Musculoskeletal: warm without cyanosis.  There is 2+ lower leg edema. / 2+ UE edema  Skin:  no jaundice or rashes, no open wounds   Neurologic: no gross neuro deficits     Psychiatric:  alert and oriented x 3    CXR:   2/9      LAB   Recent Labs      02/12/18   0352  02/11/18   0354  02/10/18   0406   WBC  4.6   --   5.9   HGB  9.8*   --   9.8*   HCT  32.9*   --   32.5*   PLT  90*   --   99*   INR   --   1.5  1.2     Recent Labs      02/12/18   0352 02/11/18   0354  02/10/18   0406   NA  143  143  146*   K  3.7  3.1*  3.5   CL  99  100  102   CO2  39*  39*  39*   GLU  102*  90  91   BUN  35*  38*  39*   CREA  0.95  0.96  0.98   MG  2.1  2.1  2.1   CA  7.9*  8.1*  7.7*         Assessment:  (Medical Decision Making)     Hospital Problems  Date Reviewed: 2/9/2018          Codes Class Noted POA    * (Principal)Acute on chronic respiratory failure (HCC) ICD-10-CM: J96.20  ICD-9-CM: 518.84  2/7/2018 Yes    Currently on home dose o2      Leukocytosis ICD-10-CM: D72.829  ICD-9-CM: 288.60  2/7/2018 Yes    Resolved       Acute on chronic diastolic heart failure (HCC) ICD-10-CM: I50.33  ICD-9-CM: 428.33  2/7/2018 Yes    Remains edematous  Lasix / aldactone  -1350 ml / 24 hours - has [place] catheter  Albumin 2        S/P TAVR (transcatheter aortic valve replacement) ICD-10-CM: Z95.2  ICD-9-CM: V43.3  1/30/2018 Yes    TAVR on 1/30/18      Pleural effusion ICD-10-CM: J90  ICD-9-CM: 511.9  1/23/2018 Yes     Right thoracentesis 1/25/18 - 1200 mls removed  Left thoracentesis 1/25/2018 - 900 mls removed  Bilateral thoracentesis 2/9/18 - L 550 ml (air aspirated during and at the end of procedure) / R 1000 ml and R creamy pink            Pulmonary hypertension (Chronic) ICD-10-CM: I27.20  ICD-9-CM: 416.8  2/12/2017 Yes        HTN (hypertension) (Chronic) ICD-10-CM: I10  ICD-9-CM: 401.9  8/5/2016 Yes        Anemia (Chronic) ICD-10-CM: D64.9  ICD-9-CM: 285.9  10/27/2011 Yes     Acute on chronic  Improved to 9.8 post transfusion         Chronic atrial fibrillation (HCC) (Chronic) ICD-10-CM: I48.2  ICD-9-CM: 427.31  10/17/2011 Yes    Rate controlled      Hypothyroidism (Chronic) ICD-10-CM: E03.9  ICD-9-CM: 244.9  12/4/2009 Yes             Plan:  (Medical Decision Making)     --Rocephin 6 / 7- will stop tomorrow  - follow up CXR pending   -STR pending      More than 50% of the time documented was spent in face-to-face contact with the patient and in the care of the patient on the floor/unit where the patient is located.     Chris Palmer MD

## 2018-02-12 NOTE — PROGRESS NOTES
Problem: Falls - Risk of  Goal: *Absence of Falls  Document Gregorio Fall Risk and appropriate interventions in the flowsheet. Outcome: Progressing Towards Goal  Fall Risk Interventions:  Mobility Interventions: Bed/chair exit alarm, Communicate number of staff needed for ambulation/transfer, Patient to call before getting OOB      Pt progressing towards goal. No falls since admission. Bed low and locked. Call light within reach. Side rails x 2. Gripper socks applied. Personal belongings within reach. Pt verbalizes understanding to call for assistance.      Medication Interventions: Bed/chair exit alarm, Teach patient to arise slowly, Patient to call before getting OOB    Elimination Interventions: Bed/chair exit alarm, Call light in reach, Patient to call for help with toileting needs, Toilet paper/wipes in reach

## 2018-02-12 NOTE — PROGRESS NOTES
MEWS score noted to be 3. Charge nurse, Jorge Alberto Gamino RN informed and assessed patient. High MEWS score noted due to hypotension. Vitals signs to be completed every 2 hours. Will continue to monitor.

## 2018-02-12 NOTE — PROGRESS NOTES
Problem: Falls - Risk of  Goal: *Absence of Falls  Document Gregorio Fall Risk and appropriate interventions in the flowsheet. Outcome: Progressing Towards Goal  Fall Risk Interventions:  Mobility Interventions: Bed/chair exit alarm, Communicate number of staff needed for ambulation/transfer, Patient to call before getting OOB         Medication Interventions: Bed/chair exit alarm, Patient to call before getting OOB, Teach patient to arise slowly    Elimination Interventions: Bed/chair exit alarm, Call light in reach, Patient to call for help with toileting needs, Toilet paper/wipes in reach      Pt progressing towards goal. No falls since admission. Bed low and locked. Call light within reach. Side rails x 2. Gripper socks applied. Personal belongings within reach. Pt verbalizes understanding to call for assistance.    Bed alarm on

## 2018-02-12 NOTE — PROGRESS NOTES
Spoke with Kayla Baker NP regarding pt's BP of 89/57 and 80 mg po lasix ordered per MAR. Orders received to give 40 mg po lasix, recheck BP in an hour and if stable to give other 40 mg.

## 2018-02-12 NOTE — PROGRESS NOTES
Bedside and Verbal shift change report given to Murray Hahn RN (oncoming nurse) by self Nalini Rodriguez nurse). Report included the following information SBAR, Kardex, MAR and Recent Results.

## 2018-02-13 PROBLEM — J96.90 RESPIRATORY FAILURE (HCC): Status: ACTIVE | Noted: 2018-01-01

## 2018-02-13 PROBLEM — I50.9 CHF (CONGESTIVE HEART FAILURE) (HCC): Status: ACTIVE | Noted: 2018-01-01

## 2018-02-13 NOTE — PROGRESS NOTES
Greer Patches  Admission Date: 2/7/2018             Daily Progress Note: 2/13/2018    The patient's chart is reviewed and the patient is discussed with the staff. 70 yo female with a history of HTN, hypothyroid, chronic dCHF, chronic a.fib, CAD, SSS s/p PPM, COPD, chronic respiratory failure maintained on o2 at 3 lpm, and TAVR on 1/30/18. She presented to the ER with increased sob, CXR findings consistent with pulmonary edema and worsening bilateral effusions. She required BIPAP in the ER but diuresed with lasix and was weaned to [place]. She reported subjective fevers, WBC elevated at 18 and abx were started - completing 7 day course of Rocephin. Thoracenteses on 2/9 - 550 mls removed from the left and 1 liter removed from the right. On continuous oxygen therapy at home. Transferring to 9 th floor for rehab. Subjective:     Plans to go to the 9th floor - waiting for transfer. Appetite fair. Denies dyspnea.      Current Facility-Administered Medications   Medication Dose Route Frequency    metoprolol succinate (TOPROL-XL) XL tablet 25 mg  25 mg Oral DAILY    potassium chloride (K-DUR, KLOR-CON) SR tablet 40 mEq  40 mEq Oral DAILY    apixaban (ELIQUIS) tablet 5 mg  5 mg Oral Q12H    furosemide (LASIX) tablet 80 mg  80 mg Oral Q12H    sodium chloride (NS) flush 5 mL  5 mL InterCATHeter Q8H    sodium chloride (NS) flush 5-10 mL  5-10 mL InterCATHeter PRN    allopurinol (ZYLOPRIM) tablet 100 mg  100 mg Oral BID    hydrocortisone (ANUSOL-HC) 2.5 % rectal cream   PeriANAL TID PRN    levothyroxine (SYNTHROID) tablet 88 mcg  88 mcg Oral ACB    pantoprazole (PROTONIX) tablet 40 mg  40 mg Oral ACB    polyethylene glycol (MIRALAX) packet 17 g  17 g Oral DAILY    pravastatin (PRAVACHOL) tablet 40 mg  40 mg Oral DAILY    rOPINIRole (REQUIP) tablet 2 mg  2 mg Oral BID    sertraline (ZOLOFT) tablet 100 mg  100 mg Oral DAILY    spironolactone (ALDACTONE) tablet 25 mg  25 mg Oral DAILY    alcohol 62% (NOZIN) nasal  1 Ampule  1 Ampule Topical Q12H    0.9% sodium chloride infusion 250 mL  250 mL IntraVENous PRN    diazePAM (VALIUM) tablet 5 mg  5 mg Oral QHS    acetaminophen (TYLENOL) tablet 650 mg  650 mg Oral Q4H PRN    sodium chloride (NS) flush 5-10 mL  5-10 mL IntraVENous Q8H    sodium chloride (NS) flush 5-10 mL  5-10 mL IntraVENous PRN    cefTRIAXone (ROCEPHIN) 1 g in 0.9% sodium chloride (MBP/ADV) 50 mL  1 g IntraVENous Q24H     Facility-Administered Medications Ordered in Other Encounters   Medication Dose Route Frequency    0.9% sodium chloride infusion 250 mL  250 mL IntraVENous PRN         Objective:     Vitals:    02/12/18 2126 02/13/18 0139 02/13/18 0509 02/13/18 0911   BP: 101/62 98/64 102/63 95/53   Pulse: 81 83 87 82   Resp: 18 19 20 20   Temp: 97.5 °F (36.4 °C) 97.6 °F (36.4 °C) 97.4 °F (36.3 °C) 97.3 °F (36.3 °C)   SpO2: 97% 100% 100% 100%   Weight:   176 lb 9.6 oz (80.1 kg)    Height:         Intake and Output:   02/11 1901 - 02/13 0700  In: 1060 [P.O.:1060]  Out: 7807 [FMSID:[id number]]  02/13 0701 - 02/13 1900  In: 480 [P.O.:480]  Out: -     Physical Exam:   Constitutional:  the patient is well developed and in no acute distress  HEENT:  Sclera clear, pupils equal, oral mucosa moist  Lungs: clear bilaterally. Wearing 2 liter per cannula. Cardiovascular:  RRR without M,G,R  Abd/GI: soft and non-tender; with positive bowel sounds. Ext: warm without cyanosis. There is 1+ lower leg edema. Musculoskeletal: moves all four extremities with equal strength  Skin:  no jaundice or rashes, no wounds   Neuro: no gross neuro deficits   Musculoskeletal: can ambulate but needs assistance. No deformity  Psychiatric: Calm.      ROS:  Chronic dyspnea with exertion, weak  Lines: right EJ catheter    CHEST XRAY:       LAB  Recent Labs      02/13/18   0404  02/12/18   0352  02/11/18   0354   WBC  4.9  4.6   --    HGB  9.5*  9.8*   --    HCT  32.0*  32.9*   --    PLT  87*  90*   --    INR   -- --   1.5     Recent Labs      02/13/18   0404  02/12/18   0352  02/11/18   0354   NA  144  143  143   K  3.8  3.7  3.1*   CL  100  99  100   CO2  39*  39*  39*   GLU  88  102*  90   BUN  32*  35*  38*   CREA  0.95  0.95  0.96   MG  2.2  2.1  2.1         Assessment:  (Medical Decision Making)     Hospital Problems  Date Reviewed: 2/10/2018          Codes Class Noted POA    * (Principal)Acute on chronic respiratory failure ([facility] [address] ICD-10-CM: J96.20  ICD-9-CM: 518.84  2/7/2018 Yes    Required bipap in the ER - changed to cannula after diuresis    Leukocytosis ICD-10-CM: D72.829  ICD-9-CM: 288.60  2/7/2018 Yes    Resolved with abx    Acute on chronic diastolic heart failure (HCC) ICD-10-CM: I50.33  ICD-9-CM: 428.33  2/7/2018 Yes    Negative balance with diuresis - last     S/P TAVR (transcatheter aortic valve replacement) ICD-10-CM: Z95.2  ICD-9-CM: V43.3  1/30/2018 Yes        Pleural effusion ICD-10-CM: J90  ICD-9-CM: 511.9  1/23/2018 Yes    Overview Addendum 2/10/2018 11:36 AM by Alta Donovan NP     Right thoracentesis 1/25/18 - 1200 mls removed  Left thoracentesis 1/25/2018 - 900 mls removed  Bilateral thoracentesis 2/9/18 - L 550 ml (air aspirated during and at the end of procedure) / R 1000 ml and R creamy pink          S/p bilateral thoracenteses - transudative    Pulmonary hypertension (Chronic) ICD-10-CM: I27.20  ICD-9-CM: 416.8  2/12/2017 Yes    Associated with heart failure/underlying COPD    HTN (hypertension) (Chronic) ICD-10-CM: I10  ICD-9-CM: 401.9  8/5/2016 Yes    Recent hypotension with diuresis    Anemia (Chronic) ICD-10-CM: D64.9  ICD-9-CM: 285.9  10/27/2011 Yes    Overview Signed 10/27/2011  8:25 AM by Amina Saucedo on chronic           Stable     Chronic atrial fibrillation ([facility] [address] (Chronic) ICD-10-CM: B81.0  ICD-9-CM: 427.31  10/17/2011 Yes    Paced per telemetry.  On eliquis    Hypothyroidism (Chronic) ICD-10-CM: E03.9  ICD-9-CM: 244.9  12/4/2009 Yes    On rx          Plan: (Medical Decision Making)   1. CXR reviewed. Still with some effusion. Denies dyspnea, negative balance with diuresis. Continue to monitor - retap prn  2. Plans to transfer to the 9th floor today  3. Oxygen support - was on at home as well. Can reassess need during the day on the 9th floor. She may be able to be weaned to just nocturnal support  4. Continue bronchodilators - has home nebulizer    Courtney Strong NP    More than 50% of time documented was spent face-to-face contact with the patient and in the care of the patient on the floor/unit where the patient is located.

## 2018-02-13 NOTE — PROGRESS NOTES
Problem: Self Care Deficits Care Plan (Adult)  Goal: *Acute Goals and Plan of Care (Insert Text)  GOALS:    1: Pt will perform toileting with supervision and adaptive equipment as needed. 2: Pt will perform LB dressing with minimal assist and adaptive equipment as needed. 3: Pt will perform bathing with minimal assistance and adaptive equipment as needed. 4: Pt will perform toilet transfers with supervision and the least restrictive device t.  5: Pt will tolerate 25 minutes of therapeutic activity with minimal rest breaks. Time Frame: 7 visits    OCCUPATIONAL THERAPY: Initial Assessment, Daily Note and Treatment Day: 1st 2/13/2018  INPATIENT: Hospital Day: 7  [place] / [place] / Product Type: Medicare /      NAME/AGE/GENDER: Adair Ba is a 69 y.o. female   PRIMARY DIAGNOSIS:  Pleural effusion [J90]  Pleural effusion [J90] Acute on chronic respiratory failure (HCC) Acute on chronic respiratory failure (HCC)  Procedure(s) (LRB):  ULTRASOUND (Bilateral)  THORACENTESIS (Bilateral)  4 Days Post-Op  ICD-10: Treatment Diagnosis:    · Generalized Muscle Weakness (M62.81)   Precautions/Allergies:     Adhesive tape; Benadryl [diphenhydramine hcl]; Demerol [meperidine]; Morphine; Sulfa (sulfonamide antibiotics); and Lorazepam      ASSESSMENT:     Ms. Guerline Diaz is a pleasant 70 yo female admitted for the above reasons. Pt is oriented x 4 and agreeable to OT. At baseline, pt reports she lives with son and receives \"some\" assistance with self care from hired caregivers but tries to do most of it herself. Currently, pt presents with overall weakness and decreased activity tolerance requiring moderate/maximal rest breaks during ADLs. She completes toileting with minimal assistance for clothing management and bowel hygiene and themb stands at sink for grooming-requires minimal assistance for container management.   Her O2 decreased to 89%, returning to 92% with breathing cues and rest break (continued to stand). Pt ambulates back to bed with minimal assistance-decreased balance noted. Pt completes bathing with assistance for back, perineal, and lower legs and then dresses in pull over shirt (minimal assist) and pajama pants with underwear (moderate assist). Pt continues to require rest breaks and verbal cueing to maintain O2 stats. Pt transferred to chair and left with all needs in reach. Ms. Carol Kamara presents with decreased activity tolerance, and decreased independence for self care, functional mobility, and ADL's. Requires skilled OT to maximize independence with self care tasks and functional transfers, educate on energy conservation. Pt plans for rehab. This section established at most recent assessment   PROBLEM LIST (Impairments causing functional limitations):  1. Decreased Strength  2. Decreased ADL/Functional Activities  3. Decreased Transfer Abilities  4. Decreased Ambulation Ability/Technique  5. Decreased Balance  6. Decreased Activity Tolerance  7. Decreased Pacing Skills  8. Decreased Work Simplification/Energy Conservation Techniques  9. Increased Fatigue  10. Increased Shortness of Breath   INTERVENTIONS PLANNED: (Benefits and precautions of occupational therapy have been discussed with the patient.)  1. Activities of daily living training  2. Adaptive equipment training  3. Balance training  4. Donning&doffing training  5. Group therapy  6. Therapeutic activity  7. Therapeutic exercise  8. Energy conservation     TREATMENT PLAN: Frequency/Duration: Follow patient 3x/week to address above goals. Rehabilitation Potential For Stated Goals: Good     RECOMMENDED REHABILITATION/EQUIPMENT: (at time of discharge pending progress): Due to the probability of continued deficits (see above) this patient will likely need continued skilled occupational therapy after discharge.   Equipment:    TBD              OCCUPATIONAL PROFILE AND HISTORY:   History of Present Injury/Illness (Reason for Referral):  See H&P  Past Medical History/Comorbidities:   Ms. Lavon Wnog  has a past medical history of Abnormal glucose (8/5/2016); Abscess (8/5/2016); Acute encephalopathy (7/8/2016); Acute renal failure ([address] (12/3/2009); Afib ([address] Anemia; Ankle swelling (8/5/2016); Anxiety (8/5/2016); Aortic Valve Bioprosthesis Present (3/7/2009); ARF (acute renal failure) ([address] (2/24/2010); Arthritis; Asthma; Atopic dermatitis (8/5/2016); Atrial fibrillation ([address] (3/7/2009); Autonomic orthostatic hypotension (8/5/2016); AV block (10/17/2011); Back pain (8/5/2016); Bradycardia (Symptomatic) (11/7/2011); CAD (coronary artery disease); Cancer ([address] (1990); Cardiac pacemaker (11/2/2015); Cardiogenic shock ([address] (10/17/2011); 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 (8/5/2016); Cellulitis (8/5/2016); Chest pain (8/5/2016); Chronic atrial fibrillation ([address] (10/17/2011); Chronic depression (8/5/2016); Chronic obstructive pulmonary disease ([address] (3/8/2009); Chronic pain; CKD (chronic kidney disease) stage 3, GFR 30-59 ml/min (12/4/2009); Congestive heart failure (CHF) ([address] (11/2/2015); Degeneration of cervical intervertebral disc (8/5/2016); Degenerative arthritis of left knee (2/27/2009); Dehydration (8/5/2016); Diastolic heart failure ([address] Digoxin toxicity (2/24/2010); Disorder of sweat glands (8/5/2016); Diverticulosis of large intestine without diverticulitis (2015); Dyspnea (8/5/2016); Eczema (8/5/2016); Embolus of femoral artery ([address] (12/4/2017); Epigastric abdominal pain (2/24/2010); GERD (gastroesophageal reflux disease); Gout (8/5/2016); H/O mitral valve repair, 2003 (6/27/2016); Heart failure ([facility] colonic polyp (2015); Hyperkalemia (10/17/2011); Hyperlipidemia (8/5/2016); Hypertension; Hypokalemia (2/24/2010); Hypothyroidism (12/4/2009); Ill-defined condition; Knee pain (8/5/2016); Leg cramps (8/5/2016); Mitral stenosis with insufficiency (11/2/2015); Nausea and vomiting (2/24/2010); Obesity (11/2/2015);  BOBBY (obstructive sleep apnea) (12/4/2009); Osteoarthritis (8/5/2016); Osteopenia (8/5/2016); Other long term (current) drug therapy (8/5/2016); Palpitations (11/2/2015); Rash (8/5/2016); Rectal bleeding (10/27/2011); Rectocele (2015); Recurrent depression ([facility] (1/4/2018); Rheumatic aortic stenosis (11/2/2015); Right hip pain (8/5/2016); RLS (restless legs syndrome) (8/5/2016); Sick sinus syndrome ([facility] (2/13/2016); Skin infection (8/5/2016); Tachycardia (6/27/2016); Thrombocytopenia, unspecified (8/22/2012); Thromboembolus ([facility] Thyroid disease; Urticaria (8/5/2016); and Vertigo (8/5/2016). Ms. Lashawn Alvarado  has a past surgical history that includes hx appendectomy; hx cholecystectomy (2005); hx gyn (1981); hx mastectomy (1990); hx pacemaker; hx breast reconstruction; pr cardiac surg procedure unlist; hx orthopaedic; vascular surgery procedure unlist (Right, 02/20/2017); and pr chest surgery procedure unlisted. Social History/Living Environment:   Home Environment: Private residence  # Steps to Enter: 1  One/Two Story Residence: One story  Living Alone: No  Support Systems: Child(brit)  Patient Expects to be Discharged to[de-identified] Private residence  Current DME Used/Available at Home: Shower chair  Tub or Shower Type: Shower  Prior Level of Function/Work/Activity:  Lives with son, caregiver assistance as needed. Number of Personal Factors/Comorbidities that affect the Plan of Care: Expanded review of therapy/medical records (1-2):  MODERATE COMPLEXITY   ASSESSMENT OF OCCUPATIONAL PERFORMANCE[de-identified]   Activities of Daily Living:           Basic ADLs (From Assessment) Complex ADLs (From Assessment)   Basic ADL  Feeding: Setup  Oral Facial Hygiene/Grooming: Minimum assistance (containers)  Bathing: Moderate assistance  Upper Body Dressing: Minimum assistance  Lower Body Dressing: Moderate assistance  Toileting: Contact guard assistance Instrumental ADL  Meal Preparation: Total assistance  Homemaking:  Total assistance Grooming/Bathing/Dressing Activities of Daily Living   Grooming  Grooming Assistance: Minimum assistance (standing at sink)  Washing Face: Supervision/set-up  Washing Hands: Supervision/set-up  Brushing Teeth: Minimum assistance (container management)  Brushing/Combing Hair: Minimum assistance (back of head)  Cues: Verbal cues provided;Visual cues provided Cognitive Retraining  Safety/Judgement: Fall prevention; Awareness of environment   Upper Body Bathing  Bathing Assistance: Stand-by assistance (back only)  Position Performed: Seated edge of bed  Cues: Verbal cues provided     Lower Body Bathing  Bathing Assistance: Moderate assistance  Perineal  : Moderate assistance  Position Performed: Standing  Cues: Verbal cues provided  Lower Body : Moderate assistance (knees and below)  Position Performed: Standing;Seated edge of bed  Cues: Verbal cues provided; Tactile cues provided;Visual cues provided;Physical assistance Toileting  Toileting Assistance: Contact guard assistance  Bladder Hygiene: Supervision/set-up  Bowel Hygiene: Contact guard assistance  Clothing Management: Contact guard assistance  Cues: Verbal cues provided   Upper Body Dressing Assistance  Dressing Assistance: Minimum assistance  Pullover Shirt: Minimum assistance  Cues: Verbal cues provided;Physical assistance Functional Transfers  Bathroom Mobility: Minimum assistance  Toilet Transfer : Minimum assistance  Shower Transfer: Minimum assistance   Lower Body Dressing Assistance  Dressing Assistance: Moderate assistance  Underpants: Moderate assistance  Socks: Total assistance (dependent)  Slip on Shoes Without Back: Moderate assistance  Position Performed: Seated edge of bed  Cues: Physical assistance; Tactile cues provided;Verbal cues provided;Visual cues provided Bed/Mat Mobility  Sit to Stand: Contact guard assistance  Bed to Chair: Minimum assistance       Most Recent Physical Functioning:   Gross Assessment:  AROM: Generally decreased, functional  Strength: Generally decreased, functional               Posture:  Posture (WDL): Exceptions to WDL  Posture Assessment: Forward head, Rounded shoulders  Balance:  Sitting: Intact  Standing: Impaired  Standing - Static: Fair  Standing - Dynamic : Fair Bed Mobility:     Wheelchair Mobility:     Transfers:  Sit to Stand: Contact guard assistance  Stand to Sit: Contact guard assistance  Bed to Chair: Minimum assistance                Patient Vitals for the past 6 hrs:   BP SpO2 O2 Flow Rate (L/min) Pulse   18 0720 - - 2 l/min -   18 0911 95/53 100 % 2 l/min 82       Mental Status  Neurologic State: Alert  Orientation Level: Oriented X4  Cognition: Follows commands  Perception: Appears intact  Perseveration: No perseveration noted  Safety/Judgement: Fall prevention, Awareness of environment                          Physical Skills Involved:  1. Balance  2. Strength  3. Activity Tolerance Cognitive Skills Affected (resulting in the inability to perform in a timely and safe manner):  1. WFLs Psychosocial Skills Affected:  1. Habits/Routines  2. Environmental Adaptation  3. Self-Awareness   Number of elements that affect the Plan of Care: 3-5:  MODERATE COMPLEXITY   CLINICAL DECISION MAKIN[address] [place] [address] AM-PAC 6 Clicks   Daily Activity Inpatient Short Form  How much help from another person does the patient currently need. .. Total A Lot A Little None   1. Putting on and taking off regular lower body clothing? [] 1   [x] 2   [] 3   [] 4   2. Bathing (including washing, rinsing, drying)? [] 1   [x] 2   [] 3   [] 4   3. Toileting, which includes using toilet, bedpan or urinal?   [] 1   [x] 2   [] 3   [] 4   4. Putting on and taking off regular upper body clothing? [] 1   [] 2   [x] 3   [] 4   5. Taking care of personal grooming such as brushing teeth? [] 1   [] 2   [x] 3   [] 4   6. Eating meals?    [] 1   [] 2   [x] 3   [] 4   © , Trustees of [address] [place] [id number], under license to [employer]. All rights reserved      Score:  Initial: 15 Most Recent: X (Date: -- )    Interpretation of Tool:  Represents activities that are increasingly more difficult (i.e. Bed mobility, Transfers, Gait). Score 24 23 22-20 19-15 14-10 9-7 6     Modifier CH CI CJ CK CL CM CN      ? Self Care:     - CURRENT STATUS: CL - 60%-79% impaired, limited or restricted    - GOAL STATUS: CK - 40%-59% impaired, limited or restricted    - D/C STATUS:  ---------------To be determined---------------  [place] / [place] / Product Type: Medicare /      Medical Necessity:     · Patient demonstrates good rehab potential due to higher previous functional level. Reason for Services/Other Comments:  · Patient continues to require skilled intervention due to medical complications and patient unable to attend/participate in therapy as expected. Use of outcome tool(s) and clinical judgement create a POC that gives a: MODERATE COMPLEXITY         TREATMENT:   (In addition to Assessment/Re-Assessment sessions the following treatments were rendered)     Pre-treatment Symptoms/Complaints:    Pain: Initial:   Pain Intensity 1: 0  Post Session:  0     Self Care: (30 minutes): Procedure(s) (per grid) utilized to improve and/or restore self-care/home management as related to dressing, bathing, toileting and grooming. Required moderate visual, verbal and tactile cueing to facilitate activities of daily living skills and compensatory activities. Assessment    Braces/Orthotics/Lines/Etc:   · O2 Device: Nasal cannula  Treatment/Session Assessment:    · Response to Treatment:  Agrees to therapy  · Interdisciplinary Collaboration:   o Occupational Therapist  o Registered Nurse  · After treatment position/precautions:   o Up in chair  o Bed/Chair-wheels locked  o Call light within reach  o RN notified   · Compliance with Program/Exercises:  Will assess as treatment progresses. · Recommendations/Intent for next treatment session: \"Next visit will focus on advancements to more challenging activities and reduction in assistance provided\".   Total Treatment Duration: 41 minutes  OT Patient Time In/Time Out  Time In: 1021  Time Out: [address], OTR/L

## 2018-02-13 NOTE — PROGRESS NOTES
Problem: Falls - Risk of  Goal: *Absence of Falls  Document Gregorio Fall Risk and appropriate interventions in the flowsheet.    Outcome: Progressing Towards Goal  Fall Risk Interventions:  Mobility Interventions: Bed/chair exit alarm, Communicate number of staff needed for ambulation/transfer, OT consult for ADLs, Patient to call before getting OOB, PT Consult for mobility concerns, PT Consult for assist device competence, Strengthening exercises (ROM-active/passive), Utilize walker, cane, or other assitive device, Utilize gait belt for transfers/ambulation         Medication Interventions: Bed/chair exit alarm, Evaluate medications/consider consulting pharmacy, Patient to call before getting OOB, Teach patient to arise slowly, Utilize gait belt for transfers/ambulation    Elimination Interventions: Call light in reach, Patient to call for help with toileting needs, Toileting schedule/hourly rounds

## 2018-02-13 NOTE — PROGRESS NOTES
Care Management Interventions  PCP Verified by CM: Yes  Transition of Care Consult (CM Consult): Other (9th floor inpatient rehab)  Physical Therapy Consult: Yes  Occupational Therapy Consult: Yes  Current Support Network: Other (lives with son)  Confirm Follow Up Transport: Family  Plan discussed with Pt/Family/Caregiver: Yes  Freedom of Choice Offered: Yes  Discharge Location  Discharge Placement: Rehab hospital/unit acute (9th floor rehab)  Patient to be d/c to 9th floor inpatient rehab. Nursing to call report and transfer patient to 9th floor.

## 2018-02-13 NOTE — PROGRESS NOTES
TRANSFER - IN REPORT:    Verbal report received from Dick Crowley RN on Alpha Lather  being received from [address] [place] [address] for routine progression of care      Report consisted of patients Situation, Background, Assessment and   Recommendations(SBAR). Information from the following report(s) SBAR, MAR, Accordion and Recent Results was reviewed with the receiving nurse. Opportunity for questions and clarification was provided. Assessment completed upon patients arrival to unit and care assumed.

## 2018-02-13 NOTE — PROGRESS NOTES
A follow up visit was made to the patient. Support was provided. Her son was present and patient shared that she was being transferred to the 9th floor.       [employer]

## 2018-02-13 NOTE — DISCHARGE SUMMARY
[facility] Cardiology Discharge Summary     Patient ID:  Derrick Colby  [id number]  69 y.o.  1940    Admit date: 2/7/2018    Discharge date:  02/13/2018    Admitting Physician: Luz Marina Vaca MD     Discharge Physician: Ole Lynn NP/Dr. Mckeon    Admission Diagnoses: Pleural effusion [J90]  Pleural effusion [J90]    Discharge Diagnoses:    Diagnosis    Acute on chronic respiratory failure (HCC)    Leukocytosis    Acute on chronic diastolic heart failure (HCC)    S/P TAVR (transcatheter aortic valve replacement)    Aortic stenosis    Peripheral arterial disease (HCC)    Pleural effusion    Chronic respiratory failure with hypoxia (HCC)    Hypoxia    Stenosis of prosthetic aortic valve    Tricuspid valve insufficiency    Systolic CHF, chronic (HCC)    S/P mitral valve repair    H/O atrioventricular rubin ablation    Pulmonary hypertension    Aortic valve replaced    Hyperlipidemia    Anxiety    CAD (coronary artery disease)    HTN (hypertension)    GERD (gastroesophageal reflux disease)    H/O mitral valve repair, 2003    Sick sinus syndrome (HCC)    Obesity    Mitral stenosis with insufficiency    Rheumatic aortic stenosis    Cardiac pacemaker    Thrombocytopenia (HCC)    Anemia    Chronic atrial fibrillation (HCC)    Hypothyroidism    BOBBY (obstructive sleep apnea)    Chronic obstructive pulmonary disease ([address]    Aortic Valve Bioprosthesis Present       Cardiology Procedures this admission:  Thoracentesis  Consults: Pulmonary/Intensive care and Rehabilitation Medicine    Hospital Course: Patient presented to the emergency department of [facility] with complaints of worsening swelling and shortness of breath. Associated symptoms includes difficulty urinating, frequency and subjective fever. Patient was discharged from this facility on 2/3/18 after TAVR procedure on 1/30/18. Per patient and family present at the bedside, patient did well after getting home. The past 2 days, she has been more short of breath. Upon arrival to the ER, CXR shows cardiomegaly with finding consistent with pulmonary edema and worsening bilateral pleural effusions. Labs show WBC on 18.1 with neutrophil 96 and procalcitonin of 1.4. While in the ER she was placed on BiPAP and given 80mg IV lasix. INR was 5.7 and HGB was 8.1 then dropping to 7.1. Per son patient had experienced a severe hemorrhoidal bleed at home. She was given vitamin K for supratheraputic INR and she was transfused with 2 units with PRBCs. Given labile INR with coumadin she was transitioned to NOAC, Eliquis. Patient was also treated with IV lasix and was -3.7 L at time of discharge. She was treated with a 7 day course of ABX for elevated WBCs. Pulmonary was consulted for pleural effusions. Once INR was <1.8 a thoracentesis was performed on left with 550 ccs removed and right sided with 1000 cc removed. Physiatrist medicine was consulted to evaluate patient for rehab. Acute rehab was recommended. At time of discharge, patient was up feeling well with improvements in shortness of breath. LE edema was much improved. Patient's labs were stable with creatinine of 0.95. Patient was seen and examined by Dr. Gabriela Cosme and determined stable and ready for discharge. The patient has been scheduled for 7 day transitional care follow up with [facility] -- Dr. Gabriela Cosme after discharge from rehab. Lawanda Gama DISPOSITION: The patient is being discharged to acute rehab in stable condition on a low saturated fat, low cholesterol and low salt diet. The patient is instructed to advance activities as tolerated to the limit of fatigue or shortness of breath. The patient is informed to monitor daily weights and maintain a 2 liter per day fluid restriction. The patient is instructed to call the office for any shortness of breath, weight gain, or increased peripheral edema.         Discharge Exam:   Visit Vitals    /63 (BP 1 Location: Right arm, BP Patient Position: At rest)    Pulse 87    Temp 97.4 °F (36.3 °C)    Resp 20    Ht 5' (1.524 m)    Wt 80.1 kg (176 lb 9.6 oz)    SpO2 100%    BMI 34.49 kg/m2     Patient has been seen by Dr. Tariq Setting: see his progress note for exam details. Recent Results (from the past 24 hour(s))   PLEASE READ & DOCUMENT PPD TEST IN 72 HRS    Collection Time: 02/12/18  1:35 PM   Result Value Ref Range    PPD Negative Negative    mm Induration 0 mm   METABOLIC PANEL, BASIC    Collection Time: 02/13/18  4:04 AM   Result Value Ref Range    Sodium 144 136 - 145 mmol/L    Potassium 3.8 3.5 - 5.1 mmol/L    Chloride 100 98 - 107 mmol/L    CO2 39 (H) 21 - 32 mmol/L    Anion gap 5 (L) 7 - 16 mmol/L    Glucose 88 65 - 100 mg/dL    BUN 32 (H) 8 - 23 MG/DL    Creatinine 0.95 0.6 - 1.0 MG/DL    GFR est AA >60 >60 ml/min/1.73m2    GFR est non-AA >60 >60 ml/min/1.73m2    Calcium 8.0 (L) 8.3 - 10.4 MG/DL   MAGNESIUM    Collection Time: 02/13/18  4:04 AM   Result Value Ref Range    Magnesium 2.2 1.8 - 2.4 mg/dL   CBC WITH AUTOMATED DIFF    Collection Time: 02/13/18  4:04 AM   Result Value Ref Range    WBC 4.9 4.3 - 11.1 K/uL    RBC 3.30 (L) 4.05 - 5.25 M/uL    HGB 9.5 (L) 11.7 - 15.4 g/dL    HCT 32.0 (L) 35.8 - 46.3 %    MCV 97.0 79.6 - 97.8 FL    MCH 28.8 26.1 - 32.9 PG    MCHC 29.7 (L) 31.4 - 35.0 g/dL    RDW 16.6 (H) 11.9 - 14.6 %    PLATELET 87 (L) 582 - 450 K/uL    MPV 11.6 10.8 - 14.1 FL    DF AUTOMATED      NEUTROPHILS 78 43 - 78 %    LYMPHOCYTES 12 (L) 13 - 44 %    MONOCYTES 7 4.0 - 12.0 %    EOSINOPHILS 3 0.5 - 7.8 %    BASOPHILS 0 0.0 - 2.0 %    IMMATURE GRANULOCYTES 0 0.0 - 5.0 %    ABS. NEUTROPHILS 3.8 1.7 - 8.2 K/UL    ABS. LYMPHOCYTES 0.6 0.5 - 4.6 K/UL    ABS. MONOCYTES 0.3 0.1 - 1.3 K/UL    ABS. EOSINOPHILS 0.2 0.0 - 0.8 K/UL    ABS. BASOPHILS 0.0 0.0 - 0.2 K/UL    ABS. IMM.  GRANS. 0.0 0.0 - 0.5 K/UL   BNP    Collection Time: 02/13/18  4:04 AM   Result Value Ref Range     pg/mL Patient Instructions:   Current Discharge Medication List      START taking these medications    Details   metoprolol succinate (TOPROL-XL) 25 mg XL tablet Take 1 Tab by mouth daily. Qty: 1 Tab, Refills: 0      apixaban (ELIQUIS) 5 mg tablet Take 1 Tab by mouth every twelve (12) hours. Qty: 60 Tab, Refills: 0      furosemide (LASIX) 80 mg tablet Take 1 Tab by mouth every twelve (12) hours. Qty: 1 Tab, Refills: 0         CONTINUE these medications which have NOT CHANGED    Details   rOPINIRole (REQUIP) 2 mg tablet Take  by mouth two (2) times a day. pravastatin (PRAVACHOL) 40 mg tablet Take 1 Tab by mouth daily. Indications: hyperlipidemia  Qty: 90 Tab, Refills: 3    Associated Diagnoses: Hyperlipidemia, unspecified hyperlipidemia type      sertraline (ZOLOFT) 100 mg tablet Take 1 Tab by mouth daily. Qty: 90 Tab, Refills: 2    Associated Diagnoses: CALI (generalized anxiety disorder)      diazePAM (VALIUM) 5 mg tablet Take 1 Tab by mouth daily. Max Daily Amount: 5 mg. Qty: 30 Tab, Refills: 2    Associated Diagnoses: CALI (generalized anxiety disorder)      omeprazole (PRILOSEC) 20 mg capsule TAKE 1 CAPSULE BY MOUTH  DAILY  Qty: 90 Cap, Refills: 3    Associated Diagnoses: Gastroesophageal reflux disease without esophagitis      spironolactone (ALDACTONE) 25 mg tablet Take 1 Tab by mouth daily. Qty: 90 Tab, Refills: 3      levothyroxine (SYNTHROID) 88 mcg tablet Take 1 Tab by mouth Daily (before breakfast). Qty: 90 Tab, Refills: 3    Associated Diagnoses: Acquired hypothyroidism      allopurinol (ZYLOPRIM) 100 mg tablet Take 1 Tab by mouth two (2) times a day. Qty: 180 Tab, Refills: 3      fluticasone (FLONASE) 50 mcg/actuation nasal spray 1 Cascade by Both Nostrils route daily. azelastine (ASTELIN) 137 mcg (0.1 %) nasal spray 1 Cascade by Both Nostrils route two (2) times a day.  Use in each nostril as directed  Qty: 1 Bottle, Refills: 0    Associated Diagnoses: Eustachian tube dysfunction, bilateral      polyethylene glycol (MIRALAX) 17 gram/dose powder Take 17 g by mouth daily. loratadine (CLARITIN) 10 mg tablet Take 10 mg by mouth as needed. glycerin, adult, (SUPPOSITORY ADULT) suppository Insert 1 Suppository into rectum as needed. promethazine (PHENERGAN) 25 mg tablet Take 1 Tab by mouth every six (6) hours as needed. Qty: 30 Tab, Refills: 3      levalbuterol (XOPENEX) 1.25 mg/3 mL nebu 1.25 mg by Nebulization route. hydrocortisone (ANUSOL-HC) 2.5 % rectal cream Apply small amount to hemorrhoids/perianal skin TID PRN  Qty: 30 g, Refills: 3      calcium carbonate (CALTREX) 600 mg (1,500 mg) tablet Take 600 mg by mouth nightly. cholecalciferol (VITAMIN D3) 1,000 unit cap Take  by mouth daily. CARBOXYMETHYLCELLULOS/GLYCERIN (REFRESH OPTIVE OP) Apply  to eye. DOCUSATE SODIUM Take 1 Cap by mouth two (2) times a day. OXYGEN-AIR DELIVERY SYSTEMS by Does Not Apply route. 2-2.5 lpm cont. Associated Diagnoses: Anemia; Atrial fibrillation (HCC)      cyanocobalamin (VITAMIN B-12) 250 mcg tablet Take 1,000 mcg by mouth daily. nitroglycerin (NITROSTAT) 0.4 mg SL tablet by SubLINGual route every five (5) minutes as needed for Chest Pain.          STOP taking these medications       torsemide (DEMADEX) 20 mg tablet Comments:   Reason for Stopping:         warfarin (COUMADIN) 5 mg tablet Comments:   Reason for Stopping:         benzonatate (TESSALON) 200 mg capsule Comments:   Reason for Stopping:                 Signed:  Mirlande Humphrey NP  2/13/2018 9:02 AM

## 2018-02-13 NOTE — PROGRESS NOTES
Dual nurse skin assessment completed with Shahram Narayan RN. Noted right EJ IV and mole behind right ear. Weeping skin noted on both upper extremities. External hemorrhoids could be seen. Heels soft but could cap refill. No other skin abnormalities noted. Admission assessment completed.

## 2018-02-13 NOTE — PROGRESS NOTES
TRANSFER - OUT REPORT:    Verbal report given to [facility], RN (name) on Geraldo Chahal  being transferred to 9th floor (unit) for routine progression of care       Report consisted of patients Situation, Background, Assessment and   Recommendations(SBAR). Information from the following report(s) SBAR, Kardex, [facility] and Recent Results was reviewed with the receiving nurse. Opportunity for questions and clarification was provided.       Patient transported with:   O2 @ 2 liters

## 2018-02-13 NOTE — PROGRESS NOTES
Bedside and Verbal shift change report given to Edmundo che RN (oncoming nurse) by self (offgoing nurse). Report included the following information SBAR, Kardex, MAR and Recent Results.

## 2018-02-13 NOTE — PROGRESS NOTES
[facility] CARDIOLOGY PROGRESS NOTE           2/13/2018 7:30 AM    Admit Date: 2/7/2018      Subjective:   Patient notes breathing improved. Still mild edema. BP low but acceptable. Renal function normal.    ROS:  Cardiovascular:  As noted above    Objective:      Vitals:    02/12/18 2113 02/12/18 2126 02/13/18 0139 02/13/18 0509   BP: 100/54 101/62 98/64 102/63   Pulse:  81 83 87   Resp:  18 19 20   Temp:  97.5 °F (36.4 °C) 97.6 °F (36.4 °C) 97.4 °F (36.3 °C)   SpO2:  97% 100% 100%   Weight:    80.1 kg (176 lb 9.6 oz)   Height:           Physical Exam:  General-No Acute Distress  Neck- supple, mild JVD  CV- RRR with II/VI TERRI  Lung- clear bilaterally  Abd- soft, nontender, nondistended  Ext- 1+ edema bilaterally. Skin- warm and dry    Data Review:   Recent Labs      02/13/18   0404  02/12/18   0352  02/11/18   0354   NA  144  143  143   K  3.8  3.7  3.1*   MG  2.2  2.1  2.1   BUN  32*  35*  38*   CREA  0.95  0.95  0.96   GLU  88  102*  90   WBC  4.9  4.6   --    HGB  9.5*  9.8*   --    HCT  32.0*  32.9*   --    PLT  87*  90*   --    INR   --    --   1.5       Assessment/Plan:     Principal Problem:    Acute respiratory failure (HCC) (2/7/2018)    Stable on PO lasix. ? Stop Rocephin. S/P bilateral thoracentesis    Active Problems:    Hypothyroidism (12/4/2009)    This is stable on therapy      Chronic atrial fibrillation ([address] (10/17/2011)    On Eliquis. V-paced. Anemia (10/27/2011)    Transfused 2 units and stable       HTN (hypertension) (8/5/2016)    This is well controlled      Pulmonary hypertension (2/12/2017)    Chronic and stable       Pleural effusion (1/23/2018)    As above      S/P TAVR (transcatheter aortic valve replacement) (1/30/2018)    On Eliquis. Leukocytosis (2/7/2018)    ? Stop antibiotics. Defer to pulmonary. Acute on chronic diastolic heart failure ([facility] [address] (2/7/2018)    As above     Hopefully to 9th floor today.        Ananya Mckeon, MD  2/13/2018 7:30 AM

## 2018-02-13 NOTE — PROGRESS NOTES
End Of Shift Functional Summary, Nursing      TOILETING:  Does patient need assist with clothing management and/or pericare? Yes: Comment: clothing management    TOILET TRANSFER:  Pt requires moderate assistance. Pt uses walker. BLADDER:  Pt does not have a [place] catheter that staff manages. Pt does not take medication. Pt is continent. of bladder and voids in toilet  Pt has had 0 bladder accidents during this shift .  (An accident is when the episode is not contained in a brief AND/OR the clothing/linen requires changing/cleaning up.)    BOWEL:  Pt does take medication. Pt is continent of bowel and uses toilet. Pt has had 0 bowel accidents during this shift. (An accident is when the episode is not contained in a brief AND/OR the clothing/linen requires changing/cleaning up.)    BED/CHAIR TRANSFER  Pt requires moderate assistance. Patient requires the assistance of 1 staff member(s). Pt uses walker      EATING  Pt requires setup. Pt wears dentures. TUBE FEEDINGS:  Pt does not  receive nutrition through tube feedings. Patient requires supervision/setup with feedings. Documentation reviewed and plan of care discussed/reviewed with   patient, oncoming nurse, patient assistant and family/spouse during the shift.

## 2018-02-13 NOTE — H&P
[address] [facility], [address] [facility]  Tel: [phone]  Fax: [phone]      HISTORY AND PHYSICAL  IRC       Admit Date: 2/13/2018  Primary Care Physician: John Friedman MD  Specialty Group: cardiology, Pulmonary    Chief Complaint : Gait dysfunction secondary to below. Admit Diagnosis: Pleural effusion [J90]; Pleural effusion [J90]  Acute respiratory failure (HCC) (2/7/2018)  Pleural effusion (1/23/2018)/ S/P bilateral thoracentesis  Hypothyroidism   Chronic atrial fibrillation   HTN   Pulmonary hypertension   S/P TAVR(1/30/2018)  Leukocytosis (2/7/2018) on Abx   Acute on chronic diastolic heart failure   weakness  Pain  DVT risk  Acute blood loss anemia/ GI bleed? Acute Rehab Dx:  Generalized weakness.   Debility    deconditioning  Mobility and ambulation deficits  Self Care/ADL deficits  Medical Dx:  Past Medical History:   Diagnosis Date    Abnormal glucose 8/5/2016    Abscess 8/5/2016    Acute encephalopathy 7/8/2016    Acute renal failure ([address] 12/3/2009    Afib ([address]     Anemia     Ankle swelling 8/5/2016    Anxiety 8/5/2016    Aortic Valve Bioprosthesis Present 3/7/2009    ARF (acute renal failure) ([address] 2/24/2010    Arthritis     Asthma     Atopic dermatitis 8/5/2016    Atrial fibrillation (HCC) 3/7/2009    Autonomic orthostatic hypotension 8/5/2016    AV block 10/17/2011    Back pain 8/5/2016    Bradycardia (Symptomatic) 11/7/2011    CAD (coronary artery disease)     Cancer (HCC) 1990    breast (left)    Cardiac pacemaker 11/2/2015    Cardiogenic shock (HCC) 10/17/2011    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    Cellulitis 8/5/2016    Chest pain 8/5/2016    Chronic atrial fibrillation ([address] 10/17/2011    Chronic depression 8/5/2016    Chronic obstructive pulmonary disease (HCC) 3/8/2009    Chronic pain     CKD (chronic kidney disease) stage 3, GFR 30-59 ml/min 12/4/2009    Congestive heart failure (CHF) ([address] 11/2/2015    Degeneration of cervical intervertebral disc 8/5/2016    Degenerative arthritis of left knee 2/27/2009    Dehydration 2/4/9495    Diastolic heart failure (HCC)     Digoxin toxicity 2/24/2010    Disorder of sweat glands 8/5/2016    Diverticulosis of large intestine without diverticulitis 2015    Dyspnea 8/5/2016    Eczema 8/5/2016    Embolus of femoral artery (HCC) 12/4/2017    Epigastric abdominal pain 2/24/2010    GERD (gastroesophageal reflux disease)     Gout 8/5/2016    H/O mitral valve repair, 2003 6/27/2016    Heart failure ([address]     Hx of colonic polyp 2015    adenoma    Hyperkalemia 10/17/2011    Hyperlipidemia 8/5/2016    Hypertension     Hypokalemia 2/24/2010    Hypothyroidism 12/4/2009    Ill-defined condition     CARPEL TUNNEL SYNDROME, BILAT HANDS    Knee pain 8/5/2016    Leg cramps 8/5/2016    Mitral stenosis with insufficiency 11/2/2015    Prior MV repair 2003. 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    Right hip pain 8/5/2016    RLS (restless legs syndrome) 8/5/2016    Sick sinus syndrome ([address] 2/13/2016    Skin infection 8/5/2016    Tachycardia 6/27/2016    Thrombocytopenia, unspecified 8/22/2012    Thromboembolus ([address]     02/2017    Thyroid disease     Urticaria 8/5/2016    Vertigo 8/5/2016       Date of Evaluation:  February 13, 2018    HPI: Malik Yin is a 69 y.o. female patient at [facility] who was admitted on 2/7/2018  by Elyl Harrison MD with below mentioned medical history ,is being seen for Physical Medicine and Rehabilitation.     Malik Yin has a history of hypertension chronic diastolic CHF chronic atrial fibrillation and TAV are on 1/30/2018 was brought to the ER with increased shortness of breath, fatigue, and generalize weakness. Chest x-ray findings were consistent with pulmonary edema, worsening bilateral effusions. Patient was placed on BiPAP, and started on diuresis. Patient also had subjective fevers, WBCs were elevated 18K at admission. Patient was started on antibiotics. Cultures are being followed. Patient was noted to have elevated INR, 6.4. Patient was given vitamin K and Inr corrected. Patient states she also noted black stools last several days. She had hemoglobin level of 7.1. She required transfusions to correct her anemia. And patient requires continued monitoring for G.I. Bleed. patient underwent bilateral thoracentesis 2/9/18 for pleural effusions. Patient continued on Lasix for fluid overload and edema. Patient has started to participate in therapies with acute PT, OT. She shows significant generalized weakness, poor activity tolerance limiting her mobility, ambulation and ADLs to below her baseline. PT notes impaired balance, very slow, unsafe to mobilize alone. She is managing her gait short distance with min A. Physical therapy was initiated and patient was starting to mobilize. However, she shows significant functional deficits due to prolonged immobility, hospitalization, still evidence of volume overload/ CHF. Our service was consulted for rehab needs and we recommended inpatient hospital rehabilitation is reasonable and necessary due to ongoing acute medical issues which have not changed since initial pre-admission evaluation. and rehab needs still likely best managed in IRU setting. The patient was evaluated by [facility] admissions coordinators. I reviewed the preadmission screening and have approved for admission to the [facility]. The patient was cleared for transfer to rehab today.  Patient continues to have ongoing  medical issues outlined above requiring physician medical supervision and functional deficits which would benefit from continued intensive therapies. Current Level of Function: (evaluated by acute therapy staff, with bed mobility, transfers, balance personally observed post-admission in the IRF setting minutes prior to submission of document) bathing - mod A, lower body dressing - mod/max A, toileting - CGA, bed mobility - CGA/ min A, transfers- min A, poor balance , ambulation- short distances with RW - min A    Prior Level of Function/Work/Activity:  Patient reports she has been functioning in her home environment with use of a r/walker and furniture walking at times prior to this admission. She reports she is on 3L of O2 at home. ?       Past Medical History:   Diagnosis Date    Abnormal glucose 8/5/2016    Abscess 8/5/2016    Acute encephalopathy 7/8/2016    Acute renal failure ([address] 12/3/2009    Afib ([address]     Anemia     Ankle swelling 8/5/2016    Anxiety 8/5/2016    Aortic Valve Bioprosthesis Present 3/7/2009    ARF (acute renal failure) ([address] 2/24/2010    Arthritis     Asthma     Atopic dermatitis 8/5/2016    Atrial fibrillation (HCC) 3/7/2009    Autonomic orthostatic hypotension 8/5/2016    AV block 10/17/2011    Back pain 8/5/2016    Bradycardia (Symptomatic) 11/7/2011    CAD (coronary artery disease)     Cancer (HCC) 1990    breast (left)    Cardiac pacemaker 11/2/2015    Cardiogenic shock ([address] 10/17/2011    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    Cellulitis 8/5/2016    Chest pain 8/5/2016    Chronic atrial fibrillation ([address] 10/17/2011    Chronic depression 8/5/2016    Chronic obstructive pulmonary disease ([address] 3/8/2009    Chronic pain     CKD (chronic kidney disease) stage 3, GFR 30-59 ml/min 12/4/2009    Congestive heart failure (CHF) ([address] 11/2/2015    Degeneration of cervical intervertebral disc 8/5/2016    Degenerative arthritis of left knee 2/27/2009  Dehydration 9/0/0099    Diastolic heart failure (HCC)     Digoxin toxicity 2/24/2010    Disorder of sweat glands 8/5/2016    Diverticulosis of large intestine without diverticulitis 2015    Dyspnea 8/5/2016    Eczema 8/5/2016    Embolus of femoral artery (HCC) 12/4/2017    Epigastric abdominal pain 2/24/2010    GERD (gastroesophageal reflux disease)     Gout 8/5/2016    H/O mitral valve repair, 2003 6/27/2016    Heart failure ([address]     Hx of colonic polyp 2015    adenoma    Hyperkalemia 10/17/2011    Hyperlipidemia 8/5/2016    Hypertension     Hypokalemia 2/24/2010    Hypothyroidism 12/4/2009    Ill-defined condition     CARPEL TUNNEL SYNDROME, BILAT HANDS    Knee pain 8/5/2016    Leg cramps 8/5/2016    Mitral stenosis with insufficiency 11/2/2015    Prior MV repair 2003.     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    Right hip pain 8/5/2016    RLS (restless legs syndrome) 8/5/2016    Sick sinus syndrome ([address] 2/13/2016    Skin infection 8/5/2016    Tachycardia 6/27/2016    Thrombocytopenia, unspecified 8/22/2012    Thromboembolus ([address]     02/2017    Thyroid disease     Urticaria 8/5/2016    Vertigo 8/5/2016      Past Surgical History:   Procedure Laterality Date    CARDIAC SURG PROCEDURE UNLIST      valve repair and replacement    CHEST SURGERY PROCEDURE UNLISTED      HX APPENDECTOMY      HX BREAST RECONSTRUCTION      HX CHOLECYSTECTOMY  2005    HX GYN  1981    hysterectomy still have ovaries    HX MASTECTOMY  1990    left mastectomy    HX ORTHOPAEDIC      knee surgery    HX PACEMAKER      VASCULAR SURGERY PROCEDURE UNLIST Right 02/20/2017 LE thrombectomy     Allergies   Allergen Reactions    Adhesive Tape Rash    Benadryl [Diphenhydramine Hcl] Other (comments)     Jitters      Demerol [Meperidine] Anxiety    Morphine Other (comments)     Confusion    Sulfa (Sulfonamide Antibiotics) Anxiety    Lorazepam Anxiety      Family History   Problem Relation Age of Onset    Heart Disease Mother     Cancer Father      Throat    Heart Disease Father     Cancer Sister      Breast, Colon    Heart Disease Sister     Breast Cancer Sister 79      from disease    Cancer Maternal Grandmother     Cancer Brother     Diabetes Brother     Heart Disease Brother     Psychiatric Disorder Paternal Grandfather     Cancer Maternal Aunt      Breast    Diabetes Son     Alcohol abuse Neg Hx     Arthritis-rheumatoid Neg Hx     Asthma Neg Hx     Bleeding Prob Neg Hx     Elevated Lipids Neg Hx     Headache Neg Hx     Migraines Neg Hx     Hypertension Neg Hx     Lung Disease Neg Hx     Mental Retardation Neg Hx     Stroke Neg Hx       Social History   Substance Use Topics    Smoking status: Former Smoker     Packs/day: 3.00     Years: 15.00     Types: Cigarettes     Quit date: 1996    Smokeless tobacco: Former User      Comment: quit     Alcohol use No      Current Facility-Administered Medications   Medication Dose Route Frequency    acetaminophen (TYLENOL) tablet 650 mg  650 mg Oral Q4H PRN    alcohol 62% (NOZIN) nasal  1 Ampule  1 Ampule Topical Q12H    allopurinol (ZYLOPRIM) tablet 100 mg  100 mg Oral BID    apixaban (ELIQUIS) tablet 5 mg  5 mg Oral Q12H    diazePAM (VALIUM) tablet 5 mg  5 mg Oral QHS    furosemide (LASIX) tablet 80 mg  80 mg Oral Q12H    hydrocortisone (ANUSOL-HC) 2.5 % rectal cream   PeriANAL TID PRN    [START ON 2018] levothyroxine (SYNTHROID) tablet 88 mcg  88 mcg Oral ACB    [START ON 2018] metoprolol succinate (TOPROL-XL) XL tablet 25 mg  25 mg Oral DAILY    [START ON 2018] pantoprazole (PROTONIX) tablet 40 mg  40 mg Oral ACB    [START ON 2/14/2018] polyethylene glycol (MIRALAX) packet 17 g  17 g Oral DAILY    [START ON 2/14/2018] potassium chloride (K-DUR, KLOR-CON) SR tablet 40 mEq  40 mEq Oral DAILY    [START ON 2/14/2018] pravastatin (PRAVACHOL) tablet 40 mg  40 mg Oral DAILY    rOPINIRole (REQUIP) tablet 2 mg  2 mg Oral BID    [START ON 2/14/2018] sertraline (ZOLOFT) tablet 100 mg  100 mg Oral DAILY    sodium chloride (NS) flush 5-10 mL  5-10 mL IntraVENous Q8H    sodium chloride (NS) flush 5-10 mL  5-10 mL IntraVENous PRN    [START ON 2/14/2018] spironolactone (ALDACTONE) tablet 25 mg  25 mg Oral DAILY    cefTRIAXone (ROCEPHIN) 1 g in 0.9% sodium chloride (MBP/ADV) 50 mL  1 g IntraVENous Q24H     Facility-Administered Medications Ordered in Other Encounters   Medication Dose Route Frequency    0.9% sodium chloride infusion 250 mL  250 mL IntraVENous PRN       Review of Systems:  Denies: fevers, chills, sweats, fatigue, malaise, anorexia, weight loss   Denies: blurry vision, loss of vision, eye pain, photophobia   Denies: hearing loss, ringing in the ears, earache, epistaxis   Denies: chest pain, palpitations, syncope, orthopnea, paroxysmal nocturnal dyspnea, claudication   Denies: dysphagia, odynophagia, nausea, vomiting, diarrhea, constipation, abdominal pain, jaundice, melena   Denies: frequency, dysuria, nocturia, urinary incontinence, stones, hematuria   Denies: polydipsia/polyuria, skin changes, temperature intolerance  Denies: back pain, joint pain, joint swelling, muscle pain   Denies: bleeding problems, blood transfusions, bruising, pallor, swollen lymph nodes   Denies: headache, dysarthria, blurred vision, diplopia,seizure, focal deficits. Objective:     Visit Vitals    /71    Pulse 80    Temp 97.1 °F (36.2 °C)    Resp 20    SpO2 100%      Intake and Output:        Physical Exam:   General: Alert and age appropriately oriented.   Appears comfortable on 2L O2 NC. No acute cardio respiratory distress. HEENT: Normocephalic,no scleral icterus  Oral mucosa moist without cyanosis, No bruit, No JVD. Lungs: + basilar crackles. no wheezing. Respiration even and unlabored   Heart: RRR,  II/VI TERRI  No clicks, rub or gallops   Abdomen: Soft, non-tender, nondistended. Bowel sounds present. No organomegaly. Genitourinary: defered   Neuromuscular:     PERRL, EOMI  Follows simple commands consistently. Able to identify, recall repeat. Mood appropriate. Insight, judgement intact. LUE     Shoulder abduction   5-/5              Elbow flexion:   5-/5               Wrist extension:  5- /5              Finger flexion;  5- /5  RUE    Shoulder abduction: 5- /5                Elbow flexion:  5- /5                         Wrist extension: 5- /5                        Finger flexion:  5- /5  LLE     Hip flexion:  3+ /5              Knee extension:  4 /5                         Ankle dorsiflexion:  5 /5                        Ankle plantarflexion:   5/5                                                                  RLE     Hip flexion:   3+/5                        Knee extension:  4 /5                         Ankle dorsiflexion:  5 /5                        Ankle plantarflexion:  5 /5  Sensory - intact grossly   No cerebellar signs. Plantars - down going  No atrophy, no fasciculations, no tremors. Skin/extremity: No rashes, no erythema. Calf non tender BLE. 2+ BLE edema. + chronic arthritic joint changes.                  Lab Review:    Recent Results (from the past 72 hour(s))   METABOLIC PANEL, BASIC    Collection Time: 02/11/18  3:54 AM   Result Value Ref Range    Sodium 143 136 - 145 mmol/L    Potassium 3.1 (L) 3.5 - 5.1 mmol/L    Chloride 100 98 - 107 mmol/L    CO2 39 (H) 21 - 32 mmol/L    Anion gap 4 (L) 7 - 16 mmol/L    Glucose 90 65 - 100 mg/dL    BUN 38 (H) 8 - 23 MG/DL    Creatinine 0.96 0.6 - 1.0 MG/DL    GFR est AA >60 >60 ml/min/1.73m2    GFR est non-AA >60 >60 ml/min/1.73m2    Calcium 8.1 (L) 8.3 - 10.4 MG/DL   MAGNESIUM    Collection Time: 02/11/18  3:54 AM   Result Value Ref Range    Magnesium 2.1 1.8 - 2.4 mg/dL   PROTHROMBIN TIME + INR    Collection Time: 02/11/18  3:54 AM   Result Value Ref Range    Prothrombin time 17.1 (H) 11.5 - 14.5 sec    INR 1.5     PLEASE READ & DOCUMENT PPD TEST IN 48 HRS    Collection Time: 02/11/18  1:30 PM   Result Value Ref Range    PPD Negative Negative    mm Induration 0 mm   METABOLIC PANEL, BASIC    Collection Time: 02/12/18  3:52 AM   Result Value Ref Range    Sodium 143 136 - 145 mmol/L    Potassium 3.7 3.5 - 5.1 mmol/L    Chloride 99 98 - 107 mmol/L    CO2 39 (H) 21 - 32 mmol/L    Anion gap 5 (L) 7 - 16 mmol/L    Glucose 102 (H) 65 - 100 mg/dL    BUN 35 (H) 8 - 23 MG/DL    Creatinine 0.95 0.6 - 1.0 MG/DL    GFR est AA >60 >60 ml/min/1.73m2    GFR est non-AA >60 >60 ml/min/1.73m2    Calcium 7.9 (L) 8.3 - 10.4 MG/DL   MAGNESIUM    Collection Time: 02/12/18  3:52 AM   Result Value Ref Range    Magnesium 2.1 1.8 - 2.4 mg/dL   CBC WITH AUTOMATED DIFF    Collection Time: 02/12/18  3:52 AM   Result Value Ref Range    WBC 4.6 4.3 - 11.1 K/uL    RBC 3.43 (L) 4.05 - 5.25 M/uL    HGB 9.8 (L) 11.7 - 15.4 g/dL    HCT 32.9 (L) 35.8 - 46.3 %    MCV 95.9 79.6 - 97.8 FL    MCH 28.6 26.1 - 32.9 PG    MCHC 29.8 (L) 31.4 - 35.0 g/dL    RDW 16.9 (H) 11.9 - 14.6 %    PLATELET 90 (L) 845 - 450 K/uL    MPV 11.2 10.8 - 14.1 FL    DF AUTOMATED      NEUTROPHILS 77 43 - 78 %    LYMPHOCYTES 12 (L) 13 - 44 %    MONOCYTES 7 4.0 - 12.0 %    EOSINOPHILS 3 0.5 - 7.8 %    BASOPHILS 1 0.0 - 2.0 %    IMMATURE GRANULOCYTES 0 0.0 - 5.0 %    ABS. NEUTROPHILS 3.6 1.7 - 8.2 K/UL    ABS. LYMPHOCYTES 0.6 0.5 - 4.6 K/UL    ABS. MONOCYTES 0.3 0.1 - 1.3 K/UL    ABS. EOSINOPHILS 0.1 0.0 - 0.8 K/UL    ABS. BASOPHILS 0.1 0.0 - 0.2 K/UL    ABS. IMM.  GRANS. 0.0 0.0 - 0.5 K/UL   PLEASE READ & DOCUMENT PPD TEST IN 72 HRS    Collection Time: 02/12/18  1:35 PM   Result Value Ref Range    PPD Negative Negative    mm Induration 0 mm   METABOLIC PANEL, BASIC    Collection Time: 02/13/18  4:04 AM   Result Value Ref Range    Sodium 144 136 - 145 mmol/L    Potassium 3.8 3.5 - 5.1 mmol/L    Chloride 100 98 - 107 mmol/L    CO2 39 (H) 21 - 32 mmol/L    Anion gap 5 (L) 7 - 16 mmol/L    Glucose 88 65 - 100 mg/dL    BUN 32 (H) 8 - 23 MG/DL    Creatinine 0.95 0.6 - 1.0 MG/DL    GFR est AA >60 >60 ml/min/1.73m2    GFR est non-AA >60 >60 ml/min/1.73m2    Calcium 8.0 (L) 8.3 - 10.4 MG/DL   MAGNESIUM    Collection Time: 02/13/18  4:04 AM   Result Value Ref Range    Magnesium 2.2 1.8 - 2.4 mg/dL   CBC WITH AUTOMATED DIFF    Collection Time: 02/13/18  4:04 AM   Result Value Ref Range    WBC 4.9 4.3 - 11.1 K/uL    RBC 3.30 (L) 4.05 - 5.25 M/uL    HGB 9.5 (L) 11.7 - 15.4 g/dL    HCT 32.0 (L) 35.8 - 46.3 %    MCV 97.0 79.6 - 97.8 FL    MCH 28.8 26.1 - 32.9 PG    MCHC 29.7 (L) 31.4 - 35.0 g/dL    RDW 16.6 (H) 11.9 - 14.6 %    PLATELET 87 (L) 180 - 450 K/uL    MPV 11.6 10.8 - 14.1 FL    DF AUTOMATED      NEUTROPHILS 78 43 - 78 %    LYMPHOCYTES 12 (L) 13 - 44 %    MONOCYTES 7 4.0 - 12.0 %    EOSINOPHILS 3 0.5 - 7.8 %    BASOPHILS 0 0.0 - 2.0 %    IMMATURE GRANULOCYTES 0 0.0 - 5.0 %    ABS. NEUTROPHILS 3.8 1.7 - 8.2 K/UL    ABS. LYMPHOCYTES 0.6 0.5 - 4.6 K/UL    ABS. MONOCYTES 0.3 0.1 - 1.3 K/UL    ABS. EOSINOPHILS 0.2 0.0 - 0.8 K/UL    ABS. BASOPHILS 0.0 0.0 - 0.2 K/UL    ABS. IMM. GRANS. 0.0 0.0 - 0.5 K/UL   BNP    Collection Time: 02/13/18  4:04 AM   Result Value Ref Range     pg/mL       Functional Assessment:                                Speech Assessment:         Ambulation:          Condition on Admission: Good      Assessment:    The Post Assessment Physician Evaluation (RAMBO) found the current functional status to be comparable with the Pre-admission Screening. The Patient is a good candidate for acute inpatient rehabilitation.  Nothing since the Pre-admission screen has changed that determination. Rehabilitation Plan  The patient has shown the ability to tolerate and benefit from 3 hours of therapy daily and is being admitted to a comprehensive acute inpatient rehabilitation program consisting of at least 3 hours of combined physical and occupational therapies. Begin intensive Physical Therapy for a minimum of 1.5 hours a day, at least 5 out of 7 days per week to address bed mobility, transfers, ambulation, strengthening, balance, and endurance. Begin intensive Occupational Therapy for a minimum of 1.5 hours a day, at least 5 out of 7 days per week to address ADL ( bathing, LE dressing, toileting) and adaptive equipment as needed. The patient will also require 24-hour skilled rehabilitation nursing for bowel and bladder management, skin care for decubitus ulcer prevention , pain management and ongoing medication administration     The patient may benefit from a psychology consult for depression, adjustment disorder. Continue ST for: dysarthria, impaired communication skills, vital stimulation for L facial weakness. Continue 24-hour skilled rehabilitation nursing for bowel and bladder management, skin care for decubitus ulcer prevention , pain management and ongoing medication administration     The patient may benefit from a psychology consult for depression, anxiety and adjustment disorder. Continue daily physician medical management:      Acute respiratory failure (HCC) (2/7/2018)/ Pleural effusion (1/23/2018)/ S/P bilateral thoracentesis  S/p - Thoracenteses on 2/9 - 550 mls removed from the left and 1 liter removed from the right.   - monitor Sao2, signs of decompensation. F/u cxR. Still with some effusion.  - continue diuresis, lasix+ aldactone  continued  - Continue bronchodilators prn- has home nebulizer.  Resume as prn;xopenex      S/P TAVR(1/30/2018)/ Acute on chronic diastolic heart failure Pulmonary hypertension / Chronic atrial fibrillation/ HTN   - cardiac precaution, s.p TAVR. - weights and maintain a 2 liter per day fluid restriction.   - Monitor HR/BP. conitinue Eliquis for a.fib  - continue BB-Toprol XL      Hypothyroidism - synthroid 88 mcg    Leukocytosis (2/7/2018) on Abx last dose 7/7 rocephin administered. monitro fro signs of infection/ sepsis. Acute blood loss anemia/ GI bleed? -monitor clinically. May have been caused by high INR. No evidence of melena. check hgb. Pneumonia prophylaxis- Insentive spirometer every hour while awake    DVT risk / DVT Prophylaxis- Will require daily physician exam to assess for signs and symptoms as patient is at increased risk for of thromboembolism. Mobilization as tolerated. Intermittent pneumatic compression devices when in bed Thigh-high or knee-high thromboembolic deterrent hose when out of bed.   - covered with eliquis. Pain Control: no current joint or muscle symptoms, essentially pain-free. Will require regular pain assessment and comprenhensive pain management. Wound Care: Monitor wound status daily per staff and physician. At risk for failure. Will require 24/7 rehab nursing. Keep wound clean and dry  - monitor for ulcers, skin breakdown due to edema. Potential urinary retention - schedule voids q6-8 hrs. Check post-void residual every shift; In and Out catheterize if post-void residual is more than 400 cc.    bowel program - as needed dulcolax, pericolace. GERD/ GI prophylaxis - resume PPI. At times may need additional antacids, Maalox prn. The patient's prognosis for significant practical improvement within a reasonable period of time appears good and the estimated length of stay is 14 days and he is expected to return home with spouse and continued rehabilitation with home health therapy.      Given the patient's complex neurologic/medical condition and the risk of further medical complications including: DVT, PE, skin breakdown, pneumonia due to decreased mobility, pulmonary edema, MI, cardiac arrhythmias due to HTN,CHF decompensation, requiring adjustment of diuretic doses, clinical monitoring of volume status,  increased risk of thromboembolism secondary to decreased blood volume and increased energy expenditure in a patient with known acute blood loss could potentially impact the IRF program with decreased cardiovascular efficiency, postural hypotension and cardiac arrhythmia. For these ongoing medical issues, rehabilitation services could not be safely provided at a lower level of care such as a skilled nursing facility or nursing home.         Signed By: Fer James MD     February 13, 2018

## 2018-02-14 PROBLEM — I50.32 CHRONIC DIASTOLIC HEART FAILURE (HCC): Status: ACTIVE | Noted: 2018-01-01

## 2018-02-14 PROBLEM — I50.9 CHF (CONGESTIVE HEART FAILURE) (HCC): Status: RESOLVED | Noted: 2018-01-01 | Resolved: 2018-01-01

## 2018-02-14 NOTE — PROGRESS NOTES
Subjective \"I want to get out of here and be like I was before I came. \"   Activity Evaluation   Strength/Endurance Patient was with low activity tolerance and with rest breaks needed during the session. Balance Sit<->stand:  CGA with RW   Social Interaction Patient was friendly and conversational during the session. She was slightly [place] and needed words repeated as needed. Cognitive A&O X4   Comments Patient was on 2 liter O2 via nasal canula. She appears motivated and eager to participate with recreational therapy. Patient was assisted to her bed and left with all needs within reach. Patient's Recreational Therapy evaluation completed under the [facility] navigation tool on 2/14/18. Please see for specifics regarding plan of care and goals. Patient prefers to be called \"Brittney. \"  Thank you for the referral.  Jason Lockhart, JACINTOS

## 2018-02-14 NOTE — PROGRESS NOTES
Per [facility] patient is currently admitted to [facility] (Inpatient Rehab Unit) as of 02/14/2018, Care Coordinator will schedule follow up call in 7 days post acute discharge to home. This note will not be viewable in [address]

## 2018-02-14 NOTE — PROGRESS NOTES
[facility] OCCUPATIONAL THERAPY INITIAL EVALUATION    Time In:  8:36  Time Out:  10:00    Precautions: Falls and Oxygen 2 L of O2    Vitals: Patient Vitals for the past 8 hrs:   Temp Pulse Resp BP SpO2   02/14/18 0733 97.5 °F (36.4 °C) 85 18 92/63 100 %         Pain: Patient denied pain. History of Presenting Illness (per previous reports): Brenda Zambrano a 69 y. o. female patient at [address] [place] [address] was admitted on 2/7/2018  by Jerilee Felty, MD with below mentioned medical history ,is being seen for Physical Medicine and Rehabilitation.    Bonnie Tom a history of hypertension chronic diastolic CHF chronic atrial fibrillation and TAV are on 1/30/2018 was brought to the ER with increased shortness of breath, fatigue, and generalize weakness. Chest x-ray findings were consistent with pulmonary edema, worsening bilateral effusions. Patient was placed on BiPAP, and started on diuresis. Patient also had subjective fevers, WBCs were elevated 18K at admission. Patient was started on antibiotics. Cultures are being followed. Patient was noted to have elevated INR, 6.4. Patient was given vitamin K and Inr corrected. Patient states she also noted black stools last several days. She had hemoglobin level of 7.1. She required transfusions to correct her anemia. And patient requires continued monitoring for G.I. Bleed. patient underwent bilateral thoracentesis 2/9/18 for pleural effusions. Patient continued on Lasix for fluid overload and edema.     Past Medical History/ Co-morbidities:   Past Medical History:   Diagnosis Date    Abnormal glucose 8/5/2016    Abscess 8/5/2016    Acute encephalopathy 7/8/2016    Acute renal failure ([address] 12/3/2009    Afib ([address]     Anemia     Ankle swelling 8/5/2016    Anxiety 8/5/2016    Aortic Valve Bioprosthesis Present 3/7/2009    ARF (acute renal failure) ([address] 2/24/2010    Arthritis     Asthma     Atopic dermatitis 8/5/2016    Atrial fibrillation ([address] 3/7/2009  Autonomic orthostatic hypotension 8/5/2016    AV block 10/17/2011    Back pain 8/5/2016    Bradycardia (Symptomatic) 11/7/2011    CAD (coronary artery disease)     Cancer (HCC) 1990    breast (left)    Cardiac pacemaker 11/2/2015    Cardiogenic shock (HCC) 10/17/2011    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    Cellulitis 8/5/2016    Chest pain 8/5/2016    Chronic atrial fibrillation ([address] 10/17/2011    Chronic depression 8/5/2016    Chronic obstructive pulmonary disease ([address] 3/8/2009    Chronic pain     CKD (chronic kidney disease) stage 3, GFR 30-59 ml/min 12/4/2009    Congestive heart failure (CHF) ([address] 11/2/2015    Degeneration of cervical intervertebral disc 8/5/2016    Degenerative arthritis of left knee 2/27/2009    Dehydration 0/6/5841    Diastolic heart failure (HCC)     Digoxin toxicity 2/24/2010    Disorder of sweat glands 8/5/2016    Diverticulosis of large intestine without diverticulitis 2015    Dyspnea 8/5/2016    Eczema 8/5/2016    Embolus of femoral artery ([address] 12/4/2017    Epigastric abdominal pain 2/24/2010    GERD (gastroesophageal reflux disease)     Gout 8/5/2016    H/O mitral valve repair, 2003 6/27/2016    Heart failure ([address]     Hx of colonic polyp 2015    adenoma    Hyperkalemia 10/17/2011    Hyperlipidemia 8/5/2016    Hypertension     Hypokalemia 2/24/2010    Hypothyroidism 12/4/2009    Ill-defined condition     CARPEL TUNNEL SYNDROME, BILAT HANDS    Knee pain 8/5/2016    Leg cramps 8/5/2016    Mitral stenosis with insufficiency 11/2/2015    Prior MV repair 2003.     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 \"    Previous Level of Function: Independent with self care. help every 2 weeks for home management. Son assisted with medication management and most of the cooking. Use of rollator walker and w/c.     Home Situation    Environment Comments   House Situation Private residence    Lives Alone No    Support Systems Child(brit) (lives with son)    Shower Situation Shower    Current DME Grab bars, Shower chair, Walker, rollator, Wheelchair    Lift Chair      Stairs to [address]       Rails         W/C Ramp No    Interior Steps        Upper Extremity Function   JOSE REYNOLDS   FMC  Intact  Intact   GMC  Intact  Intact   Light Touch       Sharp/Dull Discrimination       Hot/Cold       Proprioception       Stereognosis       9 Hole Peg Test       General Comments        JOSE REYNOLDS   General Evalutaion       AROM       Shoulder Flexion 4- 4-   Shoulder Extension        Shoulder Abduction 4- 4-   Shoulder Adduction       Elbow Flexion 4- 4-   Elbow Extension  3+ 3+   Wrist Flexion/Extension 4-      4- 4-   General Comments     0/5 No palpable muscle contraction  1/5 Palpable muscle contraction, no joint movement  2-/5 Less than full range of motion in gravity eliminated position  2/5 Able to complete full range of motion in gravity eliminated position  2+/5 Able to initiate movement against gravity  3-/5 More than half but not full range of motion against gravity  3/5 Able to complete full range of motion against gravity  3+/5 Completes full range of motion against gravity with minimal resistance  4-/5 Completes full range of motion against gravity with minimal-moderate resistance  4/5 Completes full range of motion against gravity with moderate resistance  4+/5 Completes full range of motion against gravity with moderate-maximum resistance  5/5 Completes full range of motion against gravity with maximum resistance      Functional Mobility   Performance Comments   Sitting: Static Good (unsupported)    Sitting: Dynamic Fair (occasional)    Standing: Static Fair    Standing: Dynamic Poor (constant support)    Supine to Sit 4 (Minimal assistance)    Sit to Stand Assistance Contact guard assistance    Transfer Assist Score 4    Transfer Type SPT without device      Cognition   Performance Comments   Orientation Level Oriented X4    Comprehension Level 6 Mode: Auditory  Hearing Aid:None  Glasses:Reading glasses   Expression (Native Language) 7 Mode: Verbal      Social Interaction/Pragmatics 7     Problem Solving 5 Solves complex problems with cues, or basic problems 90% or more of the time. Memory 6  executes 3 steps of a 3 step request with additional time    Comments       Activities of Daily Living    Score Comments   Self-Feeding       Grooming 5 Tasks completed by patient: Brushed hair, Brushed teeth, Washed face, Washed hands  s/u assist   Bathing 3 Body Parts Bathe: Abdomen, Arm, left, Arm, right, Buttocks, Chest, [place] area, Thigh, left, Thigh, right  Assist to wash bilateral feet. Steady assist while standing to wash buttocks. Tub/Shower Transfer [place] 4 Type of Shower: Shower  Adaptive  Equipment:Tub transfer bench, Grab bars and Walker   Upper Body  Dressing/Undressing 4 Items Applied:Bra (3 steps), Pullover (4 steps)  Assist to pull down bra. Lower Body Dressing/Undressing 4 Items Applied:Elastic waist pants (3 steps), Sock, left (1 step), Sock, right (1 step)  CGA for standing to pull clothing up. Toileting 3 Assist with pulling clothing up. Pt able to pull clothing down and complete hygiene. Toilet Transfer 4 CGA and use of grab bars and RW. Vision/Perception: No deficits noted. Instrumental Activities of Daily Living   Performance Comments   Meal Preperation Total assistance    Homemaking Total assistance    Medication Management Total assistance    Financial Management Total assistance        Session: Pt was able to stand pivot transfer with RW from bed to w/c with min assist. Pt ambulated ~15 ft using RW and CGA. Completed MMT and ROM testing. Completed medication management. Interdisciplinary Communication: Collaborated with PT and nursing for current level of function, plan of care, and measures to assure safety during their stay. Updated board with current level of function to increase carryover between disciplines. Patient/Family Education: Patient was/were educated On the role of OT, On POC and On IRC expectations. Problem List: Activity Tolerance, Safety Awareness, Strength, Sitting Balance and Standing Balance    Functional Limitations: ADL, IADL, Functional Transfers and Functional Mobility    Goals: Please see Care Plan    OT order received and chart reviewed. OT orders have been acknowledged. Patient will benefit from skilled OT services to address ADL, functional transfers, UE strength, balance, cognition and activity tolerance to maximize functional performance with daily self-care tasks and functional mobility. Treatment is likely to include ADL, Balance, Strength, Activity tolerance, Neuro-muscular re-education, Cognitive, DME, AE, Family  and Safety awareness training to increase independence with self-care. Patient will be seen for 1.5-2 hours of skilled OT services 5-6 days a week.      Sri Shah, OT Student

## 2018-02-14 NOTE — PROGRESS NOTES
SFD PROGRESS NOTE    Joanie Underwood  Admit Date: 2/13/2018  Admit Diagnosis: DEBILITY;CHF (congestive heart failure) ([facility] [address] Respiratory f*  Chief Complaint : Gait dysfunction secondary to below. Admit Diagnosis: Pleural effusion [J90]; Pleural effusion [J90]  Acute respiratory failure (HCC) (2/7/2018)  Pleural effusion (1/23/2018)/ S/P bilateral thoracentesis  Hypothyroidism   Chronic atrial fibrillation   HTN   Pulmonary hypertension   S/P TAVR(1/30/2018)  Leukocytosis (2/7/2018) on Abx   Acute on chronic diastolic heart failure   weakness  Pain  DVT risk  Acute blood loss anemia/ GI bleed? Acute Rehab Dx:  Generalized weakness. Debility    deconditioning  Mobility and ambulation deficits  Self Care/ADL deficits    Subjective     Patient seen and examined. Vss. No acute complaints. Denies chest pain, difficulty breathing, cough. Pedal edema, trace better. PT, OT well tolerated. No new barrier to progress noted. Patient able to ambulate at [facility] level.      Objective:     Current Facility-Administered Medications   Medication Dose Route Frequency    acetaminophen (TYLENOL) tablet 650 mg  650 mg Oral Q4H PRN    alcohol 62% (NOZIN) nasal  1 Ampule  1 Ampule Topical Q12H    allopurinol (ZYLOPRIM) tablet 100 mg  100 mg Oral BID    apixaban (ELIQUIS) tablet 5 mg  5 mg Oral Q12H    diazePAM (VALIUM) tablet 5 mg  5 mg Oral QHS    furosemide (LASIX) tablet 80 mg  80 mg Oral Q12H    hydrocortisone (ANUSOL-HC) 2.5 % rectal cream   PeriANAL TID PRN    levothyroxine (SYNTHROID) tablet 88 mcg  88 mcg Oral ACB    metoprolol succinate (TOPROL-XL) XL tablet 25 mg  25 mg Oral DAILY    pantoprazole (PROTONIX) tablet 40 mg  40 mg Oral ACB    polyethylene glycol (MIRALAX) packet 17 g  17 g Oral DAILY    potassium chloride (K-DUR, KLOR-CON) SR tablet 40 mEq  40 mEq Oral DAILY    pravastatin (PRAVACHOL) tablet 40 mg  40 mg Oral DAILY    rOPINIRole (REQUIP) tablet 2 mg  2 mg Oral BID    sertraline (ZOLOFT) tablet 100 mg  100 mg Oral DAILY    sodium chloride (NS) flush 5-10 mL  5-10 mL IntraVENous Q8H    sodium chloride (NS) flush 5-10 mL  5-10 mL IntraVENous PRN    spironolactone (ALDACTONE) tablet 25 mg  25 mg Oral DAILY    levalbuterol (XOPENEX) nebulizer soln 0.63 mg/3 mL  0.63 mg Nebulization Q6H PRN     Facility-Administered Medications Ordered in Other Encounters   Medication Dose Route Frequency    0.9% sodium chloride infusion 250 mL  250 mL IntraVENous PRN     Review of Systems: + weakness. Denies chest pain, shortness of breath, cough, headache, visual problems, abdominal pain, dysurea, calf pain. Pertinent positives are as noted in the medical records and unremarkable otherwise. Visit Vitals    BP 92/63    Pulse 85    Temp 97.5 °F (36.4 °C)    Resp 18    Wt 178 lb (80.7 kg)    SpO2 100%    BMI 34.76 kg/m2        Physical Exam:   General: Alert and age appropriately oriented.  Appears comfortable on 2L O2 NC. No acute cardio respiratory distress. HEENT: Normocephalic,no scleral icterus  Oral mucosa moist without cyanosis, No bruit, No JVD. Lungs: + left  basilar crackles. [place] Mendez no wheezing. Respiration even and unlabored   Heart: RRR,  II/VI TERRI  No clicks, rub or gallops   Abdomen: Soft, non-tender, nondistended. Bowel sounds present. Genitourinary: defered   Neuromuscular:      PERRL, EOMI  Follows simple commands consistently. Able to identify, recall repeat. Mood appropriate. Insight, judgement intact.    LUE     Shoulder abduction   5-/5              Elbow flexion:   5-/5               Wrist extension:  5- /5              Finger flexion;  5- /5  RUE    Shoulder abduction: 5- /5                Elbow flexion:  5- /5                         Wrist extension: 5- /5                        Finger flexion:  5- /5  LLE     Hip flexion:  3+ /5              Knee extension:  4 /5                         Ankle dorsiflexion:  5 /5                        Ankle plantarflexion:   5/5                                                                  RLE     Hip flexion:   3+/5                        Knee extension:  4 /5                         Ankle dorsiflexion:  5 /5                        Ankle plantarflexion:  5 /5  Sensory - intact grossly   No cerebellar signs. Plantars - down going  No atrophy, no fasciculations, no tremors. Skin/extremity: No rashes, no erythema. Calf non tender BLE. 2+ BLE edema. + chronic arthritic joint changes. Functional Assessment:          Balance  Sitting - Static: Good (unsupported) (02/14/18 1000)  Sitting - Dynamic: Fair (occasional) (02/14/18 1000)  Standing - Static: Fair (02/14/18 1000)                     [facility] Fall Risk Assessment:  [facility] Fall Risk  Mobility: Ambulates or transfers with assist devices or assistance/unsteady gait (02/14/18 0721)  Mobility Interventions: Bed/chair exit alarm; Patient to call before getting OOB;Utilize walker, cane, or other assitive device (02/14/18 0721)  Mentation: Alert, oriented x 3 (02/14/18 5318)  Medication: Patient receiving anticonvulsants, sedatives(tranquilizers), psychotropics or hypnotics, hypoglycemics, narcotics, sleep aids, antihypertensives, laxatives, or diuretics (02/14/18 0721)  Medication Interventions: Bed/chair exit alarm; Patient to call before getting OOB (02/14/18 5340)  Elimination: Needs assistance with toileting (02/14/18 0721)  Elimination Interventions: Call light in reach; Patient to call for help with toileting needs; Toileting schedule/hourly rounds (02/14/18 0721)  Prior Fall History: No (02/14/18 0721)  Total Score: 3 (02/14/18 0721)  High Fall Risk: Yes (02/14/18 0721)     Speech Assessment:         Ambulation:  Gait  Distance (ft): 40 Feet (ft) (40ft x 1  30ft x 1) (02/14/18 1000)  Assistive Device: Walker, rollator;Gait belt (02/14/18 1000)     Labs/Studies:  Recent Results (from the past 72 hour(s)) PLEASE READ & DOCUMENT PPD TEST IN 48 HRS    Collection Time: 02/11/18  1:30 PM   Result Value Ref Range    PPD Negative Negative    mm Induration 0 mm   METABOLIC PANEL, BASIC    Collection Time: 02/12/18  3:52 AM   Result Value Ref Range    Sodium 143 136 - 145 mmol/L    Potassium 3.7 3.5 - 5.1 mmol/L    Chloride 99 98 - 107 mmol/L    CO2 39 (H) 21 - 32 mmol/L    Anion gap 5 (L) 7 - 16 mmol/L    Glucose 102 (H) 65 - 100 mg/dL    BUN 35 (H) 8 - 23 MG/DL    Creatinine 0.95 0.6 - 1.0 MG/DL    GFR est AA >60 >60 ml/min/1.73m2    GFR est non-AA >60 >60 ml/min/1.73m2    Calcium 7.9 (L) 8.3 - 10.4 MG/DL   MAGNESIUM    Collection Time: 02/12/18  3:52 AM   Result Value Ref Range    Magnesium 2.1 1.8 - 2.4 mg/dL   CBC WITH AUTOMATED DIFF    Collection Time: 02/12/18  3:52 AM   Result Value Ref Range    WBC 4.6 4.3 - 11.1 K/uL    RBC 3.43 (L) 4.05 - 5.25 M/uL    HGB 9.8 (L) 11.7 - 15.4 g/dL    HCT 32.9 (L) 35.8 - 46.3 %    MCV 95.9 79.6 - 97.8 FL    MCH 28.6 26.1 - 32.9 PG    MCHC 29.8 (L) 31.4 - 35.0 g/dL    RDW 16.9 (H) 11.9 - 14.6 %    PLATELET 90 (L) 529 - 450 K/uL    MPV 11.2 10.8 - 14.1 FL    DF AUTOMATED      NEUTROPHILS 77 43 - 78 %    LYMPHOCYTES 12 (L) 13 - 44 %    MONOCYTES 7 4.0 - 12.0 %    EOSINOPHILS 3 0.5 - 7.8 %    BASOPHILS 1 0.0 - 2.0 %    IMMATURE GRANULOCYTES 0 0.0 - 5.0 %    ABS. NEUTROPHILS 3.6 1.7 - 8.2 K/UL    ABS. LYMPHOCYTES 0.6 0.5 - 4.6 K/UL    ABS. MONOCYTES 0.3 0.1 - 1.3 K/UL    ABS. EOSINOPHILS 0.1 0.0 - 0.8 K/UL    ABS. BASOPHILS 0.1 0.0 - 0.2 K/UL    ABS. IMM.  GRANS. 0.0 0.0 - 0.5 K/UL   PLEASE READ & DOCUMENT PPD TEST IN 72 HRS    Collection Time: 02/12/18  1:35 PM   Result Value Ref Range    PPD Negative Negative    mm Induration 0 mm   METABOLIC PANEL, BASIC    Collection Time: 02/13/18  4:04 AM   Result Value Ref Range    Sodium 144 136 - 145 mmol/L    Potassium 3.8 3.5 - 5.1 mmol/L    Chloride 100 98 - 107 mmol/L    CO2 39 (H) 21 - 32 mmol/L    Anion gap 5 (L) 7 - 16 mmol/L    Glucose 88 65 - 100 mg/dL    BUN 32 (H) 8 - 23 MG/DL    Creatinine 0.95 0.6 - 1.0 MG/DL    GFR est AA >60 >60 ml/min/1.73m2    GFR est non-AA >60 >60 ml/min/1.73m2    Calcium 8.0 (L) 8.3 - 10.4 MG/DL   MAGNESIUM    Collection Time: 02/13/18  4:04 AM   Result Value Ref Range    Magnesium 2.2 1.8 - 2.4 mg/dL   CBC WITH AUTOMATED DIFF    Collection Time: 02/13/18  4:04 AM   Result Value Ref Range    WBC 4.9 4.3 - 11.1 K/uL    RBC 3.30 (L) 4.05 - 5.25 M/uL    HGB 9.5 (L) 11.7 - 15.4 g/dL    HCT 32.0 (L) 35.8 - 46.3 %    MCV 97.0 79.6 - 97.8 FL    MCH 28.8 26.1 - 32.9 PG    MCHC 29.7 (L) 31.4 - 35.0 g/dL    RDW 16.6 (H) 11.9 - 14.6 %    PLATELET 87 (L) 858 - 450 K/uL    MPV 11.6 10.8 - 14.1 FL    DF AUTOMATED      NEUTROPHILS 78 43 - 78 %    LYMPHOCYTES 12 (L) 13 - 44 %    MONOCYTES 7 4.0 - 12.0 %    EOSINOPHILS 3 0.5 - 7.8 %    BASOPHILS 0 0.0 - 2.0 %    IMMATURE GRANULOCYTES 0 0.0 - 5.0 %    ABS. NEUTROPHILS 3.8 1.7 - 8.2 K/UL    ABS. LYMPHOCYTES 0.6 0.5 - 4.6 K/UL    ABS. MONOCYTES 0.3 0.1 - 1.3 K/UL    ABS. EOSINOPHILS 0.2 0.0 - 0.8 K/UL    ABS. BASOPHILS 0.0 0.0 - 0.2 K/UL    ABS. IMM.  GRANS. 0.0 0.0 - 0.5 K/UL   BNP    Collection Time: 02/13/18  4:04 AM   Result Value Ref Range     pg/mL       Assessment:     Problem List as of 2/14/2018  Date Reviewed: 2/10/2018          Codes Class Noted - Resolved    Res[facility] ([facility] ICD-10-CM: J96.90  ICD-9-CM: 518.81  2/13/2018 - Present        CHF (congestive heart failure) ([facility] ICD-10-CM: I50.9  ICD-9-CM: 428.0  2/13/2018 - Present        Acute on chronic respiratory failure ([facility] ICD-10-CM: J96.20  ICD-9-CM: 518.84  2/7/2018 - Present        Leukocytosis ICD-10-CM: B49.394  ICD-9-CM: 288.60  2/7/2018 - Present        Acute on chronic diastolic heart failure (HCC) ICD-10-CM: I50.33  ICD-9-CM: 428.33  2/7/2018 - Present        S/P TAVR (transcatheter aortic valve replacement) ICD-10-CM: Z95.2  ICD-9-CM: V43.3  1/30/2018 - Present        Aortic stenosis (Chronic) ICD-10-CM: I35.0  ICD-9-CM: 424.1  1/29/2018 - Present        Peripheral arterial disease ([address] (Chronic) ICD-10-CM: I73.9  ICD-9-CM: 443.9  1/23/2018 - Present        Pleural effusion ICD-10-CM: J90  ICD-9-CM: 511.9  1/23/2018 - Present    Overview Addendum 2/10/2018 11:36 AM by Sebastien Cabrera NP     Right thoracentesis 1/25/18 - 1200 mls removed  Left thoracentesis 1/25/2018 - 900 mls removed  Bilateral thoracentesis 2/9/18 - L 550 ml (air aspirated during and at the end of procedure) / R 1000 ml and R creamy pink              Chronic respiratory failure with hypoxia (HCC) (Chronic) ICD-10-CM: J96.11  ICD-9-CM: 518.83, 799.02  1/23/2018 - Present    Overview Signed 1/23/2018 11:17 AM by Roddy Moura, FELISHA     Wears 3 to 4 liters at home             Hypoxia ICD-10-CM: R09.02  ICD-9-CM: 799.02  1/23/2018 - Present        Stenosis of prosthetic aortic valve (Chronic) ICD-10-CM: I35.0  ICD-9-CM: 396.0  1/19/2018 - Present    Overview Signed 1/19/2018  3:18 PM by Kashif Casey MD     AVR (10/5/13):  21 mm Pericardial valve.                Tricuspid valve insufficiency (Chronic) ICD-10-CM: I07.1  ICD-9-CM: 397.0  1/15/2018 - Present        Systolic CHF, chronic (HCC) (Chronic) ICD-10-CM: I50.22  ICD-9-CM: 428.22, 428.0  1/15/2018 - Present        S/P mitral valve repair (Chronic) ICD-10-CM: J40.054  ICD-9-CM: V45.89  12/4/2017 - Present        H/O atrioventricular rubin ablation (Chronic) ICD-10-CM: I08.620  ICD-9-CM: V15.1  3/22/2017 - Present        Pulmonary hypertension (Chronic) ICD-10-CM: I27.20  ICD-9-CM: 416.8  2/12/2017 - Present        Aortic valve replaced (Chronic) ICD-10-CM: Z95.2  ICD-9-CM: V43.3  1/9/2017 - Present        Chronic diastolic congestive heart failure (HCC) (Chronic) ICD-10-CM: I50.32  ICD-9-CM: 428.32, 428.0  9/9/2016 - Present        Chronic depression (Chronic) ICD-10-CM: F32.9  ICD-9-CM: 311  8/5/2016 - Present        Osteopenia (Chronic) ICD-10-CM: M85.80  ICD-9-CM: 733.90 8/5/2016 - Present        RLS (restless legs syndrome) (Chronic) ICD-10-CM: G25.81  ICD-9-CM: 333.94  8/5/2016 - Present        Hyperlipidemia (Chronic) ICD-10-CM: E78.5  ICD-9-CM: 272.4  8/5/2016 - Present        Gout (Chronic) ICD-10-CM: M10.9  ICD-9-CM: 274.9  8/5/2016 - Present        Anxiety (Chronic) ICD-10-CM: F41.9  ICD-9-CM: 300.00  8/5/2016 - Present        CAD (coronary artery disease) (Chronic) ICD-10-CM: I25.10  ICD-9-CM: 414.00  8/5/2016 - Present        HTN (hypertension) (Chronic) ICD-10-CM: I10  ICD-9-CM: 401.9  8/5/2016 - Present        GERD (gastroesophageal reflux disease) (Chronic) ICD-10-CM: K21.9  ICD-9-CM: 530.81  8/5/2016 - Present        H/O mitral valve repair, 2003 (Chronic) ICD-10-CM: V58.679  ICD-9-CM: V15.1  6/27/2016 - Present        Sick sinus syndrome (HCC) (Chronic) ICD-10-CM: I49.5  ICD-9-CM: 427.81  2/13/2016 - Present        Obesity (Chronic) ICD-10-CM: E66.9  ICD-9-CM: 278.00  11/2/2015 - Present        Mitral stenosis with insufficiency (Chronic) ICD-10-CM: X76.3  ICD-9-CM: 394.2  11/2/2015 - Present    Overview Signed 11/2/2015 11:02 AM by Chandu Willis     Prior MV repair 2003.               Rheumatic aortic stenosis (Chronic) ICD-10-CM: I06.0  ICD-9-CM: 395.0  11/2/2015 - Present        Cardiac pacemaker (Chronic) ICD-10-CM: Z95.0  ICD-9-CM: V45.01  11/2/2015 - Present        Thrombocytopenia (HCC) (Chronic) ICD-10-CM: D69.6  ICD-9-CM: 287.5  8/22/2012 - Present        Anemia (Chronic) ICD-10-CM: D64.9  ICD-9-CM: 285.9  10/27/2011 - Present    Overview Signed 10/27/2011  8:25 AM by Hector Parry     Acute on chronic               Chronic atrial fibrillation (HCC) (Chronic) ICD-10-CM: I48.2  ICD-9-CM: 427.31  10/17/2011 - Present        Hypothyroidism (Chronic) ICD-10-CM: E03.9  ICD-9-CM: 244.9  12/4/2009 - Present        BOBBY (obstructive sleep apnea) (Chronic) ICD-10-CM: H67.46  ICD-9-CM: 327.23  12/4/2009 - Present        Chronic obstructive pulmonary disease ([facility] [address] (Chronic) ICD-10-CM: J44.9  ICD-9-CM: 446  3/8/2009 - Present        Aortic Valve Bioprosthesis Present (Chronic) ICD-10-CM: Z95.2  ICD-9-CM: V43.3  3/7/2009 - Present        Degenerative arthritis of left knee (Chronic) ICD-10-CM: M17.12  ICD-9-CM: 715.96  2/27/2009 - Present        RESOLVED: Acute on chronic systolic congestive heart failure (HCC) ICD-10-CM: I50.23  ICD-9-CM: 428.23, 428.0  2/1/2018 - 2/7/2018        RESOLVED: Acute pulmonary edema ([address] ICD-10-CM: J81.0  ICD-9-CM: 518.4  1/25/2018 - 2/7/2018        RESOLVED: Pacemaker ICD-10-CM: Z95.0  ICD-9-CM: V45.01  12/4/2017 - 1/23/2018        RESOLVED: History of gout ICD-10-CM: Z87.39  ICD-9-CM: V12.29  1/31/2017 - 1/23/2018        RESOLVED: Warfarin anticoagulation (Chronic) ICD-10-CM: Z79.01  ICD-9-CM: V58.61  1/9/2017 - 1/23/2018        RESOLVED: Non morbid obesity due to excess calories ICD-10-CM: E66.09  ICD-9-CM: 278.00  11/14/2016 - 1/23/2018        RESOLVED: Hypoxemia ICD-10-CM: R09.02  ICD-9-CM: 799.02  11/14/2016 - 1/23/2018        RESOLVED: Localized edema ICD-10-CM: R60.0  ICD-9-CM: 782.3  9/9/2016 - 1/23/2018        RESOLVED: Iron deficiency anemia ICD-10-CM: D50.9  ICD-9-CM: 280.9  9/9/2016 - 1/23/2018        RESOLVED: CRI (chronic renal insufficiency) ICD-10-CM: N18.9  ICD-9-CM: 585.9  8/5/2016 - 1/23/2018        RESOLVED: Dyspnea ICD-10-CM: R06.00  ICD-9-CM: 786.09  8/5/2016 - 1/23/2018        RESOLVED: Right hip pain ICD-10-CM: M25.551  ICD-9-CM: 719.45  8/5/2016 - 1/23/2018        RESOLVED: Edema ICD-10-CM: R60.9  ICD-9-CM: 782.3  8/5/2016 - 1/23/2018        RESOLVED: Chest pain ICD-10-CM: R07.9  ICD-9-CM: 786.50  8/5/2016 - 1/23/2018        RESOLVED: Other long term (current) drug therapy ICD-10-CM: A52.231  ICD-9-CM: V58.69  8/5/2016 - 1/23/2018        RESOLVED: Acute encephalopathy ICD-10-CM: G93.40  ICD-9-CM: 348.30  7/8/2016 - 1/23/2018        RESOLVED: Tachycardia ICD-10-CM: R00.0  ICD-9-CM: 785.0  6/27/2016 - 1/23/2018 RESOLVED: Palpitations ICD-10-CM: R00.2  ICD-9-CM: 785.1  11/2/2015 - 1/23/2018        RESOLVED: Respiratory insufficiency ICD-10-CM: R06.89  ICD-9-CM: 786.09  11/2/2015 - 1/23/2018        RESOLVED: Bradycardia (Symptomatic) ICD-10-CM: R00.1  ICD-9-CM: 427.89  11/7/2011 - 1/23/2018        RESOLVED: Rectal bleeding ICD-10-CM: K62.5  ICD-9-CM: 569.3  10/27/2011 - 1/23/2018        RESOLVED: AV block ICD-10-CM: I44.30  ICD-9-CM: 426.10  10/17/2011 - 1/23/2018        RESOLVED: Cardiogenic shock ([address] ICD-10-CM: R57.0  ICD-9-CM: 785.51  10/17/2011 - 1/23/2018        RESOLVED: Hyperkalemia ICD-10-CM: E87.5  ICD-9-CM: 276.7  10/17/2011 - 1/23/2018        RESOLVED: Nausea and vomiting ICD-10-CM: R11.2  ICD-9-CM: 787.01  2/24/2010 - 1/23/2018        RESOLVED: Epigastric abdominal pain ICD-10-CM: R10.13  ICD-9-CM: 789.06  2/24/2010 - 1/23/2018        RESOLVED: Digoxin toxicity ICD-10-CM: T46.0X1A  ICD-9-CM: 972.1, E942.1  2/24/2010 - 1/23/2018        RESOLVED: ARF (acute renal failure) (HCC) (Chronic) ICD-10-CM: N17.9  ICD-9-CM: 584.9  2/24/2010 - 1/23/2018        RESOLVED: Hypokalemia ICD-10-CM: E87.6  ICD-9-CM: 276.8  2/24/2010 - 1/23/2018        RESOLVED: CKD (chronic kidney disease) stage 3, GFR 30-59 ml/min (Chronic) ICD-10-CM: N18.3  ICD-9-CM: 585.3  12/4/2009 - 1/23/2018        RESOLVED: Cough ICD-10-CM: R05  ICD-9-CM: 786.2  3/9/2009 - 3/12/2009        RESOLVED: Bronchitis ICD-10-CM: J40  ICD-9-CM: 490  3/9/2009 - 3/12/2009        RESOLVED: Atrial fibrillation (HCC) (Chronic) ICD-10-CM: I48.91  ICD-9-CM: 427.31  3/7/2009 - 6/27/2016        RESOLVED: Hypotension (Chronic) ICD-10-CM: I95.9  ICD-9-CM: 458.9  3/1/2009 - 1/23/2018              Plan:     The Post Assessment Physician Evaluation (RAMBO) found the current functional status to be comparable with the Pre-admission Screening. The Patient is a good candidate for acute inpatient rehabilitation. Nothing since the Pre-admission screen has changed that determination.    Rehabilitation Plan  The patient has shown the ability to tolerate and benefit from 3 hours of therapy daily and is being admitted to a comprehensive acute inpatient rehabilitation program consisting of at least 3 hours of combined physical and occupational therapies. Resume intensive Physical Therapy for a minimum of 1.5 hours a day, at least 5 out of 7 days per week to address bed mobility, transfers, ambulation, strengthening, balance, and endurance. - pt  was ambulating independently with a rollator inside her home. reports using rollator or a wheelchair for community mobility. Will continue to focus on endurance, strengthening BLe.      Resume  intensive Occupational Therapy for a minimum of 1.5 hours a day, at least 5 out of 7 days per week to address ADL ( bathing, LE dressing, toileting) and adaptive equipment as needed.       Continue 24-hour skilled rehabilitation nursing for bowel and bladder management, skin care for decubitus ulcer prevention , pain management and ongoing medication administration      The patient may benefit from a psychology consult for depression, anxiety and adjustment disorder.     Continue daily physician medical management:    Acute respiratory failure (HCC) (2/7/2018)/ Pleural effusion (1/23/2018)/ S/P bilateral thoracentesis  S/p - Thoracenteses on 2/9 - 550 mls removed from the left and 1 liter removed from the right.   - monitor Sao2, signs of decompensation. F/u cxR. Still with some effusion.  - continue diuresis, lasix+ aldactone  continued  - Continue bronchodilators prn- has home nebulizer. Resume as prn;xopenex  Patient will be on 2L O2 at therapy and at rest. May be able to wean as tolerated. Will monitor saO2.           S/P TAVR(1/30/2018)/ Acute on chronic diastolic heart failure Pulmonary hypertension / Chronic atrial fibrillation/ HTN   - cardiac precaution, s.p TAVR. - weights and maintain a 2 liter per day fluid restriction.   - Monitor HR/BP.  conitinue Eliquis for a.fib  - continue BB-Toprol XL  2/14 - no signs of decompensation. Patient tolerating activities today. Edema not much changes BLE. Continue diuretics at current dose. Will need to monitor renal function. Notified abs unable to be drwawn this morning after multiple attempts. Will re-attempt tomorrow am.        Hypothyroidism - synthroid 88 mcg     Leukocytosis (2/7/2018) on Abx last dose 7/7 rocephin administered 2/13.   - finished abx. No new s/s of infection. Monitor.      Acute blood loss anemia/ GI bleed? -monitor clinically. May have been caused by high INR. No evidence of melena. check hgb.      Pneumonia prophylaxis- Insentive spirometer every hour while awake     DVT risk / DVT Prophylaxis- continue daily physician exam to assess for signs and symptoms as patient is at increased risk for of thromboembolism. - covered with eliquis.       Wound Care: - monitor for ulcers, skin breakdown due to edema.     Potential urinary retention - schedule voids q6-8 hrs. Check post-void residual every shift; In and Out catheterize if post-void residual is more than 400 cc. - pt mostly continent.      bowel program - as needed dulcolax, pericolace.      GERD/ GI prophylaxis - resume PPI. At times may need additional antacids, Maalox prn.          Time spent was 25 minutes with over 1/2 in direct patient care/examination, consultation and coordination of care.      Signed By: Seng Garibay MD     February 14, 2018

## 2018-02-14 NOTE — PROGRESS NOTES
End Of Shift Functional Summary, Nursing      TOILETING:  Does patient need assist with clothing management and/or pericare? Yes: Comment: help with clothing    TOILET TRANSFER:  Pt requires minimal assistance. Pt uses walker. BLADDER:  Pt does not have a [place] catheter that staff manages. Pt does not take medication. Pt is continent. of bladder and voids in toilet  Pt requires staff to empty device Pt has had 0 bladder accidents during this shift requiring minimal assistance to clean up. (An accident is when the episode is not contained in a brief AND/OR the clothing/linen requires changing/cleaning up.)    BOWEL:  Pt does take medication. Pt is continent of bowel and uses toilet. Pt requires staff to empty device    Pt has had 0 bowel accidents during this shift requiring minimal assistance from staff to clean up. (An accident is when the episode is not contained in a brief AND/OR the clothing/linen requires changing/cleaning up.)    BED/CHAIR TRANSFER  Pt requires minimal assistance. Patient requires the assistance of 1 staff member(s). Pt uses walker    EATING  Pt requires setup. Pt wears dentures. Documentation reviewed and plan of care discussed/reviewed with   oncoming nurse and patient assistant during the shift.

## 2018-02-14 NOTE — PROGRESS NOTES
PHYSICAL THERAPY EXAMINATION  TIME IN 1004  TIME OUT 1111  Patient Name: Lala Melara  Patient Age: 69 y.o.   Past Medical History:   Past Medical History:   Diagnosis Date    Abnormal glucose 8/5/2016    Abscess 8/5/2016    Acute encephalopathy 7/8/2016    Acute renal failure ([address] 12/3/2009    Afib (HCC)     Anemia     Ankle swelling 8/5/2016    Anxiety 8/5/2016    Aortic Valve Bioprosthesis Present 3/7/2009    ARF (acute renal failure) ([address] 2/24/2010    Arthritis     Asthma     Atopic dermatitis 8/5/2016    Atrial fibrillation (HCC) 3/7/2009    Autonomic orthostatic hypotension 8/5/2016    AV block 10/17/2011    Back pain 8/5/2016    Bradycardia (Symptomatic) 11/7/2011    CAD (coronary artery disease)     Cancer (HCC) 1990    breast (left)    Cardiac pacemaker 11/2/2015    Cardiogenic shock ([address] 10/17/2011    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    Cellulitis 8/5/2016    Chest pain 8/5/2016    Chronic atrial fibrillation ([address] 10/17/2011    Chronic depression 8/5/2016    Chronic obstructive pulmonary disease ([address] 3/8/2009    Chronic pain     CKD (chronic kidney disease) stage 3, GFR 30-59 ml/min 12/4/2009    Congestive heart failure (CHF) ([address] 11/2/2015    Degeneration of cervical intervertebral disc 8/5/2016    Degenerative arthritis of left knee 2/27/2009    Dehydration 0/7/9161    Diastolic heart failure (HCC)     Digoxin toxicity 2/24/2010    Disorder of sweat glands 8/5/2016    Diverticulosis of large intestine without diverticulitis 2015    Dyspnea 8/5/2016    Eczema 8/5/2016    Embolus of femoral artery ([address] 12/4/2017    Epigastric abdominal pain 2/24/2010    GERD (gastroesophageal reflux disease)     Gout 8/5/2016    H/O mitral valve repair, 2003 6/27/2016    Heart failure ([address]     Hx of colonic polyp 2015    adenoma    Hyperkalemia 10/17/2011    Hyperlipidemia 8/5/2016    Hypertension     Hypokalemia 2/24/2010    Hypothyroidism 12/4/2009    Ill-defined condition     CARPEL TUNNEL SYNDROME, BILAT HANDS    Knee pain 8/5/2016    Leg cramps 8/5/2016    Mitral stenosis with insufficiency 11/2/2015    Prior MV repair 2003.     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    Right hip pain 8/5/2016    RLS (restless legs syndrome) 8/5/2016    Sick sinus syndrome ([address] 2/13/2016    Skin infection 8/5/2016    Tachycardia 6/27/2016    Thrombocytopenia, unspecified 8/22/2012    Thromboembolus ([address]     02/2017    Thyroid disease     Urticaria 8/5/2016    Vertigo 8/5/2016       Medical Diagnosis:  DEBILITY  CHF (congestive heart failure) ([facility])  Respiratory failure ([address] <principal problem not specified>    Precautions at Admission: Other (comment) (fall precautions)    Therapy Diagnosis:   Difficulty with bed mobility  [x]     Difficulty with functional transfers  [x]     Difficulty with ambulation  [x]     Difficulty with stair negotiations  [x]       Problem List:    Decreased strength B LE  [x]     Decreased strength trunk/core  [x]     Decreased AROM   [x]     Decreased PROM  [x]    Decreased endurance  [x]     Decreased balance sitting  [x]     Decreased balance standing  [x]     Pain   []     Slow ambulation velocity  [x]    Decreased coordination  [x]    Decreased safety awareness  [x]      Functional Limitations:   Decreased independence with bed mobility  [x]     Decreased independence with functional transfers  [x]     Decreased independence with ambulation  [x]     Decreased independence with stair negotiation  [x]       Previous Functional Level: patient reporting she was ambulating independently with a rollator inside her home. reports using rollator or a wheelchair for community mobility. reports prmarily using a wheelchair    Home Environment: Home Environment: Private residence  # Steps to Enter: 1  Rails to Enter: No  One/Two Story Residence: One story  Living Alone: No  Support Systems: Child(brit)  Patient Expects to be Discharged to[de-identified] Private residence  Current DME Used/Available at Home: Shower chair  Tub or Shower Type: Shower (shower door, with grab bars)         Outcome Measures:Vital Signs:  Patient Vitals for the past 12 hrs:   Temp Pulse Resp BP SpO2   02/14/18 0733 97.5 °F (36.4 °C) 85 18 92/63 100 %   02/14/18 0136 97.3 °F (36.3 °C) 80 18 105/66 100 %     Patient on 02 at 2lpm, 02 sat 97 to 100% percent during assessment, HR 80 to 87    Pain level:No c/o pain  Pain location:NA  Pain interventions:NA    Patient education:PT POC and goals, Bed mobility training,transfer training, gait training, fall precautions, activity pacing, body mechanics, w/c  And rollator parts management, Patient verbalizing understanding and demonstrating partial understanding of patient education. Recommend follow up education.     Interdisciplinary Communication:spoke with OT regarding functional status, LOS and 02 sat       MMT Initial Asssessment   Right Lower Extremity Left Lower Extremity   Hip Flexion 3 3   Knee Extension 5 5   Knee Flexion 4+ 4+   Ankle Dorsiflexion 5 5   0/5 No palpable muscle contraction  1/5 Palpable muscle contraction, no joint movement  2-/5 Less than full range of motion in gravity eliminated position  2/5 Able to complete full range of motion in gravity eliminated position  2+/5 Able to initiate movement against gravity  3-/5 More than half but not full range of motion against gravity  3/5 Able to complete full range of motion against gravity  3+/5 Completes full range of motion against gravity with minimal resistance  4-/5 Completes full range of motion against gravity with minimal-moderate resistance  4/5 Completes full range of motion against gravity with moderate resistance  4+/5 Completes full range of motion against gravity with moderate-maximum resistance  5/5 Completes full range of motion against gravity with maximum resistance     AROM: bilateral hip flex 90, ABD 15, knee flex 90    FIM SCORES Initial Assessment   Bed/Chair/Wheelchair Transfers 4   Wheelchair Mobility  (NT secondary to fatigue after functional assessment)   Walking [place] 1   Steps/Stairs  (NT)   PRIMARY MODE OF LOCOMOTION: AMBULATION  Please see IRC Interdisciplinary Eval: Coordination/Balance Section for details regarding FIM score description.     BED/CHAIR/WHEELCHAIR TRANSFERS Initial Assessment   Rolling Right 4 (Minimal assistance)   Rolling Left 4 (Minimal assistance)   Supine to Sit 4 (Minimal assistance)   Sit to Stand Contact guard assistance   Sit to Supine 4 (Minimal assistance)   Transfer Assist Score 4   Transfer Type SPT without device   Comments Increased time and effort to complete bed mobility and transfers   Car Transfer Not tested   Car Type         WHEELCHAIR MOBILITY/MANAGEMENT Initial Assessment   Able to Propel 0 feet   Functional Level  (NT secondary to fatigue after functional assessment)   Curbs/ramps assistance required 0 (Not tested)   Wheelchair set up assistance required 3 (Moderate assistance)   Wheelchair management Manages left brake, Manages right brake       WALKING INDEPENDENCE Initial Assessment   Assistive device Walker, rollator, Gait belt   Ambulation assistance - level surface 4 (Contact guard assistance)   Distance 40 Feet (ft) (40ft x 1  30ft x 1)   Functional Level 1   Comments slow cont partial step through gait pattern with decreased step length and step clearance, foot flat initial contact with shuffling type steps   Ambulation assistance - unlevel surface 0 (Not tested)       STEPS/STAIRS Initial Assessment   Steps/Stairs ambulated 0   Rail Use Functional Level  (NT)   Comments Unable to ambulate up/down steps at this time secondary to LE weakness and decreased endurance   Curbs/Ramps 0 (Not tested)     QUALITY INDICATOR ASSIST COMMENTS   Walk 10 feet With rollator and CGA    Walk 50 feet with 2 turns Unable secondary to decreased endurance    Walk 150 feet Unable secondary to decreased endurance    Walk 10 feet on uneven  Unable secondary to decreased balance    1 step/curb Unable secondary to decreased endurance and impaired balance    4 steps Unable secondary to decreased endurance and LE weakness    12 steps Unable secondary to decreased endurance and LE weakness     object Min assist    Wheel 48' w/2 turns Unable secondary to decreased endurance    Wheel 150' Unable secondary to decreased endurance      SUPINE EXERCISES Sets Reps Comments, min assist to complete LE exercises with multiple and frequent rest breaks   Ankle Pumps 1 10    Heel Slides 1 10    Hip Abduction 1 10    Short Arc Quad 1 10    Straight Leg Raise 1 10             PHYSICAL THERAPY PLAN OF CARE    Therapy Diagnosis:   Please see table above    Order received from MD for physical therapy services and chart reviewed. Pt to be seen at least 5 times per week for at least 1.5 hours of physical therapy per day for 7 to 10 days. Thank you for the referral.    LTGs:  LTG 1. Patient transfer supine<>sit with modified independence in 1 week   LTG 2. Patient transfer sit<>stand and perform SPT with rollator and modified independence in 7 to 10 days   LTG 3. Patient ambulate 100ft with rollator and modified independence including ability to manage 02 line in 7 to 10 days   LTG 4. Patient ambulate up/down 4 steps with hand rails and SBA in 7 to 10 days   LTG 5. Patient ambulate up/down 6 inch step with rollator and min assist in 7 to 10 days     Pt would benefit from skilled physical therapy in order to improve independent functional mobility within the home.  Interventions may include range of motion (AROM, PROM B LE/trunk), motor function (B LE/trunk strengthening/coordination), activity tolerance (vitals, oxygen saturation levels), bed mobility training, balance activities, gait training (progressive ambulation program), and functional transfer training. Please see IRC; Interdisciplinary Eval, Care Plan, and Patient Education for further information regarding physical therapy examination and plan of care.      Patient to OT at end of treatment    Darin Dhaliwal, PT  2/14/2018

## 2018-02-14 NOTE — PROGRESS NOTES
Problem: Falls - Risk of  Goal: *Absence of Falls  Document Gregorio Fall Risk and appropriate interventions in the flowsheet.    Outcome: Progressing Towards Goal  Fall Risk Interventions:  Mobility Interventions: Bed/chair exit alarm, Patient to call before getting OOB         Medication Interventions: Bed/chair exit alarm    Elimination Interventions: Call light in reach, Patient to call for help with toileting needs, Toilet paper/wipes in reach, Toileting schedule/hourly rounds

## 2018-02-14 NOTE — PROGRESS NOTES
Problem: Falls - Risk of  Goal: *Absence of Falls  Document Gregorio Fall Risk and appropriate interventions in the flowsheet.    Outcome: Progressing Towards Goal  Fall Risk Interventions:  Mobility Interventions: Bed/chair exit alarm, Patient to call before getting OOB, Utilize walker, cane, or other assitive device         Medication Interventions: Bed/chair exit alarm, Patient to call before getting OOB    Elimination Interventions: Call light in reach, Patient to call for help with toileting needs, Toileting schedule/hourly rounds

## 2018-02-14 NOTE — PROGRESS NOTES
OT Daily Note    Time In  11:15   Time Out  11:50     Subjective:\"My son does my medication management because it can be confusing for me. \" Agreeable to therapy. Pain:not indicated during this session. Education:on medication management. Interdisciplinary Communication: Collaborated with Jf Lopez and patient is making progress towards goals. Precautions: Other (comment) (fall precautions) Oxygen: 2 L of O2    Balance/functional mobility Daily Assessment   Pt transferred from sit <> stand using RW and CGA. Pt ambulated ~5 feet in room using RW and wearing gait belt with CGA for stability. Medication management Daily Assessment   Pt completed medication management activity by reading prescription instructions out loud. Pt able to place 4 different medications into day and night pill boxes. Pt able to self correct errors and only needed 1 verbal cue to solve \"as needed\" prescription medication. Pt used bilateral hands to  and place medications in pill box with good [address] [place]. Cognition Daily Assessment   Pt completed cognitive test with a score of 15/15. Assessment: Pt is making good progress towards goals. Pt requires skilled therapy to address deficits in functional mobility, standing balance, sitting balance, UE strength, and activity tolerance activity tolerance to improve independence in ADLs and safety needed for functional transfers. Ended session: in recliner with call bell and phone within reach.      Ruy Gates, OT Student

## 2018-02-14 NOTE — PROGRESS NOTES
[facility] CARDIOLOGY PROGRESS NOTE           2/14/2018 5:42 PM    Admit Date: 2/13/2018      Subjective:   Patient notes edema is persistent. Appears comfortable on nasal cannula. BP low at times. ROS:  Cardiovascular:  As noted above    Objective:      Vitals:    02/14/18 0136 02/14/18 0733 02/14/18 1045 02/14/18 1624   BP: 105/66 92/63  101/64   Pulse: 80 85  84   Resp: 18 18 18   Temp: 97.3 °F (36.3 °C) 97.5 °F (36.4 °C)  97.2 °F (36.2 °C)   SpO2: 100% 100%  100%   Weight:   80.7 kg (178 lb)        Physical Exam:  General-No Acute Distress  Neck- supple, no JVD  CV- regular rate and rhythm Grade II/VI TERRI  Lung- clear bilaterally  Abd- soft, nontender, nondistended  Ext- 1+ edema bilaterally. Skin- warm and dry      Data Review:   Recent Labs      02/13/18   0404  02/12/18   0352   NA  144  143   K  3.8  3.7   MG  2.2  2.1   BUN  32*  35*   CREA  0.95  0.95   GLU  88  102*   WBC  4.9  4.6   HGB  9.5*  9.8*   HCT  32.0*  32.9*   PLT  87*  90*      No results found for: FARHAD Cha    Assessment/Plan:     Active Problems:    Chronic atrial fibrillation ([facility] (10/17/2011)    On Eliquis      S/P TAVR (transcatheter aortic valve replacement) (1/30/2018)    Stable. On Eliquis. Chronic diastolic heart failure (HCC) (2/7/2018)    Mild volume overload. Will give dose of Zaroxolyn in AM prior to AM lasix. Respiratory failure ([facility] (2/13/2018)    Stable on nasal cannula.                    Reina Loredo MD  2/14/2018 5:42 PM

## 2018-02-14 NOTE — PROGRESS NOTES
DDER:  Pt does not have a [place] catheter that staff manages. Pt does not take medication. Pt is continent. of bladder and voids in toilet  Pt has had 0 bladder accidents during this shift .  (An accident is when the episode is not contained in a brief AND/OR the clothing/linen requires changing/cleaning up.)     BOWEL:  Pt does take medication. Pt is continent of bowel and uses toilet.   Pt has had 0 bowel accidents during this shift. (An accident is when the episode is not contained in a brief AND/OR the clothing/linen requires changing/cleaning up.)

## 2018-02-14 NOTE — PROGRESS NOTES
PHYSICAL THERAPY DAILY NOTE  Time In: 2834  Time Out: 2286  Patient Seen For: PM;Balance activities;Gait training;Patient education; Therapeutic exercise;Transfer training; Other (see progress notes)    Subjective: patient reporting she is very tired this PM. Reports she had to rest in the bed after her last therapy. Reports she will try to do some therapy this PM but does not know how much she will be able to tolerate. Objective:Vital Signs:patient on 02 at 2 lpm. 02 sat 97 to 100% during PM PT. Patient Vitals for the past 12 hrs:   Temp Pulse Resp BP SpO2   02/14/18 0733 97.5 °F (36.4 °C) 85 18 92/63 100 %     Pain level:No c/o pain  Pain location:NA  Pain interventions:NA    Patient education:Bed mobility training,transfer training, gait training, fall precautions, activity pacing, body mechanics, w/c  parts management, energy conservation, breathing techniques during functional mobility, Patient verbalizing understanding and demonstrating partial understanding of patient education. Recommend follow up education. Interdisciplinary Communication:NA    Other (comment) (Fall precautions)  GROSS ASSESSMENT Daily Assessment     NA       BED/MAT MOBILITY Daily Assessment   Increased time and effort Rolling Right : 4 (Minimal assistance)  Rolling Left : 0 (Not tested)  Supine to Sit : 4 (Minimal assistance)  Sit to Supine : 4 (Minimal assistance)       TRANSFERS Daily Assessment   Increased time and effort to complete with cues for body mechanics   Transfer Type: SPT without device  Transfer Assistance : 4 (Contact guard assistance)  Sit to Stand Assistance: Contact guard assistance  Car Transfers: Not tested       GAIT Daily Assessment    Amount of Assistance: 4 (Contact guard assistance)  Distance (ft): 40 Feet (ft)  Assistive Device: Walker, rollator   Gait training with verbal cues for upright posture and to improve step length and step clearance.  Cues for taking a deeper breath with control of RR    STEPS or STAIRS Daily Assessment    Steps/Stairs Ambulated (#): 0  Level of Assist : 0 (Not tested)       BALANCE Daily Assessment    Sitting - Static: Good (unsupported)  Sitting - Dynamic: Fair (occasional)  Standing - Static: Fair  Standing - Dynamic : Impaired       WHEELCHAIR MOBILITY Daily Assessment    Able to Propel (ft): 0 feet  Functional Level:  (NT secondary to fatigue after functional assessment)  Curbs/Ramps Assist Required (FIM Score): 0 (Not tested)  Wheelchair Setup Assist Required : 3 (Moderate assistance)  Wheelchair Management: Manages left brake;Manages right brake       LOWER EXTREMITY EXERCISES Daily Assessment   Multiple and frequent rest breaks during exercises Extremity: Both  Exercise Type #1: Seated lower extremity strengthening  Sets Performed: 2  Reps Performed: 10  Level of Assist: Minimal assistance          Assessment: Initial progress towards goals will be slow due to decreased functional endurance       Patient returned to room at end of treatment. Patient supine in bed with head of bed elevated and bed rails up x 2. Needs placed in reach of patient. 02 at 2 lpm  Plan of Care: Continue with POC and progress as tolerated.      Vidal Mensah, PT  2/14/2018

## 2018-02-15 NOTE — PROGRESS NOTES
Pt  Assisted to br with one assist . No complaints noted . Pt has had a good day . Pt had teds own today .

## 2018-02-15 NOTE — PROGRESS NOTES
Problem: Falls - Risk of  Goal: *Absence of Falls  Document Gregorio Fall Risk and appropriate interventions in the flowsheet.    Outcome: Progressing Towards Goal  Fall Risk Interventions:  Mobility Interventions: Bed/chair exit alarm, Communicate number of staff needed for ambulation/transfer         Medication Interventions: Bed/chair exit alarm    Elimination Interventions: Call light in reach

## 2018-02-15 NOTE — PROGRESS NOTES
Marisol Stubbs RN Registered Nurse Signed  Progress Notes Date of Service: 02/14/18 0134         []Hide copied text  BLADDER:  Pt does not have a [place]am[place] catheter that staff manages.  Pt does not take medication.  Pt is continent. of bladder and voids in toilet  Pt has had 0 bladder accidents during this shift .   (An accident is when the episode is not contained in a brief AND/OR the clothing/linen requires changing/cleaning up.)      BOWEL:  Pt does take medication.  Pt is continent of bowel and uses toilet.  Pt has had 0 bowel accidents during this shift. (An accident is when the episode is not contained in a brief AND/OR the clothing/linen requires changing/cleaning up.)

## 2018-02-15 NOTE — PROGRESS NOTES
SFD PROGRESS NOTE    Marcio Orta  Admit Date: 2/13/2018  Admit Diagnosis: DEBILITY;CHF (congestive heart failure) ([facility] [address] Respiratory f*  Chief Complaint : Gait dysfunction secondary to below. Admit Diagnosis: Pleural effusion [J90]; Pleural effusion [J90]  Acute respiratory failure (HCC) (2/7/2018)  Pleural effusion (1/23/2018)/ S/P bilateral thoracentesis  Hypothyroidism   Chronic atrial fibrillation   HTN   Pulmonary hypertension   S/P TAVR(1/30/2018)  Leukocytosis (2/7/2018) on Abx   Acute on chronic diastolic heart failure   weakness  Pain  DVT risk  Acute blood loss anemia/ GI bleed? Acute Rehab Dx:  Generalized weakness. Debility    deconditioning   Mobility and ambulation deficits  Self Care/ADL deficits    Subjective     Patient seen and examined. Vss. Denies chest pain, cough. Feels comfortable on O2 via Viola@[employer] min. Still with moderate LE edema. PT, OT well tolerated this morning.         Objective:     Current Facility-Administered Medications   Medication Dose Route Frequency    metOLazone (ZAROXOLYN) tablet 5 mg  5 mg Oral ONCE    acetaminophen (TYLENOL) tablet 650 mg  650 mg Oral Q4H PRN    alcohol 62% (NOZIN) nasal  1 Ampule  1 Ampule Topical Q12H    allopurinol (ZYLOPRIM) tablet 100 mg  100 mg Oral BID    apixaban (ELIQUIS) tablet 5 mg  5 mg Oral Q12H    diazePAM (VALIUM) tablet 5 mg  5 mg Oral QHS    furosemide (LASIX) tablet 80 mg  80 mg Oral Q12H    hydrocortisone (ANUSOL-HC) 2.5 % rectal cream   PeriANAL TID PRN    levothyroxine (SYNTHROID) tablet 88 mcg  88 mcg Oral ACB    metoprolol succinate (TOPROL-XL) XL tablet 25 mg  25 mg Oral DAILY    pantoprazole (PROTONIX) tablet 40 mg  40 mg Oral ACB    polyethylene glycol (MIRALAX) packet 17 g  17 g Oral DAILY    potassium chloride (K-DUR, KLOR-CON) SR tablet 40 mEq  40 mEq Oral DAILY    pravastatin (PRAVACHOL) tablet 40 mg  40 mg Oral DAILY    rOPINIRole (REQUIP) tablet 2 mg  2 mg Oral BID    sertraline (ZOLOFT) tablet 100 mg  100 mg Oral DAILY    sodium chloride (NS) flush 5-10 mL  5-10 mL IntraVENous Q8H    sodium chloride (NS) flush 5-10 mL  5-10 mL IntraVENous PRN    spironolactone (ALDACTONE) tablet 25 mg  25 mg Oral DAILY    levalbuterol (XOPENEX) nebulizer soln 0.63 mg/3 mL  0.63 mg Nebulization Q6H PRN     Facility-Administered Medications Ordered in Other Encounters   Medication Dose Route Frequency    0.9% sodium chloride infusion 250 mL  250 mL IntraVENous PRN     Review of Systems: + weakness. Denies chest pain, shortness of breath, cough, headache, visual problems, abdominal pain, dysurea, calf pain. Pertinent positives are as noted in the medical records and unremarkable otherwise. Visit Vitals    /67    Pulse 83    Temp 97.4 °F (36.3 °C)    Resp 20    Wt 178 lb (80.7 kg)    SpO2 100%    BMI 34.76 kg/m2        Physical Exam:   General: Alert and age appropriately oriented.  Appears comfortable on 2L O2 NC. No acute cardio respiratory distress. HEENT: Normocephalic,no scleral icterus  Oral mucosa moist without cyanosis, No bruit, No JVD. Lungs: + left  basilar crackles. Sohail Deaner no wheezing. Respiration even and unlabored   Heart: RRR,  II/VI TERRI  No clicks, rub or gallops   Abdomen: Soft, non-tender, nondistended. Bowel sounds present. Genitourinary: defered   Neuromuscular:      PERRL, EOMI  Follows simple commands consistently. Able to identify, recall repeat. Mood appropriate. Insight, judgement intact.    LUE     Shoulder abduction   5-/5              Elbow flexion:   5-/5               Wrist extension:  5- /5              Finger flexion;  5- /5  RUE    Shoulder abduction: 5- /5                Elbow flexion:  5- /5                         Wrist extension: 5- /5                        Finger flexion:  5- /5  LLE     Hip flexion:  3+ /5              Knee extension:  4 /5                         Ankle dorsiflexion:  5 /5                        Ankle plantarflexion:   5/5                                                                  RLE     Hip flexion:   3+/5                        Knee extension:  4 /5                         Ankle dorsiflexion:  5 /5                        Ankle plantarflexion:  5 /5  Sensory - intact grossly   No cerebellar signs. Plantars - down going  No atrophy, no fasciculations, no tremors. Skin/extremity: No rashes, no erythema. Calf non tender BLE. 2+ BLE edema. + chronic arthritic joint changes.                                                                                 Functional Assessment:          Balance  Sitting - Static: Good (unsupported) (02/14/18 1500)  Sitting - Dynamic: Fair (occasional) (02/14/18 1500)  Standing - Static: Fair (02/14/18 1500)  Standing - Dynamic : Impaired (02/14/18 1500)                     Anna Owen Fall Risk Assessment:  Anna Owen Fall Risk  Mobility: Ambulates or transfers with assist devices or assistance/unsteady gait (02/15/18 0803)  Mobility Interventions: Bed/chair exit alarm;Communicate number of staff needed for ambulation/transfer (02/15/18 0803)  Mentation: Alert, oriented x 3 (02/15/18 0803)  Medication: Patient receiving anticonvulsants, sedatives(tranquilizers), psychotropics or hypnotics, hypoglycemics, narcotics, sleep aids, antihypertensives, laxatives, or diuretics (02/15/18 0803)  Medication Interventions: Bed/chair exit alarm (02/15/18 0803)  Elimination: Needs assistance with toileting (02/15/18 0803)  Elimination Interventions: Call light in reach (02/15/18 0803)  Prior Fall History: No (02/15/18 0803)  Total Score: 3 (02/15/18 0803)  High Fall Risk: Yes (02/15/18 0803)     Speech Assessment:         Ambulation:  Gait  Distance (ft): 40 Feet (ft) (02/14/18 1500)  Assistive Device: Kathye Amharic, rollator (02/14/18 1500)     Labs/Studies:  Recent Results (from the past 72 hour(s))   PLEASE READ & DOCUMENT PPD TEST IN 72 HRS    Collection Time: 02/12/18  1:35 PM   Result Value Ref Range    PPD Negative Negative    mm Induration 0 mm   METABOLIC PANEL, BASIC    Collection Time: 02/13/18  4:04 AM   Result Value Ref Range    Sodium 144 136 - 145 mmol/L    Potassium 3.8 3.5 - 5.1 mmol/L    Chloride 100 98 - 107 mmol/L    CO2 39 (H) 21 - 32 mmol/L    Anion gap 5 (L) 7 - 16 mmol/L    Glucose 88 65 - 100 mg/dL    BUN 32 (H) 8 - 23 MG/DL    Creatinine 0.95 0.6 - 1.0 MG/DL    GFR est AA >60 >60 ml/min/1.73m2    GFR est non-AA >60 >60 ml/min/1.73m2    Calcium 8.0 (L) 8.3 - 10.4 MG/DL   MAGNESIUM    Collection Time: 02/13/18  4:04 AM   Result Value Ref Range    Magnesium 2.2 1.8 - 2.4 mg/dL   CBC WITH AUTOMATED DIFF    Collection Time: 02/13/18  4:04 AM   Result Value Ref Range    WBC 4.9 4.3 - 11.1 K/uL    RBC 3.30 (L) 4.05 - 5.25 M/uL    HGB 9.5 (L) 11.7 - 15.4 g/dL    HCT 32.0 (L) 35.8 - 46.3 %    MCV 97.0 79.6 - 97.8 FL    MCH 28.8 26.1 - 32.9 PG    MCHC 29.7 (L) 31.4 - 35.0 g/dL    RDW 16.6 (H) 11.9 - 14.6 %    PLATELET 87 (L) 115 - 450 K/uL    MPV 11.6 10.8 - 14.1 FL    DF AUTOMATED      NEUTROPHILS 78 43 - 78 %    LYMPHOCYTES 12 (L) 13 - 44 %    MONOCYTES 7 4.0 - 12.0 %    EOSINOPHILS 3 0.5 - 7.8 %    BASOPHILS 0 0.0 - 2.0 %    IMMATURE GRANULOCYTES 0 0.0 - 5.0 %    ABS. NEUTROPHILS 3.8 1.7 - 8.2 K/UL    ABS. LYMPHOCYTES 0.6 0.5 - 4.6 K/UL    ABS. MONOCYTES 0.3 0.1 - 1.3 K/UL    ABS. EOSINOPHILS 0.2 0.0 - 0.8 K/UL    ABS. BASOPHILS 0.0 0.0 - 0.2 K/UL    ABS. IMM.  GRANS. 0.0 0.0 - 0.5 K/UL   BNP    Collection Time: 02/13/18  4:04 AM   Result Value Ref Range     pg/mL   METABOLIC PANEL, BASIC    Collection Time: 02/15/18  6:39 AM   Result Value Ref Range    Sodium 143 136 - 145 mmol/L    Potassium 3.9 3.5 - 5.1 mmol/L    Chloride 100 98 - 107 mmol/L    CO2 40 (H) 21 - 32 mmol/L    Anion gap 3 (L) 7 - 16 mmol/L    Glucose 87 65 - 100 mg/dL    BUN 28 (H) 8 - 23 MG/DL    Creatinine 0.85 0.6 - 1.0 MG/DL    GFR est AA >60 >60 ml/min/1.73m2    GFR est non-AA >60 >60 ml/min/1.73m2    Calcium 8.2 (L) 8.3 - 10.4 MG/DL MAGNESIUM    Collection Time: 02/15/18  6:39 AM   Result Value Ref Range    Magnesium 2.1 1.8 - 2.4 mg/dL       Assessment:     Problem List as of 2/15/2018  Date Reviewed: 2/10/2018          Codes Class Noted - Resolved    Respiratory failure ([facility] [address] ICD-10-CM: J96.90  ICD-9-CM: 518.81  2/13/2018 - Present        Acute on chronic respiratory failure ([facility] [address] ICD-10-CM: J96.20  ICD-9-CM: 518.84  2/7/2018 - Present        Leukocytosis ICD-10-CM: D72.829  ICD-9-CM: 288.60  2/7/2018 - Present        Chronic diastolic heart failure (HCC) ICD-10-CM: I50.32  ICD-9-CM: 428.32  2/7/2018 - Present        S/P TAVR (transcatheter aortic valve replacement) ICD-10-CM: Z95.2  ICD-9-CM: V43.3  1/30/2018 - Present        Aortic stenosis (Chronic) ICD-10-CM: I35.0  ICD-9-CM: 424.1  1/29/2018 - Present        Peripheral arterial disease (HCC) (Chronic) ICD-10-CM: I73.9  ICD-9-CM: 443.9  1/23/2018 - Present        Pleural effusion ICD-10-CM: J90  ICD-9-CM: 511.9  1/23/2018 - Present    Overview Addendum 2/10/2018 11:36 AM by Tessa Hendricks NP     Right thoracentesis 1/25/18 - 1200 mls removed  Left thoracentesis 1/25/2018 - 900 mls removed  Bilateral thoracentesis 2/9/18 - L 550 ml (air aspirated during and at the end of procedure) / R 1000 ml and R creamy pink              Chronic respiratory failure with hypoxia (HCC) (Chronic) ICD-10-CM: J96.11  ICD-9-CM: 518.83, 799.02  1/23/2018 - Present    Overview Signed 1/23/2018 11:17 AM by Jaxon Benavidez NP     Wears 3 to 4 liters at home             Hypoxia ICD-10-CM: R09.02  ICD-9-CM: 799.02  1/23/2018 - Present        Stenosis of prosthetic aortic valve (Chronic) ICD-10-CM: I35.0  ICD-9-CM: 396.0  1/19/2018 - Present    Overview Signed 1/19/2018  3:18 PM by Susan Casper MD     AVR (10/5/13):  21 mm Pericardial valve.                Tricuspid valve insufficiency (Chronic) ICD-10-CM: I07.1  ICD-9-CM: 397.0  1/15/2018 - Present        Systolic CHF, chronic (HCC) (Chronic) ICD-10-CM: I50.22  ICD-9-CM: 428.22, 428.0  1/15/2018 - Present        S/P mitral valve repair (Chronic) ICD-10-CM: E09.294  ICD-9-CM: V45.89  12/4/2017 - Present        H/O atrioventricular rubin ablation (Chronic) ICD-10-CM: U38.314  ICD-9-CM: V15.1  3/22/2017 - Present        Pulmonary hypertension (Chronic) ICD-10-CM: I27.20  ICD-9-CM: 416.8  2/12/2017 - Present        Aortic valve replaced (Chronic) ICD-10-CM: Z95.2  ICD-9-CM: V43.3  1/9/2017 - Present        Chronic diastolic congestive heart failure (HCC) (Chronic) ICD-10-CM: I50.32  ICD-9-CM: 428.32, 428.0  9/9/2016 - Present        Chronic depression (Chronic) ICD-10-CM: F32.9  ICD-9-CM: 103  8/5/2016 - Present        Osteopenia (Chronic) ICD-10-CM: M85.80  ICD-9-CM: 733.90  8/5/2016 - Present        RLS (restless legs syndrome) (Chronic) ICD-10-CM: G25.81  ICD-9-CM: 333.94  8/5/2016 - Present        Hyperlipidemia (Chronic) ICD-10-CM: E78.5  ICD-9-CM: 272.4  8/5/2016 - Present        Gout (Chronic) ICD-10-CM: M10.9  ICD-9-CM: 274.9  8/5/2016 - Present        Anxiety (Chronic) ICD-10-CM: F41.9  ICD-9-CM: 300.00  8/5/2016 - Present        CAD (coronary artery disease) (Chronic) ICD-10-CM: I25.10  ICD-9-CM: 414.00  8/5/2016 - Present        HTN (hypertension) (Chronic) ICD-10-CM: I10  ICD-9-CM: 401.9  8/5/2016 - Present        GERD (gastroesophageal reflux disease) (Chronic) ICD-10-CM: K21.9  ICD-9-CM: 530.81  8/5/2016 - Present        H/O mitral valve repair, 2003 (Chronic) ICD-10-CM: L58.387  ICD-9-CM: V15.1  6/27/2016 - Present        Sick sinus syndrome (HCC) (Chronic) ICD-10-CM: I49.5  ICD-9-CM: 427.81  2/13/2016 - Present        Obesity (Chronic) ICD-10-CM: E66.9  ICD-9-CM: 278.00  11/2/2015 - Present        Mitral stenosis with insufficiency (Chronic) ICD-10-CM: P05.9  ICD-9-CM: 394.2  11/2/2015 - Present    Overview Signed 11/2/2015 11:02 AM by Taiwo Holt     Prior MV repair 2003.               Rheumatic aortic stenosis (Chronic) ICD-10-CM: I06.0  ICD-9-CM: 395.0  11/2/2015 - Present        Cardiac pacemaker (Chronic) ICD-10-CM: Z95.0  ICD-9-CM: V45.01  11/2/2015 - Present        Thrombocytopenia (HCC) (Chronic) ICD-10-CM: D69.6  ICD-9-CM: 287.5  8/22/2012 - Present        Anemia (Chronic) ICD-10-CM: D64.9  ICD-9-CM: 285.9  10/27/2011 - Present    Overview Signed 10/27/2011  8:25 AM by Tomy Neal     Acute on chronic               Chronic atrial fibrillation (HCC) (Chronic) ICD-10-CM: I48.2  ICD-9-CM: 427.31  10/17/2011 - Present        Hypothyroidism (Chronic) ICD-10-CM: E03.9  ICD-9-CM: 244.9  12/4/2009 - Present        BOBBY (obstructive sleep apnea) (Chronic) ICD-10-CM: G47.33  ICD-9-CM: 327.23  12/4/2009 - Present        Chronic obstructive pulmonary disease (HCC) (Chronic) ICD-10-CM: J44.9  ICD-9-CM: 496  3/8/2009 - Present        Aortic Valve Bioprosthesis Present (Chronic) ICD-10-CM: Z95.2  ICD-9-CM: V43.3  3/7/2009 - Present        Degenerative arthritis of left knee (Chronic) ICD-10-CM: M17.12  ICD-9-CM: 715.96  2/27/2009 - Present        RESOLVED: CHF (congestive heart failure) ([facility] ICD-10-CM: I50.9  ICD-9-CM: 428.0  2/13/2018 - 2/14/2018        RESOLVED: Acute on chronic systolic congestive heart failure ([facility] [address] ICD-10-CM: I50.23  ICD-9-CM: 428.23, 428.0  2/1/2018 - 2/7/2018        RESOLVED: Acute pulmonary edema ([facility] ICD-10-CM: J81.0  ICD-9-CM: 518.4  1/25/2018 - 2/7/2018        RESOLVED: Pacemaker ICD-10-CM: Z95.0  ICD-9-CM: V45.01  12/4/2017 - 1/23/2018        RESOLVED: History of gout ICD-10-CM: Z87.39  ICD-9-CM: V12.29  1/31/2017 - 1/23/2018        RESOLVED: Warfarin anticoagulation (Chronic) ICD-10-CM: Z79.01  ICD-9-CM: V58.61  1/9/2017 - 1/23/2018        RESOLVED: Non morbid obesity due to excess calories ICD-10-CM: E66.09  ICD-9-CM: 278.00  11/14/2016 - 1/23/2018        RESOLVED: Hypoxemia ICD-10-CM: R09.02  ICD-9-CM: 799.02  11/14/2016 - 1/23/2018        RESOLVED: Localized edema ICD-10-CM: R60.0  ICD-9-CM: 782.3  9/9/2016 - 1/23/2018 RESOLVED: Iron deficiency anemia ICD-10-CM: D50.9  ICD-9-CM: 280.9  9/9/2016 - 1/23/2018        RESOLVED: CRI (chronic renal insufficiency) ICD-10-CM: N18.9  ICD-9-CM: 585.9  8/5/2016 - 1/23/2018        RESOLVED: Dyspnea ICD-10-CM: R06.00  ICD-9-CM: 786.09  8/5/2016 - 1/23/2018        RESOLVED: Right hip pain ICD-10-CM: M25.551  ICD-9-CM: 719.45  8/5/2016 - 1/23/2018        RESOLVED: Edema ICD-10-CM: R60.9  ICD-9-CM: 782.3  8/5/2016 - 1/23/2018        RESOLVED: Chest pain ICD-10-CM: R07.9  ICD-9-CM: 786.50  8/5/2016 - 1/23/2018        RESOLVED: Other long term (current) drug therapy ICD-10-CM: D13.388  ICD-9-CM: V58.69  8/5/2016 - 1/23/2018        RESOLVED: Acute encephalopathy ICD-10-CM: G93.40  ICD-9-CM: 348.30  7/8/2016 - 1/23/2018        RESOLVED: Tachycardia ICD-10-CM: R00.0  ICD-9-CM: 785.0  6/27/2016 - 1/23/2018        RESOLVED: Palpitations ICD-10-CM: R00.2  ICD-9-CM: 785.1  11/2/2015 - 1/23/2018        RESOLVED: Respiratory insufficiency ICD-10-CM: R06.89  ICD-9-CM: 786.09  11/2/2015 - 1/23/2018        RESOLVED: Bradycardia (Symptomatic) ICD-10-CM: R00.1  ICD-9-CM: 427.89  11/7/2011 - 1/23/2018        RESOLVED: Rectal bleeding ICD-10-CM: K62.5  ICD-9-CM: 569.3  10/27/2011 - 1/23/2018        RESOLVED: AV block ICD-10-CM: I44.30  ICD-9-CM: 426.10  10/17/2011 - 1/23/2018        RESOLVED: Cardiogenic shock ([address] ICD-10-CM: R57.0  ICD-9-CM: 785.51  10/17/2011 - 1/23/2018        RESOLVED: Hyperkalemia ICD-10-CM: E87.5  ICD-9-CM: 276.7  10/17/2011 - 1/23/2018        RESOLVED: Nausea and vomiting ICD-10-CM: R11.2  ICD-9-CM: 787.01  2/24/2010 - 1/23/2018        RESOLVED: Epigastric abdominal pain ICD-10-CM: R10.13  ICD-9-CM: 789.06  2/24/2010 - 1/23/2018        RESOLVED: Digoxin toxicity ICD-10-CM: T46.0X1A  ICD-9-CM: 972.1, E942.1  2/24/2010 - 1/23/2018        RESOLVED: ARF (acute renal failure) (HCC) (Chronic) ICD-10-CM: N17.9  ICD-9-CM: 584.9  2/24/2010 - 1/23/2018        RESOLVED: Hypokalemia ICD-10-CM: E87.6  ICD-9-CM: 276.8  2/24/2010 - 1/23/2018        RESOLVED: CKD (chronic kidney disease) stage 3, GFR 30-59 ml/min (Chronic) ICD-10-CM: N18.3  ICD-9-CM: 585.3  12/4/2009 - 1/23/2018        RESOLVED: Cough ICD-10-CM: R05  ICD-9-CM: 786.2  3/9/2009 - 3/12/2009        RESOLVED: Bronchitis ICD-10-CM: J40  ICD-9-CM: 717  3/9/2009 - 3/12/2009        RESOLVED: Atrial fibrillation (HCC) (Chronic) ICD-10-CM: I48.91  ICD-9-CM: 427.31  3/7/2009 - 6/27/2016        RESOLVED: Hypotension (Chronic) ICD-10-CM: I95.9  ICD-9-CM: 458.9  3/1/2009 - 1/23/2018              Plan:     The Post Assessment Physician Evaluation (RAMBO) found the current functional status to be comparable with the Pre-admission Screening. The Patient is a good candidate for acute inpatient rehabilitation. Nothing since the Pre-admission screen has changed that determination.      Rehabilitation Plan  The patient has shown the ability to tolerate and benefit from 3 hours of therapy daily and is being admitted to a comprehensive acute inpatient rehabilitation program consisting of at least 3 hours of combined physical and occupational therapies. Resume intensive Physical Therapy for a minimum of 1.5 hours a day, at least 5 out of 7 days per week to address bed mobility, transfers, ambulation, strengthening, balance, and endurance. - pt  was ambulating independently with a rollator inside her home. reports using rollator or a wheelchair for community mobility.  Will continue to focus on endurance, strengthening BLe.      Resume  intensive Occupational Therapy for a minimum of 1.5 hours a day, at least 5 out of 7 days per week to address ADL ( bathing, LE dressing, toileting) and adaptive equipment as needed.       Continue 24-hour skilled rehabilitation nursing for bowel and bladder management, skin care for decubitus ulcer prevention , pain management and ongoing medication administration      The patient may benefit from a psychology consult for depression, anxiety and adjustment disorder.     Continue daily physician medical management:    Acute respiratory failure (HCC) (2/7/2018)/ Pleural effusion (1/23/2018)/ S/P bilateral thoracentesis  S/p - Thoracenteses on 2/9 - 550 mls removed from the left and 1 liter removed from the right.   - Continue bronchodilators prn- has home nebulizer. Resume as prn;xopenex  - Patient will be on 2L O2 at therapy and at rest. May be able to wean as tolerated. Will monitor saO2. 2/15 - sao2 at 100% on 2L/ min. Feels comfortable. Continue lasix and aldactone. Cardiology gave 1x Zaroxolyn in AM prior to AM lasix.         S/P TAVR(1/30/2018)/ Acute on chronic diastolic heart failure Pulmonary hypertension / Chronic atrial fibrillation/ HTN   - cardiac precaution, s.p TAVR. - weights and maintain a 2 liter per day fluid restriction.   - Monitor HR/BP. conitinue Eliquis for a.fib  - continue BB-Toprol XL  2/15- edema, chest exam appears not changed. cintiuned on diuretics.         Hypothyroidism - synthroid 88 mcg     Leukocytosis (2/7/2018) on Abx last dose 7/7 rocephin administered 2/13.   - finished abx. No new s/s of infection.       Acute blood loss anemia/ GI bleed? -monitor clinically. May have been caused by high INR. - no melena. Will check hgb Saturday.        Pneumonia prophylaxis- Insentive spirometer every hour while awake     DVT risk / DVT Prophylaxis- continue daily physician exam to assess for signs and symptoms as patient is at increased risk for of thromboembolism. - covered with eliquis.       Wound Care: - monitor for ulcers, skin breakdown due to edema.     Potential urinary retention - no s/s of retention. Monitor.  - pt mostly continent.      bowel program - as needed dulcolax, pericolace. + BM.     GERD/ GI prophylaxis - resume PPI. At times may need additional antacids, Maalox prn.          Time spent was 25 minutes with over 1/2 in direct patient care/examination, consultation and coordination of care. Signed By: Esthela See MD     February 15, 2018

## 2018-02-15 NOTE — PROGRESS NOTES
[facility] CARDIOLOGY PROGRESS NOTE           2/15/2018     Admit Date: 2/13/2018      Subjective:   Patient notes edema is persistent but better. Appears comfortable on nasal cannula. BP low at times. ROS:  Cardiovascular:  As noted above    Objective:      Vitals:    02/15/18 0738 02/15/18 1041 02/15/18 1405 02/15/18 1512   BP: 102/67 102/62  115/60   Pulse: 83 83  84   Resp: 20   20   Temp: 97.4 °F (36.3 °C)   97.6 °F (36.4 °C)   SpO2: 100%  97% 100%   Weight:           Physical Exam:  General-No Acute Distress  Neck- supple, no JVD  CV- regular rate and rhythm Grade II/VI TERRI  Lung- clear bilaterally  Abd- soft, nontender, nondistended  Ext- 1+ edema bilaterally. Skin- warm and dry      Data Review:   Recent Labs      02/15/18   0639  02/13/18   0404   NA  143  144   K  3.9  3.8   MG  2.1  2.2   BUN  28*  32*   CREA  0.85  0.95   GLU  87  88   WBC   --   4.9   HGB   --   9.5*   HCT   --   32.0*   PLT   --   87*      No results found for: FARHAD Clement    Assessment/Plan:     Active Problems:    Chronic atrial fibrillation ([facility] [address] (10/17/2011)    On Eliquis      S/P TAVR (transcatheter aortic valve replacement) (1/30/2018)    Stable. On Eliquis. Chronic diastolic heart failure (HCC) (2/7/2018)    Mild volume overload. Continue current diuretics and keep feet elevated. May consider wrapping or support stockings. Respiratory failure ([facility] [address] (2/13/2018)    Stable on nasal cannula.                    Valorie Yan MD  2/15/2018

## 2018-02-15 NOTE — PROGRESS NOTES
Subjective \"I like to play games on my IPad. \"   Activity Standing Ipad games   Strength/Endurance Patient stood 1 time approximately 10 minutes before she needed to sit down to rest.   Balance Sit<->stand:  CGA with RW   Social Interaction Patient was friendly and conversational during the session. She is [place] and needed to be spoken loudly to for understanding. She appeared to appreciate the interaction with this therapist and RT student. Cognitive A&O X3   Comments Patient was on 2 liter O2 via nasal canula. She was assisted to her room, restroom and into the bed with all needs within reach.      Andrew Solitario, CTRS

## 2018-02-15 NOTE — PROGRESS NOTES
Problem: Falls - Risk of  Goal: *Absence of Falls  Document Gregorio Fall Risk and appropriate interventions in the flowsheet.    Outcome: Progressing Towards Goal  Fall Risk Interventions:  Mobility Interventions: Bed/chair exit alarm, Patient to call before getting OOB         Medication Interventions: Bed/chair exit alarm, Patient to call before getting OOB, Teach patient to arise slowly    Elimination Interventions: Call light in reach, Patient to call for help with toileting needs, Toilet paper/wipes in reach, Toileting schedule/hourly rounds

## 2018-02-15 NOTE — PROGRESS NOTES
PHYSICAL THERAPY DAILY NOTE  Time In: 5461  Time Out: 5748  Patient Seen For: AM;Gait training;Patient education; Therapeutic exercise;Transfer training; Other (see progress notes)    Subjective: patient reporting she feels OK. Reports she is tired from AM ADLs. Reports she does not have to walk long distances inside her home. Objective:Vital Signs:  Patient Vitals for the past 12 hrs:   Temp Pulse Resp BP SpO2   02/15/18 1041 - 83 - 102/62 -   02/15/18 0738 97.4 °F (36.3 °C) 83 20 102/67 100 %     Patient on 02 at 2lpm. 02 sat 96 to 99% during AM PT, HR 77 to 84    Pain level:No c/o pain  Pain location:NA  Pain interventions:NA    Patient education:Balance training,transfer training, gait training, fall precautions, activity pacing, body mechanics, w/c and rollator parts management, energy conservation during functional mobility, bed mobility training, Patient verbalizing understanding and demonstrating partial understanding of patient education. Recommend follow up education.     Interdisciplinary Communication:RN in to assess vital signs and issue medication    Other (comment) (Fall precautions)  GROSS ASSESSMENT Daily Assessment     NA       BED/MAT MOBILITY Daily Assessment   Increased time and effort to complete Rolling Right : 5 (Stand-by assistance)  Rolling Left : 0 (Not tested)  Supine to Sit : 5 (Stand-by assistance)  Sit to Supine : 5 (Supervision)       TRANSFERS Daily Assessment   Increased time and effort to complete with cues for body mechanics   Transfer Type: SPT without device  Transfer Assistance : 5 (Stand-by assistance)  Sit to Stand Assistance: Stand-by assistance  Car Transfers: Not tested       GAIT Daily Assessment    Amount of Assistance: 5 (Stand-by assistance)  Distance (ft): 50 Feet (ft)  Assistive Device: Walker, rollator   Gait training with cues for posture correction, breathing techniques and to improve step clearance    STEPS or STAIRS Daily Assessment    Steps/Stairs Ambulated (#): 0  Level of Assist : 0 (Not tested)       BALANCE Daily Assessment   Static standing with rollator and SBA with cues for upright posture Sitting - Static: Good (unsupported)  Sitting - Dynamic: Fair (occasional)  Standing - Static: Fair  Standing - Dynamic : Impaired       WHEELCHAIR MOBILITY Daily Assessment    Curbs/Ramps Assist Required (FIM Score): 0 (Not tested)  Wheelchair Setup Assist Required : 3 (Moderate assistance)  Wheelchair Management: Manages left brake;Manages right brake       LOWER EXTREMITY EXERCISES Daily Assessment   Multiple and frequent rest breaks during exercises Extremity: Both  Exercise Type #1: Supine lower extremity strengthening  Sets Performed: 3  Reps Performed: 10  Level of Assist: Minimal assistance          Assessment: Progress towards goals will be slow due to decreased endurance. 02 sat and HR stable on 02 at 2lpm.       Patient return to room at end of treatment and remained up in wheelchair with needs in reach    Plan of Care: Continue with POC and progress as tolerated.      Fang Vazquez, PT  2/15/2018

## 2018-02-15 NOTE — PROGRESS NOTES
PHYSICAL THERAPY DAILY NOTE  Time In: 1302  Time Out: 8893  Patient Seen For: PM;Balance activities;Gait training;Patient education; Therapeutic exercise;Transfer training; Other (see progress notes)    Subjective: patient reporting she is tired from AM therapy         Objective:Vital Signs:  Patient Vitals for the past 12 hrs:   Temp Pulse Resp BP SpO2   02/15/18 1405 - - - - 97 %   02/15/18 1041 - 83 - 102/62 -   02/15/18 0738 97.4 °F (36.3 °C) 83 20 102/67 100 %     Patient on 02 at 2lpm. 02 sat 97 to 99% during PM PT    Pain level:No c/o pain  Pain location:NA  Pain interventions:NA    Patient education:Balance training,transfer training, gait training, fall precautions, activity pacing, body mechanics, rollator parts management, energy conservation, Patient verbalizing understanding and demonstrating partial understanding of patient education. Recommend follow up education. Interdisciplinary Communication:NA    Other (comment) (Fall precautions)  GROSS ASSESSMENT Daily Assessment     NA       BED/MAT MOBILITY Daily Assessment    Rolling Right : 0 (Not tested)  Rolling Left : 0 (Not tested)  Supine to Sit : 0 (Not tested)  Sit to Supine : 0 (Not tested)       TRANSFERS Daily Assessment   Increased time and effort to complete with cues for body mechanics  Cues for rollator set up    Commode transfers with grab bar and SBA Transfer Type: SPT with walker (rollator)  Transfer Assistance : 5 (Stand-by assistance)  Sit to Stand Assistance: Stand-by assistance  Car Transfers: Not tested       GAIT Daily Assessment    Amount of Assistance: 5 (Stand-by assistance)  Distance (ft): 40 Feet (ft) (40ft x 5 with 3 min sitting rest breaks between)  Assistive Device: Walker, rollator   Gait training while managing 02 line and while making 2 turns with rollator, cues to attend to 02 line and for posture correction. Cues for controlling RR and depth of breath    STEPS or STAIRS Daily Assessment    Steps/Stairs Ambulated (#): 0  Level of Assist : 0 (Not tested)       BALANCE Daily Assessment   Static standing with grab bar on right and SBA while patient managing lower body clothing and hygiene in standing before and after toileting Sitting - Static: Good (unsupported)  Sitting - Dynamic: Fair (occasional)  Standing - Static: Fair  Standing - Dynamic : Impaired       WHEELCHAIR MOBILITY Daily Assessment    Curbs/Ramps Assist Required (FIM Score): 0 (Not tested)  Wheelchair Setup Assist Required : 3 (Moderate assistance)  Wheelchair Management: Manages left brake;Manages right brake       LOWER EXTREMITY EXERCISES Daily Assessment    Extremity: Both  Exercise Type #1: Other (comment) (LE motomed x 10 mins at level 1)  Level of Assist: Supervision          Assessment: progressing towards goals. 02 sat stable on 02 at 2lpm. Improving with ability to manage 02 line during transfers and gait       Patient to recreational therapy at end of treatment    Plan of Care: Continue with POC and progress as tolerated.      Juana Ruiz, PT  2/15/2018

## 2018-02-15 NOTE — PROGRESS NOTES
OT Daily Note    Time In  11:16   Time Out  12:07     Subjective:\"I would love to have a beauty salon session. \" Agreeable to therapy. Pain:not indicated during this session. Education:on importance of functional activities while in stance. Interdisciplinary Communication: Collaborated with Elizabeth Cosme and patient is making progress towards goals. Precautions:  (fall precautions)    Balance/functional mobility Daily Assessment   Pt transferred sit to stand at sink with RW and CGA. Pt stood 3 times for 5, 7, and 10 minutes at sink while grooming, using RW and with CGA to improve activity tolerance and standing balance. Pt stabilized self with intermittent L hand support on RW. Strengthening/activity tolerance Daily Assessment   Pt applied makeup while standing at sink to improve activity tolerance, overall strengthening, and bilateral coordination during stance. Assistance needed for brushing back of hair and educated on applying mascara. Pt able to apply eye makeup, lipstick, and shave with s/u assist and CGA for standing balance. Writer donned anti-embolism stockings on pt's bilateral feet to reduce LE swelling. Assessment: Pt is making good progress towards goals. Pt requires skilled therapy to address deficits in functional mobility, standing balance, edema, UE strength, and activity tolerance to improve independence in ADLs and safety needed for functional transfers. Ended session: in recliner with call bell and phone within reach.      Kaushal Baron, OT Student

## 2018-02-15 NOTE — PROGRESS NOTES
End Of Shift Functional Summary, Nursing      TOILETING:  Does patient need assist with clothing management and/or pericare? yes    TOILET TRANSFER:  Pt requires minimal assistance. Pt uses walker. BLADDER:  Pt does not have a salamanca catheter that staff manages. Pt does take medication. Pt is continent. of bladder and voids in toilet   Pt has had 0 bladder accidents during this shift requiring minimal assistance to clean up. (An accident is when the episode is not contained in a brief AND/OR the clothing/linen requires changing/cleaning up.)    BOWEL:  Pt does take medication. . (An accident is when the episode is not contained in a brief AND/OR the clothing/linen requires changing/cleaning up.)    BED/CHAIR TRANSFER  Pt requires minimal assistance. Patient requires the assistance of 1 staff member(s). Pt uses walker                        Documentation reviewed and plan of care discussed/reviewed with   oncoming nurse during the shift.

## 2018-02-15 NOTE — PROGRESS NOTES
02/15/18 0753   Time Spent With Patient   Time In 2906   Time Out 0915   Patient Seen For: AM;ADLs   Feeding/Eating   Feeding/Eating Assistance I   Grooming   Grooming Assistance  S   Comments Pt able to wash hands and face as well as brush hair and dentures with s/u assist.   Upper Body Bathing   Bathing Assistance, Upper S   Position Performed Seated in chair   Comments s/u assist for applying soap and preparing water. Lower Body Bathing   Bathing Assistance, Lower  CGA   Adaptive Equipment Grab bar   Position Performed Seated in chair;Standing   Adaptive Equipment Grab bar;Tub bench   Comments CGA for standing balance while washing buttocks. Toileting   Toileting Assistance (FIM Score) Mod A   Cues Verbal cues provided; Tactile cues provided   Adaptive Equipment Grab bars; Walker   Upper Body Dressing    Dressing Assistance  Min A   Comments Assist to pull down bra. Lower Body Dressing    Dressing Assistance  CGA   Leg Crossed Method Used Yes   Position Performed Seated in chair;Standing   Adaptive Equipment Used Grab bar   Comments CGA for standing balance during clothing management. Functional Transfers   Toilet Transfer  Grab bars;Stand pivot transfer with walker   Amount of Assistance Required CGA   Tub or Shower Type Shower   Amount of Assistance Required CGA   Adaptive Equipment Grab bars; Tub transfer bench;Walker (comment)     S: \"I had oatmeal today. .. It's a good morning! \" Agreeable to therapy. Focus of session was on morning ADL routine. Patient was able to ambulate ~15 feet using a RW with CGA. Pain not indicated during this session. Collaborated with PT, Radha Cardoza and confirmed patient is on track to reach goals as documented in the care plan. Patient tolerated session well, but functional mobility, standing balance, UE strength, and activity tolerance are still below baseline and requires skilled facilitation to successfully and safely complete ADL's and transfers.    Patient ended session in w/c with call remote and phone within reach.      Amirah Naylor, OT Student

## 2018-02-15 NOTE — PROGRESS NOTES
Pt resting in bed no complaints noted at this time 1t ancle at ankles   edema noted. No complaints of pain noted.

## 2018-02-16 NOTE — PROGRESS NOTES
BLADDER:  Pt does not have a salamanca catheter that staff manages.  Pt does not take medication.  Pt is continent. of bladder and voids in toilet  Pt has had 0 bladder accidents during this shift .   (An accident is when the episode is not contained in a brief AND/OR the clothing/linen requires changing/cleaning up.)      BOWEL:  Pt does take medication.  Pt is continent of bowel and uses toilet.  Pt has had 0 bowel accidents during this shift. (An accident is when the episode is not contained in a brief AND/OR the clothing/linen requires changing/cleaning up.)

## 2018-02-16 NOTE — PROGRESS NOTES
Problem: Falls - Risk of  Goal: *Absence of Falls  Document Gregorio Fall Risk and appropriate interventions in the flowsheet.    Outcome: Progressing Towards Goal  Fall Risk Interventions:  Mobility Interventions: Bed/chair exit alarm, Patient to call before getting OOB, Utilize walker, cane, or other assitive device         Medication Interventions: Bed/chair exit alarm, Evaluate medications/consider consulting pharmacy, Patient to call before getting OOB    Elimination Interventions: Call light in reach

## 2018-02-16 NOTE — PROGRESS NOTES
Subjective \"I am doing good and ready to work. \"   Activity Standing horseshoes   Strength/Endurance Patient with increased activity tolerance noted than from her previous sessions. She took several appropriate rest breaks but was able to start back up after the appropriate time. Balance Sit<->stand:  CGA with RW   Social Interaction Patient joked around and was in good spirits during the session. Her sister in law and granddaughter were present during the session which made her happy. Cognitive A&O X4   Comments Patient was at 100% on 4 liters via nasal canula at the beginning of the session but the end of the session she was placed to 2 liters and was at 93%. She was assisted to the bed and left with all needs within reach. Her daughter and sister in law were present at bedside.      ARMANI June

## 2018-02-16 NOTE — PROGRESS NOTES
[facility] CARDIOLOGY PROGRESS NOTE           2/16/2018 1:38 PM    Admit Date: 2/13/2018         Subjective: 69 yof s/p TAVR on 1/31/18. Doing well, continues to have considerable LE edema, 80 mg lasix PO bid without response. No complains, particpating with PT/OT. ROS:  Cardiovascular:  As noted above    Objective:      Vitals:    02/15/18 1430 02/15/18 1512 02/15/18 2006 02/16/18 0741   BP:  115/60 113/59 113/59   Pulse: 83 84 81 77   Resp:  20 20 16   Temp:  97.6 °F (36.4 °C) 97.4 °F (36.3 °C) 97.6 °F (36.4 °C)   SpO2: 94% 100% 99% 100%   Weight:           On telemetry:      Physical Exam:  General: Well Developed, Well Nourished, No Acute Distress, Alert & Oriented x 3, Appropriate mood  Neck: supple, no JVD  Heart: S1S2 with RRR without murmurs or gallops  Lungs: Clear throughout auscultation bilaterally without adventitious sounds  Abd: soft, nontender, nondistended, with good bowel sounds  Ext: 2-3+ pitting edema bilaterally to the thigh    Skin: warm and dry      Data Review:   Recent Labs      02/15/18   0639   NA  143   K  3.9   MG  2.1   BUN  28*   CREA  0.85   GLU  87       No results for input(s): TNIPOC, TROIQ in the last 72 hours. Assessment/Plan:     Active Problems:    Chronic atrial fibrillation (HCC) (10/17/2011)      S/P TAVR (transcatheter aortic valve replacement) (1/30/2018)      Chronic diastolic heart failure ([address] (2/7/2018)      Respiratory failure ([address] (2/13/2018)      1) TAVR - stable   2) Afib - on eliquis  3) LE edema - will add metolazone to AM meds, check BMP, daily weights don't so any headway with diuresis. Asked for thigh high CARA hose.     Luís Mahoney MD  2/16/2018 1:38 PM

## 2018-02-16 NOTE — PROGRESS NOTES
OT Daily Note    Time In  11:16   Time Out  12:00     Subjective: \"It would be nice to not drop so many things. \" Agreeable to therapy. Pain:tolerated headache well. Education:on therasponge exercises for strengthening bilateral hands. Interdisciplinary Communication: Collaborated with Daphney Alfaro and patient is making progress towards goals. Precautions:  (fall precautions)    Balance/functional mobility Daily Assessment   Pt stood at standing table for 13 minutes and 46 seconds during functional activity with CGA progressing to SBA, to improve standing balance and activity tolerance. Pt stabilized self with intermittent L hand support on table. No LOB. Pt completed SPT from w/c to recliner with CGA. Strengthening/activity tolerance Daily Assessment   Pt completed stringing bead activity while standing to improve overall strengthening, UE coordination, and activity tolerance. Pt was able to tie 2 knots independently and required assistance with additional 2 knots. Pt completed medium resistance therasponge exercises for bilateral hand  and index to thumb pincer grasp. Pt completed 2 sets of 10 reps of each. Pt would benefit from continued  strengthening in bilateral hands. Assessment: Pt is making good progress towards goals. Pt requires skilled therapy to address deficits in functional mobility, standing balance, edema, UE strength, and activity tolerance to improve independence in ADLs and safety needed for functional transfers. Ended session: in recliner with phone and call bell within reach.      Fabiana York, OT Student

## 2018-02-16 NOTE — PROGRESS NOTES
PHYSICAL THERAPY DAILY NOTE  Time In: 1001  Time Out: 1115  Patient Seen For: AM;Balance activities;Gait training;Patient education; Therapeutic exercise;Transfer training; Other (see progress notes)    Subjective: Patient reporting she feels like she is getting stronger. Reports she is still leaking fluid out of her arms L>R         Objective:Vital Signs:  Patient Vitals for the past 12 hrs:   Temp Pulse Resp BP SpO2   02/16/18 0741 97.6 °F (36.4 °C) 77 16 113/59 100 %     Patient on 02 at 2lpm. 02 sat 95 to 100% during PT treatment    Pain level:no c/o pain  Pain location:NA  Pain interventions:NA    Patient education:Bed mobility training,transfer training, gait training, fall precautions, activity pacing, body mechanics, w/c and rollator parts management, breathing techniques, energy conservation. Patient verbalizing understanding and demonstrating partial understanding of patient education. Recommend follow up education. Interdisciplinary Communication:spoke with RN and OT regarding body fluid drainage from left elbow and patient's shirt becoming saturated with body fluid. RN applied dry bandage to left elbow and to discuss drainage with MD    Other (comment) (Fall precautions)  GROSS ASSESSMENT Daily Assessment     Min assist with changing shirt       BED/MAT MOBILITY Daily Assessment   Increased time and effort Rolling Right : 5 (Supervision)  Rolling Left : 0 (Not tested)  Supine to Sit : 5 (Supervision)  Sit to Supine : 5 (Supervision)       TRANSFERS Daily Assessment   Increased time and effort to complete with cues for body mechanics and rollator set up   Transfer Type: SPT without device  Transfer Assistance : 5 (Stand-by assistance)  Sit to Stand Assistance: Stand-by assistance  Car Transfers: Not tested       GAIT Daily Assessment   Gait training while managing 02 line and while making 2 turns with rollator, cues to attend to 02 line and for posture correction. Cues for controlling RR and depth of breath Amount of Assistance: 5 (Stand-by assistance)  Distance (ft): 40 Feet (ft) (40ft x 3)  Assistive Device: Walker, rollator       STEPS or STAIRS Daily Assessment    Steps/Stairs Ambulated (#): 0  Level of Assist : 0 (Not tested)       BALANCE Daily Assessment    Sitting - Static: Good (unsupported)  Sitting - Dynamic: Fair (occasional)  Standing - Static: Fair  Standing - Dynamic : Impaired       WHEELCHAIR MOBILITY Daily Assessment    Curbs/Ramps Assist Required (FIM Score): 0 (Not tested)  Wheelchair Setup Assist Required : 3 (Moderate assistance)  Wheelchair Management: Manages left brake;Manages right brake       LOWER EXTREMITY EXERCISES Daily Assessment   Multiple and frequent rest breaks during LE exercise. Cues for breathing techniques Extremity: Both  Exercise Type #1: Supine lower extremity strengthening  Sets Performed: 3  Reps Performed: 10  Level of Assist: Minimal assistance          Assessment: Progressing towards goals. Functional endurance and strength improving. Improving with ability to manage 02 line with ambulating with rollator       Patient to OT at end of treatment    Plan of Care: Continue with POC and progress as tolerated.      Juana Ruiz, PT  2/16/2018

## 2018-02-16 NOTE — PROGRESS NOTES
Patient resting up in bed. Alert and oriented with pleasant affect. Lung sounds diminished. Encouraged to cough and deep breathe. 2 liters nasal cannula. Edema to lower extremities. Denies any pain or discomfort. At present time. Assisted with breakfast tray set up. No other verbalized needs made known at present time. Assessment completed. See doc flow sheet for further assessments.

## 2018-02-16 NOTE — ROUTINE PROCESS
CHF F/U via phone message:  PT ID#:   Date:  2/16 detailed message   Question YES NO COMMENTS   Low sodium diet or answered questions? Any short of breath? Fluid restriction: how much?/  questions        Problems sleeping? What medications do you take? What water pill do you take? Do you feel like you are making progress? Do you have any question about DC instructions? How was the care you received during your admission? Were you kept informed of your plan of care? Could we have done anything else to improve your stay? Can you contact a Nurse 24/7 , if any question or problems occur/ Who? Have a PCP/cardio. Appointment? Fiordaliza Collins? Did you weigh today? How much do you weigh? Have you gained any weight? What did have for dinner last night? Do you feel stressed?

## 2018-02-16 NOTE — PROGRESS NOTES
Problem: Falls - Risk of  Goal: *Absence of Falls  Document Gregorio Fall Risk and appropriate interventions in the flowsheet.    Outcome: Progressing Towards Goal  Fall Risk Interventions:  Mobility Interventions: Bed/chair exit alarm         Medication Interventions: Bed/chair exit alarm, Patient to call before getting OOB, Teach patient to arise slowly    Elimination Interventions: Call light in reach

## 2018-02-16 NOTE — PROGRESS NOTES
02/16/18 0831   Time Spent With Patient   Time In 0831   Time Out 1000   Patient Seen For: AM;ADLs   Feeding/Eating   Feeding/Eating Assistance I   Grooming   Grooming Assistance  S   Comments Pt able to wash hands and face as well as brush hair and dentures with s/u assist.   Upper Body Bathing   Bathing Assistance, Upper S   Position Performed Seated in chair   Comments s/u assist for applying soap and preparing water. Lower Body Bathing   Bathing Assistance, Lower  SBA   Adaptive Equipment Grab bar   Position Performed Seated in chair;Standing   Adaptive Equipment Grab bar;Tub bench   Comments SBA for standing balance while washing buttocks. Toileting   Toileting Assistance (FIM Score) Min A   Cues Tactile cues provided;Verbal cues provided   Adaptive Equipment Grab bars; Walker   Upper Body Dressing    Dressing Assistance  Mod A   Comments Assist to thread arms and pull down bra. Lower Body Dressing    Dressing Assistance  Max A   Leg Crossed Method Used Yes   Position Performed Seated in chair;Standing   Adaptive Equipment Used Grab bar   Comments Assist to thread L leg through pull-ups, assist to thread both legs through pants. Assist for clothing management over waist.   Functional Transfers   Toilet Transfer  Grab bars;Stand pivot transfer with walker   Amount of Assistance Required CGA   Tub or Shower Type Shower   Amount of Assistance Required CGA   Adaptive Equipment Grab bars; Tub transfer bench;Walker (comment)     S: \"I have a headache. \" Agreeable to therapy. Focus of session was on morning ADL routine. Patient was able to ambulate ~15 feet using a RW with CGA. Pain 5 out of 10 headache. Spoke with Nurse, Jeovany Villanueva and pt given tylenol for headache. Collaborated with PT, Sandra Antonio and confirmed patient is on track to reach goals as documented in the care plan.    Patient tolerated session well, but functional mobility, standing balance, edema, UE strength, and activity tolerance are still below baseline and requires skilled facilitation to successfully and safely complete ADL's and transfers. Patient ended session in w/c with PT, Jolly Gary.      Morena Martin, OT Student

## 2018-02-16 NOTE — PROGRESS NOTES
SFD PROGRESS NOTE    Sadie Conway  Admit Date: 2/13/2018  Admit Diagnosis: DEBILITY;CHF (congestive heart failure) ([address] Respiratory f*  Chief Complaint : Gait dysfunction secondary to below. Admit Diagnosis: Pleural effusion [J90]; Pleural effusion [J90]  Acute respiratory failure (HCC) (2/7/2018)  Pleural effusion (1/23/2018)/ S/P bilateral thoracentesis  Hypothyroidism   Chronic atrial fibrillation   HTN   Pulmonary hypertension   S/P TAVR(1/30/2018)  Leukocytosis (2/7/2018) on Abx   Acute on chronic diastolic heart failure   weakness  Pain  DVT risk  Acute blood loss anemia/ GI bleed? Acute Rehab Dx:  Generalized weakness. Debility    deconditioning   Mobility and ambulation deficits  Self Care/ADL deficits    Subjective     Patient seen and examined. Vss. Denies chest pain, cough. Feels comfortable on O2 via Elmo@Advise Only min. Still with moderate LE edema. PT, OT well tolerated this morning.         Objective:     Current Facility-Administered Medications   Medication Dose Route Frequency    acetaminophen (TYLENOL) tablet 650 mg  650 mg Oral Q4H PRN    alcohol 62% (NOZIN) nasal  1 Ampule  1 Ampule Topical Q12H    allopurinol (ZYLOPRIM) tablet 100 mg  100 mg Oral BID    apixaban (ELIQUIS) tablet 5 mg  5 mg Oral Q12H    diazePAM (VALIUM) tablet 5 mg  5 mg Oral QHS    furosemide (LASIX) tablet 80 mg  80 mg Oral Q12H    hydrocortisone (ANUSOL-HC) 2.5 % rectal cream   PeriANAL TID PRN    levothyroxine (SYNTHROID) tablet 88 mcg  88 mcg Oral ACB    metoprolol succinate (TOPROL-XL) XL tablet 25 mg  25 mg Oral DAILY    pantoprazole (PROTONIX) tablet 40 mg  40 mg Oral ACB    polyethylene glycol (MIRALAX) packet 17 g  17 g Oral DAILY    potassium chloride (K-DUR, KLOR-CON) SR tablet 40 mEq  40 mEq Oral DAILY    pravastatin (PRAVACHOL) tablet 40 mg  40 mg Oral DAILY    rOPINIRole (REQUIP) tablet 2 mg  2 mg Oral BID    sertraline (ZOLOFT) tablet 100 mg  100 mg Oral DAILY    sodium chloride (NS) flush 5-10 mL  5-10 mL IntraVENous Q8H    sodium chloride (NS) flush 5-10 mL  5-10 mL IntraVENous PRN    spironolactone (ALDACTONE) tablet 25 mg  25 mg Oral DAILY    levalbuterol (XOPENEX) nebulizer soln 0.63 mg/3 mL  0.63 mg Nebulization Q6H PRN     Facility-Administered Medications Ordered in Other Encounters   Medication Dose Route Frequency    0.9% sodium chloride infusion 250 mL  250 mL IntraVENous PRN     Review of Systems: + weakness. Denies chest pain, shortness of breath, cough, headache, visual problems, abdominal pain, dysurea, calf pain. Pertinent positives are as noted in the medical records and unremarkable otherwise. Visit Vitals    /59    Pulse 77    Temp 97.6 °F (36.4 °C)    Resp 16    Wt 178 lb (80.7 kg)    SpO2 100%    BMI 34.76 kg/m2        Physical Exam:   General: Alert and age appropriately oriented.  Appears comfortable on 2L O2 NC. No acute cardio respiratory distress. HEENT: Normocephalic,no scleral icterus  Oral mucosa moist without cyanosis, No bruit, No JVD. Lungs: + left  basilar crackles. Lulu Evon no wheezing. Respiration even and unlabored   Heart: RRR,  II/VI TERRI  No clicks, rub or gallops   Abdomen: Soft, non-tender, nondistended. Bowel sounds present. Genitourinary: defered   Neuromuscular:      PERRL, EOMI  Follows simple commands consistently. Able to identify, recall repeat. Mood appropriate. Insight, judgement intact.    LUE     Shoulder abduction   5-/5              Elbow flexion:   5-/5               Wrist extension:  5- /5              Finger flexion;  5- /5  RUE    Shoulder abduction: 5- /5                Elbow flexion:  5- /5                         Wrist extension: 5- /5                        Finger flexion:  5- /5  LLE     Hip flexion:  3+ /5              Knee extension:  4 /5                         Ankle dorsiflexion:  5 /5                        Ankle plantarflexion:   5/5                                                                  RLE     Hip flexion:   3+/5                        Knee extension:  4 /5                         Ankle dorsiflexion:  5 /5                        Ankle plantarflexion:  5 /5  Sensory - intact grossly   No cerebellar signs. Plantars - down going  No atrophy, no fasciculations, no tremors. Skin/extremity: No rashes, no erythema. Calf non tender BLE. 2+ BLE edema. + chronic arthritic joint changes. Functional Assessment:          Balance  Sitting - Static: Good (unsupported) (02/15/18 1300)  Sitting - Dynamic: Fair (occasional) (02/15/18 1300)  Standing - Static: Fair (02/15/18 1300)  Standing - Dynamic : Impaired (02/15/18 1300)           Toileting  Cues: Verbal cues provided; Tactile cues provided (02/15/18 0753)  Adaptive Equipment: Grab bars; Walker (02/15/18 0753)         Doreatha Schwab Fall Risk Assessment:  [place] Risk  Mobility: Ambulates or transfers with assist devices or assistance/unsteady gait (02/16/18 0715)  Mobility Interventions: Bed/chair exit alarm; Patient to call before getting OOB;Utilize walker, cane, or other assitive device (02/16/18 0715)  Mentation: Alert, oriented x 3 (02/16/18 0715)  Medication: Patient receiving anticonvulsants, sedatives(tranquilizers), psychotropics or hypnotics, hypoglycemics, narcotics, sleep aids, antihypertensives, laxatives, or diuretics (02/16/18 0715)  Medication Interventions: Bed/chair exit alarm;Evaluate medications/consider consulting pharmacy; Patient to call before getting OOB (02/16/18 0715)  Elimination: Needs assistance with toileting (02/16/18 0715)  Elimination Interventions: Call light in reach (02/16/18 0715)  Prior Fall History: No (02/16/18 0715)  Total Score: 3 (02/16/18 0715)  Standard Fall Precautions: Yes (02/16/18 0715)  High Fall Risk: Yes (02/15/18 1912)     Speech Assessment: Ambulation:  Gait  Distance (ft): 40 Feet (ft) (40ft x 5 with 3 min sitting rest breaks between) (02/15/18 1300)  Assistive Device: Walker, rollator (02/15/18 1300)     Labs/Studies:  Recent Results (from the past 72 hour(s))   METABOLIC PANEL, BASIC    Collection Time: 02/15/18  6:39 AM   Result Value Ref Range    Sodium 143 136 - 145 mmol/L    Potassium 3.9 3.5 - 5.1 mmol/L    Chloride 100 98 - 107 mmol/L    CO2 40 (H) 21 - 32 mmol/L    Anion gap 3 (L) 7 - 16 mmol/L    Glucose 87 65 - 100 mg/dL    BUN 28 (H) 8 - 23 MG/DL    Creatinine 0.85 0.6 - 1.0 MG/DL    GFR est AA >60 >60 ml/min/1.73m2    GFR est non-AA >60 >60 ml/min/1.73m2    Calcium 8.2 (L) 8.3 - 10.4 MG/DL   MAGNESIUM    Collection Time: 02/15/18  6:39 AM   Result Value Ref Range    Magnesium 2.1 1.8 - 2.4 mg/dL       Assessment:     Problem List as of 2/16/2018  Date Reviewed: 2/10/2018          Codes Class Noted - Resolved    Respiratory failure ([facility] ICD-10-CM: J96.90  ICD-9-CM: 518.81  2/13/2018 - Present        Acute on chronic respiratory failure ([facility] ICD-10-CM: J96.20  ICD-9-CM: 518.84  2/7/2018 - Present        Leukocytosis ICD-10-CM: C13.336  ICD-9-CM: 288.60  2/7/2018 - Present        Chronic diastolic heart failure (HCC) ICD-10-CM: I50.32  ICD-9-CM: 428.32  2/7/2018 - Present        S/P TAVR (transcatheter aortic valve replacement) ICD-10-CM: Z95.2  ICD-9-CM: V43.3  1/30/2018 - Present        Aortic stenosis (Chronic) ICD-10-CM: I35.0  ICD-9-CM: 424.1  1/29/2018 - Present        Peripheral arterial disease (HCC) (Chronic) ICD-10-CM: I73.9  ICD-9-CM: 443.9  1/23/2018 - Present        Pleural effusion ICD-10-CM: J90  ICD-9-CM: 511.9  1/23/2018 - Present    Overview Addendum 2/10/2018 11:36 AM by Alta Donovan NP     Right thoracentesis 1/25/18 - 1200 mls removed  Left thoracentesis 1/25/2018 - 900 mls removed  Bilateral thoracentesis 2/9/18 - L 550 ml (air aspirated during and at the end of procedure) / R 1000 ml and R creamy pink Chronic respiratory failure with hypoxia (HCC) (Chronic) ICD-10-CM: J96.11  ICD-9-CM: 518.83, 799.02  1/23/2018 - Present    Overview Signed 1/23/2018 11:17 AM by Arielle Laughlin, NP     Wears 3 to 4 liters at home             Hypoxia ICD-10-CM: R09.02  ICD-9-CM: 799.02  1/23/2018 - Present        Stenosis of prosthetic aortic valve (Chronic) ICD-10-CM: I35.0  ICD-9-CM: 396.0  1/19/2018 - Present    Overview Signed 1/19/2018  3:18 PM by Efraín Goldman MD     AVR (10/5/13):  21 mm Pericardial valve.                Tricuspid valve insufficiency (Chronic) ICD-10-CM: I07.1  ICD-9-CM: 397.0  1/15/2018 - Present        Systolic CHF, chronic (HCC) (Chronic) ICD-10-CM: I50.22  ICD-9-CM: 428.22, 428.0  1/15/2018 - Present        S/P mitral valve repair (Chronic) ICD-10-CM: O98.582  ICD-9-CM: V45.89  12/4/2017 - Present        H/O atrioventricular rubin ablation (Chronic) ICD-10-CM: O72.033  ICD-9-CM: V15.1  3/22/2017 - Present        Pulmonary hypertension (Chronic) ICD-10-CM: I27.20  ICD-9-CM: 416.8  2/12/2017 - Present        Aortic valve replaced (Chronic) ICD-10-CM: Z95.2  ICD-9-CM: V43.3  1/9/2017 - Present        Chronic diastolic congestive heart failure (HCC) (Chronic) ICD-10-CM: I50.32  ICD-9-CM: 428.32, 428.0  9/9/2016 - Present        Chronic depression (Chronic) ICD-10-CM: F32.9  ICD-9-CM: 311  8/5/2016 - Present        Osteopenia (Chronic) ICD-10-CM: M85.80  ICD-9-CM: 733.90  8/5/2016 - Present        RLS (restless legs syndrome) (Chronic) ICD-10-CM: G25.81  ICD-9-CM: 333.94  8/5/2016 - Present        Hyperlipidemia (Chronic) ICD-10-CM: E78.5  ICD-9-CM: 272.4  8/5/2016 - Present        Gout (Chronic) ICD-10-CM: M10.9  ICD-9-CM: 274.9  8/5/2016 - Present        Anxiety (Chronic) ICD-10-CM: F41.9  ICD-9-CM: 300.00  8/5/2016 - Present        CAD (coronary artery disease) (Chronic) ICD-10-CM: I25.10  ICD-9-CM: 414.00  8/5/2016 - Present        HTN (hypertension) (Chronic) ICD-10-CM: I10  ICD-9-CM: 401.9 8/5/2016 - Present        GERD (gastroesophageal reflux disease) (Chronic) ICD-10-CM: K21.9  ICD-9-CM: 530.81  8/5/2016 - Present        H/O mitral valve repair, 2003 (Chronic) ICD-10-CM: W06.610  ICD-9-CM: V15.1  6/27/2016 - Present        Sick sinus syndrome (HCC) (Chronic) ICD-10-CM: I49.5  ICD-9-CM: 427.81  2/13/2016 - Present        Obesity (Chronic) ICD-10-CM: E66.9  ICD-9-CM: 278.00  11/2/2015 - Present        Mitral stenosis with insufficiency (Chronic) ICD-10-CM: X80.1  ICD-9-CM: 394.2  11/2/2015 - Present    Overview Signed 11/2/2015 11:02 AM by Maninder Sanabria     Prior MV repair 2003.               Rheumatic aortic stenosis (Chronic) ICD-10-CM: I06.0  ICD-9-CM: 395.0  11/2/2015 - Present        Cardiac pacemaker (Chronic) ICD-10-CM: Z95.0  ICD-9-CM: V45.01  11/2/2015 - Present        Thrombocytopenia (HCC) (Chronic) ICD-10-CM: D69.6  ICD-9-CM: 287.5  8/22/2012 - Present        Anemia (Chronic) ICD-10-CM: D64.9  ICD-9-CM: 285.9  10/27/2011 - Present    Overview Signed 10/27/2011  8:25 AM by Carla Garcia     Acute on chronic               Chronic atrial fibrillation (HCC) (Chronic) ICD-10-CM: I48.2  ICD-9-CM: 427.31  10/17/2011 - Present        Hypothyroidism (Chronic) ICD-10-CM: E03.9  ICD-9-CM: 244.9  12/4/2009 - Present        BOBBY (obstructive sleep apnea) (Chronic) ICD-10-CM: G47.33  ICD-9-CM: 327.23  12/4/2009 - Present        Chronic obstructive pulmonary disease (HCC) (Chronic) ICD-10-CM: J44.9  ICD-9-CM: 496  3/8/2009 - Present        Aortic Valve Bioprosthesis Present (Chronic) ICD-10-CM: Z95.2  ICD-9-CM: V43.3  3/7/2009 - Present        Degenerative arthritis of left knee (Chronic) ICD-10-CM: M17.12  ICD-9-CM: 715.96  2/27/2009 - Present        RESOLVED: CHF (congestive heart failure) ([facility] ICD-10-CM: I50.9  ICD-9-CM: 428.0  2/13/2018 - 2/14/2018        RESOLVED: Acute on chronic systolic congestive heart failure ([facility] ICD-10-CM: I50.23  ICD-9-CM: 428.23, 428.0  2/1/2018 - 2/7/2018 RESOLVED: Acute pulmonary edema (HCC) ICD-10-CM: J81.0  ICD-9-CM: 518.4  1/25/2018 - 2/7/2018        RESOLVED: Pacemaker ICD-10-CM: Z95.0  ICD-9-CM: V45.01  12/4/2017 - 1/23/2018        RESOLVED: History of gout ICD-10-CM: Z87.39  ICD-9-CM: V12.29  1/31/2017 - 1/23/2018        RESOLVED: Warfarin anticoagulation (Chronic) ICD-10-CM: Z79.01  ICD-9-CM: V58.61  1/9/2017 - 1/23/2018        RESOLVED: Non morbid obesity due to excess calories ICD-10-CM: E66.09  ICD-9-CM: 278.00  11/14/2016 - 1/23/2018        RESOLVED: Hypoxemia ICD-10-CM: R09.02  ICD-9-CM: 799.02  11/14/2016 - 1/23/2018        RESOLVED: Localized edema ICD-10-CM: R60.0  ICD-9-CM: 782.3  9/9/2016 - 1/23/2018        RESOLVED: Iron deficiency anemia ICD-10-CM: D50.9  ICD-9-CM: 280.9  9/9/2016 - 1/23/2018        RESOLVED: CRI (chronic renal insufficiency) ICD-10-CM: N18.9  ICD-9-CM: 585.9  8/5/2016 - 1/23/2018        RESOLVED: Dyspnea ICD-10-CM: R06.00  ICD-9-CM: 786.09  8/5/2016 - 1/23/2018        RESOLVED: Right hip pain ICD-10-CM: M25.551  ICD-9-CM: 719.45  8/5/2016 - 1/23/2018        RESOLVED: Edema ICD-10-CM: R60.9  ICD-9-CM: 782.3  8/5/2016 - 1/23/2018        RESOLVED: Chest pain ICD-10-CM: R07.9  ICD-9-CM: 786.50  8/5/2016 - 1/23/2018        RESOLVED: Other long term (current) drug therapy ICD-10-CM: Z79.899  ICD-9-CM: V58.69  8/5/2016 - 1/23/2018        RESOLVED: Acute encephalopathy ICD-10-CM: G93.40  ICD-9-CM: 348.30  7/8/2016 - 1/23/2018        RESOLVED: Tachycardia ICD-10-CM: R00.0  ICD-9-CM: 785.0  6/27/2016 - 1/23/2018        RESOLVED: Palpitations ICD-10-CM: R00.2  ICD-9-CM: 785.1  11/2/2015 - 1/23/2018        RESOLVED: Respiratory insufficiency ICD-10-CM: R06.89  ICD-9-CM: 786.09  11/2/2015 - 1/23/2018        RESOLVED: Bradycardia (Symptomatic) ICD-10-CM: R00.1  ICD-9-CM: 427.89  11/7/2011 - 1/23/2018        RESOLVED: Rectal bleeding ICD-10-CM: K62.5  ICD-9-CM: 569.3  10/27/2011 - 1/23/2018        RESOLVED: AV block ICD-10-CM: I44.30  ICD-9-CM: 426.10  10/17/2011 - 1/23/2018        RESOLVED: Cardiogenic shock ([address] ICD-10-CM: R57.0  ICD-9-CM: 785.51  10/17/2011 - 1/23/2018        RESOLVED: Hyperkalemia ICD-10-CM: E87.5  ICD-9-CM: 276.7  10/17/2011 - 1/23/2018        RESOLVED: Nausea and vomiting ICD-10-CM: R11.2  ICD-9-CM: 787.01  2/24/2010 - 1/23/2018        RESOLVED: Epigastric abdominal pain ICD-10-CM: R10.13  ICD-9-CM: 789.06  2/24/2010 - 1/23/2018        RESOLVED: Digoxin toxicity ICD-10-CM: T46.0X1A  ICD-9-CM: 972.1, E942.1  2/24/2010 - 1/23/2018        RESOLVED: ARF (acute renal failure) (HCC) (Chronic) ICD-10-CM: N17.9  ICD-9-CM: 584.9  2/24/2010 - 1/23/2018        RESOLVED: Hypokalemia ICD-10-CM: E87.6  ICD-9-CM: 276.8  2/24/2010 - 1/23/2018        RESOLVED: CKD (chronic kidney disease) stage 3, GFR 30-59 ml/min (Chronic) ICD-10-CM: N18.3  ICD-9-CM: 585.3  12/4/2009 - 1/23/2018        RESOLVED: Cough ICD-10-CM: R05  ICD-9-CM: 786.2  3/9/2009 - 3/12/2009        RESOLVED: Bronchitis ICD-10-CM: J40  ICD-9-CM: 490  3/9/2009 - 3/12/2009        RESOLVED: Atrial fibrillation (HCC) (Chronic) ICD-10-CM: I48.91  ICD-9-CM: 427.31  3/7/2009 - 6/27/2016        RESOLVED: Hypotension (Chronic) ICD-10-CM: I95.9  ICD-9-CM: 458.9  3/1/2009 - 1/23/2018              Plan:     The Post Assessment Physician Evaluation (RAMBO) found the current functional status to be comparable with the Pre-admission Screening. The Patient is a good candidate for acute inpatient rehabilitation. Nothing since the Pre-admission screen has changed that determination.      Rehabilitation Plan  The patient has shown the ability to tolerate and benefit from 3 hours of therapy daily and is being admitted to a comprehensive acute inpatient rehabilitation program consisting of at least 3 hours of combined physical and occupational therapies.   Resume intensive Physical Therapy for a minimum of 1.5 hours a day, at least 5 out of 7 days per week to address bed mobility, transfers, ambulation, strengthening, balance, and endurance. - pt  was ambulating independently with a rollator inside her home. reports using rollator or a wheelchair for community mobility. Will continue to focus on endurance, strengthening BLe.      Resume  intensive Occupational Therapy for a minimum of 1.5 hours a day, at least 5 out of 7 days per week to address ADL ( bathing, LE dressing, toileting) and adaptive equipment as needed.       Continue 24-hour skilled rehabilitation nursing for bowel and bladder management, skin care for decubitus ulcer prevention , pain management and ongoing medication administration      The patient may benefit from a psychology consult for depression, anxiety and adjustment disorder.     Continue daily physician medical management:    Acute respiratory failure (HCC) (2/7/2018)/ Pleural effusion (1/23/2018)/ S/P bilateral thoracentesis  S/p - Thoracenteses on 2/9 - 550 mls removed from the left and 1 liter removed from the right.   - Continue bronchodilators prn- has home nebulizer. Resume as prn;xopenex  - Patient will be on 2L O2 at therapy and at rest. May be able to wean as tolerated. Will monitor saO2. 2/15 - sao2 at 100% on 2L/ min. Feels comfortable. Continue lasix and aldactone. Cardiology gave 1x Zaroxolyn in AM prior to AM lasix.         S/P TAVR(1/30/2018)/ Acute on chronic diastolic heart failure Pulmonary hypertension / Chronic atrial fibrillation/ HTN   - cardiac precaution, s.p TAVR. - weights and maintain a 2 liter per day fluid restriction.   - Monitor HR/BP. conitinue Eliquis for a.fib  - continue BB-Toprol XL  2/15- edema, chest exam appears not changed. cintiuned on diuretics.         Hypothyroidism - synthroid 88 mcg     Leukocytosis (2/7/2018) on Abx last dose 7/7 rocephin administered 2/13.   - finished abx. No new s/s of infection.       Acute blood loss anemia/ GI bleed? -monitor clinically. May have been caused by high INR. - no melena. Will check hgb Saturday.    Pneumonia prophylaxis- Insentive spirometer every hour while awake     DVT risk / DVT Prophylaxis- continue daily physician exam to assess for signs and symptoms as patient is at increased risk for of thromboembolism. - covered with eliquis.       Wound Care: - monitor for ulcers, skin breakdown due to edema.     Potential urinary retention - no s/s of retention. Monitor.  - pt mostly continent.      bowel program - as needed dulcolax, pericolace. + BM.     GERD/ GI prophylaxis - resume PPI. At times may need additional antacids, Maalox prn.          Time spent was 25 minutes with over 1/2 in direct patient care/examination, consultation and coordination of care.      Signed By: Arielle Das MD     February 16, 2018

## 2018-02-17 NOTE — PROGRESS NOTES
OT Daily Note    Time In 0950   Time Out 1028     Subjective:\"I am feeling good, I can do whatever you want to out here. \" Agreeable to therapy. Pain:Not indicated during session. Education: On breathing in through the nose when fatigued. SpO2%: Remained above 98% entire session on 2L. Interdisciplinary Communication: Collaborated with Naun Guerrero and patient is making progress towards goals. Precautions:  (fall precautions)   Rewrapped left posterior forearm for edema leak with AB pad and ace wrap. Balance/functional mobility Daily Assessment   Stood 1 time during session for ~3 minutes working on green and white cube activity, tolerate well and required CGA. Upright posture improving. Strengthening/activity tolerance Daily Assessment   Bilateral  used with 5lbs in \"I\", \"O\", and \"V\". All in 1 sets of 20 to increase activity tolerance, strength, and coordination. UE exercise bike completed for 3 minutes forward and 3 minutes backwards with minimal resistance at min/moderate pace to increase activity tolerance and UE strength. Cognition Daily Assessment   Patient completed 2 designs of green and white perceptual puzzle blocks to increase bilateral UE use in stance as well as problem solving. Patient completed 1st design without guide lines, but required cues for each block. Second completed with guide lines and patient able to complete with 100%. Ended session:In w/c in therapy gym with PTDimas.      Scar Raymond OTR/L

## 2018-02-17 NOTE — PROGRESS NOTES
End Of Shift Functional Summary, Nursing      TOILETING:  Does patient need assist with clothing management and/or pericare? Yes assist with pants up /down    TOILET TRANSFER:  Pt requires minimal assistance. Pt uses walker. BLADDER:  Pt does not have a salam[place] catheter that staff manages. Pt does not take medication. Pt is continent. of bladder and voids in bedside commode  Pt requires staff to position device Pt has had 0 bladder accidents during this shift requiring minimal assistance to clean up. (An accident is when the episode is not contained in a brief AND/OR the clothing/linen requires changing/cleaning up.)    BOWEL:  Pt does take medication. Pt is continent of bowel and uses bedside commode. Pt requires staff to position device    Pt has had 0 bowel accidents during this shift requiring minimal assistance from staff to clean up.  (An accident is when the episode is not contained in a brief AND/OR the clothing/linen requires changing/cleaning up.)

## 2018-02-17 NOTE — PROGRESS NOTES
+2 -3 Edema persists from legs to  upper thighs to buttocks. Pt up to bedside commode this morning to void after evening dose of lasix given. Voided 450 urine, assisted back to bed slightly short of breath. Remains with 2l of O2 via nasal cannula. C/o itching on  back. Adhesive tape removed x2 at old thoracentesis site. Open areas noted at the base of where the tape was. Scant bleeding noted. Bacitracin ointment and foam dressing applied. Pt denies discomfort. .Call light within reach.

## 2018-02-17 NOTE — PROGRESS NOTES
2/17/2018 1:38 PM    Admit Date: 2/13/2018        Subjective:     Steve Baron denies chest pain, dyspnea, still has edema. Objective:      Visit Vitals    /72 (BP 1 Location: Right arm, BP Patient Position: At rest)    Pulse 85    Temp 97.6 °F (36.4 °C)    Resp 18    Wt 80.7 kg (178 lb)    SpO2 100%    BMI 34.76 kg/m2       Physical Exam:  Heart: regular rate and rhythm  Lungs: clear to auscultation bilaterally  Extremities: edema 2+    Data Review:   Labs:    Recent Results (from the past 24 hour(s))   CBC WITH AUTOMATED DIFF    Collection Time: 02/17/18  6:25 AM   Result Value Ref Range    WBC 3.9 (L) 4.3 - 11.1 K/uL    RBC 3.07 (L) 4.05 - 5.25 M/uL    HGB 8.9 (L) 11.7 - 15.4 g/dL    HCT 29.8 (L) 35.8 - 46.3 %    MCV 97.1 79.6 - 97.8 FL    MCH 29.0 26.1 - 32.9 PG    MCHC 29.9 (L) 31.4 - 35.0 g/dL    RDW 16.7 (H) 11.9 - 14.6 %    PLATELET 86 (L) 891 - 450 K/uL    MPV 11.8 10.8 - 14.1 FL    DF AUTOMATED      NEUTROPHILS 77 43 - 78 %    LYMPHOCYTES 11 (L) 13 - 44 %    MONOCYTES 7 4.0 - 12.0 %    EOSINOPHILS 4 0.5 - 7.8 %    BASOPHILS 1 0.0 - 2.0 %    IMMATURE GRANULOCYTES 0 0.0 - 5.0 %    ABS. NEUTROPHILS 3.0 1.7 - 8.2 K/UL    ABS. LYMPHOCYTES 0.4 (L) 0.5 - 4.6 K/UL    ABS. MONOCYTES 0.3 0.1 - 1.3 K/UL    ABS. EOSINOPHILS 0.1 0.0 - 0.8 K/UL    ABS. BASOPHILS 0.0 0.0 - 0.2 K/UL    ABS. IMM.  GRANS. 0.0 0.0 - 0.5 K/UL   METABOLIC PANEL, BASIC    Collection Time: 02/17/18  6:25 AM   Result Value Ref Range    Sodium 142 136 - 145 mmol/L    Potassium 3.7 3.5 - 5.1 mmol/L    Chloride 98 98 - 107 mmol/L    CO2 40 (H) 21 - 32 mmol/L    Anion gap 4 (L) 7 - 16 mmol/L    Glucose 92 65 - 100 mg/dL    BUN 33 (H) 8 - 23 MG/DL    Creatinine 1.02 (H) 0.6 - 1.0 MG/DL    GFR est AA >60 >60 ml/min/1.73m2    GFR est non-AA 56 (L) >60 ml/min/1.73m2    Calcium 8.2 (L) 8.3 - 10.4 MG/DL           Assessment:     Patient Active Problem List    Diagnosis Date Noted    Respiratory failure ([facility] [address] better 02/13/2018    Acute on chronic respiratory failure ([address] 02/07/2018    Leukocytosis 02/07/2018    Chronic diastolic heart failure ([address] 02/07/2018    S/P TAVR (transcatheter aortic valve replacement) stable 01/30/2018    Aortic stenosis 01/29/2018    Peripheral arterial disease (HCC) 01/23/2018    Pleural effusion 01/23/2018    Chronic respiratory failure with hypoxia ([address] 01/23/2018    Hypoxia 01/23/2018    Stenosis of prosthetic aortic valve 01/19/2018    Tricuspid valve insufficiency 94/82/4046    Systolic CHF, chronic ([address] 01/15/2018    S/P mitral valve repair 12/04/2017    H/O atrioventricular rubin ablation 03/22/2017    Pulmonary hypertension 02/12/2017    Aortic valve replaced 01/09/2017    Chronic diastolic congestive heart failure ([address] 09/09/2016    Chronic depression 08/05/2016    Osteopenia 08/05/2016    RLS (restless legs syndrome) 08/05/2016    Hyperlipidemia 08/05/2016    Gout 08/05/2016    Anxiety 08/05/2016    CAD (coronary artery disease) 08/05/2016    HTN (hypertension) 08/05/2016    GERD (gastroesophageal reflux disease) 08/05/2016    H/O mitral valve repair, 2003 06/27/2016    Sick sinus syndrome ([address] 02/13/2016    Obesity 11/02/2015    Mitral stenosis with insufficiency 11/02/2015    Rheumatic aortic stenosis 11/02/2015    Cardiac pacemaker 11/02/2015    Thrombocytopenia ([address] 08/22/2012    Anemia 10/27/2011    Chronic atrial fibrillation ([address] 10/17/2011    Hypothyroidism 12/04/2009    BOBBY (obstructive sleep apnea) 12/04/2009    Chronic obstructive pulmonary disease ([address] 03/08/2009    Aortic Valve Bioprosthesis Present 03/07/2009    Degenerative arthritis of left knee 02/27/2009       Plan:   Elevate feet above her heart at  Night

## 2018-02-17 NOTE — PROGRESS NOTES
Problem: Falls - Risk of  Goal: *Absence of Falls  Document Gregorio Fall Risk and appropriate interventions in the flowsheet.    Outcome: Progressing Towards Goal  Fall Risk Interventions:  Mobility Interventions: Patient to call before getting OOB, Utilize walker, cane, or other assitive device         Medication Interventions: Bed/chair exit alarm, Teach patient to arise slowly    Elimination Interventions: Call light in reach, Patient to call for help with toileting needs, Toileting schedule/hourly rounds

## 2018-02-17 NOTE — PROGRESS NOTES
End Of Shift Functional Summary, Nursing        TOILETING:  Does patient need assist with clothing management and/or pericare? Yes: Comment: help with clothing     TOILET TRANSFER:  Pt requires minimal assistance. Pt uses walker.     BLADDER:  Pt does not have a [place] catheter that staff manages. Pt does not take medication. Pt is continent of bladder and voids in toilet  Pt requires staff to empty device Pt has had 0 bladder accidents during this shift requiring minimal assistance to clean up. (An accident is when the episode is not contained in a brief AND/OR the clothing/linen requires changing/cleaning up.)     BOWEL:  Pt does take medication. Pt is continent of bowel and uses toilet. Pt requires staff to empty device    Pt has had 0 bowel accidents during this shift requiring minimal assistance from staff to clean up. (An accident is when the episode is not contained in a brief AND/OR the clothing/linen requires changing/cleaning up.)     BED/CHAIR TRANSFER  Pt requires minimal assistance. Patient requires the assistance of 1 staff member(s). Pt uses walker     EATING  Pt requires setup. Pt wears dentures.       Documentation reviewed and plan of care discussed/reviewed with   oncoming nurse and patient assistant during the shift.

## 2018-02-17 NOTE — PROGRESS NOTES
SFD PROGRESS NOTE    Adia Orozco  Admit Date: 2/13/2018  Admit Diagnosis: DEBILITY  CHF (congestive heart failure) ([facility]  Respiratory failure ([facility]  Chief Complaint : Gait dysfunction secondary to below. Admit Diagnosis: Pleural effusion [J90]; Pleural effusion [J90]  Acute respiratory failure (HCC) (2/7/2018)  Pleural effusion (1/23/2018)/ S/P bilateral thoracentesis  Hypothyroidism   Chronic atrial fibrillation   HTN   Pulmonary hypertension   S/P TAVR(1/30/2018)  Leukocytosis (2/7/2018) on Abx   Acute on chronic diastolic heart failure   weakness  Pain  DVT risk  Acute blood loss anemia/ GI bleed? Acute Rehab Dx:  Generalized weakness. Debility    deconditioning   Mobility and ambulation deficits  Self Care/ADL deficits    Subjective     Patient seen and examined. Vss. Denies chest pain, cough. Feels comfortable on O2 via [email or web address] min. Still with moderate LE edema. PT, OT well tolerated this morning. Reports occasional productive cough with brownish sputum. Lightheaded when OOB. On chronic home O2. Reports voiding once after lasix pm yesterday. Denies SOB, palpations, pain or nausea. Participating in therapy.        Objective:     Current Facility-Administered Medications   Medication Dose Route Frequency    metOLazone (ZAROXOLYN) tablet 5 mg  5 mg Oral ONCE    acetaminophen (TYLENOL) tablet 650 mg  650 mg Oral Q4H PRN    alcohol 62% (NOZIN) nasal  1 Ampule  1 Ampule Topical Q12H    allopurinol (ZYLOPRIM) tablet 100 mg  100 mg Oral BID    apixaban (ELIQUIS) tablet 5 mg  5 mg Oral Q12H    diazePAM (VALIUM) tablet 5 mg  5 mg Oral QHS    furosemide (LASIX) tablet 80 mg  80 mg Oral Q12H    hydrocortisone (ANUSOL-HC) 2.5 % rectal cream   PeriANAL TID PRN    levothyroxine (SYNTHROID) tablet 88 mcg  88 mcg Oral ACB    metoprolol succinate (TOPROL-XL) XL tablet 25 mg  25 mg Oral DAILY    pantoprazole (PROTONIX) tablet 40 mg  40 mg Oral ACB    polyethylene glycol (MIRALAX) packet 17 g  17 g Oral DAILY    potassium chloride (K-DUR, KLOR-CON) SR tablet 40 mEq  40 mEq Oral DAILY    pravastatin (PRAVACHOL) tablet 40 mg  40 mg Oral DAILY    rOPINIRole (REQUIP) tablet 2 mg  2 mg Oral BID    sertraline (ZOLOFT) tablet 100 mg  100 mg Oral DAILY    sodium chloride (NS) flush 5-10 mL  5-10 mL IntraVENous PRN    spironolactone (ALDACTONE) tablet 25 mg  25 mg Oral DAILY    levalbuterol (XOPENEX) nebulizer soln 0.63 mg/3 mL  0.63 mg Nebulization Q6H PRN     Facility-Administered Medications Ordered in Other Encounters   Medication Dose Route Frequency    0.9% sodium chloride infusion 250 mL  250 mL IntraVENous PRN     Review of Systems: + weakness. Denies chest pain, shortness of breath, cough, headache, visual problems, abdominal pain, dysurea, calf pain. Pertinent positives are as noted in the medical records and unremarkable otherwise. Visit Vitals    BP (!) 87/51 (BP 1 Location: Right arm, BP Patient Position: At rest)    Pulse 85    Temp 97.6 °F (36.4 °C)    Resp 18    Wt 80.7 kg (178 lb)    SpO2 100%    BMI 34.76 kg/m2        Physical Exam:   General: Alert and age appropriately oriented.  Appears comfortable on 2L O2 NC. No acute cardio respiratory distress. HEENT: Normocephalic,no scleral icterus  Oral mucosa moist without cyanosis, No bruit, No JVD. Lungs:  bilateral basilar crackles. Darnella Hopper no wheezing. Respiration even and unlabored   Heart: RRR,  II/VI TERRI  No clicks, rub or gallops   Abdomen: Soft, non-tender, nondistended. Bowel sounds present. Genitourinary: defered   Neuromuscular:      PERRL, EOMI  Follows simple commands consistently. Able to identify, recall repeat. Mood appropriate. Insight, judgement intact.    LUE     Shoulder abduction   5-/5              Elbow flexion:   5-/5               Wrist extension:  5- /5              Finger flexion;  5- /5  RUE    Shoulder abduction: 5- /5                Elbow flexion:  5- /5                         Wrist extension: 5- /5                        Finger flexion:  5- /5  LLE     Hip flexion:  3+ /5              Knee extension:  4 /5                         Ankle dorsiflexion:  5 /5                        Ankle plantarflexion:   5/5                                                                  RLE     Hip flexion:   3+/5                        Knee extension:  4 /5                         Ankle dorsiflexion:  5 /5                        Ankle plantarflexion:  5 /5  Sensory - intact grossly   No cerebellar signs. Plantars - down going  No atrophy, no fasciculations, no tremors. Skin/extremity: No rashes, no erythema. Calf non tender BLE. 1-2+ BLE edema. + chronic arthritic joint changes. Functional Assessment:          Balance  Sitting - Static: Good (unsupported) (02/16/18 1100)  Sitting - Dynamic: Fair (occasional) (02/16/18 1100)  Standing - Static: Fair (02/16/18 1100)  Standing - Dynamic : Impaired (02/16/18 1100)           Toileting  Cues: Tactile cues provided;Verbal cues provided (02/16/18 0831)  Adaptive Equipment: Grab bars; Herminio Aloe (02/16/18 0831)         Karan Guerrero Fall Risk Assessment:  Karan Guerrero Fall Risk  Mobility: Ambulates or transfers with assist devices or assistance/unsteady gait (02/17/18 0737)  Mobility Interventions: Patient to call before getting OOB;Utilize walker, cane, or other assitive device (02/17/18 0737)  Mentation: Alert, oriented x 3 (02/17/18 0737)  Medication: Patient receiving anticonvulsants, sedatives(tranquilizers), psychotropics or hypnotics, hypoglycemics, narcotics, sleep aids, antihypertensives, laxatives, or diuretics (02/17/18 0737)  Medication Interventions: Bed/chair exit alarm; Teach patient to arise slowly (02/17/18 0764)  Elimination: Needs assistance with toileting (02/17/18 0737)  Elimination Interventions: Call light in reach; Patient to call for help with toileting needs; Toileting schedule/hourly rounds (02/17/18 0737)  Prior Fall History: No (02/17/18 0737)  Total Score: 3 (02/17/18 0737)  Standard Fall Precautions: Yes (02/16/18 2259)  High Fall Risk: Yes (02/17/18 0737)     Speech Assessment:         Ambulation:  Gait  Distance (ft): 40 Feet (ft) (40ft x 3) (02/16/18 1100)  Assistive Device: Sallye Ochoa, rollator (02/16/18 1100)     Labs/Studies:  Recent Results (from the past 72 hour(s))   METABOLIC PANEL, BASIC    Collection Time: 02/15/18  6:39 AM   Result Value Ref Range    Sodium 143 136 - 145 mmol/L    Potassium 3.9 3.5 - 5.1 mmol/L    Chloride 100 98 - 107 mmol/L    CO2 40 (H) 21 - 32 mmol/L    Anion gap 3 (L) 7 - 16 mmol/L    Glucose 87 65 - 100 mg/dL    BUN 28 (H) 8 - 23 MG/DL    Creatinine 0.85 0.6 - 1.0 MG/DL    GFR est AA >60 >60 ml/min/1.73m2    GFR est non-AA >60 >60 ml/min/1.73m2    Calcium 8.2 (L) 8.3 - 10.4 MG/DL   MAGNESIUM    Collection Time: 02/15/18  6:39 AM   Result Value Ref Range    Magnesium 2.1 1.8 - 2.4 mg/dL   CBC WITH AUTOMATED DIFF    Collection Time: 02/17/18  6:25 AM   Result Value Ref Range    WBC 3.9 (L) 4.3 - 11.1 K/uL    RBC 3.07 (L) 4.05 - 5.25 M/uL    HGB 8.9 (L) 11.7 - 15.4 g/dL    HCT 29.8 (L) 35.8 - 46.3 %    MCV 97.1 79.6 - 97.8 FL    MCH 29.0 26.1 - 32.9 PG    MCHC 29.9 (L) 31.4 - 35.0 g/dL    RDW 16.7 (H) 11.9 - 14.6 %    PLATELET 86 (L) 810 - 450 K/uL    MPV 11.8 10.8 - 14.1 FL    DF AUTOMATED      NEUTROPHILS 77 43 - 78 %    LYMPHOCYTES 11 (L) 13 - 44 %    MONOCYTES 7 4.0 - 12.0 %    EOSINOPHILS 4 0.5 - 7.8 %    BASOPHILS 1 0.0 - 2.0 %    IMMATURE GRANULOCYTES 0 0.0 - 5.0 %    ABS. NEUTROPHILS 3.0 1.7 - 8.2 K/UL    ABS. LYMPHOCYTES 0.4 (L) 0.5 - 4.6 K/UL    ABS. MONOCYTES 0.3 0.1 - 1.3 K/UL    ABS. EOSINOPHILS 0.1 0.0 - 0.8 K/UL    ABS. BASOPHILS 0.0 0.0 - 0.2 K/UL    ABS. IMM.  GRANS. 0.0 0.0 - 0.5 K/UL   METABOLIC PANEL, BASIC    Collection Time: 02/17/18  6:25 AM   Result Value Ref Range    Sodium 142 136 - 145 mmol/L    Potassium 3.7 3.5 - 5.1 mmol/L    Chloride 98 98 - 107 mmol/L    CO2 40 (H) 21 - 32 mmol/L    Anion gap 4 (L) 7 - 16 mmol/L    Glucose 92 65 - 100 mg/dL    BUN 33 (H) 8 - 23 MG/DL    Creatinine 1.02 (H) 0.6 - 1.0 MG/DL    GFR est AA >60 >60 ml/min/1.73m2    GFR est non-AA 56 (L) >60 ml/min/1.73m2    Calcium 8.2 (L) 8.3 - 10.4 MG/DL       Assessment:     Problem List as of 2/17/2018  Date Reviewed: 2/10/2018          Codes Class Noted - Resolved    Respiratory failure ([facility] ICD-10-CM: J96.90  ICD-9-CM: 518.81  2/13/2018 - Present        Acute on chronic respiratory failure ([facility] ICD-10-CM: J96.20  ICD-9-CM: 518.84  2/7/2018 - Present        Leukocytosis ICD-10-CM: D97.121  ICD-9-CM: 288.60  2/7/2018 - Present        Chronic diastolic heart failure (HCC) ICD-10-CM: I50.32  ICD-9-CM: 428.32  2/7/2018 - Present        S/P TAVR (transcatheter aortic valve replacement) ICD-10-CM: Z95.2  ICD-9-CM: V43.3  1/30/2018 - Present        Aortic stenosis (Chronic) ICD-10-CM: I35.0  ICD-9-CM: 424.1  1/29/2018 - Present        Peripheral arterial disease (HCC) (Chronic) ICD-10-CM: I73.9  ICD-9-CM: 443.9  1/23/2018 - Present        Pleural effusion ICD-10-CM: J90  ICD-9-CM: 511.9  1/23/2018 - Present    Overview Addendum 2/10/2018 11:36 AM by Dee Dee Ortiz NP     Right thoracentesis 1/25/18 - 1200 mls removed  Left thoracentesis 1/25/2018 - 900 mls removed  Bilateral thoracentesis 2/9/18 - L 550 ml (air aspirated during and at the end of procedure) / R 1000 ml and R creamy pink              Chronic respiratory failure with hypoxia (HCC) (Chronic) ICD-10-CM: J96.11  ICD-9-CM: 518.83, 799.02  1/23/2018 - Present    Overview Signed 1/23/2018 11:17 AM by Deisy Lobo NP     Wears 3 to 4 liters at home             Hypoxia ICD-10-CM: R09.02  ICD-9-CM: 799.02  1/23/2018 - Present        Stenosis of prosthetic aortic valve (Chronic) ICD-10-CM: I35.0  ICD-9-CM: 396.0  1/19/2018 - Present    Overview Signed 1/19/2018  3:18 PM by Hari Kat MD     AVR (10/5/13):  21 mm Pericardial valve.                Tricuspid valve insufficiency (Chronic) ICD-10-CM: I07.1  ICD-9-CM: 397.0  1/15/2018 - Present        Systolic CHF, chronic (HCC) (Chronic) ICD-10-CM: I50.22  ICD-9-CM: 428.22, 428.0  1/15/2018 - Present        S/P mitral valve repair (Chronic) ICD-10-CM: M78.593  ICD-9-CM: V45.89  12/4/2017 - Present        H/O atrioventricular rubin ablation (Chronic) ICD-10-CM: Z88.233  ICD-9-CM: V15.1  3/22/2017 - Present        Pulmonary hypertension (Chronic) ICD-10-CM: I27.20  ICD-9-CM: 416.8  2/12/2017 - Present        Aortic valve replaced (Chronic) ICD-10-CM: Z95.2  ICD-9-CM: V43.3  1/9/2017 - Present        Chronic diastolic congestive heart failure (HCC) (Chronic) ICD-10-CM: I50.32  ICD-9-CM: 428.32, 428.0  9/9/2016 - Present        Chronic depression (Chronic) ICD-10-CM: F32.9  ICD-9-CM: 311  8/5/2016 - Present        Osteopenia (Chronic) ICD-10-CM: M85.80  ICD-9-CM: 733.90  8/5/2016 - Present        RLS (restless legs syndrome) (Chronic) ICD-10-CM: G25.81  ICD-9-CM: 333.94  8/5/2016 - Present        Hyperlipidemia (Chronic) ICD-10-CM: E78.5  ICD-9-CM: 272.4  8/5/2016 - Present        Gout (Chronic) ICD-10-CM: M10.9  ICD-9-CM: 274.9  8/5/2016 - Present        Anxiety (Chronic) ICD-10-CM: F41.9  ICD-9-CM: 300.00  8/5/2016 - Present        CAD (coronary artery disease) (Chronic) ICD-10-CM: I25.10  ICD-9-CM: 414.00  8/5/2016 - Present        HTN (hypertension) (Chronic) ICD-10-CM: I10  ICD-9-CM: 401.9  8/5/2016 - Present        GERD (gastroesophageal reflux disease) (Chronic) ICD-10-CM: K21.9  ICD-9-CM: 530.81  8/5/2016 - Present        H/O mitral valve repair, 2003 (Chronic) ICD-10-CM: N07.718  ICD-9-CM: V15.1  6/27/2016 - Present        Sick sinus syndrome (HCC) (Chronic) ICD-10-CM: I49.5  ICD-9-CM: 427.81  2/13/2016 - Present        Obesity (Chronic) ICD-10-CM: B80.5  ICD-9-CM: 278.00  11/2/2015 - Present        Mitral stenosis with insufficiency (Chronic) ICD-10-CM: T15.4  ICD-9-CM: 394.2  11/2/2015 - Present    Overview Signed 11/2/2015 11:02 AM by Renee Alcala     Prior MV repair 2003.               Rheumatic aortic stenosis (Chronic) ICD-10-CM: I06.0  ICD-9-CM: 395.0  11/2/2015 - Present        Cardiac pacemaker (Chronic) ICD-10-CM: Z95.0  ICD-9-CM: V45.01  11/2/2015 - Present        Thrombocytopenia (HCC) (Chronic) ICD-10-CM: D69.6  ICD-9-CM: 287.5  8/22/2012 - Present        Anemia (Chronic) ICD-10-CM: D64.9  ICD-9-CM: 285.9  10/27/2011 - Present    Overview Signed 10/27/2011  8:25 AM by Germain Combs     Acute on chronic               Chronic atrial fibrillation (HCC) (Chronic) ICD-10-CM: I48.2  ICD-9-CM: 427.31  10/17/2011 - Present        Hypothyroidism (Chronic) ICD-10-CM: E03.9  ICD-9-CM: 244.9  12/4/2009 - Present        BOBBY (obstructive sleep apnea) (Chronic) ICD-10-CM: G47.33  ICD-9-CM: 327.23  12/4/2009 - Present        Chronic obstructive pulmonary disease (HCC) (Chronic) ICD-10-CM: J44.9  ICD-9-CM: 496  3/8/2009 - Present        Aortic Valve Bioprosthesis Present (Chronic) ICD-10-CM: Z95.2  ICD-9-CM: V43.3  3/7/2009 - Present        Degenerative arthritis of left knee (Chronic) ICD-10-CM: M17.12  ICD-9-CM: 715.96  2/27/2009 - Present        RESOLVED: CHF (congestive heart failure) ([facility] [address] ICD-10-CM: I50.9  ICD-9-CM: 428.0  2/13/2018 - 2/14/2018        RESOLVED: Acute on chronic systolic congestive heart failure ([facility] [address] ICD-10-CM: I50.23  ICD-9-CM: 428.23, 428.0  2/1/2018 - 2/7/2018        RESOLVED: [facility] ICD-10-CM: J81.0  ICD-9-CM: 518.4  1/25/2018 - 2/7/2018        RESOLVED: Pacemaker ICD-10-CM: Z95.0  ICD-9-CM: V45.01  12/4/2017 - 1/23/2018        RESOLVED: History of gout ICD-10-CM: Z87.39  ICD-9-CM: V12.29  1/31/2017 - 1/23/2018        RESOLVED: Warfarin anticoagulation (Chronic) ICD-10-CM: Z79.01  ICD-9-CM: V58.61  1/9/2017 - 1/23/2018        RESOLVED: Non morbid obesity due to excess calories ICD-10-CM: E66.09  ICD-9-CM: 278.00  11/14/2016 - 1/23/2018 RESOLVED: Hypoxemia ICD-10-CM: R09.02  ICD-9-CM: 799.02  11/14/2016 - 1/23/2018        RESOLVED: Localized edema ICD-10-CM: R60.0  ICD-9-CM: 782.3  9/9/2016 - 1/23/2018        RESOLVED: Iron deficiency anemia ICD-10-CM: D50.9  ICD-9-CM: 280.9  9/9/2016 - 1/23/2018        RESOLVED: CRI (chronic renal insufficiency) ICD-10-CM: N18.9  ICD-9-CM: 585.9  8/5/2016 - 1/23/2018        RESOLVED: Dyspnea ICD-10-CM: R06.00  ICD-9-CM: 786.09  8/5/2016 - 1/23/2018        RESOLVED: Right hip pain ICD-10-CM: M25.551  ICD-9-CM: 719.45  8/5/2016 - 1/23/2018        RESOLVED: Edema ICD-10-CM: R60.9  ICD-9-CM: 782.3  8/5/2016 - 1/23/2018        RESOLVED: Chest pain ICD-10-CM: R07.9  ICD-9-CM: 786.50  8/5/2016 - 1/23/2018        RESOLVED: Other long term (current) drug therapy ICD-10-CM: D28.345  ICD-9-CM: V58.69  8/5/2016 - 1/23/2018        RESOLVED: Acute encephalopathy ICD-10-CM: G93.40  ICD-9-CM: 348.30  7/8/2016 - 1/23/2018        RESOLVED: Tachycardia ICD-10-CM: R00.0  ICD-9-CM: 785.0  6/27/2016 - 1/23/2018        RESOLVED: Palpitations ICD-10-CM: R00.2  ICD-9-CM: 785.1  11/2/2015 - 1/23/2018        RESOLVED: Respiratory insufficiency ICD-10-CM: R06.89  ICD-9-CM: 786.09  11/2/2015 - 1/23/2018        RESOLVED: Bradycardia (Symptomatic) ICD-10-CM: R00.1  ICD-9-CM: 427.89  11/7/2011 - 1/23/2018        RESOLVED: Rectal bleeding ICD-10-CM: K62.5  ICD-9-CM: 569.3  10/27/2011 - 1/23/2018        RESOLVED: AV block ICD-10-CM: I44.30  ICD-9-CM: 426.10  10/17/2011 - 1/23/2018        RESOLVED: Cardiogenic shock ([facility] ICD-10-CM: R57.0  ICD-9-CM: 785.51  10/17/2011 - 1/23/2018        RESOLVED: Hyperkalemia ICD-10-CM: E87.5  ICD-9-CM: 276.7  10/17/2011 - 1/23/2018        RESOLVED: Nausea and vomiting ICD-10-CM: R11.2  ICD-9-CM: 787.01  2/24/2010 - 1/23/2018        RESOLVED: Epigastric abdominal pain ICD-10-CM: R10.13  ICD-9-CM: 789.06  2/24/2010 - 1/23/2018        RESOLVED: Digoxin toxicity ICD-10-CM: T46.0X1A  ICD-9-CM: 972.1, E942.1 2/24/2010 - 1/23/2018        RESOLVED: ARF (acute renal failure) (HCC) (Chronic) ICD-10-CM: N17.9  ICD-9-CM: 584.9  2/24/2010 - 1/23/2018        RESOLVED: Hypokalemia ICD-10-CM: E87.6  ICD-9-CM: 276.8  2/24/2010 - 1/23/2018        RESOLVED: CKD (chronic kidney disease) stage 3, GFR 30-59 ml/min (Chronic) ICD-10-CM: N18.3  ICD-9-CM: 585.3  12/4/2009 - 1/23/2018        RESOLVED: Cough ICD-10-CM: R05  ICD-9-CM: 786.2  3/9/2009 - 3/12/2009        RESOLVED: Bronchitis ICD-10-CM: J40  ICD-9-CM: 080  3/9/2009 - 3/12/2009        RESOLVED: Atrial fibrillation (HCC) (Chronic) ICD-10-CM: I48.91  ICD-9-CM: 427.31  3/7/2009 - 6/27/2016        RESOLVED: Hypotension (Chronic) ICD-10-CM: I95.9  ICD-9-CM: 458.9  3/1/2009 - 1/23/2018              Plan:     The Post Assessment Physician Evaluation (RAMBO) found the current functional status to be comparable with the Pre-admission Screening. The Patient is a good candidate for acute inpatient rehabilitation. Nothing since the Pre-admission screen has changed that determination.      Rehabilitation Plan  The patient has shown the ability to tolerate and benefit from 3 hours of therapy daily and is being admitted to a comprehensive acute inpatient rehabilitation program consisting of at least 3 hours of combined physical and occupational therapies. Resume intensive Physical Therapy for a minimum of 1.5 hours a day, at least 5 out of 7 days per week to address bed mobility, transfers, ambulation, strengthening, balance, and endurance. - pt  was ambulating independently with a rollator inside her home. reports using rollator or a wheelchair for community mobility.  Will continue to focus on endurance, strengthening BLe.      Resume  intensive Occupational Therapy for a minimum of 1.5 hours a day, at least 5 out of 7 days per week to address ADL ( bathing, LE dressing, toileting) and adaptive equipment as needed.       Continue 24-hour skilled rehabilitation nursing for bowel and bladder management, skin care for decubitus ulcer prevention , pain management and ongoing medication administration      The patient may benefit from a psychology consult for depression, anxiety and adjustment disorder.     Continue daily physician medical management:    Acute respiratory failure (HCC) (2/7/2018)/ Pleural effusion (1/23/2018)/ S/P bilateral thoracentesis  S/p - Thoracenteses on 2/9 - 550 mls removed from the left and 1 liter removed from the right.   - Continue bronchodilators prn- has home nebulizer. Resume as prn;xopenex  - Patient will be on 2L O2 at therapy and at rest. May be able to wean as tolerated. Will monitor saO2.      S/P TAVR(1/30/2018)/ Acute on chronic diastolic heart failure Pulmonary hypertension / Chronic atrial fibrillation/ HTN   - cardiac precaution, s.p TAVR. - weights and maintain a 2 liter per day fluid restriction.   - Monitor HR/BP. conitinue Eliquis for a.fib  - continue BB-Toprol XL    LE edema- on lasix 80mg bid bid,spironolactone, zaroxolyn begun, cardiology note appreciated, TEDs ordered. Hypotensive this am, 87/51, will begin low dose zaroxolyn 2.5mg, will hold spironolactone and lasix for now, attempt to give later, plan per cardiology, BUN/Cr - 28/.85 > 33/1.2 on 2/17      Hypothyroidism - synthroid 88 mcg     Leukocytosis (2/7/2018) on rocephin, wbc- 3-9 on 21/7     Acute blood loss anemia/ GI bleed? -monitor clinically. May have been caused by high INR. - no melena. Will check hgb Saturday. - Hgb - 9.5 > 8.9 on 2/17, low, recheck staci.     Pneumonia prophylaxis- Insentive spirometer every hour while awake     DVT risk / DVT Prophylaxis- continue daily physician exam to assess for signs and symptoms as patient is at increased risk for of thromboembolism. - covered with eliquis.     Wound Care: - monitor for ulcers, skin breakdown due to edema.     Potential urinary retention - no s/s of retention.  Monitor.  - pt mostly continent.      bowel program - as needed dulcolax, pericolace. + BM.     GERD/ GI prophylaxis - resume PPI. At times may need additional antacids, Maalox prn.     Time spent was 25 minutes with over 1/2 in direct patient care/examination, consultation and coordination of care.      Signed By: Evon Rojas MD     February 17, 2018

## 2018-02-17 NOTE — PROGRESS NOTES
PHYSICAL THERAPY DAILY NOTE  Time In: 3680  Time Out: 1117    Subjective: patient reporting she feels OK today. Reports her son has made a lot of adaptations to her home for her. Patient reporting no dizziness during treatment         Objective:Vital Signs:  Patient Vitals for the past 12 hrs:   Temp Pulse Resp BP SpO2   02/17/18 0812 - - - (!) 87/51 -   02/17/18 0701 97.6 °F (36.4 °C) 85 18 96/62 100 %     Pain level:No c/o pain  Pain location:NA  Pain interventions:NA    Patient education:Bed mobility training,transfer training, gait training, fall precautions, activity pacing, body mechanics,energy conservation, breathing techniqeus, Patient verbalizing understanding and demonstrating partial understanding of patient education. Recommend follow up education.     Interdisciplinary Communication:spoke with RN regarding vital signs    Other (comment) (Fall precautions)  GROSS ASSESSMENT Daily Assessment     NA       BED/MAT MOBILITY Daily Assessment   Increased time and effort to complete Rolling Right : 6 (Modified independent)  Rolling Left : 0 (Not tested)  Supine to Sit : 6 (Modified independent)  Sit to Supine : 5 (Supervision)       TRANSFERS Daily Assessment   Increased time and effort to complete with cues for body mechanics   Transfer Type: SPT without device  Transfer Assistance : 5 (Stand-by assistance)  Sit to Stand Assistance: Stand-by assistance  Car Transfers: Not tested       GAIT Daily Assessment    Amount of Assistance: 5 (Stand-by assistance)  Distance (ft): 60 Feet (ft)  Assistive Device: Walker, rollator   Gait training with cues for posture correction and to improve step clearance and step length    STEPS or STAIRS Daily Assessment    Steps/Stairs Ambulated (#): 0  Level of Assist : 0 (Not tested)       BALANCE Daily Assessment    Sitting - Static: Good (unsupported)  Sitting - Dynamic: Fair (occasional)  Standing - Static: Fair  Standing - Dynamic : Impaired       WHEELCHAIR MOBILITY Daily Assessment    Curbs/Ramps Assist Required (FIM Score): 0 (Not tested)  Wheelchair Setup Assist Required : 3 (Moderate assistance)  Wheelchair Management: Manages left brake;Manages right brake       LOWER EXTREMITY EXERCISES Daily Assessment   Multiple and frequent rest breaks with cues for breathing techniques during exercises Extremity: Both  Exercise Type #1: Supine lower extremity strengthening  Sets Performed: 3  Reps Performed: 10  Level of Assist: Minimal assistance          Assessment: Progressing towards goals       Patient return to room at end of treatment and remained up in wheelchair with needs in reach    Plan of Care: Continue with POC and progress as tolerated.      Darin Dhaliwal, PT  2/17/2018

## 2018-02-18 NOTE — PROGRESS NOTES
Problem: Falls - Risk of  Goal: *Absence of Falls  Document Gregorio Fall Risk and appropriate interventions in the flowsheet.    Outcome: Progressing Towards Goal  Fall Risk Interventions:  Mobility Interventions: Patient to call before getting OOB         Medication Interventions: Patient to call before getting OOB    Elimination Interventions: Call light in reach

## 2018-02-18 NOTE — PROGRESS NOTES
Problem: Falls - Risk of  Goal: *Absence of Falls  Document Gregorio Fall Risk and appropriate interventions in the flowsheet.    Outcome: Progressing Towards Goal  Fall Risk Interventions:  Mobility Interventions: Patient to call before getting OOB, Utilize walker, cane, or other assitive device         Medication Interventions: Patient to call before getting OOB    Elimination Interventions: Call light in reach, Patient to call for help with toileting needs, Toileting schedule/hourly rounds

## 2018-02-18 NOTE — PROGRESS NOTES
Patient had an uneventful night; slept well without complaint. Placed 2 new allyvns on excoriation on back. Hourly rounds were performed throughout the shift. All needs met at this time. Report given to oncoming nurse.

## 2018-02-18 NOTE — PROGRESS NOTES
SFD PROGRESS NOTE    Maryan Oh  Admit Date: 2/13/2018  Admit Diagnosis: DEBILITY  CHF (congestive heart failure) ([facility]  Respiratory failure ([facility]  Chief Complaint : Gait dysfunction secondary to below. Admit Diagnosis: Pleural effusion [J90]; Pleural effusion [J90]  Acute respiratory failure (HCC) (2/7/2018)  Pleural effusion (1/23/2018)/ S/P bilateral thoracentesis  Hypothyroidism   Chronic atrial fibrillation   HTN   Pulmonary hypertension   S/P TAVR(1/30/2018)  Leukocytosis (2/7/2018) on Abx   Acute on chronic diastolic heart failure   weakness  Pain  DVT risk  Acute blood loss anemia/ GI bleed? Acute Rehab Dx:  Generalized weakness. Debility    deconditioning   Mobility and ambulation deficits  Self Care/ADL deficits    Subjective     Patient seen and examined. Vss. Denies chest pain, cough. Feels comfortable on O2 via [email or web address] min. Still with moderate LE edema. PT, OT well tolerated this morning. Reports increased urination yesterday. On chronic home O2. Denies lightheadedness, cough, SOB, palpations, pain or nausea. Participating in therapy.      Objective:     Current Facility-Administered Medications   Medication Dose Route Frequency    acetaminophen (TYLENOL) tablet 650 mg  650 mg Oral Q4H PRN    alcohol 62% (NOZIN) nasal  1 Ampule  1 Ampule Topical Q12H    allopurinol (ZYLOPRIM) tablet 100 mg  100 mg Oral BID    apixaban (ELIQUIS) tablet 5 mg  5 mg Oral Q12H    diazePAM (VALIUM) tablet 5 mg  5 mg Oral QHS    furosemide (LASIX) tablet 80 mg  80 mg Oral Q12H    hydrocortisone (ANUSOL-HC) 2.5 % rectal cream   PeriANAL TID PRN    levothyroxine (SYNTHROID) tablet 88 mcg  88 mcg Oral ACB    metoprolol succinate (TOPROL-XL) XL tablet 25 mg  25 mg Oral DAILY    pantoprazole (PROTONIX) tablet 40 mg  40 mg Oral ACB    polyethylene glycol (MIRALAX) packet 17 g  17 g Oral DAILY    potassium chloride (K-DUR, KLOR-CON) SR tablet 40 mEq  40 mEq Oral DAILY    pravastatin (PRAVACHOL) tablet 40 mg  40 mg Oral DAILY    rOPINIRole (REQUIP) tablet 2 mg  2 mg Oral BID    sertraline (ZOLOFT) tablet 100 mg  100 mg Oral DAILY    sodium chloride (NS) flush 5-10 mL  5-10 mL IntraVENous PRN    spironolactone (ALDACTONE) tablet 25 mg  25 mg Oral DAILY    levalbuterol (XOPENEX) nebulizer soln 0.63 mg/3 mL  0.63 mg Nebulization Q6H PRN     Facility-Administered Medications Ordered in Other Encounters   Medication Dose Route Frequency    0.9% sodium chloride infusion 250 mL  250 mL IntraVENous PRN     Review of Systems: + weakness. Denies chest pain, shortness of breath, cough, headache, visual problems, abdominal pain, dysurea, calf pain. Pertinent positives are as noted in the medical records and unremarkable otherwise. Visit Vitals    BP 95/65 (BP 1 Location: Right arm)    Pulse 82    Temp 97.3 °F (36.3 °C)    Resp 16    Wt 80.7 kg (178 lb)    SpO2 100%    BMI 34.76 kg/m2        Physical Exam:   General: Alert and age appropriately oriented.  Appears comfortable on 2L O2 NC. No acute cardio respiratory distress. HEENT: Normocephalic,no scleral icterus  Oral mucosa moist without cyanosis, No bruit, No JVD. Lungs:  bilateral basilar atelectasis. Kelsey Friar no wheezing. Respiration even and unlabored   Heart: RRR,  II/VI TERRI  No clicks, rub or gallops   Abdomen: Soft, non-tender, nondistended. Bowel sounds present. Genitourinary: defered   Neuromuscular:      PERRL, EOMI  Follows simple commands consistently. Able to identify, recall repeat. Mood appropriate. Insight, judgement intact.    LUE     Shoulder abduction   5-/5              Elbow flexion:   5-/5               Wrist extension:  5- /5              Finger flexion;  5- /5  RUE    Shoulder abduction: 5- /5                Elbow flexion:  5- /5                         Wrist extension: 5- /5                        Finger flexion:  5- /5  LLE     Hip flexion:  3+ /5              Knee extension:  4 /5   Rafi Ibarra dorsiflexion:  5 /5                        Ankle plantarflexion:   5/5                                                                  RLE     Hip flexion:   3+/5                        Knee extension:  4 /5                         Ankle dorsiflexion:  5 /5                        Ankle plantarflexion:  5 /5  Sensory - intact grossly   No cerebellar signs. Plantars - down going  No atrophy, no fasciculations, no tremors. Skin/extremity: No rashes, no erythema. Calf non tender BLE. 1-2+ BLE edema. + chronic arthritic joint changes. Functional Assessment:          Balance  Sitting - Static: Good (unsupported) (02/17/18 1100)  Sitting - Dynamic: Fair (occasional) (02/17/18 1100)  Standing - Static: Fair (02/17/18 1100)  Standing - Dynamic : Impaired (02/17/18 1100)           Toileting  Cues: Tactile cues provided;Verbal cues provided (02/16/18 0831)  Adaptive Equipment: Grab bars; Nat Nones (02/16/18 0831)         Genevieve Handy Fall Risk Assessment:  Genevieve Handy Fall Risk  Mobility: Ambulates or transfers with assist devices or assistance/unsteady gait (02/18/18 0731)  Mobility Interventions: Patient to call before getting OOB;Utilize walker, cane, or other assitive device (02/18/18 0731)  Mentation: Alert, oriented x 3 (02/18/18 0731)  Medication: Patient receiving anticonvulsants, sedatives(tranquilizers), psychotropics or hypnotics, hypoglycemics, narcotics, sleep aids, antihypertensives, laxatives, or diuretics (02/18/18 0731)  Medication Interventions: Patient to call before getting OOB (02/18/18 0731)  Elimination: Needs assistance with toileting (02/18/18 0731)  Elimination Interventions: Call light in reach; Patient to call for help with toileting needs; Toileting schedule/hourly rounds (02/18/18 0731)  Prior Fall History: No (02/18/18 0731)  Total Score: 3 (02/18/18 0731)  Standard Fall Precautions: Yes (02/16/18 7281)  High Fall Risk: Yes (02/18/18 7033)     Ambulation:  Gait  Distance (ft): 60 Feet (ft) (02/17/18 1100)  Assistive Device: Walker, rollator (02/17/18 1100)     Labs/Studies:  Recent Results (from the past 72 hour(s))   CBC WITH AUTOMATED DIFF    Collection Time: 02/17/18  6:25 AM   Result Value Ref Range    WBC 3.9 (L) 4.3 - 11.1 K/uL    RBC 3.07 (L) 4.05 - 5.25 M/uL    HGB 8.9 (L) 11.7 - 15.4 g/dL    HCT 29.8 (L) 35.8 - 46.3 %    MCV 97.1 79.6 - 97.8 FL    MCH 29.0 26.1 - 32.9 PG    MCHC 29.9 (L) 31.4 - 35.0 g/dL    RDW 16.7 (H) 11.9 - 14.6 %    PLATELET 86 (L) 165 - 450 K/uL    MPV 11.8 10.8 - 14.1 FL    DF AUTOMATED      NEUTROPHILS 77 43 - 78 %    LYMPHOCYTES 11 (L) 13 - 44 %    MONOCYTES 7 4.0 - 12.0 %    EOSINOPHILS 4 0.5 - 7.8 %    BASOPHILS 1 0.0 - 2.0 %    IMMATURE GRANULOCYTES 0 0.0 - 5.0 %    ABS. NEUTROPHILS 3.0 1.7 - 8.2 K/UL    ABS. LYMPHOCYTES 0.4 (L) 0.5 - 4.6 K/UL    ABS. MONOCYTES 0.3 0.1 - 1.3 K/UL    ABS. EOSINOPHILS 0.1 0.0 - 0.8 K/UL    ABS. BASOPHILS 0.0 0.0 - 0.2 K/UL    ABS. IMM.  GRANS. 0.0 0.0 - 0.5 K/UL   METABOLIC PANEL, BASIC    Collection Time: 02/17/18  6:25 AM   Result Value Ref Range    Sodium 142 136 - 145 mmol/L    Potassium 3.7 3.5 - 5.1 mmol/L    Chloride 98 98 - 107 mmol/L    CO2 40 (H) 21 - 32 mmol/L    Anion gap 4 (L) 7 - 16 mmol/L    Glucose 92 65 - 100 mg/dL    BUN 33 (H) 8 - 23 MG/DL    Creatinine 1.02 (H) 0.6 - 1.0 MG/DL    GFR est AA >60 >60 ml/min/1.73m2    GFR est non-AA 56 (L) >60 ml/min/1.73m2    Calcium 8.2 (L) 8.3 - 10.4 MG/DL   HEMOGLOBIN    Collection Time: 02/18/18  6:14 AM   Result Value Ref Range    HGB 9.1 (L) 11.7 - 15.4 g/dL       Assessment:     Problem List as of 2/18/2018  Date Reviewed: 2/10/2018          Codes Class Noted - Resolved    Respiratory failure ([facility] ICD-10-CM: J96.90  ICD-9-CM: 518.81  2/13/2018 - Present        Acute on chronic respiratory failure ([facility] ICD-10-CM: J96.20  ICD-9-CM: 518.84  2/7/2018 - Present        Leukocytosis ICD-10-CM: H14.913  ICD-9-CM: 288.60 2/7/2018 - Present        Chronic diastolic heart failure (HCC) ICD-10-CM: I50.32  ICD-9-CM: 428.32  2/7/2018 - Present        S/P TAVR (transcatheter aortic valve replacement) ICD-10-CM: Z95.2  ICD-9-CM: V43.3  1/30/2018 - Present        Aortic stenosis (Chronic) ICD-10-CM: I35.0  ICD-9-CM: 424.1  1/29/2018 - Present        Peripheral arterial disease (HCC) (Chronic) ICD-10-CM: I73.9  ICD-9-CM: 443.9  1/23/2018 - Present        Pleural effusion ICD-10-CM: J90  ICD-9-CM: 511.9  1/23/2018 - Present    Overview Addendum 2/10/2018 11:36 AM by Guillermo Raines NP     Right thoracentesis 1/25/18 - 1200 mls removed  Left thoracentesis 1/25/2018 - 900 mls removed  Bilateral thoracentesis 2/9/18 - L 550 ml (air aspirated during and at the end of procedure) / R 1000 ml and R creamy pink              Chronic respiratory failure with hypoxia (HCC) (Chronic) ICD-10-CM: J96.11  ICD-9-CM: 518.83, 799.02  1/23/2018 - Present    Overview Signed 1/23/2018 11:17 AM by Marni Waite NP     Wears 3 to 4 liters at home             Hypoxia ICD-10-CM: R09.02  ICD-9-CM: 799.02  1/23/2018 - Present        Stenosis of prosthetic aortic valve (Chronic) ICD-10-CM: I35.0  ICD-9-CM: 396.0  1/19/2018 - Present    Overview Signed 1/19/2018  3:18 PM by Vlad Serrano MD     AVR (10/5/13):  21 mm Pericardial valve.                Tricuspid valve insufficiency (Chronic) ICD-10-CM: I07.1  ICD-9-CM: 397.0  1/15/2018 - Present        Systolic CHF, chronic (HCC) (Chronic) ICD-10-CM: I50.22  ICD-9-CM: 428.22, 428.0  1/15/2018 - Present        S/P mitral valve repair (Chronic) ICD-10-CM: W13.259  ICD-9-CM: V45.89  12/4/2017 - Present        H/O atrioventricular rubin ablation (Chronic) ICD-10-CM: C31.426  ICD-9-CM: V15.1  3/22/2017 - Present        Pulmonary hypertension (Chronic) ICD-10-CM: I27.20  ICD-9-CM: 416.8  2/12/2017 - Present        Aortic valve replaced (Chronic) ICD-10-CM: Z95.2  ICD-9-CM: V43.3  1/9/2017 - Present        Chronic diastolic congestive heart failure (HCC) (Chronic) ICD-10-CM: I50.32  ICD-9-CM: 428.32, 428.0  9/9/2016 - Present        Chronic depression (Chronic) ICD-10-CM: F32.9  ICD-9-CM: 433  8/5/2016 - Present        Osteopenia (Chronic) ICD-10-CM: M85.80  ICD-9-CM: 733.90  8/5/2016 - Present        RLS (restless legs syndrome) (Chronic) ICD-10-CM: G25.81  ICD-9-CM: 333.94  8/5/2016 - Present        Hyperlipidemia (Chronic) ICD-10-CM: E78.5  ICD-9-CM: 272.4  8/5/2016 - Present        Gout (Chronic) ICD-10-CM: M10.9  ICD-9-CM: 274.9  8/5/2016 - Present        Anxiety (Chronic) ICD-10-CM: F41.9  ICD-9-CM: 300.00  8/5/2016 - Present        CAD (coronary artery disease) (Chronic) ICD-10-CM: I25.10  ICD-9-CM: 414.00  8/5/2016 - Present        HTN (hypertension) (Chronic) ICD-10-CM: I10  ICD-9-CM: 401.9  8/5/2016 - Present        GERD (gastroesophageal reflux disease) (Chronic) ICD-10-CM: K21.9  ICD-9-CM: 530.81  8/5/2016 - Present        H/O mitral valve repair, 2003 (Chronic) ICD-10-CM: V62.524  ICD-9-CM: V15.1  6/27/2016 - Present        Sick sinus syndrome (HCC) (Chronic) ICD-10-CM: I49.5  ICD-9-CM: 427.81  2/13/2016 - Present        Obesity (Chronic) ICD-10-CM: E66.9  ICD-9-CM: 278.00  11/2/2015 - Present        Mitral stenosis with insufficiency (Chronic) ICD-10-CM: R79.5  ICD-9-CM: 394.2  11/2/2015 - Present    Overview Signed 11/2/2015 11:02 AM by Kari Esteban     Prior MV repair 2003.               Rheumatic aortic stenosis (Chronic) ICD-10-CM: I06.0  ICD-9-CM: 395.0  11/2/2015 - Present        Cardiac pacemaker (Chronic) ICD-10-CM: Z95.0  ICD-9-CM: V45.01  11/2/2015 - Present        Thrombocytopenia (HCC) (Chronic) ICD-10-CM: D69.6  ICD-9-CM: 287.5  8/22/2012 - Present        Anemia (Chronic) ICD-10-CM: D64.9  ICD-9-CM: 285.9  10/27/2011 - Present    Overview Signed 10/27/2011  8:25 AM by José Miguel Carbajal     Acute on chronic               Chronic atrial fibrillation (HCC) (Chronic) ICD-10-CM: W08.2  ICD-9-CM: 427.31 10/17/2011 - Present        Hypothyroidism (Chronic) ICD-10-CM: E03.9  ICD-9-CM: 244.9  12/4/2009 - Present        BOBBY (obstructive sleep apnea) (Chronic) ICD-10-CM: G47.33  ICD-9-CM: 327.23  12/4/2009 - Present        Chronic obstructive pulmonary disease (HCC) (Chronic) ICD-10-CM: J44.9  ICD-9-CM: 496  3/8/2009 - Present        Aortic Valve Bioprosthesis Present (Chronic) ICD-10-CM: Z95.2  ICD-9-CM: V43.3  3/7/2009 - Present        Degenerative arthritis of left knee (Chronic) ICD-10-CM: M17.12  ICD-9-CM: 715.96  2/27/2009 - Present        RESOLVED: CHF (congestive heart failure) ([facility] ICD-10-CM: I50.9  ICD-9-CM: 428.0  2/13/2018 - 2/14/2018        RESOLVED: Acute on chronic systolic congestive heart failure ([facility] ICD-10-CM: I50.23  ICD-9-CM: 428.23, 428.0  2/1/2018 - 2/7/2018        RESOLVED: Acute pulmonary edema ([facility] ICD-10-CM: J81.0  ICD-9-CM: 518.4  1/25/2018 - 2/7/2018        RESOLVED: Pacemaker ICD-10-CM: Z95.0  ICD-9-CM: V45.01  12/4/2017 - 1/23/2018        RESOLVED: History of gout ICD-10-CM: Z87.39  ICD-9-CM: V12.29  1/31/2017 - 1/23/2018        RESOLVED: Warfarin anticoagulation (Chronic) ICD-10-CM: Z79.01  ICD-9-CM: V58.61  1/9/2017 - 1/23/2018        RESOLVED: Non morbid obesity due to excess calories ICD-10-CM: E66.09  ICD-9-CM: 278.00  11/14/2016 - 1/23/2018        RESOLVED: Hypoxemia ICD-10-CM: R09.02  ICD-9-CM: 799.02  11/14/2016 - 1/23/2018        RESOLVED: Localized edema ICD-10-CM: R60.0  ICD-9-CM: 782.3  9/9/2016 - 1/23/2018        RESOLVED: Iron deficiency anemia ICD-10-CM: D50.9  ICD-9-CM: 280.9  9/9/2016 - 1/23/2018        RESOLVED: CRI (chronic renal insufficiency) ICD-10-CM: N18.9  ICD-9-CM: 585.9  8/5/2016 - 1/23/2018        RESOLVED: Dyspnea ICD-10-CM: R06.00  ICD-9-CM: 786.09  8/5/2016 - 1/23/2018        RESOLVED: Right hip pain ICD-10-CM: M25.551  ICD-9-CM: 719.45  8/5/2016 - 1/23/2018        RESOLVED: Edema ICD-10-CM: R60.9  ICD-9-CM: 782.3  8/5/2016 - 1/23/2018        RESOLVED: Chest pain ICD-10-CM: R07.9  ICD-9-CM: 786.50  8/5/2016 - 1/23/2018        RESOLVED: Other long term (current) drug therapy ICD-10-CM: Z79.899  ICD-9-CM: V58.69  8/5/2016 - 1/23/2018        RESOLVED: Acute encephalopathy ICD-10-CM: G93.40  ICD-9-CM: 348.30  7/8/2016 - 1/23/2018        RESOLVED: Tachycardia ICD-10-CM: R00.0  ICD-9-CM: 785.0  6/27/2016 - 1/23/2018        RESOLVED: Palpitations ICD-10-CM: R00.2  ICD-9-CM: 785.1  11/2/2015 - 1/23/2018        RESOLVED: Respiratory insufficiency ICD-10-CM: R06.89  ICD-9-CM: 786.09  11/2/2015 - 1/23/2018        RESOLVED: Bradycardia (Symptomatic) ICD-10-CM: R00.1  ICD-9-CM: 427.89  11/7/2011 - 1/23/2018        RESOLVED: Rectal bleeding ICD-10-CM: K62.5  ICD-9-CM: 569.3  10/27/2011 - 1/23/2018        RESOLVED: AV block ICD-10-CM: I44.30  ICD-9-CM: 426.10  10/17/2011 - 1/23/2018        RESOLVED: Cardiogenic shock ([address] ICD-10-CM: R57.0  ICD-9-CM: 785.51  10/17/2011 - 1/23/2018        RESOLVED: Hyperkalemia ICD-10-CM: E87.5  ICD-9-CM: 276.7  10/17/2011 - 1/23/2018        RESOLVED: Nausea and vomiting ICD-10-CM: R11.2  ICD-9-CM: 787.01  2/24/2010 - 1/23/2018        RESOLVED: Epigastric abdominal pain ICD-10-CM: R10.13  ICD-9-CM: 789.06  2/24/2010 - 1/23/2018        RESOLVED: Digoxin toxicity ICD-10-CM: T46.0X1A  ICD-9-CM: 972.1, E942.1  2/24/2010 - 1/23/2018        RESOLVED: ARF (acute renal failure) (HCC) (Chronic) ICD-10-CM: N17.9  ICD-9-CM: 584.9  2/24/2010 - 1/23/2018        RESOLVED: Hypokalemia ICD-10-CM: E87.6  ICD-9-CM: 276.8  2/24/2010 - 1/23/2018        RESOLVED: CKD (chronic kidney disease) stage 3, GFR 30-59 ml/min (Chronic) ICD-10-CM: N18.3  ICD-9-CM: 585.3  12/4/2009 - 1/23/2018        RESOLVED: Cough ICD-10-CM: R05  ICD-9-CM: 786.2  3/9/2009 - 3/12/2009        RESOLVED: Bronchitis ICD-10-CM: J40  ICD-9-CM: 490  3/9/2009 - 3/12/2009        RESOLVED: Atrial fibrillation (HCC) (Chronic) ICD-10-CM: I48.91  ICD-9-CM: 427.31  3/7/2009 - 6/27/2016        RESOLVED: Hypotension (Chronic) ICD-10-CM: I95.9  ICD-9-CM: 458.9  3/1/2009 - 1/23/2018              Plan:     The Post Assessment Physician Evaluation (RAMBO) found the current functional status to be comparable with the Pre-admission Screening. The Patient is a good candidate for acute inpatient rehabilitation. Nothing since the Pre-admission screen has changed that determination.      Rehabilitation Plan  The patient has shown the ability to tolerate and benefit from 3 hours of therapy daily and is being admitted to a comprehensive acute inpatient rehabilitation program consisting of at least 3 hours of combined physical and occupational therapies. Resume intensive Physical Therapy for a minimum of 1.5 hours a day, at least 5 out of 7 days per week to address bed mobility, transfers, ambulation, strengthening, balance, and endurance. - pt  was ambulating independently with a rollator inside her home. reports using rollator or a wheelchair for community mobility. Will continue to focus on endurance, strengthening BLe.      Resume  intensive Occupational Therapy for a minimum of 1.5 hours a day, at least 5 out of 7 days per week to address ADL ( bathing, LE dressing, toileting) and adaptive equipment as needed.       Continue 24-hour skilled rehabilitation nursing for bowel and bladder management, skin care for decubitus ulcer prevention , pain management and ongoing medication administration      The patient may benefit from a psychology consult for depression, anxiety and adjustment disorder.     Continue daily physician medical management:    Acute respiratory failure (HCC) (2/7/2018)/ Pleural effusion (1/23/2018)/ S/P bilateral thoracentesis  S/p - Thoracenteses on 2/9 - 550 mls removed from the left and 1 liter removed from the right.   - Continue bronchodilators prn- has home nebulizer. Resume as prn;xopenex  - Patient will be on 2L O2 at therapy and at rest. May be able to wean as tolerated. Will monitor saO2.    S/P TAVR(1/30/2018)/ Acute on chronic diastolic heart failure Pulmonary hypertension / Chronic atrial fibrillation/ HTN   - cardiac precaution, s.p TAVR. - weights and maintain a 2 liter per day fluid restriction.   - Monitor HR/BP. conitinue Eliquis for a.fib  - continue BB-Toprol XL    LE edema- on lasix 80mg bid bid,spironolactone, zaroxolyn 2.5mg given yesterday d/t low BP, cardiology following, TEDs begun. Hypotensive this am, 95/65, continue low dose zaroxolyn 2.5mg qd, check with cardiology regarding one time or daily use, increased response noted with zaroxolyn, give spironolactone and lasix as BP allows, plan per cardiology, BUN/Cr - 28/.85 > 33/1.2 on 2/17      Hypothyroidism - synthroid 88 mcg     Leukocytosis (2/7/2018) on rocephin, wbc- 3-9 on 21/7     Acute blood loss anemia/ GI bleed? -monitor clinically. May have been caused by high INR. - no melena. Will check hgb Saturday. - Hgb - 9.5 > 8.9 > 9.1 on 2/18, low, yet stable, continue to monitor , transfuse prn     Pneumonia prophylaxis- Insentive spirometer every hour while awake     DVT risk / DVT Prophylaxis- continue daily physician exam to assess for signs and symptoms as patient is at increased risk for of thromboembolism. - covered with eliquis.     Wound Care: - monitor for ulcers, skin breakdown due to edema.     Potential urinary retention - no s/s of retention. Monitor.  - pt mostly continent.      bowel program - as needed dulcolax, pericolace. + BM.     GERD/ GI prophylaxis - resume PPI. At times may need additional antacids, Maalox prn.     Time spent was 25 minutes with over 1/2 in direct patient care/examination, consultation and coordination of care.      Signed By: Riley Kate MD     February 18, 2018

## 2018-02-19 NOTE — PROGRESS NOTES
Pt participated in therapy today no complaints noted . Safety maintained . Pt has been placed in bed with her legs elevated due to swelling  When no tin therapy. Ruben hose intact when in therapy .

## 2018-02-19 NOTE — PROGRESS NOTES
OT Daily Note  Time In 1300   Time Out 1349     Subjective: \"I fall at home, usually back. I don't know why. \"   Pain: none reported  Education: technique for stand to sit transfers; benefits of exercise; hand placement during transfers   Interdisciplinary Communication: with CNA regarding toileting   Precautions:  (fall precautions)  Location on arrival: ambulating out of bathroom with CNA    Transfers Daily Assessment   Patient ambulating out of bathroom with CNA on arrival to room. Required increased time for task. Patient required cues x 2 to step all the way back to the wheelchair prior to sitting. Educated patient on stepping back until legs touch chair with fair understanding. Patient transferred [facility] to bed to R using SPT with RW with SBA. Patient able to scoot to R with SBA and verbal cues. Transferred sit to supine with max A for BLE. Would benefit from further transfer training. Activity Tolerance Daily Assessment   Patient performed reaching activity using different vertical heights and small rings with RUE only with 4 pound clothespin, 10 x 4 sets, working on shoulder stabilization for overhead reaching and  strengthening. Would benefit from further activity tolerance tasks. Coordination Daily Assessment   Patient completed fine motor coordination task using BUE to thread small and medium sized beads x approximately 20 beads with 1 pound weights donned on BUE. Required increased time for task. Patient was left semi-lucero's in bed with call light/all needs in reach and in no distress. BLE elevated using foot of bed elevated. Oxygen saturation remained 98-99% on 2L for duration of session. Assessment: Completes all tasks at a slow pace but very pleasant and cooperative. Limited by edema and decreased activity tolerance.    Plan: Continue OT POC with focus on ADL/IADL skills, functional transfers, functional mobility, coordination, strength, static and dynamic balance, and activity tolerance to maximize safety and independence with ADLs and functional transfers.      Crista De La Vega MS, OTR/L  2/19/2018

## 2018-02-19 NOTE — PROGRESS NOTES
P.m. Assessment completed. pt without c/o . At this time, resting in bed , call bell in reach. Feet up on pillow bilat. 1+ edema noted. Right arm up on pillow, edema noted. Pt on o2 at 2 liters sats 100%. Lowered to 1.5 liters. Pt without C/O will cont to monitor.

## 2018-02-19 NOTE — PROGRESS NOTES
PHYSICAL THERAPY DAILY NOTE  Time In: 1004  Time Out: 1101  Patient Seen For: AM;Gait training;Patient education; Therapeutic exercise;Transfer training; Other (see progress notes)    Subjective: Patient reporting she is tired from all of her AM therapy. Reports she has a platform step with a hand rail attached to it at the side of her bed. Objective:Vital Signs:  Patient Vitals for the past 12 hrs:   Temp Pulse Resp BP SpO2   02/19/18 0847 - - - 104/62 -   02/19/18 0721 97.6 °F (36.4 °C) 82 20 100/65 100 %     Patient on 02 at 2lpm. 02 sat 100% and HR 84 after ambulating 60ft    Pain level:No c/o pain  Pain location:NA  Pain interventions:NA    Patient education:Bed mobility training,transfer training, gait training, fall precautions, activity pacing,body mechanics, w/c and rollator parts management, energy conservation, Patient verbalizing understanding and demonstrating partial understanding of patient education. Recommend follow up education. Interdisciplinary Communication:Spoke with RN regarding bed positioning    Other (comment) (Fall precautions)  GROSS ASSESSMENT Daily Assessment     NA       BED/MAT MOBILITY Daily Assessment   Increased time and effort to complete Rolling Right : 6 (Modified independent)  Rolling Left : 0 (Not tested)  Supine to Sit : 6 (Modified independent)  Sit to Supine : 5 (Supervision)       TRANSFERS Daily Assessment    Transfer Type: SPT without device (w/c to mat with mat elevated to 24 inches and using 6 inch step as noted below under steps)  Transfer Assistance : 5 (Stand-by assistance) (with cues for body mechanics)  Sit to Stand Assistance: Supervision (cues for rollator set up and body mechanics)  Car Transfers: Not tested       GAIT Daily Assessment    Amount of Assistance: 5 (Stand-by assistance)  Distance (ft): 60 Feet (ft)  Assistive Device: Walker, rollator   Gait training with cues for posture correction and improving step clearance and step length.  Cues for managing 02 line    STEPS or STAIRS Daily Assessment   Up/down 6 inch step with left hand support on RW in front of patient and right hand support on mat simulating patient's set up at home with platform step with hand rail attached at foot of bed. Steps/Stairs Ambulated (#): 1  Level of Assist : 5 (Stand-by assistance)  Rail Use:  (RW as simulated hand rail, )       BALANCE Daily Assessment    Sitting - Static: Good (unsupported)  Sitting - Dynamic: Fair (occasional)  Standing - Static: Fair  Standing - Dynamic : Impaired       WHEELCHAIR MOBILITY Daily Assessment    Curbs/Ramps Assist Required (FIM Score): 0 (Not tested)  Wheelchair Setup Assist Required : 3 (Moderate assistance)  Wheelchair Management: Manages left brake;Manages right brake       LOWER EXTREMITY EXERCISES Daily Assessment   Multiple and frequent rest breaks during LE exercises, cues for breathing techniques Extremity: Both  Exercise Type #1: Supine lower extremity strengthening  Sets Performed: 3  Reps Performed: 10  Level of Assist: Minimal assistance          Assessment: Progressing towards modified independence with bed mobility. Difficulty with sit<>stand from mat at 24 inches due to patient's height,limited LE ROM and LE weakness. Patient returned to room at end of treatment. Patient supine in bed with head of bed elevated and bed rails up x 2. Needs placed in reach of patient. 02 at 2 lpm, foot of bed elevated to first notch    Plan of Care: Continue with POC and progress as tolerated.      Shanon Lopez, PT  2/19/2018

## 2018-02-19 NOTE — PROGRESS NOTES
OT Daily Note    Time In  9:19   Time Out  10:02     Subjective:\"I have this cough today. \" Agreeable to therapy. Pain:not indicated during this session. Education:on importance of energy conservation. Interdisciplinary Communication: Collaborated with Andry Marion and patient is making progress towards goals. Precautions:  (fall precautions)    Balance/functional mobility Daily Assessment   Pt transferred from sit to stand with min assist.     Pt stood at standing table for 5 minutes and 26 seconds during reaching activity to improve standing balance and activity tolerance. Pt stabilized self with constant L hand support on table. Pt stood with CGA while reaching and SBA while at midline. Strengthening/activity tolerance Daily Assessment   Pt completed clothes pin ladder activity while standing to improve functional reaching, UE strength, and UE pincer grasp. Pt was able to squeeze resistive clothes pins with R hand lateral key pinch and index to thumb pincer grasp. Pt completed laundry folding while seated at table. Pt required extra time to fold 3 articles of clothing. Pt was able to button, zip, and fold clothing with 100% accuracy. Assessment: Pt is making good progress towards goals. Pt requires skilled therapy to address deficits in functional mobility, standing balance, edema, UE strength, and activity tolerance to improve independence in ADLs and safety needed for functional transfers. Ended session: in w/c with PT, Olvin White.      Viola Gottron, OT Student

## 2018-02-19 NOTE — PROGRESS NOTES
2/19/2018 7:50 AM    Admit Date: 2/13/2018        Subjective:     Dannial Kill denies chest pain, dyspnea, palpitations. Pt has been walking On O2         Objective:      Visit Vitals    /65    Pulse 82    Temp 97.6 °F (36.4 °C)    Resp 20    Wt 80.7 kg (178 lb)    SpO2 100%    BMI 34.76 kg/m2       Physical Exam:  Heart: regular rate and rhythm  Lungs: clear to auscultation bilaterally  Extremities: edema 1-2+    Data Review:   Labs:  No results found for this or any previous visit (from the past 24 hour(s)).           Assessment:     Patient Active Problem List    Diagnosis Date Noted    Respiratory failure ([address] 02/13/2018    Acute on chronic respiratory failure ([address] 02/07/2018    Leukocytosis 02/07/2018    Chronic diastolic heart failure ([address] 02/07/2018    S/P TAVR (transcatheter aortic valve replacement) 01/30/2018    Aortic stenosis 01/29/2018    Peripheral arterial disease ([address] 01/23/2018    Pleural effusion 01/23/2018    Chronic respiratory failure with hypoxia ([address] 01/23/2018    Hypoxia 01/23/2018    Stenosis of prosthetic aortic valve 01/19/2018    Tricuspid valve insufficiency 01/15/2018     CHF, chronic ([address] more diastolic EF was 63% on ECHO 01/15/2018    S/P mitral valve repair 12/04/2017    H/O atrioventricular rubin ablation 03/22/2017    Pulmonary hypertension 02/12/2017    Aortic valve replaced 01/09/2017    Chronic diastolic congestive heart failure (HCC) on lasix 80 BID and aldactone 09/09/2016    Chronic depression 08/05/2016    Osteopenia 08/05/2016    RLS (restless legs syndrome) 08/05/2016    Hyperlipidemia 08/05/2016    Gout 08/05/2016    Anxiety 08/05/2016    CAD (coronary artery disease) stable 08/05/2016    HTN (hypertension) 08/05/2016    GERD (gastroesophageal reflux disease) 08/05/2016    H/O mitral valve repair, 2003 06/27/2016    Sick sinus syndrome ([address] 02/13/2016    Obesity 11/02/2015    Mitral stenosis with insufficiency 11/02/2015  Rheumatic aortic stenosis 11/02/2015    Cardiac pacemaker 11/02/2015    Thrombocytopenia ([facility] 08/22/2012    Anemia 10/27/2011    Chronic atrial fibrillation ([address] 10/17/2011    Hypothyroidism 12/04/2009    BOBBY (obstructive sleep apnea) 12/04/2009    Chronic obstructive pulmonary disease ([facility] 03/08/2009    Aortic Valve Bioprosthesis Present 03/07/2009    Degenerative arthritis of left knee 02/27/2009       Plan:   Continue lasix 80 BID check BMP creat has been stable elevate feet as much as possible

## 2018-02-19 NOTE — PROGRESS NOTES
End Of Shift Functional Summary, Nursing      TOILETING:  Does patient need assist with clothing management and/or pericare? Yes: Comment: clothing    TOILET TRANSFER:  Pt requires moderate assistance. Pt uses walker. BLADDER:  Pt does not have a [place] catheter that staff manages. Pt does not take medication. Pt is continent. of bladder and voids in toilet  Pt requires staff to empty device    BOWEL:  Pt does take medication. Pt is continent of bowel and uses toilet. Pt requires staff to empty device      BED/CHAIR TRANSFER  Pt requires moderate assistance. Patient requires the assistance of 1 staff member(s). Pt uses walker    Documentation reviewed and plan of care discussed/reviewed with   patient during the shift.

## 2018-02-19 NOTE — PROGRESS NOTES
Pt participated in therapy today . Ankles  where less swollen than in this AM. No complaints noted . Safety maintained 02 remains at 2 liters .   Pt resting at present

## 2018-02-19 NOTE — PROGRESS NOTES
3 [facility] here to see pt . Requested that pts legs be elevated when not in therapy due to edema . Pt resting in bed . No complaints noted at this time .

## 2018-02-19 NOTE — PROGRESS NOTES
SFD PROGRESS NOTE    Adia Orozco  Admit Date: 2/13/2018  Admit Diagnosis: DEBILITY;CHF (congestive heart failure) ([address] Respiratory f*  Chief Complaint : Gait dysfunction secondary to below. Admit Diagnosis: Pleural effusion [J90]; Pleural effusion [J90]  Acute respiratory failure (HCC) (2/7/2018)  Pleural effusion (1/23/2018)/ S/P bilateral thoracentesis  Hypothyroidism   Chronic atrial fibrillation   HTN   Pulmonary hypertension   S/P TAVR(1/30/2018)  Leukocytosis (2/7/2018) on Abx   Acute on chronic diastolic heart failure   weakness  Pain  DVT risk  Acute blood loss anemia/ GI bleed? Acute Rehab Dx:  Generalized weakness. Debility    deconditioning   Mobility and ambulation deficits  Self Care/ADL deficits    Subjective     Patient seen and examined. Vss. Afebrile. Had uneventful weekend. Appears edema, still not much changed. Continued on diuretics. Left elbow is still draining of serous body fluid and her legs are still edematous. Therapy tolerated fairly today. Tired form activity. Appears slowly imprving functionly.      Objective:     Current Facility-Administered Medications   Medication Dose Route Frequency    metOLazone (ZAROXOLYN) tablet 2.5 mg  2.5 mg Oral DAILY    acetaminophen (TYLENOL) tablet 650 mg  650 mg Oral Q4H PRN    alcohol 62% (NOZIN) nasal  1 Ampule  1 Ampule Topical Q12H    allopurinol (ZYLOPRIM) tablet 100 mg  100 mg Oral BID    apixaban (ELIQUIS) tablet 5 mg  5 mg Oral Q12H    diazePAM (VALIUM) tablet 5 mg  5 mg Oral QHS    furosemide (LASIX) tablet 80 mg  80 mg Oral Q12H    hydrocortisone (ANUSOL-HC) 2.5 % rectal cream   PeriANAL TID PRN    levothyroxine (SYNTHROID) tablet 88 mcg  88 mcg Oral ACB    metoprolol succinate (TOPROL-XL) XL tablet 25 mg  25 mg Oral DAILY    pantoprazole (PROTONIX) tablet 40 mg  40 mg Oral ACB    polyethylene glycol (MIRALAX) packet 17 g  17 g Oral DAILY    potassium chloride (K-DUR, KLOR-CON) SR tablet 40 mEq  40 mEq Oral DAILY    pravastatin (PRAVACHOL) tablet 40 mg  40 mg Oral DAILY    rOPINIRole (REQUIP) tablet 2 mg  2 mg Oral BID    sertraline (ZOLOFT) tablet 100 mg  100 mg Oral DAILY    sodium chloride (NS) flush 5-10 mL  5-10 mL IntraVENous PRN    spironolactone (ALDACTONE) tablet 25 mg  25 mg Oral DAILY    levalbuterol (XOPENEX) nebulizer soln 0.63 mg/3 mL  0.63 mg Nebulization Q6H PRN     Facility-Administered Medications Ordered in Other Encounters   Medication Dose Route Frequency    0.9% sodium chloride infusion 250 mL  250 mL IntraVENous PRN     Review of Systems: + weakness. Denies chest pain, shortness of breath, cough, headache, visual problems, abdominal pain, dysurea, calf pain. Pertinent positives are as noted in the medical records and unremarkable otherwise. Visit Vitals    /62    Pulse 82    Temp 97.6 °F (36.4 °C)    Resp 20    Wt 178 lb (80.7 kg)    SpO2 100%    BMI 34.76 kg/m2        Physical Exam:   General: Alert and age appropriately oriented.  Appears comfortable on 2L O2 NC. No acute cardio respiratory distress. HEENT: Normocephalic,no scleral icterus  Oral mucosa moist without cyanosis, No bruit, No JVD. Lungs: + left  basilar crackles. Arvid Lisa no wheezing. Respiration even and unlabored   Heart: RRR,  II/VI TERRI  No clicks, rub or gallops   Abdomen: Soft, non-tender, nondistended. Bowel sounds present. Genitourinary: defered   Neuromuscular:      PERRL, EOMI  Follows simple commands consistently. Able to identify, recall repeat. Mood appropriate. Insight, judgement intact.    LUE     Shoulder abduction   5-/5              Elbow flexion:   5-/5               Wrist extension:  5- /5              Finger flexion;  5- /5  RUE    Shoulder abduction: 5- /5                Elbow flexion:  5- /5                         Wrist extension: 5- /5                        Finger flexion:  5- /5  LLE     Hip flexion:  3+ /5              Knee extension:  4 /5   Teresia Gins dorsiflexion:  5 /5                        Ankle plantarflexion:   5/5                                                                  RLE     Hip flexion:   3+/5                        Knee extension:  4 /5                         Ankle dorsiflexion:  5 /5                        Ankle plantarflexion:  5 /5  Sensory - intact grossly   No cerebellar signs. Plantars - down going  No atrophy, no fasciculations, no tremors. Skin/extremity: No rashes, no erythema. Calf non tender BLE. 2+ BLE edema. + chronic arthritic joint changes. Mild BUE edema. Functional Assessment:          Balance  Sitting - Static: Good (unsupported) (02/19/18 1100)  Sitting - Dynamic: Fair (occasional) (02/19/18 1100)  Standing - Static: Fair (02/19/18 1100)  Standing - Dynamic : Impaired (02/19/18 1100)           Toileting  Cues: Tactile cues provided;Verbal cues provided (02/16/18 0831)  Adaptive Equipment: Grab bars; Plato Vilas (02/16/18 0831)         Maura Agrawal Fall Risk Assessment:  Maura Agrawal Fall Risk  Mobility: Ambulates or transfers with assist devices or assistance/unsteady gait (02/19/18 0743)  Mobility Interventions: Patient to call before getting OOB (02/19/18 0743)  Mentation: Alert, oriented x 3 (02/19/18 0743)  Medication: Patient receiving anticonvulsants, sedatives(tranquilizers), psychotropics or hypnotics, hypoglycemics, narcotics, sleep aids, antihypertensives, laxatives, or diuretics (02/19/18 0743)  Medication Interventions: Patient to call before getting OOB (02/19/18 0743)  Elimination: Needs assistance with toileting (02/19/18 0743)  Elimination Interventions: Call light in reach (02/19/18 0743)  Prior Fall History: No (02/19/18 0743)  Total Score: 3 (02/19/18 0743)  Standard Fall Precautions: Yes (02/16/18 8965)  High Fall Risk: Yes (02/19/18 0743)     Speech Assessment:         Ambulation:  Gait  Distance (ft): 60 Feet (ft) (02/19/18 1100)  Assistive Device: Walker, rollator (02/19/18 1100)  Rail Use:  (RW as simulated hand rail, ) (02/19/18 1100)     Labs/Studies:  Recent Results (from the past 72 hour(s))   CBC WITH AUTOMATED DIFF    Collection Time: 02/17/18  6:25 AM   Result Value Ref Range    WBC 3.9 (L) 4.3 - 11.1 K/uL    RBC 3.07 (L) 4.05 - 5.25 M/uL    HGB 8.9 (L) 11.7 - 15.4 g/dL    HCT 29.8 (L) 35.8 - 46.3 %    MCV 97.1 79.6 - 97.8 FL    MCH 29.0 26.1 - 32.9 PG    MCHC 29.9 (L) 31.4 - 35.0 g/dL    RDW 16.7 (H) 11.9 - 14.6 %    PLATELET 86 (L) 515 - 450 K/uL    MPV 11.8 10.8 - 14.1 FL    DF AUTOMATED      NEUTROPHILS 77 43 - 78 %    LYMPHOCYTES 11 (L) 13 - 44 %    MONOCYTES 7 4.0 - 12.0 %    EOSINOPHILS 4 0.5 - 7.8 %    BASOPHILS 1 0.0 - 2.0 %    IMMATURE GRANULOCYTES 0 0.0 - 5.0 %    ABS. NEUTROPHILS 3.0 1.7 - 8.2 K/UL    ABS. LYMPHOCYTES 0.4 (L) 0.5 - 4.6 K/UL    ABS. MONOCYTES 0.3 0.1 - 1.3 K/UL    ABS. EOSINOPHILS 0.1 0.0 - 0.8 K/UL    ABS. BASOPHILS 0.0 0.0 - 0.2 K/UL    ABS. IMM.  GRANS. 0.0 0.0 - 0.5 K/UL   METABOLIC PANEL, BASIC    Collection Time: 02/17/18  6:25 AM   Result Value Ref Range    Sodium 142 136 - 145 mmol/L    Potassium 3.7 3.5 - 5.1 mmol/L    Chloride 98 98 - 107 mmol/L    CO2 40 (H) 21 - 32 mmol/L    Anion gap 4 (L) 7 - 16 mmol/L    Glucose 92 65 - 100 mg/dL    BUN 33 (H) 8 - 23 MG/DL    Creatinine 1.02 (H) 0.6 - 1.0 MG/DL    GFR est AA >60 >60 ml/min/1.73m2    GFR est non-AA 56 (L) >60 ml/min/1.73m2    Calcium 8.2 (L) 8.3 - 10.4 MG/DL   HEMOGLOBIN    Collection Time: 02/18/18  6:14 AM   Result Value Ref Range    HGB 9.1 (L) 11.7 - 15.4 g/dL       Assessment:     Problem List as of 2/19/2018  Date Reviewed: 2/10/2018          Codes Class Noted - Resolved    Respiratory failure ([facility] ICD-10-CM: J96.90  ICD-9-CM: 518.81  2/13/2018 - Present        Acute on chronic respiratory failure ([facility] ICD-10-CM: J96.20  ICD-9-CM: 518.84  2/7/2018 - Present        Leukocytosis ICD-10-CM: U82.883  ICD-9-CM: 288.60  2/7/2018 - Present        Chronic diastolic heart failure (HCC) ICD-10-CM: I50.32  ICD-9-CM: 428.32  2/7/2018 - Present        S/P TAVR (transcatheter aortic valve replacement) ICD-10-CM: Z95.2  ICD-9-CM: V43.3  1/30/2018 - Present        Aortic stenosis (Chronic) ICD-10-CM: I35.0  ICD-9-CM: 424.1  1/29/2018 - Present        Peripheral arterial disease (HCC) (Chronic) ICD-10-CM: I73.9  ICD-9-CM: 443.9  1/23/2018 - Present        Pleural effusion ICD-10-CM: J90  ICD-9-CM: 511.9  1/23/2018 - Present    Overview Addendum 2/10/2018 11:36 AM by Sebastien Cabrera NP     Right thoracentesis 1/25/18 - 1200 mls removed  Left thoracentesis 1/25/2018 - 900 mls removed  Bilateral thoracentesis 2/9/18 - L 550 ml (air aspirated during and at the end of procedure) / R 1000 ml and R creamy pink              Chronic respiratory failure with hypoxia (HCC) (Chronic) ICD-10-CM: J96.11  ICD-9-CM: 518.83, 799.02  1/23/2018 - Present    Overview Signed 1/23/2018 11:17 AM by Roddy Moura, NP     Wears 3 to 4 liters at home             Hypoxia ICD-10-CM: R09.02  ICD-9-CM: 799.02  1/23/2018 - Present        Stenosis of prosthetic aortic valve (Chronic) ICD-10-CM: I35.0  ICD-9-CM: 396.0  1/19/2018 - Present    Overview Signed 1/19/2018  3:18 PM by Kashif Casey MD     AVR (10/5/13):  21 mm Pericardial valve.                Tricuspid valve insufficiency (Chronic) ICD-10-CM: I07.1  ICD-9-CM: 397.0  1/15/2018 - Present        Systolic CHF, chronic (HCC) (Chronic) ICD-10-CM: I50.22  ICD-9-CM: 428.22, 428.0  1/15/2018 - Present        S/P mitral valve repair (Chronic) ICD-10-CM: H64.654  ICD-9-CM: V45.89  12/4/2017 - Present        H/O atrioventricular rubin ablation (Chronic) ICD-10-CM: G03.471  ICD-9-CM: V15.1  3/22/2017 - Present        Pulmonary hypertension (Chronic) ICD-10-CM: I27.20  ICD-9-CM: 416.8  2/12/2017 - Present        Aortic valve replaced (Chronic) ICD-10-CM: Z95.2  ICD-9-CM: V43.3  1/9/2017 - Present        Chronic diastolic congestive heart failure (HCC) (Chronic) ICD-10-CM: I50.32  ICD-9-CM: 428.32, 428.0  9/9/2016 - Present        Chronic depression (Chronic) ICD-10-CM: F32.9  ICD-9-CM: 301  8/5/2016 - Present        Osteopenia (Chronic) ICD-10-CM: M85.80  ICD-9-CM: 733.90  8/5/2016 - Present        RLS (restless legs syndrome) (Chronic) ICD-10-CM: G25.81  ICD-9-CM: 333.94  8/5/2016 - Present        Hyperlipidemia (Chronic) ICD-10-CM: E78.5  ICD-9-CM: 272.4  8/5/2016 - Present        Gout (Chronic) ICD-10-CM: M10.9  ICD-9-CM: 274.9  8/5/2016 - Present        Anxiety (Chronic) ICD-10-CM: F41.9  ICD-9-CM: 300.00  8/5/2016 - Present        CAD (coronary artery disease) (Chronic) ICD-10-CM: I25.10  ICD-9-CM: 414.00  8/5/2016 - Present        HTN (hypertension) (Chronic) ICD-10-CM: I10  ICD-9-CM: 401.9  8/5/2016 - Present        GERD (gastroesophageal reflux disease) (Chronic) ICD-10-CM: K21.9  ICD-9-CM: 530.81  8/5/2016 - Present        H/O mitral valve repair, 2003 (Chronic) ICD-10-CM: P14.326  ICD-9-CM: V15.1  6/27/2016 - Present        Sick sinus syndrome (HCC) (Chronic) ICD-10-CM: I49.5  ICD-9-CM: 427.81  2/13/2016 - Present        Obesity (Chronic) ICD-10-CM: E66.9  ICD-9-CM: 278.00  11/2/2015 - Present        Mitral stenosis with insufficiency (Chronic) ICD-10-CM: C56.7  ICD-9-CM: 394.2  11/2/2015 - Present    Overview Signed 11/2/2015 11:02 AM by Alexis Marquez     Prior MV repair 2003.               Rheumatic aortic stenosis (Chronic) ICD-10-CM: I06.0  ICD-9-CM: 395.0  11/2/2015 - Present        Cardiac pacemaker (Chronic) ICD-10-CM: Z95.0  ICD-9-CM: V45.01  11/2/2015 - Present        Thrombocytopenia (HCC) (Chronic) ICD-10-CM: D69.6  ICD-9-CM: 287.5  8/22/2012 - Present        Anemia (Chronic) ICD-10-CM: D64.9  ICD-9-CM: 285.9  10/27/2011 - Present    Overview Signed 10/27/2011  8:25 AM by Alberto Corral     Acute on chronic               Chronic atrial fibrillation (HCC) (Chronic) ICD-10-CM: S00.4  ICD-9-CM: 427.31  10/17/2011 - Present        Hypothyroidism (Chronic) ICD-10-CM: E03.9  ICD-9-CM: 244.9  12/4/2009 - Present        BOBBY (obstructive sleep apnea) (Chronic) ICD-10-CM: G47.33  ICD-9-CM: 327.23  12/4/2009 - Present        Chronic obstructive pulmonary disease (HCC) (Chronic) ICD-10-CM: J44.9  ICD-9-CM: 496  3/8/2009 - Present        Aortic Valve Bioprosthesis Present (Chronic) ICD-10-CM: Z95.2  ICD-9-CM: V43.3  3/7/2009 - Present        Degenerative arthritis of left knee (Chronic) ICD-10-CM: M17.12  ICD-9-CM: 715.96  2/27/2009 - Present        RESOLVED: CHF (congestive heart failure) ([facility] ICD-10-CM: I50.9  ICD-9-CM: 428.0  2/13/2018 - 2/14/2018        RESOLVED: Acute on chronic systolic congestive heart failure ([facility] ICD-10-CM: I50.23  ICD-9-CM: 428.23, 428.0  2/1/2018 - 2/7/2018        RESOLVED: Acute pulmonary edema ([facility] ICD-10-CM: J81.0  ICD-9-CM: 518.4  1/25/2018 - 2/7/2018        RESOLVED: Pacemaker ICD-10-CM: Z95.0  ICD-9-CM: V45.01  12/4/2017 - 1/23/2018        RESOLVED: History of gout ICD-10-CM: Z87.39  ICD-9-CM: V12.29  1/31/2017 - 1/23/2018        RESOLVED: Warfarin anticoagulation (Chronic) ICD-10-CM: Z79.01  ICD-9-CM: V58.61  1/9/2017 - 1/23/2018        RESOLVED: Non morbid obesity due to excess calories ICD-10-CM: E66.09  ICD-9-CM: 278.00  11/14/2016 - 1/23/2018        RESOLVED: Hypoxemia ICD-10-CM: R09.02  ICD-9-CM: 799.02  11/14/2016 - 1/23/2018        RESOLVED: Localized edema ICD-10-CM: R60.0  ICD-9-CM: 782.3  9/9/2016 - 1/23/2018        RESOLVED: Iron deficiency anemia ICD-10-CM: D50.9  ICD-9-CM: 280.9  9/9/2016 - 1/23/2018        RESOLVED: CRI (chronic renal insufficiency) ICD-10-CM: N18.9  ICD-9-CM: 585.9  8/5/2016 - 1/23/2018        RESOLVED: Dyspnea ICD-10-CM: R06.00  ICD-9-CM: 786.09  8/5/2016 - 1/23/2018        RESOLVED: Right hip pain ICD-10-CM: M25.551  ICD-9-CM: 719.45  8/5/2016 - 1/23/2018        RESOLVED: Edema ICD-10-CM: R60.9  ICD-9-CM: 782.3  8/5/2016 - 1/23/2018        RESOLVED: Chest pain ICD-10-CM: R07.9  ICD-9-CM: 786.50  8/5/2016 - 1/23/2018        RESOLVED: Other long term (current) drug therapy ICD-10-CM: D03.287  ICD-9-CM: V58.69  8/5/2016 - 1/23/2018        RESOLVED: Acute encephalopathy ICD-10-CM: G93.40  ICD-9-CM: 348.30  7/8/2016 - 1/23/2018        RESOLVED: Tachycardia ICD-10-CM: R00.0  ICD-9-CM: 785.0  6/27/2016 - 1/23/2018        RESOLVED: Palpitations ICD-10-CM: R00.2  ICD-9-CM: 785.1  11/2/2015 - 1/23/2018        RESOLVED: Respiratory insufficiency ICD-10-CM: R06.89  ICD-9-CM: 786.09  11/2/2015 - 1/23/2018        RESOLVED: Bradycardia (Symptomatic) ICD-10-CM: R00.1  ICD-9-CM: 427.89  11/7/2011 - 1/23/2018        RESOLVED: Rectal bleeding ICD-10-CM: K62.5  ICD-9-CM: 569.3  10/27/2011 - 1/23/2018        RESOLVED: AV block ICD-10-CM: I44.30  ICD-9-CM: 426.10  10/17/2011 - 1/23/2018        RESOLVED: Cardiogenic shock ([facility] [address] ICD-10-CM: R57.0  ICD-9-CM: 785.51  10/17/2011 - 1/23/2018        RESOLVED: Hyperkalemia ICD-10-CM: E87.5  ICD-9-CM: 276.7  10/17/2011 - 1/23/2018        RESOLVED: Nausea and vomiting ICD-10-CM: R11.2  ICD-9-CM: 787.01  2/24/2010 - 1/23/2018        RESOLVED: Epigastric abdominal pain ICD-10-CM: R10.13  ICD-9-CM: 789.06  2/24/2010 - 1/23/2018        RESOLVED: Digoxin toxicity ICD-10-CM: T46.0X1A  ICD-9-CM: 972.1, E942.1  2/24/2010 - 1/23/2018        RESOLVED: ARF (acute renal failure) (HCC) (Chronic) ICD-10-CM: N17.9  ICD-9-CM: 584.9  2/24/2010 - 1/23/2018        RESOLVED: Hypokalemia ICD-10-CM: E87.6  ICD-9-CM: 276.8  2/24/2010 - 1/23/2018        RESOLVED: CKD (chronic kidney disease) stage 3, GFR 30-59 ml/min (Chronic) ICD-10-CM: N18.3  ICD-9-CM: 585.3  12/4/2009 - 1/23/2018        RESOLVED: Cough ICD-10-CM: R05  ICD-9-CM: 786.2  3/9/2009 - 3/12/2009        RESOLVED: Bronchitis ICD-10-CM: J40  ICD-9-CM: 490  3/9/2009 - 3/12/2009        RESOLVED: Atrial fibrillation (HCC) (Chronic) ICD-10-CM: I48.91  ICD-9-CM: 427.31  3/7/2009 - 6/27/2016        RESOLVED: Hypotension (Chronic) ICD-10-CM: I95.9  ICD-9-CM: 458.9  3/1/2009 - 1/23/2018              Plan:     The Post Assessment Physician Evaluation (RAMBO) found the current functional status to be comparable with the Pre-admission Screening. The Patient is a good candidate for acute inpatient rehabilitation. Nothing since the Pre-admission screen has changed that determination.      Rehabilitation Plan  The patient has shown the ability to tolerate and benefit from 3 hours of therapy daily and is being admitted to a comprehensive acute inpatient rehabilitation program consisting of at least 3 hours of combined physical and occupational therapies. Resume intensive Physical Therapy for a minimum of 1.5 hours a day, at least 5 out of 7 days per week to address bed mobility, transfers, ambulation, strengthening, balance, and endurance. - pt  was ambulating independently with a rollator inside her home. reports using rollator or a wheelchair for community mobility. Will continue to focus on endurance, strengthening BLe.      Resume  intensive Occupational Therapy for a minimum of 1.5 hours a day, at least 5 out of 7 days per week to address ADL ( bathing, LE dressing, toileting) and adaptive equipment as needed.       Continue 24-hour skilled rehabilitation nursing for bowel and bladder management, skin care for decubitus ulcer prevention , pain management and ongoing medication administration      The patient may benefit from a psychology consult for depression, anxiety and adjustment disorder.     Continue daily physician medical management:    Acute respiratory failure (HCC) (2/7/2018)/ Pleural effusion (1/23/2018)/ S/P bilateral thoracentesis  S/p - Thoracenteses on 2/9 - 550 mls removed from the left and 1 liter removed from the right.   - Continue bronchodilators prn- has home nebulizer. Resume as prn;xopenex  - Patient will be on 2L O2 at therapy and at rest. May be able to wean as tolerated. Will monitor saO2.    2/15 - sao2 at 100% on 2L/ min. Feels comfortable. Continue lasix and aldactone. Cardiology gave 1x Zaroxolyn in AM prior to AM lasix. 2/19 - continue lasix 80 mg bid + aldactone. +metolazone prior to lasix each morning. Monitor. Daily weights.        S/P TAVR(1/30/2018)/ Acute on chronic diastolic heart failure Pulmonary hypertension / Chronic atrial fibrillation/ HTN   - cardiac precaution, s.p TAVR. - weights and maintain a 2 liter per day fluid restriction.   - Monitor HR/BP. conitinue Eliquis for a.fib  - continue BB-Toprol XL  2/15- edema, chest exam appears not changed. cintiuned on diuretics. 2/19- continue O2 supplements.         Hypothyroidism - synthroid 88 mcg     Leukocytosis (2/7/2018) on Abx last dose 7/7 rocephin administered 2/13.   - finished abx. No new s/s of infection.       Acute blood loss anemia/ GI bleed? -monitor clinically. May have been caused by high INR. - no melena. Will check hgb Saturday.        Pneumonia prophylaxis- Insentive spirometer every hour while awake     DVT risk / DVT Prophylaxis- continue daily physician exam to assess for signs and symptoms as patient is at increased risk for of thromboembolism. - covered with eliquis. - 2/19 no signs of DVT       Wound Care: - monitor for ulcers, skin breakdown due to edema. -left elbow wound, edema drainage covered. Will wrap with ACE bandage to control edema.      Potential urinary retention - no s/s of retention. Monitor.  - pt mostly continent.      bowel program - as needed dulcolax, pericolace. + BM.     GERD/ GI prophylaxis - resume PPI. At times may need additional antacids, Maalox prn.          Time spent was 25 minutes with over 1/2 in direct patient care/examination, consultation and coordination of care.      Signed By: Kiki Urbano MD     February 19, 2018

## 2018-02-19 NOTE — PROGRESS NOTES
Pt admitted from [facility] 3rd floor- debility. Pt lives at home with son, Ana Hloder. Son works. Sister in law can provide some assistance. Pt has Medicare and supplemental insurance. Pt has a RW and 02 (provided by Vaishali Dooley). Has used [facility] in past. Lives in one level home. Anticipated length of stay 7 days. Discussed team conference aind rehab process with patient. Will follow for dc planning and case management needs.

## 2018-02-19 NOTE — PROGRESS NOTES
End Of Shift Functional Summary, Nursing      TOILETING:  Does patient need assist with clothing management and/or pericare? Yes with pants and pull ups     TOILET TRANSFER:  Pt requires moderate assistance. Pt uses walker. BLADDER:  Pt does not have a salamanca catheter that staff manages. Pt does take medication. Pt is continent. of bladder and voids in toilet  Pt requires staff to change brief Pt has had 0 bladder accidents during this shift requiring moderate assistance to clean up. (An accident is when the episode is not contained in a brief AND/OR the clothing/linen requires changing/cleaning up.)    BOWEL:  Pt does take medication. (An accident is when the episode is not contained in a brief AND/OR the clothing/linen requires changing/cleaning up.)    BED/CHAIR TRANSFER  Pt requires moderate assistance. Patient requires the assistance of 1 staff member(s). Pt uses walker              EATING  Pt requires setup. Pt wears dentures          Documentation reviewed and plan of care discussed/reviewed with   oncoming nurse during the shift.

## 2018-02-19 NOTE — PROGRESS NOTES
PHYSICAL THERAPY DAILY NOTE  Time In: 6054  Time Out: 4592  Patient Seen For: AM;Balance activities;Gait training;Patient education;Transfer training; Other (see progress notes)    Subjective: patient reporting she feels OK this AM. Reports she feels a little stronger. Reports her left elbow is still draining and her legs are still swollen         Objective:Vital Signs:patient on 02 at 2 lpm. 02 sat 97 to 100% and HR 81 to 83 during PT treatment  Patient Vitals for the past 12 hrs:   Temp Pulse Resp BP SpO2   02/19/18 0847 - - - 104/62 -   02/19/18 0721 97.6 °F (36.4 °C) 82 20 100/65 100 %     Pain level:No c/o pain  Pain location:NA  Pain interventions:NA    Patient education:Balance training,transfer training, gait training, fall precautions, activity pacing, body mechanics, w/c and rollator  parts management, energy conservation, breathing techniques during functional activity, Patient verbalizing understanding and demonstrating partial understanding of patient education. Recommend follow up education. Interdisciplinary Communication:spoke with RN and CNA regarding elevating patient's legs in the bed when not in therapy and ace wrapping LEs for edema control. CNA reporting she put on thigh high CARA hose this AM. Plan to monitor CARA hose for proper fit and if not fitting properly will change to ace wraps. Discuss this with OT and RN.        Other (comment) (Fall precautions)  GROSS ASSESSMENT Daily Assessment   Applied ABD pad and ace wrap to left elbow due to periodic body fluid drainage from left elbow  min assist to remove shirt and put on new shirt        BED/MAT MOBILITY Daily Assessment    Rolling Right : 0 (Not tested)  Rolling Left : 0 (Not tested)  Supine to Sit : 0 (Not tested)  Sit to Supine : 0 (Not tested)       TRANSFERS Daily Assessment   Cues for rollator set up and body mechanics with sit<>stand Transfer Type: SPT with walker (rollator)  Transfer Assistance : 5 (Stand-by assistance)  Sit to Stand Assistance: Supervision  Car Transfers: Not tested       GAIT Daily Assessment    Amount of Assistance: 5 (Stand-by assistance)  Distance (ft): 60 Feet (ft) (60ft x 2)  Assistive Device: Walker, rollator   Gait training with cues for posture correction and improving step clearance and step length. Cues for managing 02 line    STEPS or STAIRS Daily Assessment    Steps/Stairs Ambulated (#): 0  Level of Assist : 0 (Not tested)       BALANCE Daily Assessment   Static standing x 7 mins at sink while performed oral hygiene and cleaned teeth with set up assist and supervision. No loss of balance, Cues for upright posture Sitting - Static: Good (unsupported)  Sitting - Dynamic: Fair (occasional)  Standing - Static: Fair  Standing - Dynamic : Impaired       WHEELCHAIR MOBILITY Daily Assessment    Curbs/Ramps Assist Required (FIM Score): 0 (Not tested)  Wheelchair Setup Assist Required : 3 (Moderate assistance)  Wheelchair Management: Manages left brake;Manages right brake       LOWER EXTREMITY EXERCISES Daily Assessment     NA          Assessment: Static standing balance and standing endurance improving. Progressing towards modified independence with SPT with rollator demonstrating improved ability to recall set up of rollator for safe sit<>stand. 02 sat and HR remain stable on 02 at 2 lpm during PT treatments       Patient to OT at end of treatment    Plan of Care: Continue with POC and progress as tolerated.      Stefani Gu, PT  2/19/2018

## 2018-02-20 NOTE — PROGRESS NOTES
[facility] PROGRESS NOTE    Patti Trotter  Admit Date: 2/13/2018  Admit Diagnosis: DEBILITY;CHF (congestive heart failure) ([address] Respiratory f*  Chief Complaint : Gait dysfunction secondary to below. Admit Diagnosis: Pleural effusion [J90]; Pleural effusion [J90]  Acute respiratory failure (HCC) (2/7/2018)  Pleural effusion (1/23/2018)/ S/P bilateral thoracentesis  Hypothyroidism   Chronic atrial fibrillation   HTN   Pulmonary hypertension   S/P TAVR(1/30/2018)  Leukocytosis (2/7/2018) on Abx   Acute on chronic diastolic heart failure   weakness  Pain  DVT risk  Acute blood loss anemia/ GI bleed? Acute Rehab Dx:  Generalized weakness. Debility    deconditioning   Mobility and ambulation deficits  Self Care/ADL deficits    Subjective     Patient seen and examined. Vss. Afebrile. appears edema, not much changes. Weight essentially has stayed the same since admission. Has no new complaint. Mild sluggish this morning. Had received valium last night. Therapy tolerated fairly today. Case discussed in conference. Patient laking slow functional gains. Major barrier is the edema, decreased activity tolerance.  .     Objective:     Current Facility-Administered Medications   Medication Dose Route Frequency    metOLazone (ZAROXOLYN) tablet 2.5 mg  2.5 mg Oral DAILY    acetaminophen (TYLENOL) tablet 650 mg  650 mg Oral Q4H PRN    alcohol 62% (NOZIN) nasal  1 Ampule  1 Ampule Topical Q12H    allopurinol (ZYLOPRIM) tablet 100 mg  100 mg Oral BID    apixaban (ELIQUIS) tablet 5 mg  5 mg Oral Q12H    diazePAM (VALIUM) tablet 5 mg  5 mg Oral QHS    furosemide (LASIX) tablet 80 mg  80 mg Oral Q12H    hydrocortisone (ANUSOL-HC) 2.5 % rectal cream   PeriANAL TID PRN    levothyroxine (SYNTHROID) tablet 88 mcg  88 mcg Oral ACB    metoprolol succinate (TOPROL-XL) XL tablet 25 mg  25 mg Oral DAILY    pantoprazole (PROTONIX) tablet 40 mg  40 mg Oral ACB    polyethylene glycol (MIRALAX) packet 17 g  17 g Oral DAILY  potassium chloride (K-DUR, KLOR-CON) SR tablet 40 mEq  40 mEq Oral DAILY    pravastatin (PRAVACHOL) tablet 40 mg  40 mg Oral DAILY    rOPINIRole (REQUIP) tablet 2 mg  2 mg Oral BID    sertraline (ZOLOFT) tablet 100 mg  100 mg Oral DAILY    sodium chloride (NS) flush 5-10 mL  5-10 mL IntraVENous PRN    spironolactone (ALDACTONE) tablet 25 mg  25 mg Oral DAILY    levalbuterol (XOPENEX) nebulizer soln 0.63 mg/3 mL  0.63 mg Nebulization Q6H PRN     Facility-Administered Medications Ordered in Other Encounters   Medication Dose Route Frequency    0.9% sodium chloride infusion 250 mL  250 mL IntraVENous PRN     Review of Systems: + weakness. Denies chest pain, shortness of breath, cough, headache, visual problems, abdominal pain, dysurea, calf pain. Pertinent positives are as noted in the medical records and unremarkable otherwise. Visit Vitals    /67    Pulse 83    Temp 97.5 °F (36.4 °C)    Resp 18    Wt 178 lb 6.4 oz (80.9 kg)    SpO2 100%    BMI 34.84 kg/m2        Physical Exam:   General: Alert and age appropriately oriented.  Appears comfortable on 2L O2 NC. No acute cardio respiratory distress. HEENT: Normocephalic,no scleral icterus  Oral mucosa moist without cyanosis, No bruit, No JVD. Lungs: + left  basilar crackles. You Jews no wheezing. Respiration even and unlabored   Heart: RRR,  II/VI TERRI  No clicks, rub or gallops   Abdomen: Soft, non-tender, nondistended. Bowel sounds present. Genitourinary: defered   Neuromuscular:      PERRL, EOMI  Follows simple commands consistently. Able to identify, recall repeat. Mood appropriate. Insight, judgement intact.    LUE     Shoulder abduction   5-/5              Elbow flexion:   5-/5               Wrist extension:  5- /5              Finger flexion;  5- /5  RUE    Shoulder abduction: 5- /5                Elbow flexion:  5- /5                         Wrist extension: 5- /5                        Finger flexion:  5- /5  LLE     Hip flexion:  3+ /5              Knee extension:  4 /5                         Ankle dorsiflexion:  5 /5                        Ankle plantarflexion:   5/5                                                                  RLE     Hip flexion:   3+/5                        Knee extension:  4 /5                         Ankle dorsiflexion:  5 /5                        Ankle plantarflexion:  5 /5  Sensory - intact grossly   No cerebellar signs. Plantars - down going  No atrophy, no fasciculations, no tremors. Skin/extremity: No rashes, no erythema. Calf non tender BLE. 2+ BLE edema. + chronic arthritic joint changes. Mild BUE edema. Functional Assessment:          Balance  Sitting - Static: Good (unsupported) (02/19/18 1100)  Sitting - Dynamic: Fair (occasional) (02/19/18 1100)  Standing - Static: Fair (02/19/18 1100)  Standing - Dynamic : Impaired (02/19/18 1100)           Toileting  Cues: Tactile cues provided;Verbal cues provided (02/16/18 0831)  Adaptive Equipment: Grab bars; Walker (02/16/18 0831)         Todd Punch Fall Risk Assessment:  Emmie [place] Risk  Mobility: Ambulates or transfers with assist devices or assistance/unsteady gait (02/20/18 0700)  Mobility Interventions: Bed/chair exit alarm;Communicate number of staff needed for ambulation/transfer;OT consult for ADLs; Patient to call before getting OOB;PT Consult for mobility concerns;PT Consult for assist device competence;Strengthening exercises (ROM-active/passive); Utilize walker, cane, or other assitive device (02/20/18 0700)  Mentation: Alert, oriented x 3 (02/20/18 0700)  Mentation Interventions: Bed/chair exit alarm;Evaluate medications/consider consulting pharmacy; Eyeglasses and hearing aids; Increase mobility; Toileting rounds (02/20/18 0700)  Medication: Patient receiving anticonvulsants, sedatives(tranquilizers), psychotropics or hypnotics, hypoglycemics, narcotics, sleep aids, antihypertensives, laxatives, or diuretics (02/20/18 0700)  Medication Interventions: Bed/chair exit alarm;Evaluate medications/consider consulting pharmacy; Patient to call before getting OOB; Teach patient to arise slowly (02/20/18 0700)  Elimination: Needs assistance with toileting (02/20/18 0700)  Elimination Interventions: Call light in reach; Patient to call for help with toileting needs; Toileting schedule/hourly rounds (02/20/18 0700)  Prior Fall History: No (02/20/18 0700)  History of Falls Interventions: Consult care management for discharge planning;Evaluate medications/consider consulting pharmacy (02/20/18 0700)  Total Score: 3 (02/20/18 0700)  Standard Fall Precautions: Yes (02/16/18 2259)  High Fall Risk: Yes (02/20/18 0700)     Speech Assessment:         Ambulation:  Gait  Distance (ft): 60 Feet (ft) (02/19/18 1100)  Assistive Device: Walker, rollator (02/19/18 1100)  Rail Use:  (RW as simulated hand rail, ) (02/19/18 1100)     Labs/Studies:  Recent Results (from the past 72 hour(s))   HEMOGLOBIN    Collection Time: 02/18/18  6:14 AM   Result Value Ref Range    HGB 9.1 (L) 11.7 - 15.4 g/dL       Assessment:     Problem List as of 2/20/2018  Date Reviewed: 2/10/2018          Codes Class Noted - Resolved    Respiratory failure ([facility] ICD-10-CM: J96.90  ICD-9-CM: 518.81  2/13/2018 - Present        Acute on chronic respiratory failure ([facility] ICD-10-CM: J96.20  ICD-9-CM: 518.84  2/7/2018 - Present        Leukocytosis ICD-10-CM: V74.938  ICD-9-CM: 288.60  2/7/2018 - Present        Chronic diastolic heart failure (HCC) ICD-10-CM: I50.32  ICD-9-CM: 428.32  2/7/2018 - Present        S/P TAVR (transcatheter aortic valve replacement) ICD-10-CM: Z95.2  ICD-9-CM: V43.3  1/30/2018 - Present        Aortic stenosis (Chronic) ICD-10-CM: I35.0  ICD-9-CM: 424.1  1/29/2018 - Present        Peripheral arterial disease (HCC) (Chronic) ICD-10-CM: I73.9  ICD-9-CM: 443.9  1/23/2018 - Present        Pleural effusion ICD-10-CM: J90  ICD-9-CM: 511.9  1/23/2018 - Present    Overview Addendum 2/10/2018 11:36 AM by Michel Horn NP     Right thoracentesis 1/25/18 - 1200 mls removed  Left thoracentesis 1/25/2018 - 900 mls removed  Bilateral thoracentesis 2/9/18 - L 550 ml (air aspirated during and at the end of procedure) / R 1000 ml and R creamy pink              Chronic respiratory failure with hypoxia (HCC) (Chronic) ICD-10-CM: J96.11  ICD-9-CM: 518.83, 799.02  1/23/2018 - Present    Overview Signed 1/23/2018 11:17 AM by Courtney Strong NP     Wears 3 to 4 liters at home             Hypoxia ICD-10-CM: R09.02  ICD-9-CM: 799.02  1/23/2018 - Present        Stenosis of prosthetic aortic valve (Chronic) ICD-10-CM: I35.0  ICD-9-CM: 396.0  1/19/2018 - Present    Overview Signed 1/19/2018  3:18 PM by Jaclyn Thomason MD     AVR (10/5/13):  21 mm Pericardial valve.                Tricuspid valve insufficiency (Chronic) ICD-10-CM: I07.1  ICD-9-CM: 397.0  1/15/2018 - Present        Systolic CHF, chronic (HCC) (Chronic) ICD-10-CM: I50.22  ICD-9-CM: 428.22, 428.0  1/15/2018 - Present        S/P mitral valve repair (Chronic) ICD-10-CM: B46.947  ICD-9-CM: V45.89  12/4/2017 - Present        H/O atrioventricular rubin ablation (Chronic) ICD-10-CM: Q88.547  ICD-9-CM: V15.1  3/22/2017 - Present        Pulmonary hypertension (Chronic) ICD-10-CM: I27.20  ICD-9-CM: 416.8  2/12/2017 - Present        Aortic valve replaced (Chronic) ICD-10-CM: Z95.2  ICD-9-CM: V43.3  1/9/2017 - Present        Chronic diastolic congestive heart failure (HCC) (Chronic) ICD-10-CM: I50.32  ICD-9-CM: 428.32, 428.0  9/9/2016 - Present        Chronic depression (Chronic) ICD-10-CM: F32.9  ICD-9-CM: 311  8/5/2016 - Present        Osteopenia (Chronic) ICD-10-CM: M85.80  ICD-9-CM: 733.90  8/5/2016 - Present        RLS (restless legs syndrome) (Chronic) ICD-10-CM: G25.81  ICD-9-CM: 333.94  8/5/2016 - Present        Hyperlipidemia (Chronic) ICD-10-CM: E78.5  ICD-9-CM: 272.4  8/5/2016 - Present        Gout (Chronic) ICD-10-CM: M10.9  ICD-9-CM: 274.9  8/5/2016 - Present        Anxiety (Chronic) ICD-10-CM: F41.9  ICD-9-CM: 300.00  8/5/2016 - Present        CAD (coronary artery disease) (Chronic) ICD-10-CM: I25.10  ICD-9-CM: 414.00  8/5/2016 - Present        HTN (hypertension) (Chronic) ICD-10-CM: I10  ICD-9-CM: 401.9  8/5/2016 - Present        GERD (gastroesophageal reflux disease) (Chronic) ICD-10-CM: K21.9  ICD-9-CM: 530.81  8/5/2016 - Present        H/O mitral valve repair, 2003 (Chronic) ICD-10-CM: T66.095  ICD-9-CM: V15.1  6/27/2016 - Present        Sick sinus syndrome (HCC) (Chronic) ICD-10-CM: I49.5  ICD-9-CM: 427.81  2/13/2016 - Present        Obesity (Chronic) ICD-10-CM: E66.9  ICD-9-CM: 278.00  11/2/2015 - Present        Mitral stenosis with insufficiency (Chronic) ICD-10-CM: O57.3  ICD-9-CM: 394.2  11/2/2015 - Present    Overview Signed 11/2/2015 11:02 AM by Khadra Garcia     Prior MV repair 2003.               Rheumatic aortic stenosis (Chronic) ICD-10-CM: I06.0  ICD-9-CM: 395.0  11/2/2015 - Present        Cardiac pacemaker (Chronic) ICD-10-CM: Z95.0  ICD-9-CM: V45.01  11/2/2015 - Present        Thrombocytopenia (HCC) (Chronic) ICD-10-CM: D69.6  ICD-9-CM: 287.5  8/22/2012 - Present        Anemia (Chronic) ICD-10-CM: D64.9  ICD-9-CM: 285.9  10/27/2011 - Present    Overview Signed 10/27/2011  8:25 AM by Amina Saucedo on chronic               Chronic atrial fibrillation (HCC) (Chronic) ICD-10-CM: I48.2  ICD-9-CM: 427.31  10/17/2011 - Present        Hypothyroidism (Chronic) ICD-10-CM: E03.9  ICD-9-CM: 244.9  12/4/2009 - Present        BOBBY (obstructive sleep apnea) (Chronic) ICD-10-CM: G47.33  ICD-9-CM: 327.23  12/4/2009 - Present        Chronic obstructive pulmonary disease (HCC) (Chronic) ICD-10-CM: J44.9  ICD-9-CM: 496  3/8/2009 - Present        Aortic Valve Bioprosthesis Present (Chronic) ICD-10-CM: Z95.2  ICD-9-CM: V43.3  3/7/2009 - Present        Degenerative arthritis of left knee (Chronic) ICD-10-CM: M17.12  ICD-9-CM: 715.96  2/27/2009 - Present        RESOLVED: CHF (congestive heart failure) ([facility] ICD-10-CM: I50.9  ICD-9-CM: 428.0  2/13/2018 - 2/14/2018        RESOLVED: Acute on chronic systolic congestive heart failure ([facility] ICD-10-CM: I50.23  ICD-9-CM: 428.23, 428.0  2/1/2018 - 2/7/2018        RESOLVED: Acute pulmonary edema ([facility] ICD-10-CM: J81.0  ICD-9-CM: 518.4  1/25/2018 - 2/7/2018        RESOLVED: Pacemaker ICD-10-CM: Z95.0  ICD-9-CM: V45.01  12/4/2017 - 1/23/2018        RESOLVED: History of gout ICD-10-CM: Z87.39  ICD-9-CM: V12.29  1/31/2017 - 1/23/2018        RESOLVED: Warfarin anticoagulation (Chronic) ICD-10-CM: Z79.01  ICD-9-CM: V58.61  1/9/2017 - 1/23/2018        RESOLVED: Non morbid obesity due to excess calories ICD-10-CM: E66.09  ICD-9-CM: 278.00  11/14/2016 - 1/23/2018        RESOLVED: Hypoxemia ICD-10-CM: R09.02  ICD-9-CM: 799.02  11/14/2016 - 1/23/2018        RESOLVED: Localized edema ICD-10-CM: R60.0  ICD-9-CM: 782.3  9/9/2016 - 1/23/2018        RESOLVED: Iron deficiency anemia ICD-10-CM: D50.9  ICD-9-CM: 280.9  9/9/2016 - 1/23/2018        RESOLVED: CRI (chronic renal insufficiency) ICD-10-CM: N18.9  ICD-9-CM: 585.9  8/5/2016 - 1/23/2018        RESOLVED: Dyspnea ICD-10-CM: R06.00  ICD-9-CM: 786.09  8/5/2016 - 1/23/2018        RESOLVED: Right hip pain ICD-10-CM: M25.551  ICD-9-CM: 719.45  8/5/2016 - 1/23/2018        RESOLVED: Edema ICD-10-CM: R60.9  ICD-9-CM: 782.3  8/5/2016 - 1/23/2018        RESOLVED: Chest pain ICD-10-CM: R07.9  ICD-9-CM: 786.50  8/5/2016 - 1/23/2018        RESOLVED: Other long term (current) drug therapy ICD-10-CM: C16.543  ICD-9-CM: V58.69  8/5/2016 - 1/23/2018        RESOLVED: Acute encephalopathy ICD-10-CM: G93.40  ICD-9-CM: 348.30  7/8/2016 - 1/23/2018        RESOLVED: Tachycardia ICD-10-CM: R00.0  ICD-9-CM: 785.0  6/27/2016 - 1/23/2018        RESOLVED: Palpitations ICD-10-CM: R00.2  ICD-9-CM: 785.1  11/2/2015 - 1/23/2018 RESOLVED: Respiratory insufficiency ICD-10-CM: R06.89  ICD-9-CM: 786.09  11/2/2015 - 1/23/2018        RESOLVED: Bradycardia (Symptomatic) ICD-10-CM: R00.1  ICD-9-CM: 427.89  11/7/2011 - 1/23/2018        RESOLVED: Rectal bleeding ICD-10-CM: K62.5  ICD-9-CM: 569.3  10/27/2011 - 1/23/2018        RESOLVED: AV block ICD-10-CM: I44.30  ICD-9-CM: 426.10  10/17/2011 - 1/23/2018        RESOLVED: Cardiogenic shock ([address] ICD-10-CM: R57.0  ICD-9-CM: 785.51  10/17/2011 - 1/23/2018        RESOLVED: Hyperkalemia ICD-10-CM: E87.5  ICD-9-CM: 276.7  10/17/2011 - 1/23/2018        RESOLVED: Nausea and vomiting ICD-10-CM: R11.2  ICD-9-CM: 787.01  2/24/2010 - 1/23/2018        RESOLVED: Epigastric abdominal pain ICD-10-CM: R10.13  ICD-9-CM: 789.06  2/24/2010 - 1/23/2018        RESOLVED: Digoxin toxicity ICD-10-CM: T46.0X1A  ICD-9-CM: 972.1, E942.1  2/24/2010 - 1/23/2018        RESOLVED: ARF (acute renal failure) (HCC) (Chronic) ICD-10-CM: N17.9  ICD-9-CM: 584.9  2/24/2010 - 1/23/2018        RESOLVED: Hypokalemia ICD-10-CM: E87.6  ICD-9-CM: 276.8  2/24/2010 - 1/23/2018        RESOLVED: CKD (chronic kidney disease) stage 3, GFR 30-59 ml/min (Chronic) ICD-10-CM: N18.3  ICD-9-CM: 585.3  12/4/2009 - 1/23/2018        RESOLVED: Cough ICD-10-CM: R05  ICD-9-CM: 786.2  3/9/2009 - 3/12/2009        RESOLVED: Bronchitis ICD-10-CM: J40  ICD-9-CM: 490  3/9/2009 - 3/12/2009        RESOLVED: Atrial fibrillation (HCC) (Chronic) ICD-10-CM: I48.91  ICD-9-CM: 427.31  3/7/2009 - 6/27/2016        RESOLVED: Hypotension (Chronic) ICD-10-CM: I95.9  ICD-9-CM: 458.9  3/1/2009 - 1/23/2018              Plan:     The Post Assessment Physician Evaluation (RAMBO) found the current functional status to be comparable with the Pre-admission Screening. The Patient is a good candidate for acute inpatient rehabilitation.  Nothing since the Pre-admission screen has changed that determination.      Rehabilitation Plan  The patient has shown the ability to tolerate and benefit from 3 hours of therapy daily and is being admitted to a comprehensive acute inpatient rehabilitation program consisting of at least 3 hours of combined physical and occupational therapies. Resume intensive Physical Therapy for a minimum of 1.5 hours a day, at least 5 out of 7 days per week to address bed mobility, transfers, ambulation, strengthening, balance, and endurance. - pt  was ambulating independently with a rollator inside her home. reports using rollator or a wheelchair for community mobility. Will continue to focus on endurance, strengthening BLe.      Resume  intensive Occupational Therapy for a minimum of 1.5 hours a day, at least 5 out of 7 days per week to address ADL ( bathing, LE dressing, toileting) and adaptive equipment as needed.       Continue 24-hour skilled rehabilitation nursing for bowel and bladder management, skin care for decubitus ulcer prevention , pain management and ongoing medication administration      The patient may benefit from a psychology consult for depression, anxiety and adjustment disorder.     Continue daily physician medical management:    Acute respiratory failure (HCC) (2/7/2018)/ Pleural effusion (1/23/2018)/ S/P bilateral thoracentesis  S/p - Thoracenteses on 2/9 - 550 mls removed from the left and 1 liter removed from the right.   - Continue bronchodilators prn- has home nebulizer. Resume as prn;xopenex  - Patient will be on 2L O2 at therapy and at rest. May be able to wean as tolerated. Will monitor saO2. 2/15 - sao2 at 100% on 2L/ min. Feels comfortable. Continue lasix and aldactone. Cardiology gave 1x Zaroxolyn in AM prior to AM lasix. 2/19 - continue lasix 80 mg bid + aldactone. +metolazone prior to lasix each morning. Monitor. Daily weights.   2/20- cardiology recommendation appreciated. Transition to iv lasic. Will need access.  Difficult due to edema.      S/P TAVR(1/30/2018)/ Acute on chronic diastolic heart failure Pulmonary hypertension / Chronic atrial fibrillation/ HTN   - cardiac precaution, s.p TAVR. - weights and maintain a 2 liter per day fluid restriction.   - Monitor HR/BP. conitinue Eliquis for a.fib  - continue BB-Toprol XL  2/15- edema, chest exam appears not changed. cintiuned on diuretics. 2/19- continue O2 supplements. 2/20 - HR in control. continue O2, comfortable on same rate. Will obtain CxR. Check BNP. Monitor renal fx.         Hypothyroidism - synthroid 88 mcg     Leukocytosis (2/7/2018) on Abx last dose 7/7 rocephin administered 2/13.   - finished abx. No new s/s of infection.       Acute blood loss anemia/ GI bleed? -monitor clinically. May have been caused by high INR. - no melena. Will check hgb Saturday.        Pneumonia prophylaxis- Insentive spirometer every hour while awake     DVT risk / DVT Prophylaxis- continue daily physician exam to assess for signs and symptoms as patient is at increased risk for of thromboembolism. - covered with eliquis. No s/s of DVT/ PE. Wound Care: - monitor for ulcers, skin breakdown due to edema. -left elbow  edema drainage covered. -  TEDs. Fitting better. Edema control.         Potential urinary retention - no s/s of retention. Monitor.  - pt mostly continent.      bowel program - as needed dulcolax, pericolace. + BM.     GERD/ GI prophylaxis - resume PPI. At times may need additional antacids, Maalox prn.          Time spent was 25 minutes with over 1/2 in direct patient care/examination, consultation and coordination of care.      Signed By: Harry Darling MD     February 20, 2018

## 2018-02-20 NOTE — PROGRESS NOTES
PHYSICAL THERAPY DAILY NOTE  Time In: 3333  Time Out: 1346  Patient Seen For: PM;Balance activities;Gait training;Patient education; Therapeutic exercise;Transfer training; Other (see progress notes)    Subjective: Patient reporting she is tired from all of the therapy. Reports she wants to go home but knows she is not ready         Objective:Vital Signs:patient on 02 at 2 lpm. 02 sat 97 to 99% during treatment  Patient Vitals for the past 12 hrs:   Temp Pulse Resp BP SpO2   02/20/18 0722 97.5 °F (36.4 °C) 83 18 100/67 100 %     Pain level:No c/o pain  Pain location:NA  Pain interventions:NA    Patient education:Bed mobility training,transfer training, gait training, fall precautions, activity pacing, body mechanics, energy conservation, rollator parts management, breathing techniques during mobility and exercises, Patient verbalizing understanding and demonstrating partial understanding of patient education. Recommend follow up education. Interdisciplinary Communication:spoke with RN regarding bed positioning    Other (comment) (Fall precautions)  GROSS ASSESSMENT Daily Assessment     NA       BED/MAT MOBILITY Daily Assessment    Rolling Right : 0 (Not tested)  Rolling Left : 0 (Not tested)  Supine to Sit : 6 (Modified independent)  Sit to Supine : 5 (Supervision)       TRANSFERS Daily Assessment    Transfer Type: SPT without device (w/c to mat with mat elevated to 24 inches and using 6 inch step with RW as hand rail simulating patient's set up at home)  Transfer Assistance : 5 (Stand-by assistance) (with cues for body mechanics)  Sit to Stand Assistance: Stand-by assistance  Car Transfers: Not tested       GAIT Daily Assessment    Amount of Assistance: 4 (Contact guard assistance)  Distance (ft): 10 Feet (ft) (10ft x 2)  Assistive Device: Walker, rollator   Gait training with cues for posture correction and improving step clearance and step length.  Cues for managing 02 line    STEPS or STAIRS Daily Assessment Up/down 6 inch step with left hand support on RW in front of patient and right hand support on mat simulating patient's set up at home with platform step with hand rail attached at foot of bed. Steps/Stairs Ambulated (#): 0  Level of Assist : 0 (Not tested)       BALANCE Daily Assessment   Static standing balance activities with SBA to CGA and cues for upright posture Sitting - Static: Good (unsupported)  Sitting - Dynamic: Fair (occasional)  Standing - Static: Fair  Standing - Dynamic : Impaired       WHEELCHAIR MOBILITY Daily Assessment    Curbs/Ramps Assist Required (FIM Score): 0 (Not tested)  Wheelchair Setup Assist Required : 3 (Moderate assistance)  Wheelchair Management: Manages left brake;Manages right brake       LOWER EXTREMITY EXERCISES Daily Assessment   Multiple and frequent rest breaks between each set and exercise Extremity: Both  Exercise Type #1: Supine lower extremity strengthening  Sets Performed: 2  Reps Performed: 10  Level of Assist: Minimal assistance     SUPINE EXERCISES Sets Reps Comments   Ankle Pumps 1 20    Glut Sets 2 10    Heel Slides 2 10    Hip Abduction 2 10    Short Arc Quad 2 10             Assessment: Slight decline in functional endurance and functional mobility today. Decreased gait distance. Slight decrease in processing functional commands       Patient returned to room at end of treatment. Patient supine in bed with head of bed elevated and bed rails up x 2. Needs placed in reach of patient. 02 at 2lpm, foot of bed elevated to first notch    Plan of Care: Continue with POC and progress as tolerated.      Eleanor Reyes, PT  2/20/2018

## 2018-02-20 NOTE — PROGRESS NOTES
PHYSICAL THERAPY DAILY NOTE  Time In: 1034  Time Out: 1115  Patient Seen For: AM;Balance activities;Gait training;Patient education; Therapeutic exercise;Transfer training; Other (see progress notes)    Subjective: patient reporting she is a little more tired today. Reports increase in shortness of breath         Objective:Vital Signs:Patient on 02 at 2 lpm, 02 sat 97 to 100% and HR 79 to 81 during treatment  Patient Vitals for the past 12 hrs:   Temp Pulse Resp BP SpO2   02/20/18 0722 97.5 °F (36.4 °C) 83 18 100/67 100 %     Pain level:No c/o pain  Pain location:NA  Pain interventions:NA    Patient education:Balance training,transfer training, gait training, fall precautions, activity pacing, body mechanics, w/c and rollator parts management, energy conservation, breathing techniques during mobility training,Patient verbalizing understanding and demonstrating partial understanding of patient education. Recommend follow up education. Interdisciplinary Communication:spoke with MD regarding change in status as noted below    Other (comment) (Fall precautions)  GROSS ASSESSMENT Daily Assessment     NA       BED/MAT MOBILITY Daily Assessment    Rolling Right : 0 (Not tested)  Rolling Left : 0 (Not tested)  Supine to Sit : 0 (Not tested)  Sit to Supine : 0 (Not tested)       TRANSFERS Daily Assessment   Increased time and effort to complete with cues for body mechanics  Cues for rollator set up Transfer Type: SPT without device  Transfer Assistance : 4 (Contact guard assistance)  Sit to Stand Assistance: Stand-by assistance  Car Transfers: Not tested       GAIT Daily Assessment    Amount of Assistance: 4 (Contact guard assistance)  Distance (ft): 40 Feet (ft) (40ft x 2)  Assistive Device: Walker, rollator   Gait training with cues for posture correction and improving step clearance and step length.  Cues for managing 02 line    STEPS or STAIRS Daily Assessment    Steps/Stairs Ambulated (#): 0  Level of Assist : 0 (Not tested)       BALANCE Daily Assessment   Static standing with rollator facilitating upright posture with SBA and cues Sitting - Static: Good (unsupported)  Sitting - Dynamic: Fair (occasional)  Standing - Static: Fair  Standing - Dynamic : Impaired       WHEELCHAIR MOBILITY Daily Assessment    Curbs/Ramps Assist Required (FIM Score): 0 (Not tested)  Wheelchair Setup Assist Required : 3 (Moderate assistance)  Wheelchair Management: Manages left brake;Manages right brake       LOWER EXTREMITY EXERCISES Daily Assessment    Extremity: Both  Exercise Type #1: Other (comment) (LE motomed x 10 mins at level 1)  Level of Assist: Supervision          Assessment: Decreased functional endurance today with increased rest breaks required during treatment. 02 sat and HR stable on 02 at 2 lpm during treatment. Increased difficulty controlling RR and depth of breath. Slight decrease in ability to recall safe body mechanics and rollator set up for transfers       Patient to recreational therapy at end of treatment    Plan of Care: Continue with POC and progress as tolerated.      Janet Trevino, PT  2/20/2018

## 2018-02-20 NOTE — PROGRESS NOTES
[facility] CARDIOLOGY PROGRESS NOTE           2/20/2018 5:42 PM    Admit Date: 2/13/2018      Subjective:   Denies any worsening dyspnea but edema is worse today compared to my prior exams. ROS:  Cardiovascular:  As noted above    Objective:      Vitals:    02/19/18 0847 02/19/18 1452 02/20/18 0315 02/20/18 0722   BP: 104/62 100/65  100/67   Pulse:  86  83   Resp:  20 18   Temp:  97.6 °F (36.4 °C)  97.5 °F (36.4 °C)   SpO2:  100%  100%   Weight:   80.9 kg (178 lb 6.4 oz)        Physical Exam:  General-No Acute Distress  Neck- supple, no JVD  CV- regular rate and rhythm Grade II/VI TERRI  Lung- clear bilaterally  Abd- soft, nontender, nondistended  Ext- 2+ edema bilaterally. Skin- warm and dry      Data Review:   Recent Labs      02/20/18   0743  02/18/18   0614   NA  143   --    K  3.6   --    BUN  40*   --    CREA  1.19*   --    GLU  115*   --    HGB   --   9.1*      No results found for: [facility]    Assessment/Plan:     Active Problems:    Chronic atrial fibrillation ([facility] [address] (10/17/2011)    On Eliquis      S/P TAVR (transcatheter aortic valve replacement) (1/30/2018)    Stable. On Eliquis. Chronic diastolic heart failure ([address] (2/7/2018)    Change to IV lasix. Check BMP and BNP in AM.        Respiratory failure ([address] (2/13/2018)    Stable on nasal cannula.                    Rip Saenz MD  2/20/2018 5:42 PM

## 2018-02-20 NOTE — PROGRESS NOTES
02/20/18 0831   Time Spent With Patient   Time In 0831   Time Out 1004   Patient Seen For: AM;ADLs   Feeding/Eating   Feeding/Eating Assistance I   Grooming   Grooming Assistance  Min A   Comments s/u assist for brushing dentures and brushing hair. Assist with shaiving chin. Upper Body Bathing   Bathing Assistance, Upper S   Position Performed Seated in chair   Comments s/u assist with preparing water and cues for holding hand held shower head. Lower Body Bathing   Bathing Assistance, Lower  SBA   Adaptive Equipment Grab bar   Position Performed Seated in chair;Standing   Adaptive Equipment Grab bar;Tub bench   Comments SBA for standing balance while washing buttocks. Toileting   Toileting Assistance (FIM Score) Max A   Cues Tactile cues provided;Verbal cues provided   Adaptive Equipment Grab bars; Walker   Upper Body Dressing    Dressing Assistance  Mod A   Comments Assist to thread arms and pull down bra. Lower Body Dressing    Dressing Assistance  Mod A   Leg Crossed Method Used Yes   Position Performed Seated in chair;Standing   Adaptive Equipment Used Grab bar   Don/Doff Anti-Embolic Stockings Dep   Comments Assist with bilateral socks. Assist for clothing management over waist.   Functional Transfers   Toilet Transfer  Grab bars;Stand pivot transfer with walker   Amount of Assistance Required CGA   Tub or Shower Type Shower   Amount of Assistance Required CGA   Adaptive Equipment Grab bars; Tub transfer bench;Walker (comment)     S: \"I thought it [razor] was my toothbrush. \" Agreeable to therapy. Focus of session was on morning ADL routine. Patient was able to ambulate ~15 feet using a RW with CGA. Pain not indicated during this session. Pt more fatigued this session and required significant rest breaks. Collaborated with PT, Jolly Gary and confirmed patient is on track to reach goals as documented in the care plan.    Patient tolerated session well, but shortness of breath, edema, standing balance, UE strength, and activity tolerance are still below baseline and requires skilled facilitation to successfully and safely complete ADL's and transfers. Patient ended session in recliner with call remote and phone within reach.      Malu Lucio, OT Student

## 2018-02-20 NOTE — PROGRESS NOTES
Problem: Falls - Risk of  Goal: *Absence of Falls  Document Gregorio Fall Risk and appropriate interventions in the flowsheet.    Outcome: Progressing Towards Goal  Fall Risk Interventions:  Mobility Interventions: Bed/chair exit alarm, Communicate number of staff needed for ambulation/transfer, OT consult for ADLs, Patient to call before getting OOB, PT Consult for mobility concerns, PT Consult for assist device competence, Strengthening exercises (ROM-active/passive), Utilize walker, cane, or other assitive device    Mentation Interventions: Bed/chair exit alarm, Evaluate medications/consider consulting pharmacy, Eyeglasses and hearing aids, Increase mobility, Toileting rounds    Medication Interventions: Bed/chair exit alarm, Evaluate medications/consider consulting pharmacy, Patient to call before getting OOB, Teach patient to arise slowly    Elimination Interventions: Call light in reach, Patient to call for help with toileting needs, Toileting schedule/hourly rounds    History of Falls Interventions: Consult care management for discharge planning, Evaluate medications/consider consulting pharmacy

## 2018-02-20 NOTE — PROGRESS NOTES
Subjective \"I can play anything. \"   Activity Connect Four    Strength/Endurance Patient participated in connect four activity with no c/o tiredness or fatigue. She was able to  the small pieces and accurately place them. She showed good activity tolerance and endurance throughout the activity. Balance CGA with RW    Social Interaction Patient was in good spirits and appeared to enjoy participating in the activity. She liked to joke around with others in the gym and socialize with those around her. Cognitive A&O X4   Comments Patient was taken back to her room and assisted to the bathroom. She was left in the recliner with lunch and all needs within reach.       Shela Closs, RT Student

## 2018-02-20 NOTE — PROGRESS NOTES
Pt resting quietly in bed. Denies needs or concerns at this time. Alert and oriented x4. Lungs sounds diminished bilaterally with respirations even and unlabored. Bowel sounds active in all quadrants. +2 pulses bilaterally in upper and lower extremities. Left upper extremity weeping. Swelling in BLE. Instructed to call for assistance. Call light in reach. Voiced understanding and identified call light.

## 2018-02-20 NOTE — PROGRESS NOTES
End Of Shift Functional Summary, Nursing      TOILETING:  Does patient need assist with clothing management and/or pericare? Yes: Comment: clothing management    TOILET TRANSFER:  Pt requires moderate assistance. Pt uses wheelchair. BLADDER:  Pt does not have a [place] catheter that staff manages. Pt does take medication. Pt is continent. of bladder and voids in toilet Pt has had 0 bladder accidents during this shift.  (An accident is when the episode is not contained in a brief AND/OR the clothing/linen requires changing/cleaning up.)    BOWEL:  Pt does take medication. Pt is continent of bowel and uses toilet. Pt has had 0 bowel accidents during this shift. (An accident is when the episode is not contained in a brief AND/OR the clothing/linen requires changing/cleaning up.)    BED/CHAIR TRANSFER  Pt requires moderate assistance. Patient requires the assistance of 1 staff member(s). Pt uses walker      EATING  Pt requires setup. Pt wears dentures. TUBE FEEDINGS:  Pt does not  receive nutrition through tube feedings. Patient requires supervision/setup with feedings. Documentation reviewed and plan of care discussed/reviewed with   patient, oncoming nurse, patient assistant and family/spouse during the shift.

## 2018-02-21 NOTE — PROGRESS NOTES
PHYSICAL THERAPY DAILY NOTE  Time In: 1031  Time Out: 1121  Patient Seen For: AM;Balance activities;Gait training;Patient education; Therapeutic exercise;Transfer training; Other (see progress notes)    Subjective: patient reporting she feels OK today. Reports she feels a little stronger today. Reports she would like to go home soon. Objective:Vital Signs:  Patient Vitals for the past 12 hrs:   Temp Pulse Resp BP SpO2   02/21/18 0757 99.2 °F (37.3 °C) 81 18 98/63 100 %     Pain level:No c/o pain  Pain location:NA  Pain interventions:NA    Patient education:Balance training,transfer training, gait training, fall precautions, activity pacing, body mechanics, rollator parts management, breathing techniques during functional mobility, energy conservation, Patient verbalizing understanding and demonstrating partial understanding of patient education. Recommend follow up education. Interdisciplinary Communication:MD in to assess patient during PT treatment    Other (comment) (Fall precautions)  GROSS ASSESSMENT Daily Assessment     NA       BED/MAT MOBILITY Daily Assessment    Rolling Right : 0 (Not tested)  Rolling Left : 0 (Not tested)  Supine to Sit : 0 (Not tested)  Sit to Supine : 0 (Not tested)       TRANSFERS Daily Assessment   Increased time and effort demonstrating correct set up of rollator and correct body mechanics Transfer Type: SPT with walker  Transfer Assistance : 5 (Stand-by assistance)  Sit to Stand Assistance: Stand-by assistance  Car Transfers: Not tested       GAIT Daily Assessment    Amount of Assistance: 5 (Stand-by assistance)  Distance (ft): 60 Feet (ft) (60ft x 2 with 5 min rest break)  Assistive Device: Walker, rollator   Gait training with cues for posture correction and to improve step length and step clearance.  Cues for managing 02 line    STEPS or STAIRS Daily Assessment    Steps/Stairs Ambulated (#): 0  Level of Assist : 0 (Not tested)       BALANCE Daily Assessment   Static standing with rollator and cues for upright posture and to improve depth of breath Sitting - Static: Good (unsupported)  Sitting - Dynamic: Fair (occasional)  Standing - Static: Fair  Standing - Dynamic : Impaired       WHEELCHAIR MOBILITY Daily Assessment    Curbs/Ramps Assist Required (FIM Score): 0 (Not tested)  Wheelchair Setup Assist Required : 3 (Moderate assistance)  Wheelchair Management: Manages left brake;Manages right brake       LOWER EXTREMITY EXERCISES Daily Assessment    Extremity: Both  Exercise Type #1: Other (comment) (LE motomed x 10 mins at level 1)  Level of Assist: Supervision          Assessment: Improved tolerance to treatment with improved ability to process functional commands. Functional endurance and functional mobility improved today. Cont to require extra time to complete a functional task and rest breaks between functional tasks due to decreased endurance. Slowly improving with ability to manage 02 line when walking with rollator       Patient returned to room at end of treatment and remained up in recliner with LEs elevated and with needs in reach. .    Plan of Care: Continue with POC and progress as tolerated.      Federico Jacobsen, PT  2/21/2018

## 2018-02-21 NOTE — PROGRESS NOTES
Problem: Falls - Risk of  Goal: *Absence of Falls  Document Gregorio Fall Risk and appropriate interventions in the flowsheet.    Outcome: Progressing Towards Goal  Fall Risk Interventions:  Mobility Interventions: Patient to call before getting OOB    Mentation Interventions: Adequate sleep, hydration, pain control    Medication Interventions: Patient to call before getting OOB    Elimination Interventions: Call light in reach    History of Falls Interventions: Consult care management for discharge planning, Evaluate medications/consider consulting pharmacy

## 2018-02-21 NOTE — PROGRESS NOTES
PT WEEKLY PROGRESS NOTE   Time In: 5339   Time Out: 1340    Subjective: Patient reporting she is tired this PM. Reports she has a small 4 door car to go home in and has 2 steps to get into her home with no rails. Objective: Vital Signs:  Patient Vitals for the past 12 hrs:   Temp Pulse Resp BP SpO2   02/21/18 0757 99.2 °F (37.3 °C) 81 18 98/63 100 %     Pain level:No c/o pain  Pain location:NA  Pain interventions:NA    Patient education:Bed mobility training,transfer training, gait training, fall precautions, activity pacing, body mechanics, w/c and rollator parts management, energy conservation, breathing techniques during mobility, Patient verbalizing understanding and demonstrating partial understanding of patient education. Recommend follow up education. Interdisciplinary Communication:spoke with OT regarding progress towards goals    Other (comment) (Fall precautions)    Outcome Measures:     FIM SCORES Initial Assessment Weekly Progress Assessment 2/21/2018   Bed/Chair/Wheelchair Transfers 4 5   Wheelchair Mobility  (NT secondary to fatigue after functional assessment) NT   Walking [place] 1 2   Steps/Stairs  (NT) 2   Please see [facility] Interdisciplinary Eval: Coordination/Balance Section for details regarding FIM score description. BED/CHAIR/WHEELCHAIR TRANSFERS Initial Assessment Weekly Progress Assessment 2/21/2018   Rolling Right 4 (Minimal assistance) 6 (Modified independent)   Rolling Left 4 (Minimal assistance) 6 (Modified independent)   Supine to Sit 4 (Minimal assistance) 6 (Modified independent)   Sit to Stand Contact guard assistance Stand-by assistance   Sit to Supine 4 (Minimal assistance) 5 (Supervision)   Transfer Type SPT without device SPT with walker   Comments Increased time and effort to complete bed mobility and transfers   Increased time and effort to complete with cues for body mechanics to complete bed mobility and transfers.      Car Transfer Not tested Minimum assistance Car Type rehab car rehab car     GROSS ASSESSMENT Weekly Progress Assessment 2/21/2018   AROM  (LE decreased secondary to weakness and edema)   Strength  (bilateral hip -3/5 to +2/5,knee 4/5, ankle 4/5)   Coordination  (Impaired)   Tone Normal   Sensation Intact   PROM  (Decreased secondary to LE edema )     POSTURE Weekly Progress Assessment 2/21/2018   Posture (WDL) Exceptions to WDL   Posture Assessment Forward head;Rounded shoulders;Kyphosis;Trunk flexion     WHEELCHAIR MOBILITY/MANAGEMENT Initial Assessment Weekly Progress Assessment 2/21/2018   Able to Propel 0 feet  NA   Curbs/ramps assistance required 0 (Not tested) 0 (Not tested)   Wheelchair set up assistance required 3 (Moderate assistance)  mod assist   Wheelchair management Manages left brake, Manages right brake Manages left brake;Manages right brake     WALKING INDEPENDENCE Initial Assessment Weekly Progress Assessment 2/21/2018   Assistive device Walker, rollator, Gait belt Walker, rollator   Ambulation assistance - level surface 4 (Contact guard assistance)  SBA with cues for posture correction and to improve step clearance and step length.  Cues to manage 02 line   Distance 40 Feet (ft) (40ft x 1  30ft x 1) 60 Feet (ft)   Comments slow cont partial step through gait pattern with decreased step length and step clearance, foot flat initial contact with shuffling type steps Patient on 02 at 2 lpm     GAIT Weekly Progress Assessment 2/21/2018   Gait Description (WDL)  slow cont to start/stop gait with flexed posture   Gait Abnormalities  decreased ankle PF and knee flex at terminal stance, decreased knee ext and ankle DF at initial contact, increased right knee flex at midstance     STEPS/STAIRS Initial Assessment Weekly Progress Assessment 2/21/2018   Steps/Stairs ambulated 0 4   Rail Use  (RW as simulated hand rail, ) Both   Comments Unable to ambulate up/down steps at thsi time secondary to LE weakness and decreased endurance  slow single step at a time leading up with LLE and down with RLE. Increased time and effort to complete with decreased step clearance. Flexed posture, 4 min sitting rest break after going up steps   Curbs/Ramps 0 (Not tested)  NA          Assessment: Patient making slow progress towards goals. Patient has not LTGs per eval due to slow progress. Progress has been slow secondary to decreased functional endurance and slow progress with reducing edema. 02 sat and HR has been stable on 02 at 2 lpm but has tendency to fatigue easily during functional mobility. Slowly improving with ability to manage 02 line during gait with rollator. Functional endurance and functional strength slowly improving. Anticipating patient will require supervision with gait and transfers with rollator but gait will be limited to household distances. Patient not able to functionally propel w/c due to UE weakness and limited ROM and decreased endurance. Plan for D/C next week with family training prior to D/C and [facility] PT follow up       Patient returned to room at end of treatment. Patient supine in bed with head of bed elevated and bed rails up x 2. Needs placed in reach of patient. LEs elevated    Plan of Care: patient seen for weekly assessment. Goals updated and revised. Please see Care Plan for updated LTGs.     Family Training: Needs to be scheduled    Soto De Anda, PT  2/21/2018

## 2018-02-21 NOTE — PROGRESS NOTES
Patient in [place] procedure room for procedure. Name and  verified via verbal and armband. Patient moved to procedure table with assistance.

## 2018-02-21 NOTE — PROGRESS NOTES
Problem: Falls - Risk of  Goal: *Absence of Falls  Document Gregorio Fall Risk and appropriate interventions in the flowsheet.    Outcome: Progressing Towards Goal  Fall Risk Interventions:  Mobility Interventions: Patient to call before getting OOB, Communicate number of staff needed for ambulation/transfer    Mentation Interventions: Adequate sleep, hydration, pain control, Door open when patient unattended, Evaluate medications/consider consulting pharmacy, Increase mobility, More frequent rounding    Medication Interventions: Evaluate medications/consider consulting pharmacy, Patient to call before getting OOB    Elimination Interventions: Call light in reach    History of Falls Interventions: Consult care management for discharge planning, Evaluate medications/consider consulting pharmacy

## 2018-02-21 NOTE — PROGRESS NOTES
[facility] CARDIOLOGY PROGRESS NOTE    2/21/2018 7:14 AM    Admit Date: 2/13/2018    Admit Diagnosis: DEBILITY;CHF (congestive heart failure) ([facility] [address] Respiratory f*      Subjective:   Stable overnight without angina, CHF, or palpitations. Vitals stable and controlled. Still with significant LE edema but lying nearly flat comfortably in bed. No other complaints overnight. Tolerating meds well. Objective:      Vitals:    02/19/18 1452 02/20/18 0315 02/20/18 0722 02/20/18 1915   BP: 100/65  100/67 104/57   Pulse: 86  83 79   Resp: 20 18 18   Temp: 97.6 °F (36.4 °C)  97.5 °F (36.4 °C) 97.6 °F (36.4 °C)   SpO2: 100%  100% 99%   Weight:  80.9 kg (178 lb 6.4 oz)         Physical Exam:  Neck- supple, 10-12cm JVD at 30deg  CV- regular rate and rhythm no MRG  Lung- clear bilaterally, decreased bibasilar  Abd- soft, nontender, nondistended  Ext- 1-2+ anterior tibial pitting edema  Skin- warm and dry    Data Review:   Recent Labs      02/20/18   0743   NA  143   K  3.6   BUN  40*   CREA  1.19*   GLU  115*       Assessment and Plan:        Active Problems:    Chronic atrial fibrillation (HCC)- rate controlled, anticoagulated    On Eliquis       S/P TAVR (transcatheter aortic valve replacement) - stable, no angina or SOB    Stable. On Eliquis.        Chronic diastolic heart failure ([facility] [address] (2/7/2018)- edema still significant- continue diuresis, leg elevation    Changed to IV lasix yesterday. Check BMP and Mg in AM.         Respiratory failure ([address] (2/13/2018)- improved, comfortable lying flat in bed, continue diuresis. Stable on nasal cannula.       BMP, CBC, Mg in AM  Elevate legs aggressively today    CANDACE Spangler MD  [facility] Cardiology  Pager [phone]

## 2018-02-21 NOTE — PROGRESS NOTES
Per [facility] patient is still currently admitted to [facility] (Inpatient Rehab) as of 02/21/2018. Care Coordinator unable to complete Transitions of Care Outreach due to patient current admit status. This note will not be viewable in [address]

## 2018-02-21 NOTE — PROGRESS NOTES
OT WEEKLY PROGRESS NOTE    Time In: 3884  Time Out: 3347    COMPREHENSION MODE Initial Assessment Weekly Progress Assessment 2/21/2018   Score 6 6     EXPRESSION Initial Assessment Weekly Progress Assessment 2/21/2018   Primary Mode of Expression Verbal Verbal   Score 7 7   Comments         SOCIAL INTERACTION/ PRAGMATICS Initial Assessment Weekly Progress Assessment 2/21/2018   Score 7 7   Comments         PROBLEM SOLVING Initial Assessment Weekly Progress Assessment 2/21/2018   Score 5 5   Comments Solves complex problems with cues, or basic problems 90% or more of the time. MEMORY Initial Assessment Weekly Progress Assessment 2/21/2018   Score 6 6   Comments  executes 3 steps of a 3 step request with additional time        EATING Initial Assessment Weekly Progress Assessment 2/21/2018   Functional Level       Comments         GROOMING Initial Assessment Weekly Progress Assessment 2/21/2018   Functional Level 5 5   Tasks completed by patient Brushed hair, Brushed teeth, Washed face, Washed hands Brushed hair;Brushed teeth; Washed face; Washed hands   Comments s/u assist sinkside in w/c     BATHING Initial Assessment Weekly Progress Assessment 2/21/2018   Functional Level 3 5   Body parts patient bathed Abdomen, Arm, left, Arm, right, Buttocks, Chest, Nitza area, Thigh, left, Thigh, right Abdomen;Arm, left; Buttocks;Arm, right;Chest;Lower leg and foot, left; Lower leg and foot, right;Nitza area; Thigh, left; Thigh, right   Comments Assist to wash bilateral feet. Steady assist while standing to wash buttocks. able to wash all areas with extra time. Supervision for safety     TUB/SHOWER TRANSFER INDEPENDENCE Initial Assessment Weekly Progress Assessment 2/21/2018   Score 4 4   Comments CGA and use of RW. CGA     UPPER BODY DRESSING/UNDRESSING Initial Assessment Weekly Progress Assessment 2/21/2018   Functional Level 4 4   Items applied/Steps completed Bra (3 steps), Pullover (4 steps) Bra (3 steps); Pullover (4 steps) Comments Assist to pull down bra.  some assist pulling sports bra down in back     LOWER BODY DRESSING/UNDRESSING Initial Assessment Weekly Progress Assessment 2/21/2018   Functional Level 4 4   Items applied/Steps completed Elastic waist pants (3 steps), Sock, left (1 step), Sock, right (1 step) Elastic waist pants (3 steps); Fasten shoe (1 step); Shoe, left (1 step); Sock, left (1 step); Shoe, right (1 step); Sock, right (1 step); Underpants (3 steps)   Comments CGA for standing to pull clothing up. some assist with left leg threading into pants. Good balance pulling over waist     TOILETING Initial Assessment Weekly Progress Assessment 2/21/2018   Functional Level 3 5   Comments Assist with pulling clothing up. Pt able to pull clothing down and complete hygiene. SBA     TOILET TRANSFER INDEPENDENCE Initial Assessment Weekly Progress Assessment 2/21/2018   Transfer score 4 5   Comments CGA and use of grab bars and RW.  supevision     Plan of Care: Please see Care Plan for updated LTGs. Summary of Progress: Patient is making slow gains, attempting to use compression stocking to assist with distal edema in LE with some success. Patient is making improvements in strength, activity tolerance, and balance resulting in increased independence in self cares and functional transfers. Patient is still below baseline to return home to take care of her self independently. Summary of Session: S: \"I am doing better today. \" Agreeable to therapy. Focus of session was on morning ADL routine and progress note. Patient was able to ambulate ~25 feet using a RW and manage oxygen line with supervision. Pain denied during session. Collaborated with PT, Sariah Yan and confirmed patient is on track to reach goals as documented in the care plan. Patient tolerated session well. Patient ended session in w/c with call remote and phone within reach.      Mariann Restrepo OTR

## 2018-02-21 NOTE — PROGRESS NOTES
Subjective \"We can play a game today. \"   Activity Beanbag Toss    Strength/Endurance Patient participated in the activity appropriately and had good activity tolerance. She engaged both upper extremities to toss the beanbags accurately in the basket. She had no c/o of tiredness or fatigue. Balance CGA with RW    Social Interaction Patient initiated and engaged in conversation with the therapist and student. She appears to enjoy the socialization and likes to joke around during the session. She was in good spirits. Cognitive A&O X4   Comments Patient was left in the gym awaiting her PT session.       Giovanni Willard, RT Student

## 2018-02-21 NOTE — PROGRESS NOTES
End Of Shift Functional Summary, Nursing      TOILETING:  Does patient need assist with clothing management and/or pericare? Yes: Comment: needs moving and turning and pulling clothe up and down    TOILET TRANSFER:  Pt requires moderate assistance. Pt uses wheelchair. BLADDER:  Pt does not have a salamanca catheter that staff manages. Pt does not take medication. Pt is continent. of bladder and voids in toilet  Pt requires staff to empty device Pt has had 0 bladder accidents during this shift requiring moderate assistance to clean up. (An accident is when the episode is not contained in a brief AND/OR the clothing/linen requires changing/cleaning up.)    BOWEL:  Pt does take medication. Pt is continent of bowel and uses toilet. Pt requires staff to empty device    Pt has had 0 bowel accidents during this shift requiring moderate assistance from staff to clean up. (An accident is when the episode is not contained in a brief AND/OR the clothing/linen requires changing/cleaning up.)    BED/CHAIR TRANSFER  Pt requires moderate assistance. Patient requires the assistance of 1 staff member(s). Pt uses walker                              EATING  Pt requires setup. Pt wears dentures. TUBE FEEDINGS:  Pt does not  receive nutrition through tube feedings. Patient requires no assistance with feedings. Documentation reviewed and plan of care discussed/reviewed with   patient, physician, therapists, oncoming nurse, patient assistant and family/spouse during the shift.

## 2018-02-21 NOTE — PROGRESS NOTES
Patient had a quiet night; slept well without complaint. Ambulated to the toilet twice; had a BM at 2 am.      Weight AM = 79.379 kg      Hourly rounds were performed throughout the shift. All needs met at this time. Report given to oncoming nurse.

## 2018-02-21 NOTE — PROGRESS NOTES
Patient resting up in bed. Alert and oriented with pleasant affect. Lung sounds with light coarseness. Diminished in bases. Encouraged to cough and deep breathe. 2 liters nasal cannula. Edema continues to extremities. Dyspneic upon exertion. Denies any pain or discomfort. Repositioned up in bed for breakfast tray. No other verbalized needs made known at present time. Assessment completed. See doc flow sheet for further assessments.

## 2018-02-21 NOTE — PROGRESS NOTES
TRANSFER - OUT REPORT:    Verbal report given to Yon Whitaker (name) on Jenny Jaramillo  being transferred to 9th floor (unit) for routine progression of care       Report consisted of patients Situation, Background, Assessment and   Recommendations(SBAR). Information from the following report(s) Procedure Summary was reviewed with the receiving nurse. Lines:   Triple Lumen 02/21/18 Right Neck (Active)   Central Line Being Utilized Yes 2/21/2018  3:59 PM   Criteria for Appropriate Use Limited/no vessel suitable for conventional peripheral access 2/21/2018  3:59 PM   Site Assessment Clean, dry, & intact 2/21/2018  3:59 PM   Infiltration Assessment 0 2/21/2018  3:59 PM   Date of Last Dressing Change 02/21/18 2/21/2018  3:59 PM   Dressing Status Clean, dry, & intact 2/21/2018  3:59 PM   Dressing Type Disk with Chlorhexadine gluconate (CHG) 2/21/2018  3:59 PM        Opportunity for questions and clarification was provided.       Patient transported with:   [employer]

## 2018-02-22 NOTE — PROGRESS NOTES
PHYSICAL THERAPY DAILY NOTE  Time In: 1505  Time Out: 8132  Patient Seen For: PM;Balance activities; Family training;Gait training;Patient education;Transfer training; Other (see progress notes)    Subjective: Patient reporting she feels better since taking a nap after lunch. Son reporting he is gone most of the day and patient will be at home alone during the day. Reports he is thinking about hiring some one to stay with her for a few hours         Objective:Vital Signs:  Patient Vitals for the past 12 hrs:   Temp Pulse Resp BP SpO2   02/22/18 0746 97.6 °F (36.4 °C) 80 20 99/66 100 %     Pain level:No c/o pain  Pain location:NA  Pain interventions:NA    Patient education:Balance training,transfer training, gait training, fall precautions, activity pacing, body mechanics, rollator parts management, energy conservation, breathing techniques during functional mobility, Patient verbalizing understanding and demonstrating partial understanding of patient education. Recommend follow up education.     Interdisciplinary Communication:NA    Other (comment) (Fall precautions)  GROSS ASSESSMENT Daily Assessment     NA       BED/MAT MOBILITY Daily Assessment    Rolling Right : 0 (Not tested)  Rolling Left : 0 (Not tested)  Supine to Sit : 0 (Not tested)  Sit to Supine : 0 (Not tested)       TRANSFERS Daily Assessment   Increased time and effort to complete with cues for body mechanics   Transfer Type: SPT with walker (rollator)  Transfer Assistance : 5 (Stand-by assistance)  Sit to Stand Assistance: Stand-by assistance (verbal cues for body mechanics and rollator set up)  Car Transfers: Minimum assistance  Car Type: rehab car       GAIT Daily Assessment    Amount of Assistance: 5 (Stand-by assistance)  Distance (ft): 60 Feet (ft)  Assistive Device: Walker, rollator   Gait training with cues for posture correction and to improve step length and step clearance, cues for managing 02 line    STEPS or STAIRS Daily Assessment   Slow single step at a time leading up with LLE and down with RLE. Increased time and effort to complete with cues for posture correction and to improve step clearance. 4 min sitting rest break after going up steps Steps/Stairs Ambulated (#): 4  Level of Assist : 4 (Minimal assistance)  Rail Use: Both       BALANCE Daily Assessment   Static standing with rollator and SBA with cues for upright posture Sitting - Static: Good (unsupported)  Sitting - Dynamic: Fair (occasional)  Standing - Static: Fair  Standing - Dynamic : Impaired       WHEELCHAIR MOBILITY Daily Assessment    Curbs/Ramps Assist Required (FIM Score): 0 (Not tested)  Wheelchair Setup Assist Required : 3 (Moderate assistance)  Wheelchair Management: Manages left brake;Manages right brake       LOWER EXTREMITY EXERCISES Daily Assessment     NA          Assessment: Slow progress towards goals secondary to ongoing edema. 02 sat and HR remain stable during PT treatment on 02 at 2lpm       Patient returned to room at end of treatment and remained up in recliner with LEs elevated and with needs in reach. Plan of Care: Continue with POC and progress as tolerated.      Tabby Brown, PT  2/22/2018

## 2018-02-22 NOTE — PROGRESS NOTES
PHYSICAL THERAPY DAILY NOTE  Time In: 1034  Time Out: 6433  Patient Seen For: AM;Balance activities; Family training;Gait training;Patient education;Transfer training; Other (see progress notes)    Subjective: patient reporting she feels OK. Reports she is tired from AM therapy. Son in for family training. Son reporting he is concerned about ongoing fluid retention in her legs. Discussed at length patient's set up at home including bed room,bathroom and living room. Son reporting patient's bed is about 30 inches high and she uses a 6 inch platform step with right side hand rail to access her tall bed. Reports she has difficulty getting into bed even with the platform step. Reports there is no way to lower bed due to it being a mechanical adjustable bed. Reports patient has 1 2 inch step then a 9 inch step to get into her home. Reports no hand rails         Objective:Vital Signs:  Patient Vitals for the past 12 hrs:   Temp Pulse Resp BP SpO2   02/22/18 0746 97.6 °F (36.4 °C) 80 20 99/66 100 %     Patient on 02 at 2 lpm, 02 sat 99% and HR 84 after ambulating 40ft    Pain level:No c/o pain  Pain location:NA  Pain interventions:NA    Patient education:Bed mobility training,transfer training, gait training, fall precautions, activity pacing, body mechanics,energy conservation, breathing techniques during mobility training, Patient verbalizing understanding and demonstrating partial understanding of patient education. Recommend follow up education.     Interdisciplinary Communication:spoke with MSW regarding DME and family training and D/C date    Other (comment) (Fall precautions)  GROSS ASSESSMENT Daily Assessment     Set up of mat with step and Rw simulating patient's set up at home       BED/MAT MOBILITY Daily Assessment   Increased time and effort Rolling Right : 6 (Modified independent)  Rolling Left : 6 (Modified independent)  Supine to Sit : 6 (Modified independent)  Sit to Supine : 4 (Minimal assistance) (with LEs) TRANSFERS Daily Assessment   Simulating patient's bed set up at home with mat elevated to 30 inches. Using 6 inch step with RW to side of step (simulating patient's platform step with hand rail at home)patient transferred onto step facing forward , turned 180 degrees holding onto RW then sat down with SBA and cues. Sit to stand from elevated mat using rollator and SBA with cues, Transfer Type: SPT with walker (rollator)  Transfer Assistance : 5 (Stand-by assistance)  Sit to Stand Assistance: Stand-by assistance (cues for rollator set up)  Car Transfers: Not tested       GAIT Daily Assessment    Amount of Assistance: 5 (Stand-by assistance)  Distance (ft): 40 Feet (ft)  Assistive Device: Walker, rollator   Gait training with cues for posture correction and to improve step length and step clearance. Cues for managing 02 line    STEPS or STAIRS Daily Assessment   Up 6 inch step with RW on right side of step and using left hand on mat elevating to 30 inches  Stepping down step with rollator and CGA with cues Steps/Stairs Ambulated (#): 0  Level of Assist : 0 (Not tested)   Discussed with son possibility of a hand rail or a grab bar into door frame at entrance of home    BALANCE Daily Assessment    Sitting - Static: Good (unsupported)  Sitting - Dynamic: Fair (occasional)  Standing - Static: Fair  Standing - Dynamic : Impaired       WHEELCHAIR MOBILITY Daily Assessment    Curbs/Ramps Assist Required (FIM Score): 0 (Not tested)  Wheelchair Setup Assist Required : 3 (Moderate assistance)  Wheelchair Management: Manages left brake;Manages right brake       LOWER EXTREMITY EXERCISES Daily Assessment     NA          Assessment: Decreased functional endurance this AM with slight decline in functional status. Difficult to progress beyond current level due to ongoing medical issues.  Problem solving with patient's son regarding patient's home set up lead to recommendation for hospital bed at this time due to height of patient's bed at home and recommendation for grab bar on door frame at entrance of home. Patient to OT at end of treatment    Plan of Care: Continue with POC and progress as tolerated.      Tashi Aldrich, PT  2/22/2018

## 2018-02-22 NOTE — PROGRESS NOTES
Right IJ neck dressing changed per Bart Patterson at 2100. Scant amount of bleeding noted at insertion site. Reinforced with guaze. Pt up to BR to void without calling for assistance. Assisted back to bed. SR up x3. Bed alarm on. Call bell within reach.

## 2018-02-22 NOTE — PROGRESS NOTES
End Of Shift Functional Summary, Nursing      TOILETING:  Does patient need assist with clothing management and/or pericare? Yes Help pulling pants up and down    TOILET TRANSFER:  Pt requires moderate assistance. Pt uses walker. BLADDER:  Pt does not have a salamanca catheter that staff manages. Pt does not take medication. Pt is incontinent. of bladder and voids in bedside commode  Pt requires staff to position device and change brief. Pt has had 0 bladder accidents during this shift requiring moderate assistance to clean up. (An accident is when the episode is not contained in a brief AND/OR the clothing/linen requires changing/cleaning up.)    BOWEL:  Pt does take medication. Pt is continent of bowel and uses toilet and bedside commode. Pt requires staff to position device    Pt has had 0 bowel accidents during this shift requiring moderate assistance from staff to clean up.  (An accident is when the episode is not contained in a brief AND/OR the clothing/linen requires changing/cleaning up.)

## 2018-02-22 NOTE — PROGRESS NOTES
Problem: Falls - Risk of  Goal: *Absence of Falls  Document Gregorio Fall Risk and appropriate interventions in the flowsheet.    Outcome: Progressing Towards Goal  Fall Risk Interventions:  Mobility Interventions: Patient to call before getting OOB, Communicate number of staff needed for ambulation/transfer, Utilize walker, cane, or other assitive device    Mentation Interventions: Adequate sleep, hydration, pain control, Door open when patient unattended, Evaluate medications/consider consulting pharmacy, Increase mobility, More frequent rounding    Medication Interventions: Evaluate medications/consider consulting pharmacy, Patient to call before getting OOB    Elimination Interventions: Call light in reach, Patient to call for help with toileting needs    History of Falls Interventions: Consult care management for discharge planning

## 2018-02-22 NOTE — PROGRESS NOTES
Patient resting up in bed. Alert and oriented with pleasant affect. Lung sounds with light coarseness to upper lobes. 2 liters nasal cannula. S1S2, bowel sounds active. Right jugular central line intact, clean, and patent. Patient denies any pain or discomfort. Daily weight completed. Patient approximately 176 lbs at present time. Assessment completed. See doc flow sheet for further assessments.

## 2018-02-22 NOTE — PROGRESS NOTES
Problem: Nutrition Deficit  Goal: *Optimize nutritional status  Nutrition:  Assessment based on LOS. Assessment:  Anthropometrics:   Ht - 5'0\", wgt - 80 kg (standing scale 9th floor 2/22/18), BMI 34.5 c/w \"overweight\" in a person > 72years of age, edema - 2+ pitting BLEs. Macronutrient Needs:  Estimated calorie needs - 8697-8398 sayra/day (15-20 sayra/kg/day)  Estimated protein needs - 36-54 gm pro/day (0.8-1.2 gm pro/kgIBW/day) (GFR 47 ml/min)  Intake/Comparative Standards: This patient's average intake of cardiac diet for the past 7 recorded days/13 meals: 73%. This potentially meets 100% of calorie and >100% of protein goals. Diet:   Cardiac. Pertinent Labs:   Potassium 3, BUN 41, creatinine 1. 19. Pertinent Medications:   Lasix twice daily, Aldactone. Food/Nutrition History:   The patient presents with no acute nutrition risk factors based on the nursing admission malnutrition screen. She was visited today in room 909 during lunch. The patient reports that she doesn't feel that she is eating the same amount of food here as she does at home because it doesn't taste good. She also reported that she drinks Ensure at home. Diagnosis (Nutrition):  No nutrition diagnosis at this time. Intervention:  Meals and Snacks: Continue current cardiac diet. Medical Food Supplement Therapy: Commercial Beverage: Strawberry Ensure Enlive with all trays. She was encouraged to sip it with her meals. Nutrition Discharge Plan: Too soon to determine. Can [place].  Samira Guardian  [phone]

## 2018-02-22 NOTE — PROGRESS NOTES
02/22/18 0840   Time Spent With Patient   Time In 0840   Time Out 1004   Patient Seen For: AM;ADLs   Feeding/Eating   Feeding/Eating Assistance I   Grooming   Grooming Assistance  S   Comments s/u assist for brushing dentures and brushing hair. Upper Body Bathing   Bathing Assistance, Upper S   Position Performed Seated in chair   Comments s/u assist with preparing water and cues for holding hand held shower head. Lower Body Bathing   Bathing Assistance, Lower  Min A   Adaptive Equipment Grab bar   Position Performed Seated in chair;Standing   Adaptive Equipment Grab bar;Tub bench   Comments CGA for stance while washing buttocks. Toileting   Toileting Assistance (FIM Score) Max A   Cues Verbal cues provided;Physical assistance for pants down;Physical assistance for pants up   Adaptive Equipment Walker;Grab bars   Upper Body Dressing    Dressing Assistance  Mod A   Comments Assist to thread arms in bra and pull down. Lower Body Dressing    Dressing Assistance  Max A   Leg Crossed Method Used No   Position Performed Standing;Seated in chair   Adaptive Equipment Used Grab bar   Don/Doff Anti-Embolic Stockings Dep   Comments Assist with threading L leg through pull-ups and pants. Assist with clothing management over waist in stance. Functional Transfers   Toilet Transfer  Grab bars;Stand pivot transfer with walker   Amount of Assistance Required CGA   Tub or Shower Type Shower   Amount of Assistance Required CGA   Adaptive Equipment Grab bars; Walker (comment); Tub transfer bench     S: \"I got a new port put in my neck last night. \" Agreeable to therapy. Focus of session was on morning ADL routine. Patient was able to ambulate ~15 feet using a RW with CGA. Pain not indicated during this session. Collaborated with PT and confirmed patient is making slow progress towards goals due to swelling impacting overall strength and activity tolerance.   Patient tolerated session well, but shortness of breath, edema, standing balance, UE strength, and activity tolerance are still below baseline and requires skilled facilitation to successfully and safely complete ADL's and transfers. Patient ended session in w/c with call remote and phone within reach.      Patrick Beasley, OT Student

## 2018-02-22 NOTE — PROGRESS NOTES
OT Daily Note    Time In 1115   Time Out 1156     Subjective:\"I am tired, but what is next\" Agreeable to therapy. Pain:Denied during session. Interdisciplinary Communication: Collaborated with Benjamin Montanez and patient is making progress towards goals. Precautions:  (fall precautions)    Balance/functional mobility Daily Assessment   Ambulated ~10 with RW from w/c to edge of bed with extra time and SBA. Min assist from edge of bed to supine in bed. Strengthening/activity tolerance Daily Assessment   UE exercise bike completed for 6 minutes with minimal resistance at min/moderate pace to increase activity tolerance and UE strength. Cognition Daily Assessment   16 piece block puzzle completed requiring step by step verbal instructions to achieve only 50% accuracy. Patient more fatigued and may not be accurate representation of cognition. Ended session:Left with legs elevated in bed, all needs including lunch within reach. [address] daughter visiting.      Zoe Suazo OTR/L

## 2018-02-22 NOTE — PROGRESS NOTES
Subjective \"I am feeling better than I did yesterday. \"   Activity Tennis/balloon hit   Strength/Endurance Patient with increased activity tolerance and with appropriate rest breaks taken throughout the session. She was motivated and willing to participate. Balance NT   Social Interaction Patient was friendly and in good spirits. She enjoyed having her son present during her RT session. She is slightly [place] but able to understand when spoken louder to. Cognitive A&O X4   Comments Patient was at 100% on 2 liter O2 and heart rate at 80 bpm.  She was left seated in her w/c awaiting PT session with Genoveva Jaquez. Patient is making excellent progress with recreational therapy. Continue with RT POC.     Margarito Comment, CTRS

## 2018-02-22 NOTE — PROGRESS NOTES
S[facility] PROGRESS NOTE    Soledad Barrera  Admit Date: 2/13/2018  Admit Diagnosis: DEBILITY;CHF (congestive heart failure) ([address] Respiratory f*  Chief Complaint : Gait dysfunction secondary to below. Admit Diagnosis: Pleural effusion [J90]; Pleural effusion [J90]  Acute respiratory failure (HCC) (2/7/2018)  Pleural effusion (1/23/2018)/ S/P bilateral thoracentesis  Hypothyroidism   Chronic atrial fibrillation   HTN   Pulmonary hypertension   S/P TAVR(1/30/2018)  Leukocytosis (2/7/2018) on Abx   Acute on chronic diastolic heart failure   weakness  Pain  DVT risk  Acute blood loss anemia/ GI bleed? Acute Rehab Dx:  Generalized weakness. Debility    deconditioning   Mobility and ambulation deficits  Self Care/ADL deficits    Subjective     Patient seen and examined. Vss. Afebrile. No new complaints. Feels well. Patient edema mildly better. Mild decreased weight; 176lbs today. Left IJ oozing overnight following IR procedure, appears not bleeding today. hgb decreased, likely due to blood loss. Will be monitored. eliquis reduced to 1/2 dose temporarily. continue iv lasix. Therapy tolerated well. deniies chest pain, SOB( on 2L viaNC)    Objective:     Current Facility-Administered Medications   Medication Dose Route Frequency    potassium chloride (K-DUR, KLOR-CON) SR tablet 40 mEq  40 mEq Oral BID    magnesium oxide (MAG-OX) tablet 400 mg  400 mg Oral DAILY    apixaban (ELIQUIS) tablet 2.5 mg  2.5 mg Oral Q12H    lidocaine (XYLOCAINE) 20 mg/mL (2 %) injection  mg  1-15 mL SubCUTAneous Multiple    heparin (PF) 2 units/ml in NS infusion 2,000 Units  1,000 mL Irrigation PRN    diazePAM (VALIUM) tablet 2.5 mg  2.5 mg Oral QHS    central line flush (saline) syringe 10 mL  10 mL InterCATHeter Q8H    furosemide (LASIX) injection 80 mg  80 mg IntraVENous BID    metOLazone (ZAROXOLYN) tablet 2.5 mg  2.5 mg Oral DAILY    acetaminophen (TYLENOL) tablet 650 mg  650 mg Oral Q4H PRN    alcohol 62% (NOZIN) nasal  1 Ampule  1 Ampule Topical Q12H    allopurinol (ZYLOPRIM) tablet 100 mg  100 mg Oral BID    hydrocortisone (ANUSOL-HC) 2.5 % rectal cream   PeriANAL TID PRN    levothyroxine (SYNTHROID) tablet 88 mcg  88 mcg Oral ACB    metoprolol succinate (TOPROL-XL) XL tablet 25 mg  25 mg Oral DAILY    pantoprazole (PROTONIX) tablet 40 mg  40 mg Oral ACB    polyethylene glycol (MIRALAX) packet 17 g  17 g Oral DAILY    pravastatin (PRAVACHOL) tablet 40 mg  40 mg Oral DAILY    rOPINIRole (REQUIP) tablet 2 mg  2 mg Oral BID    sertraline (ZOLOFT) tablet 100 mg  100 mg Oral DAILY    spironolactone (ALDACTONE) tablet 25 mg  25 mg Oral DAILY    levalbuterol (XOPENEX) nebulizer soln 0.63 mg/3 mL  0.63 mg Nebulization Q6H PRN     Facility-Administered Medications Ordered in Other Encounters   Medication Dose Route Frequency    0.9% sodium chloride infusion 250 mL  250 mL IntraVENous PRN     Review of Systems: + weakness. Denies chest pain, shortness of breath, cough, headache, visual problems, abdominal pain, dysurea, calf pain. Pertinent positives are as noted in the medical records and unremarkable otherwise. Visit Vitals    BP 99/66    Pulse 80    Temp 97.6 °F (36.4 °C)    Resp 20    Wt 176 lb 6.4 oz (80 kg)    SpO2 100%    BMI 34.45 kg/m2        Physical Exam:   General: Alert and age appropriately oriented.  Appears comfortable on 2L O2 NC. No acute cardio respiratory distress. HEENT: Normocephalic,no scleral icterus  Oral mucosa moist without cyanosis, No bruit, No JVD. Lungs: + left  basilar crackles. Marshia Minder no wheezing. Respiration even and unlabored   Heart: RRR,  II/VI TERRI  No clicks, rub or gallops   Abdomen: Soft, non-tender, nondistended. Bowel sounds present. Genitourinary: defered   Neuromuscular:      PERRL, EOMI  Follows simple commands consistently. Able to identify, recall repeat. Mood appropriate. Insight, judgement intact.    LUE     Shoulder abduction   5-/5              Elbow flexion:   5-/5               Wrist extension:  5- /5              Finger flexion;  5- /5  RUE    Shoulder abduction: 5- /5                Elbow flexion:  5- /5                         Wrist extension: 5- /5                        Finger flexion:  5- /5  LLE     Hip flexion:  3+ /5              Knee extension:  4 /5                         Ankle dorsiflexion:  5 /5                        Ankle plantarflexion:   5/5                                                                  RLE     Hip flexion:   3+/5                        Knee extension:  4 /5                         Ankle dorsiflexion:  5 /5                        Ankle plantarflexion:  5 /5  Sensory - intact grossly   No cerebellar signs. Plantars - down going  No atrophy, no fasciculations, no tremors. Skin/extremity: No rashes, no erythema. Calf non tender BLE. 2+ BLE edema. + chronic arthritic joint changes. Mild BUE edema. LUE weeping. Functional Assessment:  Gross Assessment  AROM:  (LE decreased secondary to weakness and edema) (02/21/18 1300)  PROM:  (Decreased secondary to LE edema ) (02/21/18 1300)  Strength:  (bilateral hip -3/5 to +2/5,knee 4/5, ankle 4/5) (02/21/18 1300)  Coordination:  (Impaired) (02/21/18 1300)  Tone: Normal (02/21/18 1300)  Sensation: Intact (02/21/18 1300)       Balance  Sitting - Static: Good (unsupported) (02/21/18 1300)  Sitting - Dynamic: Fair (occasional) (02/21/18 1300)  Standing - Static: Fair (02/21/18 1300)  Standing - Dynamic : Impaired (02/21/18 1300)           Toileting  Cues: Tactile cues provided;Verbal cues provided (02/20/18 0831)  Adaptive Equipment: Grab bars; Walker (02/20/18 0831)         [facility] Fall Risk Assessment:  [facility] Fall Risk  Mobility: Ambulates or transfers with assist devices or assistance/unsteady gait (02/21/18 2001)  Mobility Interventions: Patient to call before getting OOB; Bed/chair exit alarm;PT Consult for assist device competence;Strengthening exercises (ROM-active/passive); Utilize walker, cane, or other assitive device (02/21/18 2001)  Mentation: Alert, oriented x 3 (02/21/18 2001)  Mentation Interventions: Adequate sleep, hydration, pain control;Door open when patient unattended;More frequent rounding;Evaluate medications/consider consulting pharmacy;Gait belt with transfers/ambulation; Toileting rounds;Update white board (02/21/18 2001)  Medication: Patient receiving anticonvulsants, sedatives(tranquilizers), psychotropics or hypnotics, hypoglycemics, narcotics, sleep aids, antihypertensives, laxatives, or diuretics (02/21/18 2001)  Medication Interventions: Evaluate medications/consider consulting pharmacy; Patient to call before getting OOB; Teach patient to arise slowly; Assess postural VS orthostatic hypotension (02/21/18 2001)  Elimination: Needs assistance with toileting (02/21/18 2001)  Elimination Interventions: Call light in reach; Patient to call for help with toileting needs; Toileting schedule/hourly rounds; Toilet paper/wipes in reach (02/21/18 2001)  Prior Fall History: No (02/21/18 2001)  History of Falls Interventions: Consult care management for discharge planning;Evaluate medications/consider consulting pharmacy (02/20/18 0700)  Total Score: 3 (02/21/18 2001)  Standard Fall Precautions: Yes (02/21/18 2001)  High Fall Risk: Yes (02/21/18 2001)     Speech Assessment:         Ambulation:  Gait  Distance (ft): 60 Feet (ft) (02/21/18 1300)  Assistive Device: Trudell Piles, rollator (02/21/18 1300)  Rail Use: Both (02/21/18 1300)     Labs/Studies:  Recent Results (from the past 72 hour(s))   METABOLIC PANEL, BASIC    Collection Time: 02/20/18  7:43 AM   Result Value Ref Range    Sodium 143 136 - 145 mmol/L    Potassium 3.6 3.5 - 5.1 mmol/L    Chloride 98 98 - 107 mmol/L    CO2 38 (H) 21 - 32 mmol/L    Anion gap 7 7 - 16 mmol/L    Glucose 115 (H) 65 - 100 mg/dL    BUN 40 (H) 8 - 23 MG/DL Creatinine 1.19 (H) 0.6 - 1.0 MG/DL    GFR est AA 57 (L) >60 ml/min/1.73m2    GFR est non-AA 47 (L) >60 ml/min/1.73m2    Calcium 8.4 8.3 - 10.4 MG/DL   BNP    Collection Time: 02/21/18  7:03 AM   Result Value Ref Range     pg/mL   METABOLIC PANEL, BASIC    Collection Time: 02/21/18  7:03 AM   Result Value Ref Range    Sodium 144 136 - 145 mmol/L    Potassium 3.1 (L) 3.5 - 5.1 mmol/L    Chloride 97 (L) 98 - 107 mmol/L    CO2 40 (H) 21 - 32 mmol/L    Anion gap 7 7 - 16 mmol/L    Glucose 89 65 - 100 mg/dL    BUN 41 (H) 8 - 23 MG/DL    Creatinine 1.17 (H) 0.6 - 1.0 MG/DL    GFR est AA 58 (L) >60 ml/min/1.73m2    GFR est non-AA 48 (L) >60 ml/min/1.73m2    Calcium 8.6 8.3 - 15.2 MG/DL   METABOLIC PANEL, BASIC    Collection Time: 02/22/18  6:00 AM   Result Value Ref Range    Sodium 141 136 - 145 mmol/L    Potassium 3.0 (L) 3.5 - 5.1 mmol/L    Chloride 95 (L) 98 - 107 mmol/L    CO2 41 (HH) 21 - 32 mmol/L    Anion gap 5 (L) 7 - 16 mmol/L    Glucose 84 65 - 100 mg/dL    BUN 40 (H) 8 - 23 MG/DL    Creatinine 1.23 (H) 0.6 - 1.0 MG/DL    GFR est AA 55 (L) >60 ml/min/1.73m2    GFR est non-AA 45 (L) >60 ml/min/1.73m2    Calcium 8.3 8.3 - 10.4 MG/DL   CBC W/O DIFF    Collection Time: 02/22/18  6:00 AM   Result Value Ref Range    WBC 5.2 4.3 - 11.1 K/uL    RBC 2.59 (L) 4.05 - 5.25 M/uL    HGB 7.5 (L) 11.7 - 15.4 g/dL    HCT 24.8 (L) 35.8 - 46.3 %    MCV 95.8 79.6 - 97.8 FL    MCH 29.0 26.1 - 32.9 PG    MCHC 30.2 (L) 31.4 - 35.0 g/dL    RDW 16.9 (H) 11.9 - 14.6 %    PLATELET 74 (L) 918 - 450 K/uL    MPV 11.4 10.8 - 14.1 FL   MAGNESIUM    Collection Time: 02/22/18  6:00 AM   Result Value Ref Range    Magnesium 2.0 1.8 - 2.4 mg/dL   CBC W/O DIFF    Collection Time: 02/22/18  7:20 AM   Result Value Ref Range    WBC 4.7 4.3 - 11.1 K/uL    RBC 2.48 (L) 4.05 - 5.25 M/uL    HGB 7.4 (L) 11.7 - 15.4 g/dL    HCT 23.7 (L) 35.8 - 46.3 %    MCV 95.6 79.6 - 97.8 FL    MCH 29.8 26.1 - 32.9 PG    MCHC 31.2 (L) 31.4 - 35.0 g/dL    RDW 16.9 (H) 11.9 - 14.6 %    PLATELET 70 (L) 433 - 450 K/uL    MPV 11.0 10.8 - 18.4 FL   METABOLIC PANEL, BASIC    Collection Time: 02/22/18  7:20 AM   Result Value Ref Range    Sodium 143 136 - 145 mmol/L    Potassium 3.0 (L) 3.5 - 5.1 mmol/L    Chloride 95 (L) 98 - 107 mmol/L    CO2 41 (HH) 21 - 32 mmol/L    Anion gap 7 7 - 16 mmol/L    Glucose 83 65 - 100 mg/dL    BUN 41 (H) 8 - 23 MG/DL    Creatinine 1.19 (H) 0.6 - 1.0 MG/DL    GFR est AA 57 (L) >60 ml/min/1.73m2    GFR est non-AA 47 (L) >60 ml/min/1.73m2    Calcium 8.5 8.3 - 10.4 MG/DL       Assessment:     Problem List as of 2/22/2018  Date Reviewed: 2/10/2018          Codes Class Noted - Resolved    Respiratory failure ([facility] [address] ICD-10-CM: J96.90  ICD-9-CM: 518.81  2/13/2018 - Present        Acute on chronic respiratory failure (HCC) ICD-10-CM: J96.20  ICD-9-CM: 518.84  2/7/2018 - Present        Leukocytosis ICD-10-CM: Q96.787  ICD-9-CM: 288.60  2/7/2018 - Present        Chronic diastolic heart failure (HCC) ICD-10-CM: I50.32  ICD-9-CM: 428.32  2/7/2018 - Present        S/P TAVR (transcatheter aortic valve replacement) ICD-10-CM: Z95.2  ICD-9-CM: V43.3  1/30/2018 - Present        Aortic stenosis (Chronic) ICD-10-CM: I35.0  ICD-9-CM: 424.1  1/29/2018 - Present        Peripheral arterial disease (HCC) (Chronic) ICD-10-CM: I73.9  ICD-9-CM: 443.9  1/23/2018 - Present        Pleural effusion ICD-10-CM: J90  ICD-9-CM: 511.9  1/23/2018 - Present    Overview Addendum 2/10/2018 11:36 AM by Dayne Guadarrama NP     Right thoracentesis 1/25/18 - 1200 mls removed  Left thoracentesis 1/25/2018 - 900 mls removed  Bilateral thoracentesis 2/9/18 - L 550 ml (air aspirated during and at the end of procedure) / R 1000 ml and R creamy pink              Chronic respiratory failure with hypoxia (HCC) (Chronic) ICD-10-CM: J96.11  ICD-9-CM: 518.83, 799.02  1/23/2018 - Present    Overview Signed 1/23/2018 11:17 AM by Elvin Michel NP     Wears 3 to 4 liters at home             Hypoxia ICD-10-CM: R09.02  ICD-9-CM: 799.02  1/23/2018 - Present        Stenosis of prosthetic aortic valve (Chronic) ICD-10-CM: I35.0  ICD-9-CM: 396.0  1/19/2018 - Present    Overview Signed 1/19/2018  3:18 PM by Acosta Hardin MD     AVR (10/5/13):  21 mm Pericardial valve.                Tricuspid valve insufficiency (Chronic) ICD-10-CM: I07.1  ICD-9-CM: 397.0  1/15/2018 - Present        Systolic CHF, chronic (HCC) (Chronic) ICD-10-CM: I50.22  ICD-9-CM: 428.22, 428.0  1/15/2018 - Present        S/P mitral valve repair (Chronic) ICD-10-CM: V96.776  ICD-9-CM: V45.89  12/4/2017 - Present        H/O atrioventricular rubin ablation (Chronic) ICD-10-CM: V38.202  ICD-9-CM: V15.1  3/22/2017 - Present        Pulmonary hypertension (Chronic) ICD-10-CM: I27.20  ICD-9-CM: 416.8  2/12/2017 - Present        Aortic valve replaced (Chronic) ICD-10-CM: Z95.2  ICD-9-CM: V43.3  1/9/2017 - Present        Chronic diastolic congestive heart failure (HCC) (Chronic) ICD-10-CM: I50.32  ICD-9-CM: 428.32, 428.0  9/9/2016 - Present        Chronic depression (Chronic) ICD-10-CM: F32.9  ICD-9-CM: 311  8/5/2016 - Present        Osteopenia (Chronic) ICD-10-CM: M85.80  ICD-9-CM: 733.90  8/5/2016 - Present        RLS (restless legs syndrome) (Chronic) ICD-10-CM: G25.81  ICD-9-CM: 333.94  8/5/2016 - Present        Hyperlipidemia (Chronic) ICD-10-CM: E78.5  ICD-9-CM: 272.4  8/5/2016 - Present        Gout (Chronic) ICD-10-CM: M10.9  ICD-9-CM: 274.9  8/5/2016 - Present        Anxiety (Chronic) ICD-10-CM: F41.9  ICD-9-CM: 300.00  8/5/2016 - Present        CAD (coronary artery disease) (Chronic) ICD-10-CM: I25.10  ICD-9-CM: 414.00  8/5/2016 - Present        HTN (hypertension) (Chronic) ICD-10-CM: I10  ICD-9-CM: 401.9  8/5/2016 - Present        GERD (gastroesophageal reflux disease) (Chronic) ICD-10-CM: K21.9  ICD-9-CM: 530.81  8/5/2016 - Present        H/O mitral valve repair, 2003 (Chronic) ICD-10-CM: A59.828  ICD-9-CM: V15.1  6/27/2016 - Present        Sick sinus syndrome ([facility] (Chronic) ICD-10-CM: I49.5  ICD-9-CM: 427.81  2/13/2016 - Present        Obesity (Chronic) ICD-10-CM: E66.9  ICD-9-CM: 278.00  11/2/2015 - Present        Mitral stenosis with insufficiency (Chronic) ICD-10-CM: R26.8  ICD-9-CM: 394.2  11/2/2015 - Present    Overview Signed 11/2/2015 11:02 AM by Lindsey Alcala     Prior MV repair 2003.               Rheumatic aortic stenosis (Chronic) ICD-10-CM: I06.0  ICD-9-CM: 395.0  11/2/2015 - Present        Cardiac pacemaker (Chronic) ICD-10-CM: Z95.0  ICD-9-CM: V45.01  11/2/2015 - Present        Thrombocytopenia (HCC) (Chronic) ICD-10-CM: D69.6  ICD-9-CM: 287.5  8/22/2012 - Present        Anemia (Chronic) ICD-10-CM: D64.9  ICD-9-CM: 285.9  10/27/2011 - Present    Overview Signed 10/27/2011  8:25 AM by Alberto Corral     Acute on chronic               Chronic atrial fibrillation (HCC) (Chronic) ICD-10-CM: I48.2  ICD-9-CM: 427.31  10/17/2011 - Present        Hypothyroidism (Chronic) ICD-10-CM: E03.9  ICD-9-CM: 244.9  12/4/2009 - Present        BOBBY (obstructive sleep apnea) (Chronic) ICD-10-CM: G47.33  ICD-9-CM: 327.23  12/4/2009 - Present        Chronic obstructive pulmonary disease (HCC) (Chronic) ICD-10-CM: J44.9  ICD-9-CM: 496  3/8/2009 - Present        Aortic Valve Bioprosthesis Present (Chronic) ICD-10-CM: Z95.2  ICD-9-CM: V43.3  3/7/2009 - Present        Degenerative arthritis of left knee (Chronic) ICD-10-CM: M17.12  ICD-9-CM: 715.96  2/27/2009 - Present        RESOLVED: CHF (congestive heart failure) ([facility] ICD-10-CM: I50.9  ICD-9-CM: 428.0  2/13/2018 - 2/14/2018        RESOLVED: Acute on chronic systolic congestive heart failure ([facility] ICD-10-CM: I50.23  ICD-9-CM: 428.23, 428.0  2/1/2018 - 2/7/2018        RESOLVED: Acute pulmonary edema ([facility] ICD-10-CM: J81.0  ICD-9-CM: 518.4  1/25/2018 - 2/7/2018        RESOLVED: Pacemaker ICD-10-CM: Z95.0  ICD-9-CM: V45.01  12/4/2017 - 1/23/2018        RESOLVED: History of gout ICD-10-CM: Z87.39  ICD-9-CM: V12.29 1/31/2017 - 1/23/2018        RESOLVED: Warfarin anticoagulation (Chronic) ICD-10-CM: Z79.01  ICD-9-CM: V58.61  1/9/2017 - 1/23/2018        RESOLVED: Non morbid obesity due to excess calories ICD-10-CM: E66.09  ICD-9-CM: 278.00  11/14/2016 - 1/23/2018        RESOLVED: Hypoxemia ICD-10-CM: R09.02  ICD-9-CM: 799.02  11/14/2016 - 1/23/2018        RESOLVED: Localized edema ICD-10-CM: R60.0  ICD-9-CM: 782.3  9/9/2016 - 1/23/2018        RESOLVED: Iron deficiency anemia ICD-10-CM: D50.9  ICD-9-CM: 280.9  9/9/2016 - 1/23/2018        RESOLVED: CRI (chronic renal insufficiency) ICD-10-CM: N18.9  ICD-9-CM: 585.9  8/5/2016 - 1/23/2018        RESOLVED: Dyspnea ICD-10-CM: R06.00  ICD-9-CM: 786.09  8/5/2016 - 1/23/2018        RESOLVED: Right hip pain ICD-10-CM: M25.551  ICD-9-CM: 719.45  8/5/2016 - 1/23/2018        RESOLVED: Edema ICD-10-CM: R60.9  ICD-9-CM: 782.3  8/5/2016 - 1/23/2018        RESOLVED: Chest pain ICD-10-CM: R07.9  ICD-9-CM: 786.50  8/5/2016 - 1/23/2018        RESOLVED: Other long term (current) drug therapy ICD-10-CM: G36.796  ICD-9-CM: V58.69  8/5/2016 - 1/23/2018        RESOLVED: Acute encephalopathy ICD-10-CM: G93.40  ICD-9-CM: 348.30  7/8/2016 - 1/23/2018        RESOLVED: Tachycardia ICD-10-CM: R00.0  ICD-9-CM: 785.0  6/27/2016 - 1/23/2018        RESOLVED: Palpitations ICD-10-CM: R00.2  ICD-9-CM: 785.1  11/2/2015 - 1/23/2018        RESOLVED: Respiratory insufficiency ICD-10-CM: R06.89  ICD-9-CM: 786.09  11/2/2015 - 1/23/2018        RESOLVED: Bradycardia (Symptomatic) ICD-10-CM: R00.1  ICD-9-CM: 427.89  11/7/2011 - 1/23/2018        RESOLVED: Rectal bleeding ICD-10-CM: K62.5  ICD-9-CM: 569.3  10/27/2011 - 1/23/2018        RESOLVED: AV block ICD-10-CM: I44.30  ICD-9-CM: 426.10  10/17/2011 - 1/23/2018        RESOLVED: Cardiogenic shock ([address] ICD-10-CM: R57.0  ICD-9-CM: 785.51  10/17/2011 - 1/23/2018        RESOLVED: Hyperkalemia ICD-10-CM: E87.5  ICD-9-CM: 276.7  10/17/2011 - 1/23/2018        RESOLVED: Nausea and vomiting ICD-10-CM: R11.2  ICD-9-CM: 787.01  2/24/2010 - 1/23/2018        RESOLVED: Epigastric abdominal pain ICD-10-CM: R10.13  ICD-9-CM: 789.06  2/24/2010 - 1/23/2018        RESOLVED: Digoxin toxicity ICD-10-CM: T46.0X1A  ICD-9-CM: 972.1, E942.1  2/24/2010 - 1/23/2018        RESOLVED: ARF (acute renal failure) (HCC) (Chronic) ICD-10-CM: N17.9  ICD-9-CM: 584.9  2/24/2010 - 1/23/2018        RESOLVED: Hypokalemia ICD-10-CM: E87.6  ICD-9-CM: 276.8  2/24/2010 - 1/23/2018        RESOLVED: CKD (chronic kidney disease) stage 3, GFR 30-59 ml/min (Chronic) ICD-10-CM: N18.3  ICD-9-CM: 585.3  12/4/2009 - 1/23/2018        RESOLVED: Cough ICD-10-CM: R05  ICD-9-CM: 786.2  3/9/2009 - 3/12/2009        RESOLVED: Bronchitis ICD-10-CM: J40  ICD-9-CM: 490  3/9/2009 - 3/12/2009        RESOLVED: Atrial fibrillation (HCC) (Chronic) ICD-10-CM: I48.91  ICD-9-CM: 427.31  3/7/2009 - 6/27/2016        RESOLVED: Hypotension (Chronic) ICD-10-CM: I95.9  ICD-9-CM: 458.9  3/1/2009 - 1/23/2018              Plan:     The Post Assessment Physician Evaluation (RAMBO) found the current functional status to be comparable with the Pre-admission Screening. The Patient is a good candidate for acute inpatient rehabilitation. Nothing since the Pre-admission screen has changed that determination.      Rehabilitation Plan  The patient has shown the ability to tolerate and benefit from 3 hours of therapy daily and is being admitted to a comprehensive acute inpatient rehabilitation program consisting of at least 3 hours of combined physical and occupational therapies. Resume intensive Physical Therapy for a minimum of 1.5 hours a day, at least 5 out of 7 days per week to address bed mobility, transfers, ambulation, strengthening, balance, and endurance. - pt  was ambulating independently with a rollator inside her home. reports using rollator or a wheelchair for community mobility.  Will continue to focus on endurance, strengthening BLe.      Resume  intensive Occupational Therapy for a minimum of 1.5 hours a day, at least 5 out of 7 days per week to address ADL ( bathing, LE dressing, toileting) and adaptive equipment as needed.       Continue 24-hour skilled rehabilitation nursing for bowel and bladder management, skin care for decubitus ulcer prevention , pain management and ongoing medication administration      The patient may benefit from a psychology consult for depression, anxiety and adjustment disorder.     Continue daily physician medical management:    Acute respiratory failure (HCC) (2/7/2018)/ Pleural effusion (1/23/2018)/ S/P bilateral thoracentesis  S/p - Thoracenteses on 2/9 - 550 mls removed from the left and 1 liter removed from the right.   - Continue bronchodilators prn- has home nebulizer. Resume as prn;xopenex  - Patient will be on 2L O2 at therapy and at rest. May be able to wean as tolerated. Will monitor saO2. 2/15 - sao2 at 100% on 2L/ min. Feels comfortable. Continue lasix and aldactone. Cardiology gave 1x Zaroxolyn in AM prior to AM lasix. 2/19 - continue lasix 80 mg bid + aldactone. +metolazone prior to lasix each morning. Monitor. Daily weights.   2/20- cardiology recommendation appreciated. Transition to iv lasic. Will need access. Difficult due to edema. 2/21- Persistent edema. Iv lasix ordered per cardiology, however since peripheral access not yet achieved after multiple attempts due to edema. Will have IR place CVC / ij anticipating frequent blood draws and continued iv diuretic needs. Mean time will have po lasix for every iv dose missed. 2/22 - Patient edema mildly better. Mild decreased weight; 176lbs. conitune lasix 80 bid. Increase KCl 40 to bid dose.    Left IJ oozing overnight following IR procedure, appears not bleeding today. hgb decreased, likely due to blood loss.            S/P TAVR(1/30/2018)/ Acute on chronic diastolic heart failure Pulmonary hypertension / Chronic atrial fibrillation/ HTN   - cardiac precaution, s. p TAVR. - weights and maintain a 2 liter per day fluid restriction.   - Monitor HR/BP. conitinue Eliquis for a.fib  - continue BB-Toprol XL  2/15- edema, chest exam appears not changed. cintiuned on diuretics. 2/19- continue O2 supplements. 2/20 - HR in control. continue O2, comfortable on same rate. Will obtain CxR. Check BNP. Monitor renal fx.   2/21 - HR in control. conitinue eliquis. BB at current dose. 2/22 - HR 80's monitor. Temporarily reduce Eliquis dose 2.5 bid due to oozing, blood loss. Anemia - hgb 7.4. Check in am. Transfuse prn.         Hypothyroidism - synthroid 88 mcg     Leukocytosis (2/7/2018) on Abx last dose 7/7 rocephin administered 2/13.   - finished abx. No new s/s of infection. - 2/21 no new signs of infection.      Acute blood loss anemia/ GI bleed? -monitor clinically. May have been caused by high INR. - no melena. - hgb 9.1 (2/18) , asymptomatic.   - 2/22 - Anemia due to blood loss form IR proocedure- hgb 7.4. Check in am. Transfuse prn.        Pneumonia prophylaxis- Insentive spirometer every hour while awake     DVT risk / DVT Prophylaxis- continue daily physician exam to assess for signs and symptoms as patient is at increased risk for of thromboembolism. - covered with eliquis. No s/s of DVT/ PE. Wound Care: - monitor for ulcers, skin breakdown due to edema. -left elbow  edema drainage covered. -  TEDs. Fitting better. Edema control.         Potential urinary retention - no s/s of retention. Monitor.  - pt mostly continent.      bowel program - as needed dulcolax, pericolace. + BM.     GERD/ GI prophylaxis - resume PPI. At times may need additional antacids, Maalox prn.          Time spent was 25 minutes with over 1/2 in direct patient care/examination, consultation and coordination of care.      Signed By: Susan Woodruff MD     February 22, 2018

## 2018-02-23 NOTE — PROGRESS NOTES
PHYSICAL THERAPY DAILY NOTE  Time In: 0915  Time Out: 2942  Patient Seen For: AM;Balance activities;Gait training;Patient education; Therapeutic exercise;Transfer training; Other (see progress notes)    Subjective: patient reporting she has been coughing more today and feels more short of breath. Reports her arms and legs are still swollen         Objective:Vital Signs:  Patient Vitals for the past 12 hrs:   Temp Pulse Resp BP SpO2   02/23/18 0730 97.9 °F (36.6 °C) 80 20 119/65 99 %     Patient initially on 02 at 2lpm and 02 sat 90 to 91% at rest. Increased 02 to 3 lpm and 02 sat 98 to 100% during activity    Pain level:No c/o pain  Pain location:NA  Pain interventions:NA    Patient education:Bed mobility training,transfer training, gait training, fall precautions, activity pacing, balance training, w/c and rollator parts management, body mechanics, energy conservation, breathing techniques during functional mobility, Patient verbalizing understanding and demonstrating partial understanding of patient education. Recommend follow up education. Interdisciplinary Communication:MD in to assess patient.  Spoke with RN and OT regarding 02 sat and increasing 02 to 3 lpm    Other (comment) (Fall precautions)  GROSS ASSESSMENT Daily Assessment     NA       BED/MAT MOBILITY Daily Assessment   Increased time and effort to complete Rolling Right : 0 (Not tested)  Rolling Left : 0 (Not tested)  Supine to Sit : 6 (Modified independent)  Sit to Supine : 4 (Minimal assistance) (with LEs onto mat elevated to 30 inches)       TRANSFERS Daily Assessment   Increased time and effort to complete Transfer Type: SPT with walker (rollator)  Transfer Assistance : 5 (Stand-by assistance) (cues for body mechanics)  Sit to Stand Assistance: Stand-by assistance (cues for body mechanics and rollator set up)  Car Transfers: Not tested       GAIT Daily Assessment    Amount of Assistance: 5 (Stand-by assistance) verbal cues for posture correction,improving step length and step clearance and for managing 02 line  Distance (ft): 60 Feet (ft)  Assistive Device: Walker, rollator       STEPS or STAIRS Daily Assessment    Steps/Stairs Ambulated (#): 0  Level of Assist : 0 (Not tested)       BALANCE Daily Assessment   Static standing balance activities while pulling up pants with SBA to CGA for balance. Able to manage pulling pants up from knees to hips. Static sitting balance activities while brushing hair with SBA to supervision Sitting - Static: Good (unsupported)  Sitting - Dynamic: Fair (occasional)  Standing - Static: Fair  Standing - Dynamic : Impaired       WHEELCHAIR MOBILITY Daily Assessment    Curbs/Ramps Assist Required (FIM Score): 0 (Not tested)  Wheelchair Setup Assist Required : 3 (Moderate assistance)  Wheelchair Management: Manages left brake;Manages right brake       LOWER EXTREMITY EXERCISES Daily Assessment    Extremity: Both  Exercise Type #1: Supine lower extremity strengthening  Sets Performed: 3  Reps Performed: 10  Level of Assist: Minimal assistance (multiple and frequent rest breaks during exercises)     SUPINE EXERCISES Sets Reps Comments   Ankle Pumps 1 20    Glut Sets 3 10    Heel Slides 3 10    Hip Abduction 3 10    Short Arc Quad 3 10             Assessment: Progress towards goals remains slow and delayed secondary to decreased functional endurance, respiratory status and edema. Difficulty with getting LEs onto mat secondary to height of mat, LE weakness and LE edema. Patient requires significant rest time between functional movements secondary to decreased endurance       Patient to OT at end of treatment    Plan of Care: Continue with POC and progress as tolerated.      Fang Vazquez, PT  2/23/2018

## 2018-02-23 NOTE — PROGRESS NOTES
Problem: Falls - Risk of  Goal: *Absence of Falls  Document Gregorio Fall Risk and appropriate interventions in the flowsheet.    Outcome: Progressing Towards Goal  Fall Risk Interventions:  Mobility Interventions: Patient to call before getting OOB    Mentation Interventions: Adequate sleep, hydration, pain control    Medication Interventions: Evaluate medications/consider consulting pharmacy    Elimination Interventions: Call light in reach    History of Falls Interventions: Consult care management for discharge planning

## 2018-02-23 NOTE — PROGRESS NOTES
Subjective \"I am trying to finish up my lunch. \"   Activity Question/answers and sorting activity   Strength/Endurance Patient with decreased activity tolerance this afternoon. She appeared tired and was taking a while to finish her meal, like eating was extra effort. She was willing to participate as needed, just needed extra time. Balance NT   Social Interaction Patient was friendly and appeared to enjoy sharing stories about her family with this therapist and the RT student. Cognitive A&O X4   Comments Patient was left seated in her recliner chair and left with all needs within reach.       Mary Grace Jacobs, CTRS

## 2018-02-23 NOTE — PROGRESS NOTES
OT Daily Note    Time In  1030   Time Out  1120     Subjective:\"I am all worn out now [after standing]\" Agreeable to therapy. Pain:not indicated during this session. Education:on oxygen management during functional mobility. Interdisciplinary Communication: Collaborated with Emi Webster and patient is making slow progress towards goals. Precautions:  (fall precautions) Pt on 3L of O2    Balance/functional mobility Daily Assessment   Pt completed sit to stand transfer from w/c to standing table with min assist.     Pt stood 2 times, 6.45 minutes and 2.05 minutes, at standing table in order to improve standing balance and activity tolerance. Pt required CGA and intermittently stabilized self with L arm on table. Pt reported fatigue and need to remain seated after standing 2 times. Pt ambulated ~10 feet with RW with CGA. Pt completed toilet transfer with min assist and use of RW and grab bars. Pt required max assist with toileting hygiene and clothing management. Strengthening/activity tolerance Daily Assessment   Pt completed 40 pc puzzle to improve UE coordination, grasp, problem solving, and activity tolerance. Pt initially began puzzle while standing at standing table and progressed to completing puzzle while seated at table. Pt required significant cues to rotate puzzle pc's and for placement. Assessment: Patient tolerated session well, but shortness of breath, edema, functional mobility, overall strength, and activity tolerance are still below baseline and requires skilled facilitation to successfully and safely complete ADL's and transfers. Ended session: in recliner with call bell and phone within reach.      Rosario Steiner, OT Student

## 2018-02-23 NOTE — PROGRESS NOTES
02/23/18 0835   Time Spent With Patient   Time In 0835   Time Out 0915   Patient Seen For: AM;ADLs   Feeding/Eating   Feeding/Eating Assistance I   Grooming   Grooming Assistance  S   Comments s/u assist for brushing dentures and washing face at w/c level at sink. Upper Body Dressing    Dressing Assistance  S   Comments Pt was able to don shirt with s/u assist.   Lower Body Dressing    Dressing Assistance  Max A   Leg Crossed Method Used No   Position Performed Standing;Seated in chair   Adaptive Equipment Used Grab bar   Don/Doff Anti-Embolic Stockings Dep   Comments Assist with threading L leg through pull-ups and pants. Assist with clothing management over waist in stance. Functional Transfers   Tub or Shower Type Shower   Amount of Assistance Required Min A   Adaptive Equipment Walker (comment); Wheelchair     S: \"I don't take showers every day, I can wash by the sink today. \" Agreeable to therapy. Focus of session was on morning ADL routine. Patient was able to SPT with RW from bed to w/c with CGA. Pain not indicated during this session. Collaborated with PT, Kristofer Rhodes and patient is making slow progress. Patient tolerated session well, but shortness of breath, edema, functional mobility, overall strength, and activity tolerance are still below baseline and requires skilled facilitation to successfully and safely complete ADL's and transfers. Patient ended session in w/c with call remote and phone within reach.      Amirha Naylor, OT Student

## 2018-02-23 NOTE — PROGRESS NOTES
[facility] CARDIOLOGY PROGRESS NOTE           2/23/2018 5:42 PM    Admit Date: 2/13/2018      Subjective:   Dyspnea stable. Edema improved. BP stable. Hgb decreased. NYHA class II    ROS:  Cardiovascular:  As noted above    Objective:      Vitals:    02/22/18 0746 02/22/18 1910 02/23/18 0234 02/23/18 0730   BP: 99/66 106/74  119/65   Pulse: 80 78  80   Resp: 20 20  20   Temp: 97.6 °F (36.4 °C) 97.8 °F (36.6 °C)  97.9 °F (36.6 °C)   SpO2: 100% 100%  99%   Weight:   79.3 kg (174 lb 14.4 oz)        Physical Exam:  General-No Acute Distress  Neck- supple, no JVD  CV- regular rate and rhythm Grade II/VI TERRI  Lung- clear bilaterally  Abd- soft, nontender, nondistended  Ext- 1+ edema bilaterally. Skin- warm and dry      Data Review:   Recent Labs      02/23/18   0530  02/22/18   0720  02/22/18   0600   NA   --   143  141   K   --   3.0*  3.0*   MG   --    --   2.0   BUN   --   41*  40*   CREA   --   1.19*  1.23*   GLU   --   83  84   WBC   --   4.7  5.2   HGB  7.7*  7.4*  7.5*   HCT  24.9*  23.7*  24.8*   PLT   --   70*  74*      No results found for: Kriss Bowser, [facility]    Assessment/Plan:     Active Problems:    Chronic atrial fibrillation ([facility] [address] (10/17/2011)    On Eliquis      S/P TAVR (transcatheter aortic valve replacement) (1/30/2018)    Stable. On Eliquis. Chronic diastolic heart failure (HCC) (2/7/2018)    Change to PO lasix in AM.       Respiratory failure ([address] (2/13/2018)    Stable on nasal cannula.      Anemia:  Transfuse 1 unit in AM.                 Wing Betts MD  2/23/2018 5:42 PM

## 2018-02-23 NOTE — PROGRESS NOTES
Patient had an uneventful night; slept well without complaint. Urinated several times during the night; good urine output. Weight = 79.3 kg (previously 80.2)    IJ insertion site was saturated with old blood; site was cleaned and dressing changed. Hourly rounds were performed throughout the shift. All needs met at this time. Report given to oncoming nurse.

## 2018-02-23 NOTE — PROGRESS NOTES
SFD PROGRESS NOTE    Ritesh Singh  Admit Date: 2/13/2018  Admit Diagnosis: DEBILITY;CHF (congestive heart failure) ([address] Respiratory f*  Chief Complaint : Gait dysfunction secondary to below. Admit Diagnosis: Pleural effusion [J90]; Pleural effusion [J90]  Acute respiratory failure (HCC) (2/7/2018)  Pleural effusion (1/23/2018)/ S/P bilateral thoracentesis  Hypothyroidism   Chronic atrial fibrillation   HTN   Pulmonary hypertension   S/P TAVR(1/30/2018)  Leukocytosis (2/7/2018) on Abx   Acute on chronic diastolic heart failure   weakness  Pain  DVT risk  Acute blood loss anemia/ GI bleed? Acute Rehab Dx:  Generalized weakness. Debility    deconditioning   Mobility and ambulation deficits  Self Care/ADL deficits    Subjective     Patient seen and examined. Vss. Afebrile. No new complaints. Feels well. Shedding weight slowly daily. Still edematous.   PT note mildly     Objective:     Current Facility-Administered Medications   Medication Dose Route Frequency    potassium chloride (K-DUR, KLOR-CON) SR tablet 40 mEq  40 mEq Oral BID    magnesium oxide (MAG-OX) tablet 400 mg  400 mg Oral DAILY    apixaban (ELIQUIS) tablet 2.5 mg  2.5 mg Oral Q12H    diazePAM (VALIUM) tablet 2.5 mg  2.5 mg Oral QHS    central line flush (saline) syringe 10 mL  10 mL InterCATHeter Q8H    furosemide (LASIX) injection 80 mg  80 mg IntraVENous BID    metOLazone (ZAROXOLYN) tablet 2.5 mg  2.5 mg Oral DAILY    acetaminophen (TYLENOL) tablet 650 mg  650 mg Oral Q4H PRN    alcohol 62% (NOZIN) nasal  1 Ampule  1 Ampule Topical Q12H    allopurinol (ZYLOPRIM) tablet 100 mg  100 mg Oral BID    hydrocortisone (ANUSOL-HC) 2.5 % rectal cream   PeriANAL TID PRN    levothyroxine (SYNTHROID) tablet 88 mcg  88 mcg Oral ACB    metoprolol succinate (TOPROL-XL) XL tablet 25 mg  25 mg Oral DAILY    pantoprazole (PROTONIX) tablet 40 mg  40 mg Oral ACB    polyethylene glycol (MIRALAX) packet 17 g  17 g Oral DAILY    pravastatin (PRAVACHOL) tablet 40 mg  40 mg Oral DAILY    rOPINIRole (REQUIP) tablet 2 mg  2 mg Oral BID    sertraline (ZOLOFT) tablet 100 mg  100 mg Oral DAILY    spironolactone (ALDACTONE) tablet 25 mg  25 mg Oral DAILY    levalbuterol (XOPENEX) nebulizer soln 0.63 mg/3 mL  0.63 mg Nebulization Q6H PRN     Facility-Administered Medications Ordered in Other Encounters   Medication Dose Route Frequency    0.9% sodium chloride infusion 250 mL  250 mL IntraVENous PRN     Review of Systems: + weakness. Denies chest pain, shortness of breath, cough, headache, visual problems, abdominal pain, dysurea, calf pain. Pertinent positives are as noted in the medical records and unremarkable otherwise. Visit Vitals    /65    Pulse 80    Temp 97.9 °F (36.6 °C)    Resp 20    Wt 174 lb 14.4 oz (79.3 kg)    SpO2 99%    BMI 34.16 kg/m2        Physical Exam:   General: Alert and age appropriately oriented.  Appears comfortable on 2L O2 NC. No acute cardio respiratory distress. HEENT: Normocephalic,no scleral icterus  Oral mucosa moist without cyanosis, No bruit, +JVD. Lungs: + bibasilar crackles. No wheezing. Respiration even and unlabored   Heart: RRR,  II/VI TERRI  No clicks, rub or gallops   Abdomen: Soft, non-tender, nondistended. Bowel sounds present. Genitourinary: defered   Neuromuscular:      PERRL, EOMI  Follows simple commands consistently. Able to identify, recall repeat. Mood appropriate. Insight, judgement intact.    LUE     Shoulder abduction   5-/5              Elbow flexion:   5-/5               Wrist extension:  5- /5              Finger flexion;  5- /5  RUE    Shoulder abduction: 5- /5                Elbow flexion:  5- /5                         Wrist extension: 5- /5                        Finger flexion:  5- /5  LLE     Hip flexion:  3+ /5              Knee extension:  4 /5                         Ankle dorsiflexion:  5 /5                        Ankle plantarflexion:   5/5                                                                  RLE     Hip flexion:   3+/5                        Knee extension:  4 /5                         Ankle dorsiflexion:  5 /5                        Ankle plantarflexion:  5 /5  Sensory - intact grossly   No cerebellar signs. Plantars - down going  No atrophy, no fasciculations, no tremors. Skin/extremity: No rashes, no erythema. Calf non tender BLE. 2+ BLE edema. + chronic arthritic joint changes. Mild BUE edema. Functional Assessment:  Gross Assessment  AROM:  (LE decreased secondary to weakness and edema) (02/21/18 1300)  PROM:  (Decreased secondary to LE edema ) (02/21/18 1300)  Strength:  (bilateral hip -3/5 to +2/5,knee 4/5, ankle 4/5) (02/21/18 1300)  Coordination:  (Impaired) (02/21/18 1300)  Tone: Normal (02/21/18 1300)  Sensation: Intact (02/21/18 1300)       Balance  Sitting - Static: Good (unsupported) (02/23/18 1200)  Sitting - Dynamic: Fair (occasional) (02/23/18 1200)  Standing - Static: Fair (02/23/18 1200)  Standing - Dynamic : Impaired (02/23/18 1200)           Toileting  Cues: Verbal cues provided;Physical assistance for pants down;Physical assistance for pants up (02/22/18 0840)  Adaptive Equipment: Walker;Grab bars (02/22/18 0840)         Maura Agrawal Fall Risk Assessment:  Maura Agrawal Fall Risk  Mobility: Ambulates or transfers with assist devices or assistance/unsteady gait (02/23/18 0720)  Mobility Interventions: Patient to call before getting OOB (02/23/18 0720)  Mentation: Alert, oriented x 3 (02/23/18 0720)  Mentation Interventions: Adequate sleep, hydration, pain control;Bed/chair exit alarm; Increase mobility;More frequent rounding (02/23/18 0720)  Medication: Patient receiving anticonvulsants, sedatives(tranquilizers), psychotropics or hypnotics, hypoglycemics, narcotics, sleep aids, antihypertensives, laxatives, or diuretics (02/23/18 4161)  Medication Interventions: Evaluate medications/consider consulting pharmacy; Patient to call before getting OOB; Teach patient to arise slowly (02/23/18 0720)  Elimination: Needs assistance with toileting (02/23/18 0720)  Elimination Interventions: Call light in reach; Patient to call for help with toileting needs; Toilet paper/wipes in reach; Toileting schedule/hourly rounds (02/23/18 0720)  Prior Fall History: No (02/23/18 0720)  History of Falls Interventions: Consult care management for discharge planning (02/23/18 0720)  Total Score: 3 (02/23/18 0720)  Standard Fall Precautions: Yes (02/22/18 2312)  High Fall Risk: Yes (02/23/18 0720)     Speech Assessment:         Ambulation:  Gait  Distance (ft): 60 Feet (ft) (02/23/18 1200)  Assistive Device: Bernardo Serrato, rollator (02/23/18 1200)  Rail Use: Both (02/22/18 1600)     Labs/Studies:  Recent Results (from the past 72 hour(s))   BNP    Collection Time: 02/21/18  7:03 AM   Result Value Ref Range     pg/mL   METABOLIC PANEL, BASIC    Collection Time: 02/21/18  7:03 AM   Result Value Ref Range    Sodium 144 136 - 145 mmol/L    Potassium 3.1 (L) 3.5 - 5.1 mmol/L    Chloride 97 (L) 98 - 107 mmol/L    CO2 40 (H) 21 - 32 mmol/L    Anion gap 7 7 - 16 mmol/L    Glucose 89 65 - 100 mg/dL    BUN 41 (H) 8 - 23 MG/DL    Creatinine 1.17 (H) 0.6 - 1.0 MG/DL    GFR est AA 58 (L) >60 ml/min/1.73m2    GFR est non-AA 48 (L) >60 ml/min/1.73m2    Calcium 8.6 8.3 - 98.2 MG/DL   METABOLIC PANEL, BASIC    Collection Time: 02/22/18  6:00 AM   Result Value Ref Range    Sodium 141 136 - 145 mmol/L    Potassium 3.0 (L) 3.5 - 5.1 mmol/L    Chloride 95 (L) 98 - 107 mmol/L    CO2 41 (HH) 21 - 32 mmol/L    Anion gap 5 (L) 7 - 16 mmol/L    Glucose 84 65 - 100 mg/dL    BUN 40 (H) 8 - 23 MG/DL    Creatinine 1.23 (H) 0.6 - 1.0 MG/DL    GFR est AA 55 (L) >60 ml/min/1.73m2    GFR est non-AA 45 (L) >60 ml/min/1.73m2    Calcium 8.3 8.3 - 10.4 MG/DL   CBC W/O DIFF    Collection Time: 02/22/18  6:00 AM   Result Value Ref Range    WBC 5.2 4.3 - 11.1 K/uL    RBC 2.59 (L) 4.05 - 5.25 M/uL    HGB 7.5 (L) 11.7 - 15.4 g/dL    HCT 24.8 (L) 35.8 - 46.3 %    MCV 95.8 79.6 - 97.8 FL    MCH 29.0 26.1 - 32.9 PG    MCHC 30.2 (L) 31.4 - 35.0 g/dL    RDW 16.9 (H) 11.9 - 14.6 %    PLATELET 74 (L) 975 - 450 K/uL    MPV 11.4 10.8 - 14.1 FL   MAGNESIUM    Collection Time: 02/22/18  6:00 AM   Result Value Ref Range    Magnesium 2.0 1.8 - 2.4 mg/dL   CBC W/O DIFF    Collection Time: 02/22/18  7:20 AM   Result Value Ref Range    WBC 4.7 4.3 - 11.1 K/uL    RBC 2.48 (L) 4.05 - 5.25 M/uL    HGB 7.4 (L) 11.7 - 15.4 g/dL    HCT 23.7 (L) 35.8 - 46.3 %    MCV 95.6 79.6 - 97.8 FL    MCH 29.8 26.1 - 32.9 PG    MCHC 31.2 (L) 31.4 - 35.0 g/dL    RDW 16.9 (H) 11.9 - 14.6 %    PLATELET 70 (L) 030 - 450 K/uL    MPV 11.0 10.8 - 39.2 FL   METABOLIC PANEL, BASIC    Collection Time: 02/22/18  7:20 AM   Result Value Ref Range    Sodium 143 136 - 145 mmol/L    Potassium 3.0 (L) 3.5 - 5.1 mmol/L    Chloride 95 (L) 98 - 107 mmol/L    CO2 41 (HH) 21 - 32 mmol/L    Anion gap 7 7 - 16 mmol/L    Glucose 83 65 - 100 mg/dL    BUN 41 (H) 8 - 23 MG/DL    Creatinine 1.19 (H) 0.6 - 1.0 MG/DL    GFR est AA 57 (L) >60 ml/min/1.73m2    GFR est non-AA 47 (L) >60 ml/min/1.73m2    Calcium 8.5 8.3 - 10.4 MG/DL   HGB & HCT    Collection Time: 02/23/18  5:30 AM   Result Value Ref Range    HGB 7.7 (L) 11.7 - 15.4 g/dL    HCT 24.9 (L) 35.8 - 46.3 %       Assessment:     Problem List as of 2/23/2018  Date Reviewed: 2/10/2018          Codes Class Noted - Resolved    Respiratory failure ([facility] ICD-10-CM: J96.90  ICD-9-CM: 518.81  2/13/2018 - Present        Acute on chronic respiratory failure (HCC) ICD-10-CM: J96.20  ICD-9-CM: 518.84  2/7/2018 - Present        Leukocytosis ICD-10-CM: X27.361  ICD-9-CM: 288.60  2/7/2018 - Present        Chronic diastolic heart failure (HCC) ICD-10-CM: I50.32  ICD-9-CM: 428.32  2/7/2018 - Present        S/P TAVR (transcatheter aortic valve replacement) ICD-10-CM: Z95.2  ICD-9-CM: V43.3  1/30/2018 - Present        Aortic stenosis (Chronic) ICD-10-CM: I35.0  ICD-9-CM: 424.1  1/29/2018 - Present        Peripheral arterial disease (HCC) (Chronic) ICD-10-CM: I73.9  ICD-9-CM: 443.9  1/23/2018 - Present        Pleural effusion ICD-10-CM: J90  ICD-9-CM: 511.9  1/23/2018 - Present    Overview Addendum 2/10/2018 11:36 AM by Jamin Rosen NP     Right thoracentesis 1/25/18 - 1200 mls removed  Left thoracentesis 1/25/2018 - 900 mls removed  Bilateral thoracentesis 2/9/18 - L 550 ml (air aspirated during and at the end of procedure) / R 1000 ml and R creamy pink              Chronic respiratory failure with hypoxia (HCC) (Chronic) ICD-10-CM: J96.11  ICD-9-CM: 518.83, 799.02  1/23/2018 - Present    Overview Signed 1/23/2018 11:17 AM by Keith Hagan, FELISHA     Wears 3 to 4 liters at home             Hypoxia ICD-10-CM: R09.02  ICD-9-CM: 799.02  1/23/2018 - Present        Stenosis of prosthetic aortic valve (Chronic) ICD-10-CM: I35.0  ICD-9-CM: 396.0  1/19/2018 - Present    Overview Signed 1/19/2018  3:18 PM by Mirtha Roger MD     AVR (10/5/13):  21 mm Pericardial valve.                Tricuspid valve insufficiency (Chronic) ICD-10-CM: I07.1  ICD-9-CM: 397.0  1/15/2018 - Present        Systolic CHF, chronic (HCC) (Chronic) ICD-10-CM: I50.22  ICD-9-CM: 428.22, 428.0  1/15/2018 - Present        S/P mitral valve repair (Chronic) ICD-10-CM: Q34.549  ICD-9-CM: V45.89  12/4/2017 - Present        H/O atrioventricular rubin ablation (Chronic) ICD-10-CM: K90.049  ICD-9-CM: V15.1  3/22/2017 - Present        Pulmonary hypertension (Chronic) ICD-10-CM: I27.20  ICD-9-CM: 416.8  2/12/2017 - Present        Aortic valve replaced (Chronic) ICD-10-CM: Z95.2  ICD-9-CM: V43.3  1/9/2017 - Present        Chronic diastolic congestive heart failure (HCC) (Chronic) ICD-10-CM: I50.32  ICD-9-CM: 428.32, 428.0  9/9/2016 - Present        Chronic depression (Chronic) ICD-10-CM: F32.9  ICD-9-CM: 587  8/5/2016 - Present        Osteopenia (Chronic) ICD-10-CM: M85.80  ICD-9-CM: 733.90  8/5/2016 - Present        RLS (restless legs syndrome) (Chronic) ICD-10-CM: G25.81  ICD-9-CM: 333.94  8/5/2016 - Present        Hyperlipidemia (Chronic) ICD-10-CM: E78.5  ICD-9-CM: 272.4  8/5/2016 - Present        Gout (Chronic) ICD-10-CM: M10.9  ICD-9-CM: 274.9  8/5/2016 - Present        Anxiety (Chronic) ICD-10-CM: F41.9  ICD-9-CM: 300.00  8/5/2016 - Present        CAD (coronary artery disease) (Chronic) ICD-10-CM: I25.10  ICD-9-CM: 414.00  8/5/2016 - Present        HTN (hypertension) (Chronic) ICD-10-CM: I10  ICD-9-CM: 401.9  8/5/2016 - Present        GERD (gastroesophageal reflux disease) (Chronic) ICD-10-CM: K21.9  ICD-9-CM: 530.81  8/5/2016 - Present        H/O mitral valve repair, 2003 (Chronic) ICD-10-CM: J67.930  ICD-9-CM: V15.1  6/27/2016 - Present        Sick sinus syndrome (HCC) (Chronic) ICD-10-CM: I49.5  ICD-9-CM: 427.81  2/13/2016 - Present        Obesity (Chronic) ICD-10-CM: E66.9  ICD-9-CM: 278.00  11/2/2015 - Present        Mitral stenosis with insufficiency (Chronic) ICD-10-CM: Y42.7  ICD-9-CM: 394.2  11/2/2015 - Present    Overview Signed 11/2/2015 11:02 AM by Brooke Spears     Prior MV repair 2003.               Rheumatic aortic stenosis (Chronic) ICD-10-CM: I06.0  ICD-9-CM: 395.0  11/2/2015 - Present        Cardiac pacemaker (Chronic) ICD-10-CM: Z95.0  ICD-9-CM: V45.01  11/2/2015 - Present        Thrombocytopenia (HCC) (Chronic) ICD-10-CM: D69.6  ICD-9-CM: 287.5  8/22/2012 - Present        Anemia (Chronic) ICD-10-CM: D64.9  ICD-9-CM: 285.9  10/27/2011 - Present    Overview Signed 10/27/2011  8:25 AM by Debby Saucedo on chronic               Chronic atrial fibrillation (HCC) (Chronic) ICD-10-CM: I48.2  ICD-9-CM: 427.31  10/17/2011 - Present        Hypothyroidism (Chronic) ICD-10-CM: E03.9  ICD-9-CM: 244.9  12/4/2009 - Present        BOBBY (obstructive sleep apnea) (Chronic) ICD-10-CM: G47.33  ICD-9-CM: 327.23  12/4/2009 - Present        Chronic obstructive pulmonary disease (HCC) (Chronic) ICD-10-CM: J44.9  ICD-9-CM: 496  3/8/2009 - Present        Aortic Valve Bioprosthesis Present (Chronic) ICD-10-CM: Z95.2  ICD-9-CM: V43.3  3/7/2009 - Present        Degenerative arthritis of left knee (Chronic) ICD-10-CM: M17.12  ICD-9-CM: 715.96  2/27/2009 - Present        RESOLVED: CHF (congestive heart failure) ([facility] ICD-10-CM: I50.9  ICD-9-CM: 428.0  2/13/2018 - 2/14/2018        RESOLVED: Acute on chronic systolic congestive heart failure ([facility] ICD-10-CM: I50.23  ICD-9-CM: 428.23, 428.0  2/1/2018 - 2/7/2018        RESOLVED: Acute pulmonary edema ([facility] ICD-10-CM: J81.0  ICD-9-CM: 518.4  1/25/2018 - 2/7/2018        RESOLVED: Pacemaker ICD-10-CM: Z95.0  ICD-9-CM: V45.01  12/4/2017 - 1/23/2018        RESOLVED: History of gout ICD-10-CM: Z87.39  ICD-9-CM: V12.29  1/31/2017 - 1/23/2018        RESOLVED: Warfarin anticoagulation (Chronic) ICD-10-CM: Z79.01  ICD-9-CM: V58.61  1/9/2017 - 1/23/2018        RESOLVED: Non morbid obesity due to excess calories ICD-10-CM: E66.09  ICD-9-CM: 278.00  11/14/2016 - 1/23/2018        RESOLVED: Hypoxemia ICD-10-CM: R09.02  ICD-9-CM: 799.02  11/14/2016 - 1/23/2018        RESOLVED: Localized edema ICD-10-CM: R60.0  ICD-9-CM: 782.3  9/9/2016 - 1/23/2018        RESOLVED: Iron deficiency anemia ICD-10-CM: D50.9  ICD-9-CM: 280.9  9/9/2016 - 1/23/2018        RESOLVED: CRI (chronic renal insufficiency) ICD-10-CM: N18.9  ICD-9-CM: 585.9  8/5/2016 - 1/23/2018        RESOLVED: Dyspnea ICD-10-CM: R06.00  ICD-9-CM: 786.09  8/5/2016 - 1/23/2018        RESOLVED: Right hip pain ICD-10-CM: M25.551  ICD-9-CM: 719.45  8/5/2016 - 1/23/2018        RESOLVED: Edema ICD-10-CM: R60.9  ICD-9-CM: 782.3  8/5/2016 - 1/23/2018        RESOLVED: Chest pain ICD-10-CM: R07.9  ICD-9-CM: 786.50  8/5/2016 - 1/23/2018        RESOLVED: Other long term (current) drug therapy ICD-10-CM: Z79.899  ICD-9-CM: V58.69  8/5/2016 - 1/23/2018        RESOLVED: Acute encephalopathy ICD-10-CM: G93.40  ICD-9-CM: 348.30  7/8/2016 - 1/23/2018        RESOLVED: Tachycardia ICD-10-CM: R00.0  ICD-9-CM: 785.0  6/27/2016 - 1/23/2018        RESOLVED: Palpitations ICD-10-CM: R00.2  ICD-9-CM: 785.1  11/2/2015 - 1/23/2018        RESOLVED: Respiratory insufficiency ICD-10-CM: R06.89  ICD-9-CM: 786.09  11/2/2015 - 1/23/2018        RESOLVED: Bradycardia (Symptomatic) ICD-10-CM: R00.1  ICD-9-CM: 427.89  11/7/2011 - 1/23/2018        RESOLVED: Rectal bleeding ICD-10-CM: K62.5  ICD-9-CM: 569.3  10/27/2011 - 1/23/2018        RESOLVED: AV block ICD-10-CM: I44.30  ICD-9-CM: 426.10  10/17/2011 - 1/23/2018        RESOLVED: Cardiogenic shock ([address] ICD-10-CM: R57.0  ICD-9-CM: 785.51  10/17/2011 - 1/23/2018        RESOLVED: Hyperkalemia ICD-10-CM: E87.5  ICD-9-CM: 276.7  10/17/2011 - 1/23/2018        RESOLVED: Nausea and vomiting ICD-10-CM: R11.2  ICD-9-CM: 787.01  2/24/2010 - 1/23/2018        RESOLVED: Epigastric abdominal pain ICD-10-CM: R10.13  ICD-9-CM: 789.06  2/24/2010 - 1/23/2018        RESOLVED: Digoxin toxicity ICD-10-CM: T46.0X1A  ICD-9-CM: 972.1, E942.1  2/24/2010 - 1/23/2018        RESOLVED: ARF (acute renal failure) (HCC) (Chronic) ICD-10-CM: N17.9  ICD-9-CM: 584.9  2/24/2010 - 1/23/2018        RESOLVED: Hypokalemia ICD-10-CM: E87.6  ICD-9-CM: 276.8  2/24/2010 - 1/23/2018        RESOLVED: CKD (chronic kidney disease) stage 3, GFR 30-59 ml/min (Chronic) ICD-10-CM: N18.3  ICD-9-CM: 585.3  12/4/2009 - 1/23/2018        RESOLVED: Cough ICD-10-CM: R05  ICD-9-CM: 786.2  3/9/2009 - 3/12/2009        RESOLVED: Bronchitis ICD-10-CM: J40  ICD-9-CM: 907  3/9/2009 - 3/12/2009        RESOLVED: Atrial fibrillation (HCC) (Chronic) ICD-10-CM: I48.91  ICD-9-CM: 427.31  3/7/2009 - 6/27/2016        RESOLVED: Hypotension (Chronic) ICD-10-CM: I95.9  ICD-9-CM: 458.9  3/1/2009 - 1/23/2018              Plan:     The Post Assessment Physician Evaluation (RAMBO) found the current functional status to be comparable with the Pre-admission Screening. The Patient is a good candidate for acute inpatient rehabilitation. Nothing since the Pre-admission screen has changed that determination.      Rehabilitation Plan  The patient has shown the ability to tolerate and benefit from 3 hours of therapy daily and is being admitted to a comprehensive acute inpatient rehabilitation program consisting of at least 3 hours of combined physical and occupational therapies. Resume intensive Physical Therapy for a minimum of 1.5 hours a day, at least 5 out of 7 days per week to address bed mobility, transfers, ambulation, strengthening, balance, and endurance. - pt  was ambulating independently with a rollator inside her home. reports using rollator or a wheelchair for community mobility. Will continue to focus on endurance, strengthening BLe.      Resume  intensive Occupational Therapy for a minimum of 1.5 hours a day, at least 5 out of 7 days per week to address ADL ( bathing, LE dressing, toileting) and adaptive equipment as needed.       Continue 24-hour skilled rehabilitation nursing for bowel and bladder management, skin care for decubitus ulcer prevention , pain management and ongoing medication administration      The patient may benefit from a psychology consult for depression, anxiety and adjustment disorder.     Continue daily physician medical management:    Acute respiratory failure (HCC) (2/7/2018)/ Pleural effusion (1/23/2018)/ S/P bilateral thoracentesis  S/p - Thoracenteses on 2/9 - 550 mls removed from the left and 1 liter removed from the right.   - Continue bronchodilators prn- has home nebulizer. Resume as prn;xopenex  - Patient will be on 2L O2 at therapy and at rest. May be able to wean as tolerated. Will monitor saO2. 2/15 - sao2 at 100% on 2L/ min. Feels comfortable. Continue lasix and aldactone. Cardiology gave 1x Zaroxolyn in AM prior to AM lasix.    2/19 - continue lasix 80 mg bid + aldactone. +metolazone prior to lasix each morning. Monitor. Daily weights.   2/20- cardiology recommendation appreciated. Transition to iv lasic. Will need access. Difficult due to edema. 2/21- Persistent edema. Iv lasix ordered per cardiology, however since peripheral access not yet achieved after multiple attempts due to edema. Will have IR place CVC / ij anticipating frequent blood draws and continued iv diuretic needs. Mean time will have po lasix for every iv dose missed. 2/22 - Patient edema mildly better. Mild decreased weight; 176lbs. conitune lasix 80 bid. Increase KCl 40 to bid dose. Left IJ oozing overnight following IR procedure, appears not bleeding today. hgb decreased, likely due to blood loss. 2/23 - mildly SOB, will repeat CxR to monitor bibasilar effusions. Continue iv lasix.           S/P TAVR(1/30/2018)/ Acute on chronic diastolic heart failure Pulmonary hypertension / Chronic atrial fibrillation/ HTN   - cardiac precaution, s.p TAVR. - weights and maintain a 2 liter per day fluid restriction.   - Monitor HR/BP. conitinue Eliquis for a.fib  - continue BB-Toprol XL  2/15- edema, chest exam appears not changed. cintiuned on diuretics. 2/19- continue O2 supplements. 2/20 - HR in control. continue O2, comfortable on same rate. Will obtain CxR. Check BNP. Monitor renal fx.   2/21 - HR in control. conitinue eliquis. BB at current dose. 2/22 - HR 80's monitor. Temporarily reduce Eliquis dose 2.5 bid due to oozing, blood loss. Anemia - hgb 7.4. Check in am. Transfuse prn.   2/23 - will continue lower dose eliquis as hgb decreased following IR procedure. , plt remain low; 70k. HR in good range 70-80s. Continue monitor.         Hypothyroidism - synthroid 88 mcg     Leukocytosis (2/7/2018) on Abx last dose 7/7 rocephin administered 2/13.   - finished abx. No new s/s of infection. - 2/21 no new signs of infection.      Acute blood loss anemia/ GI bleed? -monitor clinically. May have been caused by high INR. - no melena. - hgb 9.1 (2/18) , asymptomatic.   - 2/22 - Anemia due to blood loss form IR proocedure- hgb 7.4. Check in am. Transfuse prn.   2/23 - hgb 7.7. Check labs in am and Monday.        Pneumonia prophylaxis- Insentive spirometer every hour while awake     DVT risk / DVT Prophylaxis- continue daily physician exam to assess for signs and symptoms as patient is at increased risk for of thromboembolism. - covered with eliquis. No s/s of DVT/ PE. Wound Care: - monitor for ulcers, skin breakdown due to edema. -left elbow  edema drainage covered. -  TEDs. Fitting better. Edema control.         Potential urinary retention - no s/s of retention. Monitor.  - pt mostly continent.      bowel program - as needed dulcolax, pericolace. + BM.     GERD/ GI prophylaxis - resume PPI. At times may need additional antacids, Maalox prn.          Time spent was 25 minutes with over 1/2 in direct patient care/examination, consultation and coordination of care.      Signed By: Esthela See MD     February 23, 2018

## 2018-02-24 NOTE — PROGRESS NOTES
End Of Shift Functional Summary, Nursing      TOILETING:  Does patient need assist with clothing management and/or pericare? Yes: Comment: max assist    TOILET TRANSFER:  Pt requires maximum assistance. Pt uses walker. BLADDER:  Pt does not have a [place] catheter that staff manages. Pt does not take medication. Pt is both continent and incontinent. of bladder and voids in toilet  Pt requires staff to empty device Pt has had 0 bladder accidents during this shift.  (An accident is when the episode is not contained in a brief AND/OR the clothing/linen requires changing/cleaning up.)              Documentation reviewed and plan of care discussed/reviewed with   patient, oncoming nurse and patient assistant during the shift.

## 2018-02-24 NOTE — PROGRESS NOTES
Problem: Falls - Risk of  Goal: *Absence of Falls  Document Gregorio Fall Risk and appropriate interventions in the flowsheet.    Outcome: Progressing Towards Goal  Fall Risk Interventions:  Mobility Interventions: Communicate number of staff needed for ambulation/transfer, Patient to call before getting OOB, Utilize walker, cane, or other assitive device    Mentation Interventions: Adequate sleep, hydration, pain control, Door open when patient unattended, Evaluate medications/consider consulting pharmacy, Increase mobility    Medication Interventions: Assess postural VS orthostatic hypotension, Evaluate medications/consider consulting pharmacy, Patient to call before getting OOB    Elimination Interventions: Call light in reach, Patient to call for help with toileting needs, Toilet paper/wipes in reach    History of Falls Interventions: Consult care management for discharge planning

## 2018-02-24 NOTE — PROGRESS NOTES
Jenna Lacy MD,   Medical Director  [address] [facility], [address] [facility]  Tel: [phone]       SFD PROGRESS NOTE    Nidia Meza  Admit Date: 2/13/2018  Admit Diagnosis: DEBILITY;CHF (congestive heart failure) ([address] Respiratory f*    Subjective     Doing well. \"I am feeling good\" denies cp, sob , n/v. Appetite fair.      Objective:     Current Facility-Administered Medications   Medication Dose Route Frequency    0.9% sodium chloride infusion 250 mL  250 mL IntraVENous PRN    furosemide (LASIX) tablet 80 mg  80 mg Oral BID    potassium chloride (K-DUR, KLOR-CON) SR tablet 40 mEq  40 mEq Oral BID    magnesium oxide (MAG-OX) tablet 400 mg  400 mg Oral DAILY    apixaban (ELIQUIS) tablet 2.5 mg  2.5 mg Oral Q12H    diazePAM (VALIUM) tablet 2.5 mg  2.5 mg Oral QHS    central line flush (saline) syringe 10 mL  10 mL InterCATHeter Q8H    metOLazone (ZAROXOLYN) tablet 2.5 mg  2.5 mg Oral DAILY    acetaminophen (TYLENOL) tablet 650 mg  650 mg Oral Q4H PRN    alcohol 62% (NOZIN) nasal  1 Ampule  1 Ampule Topical Q12H    allopurinol (ZYLOPRIM) tablet 100 mg  100 mg Oral BID    hydrocortisone (ANUSOL-HC) 2.5 % rectal cream   PeriANAL TID PRN    levothyroxine (SYNTHROID) tablet 88 mcg  88 mcg Oral ACB    metoprolol succinate (TOPROL-XL) XL tablet 25 mg  25 mg Oral DAILY    pantoprazole (PROTONIX) tablet 40 mg  40 mg Oral ACB    polyethylene glycol (MIRALAX) packet 17 g  17 g Oral DAILY    pravastatin (PRAVACHOL) tablet 40 mg  40 mg Oral DAILY    rOPINIRole (REQUIP) tablet 2 mg  2 mg Oral BID    sertraline (ZOLOFT) tablet 100 mg  100 mg Oral DAILY    spironolactone (ALDACTONE) tablet 25 mg  25 mg Oral DAILY    levalbuterol (XOPENEX) nebulizer soln 0.63 mg/3 mL  0.63 mg Nebulization Q6H PRN     Facility-Administered Medications Ordered in Other Encounters   Medication Dose Route Frequency    0.9% sodium chloride infusion 250 mL  250 mL IntraVENous PRN     Review of Systems:Denies chest pain,  cough, headache, visual problems, abdominal pain, dysurea, calf pain. Pertinent positives are as noted in the medical records and unremarkable otherwise.   + sob with activity, weakness  Visit Vitals    /85 (BP 1 Location: Right arm, BP Patient Position: At rest)    Pulse 84    Temp 97.9 °F (36.6 °C)    Resp 16    Wt 172 lb (78 kg)    SpO2 99%    BMI 33.59 kg/m2        Physical Exam:   General: Alert and age appropriately oriented. No acute cardio respiratory distress. HEENT: Normocephalic,no scleral icterus  Oral mucosa moist without cyanosis   Lungs: Clear to auscultation  bilaterally. Respiration even and unlabored   Heart: Regular rate and rhythm, S1, S2   2/6 TERRI   Abdomen: Soft, non-tender, nondistended. Bowel sounds present. No organomegaly. Genitourinary: defer   Neuromuscular:      Grossly no focal motor deficits noted. Moves ankles. Prox>distal weakness   sens intact   Skin/extremity: No rashes, no erythema.  No calf tenderness BLE  2+ pitting edema to mid calf  Right neck IJ ; bruising, dried blood, no erythema                                                                            Functional Assessment:  Gross Assessment  AROM:  (LE decreased secondary to weakness and edema) (02/21/18 1300)  PROM:  (Decreased secondary to LE edema ) (02/21/18 1300)  Strength:  (bilateral hip -3/5 to +2/5,knee 4/5, ankle 4/5) (02/21/18 1300)  Coordination:  (Impaired) (02/21/18 1300)  Tone: Normal (02/21/18 1300)  Sensation: Intact (02/21/18 1300)       Balance  Sitting - Static: Good (unsupported) (02/23/18 1200)  Sitting - Dynamic: Fair (occasional) (02/23/18 1200)  Standing - Static: Fair (02/23/18 1200)  Standing - Dynamic : Impaired (02/23/18 1200)           Toileting  Cues: Verbal cues provided;Physical assistance for pants down;Physical assistance for pants up (02/22/18 0840)  Adaptive Equipment: Walker;Grab bars (02/22/18 0840) Gregorio Fall Risk Assessment:  Karan Guerrero Fall Risk  Mobility: Ambulates or transfers with assist devices or assistance/unsteady gait (02/23/18 2300)  Mobility Interventions: Assess mobility with egress test;Bed/chair exit alarm;OT consult for ADLs; Communicate number of staff needed for ambulation/transfer;Patient to call before getting OOB;PT Consult for mobility concerns;PT Consult for assist device competence;Strengthening exercises (ROM-active/passive); Utilize walker, cane, or other assitive device (02/23/18 2300)  Mentation: Alert, oriented x 3 (02/23/18 2300)  Mentation Interventions: Adequate sleep, hydration, pain control;Bed/chair exit alarm; Increase mobility;More frequent rounding (02/23/18 0720)  Medication: Patient receiving anticonvulsants, sedatives(tranquilizers), psychotropics or hypnotics, hypoglycemics, narcotics, sleep aids, antihypertensives, laxatives, or diuretics (02/23/18 2300)  Medication Interventions: Assess postural VS orthostatic hypotension; Evaluate medications/consider consulting pharmacy; Patient to call before getting OOB; Bed/chair exit alarm; Teach patient to arise slowly (02/23/18 2300)  Elimination: Needs assistance with toileting (02/23/18 2300)  Elimination Interventions: Bed/chair exit alarm;Call light in reach;Elevated toilet seat; Toilet paper/wipes in reach; Patient to call for help with toileting needs; Toileting schedule/hourly rounds (02/23/18 2300)  Prior Fall History: No (02/23/18 2300)  History of Falls Interventions: Consult care management for discharge planning (02/23/18 0720)  Total Score: 3 (02/23/18 2300)  Standard Fall Precautions: Yes (02/23/18 2300)  High Fall Risk: Yes (02/23/18 2300)     Speech Assessment:         Ambulation:  Gait  Distance (ft): 60 Feet (ft) (02/23/18 1200)  Assistive Device: [address], rollator (02/23/18 1200)  Rail Use: Both (02/22/18 1600)     Labs/Studies:  Recent Results (from the past 72 hour(s))   METABOLIC PANEL, BASIC    Collection Time: 02/22/18  6:00 AM   Result Value Ref Range    Sodium 141 136 - 145 mmol/L    Potassium 3.0 (L) 3.5 - 5.1 mmol/L    Chloride 95 (L) 98 - 107 mmol/L    CO2 41 (HH) 21 - 32 mmol/L    Anion gap 5 (L) 7 - 16 mmol/L    Glucose 84 65 - 100 mg/dL    BUN 40 (H) 8 - 23 MG/DL    Creatinine 1.23 (H) 0.6 - 1.0 MG/DL    GFR est AA 55 (L) >60 ml/min/1.73m2    GFR est non-AA 45 (L) >60 ml/min/1.73m2    Calcium 8.3 8.3 - 10.4 MG/DL   CBC W/O DIFF    Collection Time: 02/22/18  6:00 AM   Result Value Ref Range    WBC 5.2 4.3 - 11.1 K/uL    RBC 2.59 (L) 4.05 - 5.25 M/uL    HGB 7.5 (L) 11.7 - 15.4 g/dL    HCT 24.8 (L) 35.8 - 46.3 %    MCV 95.8 79.6 - 97.8 FL    MCH 29.0 26.1 - 32.9 PG    MCHC 30.2 (L) 31.4 - 35.0 g/dL    RDW 16.9 (H) 11.9 - 14.6 %    PLATELET 74 (L) 506 - 450 K/uL    MPV 11.4 10.8 - 14.1 FL   MAGNESIUM    Collection Time: 02/22/18  6:00 AM   Result Value Ref Range    Magnesium 2.0 1.8 - 2.4 mg/dL   CBC W/O DIFF    Collection Time: 02/22/18  7:20 AM   Result Value Ref Range    WBC 4.7 4.3 - 11.1 K/uL    RBC 2.48 (L) 4.05 - 5.25 M/uL    HGB 7.4 (L) 11.7 - 15.4 g/dL    HCT 23.7 (L) 35.8 - 46.3 %    MCV 95.6 79.6 - 97.8 FL    MCH 29.8 26.1 - 32.9 PG    MCHC 31.2 (L) 31.4 - 35.0 g/dL    RDW 16.9 (H) 11.9 - 14.6 %    PLATELET 70 (L) 822 - 450 K/uL    MPV 11.0 10.8 - 42.2 FL   METABOLIC PANEL, BASIC    Collection Time: 02/22/18  7:20 AM   Result Value Ref Range    Sodium 143 136 - 145 mmol/L    Potassium 3.0 (L) 3.5 - 5.1 mmol/L    Chloride 95 (L) 98 - 107 mmol/L    CO2 41 (HH) 21 - 32 mmol/L    Anion gap 7 7 - 16 mmol/L    Glucose 83 65 - 100 mg/dL    BUN 41 (H) 8 - 23 MG/DL    Creatinine 1.19 (H) 0.6 - 1.0 MG/DL    GFR est AA 57 (L) >60 ml/min/1.73m2    GFR est non-AA 47 (L) >60 ml/min/1.73m2    Calcium 8.5 8.3 - 10.4 MG/DL   HGB & HCT    Collection Time: 02/23/18  5:30 AM   Result Value Ref Range    HGB 7.7 (L) 11.7 - 15.4 g/dL    HCT 24.9 (L) 35.8 - 46.3 %   TYPE + CROSSMATCH    Collection Time: 02/23/18  6:05 PM   Result Value Ref Range    Crossmatch Expiration 02/26/2018     ABO/Rh(D) A POSITIVE     Antibody screen NEG     Unit number [id number]     Blood component type RC LR     Unit division 00     Status of unit ALLOCATED     Crossmatch result Compatible    METABOLIC PANEL, BASIC    Collection Time: 02/24/18  4:51 AM   Result Value Ref Range    Sodium 143 136 - 145 mmol/L    Potassium 3.7 3.5 - 5.1 mmol/L    Chloride 95 (L) 98 - 107 mmol/L    CO2 >45 (HH) 21 - 32 mmol/L    Anion gap Cannot be calculated 7 - 16 mmol/L    Glucose 106 (H) 65 - 100 mg/dL    BUN 47 (H) 8 - 23 MG/DL    Creatinine 1.34 (H) 0.6 - 1.0 MG/DL    GFR est AA 49 (L) >60 ml/min/1.73m2    GFR est non-AA 41 (L) >60 ml/min/1.73m2    Calcium 8.7 8.3 - 10.4 MG/DL   CBC WITH AUTOMATED DIFF    Collection Time: 02/24/18  4:51 AM   Result Value Ref Range    WBC 4.3 4.3 - 11.1 K/uL    RBC 2.57 (L) 4.05 - 5.25 M/uL    HGB 7.4 (L) 11.7 - 15.4 g/dL    HCT 25.1 (L) 35.8 - 46.3 %    MCV 97.7 79.6 - 97.8 FL    MCH 28.8 26.1 - 32.9 PG    MCHC 29.5 (L) 31.4 - 35.0 g/dL    RDW 17.2 (H) 11.9 - 14.6 %    PLATELET 83 (L) 383 - 450 K/uL    MPV 11.5 10.8 - 14.1 FL    DF AUTOMATED      NEUTROPHILS 77 43 - 78 %    LYMPHOCYTES 12 (L) 13 - 44 %    MONOCYTES 7 4.0 - 12.0 %    EOSINOPHILS 3 0.5 - 7.8 %    BASOPHILS 1 0.0 - 2.0 %    IMMATURE GRANULOCYTES 0 0.0 - 5.0 %    ABS. NEUTROPHILS 3.3 1.7 - 8.2 K/UL    ABS. LYMPHOCYTES 0.5 0.5 - 4.6 K/UL    ABS. MONOCYTES 0.3 0.1 - 1.3 K/UL    ABS. EOSINOPHILS 0.1 0.0 - 0.8 K/UL    ABS. BASOPHILS 0.0 0.0 - 0.2 K/UL    ABS. IMM.  GRANS. 0.0 0.0 - 0.5 K/UL       Assessment:     Problem List as of 2/24/2018  Date Reviewed: 2/10/2018          Codes Class Noted - Resolved    Respiratory failure ([facility] ICD-10-CM: J96.90  ICD-9-CM: 518.81  2/13/2018 - Present        Acute on chronic respiratory failure ([facility] ICD-10-CM: J96.20  ICD-9-CM: 518.84  2/7/2018 - Present        Leukocytosis ICD-10-CM: U01.919  ICD-9-CM: 288.60  2/7/2018 - Present        Chronic diastolic heart failure (HCC) ICD-10-CM: I50.32  ICD-9-CM: 428.32  2/7/2018 - Present        S/P TAVR (transcatheter aortic valve replacement) ICD-10-CM: Z95.2  ICD-9-CM: V43.3  1/30/2018 - Present        Aortic stenosis (Chronic) ICD-10-CM: I35.0  ICD-9-CM: 424.1  1/29/2018 - Present        Peripheral arterial disease (HCC) (Chronic) ICD-10-CM: I73.9  ICD-9-CM: 443.9  1/23/2018 - Present        Pleural effusion ICD-10-CM: J90  ICD-9-CM: 511.9  1/23/2018 - Present    Overview Addendum 2/10/2018 11:36 AM by Emelia Kong NP     Right thoracentesis 1/25/18 - 1200 mls removed  Left thoracentesis 1/25/2018 - 900 mls removed  Bilateral thoracentesis 2/9/18 - L 550 ml (air aspirated during and at the end of procedure) / R 1000 ml and R creamy pink              Chronic respiratory failure with hypoxia (HCC) (Chronic) ICD-10-CM: J96.11  ICD-9-CM: 518.83, 799.02  1/23/2018 - Present    Overview Signed 1/23/2018 11:17 AM by Joellen Garcia NP     Wears 3 to 4 liters at home             Hypoxia ICD-10-CM: R09.02  ICD-9-CM: 799.02  1/23/2018 - Present        Stenosis of prosthetic aortic valve (Chronic) ICD-10-CM: I35.0  ICD-9-CM: 396.0  1/19/2018 - Present    Overview Signed 1/19/2018  3:18 PM by Tula Jeans, MD     AVR (10/5/13):  21 mm Pericardial valve.                Tricuspid valve insufficiency (Chronic) ICD-10-CM: I07.1  ICD-9-CM: 397.0  1/15/2018 - Present        Systolic CHF, chronic (HCC) (Chronic) ICD-10-CM: I50.22  ICD-9-CM: 428.22, 428.0  1/15/2018 - Present        S/P mitral valve repair (Chronic) ICD-10-CM: L84.234  ICD-9-CM: V45.89  12/4/2017 - Present        H/O atrioventricular rubin ablation (Chronic) ICD-10-CM: G79.565  ICD-9-CM: V15.1  3/22/2017 - Present        Pulmonary hypertension (Chronic) ICD-10-CM: I27.20  ICD-9-CM: 416.8  2/12/2017 - Present        Aortic valve replaced (Chronic) ICD-10-CM: Z95.2  ICD-9-CM: V43.3  1/9/2017 - Present        Chronic diastolic congestive heart failure ([address] (Chronic) ICD-10-CM: I50.32  ICD-9-CM: 428.32, 428.0  9/9/2016 - Present        Chronic depression (Chronic) ICD-10-CM: F32.9  ICD-9-CM: 801  8/5/2016 - Present        Osteopenia (Chronic) ICD-10-CM: M85.80  ICD-9-CM: 733.90  8/5/2016 - Present        RLS (restless legs syndrome) (Chronic) ICD-10-CM: G25.81  ICD-9-CM: 333.94  8/5/2016 - Present        Hyperlipidemia (Chronic) ICD-10-CM: E78.5  ICD-9-CM: 272.4  8/5/2016 - Present        Gout (Chronic) ICD-10-CM: M10.9  ICD-9-CM: 274.9  8/5/2016 - Present        Anxiety (Chronic) ICD-10-CM: F41.9  ICD-9-CM: 300.00  8/5/2016 - Present        CAD (coronary artery disease) (Chronic) ICD-10-CM: I25.10  ICD-9-CM: 414.00  8/5/2016 - Present        HTN (hypertension) (Chronic) ICD-10-CM: I10  ICD-9-CM: 401.9  8/5/2016 - Present        GERD (gastroesophageal reflux disease) (Chronic) ICD-10-CM: K21.9  ICD-9-CM: 530.81  8/5/2016 - Present        H/O mitral valve repair, 2003 (Chronic) ICD-10-CM: U55.795  ICD-9-CM: V15.1  6/27/2016 - Present        Sick sinus syndrome (HCC) (Chronic) ICD-10-CM: I49.5  ICD-9-CM: 427.81  2/13/2016 - Present        Obesity (Chronic) ICD-10-CM: E66.9  ICD-9-CM: 278.00  11/2/2015 - Present        Mitral stenosis with insufficiency (Chronic) ICD-10-CM: E22.3  ICD-9-CM: 394.2  11/2/2015 - Present    Overview Signed 11/2/2015 11:02 AM by Lus Opitz     Prior MV repair 2003.               Rheumatic aortic stenosis (Chronic) ICD-10-CM: I06.0  ICD-9-CM: 395.0  11/2/2015 - Present        Cardiac pacemaker (Chronic) ICD-10-CM: Z95.0  ICD-9-CM: V45.01  11/2/2015 - Present        Thrombocytopenia (HCC) (Chronic) ICD-10-CM: D69.6  ICD-9-CM: 287.5  8/22/2012 - Present        Anemia (Chronic) ICD-10-CM: D64.9  ICD-9-CM: 285.9  10/27/2011 - Present    Overview Signed 10/27/2011  8:25 AM by Gabriel Saucedo on chronic               Chronic atrial fibrillation (HCC) (Chronic) ICD-10-CM: I71.7  ICD-9-CM: 427.31  10/17/2011 - Present Hypothyroidism (Chronic) ICD-10-CM: E03.9  ICD-9-CM: 244.9  12/4/2009 - Present        BOBBY (obstructive sleep apnea) (Chronic) ICD-10-CM: G47.33  ICD-9-CM: 327.23  12/4/2009 - Present        Chronic obstructive pulmonary disease (HCC) (Chronic) ICD-10-CM: J44.9  ICD-9-CM: 496  3/8/2009 - Present        Aortic Valve Bioprosthesis Present (Chronic) ICD-10-CM: Z95.2  ICD-9-CM: V43.3  3/7/2009 - Present        Degenerative arthritis of left knee (Chronic) ICD-10-CM: M17.12  ICD-9-CM: 715.96  2/27/2009 - Present        RESOLVED: CHF (congestive heart failure) ([facility] ICD-10-CM: I50.9  ICD-9-CM: 428.0  2/13/2018 - 2/14/2018        RESOLVED: Acute on chronic systolic congestive heart failure ([facility] ICD-10-CM: I50.23  ICD-9-CM: 428.23, 428.0  2/1/2018 - 2/7/2018        RESOLVED: Acute pulmonary edema ([facility] ICD-10-CM: J81.0  ICD-9-CM: 518.4  1/25/2018 - 2/7/2018        RESOLVED: Pacemaker ICD-10-CM: Z95.0  ICD-9-CM: V45.01  12/4/2017 - 1/23/2018        RESOLVED: History of gout ICD-10-CM: Z87.39  ICD-9-CM: V12.29  1/31/2017 - 1/23/2018        RESOLVED: Warfarin anticoagulation (Chronic) ICD-10-CM: Z79.01  ICD-9-CM: V58.61  1/9/2017 - 1/23/2018        RESOLVED: Non morbid obesity due to excess calories ICD-10-CM: E66.09  ICD-9-CM: 278.00  11/14/2016 - 1/23/2018        RESOLVED: Hypoxemia ICD-10-CM: R09.02  ICD-9-CM: 799.02  11/14/2016 - 1/23/2018        RESOLVED: Localized edema ICD-10-CM: R60.0  ICD-9-CM: 782.3  9/9/2016 - 1/23/2018        RESOLVED: Iron deficiency anemia ICD-10-CM: D50.9  ICD-9-CM: 280.9  9/9/2016 - 1/23/2018        RESOLVED: CRI (chronic renal insufficiency) ICD-10-CM: N18.9  ICD-9-CM: 585.9  8/5/2016 - 1/23/2018        RESOLVED: Dyspnea ICD-10-CM: R06.00  ICD-9-CM: 786.09  8/5/2016 - 1/23/2018        RESOLVED: Right hip pain ICD-10-CM: M25.551  ICD-9-CM: 719.45  8/5/2016 - 1/23/2018        RESOLVED: Edema ICD-10-CM: R60.9  ICD-9-CM: 782.3  8/5/2016 - 1/23/2018        RESOLVED: Chest pain ICD-10-CM: R07.9  ICD-9-CM: 786.50  8/5/2016 - 1/23/2018        RESOLVED: Other long term (current) drug therapy ICD-10-CM: Z79.899  ICD-9-CM: V58.69  8/5/2016 - 1/23/2018        RESOLVED: Acute encephalopathy ICD-10-CM: G93.40  ICD-9-CM: 348.30  7/8/2016 - 1/23/2018        RESOLVED: Tachycardia ICD-10-CM: R00.0  ICD-9-CM: 785.0  6/27/2016 - 1/23/2018        RESOLVED: Palpitations ICD-10-CM: R00.2  ICD-9-CM: 785.1  11/2/2015 - 1/23/2018        RESOLVED: Respiratory insufficiency ICD-10-CM: R06.89  ICD-9-CM: 786.09  11/2/2015 - 1/23/2018        RESOLVED: Bradycardia (Symptomatic) ICD-10-CM: R00.1  ICD-9-CM: 427.89  11/7/2011 - 1/23/2018        RESOLVED: Rectal bleeding ICD-10-CM: K62.5  ICD-9-CM: 569.3  10/27/2011 - 1/23/2018        RESOLVED: AV block ICD-10-CM: I44.30  ICD-9-CM: 426.10  10/17/2011 - 1/23/2018        RESOLVED: Cardiogenic shock ([facility] ICD-10-CM: R57.0  ICD-9-CM: 785.51  10/17/2011 - 1/23/2018        RESOLVED: Hyperkalemia ICD-10-CM: E87.5  ICD-9-CM: 276.7  10/17/2011 - 1/23/2018        RESOLVED: Nausea and vomiting ICD-10-CM: R11.2  ICD-9-CM: 787.01  2/24/2010 - 1/23/2018        RESOLVED: Epigastric abdominal pain ICD-10-CM: R10.13  ICD-9-CM: 789.06  2/24/2010 - 1/23/2018        RESOLVED: Digoxin toxicity ICD-10-CM: T46.0X1A  ICD-9-CM: 972.1, E942.1  2/24/2010 - 1/23/2018        RESOLVED: ARF (acute renal failure) (HCC) (Chronic) ICD-10-CM: N17.9  ICD-9-CM: 584.9  2/24/2010 - 1/23/2018        RESOLVED: Hypokalemia ICD-10-CM: E87.6  ICD-9-CM: 276.8  2/24/2010 - 1/23/2018        RESOLVED: CKD (chronic kidney disease) stage 3, GFR 30-59 ml/min (Chronic) ICD-10-CM: N18.3  ICD-9-CM: 585.3  12/4/2009 - 1/23/2018        RESOLVED: Cough ICD-10-CM: R05  ICD-9-CM: 786.2  3/9/2009 - 3/12/2009        RESOLVED: Bronchitis ICD-10-CM: J40  ICD-9-CM: 795  3/9/2009 - 3/12/2009        RESOLVED: Atrial fibrillation (HCC) (Chronic) ICD-10-CM: I48.91  ICD-9-CM: 427.31  3/7/2009 - 6/27/2016        RESOLVED: Hypotension (Chronic) ICD-10-CM: I95.9  ICD-9-CM: 458.9  3/1/2009 - 1/23/2018            Debility ; acute respiratory failure/Acute on chronic CHF/ chronic AF/ Anemia    - s/p TAVR; cont strict I/o due to CHF. On aggressive diuresis; plans to change all diuretics to po today, per cardiology, cont O2 supplementation    - chronic afib; cont Eliquis; rate controlled with betablk    -htn; having some hypotension; last evening SBP 82; multifactorial; backing off diuretics; needs parameters when to hold; bp this a.m. 129/85    -Anemia; bld transfusion today. Re check in a.m; check hemoccult of stool ; will add on iron studies to today's lab; no recent rectal bld noted    -depression; cont zoloft and valium; coping well    -Pulm edema; s/p thoracentesis' bilat during acute care. O2 needs decreasing. Cont Xopenex prn. Enc IS    -hypothyroidism; con synthroid    -HLD; cont pravachol    -hypokalemia; cont K, may need to replace IV; recheck in a.m.     -monitor renal fxn; creat creeping up; likely due to aggressive diuresis        Time spent was 35 minutes with over 1/2 in direct patient care/examination, consultation and coordination of care.      Signed By: Lelo Mclean MD     February 24, 2018

## 2018-02-24 NOTE — PROGRESS NOTES
Was notified of pt BP 82/50 by PCT. Pt is alert and without signs of distress. BP rechecked with results noted in doc flowsheet. CARA hose left on pt to help with circulation. Will monitor pt.

## 2018-02-24 NOTE — PROGRESS NOTES
Patient resting up in bed. Alert and oriented. Lung sounds with coarseness to upper lobes, but improved. 2 liters nasal cannula. S1S2, bowel sounds active. Up to toilet with walker x 1 assist. Dyspneic upon exertion. Right IJ patent  and clean. Edema improved to lower extremities. No complaint of pain or discomfort. Door left open for closer observation. Assessment completed. No other verbalized needs at present time. See doc flow sheet for further assessments.

## 2018-02-24 NOTE — PROGRESS NOTES
End Of Shift Functional Summary, Nursing      TOILETING:  Does patient need assist with clothing management and/or pericare? Yes: Comment: needs assistance with going from sitting to standing and jessi clothes up and down. TOILET TRANSFER:  Pt requires moderate assistance. Pt uses walker. BLADDER:  Pt does not have a [place] catheter that staff manages. Pt does not take medication. Pt is continent. of bladder and voids in toilet  Pt requires staff to empty device Pt has had 0 bladder accidents during this shift requiring moderate assistance to clean up. (An accident is when the episode is not contained in a brief AND/OR the clothing/linen requires changing/cleaning up.)    BOWEL:  Pt does take medication. Pt is continent of bowel and uses toilet. Pt requires staff to empty device    Pt has had 0 bowel accidents during this shift requiring moderate assistance from staff to clean up. (An accident is when the episode is not contained in a brief AND/OR the clothing/linen requires changing/cleaning up.)    BED/CHAIR TRANSFER  Pt requires moderate assistance. Patient requires the assistance of 1 staff member(s). Pt uses walker                   EATING  Pt requires setup. Pt wears dentures. TUBE FEEDINGS:  Pt does not  receive nutrition through tube feedings. Patient requires moderate assistance with feedings. Documentation reviewed and plan of care discussed/reviewed with   patient, physician, therapists, oncoming nurse, patient assistant and family/spouse during the shift.

## 2018-02-24 NOTE — PROGRESS NOTES
02/24/18 1140   Time Spent With Patient   Time In 0957   Time Out 1055   Patient Seen For: AM;Other (see progress notes)   Grooming   Grooming Assistance  S   Comments set up for dentures and hair care. Patient in bed upon arrival.  Patient seen for grooming/hygines, UE strength and   Asked if she could brush teeth. Transfer with mod A for verbal cues for foot and hand placement. With set up, patient able to brush denture, apply cream to gums and fixodent to dentures. In gym, patient completed 5 minutes on restorator slowly at min resistance. Shoulder tree with red clothespin for UE strength and endurance. Able to complete 10 on right side with 1 minute rest.   10 minutes with LUE with no rests. Patient then given to PT. Continue POC.      Oni Loya  2/24/2018

## 2018-02-24 NOTE — PROGRESS NOTES
PHYSICAL THERAPY DAILY NOTE  Time In: 1055  Time Out: 4628  Patient Seen For: AM;Balance activities;Gait training;Patient education; Therapeutic exercise;Transfer training; Other (see progress notes)    Subjective: Patient reporting she feels better today. Reports her swelling in her arms and legs is less. Reports her breathing is better today         Objective:Vital Signs:  Patient Vitals for the past 12 hrs:   Temp Pulse Resp BP SpO2   02/24/18 0752 97.9 °F (36.6 °C) 84 16 129/85 99 %     Patient on 02 at 3 lpm. 02 sat 99 to 100% during treatment. Pain level:No c/o pain  Pain location:NA  Pain interventions:NA    Patient education:Bed mobility training,transfer training, gait training, fall precautions, activity pacing, body mechanics, w/c and rollator parts management, energy conservation, breathing techniques during functional mobility.       Interdisciplinary Communication:NA    Other (comment) (Fall precautions)  GROSS ASSESSMENT Daily Assessment     NA       BED/MAT MOBILITY Daily Assessment   Increased time and effort to complete with cues for body mechanics  Cues for breathing techniques Rolling Right : 0 (Not tested)  Rolling Left : 0 (Not tested)  Supine to Sit : 6 (Modified independent)  Sit to Supine : 5 (Supervision)       TRANSFERS Daily Assessment   Increased time and effort to complete with cues for body mechanics and rollator set up   Transfer Type: SPT with walker  Transfer Assistance : 5 (Stand-by assistance)  Sit to Stand Assistance: Stand-by assistance  Car Transfers: Not tested       GAIT Daily Assessment    Amount of Assistance: 5 (Stand-by assistance)  Distance (ft): 60 Feet (ft)  Assistive Device: Walker, rollator   Gait training with cues for posture correction and to improve step length and step clearance    STEPS or STAIRS Daily Assessment    Steps/Stairs Ambulated (#): 0  Level of Assist : 0 (Not tested)       BALANCE Daily Assessment   Static standing with rollator and SBA, cues for posture correction Sitting - Static: Good (unsupported)  Sitting - Dynamic: Fair (occasional)  Standing - Static: Fair  Standing - Dynamic : Impaired       WHEELCHAIR MOBILITY Daily Assessment    Curbs/Ramps Assist Required (FIM Score): 0 (Not tested)  Wheelchair Setup Assist Required : 3 (Moderate assistance)  Wheelchair Management: Manages left brake;Manages right brake       LOWER EXTREMITY EXERCISES Daily Assessment   Multiple and frequent rest breaks during exercises, cues for breathing techniques. Extremity: Both  Exercise Type #1: Supine lower extremity strengthening  Sets Performed: 3  Reps Performed: 10  Level of Assist: Minimal assistance     SUPINE EXERCISES Sets Reps Comments   Ankle Pumps 1 20    Bridging 3 10    Heel Slides 3 10    Hip Abduction 3 10    Short Arc Quad 3 10             Assessment: Improved tolerance to treatment with improved functional mobility. LE edema decreased with improved ability to lift LEs onto mat. RR and rhythm improved during functional mobility       Patient returned to room at end of treatment and remained up in recliner with LEs elevated and with needs in reach. 02 at 3lpm    Plan of Care: Continue with POC and progress as tolerated.      Isa Petersen, PT  2/24/2018

## 2018-02-24 NOTE — PROGRESS NOTES
Problem: Falls - Risk of  Goal: *Absence of Falls  Document Gregorio Fall Risk and appropriate interventions in the flowsheet.    Fall Risk Interventions:  Mobility Interventions: Assess mobility with egress test, Bed/chair exit alarm, OT consult for ADLs, Communicate number of staff needed for ambulation/transfer, Patient to call before getting OOB, PT Consult for mobility concerns, PT Consult for assist device competence, Strengthening exercises (ROM-active/passive), Utilize walker, cane, or other assitive device    Mentation Interventions: Adequate sleep, hydration, pain control, Bed/chair exit alarm, Increase mobility, More frequent rounding    Medication Interventions: Assess postural VS orthostatic hypotension, Evaluate medications/consider consulting pharmacy, Patient to call before getting OOB, Bed/chair exit alarm, Teach patient to arise slowly    Elimination Interventions: Bed/chair exit alarm, Call light in reach, Elevated toilet seat, Toilet paper/wipes in reach, Patient to call for help with toileting needs, Toileting schedule/hourly rounds    History of Falls Interventions: Consult care management for discharge planning

## 2018-02-25 NOTE — PROGRESS NOTES
Pt assisted up to bathroom to void 300ml. Tolerated well with minimal dyspnea on exertion. Lungs auscultated with faint expiratory rales auscultated. Right arm appears to be swollen. Radial pulse palpated. Pt denies any discomfort or numbness in right arm. No other signs of distress assessed. Continues to be on O2 at 3l/NC.

## 2018-02-25 NOTE — PROGRESS NOTES
End Of Shift Functional Summary, Nursing      TOILETING:  Does patient need assist with clothing management and/or pericare? Yes: Comment: needs completed care with toileting either with toilet, bedside toilet, or bedpan. TOILET TRANSFER:  Pt requires maximum assistance. Pt uses walker. BLADDER:  Pt does not have a [place] catheter that staff manages. Pt does not take medication. Pt is continent. of bladder and voids in bedpan  Pt requires staff to empty device Pt has had 0 bladder accidents during this shift requiring maximum assistance to clean up. (An accident is when the episode is not contained in a brief AND/OR the clothing/linen requires changing/cleaning up.)    BOWEL:  Pt does take medication. Pt is continent of bowel and uses bedpan. Pt requires staff to empty device    Pt has had 0 bowel accidents during this shift requiring maximum assistance from staff to clean up. (An accident is when the episode is not contained in a brief AND/OR the clothing/linen requires changing/cleaning up.)    BED/CHAIR TRANSFER  Pt requires maximum assistance. Patient requires the assistance of 2 staff member(s). Pt uses two person assist to stand. EATING  Pt requires staff to feed. Pt wears dentures. TUBE FEEDINGS:  Pt does not  receive nutrition through tube feedings. Patient requires moderate assistance with feedings. Documentation reviewed and plan of care discussed/reviewed with   patient, physician, therapists, oncoming nurse, patient assistant and family/spouse during the shift.

## 2018-02-25 NOTE — PROGRESS NOTES
Report received from previous shift nurse on pts history and status. Pt laying in bed with HOB elevated, sleeping in no acute distress. Awakens with verbal stimulus, head to toe assesmt completed. Pt denies any discomfort or pain. Assisted up to bathroom to void in bathroom in wheelchair. Tolerated well with no dyspnea, speaking in sentences, O2 on at 3l nc continuously.

## 2018-02-25 NOTE — PROGRESS NOTES
Problem: Falls - Risk of  Goal: *Absence of Falls  Document Gregorio Fall Risk and appropriate interventions in the flowsheet.    Outcome: Progressing Towards Goal  Fall Risk Interventions:  Mobility Interventions: Communicate number of staff needed for ambulation/transfer, Strengthening exercises (ROM-active/passive), Utilize walker, cane, or other assitive device, Patient to call before getting OOB    Mentation Interventions: Adequate sleep, hydration, pain control, Door open when patient unattended, Evaluate medications/consider consulting pharmacy, Increase mobility    Medication Interventions: Patient to call before getting OOB, Evaluate medications/consider consulting pharmacy    Elimination Interventions: Call light in reach, Elevated toilet seat    History of Falls Interventions: Consult care management for discharge planning, Evaluate medications/consider consulting pharmacy, Door open when patient unattended

## 2018-02-25 NOTE — PROGRESS NOTES
Patient resting up in bed. Drowsy, but aroused with voice. Lung sounds with light coarseness to upper lobes, diminished in bases. Encouraged to cough and deep breathe. 2 liters nasal cannula. S1S2, bowel sounds active. Repositioned up in bed for breakfast tray. Assisted with up food and assistance with feeding. Perineal area dry and clean. No complaint of pain or discomfort at present time. Assessment completed. See doc flow sheet for further assessments.

## 2018-02-25 NOTE — PROGRESS NOTES
Son in at bedside visiting with patient. Stated he would be back in the morning to meet up with the Cardiologist to ask questions.

## 2018-02-25 NOTE — PROGRESS NOTES
[facility] CARDIOLOGY PROGRESS NOTE           2/25/2018   Admit Date: 2/13/2018      Subjective:   Dyspnea stable. Edema improved. BP stable. NYHA class II    ROS:  Cardiovascular:  As noted above    Objective:      Vitals:    02/24/18 2000 02/25/18 0045 02/25/18 0725 02/25/18 1126   BP: 99/47  101/65 102/67   Pulse: 83  82 82   Resp: 17  16 16   Temp: 97.8 °F (36.6 °C)  97.6 °F (36.4 °C) 97.6 °F (36.4 °C)   SpO2: 100%  100% 99%   Weight:  79.1 kg (174 lb 6.4 oz)         Physical Exam:  General-No Acute Distress  Neck- supple, no JVD  CV- regular rate and rhythm Grade II/VI TERRI  Lung- clear bilaterally  Abd- soft, nontender, nondistended  Ext- 1+ edema bilaterally. Skin- warm and dry      Data Review:   Recent Labs      02/25/18   0630  02/24/18   0451   NA   --   143   K   --   3.7   BUN   --   47*   CREA   --   1.34*   GLU   --   106*   WBC   --   4.3   HGB  8.2*  7.4*   HCT  27.3*  25.1*   PLT   --   83*      No results found for: FARHAD Webb    Assessment/Plan:     Active Problems:    Chronic atrial fibrillation ([facility] [address] (10/17/2011)    On Eliquis      S/P TAVR (transcatheter aortic valve replacement) (1/30/2018)    Stable. On Eliquis. Chronic diastolic heart failure ([address] (2/7/2018)    Stable on PO lasix and zaroxolyn. Need to check BMP, Mg      Respiratory failure ([address] (2/13/2018)    Stable on nasal cannula. Anemia:  S/P 1 unit PRBC.   Recheck Hgb in Kira Hidalgo MD  2/25/2018

## 2018-02-25 NOTE — PROGRESS NOTES
Gideon Krause MD,   Medical Director  [address] [facility], [address] [facility]  Tel: [phone]       [facility] PROGRESS NOTE    Jason Hopson  Admit Date: 2/13/2018  Admit Diagnosis: DEBILITY;CHF (congestive heart failure) ([address] Respiratory f*    Subjective     Feeling well. Denies sob. No cp, n. Good spirits. Tolerated blood transfusion well. Mildly hypotensive this a.m.  But asymptomatic    Objective:     Current Facility-Administered Medications   Medication Dose Route Frequency    0.9% sodium chloride infusion 250 mL  250 mL IntraVENous PRN    furosemide (LASIX) tablet 80 mg  80 mg Oral BID    potassium chloride (K-DUR, KLOR-CON) SR tablet 40 mEq  40 mEq Oral BID    magnesium oxide (MAG-OX) tablet 400 mg  400 mg Oral DAILY    apixaban (ELIQUIS) tablet 2.5 mg  2.5 mg Oral Q12H    diazePAM (VALIUM) tablet 2.5 mg  2.5 mg Oral QHS    central line flush (saline) syringe 10 mL  10 mL InterCATHeter Q8H    metOLazone (ZAROXOLYN) tablet 2.5 mg  2.5 mg Oral DAILY    acetaminophen (TYLENOL) tablet 650 mg  650 mg Oral Q4H PRN    alcohol 62% (NOZIN) nasal  1 Ampule  1 Ampule Topical Q12H    allopurinol (ZYLOPRIM) tablet 100 mg  100 mg Oral BID    hydrocortisone (ANUSOL-HC) 2.5 % rectal cream   PeriANAL TID PRN    levothyroxine (SYNTHROID) tablet 88 mcg  88 mcg Oral ACB    metoprolol succinate (TOPROL-XL) XL tablet 25 mg  25 mg Oral DAILY    pantoprazole (PROTONIX) tablet 40 mg  40 mg Oral ACB    polyethylene glycol (MIRALAX) packet 17 g  17 g Oral DAILY    pravastatin (PRAVACHOL) tablet 40 mg  40 mg Oral DAILY    rOPINIRole (REQUIP) tablet 2 mg  2 mg Oral BID    sertraline (ZOLOFT) tablet 100 mg  100 mg Oral DAILY    spironolactone (ALDACTONE) tablet 25 mg  25 mg Oral DAILY    levalbuterol (XOPENEX) nebulizer soln 0.63 mg/3 mL  0.63 mg Nebulization Q6H PRN     Facility-Administered Medications Ordered in Other Encounters   Medication Dose Route Frequency    0.9% sodium chloride infusion 250 mL  250 mL IntraVENous PRN     Review of Systems:Denies chest pain, shortness of breath, cough, headache, visual problems, abdominal pain, dysurea, calf pain. Pertinent positives are as noted in the medical records and unremarkable otherwise. Visit Vitals    /65 (BP 1 Location: Right arm)    Pulse 82    Temp 97.6 °F (36.4 °C)    Resp 16    Wt 174 lb 6.4 oz (79.1 kg)    SpO2 100%    BMI 34.06 kg/m2        Physical Exam:   General: Alert and age appropriately oriented. No acute cardio respiratory distress. HEENT: Normocephalic,no scleral icterus  Oral mucosa moist without cyanosis   Lungs: Clear to auscultation  Bilaterally but dec bases  Respiration even and unlabored   Heart: Regular rate and rhythm, S1, S2   2/6 TERRI   Abdomen: Soft, non-tender, nondistended. Bowel sounds present. No organomegaly. Genitourinary: defer   Neuromuscular:      Bilateral hip flexion 3-, ankle PF and DF 5  Sensation intact   Ox 3   Skin/extremity: No rashes, no erythema.  No calf tenderness BLE  2- pitting edema                                                                            Functional Assessment:  Gross Assessment  AROM:  (LE decreased secondary to weakness and edema) (02/21/18 1300)  PROM:  (Decreased secondary to LE edema ) (02/21/18 1300)  Strength:  (bilateral hip -3/5 to +2/5,knee 4/5, ankle 4/5) (02/21/18 1300)  Coordination:  (Impaired) (02/21/18 1300)  Tone: Normal (02/21/18 1300)  Sensation: Intact (02/21/18 1300)       Balance  Sitting - Static: Good (unsupported) (02/24/18 1100)  Sitting - Dynamic: Fair (occasional) (02/24/18 1100)  Standing - Static: Fair (02/24/18 1100)  Standing - Dynamic : Impaired (02/24/18 1100)           Toileting  Cues: Verbal cues provided;Physical assistance for pants down;Physical assistance for pants up (02/22/18 0840)  Adaptive Equipment: Walker;Grab bars (02/22/18 0840)         Todd Punch Fall Risk Assessment:  Todd Punch Fall Risk  Mobility: Ambulates or transfers with assist devices or assistance/unsteady gait (02/24/18 2230)  Mobility Interventions: Communicate number of staff needed for ambulation/transfer (02/24/18 2230)  Mentation: Alert, oriented x 3 (02/24/18 2230)  Mentation Interventions: Adequate sleep, hydration, pain control (02/24/18 2230)  Medication: Patient receiving anticonvulsants, sedatives(tranquilizers), psychotropics or hypnotics, hypoglycemics, narcotics, sleep aids, antihypertensives, laxatives, or diuretics (02/24/18 2230)  Medication Interventions: Assess postural VS orthostatic hypotension;Bed/chair exit alarm; Patient to call before getting OOB; Teach patient to arise slowly (02/24/18 2230)  Elimination: Needs assistance with toileting (02/24/18 2230)  Elimination Interventions: Call light in reach; Patient to call for help with toileting needs; Toilet paper/wipes in reach; Toileting schedule/hourly rounds (02/24/18 2230)  Prior Fall History: No (02/24/18 2230)  History of Falls Interventions: Consult care management for discharge planning (02/23/18 0720)  Total Score: 3 (02/24/18 2230)  Standard Fall Precautions: Yes (02/24/18 0715)  High Fall Risk: Yes (02/24/18 2230)     Speech Assessment:         Ambulation:  Gait  Distance (ft): 60 Feet (ft) (02/24/18 1100)  Assistive Device: [place], rollator (02/24/18 1100)  Rail Use: Both (02/22/18 1600)     Labs/Studies:  Recent Results (from the past 72 hour(s))   HGB & HCT    Collection Time: 02/23/18  5:30 AM   Result Value Ref Range    HGB 7.7 (L) 11.7 - 15.4 g/dL    HCT 24.9 (L) 35.8 - 46.3 %   TYPE + CROSSMATCH    Collection Time: 02/23/18  6:05 PM   Result Value Ref Range    Crossmatch Expiration 02/26/2018     ABO/Rh(D) A POSITIVE     Antibody screen NEG     Unit number [id number]     Blood component type RC LR     Unit division 00     Status of unit TRANSFUSED     Crossmatch result Compatible    METABOLIC PANEL, BASIC    Collection Time: 02/24/18  4:51 AM Result Value Ref Range    Sodium 143 136 - 145 mmol/L    Potassium 3.7 3.5 - 5.1 mmol/L    Chloride 95 (L) 98 - 107 mmol/L    CO2 >45 (HH) 21 - 32 mmol/L    Anion gap Cannot be calculated 7 - 16 mmol/L    Glucose 106 (H) 65 - 100 mg/dL    BUN 47 (H) 8 - 23 MG/DL    Creatinine 1.34 (H) 0.6 - 1.0 MG/DL    GFR est AA 49 (L) >60 ml/min/1.73m2    GFR est non-AA 41 (L) >60 ml/min/1.73m2    Calcium 8.7 8.3 - 10.4 MG/DL   CBC WITH AUTOMATED DIFF    Collection Time: 02/24/18  4:51 AM   Result Value Ref Range    WBC 4.3 4.3 - 11.1 K/uL    RBC 2.57 (L) 4.05 - 5.25 M/uL    HGB 7.4 (L) 11.7 - 15.4 g/dL    HCT 25.1 (L) 35.8 - 46.3 %    MCV 97.7 79.6 - 97.8 FL    MCH 28.8 26.1 - 32.9 PG    MCHC 29.5 (L) 31.4 - 35.0 g/dL    RDW 17.2 (H) 11.9 - 14.6 %    PLATELET 83 (L) 571 - 450 K/uL    MPV 11.5 10.8 - 14.1 FL    DF AUTOMATED      NEUTROPHILS 77 43 - 78 %    LYMPHOCYTES 12 (L) 13 - 44 %    MONOCYTES 7 4.0 - 12.0 %    EOSINOPHILS 3 0.5 - 7.8 %    BASOPHILS 1 0.0 - 2.0 %    IMMATURE GRANULOCYTES 0 0.0 - 5.0 %    ABS. NEUTROPHILS 3.3 1.7 - 8.2 K/UL    ABS. LYMPHOCYTES 0.5 0.5 - 4.6 K/UL    ABS. MONOCYTES 0.3 0.1 - 1.3 K/UL    ABS. EOSINOPHILS 0.1 0.0 - 0.8 K/UL    ABS. BASOPHILS 0.0 0.0 - 0.2 K/UL    ABS. IMM.  GRANS. 0.0 0.0 - 0.5 K/UL   TRANSFERRIN SATURATION    Collection Time: 02/24/18 12:31 PM   Result Value Ref Range    Iron 51 35 - 150 ug/dL    TIBC 333 250 - 450 ug/dL    Transferrin Saturation 15 (L) >20 %   FERRITIN    Collection Time: 02/24/18 12:32 PM   Result Value Ref Range    Ferritin 122 8 - 388 NG/ML   TRANSFERRIN    Collection Time: 02/24/18 12:32 PM   Result Value Ref Range    Transferrin 259 202 - 364 mg/dL   HGB & HCT    Collection Time: 02/25/18  6:30 AM   Result Value Ref Range    HGB 8.2 (L) 11.7 - 15.4 g/dL    HCT 27.3 (L) 35.8 - 46.3 %       Assessment:     Problem List as of 2/25/2018  Date Reviewed: 2/10/2018          Codes Class Noted - Resolved    Respiratory failure ([facility] ICD-10-CM: J96.90  ICD-9-CM: 518.81  2/13/2018 - Present        Acute on chronic respiratory failure ([facility] ICD-10-CM: J96.20  ICD-9-CM: 518.84  2/7/2018 - Present        Leukocytosis ICD-10-CM: Q53.722  ICD-9-CM: 288.60  2/7/2018 - Present        Chronic diastolic heart failure (HCC) ICD-10-CM: I50.32  ICD-9-CM: 428.32  2/7/2018 - Present        S/P TAVR (transcatheter aortic valve replacement) ICD-10-CM: Z95.2  ICD-9-CM: V43.3  1/30/2018 - Present        Aortic stenosis (Chronic) ICD-10-CM: I35.0  ICD-9-CM: 424.1  1/29/2018 - Present        Peripheral arterial disease (HCC) (Chronic) ICD-10-CM: I73.9  ICD-9-CM: 443.9  1/23/2018 - Present        Pleural effusion ICD-10-CM: J90  ICD-9-CM: 511.9  1/23/2018 - Present    Overview Addendum 2/10/2018 11:36 AM by Guillermo Raines NP     Right thoracentesis 1/25/18 - 1200 mls removed  Left thoracentesis 1/25/2018 - 900 mls removed  Bilateral thoracentesis 2/9/18 - L 550 ml (air aspirated during and at the end of procedure) / R 1000 ml and R creamy pink              Chronic respiratory failure with hypoxia (HCC) (Chronic) ICD-10-CM: J96.11  ICD-9-CM: 518.83, 799.02  1/23/2018 - Present    Overview Signed 1/23/2018 11:17 AM by Marni Waite NP     Wears 3 to 4 liters at home             Hypoxia ICD-10-CM: R09.02  ICD-9-CM: 799.02  1/23/2018 - Present        Stenosis of prosthetic aortic valve (Chronic) ICD-10-CM: I35.0  ICD-9-CM: 396.0  1/19/2018 - Present    Overview Signed 1/19/2018  3:18 PM by Vlad Serrano MD     AVR (10/5/13):  21 mm Pericardial valve.                Tricuspid valve insufficiency (Chronic) ICD-10-CM: I07.1  ICD-9-CM: 397.0  1/15/2018 - Present        Systolic CHF, chronic (HCC) (Chronic) ICD-10-CM: I50.22  ICD-9-CM: 428.22, 428.0  1/15/2018 - Present        S/P mitral valve repair (Chronic) ICD-10-CM: E83.330  ICD-9-CM: V45.89  12/4/2017 - Present        H/O atrioventricular rubin ablation (Chronic) ICD-10-CM: Z17.055  ICD-9-CM: V15.1  3/22/2017 - Present        Pulmonary hypertension (Chronic) ICD-10-CM: I27.20  ICD-9-CM: 416.8  2/12/2017 - Present        Aortic valve replaced (Chronic) ICD-10-CM: Z95.2  ICD-9-CM: V43.3  1/9/2017 - Present        Chronic diastolic congestive heart failure (HCC) (Chronic) ICD-10-CM: I50.32  ICD-9-CM: 428.32, 428.0  9/9/2016 - Present        Chronic depression (Chronic) ICD-10-CM: F32.9  ICD-9-CM: 982  8/5/2016 - Present        Osteopenia (Chronic) ICD-10-CM: M85.80  ICD-9-CM: 733.90  8/5/2016 - Present        RLS (restless legs syndrome) (Chronic) ICD-10-CM: G25.81  ICD-9-CM: 333.94  8/5/2016 - Present        Hyperlipidemia (Chronic) ICD-10-CM: E78.5  ICD-9-CM: 272.4  8/5/2016 - Present        Gout (Chronic) ICD-10-CM: M10.9  ICD-9-CM: 274.9  8/5/2016 - Present        Anxiety (Chronic) ICD-10-CM: F41.9  ICD-9-CM: 300.00  8/5/2016 - Present        CAD (coronary artery disease) (Chronic) ICD-10-CM: I25.10  ICD-9-CM: 414.00  8/5/2016 - Present        HTN (hypertension) (Chronic) ICD-10-CM: I10  ICD-9-CM: 401.9  8/5/2016 - Present        GERD (gastroesophageal reflux disease) (Chronic) ICD-10-CM: K21.9  ICD-9-CM: 530.81  8/5/2016 - Present        H/O mitral valve repair, 2003 (Chronic) ICD-10-CM: G52.813  ICD-9-CM: V15.1  6/27/2016 - Present        Sick sinus syndrome (HCC) (Chronic) ICD-10-CM: I49.5  ICD-9-CM: 427.81  2/13/2016 - Present        Obesity (Chronic) ICD-10-CM: E66.9  ICD-9-CM: 278.00  11/2/2015 - Present        Mitral stenosis with insufficiency (Chronic) ICD-10-CM: H88.0  ICD-9-CM: 394.2  11/2/2015 - Present    Overview Signed 11/2/2015 11:02 AM by Zora Castillo     Prior MV repair 2003.               Rheumatic aortic stenosis (Chronic) ICD-10-CM: I06.0  ICD-9-CM: 395.0  11/2/2015 - Present        Cardiac pacemaker (Chronic) ICD-10-CM: Z95.0  ICD-9-CM: V45.01  11/2/2015 - Present        Thrombocytopenia (HCC) (Chronic) ICD-10-CM: D69.6  ICD-9-CM: 287.5  8/22/2012 - Present        Anemia (Chronic) ICD-10-CM: D64.9  ICD-9-CM: 285.9  10/27/2011 - Present    Overview Signed 10/27/2011  8:25 AM by Bernardino Goins     Acute on chronic               Chronic atrial fibrillation (HCC) (Chronic) ICD-10-CM: I48.2  ICD-9-CM: 427.31  10/17/2011 - Present        Hypothyroidism (Chronic) ICD-10-CM: E03.9  ICD-9-CM: 244.9  12/4/2009 - Present        BOBBY (obstructive sleep apnea) (Chronic) ICD-10-CM: G47.33  ICD-9-CM: 327.23  12/4/2009 - Present        Chronic obstructive pulmonary disease (HCC) (Chronic) ICD-10-CM: J44.9  ICD-9-CM: 496  3/8/2009 - Present        Aortic Valve Bioprosthesis Present (Chronic) ICD-10-CM: Z95.2  ICD-9-CM: V43.3  3/7/2009 - Present        Degenerative arthritis of left knee (Chronic) ICD-10-CM: M17.12  ICD-9-CM: 715.96  2/27/2009 - Present        RESOLVED: CHF (congestive heart failure) ([facility] ICD-10-CM: I50.9  ICD-9-CM: 428.0  2/13/2018 - 2/14/2018        RESOLVED: Acute on chronic systolic congestive heart failure ([facility] ICD-10-CM: I50.23  ICD-9-CM: 428.23, 428.0  2/1/2018 - 2/7/2018        RESOLVED: Acute pulmonary edema ([facility] [address] ICD-10-CM: J81.0  ICD-9-CM: 518.4  1/25/2018 - 2/7/2018        RESOLVED: Pacemaker ICD-10-CM: Z95.0  ICD-9-CM: V45.01  12/4/2017 - 1/23/2018        RESOLVED: History of gout ICD-10-CM: Z87.39  ICD-9-CM: V12.29  1/31/2017 - 1/23/2018        RESOLVED: Warfarin anticoagulation (Chronic) ICD-10-CM: Z79.01  ICD-9-CM: V58.61  1/9/2017 - 1/23/2018        RESOLVED: Non morbid obesity due to excess calories ICD-10-CM: E66.09  ICD-9-CM: 278.00  11/14/2016 - 1/23/2018        RESOLVED: Hypoxemia ICD-10-CM: R09.02  ICD-9-CM: 799.02  11/14/2016 - 1/23/2018        RESOLVED: Localized edema ICD-10-CM: R60.0  ICD-9-CM: 782.3  9/9/2016 - 1/23/2018        RESOLVED: Iron deficiency anemia ICD-10-CM: D50.9  ICD-9-CM: 280.9  9/9/2016 - 1/23/2018        RESOLVED: CRI (chronic renal insufficiency) ICD-10-CM: N18.9  ICD-9-CM: 585.9  8/5/2016 - 1/23/2018        RESOLVED: Dyspnea ICD-10-CM: R06.00  ICD-9-CM: 786.09  8/5/2016 - 1/23/2018 RESOLVED: Right hip pain ICD-10-CM: M25.551  ICD-9-CM: 719.45  8/5/2016 - 1/23/2018        RESOLVED: Edema ICD-10-CM: R60.9  ICD-9-CM: 782.3  8/5/2016 - 1/23/2018        RESOLVED: Chest pain ICD-10-CM: R07.9  ICD-9-CM: 786.50  8/5/2016 - 1/23/2018        RESOLVED: Other long term (current) drug therapy ICD-10-CM: M82.883  ICD-9-CM: V58.69  8/5/2016 - 1/23/2018        RESOLVED: Acute encephalopathy ICD-10-CM: G93.40  ICD-9-CM: 348.30  7/8/2016 - 1/23/2018        RESOLVED: Tachycardia ICD-10-CM: R00.0  ICD-9-CM: 785.0  6/27/2016 - 1/23/2018        RESOLVED: Palpitations ICD-10-CM: R00.2  ICD-9-CM: 785.1  11/2/2015 - 1/23/2018        RESOLVED: Respiratory insufficiency ICD-10-CM: R06.89  ICD-9-CM: 786.09  11/2/2015 - 1/23/2018        RESOLVED: Bradycardia (Symptomatic) ICD-10-CM: R00.1  ICD-9-CM: 427.89  11/7/2011 - 1/23/2018        RESOLVED: Rectal bleeding ICD-10-CM: K62.5  ICD-9-CM: 569.3  10/27/2011 - 1/23/2018        RESOLVED: AV block ICD-10-CM: I44.30  ICD-9-CM: 426.10  10/17/2011 - 1/23/2018        RESOLVED: Cardiogenic shock ([address] ICD-10-CM: R57.0  ICD-9-CM: 785.51  10/17/2011 - 1/23/2018        RESOLVED: Hyperkalemia ICD-10-CM: E87.5  ICD-9-CM: 276.7  10/17/2011 - 1/23/2018        RESOLVED: Nausea and vomiting ICD-10-CM: R11.2  ICD-9-CM: 787.01  2/24/2010 - 1/23/2018        RESOLVED: Epigastric abdominal pain ICD-10-CM: R10.13  ICD-9-CM: 789.06  2/24/2010 - 1/23/2018        RESOLVED: Digoxin toxicity ICD-10-CM: T46.0X1A  ICD-9-CM: 972.1, E942.1  2/24/2010 - 1/23/2018        RESOLVED: ARF (acute renal failure) (HCC) (Chronic) ICD-10-CM: N17.9  ICD-9-CM: 584.9  2/24/2010 - 1/23/2018        RESOLVED: Hypokalemia ICD-10-CM: E87.6  ICD-9-CM: 276.8  2/24/2010 - 1/23/2018        RESOLVED: CKD (chronic kidney disease) stage 3, GFR 30-59 ml/min (Chronic) ICD-10-CM: N18.3  ICD-9-CM: 585.3  12/4/2009 - 1/23/2018        RESOLVED: Cough ICD-10-CM: R05  ICD-9-CM: 786.2  3/9/2009 - 3/12/2009        RESOLVED: Bronchitis ICD-10-CM: J40  ICD-9-CM: 838  3/9/2009 - 3/12/2009        RESOLVED: Atrial fibrillation (HCC) (Chronic) ICD-10-CM: I48.91  ICD-9-CM: 427.31  3/7/2009 - 6/27/2016        RESOLVED: Hypotension (Chronic) ICD-10-CM: I95.9  ICD-9-CM: 458.9  3/1/2009 - 1/23/2018                 Debility ; acute respiratory failure/Acute on chronic CHF/ chronic AF/ Anemia     - s/p TAVR; cont strict I/o due to CHF. On aggressive diuresis; plans to change all diuretics to po today, per cardiology, cont O2 supplementation  -2/25; on po lasix 80bid, stable     - chronic afib; cont Eliquis; rate controlled with betablk     -htn; having some hypotension; last evening SBP 82; multifactorial; backing off diuretics; needs parameters when to hold; bp this a.m. 129/85  -2/25 100/65 this a.m. Max      -Anemia; bld transfusion today. Re check in a.m; check hemoccult of stool ; will add on iron studies to today's lab; no recent rectal bld noted  -stool pending; hgb; 8.2 after one unit from 7.4 yesterday ; iron studies ok     -depression; cont zoloft and valium; coping well     -Pulm edema; s/p thoracentesis' bilat during acute care. O2 needs decreasing. Cont Xopenex prn. Enc IS  -diuresis has improved situation     -hypothyroidism; con synthroid     -HLD; cont pravachol     -hypokalemia; cont K, may need to replace IV; recheck in a.m. K 3.7 stable     -monitor renal fxn; creat creeping up; likely due to aggressive diuresis; 2/25 labs pending             Time spent was 25 minutes with over 1/2 in direct patient care/examination, consultation and coordination of care.      Signed By: Valdez Bowie MD     February 25, 2018

## 2018-02-26 NOTE — PROGRESS NOTES
End Of Shift Functional Summary, Nursing        TOILETING:  Does patient need assist with clothing management and/or pericare? Yes: Comment: needs assistance with going from sitting to standing and jessi clothes up and down.     TOILET TRANSFER:  Pt requires moderate assistance. Pt uses walker.     BLADDER:  Pt does not have a salamanca catheter that staff manages. Pt does not take medication. Pt is continent. of bladder and voids in toilet  Pt requires staff to empty device Pt has had 0 bladder accidents during this shift requiring moderate assistance to clean up. (An accident is when the episode is not contained in a brief AND/OR the clothing/linen requires changing/cleaning up.)     BOWEL:  Pt does take medication. Pt is continent of bowel and uses toilet. Pt requires staff to empty device    Pt has had 0 bowel accidents during this shift requiring moderate assistance from staff to clean up. (An accident is when the episode is not contained in a brief AND/OR the clothing/linen requires changing/cleaning up.)     BED/CHAIR TRANSFER  Pt requires moderate assistance. Patient requires the assistance of 1 staff member(s).   Pt uses walker

## 2018-02-26 NOTE — PROGRESS NOTES
Problem: Falls - Risk of  Goal: *Absence of Falls  Document Gregorio Fall Risk and appropriate interventions in the flowsheet.    Outcome: Progressing Towards Goal  Fall Risk Interventions:  Mobility Interventions: Communicate number of staff needed for ambulation/transfer, Patient to call before getting OOB, Utilize walker, cane, or other assitive device    Mentation Interventions: Adequate sleep, hydration, pain control, Bed/chair exit alarm, Door open when patient unattended    Medication Interventions: Bed/chair exit alarm, Patient to call before getting OOB    Elimination Interventions: Call light in reach, Patient to call for help with toileting needs    History of Falls Interventions: Bed/chair exit alarm, Consult care management for discharge planning, Door open when patient unattended

## 2018-02-26 NOTE — PROGRESS NOTES
PHYSICAL THERAPY DAILY NOTE  Time In: 2343  Time Out: 1000  Patient Seen For: AM;Balance activities;Gait training;Patient education;Transfer training; Other (see progress notes)    Subjective: Patient reporting she feels OK. Reports she feels a little weaker today. Son expressing concern that patient is more confused and is not moving as well. Son expressing concern about cognitive status. Objective:Vital Signs:  Visit Vitals    /69 (BP 1 Location: Right arm)    Pulse 92    Temp 98.2 °F (36.8 °C)    Resp 20    Wt 78.2 kg (172 lb 6.4 oz)    SpO2 100%    BMI 33.67 kg/m2     Patient on 02 sat 3 lpm, 02 sat 97 to 100% during PT treatment    Pain level:No c/o pain  Pain location:NA  Pain interventions:NA    Patient education:Balance training,transfer training, gait training, fall precautions, activity pacing, body mechanics, w/c and rollator parts management, energy conservation, breathing techniques during mobility, Patient with limited understanding of patient education. Recommend follow up education. Interdisciplinary Communication:spoke with RN and MD regarding changes.  MD in to talk with patient and son    Other (comment) (Fall precautions)  GROSS ASSESSMENT Daily Assessment     NA       BED/MAT MOBILITY Daily Assessment    Rolling Right : 0 (Not tested)  Rolling Left : 0 (Not tested)  Supine to Sit : 0 (Not tested)  Sit to Supine : 0 (Not tested)       TRANSFERS Daily Assessment   Increased time and effort to complete with cues for body mechanics    Mod assist with managing clothing before and after toileting, RN assist with hygiene Transfer Type: SPT with walker  Other: w/c<>commode transfers using grab bar and min assist  Transfer Assistance : 4 (Minimal assistance)  Sit to Stand Assistance: Contact guard assistance  Car Transfers: Not tested       GAIT Daily Assessment    Amount of Assistance: 4 (Minimal assistance)  Distance (ft): 45 Feet (ft)  Assistive Device: Walker, rollator   Gait training with cues for upright posture and to improve step length and step clearance, assist with managing 02 line. Increased flexed posture with increased shuffling gait pattern. STEPS or STAIRS Daily Assessment    Steps/Stairs Ambulated (#): 0  Level of Assist : 0 (Not tested)       BALANCE Daily Assessment   Static standing with rollator facilitating upright posture and breathing techniques with SBA/CGA  And cues    Static standing with RUE support on gra bar before and after toileting while mananging lower body clothing Sitting - Static: Good (unsupported)  Sitting - Dynamic: Fair (occasional)  Standing - Static: Fair;Constant support (with rollator)  Standing - Dynamic : Impaired       WHEELCHAIR MOBILITY Daily Assessment    Curbs/Ramps Assist Required (FIM Score): 0 (Not tested)  Wheelchair Setup Assist Required : 3 (Moderate assistance)  Wheelchair Management: Manages left brake;Manages right brake       LOWER EXTREMITY EXERCISES Daily Assessment     NA          Assessment: Decline in functional status and decline in cognitive status this AM. Patient having difficulty following commands and unable to recall safe set up of rollator and how to manage 02 line during transfers and gait. Patient to OT at end of treatment    Plan of Care: Continue with POC and progress as tolerated.      Barbara Humphrey, PT  2/26/2018

## 2018-02-26 NOTE — PROGRESS NOTES
SFD PROGRESS NOTE    Jaylen Huynh  Admit Date: 2/13/2018  Admit Diagnosis: DEBILITY;CHF (congestive heart failure) ([facility] [address] Respiratory f*  Chief Complaint : Gait dysfunction secondary to below. Admit Diagnosis: Pleural effusion [J90]; Pleural effusion [J90]  Acute respiratory failure (HCC) (2/7/2018)  Pleural effusion (1/23/2018)/ S/P bilateral thoracentesis  Hypothyroidism   Chronic atrial fibrillation   HTN   Pulmonary hypertension   S/P TAVR(1/30/2018)  Leukocytosis (2/7/2018) on Abx   Acute on chronic diastolic heart failure   weakness  Pain  DVT risk  Acute blood loss anemia/ GI bleed? Acute Rehab Dx:  Generalized weakness. Debility    deconditioning   Mobility and ambulation deficits  Self Care/ADL deficits    Subjective     Patient seen and examined. Vss. Afebrile. Patient noted to be mildly more confused this morning, requiring more cues for tasks. Patient appears more fatigued over last few days. Monitoring renal funtion. Creatinine elevated. Will back off on diuretics.      Objective:     Current Facility-Administered Medications   Medication Dose Route Frequency    furosemide (LASIX) tablet 20 mg  20 mg Oral BID    0.9% sodium chloride infusion 250 mL  250 mL IntraVENous PRN    potassium chloride (K-DUR, KLOR-CON) SR tablet 40 mEq  40 mEq Oral BID    magnesium oxide (MAG-OX) tablet 400 mg  400 mg Oral DAILY    apixaban (ELIQUIS) tablet 2.5 mg  2.5 mg Oral Q12H    diazePAM (VALIUM) tablet 2.5 mg  2.5 mg Oral QHS    central line flush (saline) syringe 10 mL  10 mL InterCATHeter Q8H    acetaminophen (TYLENOL) tablet 650 mg  650 mg Oral Q4H PRN    alcohol 62% (NOZIN) nasal  1 Ampule  1 Ampule Topical Q12H    allopurinol (ZYLOPRIM) tablet 100 mg  100 mg Oral BID    hydrocortisone (ANUSOL-HC) 2.5 % rectal cream   PeriANAL TID PRN    levothyroxine (SYNTHROID) tablet 88 mcg  88 mcg Oral ACB    metoprolol succinate (TOPROL-XL) XL tablet 25 mg  25 mg Oral DAILY    pantoprazole (PROTONIX) tablet 40 mg  40 mg Oral ACB    polyethylene glycol (MIRALAX) packet 17 g  17 g Oral DAILY    pravastatin (PRAVACHOL) tablet 40 mg  40 mg Oral DAILY    rOPINIRole (REQUIP) tablet 2 mg  2 mg Oral BID    sertraline (ZOLOFT) tablet 100 mg  100 mg Oral DAILY    spironolactone (ALDACTONE) tablet 25 mg  25 mg Oral DAILY    levalbuterol (XOPENEX) nebulizer soln 0.63 mg/3 mL  0.63 mg Nebulization Q6H PRN     Facility-Administered Medications Ordered in Other Encounters   Medication Dose Route Frequency    0.9% sodium chloride infusion 250 mL  250 mL IntraVENous PRN     Review of Systems: + weakness. Denies chest pain, shortness of breath, cough, headache, visual problems, abdominal pain, dysurea, calf pain. Pertinent positives are as noted in the medical records and unremarkable otherwise. Visit Vitals    /70    Pulse 82    Temp 98.2 °F (36.8 °C)    Resp 20    Wt 172 lb 6.4 oz (78.2 kg)    SpO2 99%    BMI 33.67 kg/m2        Physical Exam:   General: Alert and age appropriately oriented.  Appears comfortable on 2L O2 NC. No acute cardio respiratory distress. HEENT: Normocephalic,no scleral icterus  Oral mucosa moist without cyanosis, No bruit, +JVD. Lungs: + bibasilar crackles. No wheezing. Respiration even and unlabored   Heart: RRR,  II/VI TERRI  No clicks, rub or gallops   Abdomen: Soft, non-tender, nondistended. Bowel sounds present. Genitourinary: defered   Neuromuscular:      PERRL, EOMI  Follows simple commands consistently. Able to identify, recall repeat. Mood appropriate. Insight, judgement intact.    LUE     Shoulder abduction   5-/5              Elbow flexion:   5-/5               Wrist extension:  5- /5              Finger flexion;  5- /5  RUE    Shoulder abduction: 5- /5                Elbow flexion:  5- /5                         Wrist extension: 5- /5                        Finger flexion:  5- /5  LLE     Hip flexion:  3+ /5              Knee extension:  4 /5                       Ankle dorsiflexion:  5 /5                        Ankle plantarflexion:   5/5                                                                  RLE     Hip flexion:   3+/5                        Knee extension:  4 /5                         Ankle dorsiflexion:  5 /5                        Ankle plantarflexion:  5 /5  Sensory - intact grossly   No cerebellar signs. Plantars - down going  No atrophy, no fasciculations, no tremors. Skin/extremity: No rashes, no erythema. Calf non tender BLE. 2+ BLE edema. + chronic arthritic joint changes. Mild BUE edema.                                                                                  Functional Assessment:  Gross Assessment  AROM:  (LE decreased secondary to weakness and edema) (02/21/18 1300)  PROM:  (Decreased secondary to LE edema ) (02/21/18 1300)  Strength:  (bilateral hip -3/5 to +2/5,knee 4/5, ankle 4/5) (02/21/18 1300)  Coordination:  (Impaired) (02/21/18 1300)  Tone: Normal (02/21/18 1300)  Sensation: Intact (02/21/18 1300)       Balance  Sitting - Static: Good (unsupported) (02/24/18 1100)  Sitting - Dynamic: Fair (occasional) (02/24/18 1100)  Standing - Static: Fair (02/24/18 1100)  Standing - Dynamic : Impaired (02/24/18 1100)           Toileting  Cues: Verbal cues provided;Physical assistance for pants down;Physical assistance for pants up (02/22/18 0840)  Adaptive Equipment: Walker;Grab bars (02/22/18 0840)         [facility] Fall Risk Assessment:  [facility] Fall Risk  Mobility: Ambulates or transfers with assist devices or assistance/unsteady gait (02/26/18 1871)  Mobility Interventions: Communicate number of staff needed for ambulation/transfer (02/25/18 2313)  Mentation: Alert, oriented x 3 (02/25/18 2313)  Mentation Interventions: Adequate sleep, hydration, pain control;Bed/chair exit alarm (02/25/18 2313)  Medication: Patient receiving anticonvulsants, sedatives(tranquilizers), psychotropics or hypnotics, hypoglycemics, narcotics, sleep aids, antihypertensives, laxatives, or diuretics (02/25/18 2313)  Medication Interventions: Patient to call before getting OOB;Utilize gait belt for transfers/ambulation (02/25/18 2313)  Elimination: Needs assistance with toileting (02/25/18 2313)  Elimination Interventions: Call light in reach; Patient to call for help with toileting needs; Toileting schedule/hourly rounds (02/25/18 2313)  Prior Fall History: No (02/25/18 2313)  History of Falls Interventions: Bed/chair exit alarm;Room close to nurse's station;Utilize gait belt for transfer/ambulation (02/25/18 2313)  Total Score: 3 (02/25/18 2313)  Standard Fall Precautions: Yes (02/25/18 2313)  High Fall Risk: Yes (02/25/18 2313)     Speech Assessment:         Ambulation:  Gait  Distance (ft): 60 Feet (ft) (02/24/18 1100)  Assistive Device: Tito Estrin, rollator (02/24/18 1100)  Rail Use: Both (02/22/18 1600)     Labs/Studies:  Recent Results (from the past 72 hour(s))   TYPE + CROSSMATCH    Collection Time: 02/23/18  6:05 PM   Result Value Ref Range    Crossmatch Expiration 02/26/2018     ABO/Rh(D) A POSITIVE     Antibody screen NEG     Unit number [id number]     Blood component type RC LR     Unit division 00     Status of unit TRANSFUSED     Crossmatch result Compatible    METABOLIC PANEL, BASIC    Collection Time: 02/24/18  4:51 AM   Result Value Ref Range    Sodium 143 136 - 145 mmol/L    Potassium 3.7 3.5 - 5.1 mmol/L    Chloride 95 (L) 98 - 107 mmol/L    CO2 >45 (HH) 21 - 32 mmol/L    Anion gap Cannot be calculated 7 - 16 mmol/L    Glucose 106 (H) 65 - 100 mg/dL    BUN 47 (H) 8 - 23 MG/DL    Creatinine 1.34 (H) 0.6 - 1.0 MG/DL    GFR est AA 49 (L) >60 ml/min/1.73m2    GFR est non-AA 41 (L) >60 ml/min/1.73m2    Calcium 8.7 8.3 - 10.4 MG/DL   CBC WITH AUTOMATED DIFF    Collection Time: 02/24/18  4:51 AM   Result Value Ref Range    WBC 4.3 4.3 - 11.1 K/uL    RBC 2.57 (L) 4.05 - 5.25 M/uL    HGB 7.4 (L) 11.7 - 15.4 g/dL    HCT 25.1 (L) 35.8 - 46.3 %    MCV 97.7 79.6 - 97.8 FL    MCH 28.8 26.1 - 32.9 PG    MCHC 29.5 (L) 31.4 - 35.0 g/dL    RDW 17.2 (H) 11.9 - 14.6 %    PLATELET 83 (L) 991 - 450 K/uL    MPV 11.5 10.8 - 14.1 FL    DF AUTOMATED      NEUTROPHILS 77 43 - 78 %    LYMPHOCYTES 12 (L) 13 - 44 %    MONOCYTES 7 4.0 - 12.0 %    EOSINOPHILS 3 0.5 - 7.8 %    BASOPHILS 1 0.0 - 2.0 %    IMMATURE GRANULOCYTES 0 0.0 - 5.0 %    ABS. NEUTROPHILS 3.3 1.7 - 8.2 K/UL    ABS. LYMPHOCYTES 0.5 0.5 - 4.6 K/UL    ABS. MONOCYTES 0.3 0.1 - 1.3 K/UL    ABS. EOSINOPHILS 0.1 0.0 - 0.8 K/UL    ABS. BASOPHILS 0.0 0.0 - 0.2 K/UL    ABS. IMM.  GRANS. 0.0 0.0 - 0.5 K/UL   TRANSFERRIN SATURATION    Collection Time: 02/24/18 12:31 PM   Result Value Ref Range    Iron 51 35 - 150 ug/dL    TIBC 333 250 - 450 ug/dL    Transferrin Saturation 15 (L) >20 %   FERRITIN    Collection Time: 02/24/18 12:32 PM   Result Value Ref Range    Ferritin 122 8 - 388 NG/ML   TRANSFERRIN    Collection Time: 02/24/18 12:32 PM   Result Value Ref Range    Transferrin 259 202 - 364 mg/dL   HGB & HCT    Collection Time: 02/25/18  6:30 AM   Result Value Ref Range    HGB 8.2 (L) 11.7 - 15.4 g/dL    HCT 27.3 (L) 35.8 - 14.3 %   METABOLIC PANEL, BASIC    Collection Time: 02/25/18 12:05 PM   Result Value Ref Range    Sodium 145 136 - 145 mmol/L    Potassium 3.3 (L) 3.5 - 5.1 mmol/L    Chloride 93 (L) 98 - 107 mmol/L    CO2 >45 (HH) 21 - 32 mmol/L    Anion gap Cannot be calculated 7 - 16 mmol/L    Glucose 160 (H) 65 - 100 mg/dL    BUN 51 (H) 8 - 23 MG/DL    Creatinine 1.26 (H) 0.6 - 1.0 MG/DL    GFR est AA 53 (L) >60 ml/min/1.73m2    GFR est non-AA 44 (L) >60 ml/min/1.73m2    Calcium 8.8 8.3 - 10.4 MG/DL   HGB & HCT    Collection Time: 02/26/18  5:45 AM   Result Value Ref Range    HGB 8.2 (L) 11.7 - 15.4 g/dL    HCT 27.9 (L) 35.8 - 69.7 %   METABOLIC PANEL, BASIC    Collection Time: 02/26/18  5:45 AM   Result Value Ref Range    Sodium 148 (H) 136 - 145 mmol/L    Potassium 3.5 3.5 - 5.1 mmol/L    Chloride 97 (L) 98 - 107 mmol/L    CO2 >45 (HH) 21 - 32 mmol/L    Anion gap Cannot be calculated 7 - 16 mmol/L    Glucose 98 65 - 100 mg/dL    BUN 57 (H) 8 - 23 MG/DL    Creatinine 1.59 (H) 0.6 - 1.0 MG/DL    GFR est AA 41 (L) >60 ml/min/1.73m2    GFR est non-AA 34 (L) >60 ml/min/1.73m2    Calcium 9.0 8.3 - 10.4 MG/DL   PREALBUMIN    Collection Time: 02/26/18  5:45 AM   Result Value Ref Range    Prealbumin 21.0 18.0 - 35.7 MG/DL   MAGNESIUM    Collection Time: 02/26/18  5:45 AM   Result Value Ref Range    Magnesium 2.3 1.8 - 2.4 mg/dL       Assessment:     Problem List as of 2/26/2018  Date Reviewed: 2/10/2018          Codes Class Noted - Resolved    [facility] ICD-10-CM: J96.90  ICD-9-CM: 518.81  2/13/2018 - Present        Acute on chronic respiratory failure ([facility] [address] ICD-10-CM: J96.20  ICD-9-CM: 518.84  2/7/2018 - Present        Leukocytosis ICD-10-CM: A59.970  ICD-9-CM: 288.60  2/7/2018 - Present        Chronic diastolic heart failure (HCC) ICD-10-CM: I50.32  ICD-9-CM: 428.32  2/7/2018 - Present        S/P TAVR (transcatheter aortic valve replacement) ICD-10-CM: Z95.2  ICD-9-CM: V43.3  1/30/2018 - Present        Aortic stenosis (Chronic) ICD-10-CM: I35.0  ICD-9-CM: 424.1  1/29/2018 - Present        Peripheral arterial disease (HCC) (Chronic) ICD-10-CM: I73.9  ICD-9-CM: 443.9  1/23/2018 - Present        Pleural effusion ICD-10-CM: J90  ICD-9-CM: 511.9  1/23/2018 - Present    Overview Addendum 2/10/2018 11:36 AM by Nicolas Sanders NP     Right thoracentesis 1/25/18 - 1200 mls removed  Left thoracentesis 1/25/2018 - 900 mls removed  Bilateral thoracentesis 2/9/18 - L 550 ml (air aspirated during and at the end of procedure) / R 1000 ml and R creamy pink              Chronic respiratory failure with hypoxia (HCC) (Chronic) ICD-10-CM: J96.11  ICD-9-CM: 518.83, 799.02  1/23/2018 - Present    Overview Signed 1/23/2018 11:17 AM by Trena Judd, NP     Wears 3 to 4 liters at home             Hypoxia ICD-10-CM: R09.02  ICD-9-CM: 799.02  1/23/2018 - Present        Stenosis of prosthetic aortic valve (Chronic) ICD-10-CM: I35.0  ICD-9-CM: 396.0  1/19/2018 - Present    Overview Signed 1/19/2018  3:18 PM by Harley Guajardo MD     AVR (10/5/13):  21 mm Pericardial valve.                Tricuspid valve insufficiency (Chronic) ICD-10-CM: I07.1  ICD-9-CM: 397.0  1/15/2018 - Present        Systolic CHF, chronic (HCC) (Chronic) ICD-10-CM: I50.22  ICD-9-CM: 428.22, 428.0  1/15/2018 - Present        S/P mitral valve repair (Chronic) ICD-10-CM: H76.224  ICD-9-CM: V45.89  12/4/2017 - Present        H/O atrioventricular rubin ablation (Chronic) ICD-10-CM: L55.937  ICD-9-CM: V15.1  3/22/2017 - Present        Pulmonary hypertension (Chronic) ICD-10-CM: I27.20  ICD-9-CM: 416.8  2/12/2017 - Present        Aortic valve replaced (Chronic) ICD-10-CM: Z95.2  ICD-9-CM: V43.3  1/9/2017 - Present        Chronic diastolic congestive heart failure (HCC) (Chronic) ICD-10-CM: I50.32  ICD-9-CM: 428.32, 428.0  9/9/2016 - Present        Chronic depression (Chronic) ICD-10-CM: F32.9  ICD-9-CM: 311  8/5/2016 - Present        Osteopenia (Chronic) ICD-10-CM: M85.80  ICD-9-CM: 733.90  8/5/2016 - Present        RLS (restless legs syndrome) (Chronic) ICD-10-CM: G25.81  ICD-9-CM: 333.94  8/5/2016 - Present        Hyperlipidemia (Chronic) ICD-10-CM: E78.5  ICD-9-CM: 272.4  8/5/2016 - Present        Gout (Chronic) ICD-10-CM: M10.9  ICD-9-CM: 274.9  8/5/2016 - Present        Anxiety (Chronic) ICD-10-CM: F41.9  ICD-9-CM: 300.00  8/5/2016 - Present        CAD (coronary artery disease) (Chronic) ICD-10-CM: I25.10  ICD-9-CM: 414.00  8/5/2016 - Present        HTN (hypertension) (Chronic) ICD-10-CM: I10  ICD-9-CM: 401.9  8/5/2016 - Present        GERD (gastroesophageal reflux disease) (Chronic) ICD-10-CM: K21.9  ICD-9-CM: 530.81  8/5/2016 - Present        H/O mitral valve repair, 2003 (Chronic) ICD-10-CM: F49.638  ICD-9-CM: V15.1  6/27/2016 - Present        Sick sinus syndrome ([facility] [address] (Chronic) ICD-10-CM: I49.5  ICD-9-CM: 427.81  2/13/2016 - Present        Obesity (Chronic) ICD-10-CM: E66.9  ICD-9-CM: 278.00  11/2/2015 - Present        Mitral stenosis with insufficiency (Chronic) ICD-10-CM: O88.6  ICD-9-CM: 394.2  11/2/2015 - Present    Overview Signed 11/2/2015 11:02 AM by Eric Alcala     Prior MV repair 2003.               Rheumatic aortic stenosis (Chronic) ICD-10-CM: I06.0  ICD-9-CM: 395.0  11/2/2015 - Present        Cardiac pacemaker (Chronic) ICD-10-CM: Z95.0  ICD-9-CM: V45.01  11/2/2015 - Present        Thrombocytopenia (HCC) (Chronic) ICD-10-CM: D69.6  ICD-9-CM: 287.5  8/22/2012 - Present        Anemia (Chronic) ICD-10-CM: D64.9  ICD-9-CM: 285.9  10/27/2011 - Present    Overview Signed 10/27/2011  8:25 AM by Yessi Saucedo on chronic               Chronic atrial fibrillation (HCC) (Chronic) ICD-10-CM: I48.2  ICD-9-CM: 427.31  10/17/2011 - Present        Hypothyroidism (Chronic) ICD-10-CM: E03.9  ICD-9-CM: 244.9  12/4/2009 - Present        BOBBY (obstructive sleep apnea) (Chronic) ICD-10-CM: G47.33  ICD-9-CM: 327.23  12/4/2009 - Present        Chronic obstructive pulmonary disease (HCC) (Chronic) ICD-10-CM: J44.9  ICD-9-CM: 496  3/8/2009 - Present        Aortic Valve Bioprosthesis Present (Chronic) ICD-10-CM: Z95.2  ICD-9-CM: V43.3  3/7/2009 - Present        Degenerative arthritis of left knee (Chronic) ICD-10-CM: M17.12  ICD-9-CM: 715.96  2/27/2009 - Present        RESOLVED: [facility] ICD-10-CM: I50.9  ICD-9-CM: 428.0  2/13/2018 - 2/14/2018        RESOLVED: Acute on chronic systolic congestive heart failure ([facility] ICD-10-CM: I50.23  ICD-9-CM: 428.23, 428.0  2/1/2018 - 2/7/2018        RESOLVED: Acute pulmonary edema ([facility] ICD-10-CM: J81.0  ICD-9-CM: 518.4  1/25/2018 - 2/7/2018        RESOLVED: Pacemaker ICD-10-CM: Z95.0  ICD-9-CM: V45.01  12/4/2017 - 1/23/2018        RESOLVED: History of gout ICD-10-CM: Z87.39  ICD-9-CM: V12.29  1/31/2017 - 1/23/2018 RESOLVED: Warfarin anticoagulation (Chronic) ICD-10-CM: Z79.01  ICD-9-CM: V58.61  1/9/2017 - 1/23/2018        RESOLVED: Non morbid obesity due to excess calories ICD-10-CM: E66.09  ICD-9-CM: 278.00  11/14/2016 - 1/23/2018        RESOLVED: Hypoxemia ICD-10-CM: R09.02  ICD-9-CM: 799.02  11/14/2016 - 1/23/2018        RESOLVED: Localized edema ICD-10-CM: R60.0  ICD-9-CM: 782.3  9/9/2016 - 1/23/2018        RESOLVED: Iron deficiency anemia ICD-10-CM: D50.9  ICD-9-CM: 280.9  9/9/2016 - 1/23/2018        RESOLVED: CRI (chronic renal insufficiency) ICD-10-CM: N18.9  ICD-9-CM: 585.9  8/5/2016 - 1/23/2018        RESOLVED: Dyspnea ICD-10-CM: R06.00  ICD-9-CM: 786.09  8/5/2016 - 1/23/2018        RESOLVED: Right hip pain ICD-10-CM: M25.551  ICD-9-CM: 719.45  8/5/2016 - 1/23/2018        RESOLVED: Edema ICD-10-CM: R60.9  ICD-9-CM: 782.3  8/5/2016 - 1/23/2018        RESOLVED: Chest pain ICD-10-CM: R07.9  ICD-9-CM: 786.50  8/5/2016 - 1/23/2018        RESOLVED: Other long term (current) drug therapy ICD-10-CM: H11.589  ICD-9-CM: V58.69  8/5/2016 - 1/23/2018        RESOLVED: Acute encephalopathy ICD-10-CM: G93.40  ICD-9-CM: 348.30  7/8/2016 - 1/23/2018        RESOLVED: Tachycardia ICD-10-CM: R00.0  ICD-9-CM: 785.0  6/27/2016 - 1/23/2018        RESOLVED: Palpitations ICD-10-CM: R00.2  ICD-9-CM: 785.1  11/2/2015 - 1/23/2018        RESOLVED: Respiratory insufficiency ICD-10-CM: R06.89  ICD-9-CM: 786.09  11/2/2015 - 1/23/2018        RESOLVED: Bradycardia (Symptomatic) ICD-10-CM: R00.1  ICD-9-CM: 427.89  11/7/2011 - 1/23/2018        RESOLVED: Rectal bleeding ICD-10-CM: K62.5  ICD-9-CM: 569.3  10/27/2011 - 1/23/2018        RESOLVED: AV block ICD-10-CM: I44.30  ICD-9-CM: 426.10  10/17/2011 - 1/23/2018        RESOLVED: Cardiogenic shock ([facility] ICD-10-CM: R57.0  ICD-9-CM: 785.51  10/17/2011 - 1/23/2018        RESOLVED: Hyperkalemia ICD-10-CM: E87.5  ICD-9-CM: 276.7  10/17/2011 - 1/23/2018        RESOLVED: Nausea and vomiting ICD-10-CM: R11.2  ICD-9-CM: 787.01  2/24/2010 - 1/23/2018        RESOLVED: Epigastric abdominal pain ICD-10-CM: R10.13  ICD-9-CM: 789.06  2/24/2010 - 1/23/2018        RESOLVED: Digoxin toxicity ICD-10-CM: T46.0X1A  ICD-9-CM: 972.1, E942.1  2/24/2010 - 1/23/2018        RESOLVED: ARF (acute renal failure) (HCC) (Chronic) ICD-10-CM: N17.9  ICD-9-CM: 584.9  2/24/2010 - 1/23/2018        RESOLVED: Hypokalemia ICD-10-CM: E87.6  ICD-9-CM: 276.8  2/24/2010 - 1/23/2018        RESOLVED: CKD (chronic kidney disease) stage 3, GFR 30-59 ml/min (Chronic) ICD-10-CM: N18.3  ICD-9-CM: 585.3  12/4/2009 - 1/23/2018        RESOLVED: Cough ICD-10-CM: R05  ICD-9-CM: 786.2  3/9/2009 - 3/12/2009        RESOLVED: Bronchitis ICD-10-CM: J40  ICD-9-CM: 490  3/9/2009 - 3/12/2009        RESOLVED: Atrial fibrillation (HCC) (Chronic) ICD-10-CM: I48.91  ICD-9-CM: 427.31  3/7/2009 - 6/27/2016        RESOLVED: Hypotension (Chronic) ICD-10-CM: I95.9  ICD-9-CM: 458.9  3/1/2009 - 1/23/2018              Plan:     The Post Assessment Physician Evaluation (RAMBO) found the current functional status to be comparable with the Pre-admission Screening. The Patient is a good candidate for acute inpatient rehabilitation. Nothing since the Pre-admission screen has changed that determination.      Rehabilitation Plan  The patient has shown the ability to tolerate and benefit from 3 hours of therapy daily and is being admitted to a comprehensive acute inpatient rehabilitation program consisting of at least 3 hours of combined physical and occupational therapies. Resume intensive Physical Therapy for a minimum of 1.5 hours a day, at least 5 out of 7 days per week to address bed mobility, transfers, ambulation, strengthening, balance, and endurance. - pt  was ambulating independently with a rollator inside her home. reports using rollator or a wheelchair for community mobility.  Will continue to focus on endurance, strengthening BLe.      Resume  intensive Occupational Therapy for a minimum of 1.5 hours a day, at least 5 out of 7 days per week to address ADL ( bathing, LE dressing, toileting) and adaptive equipment as needed.       Continue 24-hour skilled rehabilitation nursing for bowel and bladder management, skin care for decubitus ulcer prevention , pain management and ongoing medication administration      The patient may benefit from a psychology consult for depression, anxiety and adjustment disorder.     Continue daily physician medical management:    Acute respiratory failure ([facility] (2/7/2018)/ Pleural effusion (1/23/2018)/ S/P bilateral thoracentesis  S/p - Thoracenteses on 2/9 - 550 mls removed from the left and 1 liter removed from the right.   - Continue bronchodilators prn- has home nebulizer. Resume as prn;xopenex  - Patient will be on 2L O2 at therapy and at rest. May be able to wean as tolerated. Will monitor saO2. 2/15 - sao2 at 100% on 2L/ min. Feels comfortable. Continue lasix and aldactone. Cardiology gave 1x Zaroxolyn in AM prior to AM lasix. 2/19 - continue lasix 80 mg bid + aldactone. +metolazone prior to lasix each morning. Monitor. Daily weights.   2/20- cardiology recommendation appreciated. Transition to iv lasic. Will need access. Difficult due to edema. 2/21- Persistent edema. Iv lasix ordered per cardiology, however since peripheral access not yet achieved after multiple attempts due to edema. Will have IR place CVC / ij anticipating frequent blood draws and continued iv diuretic needs. Mean time will have po lasix for every iv dose missed. 2/22 - Patient edema mildly better. Mild decreased weight; 176lbs. conitune lasix 80 bid. Increase KCl 40 to bid dose. Left IJ oozing overnight following IR procedure, appears not bleeding today. hgb decreased, likely due to blood loss. 2/23 - mildly SOB, will repeat CxR to monitor bibasilar effusions. Continue iv lasix. 2/26 - SaO2 stable on O2 @2L via NC. cpntinued on diuresis over the weekend.  Cr, elevated. Will reduce lasix.           S/P TAVR(1/30/2018)/ Acute on chronic diastolic heart failure Pulmonary hypertension / Chronic atrial fibrillation/ HTN   - cardiac precaution, s.p TAVR. - weights and maintain a 2 liter per day fluid restriction.   - Monitor HR/BP. conitinue Eliquis for a.fib  - continue BB-Toprol XL  2/15- edema, chest exam appears not changed. cintiuned on diuretics. 2/19- continue O2 supplements. 2/20 - HR in control. continue O2, comfortable on same rate. Will obtain CxR. Check BNP. Monitor renal fx.   2/21 - HR in control. conitinue eliquis. BB at current dose. 2/22 - HR 80's monitor. Temporarily reduce Eliquis dose 2.5 bid due to oozing, blood loss. Anemia - hgb 7.4. Check in am. Transfuse prn.   2/23 - will continue lower dose eliquis as hgb decreased following IR procedure. , plt remain low; 70k. HR in good range 70-80s. Continue monitor. 2/26 - continued on lower dose eliquis for now. Weight 172 lbs, reduced about 7 lbs since he admission to [facility]. still persistent edema. Cr 1.59, will reduce lasix and hold metolazone. Will get cardiology input.         Hypothyroidism - synthroid 88 mcg     Leukocytosis (2/7/2018) on Abx last dose 7/7 rocephin administered 2/13.   - finished abx. No new s/s of infection. - 2/21 no new signs of infection.      Acute blood loss anemia/ GI bleed? -monitor clinically. May have been caused by high INR. - no melena. - hgb 9.1 (2/18) , asymptomatic.   - 2/22 - Anemia due to blood loss form IR proocedure- hgb 7.4. Check in am. Transfuse prn.   2/23 - hgb 7.7. Check labs in am and Monday. 2/26 - s/p transfusion 1 unit over the weekend. Monitor. hgb 8.2        Pneumonia prophylaxis- Insentive spirometer every hour while awake     DVT risk / DVT Prophylaxis- continue daily physician exam to assess for signs and symptoms as patient is at increased risk for of thromboembolism. - covered with eliquis. No s/s of DVT/ PE.         Wound Care: - monitor for ulcers, skin breakdown due to edema. -left elbow  edema drainage covered. -  TEDs. Fitting better. Edema control.         Potential urinary retention - no s/s of retention. Monitor.  - pt mostly continent. - needed to use bedpan over the wekend due to decline in mobility.      bowel program - as needed dulcolax, pericolace. + BM.     GERD/ GI prophylaxis - resume PPI. At times may need additional antacids, Maalox prn.          Time spent was 25 minutes with over 1/2 in direct patient care/examination, consultation and coordination of care.      Signed By: Jaylon Lee MD     February 26, 2018

## 2018-02-26 NOTE — PROGRESS NOTES
Subjective \"I am worn out. \"    Activity Beanbag Toss    Strength/Endurance Patient tossed the beanbags with both hands during the session. She appeared fatigued throughout the activity but was able to continue with the session and toss the beanbags accurately. Balance CGA with RW    Social Interaction Patient was in good spirits but seemed more tired than usual. She engaged in conversation about her family and the car that her  bought her. She stated that she felt worn out today but still wanted to work hard so she could go home. Cognitive A&O X4   Comments Patient was assisted to the bathroom and then to the recliner with all needs within reach.       Taylor Terrazas, RT Student

## 2018-02-26 NOTE — PROGRESS NOTES
02/26/18 0731   Time Spent With Patient   Time In 0731   Time Out 0915   Patient Seen For: AM;ADLs   Feeding/Eating   Feeding/Eating Assistance I   Grooming   Grooming Assistance  S   Comments s/u assist for dentures, hair care, and washing face and hands. Upper Body Bathing   Comments Not done due to time restraint. Lower Body Bathing   Comments Not done due to time restraint   Upper Body Dressing    Dressing Assistance  Mod A   Comments Assist with threading shirt over head and pulling down. Lower Body Dressing    Dressing Assistance  Max A   Leg Crossed Method Used No   Position Performed Standing;Seated in chair   Don/Doff Anti-Embolic Stockings Dep   Comments Assist with threading L leg through pull-ups and pants. Assist with clothing management over waist in stance. Functional Transfers   Tub or Shower Type Other (comment)  (Washed at w/c level at sink)   Amount of Assistance Required Min A   Adaptive Equipment Walker (comment); Wheelchair     S: \"I am a little bit dizzy. \" Agreeable to therapy. Focus of session was on morning ADL routine. Patient was able to SPT from bed to w/c using RW and with min assist.   Pain not indicated during this session. Pt experienced 1 LOB while standing during LE dressing. Assist with recovering pt to sitting in w/c. Collaborated with PT, Lisa Martinez and patient is making slow progress towards goals. Pt demonstrated increased confusion at this date as demonstrated by brushing top dentures 3 times even after cues to brush bottom dentures as well as difficulty answer questions. Patient tolerated session well, but confusion, shortness of breath, edema, functional mobility, overall strength, and activity tolerance are still below baseline and requires skilled facilitation to successfully and safely complete ADL's and transfers. Patient ended session in w/c with PT, Lisa Martinez.      Kenyatta Mae, OT Student

## 2018-02-26 NOTE — PROGRESS NOTES
[facility] CARDIOLOGY PROGRESS NOTE           2/26/2018   Admit Date: 2/13/2018      Subjective:   Dyspnea stable. Edema improved. BP stable. Cr has elevated and will reduce lasix and hold zaroxolyn. NYHA class II    ROS:  Cardiovascular:  As noted above    Objective:      Vitals:    02/26/18 0054 02/26/18 0717 02/26/18 1233 02/26/18 1447   BP:  116/70 110/69 97/68   Pulse:  82 92 83   Resp:  20  16   Temp:  98.2 °F (36.8 °C)  98.1 °F (36.7 °C)   SpO2:  99% 100% 100%   Weight: 78.2 kg (172 lb 6.4 oz)          Physical Exam:  General-No Acute Distress  Neck- supple, no JVD  CV- regular rate and rhythm Grade II/VI TERRI  Lung- clear bilaterally  Abd- soft, nontender, nondistended  Ext- 1+ edema bilaterally. Skin- warm and dry      Data Review:   Recent Labs      02/26/18   0545  02/25/18   1205  02/25/18   0630  02/24/18   0451   NA  148*  145   --   143   K  3.5  3.3*   --   3.7   MG  2.3   --    --    --    BUN  57*  51*   --   47*   CREA  1.59*  1.26*   --   1.34*   GLU  98  160*   --   106*   WBC   --    --    --   4.3   HGB  8.2*   --   8.2*  7.4*   HCT  27.9*   --   27.3*  25.1*   PLT   --    --    --   83*      No results found for: [facility]    Assessment/Plan:     Active Problems:    [facility] (10/17/2011)    On Eliquis      S/P TAVR (transcatheter aortic valve replacement) (1/30/2018)    Stable. On Eliquis. Chronic diastolic heart failure ([facility] (2/7/2018)    Stable on PO lasix and zaroxolyn. DC zaroxolyn and reduce lasix to 80mg daily      Respiratory failure ([facility] (2/13/2018)    Stable on nasal cannula. Anemia:  S/P 1 unit PRBC.   Hgb is stable                Poli Rivera MD  2/26/2018

## 2018-02-26 NOTE — PROGRESS NOTES
End Of Shift Functional Summary, Nursing      TOILETING:  Does patient need assist with clothing management and/or pericare? Yes: Comment: Assist with pulling up and down pants and pericare    TOILET TRANSFER:  Pt requires moderate assistance. Pt uses walker. BLADDER:  Pt does not have a salamanca catheter that staff manages. Pt does not take medication. Pt is continent. of bladder and voids in toilet. Pt has had 0 bladder accidents during this shift. BOWEL:  Pt does take medication. Pt is continent of bowel and uses bedside commode. Pt requires staff to position device and empty device    Pt has had 0 bowel accidents during this shift. BED/CHAIR TRANSFER  Pt requires moderate assistance. Patient requires the assistance of 2 staff member(s). Pt uses walker      EATING  Pt requires setup. Pt wears dentures. TUBE FEEDINGS:  Pt does not  receive nutrition through tube feedings. Documentation reviewed and plan of care discussed/reviewed with   patient, physician, therapists, oncoming nurse, patient assistant and family/spouse during the shift.

## 2018-02-26 NOTE — PROGRESS NOTES
PHYSICAL THERAPY DAILY NOTE  Time In: 1302  Time Out: 7441  Patient Seen For: PM;Balance activities;Gait training;Patient education;Transfer training; Other (see progress notes)    Subjective: patient reporting she is OK. Reports she is tired and took a nap after lunch. Reports she wants to try and walk         Objective:Vital Signs:  Patient Vitals for the past 12 hrs:   Temp Pulse Resp BP SpO2   02/26/18 1447 98.1 °F (36.7 °C) 83 16 97/68 100 %   02/26/18 1233 - 92 - 110/69 100 %   02/26/18 0717 98.2 °F (36.8 °C) 82 20 116/70 99 %     Patient on 02 at 3 lpm,02 sat 97 to 100% during treatment    Pain level:No c/o pain  Pain location:NA  Pain interventions:NA    Patient education:Balance training,transfer training, gait training, fall precautions, activity pacing, body mechanics, energy conservation, posture correction, breathing techniques during mobility, Patient with limited understanding of patient education. Recommend follow up education.         Interdisciplinary Communication:spoke with RN regarding decline in function and cognition    Other (comment) (Fall precautions)  GROSS ASSESSMENT Daily Assessment     NA       BED/MAT MOBILITY Daily Assessment    Rolling Right : 0 (Not tested)  Rolling Left : 0 (Not tested)  Supine to Sit : 0 (Not tested)  Sit to Supine : 0 (Not tested)       TRANSFERS Daily Assessment   Increased time and effort to complete with cues for body mechanics   Transfer Type: SPT without device  Other: w/c<>commode transfers using grab bar and min assist with cues for body mechanics  Transfer Assistance : 4 (Minimal assistance)  Sit to Stand Assistance: Contact guard assistance  Car Transfers: Not tested       GAIT Daily Assessment    Amount of Assistance: 4 (Minimal assistance)  Distance (ft): 10 Feet (ft) (10ft x 5 with 3 to 4 min rest breaks between each attempt)  Assistive Device: Other (comment) (parallel bars)   Gait training with cues for posture correction and to improve step length, step clearance and gait speed    STEPS or STAIRS Daily Assessment    Steps/Stairs Ambulated (#): 0  Level of Assist : 0 (Not tested)       BALANCE Daily Assessment   Static standing balance activities in parallel bars facilitating upright posture and breathing techniques with CGA and cues    Static standing balance activities before and after toileting while managing lower body clothing with min assist and right UE support on grab bar. Max assist with managing clothing. Patient able to manage hygiene Sitting - Static: Good (unsupported)  Sitting - Dynamic: Fair (occasional)  Standing - Static: Fair (in parallel bars)  Standing - Dynamic : Impaired       WHEELCHAIR MOBILITY Daily Assessment    Curbs/Ramps Assist Required (FIM Score): 0 (Not tested)  Wheelchair Setup Assist Required : 3 (Moderate assistance)  Wheelchair Management: Manages left brake;Manages right brake       LOWER EXTREMITY EXERCISES Daily Assessment     NA          Assessment: Functional status and cognitive status has declined over the last 2 to 3 days. Increased difficulty following commands and no carry over of functional mobility education       Patient to recreational therapy at end of treatment    Plan of Care: Continue with POC and progress as tolerated.      Darin Dhaliwal, PT  2/26/2018

## 2018-02-26 NOTE — PROGRESS NOTES
Report received on pt history and status from previous shift nurse. Pt sitting up in bed with son at bedside, talking on phone. Head to toe assessment completed. Denies any discomfort or pain.

## 2018-02-26 NOTE — PROGRESS NOTES
Problem: Falls - Risk of  Goal: *Absence of Falls  Document Gregorio Fall Risk and appropriate interventions in the flowsheet.    Outcome: Progressing Towards Goal  Fall Risk Interventions:  Mobility Interventions: Communicate number of staff needed for ambulation/transfer    Mentation Interventions: Adequate sleep, hydration, pain control, Bed/chair exit alarm    Medication Interventions: Patient to call before getting OOB, Utilize gait belt for transfers/ambulation    Elimination Interventions: Call light in reach, Patient to call for help with toileting needs, Toileting schedule/hourly rounds    History of Falls Interventions: Bed/chair exit alarm, Room close to nurse's station, Utilize gait belt for transfer/ambulation

## 2018-02-26 NOTE — PROGRESS NOTES
OT Daily Note    Time In  10:00   Time Out  10:45     Subjective:\"I'm feeling alright. \" Agreeable to therapy. Pain:not indicated during this session. Education:on importance of UE strengthening and coordination. Interdisciplinary Communication: Collaborated with Tomi Armas and patient is making slow progress towards goals. Precautions:  (fall precautions) Pt at SpO2 of 99% on 3L of O2. Balance/functional mobility Daily Assessment   Pt completed SPT from w/c to recliner using RW with min assist.       Strengthening/activity tolerance Daily Assessment   Pt completed 6.23 minutes on the arm bike with no resistance, with a rest break at 3.28 minutes, to improve UE strengthening, activity tolerance, and cardiovascular circulation. Pt with increased distractibility during exercises and >3 verbal cues to re-attend to task. Pt completed peg board activity with visual design instructions, to improve UE coordination and overall cognition. Pt was able to place 5 out of 10 pegs correctly without verbal cues. Pt placed remaining 5 pegs with verbal cues to follow design and correct placement. Assessment: Patient tolerated session well, but confusion, shortness of breath, edema, functional mobility, overall strength, and activity tolerance are still below baseline and requires skilled facilitation to successfully and safely complete ADL's and transfers. Ended session: in recliner with call bell and phone within reach.      Nadira Hill, OT Student

## 2018-02-27 NOTE — PROGRESS NOTES
Subjective \"We can do tennis today. \"    Activity Balloon Tennis    Strength/Endurance Patient was found in her room trying to eat lunch. Recreational Therapist and student assisted her in finishing eating. She was able to participate in balloon tennis and had better activity tolerance today than her session yesterday. She was able to sustain attention and hit the balloon successfully to the student. Balance SPT with RW    Social Interaction Patient engaged in conversation her son, therapist, and student. She appeared to be in good spirits by smiling and joking around during the session. She seemed more alert today than in her previous session. Cognitive A&O X4   Comments Patient was left in the gym awaiting her PT session with Evonne Grider.       Magalie Cardenas, RT Student

## 2018-02-27 NOTE — PROGRESS NOTES
Team conference, dc deferred pending medical status. May need 24 hr supervision at discharge depending on improvement. Discussed with pt's son. He states he could hire someone for maybe 4 hours per day but would not be able to provide 24 hr assistance. He works a very erratic work schedule.

## 2018-02-27 NOTE — PROGRESS NOTES
PHYSICAL THERAPY DAILY NOTE  Time In: 1000  Time Out: 1486  Patient Seen For: AM;Other (see progress notes); Therapeutic exercise;Transfer training    Subjective: Patient had no complaints. Objective: No pain noted. O2 at 1 liter and sats remained in the 90s thru out treatment. Other (comment) (Fall precautions)  GROSS ASSESSMENT Daily Assessment            BED/MAT MOBILITY Daily Assessment    Supine to Sit : 3 (Moderate assistance)  Sit to Supine : 3 (Moderate assistance)       TRANSFERS Daily Assessment    Transfer Type: SPT without device  Transfer Assistance : 4 (Minimal assistance)  Sit to Stand Assistance: Contact guard assistance       GAIT Daily Assessment    Amount of Assistance: 4 (Minimal assistance)  Distance (ft): 40 Feet (ft) (x 3)  Assistive Device: Walker, rolling       STEPS or STAIRS Daily Assessment    Level of Assist : 0 (Not tested)       BALANCE Daily Assessment            WHEELCHAIR MOBILITY Daily Assessment            LOWER EXTREMITY EXERCISES Daily Assessment    Extremity: Both  Exercise Type #1: Supine lower extremity strengthening  Sets Performed: 2  Reps Performed: 10  Level of Assist: Minimal assistance          Assessment: Patient voiced that she was feeling better than yesterday. Plan of Care: Continue with plan of care to reach PT goals. Returned to room to bed with call bell at reach and alarm on.     Connie Giraldo, PTA  2/27/2018

## 2018-02-27 NOTE — PROGRESS NOTES
Awake and attempted to get out of bed unassisted. State she needs to go to bathroom. assisted up to [facility] for a small BM and void. Weighed and assisted back to bed x2. Valium administered.

## 2018-02-27 NOTE — PROGRESS NOTES
SFD PROGRESS NOTE    Ritesh Singh  Admit Date: 2/13/2018  Admit Diagnosis: DEBILITY;CHF (congestive heart failure) ([address] Respiratory f*  Chief Complaint : Gait dysfunction secondary to below. Admit Diagnosis: Pleural effusion [J90]; Pleural effusion [J90]  Acute respiratory failure (HCC) (2/7/2018)  Pleural effusion (1/23/2018)/ S/P bilateral thoracentesis  Hypothyroidism   Chronic atrial fibrillation   HTN   Pulmonary hypertension   S/P TAVR(1/30/2018)  Leukocytosis (2/7/2018) on Abx   Acute on chronic diastolic heart failure   weakness  Pain  DVT risk  Acute blood loss anemia/ GI bleed? Acute Rehab Dx:  Generalized weakness. Debility    deconditioning   Mobility and ambulation deficits  Self Care/ADL deficits    Subjective     Patient seen and examined. Vss. Afebrile. Patient awake, eating breakfast by her self. Appears weak, fatigued. Reduced diuretics due to elevated Cr. Cr improved today. Will continue too watch. FOBT +,  however + result as patient did describe history of melena prior to admission, with elevated INR. Patient was admitted with severe anemia. Rehab progress, assessment and plans to be discussed in team conference.      Objective:     Current Facility-Administered Medications   Medication Dose Route Frequency    furosemide (LASIX) tablet 80 mg  80 mg Oral DAILY    potassium chloride (K-DUR, KLOR-CON) SR tablet 40 mEq  40 mEq Oral DAILY    0.9% sodium chloride infusion 250 mL  250 mL IntraVENous PRN    magnesium oxide (MAG-OX) tablet 400 mg  400 mg Oral DAILY    apixaban (ELIQUIS) tablet 2.5 mg  2.5 mg Oral Q12H    diazePAM (VALIUM) tablet 2.5 mg  2.5 mg Oral QHS    central line flush (saline) syringe 10 mL  10 mL InterCATHeter Q8H    acetaminophen (TYLENOL) tablet 650 mg  650 mg Oral Q4H PRN    alcohol 62% (NOZIN) nasal  1 Ampule  1 Ampule Topical Q12H    allopurinol (ZYLOPRIM) tablet 100 mg  100 mg Oral BID    hydrocortisone (ANUSOL-HC) 2.5 % rectal cream PeriANAL TID PRN    levothyroxine (SYNTHROID) tablet 88 mcg  88 mcg Oral ACB    metoprolol succinate (TOPROL-XL) XL tablet 25 mg  25 mg Oral DAILY    pantoprazole (PROTONIX) tablet 40 mg  40 mg Oral ACB    polyethylene glycol (MIRALAX) packet 17 g  17 g Oral DAILY    pravastatin (PRAVACHOL) tablet 40 mg  40 mg Oral DAILY    rOPINIRole (REQUIP) tablet 2 mg  2 mg Oral BID    sertraline (ZOLOFT) tablet 100 mg  100 mg Oral DAILY    spironolactone (ALDACTONE) tablet 25 mg  25 mg Oral DAILY    levalbuterol (XOPENEX) nebulizer soln 0.63 mg/3 mL  0.63 mg Nebulization Q6H PRN     Facility-Administered Medications Ordered in Other Encounters   Medication Dose Route Frequency    0.9% sodium chloride infusion 250 mL  250 mL IntraVENous PRN     Review of Systems: + weakness. Denies chest pain, shortness of breath, cough, headache, visual problems, abdominal pain, dysurea, calf pain. Pertinent positives are as noted in the medical records and unremarkable otherwise. Visit Vitals    /66    Pulse 83    Temp 97.8 °F (36.6 °C)    Resp 22    Wt 173 lb (78.5 kg)    SpO2 100%    BMI 33.79 kg/m2        Physical Exam:   General: Alert and age appropriately oriented.  Appears comfortable on 2L O2 NC. No acute cardio respiratory distress. HEENT: Normocephalic,no scleral icterus  Oral mucosa moist without cyanosis, No bruit, +JVD. Lungs: + bibasilar crackles. No wheezing. Respiration even and unlabored   Heart: RRR,  II/VI TERRI  No clicks, rub or gallops   Abdomen: Soft, non-tender, nondistended. Bowel sounds present. Genitourinary: defered   Neuromuscular:      PERRL, EOMI  Follows simple commands consistently. Able to identify, recall repeat. Mood appropriate. Insight, judgement intact.    LUE     Shoulder abduction   5-/5              Elbow flexion:   5-/5               Wrist extension:  5- /5              Finger flexion;  5- /5  RUE    Shoulder abduction: 5- /5                Elbow flexion:  5- /5                       Wrist extension: 5- /5                        Finger flexion:  5- /5  LLE     Hip flexion:  3+ /5              Knee extension:  4 /5                         Ankle dorsiflexion:  5 /5                        Ankle plantarflexion:   5/5                                                                  RLE     Hip flexion:   3+/5                        Knee extension:  4 /5                         Ankle dorsiflexion:  5 /5                        Ankle plantarflexion:  5 /5  Sensory - intact grossly   No cerebellar signs. Plantars - down going  No atrophy, no fasciculations, no tremors. Skin/extremity: No rashes, no erythema. Calf non tender BLE. 2+ BLE edema. + chronic arthritic joint changes. Mild BUE edema.                                                                                  Functional Assessment:  Gross Assessment  AROM:  (LE decreased secondary to weakness and edema) (02/21/18 1300)  PROM:  (Decreased secondary to LE edema ) (02/21/18 1300)  Strength:  (bilateral hip -3/5 to +2/5,knee 4/5, ankle 4/5) (02/21/18 1300)  Coordination:  (Impaired) (02/21/18 1300)  Tone: Normal (02/21/18 1300)  Sensation: Intact (02/21/18 1300)       Balance  Sitting - Static: Good (unsupported) (02/26/18 1600)  Sitting - Dynamic: Fair (occasional) (02/26/18 1600)  Standing - Static: Fair (in parallel bars) (02/26/18 1600)  Standing - Dynamic : Impaired (02/26/18 1600)           Toileting  Cues: Verbal cues provided;Physical assistance for pants down;Physical assistance for pants up (02/22/18 0840)  Adaptive Equipment: Walker;Grab bars (02/22/18 0840)         Bennett Willis Fall Risk Assessment:  Bennett Willis Fall Risk  Mobility: Ambulates or transfers with assist devices or assistance/unsteady gait (02/26/18 4598)  Mobility Interventions: Communicate number of staff needed for ambulation/transfer;Patient to call before getting OOB;Utilize walker, cane, or other assitive device (02/26/18 3597)  Mentation: Periodic confusion (02/26/18 2258)  Mentation Interventions: Adequate sleep, hydration, pain control;Bed/chair exit alarm; Door open when patient unattended (02/26/18 2258)  Medication: Patient receiving anticonvulsants, sedatives(tranquilizers), psychotropics or hypnotics, hypoglycemics, narcotics, sleep aids, antihypertensives, laxatives, or diuretics (02/26/18 2258)  Medication Interventions: Bed/chair exit alarm (02/26/18 2258)  Elimination: Needs assistance with toileting (02/26/18 2258)  Elimination Interventions: Call light in reach; Patient to call for help with toileting needs; Toileting schedule/hourly rounds (02/26/18 2258)  Prior Fall History: No (02/26/18 2258)  History of Falls Interventions: Bed/chair exit alarm (02/26/18 2258)  Total Score: 4 (02/26/18 2258)  Standard Fall Precautions: Yes (02/25/18 2313)  High Fall Risk: Yes (02/26/18 2258)     Speech Assessment:         Ambulation:  Gait  Distance (ft): 10 Feet (ft) (10ft x 5 with 3 to 4 min rest breaks between each attempt) (02/26/18 1600)  Assistive Device: Other (comment) (parallel bars) (02/26/18 1600)  Rail Use: Both (02/22/18 1600)     Labs/Studies:  Recent Results (from the past 72 hour(s))   TRANSFERRIN SATURATION    Collection Time: 02/24/18 12:31 PM   Result Value Ref Range    Iron 51 35 - 150 ug/dL    TIBC 333 250 - 450 ug/dL    Transferrin Saturation 15 (L) >20 %   FERRITIN    Collection Time: 02/24/18 12:32 PM   Result Value Ref Range    Ferritin 122 8 - 388 NG/ML   TRANSFERRIN    Collection Time: 02/24/18 12:32 PM   Result Value Ref Range    Transferrin 259 202 - 364 mg/dL   HGB & HCT    Collection Time: 02/25/18  6:30 AM   Result Value Ref Range    HGB 8.2 (L) 11.7 - 15.4 g/dL    HCT 27.3 (L) 35.8 - 78.9 %   METABOLIC PANEL, BASIC    Collection Time: 02/25/18 12:05 PM   Result Value Ref Range    Sodium 145 136 - 145 mmol/L    Potassium 3.3 (L) 3.5 - 5.1 mmol/L    Chloride 93 (L) 98 - 107 mmol/L    CO2 >45 (HH) 21 - 32 mmol/L    Anion gap Cannot be calculated 7 - 16 mmol/L    Glucose 160 (H) 65 - 100 mg/dL    BUN 51 (H) 8 - 23 MG/DL    Creatinine 1.26 (H) 0.6 - 1.0 MG/DL    GFR est AA 53 (L) >60 ml/min/1.73m2    GFR est non-AA 44 (L) >60 ml/min/1.73m2    Calcium 8.8 8.3 - 10.4 MG/DL   HGB & HCT    Collection Time: 02/26/18  5:45 AM   Result Value Ref Range    HGB 8.2 (L) 11.7 - 15.4 g/dL    HCT 27.9 (L) 35.8 - 96.8 %   METABOLIC PANEL, BASIC    Collection Time: 02/26/18  5:45 AM   Result Value Ref Range    Sodium 148 (H) 136 - 145 mmol/L    Potassium 3.5 3.5 - 5.1 mmol/L    Chloride 97 (L) 98 - 107 mmol/L    CO2 >45 (HH) 21 - 32 mmol/L    Anion gap Cannot be calculated 7 - 16 mmol/L    Glucose 98 65 - 100 mg/dL    BUN 57 (H) 8 - 23 MG/DL    Creatinine 1.59 (H) 0.6 - 1.0 MG/DL    GFR est AA 41 (L) >60 ml/min/1.73m2    GFR est non-AA 34 (L) >60 ml/min/1.73m2    Calcium 9.0 8.3 - 10.4 MG/DL   PREALBUMIN    Collection Time: 02/26/18  5:45 AM   Result Value Ref Range    Prealbumin 21.0 18.0 - 35.7 MG/DL   MAGNESIUM    Collection Time: 02/26/18  5:45 AM   Result Value Ref Range    Magnesium 2.3 1.8 - 2.4 mg/dL   CULTURE, URINE    Collection Time: 02/26/18 10:00 AM   Result Value Ref Range    Special Requests: NO SPECIAL REQUESTS      Culture result: <10,000  COLONIES/mL  NORMAL SKIN CARLOS ISOLATED       URINALYSIS W/ RFLX MICROSCOPIC    Collection Time: 02/26/18  1:23 PM   Result Value Ref Range    Color YELLOW      Appearance CLEAR      Specific gravity 1.010 1.001 - 1.023      pH (UA) 5.5 5.0 - 9.0      Protein NEGATIVE  NEG mg/dL    Glucose NEGATIVE  mg/dL    Ketone NEGATIVE  NEG mg/dL    Bilirubin NEGATIVE  NEG      Blood NEGATIVE  NEG      Urobilinogen 0.2 0.2 - 1.0 EU/dL    Nitrites NEGATIVE  NEG      Leukocyte Esterase NEGATIVE  NEG     OCCULT BLOOD, STOOL    Collection Time: 02/27/18  4:00 AM   Result Value Ref Range    Occult blood, stool POSITIVE (A) NEG     METABOLIC PANEL, BASIC    Collection Time: 02/27/18  6:00 AM   Result Value Ref Range    Sodium 148 (H) 136 - 145 mmol/L    Potassium 3.5 3.5 - 5.1 mmol/L    Chloride 94 (L) 98 - 107 mmol/L    CO2 >45 (HH) 21 - 32 mmol/L    Anion gap Cannot be calculated 7 - 16 mmol/L    Glucose 109 (H) 65 - 100 mg/dL    BUN 62 (H) 8 - 23 MG/DL    Creatinine 1.21 (H) 0.6 - 1.0 MG/DL    GFR est AA 56 (L) >60 ml/min/1.73m2    GFR est non-AA 46 (L) >60 ml/min/1.73m2    Calcium 8.9 8.3 - 10.4 MG/DL       Assessment:     Problem List as of 2/27/2018  Date Reviewed: 2/10/2018          Codes Class Noted - Resolved    Respiratory failure ([facility] ICD-10-CM: J96.90  ICD-9-CM: 518.81  2/13/2018 - Present        Acute on chronic respiratory failure ([facility] ICD-10-CM: J96.20  ICD-9-CM: 518.84  2/7/2018 - Present        Leukocytosis ICD-10-CM: O82.213  ICD-9-CM: 288.60  2/7/2018 - Present        Chronic diastolic heart failure (HCC) ICD-10-CM: I50.32  ICD-9-CM: 428.32  2/7/2018 - Present        S/P TAVR (transcatheter aortic valve replacement) ICD-10-CM: Z95.2  ICD-9-CM: V43.3  1/30/2018 - Present        Aortic stenosis (Chronic) ICD-10-CM: I35.0  ICD-9-CM: 424.1  1/29/2018 - Present        Peripheral arterial disease (HCC) (Chronic) ICD-10-CM: I73.9  ICD-9-CM: 443.9  1/23/2018 - Present        Pleural effusion ICD-10-CM: J90  ICD-9-CM: 511.9  1/23/2018 - Present    Overview Addendum 2/10/2018 11:36 AM by Geraldine Bergeron NP     Right thoracentesis 1/25/18 - 1200 mls removed  Left thoracentesis 1/25/2018 - 900 mls removed  Bilateral thoracentesis 2/9/18 - L 550 ml (air aspirated during and at the end of procedure) / R 1000 ml and R creamy pink              Chronic respiratory failure with hypoxia (HCC) (Chronic) ICD-10-CM: J96.11  ICD-9-CM: 518.83, 799.02  1/23/2018 - Present    Overview Signed 1/23/2018 11:17 AM by Ashwini Sanabria, NP     Wears 3 to 4 liters at home             Hypoxia ICD-10-CM: R09.02  ICD-9-CM: 799.02  1/23/2018 - Present        Stenosis of prosthetic aortic valve (Chronic) ICD-10-CM: I35.0  ICD-9-CM: 396.0  1/19/2018 - Present    Overview Signed 1/19/2018  3:18 PM by Debi Quarles MD     AVR (10/5/13):  21 mm Pericardial valve.                Tricuspid valve insufficiency (Chronic) ICD-10-CM: I07.1  ICD-9-CM: 397.0  1/15/2018 - Present        Systolic CHF, chronic (HCC) (Chronic) ICD-10-CM: I50.22  ICD-9-CM: 428.22, 428.0  1/15/2018 - Present        S/P mitral valve repair (Chronic) ICD-10-CM: N92.608  ICD-9-CM: V45.89  12/4/2017 - Present        H/O atrioventricular rubin ablation (Chronic) ICD-10-CM: J85.032  ICD-9-CM: V15.1  3/22/2017 - Present        Pulmonary hypertension (Chronic) ICD-10-CM: I27.20  ICD-9-CM: 416.8  2/12/2017 - Present        Aortic valve replaced (Chronic) ICD-10-CM: Z95.2  ICD-9-CM: V43.3  1/9/2017 - Present        Chronic diastolic congestive heart failure (HCC) (Chronic) ICD-10-CM: I50.32  ICD-9-CM: 428.32, 428.0  9/9/2016 - Present        Chronic depression (Chronic) ICD-10-CM: F32.9  ICD-9-CM: 311  8/5/2016 - Present        Osteopenia (Chronic) ICD-10-CM: M85.80  ICD-9-CM: 733.90  8/5/2016 - Present        RLS (restless legs syndrome) (Chronic) ICD-10-CM: G25.81  ICD-9-CM: 333.94  8/5/2016 - Present        Hyperlipidemia (Chronic) ICD-10-CM: E78.5  ICD-9-CM: 272.4  8/5/2016 - Present        Gout (Chronic) ICD-10-CM: M10.9  ICD-9-CM: 274.9  8/5/2016 - Present        Anxiety (Chronic) ICD-10-CM: F41.9  ICD-9-CM: 300.00  8/5/2016 - Present        CAD (coronary artery disease) (Chronic) ICD-10-CM: I25.10  ICD-9-CM: 414.00  8/5/2016 - Present        HTN (hypertension) (Chronic) ICD-10-CM: I10  ICD-9-CM: 401.9  8/5/2016 - Present        GERD (gastroesophageal reflux disease) (Chronic) ICD-10-CM: K21.9  ICD-9-CM: 530.81  8/5/2016 - Present        H/O mitral valve repair, 2003 (Chronic) ICD-10-CM: G89.158  ICD-9-CM: V15.1  6/27/2016 - Present        Sick sinus syndrome (HCC) (Chronic) ICD-10-CM: I49.5  ICD-9-CM: 427.81  2/13/2016 - Present        Obesity (Chronic) ICD-10-CM: E66.9  ICD-9-CM: 278.00 11/2/2015 - Present        Mitral stenosis with insufficiency (Chronic) ICD-10-CM: O62.6  ICD-9-CM: 394.2  11/2/2015 - Present    Overview Signed 11/2/2015 11:02 AM by Odessa Alcala     Prior MV repair 2003.               Rheumatic aortic stenosis (Chronic) ICD-10-CM: I06.0  ICD-9-CM: 395.0  11/2/2015 - Present        Cardiac pacemaker (Chronic) ICD-10-CM: Z95.0  ICD-9-CM: V45.01  11/2/2015 - Present        Thrombocytopenia (HCC) (Chronic) ICD-10-CM: D69.6  ICD-9-CM: 287.5  8/22/2012 - Present        Anemia (Chronic) ICD-10-CM: D64.9  ICD-9-CM: 285.9  10/27/2011 - Present    Overview Signed 10/27/2011  8:25 AM by Makayla Friday     Acute on chronic               Chronic atrial fibrillation (HCC) (Chronic) ICD-10-CM: I48.2  ICD-9-CM: 427.31  10/17/2011 - Present        Hypothyroidism (Chronic) ICD-10-CM: E03.9  ICD-9-CM: 244.9  12/4/2009 - Present        BOBBY (obstructive sleep apnea) (Chronic) ICD-10-CM: G47.33  ICD-9-CM: 327.23  12/4/2009 - Present        Chronic obstructive pulmonary disease (HCC) (Chronic) ICD-10-CM: J44.9  ICD-9-CM: 496  3/8/2009 - Present        Aortic Valve Bioprosthesis Present (Chronic) ICD-10-CM: Z95.2  ICD-9-CM: V43.3  3/7/2009 - Present        Degenerative arthritis of left knee (Chronic) ICD-10-CM: M17.12  ICD-9-CM: 715.96  2/27/2009 - Present        RESOLVED: CHF (congestive heart failure) ([facility] ICD-10-CM: I50.9  ICD-9-CM: 428.0  2/13/2018 - 2/14/2018        RESOLVED: Acute on chronic systolic congestive heart failure ([facility] ICD-10-CM: I50.23  ICD-9-CM: 428.23, 428.0  2/1/2018 - 2/7/2018        RESOLVED: Acute pulmonary edema ([facility] ICD-10-CM: J81.0  ICD-9-CM: 518.4  1/25/2018 - 2/7/2018        RESOLVED: Pacemaker ICD-10-CM: Z95.0  ICD-9-CM: V45.01  12/4/2017 - 1/23/2018        RESOLVED: History of gout ICD-10-CM: Z87.39  ICD-9-CM: V12.29  1/31/2017 - 1/23/2018        RESOLVED: Warfarin anticoagulation (Chronic) ICD-10-CM: Z79.01  ICD-9-CM: V58.61  1/9/2017 - 1/23/2018        RESOLVED: Non morbid obesity due to excess calories ICD-10-CM: E66.09  ICD-9-CM: 278.00  11/14/2016 - 1/23/2018        RESOLVED: Hypoxemia ICD-10-CM: R09.02  ICD-9-CM: 799.02  11/14/2016 - 1/23/2018        RESOLVED: Localized edema ICD-10-CM: R60.0  ICD-9-CM: 782.3  9/9/2016 - 1/23/2018        RESOLVED: Iron deficiency anemia ICD-10-CM: D50.9  ICD-9-CM: 280.9  9/9/2016 - 1/23/2018        RESOLVED: CRI (chronic renal insufficiency) ICD-10-CM: N18.9  ICD-9-CM: 585.9  8/5/2016 - 1/23/2018        RESOLVED: Dyspnea ICD-10-CM: R06.00  ICD-9-CM: 786.09  8/5/2016 - 1/23/2018        RESOLVED: Right hip pain ICD-10-CM: M25.551  ICD-9-CM: 719.45  8/5/2016 - 1/23/2018        RESOLVED: Edema ICD-10-CM: R60.9  ICD-9-CM: 782.3  8/5/2016 - 1/23/2018        RESOLVED: Chest pain ICD-10-CM: R07.9  ICD-9-CM: 786.50  8/5/2016 - 1/23/2018        RESOLVED: Other long term (current) drug therapy ICD-10-CM: Z79.899  ICD-9-CM: V58.69  8/5/2016 - 1/23/2018        RESOLVED: Acute encephalopathy ICD-10-CM: G93.40  ICD-9-CM: 348.30  7/8/2016 - 1/23/2018        RESOLVED: Tachycardia ICD-10-CM: R00.0  ICD-9-CM: 785.0  6/27/2016 - 1/23/2018        RESOLVED: Palpitations ICD-10-CM: R00.2  ICD-9-CM: 785.1  11/2/2015 - 1/23/2018        RESOLVED: Respiratory insufficiency ICD-10-CM: R06.89  ICD-9-CM: 786.09  11/2/2015 - 1/23/2018        RESOLVED: Bradycardia (Symptomatic) ICD-10-CM: R00.1  ICD-9-CM: 427.89  11/7/2011 - 1/23/2018        RESOLVED: Rectal bleeding ICD-10-CM: K62.5  ICD-9-CM: 569.3  10/27/2011 - 1/23/2018        RESOLVED: AV block ICD-10-CM: I44.30  ICD-9-CM: 426.10  10/17/2011 - 1/23/2018        RESOLVED: Cardiogenic shock ([facility] ICD-10-CM: R57.0  ICD-9-CM: 785.51  10/17/2011 - 1/23/2018        RESOLVED: Hyperkalemia ICD-10-CM: E87.5  ICD-9-CM: 276.7  10/17/2011 - 1/23/2018        RESOLVED: Nausea and vomiting ICD-10-CM: R11.2  ICD-9-CM: 787.01  2/24/2010 - 1/23/2018        RESOLVED: Epigastric abdominal pain ICD-10-CM: R10.13  ICD-9-CM: 789.06 2/24/2010 - 1/23/2018        RESOLVED: Digoxin toxicity ICD-10-CM: T46.0X1A  ICD-9-CM: 972.1, E942.1  2/24/2010 - 1/23/2018        RESOLVED: ARF (acute renal failure) (HCC) (Chronic) ICD-10-CM: N17.9  ICD-9-CM: 584.9  2/24/2010 - 1/23/2018        RESOLVED: Hypokalemia ICD-10-CM: E87.6  ICD-9-CM: 276.8  2/24/2010 - 1/23/2018        RESOLVED: CKD (chronic kidney disease) stage 3, GFR 30-59 ml/min (Chronic) ICD-10-CM: N18.3  ICD-9-CM: 585.3  12/4/2009 - 1/23/2018        RESOLVED: Cough ICD-10-CM: R05  ICD-9-CM: 786.2  3/9/2009 - 3/12/2009        RESOLVED: Bronchitis ICD-10-CM: J40  ICD-9-CM: 490  3/9/2009 - 3/12/2009        RESOLVED: Atrial fibrillation (HCC) (Chronic) ICD-10-CM: I48.91  ICD-9-CM: 427.31  3/7/2009 - 6/27/2016        RESOLVED: Hypotension (Chronic) ICD-10-CM: I95.9  ICD-9-CM: 458.9  3/1/2009 - 1/23/2018              Plan:     The Post Assessment Physician Evaluation (RAMBO) found the current functional status to be comparable with the Pre-admission Screening. The Patient is a good candidate for acute inpatient rehabilitation. Nothing since the Pre-admission screen has changed that determination.      Rehabilitation Plan  The patient has shown the ability to tolerate and benefit from 3 hours of therapy daily and is being admitted to a comprehensive acute inpatient rehabilitation program consisting of at least 3 hours of combined physical and occupational therapies. Resume intensive Physical Therapy for a minimum of 1.5 hours a day, at least 5 out of 7 days per week to address bed mobility, transfers, ambulation, strengthening, balance, and endurance. - pt  was ambulating independently with a rollator inside her home. reports using rollator or a wheelchair for community mobility.  Will continue to focus on endurance, strengthening BLe.      Resume  intensive Occupational Therapy for a minimum of 1.5 hours a day, at least 5 out of 7 days per week to address ADL ( bathing, LE dressing, toileting) and adaptive equipment as needed.       Continue 24-hour skilled rehabilitation nursing for bowel and bladder management, skin care for decubitus ulcer prevention , pain management and ongoing medication administration      The patient may benefit from a psychology consult for depression, anxiety and adjustment disorder.     Continue daily physician medical management:    Acute respiratory failure (HCC) (2/7/2018)/ Pleural effusion (1/23/2018)/ S/P bilateral thoracentesis  S/p - Thoracenteses on 2/9 - 550 mls removed from the left and 1 liter removed from the right.   - Continue bronchodilators prn- has home nebulizer. Resume as prn;xopenex  - Patient will be on 2L O2 at therapy and at rest. May be able to wean as tolerated. Will monitor saO2. 2/15 - sao2 at 100% on 2L/ min. Feels comfortable. Continue lasix and aldactone. Cardiology gave 1x Zaroxolyn in AM prior to AM lasix. 2/19 - continue lasix 80 mg bid + aldactone. +metolazone prior to lasix each morning. Monitor. Daily weights.   2/20- cardiology recommendation appreciated. Transition to iv lasic. Will need access. Difficult due to edema. 2/21- Persistent edema. Iv lasix ordered per cardiology, however since peripheral access not yet achieved after multiple attempts due to edema. Will have IR place CVC / ij anticipating frequent blood draws and continued iv diuretic needs. Mean time will have po lasix for every iv dose missed. 2/22 - Patient edema mildly better. Mild decreased weight; 176lbs. conitune lasix 80 bid. Increase KCl 40 to bid dose. Left IJ oozing overnight following IR procedure, appears not bleeding today. hgb decreased, likely due to blood loss. 2/23 - mildly SOB, will repeat CxR to monitor bibasilar effusions. Continue iv lasix. 2/26 - SaO2 stable on O2 @2L via NC. cpntinued on diuresis over the weekend. Cr, elevated. Will reduce lasix. 2/27 - lasix reduced. Metolazone held. BUN/ Cr. = 57/1.59-> 62/1. 21.           S/P TAVR(1/30/2018)/ Acute on chronic diastolic heart failure Pulmonary hypertension / Chronic atrial fibrillation/ HTN   - cardiac precaution, s.p TAVR. - weights and maintain a 2 liter per day fluid restriction.   - Monitor HR/BP. conitinue Eliquis for a.fib  - continue BB-Toprol XL  2/15- edema, chest exam appears not changed. cintiuned on diuretics. 2/19- continue O2 supplements. 2/20 - HR in control. continue O2, comfortable on same rate. Will obtain CxR. Check BNP. Monitor renal fx.   2/21 - HR in control. conitinue eliquis. BB at current dose. 2/22 - HR 80's monitor. Temporarily reduce Eliquis dose 2.5 bid due to oozing, blood loss. Anemia - hgb 7.4. Check in am. Transfuse prn.   2/23 - will continue lower dose eliquis as hgb decreased following IR procedure. , plt remain low; 70k. HR in good range 70-80s. Continue monitor. 2/26 - continued on lower dose eliquis for now. Weight 172 lbs, reduced about 7 lbs since he admission to [facility]. still persistent edema. Cr 1.59, will reduce lasix and hold metolazone. Will get cardiology input.         Hypothyroidism - synthroid 88 mcg     Leukocytosis (2/7/2018) on Abx last dose 7/7 rocephin administered 2/13.   - finished abx. No new s/s of infection. - 2/21 no new signs of infection.      Acute blood loss anemia/ GI bleed? -monitor clinically. May have been caused by high INR. - no melena. - hgb 9.1 (2/18) , asymptomatic.   - 2/22 - Anemia due to blood loss form IR proocedure- hgb 7.4. Check in am. Transfuse prn.   2/23 - hgb 7.7. Check labs in am and Monday. 2/26 - s/p transfusion 1 unit over the weekend. Monitor. hgb 8.2   2/27 - hgb 8.2-> 7.7, mild decrease. Will ask GI to consult on management. continue Eliquis?        Pneumonia prophylaxis- Insentive spirometer every hour while awake     DVT risk / DVT Prophylaxis- continue daily physician exam to assess for signs and symptoms as patient is at increased risk for of thromboembolism.    - covered with eliquis. No s/s of DVT/ PE. Wound Care: - monitor for ulcers, skin breakdown due to edema. -left elbow  edema drainage covered. -  TEDs. Fitting better. Edema control.         Potential urinary retention - no s/s of retention. Monitor.  - pt mostly continent. - needed to use bedpan over the wekend due to decline in mobility.      bowel program - as needed dulcolax, pericolace. + BM.     GERD/ GI prophylaxis - resume PPI. At times may need additional antacids, Maalox prn.          Time spent was 25 minutes with over 1/2 in direct patient care/examination, consultation and coordination of care.      Signed By: Soledad Trujillo MD     February 27, 2018

## 2018-02-27 NOTE — CONSULTS
[facility] Consult Note         Referring Physician:  Cliff August Date:  2/27/2018    Admit Date:  2/13/2018    Chief Complaint:  Anemia, hemoccult positive stool    Subjective:     History of Present Illness:  Patient is a 69 y.o. female with PMH of below, who is seen in consultation at the request of Dr. Edgar Rodriguez for Anemia with hemoccult positive stool. The patient is unable to provide answers to some direct questions. She has a history of intermittent rectal bleeding. It is not clear when this last occurred, but one notes repeated consultation (Dr. Ramos Ledy earlier this year, Dr. Erma Matthews twice in 2017) for the same. This is due to hemorrhoids and anticoagulation. She has Anusol cream as a PRN med, but this has not been administered recently. She variably reports dark stool recently and NO dark stool recently. She has a history of abdominal pain and urgent bowel movements after eating, but then reports that it's been a long time since this occurred. She has a history of chronic anemia since at least 2010 with extensive endoscopic work up. She recently required transfusion after presenting with severe anemia and iatrogenic coagulopathy after TAVR.     PMH:  Past Medical History:   Diagnosis Date    Abnormal glucose 8/5/2016    Abscess 8/5/2016    Acute encephalopathy 7/8/2016    Acute renal failure ([address] 12/3/2009    Afib ([address]     Anemia     Ankle swelling 8/5/2016    Anxiety 8/5/2016    Aortic Valve Bioprosthesis Present 3/7/2009    ARF (acute renal failure) ([address] 2/24/2010    Arthritis     Asthma     Atopic dermatitis 8/5/2016    Atrial fibrillation ([address] 3/7/2009    Autonomic orthostatic hypotension 8/5/2016    AV block 10/17/2011    Back pain 8/5/2016    Bradycardia (Symptomatic) 11/7/2011    CAD (coronary artery disease)     Cancer (HCC) 1990    breast (left)    Cardiac pacemaker 11/2/2015    Cardiogenic shock ([address] 10/17/2011    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    Cellulitis 8/5/2016    Chest pain 8/5/2016    Chronic atrial fibrillation ([address] 10/17/2011    Chronic depression 8/5/2016    Chronic obstructive pulmonary disease ([address] 3/8/2009    Chronic pain     CKD (chronic kidney disease) stage 3, GFR 30-59 ml/min 12/4/2009    Congestive heart failure (CHF) ([address] 11/2/2015    Degeneration of cervical intervertebral disc 8/5/2016    Degenerative arthritis of left knee 2/27/2009    Dehydration 2/7/6086    Diastolic heart failure (HCC)     Digoxin toxicity 2/24/2010    Disorder of sweat glands 8/5/2016    Diverticulosis of large intestine without diverticulitis 2015    Dyspnea 8/5/2016    Eczema 8/5/2016    Embolus of femoral artery (HCC) 12/4/2017    Epigastric abdominal pain 2/24/2010    GERD (gastroesophageal reflux disease)     Gout 8/5/2016    H/O mitral valve repair, 2003 6/27/2016    Heart failure ([address]     Hx of colonic polyp 2015    adenoma    Hyperkalemia 10/17/2011    Hyperlipidemia 8/5/2016    Hypertension     Hypokalemia 2/24/2010    Hypothyroidism 12/4/2009    Ill-defined condition     CARPEL TUNNEL SYNDROME, BILAT HANDS    Knee pain 8/5/2016    Leg cramps 8/5/2016    Mitral stenosis with insufficiency 11/2/2015    Prior MV repair 2003.     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    Right hip pain 8/5/2016    RLS (restless legs syndrome) 8/5/2016    Sick sinus syndrome ([address] 2/13/2016    Skin infection 8/5/2016    Tachycardia 6/27/2016    Thrombocytopenia, unspecified 8/22/2012    Thromboembolus ([address]     02/2017    Thyroid disease     Urticaria 8/5/2016    Vertigo 8/5/2016       PSH:  Past Surgical History:   Procedure Laterality Date    CARDIAC SURG PROCEDURE UNLIST      valve repair and replacement    CHEST SURGERY PROCEDURE UNLISTED      HX APPENDECTOMY      HX BREAST RECONSTRUCTION      HX CHOLECYSTECTOMY  2005    HX GYN  1981    hysterectomy still have ovaries    HX MASTECTOMY  1990    left mastectomy    HX ORTHOPAEDIC      knee surgery    HX PACEMAKER      VASCULAR SURGERY PROCEDURE UNLIST Right 02/20/2017    LE thrombectomy       Allergies: Allergies   Allergen Reactions    Adhesive Tape Rash    Benadryl [Diphenhydramine Hcl] Other (comments)     Jitters      Demerol [Meperidine] Anxiety    Morphine Other (comments)     Confusion    Sulfa (Sulfonamide Antibiotics) Anxiety    Lorazepam Anxiety       Home Medications:  Prior to Admission medications    Medication Sig Start Date End Date Taking? Authorizing Provider   metoprolol succinate (TOPROL-XL) 25 mg XL tablet Take 1 Tab by mouth daily. 2/14/18  Yes Suzanne Mcclure NP   apixaban (ELIQUIS) 5 mg tablet Take 1 Tab by mouth every twelve (12) hours. 2/13/18  Yes Suzanne Mcclure NP   furosemide (LASIX) 80 mg tablet Take 1 Tab by mouth every twelve (12) hours. 2/13/18  Yes Suzanne Mcclure NP   rOPINIRole (REQUIP) 2 mg tablet Take  by mouth two (2) times a day. Yes Historical Provider   pravastatin (PRAVACHOL) 40 mg tablet Take 1 Tab by mouth daily. Indications: hyperlipidemia 1/4/18  Yes Brigid Zaragoza MD   sertraline (ZOLOFT) 100 mg tablet Take 1 Tab by mouth daily. 1/4/18  Yes Brigid Zaragoza MD   diazePAM (VALIUM) 5 mg tablet Take 1 Tab by mouth daily. Max Daily Amount: 5 mg. 1/4/18  Yes Brigid Zaragoza MD   spironolactone (ALDACTONE) 25 mg tablet Take 1 Tab by mouth daily. 9/11/17  Yes Brigid Zaragoza MD   levothyroxine (SYNTHROID) 88 mcg tablet Take 1 Tab by mouth Daily (before breakfast).  9/11/17  Yes Brigid Zaragoza MD allopurinol (ZYLOPRIM) 100 mg tablet Take 1 Tab by mouth two (2) times a day. 6/19/17  Yes Alfred Neff MD   polyethylene glycol (MIRALAX) 17 gram/dose powder Take 17 g by mouth daily. Yes Historical Provider   OXYGEN-AIR DELIVERY SYSTEMS by Does Not Apply route. 2-2.5 lpm cont. Yes Historical Provider   omeprazole (PRILOSEC) 20 mg capsule TAKE 1 CAPSULE BY MOUTH  DAILY 12/11/17 April MD Lolly   fluticasone (FLONASE) 50 mcg/actuation nasal spray 1 [place] by Both Nostrils route daily. Historical Provider   azelastine (ASTELIN) 137 mcg (0.1 %) nasal spray 1 [place] by Both Nostrils route two (2) times a day. Use in each nostril as directed 4/20/17 April MD Lolly   loratadine (CLARITIN) 10 mg tablet Take 10 mg by mouth as needed. Historical Provider   glycerin, adult, (SUPPOSITORY ADULT) suppository Insert 1 Suppository into rectum as needed. Historical Provider   promethazine (PHENERGAN) 25 mg tablet Take 1 Tab by mouth every six (6) hours as needed. 4/3/17   April MD Lolly   levalbuterol (XOPENEX) 1.25 mg/3 mL nebu 1.25 mg by Nebulization route. Historical Provider   hydrocortisone (ANUSOL-HC) 2.5 % rectal cream Apply small amount to hemorrhoids/perianal skin TID PRN 1/17/17   Pablo Vasquez MD   calcium carbonate (CALTREX) 600 mg (1,500 mg) tablet Take 600 mg by mouth nightly. Historical Provider   cholecalciferol (VITAMIN D3) 1,000 unit cap Take  by mouth daily. Historical Provider   CARBOXYMETHYLCELLULOS/GLYCERIN (REFRESH OPTIVE OP) Apply  to eye. Historical Provider   DOCUSATE SODIUM Take 1 Cap by mouth two (2) times a day. Historical Provider   cyanocobalamin (VITAMIN B-12) 250 mcg tablet Take 1,000 mcg by mouth daily. Quita Shipman MD   nitroglycerin (NITROSTAT) 0.4 mg SL tablet by SubLINGual route every five (5) minutes as needed for Chest Pain.     Quita Shipman MD       Hospital Medications:  Current Facility-Administered Medications   Medication Dose Route Frequency    furosemide (LASIX) tablet 80 mg  80 mg Oral DAILY    potassium chloride (K-DUR, KLOR-CON) SR tablet 40 mEq  40 mEq Oral DAILY    0.9% sodium chloride infusion 250 mL  250 mL IntraVENous PRN    magnesium oxide (MAG-OX) tablet 400 mg  400 mg Oral DAILY    apixaban (ELIQUIS) tablet 2.5 mg  2.5 mg Oral Q12H    diazePAM (VALIUM) tablet 2.5 mg  2.5 mg Oral QHS    central line flush (saline) syringe 10 mL  10 mL InterCATHeter Q8H    acetaminophen (TYLENOL) tablet 650 mg  650 mg Oral Q4H PRN    alcohol 62% (NOZIN) nasal  1 Ampule  1 Ampule Topical Q12H    allopurinol (ZYLOPRIM) tablet 100 mg  100 mg Oral BID    hydrocortisone (ANUSOL-HC) 2.5 % rectal cream   PeriANAL TID PRN    levothyroxine (SYNTHROID) tablet 88 mcg  88 mcg Oral ACB    metoprolol succinate (TOPROL-XL) XL tablet 25 mg  25 mg Oral DAILY    pantoprazole (PROTONIX) tablet 40 mg  40 mg Oral ACB    polyethylene glycol (MIRALAX) packet 17 g  17 g Oral DAILY    pravastatin (PRAVACHOL) tablet 40 mg  40 mg Oral DAILY    rOPINIRole (REQUIP) tablet 2 mg  2 mg Oral BID    sertraline (ZOLOFT) tablet 100 mg  100 mg Oral DAILY    spironolactone (ALDACTONE) tablet 25 mg  25 mg Oral DAILY    levalbuterol (XOPENEX) nebulizer soln 0.63 mg/3 mL  0.63 mg Nebulization Q6H PRN     Facility-Administered Medications Ordered in Other Encounters   Medication Dose Route Frequency    0.9% sodium chloride infusion 250 mL  250 mL IntraVENous PRN       Social History:  Social History   Substance Use Topics    Smoking status: Former Smoker     Packs/day: 3.00     Years: 15.00     Types: Cigarettes     Quit date: 6/27/1996    Smokeless tobacco: Former User      Comment: quit 1996    Alcohol use No         Family History:  Family History   Problem Relation Age of Onset    Heart Disease Mother     Cancer Father      Throat    Heart Disease Father     Cancer Sister      Breast, Colon    Heart Disease Sister  Breast Cancer Sister 79      from disease    Cancer Maternal Grandmother     Cancer Brother     Diabetes Brother     Heart Disease Brother     Psychiatric Disorder Paternal Grandfather     Cancer Maternal Aunt      Breast    Diabetes Son     Alcohol abuse Neg Hx     Arthritis-rheumatoid Neg Hx     Asthma Neg Hx     Bleeding Prob Neg Hx     Elevated Lipids Neg Hx     Headache Neg Hx     Migraines Neg Hx     Hypertension Neg Hx     Lung Disease Neg Hx     Mental Retardation Neg Hx     Stroke Neg Hx        Review of Systems:  A detailed 10 system ROS is asked, to which the patient responds \"I don't know\"    Diet:  Regular    Objective:     Physical Exam:  Vitals:  Visit Vitals    /66    Pulse 83    Temp 97.8 °F (36.6 °C)    Resp 22    Wt 78.5 kg (173 lb)    SpO2 100%    BMI 33.79 kg/m2     Gen:  Pt is alert, cooperative, no acute distress  Skin:  Extremities and face reveal no rashes. HEENT: Sclerae anicteric. Extra-occular muscles are intact. No oral ulcers. No abnormal pigmentation of the lips. The neck is supple. Cardiovascular: Regular rate and rhythm. No murmurs, gallops, or rubs. Respiratory:  Comfortable breathing with no accessory muscle use. Diminished breath sounds at bases bilaterally  GI:  Abdomen nondistended, soft, and nontender. Normal active bowel sounds. No enlargement of the liver or spleen. No masses palpable. Rectal:  Deferred  Musculoskeletal:  2+ edema to thighs    Neurological:  Alert, easily distracted, answers some questions appropriately  Psychiatric:  Mood appears appropriate  Lymphatic:  No cervical or supraclavicular adenopathy.     Laboratory:    Recent Labs      18   0858  18   0600  18   0545  18   1205  18   0630   HGB  7.7*   --   8.2*   --   8.2*   HCT  26.4*   --   27.9*   --   27.3*   NA   --   148*  148*  145   --    K   --   3.5  3.5  3.3*   --    CL   --   94*  97*  93*   --    CO2   --   >45*  >45* >45*   --    BUN   --   62*  57*  51*   --    CREA   --   1.21*  1.59*  1.26*   --    CA   --   8.9  9.0  8.8   --    MG   --    --   2.3   --    --    GLU   --   109*  98  160*   --           Assessment:     Active Problems:    Chronic atrial fibrillation (HCC) (10/17/2011)      S/P TAVR (transcatheter aortic valve replacement) (1/30/2018)      Chronic diastolic heart failure (HCC) (2/7/2018)      Respiratory failure (HCC) (2/13/2018)    Chronic anemia-hgb at baseline for what appears to be past several years. Chronic GI blood loss has been suspected, although a source other than hemorrhoids has never been found. Although she is no longer on Coumadin, she is on Eliquis and is likely to continue to have this issue. Hemorrhoids with bleeding-Chronic with intermittent bleeding. Plan:     Endoscopy is high risk and low benefit in this patient. I am unable to find convincing evidence of acute GI bleeding. Continue PPI. Use the Anusol that is already prescribed as needed for hemorrhoid bleeding. Transfuse as necessary for her clinical status. No further role for GI care. Hemorrhoid banding can be considered as an outpatient if she continues to have rectal bleeding despite Anusol, but this is unlikely to solve her chronic blood loss anemia.     Steven Wyman MD

## 2018-02-27 NOTE — PROGRESS NOTES
PHYSICAL THERAPY DAILY NOTE  Time In: 6431  Time Out: 5819  Patient Seen For: PM;Other (see progress notes);Gait training; Therapeutic exercise;Transfer training    Subjective: Patient had no complaints. Objective: No pain noted. O2 at 1 liter. O2 sats remained >90% thru out treatment. Other (comment) (Fall precautions)  GROSS ASSESSMENT Daily Assessment            BED/MAT MOBILITY Daily Assessment    Supine to Sit : 3 (Moderate assistance)  Sit to Supine : 3 (Moderate assistance)       TRANSFERS Daily Assessment    Transfer Type: SPT without device  Transfer Assistance : 4 (Minimal assistance)  Sit to Stand Assistance: Contact guard assistance       GAIT Daily Assessment    Amount of Assistance: 4 (Minimal assistance)  Distance (ft): 40 Feet (ft)  Assistive Device: Walker, rolling       STEPS or STAIRS Daily Assessment    Level of Assist : 0 (Not tested)       BALANCE Daily Assessment            WHEELCHAIR MOBILITY Daily Assessment            LOWER EXTREMITY EXERCISES Daily Assessment    Extremity: Both  Exercise Type #1: Seated lower extremity strengthening  Sets Performed: 1  Reps Performed: 20  Level of Assist: Minimal assistance          Assessment: Patient making good progress. Plan of Care: Continue with plan of care to reach PT goals. Returned to room with call simeon at reach.     Elian Soriano, PTA  2/27/2018

## 2018-02-27 NOTE — PROGRESS NOTES
02/27/18 0830   Time Spent With Patient   Time In 0830   Time Out 0956   Patient Seen For: AM;ADLs   Feeding/Eating   Feeding/Eating Assistance Min A   Comments Assist to initiate eating. Verbal cues to continue. Grooming   Grooming Assistance  S   Comments s/u assist for dentures, hair care, and washing face and hands. Upper Body Bathing   Bathing Assistance, Upper Dep   Position Performed Other (comment)  (Supine in bed.)   Comments Total assist due to pt fatigue, confusion, and time restraint. Lower Body Bathing   Bathing Assistance, Lower  Dep   Position Performed Supine   Comments Total assist due to patient fatiuge, confusion, and time restraint. Toileting   Toileting Assistance (FIM Score) Max A   Cues Physical assistance for pants down;Physical assistance for pants up;Verbal cues provided   Adaptive Equipment Walker   Upper Body Dressing    Dressing Assistance  Mod A   Comments Assist with threading shirt over head and pulling down. Lower Body Dressing    Dressing Assistance  Dep   Leg Crossed Method Used No   Position Performed Supine   Don/Doff Anti-Embolic Stockings Dep   Comments Total assist with all parts. Pt able to partially bridge to pull clothing over waist as well as rolled side to side. Functional Transfers   Toilet Transfer  Stand pivot transfer with walker; Other (comment)  (BSC)   Amount of Assistance Required CGA   Amount of Assistance Required Min A   Adaptive Equipment Walker (comment); Wheelchair     S: \"I am tired. \" Agreeable to therapy. Focus of session was on morning ADL routine. Patient was able to SPT from bed <> BSC with CGA and verbal cues for foot placement. Pain not indicated. Pt on 3L of O2 with SpO2 level at 99%, decreased to 1.5L of O2 at 94%. Collaborated with PTAMelanie and RN, Tommy Rao and patient is making slow progress towards goals.    Patient tolerated session well, but shortness of breath, edema, functional mobility, standing balance, overall strength, and activity tolerance are still below baseline and requires skilled facilitation to successfully and safely complete ADL's and transfers. Patient ended session in w/c with call remote and phone within reach.      Kareem Lewis, OT Student

## 2018-02-27 NOTE — PROGRESS NOTES
Patient resting up in bed. Patient alert to self. Patient drowsy. Assisted with setting up breakfast tray. Lung sounds with light coarseness, diminished in bases. S1S2, bowel sounds active. Right IJ triple lumen patent and clean. Denies any pain or discomfort. Continues to be drowsy and drifts off to sleep when staff not in room. Door left open for closer observation. 3 liters nasal cannula. No other verbalized needs made known at present time. Assessment completed. See doc flow sheet for further assessments.

## 2018-02-27 NOTE — PROGRESS NOTES
Problem: Falls - Risk of  Goal: *Absence of Falls  Document Gregorio Fall Risk and appropriate interventions in the flowsheet.    Outcome: Progressing Towards Goal  Fall Risk Interventions:  Mobility Interventions: Communicate number of staff needed for ambulation/transfer, Patient to call before getting OOB, Utilize walker, cane, or other assitive device    Mentation Interventions: Adequate sleep, hydration, pain control, Door open when patient unattended, Evaluate medications/consider consulting pharmacy    Medication Interventions: Bed/chair exit alarm, Evaluate medications/consider consulting pharmacy, Patient to call before getting OOB    Elimination Interventions: Call light in reach    History of Falls Interventions: Consult care management for discharge planning, Door open when patient unattended, Evaluate medications/consider consulting pharmacy

## 2018-02-27 NOTE — PROGRESS NOTES
Report received on pt history and status from previous shift nurse. Pt in bed with HOB elevated, resting quietly. Awakens spontaneously.  Denies any discomfort or pain

## 2018-02-28 NOTE — PROGRESS NOTES
End Of Shift Functional Summary, Nursing      TOILETING:  Does patient need assist with clothing management and/or pericare? Yes: Comment: needs help pulling up and down clothing. TOILET TRANSFER:  Pt requires maximum assistance. Pt uses walker. BLADDER:  Pt does not have a sal[place] catheter that staff manages. Pt does not take medication. Pt is continent of bladder and voids in bedside commode  Pt requires staff to empty device Pt has had 0 bladder accidents during this shift requiring moderate. assistance to clean up. (An accident is when the episode is not contained in a brief AND/OR the clothing/linen requires changing/cleaning up.)    BOWEL:  Pt does take medication. Pt is continent of bowel and uses bedside commode. Pt requires staff to empty device. Pt has had 0 bowel accidents during this shift requiring moderate assistance from staff to clean up. (An accident is when the episode is not contained in a brief AND/OR the clothing/linen requires changing/cleaning up.)    BED/CHAIR TRANSFER  Pt requires maximum assistance. Patient requires the assistance of 2 staff member(s). Pt uses walker    EATING  Pt requires setup. Pt wears dentures. Documentation reviewed and plan of care discussed/reviewed with   oncoming nurse and patient assistant during the shift.

## 2018-02-28 NOTE — PROGRESS NOTES
OT WEEKLY PROGRESS NOTE    Time In:  8:36  Time Out:  10:03    COMPREHENSION MODE Initial Assessment Weekly Progress Assessment 2/28/2018   Score 6 6     EXPRESSION Initial Assessment Weekly Progress Assessment 2/28/2018   Primary Mode of Expression Verbal Verbal   Score 7 7   Comments         SOCIAL INTERACTION/ PRAGMATICS Initial Assessment Weekly Progress Assessment 2/28/2018   Score 7 7   Comments         PROBLEM SOLVING Initial Assessment Weekly Progress Assessment 2/28/2018   Score 5 5   Comments Solves complex problems with cues, or basic problems 90% or more of the time. Solves complex problems with cues, or basic problems 90% or more of the time. MEMORY Initial Assessment Weekly Progress Assessment 2/28/2018   Score 6 6   Comments  executes 3 steps of a 3 step request with additional time  executes 3 steps of a 3 step request with additional time      EATING Initial Assessment Weekly Progress Assessment 2/28/2018   Functional Level 5 5   Comments Supervision, cues or staff needed to open packages or cut food. . Supervision, cues or staff needed to open packages or cut food. Christiana Showers Initial Assessment Weekly Progress Assessment 2/28/2018   Functional Level 5 5   Tasks completed by patient Brushed hair, Brushed teeth, Washed face, Washed hands Brushed hair; Washed face; Washed hands   Comments s/u assist sinkside in w/c     BATHING Initial Assessment Weekly Progress Assessment 2/28/2018   Functional Level 3 5   Body parts patient bathed Abdomen, Arm, left, Arm, right, Buttocks, Chest, [place] area, Thigh, left, Thigh, right Abdomen;Arm, left;Arm, right;Buttocks; Lower leg and foot, left; Lower leg and foot, right;Nitza area; Thigh, left; Thigh, right   Comments Assist to wash bilateral feet. Steady assist while standing to wash buttocks. Steady assist for washing buttocks and periarea while standing at RW.      TUB/SHOWER TRANSFER INDEPENDENCE Initial Assessment Weekly Progress Assessment 2/28/2018   Score 4 Comments CGA and use of RW. UPPER BODY DRESSING/UNDRESSING Initial Assessment Weekly Progress Assessment 2/28/2018   Functional Level 4     Items applied/Steps completed Bra (3 steps), Pullover (4 steps)     Comments Assist to pull down bra. Not completed due to blood transfusion. LOWER BODY DRESSING/UNDRESSING Initial Assessment Weekly Progress Assessment 2/28/2018   Functional Level 4 1   Items applied/Steps completed Elastic waist pants (3 steps), Sock, left (1 step), Sock, right (1 step) Elastic waist pants (3 steps); Underpants (3 steps)   Comments CGA for standing to pull clothing up. Total assist due to pt fatigue. TOILETING Initial Assessment Weekly Progress Assessment 2/28/2018   Functional Level 3 2   Comments Assist with pulling clothing up. Pt able to pull clothing down and complete hygiene. Pt was able to complete toileting hygiene after BM. TOILET TRANSFER INDEPENDENCE Initial Assessment Weekly Progress Assessment 2/28/2018   Transfer score 4 4   Comments CGA and use of grab bars and RW. CGA     Plan of Care: Please see Care Plan for updated LTGs. Family Training:  to be completed closer to discharge date. Summary of Progress: Pt is making slow progress but more alert and oriented today. Patient tolerated session well, but shortness of breath, edema, functional mobility, standing balance, overall strength, and activity tolerance are still below baseline and requires skilled facilitation to successfully and safely complete ADL's and transfers. Summary of Session:   S: \"I had a wonderful evening last night, my grandchildren came to visit. \" Agreeable to therapy. Focus of session was on morning ADL routine and progress assessment. Patient was able to SPT from edge of bed to w/c with min assist. Pt completed supine to sitting edge of bed with moderate assist.   Pain not indicated during this session. Pt with SpO2 level at 99% on 1.5L of O2.       Patient ended session in bed with call remote and phone within reach.      Amirah Naylor, OT Student

## 2018-02-28 NOTE — PROGRESS NOTES
[facility] CARDIOLOGY PROGRESS NOTE           2/28/2018   Admit Date: 2/13/2018      Subjective:   Dyspnea stable. Edema improved. BP stable. Cr has improved with reduced lasix and stopping zaroxolyn. NYHA class II    ROS:  Cardiovascular:  As noted above    Objective:      Vitals:    02/28/18 0912 02/28/18 0940 02/28/18 1052 02/28/18 1150   BP: 105/68 105/69 109/73 108/71   Pulse: 85 83 85 84   Resp: 20 20 20 20   Temp: 97.8 °F (36.6 °C) 97.9 °F (36.6 °C) 97.9 °F (36.6 °C) 97.9 °F (36.6 °C)   SpO2: 99% 100% 97% 98%   Weight:           Physical Exam:  General-No Acute Distress  Neck- supple, no JVD  CV- regular rate and rhythm Grade II/VI TERRI  Lung- clear bilaterally  Abd- soft, nontender, nondistended  Ext- 1+ edema bilaterally. Skin- warm and dry      Data Review:   Recent Labs      02/28/18   0555  02/27/18   0858  02/27/18   0600  02/26/18   0545   NA  147*   --   148*  148*   K  3.5   --   3.5  3.5   MG   --    --    --   2.3   BUN  68*   --   62*  57*   CREA  1.14*   --   1.21*  1.59*   GLU  102*   --   109*  98   WBC  4.1*   --    --    --    HGB  7.5*  7.7*   --   8.2*   HCT  25.9*  26.4*   --   27.9*   PLT  65*   --    --    --       No results found for: [employer], [facility]    Assessment/Plan:     Active Problems:    Chronic atrial fibrillation ([address] (10/17/2011)    On Eliquis - with ongoing anemia - suspect slow blood loss as etiology. Stop Eliquis for now and use 81mg ASA      S/P TAVR (transcatheter aortic valve replacement) (1/30/2018)    Stable. Start ASA 81mg       Chronic diastolic heart failure ([address] (2/7/2018)    Stable on PO lasix       Respiratory failure ([address] (2/13/2018)    Stable on nasal cannula. Anemia:  Transfuse 2 units PRBC. Stop Eliquis and start ASA 81mg.                   Marti Adame MD  2/28/2018

## 2018-02-28 NOTE — PROGRESS NOTES
PT WEEKLY PROGRESS NOTE   Time In: 1300   Time Out: 1345    Subjective: Pt. Mumbling, difficult to understand this visit. Agreeable to try to get up in recliner. Objective: Other (comment) (Fall precautions)    Outcome Measures: Vital Signs: /74  Pulse 84  Temp 97.9 °F (36.6 °C)  Resp 20  Wt 78.5 kg (173 lb)  SpO2 96%  BMI 33.79 kg/m2    Pain level: denies pain    Patient education: Pt. Educated on benefits of OOB to chair this PM.    Interdisciplinary Communication: cleared pt. For OOB w/ RN prior to visit       AROM: [facility]    FIM SCORES Initial Assessment Weekly Progress Assessment 2/28/2018   Bed/Chair/Wheelchair Transfers 4 4   Wheelchair Mobility  (NT secondary to fatigue after functional assessment) NT   Walking [place] 1 1   Steps/Stairs  (NT) NT   Please see [facility] Interdisciplinary Eval: Coordination/Balance Section for details regarding FIM score description.     BED/CHAIR/WHEELCHAIR TRANSFERS Initial Assessment Weekly Progress Assessment 2/28/2018   Rolling Right 4 (Minimal assistance) NT   Rolling Left 4 (Minimal assistance) NT   Supine to Sit 4 (Minimal assistance) 4 (Minimal assistance) (HOB elevated, using rails)   Sit to Stand Contact guard assistance Minimal assistance   Sit to Supine 4 (Minimal assistance) NT   Transfer Type SPT without device SPT with walker   Comments Increased time and effort to complete bed mobility and transfers Slow movement patterns w/ slowed processing & increased effort required   Car Transfer Not tested NT   Car Type rehab car NT     GROSS ASSESSMENT Weekly Progress Assessment 2/28/2018   AROM Generally decreased, functional   Strength Generally decreased, functional   Coordination Generally decreased, functional   Tone Normal   Sensation     PROM Generally decreased, functional     POSTURE Weekly Progress Assessment 2/28/2018   Posture (WDL) Exceptions to WDL   Posture Assessment Trunk flexion;Rounded shoulders     WHEELCHAIR MOBILITY/MANAGEMENT Initial Assessment Weekly Progress Assessment 2/28/2018   Able to Propel 0 feet NT   Curbs/ramps assistance required 0 (Not tested) NT   Wheelchair set up assistance required 3 (Moderate assistance) NT   Wheelchair management Manages left brake, Manages right brake NT     [facility] Initial Assessment Weekly Progress Assessment 2/28/2018   Assistive device Walker, rollator, Gait belt Walker, rolling   Ambulation assistance - level surface 4 (Contact guard assistance) 4 (minimal assist)   Distance 40 Feet (ft) (40ft x 1  30ft x 1) 5 Feet (ft)   Comments slow cont partial step through gait pattern with decreased step length and step clearance, foot flat initial contact with shuffling type steps Flexed posture, w/ therapist managing RW & providing both manual & verbal cues for stepping sequencing       STEPS/STAIRS Initial Assessment Weekly Progress Assessment 2/28/2018   Steps/Stairs ambulated 0 NT   Rail Use  (RW as simulated hand rail, ) NT   Comments Unable to ambulate up/down steps at thsi time secondary to LE weakness and decreased endurance NT   Curbs/Ramps 0 (Not tested) NT     Therapeutic Exercise:  Pt. Performed supine B LE exercises x10 as follows:  SUPINE EXERCISES Sets Reps Comments   Ankle Pumps 1 10    hooklying hip adduction 1 10 Ball in between knees   Glut Sets 1 10    Heel Slides 1 10    Hip Abduction 1 10    Short Arc Quad 1 10    Straight Leg Raise 1 10 AAROM     Pt. Left up in recliner in NAD, call bell in reach. Assessment: Pt. W/ decline in functional status secondary to multiple medical complications, in the process for getting blood this visit. Pt. Now requires min A for all mobility & requires increased time & effort for all mobility tasks. Pt. Has not met goals secondary to above, goals modified as appropriate. Pt. Cont. To function below her baseline & benefits from cont. PT services to address. Plan of Care: Please see Care Plan for updated LTGs.     Family Training:  To be scheduled    Claudio Kilpatrick, PT  2/28/2018

## 2018-02-28 NOTE — PROGRESS NOTES
Problem: Falls - Risk of  Goal: *Absence of Falls  Document Gregorio Fall Risk and appropriate interventions in the flowsheet.    Outcome: Progressing Towards Goal  Fall Risk Interventions:  Mobility Interventions: Utilize walker, cane, or other assitive device    Mentation Interventions: Bed/chair exit alarm, Door open when patient unattended    Medication Interventions: Bed/chair exit alarm, Patient to call before getting OOB    Elimination Interventions: Bed/chair exit alarm, Call light in reach    History of Falls Interventions: Bed/chair exit alarm, Consult care management for discharge planning, Door open when patient unattended

## 2018-02-28 NOTE — PROGRESS NOTES
Patient not seen on this date for their 2/28/18 session secondary to being given blood. Will see patient for her next scheduled session. This student went back to her room to assist her in lunch.      Wing Francis, RT Student

## 2018-02-28 NOTE — PROGRESS NOTES
SFD PROGRESS NOTE    Cale Segovia  Admit Date: 2/13/2018  Admit Diagnosis: DEBILITY;CHF (congestive heart failure) ([facility] [address] Respiratory f*  Chief Complaint : Gait dysfunction secondary to below. Admit Diagnosis: Pleural effusion [J90]; Pleural effusion [J90]  Acute respiratory failure (HCC) (2/7/2018)  Pleural effusion (1/23/2018)/ S/P bilateral thoracentesis  Hypothyroidism   Chronic atrial fibrillation   HTN   Pulmonary hypertension   S/P TAVR(1/30/2018)  Leukocytosis (2/7/2018) on Abx   Acute on chronic diastolic heart failure   weakness  Pain  DVT risk  Acute blood loss anemia/ GI bleed? Acute Rehab Dx:  Generalized weakness. Debility    deconditioning   Mobility and ambulation deficits  Self Care/ADL deficits    Subjective     Patient seen and examined. Vss. Afebrile. Awake in bed. No new complaints. Denies chest pain, SOB, cough. Edema appears slightly improved. Patient to receive 2 units of prbc transfusion today. hgb stable, low at 7.5, possibly cause of recent fatigue. Ordered last night, not yet carried out. Will likely interfere with therapy.      Objective:     Current Facility-Administered Medications   Medication Dose Route Frequency    0.9% sodium chloride infusion 250 mL  250 mL IntraVENous PRN    furosemide (LASIX) tablet 80 mg  80 mg Oral DAILY    potassium chloride (K-DUR, KLOR-CON) SR tablet 40 mEq  40 mEq Oral DAILY    0.9% sodium chloride infusion 250 mL  250 mL IntraVENous PRN    magnesium oxide (MAG-OX) tablet 400 mg  400 mg Oral DAILY    apixaban (ELIQUIS) tablet 2.5 mg  2.5 mg Oral Q12H    diazePAM (VALIUM) tablet 2.5 mg  2.5 mg Oral QHS    central line flush (saline) syringe 10 mL  10 mL InterCATHeter Q8H    acetaminophen (TYLENOL) tablet 650 mg  650 mg Oral Q4H PRN    alcohol 62% (NOZIN) nasal  1 Ampule  1 Ampule Topical Q12H    allopurinol (ZYLOPRIM) tablet 100 mg  100 mg Oral BID    hydrocortisone (ANUSOL-HC) 2.5 % rectal cream   PeriANAL TID PRN    levothyroxine (SYNTHROID) tablet 88 mcg  88 mcg Oral ACB    metoprolol succinate (TOPROL-XL) XL tablet 25 mg  25 mg Oral DAILY    pantoprazole (PROTONIX) tablet 40 mg  40 mg Oral ACB    polyethylene glycol (MIRALAX) packet 17 g  17 g Oral DAILY    pravastatin (PRAVACHOL) tablet 40 mg  40 mg Oral DAILY    rOPINIRole (REQUIP) tablet 2 mg  2 mg Oral BID    sertraline (ZOLOFT) tablet 100 mg  100 mg Oral DAILY    spironolactone (ALDACTONE) tablet 25 mg  25 mg Oral DAILY    levalbuterol (XOPENEX) nebulizer soln 0.63 mg/3 mL  0.63 mg Nebulization Q6H PRN     Facility-Administered Medications Ordered in Other Encounters   Medication Dose Route Frequency    0.9% sodium chloride infusion 250 mL  250 mL IntraVENous PRN     Review of Systems: + weakness. Denies chest pain, shortness of breath, cough, headache, visual problems, abdominal pain, dysurea, calf pain. Pertinent positives are as noted in the medical records and unremarkable otherwise. Visit Vitals    /68 (BP 1 Location: Right arm, BP Patient Position: At rest)    Pulse 87    Temp 97.8 °F (36.6 °C)    Resp 20    Wt 173 lb (78.5 kg)    SpO2 98%    BMI 33.79 kg/m2        Physical Exam:   General: Alert and age appropriately oriented.  Appears comfortable on 2L O2 NC. No acute cardio respiratory distress. HEENT: Normocephalic,no scleral icterus  Oral mucosa moist without cyanosis, No bruit, +JVD. Lungs: + bibasilar crackles. No wheezing. Respiration even and unlabored   Heart: RRR,  II/VI TERRI  No clicks, rub or gallops   Abdomen: Soft, non-tender, nondistended. Bowel sounds present. Genitourinary: defered   Neuromuscular:      PERRL, EOMI  Follows simple commands consistently. Able to identify, recall repeat. Mood appropriate. Insight, judgement intact.    LUE     Shoulder abduction   5-/5              Elbow flexion:   5-/5               Wrist extension:  5- /5              Finger flexion;  5- /5  RUE    Shoulder abduction: 5- /5                Elbow flexion:  5- /5                         Wrist extension: 5- /5                        Finger flexion:  5- /5  LLE     Hip flexion:  3+ /5              Knee extension:  4 /5                         Ankle dorsiflexion:  5 /5                        Ankle plantarflexion:   5/5                                                                  RLE     Hip flexion:   3+/5                        Knee extension:  4 /5                         Ankle dorsiflexion:  5 /5                        Ankle plantarflexion:  5 /5  Sensory - intact grossly   No cerebellar signs. Plantars - down going  No atrophy, no fasciculations, no tremors. Skin/extremity: No rashes, no erythema. Calf non tender BLE. 2+ BLE edema. + chronic arthritic joint changes. Mild BUE edema.                                                                                  Functional Assessment:  Gross Assessment  AROM:  (LE decreased secondary to weakness and edema) (02/21/18 1300)  PROM:  (Decreased secondary to LE edema ) (02/21/18 1300)  Strength:  (bilateral hip -3/5 to +2/5,knee 4/5, ankle 4/5) (02/21/18 1300)  Coordination:  (Impaired) (02/21/18 1300)  Tone: Normal (02/21/18 1300)  Sensation: Intact (02/21/18 1300)       Balance  Sitting - Static: Good (unsupported) (02/26/18 1600)  Sitting - Dynamic: Fair (occasional) (02/26/18 1600)  Standing - Static: Fair (in parallel bars) (02/26/18 1600)  Standing - Dynamic : Impaired (02/26/18 1600)           Toileting  Cues: Physical assistance for pants down;Physical assistance for pants up;Verbal cues provided (02/27/18 0830)  Adaptive Equipment: Jonathan Yeung (02/27/18 0830)         [facility] Fall Risk Assessment:  [facility] Fall Risk  Mobility: Ambulates or transfers with assist devices or assistance/unsteady gait (02/27/18 2114)  Mobility Interventions: Utilize walker, cane, or other assitive device (02/27/18 2114)  Mentation: Periodic confusion (02/27/18 2114)  Mentation Interventions: Bed/chair exit alarm; Door open when patient unattended (02/27/18 2114)  Medication: Patient receiving anticonvulsants, sedatives(tranquilizers), psychotropics or hypnotics, hypoglycemics, narcotics, sleep aids, antihypertensives, laxatives, or diuretics (02/27/18 2114)  Medication Interventions: Bed/chair exit alarm; Patient to call before getting OOB (02/27/18 2114)  Elimination: Needs assistance with toileting (02/27/18 2114)  Elimination Interventions: Bed/chair exit alarm;Call light in reach (02/27/18 2114)  Prior Fall History: No (02/27/18 2114)  History of Falls Interventions: Bed/chair exit alarm; Consult care management for discharge planning;Door open when patient unattended (02/27/18 1323)  Total Score: 4 (02/27/18 2114)  Standard Fall Precautions: Yes (02/27/18 1117)  High Fall Risk: Yes (02/27/18 2114)     Speech Assessment:         Ambulation:  Gait  Distance (ft): 40 Feet (ft) (02/27/18 1620)  Assistive Device: Walker, rolling (02/27/18 1620)  Rail Use: Both (02/22/18 1600)     Labs/Studies:  Recent Results (from the past 72 hour(s))   METABOLIC PANEL, BASIC    Collection Time: 02/25/18 12:05 PM   Result Value Ref Range    Sodium 145 136 - 145 mmol/L    Potassium 3.3 (L) 3.5 - 5.1 mmol/L    Chloride 93 (L) 98 - 107 mmol/L    CO2 >45 (HH) 21 - 32 mmol/L    Anion gap Cannot be calculated 7 - 16 mmol/L    Glucose 160 (H) 65 - 100 mg/dL    BUN 51 (H) 8 - 23 MG/DL    Creatinine 1.26 (H) 0.6 - 1.0 MG/DL    GFR est AA 53 (L) >60 ml/min/1.73m2    GFR est non-AA 44 (L) >60 ml/min/1.73m2    Calcium 8.8 8.3 - 10.4 MG/DL   HGB & HCT    Collection Time: 02/26/18  5:45 AM   Result Value Ref Range    HGB 8.2 (L) 11.7 - 15.4 g/dL    HCT 27.9 (L) 35.8 - 27.5 %   METABOLIC PANEL, BASIC    Collection Time: 02/26/18  5:45 AM   Result Value Ref Range    Sodium 148 (H) 136 - 145 mmol/L    Potassium 3.5 3.5 - 5.1 mmol/L    Chloride 97 (L) 98 - 107 mmol/L    CO2 >45 (HH) 21 - 32 mmol/L    Anion gap Cannot be calculated 7 - 16 mmol/L Glucose 98 65 - 100 mg/dL    BUN 57 (H) 8 - 23 MG/DL    Creatinine 1.59 (H) 0.6 - 1.0 MG/DL    GFR est AA 41 (L) >60 ml/min/1.73m2    GFR est non-AA 34 (L) >60 ml/min/1.73m2    Calcium 9.0 8.3 - 10.4 MG/DL   PREALBUMIN    Collection Time: 02/26/18  5:45 AM   Result Value Ref Range    Prealbumin 21.0 18.0 - 35.7 MG/DL   MAGNESIUM    Collection Time: 02/26/18  5:45 AM   Result Value Ref Range    Magnesium 2.3 1.8 - 2.4 mg/dL   CULTURE, URINE    Collection Time: 02/26/18 10:00 AM   Result Value Ref Range    Special Requests: NO SPECIAL REQUESTS      Culture result: <10,000  COLONIES/mL  NORMAL SKIN CARLOS ISOLATED       URINALYSIS W/ RFLX MICROSCOPIC    Collection Time: 02/26/18  1:23 PM   Result Value Ref Range    Color YELLOW      Appearance CLEAR      Specific gravity 1.010 1.001 - 1.023      pH (UA) 5.5 5.0 - 9.0      Protein NEGATIVE  NEG mg/dL    Glucose NEGATIVE  mg/dL    Ketone NEGATIVE  NEG mg/dL    Bilirubin NEGATIVE  NEG      Blood NEGATIVE  NEG      Urobilinogen 0.2 0.2 - 1.0 EU/dL    Nitrites NEGATIVE  NEG      Leukocyte Esterase NEGATIVE  NEG     OCCULT BLOOD, STOOL    Collection Time: 02/27/18  4:00 AM   Result Value Ref Range    Occult blood, stool POSITIVE (A) NEG     METABOLIC PANEL, BASIC    Collection Time: 02/27/18  6:00 AM   Result Value Ref Range    Sodium 148 (H) 136 - 145 mmol/L    Potassium 3.5 3.5 - 5.1 mmol/L    Chloride 94 (L) 98 - 107 mmol/L    CO2 >45 (HH) 21 - 32 mmol/L    Anion gap Cannot be calculated 7 - 16 mmol/L    Glucose 109 (H) 65 - 100 mg/dL    BUN 62 (H) 8 - 23 MG/DL    Creatinine 1.21 (H) 0.6 - 1.0 MG/DL    GFR est AA 56 (L) >60 ml/min/1.73m2    GFR est non-AA 46 (L) >60 ml/min/1.73m2    Calcium 8.9 8.3 - 10.4 MG/DL   HGB & HCT    Collection Time: 02/27/18  8:58 AM   Result Value Ref Range    HGB 7.7 (L) 11.7 - 15.4 g/dL    HCT 26.4 (L) 35.8 - 46.3 %   TYPE + CROSSMATCH    Collection Time: 02/27/18  8:26 PM   Result Value Ref Range    Crossmatch Expiration 03/02/2018 ABO/Rh(D) A POSITIVE     Antibody screen NEG     Unit number [id number]     Blood component type [facility]     Unit division 00     Status of unit ALLOCATED     Crossmatch result Compatible     Unit number [id number]     Blood component type [facility]     Unit division 00     Status of unit ALLOCATED     Crossmatch result Compatible    METABOLIC PANEL, BASIC    Collection Time: 02/28/18  5:55 AM   Result Value Ref Range    Sodium 147 (H) 136 - 145 mmol/L    Potassium 3.5 3.5 - 5.1 mmol/L    Chloride 95 (L) 98 - 107 mmol/L    CO2 >45 (HH) 21 - 32 mmol/L    Anion gap Cannot be calculated 7 - 16 mmol/L    Glucose 102 (H) 65 - 100 mg/dL    BUN 68 (H) 8 - 23 MG/DL    Creatinine 1.14 (H) 0.6 - 1.0 MG/DL    GFR est AA 60 (L) >60 ml/min/1.73m2    GFR est non-AA 49 (L) >60 ml/min/1.73m2    Calcium 8.8 8.3 - 10.4 MG/DL   CBC WITH AUTOMATED DIFF    Collection Time: 02/28/18  5:55 AM   Result Value Ref Range    WBC 4.1 (L) 4.3 - 11.1 K/uL    RBC 2.61 (L) 4.05 - 5.25 M/uL    HGB 7.5 (L) 11.7 - 15.4 g/dL    HCT 25.9 (L) 35.8 - 46.3 %    MCV 99.2 (H) 79.6 - 97.8 FL    MCH 28.7 26.1 - 32.9 PG    MCHC 29.0 (L) 31.4 - 35.0 g/dL    RDW 18.3 (H) 11.9 - 14.6 %    PLATELET 65 (L) 010 - 450 K/uL    MPV 12.0 10.8 - 14.1 FL    DF AUTOMATED      NEUTROPHILS 75 43 - 78 %    LYMPHOCYTES 16 13 - 44 %    MONOCYTES 5 4.0 - 12.0 %    EOSINOPHILS 3 0.5 - 7.8 %    BASOPHILS 1 0.0 - 2.0 %    IMMATURE GRANULOCYTES 0 0.0 - 5.0 %    ABS. NEUTROPHILS 3.1 1.7 - 8.2 K/UL    ABS. LYMPHOCYTES 0.7 0.5 - 4.6 K/UL    ABS. MONOCYTES 0.2 0.1 - 1.3 K/UL    ABS. EOSINOPHILS 0.1 0.0 - 0.8 K/UL    ABS. BASOPHILS 0.0 0.0 - 0.2 K/UL    ABS. IMM.  GRANS. 0.0 0.0 - 0.5 K/UL       Assessment:     Problem List as of 2/28/2018  Date Reviewed: 2/10/2018          Codes Class Noted - Resolved    Respiratory failure ([facility] ICD-10-CM: J96.90  ICD-9-CM: 518.81  2/13/2018 - Present        Acute on chronic respiratory failure ([facility] ICD-10-CM: J96.20  ICD-9-CM: 518.84  2/7/2018 - Present        Leukocytosis ICD-10-CM: D72.829  ICD-9-CM: 288.60  2/7/2018 - Present        Chronic diastolic heart failure (HCC) ICD-10-CM: I50.32  ICD-9-CM: 428.32  2/7/2018 - Present        S/P TAVR (transcatheter aortic valve replacement) ICD-10-CM: Z95.2  ICD-9-CM: V43.3  1/30/2018 - Present        Aortic stenosis (Chronic) ICD-10-CM: I35.0  ICD-9-CM: 424.1  1/29/2018 - Present        Peripheral arterial disease (HCC) (Chronic) ICD-10-CM: I73.9  ICD-9-CM: 443.9  1/23/2018 - Present        Pleural effusion ICD-10-CM: J90  ICD-9-CM: 511.9  1/23/2018 - Present    Overview Addendum 2/10/2018 11:36 AM by Michel Horn NP     Right thoracentesis 1/25/18 - 1200 mls removed  Left thoracentesis 1/25/2018 - 900 mls removed  Bilateral thoracentesis 2/9/18 - L 550 ml (air aspirated during and at the end of procedure) / R 1000 ml and R creamy pink              Chronic respiratory failure with hypoxia (HCC) (Chronic) ICD-10-CM: J96.11  ICD-9-CM: 518.83, 799.02  1/23/2018 - Present    Overview Signed 1/23/2018 11:17 AM by Courtney Strong NP     Wears 3 to 4 liters at home             Hypoxia ICD-10-CM: R09.02  ICD-9-CM: 799.02  1/23/2018 - Present        Stenosis of prosthetic aortic valve (Chronic) ICD-10-CM: I35.0  ICD-9-CM: 396.0  1/19/2018 - Present    Overview Signed 1/19/2018  3:18 PM by Jaclyn Thomason MD     AVR (10/5/13):  21 mm Pericardial valve.                Tricuspid valve insufficiency (Chronic) ICD-10-CM: I07.1  ICD-9-CM: 397.0  1/15/2018 - Present        Systolic CHF, chronic (HCC) (Chronic) ICD-10-CM: I50.22  ICD-9-CM: 428.22, 428.0  1/15/2018 - Present        S/P mitral valve repair (Chronic) ICD-10-CM: X57.789  ICD-9-CM: V45.89  12/4/2017 - Present        H/O atrioventricular rubin ablation (Chronic) ICD-10-CM: G39.451  ICD-9-CM: V15.1  3/22/2017 - Present        Pulmonary hypertension (Chronic) ICD-10-CM: I27.20  ICD-9-CM: 416.8  2/12/2017 - Present        Aortic valve replaced (Chronic) ICD-10-CM: Z95.2  ICD-9-CM: V43.3  1/9/2017 - Present        Chronic diastolic congestive heart failure (HCC) (Chronic) ICD-10-CM: I50.32  ICD-9-CM: 428.32, 428.0  9/9/2016 - Present        Chronic depression (Chronic) ICD-10-CM: F32.9  ICD-9-CM: 278  8/5/2016 - Present        Osteopenia (Chronic) ICD-10-CM: M85.80  ICD-9-CM: 733.90  8/5/2016 - Present        RLS (restless legs syndrome) (Chronic) ICD-10-CM: G25.81  ICD-9-CM: 333.94  8/5/2016 - Present        Hyperlipidemia (Chronic) ICD-10-CM: E78.5  ICD-9-CM: 272.4  8/5/2016 - Present        Gout (Chronic) ICD-10-CM: M10.9  ICD-9-CM: 274.9  8/5/2016 - Present        Anxiety (Chronic) ICD-10-CM: F41.9  ICD-9-CM: 300.00  8/5/2016 - Present        CAD (coronary artery disease) (Chronic) ICD-10-CM: I25.10  ICD-9-CM: 414.00  8/5/2016 - Present        HTN (hypertension) (Chronic) ICD-10-CM: I10  ICD-9-CM: 401.9  8/5/2016 - Present        GERD (gastroesophageal reflux disease) (Chronic) ICD-10-CM: K21.9  ICD-9-CM: 530.81  8/5/2016 - Present        H/O mitral valve repair, 2003 (Chronic) ICD-10-CM: F21.192  ICD-9-CM: V15.1  6/27/2016 - Present        Sick sinus syndrome (HCC) (Chronic) ICD-10-CM: I49.5  ICD-9-CM: 427.81  2/13/2016 - Present        Obesity (Chronic) ICD-10-CM: E66.9  ICD-9-CM: 278.00  11/2/2015 - Present        Mitral stenosis with insufficiency (Chronic) ICD-10-CM: X28.2  ICD-9-CM: 394.2  11/2/2015 - Present    Overview Signed 11/2/2015 11:02 AM by Evan Roche MV repair 2003.               Rheumatic aortic stenosis (Chronic) ICD-10-CM: I06.0  ICD-9-CM: 395.0  11/2/2015 - Present        Cardiac pacemaker (Chronic) ICD-10-CM: Z95.0  ICD-9-CM: V45.01  11/2/2015 - Present        Thrombocytopenia (HCC) (Chronic) ICD-10-CM: D69.6  ICD-9-CM: 287.5  8/22/2012 - Present        Anemia (Chronic) ICD-10-CM: D64.9  ICD-9-CM: 285.9  10/27/2011 - Present    Overview Signed 10/27/2011  8:25 AM by Mike Saucedo on chronic               Chronic atrial fibrillation (HCC) (Chronic) ICD-10-CM: I48.2  ICD-9-CM: 427.31  10/17/2011 - Present        Hypothyroidism (Chronic) ICD-10-CM: E03.9  ICD-9-CM: 244.9  12/4/2009 - Present        BOBBY (obstructive sleep apnea) (Chronic) ICD-10-CM: G47.33  ICD-9-CM: 327.23  12/4/2009 - Present        Chronic obstructive pulmonary disease (HCC) (Chronic) ICD-10-CM: J44.9  ICD-9-CM: 496  3/8/2009 - Present        Aortic Valve Bioprosthesis Present (Chronic) ICD-10-CM: Z95.2  ICD-9-CM: V43.3  3/7/2009 - Present        Degenerative arthritis of left knee (Chronic) ICD-10-CM: M17.12  ICD-9-CM: 715.96  2/27/2009 - Present        RESOLVED: CHF (congestive heart failure) ([facility] ICD-10-CM: I50.9  ICD-9-CM: 428.0  2/13/2018 - 2/14/2018        RESOLVED: Acute on chronic systolic congestive heart failure ([facility] [address] [facility] ICD-10-CM: I50.23  ICD-9-CM: 428.23, 428.0  2/1/2018 - 2/7/2018        RESOLVED: Acute pulmonary edema ([facility] ICD-10-CM: J81.0  ICD-9-CM: 518.4  1/25/2018 - 2/7/2018        RESOLVED: Pacemaker ICD-10-CM: Z95.0  ICD-9-CM: V45.01  12/4/2017 - 1/23/2018        RESOLVED: History of gout ICD-10-CM: Z87.39  ICD-9-CM: V12.29  1/31/2017 - 1/23/2018        RESOLVED: Warfarin anticoagulation (Chronic) ICD-10-CM: Z79.01  ICD-9-CM: V58.61  1/9/2017 - 1/23/2018        RESOLVED: Non morbid obesity due to excess calories ICD-10-CM: E66.09  ICD-9-CM: 278.00  11/14/2016 - 1/23/2018        RESOLVED: Hypoxemia ICD-10-CM: R09.02  ICD-9-CM: 799.02  11/14/2016 - 1/23/2018        RESOLVED: Localized edema ICD-10-CM: R60.0  ICD-9-CM: 782.3  9/9/2016 - 1/23/2018        RESOLVED: Iron deficiency anemia ICD-10-CM: D50.9  ICD-9-CM: 280.9  9/9/2016 - 1/23/2018        RESOLVED: CRI (chronic renal insufficiency) ICD-10-CM: N18.9  ICD-9-CM: 585.9  8/5/2016 - 1/23/2018        RESOLVED: Dyspnea ICD-10-CM: R06.00  ICD-9-CM: 786.09  8/5/2016 - 1/23/2018        RESOLVED: Right hip pain ICD-10-CM: M25.551  ICD-9-CM: 719.45  8/5/2016 - 1/23/2018        RESOLVED: Edema ICD-10-CM: R60.9  ICD-9-CM: 782.3  8/5/2016 - 1/23/2018        RESOLVED: Chest pain ICD-10-CM: R07.9  ICD-9-CM: 786.50  8/5/2016 - 1/23/2018        RESOLVED: Other long term (current) drug therapy ICD-10-CM: K76.534  ICD-9-CM: V58.69  8/5/2016 - 1/23/2018        RESOLVED: Acute encephalopathy ICD-10-CM: G93.40  ICD-9-CM: 348.30  7/8/2016 - 1/23/2018        RESOLVED: Tachycardia ICD-10-CM: R00.0  ICD-9-CM: 785.0  6/27/2016 - 1/23/2018        RESOLVED: Palpitations ICD-10-CM: R00.2  ICD-9-CM: 785.1  11/2/2015 - 1/23/2018        RESOLVED: Respiratory insufficiency ICD-10-CM: R06.89  ICD-9-CM: 786.09  11/2/2015 - 1/23/2018        RESOLVED: Bradycardia (Symptomatic) ICD-10-CM: R00.1  ICD-9-CM: 427.89  11/7/2011 - 1/23/2018        RESOLVED: Rectal bleeding ICD-10-CM: K62.5  ICD-9-CM: 569.3  10/27/2011 - 1/23/2018        RESOLVED: AV block ICD-10-CM: I44.30  ICD-9-CM: 426.10  10/17/2011 - 1/23/2018        RESOLVED: Cardiogenic shock ([facility] ICD-10-CM: R57.0  ICD-9-CM: 785.51  10/17/2011 - 1/23/2018        RESOLVED: Hyperkalemia ICD-10-CM: E87.5  ICD-9-CM: 276.7  10/17/2011 - 1/23/2018        RESOLVED: Nausea and vomiting ICD-10-CM: R11.2  ICD-9-CM: 787.01  2/24/2010 - 1/23/2018        RESOLVED: Epigastric abdominal pain ICD-10-CM: R10.13  ICD-9-CM: 789.06  2/24/2010 - 1/23/2018        RESOLVED: Digoxin toxicity ICD-10-CM: T46.0X1A  ICD-9-CM: 972.1, E942.1  2/24/2010 - 1/23/2018        RESOLVED: ARF (acute renal failure) (HCC) (Chronic) ICD-10-CM: N17.9  ICD-9-CM: 584.9  2/24/2010 - 1/23/2018        RESOLVED: Hypokalemia ICD-10-CM: E87.6  ICD-9-CM: 276.8  2/24/2010 - 1/23/2018        RESOLVED: CKD (chronic kidney disease) stage 3, GFR 30-59 ml/min (Chronic) ICD-10-CM: N18.3  ICD-9-CM: 585.3  12/4/2009 - 1/23/2018        RESOLVED: Cough ICD-10-CM: R05  ICD-9-CM: 786.2  3/9/2009 - 3/12/2009        RESOLVED: Bronchitis ICD-10-CM: J40  ICD-9-CM: 896  3/9/2009 - 3/12/2009        RESOLVED: Atrial fibrillation (HCC) (Chronic) ICD-10-CM: I48.91  ICD-9-CM: 427.31  3/7/2009 - 6/27/2016        RESOLVED: Hypotension (Chronic) ICD-10-CM: I95.9  ICD-9-CM: 458.9  3/1/2009 - 1/23/2018              Plan:     The Post Assessment Physician Evaluation (RAMBO) found the current functional status to be comparable with the Pre-admission Screening. The Patient is a good candidate for acute inpatient rehabilitation. Nothing since the Pre-admission screen has changed that determination.      Rehabilitation Plan  The patient has shown the ability to tolerate and benefit from 3 hours of therapy daily and is being admitted to a comprehensive acute inpatient rehabilitation program consisting of at least 3 hours of combined physical and occupational therapies. Resume intensive Physical Therapy for a minimum of 1.5 hours a day, at least 5 out of 7 days per week to address bed mobility, transfers, ambulation, strengthening, balance, and endurance. - pt  was ambulating independently with a rollator inside her home. reports using rollator or a wheelchair for community mobility. Will continue to focus on endurance, strengthening BLe.      Resume  intensive Occupational Therapy for a minimum of 1.5 hours a day, at least 5 out of 7 days per week to address ADL ( bathing, LE dressing, toileting) and adaptive equipment as needed.       Continue 24-hour skilled rehabilitation nursing for bowel and bladder management, skin care for decubitus ulcer prevention , pain management and ongoing medication administration      The patient may benefit from a psychology consult for depression, anxiety and adjustment disorder.     Continue daily physician medical management:    Acute respiratory failure (HCC) (2/7/2018)/ Pleural effusion (1/23/2018)/ S/P bilateral thoracentesis  S/p - Thoracenteses on 2/9 - 550 mls removed from the left and 1 liter removed from the right.   - Continue bronchodilators prn- has home nebulizer.  Resume as prn;xopenex  - Patient will be on 2L O2 at therapy and at rest. May be able to wean as tolerated. Will monitor saO2. 2/15 - sao2 at 100% on 2L/ min. Feels comfortable. Continue lasix and aldactone. Cardiology gave 1x Zaroxolyn in AM prior to AM lasix. 2/19 - continue lasix 80 mg bid + aldactone. +metolazone prior to lasix each morning. Monitor. Daily weights.   2/20- cardiology recommendation appreciated. Transition to iv lasic. Will need access. Difficult due to edema. 2/21- Persistent edema. Iv lasix ordered per cardiology, however since peripheral access not yet achieved after multiple attempts due to edema. Will have IR place CVC / ij anticipating frequent blood draws and continued iv diuretic needs. Mean time will have po lasix for every iv dose missed. 2/22 - Patient edema mildly better. Mild decreased weight; 176lbs. conitune lasix 80 bid. Increase KCl 40 to bid dose. Left IJ oozing overnight following IR procedure, appears not bleeding today. hgb decreased, likely due to blood loss. 2/23 - mildly SOB, will repeat CxR to monitor bibasilar effusions. Continue iv lasix. 2/26 - SaO2 stable on O2 @2L via NC. cpntinued on diuresis over the weekend. Cr, elevated. Will reduce lasix. 2/27 - lasix reduced. Metolazone held. BUN/ Cr. = 57/1.59-> 62/1.21.   2/28 - Edema clinically appears better, still persistent. Continue to check renal function. 68/1. 14. Moderate elevation of BUN. Cr acceptable. Continued clinical evidence of fluid retention. continue to diurese with careful monitoring of renal function         S/P TAVR(1/30/2018)/ Acute on chronic diastolic heart failure Pulmonary hypertension / Chronic atrial fibrillation/ HTN   - cardiac precaution, s.p TAVR. - weights and maintain a 2 liter per day fluid restriction.   - Monitor HR/BP. conitinue Eliquis for a.fib  - continue BB-Toprol XL  2/15- edema, chest exam appears not changed. cintiuned on diuretics. 2/19- continue O2 supplements. 2/20 - HR in control.  continue O2, comfortable on same rate. Will obtain CxR. Check BNP. Monitor renal fx.   2/21 - HR in control. conitinue eliquis. BB at current dose. 2/22 - HR 80's monitor. Temporarily reduce Eliquis dose 2.5 bid due to oozing, blood loss. Anemia - hgb 7.4. Check in am. Transfuse prn.   2/23 - will continue lower dose eliquis as hgb decreased following IR procedure. , plt remain low; 70k. HR in good range 70-80s. Continue monitor. 2/26 - continued on lower dose eliquis for now. Weight 172 lbs, reduced about 7 lbs since he admission to [facility]. still persistent edema. Cr 1.59, will reduce lasix and hold metolazone. Will get cardiology input. 2/28 - continue of current dose diuretics.         Hypothyroidism - synthroid 88 mcg     Leukocytosis (2/7/2018) on Abx last dose 7/7 rocephin administered 2/13.   - finished abx. No new s/s of infection. - 2/21 no new signs of infection.      Acute blood loss anemia/ GI bleed? -monitor clinically. May have been caused by high INR. - no melena. - hgb 9.1 (2/18) , asymptomatic.   - 2/22 - Anemia due to blood loss form IR proocedure- hgb 7.4. Check in am. Transfuse prn.   2/23 - hgb 7.7. Check labs in am and Monday. 2/26 - s/p transfusion 1 unit over the weekend. Monitor. hgb 8.2   2/27 - hgb 8.2-> 7.7, mild decrease. Will ask GI to consult on management. continue Eliquis? 2/28 -hgb 7,5. Transfuse 2 units today. GI consult appreciated. Will follow rec. Continue PPI. Use the Anusol prn for hemmorhoids       Pneumonia prophylaxis- Insentive spirometer every hour while awake     DVT risk / DVT Prophylaxis- continue daily physician exam to assess for signs and symptoms as patient is at increased risk for of thromboembolism. - covered with eliquis. No s/s of DVT/ PE. Wound Care: - monitor for ulcers, skin breakdown due to edema. -left elbow  edema drainage covered. -  TEDs. Fitting better. Edema control.         Potential urinary retention - no s/s of retention.  Monitor.  - pt mostly continent. - needed to use bedpan over the wekend due to decline in mobility.      bowel program - as needed dulcolax, pericolace. + BM.     GERD/ GI prophylaxis - resume PPI. At times may need additional antacids, Maalox prn.          Time spent was 35 minutes with over 1/2 in direct patient care/examination, consultation and coordination of care.      Signed By: Dany Carter MD     February 28, 2018

## 2018-02-28 NOTE — PROGRESS NOTES
Problem: Falls - Risk of  Goal: *Absence of Falls  Document Gregorio Fall Risk and appropriate interventions in the flowsheet.    Outcome: Progressing Towards Goal  Fall Risk Interventions:  Mobility Interventions: Patient to call before getting OOB, Utilize walker, cane, or other assitive device    Mentation Interventions: Adequate sleep, hydration, pain control, Bed/chair exit alarm, Door open when patient unattended, Gait belt with transfers/ambulation, Room close to nurse's station, Toileting rounds    Medication Interventions: Bed/chair exit alarm, Patient to call before getting OOB    Elimination Interventions: Bed/chair exit alarm, Call light in reach, Elevated toilet seat, Toileting schedule/hourly rounds    History of Falls Interventions: Bed/chair exit alarm, Door open when patient unattended, Room close to nurse's station

## 2018-02-28 NOTE — PROGRESS NOTES
Attempted to see pt. For AM session. Pt. Had just started receiving blood & too fatigued currently to participate.   Will follow up in PM.

## 2018-03-01 NOTE — PROGRESS NOTES
Patient resting up in bed. More alert today. Assisted up to bedside toilet with two person assist. Lung sounds diminished in bases. 2 liters nasal cannula. S1S2, bowel sounds active. Triple lumen IJ patent. Ecchymosis to upper extremities. Following commands. No complaint of pain or discomfort. Breakfast tray set up. No other verbalized needs at present time. See doc flow sheet for further assessments.

## 2018-03-01 NOTE — PROGRESS NOTES
Problem: Falls - Risk of  Goal: *Absence of Falls  Document Gregorio Fall Risk and appropriate interventions in the flowsheet.    Outcome: Progressing Towards Goal  Fall Risk Interventions:  Mobility Interventions: Patient to call before getting OOB    Mentation Interventions: Adequate sleep, hydration, pain control    Medication Interventions: Patient to call before getting OOB    Elimination Interventions: Call light in reach    History of Falls Interventions: Bed/chair exit alarm

## 2018-03-01 NOTE — PROGRESS NOTES
End Of Shift Functional Summary, Nursing      TOILETING:  Does patient need assist with clothing management and/or pericare? Yes: Comment: max assist with everything    TOILET TRANSFER:  Pt requires maximum assistance. Pt uses walker. BLADDER:  Pt does not have a salamanca catheter that staff manages. Pt does not take medication. Pt is incontinent. of bladder and voids in brief  Pt requires staff to empty device Pt has had 0 bladder accidents during this shift requiring maximum assistance to clean up. (An accident is when the episode is not contained in a brief AND/OR the clothing/linen requires changing/cleaning up.)    BOWEL:  Pt does take medication. Pt is continent of bowel and uses bedside commode. Pt requires staff to empty device    Pt has had 0 bowel accidents during this shift requiring maximum assistance from staff to clean up. (An accident is when the episode is not contained in a brief AND/OR the clothing/linen requires changing/cleaning up.)    BED/CHAIR TRANSFER  Pt requires maximum assistance. Patient requires the assistance of 2 staff member(s). Pt uses walker         . EATING  Pt requires setup. Pt wears dentures. TUBE FEEDINGS:  Pt does not  receive nutrition through tube feedings. Patient requires minimal assistance with feedings. Documentation reviewed and plan of care discussed/reviewed with   patient, physician, therapists, oncoming nurse, patient assistant and family/spouse during the shift.

## 2018-03-01 NOTE — PROGRESS NOTES
PHYSICAL THERAPY DAILY NOTE  Time In: 8934  Time Out: 3188  Patient Seen For: PM;Balance activities;Gait training;Patient education; Therapeutic exercise;Transfer training; Other (see progress notes)    Subjective: patient reporting she fell asleep after lunch. Reports her son helped her eat her lunch. Reports she is very tired after AM therapy. Objective:Vital Signs:patient on 02 at 2 lpm, 02 sat 94 to 97% during PT treatment  Patient Vitals for the past 12 hrs:   Temp Pulse Resp BP SpO2   03/01/18 0808 97.6 °F (36.4 °C) 83 20 103/67 96 %     Pain level:No c/o pain  Pain location:NA  Pain interventions:NA    Patient education:Bed mobility training,transfer training, gait training, fall precautions, activity pacing, body mechanics,energy conservation, breathing techniques during mobility, Patient with limited understanding of patient education. Recommend follow up education.     Interdisciplinary Communication:spoke with RN regarding declining functional status    Other (comment) (Fall precautions)  GROSS ASSESSMENT Daily Assessment     NA       BED/MAT MOBILITY Daily Assessment   Increased time and effort to complete with cues for body mechanics   Rolling Right : 3 (Moderate assistance )  Rolling Left : 3 (Moderate assistance )  Supine to Sit : 0 (Not tested)  Sit to Supine : 3 (Moderate assistance)       TRANSFERS Daily Assessment   Increased time and effort to complete with cues for body mechanics   Transfer Type: SPT without device (recliner to w/c, w/c to bed)    Transfer Assistance : 4 (Minimal assistance)  Sit to Stand Assistance: Minimal assistance  Car Transfers: Not tested       GAIT Daily Assessment    Amount of Assistance: 4 (Minimal assistance)  Distance (ft): 8 Feet (ft) (8ft x 2  5 ft x 2 in parallel bars w/ 4 to 5 min rest breaks)  Assistive Device: Other (comment) (parallel bars)   Gait training facilitating upright posture with improved step clearance and inhibiting scissoring gait pattern    STEPS or STAIRS Daily Assessment    Steps/Stairs Ambulated (#): 0  Level of Assist : 0 (Not tested)       BALANCE Daily Assessment   Static standing balance activities in parallel bars with min assist facilitating upright posture x 1 min x 5 reps Sitting - Static: Good (unsupported)  Sitting - Dynamic: Fair (occasional)  Standing - Static: Fair;Constant support (in parallel bars)  Standing - Dynamic : Impaired       WHEELCHAIR MOBILITY Daily Assessment    Curbs/Ramps Assist Required (FIM Score): 0 (Not tested)  Wheelchair Setup Assist Required : 3 (Moderate assistance)  Wheelchair Management: Manages left brake;Manages right brake       LOWER EXTREMITY EXERCISES Daily Assessment    NA          Assessment: Functional status has tendency to decline in PM secondary to fatigue from AM therapies. Functional status cont to slowly decline       Patient returned to room at end of treatment. Patient supine in bed with head of bed elevated and bed rails up x 2. Needs placed in reach of patient. 02 at 2 lpm    Plan of Care: Continue with POC and progress as tolerated.      Aki Kraft, PT  3/1/2018

## 2018-03-01 NOTE — PROGRESS NOTES
PHYSICAL THERAPY DAILY NOTE  Time In: 1004  Time Out: 0600  Patient Seen For: AM;Balance activities; Patient education; Therapeutic exercise;Transfer training; Other (see progress notes)    Subjective: patient reporting she is tired from AM ADLs. Reports she did not eat much breakfast because she was not hungry. Reporting at times she feels short of breath         Objective:Vital Signs:Patient initially on 02 at 1 lpm, 02 sat 82 to 85%, Increased to 2 lpm and 02 sat 94 to 97%. Patient Vitals for the past 12 hrs:   Temp Pulse Resp BP SpO2   03/01/18 0808 97.6 °F (36.4 °C) 83 20 103/67 96 %     Pain level:No c/o pain  Pain location:NA  Pain interventions:NA    Patient education:Bed mobility training,transfer training, balance training, fall precautions, activity pacing,body mechanics, w/c parts management, energy conservation, breathing techniques during mobility, Patient verbalizing understanding and demonstrating partial understanding of patient education. Recommend follow up education. Interdisciplinary Communication:RN in to assist with hygiene after toileting, RN aware of black stools    Other (comment) (Fall precautions)  GROSS ASSESSMENT Daily Assessment     Spent 25 mins with patient in bathroom       BED/MAT MOBILITY Daily Assessment   Increased time and effort to complete with cues for body mechanics   Rolling Right : 4 (Minimal assistance)  Rolling Left : 4 (Minimal assistance)  Supine to Sit : 5 (Stand-by assistance)  Sit to Supine : 3 (Moderate assistance)       TRANSFERS Daily Assessment   Increased time and effort to complete with cues for body mechanics   Transfer Type: SPT without device (w/c<>mat and w/c to recliner)  Other: Min assist with w/c<>commode transfer using grab bar. Max assist with lower body clothing management. RN assisting with hygiene.    Transfer Assistance : 4 (Minimal assistance)  Sit to Stand Assistance: Minimal assistance  Car Transfers: Not tested       GAIT Daily Assessment Unable to tolerate gait training due to fatigue from exercises, bed mobility and transfer training and time spent in bathroom Amount of Assistance: 0 (Not tested)  Distance (ft): 0 Feet (ft)       STEPS or STAIRS Daily Assessment    Steps/Stairs Ambulated (#): 0  Level of Assist : 0 (Not tested)       BALANCE Daily Assessment   Static standing balance activities during lower body clothing management before and after toileting. CGA with RUE support on grab bar for balance, max assist with lower body clothing management Sitting - Static: Good (unsupported)  Sitting - Dynamic: Fair (occasional)  Standing - Static: Fair (with grab bar)  Standing - Dynamic : Impaired       WHEELCHAIR MOBILITY Daily Assessment    Curbs/Ramps Assist Required (FIM Score): 0 (Not tested)  Wheelchair Setup Assist Required : 3 (Moderate assistance)  Wheelchair Management: Manages left brake;Manages right brake       LOWER EXTREMITY EXERCISES Daily Assessment   Multiple and frequent rest breaks during LE exercises. Patient had to sit upright 3 times during exercises due to c/o difficulty breathing. Patient with decreased ability to participate with LE exercises Extremity: Both  Exercise Type #1: Supine lower extremity strengthening  Sets Performed: 3  Reps Performed: 10  Level of Assist: Maximum assistance     SUPINE EXERCISES Sets Reps Comments   Ankle Pumps 1 30    Heel Slides 3 10    Hip Abduction 3 10    Short Arc Quad 3 10             Assessment: progress towards goals remains slow and limited. Patient fatigues easily during functional mobility and requires significant amount of time to complete a functional task and requires rest breaks during functional tasks. 02 sat stable on 02 at 2lpm.       Patient returned to room at end of treatment and remained up in recliner with LEs elevated and with needs in reach. 02 at 2 lpm    Plan of Care: Continue with POC and progress as tolerated.      Tabby Brown, PT  3/1/2018

## 2018-03-01 NOTE — PROGRESS NOTES
Son and several other family members were visiting at shift change. Son expressed concern about:    --Patient's bruising--especially under the arms. Will follow up with Dr. Amirah Canales for assessment. --Patient's lack of appetite. Son stated that patient wasn't eating well when he wasn't around and that she seemed stronger when he came and made sure she had enough protein. Son would like to know if her protein levels are being checked. --Patient's O2 setting. He thought that some of her weakness may be due to too much C02 building up because her 02 was set at 1.5 lpm.  --Patient's blood administration. Son stated that the patient has had chronic anemia for years and that her hematologist is overseeing this. Patient was helped to the [facility] 4 X during the night and has had a wet brief each time, plus 150-200 mL of urine in the bucket. More impulsive than usual; started to get OOB on her own several times; bed alarm maintained throughout the night. Labs drawn in am.    Patient's weight is: 77.6 kg (78.5 previously). Hourly rounds were performed throughout the shift. All needs met at this time. Report given to oncoming nurse.

## 2018-03-01 NOTE — PROGRESS NOTES
Problem: Falls - Risk of  Goal: *Absence of Falls  Document Gregorio Fall Risk and appropriate interventions in the flowsheet.    Outcome: Progressing Towards Goal  Fall Risk Interventions:  Mobility Interventions: Patient to call before getting OOB, Utilize walker, cane, or other assitive device    Mentation Interventions: Adequate sleep, hydration, pain control, Door open when patient unattended, Evaluate medications/consider consulting pharmacy    Medication Interventions: Patient to call before getting OOB    Elimination Interventions: Call light in reach    History of Falls Interventions: Consult care management for discharge planning, Door open when patient unattended

## 2018-03-01 NOTE — PROGRESS NOTES
[facility] CARDIOLOGY PROGRESS NOTE    3/1/2018 7:54 AM    Admit Date: 2/13/2018    Admit Diagnosis: DEBILITY;CHF (congestive heart failure) ([facility] [address] Respiratory f*      Subjective:   Stable overnight without angina, CHF, or palpitations but still with significant edema, renal function stable. No orthopnea at present but bibasilar crackles present. Vitals stable and controlled. No other complaints overnight. Tolerating meds well. Objective:      Vitals:    02/28/18 1500 02/28/18 1558 02/28/18 1920 03/01/18 0120   BP: 115/73 118/75 120/72    Pulse: 83 85 85    Resp: 20 20 20    Temp: 97.8 °F (36.6 °C) 97.8 °F (36.6 °C) 98.1 °F (36.7 °C)    SpO2: 96% 96% 96%    Weight:    77.6 kg (171 lb)       Physical Exam:  Neck- supple, + JVD  CV- regular rate and rhythm no MRG  Lung- clear bilaterally apically, crackles bibasilar  Abd- soft, nontender, nondistended  Ext-2+ pitting LE edema  Skin- warm and dry    Data Review:   Recent Labs      03/01/18   0528  02/28/18   0555  02/27/18   0858   NA  148*  147*   --    K  3.4*  3.5   --    BUN  68*  68*   --    CREA  1.17*  1.14*   --    GLU  109*  102*   --    WBC   --   4.1*   --    HGB   --   7.5*  7.7*   HCT   --   25.9*  26.4*   PLT   --   65*   --        Assessment and Plan: Active Problems:    Chronic atrial fibrillation ([facility] [address] (10/17/2011)    On Eliquis - with ongoing anemia - suspect slow blood loss as etiology. Eliquis stopped yesterday, placed on 81mg ASA- recheck H/H tomorrow AM       S/P TAVR (transcatheter aortic valve replacement) (1/30/2018)    Stable. Started ASA 81mg        Chronic diastolic heart failure ([address] (2/7/2018)    Stable on PO lasix - continue AM po lasix, extra IV dose x 1 this afternoon       Respiratory failure ([address] (2/13/2018)    Stable on nasal cannula.         Anemia:  Transfused 2 units PRBC. Await CBC from this AM.  Recheck in AM.  Eliquis stopped yesterday as above. Continue to monitor.       CANDACE Davidson MD  [facility] Cardiology  Pager [phone]

## 2018-03-01 NOTE — PROGRESS NOTES
03/01/18 0832   Time Spent With Patient   Time In 0832   Time Out 1000   Patient Seen For: AM;ADLs   Grooming   Grooming Assistance  Min A   Comments s/u assist to wash dentures. Assist to comb hair. Upper Body Bathing   Bathing Assistance, Upper S   Position Performed Seated in chair   Comments Verbal cues to wash chest and abdomen. Lower Body Bathing   Bathing Assistance, Lower  Min A   Adaptive Equipment Grab bar   Position Performed Seated in chair;Standing   Adaptive Equipment Grab bar;Tub bench   Comments Assist with washing buttocks while pt standing and use of grab bars. Toileting   Toileting Assistance (FIM Score) Max A   Cues Physical assistance for pants down;Physical assistance for pants up; Tactile cues provided   Adaptive Equipment Walker   Upper Body Dressing    Dressing Assistance  Max A   Comments Assist with all parts of donning bra. Assist with threading head through shirt and pull down. Lower Body Dressing    Dressing Assistance  Dep   Leg Crossed Method Used No   Position Performed Standing;Seated in chair;Bending forward method   Adaptive Equipment Used Grab bar   Don/Doff Anti-Embolic Stockings Dep   Comments Total assist with all parts. Pt able to steady self while standing with use of grab bars during clothing management. Functional Transfers   Toilet Transfer  Stand pivot transfer with walker   Amount of Assistance Required Mod A   Tub or Shower Type Shower   Amount of Assistance Required Mod A   Adaptive Equipment Tub transfer bench;Grab bars; Wheelchair     S: \"Yes I would like to take a shower today. \" Agreeable to therapy. Focus of session was on morning ADL routine. Patient was able to SPT from bed to [facility] with moderate assist.    Pain not indicated during this session. Pt with SpO2 level at 85% on 1 L of O2, increased to 2L with PTMaddy. Collaborated with PTMaddy and RN, Vivian Ford confirmed patient is making progress.    Patient tolerated session well, but  shortness of breath, edema, functional mobility, standing balance, overall strength, and activity tolerance are still below baseline and requires skilled facilitation to successfully and safely complete ADL's and transfers. Patient ended session in w/c with call remote and phone within reach.      Fabiana York, OT Student

## 2018-03-02 NOTE — PROGRESS NOTES
PICC team attempted to see pt about IV. Will try to return.   Would like to get triple lumen out  Since it is not being utilized

## 2018-03-02 NOTE — PROGRESS NOTES
SFD PROGRESS NOTE    Peter Sinha  Admit Date: 2/13/2018  Admit Diagnosis: DEBILITY;CHF (congestive heart failure) ([facility] [address] Respiratory f*  Chief Complaint : Gait dysfunction secondary to below. Admit Diagnosis: Pleural effusion [J90]; Pleural effusion [J90]  Acute respiratory failure (HCC) (2/7/2018)  Pleural effusion (1/23/2018)/ S/P bilateral thoracentesis  Hypothyroidism   Chronic atrial fibrillation   HTN   Pulmonary hypertension   S/P TAVR(1/30/2018)  Leukocytosis (2/7/2018) on Abx   Acute on chronic diastolic heart failure   weakness  Pain  DVT risk  Acute blood loss anemia/ GI bleed? Acute Rehab Dx:  Generalized weakness. Debility    deconditioning   Mobility and ambulation deficits  Self Care/ADL deficits    Subjective     Patient seen and examined. Vss. Afebrile. S/p transfusion 2u yesterday. hgb improved, 10.2. Patient appears mildy less fatigued. Tolerated PT/ OT better. ambulating 40' with min assist using a RW. midly improved edema.      Objective:     Current Facility-Administered Medications   Medication Dose Route Frequency    aspirin delayed-release tablet 81 mg  81 mg Oral DAILY    0.9% sodium chloride infusion 250 mL  250 mL IntraVENous PRN    furosemide (LASIX) tablet 80 mg  80 mg Oral DAILY    potassium chloride (K-DUR, KLOR-CON) SR tablet 40 mEq  40 mEq Oral DAILY    0.9% sodium chloride infusion 250 mL  250 mL IntraVENous PRN    magnesium oxide (MAG-OX) tablet 400 mg  400 mg Oral DAILY    diazePAM (VALIUM) tablet 2.5 mg  2.5 mg Oral QHS    central line flush (saline) syringe 10 mL  10 mL InterCATHeter Q8H    acetaminophen (TYLENOL) tablet 650 mg  650 mg Oral Q4H PRN    alcohol 62% (NOZIN) nasal  1 Ampule  1 Ampule Topical Q12H    allopurinol (ZYLOPRIM) tablet 100 mg  100 mg Oral BID    hydrocortisone (ANUSOL-HC) 2.5 % rectal cream   PeriANAL TID PRN    levothyroxine (SYNTHROID) tablet 88 mcg  88 mcg Oral ACB    metoprolol succinate (TOPROL-XL) XL tablet 25 mg 25 mg Oral DAILY    pantoprazole (PROTONIX) tablet 40 mg  40 mg Oral ACB    polyethylene glycol (MIRALAX) packet 17 g  17 g Oral DAILY    pravastatin (PRAVACHOL) tablet 40 mg  40 mg Oral DAILY    rOPINIRole (REQUIP) tablet 2 mg  2 mg Oral BID    sertraline (ZOLOFT) tablet 100 mg  100 mg Oral DAILY    spironolactone (ALDACTONE) tablet 25 mg  25 mg Oral DAILY    levalbuterol (XOPENEX) nebulizer soln 0.63 mg/3 mL  0.63 mg Nebulization Q6H PRN     Facility-Administered Medications Ordered in Other Encounters   Medication Dose Route Frequency    0.9% sodium chloride infusion 250 mL  250 mL IntraVENous PRN     Review of Systems: + weakness. Denies chest pain, shortness of breath, cough, headache, visual problems, abdominal pain, dysurea, calf pain. Pertinent positives are as noted in the medical records and unremarkable otherwise. Visit Vitals    /76    Pulse 85    Temp 97.8 °F (36.6 °C)    Resp 20    Wt 171 lb (77.6 kg)    SpO2 100%    BMI 33.4 kg/m2        Physical Exam:   General: Alert and age appropriately oriented.  Appears comfortable on 2L O2 NC. No acute cardio respiratory distress. HEENT: Normocephalic,no scleral icterus  Oral mucosa moist without cyanosis, No bruit, +JVD. Lungs: + bibasilar crackles. No wheezing. Respiration even and unlabored   Heart: RRR,  II/VI TERRI  No clicks, rub or gallops   Abdomen: Soft, non-tender, nondistended. Bowel sounds present. Genitourinary: defered   Neuromuscular:      PERRL, EOMI  Follows simple commands consistently. Able to identify, recall repeat. Mood appropriate. Insight, judgement intact.    LUE     Shoulder abduction   5-/5              Elbow flexion:   5-/5               Wrist extension:  5- /5              Finger flexion;  5- /5  RUE    Shoulder abduction: 5- /5                Elbow flexion:  5- /5                         Wrist extension: 5- /5                        Finger flexion:  5- /5  LLE     Hip flexion:  3+ /5              Knee extension:  4 /5                         Ankle dorsiflexion:  5 /5                        Ankle plantarflexion:   5/5                                                                  RLE     Hip flexion:   3+/5                        Knee extension:  4 /5                         Ankle dorsiflexion:  5 /5                        Ankle plantarflexion:  5 /5  Sensory - intact grossly   No cerebellar signs. Plantars - down going  No atrophy, no fasciculations, no tremors. Skin/extremity: No rashes, no erythema. Calf non tender BLE. 2+ BLE edema. + chronic arthritic joint changes. Mild BUE edema. Functional Assessment:  Gross Assessment  AROM: Generally decreased, functional (02/28/18 1400)  PROM: Generally decreased, functional (02/28/18 1400)  Strength: Generally decreased, functional (02/28/18 1400)  Coordination: Generally decreased, functional (02/28/18 1400)  Tone: Normal (02/28/18 1400)  Sensation: Intact (02/21/18 1300)       Balance  Sitting - Static: Good (unsupported) (03/01/18 1600)  Sitting - Dynamic: Fair (occasional) (03/01/18 1600)  Standing - Static: Fair;Constant support (in parallel bars) (03/01/18 1600)  Standing - Dynamic : Impaired (03/01/18 1600)           Toileting  Cues: Physical assistance for pants down;Physical assistance for pants up; Tactile cues provided (03/01/18 0832)  Adaptive Equipment: Kiki Tian (03/01/18 0832)         Monique Montalvo Fall Risk Assessment:  Gabriel Bhakta  Mobility: Ambulates or transfers with assist devices or assistance/unsteady gait (03/01/18 2042)  Mobility Interventions: Patient to call before getting OOB (03/01/18 2042)  Mentation: Periodic confusion (03/01/18 2042)  Mentation Interventions: Adequate sleep, hydration, pain control (03/01/18 2042)  Medication: Patient receiving anticonvulsants, sedatives(tranquilizers), psychotropics or hypnotics, hypoglycemics, narcotics, sleep aids, antihypertensives, laxatives, or diuretics (03/01/18 2042)  Medication Interventions: Patient to call before getting OOB (03/01/18 2042)  Elimination: Needs assistance with toileting (03/01/18 2042)  Elimination Interventions: Call light in reach (03/01/18 2042)  Prior Fall History: Unknown (03/01/18 2042)  History of Falls Interventions: Bed/chair exit alarm (03/01/18 2042)  Total Score: 4 (03/01/18 2042)  Standard Fall Precautions: Yes (03/01/18 2042)  High Fall Risk: Yes (02/28/18 2313)     Speech Assessment:         Ambulation:  Gait  Base of Support: Widened (02/28/18 1400)  Speed/Carol: Slow;Shuffled (02/28/18 1400)  Step Length: Right shortened;Left shortened (02/28/18 1400)  Distance (ft): 8 Feet (ft) (8ft x 2  5 ft x 2 in parallel bars w/ 4 to 5 min rest breaks) (03/01/18 1600)  Assistive Device: Other (comment) (parallel bars) (03/01/18 1600)  Rail Use: Both (02/22/18 1600)     Labs/Studies:  Recent Results (from the past 72 hour(s))   OCCULT BLOOD, STOOL    Collection Time: 02/27/18  4:00 AM   Result Value Ref Range    Occult blood, stool POSITIVE (A) NEG     METABOLIC PANEL, BASIC    Collection Time: 02/27/18  6:00 AM   Result Value Ref Range    Sodium 148 (H) 136 - 145 mmol/L    Potassium 3.5 3.5 - 5.1 mmol/L    Chloride 94 (L) 98 - 107 mmol/L    CO2 >45 (HH) 21 - 32 mmol/L    Anion gap Cannot be calculated 7 - 16 mmol/L    Glucose 109 (H) 65 - 100 mg/dL    BUN 62 (H) 8 - 23 MG/DL    Creatinine 1.21 (H) 0.6 - 1.0 MG/DL    GFR est AA 56 (L) >60 ml/min/1.73m2    GFR est non-AA 46 (L) >60 ml/min/1.73m2    Calcium 8.9 8.3 - 10.4 MG/DL   HGB & HCT    Collection Time: 02/27/18  8:58 AM   Result Value Ref Range    HGB 7.7 (L) 11.7 - 15.4 g/dL    HCT 26.4 (L) 35.8 - 46.3 %   TYPE + CROSSMATCH    Collection Time: 02/27/18  8:26 PM   Result Value Ref Range    Crossmatch Expiration 03/02/2018     ABO/Rh(D) A POSITIVE     Antibody screen NEG     Unit number [id number]     Blood component type  LR     Unit division 00     Status of unit TRANSFUSED     Crossmatch result Compatible     Unit number [id number]     Blood component type  LR     Unit division 00     Status of unit TRANSFUSED     Crossmatch result Compatible    METABOLIC PANEL, BASIC    Collection Time: 02/28/18  5:55 AM   Result Value Ref Range    Sodium 147 (H) 136 - 145 mmol/L    Potassium 3.5 3.5 - 5.1 mmol/L    Chloride 95 (L) 98 - 107 mmol/L    CO2 >45 (HH) 21 - 32 mmol/L    Anion gap Cannot be calculated 7 - 16 mmol/L    Glucose 102 (H) 65 - 100 mg/dL    BUN 68 (H) 8 - 23 MG/DL    Creatinine 1.14 (H) 0.6 - 1.0 MG/DL    GFR est AA 60 (L) >60 ml/min/1.73m2    GFR est non-AA 49 (L) >60 ml/min/1.73m2    Calcium 8.8 8.3 - 10.4 MG/DL   CBC WITH AUTOMATED DIFF    Collection Time: 02/28/18  5:55 AM   Result Value Ref Range    WBC 4.1 (L) 4.3 - 11.1 K/uL    RBC 2.61 (L) 4.05 - 5.25 M/uL    HGB 7.5 (L) 11.7 - 15.4 g/dL    HCT 25.9 (L) 35.8 - 46.3 %    MCV 99.2 (H) 79.6 - 97.8 FL    MCH 28.7 26.1 - 32.9 PG    MCHC 29.0 (L) 31.4 - 35.0 g/dL    RDW 18.3 (H) 11.9 - 14.6 %    PLATELET 65 (L) 725 - 450 K/uL    MPV 12.0 10.8 - 14.1 FL    DF AUTOMATED      NEUTROPHILS 75 43 - 78 %    LYMPHOCYTES 16 13 - 44 %    MONOCYTES 5 4.0 - 12.0 %    EOSINOPHILS 3 0.5 - 7.8 %    BASOPHILS 1 0.0 - 2.0 %    IMMATURE GRANULOCYTES 0 0.0 - 5.0 %    ABS. NEUTROPHILS 3.1 1.7 - 8.2 K/UL    ABS. LYMPHOCYTES 0.7 0.5 - 4.6 K/UL    ABS. MONOCYTES 0.2 0.1 - 1.3 K/UL    ABS. EOSINOPHILS 0.1 0.0 - 0.8 K/UL    ABS. BASOPHILS 0.0 0.0 - 0.2 K/UL    ABS. IMM.  GRANS. 0.0 0.0 - 0.5 K/UL   METABOLIC PANEL, BASIC    Collection Time: 03/01/18  5:28 AM   Result Value Ref Range    Sodium 148 (H) 136 - 145 mmol/L    Potassium 3.4 (L) 3.5 - 5.1 mmol/L    Chloride 94 (L) 98 - 107 mmol/L    CO2 >45 (HH) 21 - 32 mmol/L    Anion gap Cannot be calculated 7 - 16 mmol/L    Glucose 109 (H) 65 - 100 mg/dL    BUN 68 (H) 8 - 23 MG/DL    Creatinine 1.17 (H) 0.6 - 1.0 MG/DL    GFR est AA 58 (L) >60 ml/min/1.73m2    GFR est non-AA 48 (L) >60 ml/min/1.73m2    Calcium 8.8 8.3 - 10.4 MG/DL   CBC W/O DIFF    Collection Time: 03/01/18  9:05 AM   Result Value Ref Range    WBC 4.7 4.3 - 11.1 K/uL    RBC 3.53 (L) 4.05 - 5.25 M/uL    HGB 10.2 (L) 11.7 - 15.4 g/dL    HCT 34.2 (L) 35.8 - 46.3 %    MCV 96.9 79.6 - 97.8 FL    MCH 28.9 26.1 - 32.9 PG    MCHC 29.8 (L) 31.4 - 35.0 g/dL    RDW 18.6 (H) 11.9 - 14.6 %    PLATELET 74 (L) 761 - 450 K/uL    MPV 11.7 10.8 - 14.1 FL       Assessment:     Problem List as of 3/1/2018  Date Reviewed: 2/10/2018          Codes Class Noted - Resolved    Respiratory failure ([facility] ICD-10-CM: J96.90  ICD-9-CM: 518.81  2/13/2018 - Present        Acute on chronic respiratory failure ([facility] ICD-10-CM: J96.20  ICD-9-CM: 518.84  2/7/2018 - Present        Leukocytosis ICD-10-CM: L45.541  ICD-9-CM: 288.60  2/7/2018 - Present        Chronic diastolic heart failure (HCC) ICD-10-CM: I50.32  ICD-9-CM: 428.32  2/7/2018 - Present        S/P TAVR (transcatheter aortic valve replacement) ICD-10-CM: Z95.2  ICD-9-CM: V43.3  1/30/2018 - Present        Aortic stenosis (Chronic) ICD-10-CM: I35.0  ICD-9-CM: 424.1  1/29/2018 - Present        Peripheral arterial disease (HCC) (Chronic) ICD-10-CM: I73.9  ICD-9-CM: 443.9  1/23/2018 - Present        Pleural effusion ICD-10-CM: J90  ICD-9-CM: 511.9  1/23/2018 - Present    Overview Addendum 2/10/2018 11:36 AM by Guillermo Raines NP     Right thoracentesis 1/25/18 - 1200 mls removed  Left thoracentesis 1/25/2018 - 900 mls removed  Bilateral thoracentesis 2/9/18 - L 550 ml (air aspirated during and at the end of procedure) / R 1000 ml and R creamy pink              Chronic respiratory failure with hypoxia (HCC) (Chronic) ICD-10-CM: J96.11  ICD-9-CM: 518.83, 799.02  1/23/2018 - Present    Overview Signed 1/23/2018 11:17 AM by Marni Waite NP     Wears 3 to 4 liters at home             Hypoxia ICD-10-CM: R09.02  ICD-9-CM: 799.02  1/23/2018 - Present        Stenosis of prosthetic aortic valve (Chronic) ICD-10-CM: I35.0  ICD-9-CM: 396.0  1/19/2018 - Present    Overview Signed 1/19/2018  3:18 PM by Lara Galarza MD     AVR (10/5/13):  21 mm Pericardial valve.                Tricuspid valve insufficiency (Chronic) ICD-10-CM: I07.1  ICD-9-CM: 397.0  1/15/2018 - Present        Systolic CHF, chronic (HCC) (Chronic) ICD-10-CM: I50.22  ICD-9-CM: 428.22, 428.0  1/15/2018 - Present        S/P mitral valve repair (Chronic) ICD-10-CM: I08.541  ICD-9-CM: V45.89  12/4/2017 - Present        H/O atrioventricular rubin ablation (Chronic) ICD-10-CM: W65.624  ICD-9-CM: V15.1  3/22/2017 - Present        Pulmonary hypertension (Chronic) ICD-10-CM: I27.20  ICD-9-CM: 416.8  2/12/2017 - Present        Aortic valve replaced (Chronic) ICD-10-CM: Z95.2  ICD-9-CM: V43.3  1/9/2017 - Present        Chronic diastolic congestive heart failure (HCC) (Chronic) ICD-10-CM: I50.32  ICD-9-CM: 428.32, 428.0  9/9/2016 - Present        Chronic depression (Chronic) ICD-10-CM: F32.9  ICD-9-CM: 311  8/5/2016 - Present        Osteopenia (Chronic) ICD-10-CM: M85.80  ICD-9-CM: 733.90  8/5/2016 - Present        RLS (restless legs syndrome) (Chronic) ICD-10-CM: G25.81  ICD-9-CM: 333.94  8/5/2016 - Present        Hyperlipidemia (Chronic) ICD-10-CM: E78.5  ICD-9-CM: 272.4  8/5/2016 - Present        Gout (Chronic) ICD-10-CM: M10.9  ICD-9-CM: 274.9  8/5/2016 - Present        Anxiety (Chronic) ICD-10-CM: F41.9  ICD-9-CM: 300.00  8/5/2016 - Present        CAD (coronary artery disease) (Chronic) ICD-10-CM: I25.10  ICD-9-CM: 414.00  8/5/2016 - Present        HTN (hypertension) (Chronic) ICD-10-CM: I10  ICD-9-CM: 401.9  8/5/2016 - Present        GERD (gastroesophageal reflux disease) (Chronic) ICD-10-CM: K21.9  ICD-9-CM: 530.81  8/5/2016 - Present        H/O mitral valve repair, 2003 (Chronic) ICD-10-CM: K40.498  ICD-9-CM: V15.1  6/27/2016 - Present        Sick sinus syndrome (HCC) (Chronic) ICD-10-CM: I49.5  ICD-9-CM: 427.81 2/13/2016 - Present        Obesity (Chronic) ICD-10-CM: E66.9  ICD-9-CM: 278.00  11/2/2015 - Present        Mitral stenosis with insufficiency (Chronic) ICD-10-CM: X13.8  ICD-9-CM: 394.2  11/2/2015 - Present    Overview Signed 11/2/2015 11:02 AM by Nicola Alcala     Prior MV repair 2003.               Rheumatic aortic stenosis (Chronic) ICD-10-CM: I06.0  ICD-9-CM: 395.0  11/2/2015 - Present        Cardiac pacemaker (Chronic) ICD-10-CM: Z95.0  ICD-9-CM: V45.01  11/2/2015 - Present        Thrombocytopenia (HCC) (Chronic) ICD-10-CM: D69.6  ICD-9-CM: 287.5  8/22/2012 - Present        Anemia (Chronic) ICD-10-CM: D64.9  ICD-9-CM: 285.9  10/27/2011 - Present    Overview Signed 10/27/2011  8:25 AM by Pat Bruce     Acute on chronic               Chronic atrial fibrillation (HCC) (Chronic) ICD-10-CM: I48.2  ICD-9-CM: 427.31  10/17/2011 - Present        Hypothyroidism (Chronic) ICD-10-CM: E03.9  ICD-9-CM: 244.9  12/4/2009 - Present        BOBBY (obstructive sleep apnea) (Chronic) ICD-10-CM: G47.33  ICD-9-CM: 327.23  12/4/2009 - Present        Chronic obstructive pulmonary disease (HCC) (Chronic) ICD-10-CM: J44.9  ICD-9-CM: 496  3/8/2009 - Present        Aortic Valve Bioprosthesis Present (Chronic) ICD-10-CM: Z95.2  ICD-9-CM: V43.3  3/7/2009 - Present        Degenerative arthritis of left knee (Chronic) ICD-10-CM: M17.12  ICD-9-CM: 715.96  2/27/2009 - Present        RESOLVED: CHF (congestive heart failure) ([facility] [address] ICD-10-CM: I50.9  ICD-9-CM: 428.0  2/13/2018 - 2/14/2018        RESOLVED: Acute on chronic systolic congestive heart failure ([address] ICD-10-CM: I50.23  ICD-9-CM: 428.23, 428.0  2/1/2018 - 2/7/2018        RESOLVED: Acute pulmonary edema ([facility] ICD-10-CM: J81.0  ICD-9-CM: 518.4  1/25/2018 - 2/7/2018        RESOLVED: Pacemaker ICD-10-CM: Z95.0  ICD-9-CM: V45.01  12/4/2017 - 1/23/2018        RESOLVED: History of gout ICD-10-CM: Z87.39  ICD-9-CM: V12.29  1/31/2017 - 1/23/2018        RESOLVED: Warfarin anticoagulation (Chronic) ICD-10-CM: Z79.01  ICD-9-CM: V58.61  1/9/2017 - 1/23/2018        RESOLVED: Non morbid obesity due to excess calories ICD-10-CM: E66.09  ICD-9-CM: 278.00  11/14/2016 - 1/23/2018        RESOLVED: Hypoxemia ICD-10-CM: R09.02  ICD-9-CM: 799.02  11/14/2016 - 1/23/2018        RESOLVED: Localized edema ICD-10-CM: R60.0  ICD-9-CM: 782.3  9/9/2016 - 1/23/2018        RESOLVED: Iron deficiency anemia ICD-10-CM: D50.9  ICD-9-CM: 280.9  9/9/2016 - 1/23/2018        RESOLVED: CRI (chronic renal insufficiency) ICD-10-CM: N18.9  ICD-9-CM: 585.9  8/5/2016 - 1/23/2018        RESOLVED: Dyspnea ICD-10-CM: R06.00  ICD-9-CM: 786.09  8/5/2016 - 1/23/2018        RESOLVED: Right hip pain ICD-10-CM: M25.551  ICD-9-CM: 719.45  8/5/2016 - 1/23/2018        RESOLVED: Edema ICD-10-CM: R60.9  ICD-9-CM: 782.3  8/5/2016 - 1/23/2018        RESOLVED: Chest pain ICD-10-CM: R07.9  ICD-9-CM: 786.50  8/5/2016 - 1/23/2018        RESOLVED: Other long term (current) drug therapy ICD-10-CM: Z79.899  ICD-9-CM: V58.69  8/5/2016 - 1/23/2018        RESOLVED: Acute encephalopathy ICD-10-CM: G93.40  ICD-9-CM: 348.30  7/8/2016 - 1/23/2018        RESOLVED: Tachycardia ICD-10-CM: R00.0  ICD-9-CM: 785.0  6/27/2016 - 1/23/2018        RESOLVED: Palpitations ICD-10-CM: R00.2  ICD-9-CM: 785.1  11/2/2015 - 1/23/2018        RESOLVED: Respiratory insufficiency ICD-10-CM: R06.89  ICD-9-CM: 786.09  11/2/2015 - 1/23/2018        RESOLVED: Bradycardia (Symptomatic) ICD-10-CM: R00.1  ICD-9-CM: 427.89  11/7/2011 - 1/23/2018        RESOLVED: Rectal bleeding ICD-10-CM: K62.5  ICD-9-CM: 569.3  10/27/2011 - 1/23/2018        RESOLVED: AV block ICD-10-CM: I44.30  ICD-9-CM: 426.10  10/17/2011 - 1/23/2018        RESOLVED: Cardiogenic shock ([address] ICD-10-CM: R57.0  ICD-9-CM: 785.51  10/17/2011 - 1/23/2018        RESOLVED: Hyperkalemia ICD-10-CM: E87.5  ICD-9-CM: 276.7  10/17/2011 - 1/23/2018        RESOLVED: Nausea and vomiting ICD-10-CM: R11.2  ICD-9-CM: 787.01  2/24/2010 - 1/23/2018        RESOLVED: Epigastric abdominal pain ICD-10-CM: R10.13  ICD-9-CM: 789.06  2/24/2010 - 1/23/2018        RESOLVED: Digoxin toxicity ICD-10-CM: T46.0X1A  ICD-9-CM: 972.1, E942.1  2/24/2010 - 1/23/2018        RESOLVED: ARF (acute renal failure) (HCC) (Chronic) ICD-10-CM: N17.9  ICD-9-CM: 584.9  2/24/2010 - 1/23/2018        RESOLVED: Hypokalemia ICD-10-CM: E87.6  ICD-9-CM: 276.8  2/24/2010 - 1/23/2018        RESOLVED: CKD (chronic kidney disease) stage 3, GFR 30-59 ml/min (Chronic) ICD-10-CM: N18.3  ICD-9-CM: 585.3  12/4/2009 - 1/23/2018        RESOLVED: Cough ICD-10-CM: R05  ICD-9-CM: 786.2  3/9/2009 - 3/12/2009        RESOLVED: Bronchitis ICD-10-CM: J40  ICD-9-CM: 490  3/9/2009 - 3/12/2009        RESOLVED: Atrial fibrillation (HCC) (Chronic) ICD-10-CM: I48.91  ICD-9-CM: 427.31  3/7/2009 - 6/27/2016        RESOLVED: Hypotension (Chronic) ICD-10-CM: I95.9  ICD-9-CM: 458.9  3/1/2009 - 1/23/2018              Plan:     The Post Assessment Physician Evaluation (RAMBO) found the current functional status to be comparable with the Pre-admission Screening. The Patient is a good candidate for acute inpatient rehabilitation. Nothing since the Pre-admission screen has changed that determination.      Rehabilitation Plan  The patient has shown the ability to tolerate and benefit from 3 hours of therapy daily and is being admitted to a comprehensive acute inpatient rehabilitation program consisting of at least 3 hours of combined physical and occupational therapies. Resume intensive Physical Therapy for a minimum of 1.5 hours a day, at least 5 out of 7 days per week to address bed mobility, transfers, ambulation, strengthening, balance, and endurance. - pt  was ambulating independently with a rollator inside her home. reports using rollator or a wheelchair for community mobility.  Will continue to focus on endurance, strengthening BLe.      Resume  intensive Occupational Therapy for a minimum of 1.5 hours a day, at least 5 out of 7 days per week to address ADL ( bathing, LE dressing, toileting) and adaptive equipment as needed.       Continue 24-hour skilled rehabilitation nursing for bowel and bladder management, skin care for decubitus ulcer prevention , pain management and ongoing medication administration      The patient may benefit from a psychology consult for depression, anxiety and adjustment disorder.     Continue daily physician medical management:    Acute respiratory failure (HCC) (2/7/2018)/ Pleural effusion (1/23/2018)/ S/P bilateral thoracentesis  S/p - Thoracenteses on 2/9 - 550 mls removed from the left and 1 liter removed from the right.   - Continue bronchodilators prn- has home nebulizer. Resume as prn;xopenex  - Patient will be on 2L O2 at therapy and at rest. May be able to wean as tolerated. Will monitor saO2. 2/15 - sao2 at 100% on 2L/ min. Feels comfortable. Continue lasix and aldactone. Cardiology gave 1x Zaroxolyn in AM prior to AM lasix. 2/19 - continue lasix 80 mg bid + aldactone. +metolazone prior to lasix each morning. Monitor. Daily weights.   2/20- cardiology recommendation appreciated. Transition to iv lasic. Will need access. Difficult due to edema. 2/21- Persistent edema. Iv lasix ordered per cardiology, however since peripheral access not yet achieved after multiple attempts due to edema. Will have IR place CVC / ij anticipating frequent blood draws and continued iv diuretic needs. Mean time will have po lasix for every iv dose missed. 2/22 - Patient edema mildly better. Mild decreased weight; 176lbs. conitune lasix 80 bid. Increase KCl 40 to bid dose. Left IJ oozing overnight following IR procedure, appears not bleeding today. hgb decreased, likely due to blood loss. 2/23 - mildly SOB, will repeat CxR to monitor bibasilar effusions. Continue iv lasix. 2/26 - SaO2 stable on O2 @2L via NC. cpntinued on diuresis over the weekend. Cr, elevated. Will reduce lasix.    2/27 - lasix reduced. Metolazone held. BUN/ Cr. = 57/1.59-> 62/1.21.   2/28 - Edema clinically appears better, still persistent. Continue to check renal function. 68/1. 14. Moderate elevation of BUN. Cr acceptable. Continued clinical evidence of fluid retention. continue to diurese with careful monitoring of renal function  3/1- continue to diurese oral Lasix 80 daily. Cr improved. Holding eliquis, started on aspirin for anticoag/antiplatelet therapy due to suspected hx of GI bleeding. .         S/P TAVR(1/30/2018)/ Acute on chronic diastolic heart failure Pulmonary hypertension / Chronic atrial fibrillation/ HTN   - cardiac precaution, s.p TAVR. - weights and maintain a 2 liter per day fluid restriction.   - Monitor HR/BP. conitinue Eliquis for a.fib  - continue BB-Toprol XL  2/15- edema, chest exam appears not changed. cintiuned on diuretics. 2/19- continue O2 supplements. 2/20 - HR in control. continue O2, comfortable on same rate. Will obtain CxR. Check BNP. Monitor renal fx.   2/21 - HR in control. conitinue eliquis. BB at current dose. 2/22 - HR 80's monitor. Temporarily reduce Eliquis dose 2.5 bid due to oozing, blood loss. Anemia - hgb 7.4. Check in am. Transfuse prn.   2/23 - will continue lower dose eliquis as hgb decreased following IR procedure. , plt remain low; 70k. HR in good range 70-80s. Continue monitor. 2/26 - continued on lower dose eliquis for now. Weight 172 lbs, reduced about 7 lbs since he admission to [facility]. still persistent edema. Cr 1.59, will reduce lasix and hold metolazone. Will get cardiology input. 2/28 - continue of current dose diuretics.         Hypothyroidism - synthroid 88 mcg     Leukocytosis (2/7/2018) on Abx last dose 7/7 rocephin administered 2/13.   - finished abx. No new s/s of infection. - 2/21 no new signs of infection.      Acute blood loss anemia/ GI bleed? -monitor clinically. May have been caused by high INR. - no melena. - hgb 9.1 (2/18) , asymptomatic. - 2/22 - Anemia due to blood loss form IR proocedure- hgb 7.4. Check in am.  2/23 - hgb 7.7. Check labs in am and Monday. 2/26 - s/p transfusion 1 unit over the weekend. Monitor. hgb 8.2   2/27 - hgb 8.2-> 7.7, mild decrease. Will ask GI to consult on management. continue Eliquis? 2/28 -hgb 7,5. Transfuse 2 units today. GI consult appreciated. Will follow rec. Continue PPI. Use the Anusol prn for hemmorhoids  3/1 - GI recommendaiton to be followed. continue PPI. hemmorrhoidal bleeding to be treated with anusol. Transfuse prn. Monitor hgb. Monitor for s/s of acute bleed. hgb 10.2 today, s/p transfusion 2u.       Pneumonia prophylaxis- Insentive spirometer every hour while awake     DVT risk / DVT Prophylaxis- continue daily physician exam to assess for signs and symptoms as patient is at increased risk for of thromboembolism. - covered with eliquis. No s/s of DVT/ PE. Wound Care: - monitor for ulcers, skin breakdown due to edema. -left elbow  edema drainage covered. -  TEDs. Fitting better. Edema control.         Potential urinary retention - no s/s of retention. Monitor.  - pt mostly continent. - needed to use bedpan over the wekend due to decline in mobility.      bowel program - as needed dulcolax, pericolace. + BM.     GERD/ GI prophylaxis - resume PPI. At times may need additional antacids, Maalox prn.          Time spent was 35 minutes with over 1/2 in direct patient care/examination, consultation and coordination of care.      Signed By: Devi Layton MD     March 1, 2018

## 2018-03-02 NOTE — PROGRESS NOTES
End Of Shift Functional Summary, Nursing      TOILETING:  Does patient need assist with clothing management and/or pericare? Yes: Comment: needs complete assistance with toileting    TOILET TRANSFER:  Pt requires maximum assistance. Pt uses walker. BLADDER:  Pt does not have a salam[place] catheter that staff manages. Pt does not take medication. Pt is continent. of bladder and voids in bedside commode  Pt requires staff to empty device Pt has had 0 bladder accidents during this shift requiring maximum assistance to clean up. (An accident is when the episode is not contained in a brief AND/OR the clothing/linen requires changing/cleaning up.)    BOWEL:  Pt does take medication. Pt is continent of bowel and uses bedside commode. Pt requires staff to empty device    Pt has had 0 bowel accidents during this shift requiring maximum assistance from staff to clean up. (An accident is when the episode is not contained in a brief AND/OR the clothing/linen requires changing/cleaning up.)    BED/CHAIR TRANSFER  Pt requires maximum assistance. Patient requires the assistance of 2 staff member(s). Pt uses walker                               EATING  Pt requires setup. Pt wears dentures. TUBE FEEDINGS:  Pt does not  receive nutrition through tube feedings. Patient requires minimal assistance with feedings. Documentation reviewed and plan of care discussed/reviewed with   patient, physician, therapists, oncoming nurse, patient assistant and family/spouse during the shift.

## 2018-03-02 NOTE — PROGRESS NOTES
PHYSICAL THERAPY DAILY NOTE  Time In: 3068  Time Out: 0022  Patient Seen For: PM;Balance activities;Gait training;Patient education;Transfer training; Other (see progress notes)    Subjective: patient reporting she has been up more today and is not as tired this PM. Requesting to rest in the bed at end of treatment         Objective:Vital Signs:patient on 02 at 2 lpm.  Patient Vitals for the past 12 hrs:   Temp Pulse Resp BP SpO2   03/02/18 0755 97.2 °F (36.2 °C) 86 18 99/66 97 %     Pain level:No c/o pain  Pain location:NA  Pain interventions:NA    Patient education:Bed mobility training,transfer training, gait training, fall precautions, activity pacing, balance training, energy conservation, posture correction, breathing techniques, Patient with limited understanding of patient education. Recommend follow up education. Interdisciplinary Communication:RN in to assist with toileting. Discussed mobility level    Other (comment) (Fall precautions)  GROSS ASSESSMENT Daily Assessment     NA       BED/MAT MOBILITY Daily Assessment   Increased time and effort to complete with cues for body mechanics   Rolling Right : 0 (Not tested)  Rolling Left : 0 (Not tested)  Supine to Sit : 0 (Not tested)  Sit to Supine : 3 (Moderate assistance)       TRANSFERS Daily Assessment   Increased time and effort to complete with cues for body mechanics  Min assist with SPT w/c<>commode using grab bar. Cues for body mechanics Transfer Type: SPT without device  Transfer Assistance : 4 (Minimal assistance)  Sit to Stand Assistance: Minimal assistance  Car Transfers: Not tested       GAIT Daily Assessment    Amount of Assistance: 4 (Minimal assistance)  Distance (ft):  (10ft x 5 with 3 to 4 min rest break between attempts)  Assistive Device: Other (comment) (parallel bars)   Gait training facilitating upright posture with improved step clearance and inhibiting scissoring pattern.     STEPS or STAIRS Daily Assessment    Steps/Stairs Ambulated (#): 0  Level of Assist : 0 (Not tested)       BALANCE Daily Assessment   Static standing balance activities in parallel bars facilitating upright posture and improved breathing pattern. X 1 min x 5    Static standing balance activities before and after toileting while managing lower body clothing, CGA for balance, max assist with clothing management. Modified independent with hygiene Sitting - Static: Good (unsupported)  Sitting - Dynamic: Fair (occasional)  Standing - Static: Fair;Constant support (in parallel bars)  Standing - Dynamic : Impaired       WHEELCHAIR MOBILITY Daily Assessment    Curbs/Ramps Assist Required (FIM Score): 0 (Not tested)  Wheelchair Setup Assist Required : 3 (Moderate assistance)  Wheelchair Management: Manages left brake;Manages right brake       LOWER EXTREMITY EXERCISES Daily Assessment     NA          Assessment: Improved functional endurance this PM. No significant change in ability to ambulate this PM. Fatigues after ambulating 10 ft and requires 2 to 3 minutes to ambulate 10 ft. Patient returned to room at end of treatment. Patient supine in bed with head of bed elevated and bed rails up x 2. Needs placed in reach of patient. 02 at 2 lpm  Plan of Care: Continue with POC and progress as tolerated.      Tabby Brown, PT  3/2/2018

## 2018-03-02 NOTE — PROGRESS NOTES
[facility] CARDIOLOGY PROGRESS NOTE    3/2/2018 7:54 AM    Admit Date: 2/13/2018    Admit Diagnosis: DEBILITY;CHF (congestive heart failure) ([facility] [address] Respiratory f*      Subjective:   PAtient denies active dyspnea. BP controlled. Renal function stable. Hgb stable off ELiquis. Objective:      Vitals:    03/01/18 1834 03/02/18 0415 03/02/18 0755 03/02/18 1500   BP: 116/76  99/66 109/72   Pulse: 85  86 83   Resp: 20  18 16   Temp: 97.8 °F (36.6 °C)  97.2 °F (36.2 °C) 97.5 °F (36.4 °C)   SpO2: 100%  97% 100%   Weight:  76.7 kg (169 lb)         Physical Exam:  Neck- supple, + JVD  CV- regular rate and rhythm no MRG  Lung- clear bilaterally apically, crackles bibasilar  Abd- soft, nontender, nondistended  Ext-1+ pitting LE edema  Skin- warm and dry    Data Review:   Recent Labs      03/02/18   0413  03/01/18   0905  03/01/18   0528   NA  149*   --   148*   K  3.6   --   3.4*   MG  2.4   --    --    BUN  67*   --   68*   CREA  1.11*   --   1.17*   GLU  106*   --   109*   WBC  4.0*  4.7   --    HGB  9.8*  10.2*   --    HCT  33.1*  34.2*   --    PLT  72*  74*   --        Assessment and Plan: Active Problems:    Chronic atrial fibrillation ([facility] [address] (10/17/2011)    On Eliquis - with ongoing anemia - suspect slow blood loss as etiology. Eliquis stopped, placed on 81mg ASA. Follow serial CBC       S/P TAVR (transcatheter aortic valve replacement) (1/30/2018)    Stable. Started ASA 81mg        Chronic diastolic heart failure ([facility] [address] (2/7/2018)    Stable on PO lasix        Respiratory failure ([facility] [address] (2/13/2018)    Stable on nasal cannula.         Anemia:  Transfused 2 units PRBC.   See above.        Wing Betts MD

## 2018-03-02 NOTE — PROGRESS NOTES
PHYSICAL THERAPY DAILY NOTE  Time In: 3411  Time Out: 0915  Patient Seen For: AM;Balance activities;Gait training;Patient education;Transfer training; Other (see progress notes)    Subjective: patient reporting she feels better today. Reports she feels stronger. Reports she was able to eat more of her breakfast         Objective:Vital Signs:Patient on 02 at 2 lpm, 02 sat 95 to 100% during treatment, HR 81 to 88  Patient Vitals for the past 12 hrs:   Temp Pulse Resp BP SpO2   03/02/18 0755 97.2 °F (36.2 °C) 86 18 99/66 97 %     Pain level:No c/o pain  Pain location:NA  Pain interventions:NA    Patient education:Bed mobility training,transfer training, gait training, fall precautions, activity pacing, balance training, energy conservation, posture correction, breathing techniques, Patient with limited understanding of patient education. Recommend follow up education. Interdisciplinary Communication:spoke with RN regarding 02 sat levels and functional mobility level    Other (comment) (Fall precautions)  GROSS ASSESSMENT Daily Assessment     NA       BED/MAT MOBILITY Daily Assessment   Increased time and effort to complete with cues for body mechanics   Rolling Right : 4 (Minimal assistance)  Rolling Left : 0 (Not tested)  Supine to Sit : 4 (Minimal assistance)  Sit to Supine : 0 (Not tested)       TRANSFERS Daily Assessment   Increased time and effort to complete with cues for body mechanics   Transfer Type: SPT without device (bed to w/c)  Transfer Assistance : 4 (Contact guard assistance)  Sit to Stand Assistance: Contact guard assistance  Car Transfers: Not tested       GAIT Daily Assessment    Amount of Assistance: 4 (Minimal assistance)  Distance (ft): 10 Feet (ft) (10ft x 5 with 3 to 4 min rest break between attempts)  Assistive Device: Other (comment) (parallel bars)   Gait training facilitating upright posture with improved step clearance and inhibiting scissoring pattern.       STEPS or STAIRS Daily Assessment    Steps/Stairs Ambulated (#): 0  Level of Assist : 0 (Not tested)       BALANCE Daily Assessment   Static standing balance activities x 1 min x 5 facilitating upright posture with improved breathing pattern in parallel bars with CGA and cues Sitting - Static: Good (unsupported)  Sitting - Dynamic: Fair (occasional)  Standing - Static: Fair;Constant support (in parallel bars)  Standing - Dynamic : Impaired       WHEELCHAIR MOBILITY Daily Assessment    Curbs/Ramps Assist Required (FIM Score): 0 (Not tested)  Wheelchair Setup Assist Required : 3 (Moderate assistance)  Wheelchair Management: Manages left brake;Manages right brake       LOWER EXTREMITY EXERCISES Daily Assessment     NA          Assessment: Improved tolerance to treatment with improved functional endurance and functional mobility this AM. Improved standing posture during gait training. At this time patient has to have 02 at 2lpm during PT treatment in order to keep her 02 sat above 85%. Limited to no recall of patient education noted above       Patient to OT at end of treatment    Plan of Care: Continue with POC and progress as tolerated.      Sherrell Costa, PT  3/2/2018

## 2018-03-02 NOTE — PROGRESS NOTES
OT Daily Note  Time In 1310   Time Out 1344     Subjective: \"I'm just tired. That's all I got. \" patient stated  Pain: no pain reported  Education: importance of activity tolerance and endurance  Interdisciplinary Communication: handoff to PT with Kiley Marking  Precautions:  (fall precautions)    Self-Care      03/02/18 1310   Time Spent With Patient   Time In 1310   Time Out 1344   Patient Seen For: PM;Other (see progress notes)   Toileting   Toileting Assistance (FIM Score) Min A   Cues Verbal cues provided   Adaptive Equipment (bedside commode)   Functional Transfers   Toilet Transfer  Stand pivot transfer without device   Amount of Assistance Required Min A        Activity Tolerance Daily Assessment   Therapist facilitated standing tolerance activity at heightened table. She was cued on several occasions for proper stance with decreased leaning for standing support. Max 60 seconds of standing before becoming fatigue and requiring to stay seated in w/c. CGA provided while standing and support placed on knee. Fair performance noted, increased fatigue       Assessment: Patient demonstrated increased fatigue and difficulty with activity tolerance while standing during tasks. Ms. Vitaly Warren continues to require cuing for standing tolerance and proper posture and stability. Concluded session with patient transferring to physical therapy.    Plan: Continue with POC and address current goals     MINDY Ward, OTR/L  3/2/18

## 2018-03-02 NOTE — PROGRESS NOTES
SFD PROGRESS NOTE    Francine Mcnulty  Admit Date: 2/13/2018  Admit Diagnosis: DEBILITY;CHF (congestive heart failure) ([address] Respiratory f*  Chief Complaint : Gait dysfunction secondary to below. Admit Diagnosis: Pleural effusion [J90]; Pleural effusion [J90]  Acute respiratory failure (HCC) (2/7/2018)  Pleural effusion (1/23/2018)/ S/P bilateral thoracentesis  Hypothyroidism   Chronic atrial fibrillation   HTN   Pulmonary hypertension   S/P TAVR(1/30/2018)  Leukocytosis (2/7/2018) on Abx   Acute on chronic diastolic heart failure   weakness  Pain  DVT risk  Acute blood loss anemia/ GI bleed? Acute Rehab Dx:  Generalized weakness. Debility    deconditioning   Mobility and ambulation deficits  Self Care/ADL deficits    Subjective     Patient seen and examined. Vss. Afebrile. Appears to be tolerating therapies fairly well. No new cardiopulmonary setbacks. conitnuing diuresis, monitoring renal function.      Objective:     Current Facility-Administered Medications   Medication Dose Route Frequency    aspirin delayed-release tablet 81 mg  81 mg Oral DAILY    0.9% sodium chloride infusion 250 mL  250 mL IntraVENous PRN    furosemide (LASIX) tablet 80 mg  80 mg Oral DAILY    potassium chloride (K-DUR, KLOR-CON) SR tablet 40 mEq  40 mEq Oral DAILY    0.9% sodium chloride infusion 250 mL  250 mL IntraVENous PRN    magnesium oxide (MAG-OX) tablet 400 mg  400 mg Oral DAILY    diazePAM (VALIUM) tablet 2.5 mg  2.5 mg Oral QHS    central line flush (saline) syringe 10 mL  10 mL InterCATHeter Q8H    acetaminophen (TYLENOL) tablet 650 mg  650 mg Oral Q4H PRN    alcohol 62% (NOZIN) nasal  1 Ampule  1 Ampule Topical Q12H    allopurinol (ZYLOPRIM) tablet 100 mg  100 mg Oral BID    hydrocortisone (ANUSOL-HC) 2.5 % rectal cream   PeriANAL TID PRN    levothyroxine (SYNTHROID) tablet 88 mcg  88 mcg Oral ACB    metoprolol succinate (TOPROL-XL) XL tablet 25 mg  25 mg Oral DAILY    pantoprazole (PROTONIX) tablet 40 mg  40 mg Oral ACB    polyethylene glycol (MIRALAX) packet 17 g  17 g Oral DAILY    pravastatin (PRAVACHOL) tablet 40 mg  40 mg Oral DAILY    rOPINIRole (REQUIP) tablet 2 mg  2 mg Oral BID    sertraline (ZOLOFT) tablet 100 mg  100 mg Oral DAILY    spironolactone (ALDACTONE) tablet 25 mg  25 mg Oral DAILY    levalbuterol (XOPENEX) nebulizer soln 0.63 mg/3 mL  0.63 mg Nebulization Q6H PRN     Facility-Administered Medications Ordered in Other Encounters   Medication Dose Route Frequency    0.9% sodium chloride infusion 250 mL  250 mL IntraVENous PRN     Review of Systems: + weakness. Denies chest pain, shortness of breath, cough, headache, visual problems, abdominal pain, dysurea, calf pain. Pertinent positives are as noted in the medical records and unremarkable otherwise. Visit Vitals    BP 99/66    Pulse 86    Temp 97.2 °F (36.2 °C)    Resp 18    Wt 169 lb (76.7 kg)    SpO2 97%    BMI 33.01 kg/m2        Physical Exam:   General: Alert and age appropriately oriented.  Appears comfortable on 2L O2 NC. No acute cardio respiratory distress. mild lethargy. HEENT: Normocephalic,no scleral icterus  Oral mucosa moist without cyanosis, No bruit, +JVD. Lungs: + few R  basilar crackles. No wheezing. Respiration even and unlabored   Heart: RRR,  II/VI TERRI  No clicks, rub or gallops   Abdomen: Soft, non-tender, nondistended. Bowel sounds present. Genitourinary: defered   Neuromuscular:      PERRL, EOMI  Follows simple commands consistently. Able to identify, recall repeat. Mood appropriate. Insight, judgement intact.    LUE     Shoulder abduction   5-/5              Elbow flexion:   5-/5               Wrist extension:  5- /5              Finger flexion;  5- /5  RUE    Shoulder abduction: 5- /5                Elbow flexion:  5- /5                         Wrist extension: 5- /5                        Finger flexion:  5- /5  LLE     Hip flexion:  3+ /5              Knee extension:  4 -/5                       Ankle dorsiflexion:  5 -/5                        Ankle plantarflexion:   5/5                                                                  RLE     Hip flexion:   3+/5                        Knee extension:  4- /5                         Ankle dorsiflexion:  5- /5                        Ankle plantarflexion:  5 /5  Sensory - intact grossly   No cerebellar signs. Plantars - down going  No atrophy, no fasciculations, no tremors. Skin/extremity: No rashes, no erythema. Calf non tender BLE. 2+ BLE edema. + chronic arthritic joint changes. Mild BUE edema. Functional Assessment:  Gross Assessment  AROM: Generally decreased, functional (02/28/18 1400)  PROM: Generally decreased, functional (02/28/18 1400)  Strength: Generally decreased, functional (02/28/18 1400)  Coordination: Generally decreased, functional (02/28/18 1400)  Tone: Normal (02/28/18 1400)  Sensation: Intact (02/21/18 1300)       Balance  Sitting - Static: Good (unsupported) (03/02/18 1000)  Sitting - Dynamic: Fair (occasional) (03/02/18 1000)  Standing - Static: Fair;Constant support (in parallel bars) (03/02/18 1000)  Standing - Dynamic : Impaired (03/02/18 1000)           Toileting  Cues: Physical assistance for pants down;Physical assistance for pants up; Tactile cues provided (03/01/18 0832)  Adaptive Equipment: Bill Gillis (03/01/18 0832)         Rachele Krabbe Fall Risk Assessment:  Rodrick No Risk  Mobility: Ambulates or transfers with assist devices or assistance/unsteady gait (03/02/18 0715)  Mobility Interventions: Patient to call before getting OOB (03/02/18 0715)  Mentation: Periodic confusion (03/02/18 0715)  Mentation Interventions: Adequate sleep, hydration, pain control (03/02/18 0715)  Medication: Patient receiving anticonvulsants, sedatives(tranquilizers), psychotropics or hypnotics, hypoglycemics, narcotics, sleep aids, antihypertensives, laxatives, or diuretics (03/02/18 0715)  Medication Interventions: Patient to call before getting OOB; Evaluate medications/consider consulting pharmacy; Bed/chair exit alarm (03/02/18 0715)  Elimination: Needs assistance with toileting (03/02/18 0715)  Elimination Interventions: Call light in reach; Patient to call for help with toileting needs (03/02/18 0715)  Prior Fall History: Unknown (03/02/18 0715)  History of Falls Interventions: Bed/chair exit alarm; Consult care management for discharge planning;Door open when patient unattended (03/02/18 0715)  Total Score: 4 (03/02/18 0715)  Standard Fall Precautions: Yes (03/02/18 0715)  High Fall Risk: Yes (02/28/18 2313)     Speech Assessment:         Ambulation:  Gait  Base of Support: Widened (02/28/18 1400)  Speed/Carol: Slow;Shuffled (02/28/18 1400)  Step Length: Right shortened;Left shortened (02/28/18 1400)  Distance (ft): 10 Feet (ft) (10ft x 5 with 3 to 4 min rest break between attempts) (03/02/18 1000)  Assistive Device: Other (comment) (parallel bars) (03/02/18 1000)  Rail Use: Both (02/22/18 1600)     Labs/Studies:  Recent Results (from the past 72 hour(s))   TYPE + CROSSMATCH    Collection Time: 02/27/18  8:26 PM   Result Value Ref Range    Crossmatch Expiration 03/02/2018     ABO/Rh(D) A POSITIVE     Antibody screen NEG     Unit number [id number]     Blood component type [facility]     Unit division 00     Status of unit TRANSFUSED     Crossmatch result Compatible     Unit number [id number]     Blood component type [facility]     Unit division 00     Status of unit TRANSFUSED     Crossmatch result Compatible    METABOLIC PANEL, BASIC    Collection Time: 02/28/18  5:55 AM   Result Value Ref Range    Sodium 147 (H) 136 - 145 mmol/L    Potassium 3.5 3.5 - 5.1 mmol/L    Chloride 95 (L) 98 - 107 mmol/L    CO2 >45 (HH) 21 - 32 mmol/L    Anion gap Cannot be calculated 7 - 16 mmol/L    Glucose 102 (H) 65 - 100 mg/dL    BUN 68 (H) 8 - 23 MG/DL    Creatinine 1.14 (H) 0.6 - 1.0 MG/DL    GFR est AA 60 (L) >60 ml/min/1.73m2    GFR est non-AA 49 (L) >60 ml/min/1.73m2    Calcium 8.8 8.3 - 10.4 MG/DL   CBC WITH AUTOMATED DIFF    Collection Time: 02/28/18  5:55 AM   Result Value Ref Range    WBC 4.1 (L) 4.3 - 11.1 K/uL    RBC 2.61 (L) 4.05 - 5.25 M/uL    HGB 7.5 (L) 11.7 - 15.4 g/dL    HCT 25.9 (L) 35.8 - 46.3 %    MCV 99.2 (H) 79.6 - 97.8 FL    MCH 28.7 26.1 - 32.9 PG    MCHC 29.0 (L) 31.4 - 35.0 g/dL    RDW 18.3 (H) 11.9 - 14.6 %    PLATELET 65 (L) 733 - 450 K/uL    MPV 12.0 10.8 - 14.1 FL    DF AUTOMATED      NEUTROPHILS 75 43 - 78 %    LYMPHOCYTES 16 13 - 44 %    MONOCYTES 5 4.0 - 12.0 %    EOSINOPHILS 3 0.5 - 7.8 %    BASOPHILS 1 0.0 - 2.0 %    IMMATURE GRANULOCYTES 0 0.0 - 5.0 %    ABS. NEUTROPHILS 3.1 1.7 - 8.2 K/UL    ABS. LYMPHOCYTES 0.7 0.5 - 4.6 K/UL    ABS. MONOCYTES 0.2 0.1 - 1.3 K/UL    ABS. EOSINOPHILS 0.1 0.0 - 0.8 K/UL    ABS. BASOPHILS 0.0 0.0 - 0.2 K/UL    ABS. IMM.  GRANS. 0.0 0.0 - 0.5 K/UL   METABOLIC PANEL, BASIC    Collection Time: 03/01/18  5:28 AM   Result Value Ref Range    Sodium 148 (H) 136 - 145 mmol/L    Potassium 3.4 (L) 3.5 - 5.1 mmol/L    Chloride 94 (L) 98 - 107 mmol/L    CO2 >45 (HH) 21 - 32 mmol/L    Anion gap Cannot be calculated 7 - 16 mmol/L    Glucose 109 (H) 65 - 100 mg/dL    BUN 68 (H) 8 - 23 MG/DL    Creatinine 1.17 (H) 0.6 - 1.0 MG/DL    GFR est AA 58 (L) >60 ml/min/1.73m2    GFR est non-AA 48 (L) >60 ml/min/1.73m2    Calcium 8.8 8.3 - 10.4 MG/DL   CBC W/O DIFF    Collection Time: 03/01/18  9:05 AM   Result Value Ref Range    WBC 4.7 4.3 - 11.1 K/uL    RBC 3.53 (L) 4.05 - 5.25 M/uL    HGB 10.2 (L) 11.7 - 15.4 g/dL    HCT 34.2 (L) 35.8 - 46.3 %    MCV 96.9 79.6 - 97.8 FL    MCH 28.9 26.1 - 32.9 PG    MCHC 29.8 (L) 31.4 - 35.0 g/dL    RDW 18.6 (H) 11.9 - 14.6 %    PLATELET 74 (L) 804 - 450 K/uL    MPV 11.7 10.8 - 21.9 FL   METABOLIC PANEL, BASIC    Collection Time: 03/02/18  4:13 AM   Result Value Ref Range    Sodium 149 (H) 136 - 145 mmol/L Potassium 3.6 3.5 - 5.1 mmol/L    Chloride 95 (L) 98 - 107 mmol/L    CO2 >45 (HH) 21 - 32 mmol/L    Anion gap Cannot be calculated 7 - 16 mmol/L    Glucose 106 (H) 65 - 100 mg/dL    BUN 67 (H) 8 - 23 MG/DL    Creatinine 1.11 (H) 0.6 - 1.0 MG/DL    GFR est AA >60 >60 ml/min/1.73m2    GFR est non-AA 51 (L) >60 ml/min/1.73m2    Calcium 9.0 8.3 - 10.4 MG/DL   CBC W/O DIFF    Collection Time: 03/02/18  4:13 AM   Result Value Ref Range    WBC 4.0 (L) 4.3 - 11.1 K/uL    RBC 3.38 (L) 4.05 - 5.25 M/uL    HGB 9.8 (L) 11.7 - 15.4 g/dL    HCT 33.1 (L) 35.8 - 46.3 %    MCV 97.9 (H) 79.6 - 97.8 FL    MCH 29.0 26.1 - 32.9 PG    MCHC 29.6 (L) 31.4 - 35.0 g/dL    RDW 17.9 (H) 11.9 - 14.6 %    PLATELET 72 (L) 094 - 450 K/uL    MPV 11.8 10.8 - 14.1 FL   MAGNESIUM    Collection Time: 03/02/18  4:13 AM   Result Value Ref Range    Magnesium 2.4 1.8 - 2.4 mg/dL       Assessment:     Problem List as of 3/2/2018  Date Reviewed: 2/10/2018          Codes Class Noted - Resolved    Res[facility] failure ([facility] ICD-10-CM: J96.90  ICD-9-CM: 518.81  2/13/2018 - Present        Acute on chronic respiratory failure ([facility] [address] ICD-10-CM: J96.20  ICD-9-CM: 518.84  2/7/2018 - Present        Leukocytosis ICD-10-CM: O30.838  ICD-9-CM: 288.60  2/7/2018 - Present        Chronic diastolic heart failure (HCC) ICD-10-CM: I50.32  ICD-9-CM: 428.32  2/7/2018 - Present        S/P TAVR (transcatheter aortic valve replacement) ICD-10-CM: Z95.2  ICD-9-CM: V43.3  1/30/2018 - Present        Aortic stenosis (Chronic) ICD-10-CM: I35.0  ICD-9-CM: 424.1  1/29/2018 - Present        Peripheral arterial disease (HCC) (Chronic) ICD-10-CM: I73.9  ICD-9-CM: 443.9  1/23/2018 - Present        Pleural effusion ICD-10-CM: J90  ICD-9-CM: 511.9  1/23/2018 - Present    Overview Addendum 2/10/2018 11:36 AM by Alta Donovan NP     Right thoracentesis 1/25/18 - 1200 mls removed  Left thoracentesis 1/25/2018 - 900 mls removed  Bilateral thoracentesis 2/9/18 - L 550 ml (air aspirated during and at the end of procedure) / R 1000 ml and R creamy pink              Chronic respiratory failure with hypoxia (HCC) (Chronic) ICD-10-CM: J96.11  ICD-9-CM: 518.83, 799.02  1/23/2018 - Present    Overview Signed 1/23/2018 11:17 AM by Neida Aguirre, NP     Wears 3 to 4 liters at home             Hypoxia ICD-10-CM: R09.02  ICD-9-CM: 799.02  1/23/2018 - Present        Stenosis of prosthetic aortic valve (Chronic) ICD-10-CM: I35.0  ICD-9-CM: 396.0  1/19/2018 - Present    Overview Signed 1/19/2018  3:18 PM by Acosta Hardin MD     AVR (10/5/13):  21 mm Pericardial valve.                Tricuspid valve insufficiency (Chronic) ICD-10-CM: I07.1  ICD-9-CM: 397.0  1/15/2018 - Present        Systolic CHF, chronic (HCC) (Chronic) ICD-10-CM: I50.22  ICD-9-CM: 428.22, 428.0  1/15/2018 - Present        S/P mitral valve repair (Chronic) ICD-10-CM: I90.615  ICD-9-CM: V45.89  12/4/2017 - Present        H/O atrioventricular rubin ablation (Chronic) ICD-10-CM: W36.732  ICD-9-CM: V15.1  3/22/2017 - Present        Pulmonary hypertension (Chronic) ICD-10-CM: I27.20  ICD-9-CM: 416.8  2/12/2017 - Present        Aortic valve replaced (Chronic) ICD-10-CM: Z95.2  ICD-9-CM: V43.3  1/9/2017 - Present        Chronic diastolic congestive heart failure (HCC) (Chronic) ICD-10-CM: I50.32  ICD-9-CM: 428.32, 428.0  9/9/2016 - Present        Chronic depression (Chronic) ICD-10-CM: F32.9  ICD-9-CM: 311  8/5/2016 - Present        Osteopenia (Chronic) ICD-10-CM: M85.80  ICD-9-CM: 733.90  8/5/2016 - Present        RLS (restless legs syndrome) (Chronic) ICD-10-CM: G25.81  ICD-9-CM: 333.94  8/5/2016 - Present        Hyperlipidemia (Chronic) ICD-10-CM: E78.5  ICD-9-CM: 272.4  8/5/2016 - Present        Gout (Chronic) ICD-10-CM: M10.9  ICD-9-CM: 274.9  8/5/2016 - Present        Anxiety (Chronic) ICD-10-CM: F41.9  ICD-9-CM: 300.00  8/5/2016 - Present        CAD (coronary artery disease) (Chronic) ICD-10-CM: I25.10  ICD-9-CM: 414.00  8/5/2016 - Present HTN (hypertension) (Chronic) ICD-10-CM: I10  ICD-9-CM: 401.9  8/5/2016 - Present        GERD (gastroesophageal reflux disease) (Chronic) ICD-10-CM: K21.9  ICD-9-CM: 530.81  8/5/2016 - Present        H/O mitral valve repair, 2003 (Chronic) ICD-10-CM: H00.311  ICD-9-CM: V15.1  6/27/2016 - Present        Sick sinus syndrome (HCC) (Chronic) ICD-10-CM: I49.5  ICD-9-CM: 427.81  2/13/2016 - Present        Obesity (Chronic) ICD-10-CM: E66.9  ICD-9-CM: 278.00  11/2/2015 - Present        Mitral stenosis with insufficiency (Chronic) ICD-10-CM: P12.4  ICD-9-CM: 394.2  11/2/2015 - Present    Overview Signed 11/2/2015 11:02 AM by Suresh Orourke     Prior MV repair 2003.               Rheumatic aortic stenosis (Chronic) ICD-10-CM: I06.0  ICD-9-CM: 395.0  11/2/2015 - Present        Cardiac pacemaker (Chronic) ICD-10-CM: Z95.0  ICD-9-CM: V45.01  11/2/2015 - Present        Thrombocytopenia (HCC) (Chronic) ICD-10-CM: D69.6  ICD-9-CM: 287.5  8/22/2012 - Present        Anemia (Chronic) ICD-10-CM: D64.9  ICD-9-CM: 285.9  10/27/2011 - Present    Overview Signed 10/27/2011  8:25 AM by Brain Bird     Acute on chronic               Chronic atrial fibrillation (HCC) (Chronic) ICD-10-CM: I48.2  ICD-9-CM: 427.31  10/17/2011 - Present        Hypothyroidism (Chronic) ICD-10-CM: E03.9  ICD-9-CM: 244.9  12/4/2009 - Present        BOBBY (obstructive sleep apnea) (Chronic) ICD-10-CM: G47.33  ICD-9-CM: 327.23  12/4/2009 - Present        Chronic obstructive pulmonary disease (HCC) (Chronic) ICD-10-CM: J44.9  ICD-9-CM: 496  3/8/2009 - Present        Aortic Valve Bioprosthesis Present (Chronic) ICD-10-CM: Z95.2  ICD-9-CM: V43.3  3/7/2009 - Present        Degenerative arthritis of left knee (Chronic) ICD-10-CM: M17.12  ICD-9-CM: 715.96  2/27/2009 - Present        RESOLVED: CHF (congestive heart failure) ([facility]) ICD-10-CM: I50.9  ICD-9-CM: 428.0  2/13/2018 - 2/14/2018        RESOLVED: Acute on chronic systolic congestive heart failure ([address] ICD-10-CM: I50.23  ICD-9-CM: 428.23, 428.0  2/1/2018 - 2/7/2018        RESOLVED: Acute pulmonary edema ([address] ICD-10-CM: J81.0  ICD-9-CM: 518.4  1/25/2018 - 2/7/2018        RESOLVED: Pacemaker ICD-10-CM: Z95.0  ICD-9-CM: V45.01  12/4/2017 - 1/23/2018        RESOLVED: History of gout ICD-10-CM: Z87.39  ICD-9-CM: V12.29  1/31/2017 - 1/23/2018        RESOLVED: Warfarin anticoagulation (Chronic) ICD-10-CM: Z79.01  ICD-9-CM: V58.61  1/9/2017 - 1/23/2018        RESOLVED: Non morbid obesity due to excess calories ICD-10-CM: E66.09  ICD-9-CM: 278.00  11/14/2016 - 1/23/2018        RESOLVED: Hypoxemia ICD-10-CM: R09.02  ICD-9-CM: 799.02  11/14/2016 - 1/23/2018        RESOLVED: Localized edema ICD-10-CM: R60.0  ICD-9-CM: 782.3  9/9/2016 - 1/23/2018        RESOLVED: Iron deficiency anemia ICD-10-CM: D50.9  ICD-9-CM: 280.9  9/9/2016 - 1/23/2018        RESOLVED: CRI (chronic renal insufficiency) ICD-10-CM: N18.9  ICD-9-CM: 585.9  8/5/2016 - 1/23/2018        RESOLVED: Dyspnea ICD-10-CM: R06.00  ICD-9-CM: 786.09  8/5/2016 - 1/23/2018        RESOLVED: Right hip pain ICD-10-CM: M25.551  ICD-9-CM: 719.45  8/5/2016 - 1/23/2018        RESOLVED: Edema ICD-10-CM: R60.9  ICD-9-CM: 782.3  8/5/2016 - 1/23/2018        RESOLVED: Chest pain ICD-10-CM: R07.9  ICD-9-CM: 786.50  8/5/2016 - 1/23/2018        RESOLVED: Other long term (current) drug therapy ICD-10-CM: Z79.899  ICD-9-CM: V58.69  8/5/2016 - 1/23/2018        RESOLVED: Acute encephalopathy ICD-10-CM: G93.40  ICD-9-CM: 348.30  7/8/2016 - 1/23/2018        RESOLVED: Tachycardia ICD-10-CM: R00.0  ICD-9-CM: 785.0  6/27/2016 - 1/23/2018        RESOLVED: Palpitations ICD-10-CM: R00.2  ICD-9-CM: 785.1  11/2/2015 - 1/23/2018        RESOLVED: Respiratory insufficiency ICD-10-CM: R06.89  ICD-9-CM: 786.09  11/2/2015 - 1/23/2018        RESOLVED: Bradycardia (Symptomatic) ICD-10-CM: R00.1  ICD-9-CM: 427.89  11/7/2011 - 1/23/2018        RESOLVED: Rectal bleeding ICD-10-CM: K62.5  ICD-9-CM: 569.3  10/27/2011 - 1/23/2018        RESOLVED: AV block ICD-10-CM: I44.30  ICD-9-CM: 426.10  10/17/2011 - 1/23/2018        RESOLVED: Cardiogenic shock ([address] ICD-10-CM: R57.0  ICD-9-CM: 785.51  10/17/2011 - 1/23/2018        RESOLVED: Hyperkalemia ICD-10-CM: E87.5  ICD-9-CM: 276.7  10/17/2011 - 1/23/2018        RESOLVED: Nausea and vomiting ICD-10-CM: R11.2  ICD-9-CM: 787.01  2/24/2010 - 1/23/2018        RESOLVED: Epigastric abdominal pain ICD-10-CM: R10.13  ICD-9-CM: 789.06  2/24/2010 - 1/23/2018        RESOLVED: Digoxin toxicity ICD-10-CM: T46.0X1A  ICD-9-CM: 972.1, E942.1  2/24/2010 - 1/23/2018        RESOLVED: ARF (acute renal failure) (HCC) (Chronic) ICD-10-CM: N17.9  ICD-9-CM: 584.9  2/24/2010 - 1/23/2018        RESOLVED: Hypokalemia ICD-10-CM: E87.6  ICD-9-CM: 276.8  2/24/2010 - 1/23/2018        RESOLVED: CKD (chronic kidney disease) stage 3, GFR 30-59 ml/min (Chronic) ICD-10-CM: N18.3  ICD-9-CM: 585.3  12/4/2009 - 1/23/2018        RESOLVED: Cough ICD-10-CM: R05  ICD-9-CM: 786.2  3/9/2009 - 3/12/2009        RESOLVED: Bronchitis ICD-10-CM: J40  ICD-9-CM: 490  3/9/2009 - 3/12/2009        RESOLVED: Atrial fibrillation (HCC) (Chronic) ICD-10-CM: I48.91  ICD-9-CM: 427.31  3/7/2009 - 6/27/2016        RESOLVED: Hypotension (Chronic) ICD-10-CM: I95.9  ICD-9-CM: 458.9  3/1/2009 - 1/23/2018              Plan:     The Post Assessment Physician Evaluation (RAMBO) found the current functional status to be comparable with the Pre-admission Screening. The Patient is a good candidate for acute inpatient rehabilitation. Nothing since the Pre-admission screen has changed that determination.      Rehabilitation Plan  The patient has shown the ability to tolerate and benefit from 3 hours of therapy daily and is being admitted to a comprehensive acute inpatient rehabilitation program consisting of at least 3 hours of combined physical and occupational therapies.   Resume intensive Physical Therapy for a minimum of 1.5 hours a day, at least 5 out of 7 days per week to address bed mobility, transfers, ambulation, strengthening, balance, and endurance. - pt  was ambulating independently with a rollator inside her home. reports using rollator or a wheelchair for community mobility. Will continue to focus on endurance, strengthening BLe.      Resume  intensive Occupational Therapy for a minimum of 1.5 hours a day, at least 5 out of 7 days per week to address ADL ( bathing, LE dressing, toileting) and adaptive equipment as needed.       Continue 24-hour skilled rehabilitation nursing for bowel and bladder management, skin care for decubitus ulcer prevention , pain management and ongoing medication administration      The patient may benefit from a psychology consult for depression, anxiety and adjustment disorder.     Continue daily physician medical management:    Acute respiratory failure (HCC) (2/7/2018)/ Pleural effusion (1/23/2018)/ S/P bilateral thoracentesis  S/p - Thoracenteses on 2/9 - 550 mls removed from the left and 1 liter removed from the right.   - Continue bronchodilators prn- has home nebulizer. Resume as prn;xopenex  - Patient will be on 2L O2 at therapy and at rest. May be able to wean as tolerated. Will monitor saO2. 2/15 - sao2 at 100% on 2L/ min. Feels comfortable. Continue lasix and aldactone. Cardiology gave 1x Zaroxolyn in AM prior to AM lasix. 2/19 - continue lasix 80 mg bid + aldactone. +metolazone prior to lasix each morning. Monitor. Daily weights.   2/20- cardiology recommendation appreciated. Transition to iv lasic. Will need access. Difficult due to edema. 2/21- Persistent edema. Iv lasix ordered per cardiology, however since peripheral access not yet achieved after multiple attempts due to edema. Will have IR place CVC / ij anticipating frequent blood draws and continued iv diuretic needs. Mean time will have po lasix for every iv dose missed. 2/22 - Patient edema mildly better. Mild decreased weight; 176lbs.  conitune lasix 80 bid. Increase KCl 40 to bid dose. Left IJ oozing overnight following IR procedure, appears not bleeding today. hgb decreased, likely due to blood loss. 2/23 - mildly SOB, will repeat CxR to monitor bibasilar effusions. Continue iv lasix. 2/26 - SaO2 stable on O2 @2L via NC. cpntinued on diuresis over the weekend. Cr, elevated. Will reduce lasix. 2/27 - lasix reduced. Metolazone held. BUN/ Cr. = 57/1.59-> 62/1.21.   2/28 - Edema clinically appears better, still persistent. Continue to check renal function. 68/1. 14. Moderate elevation of BUN. Cr acceptable. Continued clinical evidence of fluid retention. continue to diurese with careful monitoring of renal function  3/1- continue to diurese oral Lasix 80 daily. Cr improved. Holding eliquis, started on aspirin for anticoag/antiplatelet therapy due to suspected hx of GI bleeding. .  3/2 - weight 169lbs. Slow decrease. Conitinuing diuresis, monitor renal function. Clinically volume overloaded, CHF.          S/P TAVR(1/30/2018)/ Acute on chronic diastolic heart failure Pulmonary hypertension / Chronic atrial fibrillation/ HTN   - cardiac precaution, s.p TAVR. - weights and maintain a 2 liter per day fluid restriction.   - Monitor HR/BP. conitinue Eliquis for a.fib  - continue BB-Toprol XL  2/15- edema, chest exam appears not changed. cintiuned on diuretics. 2/19- continue O2 supplements. 2/20 - HR in control. continue O2, comfortable on same rate. Will obtain CxR. Check BNP. Monitor renal fx.   2/21 - HR in control. conitinue eliquis. BB at current dose. 2/22 - HR 80's monitor. Temporarily reduce Eliquis dose 2.5 bid due to oozing, blood loss. Anemia - hgb 7.4. Check in am. Transfuse prn.   2/23 - will continue lower dose eliquis as hgb decreased following IR procedure. , plt remain low; 70k. HR in good range 70-80s. Continue monitor. 2/26 - continued on lower dose eliquis for now. Weight 172 lbs, reduced about 7 lbs since he admission to [facility].  still persistent edema. Cr 1.59, will reduce lasix and hold metolazone. Will get cardiology input. 2/28 - continue of current dose diuretics.         Hypothyroidism - synthroid 88 mcg     Leukocytosis (2/7/2018) on Abx last dose 7/7 rocephin administered 2/13.   - finished abx. No new s/s of infection. - 2/21 no new signs of infection.      Acute blood loss anemia/ GI bleed? -monitor clinically. May have been caused by high INR. - no melena. - hgb 9.1 (2/18) , asymptomatic.   - 2/22 - Anemia due to blood loss form IR proocedure- hgb 7.4. Check in am.  2/23 - hgb 7.7. Check labs in am and Monday. 2/26 - s/p transfusion 1 unit over the weekend. Monitor. hgb 8.2   2/27 - hgb 8.2-> 7.7, mild decrease. Will ask GI to consult on management. continue Eliquis? 2/28 -hgb 7,5. Transfuse 2 units today. GI consult appreciated. Will follow rec. Continue PPI. Use the Anusol prn for hemmorhoids  3/1 - GI recommendaiton to be followed. continue PPI. hemmorrhoidal bleeding to be treated with anusol. Transfuse prn. Monitor hgb. Monitor for s/s of acute bleed. hgb 10.2 today, s/p transfusion 2u.  3/2- no signs of acute bleed. conitnue to monitor hgb/hct, check Sunday.       Pneumonia prophylaxis- Insentive spirometer every hour while awake     DVT risk / DVT Prophylaxis- continue daily physician exam to assess for signs and symptoms as patient is at increased risk for of thromboembolism. - covered with eliquis. No s/s of DVT/ PE. Wound Care: - monitor for ulcers, skin breakdown due to edema. -left elbow  edema drainage covered. -  TEDs. Fitting better. Edema control.         Potential urinary retention - no s/s of retention. Monitor.  - pt mostly continent. - needed to use bedpan over the wekend due to decline in mobility.      bowel program - as needed dulcolax, pericolace. + BM.     GERD/ GI prophylaxis - resume PPI.  At times may need additional antacids, Maalox prn.          Time spent was 35 minutes with over 1/2 in direct patient care/examination, consultation and coordination of care.      Signed By: Jaylon Lee MD     March 2, 2018

## 2018-03-02 NOTE — PROGRESS NOTES
OT Daily Note    Time In 0915   Time Out 1000     Subjective: \"I need to go to the bathroom. \"  Pain: None indicated  Patient on 1L 02 via nasal cannula, 02 saturation remains greater than or equal to 90% this session    Precautions:  (fall precautions)    Activity Tolerance   Patient seated at tabletop in therapy gym for BUE endurance/FMC task with 1# cuff weights donned bilaterally. Patient reaches to R of tabletop to gather clothing items, cued to complete all fasteners, folds each items using BUE, and stacks folded items to L of tabletop. Note patient required increased time, consistent cues and demonstration to initiate/complete fasteners such as buttons, with decreased attention to cues altogether at times (neglecting fasteners and proceeding to fold item). Self-Care   Patient requested toileting, transferred wheelchair <> toilet SPT with moderate assist.  Required total assistance for toileting this session for bottom hygiene. Washed hands setup at sink in wheelchair following toileting. Assessment: Decreased attention and problem solving noted compounded by [facility] deficit for IADL laundry task. Education: Purpose of therapy  Interdisciplinary Communication: Collaborated with Cecile Flynn and agreed patient is progressing well and on-track to meet goals as stated in [address]. Plan: Continue to address ADL/IADL, functional mobility, activity tolerance, balance, strengthening, coordination, education, cognition.       uHmaira Herring, OTR/L

## 2018-03-02 NOTE — PROGRESS NOTES
Patient resting up in bed. More alert this morning, less lethargic. Up to bedside commode. Lung sounds with light coarseness. 2 liters nasal cannula. S1S2, bowel sounds active. Triple limen IJ patent. Bruising to upper extremities. Edema to extremities. Assisted with setting up breakfast tray. Door left open for closer observation. No other verbalized needs at present time. See doc flow sheet for further assessments.

## 2018-03-02 NOTE — PROGRESS NOTES
Problem: Falls - Risk of  Goal: *Absence of Falls  Document Gregorio Fall Risk and appropriate interventions in the flowsheet.    Outcome: Progressing Towards Goal  Fall Risk Interventions:  Mobility Interventions: Patient to call before getting OOB    Mentation Interventions: Adequate sleep, hydration, pain control    Medication Interventions: Patient to call before getting OOB, Evaluate medications/consider consulting pharmacy, Bed/chair exit alarm    Elimination Interventions: Call light in reach, Patient to call for help with toileting needs    History of Falls Interventions: Bed/chair exit alarm, Consult care management for discharge planning, Door open when patient unattended

## 2018-03-02 NOTE — PROGRESS NOTES
Patient was helped to the [facility] 4 X during the night and has had a wet brief several times, plus 150-200 mL of urine in the bucket.     Continues to be impulsive; tries to get OOB on her own without using call light; bed alarm maintained throughout the night.     Labs drawn in am:  Hgb = 9.8;  C02 continues to be >45    Nasal cannula @ 1.5 lpm; O2 = 97. When lowered to 1 lpm, 02 = 87.     Patient's weight is: 76.7 (77.6 kg previously). Hourly rounds were performed throughout the shift. All needs met at this time. Report given to oncoming nurse.

## 2018-03-03 NOTE — PROGRESS NOTES
PHYSICAL THERAPY DAILY NOTE  Time In: 1628  Time Out: 1130  Patient Seen For: AM;Gait training;Patient education; Therapeutic exercise;Transfer training; Other (see progress notes)    Subjective: Pt agreeable for therapy. Family present at start of session. \"I'm ok, getting better. \"           Objective: Pt on 2L02 via NC during session. O2 sats 99% with activity. Other (comment) (Fall precautions)    TRANSFERS Daily Assessment    Transfer Type: SPT with walker  Transfer Assistance : 4 (Minimal assistance)  Sit to Stand Assistance: Minimal assistance  Car Transfers: Not tested       GAIT Daily Assessment    Amount of Assistance: 4 (Contact guard assistance)  Distance (ft): 50 Feet (ft)  Assistive Device: Walker, rolling   Verbal cues for walker placement for safety. Required occasional min x 1 assist for walker management. BALANCE Daily Assessment    Sitting - Static: Good (unsupported)  Sitting - Dynamic: Fair (occasional)  Standing - Static: Fair       WHEELCHAIR MOBILITY Daily Assessment    Wheelchair Setup Assist Required : 3 (Moderate assistance)  Wheelchair Management: Manages left brake;Manages right brake     LOWER EXTREMITY EXERCISES Daily Assessment    Extremity: Both  Exercise Type #1: Other (comment) (motomed level 2 for 10 minutes)  Sets Performed: 1   Pt left seated in wc with OT to continue therapy. Assessment: Excellent increase in ambulation distance and endurance on this date. Plan of Care: Continue with POC and progress as tolerated. Benoit Pacheco  3/3/2018

## 2018-03-03 NOTE — PROGRESS NOTES
[place] PO given. Pt states she does not want to eat dinner at this time.  Set aside to warm up later

## 2018-03-03 NOTE — PROGRESS NOTES
[facility] CARDIOLOGY PROGRESS NOTE      3/3/2018 1:12 PM    Admit Date: 2/13/2018    Admit Diagnosis: DEBILITY;CHF (congestive heart failure) ([facility] [address] Respiratory f*      Subjective:   No chest pain or dyspnea. getting stronger wants to get home       Objective:      Vitals:    03/02/18 0755 03/02/18 1500 03/02/18 1917 03/03/18 0908   BP: 99/66 109/72 100/65 101/52   Pulse: 86 83 85 84   Resp: 18 16 16 18   Temp: 97.2 °F (36.2 °C) 97.5 °F (36.4 °C) 97.5 °F (36.4 °C) 97.6 °F (36.4 °C)   SpO2: 97% 100% 100% 100%   Weight:           Physical Exam:  Neck-   CV- rr+r  1/6 TERRI  Lungs- clear except bases   Ext-  Skin- warm and dry        Data Review:   Recent Labs      03/02/18   0413  03/01/18   0905  03/01/18   0528   NA  149*   --   148*   K  3.6   --   3.4*   MG  2.4   --    --    BUN  67*   --   68*   CREA  1.11*   --   1.17*   GLU  106*   --   109*   WBC  4.0*  4.7   --    HGB  9.8*  10.2*   --    HCT  33.1*  34.2*   --    PLT  72*  74*   --        Assessment and Plan:      Active Problems:    Chronic atrial fibrillation (HCC) (10/17/2011)   regular rhythm today  of Eliquis with anemia  stable after transfusion       S/P TAVR (transcatheter aortic valve replacement) (1/30/2018)  doing well       Chronic diastolic heart failure ([address] (2/7/2018)  continue medications  monitor and repeat lytes in am       Respiratory failure ([facility] [address] (2/13/2018)        Nika Edwards MD

## 2018-03-03 NOTE — PROGRESS NOTES
03/03/18 1345   Time Spent With Patient   Time In 1120   Time Out 1215   Patient Seen For: AM;ADLs;Other (see progress notes)   Toileting   Toileting Assistance (FIM Score) Min A   Patient was seen for UE strengthening, standing balance, cognitive activities and toileting. Patient competed 3 minutes of restorator at min resistance. At raised standing table, patient was able to take out 8 red puzzle pieces with 2 cues. Max cues and extra time  to replace and connect with other pieces. Stood for 6 minutes before asking to sit down. Patient asked to use the commode. Able to pull pants down with min A. Wiped self and needed min A to pull pants back up. Stand pivot to commode with supervision. Patient then put in recliner with alarm activated. All essentials within reach. Continue POC. Cm Tilley  3/3/2018    .

## 2018-03-03 NOTE — PROGRESS NOTES
SFD PROGRESS NOTE    Soto Maldonado  Admit Date: 2/13/2018  Admit Diagnosis: DEBILITY;CHF (congestive heart failure) ([facility] [address] Respiratory f*  Chief Complaint : Gait dysfunction secondary to below. Admit Diagnosis: Pleural effusion [J90]; Pleural effusion [J90]  Acute respiratory failure (HCC) (2/7/2018)  Pleural effusion (1/23/2018)/ S/P bilateral thoracentesis  Hypothyroidism   Chronic atrial fibrillation   HTN   Pulmonary hypertension   S/P TAVR(1/30/2018)  Leukocytosis (2/7/2018) on Abx   Acute on chronic diastolic heart failure   weakness  Pain  DVT risk  Acute blood loss anemia/ GI bleed? Acute Rehab Dx:  Generalized weakness. Debility    deconditioning   Mobility and ambulation deficits  Self Care/ADL deficits    Subjective     Patient seen and examined. Vss. Afebrile. Patient denies acute chest pain, SOB, cough. Still with generalized weakness, poor activity tolerance due to on going heart failure. AM PT well tolerated. Noted increase in ambulation distance and endurance.      Objective:     Current Facility-Administered Medications   Medication Dose Route Frequency    aspirin delayed-release tablet 81 mg  81 mg Oral DAILY    0.9% sodium chloride infusion 250 mL  250 mL IntraVENous PRN    furosemide (LASIX) tablet 80 mg  80 mg Oral DAILY    potassium chloride (K-DUR, KLOR-CON) SR tablet 40 mEq  40 mEq Oral DAILY    magnesium oxide (MAG-OX) tablet 400 mg  400 mg Oral DAILY    diazePAM (VALIUM) tablet 2.5 mg  2.5 mg Oral QHS    central line flush (saline) syringe 10 mL  10 mL InterCATHeter Q8H    acetaminophen (TYLENOL) tablet 650 mg  650 mg Oral Q4H PRN    alcohol 62% (NOZIN) nasal  1 Ampule  1 Ampule Topical Q12H    allopurinol (ZYLOPRIM) tablet 100 mg  100 mg Oral BID    hydrocortisone (ANUSOL-HC) 2.5 % rectal cream   PeriANAL TID PRN    levothyroxine (SYNTHROID) tablet 88 mcg  88 mcg Oral ACB    metoprolol succinate (TOPROL-XL) XL tablet 25 mg  25 mg Oral DAILY    pantoprazole (PROTONIX) tablet 40 mg  40 mg Oral ACB    polyethylene glycol (MIRALAX) packet 17 g  17 g Oral DAILY    pravastatin (PRAVACHOL) tablet 40 mg  40 mg Oral DAILY    rOPINIRole (REQUIP) tablet 2 mg  2 mg Oral BID    sertraline (ZOLOFT) tablet 100 mg  100 mg Oral DAILY    spironolactone (ALDACTONE) tablet 25 mg  25 mg Oral DAILY    levalbuterol (XOPENEX) nebulizer soln 0.63 mg/3 mL  0.63 mg Nebulization Q6H PRN     Review of Systems: + weakness. Denies chest pain, shortness of breath, cough, headache, visual problems, abdominal pain, dysurea, calf pain. Pertinent positives are as noted in the medical records and unremarkable otherwise. Visit Vitals    /65 (BP 1 Location: Right arm, BP Patient Position: At rest)    Pulse 85    Temp 97.5 °F (36.4 °C)    Resp 16    Wt 169 lb (76.7 kg)    SpO2 100%    BMI 33.01 kg/m2        Physical Exam:   General: Alert and age appropriately oriented.  Appears comfortable on 2L O2 NC. No acute cardio respiratory distress. mild lethargy. HEENT: Normocephalic,no scleral icterus  Oral mucosa moist without cyanosis, No bruit, mild +JVD. Lungs: + few  basilar crackles. No wheezing. Respiration even and unlabored   Heart: RRR,  II/VI TERRI  No clicks, rub or gallops   Abdomen: Soft, non-tender, nondistended. Bowel sounds present. Genitourinary: defered   Neuromuscular:      PERRL, EOMI  Follows simple commands consistently. Able to identify, recall repeat. Mood appropriate. Insight, judgement intact.    LUE     Shoulder abduction   5-/5              Elbow flexion:   5-/5               Wrist extension:  5- /5              Finger flexion;  5- /5  RUE    Shoulder abduction: 5- /5                Elbow flexion:  5- /5                         Wrist extension: 5- /5                        Finger flexion:  5- /5  LLE     Hip flexion:  3+ /5              Knee extension:  4 -/5                         Ankle dorsiflexion:  5 -/5  Cristine Carnes plantarflexion:   5/5                                                                  RLE     Hip flexion:   3+/5                        Knee extension:  4- /5                         Ankle dorsiflexion:  5- /5                        Ankle plantarflexion:  5 /5  Sensory - intact grossly   No cerebellar signs. Plantars - down going  No atrophy, no fasciculations, no tremors. Skin/extremity: No rashes, no erythema. Calf non tender BLE. 2+ BLE edema. + chronic arthritic joint changes. Mild BUE edema.                                                                                  Functional Assessment:  Gross Assessment  AROM: Generally decreased, functional (02/28/18 1400)  PROM: Generally decreased, functional (02/28/18 1400)  Strength: Generally decreased, functional (02/28/18 1400)  Coordination: Generally decreased, functional (02/28/18 1400)  Tone: Normal (02/28/18 1400)  Sensation: Intact (02/21/18 1300)       Balance  Sitting - Static: Good (unsupported) (03/02/18 1300)  Sitting - Dynamic: Fair (occasional) (03/02/18 1300)  Standing - Static: Fair;Constant support (in parallel bars) (03/02/18 1300)  Standing - Dynamic : Impaired (03/02/18 1300)           Toileting  Cues: Verbal cues provided (03/02/18 1310)  Adaptive Equipment:  (bedside commode) (03/02/18 1310)         Genevieve Handy Fall Risk Assessment:  Genevieve Handy Fall Risk  Mobility: Ambulates or transfers with assist devices or assistance/unsteady gait (03/02/18 1924)  Mobility Interventions: Patient to call before getting OOB (03/02/18 1924)  Mentation: Periodic confusion (03/02/18 1924)  Mentation Interventions: Adequate sleep, hydration, pain control (03/02/18 1924)  Medication: Patient receiving anticonvulsants, sedatives(tranquilizers), psychotropics or hypnotics, hypoglycemics, narcotics, sleep aids, antihypertensives, laxatives, or diuretics (03/02/18 1924)  Medication Interventions: Patient to call before getting OOB (03/02/18 1924)  Elimination: Needs assistance with toileting (03/02/18 1924)  Elimination Interventions: Call light in reach; Patient to call for help with toileting needs (03/02/18 0715)  Prior Fall History: Unknown (03/02/18 1924)  History of Falls Interventions: Bed/chair exit alarm (03/02/18 1924)  Total Score: 4 (03/02/18 1924)  Standard Fall Precautions: Yes (03/02/18 1924)  High Fall Risk: Yes (02/28/18 2313)     Speech Assessment:         Ambulation:  Gait  Base of Support: Widened (02/28/18 1400)  Speed/Carol: Slow;Shuffled (02/28/18 1400)  Step Length: Right shortened;Left shortened (02/28/18 1400)  Distance (ft):  (10ft x 5 with 3 to 4 min rest break between attempts) (03/02/18 1300)  Assistive Device: Other (comment) (parallel bars) (03/02/18 1300)  Rail Use: Both (02/22/18 1600)     Labs/Studies:  Recent Results (from the past 72 hour(s))   METABOLIC PANEL, BASIC    Collection Time: 03/01/18  5:28 AM   Result Value Ref Range    Sodium 148 (H) 136 - 145 mmol/L    Potassium 3.4 (L) 3.5 - 5.1 mmol/L    Chloride 94 (L) 98 - 107 mmol/L    CO2 >45 (HH) 21 - 32 mmol/L    Anion gap Cannot be calculated 7 - 16 mmol/L    Glucose 109 (H) 65 - 100 mg/dL    BUN 68 (H) 8 - 23 MG/DL    Creatinine 1.17 (H) 0.6 - 1.0 MG/DL    GFR est AA 58 (L) >60 ml/min/1.73m2    GFR est non-AA 48 (L) >60 ml/min/1.73m2    Calcium 8.8 8.3 - 10.4 MG/DL   CBC W/O DIFF    Collection Time: 03/01/18  9:05 AM   Result Value Ref Range    WBC 4.7 4.3 - 11.1 K/uL    RBC 3.53 (L) 4.05 - 5.25 M/uL    HGB 10.2 (L) 11.7 - 15.4 g/dL    HCT 34.2 (L) 35.8 - 46.3 %    MCV 96.9 79.6 - 97.8 FL    MCH 28.9 26.1 - 32.9 PG    MCHC 29.8 (L) 31.4 - 35.0 g/dL    RDW 18.6 (H) 11.9 - 14.6 %    PLATELET 74 (L) 865 - 450 K/uL    MPV 11.7 10.8 - 20.5 FL   METABOLIC PANEL, BASIC    Collection Time: 03/02/18  4:13 AM   Result Value Ref Range    Sodium 149 (H) 136 - 145 mmol/L    Potassium 3.6 3.5 - 5.1 mmol/L    Chloride 95 (L) 98 - 107 mmol/L    CO2 >45 (HH) 21 - 32 mmol/L    Anion gap Cannot be calculated 7 - 16 mmol/L    Glucose 106 (H) 65 - 100 mg/dL    BUN 67 (H) 8 - 23 MG/DL    Creatinine 1.11 (H) 0.6 - 1.0 MG/DL    GFR est AA >60 >60 ml/min/1.73m2    GFR est non-AA 51 (L) >60 ml/min/1.73m2    Calcium 9.0 8.3 - 10.4 MG/DL   CBC W/O DIFF    Collection Time: 03/02/18  4:13 AM   Result Value Ref Range    WBC 4.0 (L) 4.3 - 11.1 K/uL    RBC 3.38 (L) 4.05 - 5.25 M/uL    HGB 9.8 (L) 11.7 - 15.4 g/dL    HCT 33.1 (L) 35.8 - 46.3 %    MCV 97.9 (H) 79.6 - 97.8 FL    MCH 29.0 26.1 - 32.9 PG    MCHC 29.6 (L) 31.4 - 35.0 g/dL    RDW 17.9 (H) 11.9 - 14.6 %    PLATELET 72 (L) 791 - 450 K/uL    MPV 11.8 10.8 - 14.1 FL   MAGNESIUM    Collection Time: 03/02/18  4:13 AM   Result Value Ref Range    Magnesium 2.4 1.8 - 2.4 mg/dL       Assessment:     Problem List as of 3/3/2018  Date Reviewed: 2/10/2018          Codes Class Noted - Resolved    Respiratory failure ([facility] ICD-10-CM: J96.90  ICD-9-CM: 518.81  2/13/2018 - Present        Acute on chronic [facility] ([facility] ICD-10-CM: J96.20  ICD-9-CM: 518.84  2/7/2018 - Present        Leukocytosis ICD-10-CM: H69.137  ICD-9-CM: 288.60  2/7/2018 - Present        Chronic diastolic heart failure (HCC) ICD-10-CM: I50.32  ICD-9-CM: 428.32  2/7/2018 - Present        S/P TAVR (transcatheter aortic valve replacement) ICD-10-CM: Z95.2  ICD-9-CM: V43.3  1/30/2018 - Present        Aortic stenosis (Chronic) ICD-10-CM: I35.0  ICD-9-CM: 424.1  1/29/2018 - Present        Peripheral arterial disease (HCC) (Chronic) ICD-10-CM: I73.9  ICD-9-CM: 443.9  1/23/2018 - Present        Pleural effusion ICD-10-CM: J90  ICD-9-CM: 511.9  1/23/2018 - Present    Overview Addendum 2/10/2018 11:36 AM by Latoya Meredith NP     Right thoracentesis 1/25/18 - 1200 mls removed  Left thoracentesis 1/25/2018 - 900 mls removed  Bilateral thoracentesis 2/9/18 - L 550 ml (air aspirated during and at the end of procedure) / R 1000 ml and R creamy pink              Chronic respiratory failure with hypoxia (HCC) (Chronic) ICD-10-CM: J96.11  ICD-9-CM: 518.83, 799.02  1/23/2018 - Present    Overview Signed 1/23/2018 11:17 AM by Joellen Garcia, FELISHA     Wears 3 to 4 liters at home             Hypoxia ICD-10-CM: R09.02  ICD-9-CM: 799.02  1/23/2018 - Present        Stenosis of prosthetic aortic valve (Chronic) ICD-10-CM: I35.0  ICD-9-CM: 396.0  1/19/2018 - Present    Overview Signed 1/19/2018  3:18 PM by Tula Jeans, MD     AVR (10/5/13):  21 mm Pericardial valve.                Tricuspid valve insufficiency (Chronic) ICD-10-CM: I07.1  ICD-9-CM: 397.0  1/15/2018 - Present        Systolic CHF, chronic (HCC) (Chronic) ICD-10-CM: I50.22  ICD-9-CM: 428.22, 428.0  1/15/2018 - Present        S/P mitral valve repair (Chronic) ICD-10-CM: N73.357  ICD-9-CM: V45.89  12/4/2017 - Present        H/O atrioventricular rubin ablation (Chronic) ICD-10-CM: I73.475  ICD-9-CM: V15.1  3/22/2017 - Present        Pulmonary hypertension (Chronic) ICD-10-CM: I27.20  ICD-9-CM: 416.8  2/12/2017 - Present        Aortic valve replaced (Chronic) ICD-10-CM: Z95.2  ICD-9-CM: V43.3  1/9/2017 - Present        Chronic diastolic congestive heart failure (HCC) (Chronic) ICD-10-CM: I50.32  ICD-9-CM: 428.32, 428.0  9/9/2016 - Present        Chronic depression (Chronic) ICD-10-CM: F32.9  ICD-9-CM: 311  8/5/2016 - Present        Osteopenia (Chronic) ICD-10-CM: M85.80  ICD-9-CM: 733.90  8/5/2016 - Present        RLS (restless legs syndrome) (Chronic) ICD-10-CM: G25.81  ICD-9-CM: 333.94  8/5/2016 - Present        Hyperlipidemia (Chronic) ICD-10-CM: E78.5  ICD-9-CM: 272.4  8/5/2016 - Present        Gout (Chronic) ICD-10-CM: M10.9  ICD-9-CM: 274.9  8/5/2016 - Present        Anxiety (Chronic) ICD-10-CM: F41.9  ICD-9-CM: 300.00  8/5/2016 - Present        CAD (coronary artery disease) (Chronic) ICD-10-CM: I25.10  ICD-9-CM: 414.00  8/5/2016 - Present        HTN (hypertension) (Chronic) ICD-10-CM: I10  ICD-9-CM: 401.9  8/5/2016 - Present        GERD (gastroesophageal reflux disease) (Chronic) ICD-10-CM: K21.9  ICD-9-CM: 530.81  8/5/2016 - Present        H/O mitral valve repair, 2003 (Chronic) ICD-10-CM: Q52.788  ICD-9-CM: V15.1  6/27/2016 - Present        Sick sinus syndrome (HCC) (Chronic) ICD-10-CM: I49.5  ICD-9-CM: 427.81  2/13/2016 - Present        Obesity (Chronic) ICD-10-CM: E66.9  ICD-9-CM: 278.00  11/2/2015 - Present        Mitral stenosis with insufficiency (Chronic) ICD-10-CM: Z12.5  ICD-9-CM: 394.2  11/2/2015 - Present    Overview Signed 11/2/2015 11:02 AM by Rika Alcala     Prior MV repair 2003.               Rheumatic aortic stenosis (Chronic) ICD-10-CM: I06.0  ICD-9-CM: 395.0  11/2/2015 - Present        Cardiac pacemaker (Chronic) ICD-10-CM: Z95.0  ICD-9-CM: V45.01  11/2/2015 - Present        Thrombocytopenia (HCC) (Chronic) ICD-10-CM: D69.6  ICD-9-CM: 287.5  8/22/2012 - Present        Anemia (Chronic) ICD-10-CM: D64.9  ICD-9-CM: 285.9  10/27/2011 - Present    Overview Signed 10/27/2011  8:25 AM by Tianna Saucedo on chronic               Chronic atrial fibrillation (HCC) (Chronic) ICD-10-CM: I48.2  ICD-9-CM: 427.31  10/17/2011 - Present        Hypothyroidism (Chronic) ICD-10-CM: E03.9  ICD-9-CM: 244.9  12/4/2009 - Present        BOBBY (obstructive sleep apnea) (Chronic) ICD-10-CM: G47.33  ICD-9-CM: 327.23  12/4/2009 - Present        Chronic obstructive pulmonary disease (HCC) (Chronic) ICD-10-CM: J44.9  ICD-9-CM: 496  3/8/2009 - Present        Aortic Valve Bioprosthesis Present (Chronic) ICD-10-CM: Z95.2  ICD-9-CM: V43.3  3/7/2009 - Present        Degenerative arthritis of left knee (Chronic) ICD-10-CM: M17.12  ICD-9-CM: 715.96  2/27/2009 - Present        RESOLVED: CHF (congestive heart failure) (HCC) ICD-10-CM: I50.9  ICD-9-CM: 428.0  2/13/2018 - 2/14/2018        RESOLVED: Acute on chronic systolic congestive heart failure ([facility] ICD-10-CM: I50.23  ICD-9-CM: 428.23, 428.0  2/1/2018 - 2/7/2018        RESOLVED: Acute pulmonary edema ([facility] ICD-10-CM: J81.0  ICD-9-CM: 518.4  1/25/2018 - 2/7/2018        RESOLVED: Pacemaker ICD-10-CM: Z95.0  ICD-9-CM: V45.01  12/4/2017 - 1/23/2018        RESOLVED: History of gout ICD-10-CM: Z87.39  ICD-9-CM: V12.29  1/31/2017 - 1/23/2018        RESOLVED: Warfarin anticoagulation (Chronic) ICD-10-CM: Z79.01  ICD-9-CM: V58.61  1/9/2017 - 1/23/2018        RESOLVED: Non morbid obesity due to excess calories ICD-10-CM: E66.09  ICD-9-CM: 278.00  11/14/2016 - 1/23/2018        RESOLVED: Hypoxemia ICD-10-CM: R09.02  ICD-9-CM: 799.02  11/14/2016 - 1/23/2018        RESOLVED: Localized edema ICD-10-CM: R60.0  ICD-9-CM: 782.3  9/9/2016 - 1/23/2018        RESOLVED: Iron deficiency anemia ICD-10-CM: D50.9  ICD-9-CM: 280.9  9/9/2016 - 1/23/2018        RESOLVED: CRI (chronic renal insufficiency) ICD-10-CM: N18.9  ICD-9-CM: 585.9  8/5/2016 - 1/23/2018        RESOLVED: Dyspnea ICD-10-CM: R06.00  ICD-9-CM: 786.09  8/5/2016 - 1/23/2018        RESOLVED: Right hip pain ICD-10-CM: M25.551  ICD-9-CM: 719.45  8/5/2016 - 1/23/2018        RESOLVED: Edema ICD-10-CM: R60.9  ICD-9-CM: 782.3  8/5/2016 - 1/23/2018        RESOLVED: Chest pain ICD-10-CM: R07.9  ICD-9-CM: 786.50  8/5/2016 - 1/23/2018        RESOLVED: Other long term (current) drug therapy ICD-10-CM: Z79.899  ICD-9-CM: V58.69  8/5/2016 - 1/23/2018        RESOLVED: Acute encephalopathy ICD-10-CM: G93.40  ICD-9-CM: 348.30  7/8/2016 - 1/23/2018        RESOLVED: Tachycardia ICD-10-CM: R00.0  ICD-9-CM: 785.0  6/27/2016 - 1/23/2018        RESOLVED: Palpitations ICD-10-CM: R00.2  ICD-9-CM: 785.1  11/2/2015 - 1/23/2018        RESOLVED: Respiratory insufficiency ICD-10-CM: R06.89  ICD-9-CM: 786.09  11/2/2015 - 1/23/2018        RESOLVED: Bradycardia (Symptomatic) ICD-10-CM: R00.1  ICD-9-CM: 427.89  11/7/2011 - 1/23/2018        RESOLVED: Rectal bleeding ICD-10-CM: K62.5  ICD-9-CM: 569.3  10/27/2011 - 1/23/2018        RESOLVED: AV block ICD-10-CM: I44.30  ICD-9-CM: 426.10  10/17/2011 - 1/23/2018        RESOLVED: Cardiogenic shock ([facility] ICD-10-CM: R57.0  ICD-9-CM: 785.51  10/17/2011 - 1/23/2018        RESOLVED: Hyperkalemia ICD-10-CM: E87.5  ICD-9-CM: 276.7  10/17/2011 - 1/23/2018        RESOLVED: Nausea and vomiting ICD-10-CM: R11.2  ICD-9-CM: 787.01  2/24/2010 - 1/23/2018        RESOLVED: Epigastric abdominal pain ICD-10-CM: R10.13  ICD-9-CM: 789.06  2/24/2010 - 1/23/2018        RESOLVED: Digoxin toxicity ICD-10-CM: T46.0X1A  ICD-9-CM: 972.1, E942.1  2/24/2010 - 1/23/2018        RESOLVED: ARF (acute renal failure) (HCC) (Chronic) ICD-10-CM: N17.9  ICD-9-CM: 584.9  2/24/2010 - 1/23/2018        RESOLVED: Hypokalemia ICD-10-CM: E87.6  ICD-9-CM: 276.8  2/24/2010 - 1/23/2018        RESOLVED: CKD (chronic kidney disease) stage 3, GFR 30-59 ml/min (Chronic) ICD-10-CM: N18.3  ICD-9-CM: 585.3  12/4/2009 - 1/23/2018        RESOLVED: Cough ICD-10-CM: R05  ICD-9-CM: 786.2  3/9/2009 - 3/12/2009        RESOLVED: Bronchitis ICD-10-CM: J40  ICD-9-CM: 490  3/9/2009 - 3/12/2009        RESOLVED: Atrial fibrillation (HCC) (Chronic) ICD-10-CM: I48.91  ICD-9-CM: 427.31  3/7/2009 - 6/27/2016        RESOLVED: Hypotension (Chronic) ICD-10-CM: I95.9  ICD-9-CM: 458.9  3/1/2009 - 1/23/2018              Plan:     The Post Assessment Physician Evaluation (RAMBO) found the current functional status to be comparable with the Pre-admission Screening. The Patient is a good candidate for acute inpatient rehabilitation. Nothing since the Pre-admission screen has changed that determination.      Rehabilitation Plan  The patient has shown the ability to tolerate and benefit from 3 hours of therapy daily and is being admitted to a comprehensive acute inpatient rehabilitation program consisting of at least 3 hours of combined physical and occupational therapies. Resume intensive Physical Therapy for a minimum of 1.5 hours a day, at least 5 out of 7 days per week to address bed mobility, transfers, ambulation, strengthening, balance, and endurance.   - pt  was ambulating independently with a rollator inside her home. reports using rollator or a wheelchair for community mobility. - continue to focus on endurance, strengthening BLE   -  Still with generalized weakness, poor activity tolerance due to on going heart failure. Resume  intensive Occupational Therapy for a minimum of 1.5 hours a day, at least 5 out of 7 days per week to address ADL ( bathing, LE dressing, toileting) and adaptive equipment as needed.       Continue 24-hour skilled rehabilitation nursing for bowel and bladder management, skin care for decubitus ulcer prevention , pain management and ongoing medication administration      The patient may benefit from a psychology consult for depression, anxiety and adjustment disorder.     Continue daily physician medical management:    Acute respiratory failure (HCC) (2/7/2018)/ Pleural effusion (1/23/2018)/ S/P bilateral thoracentesis  S/p - Thoracenteses on 2/9 - 550 mls removed from the left and 1 liter removed from the right.   - Continue bronchodilators prn- has home nebulizer. Resume as prn;xopenex  - Patient will be on 2L O2 at therapy and at rest. May be able to wean as tolerated. Will monitor saO2. 2/15 - sao2 at 100% on 2L/ min. Feels comfortable. Continue lasix and aldactone. Cardiology gave 1x Zaroxolyn in AM prior to AM lasix. 2/19 - continue lasix 80 mg bid + aldactone. +metolazone prior to lasix each morning. Monitor. Daily weights.   2/20- cardiology recommendation appreciated. Transition to iv lasic. Will need access. Difficult due to edema. 2/21- Persistent edema. Iv lasix ordered per cardiology, however since peripheral access not yet achieved after multiple attempts due to edema. Will have IR place CVC / ij anticipating frequent blood draws and continued iv diuretic needs. Mean time will have po lasix for every iv dose missed. 2/22 - Patient edema mildly better. Mild decreased weight; 176lbs. conitune lasix 80 bid.  Increase KCl 40 to bid dose. Left IJ oozing overnight following IR procedure, appears not bleeding today. hgb decreased, likely due to blood loss. 2/23 - mildly SOB, will repeat CxR to monitor bibasilar effusions. Continue iv lasix. 2/26 - SaO2 stable on O2 @2L via NC. cpntinued on diuresis over the weekend. Cr, elevated. Will reduce lasix. 2/27 - lasix reduced. Metolazone held. BUN/ Cr. = 57/1.59-> 62/1.21.   2/28 - Edema clinically appears better, still persistent. Continue to check renal function. 68/1. 14. Moderate elevation of BUN. Cr acceptable. Continued clinical evidence of fluid retention. continue to diurese with careful monitoring of renal function  3/1- continue to diurese oral Lasix 80 daily. Cr improved. Holding eliquis, started on aspirin for anticoag/antiplatelet therapy due to suspected hx of GI bleeding. .  3/2 - weight 169lbs. Slow decrease. Conitinuing diuresis, monitor renal function. Clinically volume overloaded, CHF. 3/3 - cardiology following. Will follow recommendations. Continuing current diuretics, lasix, aldactone. Monitor eletrolytes.      S/P TAVR(1/30/2018)/ Acute on chronic diastolic heart failure Pulmonary hypertension / Chronic atrial fibrillation/ HTN   - cardiac precaution, s.p TAVR. - weights and maintain a 2 liter per day fluid restriction.   - Monitor HR/BP. conitinue Eliquis for a.fib  - continue BB-Toprol XL  2/15- edema, chest exam appears not changed. cintiuned on diuretics. 2/19- continue O2 supplements. 2/20 - HR in control. continue O2, comfortable on same rate. Will obtain CxR. Check BNP. Monitor renal fx.   2/21 - HR in control. conitinue eliquis. BB at current dose. 2/22 - HR 80's monitor. Temporarily reduce Eliquis dose 2.5 bid due to oozing, blood loss. Anemia - hgb 7.4. Check in am. Transfuse prn.   2/23 - will continue lower dose eliquis as hgb decreased following IR procedure. , plt remain low; 70k. HR in good range 70-80s. Continue monitor.    2/26 - continued on lower dose eliquis for now. Weight 172 lbs, reduced about 7 lbs since he admission to [facility]. still persistent edema. Cr 1.59, will reduce lasix and hold metolazone. Will get cardiology input. 2/28 - continue of current dose diuretics. 3/3- monitor renal function. BMP tomorrow.         Hypothyroidism - synthroid 88 mcg     Leukocytosis (2/7/2018) on Abx last dose 7/7 rocephin administered 2/13.   - finished abx. No new s/s of infection. - resolved. Thrombocytopenia - idiopathic, chronic. Monitor.      Acute blood loss anemia/ GI bleed? -monitor clinically. May have been caused by high INR. - no melena. - hgb 9.1 (2/18) , asymptomatic.   - 2/22 - Anemia due to blood loss form IR proocedure- hgb 7.4. Check in am.  2/23 - hgb 7.7. Check labs in am and Monday. 2/26 - s/p transfusion 1 unit over the weekend. Monitor. hgb 8.2   2/27 - hgb 8.2-> 7.7, mild decrease. Will ask GI to consult on management. continue Eliquis? 2/28 -hgb 7,5. Transfuse 2 units today. GI consult appreciated. Will follow rec. Continue PPI. Use the Anusol prn for hemmorhoids  3/1 - GI recommendaiton to be followed. continue PPI. hemmorrhoidal bleeding to be treated with anusol. Transfuse prn. Monitor hgb. Monitor for s/s of acute bleed. hgb 10.2 today, s/p transfusion 2u.  3/2- no signs of acute bleed. conitnue to monitor hgb/hct, check Sunday. 3/3 - hgb tomorrow.        Pneumonia prophylaxis- Insentive spirometer every hour while awake     DVT risk / DVT Prophylaxis- continue daily physician exam to assess for signs and symptoms as patient is at increased risk for of thromboembolism. - covered with eliquis. No s/s of DVT/ PE. Wound Care: - monitor for ulcers, skin breakdown due to edema. -left elbow  edema drainage covered. -  TEDs. Fitting better. Edema control.         Potential urinary retention - no s/s of retention. Monitor.  - pt mostly continent.    - needed to use bedpan over the wekend due to decline in mobility.      bowel program - as needed dulcolax, pericolace. + BM.     GERD/ GI prophylaxis - resume PPI. At times may need additional antacids, Maalox prn.          Time spent was 35 minutes with over 1/2 in direct patient care/examination, consultation and coordination of care.      Signed By: Dany Carter MD     March 3, 2018

## 2018-03-03 NOTE — PROGRESS NOTES
Hourly rounding completed throughout shift. Stationed out side of room to monitor activity when confused. TOILETING:  Does patient need assist with clothing management and/or pericare? Yes: Comment: needs complete assistance with toileting     TOILET TRANSFER:  Pt requires maximum assistance. Pt uses walker.     BLADDER:  Pt does not have a salamanca catheter that staff manages. Pt does not take medication. Pt is continent. of bladder and voids in bedside commode  Pt requires staff to empty device Pt has had 0 bladder accidents during this shift requiring maximum assistance to clean up. (An accident is when the episode is not contained in a brief AND/OR the clothing/linen requires changing/cleaning up.)     BOWEL:  Pt does take medication. Pt is continent of bowel and uses bedside commode. Pt requires staff to empty device    Pt has had 0 bowel accidents during this shift requiring maximum assistance from staff to clean up. (An accident is when the episode is not contained in a brief AND/OR the clothing/linen requires changing/cleaning up.)     BED/CHAIR TRANSFER  Pt requires maximum assistance. Patient requires the assistance of 2 staff member(s). Pt uses w  EATING  Pt requires setup. Pt wears dentures. TUBE FEEDINGS:  Pt does not  receive nutrition through tube feedings.   Patient requires minimal assistance with feedings.            Documentation reviewed and plan of care discussed/reviewed with   patient, physician, therapists, oncoming nurse, patient assistant and family/spouse during the shift.

## 2018-03-03 NOTE — PROGRESS NOTES
BLADDER:  Pt does not have a salamanca catheter that staff manages. Pt does not take medication. Pt is incontinent. of bladder and voids in brief  Pt requires staff to empty device Pt has had 0 bladder accidents during this shift requiring maximum assistance to clean up. (An accident is when the episode is not contained in a brief AND/OR the clothing/linen requires changing/cleaning up.)     BOWEL:  Pt does take medication. Pt is continent of bowel and uses bedside commode. Pt requires staff to empty device    Pt has had 0 bowel accidents during this shift requiring maximum assistance from staff to clean up.  (An accident is when the episode is not contained in a brief AND/OR the clothing/linen requires changing/cleaning up.)

## 2018-03-03 NOTE — PROGRESS NOTES
Problem: Falls - Risk of  Goal: *Absence of Falls  Document Gregorio Fall Risk and appropriate interventions in the flowsheet.    Outcome: Progressing Towards Goal  Fall Risk Interventions:  Mobility Interventions: Patient to call before getting OOB    Mentation Interventions: Adequate sleep, hydration, pain control, Bed/chair exit alarm    Medication Interventions: Patient to call before getting OOB    Elimination Interventions: Bed/chair exit alarm, Call light in reach, Elevated toilet seat, Toileting schedule/hourly rounds    History of Falls Interventions: Bed/chair exit alarm

## 2018-03-03 NOTE — PROGRESS NOTES
Problem: Falls - Risk of  Goal: *Absence of Falls  Document Gregorio Fall Risk and appropriate interventions in the flowsheet.    Outcome: Progressing Towards Goal  Fall Risk Interventions:  Mobility Interventions: Patient to call before getting OOB    Mentation Interventions: Adequate sleep, hydration, pain control    Medication Interventions: Patient to call before getting OOB    Elimination Interventions: Call light in reach, Patient to call for help with toileting needs    History of Falls Interventions: Bed/chair exit alarm

## 2018-03-04 NOTE — PROGRESS NOTES
[facility] CARDIOLOGY PROGRESS NOTE      3/4/2018 10:00 AM    Admit Date: 2/13/2018    Admit Diagnosis: DEBILITY;CHF (congestive heart failure) ([address] Respiratory f*      Subjective:   No chest pain or dyspnea. Objective:      Vitals:    03/03/18 1912 03/04/18 0514 03/04/18 0647 03/04/18 0846   BP: 106/69  96/61 112/68   Pulse: 83  86 88   Resp: 16  16    Temp: 97.5 °F (36.4 °C)  97.6 °F (36.4 °C)    SpO2: 100%  100%    Weight:  77 kg (169 lb 12.8 oz)         Physical Exam:  Neck-   CV- rr+r soft TERRI   Lungs- clear  Ext-  Skin- warm and dry        Data Review:   Recent Labs      03/04/18   0532  03/04/18   0022  03/02/18   0413   NA  148*  149*  149*   K  4.5  4.4  3.6   MG   --   2.5*  2.4   BUN  62*  64*  67*   CREA  1.14*  1.14*  1.11*   GLU  114*  135*  106*   WBC   --    --   4.0*   HGB  9.7*   --   9.8*   HCT  33.5*   --   33.1*   PLT   --    --   72*       Assessment and Plan:      Active Problems:    Chronic atrial fibrillation (HCC) (10/17/2011)   regular rhythm paced   Eliquis 5mg bid       S/P TAVR (transcatheter aortic valve replacement) (1/30/2018)  no problems       Chronic diastolic heart failure ([address] (2/7/2018)      Respiratory failure ([address] (2/13/2018)    we will go on standby     Romel Grant MD

## 2018-03-04 NOTE — PROGRESS NOTES
Problem: Falls - Risk of  Goal: *Absence of Falls  Document Rgegorio Fall Risk and appropriate interventions in the flowsheet.    Outcome: Progressing Towards Goal  Fall Risk Interventions:  Mobility Interventions: Patient to call before getting OOB    Mentation Interventions: Adequate sleep, hydration, pain control    Medication Interventions: Patient to call before getting OOB    Elimination Interventions: Bed/chair exit alarm    History of Falls Interventions: Bed/chair exit alarm

## 2018-03-04 NOTE — PROGRESS NOTES
Problem: Falls - Risk of  Goal: *Absence of Falls  Document Gregorio Fall Risk and appropriate interventions in the flowsheet.    Outcome: Progressing Towards Goal  Fall Risk Interventions:  Mobility Interventions: OT consult for ADLs, Patient to call before getting OOB    Mentation Interventions: Adequate sleep, hydration, pain control, Bed/chair exit alarm    Medication Interventions: Patient to call before getting OOB    Elimination Interventions: Bed/chair exit alarm, Call light in reach    History of Falls Interventions: Bed/chair exit alarm

## 2018-03-04 NOTE — PROGRESS NOTES
Patient slept intermittently, urinated frequently. Continues to get OOB without using her call light first.  Bed alarm maintained. Labs drawn at [facility] and AM.   Central line dressing changed. Weight:  77.0 (previously 76.7)    Hourly rounds were performed throughout the shift. All needs met at this time. Report given to oncoming nurse.

## 2018-03-04 NOTE — PROGRESS NOTES
SFD PROGRESS NOTE    Isaiah Box  Admit Date: 2/13/2018  Admit Diagnosis: DEBILITY;CHF (congestive heart failure) ([address] Respiratory f*  Chief Complaint : Gait dysfunction secondary to below. Admit Diagnosis: Pleural effusion [J90]; Pleural effusion [J90]  Acute respiratory failure (HCC) (2/7/2018)  Pleural effusion (1/23/2018)/ S/P bilateral thoracentesis  Hypothyroidism   Chronic atrial fibrillation   HTN   Pulmonary hypertension   S/P TAVR(1/30/2018)  Leukocytosis (2/7/2018) on Abx   Acute on chronic diastolic heart failure   weakness  Pain  DVT risk  Acute blood loss anemia/ GI bleed? Acute Rehab Dx:  Generalized weakness. Debility    deconditioning   Mobility and ambulation deficits  Self Care/ADL deficits    Subjective     Patient seen and examined. Vss. Afebrile. Resting comfortably in bed. No acute distress. O2 via NC @2l/ min. SaO2 is 99%. Po intake fair. Daily urine output adequate. + BM.      Objective:     Current Facility-Administered Medications   Medication Dose Route Frequency    alum-mag hydroxide-simeth (MYLANTA) oral suspension 30 mL  30 mL Oral Q4H PRN    aspirin delayed-release tablet 81 mg  81 mg Oral DAILY    0.9% sodium chloride infusion 250 mL  250 mL IntraVENous PRN    furosemide (LASIX) tablet 80 mg  80 mg Oral DAILY    potassium chloride (K-DUR, KLOR-CON) SR tablet 40 mEq  40 mEq Oral DAILY    magnesium oxide (MAG-OX) tablet 400 mg  400 mg Oral DAILY    diazePAM (VALIUM) tablet 2.5 mg  2.5 mg Oral QHS    central line flush (saline) syringe 10 mL  10 mL InterCATHeter Q8H    acetaminophen (TYLENOL) tablet 650 mg  650 mg Oral Q4H PRN    alcohol 62% (NOZIN) nasal  1 Ampule  1 Ampule Topical Q12H    allopurinol (ZYLOPRIM) tablet 100 mg  100 mg Oral BID    hydrocortisone (ANUSOL-HC) 2.5 % rectal cream   PeriANAL TID PRN    levothyroxine (SYNTHROID) tablet 88 mcg  88 mcg Oral ACB    metoprolol succinate (TOPROL-XL) XL tablet 25 mg  25 mg Oral DAILY    pantoprazole (PROTONIX) tablet 40 mg  40 mg Oral ACB    polyethylene glycol (MIRALAX) packet 17 g  17 g Oral DAILY    pravastatin (PRAVACHOL) tablet 40 mg  40 mg Oral DAILY    rOPINIRole (REQUIP) tablet 2 mg  2 mg Oral BID    sertraline (ZOLOFT) tablet 100 mg  100 mg Oral DAILY    spironolactone (ALDACTONE) tablet 25 mg  25 mg Oral DAILY    levalbuterol (XOPENEX) nebulizer soln 0.63 mg/3 mL  0.63 mg Nebulization Q6H PRN     Review of Systems: + weakness. Denies chest pain, shortness of breath, cough, headache, visual problems, abdominal pain, dysurea, calf pain. Pertinent positives are as noted in the medical records and unremarkable otherwise. Visit Vitals    /68    Pulse 88    Temp 97.6 °F (36.4 °C)    Resp 16    Wt 169 lb 12.8 oz (77 kg)    SpO2 100%    BMI 33.16 kg/m2        Physical Exam:   General: Alert and age appropriately oriented.  Appears comfortable on 2L O2 NC. No acute cardio respiratory distress. mild lethargy. HEENT: Normocephalic,no scleral icterus  Oral mucosa moist without cyanosis, No bruit, mild +JVD. Lungs: + few r  basilar crackles. No wheezing. Respiration even and unlabored   Heart: RRR,  II/VI TERRI  No clicks, rub or gallops   Abdomen: Soft, non-tender, nondistended. Bowel sounds present. Genitourinary: defered   Neuromuscular:      PERRL, EOMI  Follows simple commands consistently. Able to identify, recall repeat. Mood appropriate. Insight, judgement intact.    LUE     Shoulder abduction   5-/5              Elbow flexion:   5-/5               Wrist extension:  5- /5              Finger flexion;  5- /5  RUE    Shoulder abduction: 5- /5                Elbow flexion:  5- /5                         Wrist extension: 5- /5                        Finger flexion:  5- /5  LLE     Hip flexion:  3+ /5              Knee extension:  4 -/5                         Ankle dorsiflexion:  5 -/5                        Ankle plantarflexion:   5/5                                                                  RLE     Hip flexion:   3+/5                        Knee extension:  4- /5                         Ankle dorsiflexion:  5- /5                        Ankle plantarflexion:  5 /5  Sensory - intact grossly   No cerebellar signs. Plantars - down going  No atrophy, no fasciculations, no tremors. Skin/extremity: No rashes, no erythema. Calf non tender BLE. 2+ BLE edema. + chronic arthritic joint changes. Mild BUE edema. Functional Assessment:  Gross Assessment  AROM: Generally decreased, functional (02/28/18 1400)  PROM: Generally decreased, functional (02/28/18 1400)  Strength: Generally decreased, functional (02/28/18 1400)  Coordination: Generally decreased, functional (02/28/18 1400)  Tone: Normal (02/28/18 1400)  Sensation: Intact (02/21/18 1300)       Balance  Sitting - Static: Good (unsupported) (03/03/18 1138)  Sitting - Dynamic: Fair (occasional) (03/03/18 1138)  Standing - Static: Fair (03/03/18 1138)  Standing - Dynamic : Impaired (03/02/18 1300)           Toileting  Cues: Verbal cues provided (03/02/18 1310)  Adaptive Equipment:  (bedside commode) (03/02/18 1310)         Leigh Ann Wadsworth Fall Risk Assessment:  Leigh Ann Wadsworth Fall Risk  Mobility: Ambulates or transfers with assist devices or assistance/unsteady gait (03/04/18 1008)  Mobility Interventions: OT consult for ADLs; Patient to call before getting OOB (03/04/18 1008)  Mentation: Periodic confusion (03/04/18 1008)  Mentation Interventions: Adequate sleep, hydration, pain control;Bed/chair exit alarm (03/04/18 1008)  Medication: Patient receiving anticonvulsants, sedatives(tranquilizers), psychotropics or hypnotics, hypoglycemics, narcotics, sleep aids, antihypertensives, laxatives, or diuretics (03/04/18 1008)  Medication Interventions: Patient to call before getting OOB (03/04/18 1008)  Elimination: Needs assistance with toileting (03/04/18 1008)  Elimination Interventions: Bed/chair exit alarm;Call light in reach (03/04/18 1008)  Prior Fall History: Unknown (03/04/18 1008)  History of Falls Interventions: Bed/chair exit alarm (03/04/18 1008)  Total Score: 4 (03/04/18 1008)  Standard Fall Precautions: Yes (03/04/18 1008)  High Fall Risk: Yes (02/28/18 2313)     Speech Assessment:         Ambulation:  Gait  Base of Support: Widened (02/28/18 1400)  Speed/Carol: Slow;Shuffled (02/28/18 1400)  Step Length: Right shortened;Left shortened (02/28/18 1400)  Distance (ft): 50 Feet (ft) (03/03/18 1138)  Assistive Device: Walker, rolling (03/03/18 1138)  Rail Use: Both (02/22/18 1600)     Labs/Studies:  Recent Results (from the past 72 hour(s))   METABOLIC PANEL, BASIC    Collection Time: 03/02/18  4:13 AM   Result Value Ref Range    Sodium 149 (H) 136 - 145 mmol/L    Potassium 3.6 3.5 - 5.1 mmol/L    Chloride 95 (L) 98 - 107 mmol/L    CO2 >45 (HH) 21 - 32 mmol/L    Anion gap Cannot be calculated 7 - 16 mmol/L    Glucose 106 (H) 65 - 100 mg/dL    BUN 67 (H) 8 - 23 MG/DL    Creatinine 1.11 (H) 0.6 - 1.0 MG/DL    GFR est AA >60 >60 ml/min/1.73m2    GFR est non-AA 51 (L) >60 ml/min/1.73m2    Calcium 9.0 8.3 - 10.4 MG/DL   CBC W/O DIFF    Collection Time: 03/02/18  4:13 AM   Result Value Ref Range    WBC 4.0 (L) 4.3 - 11.1 K/uL    RBC 3.38 (L) 4.05 - 5.25 M/uL    HGB 9.8 (L) 11.7 - 15.4 g/dL    HCT 33.1 (L) 35.8 - 46.3 %    MCV 97.9 (H) 79.6 - 97.8 FL    MCH 29.0 26.1 - 32.9 PG    MCHC 29.6 (L) 31.4 - 35.0 g/dL    RDW 17.9 (H) 11.9 - 14.6 %    PLATELET 72 (L) 267 - 450 K/uL    MPV 11.8 10.8 - 14.1 FL   MAGNESIUM    Collection Time: 03/02/18  4:13 AM   Result Value Ref Range    Magnesium 2.4 1.8 - 2.4 mg/dL   METABOLIC PANEL, BASIC    Collection Time: 03/04/18 12:22 AM   Result Value Ref Range    Sodium 149 (H) 136 - 145 mmol/L    Potassium 4.4 3.5 - 5.1 mmol/L    Chloride 95 (L) 98 - 107 mmol/L    CO2 >45 (HH) 21 - 32 mmol/L    Anion gap Cannot be calculated 7 - 16 mmol/L    Glucose 135 (H) 65 - 100 mg/dL    BUN 64 (H) 8 - 23 MG/DL    Creatinine 1.14 (H) 0.6 - 1.0 MG/DL    GFR est AA 60 (L) >60 ml/min/1.73m2    GFR est non-AA 49 (L) >60 ml/min/1.73m2    Calcium 8.7 8.3 - 10.4 MG/DL   MAGNESIUM    Collection Time: 03/04/18 12:22 AM   Result Value Ref Range    Magnesium 2.5 (H) 1.8 - 2.4 mg/dL   METABOLIC PANEL, BASIC    Collection Time: 03/04/18  5:32 AM   Result Value Ref Range    Sodium 148 (H) 136 - 145 mmol/L    Potassium 4.5 3.5 - 5.1 mmol/L    Chloride 94 (L) 98 - 107 mmol/L    CO2 >45 (HH) 21 - 32 mmol/L    Anion gap Cannot be calculated 7 - 16 mmol/L    Glucose 114 (H) 65 - 100 mg/dL    BUN 62 (H) 8 - 23 MG/DL    Creatinine 1.14 (H) 0.6 - 1.0 MG/DL    GFR est AA 60 (L) >60 ml/min/1.73m2    GFR est non-AA 49 (L) >60 ml/min/1.73m2    Calcium 8.8 8.3 - 10.4 MG/DL   HGB & HCT    Collection Time: 03/04/18  5:32 AM   Result Value Ref Range    HGB 9.7 (L) 11.7 - 15.4 g/dL    HCT 33.5 (L) 35.8 - 46.3 %       Assessment:     Problem List as of 3/4/2018  Date Reviewed: 2/10/2018          Codes Class Noted - Resolved    Respiratory failure ([facility] ICD-10-CM: J96.90  ICD-9-CM: 518.81  2/13/2018 - Present        Acute on chronic respiratory failure (HCC) ICD-10-CM: J96.20  ICD-9-CM: 518.84  2/7/2018 - Present        Leukocytosis ICD-10-CM: R23.680  ICD-9-CM: 288.60  2/7/2018 - Present        Chronic diastolic heart failure (HCC) ICD-10-CM: I50.32  ICD-9-CM: 428.32  2/7/2018 - Present        S/P TAVR (transcatheter aortic valve replacement) ICD-10-CM: Z95.2  ICD-9-CM: V43.3  1/30/2018 - Present        Aortic stenosis (Chronic) ICD-10-CM: I35.0  ICD-9-CM: 424.1  1/29/2018 - Present        Peripheral arterial disease (HCC) (Chronic) ICD-10-CM: I73.9  ICD-9-CM: 443.9  1/23/2018 - Present        Pleural effusion ICD-10-CM: J90  ICD-9-CM: 511.9  1/23/2018 - Present    Overview Addendum 2/10/2018 11:36 AM by Dion Cardenas NP     Right thoracentesis 1/25/18 - 1200 mls removed  Left thoracentesis 1/25/2018 - 900 mls removed  Bilateral thoracentesis 2/9/18 - L 550 ml (air aspirated during and at the end of procedure) / R 1000 ml and R creamy pink              Chronic respiratory failure with hypoxia (HCC) (Chronic) ICD-10-CM: J96.11  ICD-9-CM: 518.83, 799.02  1/23/2018 - Present    Overview Signed 1/23/2018 11:17 AM by Tomasa Mahan, FELISHA     Wears 3 to 4 liters at home             Hypoxia ICD-10-CM: R09.02  ICD-9-CM: 799.02  1/23/2018 - Present        Stenosis of prosthetic aortic valve (Chronic) ICD-10-CM: I35.0  ICD-9-CM: 396.0  1/19/2018 - Present    Overview Signed 1/19/2018  3:18 PM by Fausto Phillips MD     AVR (10/5/13):  21 mm Pericardial valve.                Tricuspid valve insufficiency (Chronic) ICD-10-CM: I07.1  ICD-9-CM: 397.0  1/15/2018 - Present        Systolic CHF, chronic (HCC) (Chronic) ICD-10-CM: I50.22  ICD-9-CM: 428.22, 428.0  1/15/2018 - Present        S/P mitral valve repair (Chronic) ICD-10-CM: R63.913  ICD-9-CM: V45.89  12/4/2017 - Present        H/O atrioventricular rubin ablation (Chronic) ICD-10-CM: Y84.497  ICD-9-CM: V15.1  3/22/2017 - Present        Pulmonary hypertension (Chronic) ICD-10-CM: I27.20  ICD-9-CM: 416.8  2/12/2017 - Present        Aortic valve replaced (Chronic) ICD-10-CM: Z95.2  ICD-9-CM: V43.3  1/9/2017 - Present        Chronic diastolic congestive heart failure (HCC) (Chronic) ICD-10-CM: I50.32  ICD-9-CM: 428.32, 428.0  9/9/2016 - Present        Chronic depression (Chronic) ICD-10-CM: F32.9  ICD-9-CM: 311  8/5/2016 - Present        Osteopenia (Chronic) ICD-10-CM: M85.80  ICD-9-CM: 733.90  8/5/2016 - Present        RLS (restless legs syndrome) (Chronic) ICD-10-CM: G25.81  ICD-9-CM: 333.94  8/5/2016 - Present        Hyperlipidemia (Chronic) ICD-10-CM: E78.5  ICD-9-CM: 272.4  8/5/2016 - Present        Gout (Chronic) ICD-10-CM: M10.9  ICD-9-CM: 274.9  8/5/2016 - Present        Anxiety (Chronic) ICD-10-CM: F41.9  ICD-9-CM: 300.00  8/5/2016 - Present        CAD (coronary artery disease) (Chronic) ICD-10-CM: I25.10  ICD-9-CM: 414.00  8/5/2016 - Present        HTN (hypertension) (Chronic) ICD-10-CM: I10  ICD-9-CM: 401.9  8/5/2016 - Present        GERD (gastroesophageal reflux disease) (Chronic) ICD-10-CM: K21.9  ICD-9-CM: 530.81  8/5/2016 - Present        H/O mitral valve repair, 2003 (Chronic) ICD-10-CM: O83.943  ICD-9-CM: V15.1  6/27/2016 - Present        Sick sinus syndrome (HCC) (Chronic) ICD-10-CM: I49.5  ICD-9-CM: 427.81  2/13/2016 - Present        Obesity (Chronic) ICD-10-CM: E66.9  ICD-9-CM: 278.00  11/2/2015 - Present        Mitral stenosis with insufficiency (Chronic) ICD-10-CM: P04.8  ICD-9-CM: 394.2  11/2/2015 - Present    Overview Signed 11/2/2015 11:02 AM by Kelly Adamson     Prior MV repair 2003.               Rheumatic aortic stenosis (Chronic) ICD-10-CM: I06.0  ICD-9-CM: 395.0  11/2/2015 - Present        Cardiac pacemaker (Chronic) ICD-10-CM: Z95.0  ICD-9-CM: V45.01  11/2/2015 - Present        Thrombocytopenia (HCC) (Chronic) ICD-10-CM: D69.6  ICD-9-CM: 287.5  8/22/2012 - Present        Anemia (Chronic) ICD-10-CM: D64.9  ICD-9-CM: 285.9  10/27/2011 - Present    Overview Signed 10/27/2011  8:25 AM by Jensen Saucedo on chronic               Chronic atrial fibrillation (HCC) (Chronic) ICD-10-CM: I48.2  ICD-9-CM: 427.31  10/17/2011 - Present        Hypothyroidism (Chronic) ICD-10-CM: E03.9  ICD-9-CM: 244.9  12/4/2009 - Present        BOBBY (obstructive sleep apnea) (Chronic) ICD-10-CM: G47.33  ICD-9-CM: 327.23  12/4/2009 - Present        Chronic obstructive pulmonary disease (HCC) (Chronic) ICD-10-CM: J44.9  ICD-9-CM: 496  3/8/2009 - Present        Aortic Valve Bioprosthesis Present (Chronic) ICD-10-CM: Z95.2  ICD-9-CM: V43.3  3/7/2009 - Present        Degenerative arthritis of left knee (Chronic) ICD-10-CM: M17.12  ICD-9-CM: 715.96  2/27/2009 - Present        RESOLVED: CHF (congestive heart failure) ([facility] ICD-10-CM: I50.9  ICD-9-CM: 428.0  2/13/2018 - 2/14/2018        RESOLVED: Acute on chronic systolic congestive heart failure ([facility] ICD-10-CM: I50.23  ICD-9-CM: 428.23, 428.0  2/1/2018 - 2/7/2018        RESOLVED: Acute pulmonary edema ([facility] ICD-10-CM: J81.0  ICD-9-CM: 518.4  1/25/2018 - 2/7/2018        RESOLVED: Pacemaker ICD-10-CM: Z95.0  ICD-9-CM: V45.01  12/4/2017 - 1/23/2018        RESOLVED: History of gout ICD-10-CM: Z87.39  ICD-9-CM: V12.29  1/31/2017 - 1/23/2018        RESOLVED: Warfarin anticoagulation (Chronic) ICD-10-CM: Z79.01  ICD-9-CM: V58.61  1/9/2017 - 1/23/2018        RESOLVED: Non morbid obesity due to excess calories ICD-10-CM: E66.09  ICD-9-CM: 278.00  11/14/2016 - 1/23/2018        RESOLVED: Hypoxemia ICD-10-CM: R09.02  ICD-9-CM: 799.02  11/14/2016 - 1/23/2018        RESOLVED: Localized edema ICD-10-CM: R60.0  ICD-9-CM: 782.3  9/9/2016 - 1/23/2018        RESOLVED: Iron deficiency anemia ICD-10-CM: D50.9  ICD-9-CM: 280.9  9/9/2016 - 1/23/2018        RESOLVED: CRI (chronic renal insufficiency) ICD-10-CM: N18.9  ICD-9-CM: 585.9  8/5/2016 - 1/23/2018        RESOLVED: Dyspnea ICD-10-CM: R06.00  ICD-9-CM: 786.09  8/5/2016 - 1/23/2018        RESOLVED: Right hip pain ICD-10-CM: M25.551  ICD-9-CM: 719.45  8/5/2016 - 1/23/2018        RESOLVED: Edema ICD-10-CM: R60.9  ICD-9-CM: 782.3  8/5/2016 - 1/23/2018        RESOLVED: Chest pain ICD-10-CM: R07.9  ICD-9-CM: 786.50  8/5/2016 - 1/23/2018        RESOLVED: Other long term (current) drug therapy ICD-10-CM: K06.012  ICD-9-CM: V58.69  8/5/2016 - 1/23/2018        RESOLVED: Acute encephalopathy ICD-10-CM: G93.40  ICD-9-CM: 348.30  7/8/2016 - 1/23/2018        RESOLVED: Tachycardia ICD-10-CM: R00.0  ICD-9-CM: 785.0  6/27/2016 - 1/23/2018        RESOLVED: Palpitations ICD-10-CM: R00.2  ICD-9-CM: 785.1  11/2/2015 - 1/23/2018        RESOLVED: Respiratory insufficiency ICD-10-CM: R06.89  ICD-9-CM: 786.09  11/2/2015 - 1/23/2018        RESOLVED: Bradycardia (Symptomatic) ICD-10-CM: R00.1  ICD-9-CM: 427.89  11/7/2011 - 1/23/2018        RESOLVED: Rectal bleeding ICD-10-CM: K62.5  ICD-9-CM: 569.3  10/27/2011 - 1/23/2018        RESOLVED: AV block ICD-10-CM: I44.30  ICD-9-CM: 426.10  10/17/2011 - 1/23/2018        RESOLVED: Cardiogenic shock ([address] ICD-10-CM: R57.0  ICD-9-CM: 785.51  10/17/2011 - 1/23/2018        RESOLVED: Hyperkalemia ICD-10-CM: E87.5  ICD-9-CM: 276.7  10/17/2011 - 1/23/2018        RESOLVED: Nausea and vomiting ICD-10-CM: R11.2  ICD-9-CM: 787.01  2/24/2010 - 1/23/2018        RESOLVED: Epigastric abdominal pain ICD-10-CM: R10.13  ICD-9-CM: 789.06  2/24/2010 - 1/23/2018        RESOLVED: Digoxin toxicity ICD-10-CM: T46.0X1A  ICD-9-CM: 972.1, E942.1  2/24/2010 - 1/23/2018        RESOLVED: ARF (acute renal failure) (HCC) (Chronic) ICD-10-CM: N17.9  ICD-9-CM: 584.9  2/24/2010 - 1/23/2018        RESOLVED: Hypokalemia ICD-10-CM: E87.6  ICD-9-CM: 276.8  2/24/2010 - 1/23/2018        RESOLVED: CKD (chronic kidney disease) stage 3, GFR 30-59 ml/min (Chronic) ICD-10-CM: N18.3  ICD-9-CM: 585.3  12/4/2009 - 1/23/2018        RESOLVED: Cough ICD-10-CM: R05  ICD-9-CM: 786.2  3/9/2009 - 3/12/2009        RESOLVED: Bronchitis ICD-10-CM: J40  ICD-9-CM: 490  3/9/2009 - 3/12/2009        RESOLVED: Atrial fibrillation (HCC) (Chronic) ICD-10-CM: I48.91  ICD-9-CM: 427.31  3/7/2009 - 6/27/2016        RESOLVED: Hypotension (Chronic) ICD-10-CM: I95.9  ICD-9-CM: 458.9  3/1/2009 - 1/23/2018              Plan:     The Post Assessment Physician Evaluation (RAMBO) found the current functional status to be comparable with the Pre-admission Screening. The Patient is a good candidate for acute inpatient rehabilitation.  Nothing since the Pre-admission screen has changed that determination.      Rehabilitation Plan  The patient has shown the ability to tolerate and benefit from 3 hours of therapy daily and is being admitted to a comprehensive acute inpatient rehabilitation program consisting of at least 3 hours of combined physical and occupational therapies. Resume intensive Physical Therapy for a minimum of 1.5 hours a day, at least 5 out of 7 days per week to address bed mobility, transfers, ambulation, strengthening, balance, and endurance. - pt  was ambulating independently with a rollator inside her home. reports using rollator or a wheelchair for community mobility. Will continue to focus on endurance, strengthening BLe.      Resume  intensive Occupational Therapy for a minimum of 1.5 hours a day, at least 5 out of 7 days per week to address ADL ( bathing, LE dressing, toileting) and adaptive equipment as needed.       Continue 24-hour skilled rehabilitation nursing for bowel and bladder management, skin care for decubitus ulcer prevention , pain management and ongoing medication administration      The patient may benefit from a psychology consult for depression, anxiety and adjustment disorder.     Continue daily physician medical management:    Acute respiratory failure (HCC) (2/7/2018)/ Pleural effusion (1/23/2018)/ S/P bilateral thoracentesis  S/p - Thoracenteses on 2/9 - 550 mls removed from the left and 1 liter removed from the right.   - Continue bronchodilators prn- has home nebulizer. Resume as prn;xopenex  - Patient will be on 2L O2 at therapy and at rest. May be able to wean as tolerated. Will monitor saO2. 2/15 - sao2 at 100% on 2L/ min. Feels comfortable. Continue lasix and aldactone. Cardiology gave 1x Zaroxolyn in AM prior to AM lasix. 2/19 - continue lasix 80 mg bid + aldactone. +metolazone prior to lasix each morning. Monitor. Daily weights.   2/20- cardiology recommendation appreciated. Transition to iv lasic. Will need access. Difficult due to edema. 2/21- Persistent edema. Iv lasix ordered per cardiology, however since peripheral access not yet achieved after multiple attempts due to edema. Will have IR place CVC / ij anticipating frequent blood draws and continued iv diuretic needs. Mean time will have po lasix for every iv dose missed. 2/22 - Patient edema mildly better. Mild decreased weight; 176lbs. conitune lasix 80 bid. Increase KCl 40 to bid dose. Left IJ oozing overnight following IR procedure, appears not bleeding today. hgb decreased, likely due to blood loss. 2/23 - mildly SOB, will repeat CxR to monitor bibasilar effusions. Continue iv lasix. 2/26 - SaO2 stable on O2 @2L via NC. cpntinued on diuresis over the weekend. Cr, elevated. Will reduce lasix. 2/27 - lasix reduced. Metolazone held. BUN/ Cr. = 57/1.59-> 62/1.21.   2/28 - Edema clinically appears better, still persistent. Continue to check renal function. 68/1. 14. Moderate elevation of BUN. Cr acceptable. Continued clinical evidence of fluid retention. continue to diurese with careful monitoring of renal function  3/1- continue to diurese oral Lasix 80 daily. Cr improved. Holding eliquis, started on aspirin for anticoag/antiplatelet therapy due to suspected hx of GI bleeding. .  3/2 - weight 169lbs. Slow decrease. Conitinuing diuresis, monitor renal function. Clinically volume overloaded, CHF.          S/P TAVR(1/30/2018)/ Acute on chronic diastolic heart failure Pulmonary hypertension / Chronic atrial fibrillation/ HTN   - cardiac precaution, s.p TAVR. - weights and maintain a 2 liter per day fluid restriction.   - Monitor HR/BP. conitinue Eliquis for a.fib  - continue BB-Toprol XL  - edema, chest exam appears not changed. cintiuned on diuretics. continue O2 supplements. 2/26 - continued on lower dose eliquis for now. Weight 172 lbs, reduced about 7 lbs since he admission to [facility]. still persistent edema. Cr 1.59, will reduce lasix and hold metolazone. 2/28 - continue of current dose diuretics. 3/4 - continued on laix 80mg daily, and aldactone 25. Will obtain cxr, BNP. Reduce diuretic dose?  Urine reported to be dark.         Hypothyroidism - synthroid 88 mcg     Leukocytosis (2/7/2018) on Abx last dose 7/7 rocephin administered 2/13.   - finished abx. No new s/s of infection. - 2/21 no new signs of infection.      Acute blood loss anemia/ GI bleed? -monitor clinically. May have been caused by high INR. - no melena. - hgb 9.1 (2/18) , asymptomatic.   - 2/22 - Anemia due to blood loss form IR proocedure- hgb 7.4. Check in am.  2/23 - hgb 7.7. Check labs in am and Monday. 2/26 - s/p transfusion 1 unit over the weekend. Monitor. hgb 8.2   2/27 - hgb 8.2-> 7.7, mild decrease. Will ask GI to consult on management. continue Eliquis? 2/28 -hgb 7,5. Transfuse 2 units today. GI consult appreciated. Will follow rec. Continue PPI. Use the Anusol prn for hemmorhoids  3/1 - GI recommendaiton to be followed. continue PPI. hemmorrhoidal bleeding to be treated with anusol. Transfuse prn. Monitor hgb. Monitor for s/s of acute bleed. hgb 10.2 today, s/p transfusion 2u.  3/2- no signs of acute bleed. conitnue to monitor hgb/hct, check Sunday. 3/4 - hgb 9.7 (3/4) will check periodically.        Pneumonia prophylaxis- Insentive spirometer every hour while awake     DVT risk / DVT Prophylaxis- continue daily physician exam to assess for signs and symptoms as patient is at increased risk for of thromboembolism  3/4 - no s/s of DVT. Will need for SCDs as pt off eliquis altogther       Wound Care: - monitor for ulcers, skin breakdown due to edema. -left elbow  edema drainage covered. -  TEDs. Fitting better. Edema control.         Potential urinary retention - no s/s of retention. Monitor.  - pt mostly continent. - needed to use bedpan over the wekend due to decline in mobility.      bowel program - as needed dulcolax, pericolace. + BM.     GERD/ GI prophylaxis - resume PPI. At times may need additional antacids, Maalox prn.          Time spent was 35 minutes with over 1/2 in direct patient care/examination, consultation and coordination of care.      Signed By: Jennifer Mcbride MD     March 4, 2018

## 2018-03-04 NOTE — PROGRESS NOTES
Hourly rounding completed throughout shift. Stationed out side of room to monitor activity when confused.          TOILETING:  Does patient need assist with clothing management and/or pericare? Yes: Comment: needs complete assistance with toileting      TOILET TRANSFER:  Pt requires maximum assistance.  Pt uses walker.      BLADDER:  Pt does not have a salamanca catheter that staff manages.  Pt does not take medication.  Pt is continent. of bladder and voids in bedside commode  Pt requires staff to empty device Pt has had 0 bladder accidents during this shift requiring maximum assistance to clean up.  (An accident is when the episode is not contained in a brief AND/OR the clothing/linen requires changing/cleaning up.)      BOWEL:  Pt does take medication.  Pt is continent of bowel and uses bedside commode.  Pt requires staff to empty device    Pt has had 0 bowel accidents during this shift requiring maximum assistance from staff to clean up. (An accident is when the episode is not contained in a brief AND/OR the clothing/linen requires changing/cleaning up.)      BED/CHAIR TRANSFER  Pt requires maximum assistance. Patient requires the assistance of 2 staff member(s).  Pt uses w  EATING  Pt requires setup.  Pt wears dentures.  TUBE FEEDINGS:  Pt does not  receive nutrition through tube feedings.  Patient requires minimal assistance with feedings.   Shirlene Woodard      Documentation reviewed and plan of care discussed/reviewed with   patient, physician, therapists, oncoming nurse, patient assistant and family/spouse during the shift.

## 2018-03-04 NOTE — PROGRESS NOTES
Assessed pt for PIV to remove CVC. Pt is a difficult stick; central line is being used for blood draws and potential blood transfusions. At this time, CVC is C/D/I will reassess tomorrow for PIV.

## 2018-03-05 NOTE — PROGRESS NOTES
03/05/18 0833   Time Spent With Patient   Time In 0833   Time Out 1003   Patient Seen For: AM;ADLs   Grooming   Grooming Assistance  Mod A   Comments Pt was able to wash hands and face. Assist to comb hair due to pt fatigue. Upper Body Bathing   Bathing Assistance, Upper S   Position Performed Seated in chair   Comments Verbal cues to wash arms and chest.   Lower Body Bathing   Bathing Assistance, Lower  Min A   Adaptive Equipment Walker   Position Performed Seated in chair;Standing   Adaptive Equipment Walker; Other (comment)  (seated in w/c at sink)   Comments Assist with washing buttocks while pt standing and use of walker. Toileting   Toileting Assistance (FIM Score) Mod A   Cues Physical assistance for pants down;Physical assistance for pants up   Adaptive Equipment Walker;Elevated seat   Upper Body Dressing    Dressing Assistance  Max A   Comments Assist with all parts of donning bra. Assist with threading head through shirt and pull down. Lower Body Dressing    Dressing Assistance  Max A   Leg Crossed Method Used Yes   Position Performed Seated in chair;Standing   Adaptive Equipment Used Walker   Don/Doff Anti-Embolic Stockings Dep   Comments Pt was able to thread bilateral legs through pullups via cross over method. Pt able to thread R leg through pants. Assist with clothing management up while standing at walker. Assist with bilateral socks. Functional Transfers   Toilet Transfer  Stand pivot transfer with walker   Amount of Assistance Required Min A   Tub or Shower Type Other (comment)  (Pt completed bathing seated in w/c at sink.)   Amount of Assistance Required Mod A   Adaptive Equipment Walker (comment); Wheelchair;Bedside commode     S: \"Are you tired? I am tired. \" Agreeable to therapy. Focus of session was on morning ADL routine. Patient was able to SPT using RW with moderate assist and tactile/verbal cues for foot and hand placement.  Pt experienced 1 LOB while standing at walker during toileting, pt able to recover into seated position in w/c with assistance. Pain not indicated during this session. Collaborated with RN, Colton Gutiérrez and confirmed patient is more confused today. Spoke with RN and collected urine sample. Patient tolerated session well, but confusion, shortness of breath, overall strength, functional mobility, and activity tolerance are still below baseline and requires skilled facilitation to successfully and safely complete ADL's and transfers. Patient ended session in recliner with call remote and phone within reach.      Linh Maurer, OT Student

## 2018-03-05 NOTE — PROGRESS NOTES
PHYSICAL THERAPY DAILY NOTE  Time In: 1300  Time Out: 2808  Patient Seen For: PM;Balance activities;Gait training;Patient education;Transfer training; Other (see progress notes)    Subjective: patient reporting she is tired from AM therapy. Reports she would like to rest in the bed          Objective:Vital Signs:  Patient Vitals for the past 12 hrs:   Temp Pulse Resp BP SpO2   03/05/18 0729 97.6 °F (36.4 °C) 81 22 115/71 98 %     Patient on 02 at 2 lpm during treatment. 02 sat 97 to 100%    Pain level:No c/o pain  Pain location:NA  Pain interventions:NA    Patient education:Bed mobility training,transfer training, gait training, fall precautions, activity pacing, body mechanics,rollator parts management, energy conservation, posture correction, Patient with limited understanding of patient education. Recommend follow up education.     Interdisciplinary Communication:spoke with RN regarding functional status    Other (comment) (Fall precautions)  GROSS ASSESSMENT Daily Assessment     NA       BED/MAT MOBILITY Daily Assessment   Increased time and effort to complete with cues for body mechanics   Rolling Right : 4 (Minimal assistance)  Rolling Left : 4 (Minimal assistance)  Supine to Sit : 5 (Stand-by assistance)  Sit to Supine : 4 (Minimal assistance)       TRANSFERS Daily Assessment   Increased time and effort to complete with cues for body mechanics  Cues for rollator parts management Transfer Type: SPT without device  Transfer Assistance : 4 (Contact guard assistance)  Sit to Stand Assistance: Stand-by assistance  Car Transfers: Not tested       GAIT Daily Assessment   Gait training with verbal cues for posture correction, to improve step clearance and for breathing pattern  4 to 5 min rest breaks between gait attempts Amount of Assistance: 4 (Contact guard assistance)  Distance (ft): 50 Feet (ft) (50ft x 1 40ft x 2 with 5 minute rest breaks between attempts)  Assistive Device: Walker, rollator       STEPS or [employer] Daily Assessment   Slow single step at a time leading up with RLE and down with LLE. Increased time and effort to complete with cues for posture correction improved step clearance and for anterior weight shift. Patient with balance offset posteriorly going up/down steps Steps/Stairs Ambulated (#): 4  Level of Assist : 3 (Moderate assistance)  Rail Use: Both   5 minute sitting rest break after going up steps    BALANCE Daily Assessment   Static standing with rollator and SBA with cues for posture correction and breathing pattern Sitting - Static: Good (unsupported)  Sitting - Dynamic: Fair (occasional)  Standing - Static: Fair  Standing - Dynamic : Impaired       WHEELCHAIR MOBILITY Daily Assessment    Curbs/Ramps Assist Required (FIM Score): 0 (Not tested)  Wheelchair Setup Assist Required : 0 (Not tested)       LOWER EXTREMITY EXERCISES Daily Assessment     NA          Assessment: Progressing towards goals slowly now but anticipate she will need 24 hour assistance at home after D/C secondary to decreased safety awareness with increased risk for falling. Patient returned to room at end of treatment. Patient supine in bed with head of bed elevated and bed rails up x 2. Needs placed in reach of patient. 02 at 2 lpm. Son in room with patient    Plan of Care: Continue with POC and progress as tolerated.      Juana Ruiz, PT  3/5/2018

## 2018-03-05 NOTE — PROGRESS NOTES
End Of Shift Functional Summary, Nursing    TOILETING:  Does patient need assist with clothing management and/or pericare? Yes: Comment: complete care    TOILET TRANSFER:  Pt requires maximum assistance. Pt uses walker. BLADDER:  Pt does not have a salamanca catheter that staff manages. Pt does not take medication. Pt is continent. of bladder and voids in bedside commode  Pt requires staff to empty device and change brief Pt has had 0 bladder accidents during this shift requiring maximum assistance to clean up. (An accident is when the episode is not contained in a brief AND/OR the clothing/linen requires changing/cleaning up.)    BOWEL:  Pt does take medication. Pt is continent of bowel and uses toilet. Pt requires staff to empty device and change brief    Pt has had 0 bowel accidents during this shift requiring maximum assistance from staff to clean up. (An accident is when the episode is not contained in a brief AND/OR the clothing/linen requires changing/cleaning up.)    BED/CHAIR TRANSFER  Pt requires maximum assistance. Patient requires the assistance of 2 staff member(s). Pt uses walker    Documentation reviewed and plan of care discussed/reviewed with   patient during the shift.

## 2018-03-05 NOTE — PROGRESS NOTES
End Of Shift Functional Summary, Nursing      TOILETING:  Does patient need assist with clothing management and/or pericare? Yes: Comment: needs completed assistance with moving and clothing    TOILET TRANSFER:  Pt requires maximum assistance. Pt uses walker. BLADDER:  Pt does not have a salamanca catheter that staff manages. Pt does not take medication. Pt is continent. of bladder and voids in bedside commode  Pt requires staff to empty device Pt has had 0 bladder accidents during this shift requiring maximum assistance to clean up. (An accident is when the episode is not contained in a brief AND/OR the clothing/linen requires changing/cleaning up.)    BOWEL:  Pt does take medication. Pt is continent of bowel and uses bedside commode. Pt requires staff to empty device    Pt has had 0 bowel accidents during this shift requiring maximum assistance from staff to clean up. (An accident is when the episode is not contained in a brief AND/OR the clothing/linen requires changing/cleaning up.)    BED/CHAIR TRANSFER  Pt requires maximum assistance. Patient requires the assistance of 2 staff member(s). Pt uses walker                               EATING  Pt requires setup. Pt wears dentures. TUBE FEEDINGS:  Pt does not  receive nutrition through tube feedings. Patient requires maximum assistance with feedings. Documentation reviewed and plan of care discussed/reviewed with   patient, physician, therapists, oncoming nurse, patient assistant and family/spouse during the shift.

## 2018-03-05 NOTE — PROGRESS NOTES
Problem: Nutrition Deficit  Goal: *Optimize nutritional status  Nutrition F/U: Day 20  Assessment:  Anthropometrics:                       Ht - 5' 0\", wgt - 77 kg (standing scale 9th floor 3/5/18), BMI 33.2 c/w \"overweight\" in a person > 72years of age, edema - 1+ BLEs. Macronutrient Needs:  Estimated calorie needs - 1986-3374 sayra/day (15-20 sayra/kg/day)  Estimated protein needs - 36-54 gm pro/day (0.8-1.2 gm pro/kgIBW/day) (GFR 47 ml/min)  Intake/Comparative Standards: This patient's average intake of cardiac diet for the past 7 recorded days/14 meals: 50%. This potentially meets 83% of calorie and 100% of protein goals. Per patient and Son the patient consumes 2 Ensure Enlive shakes daily. 2 Ensure Enlives plus meal intakes meets 100% of estimated needs. Diet:                       Cardiac. Pertinent Medications:                       Lasix 80 mg daily  Food/Nutrition History:                       The patient reports that her appetite is doing ok. States that she is tired of being served items that she does not like. The patient's son is present and reports she was served BBQ pork and baked beans today and she does not like those items and would not have ordered those items. Son bought a pimento cheese sandwich from [employer] for the patient to consume.      Intervention:  Meals and Snacks: Continue current cardiac diet. Medical Food Supplement Therapy: Commercial Beverage: continue Ensure Enlive [place] TID  Nutrition Discharge Plan: continue Ensure as the patient did prior to hospitalization. Jazlyn Sharp [address], [address] [place] [address], LD   [phone]

## 2018-03-05 NOTE — PROGRESS NOTES
Patient resting up in bed. Alert and oriented to self, but disoriented to time, place, and situation. Up to bedside toilet to void. Urine dark and malodorous. Lung sounds diminished in bases. 2 liters nasal cannula. S1S2, bowel sounds active. Side rails up x 3. Breakfast tray set up for patient. Call light within reach. Bed alarm in place. Door left open for closer observation. Assessment completed. See doc flow sheet for further assessments.

## 2018-03-05 NOTE — PROGRESS NOTES
SFD PROGRESS NOTE    Peter Sinha  Admit Date: 2/13/2018  Admit Diagnosis: DEBILITY;CHF (congestive heart failure) ([facility] [address] Respiratory f*  Chief Complaint : Gait dysfunction secondary to below. Admit Diagnosis: Pleural effusion [J90]; Pleural effusion [J90]  Acute respiratory failure (HCC) (2/7/2018)  Pleural effusion (1/23/2018)/ S/P bilateral thoracentesis  Hypothyroidism   Chronic atrial fibrillation   HTN   Pulmonary hypertension   S/P TAVR(1/30/2018)  Leukocytosis (2/7/2018) on Abx   Acute on chronic diastolic heart failure   weakness  Pain  DVT risk  Acute blood loss anemia/ GI bleed? Acute Rehab Dx:  Generalized weakness. Debility    deconditioning   Mobility and ambulation deficits  Self Care/ADL deficits    Subjective     Patient seen and examined. Vss. Afebrile. morning ADLs tolerated fairly, however was much fatigued with those activities. UA suspicious for UTI. Will start treatment.      Objective:     Current Facility-Administered Medications   Medication Dose Route Frequency    diazePAM (VALIUM) tablet 2 mg  2 mg Oral QHS PRN    alum-mag hydroxide-simeth (MYLANTA) oral suspension 30 mL  30 mL Oral Q4H PRN    aspirin delayed-release tablet 81 mg  81 mg Oral DAILY    0.9% sodium chloride infusion 250 mL  250 mL IntraVENous PRN    furosemide (LASIX) tablet 80 mg  80 mg Oral DAILY    potassium chloride (K-DUR, KLOR-CON) SR tablet 40 mEq  40 mEq Oral DAILY    magnesium oxide (MAG-OX) tablet 400 mg  400 mg Oral DAILY    central line flush (saline) syringe 10 mL  10 mL InterCATHeter Q8H    acetaminophen (TYLENOL) tablet 650 mg  650 mg Oral Q4H PRN    alcohol 62% (NOZIN) nasal  1 Ampule  1 Ampule Topical Q12H    allopurinol (ZYLOPRIM) tablet 100 mg  100 mg Oral BID    hydrocortisone (ANUSOL-HC) 2.5 % rectal cream   PeriANAL TID PRN    levothyroxine (SYNTHROID) tablet 88 mcg  88 mcg Oral ACB    metoprolol succinate (TOPROL-XL) XL tablet 25 mg  25 mg Oral DAILY    pantoprazole (PROTONIX) tablet 40 mg  40 mg Oral ACB    polyethylene glycol (MIRALAX) packet 17 g  17 g Oral DAILY    pravastatin (PRAVACHOL) tablet 40 mg  40 mg Oral DAILY    rOPINIRole (REQUIP) tablet 2 mg  2 mg Oral BID    sertraline (ZOLOFT) tablet 100 mg  100 mg Oral DAILY    spironolactone (ALDACTONE) tablet 25 mg  25 mg Oral DAILY    levalbuterol (XOPENEX) nebulizer soln 0.63 mg/3 mL  0.63 mg Nebulization Q6H PRN     Review of Systems: + weakness. Denies chest pain, shortness of breath, cough, headache, visual problems, abdominal pain, dysurea, calf pain. Pertinent positives are as noted in the medical records and unremarkable otherwise. Visit Vitals    /71    Pulse 81    Temp 97.6 °F (36.4 °C)    Resp 22    Wt 169 lb 12.8 oz (77 kg)    SpO2 98%    BMI 33.16 kg/m2        Physical Exam:   General: Alert and age appropriately oriented.  Appears comfortable on 2L O2 NC. No acute cardio respiratory distress. mild lethargy. HEENT: Normocephalic,no scleral icterus  Oral mucosa moist without cyanosis, No bruit, mild +JVD. Lungs: + few r  basilar crackles. No wheezing. Respiration even and unlabored   Heart: RRR,  II/VI TERRI  No clicks, rub or gallops   Abdomen: Soft, non-tender, nondistended. Bowel sounds present. Genitourinary: defered   Neuromuscular:      PERRL, EOMI  Follows simple commands consistently. Able to identify, recall repeat. Mood appropriate. Insight, judgement intact.    LUE     Shoulder abduction   5-/5              Elbow flexion:   5-/5               Wrist extension:  5- /5              Finger flexion;  5- /5  RUE    Shoulder abduction: 5- /5                Elbow flexion:  5- /5                         Wrist extension: 5- /5                        Finger flexion:  5- /5  LLE     Hip flexion:  3+ /5              Knee extension:  4 -/5                         Ankle dorsiflexion:  5 -/5                        Ankle plantarflexion:   5/5                                                                  RLE     Hip flexion:   3+/5                        Knee extension:  4- /5                         Ankle dorsiflexion:  5- /5                        Ankle plantarflexion:  5 /5  Sensory - intact grossly   No cerebellar signs. Plantars - down going  No atrophy, no fasciculations, no tremors. Skin/extremity: No rashes, no erythema. Calf non tender BLE. 2+ BLE edema. + chronic arthritic joint changes. Mild BUE edema.                                                                                  Functional Assessment:  Gross Assessment  AROM: Generally decreased, functional (02/28/18 1400)  PROM: Generally decreased, functional (02/28/18 1400)  Strength: Generally decreased, functional (02/28/18 1400)  Coordination: Generally decreased, functional (02/28/18 1400)  Tone: Normal (02/28/18 1400)  Sensation: Intact (02/21/18 1300)       Balance  Sitting - Static: Good (unsupported) (03/03/18 1138)  Sitting - Dynamic: Fair (occasional) (03/03/18 1138)  Standing - Static: Fair (03/03/18 1138)  Standing - Dynamic : Impaired (03/02/18 1300)           Toileting  Cues: Verbal cues provided (03/02/18 1310)  Adaptive Equipment:  (bedside commode) (03/02/18 1310)         Rehan Bravo Fall Risk Assessment:  Rehan Bravo Fall Risk  Mobility: Ambulates or transfers with assist devices or assistance/unsteady gait (03/04/18 2116)  Mobility Interventions: Bed/chair exit alarm (03/04/18 2116)  Mentation: Periodic confusion (03/04/18 2116)  Mentation Interventions: Adequate sleep, hydration, pain control (03/04/18 2116)  Medication: Patient receiving anticonvulsants, sedatives(tranquilizers), psychotropics or hypnotics, hypoglycemics, narcotics, sleep aids, antihypertensives, laxatives, or diuretics (03/04/18 2116)  Medication Interventions: Patient to call before getting OOB (03/04/18 2116)  Elimination: Needs assistance with toileting (03/04/18 2116)  Elimination Interventions: Bed/chair exit alarm (03/04/18 2116)  Prior Fall History: Unknown (03/04/18 2116)  History of Falls Interventions: Bed/chair exit alarm (03/04/18 2116)  Total Score: 4 (03/04/18 2116)  Standard Fall Precautions: Yes (03/04/18 2116)  High Fall Risk: Yes (02/28/18 2313)     Speech Assessment:         Ambulation:  Gait  Base of Support: Widened (02/28/18 1400)  Speed/Carol: Slow;Shuffled (02/28/18 1400)  Step Length: Right shortened;Left shortened (02/28/18 1400)  Distance (ft): 50 Feet (ft) (03/03/18 1138)  Assistive Device: Walker, rolling (03/03/18 1138)  Rail Use: Both (02/22/18 1600)     Labs/Studies:  Recent Results (from the past 67 hour(s))   METABOLIC PANEL, BASIC    Collection Time: 03/04/18 12:22 AM   Result Value Ref Range    Sodium 149 (H) 136 - 145 mmol/L    Potassium 4.4 3.5 - 5.1 mmol/L    Chloride 95 (L) 98 - 107 mmol/L    CO2 >45 (HH) 21 - 32 mmol/L    Anion gap Cannot be calculated 7 - 16 mmol/L    Glucose 135 (H) 65 - 100 mg/dL    BUN 64 (H) 8 - 23 MG/DL    Creatinine 1.14 (H) 0.6 - 1.0 MG/DL    GFR est AA 60 (L) >60 ml/min/1.73m2    GFR est non-AA 49 (L) >60 ml/min/1.73m2    Calcium 8.7 8.3 - 10.4 MG/DL   MAGNESIUM    Collection Time: 03/04/18 12:22 AM   Result Value Ref Range    Magnesium 2.5 (H) 1.8 - 2.4 mg/dL   METABOLIC PANEL, BASIC    Collection Time: 03/04/18  5:32 AM   Result Value Ref Range    Sodium 148 (H) 136 - 145 mmol/L    Potassium 4.5 3.5 - 5.1 mmol/L    Chloride 94 (L) 98 - 107 mmol/L    CO2 >45 (HH) 21 - 32 mmol/L    Anion gap Cannot be calculated 7 - 16 mmol/L    Glucose 114 (H) 65 - 100 mg/dL    BUN 62 (H) 8 - 23 MG/DL    Creatinine 1.14 (H) 0.6 - 1.0 MG/DL    GFR est AA 60 (L) >60 ml/min/1.73m2    GFR est non-AA 49 (L) >60 ml/min/1.73m2    Calcium 8.8 8.3 - 10.4 MG/DL   HGB & HCT    Collection Time: 03/04/18  5:32 AM   Result Value Ref Range    HGB 9.7 (L) 11.7 - 15.4 g/dL    HCT 33.5 (L) 35.8 - 46.3 %         CHEST RADIOGRAPH, 1 views, 3/5/2018     History: CHF, fatigue, and edema.     Technique: Portable frontal view of the chest.      Comparison: Chest radiograph 2/23/2018     Findings:   A stable right internal jugular line, and left-sided venous port are seen. Grossly stable post surgical changes overlie the mediastinal silhouette. Stable  moderate cardiomegaly is seen. Lungs are expanded without pneumothorax. Basilar  effusions and airspace changes are seen which are not felt to be significantly  changed from the prior study.  These are favored to represent sequela of fluid  overload given the associated cardiac megaly.     IMPRESSION  IMPRESSION:   1.  Stable findings of the chest as described above.         Assessment:     Problem List as of 3/5/2018  Date Reviewed: 2/10/2018          Codes Class Noted - Resolved    Respiratory failure ([facility] [address] ICD-10-CM: J96.90  ICD-9-CM: 518.81  2/13/2018 - Present        Acute on chronic respiratory failure ([facility] [address] ICD-10-CM: J96.20  ICD-9-CM: 518.84  2/7/2018 - Present        Leukocytosis ICD-10-CM: D61.382  ICD-9-CM: 288.60  2/7/2018 - Present        Chronic diastolic heart failure (HCC) ICD-10-CM: I50.32  ICD-9-CM: 428.32  2/7/2018 - Present        S/P TAVR (transcatheter aortic valve replacement) ICD-10-CM: Z95.2  ICD-9-CM: V43.3  1/30/2018 - Present        Aortic stenosis (Chronic) ICD-10-CM: I35.0  ICD-9-CM: 424.1  1/29/2018 - Present        Peripheral arterial disease (HCC) (Chronic) ICD-10-CM: I73.9  ICD-9-CM: 443.9  1/23/2018 - Present        Pleural effusion ICD-10-CM: J90  ICD-9-CM: 511.9  1/23/2018 - Present    Overview Addendum 2/10/2018 11:36 AM by Lorene Amos NP     Right thoracentesis 1/25/18 - 1200 mls removed  Left thoracentesis 1/25/2018 - 900 mls removed  Bilateral thoracentesis 2/9/18 - L 550 ml (air aspirated during and at the end of procedure) / R 1000 ml and R creamy pink              Chronic respiratory failure with hypoxia (HCC) (Chronic) ICD-10-CM: J96.11  ICD-9-CM: 518.83, 799.02  1/23/2018 - Present    Overview Signed 1/23/2018 11:17 AM by Sruthi Raymond, FELISHA     Wears 3 to 4 liters at home             Hypoxia ICD-10-CM: R09.02  ICD-9-CM: 799.02  1/23/2018 - Present        Stenosis of prosthetic aortic valve (Chronic) ICD-10-CM: I35.0  ICD-9-CM: 396.0  1/19/2018 - Present    Overview Signed 1/19/2018  3:18 PM by Harley Guajardo MD     AVR (10/5/13):  21 mm Pericardial valve.                Tricuspid valve insufficiency (Chronic) ICD-10-CM: I07.1  ICD-9-CM: 397.0  1/15/2018 - Present        Systolic CHF, chronic (HCC) (Chronic) ICD-10-CM: I50.22  ICD-9-CM: 428.22, 428.0  1/15/2018 - Present        S/P mitral valve repair (Chronic) ICD-10-CM: K14.330  ICD-9-CM: V45.89  12/4/2017 - Present        H/O atrioventricular rubin ablation (Chronic) ICD-10-CM: Z40.012  ICD-9-CM: V15.1  3/22/2017 - Present        Pulmonary hypertension (Chronic) ICD-10-CM: I27.20  ICD-9-CM: 416.8  2/12/2017 - Present        Aortic valve replaced (Chronic) ICD-10-CM: Z95.2  ICD-9-CM: V43.3  1/9/2017 - Present        Chronic diastolic congestive heart failure (HCC) (Chronic) ICD-10-CM: I50.32  ICD-9-CM: 428.32, 428.0  9/9/2016 - Present        Chronic depression (Chronic) ICD-10-CM: F32.9  ICD-9-CM: 311  8/5/2016 - Present        Osteopenia (Chronic) ICD-10-CM: M85.80  ICD-9-CM: 733.90  8/5/2016 - Present        RLS (restless legs syndrome) (Chronic) ICD-10-CM: G25.81  ICD-9-CM: 333.94  8/5/2016 - Present        Hyperlipidemia (Chronic) ICD-10-CM: E78.5  ICD-9-CM: 272.4  8/5/2016 - Present        Gout (Chronic) ICD-10-CM: M10.9  ICD-9-CM: 274.9  8/5/2016 - Present        Anxiety (Chronic) ICD-10-CM: F41.9  ICD-9-CM: 300.00  8/5/2016 - Present        CAD (coronary artery disease) (Chronic) ICD-10-CM: I25.10  ICD-9-CM: 414.00  8/5/2016 - Present        HTN (hypertension) (Chronic) ICD-10-CM: I10  ICD-9-CM: 401.9  8/5/2016 - Present        GERD (gastroesophageal reflux disease) (Chronic) ICD-10-CM: K21.9  ICD-9-CM: 530.81  8/5/2016 - Present        H/O mitral valve repair, 2003 (Chronic) ICD-10-CM: D29.749  ICD-9-CM: V15.1  6/27/2016 - Present        Sick sinus syndrome (HCC) (Chronic) ICD-10-CM: I49.5  ICD-9-CM: 427.81  2/13/2016 - Present        Obesity (Chronic) ICD-10-CM: E66.9  ICD-9-CM: 278.00  11/2/2015 - Present        Mitral stenosis with insufficiency (Chronic) ICD-10-CM: T80.0  ICD-9-CM: 394.2  11/2/2015 - Present    Overview Signed 11/2/2015 11:02 AM by Jenna Alcala     Prior MV repair 2003.               Rheumatic aortic stenosis (Chronic) ICD-10-CM: I06.0  ICD-9-CM: 395.0  11/2/2015 - Present        Cardiac pacemaker (Chronic) ICD-10-CM: Z95.0  ICD-9-CM: V45.01  11/2/2015 - Present        Thrombocytopenia (HCC) (Chronic) ICD-10-CM: D69.6  ICD-9-CM: 287.5  8/22/2012 - Present        Anemia (Chronic) ICD-10-CM: D64.9  ICD-9-CM: 285.9  10/27/2011 - Present    Overview Signed 10/27/2011  8:25 AM by Jensen Saucedo on chronic               Chronic atrial fibrillation (HCC) (Chronic) ICD-10-CM: I48.2  ICD-9-CM: 427.31  10/17/2011 - Present        Hypothyroidism (Chronic) ICD-10-CM: E03.9  ICD-9-CM: 244.9  12/4/2009 - Present        BOBBY (obstructive sleep apnea) (Chronic) ICD-10-CM: G47.33  ICD-9-CM: 327.23  12/4/2009 - Present        Chronic obstructive pulmonary disease (HCC) (Chronic) ICD-10-CM: J44.9  ICD-9-CM: 496  3/8/2009 - Present        Aortic Valve Bioprosthesis Present (Chronic) ICD-10-CM: Z95.2  ICD-9-CM: V43.3  3/7/2009 - Present        Degenerative arthritis of left knee (Chronic) ICD-10-CM: M17.12  ICD-9-CM: 715.96  2/27/2009 - Present        RESOLVED: CHF (congestive heart failure) ([facility] ICD-10-CM: I50.9  ICD-9-CM: 428.0  2/13/2018 - 2/14/2018        RESOLVED: Acute on chronic systolic congestive heart failure ([facility] ICD-10-CM: I50.23  ICD-9-CM: 428.23, 428.0  2/1/2018 - 2/7/2018        RESOLVED: Acute pulmonary edema ([facility] ICD-10-CM: J81.0  ICD-9-CM: 518.4  1/25/2018 - 2/7/2018        RESOLVED: Pacemaker ICD-10-CM: Z95.0  ICD-9-CM: V45.01  12/4/2017 - 1/23/2018        RESOLVED: History of gout ICD-10-CM: Z87.39  ICD-9-CM: V12.29  1/31/2017 - 1/23/2018        RESOLVED: Warfarin anticoagulation (Chronic) ICD-10-CM: Z79.01  ICD-9-CM: V58.61  1/9/2017 - 1/23/2018        RESOLVED: Non morbid obesity due to excess calories ICD-10-CM: E66.09  ICD-9-CM: 278.00  11/14/2016 - 1/23/2018        RESOLVED: Hypoxemia ICD-10-CM: R09.02  ICD-9-CM: 799.02  11/14/2016 - 1/23/2018        RESOLVED: Localized edema ICD-10-CM: R60.0  ICD-9-CM: 782.3  9/9/2016 - 1/23/2018        RESOLVED: Iron deficiency anemia ICD-10-CM: D50.9  ICD-9-CM: 280.9  9/9/2016 - 1/23/2018        RESOLVED: CRI (chronic renal insufficiency) ICD-10-CM: N18.9  ICD-9-CM: 585.9  8/5/2016 - 1/23/2018        RESOLVED: Dyspnea ICD-10-CM: R06.00  ICD-9-CM: 786.09  8/5/2016 - 1/23/2018        RESOLVED: Right hip pain ICD-10-CM: M25.551  ICD-9-CM: 719.45  8/5/2016 - 1/23/2018        RESOLVED: Edema ICD-10-CM: R60.9  ICD-9-CM: 782.3  8/5/2016 - 1/23/2018        RESOLVED: Chest pain ICD-10-CM: R07.9  ICD-9-CM: 786.50  8/5/2016 - 1/23/2018        RESOLVED: Other long term (current) drug therapy ICD-10-CM: Z79.899  ICD-9-CM: V58.69  8/5/2016 - 1/23/2018        RESOLVED: Acute encephalopathy ICD-10-CM: G93.40  ICD-9-CM: 348.30  7/8/2016 - 1/23/2018        RESOLVED: Tachycardia ICD-10-CM: R00.0  ICD-9-CM: 785.0  6/27/2016 - 1/23/2018        RESOLVED: Palpitations ICD-10-CM: R00.2  ICD-9-CM: 785.1  11/2/2015 - 1/23/2018        RESOLVED: Respiratory insufficiency ICD-10-CM: R06.89  ICD-9-CM: 786.09  11/2/2015 - 1/23/2018        RESOLVED: Bradycardia (Symptomatic) ICD-10-CM: R00.1  ICD-9-CM: 427.89  11/7/2011 - 1/23/2018        RESOLVED: Rectal bleeding ICD-10-CM: K62.5  ICD-9-CM: 569.3  10/27/2011 - 1/23/2018        RESOLVED: AV block ICD-10-CM: I44.30  ICD-9-CM: 426.10  10/17/2011 - 1/23/2018        RESOLVED: Cardiogenic shock ([facility] [address] ICD-10-CM: R57.0  ICD-9-CM: 785.51  10/17/2011 - 1/23/2018 RESOLVED: Hyperkalemia ICD-10-CM: E87.5  ICD-9-CM: 276.7  10/17/2011 - 1/23/2018        RESOLVED: Nausea and vomiting ICD-10-CM: R11.2  ICD-9-CM: 787.01  2/24/2010 - 1/23/2018        RESOLVED: Epigastric abdominal pain ICD-10-CM: R10.13  ICD-9-CM: 789.06  2/24/2010 - 1/23/2018        RESOLVED: Digoxin toxicity ICD-10-CM: T46.0X1A  ICD-9-CM: 972.1, E942.1  2/24/2010 - 1/23/2018        RESOLVED: ARF (acute renal failure) (HCC) (Chronic) ICD-10-CM: N17.9  ICD-9-CM: 584.9  2/24/2010 - 1/23/2018        RESOLVED: Hypokalemia ICD-10-CM: E87.6  ICD-9-CM: 276.8  2/24/2010 - 1/23/2018        RESOLVED: CKD (chronic kidney disease) stage 3, GFR 30-59 ml/min (Chronic) ICD-10-CM: N18.3  ICD-9-CM: 585.3  12/4/2009 - 1/23/2018        RESOLVED: Cough ICD-10-CM: R05  ICD-9-CM: 786.2  3/9/2009 - 3/12/2009        RESOLVED: Bronchitis ICD-10-CM: J40  ICD-9-CM: 490  3/9/2009 - 3/12/2009        RESOLVED: Atrial fibrillation (HCC) (Chronic) ICD-10-CM: I48.91  ICD-9-CM: 427.31  3/7/2009 - 6/27/2016        RESOLVED: Hypotension (Chronic) ICD-10-CM: I95.9  ICD-9-CM: 458.9  3/1/2009 - 1/23/2018              Plan:     The Post Assessment Physician Evaluation (RAMBO) found the current functional status to be comparable with the Pre-admission Screening. The Patient is a good candidate for acute inpatient rehabilitation. Nothing since the Pre-admission screen has changed that determination.      Rehabilitation Plan  The patient has shown the ability to tolerate and benefit from 3 hours of therapy daily and is being admitted to a comprehensive acute inpatient rehabilitation program consisting of at least 3 hours of combined physical and occupational therapies. Resume intensive Physical Therapy for a minimum of 1.5 hours a day, at least 5 out of 7 days per week to address bed mobility, transfers, ambulation, strengthening, balance, and endurance. - pt  was ambulating independently with a rollator inside her home.  reports using rollator or a wheelchair for community mobility. Will continue to focus on endurance, strengthening BLe.      Resume  intensive Occupational Therapy for a minimum of 1.5 hours a day, at least 5 out of 7 days per week to address ADL ( bathing, LE dressing, toileting) and adaptive equipment as needed.       Continue 24-hour skilled rehabilitation nursing for bowel and bladder management, skin care for decubitus ulcer prevention , pain management and ongoing medication administration      The patient may benefit from a psychology consult for depression, anxiety and adjustment disorder.     Continue daily physician medical management:    Acute respiratory failure (HCC) (2/7/2018)/ Pleural effusion (1/23/2018)/ S/P bilateral thoracentesis  S/p - Thoracenteses on 2/9 - 550 mls removed from the left and 1 liter removed from the right.   - Continue bronchodilators prn- has home nebulizer. Resume as prn;xopenex  - Patient will be on 2L O2 at therapy and at rest. May be able to wean as tolerated. Will monitor saO2. 2/15 - sao2 at 100% on 2L/ min. Feels comfortable. Continue lasix and aldactone. Cardiology gave 1x Zaroxolyn in AM prior to AM lasix. 2/19 - continue lasix 80 mg bid + aldactone. +metolazone prior to lasix each morning. Monitor. Daily weights.   2/20- cardiology recommendation appreciated. Transition to iv lasic. Will need access. Difficult due to edema. 2/21- Persistent edema. Iv lasix ordered per cardiology, however since peripheral access not yet achieved after multiple attempts due to edema. Will have IR place CVC / ij anticipating frequent blood draws and continued iv diuretic needs. Mean time will have po lasix for every iv dose missed. 2/22 - Patient edema mildly better. Mild decreased weight; 176lbs. conitune lasix 80 bid. Increase KCl 40 to bid dose. Left IJ oozing overnight following IR procedure, appears not bleeding today. hgb decreased, likely due to blood loss.     2/23 - mildly SOB, will repeat CxR to monitor bibasilar effusions. Continue iv lasix. 2/26 - SaO2 stable on O2 @2L via NC. cpntinued on diuresis over the weekend. Cr, elevated. Will reduce lasix. 2/27 - lasix reduced. Metolazone held. BUN/ Cr. = 57/1.59-> 62/1.21.   2/28 - Edema clinically appears better, still persistent. Continue to check renal function. 68/1. 14. Moderate elevation of BUN. Cr acceptable. Continued clinical evidence of fluid retention. continue to diurese with careful monitoring of renal function  3/1- continue to diurese oral Lasix 80 daily. Cr improved. Holding eliquis, started on aspirin for anticoag/antiplatelet therapy due to suspected hx of GI bleeding. .  3/2 - weight 169lbs. Slow decrease. Conitinuing diuresis, monitor renal function. Clinically volume overloaded, CHF.   3/5 - weight 169lbs. continued on2L o2 via nc. Comfortable however easily gets winded, fatigued. CxR shows still basilar effusions not much changed form prior exam.         UTI - - 3/5 abnormal UA, symptomatic. Will start treatment. Cx pending.          S/P TAVR(1/30/2018)/ Acute on chronic diastolic heart failure Pulmonary hypertension / Chronic atrial fibrillation/ HTN   - cardiac precaution, s.p TAVR. - weights and maintain a 2 liter per day fluid restriction.   - Monitor HR/BP. conitinue Eliquis for a.fib  - continue BB-Toprol XL  - edema, chest exam appears not changed. cintiuned on diuretics. continue O2 supplements. 2/26 - continued on lower dose eliquis for now. Weight 172 lbs, reduced about 7 lbs since he admission to [facility]. still persistent edema. Cr 1.59, will reduce lasix and hold metolazone. 2/28 - continue of current dose diuretics. 3/4 - continued on laix 80mg daily, and aldactone 25. Will obtain cxr, BNP. Reduce diuretic dose? Urine reported to be dark. 3/5 - contiune on lasix 80 daily + aldactone 25. Strict I/o, monitor weight.  BMP, BNP in am.         Hypothyroidism - synthroid 88 mcg     Leukocytosis (2/7/2018) on Abx last dose 7/7 rocephin administered 2/13.   - finished abx. No new s/s of infection. - 2/21 no new signs of infection.      Acute blood loss anemia/ GI bleed? -monitor clinically. May have been caused by high INR. - no melena. - hgb 9.1 (2/18) , asymptomatic.   - 2/22 - Anemia due to blood loss form IR proocedure- hgb 7.4. Check in am.  2/23 - hgb 7.7. Check labs in am and Monday. 2/26 - s/p transfusion 1 unit over the weekend. Monitor. hgb 8.2   2/27 - hgb 8.2-> 7.7, mild decrease. Will ask GI to consult on management. continue Eliquis? 2/28 -hgb 7,5. Transfuse 2 units today. GI consult appreciated. Will follow rec. Continue PPI. Use the Anusol prn for hemmorhoids  3/1 - GI recommendaiton to be followed. continue PPI. hemmorrhoidal bleeding to be treated with anusol. Transfuse prn. Monitor hgb. Monitor for s/s of acute bleed. hgb 10.2 today, s/p transfusion 2u.  3/2- no signs of acute bleed. conitnue to monitor hgb/hct, check Sunday. 3/4 - hgb 9.7 (3/4) will check periodically. 3/5 - will check hbg 3/6. No new melena.      Pneumonia prophylaxis- Insentive spirometer every hour while awake     DVT risk / DVT Prophylaxis- continue daily physician exam to assess for signs and symptoms as patient is at increased risk for of thromboembolism  3/4 - no s/s of DVT. Will need for SCDs as pt off eliquis altogther  3/5 no s/s of DVT. SCDs. Wound Care: - monitor for ulcers, skin breakdown due to edema. -left elbow  edema drainage covered. -  TEDs. Fitting better. Edema control.         Potential urinary retention - no s/s of retention. Monitor.  - pt mostly continent. - needed to use bedpan over the wekend due to decline in mobility.      bowel program - as needed dulcolax, pericolace. + BM.     GERD/ GI prophylaxis - resume PPI. At times may need additional antacids, Maalox prn.          Time spent was 35 minutes with over 1/2 in direct patient care/examination, consultation and coordination of care. Signed By: Selene Whiting MD     March 5, 2018

## 2018-03-05 NOTE — PROGRESS NOTES
PHYSICAL THERAPY DAILY NOTE  Time In: 1121  Time Out: 7543  Patient Seen For: AM;Balance activities;Gait training;Patient education;Transfer training; Other (see progress notes)    Subjective: patient reporting she feels a little better today. Reports she is not as tired and not as short of breath. Reports she would like to go home soon         Objective:Vital Signs:patient on 02 at 1 lpm. 02 sat 95 to 100% during treatment  Patient Vitals for the past 12 hrs:   Temp Pulse Resp BP SpO2   03/05/18 0729 97.6 °F (36.4 °C) 81 22 115/71 98 %     Pain level:No c/o pain  Pain location:NA  Pain interventions:NA    Patient education:Balance training,transfer training, gait training, fall precautions, activity pacing, body mechanics, posture correction, energy conservation, breathing techniques during mobility. Patient with limited understanding of patient education. Recommend follow up education. Interdisciplinary Communication:spoke with OT regarding functional status and 02 sat readings      Other (comment) (Fall precautions)  GROSS ASSESSMENT Daily Assessment     patient requires considerable amount of time to complete a functional tasks, including up to 1 minute to complete a SPT and 2 to 3 minutes to walk 40 to 50 ft       BED/MAT MOBILITY Daily Assessment    Rolling Right : 0 (Not tested)  Rolling Left : 0 (Not tested)  Supine to Sit : 0 (Not tested)  Sit to Supine : 0 (Not tested)       TRANSFERS Daily Assessment   Increased time and effort to complete with cues for body mechanics   Transfer Type: SPT without device (recliner<>w/c)  Other: recliner <>BSC with min assist. Max assist to manage lower body clothing. Max assist with hygiene.   Transfer Assistance : 4 (Minimal assistance)  Sit to Stand Assistance: Contact guard assistance  Car Transfers: Not tested       GAIT Daily Assessment   Gait training with verbal cues for posture correction, to improve step clearance and for breathing pattern  4 to 5 min rest breaks between gait attempts Amount of Assistance: 4 (Contact guard assistance)  Distance (ft): 50 Feet (ft) (50ft x 1  40ft x 2)  Assistive Device: Walker, rolling       STEPS or STAIRS Daily Assessment    Steps/Stairs Ambulated (#): 0  Level of Assist : 0 (Not tested)       BALANCE Daily Assessment   Static standing balance activities with CGA and cues while managing lower body clothing before and after toileting  Static standing with rollator and SBA with cues for posture and breathing pattern Sitting - Static: Good (unsupported)  Sitting - Dynamic: Fair (occasional)  Standing - Static: Fair;Constant support  Standing - Dynamic : Impaired       WHEELCHAIR MOBILITY Daily Assessment    Curbs/Ramps Assist Required (FIM Score): 0 (Not tested)  Wheelchair Setup Assist Required : 0 (Not tested)       LOWER EXTREMITY EXERCISES Daily Assessment     NA          Assessment: Functional mobility has improved since last week. Cont to demonstrate periodic confusion with impaired safety awareness during functional mobility. Functional endurance improved today with 02 sat stable on 02 at 1 lpm during treatment. Cont to require considerable time and cues to complete a functional tasks at current level. Limited to no carry over of education noted above       Patient returned to room at end of treatment and remained up in recliner with LEs elevated and with needs in reach. 02 at 2 lpm and posey alarm on. Plan of Care: Continue with POC and progress as tolerated.     Barbara Humphrey, PT  3/5/2018

## 2018-03-06 PROBLEM — D72.829 LEUKOCYTOSIS: Status: RESOLVED | Noted: 2018-01-01 | Resolved: 2018-01-01

## 2018-03-06 PROBLEM — I50.32 CHRONIC DIASTOLIC HEART FAILURE (HCC): Chronic | Status: ACTIVE | Noted: 2018-01-01

## 2018-03-06 PROBLEM — J90 PLEURAL EFFUSION, BILATERAL: Status: ACTIVE | Noted: 2018-01-01

## 2018-03-06 PROBLEM — J96.00 ACUTE RESPIRATORY FAILURE (HCC): Status: ACTIVE | Noted: 2018-01-01

## 2018-03-06 PROBLEM — J96.20 ACUTE ON CHRONIC RESPIRATORY FAILURE (HCC): Status: RESOLVED | Noted: 2018-01-01 | Resolved: 2018-01-01

## 2018-03-06 PROBLEM — J96.22 ACUTE ON CHRONIC RESPIRATORY FAILURE WITH HYPERCAPNIA (HCC): Status: ACTIVE | Noted: 2018-01-01

## 2018-03-06 NOTE — PROGRESS NOTES
Problem: Falls - Risk of  Goal: *Absence of Falls  Document Gregorio Fall Risk and appropriate interventions in the flowsheet.    Outcome: Progressing Towards Goal  Fall Risk Interventions:  Mobility Interventions: Bed/chair exit alarm, Patient to call before getting OOB, Utilize walker, cane, or other assitive device    Mentation Interventions: Adequate sleep, hydration, pain control, Bed/chair exit alarm, Door open when patient unattended, Evaluate medications/consider consulting pharmacy    Medication Interventions: Patient to call before getting OOB    Elimination Interventions: Call light in reach, Patient to call for help with toileting needs    History of Falls Interventions: Consult care management for discharge planning, Bed/chair exit alarm, Door open when patient unattended

## 2018-03-06 NOTE — PROGRESS NOTES
Problem: Falls - Risk of  Goal: *Absence of Falls  Document Gregorio Fall Risk and appropriate interventions in the flowsheet.    Outcome: Progressing Towards Goal  Fall Risk Interventions:  Mobility Interventions: Bed/chair exit alarm, Patient to call before getting OOB    Mentation Interventions: Adequate sleep, hydration, pain control, Door open when patient unattended    Medication Interventions: Patient to call before getting OOB, Teach patient to arise slowly    Elimination Interventions: Call light in reach, Patient to call for help with toileting needs, Toilet paper/wipes in reach, Toileting schedule/hourly rounds    History of Falls Interventions: Bed/chair exit alarm

## 2018-03-06 NOTE — PROGRESS NOTES
Patient resting up in bed. Alert and oriented to self/following commands. Assisted up to bedside commode. Voiding without difficulty. Malodorous urine/cloudy. Denies any pain or discomfort at present time. Repositioned up in bed for breakfast tray. Tray set up. SCD's in place. Wearing CARA hose during the day. IV to right arm patent and clean. Lung sounds diminished in bases. 2 liters nasal cannula. No other verbalized needs made known at present time. Assessment completed. See doc flow sheet for further assessments.

## 2018-03-06 NOTE — PROGRESS NOTES
End Of Shift Functional Summary, Nursing      TOILETING:  Does patient need assist with clothing management and/or pericare? Yes: Comment: max assist jesse everything/clothing and mobility. TOILET TRANSFER:  Pt requires maximum assistance. Pt uses walker. BLADDER:  Pt does not have a salamanca catheter that staff manages. Pt does not take medication. Pt is continent. of bladder and voids in bedside commode  Pt requires staff to empty device Pt has had 0 bladder accidents during this shift requiring maximum assistance to clean up. (An accident is when the episode is not contained in a brief AND/OR the clothing/linen requires changing/cleaning up.)    BOWEL:  Pt does take medication. Pt is continent of bowel and uses bedside commode. Pt requires staff to empty device    Pt has had 0 bowel accidents during this shift requiring maximum assistance from staff to clean up. (An accident is when the episode is not contained in a brief AND/OR the clothing/linen requires changing/cleaning up.)    BED/CHAIR TRANSFER  Pt requires maximum assistance. Patient requires the assistance of 2 staff member(s). Pt uses walker                           EATING  Pt requires setup. Pt wears dentures. TUBE FEEDINGS:  Pt does not  receive nutrition through tube feedings. Patient requires supervision/setup with feedings. Documentation reviewed and plan of care discussed/reviewed with   patient, physician, therapists, oncoming nurse, patient assistant and family/spouse during the shift.

## 2018-03-06 NOTE — PROGRESS NOTES
PHYSICAL THERAPY DAILY NOTE  Time In: 7774  Time Out: 4584  Patient Seen For: PM;Therapeutic exercise    Subjective: Patient had no complaints. Objective: No pain noted. Son and sister in law present in room with patient. Discussed with nurse Severiano Moose concerning patients confusion. Patient in recliner and awake . O2 at 2 liters and O2 sats at 98%. HR 83 after exercises and standing HR 94. Other (comment) (Fall precautions)  GROSS ASSESSMENT Daily Assessment            BED/MAT MOBILITY Daily Assessment    Supine to Sit : 0 (Not tested)  Sit to Supine : 0 (Not tested)       TRANSFERS Daily Assessment   Worked on standing for pressure relief and exercise. Knees stayed in flexed position in standing. Transfer Type: SPT without device  Transfer Assistance : 4 (Minimal assistance)  Sit to Stand Assistance: Moderate assistance  Car Transfers: Not tested       GAIT Daily Assessment    Amount of Assistance: 0 (Not tested)  Distance (ft): 10 Feet (ft) (x2)  Assistive Device: Walker, rolling       STEPS or STAIRS Daily Assessment    Level of Assist : 0 (Not tested)       BALANCE Daily Assessment    Sitting - Static: Good (unsupported)  Sitting - Dynamic: Fair (occasional)  Standing - Static: Fair  Standing - Dynamic : Impaired       WHEELCHAIR MOBILITY Daily Assessment            LOWER EXTREMITY EXERCISES Daily Assessment               Assessment: Patient seemed in good spirits with family present . Plan of Care: Continue with plan of care to reach PT goals. She wanted to remain in recliner with family present and alarm on.     Sami Tolbert, PTA  3/6/2018

## 2018-03-06 NOTE — PROGRESS NOTES
PHYSICAL THERAPY DAILY NOTE  Time In: 1030  Time Out: 5826  Patient Seen For: AM;Balance activities;Gait training;Patient education;Transfer training    Subjective: \"I will try. \" Patient agreeable to therapy. Objective: Vital Signs:   Patient Vitals for the past 8 hrs:   Temp Pulse Resp BP SpO2   03/06/18 0723 97.4 °F (36.3 °C) 82 22 100/62 97 %         Pain level: No pain reported. Pain location: n/a  Pain interventions: n/a    Patient education: Educated patient on safety with functional transfers and hand placement with sit>stand transfers. Interdisciplinary Communication: Communicated with Shahab OT student regarding patient's POC. Other (comment) (Fall precautions)    TRANSFERS Daily Assessment   Increased time and effort with functional mobility. Patient transferred from recliner>w/c>bedside commode>recliner. Patient requires Max A for lower body dressing and hygiene. Transfer Type: SPT without device  Transfer Assistance : 4 (Minimal assistance)  Sit to Stand Assistance: Contact guard assistance  Car Transfers: Not tested       GAIT Daily Assessment   Increased time and effort. Patient ambulated with slow partial step through gait pattern with moderate forward head flexion. Amount of Assistance: 4 (Contact guard assistance)  Distance (ft): 10 Feet (ft) (x2)  Assistive Device: Walker, rolling       BALANCE Daily Assessment    Sitting - Static: Good (unsupported)  Sitting - Dynamic: Fair (occasional)  Standing - Static: Fair  Standing - Dynamic : Impaired     Patient returned to room sitting in recliner with all needs in reach. Assessment: Patient demonstrated periods of confusion throughout therapy session. Patient's O2 saturation remained stable at 2L O2 via nasal cannula throughout therapy session. Plan of Care: Continue with POC and progress as tolerated.        Adalberto Ledesma  3/6/2018

## 2018-03-06 NOTE — PROGRESS NOTES
03/06/18 0833   Time Spent With Patient   Time In 0833   Time Out 1005   Patient Seen For: AM;ADLs   Grooming   Grooming Assistance  Mod A   Comments Pt was able to wash hands and face. Assist to comb hair and brush dentures. Upper Body Bathing   Bathing Assistance, Upper S   Position Performed Other (comment)  (supine in bed)   Comments Verbal cues to wash arms   Lower Body Bathing   Bathing Assistance, Lower  Min A   Position Performed Bending forward method; Long sitting on bed   Comments Assist with washing buttocks while pt rolled side to side in bed. Upper Body Dressing    Dressing Assistance  Max A   Comments Assist with all parts of donning bra. Assist with threading head through shirt and pull down. Lower Body Dressing    Dressing Assistance  Max A   Leg Crossed Method Used Yes   Position Performed Supine   Don/Doff Anti-Embolic Stockings Dep   Comments Pt was able to thread bilateral legs through pullups via cross over method. Pt able to thread both leg through pants. Assist with clothing management up while standing at walker. Assist with bilateral socks. Functional Transfers   Tub or Shower Type Other (comment)   Amount of Assistance Required Min A   Adaptive Equipment Walker (comment)     S: \"I am worn out. \" Agreeable to therapy. Focus of session was on morning ADL routine. Patient was able to ambulate ~5 feet using a RW with CGA. Pain not indicated during this session. Collaborated with RN, Vivian Ford and Rossana Hess and confirmed patient is making slow progress. Patient tolerated session well, but confusion, UE strength, functional mobility, shortness of breath, and activity tolerance are still below baseline and requires skilled facilitation to successfully and safely complete ADL's and transfers. Patient ended session in recliner with call remote and phone within reach.      Barb Tolliver, OT Student

## 2018-03-06 NOTE — CONSULTS
CONSULT NOTE    Yoko Garcia    3/6/2018    Date of Admission:  2/13/2018    The patient's chart is reviewed and the patient is discussed with the staff. Subjective:     Patient is a 69 y.o.  female seen and evaluated at the request of Dr. Verena Calvo. Past medical history of recent TAVR, HTN, CAD, chronic AFIB, anemia, anxiety, and chronic diastolic heart failure. She underwent bilateral thoracentesis on Jan 25th (preop) removing 1200 mls from the right and 900 mls from the left. She underwent the TAVR on Jan 30th. She was discharged home on Feb 3 but presented to the ER on 2/6/18 via EMS with complaints of worsening swelling and shortness of breath. Upon arrival to the ER, CXR shows cardiomegaly with finding consistent with pulmonary edema and worsening bilateral pleural effusions. We saw her again and bilateral thoracenteses were repeated on 2/9 removing 550 mls from the left and 1000 mls from the right. She was later transferred to the 9th floor for rehab. Since transfer, cardiology has followed and adjusted diuretic therapy. She has been anemic and her stool was heme positive so the Eliquis was stopped. She has been more lethargic and her family noticed that she seemed more confused so an ABG was obtained which showed hypercapnea. The patient is sleeping. Will awaken and interact for a short time before falling back to sleep. She has no complaints. Review of Systems  Review of systems not obtained due to patient factors.     Patient Active Problem List   Diagnosis Code    Degenerative arthritis of left knee M17.12    Aortic Valve Bioprosthesis Present Z95.2    Chronic obstructive pulmonary disease ([facility] J44.9    Hypothyroidism E03.9    BOBBY (obstructive sleep apnea) G47.33    Chronic atrial fibrillation (HCC) I48.2    Anemia D64.9    Thrombocytopenia (HCC) D69.6    Obesity E66.9    Mitral stenosis with insufficiency I05.2    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  Cardiac pacemaker Z95.0    Sick sinus syndrome (HCC) I49.5    H/O mitral valve repair,  Z98.890    Chronic depression F32.9    Osteopenia M85.80    RLS (restless legs syndrome) G25.81    Hyperlipidemia E78.5    Gout M10.9    Anxiety F41.9    CAD (coronary artery disease) I25.10    HTN (hypertension) I10    GERD (gastroesophageal reflux disease) K21.9    Chronic diastolic congestive heart failure (HCC) I50.32    Aortic valve replaced Z95.2    Pulmonary hypertension I27.20    H/O atrioventricular rubin ablation Z98.890    S/P mitral valve repair Z98.890    Tricuspid valve insufficiency V63.3    Systolic CHF, chronic (HCC) I50.22    Stenosis of prosthetic aortic valve I35.0    Peripheral arterial disease (HCC) I73.9    Chronic respiratory failure with hypoxia ([facility]) J96.11    Hypoxia R09.02    Aortic stenosis I35.0    S/P TAVR (transcatheter aortic valve replacement) Z95.2    Chronic diastolic heart failure (HCC) I50.32    Acute on chronic respiratory failure with hypercapnia ([facility]) J96.22    Pleural effusion, bilateral J90         Prior to Admission Medications   Prescriptions Last Dose Informant Patient Reported? Taking? CARBOXYMETHYLCELLULOS/GLYCERIN (REFRESH OPTIVE OP) Unknown at Unknown time  Yes No   Sig: Apply  to eye. DOCUSATE SODIUM Unknown at Unknown time  Yes No   Sig: Take 1 Cap by mouth two (2) times a day. OXYGEN-AIR DELIVERY SYSTEMS 2018 at Unknown time  Yes Yes   Sig: by Does Not Apply route. 2-2.5 lpm cont. allopurinol (ZYLOPRIM) 100 mg tablet 2018 at Unknown time  No Yes   Sig: Take 1 Tab by mouth two (2) times a day. apixaban (ELIQUIS) 5 mg tablet 2018 at Unknown time  No Yes   Sig: Take 1 Tab by mouth every twelve (12) hours. azelastine (ASTELIN) 137 mcg (0.1 %) nasal spray Unknown at Unknown time  No No   Si [place] by Both Nostrils route two (2) times a day.  Use in each nostril as directed   calcium carbonate (CALTREX) 600 mg (1,500 mg) tablet Unknown at Unknown time  Yes No   Sig: Take 600 mg by mouth nightly. cholecalciferol (VITAMIN D3) 1,000 unit cap Unknown at Unknown time  Yes No   Sig: Take  by mouth daily. cyanocobalamin (VITAMIN B-12) 250 mcg tablet Unknown at Unknown time  Yes No   Sig: Take 1,000 mcg by mouth daily. diazePAM (VALIUM) 5 mg tablet 2018 at Unknown time  No Yes   Sig: Take 1 Tab by mouth daily. Max Daily Amount: 5 mg. fluticasone (FLONASE) 50 mcg/actuation nasal spray Unknown at Unknown time  Yes No   [place] by Both Nostrils route daily. furosemide (LASIX) 80 mg tablet 2018 at Unknown time  No Yes   Sig: Take 1 Tab by mouth every twelve (12) hours. glycerin, adult, (SUPPOSITORY ADULT) suppository Unknown at Unknown time  Yes No   Sig: Insert 1 Suppository into rectum as needed. hydrocortisone (ANUSOL-HC) 2.5 % rectal cream Unknown at Unknown time  No No   Sig: Apply small amount to hemorrhoids/perianal skin TID PRN   levalbuterol (XOPENEX) 1.25 mg/3 mL nebu Unknown at Unknown time  Yes No   Si.25 mg by Nebulization route. levothyroxine (SYNTHROID) 88 mcg tablet 2018 at Unknown time  No Yes   Sig: Take 1 Tab by mouth Daily (before breakfast). loratadine (CLARITIN) 10 mg tablet Unknown at Unknown time  Yes No   Sig: Take 10 mg by mouth as needed. metoprolol succinate (TOPROL-XL) 25 mg XL tablet 2018 at Unknown time  No Yes   Sig: Take 1 Tab by mouth daily. nitroglycerin (NITROSTAT) 0.4 mg SL tablet Unknown at Unknown time  Yes No   Sig: by SubLINGual route every five (5) minutes as needed for Chest Pain. omeprazole (PRILOSEC) 20 mg capsule Unknown at Unknown time  No No   Sig: TAKE 1 CAPSULE BY MOUTH  DAILY   polyethylene glycol (MIRALAX) 17 gram/dose powder 2018 at Unknown time  Yes Yes   Sig: Take 17 g by mouth daily. pravastatin (PRAVACHOL) 40 mg tablet 2018 at Unknown time  No Yes   Sig: Take 1 Tab by mouth daily.  Indications: hyperlipidemia promethazine (PHENERGAN) 25 mg tablet Not Taking at Unknown time  No No   Sig: Take 1 Tab by mouth every six (6) hours as needed. rOPINIRole (REQUIP) 2 mg tablet 2/13/2018 at Unknown time  Yes Yes   Sig: Take  by mouth two (2) times a day. sertraline (ZOLOFT) 100 mg tablet 2/13/2018 at Unknown time  No Yes   Sig: Take 1 Tab by mouth daily. spironolactone (ALDACTONE) 25 mg tablet 2/13/2018 at Unknown time  No Yes   Sig: Take 1 Tab by mouth daily.       Facility-Administered Medications: None       Past Medical History:   Diagnosis Date    Abnormal glucose 8/5/2016    Abscess 8/5/2016    Acute encephalopathy 7/8/2016    Acute renal failure ([address] 12/3/2009    Afib ([address]     Anemia     Ankle swelling 8/5/2016    Anxiety 8/5/2016    Aortic Valve Bioprosthesis Present 3/7/2009    [facility]F [facility] failure) ([facility] 2/24/2010    Arthritis     Atopic dermatitis 8/5/2016    Atrial fibrillation ([facility]) 3/7/2009    Autonomic orthostatic hypotension 8/5/2016    AV block 10/17/2011    Back pain 8/5/2016    Bradycardia (Symptomatic) 11/7/2011    CAD (coronary artery disease)     Cancer ([facility]) 1990    breast (left)    Cardiac pacemaker 11/2/2015    Cardiogenic shock ([address] 10/17/2011    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    Cellulitis 8/5/2016    Chest pain 8/5/2016    Chronic atrial fibrillation ([address] 10/17/2011    Chronic depression 8/5/2016    Chronic obstructive pulmonary disease ([address] 3/8/2009    Chronic pain     CKD (chronic kidney disease) stage 3, GFR 30-59 ml/min 12/4/2009    Congestive heart failure (CHF) ([address] 11/2/2015    Degeneration of cervical intervertebral disc 8/5/2016    Degenerative arthritis of left knee 2/27/2009    Dehydration 1/1/8834    Diastolic heart failure (HCC)     Digoxin toxicity 2/24/2010    Disorder of sweat glands 8/5/2016    Diverticulosis of large intestine without diverticulitis 2015    Dyspnea 8/5/2016    Eczema 8/5/2016    Embolus of femoral artery ([address] 12/4/2017    Epigastric abdominal pain 2/24/2010    GERD (gastroesophageal reflux disease)     Gout 8/5/2016    H/O mitral valve repair, 2003 6/27/2016    Heart failure ([address]     Hx of colonic polyp 2015    adenoma    Hyperkalemia 10/17/2011    Hyperlipidemia 8/5/2016    Hypertension     Hypokalemia 2/24/2010    Hypothyroidism 12/4/2009    Ill-defined condition     CARPEL TUNNEL SYNDROME, BILAT HANDS    Knee pain 8/5/2016    Leg cramps 8/5/2016    Mitral stenosis with insufficiency 11/2/2015    Prior MV repair 2003.     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    Right hip pain 8/5/2016    RLS (restless legs syndrome) 8/5/2016    Sick sinus syndrome ([address] 2/13/2016    Skin infection 8/5/2016    Tachycardia 6/27/2016    Thrombocytopenia, unspecified 8/22/2012    Thromboembolus ([address]     02/2017    Urticaria 8/5/2016    Vertigo 8/5/2016     Active Ambulatory Problems     Diagnosis Date Noted    Degenerative arthritis of left knee 02/27/2009    Aortic Valve Bioprosthesis Present 03/07/2009    Chronic obstructive pulmonary disease ([address] 03/08/2009    Hypothyroidism 12/04/2009    BOBBY (obstructive sleep apnea) 12/04/2009    Chronic atrial fibrillation ([address] 10/17/2011    Anemia 10/27/2011    Thrombocytopenia ([address] 08/22/2012    Obesity 11/02/2015    Mitral stenosis with insufficiency 11/02/2015    Rheumatic aortic stenosis 11/02/2015    Cardiac pacemaker 11/02/2015    Sick sinus syndrome ([address] 02/13/2016    H/O mitral valve repair, 2003 06/27/2016    Chronic depression 08/05/2016    Osteopenia 08/05/2016    RLS (restless legs syndrome) 08/05/2016    Hyperlipidemia 08/05/2016    Gout 08/05/2016    Anxiety 08/05/2016    CAD (coronary artery disease) 08/05/2016    HTN (hypertension) 08/05/2016    GERD (gastroesophageal reflux disease) 08/05/2016    Chronic diastolic congestive heart failure ([address] 09/09/2016    Aortic valve replaced 01/09/2017    Pulmonary hypertension 02/12/2017    H/O atrioventricular rubin ablation 03/22/2017    S/P mitral valve repair 12/04/2017    Tricuspid valve insufficiency 09/82/4260    Systolic CHF, chronic ([address] 01/15/2018    Stenosis of prosthetic aortic valve 01/19/2018    Peripheral arterial disease ([address] 01/23/2018    Chronic respiratory failure with hypoxia ([facility] 01/23/2018    Hypoxia 01/23/2018    Aortic stenosis 01/29/2018    S/P TAVR (transcatheter aortic valve replacement) 01/30/2018    Chronic diastolic heart failure ([facility] 02/07/2018     Resolved Ambulatory Problems     Diagnosis Date Noted    Hypotension 03/01/2009    Atrial fibrillation ([address] 03/07/2009    Cough 03/09/2009    Bronchitis 03/09/2009    CKD (chronic kidney disease) stage 3, GFR 30-59 ml/min 12/04/2009    Nausea and vomiting 02/24/2010    Epigastric abdominal pain 02/24/2010    Digoxin toxicity 02/24/2010    ARF (acute renal failure) ([address] 02/24/2010    Hypokalemia 02/24/2010    AV block 10/17/2011    Cardiogenic shock (HCC) 10/17/2011    Hyperkalemia 10/17/2011    Rectal bleeding 10/27/2011    Bradycardia (Symptomatic) 11/07/2011    Palpitations 11/02/2015    Respiratory insufficiency 11/02/2015    Tachycardia 06/27/2016    Acute encephalopathy 07/08/2016    CRI (chronic renal insufficiency) 08/05/2016    Dyspnea 08/05/2016    Right hip pain 08/05/2016    Edema 08/05/2016    Chest pain 08/05/2016    Other long term (current) drug therapy 08/05/2016    Localized edema 09/09/2016    Iron deficiency anemia 09/09/2016    Non morbid obesity due to excess calories 11/14/2016    Hypoxemia 11/14/2016    Warfarin anticoagulation 01/09/2017    History of gout 01/31/2017    Acute on chronic systolic congestive heart failure ([address] 02/01/2018    Pacemaker 12/04/2017    Acute pulmonary edema (HCC) 01/25/2018    Acute on chronic respiratory failure ([address] 02/07/2018    Leukocytosis 02/07/2018     Past Medical History:   Diagnosis Date    Abnormal glucose 8/5/2016    Abscess 8/5/2016    Acute encephalopathy 7/8/2016    Acute renal failure ([address] 12/3/2009    Afib ([address]     Anemia     Ankle swelling 8/5/2016    Anxiety 8/5/2016    Aortic Valve Bioprosthesis Present 3/7/2009    [facility]F [facility] 2/24/2010    Arthritis     Atopic dermatitis 8/5/2016    Atrial fibrillation ([facility]) 3/7/2009    Autonomic orthostatic hypotension 8/5/2016    AV block 10/17/2011    Back pain 8/5/2016    Bradycardia (Symptomatic) 11/7/2011    CAD (coronary artery disease)     Cancer [facility]) 1990    Cardiac pacemaker 11/2/2015    Cardiogenic shock ([address] 10/17/2011    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    Cellulitis 8/5/2016    Chest pain 8/5/2016    Chronic atrial fibrillation ([address] 10/17/2011    Chronic depression 8/5/2016    Chronic obstructive pulmonary disease ([address] 3/8/2009    Chronic pain     CKD (chronic kidney disease) stage 3, GFR 30-59 ml/min 12/4/2009    Congestive heart failure (CHF) ([address] 11/2/2015    Degeneration of cervical intervertebral disc 8/5/2016    Degenerative arthritis of left knee 2/27/2009    Dehydration 7/8/5939    Diastolic heart failure (HCC)     Digoxin toxicity 2/24/2010    Disorder of sweat glands 8/5/2016    Diverticulosis of large intestine without diverticulitis 2015    Dyspnea 8/5/2016    Eczema 8/5/2016    Embolus of femoral artery ([address] 12/4/2017    Epigastric abdominal pain 2/24/2010    GERD (gastroesophageal reflux disease)     Gout 8/5/2016    H/O mitral valve repair, 2003 6/27/2016    Heart failure ([address]     Hx of colonic polyp 2015    Hyperkalemia 10/17/2011    Hyperlipidemia 8/5/2016    Hypertension     Hypokalemia 2/24/2010    Hypothyroidism 12/4/2009    Ill-defined condition     Knee pain 8/5/2016    Leg cramps 8/5/2016    Mitral stenosis with insufficiency 11/2/2015   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    Right hip pain 8/5/2016    RLS (restless legs syndrome) 8/5/2016    Sick sinus syndrome ([address] 2/13/2016    Skin infection 8/5/2016    Tachycardia 6/27/2016    Thrombocytopenia, unspecified 8/22/2012    Thromboembolus ([address]     Urticaria 8/5/2016    Vertigo 8/5/2016     Past Surgical History:   Procedure Laterality Date    CARDIAC SURG PROCEDURE UNLIST      valve repair and replacement    CHEST SURGERY PROCEDURE UNLISTED      HX APPENDECTOMY      HX BREAST RECONSTRUCTION      HX CHOLECYSTECTOMY  2005    HX GYN  1981    hysterectomy still have ovaries    HX MASTECTOMY  1990    left mastectomy    HX ORTHOPAEDIC      knee surgery    HX PACEMAKER      VASCULAR SURGERY PROCEDURE UNLIST Right 02/20/2017    LE thrombectomy     Social History     Social History    Marital status:      Spouse name: N/A    Number of children: N/A    Years of education: N/A     Occupational History    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    cotton mill      6 years     Social History Main Topics    Smoking status: Former Smoker     Packs/day: 3.00     Years: 15.00     Types: Cigarettes     Quit date: 6/27/1996    Smokeless tobacco: Former User      Comment: quit 1996    Alcohol use No    Drug use: No    Sexual activity: Not Currently     Other Topics Concern    Not on file Social History Narrative    Lives with her son     Family History   Problem Relation Age of Onset    Heart Disease Mother     Cancer Father      Throat    Heart Disease Father     Cancer Sister      Breast, Colon    Heart Disease Sister     Breast Cancer Sister 79      from disease    Cancer Maternal Grandmother     Cancer Brother     Diabetes Brother     Heart Disease Brother     Psychiatric Disorder Paternal Grandfather     Cancer Maternal Aunt      Breast    Diabetes Son      Allergies   Allergen Reactions    Adhesive Tape Rash    Benadryl [Diphenhydramine Hcl] Other (comments)     Jitters      Demerol [Meperidine] Anxiety    Morphine Other (comments)     Confusion    Sulfa (Sulfonamide Antibiotics) Anxiety    Lorazepam Anxiety       Current Facility-Administered Medications   Medication Dose Route Frequency    ciprofloxacin HCl (CIPRO) tablet 500 mg  500 mg Oral Q12H    diazePAM (VALIUM) tablet 2 mg  2 mg Oral QHS PRN    alum-mag hydroxide-simeth (MYLANTA) oral suspension 30 mL  30 mL Oral Q4H PRN    aspirin delayed-release tablet 81 mg  81 mg Oral DAILY    0.9% sodium chloride infusion 250 mL  250 mL IntraVENous PRN    furosemide (LASIX) tablet 80 mg  80 mg Oral DAILY    potassium chloride (K-DUR, KLOR-CON) SR tablet 40 mEq  40 mEq Oral DAILY    magnesium oxide (MAG-OX) tablet 400 mg  400 mg Oral DAILY    central line flush (saline) syringe 10 mL  10 mL InterCATHeter Q8H    acetaminophen (TYLENOL) tablet 650 mg  650 mg Oral Q4H PRN    alcohol 62% (NOZIN) nasal  1 Ampule  1 Ampule Topical Q12H    allopurinol (ZYLOPRIM) tablet 100 mg  100 mg Oral BID    hydrocortisone (ANUSOL-HC) 2.5 % rectal cream   PeriANAL TID PRN    levothyroxine (SYNTHROID) tablet 88 mcg  88 mcg Oral ACB    metoprolol succinate (TOPROL-XL) XL tablet 25 mg  25 mg Oral DAILY    pantoprazole (PROTONIX) tablet 40 mg  40 mg Oral ACB    polyethylene glycol (MIRALAX) packet 17 g  17 g Oral DAILY    pravastatin (PRAVACHOL) tablet 40 mg  40 mg Oral DAILY    rOPINIRole (REQUIP) tablet 2 mg  2 mg Oral BID    sertraline (ZOLOFT) tablet 100 mg  100 mg Oral DAILY    spironolactone (ALDACTONE) tablet 25 mg  25 mg Oral DAILY    levalbuterol (XOPENEX) nebulizer soln 0.63 mg/3 mL  0.63 mg Nebulization Q6H PRN         Objective:     Vitals:    03/05/18 1517 03/05/18 1953 03/06/18 0639 03/06/18 0723   BP: 102/65 107/71  100/62   Pulse: 82 83  82   Resp: 22 22  22   Temp: 97.6 °F (36.4 °C) 98 °F (36.7 °C)  97.4 °F (36.3 °C)   SpO2: 100% 98%  97%   Weight:   170 lb (77.1 kg)        PHYSICAL EXAM     Constitutional:  the patient is well developed and in no acute distress  HEENT:  Sclera clear, pupils equal, oral mucosa moist  Lungs: Mild B inspiratory crackles. Cardiovascular:  RRR without M,G,R  Abd/GI: soft and non-tender; with positive bowel sounds. Ext: warm without cyanosis. There is 1-2+ B lower leg edema. Skin:  no jaundice or rashes, no wounds   Neuro: no gross neuro deficits. Alert and oriented  Musculoskeletal: moves all four extremities. No deformities. Psychiatric: calm.  Does not appear anxious or depressed  Chest X-ray:        Recent Labs      03/06/18   0611  03/05/18   1304  03/04/18   0532   HGB  9.1*  9.2*  9.7*   HCT  31.7*  31.3*  33.5*     Recent Labs      03/06/18   0611  03/04/18   0532  03/04/18   0022   NA  151*  148*  149*   K  4.3  4.5  4.4   CL  99  94*  95*   GLU  104*  114*  135*   CO2  >45*  >45*  >45*   BUN  57*  62*  64*   CREA  1.15*  1.14*  1.14*   MG   --    --   2.5*   CA  9.0  8.8  8.7     Recent Labs      03/06/18   1325   PH  7.43   PCO2  81*   PO2  89   HCO3  52*         Assessment:  (Medical Decision Making)     Hospital Problems  Date Reviewed: 2/10/2018          Codes Class Noted POA    Acute on chronic respiratory failure with hypercapnia (HCC) ICD-10-CM: W61.08  ICD-9-CM: 518.84  3/6/2018 No        Pleural effusion, bilateral ICD-10-CM: J90  ICD-9-CM: 511.9 3/6/2018 Yes        Chronic diastolic heart failure (HCC) (Chronic) ICD-10-CM: I50.32  ICD-9-CM: 428.32  2/7/2018 Yes        S/P TAVR (transcatheter aortic valve replacement) ICD-10-CM: Z95.2  ICD-9-CM: V43.3  1/30/2018 Yes        Hypoxia ICD-10-CM: R09.02  ICD-9-CM: 799.02  1/23/2018 Yes        Chronic atrial fibrillation (HCC) (Chronic) ICD-10-CM: I48.2  ICD-9-CM: 427.31  10/17/2011 Yes        Chronic obstructive pulmonary disease ([facility] (Chronic) ICD-10-CM: J44.9  ICD-9-CM: 496  3/8/2009 Yes    Overview Signed 3/6/2018  3:19 PM by Myriam Tavares NP     PFTs Jan 2018                   Suspect patient has had gradually worsening hypercarbia over several days to weeks. This is likely a combination of her very severe baseline COPD (FEV1 20% on recent bedside testing) and heart failure/pulmonary edema. Plan:  (Medical Decision Making)   -will start patient on bipap at night and any time she is asleep  -will repeat an abg in the am. If it is improving will continue with this plan. If worse or no better then will need ICU for continuous bipap.   -will also monitor bicarb levels, may need diamox once CO2 moving in the right direction.   -may eventually need thoracentesis as well.      Anabella Leslie MD      More than 50% of time documented was spent face-to-face contact with the patient and in the care of the patient on the floor/unit where the patient is located

## 2018-03-07 NOTE — PROGRESS NOTES
OT Daily Note    Time In 1030   Time Out 1116     Subjective:\"I can get up\" Agreeable to therapy. Pain:denied during session  Interdisciplinary Communication: Collaborated with PT, Juan Pablo Garcia and patient is making progress towards goals for the first time in awhile  Precautions:  (fall precautions)     Progress note: Pt seenfor weekly progress note, goals were revised and updated. Please refer to IRC/Care Plan for further details. Pt is making progress towards goals for first time in a few weeks, cognition, strength, balance, and activity tolerance have improved since addition of BiPap. Continue with current OT POC. Balance/functional mobility Daily Assessment   Bed mobility CGA. SPT was minimal assist ~2 feet using a RW. Cognition Daily Assessment   Alphabet puzzle completed with 80% accuracy with about 7 vc's for command following and simplification of task, patient much more alert and understood task well. Self cares Daily Assessment   Patient able to apply lip stick with minimal assist.  Brushed teeth with s/u only. Applied dentures with s/u for fixodent. Moderate assist for LB dressing. Ended session:In w/c in therapy gym with PTJuan Pablo.      Ankush Fair OTR/L

## 2018-03-07 NOTE — PROGRESS NOTES
Peter Sinha  Admission Date: 2/13/2018             Daily Progress Note: 3/7/2018    The patient's chart is reviewed and the patient is discussed with the staff. Subjective:     Patient is a 69 y.o.  female seen and evaluated at the request of Dr. Marea Meckel. Past medical history of recent TAVR, HTN, CAD, chronic AFIB, anemia, anxiety, and chronic diastolic heart failure. She underwent bilateral thoracentesis on Jan 25th (preop) removing 1200 mls from the right and 900 mls from the left. She underwent the TAVR on Jan 30th. She was discharged home on Feb 3 but presented to the ER on 2/6/18 via EMS with complaints of worsening swelling and shortness of breath. Upon arrival to the ER, CXR shows cardiomegaly with finding consistent with pulmonary edema and worsening bilateral pleural effusions. We saw her again and bilateral thoracenteses were repeated on 2/9 removing 550 mls from the left and 1000 mls from the right. She was later transferred to the 9th floor for rehab. Since transfer, cardiology has followed and adjusted diuretic therapy. She has been anemic and her stool was heme positive so the Eliquis was stopped. She has been more lethargic and her family noticed that she seemed more confused so an ABG was obtained which showed hypercapnea.    She is better and had improved abgs on bipap yesteray- says she feels ok,, currently sleeping but wakes up easily    Current Facility-Administered Medications   Medication Dose Route Frequency    ciprofloxacin HCl (CIPRO) tablet 500 mg  500 mg Oral Q12H    diazePAM (VALIUM) tablet 2 mg  2 mg Oral QHS PRN    alum-mag hydroxide-simeth (MYLANTA) oral suspension 30 mL  30 mL Oral Q4H PRN    aspirin delayed-release tablet 81 mg  81 mg Oral DAILY    0.9% sodium chloride infusion 250 mL  250 mL IntraVENous PRN    furosemide (LASIX) tablet 80 mg  80 mg Oral DAILY    potassium chloride (K-DUR, KLOR-CON) SR tablet 40 mEq  40 mEq Oral DAILY  magnesium oxide (MAG-OX) tablet 400 mg  400 mg Oral DAILY    acetaminophen (TYLENOL) tablet 650 mg  650 mg Oral Q4H PRN    alcohol 62% (NOZIN) nasal  1 Ampule  1 Ampule Topical Q12H    allopurinol (ZYLOPRIM) tablet 100 mg  100 mg Oral BID    hydrocortisone (ANUSOL-HC) 2.5 % rectal cream   PeriANAL TID PRN    levothyroxine (SYNTHROID) tablet 88 mcg  88 mcg Oral ACB    metoprolol succinate (TOPROL-XL) XL tablet 25 mg  25 mg Oral DAILY    pantoprazole (PROTONIX) tablet 40 mg  40 mg Oral ACB    polyethylene glycol (MIRALAX) packet 17 g  17 g Oral DAILY    pravastatin (PRAVACHOL) tablet 40 mg  40 mg Oral DAILY    rOPINIRole (REQUIP) tablet 2 mg  2 mg Oral BID    sertraline (ZOLOFT) tablet 100 mg  100 mg Oral DAILY    spironolactone (ALDACTONE) tablet 25 mg  25 mg Oral DAILY    levalbuterol (XOPENEX) nebulizer soln 0.63 mg/3 mL  0.63 mg Nebulization Q6H PRN       Review of Systems    Constitutional: negative for fever, chills, sweats  Cardiovascular: negative for chest pain, palpitations, syncope, edema  Gastrointestinal:  negative for dysphagia, reflux, vomiting, diarrhea, abdominal pain, or melena  Neurologic:  negative for focal weakness, numbness, headache    Objective:     Vitals:    03/06/18 2150 03/07/18 0040 03/07/18 0751 03/07/18 0836   BP:   92/59 94/60   Pulse:   82    Resp:   20    Temp:   97.9 °F (36.6 °C)    SpO2: 98%  93%    Weight:  170 lb (77.1 kg)       Intake and Output:   03/05 1901 - 03/07 0700  In: 480 [P.O.:480]  Out: 1400 [Urine:1400]  03/07 0701 - 03/07 1900  In: 240 [P.O.:240]  Out: 900 [Urine:900]    Physical Exam:   Constitution:  the patient is well developed and in no acute distress  EENMT:  Sclera clear, pupils equal, oral mucosa moist  Respiratory: clear  Cardiovascular:  RRR without M,G,R  Gastrointestinal: soft and non-tender; with positive bowel sounds. Musculoskeletal: warm without cyanosis. There is no lower leg edema.   Skin:  no jaundice or rashes, no wounds Neurologic: no gross neuro deficits     Psychiatric:  alert and oriented x 2    CXR:       LAB  No results for input(s): GLUCPOC in the last 72 hours. No lab exists for component: [place]   Recent Labs      03/06/18   0611  03/05/18   1304   HGB  9.1*  9.2*   HCT  31.7*  31.3*     Recent Labs      03/06/18   0611   NA  151*   K  4.3   CL  99   CO2  >45*   GLU  104*   BUN  57*   CREA  1.15*   CA  9.0     Recent Labs      03/07/18   0440  03/06/18   1325   PH  7.50*  7.43   PCO2  66*  81*   PO2  83  89   HCO3  51*  52*     No results for input(s): LCAD, LAC in the last 72 hours. Assessment:  (Medical Decision Making)     Hospital Problems  Date Reviewed: 2/10/2018          Codes Class Noted POA    Acute on chronic respiratory failure with hypercapnia (HCC) ICD-10-CM: A51.36  ICD-9-CM: 518.84  3/6/2018 No        Pleural effusion, bilateral ICD-10-CM: J90  ICD-9-CM: 511.9  3/6/2018 Yes        Chronic diastolic heart failure (HCC) (Chronic) ICD-10-CM: I50.32  ICD-9-CM: 428.32  2/7/2018 Yes        S/P TAVR (transcatheter aortic valve replacement) ICD-10-CM: Z95.2  ICD-9-CM: V43.3  1/30/2018 Yes        Hypoxia ICD-10-CM: R09.02  ICD-9-CM: 799.02  1/23/2018 Yes        Chronic atrial fibrillation (HCC) (Chronic) ICD-10-CM: I48.2  ICD-9-CM: 427.31  10/17/2011 Yes        Chronic obstructive pulmonary disease ([address] (Chronic) ICD-10-CM: J44.9  ICD-9-CM: 532  3/8/2009 Yes    Overview Signed 3/6/2018  3:19 PM by Elvin Michel NP     PFTs Jan 2018                     Plan:  (Medical Decision Making)   1   Diamox x 1 dose  2   bipap hs and with naps  --    More than 50% of the time documented was spent in face-to-face contact with the patient and in the care of the patient on the floor/unit where the patient is located.     Linda Peña MD

## 2018-03-07 NOTE — PROGRESS NOTES
SFD PROGRESS NOTE    Bahman Choudhary  Admit Date: 2/13/2018  Admit Diagnosis: DEBILITY;CHF (congestive heart failure) ([facility] [address] Respiratory f*  Chief Complaint : Gait dysfunction secondary to below. Admit Diagnosis: Pleural effusion [J90]; Pleural effusion [J90]  Acute respiratory failure (HCC) (2/7/2018)  Pleural effusion (1/23/2018)/ S/P bilateral thoracentesis  Hypothyroidism   Chronic atrial fibrillation   HTN   Pulmonary hypertension   S/P TAVR(1/30/2018)  Leukocytosis (2/7/2018) on Abx   Acute on chronic diastolic heart failure   weakness  Pain  DVT risk  Acute blood loss anemia/ GI bleed? Acute Rehab Dx:  Generalized weakness. Debility    deconditioning   Mobility and ambulation deficits  Self Care/ADL deficits    Subjective     Patient seen and examined. Vss. Afebrile. Tolerated BiPAP fairly overnight. Awake and participated fairly in OT. On exam not much clinical difference, fatigued, poor breath sounds, basilar  Crackles. Patient noted to become winded, short of breath with minimal activity. PCO2 improved, but ph higher. Will discuss hospice care plans with son as cardiopulonary condition, prognosis poor.  .     Objective:     Current Facility-Administered Medications   Medication Dose Route Frequency    ciprofloxacin HCl (CIPRO) tablet 500 mg  500 mg Oral Q12H    diazePAM (VALIUM) tablet 2 mg  2 mg Oral QHS PRN    alum-mag hydroxide-simeth (MYLANTA) oral suspension 30 mL  30 mL Oral Q4H PRN    aspirin delayed-release tablet 81 mg  81 mg Oral DAILY    0.9% sodium chloride infusion 250 mL  250 mL IntraVENous PRN    furosemide (LASIX) tablet 80 mg  80 mg Oral DAILY    potassium chloride (K-DUR, KLOR-CON) SR tablet 40 mEq  40 mEq Oral DAILY    magnesium oxide (MAG-OX) tablet 400 mg  400 mg Oral DAILY    central line flush (saline) syringe 10 mL  10 mL InterCATHeter Q8H    acetaminophen (TYLENOL) tablet 650 mg  650 mg Oral Q4H PRN    alcohol 62% (NOZIN) nasal  1 Ampule  1 Ampule Topical Q12H    allopurinol (ZYLOPRIM) tablet 100 mg  100 mg Oral BID    hydrocortisone (ANUSOL-HC) 2.5 % rectal cream   PeriANAL TID PRN    levothyroxine (SYNTHROID) tablet 88 mcg  88 mcg Oral ACB    metoprolol succinate (TOPROL-XL) XL tablet 25 mg  25 mg Oral DAILY    pantoprazole (PROTONIX) tablet 40 mg  40 mg Oral ACB    polyethylene glycol (MIRALAX) packet 17 g  17 g Oral DAILY    pravastatin (PRAVACHOL) tablet 40 mg  40 mg Oral DAILY    rOPINIRole (REQUIP) tablet 2 mg  2 mg Oral BID    sertraline (ZOLOFT) tablet 100 mg  100 mg Oral DAILY    spironolactone (ALDACTONE) tablet 25 mg  25 mg Oral DAILY    levalbuterol (XOPENEX) nebulizer soln 0.63 mg/3 mL  0.63 mg Nebulization Q6H PRN     Review of Systems: + weakness. Denies chest pain, shortness of breath, cough, headache, visual problems, abdominal pain, dysurea, calf pain. Pertinent positives are as noted in the medical records and unremarkable otherwise. Visit Vitals    BP 94/60 (BP 1 Location: Right arm, BP Patient Position: Sitting)    Pulse 82    Temp 97.9 °F (36.6 °C)    Resp 20    Wt 170 lb (77.1 kg)    SpO2 93%    BMI 33.2 kg/m2        Physical Exam:   General: Alert and age appropriately oriented.  Appears comfortable on 2L O2 NC. No acute cardio respiratory distress. mild lethargy. HEENT: Normocephalic,no scleral icterus  Oral mucosa moist without cyanosis, No bruit, mild +JVD. Lungs: + few r  basilar crackles. No wheezing. Respiration even and unlabored   Heart: RRR,  II/VI TERRI  No clicks, rub or gallops   Abdomen: Soft, non-tender, nondistended. Bowel sounds present. Genitourinary: defered   Neuromuscular:      PERRL, EOMI  Follows simple commands consistently. Able to identify, recall repeat. Mood appropriate. Insight, judgement intact.    LUE     Shoulder abduction   4/5              Elbow flexion:   4/5               Wrist extension:  4+ /5              Finger flexion;  4 /5  RUE    Shoulder abduction:4 /5                Elbow flexion:  4 /5                         Wrist extension: 5- /5                        Finger flexion:  5- /5  LLE     Hip flexion:  3+ /5              Knee extension:  4 -/5                         Ankle dorsiflexion:  5 -/5                        Ankle plantarflexion:   5/5                                                                  RLE     Hip flexion:   3+/5                        Knee extension:  4- /5                         Ankle dorsiflexion:  5- /5                        Ankle plantarflexion:  5 /5  Sensory - intact grossly   No cerebellar signs. Plantars - down going  No atrophy, no fasciculations, no tremors. Skin/extremity: No rashes, no erythema. Calf non tender BLE. 2+ BLE edema. + chronic arthritic joint changes. Mild BUE edema.                                                                                  Functional Assessment:  Gross Assessment  AROM: Generally decreased, functional (02/28/18 1400)  PROM: Generally decreased, functional (02/28/18 1400)  Strength: Generally decreased, functional (02/28/18 1400)  Coordination: Generally decreased, functional (02/28/18 1400)  Tone: Normal (02/28/18 1400)  Sensation: Intact (02/21/18 1300)       Balance  Sitting - Static: Good (unsupported) (03/06/18 1200)  Sitting - Dynamic: Fair (occasional) (03/06/18 1200)  Standing - Static: Fair (03/06/18 1200)  Standing - Dynamic : Impaired (03/06/18 1200)           Toileting  Cues: Physical assistance for pants down;Physical assistance for pants up (03/05/18 0833)  Adaptive Equipment: Walker;Elevated seat (03/05/18 0833)         Hailey Neal Fall Risk Assessment:  Guillermo Beverage Risk  Mobility: Ambulates or transfers with assist devices or assistance/unsteady gait (03/07/18 0720)  Mobility Interventions: Bed/chair exit alarm;Communicate number of staff needed for ambulation/transfer;Patient to call before getting OOB;PT Consult for mobility concerns;PT Consult for assist device competence;Strengthening exercises (ROM-active/passive); Utilize walker, cane, or other assitive device;Utilize gait belt for transfers/ambulation (03/07/18 0720)  Mentation: Periodic confusion (03/07/18 0720)  Mentation Interventions: Bed/chair exit alarm; Adequate sleep, hydration, pain control;Evaluate medications/consider consulting pharmacy; Increase mobility;More frequent rounding; Toileting rounds (03/07/18 0720)  Medication: Patient receiving anticonvulsants, sedatives(tranquilizers), psychotropics or hypnotics, hypoglycemics, narcotics, sleep aids, antihypertensives, laxatives, or diuretics (03/07/18 0720)  Medication Interventions: Bed/chair exit alarm;Evaluate medications/consider consulting pharmacy; Patient to call before getting OOB; Teach patient to arise slowly;Utilize gait belt for transfers/ambulation (03/07/18 0720)  Elimination: Needs assistance with toileting (03/07/18 0720)  Elimination Interventions: Call light in reach;Bed/chair exit alarm; Patient to call for help with toileting needs; Toileting schedule/hourly rounds (03/07/18 0720)  Prior Fall History: Unknown (03/07/18 0720)  History of Falls Interventions: Bed/chair exit alarm;Evaluate medications/consider consulting pharmacy;Utilize gait belt for transfer/ambulation (03/07/18 0720)  Total Score: 4 (03/07/18 0720)  Standard Fall Precautions: Yes (03/06/18 1935)  High Fall Risk: Yes (03/07/18 0720)     Speech Assessment:         Ambulation:  Gait  Base of Support: Widened (02/28/18 1400)  Speed/Carol: Slow;Shuffled (02/28/18 1400)  Step Length: Right shortened;Left shortened (02/28/18 1400)  Distance (ft): 10 Feet (ft) (x2) (03/06/18 1200)  Assistive Device: Walker, rolling (03/06/18 1200)  Rail Use: Both (03/05/18 1500)     Labs/Studies:  Recent Results (from the past 72 hour(s))   CULTURE, URINE    Collection Time: 03/05/18  9:15 AM   Result Value Ref Range    Special Requests: NO SPECIAL REQUESTS      Culture result: (A)       >100,000 COLONIES/mL GRAM NEGATIVE RODS IDENTIFICATION AND SUSCEPTIBILITY TO FOLLOW   URINALYSIS W/ RFLX MICROSCOPIC    Collection Time: 03/05/18 10:07 AM   Result Value Ref Range    Color YELLOW      Appearance CLOUDY      Specific gravity 1.012 1.001 - 1.023      pH (UA) 7.5 5.0 - 9.0      Protein NEGATIVE  NEG mg/dL    Glucose NEGATIVE  mg/dL    Ketone NEGATIVE  NEG mg/dL    Bilirubin NEGATIVE  NEG      Blood TRACE (A) NEG      Urobilinogen 1.0 0.2 - 1.0 EU/dL    Nitrites NEGATIVE  NEG      Leukocyte Esterase MODERATE (A) NEG      WBC 20-50 0 /hpf    RBC 0-3 0 /hpf    Epithelial cells 0-3 0 /hpf    Bacteria 4+ (H) 0 /hpf    Casts 0-3 0 /lpf   HGB & HCT    Collection Time: 03/05/18  1:04 PM   Result Value Ref Range    HGB 9.2 (L) 11.7 - 15.4 g/dL    HCT 31.3 (L) 35.8 - 46.3 %   HGB & HCT    Collection Time: 03/06/18  6:11 AM   Result Value Ref Range    HGB 9.1 (L) 11.7 - 15.4 g/dL    HCT 31.7 (L) 35.8 - 73.0 %   METABOLIC PANEL, BASIC    Collection Time: 03/06/18  6:11 AM   Result Value Ref Range    Sodium 151 (H) 136 - 145 mmol/L    Potassium 4.3 3.5 - 5.1 mmol/L    Chloride 99 98 - 107 mmol/L    CO2 >45 (HH) 21 - 32 mmol/L    Anion gap Cannot be calculated 7 - 16 mmol/L    Glucose 104 (H) 65 - 100 mg/dL    BUN 57 (H) 8 - 23 MG/DL    Creatinine 1.15 (H) 0.6 - 1.0 MG/DL    GFR est AA 59 (L) >60 ml/min/1.73m2    GFR est non-AA 49 (L) >60 ml/min/1.73m2    Calcium 9.0 8.3 - 10.4 MG/DL   BNP    Collection Time: 03/06/18  6:11 AM   Result Value Ref Range     pg/mL   BLOOD GAS, ARTERIAL    Collection Time: 03/06/18  1:25 PM   Result Value Ref Range    pH 7.43 7.35 - 7.45      PCO2 81 (H) 35 - 45 mmHg    PO2 89 80 - 105 mmHg    BICARBONATE 52 (H) 22 - 26 mmol/L    BASE EXCESS 24.1 (H) 0 - 3 mmol/L    TOTAL HEMOGLOBIN 10.0 (L) 11.7 - 15.0 GM/DL    O2 SAT 98 92 - 98.5 %    Arterial O2 Hgb 95.8 94 - 97 %    CARBOXYHEMOGLOBIN 1.8 (H) 0.5 - 1.5 %    METHEMOGLOBIN 0.1 0.0 - 1.5 %    DEOXYHEMOGLOBIN 2 0.0 - 5.0 %    SITE RB ALLENS TEST NA     MODE NC     O2 FLOW 2.00 L/min    Respiratory comment: Dr Howard Case at 3 6 2018 1 31 57 PM. Read back. BLOOD GAS, ARTERIAL    Collection Time: 03/07/18  4:40 AM   Result Value Ref Range    pH 7.50 (H) 7.35 - 7.45      PCO2 66 (H) 35 - 45 mmHg    PO2 83 80 - 105 mmHg    BICARBONATE 51 (H) 22 - 26 mmol/L    BASE EXCESS 24.3 (H) 0 - 3 mmol/L    TOTAL HEMOGLOBIN 9.3 (L) 11.7 - 15.0 GM/DL    O2 SAT 96 92 - 98.5 %    Arterial O2 Hgb 94.5 94 - 97 %    CARBOXYHEMOGLOBIN 1.5 0.5 - 1.5 %    METHEMOGLOBIN 0.5 0.0 - 1.5 %    DEOXYHEMOGLOBIN 4 0.0 - 5.0 %    SITE LR     ALLENS TEST POSITIVE      MODE BIPAP 14 6     RATE 12.0      Respiratory comment: Casper RN at 3 7 2018 4 49 56 AM. Read back. CHEST RADIOGRAPH, 1 views, 3/5/2018     History: CHF, fatigue, and edema.     Technique: Portable frontal view of the chest.      Comparison: Chest radiograph 2/23/2018     Findings:   A stable right internal jugular line, and left-sided venous port are seen. Grossly stable post surgical changes overlie the mediastinal silhouette. Stable  moderate cardiomegaly is seen. Lungs are expanded without pneumothorax. Basilar  effusions and airspace changes are seen which are not felt to be significantly  changed from the prior study.  These are favored to represent sequela of fluid  overload given the associated cardiac megaly.     IMPRESSION  IMPRESSION:   1.  Stable findings of the chest as described above.         Assessment:     Problem List as of 3/7/2018  Date Reviewed: 2/10/2018          Codes Class Noted - Resolved    Acute on chronic respiratory failure with hypercapnia (HCC) ICD-10-CM: Q82.21  ICD-9-CM: 518.84  3/6/2018 - Present        Pleural effusion, bilateral ICD-10-CM: J90  ICD-9-CM: 511.9  3/6/2018 - Present        Chronic diastolic heart failure (HCC) (Chronic) ICD-10-CM: I50.32  ICD-9-CM: 428.32  2/7/2018 - Present        S/P TAVR (transcatheter aortic valve replacement) ICD-10-CM: Z95.2  ICD-9-CM: V43.3 1/30/2018 - Present        Aortic stenosis (Chronic) ICD-10-CM: I35.0  ICD-9-CM: 424.1  1/29/2018 - Present        Peripheral arterial disease (HCC) (Chronic) ICD-10-CM: I73.9  ICD-9-CM: 443.9  1/23/2018 - Present        Chronic respiratory failure with hypoxia (HCC) (Chronic) ICD-10-CM: J96.11  ICD-9-CM: 518.83, 799.02  1/23/2018 - Present    Overview Signed 1/23/2018 11:17 AM by Smith Tsang, NP     Wears 3 to 4 liters at home             Hypoxia ICD-10-CM: R09.02  ICD-9-CM: 799.02  1/23/2018 - Present        Stenosis of prosthetic aortic valve (Chronic) ICD-10-CM: I35.0  ICD-9-CM: 396.0  1/19/2018 - Present    Overview Signed 1/19/2018  3:18 PM by Laila Delgado MD     AVR (10/5/13):  21 mm Pericardial valve.                Tricuspid valve insufficiency (Chronic) ICD-10-CM: I07.1  ICD-9-CM: 397.0  1/15/2018 - Present        Systolic CHF, chronic (HCC) (Chronic) ICD-10-CM: I50.22  ICD-9-CM: 428.22, 428.0  1/15/2018 - Present        S/P mitral valve repair (Chronic) ICD-10-CM: Z20.303  ICD-9-CM: V45.89  12/4/2017 - Present        H/O atrioventricular rubin ablation (Chronic) ICD-10-CM: J44.628  ICD-9-CM: V15.1  3/22/2017 - Present        Pulmonary hypertension (Chronic) ICD-10-CM: I27.20  ICD-9-CM: 416.8  2/12/2017 - Present        Aortic valve replaced (Chronic) ICD-10-CM: Z95.2  ICD-9-CM: V43.3  1/9/2017 - Present        Chronic diastolic congestive heart failure (HCC) (Chronic) ICD-10-CM: I50.32  ICD-9-CM: 428.32, 428.0  9/9/2016 - Present        Chronic depression (Chronic) ICD-10-CM: F32.9  ICD-9-CM: 311  8/5/2016 - Present        Osteopenia (Chronic) ICD-10-CM: M85.80  ICD-9-CM: 733.90  8/5/2016 - Present        RLS (restless legs syndrome) (Chronic) ICD-10-CM: G25.81  ICD-9-CM: 333.94  8/5/2016 - Present        Hyperlipidemia (Chronic) ICD-10-CM: E78.5  ICD-9-CM: 272.4  8/5/2016 - Present        Gout (Chronic) ICD-10-CM: M10.9  ICD-9-CM: 274.9  8/5/2016 - Present        Anxiety (Chronic) ICD-10-CM: F41.9  ICD-9-CM: 300.00  8/5/2016 - Present        CAD (coronary artery disease) (Chronic) ICD-10-CM: I25.10  ICD-9-CM: 414.00  8/5/2016 - Present        HTN (hypertension) (Chronic) ICD-10-CM: I10  ICD-9-CM: 401.9  8/5/2016 - Present        GERD (gastroesophageal reflux disease) (Chronic) ICD-10-CM: K21.9  ICD-9-CM: 530.81  8/5/2016 - Present        H/O mitral valve repair, 2003 (Chronic) ICD-10-CM: R31.863  ICD-9-CM: V15.1  6/27/2016 - Present        Sick sinus syndrome (HCC) (Chronic) ICD-10-CM: I49.5  ICD-9-CM: 427.81  2/13/2016 - Present        Obesity (Chronic) ICD-10-CM: E66.9  ICD-9-CM: 278.00  11/2/2015 - Present        Mitral stenosis with insufficiency (Chronic) ICD-10-CM: K24.1  ICD-9-CM: 394.2  11/2/2015 - Present    Overview Signed 11/2/2015 11:02 AM by Rocio Rhodes     Prior MV repair 2003.               Rheumatic aortic stenosis (Chronic) ICD-10-CM: I06.0  ICD-9-CM: 395.0  11/2/2015 - Present        Cardiac pacemaker (Chronic) ICD-10-CM: Z95.0  ICD-9-CM: V45.01  11/2/2015 - Present        Thrombocytopenia (HCC) (Chronic) ICD-10-CM: D69.6  ICD-9-CM: 287.5  8/22/2012 - Present        Anemia (Chronic) ICD-10-CM: D64.9  ICD-9-CM: 285.9  10/27/2011 - Present    Overview Signed 10/27/2011  8:25 AM by Bernardino Saucedo on chronic               Chronic atrial fibrillation (HCC) (Chronic) ICD-10-CM: I48.2  ICD-9-CM: 427.31  10/17/2011 - Present        Hypothyroidism (Chronic) ICD-10-CM: E03.9  ICD-9-CM: 244.9  12/4/2009 - Present        BOBBY (obstructive sleep apnea) (Chronic) ICD-10-CM: G47.33  ICD-9-CM: 327.23  12/4/2009 - Present        Chronic obstructive pulmonary disease (HCC) (Chronic) ICD-10-CM: J44.9  ICD-9-CM: 496  3/8/2009 - Present    Overview Signed 3/6/2018  3:19 PM by Roddy Moura NP     PFTs Jan 2018               Aortic Valve Bioprosthesis Present (Chronic) ICD-10-CM: Z95.2  ICD-9-CM: V43.3  3/7/2009 - Present        Degenerative arthritis of left knee (Chronic) ICD-10-CM: M17.12  ICD-9-CM: 715.96  2/27/2009 - Present        RESOLVED: CHF (congestive heart failure) ([facility] ICD-10-CM: I50.9  ICD-9-CM: 428.0  2/13/2018 - 2/14/2018        RESOLVED: Acute on chronic respiratory failure ([facility] ICD-10-CM: J96.20  ICD-9-CM: 518.84  2/7/2018 - 3/6/2018        RESOLVED: Leukocytosis ICD-10-CM: D38.094  ICD-9-CM: 288.60  2/7/2018 - 3/6/2018        RESOLVED: Acute on chronic systolic congestive heart failure ([facility] ICD-10-CM: I50.23  ICD-9-CM: 428.23, 428.0  2/1/2018 - 2/7/2018        RESOLVED: Acute pulmonary edema ([facility] ICD-10-CM: J81.0  ICD-9-CM: 518.4  1/25/2018 - 2/7/2018        RESOLVED: Pacemaker ICD-10-CM: Z95.0  ICD-9-CM: V45.01  12/4/2017 - 1/23/2018        RESOLVED: History of gout ICD-10-CM: Z87.39  ICD-9-CM: V12.29  1/31/2017 - 1/23/2018        RESOLVED: Warfarin anticoagulation (Chronic) ICD-10-CM: Z79.01  ICD-9-CM: V58.61  1/9/2017 - 1/23/2018        RESOLVED: Non morbid obesity due to excess calories ICD-10-CM: E66.09  ICD-9-CM: 278.00  11/14/2016 - 1/23/2018        RESOLVED: Hypoxemia ICD-10-CM: R09.02  ICD-9-CM: 799.02  11/14/2016 - 1/23/2018        RESOLVED: Localized edema ICD-10-CM: R60.0  ICD-9-CM: 782.3  9/9/2016 - 1/23/2018        RESOLVED: Iron deficiency anemia ICD-10-CM: D50.9  ICD-9-CM: 280.9  9/9/2016 - 1/23/2018        RESOLVED: CRI (chronic renal insufficiency) ICD-10-CM: N18.9  ICD-9-CM: 585.9  8/5/2016 - 1/23/2018        RESOLVED: Dyspnea ICD-10-CM: R06.00  ICD-9-CM: 786.09  8/5/2016 - 1/23/2018        RESOLVED: Right hip pain ICD-10-CM: M25.551  ICD-9-CM: 719.45  8/5/2016 - 1/23/2018        RESOLVED: Edema ICD-10-CM: R60.9  ICD-9-CM: 782.3  8/5/2016 - 1/23/2018        RESOLVED: Chest pain ICD-10-CM: R07.9  ICD-9-CM: 786.50  8/5/2016 - 1/23/2018        RESOLVED: Other long term (current) drug therapy ICD-10-CM: R57.779  ICD-9-CM: V58.69  8/5/2016 - 1/23/2018        RESOLVED: Acute encephalopathy ICD-10-CM: G93.40  ICD-9-CM: 348.30  7/8/2016 - 1/23/2018 RESOLVED: Tachycardia ICD-10-CM: R00.0  ICD-9-CM: 785.0  6/27/2016 - 1/23/2018        RESOLVED: Palpitations ICD-10-CM: R00.2  ICD-9-CM: 785.1  11/2/2015 - 1/23/2018        RESOLVED: Respiratory insufficiency ICD-10-CM: R06.89  ICD-9-CM: 786.09  11/2/2015 - 1/23/2018        RESOLVED: Bradycardia (Symptomatic) ICD-10-CM: R00.1  ICD-9-CM: 427.89  11/7/2011 - 1/23/2018        RESOLVED: Rectal bleeding ICD-10-CM: K62.5  ICD-9-CM: 569.3  10/27/2011 - 1/23/2018        RESOLVED: AV block ICD-10-CM: I44.30  ICD-9-CM: 426.10  10/17/2011 - 1/23/2018        RESOLVED: Cardiogenic shock ([facility] [address] ICD-10-CM: R57.0  ICD-9-CM: 785.51  10/17/2011 - 1/23/2018        RESOLVED: Hyperkalemia ICD-10-CM: E87.5  ICD-9-CM: 276.7  10/17/2011 - 1/23/2018        RESOLVED: Nausea and vomiting ICD-10-CM: R11.2  ICD-9-CM: 787.01  2/24/2010 - 1/23/2018        RESOLVED: Epigastric abdominal pain ICD-10-CM: R10.13  ICD-9-CM: 789.06  2/24/2010 - 1/23/2018        RESOLVED: Digoxin toxicity ICD-10-CM: T46.0X1A  ICD-9-CM: 972.1, E942.1  2/24/2010 - 1/23/2018        RESOLVED: ARF (acute renal failure) (HCC) (Chronic) ICD-10-CM: N17.9  ICD-9-CM: 584.9  2/24/2010 - 1/23/2018        RESOLVED: Hypokalemia ICD-10-CM: E87.6  ICD-9-CM: 276.8  2/24/2010 - 1/23/2018        RESOLVED: CKD (chronic kidney disease) stage 3, GFR 30-59 ml/min (Chronic) ICD-10-CM: N18.3  ICD-9-CM: 585.3  12/4/2009 - 1/23/2018        RESOLVED: Cough ICD-10-CM: R05  ICD-9-CM: 786.2  3/9/2009 - 3/12/2009        RESOLVED: Bronchitis ICD-10-CM: J40  ICD-9-CM: 490  3/9/2009 - 3/12/2009        RESOLVED: Atrial fibrillation (HCC) (Chronic) ICD-10-CM: I48.91  ICD-9-CM: 427.31  3/7/2009 - 6/27/2016        RESOLVED: Hypotension (Chronic) ICD-10-CM: I95.9  ICD-9-CM: 458.9  3/1/2009 - 1/23/2018              Plan:     The Post Assessment Physician Evaluation (RAMBO) found the current functional status to be comparable with the Pre-admission Screening.  The Patient is a good candidate for acute inpatient rehabilitation. Nothing since the Pre-admission screen has changed that determination.      Rehabilitation Plan  The patient has shown the ability to tolerate and benefit from 3 hours of therapy daily and is being admitted to a comprehensive acute inpatient rehabilitation program consisting of at least 3 hours of combined physical and occupational therapies. Resume intensive Physical Therapy for a minimum of 1.5 hours a day, at least 5 out of 7 days per week to address bed mobility, transfers, ambulation, strengthening, balance, and endurance. - pt  was ambulating independently with a rollator inside her home. reports using rollator or a wheelchair for community mobility. Will continue to focus on endurance, strengthening BLe.      Resume  intensive Occupational Therapy for a minimum of 1.5 hours a day, at least 5 out of 7 days per week to address ADL ( bathing, LE dressing, toileting) and adaptive equipment as needed.       Continue 24-hour skilled rehabilitation nursing for bowel and bladder management, skin care for decubitus ulcer prevention , pain management and ongoing medication administration      The patient may benefit from a psychology consult for depression, anxiety and adjustment disorder.     Continue daily physician medical management:    Acute respiratory failure (HCC) (2/7/2018)/ Pleural effusion (1/23/2018)/ S/P bilateral thoracentesis  S/p - Thoracenteses on 2/9 - 550 mls removed from the left and 1 liter removed from the right.   - Continue bronchodilators prn- has home nebulizer. Resume as prn;xopenex  - Patient will be on 2L O2 at therapy and at rest. May be able to wean as tolerated. Will monitor saO2. 2/15 - sao2 at 100% on 2L/ min. Feels comfortable. Continue lasix and aldactone. Cardiology gave 1x Zaroxolyn in AM prior to AM lasix. 2/19 - continue lasix 80 mg bid + aldactone. +metolazone prior to lasix each morning. Monitor.  Daily weights.   2/20- cardiology recommendation appreciated. Transition to iv lasic. Will need access. Difficult due to edema. 2/21- Persistent edema. Iv lasix ordered per cardiology, however since peripheral access not yet achieved after multiple attempts due to edema. Will have IR place CVC / ij anticipating frequent blood draws and continued iv diuretic needs. Mean time will have po lasix for every iv dose missed. 2/22 - Patient edema mildly better. Mild decreased weight; 176lbs. conitune lasix 80 bid. Increase KCl 40 to bid dose. Left IJ oozing overnight following IR procedure, appears not bleeding today. hgb decreased, likely due to blood loss. 2/23 - mildly SOB, will repeat CxR to monitor bibasilar effusions. Continue iv lasix. 2/26 - SaO2 stable on O2 @2L via NC. cpntinued on diuresis over the weekend. Cr, elevated. Will reduce lasix. 2/27 - lasix reduced. Metolazone held. BUN/ Cr. = 57/1.59-> 62/1.21.   2/28 - Edema clinically appears better, still persistent. Continue to check renal function. 68/1. 14. Moderate elevation of BUN. Cr acceptable. Continued clinical evidence of fluid retention. continue to diurese with careful monitoring of renal function  3/1- continue to diurese oral Lasix 80 daily. Cr improved. Holding eliquis, started on aspirin for anticoag/antiplatelet therapy due to suspected hx of GI bleeding. .  3/2 - weight 169lbs. Slow decrease. Conitinuing diuresis, monitor renal function. Clinically volume overloaded, CHF.   3/5 - weight 169lbs. continued on2L o2 via nc. Comfortable however easily gets winded, fatigued. CxR shows still basilar effusions not much changed form prior exam. 3/6- acute on chronic respiratory failure. ABG confirms worse hypercapnea. Pulmonary assistance, plans noted. Start bipap when sleeping. Possible transfer to ICU needed if does not improve. Possible thoracentesis. 3/7 - continue BipAP. ABG showing mild decrease in pCO2, ph increased. Will discuss care plans with son, pulmonary.  Considering hospice discharge. UTI - - 3/5 abnormal UA, symptomatic. Will start treatment. - 3/6 Cx pending. Continue cipro.     S/P TAVR(1/30/2018)/ Acute on chronic diastolic heart failure Pulmonary hypertension / Chronic atrial fibrillation/ HTN   - cardiac precaution, s.p TAVR. - weights and maintain a 2 liter per day fluid restriction.   - Monitor HR/BP. conitinue Eliquis for a.fib  - continue BB-Toprol XL  - edema, chest exam appears not changed. cintiuned on diuretics. continue O2 supplements. 2/26 - continued on lower dose eliquis for now. Weight 172 lbs, reduced about 7 lbs since he admission to [facility]. still persistent edema. Cr 1.59, will reduce lasix and hold metolazone. 2/28 - continue of current dose diuretics. 3/4 - continued on laix 80mg daily, and aldactone 25. Will obtain cxr, BNP. Reduce diuretic dose? Urine reported to be dark. 3/5 - contiune on lasix 80 daily + aldactone 25. Strict I/o, monitor weight. BMP, BNP in am.    3/6- continue diuresis. Xw=152dcl. 3/7 - continue lasix. , aldactone.       Hypothyroidism - synthroid 88 mcg     Leukocytosis (2/7/2018) on Abx last dose 7/7 rocephin administered 2/13.   - finished abx. No new s/s of infection.         Acute blood loss anemia/ GI bleed? -monitor clinically. May have been caused by high INR. - no melena. - hgb 9.1 (2/18) , asymptomatic.   - 2/22 - Anemia due to blood loss form IR proocedure- hgb 7.4. Check in am.  2/23 - hgb 7.7. Check labs in am and Monday. 2/26 - s/p transfusion 1 unit over the weekend. Monitor. hgb 8.2   2/27 - hgb 8.2-> 7.7, mild decrease. Will ask GI to consult on management. continue Eliquis? 2/28 -hgb 7,5. Transfuse 2 units today. GI consult appreciated. Will follow rec. Continue PPI. Use the Anusol prn for hemmorhoids  3/1 - GI recommendaiton to be followed. continue PPI. hemmorrhoidal bleeding to be treated with anusol. Transfuse prn. Monitor hgb.  Monitor for s/s of acute bleed. hgb 10.2 today, s/p transfusion 2u.  3/2- no signs of acute bleed. conitnue to monitor hgb/hct, check Sunday. 3/4 - hgb 9.7 (3/4) will check periodically. 3/5 - will check hbg 3/6. No new melena. 3/6 - slight decrease, hgb 9.2, 1gm decrease since transfusion 5 days ago. Slow GIB? conitnue monitor. 3/7 - no clinical change, no melena reported. check hgb tomorrow 3/8     Pneumonia prophylaxis- Insentive spirometer every hour while awake     DVT risk / DVT Prophylaxis- continue daily physician exam to assess for signs and symptoms as patient is at increased risk for of thromboembolism  3/4 - no s/s of DVT. Will need for SCDs as pt off eliquis altogther  3/5 no s/s of DVT. SCDs. 3/7 - continue SCDs. No s/s of DVT. Wound Care: - monitor for ulcers, skin breakdown due to edema. -left elbow  edema drainage covered. -  TEDs. Fitting better. Edema control.         Potential urinary retention - no s/s of retention. Monitor.  - pt mostly continent. - needed to use bedpan over the wekend due to decline in mobility.      bowel program - as needed dulcolax, pericolace. + BM.     GERD/ GI prophylaxis - resume PPI. At times may need additional antacids, Maalox prn.          Time spent was 35 minutes with over 1/2 in direct patient care/examination, consultation and coordination of care.      Signed By: Balbir Reinoso MD     March 7, 2018

## 2018-03-07 NOTE — PROGRESS NOTES
BLADDER:  Pt does not have a salamanca catheter that staff manages. Pt does not take medication. Pt is continent. of bladder and voids in bedside commode  Pt requires staff to empty device Pt has had 0 bladder accidents during this shift requiring maximum assistance to clean up. (An accident is when the episode is not contained in a brief AND/OR the clothing/linen requires changing/cleaning up.)     BOWEL:  Pt does take medication. Pt is continent of bowel and uses bedside commode. Pt requires staff to empty device    Pt has had 0 bowel accidents during this shift requiring maximum assistance from staff to clean up.  (An accident is when the episode is not contained in a brief AND/OR the clothing/linen requires changing/cleaning up.)

## 2018-03-07 NOTE — PROGRESS NOTES
OT Daily Note    Time In 1315   Time Out 1345     Subjective:\"I am tired\" Agreeable to therapy. Pain:denied during session  Education: On benefits of therapy  Interdisciplinary Communication: Spoke with Carolyn Tripp about late start due to patient eating late lunch with son. Precautions:  (fall precautions)    Balance/functional mobility Daily Assessment   Ambulated 10' with minimal assist from recliner to w/c using RW. Strengthening/activity tolerance Daily Assessment   UE exercise bike completed for 8 minutes with minimal resistance at moderate pace to increase activity tolerance and UE strength. Bilateral  used with 5lbs in \"I\", \"O\",. All in 2 sets of 6 to increase activity tolerance, strength, and coordination. Assessment: Little more fatigue than morning session, but still alert and participating. SpO2% remained above 98% entire session on 1L. Ended session:In w/c with PT, Sobeida Block in therapy gym.      Javier Houser OTR/L

## 2018-03-07 NOTE — PROGRESS NOTES
Problem: Falls - Risk of  Goal: *Absence of Falls  Document Gregorio Fall Risk and appropriate interventions in the flowsheet.    Outcome: Progressing Towards Goal  Fall Risk Interventions:  Mobility Interventions: Bed/chair exit alarm    Mentation Interventions: Adequate sleep, hydration, pain control    Medication Interventions: Patient to call before getting OOB, Teach patient to arise slowly    Elimination Interventions: Call light in reach, Patient to call for help with toileting needs, Toilet paper/wipes in reach, Toileting schedule/hourly rounds    History of Falls Interventions: Bed/chair exit alarm

## 2018-03-07 NOTE — PROGRESS NOTES
SFD PROGRESS NOTE    [facility]  Admit Date: 2/13/2018  Admit Diagnosis: DEBILITY;CHF (congestive heart failure) ([facility] [address] Respiratory f*  Chief Complaint : Gait dysfunction secondary to below. Admit Diagnosis: Pleural effusion [J90]; Pleural effusion [J90]  Acute respiratory failure (HCC) (2/7/2018)  Pleural effusion (1/23/2018)/ S/P bilateral thoracentesis  Hypothyroidism   Chronic atrial fibrillation   HTN   Pulmonary hypertension   S/P TAVR(1/30/2018)  Leukocytosis (2/7/2018) on Abx   Acute on chronic diastolic heart failure   weakness  Pain  DVT risk  Acute blood loss anemia/ GI bleed? Acute Rehab Dx:  Generalized weakness. Debility    deconditioning   Mobility and ambulation deficits  Self Care/ADL deficits    Subjective     Patient seen and examined. Vss. Afebrile. Patient continues to be easily fatigued. continued on diuresis for CHF. UTI being treated. Bicarb elevated. ABG done shows worse hypercapnea than previous ABG. Pulmonary f/u appreciated. Agree with plans.      Objective:     Current Facility-Administered Medications   Medication Dose Route Frequency    ciprofloxacin HCl (CIPRO) tablet 500 mg  500 mg Oral Q12H    diazePAM (VALIUM) tablet 2 mg  2 mg Oral QHS PRN    alum-mag hydroxide-simeth (MYLANTA) oral suspension 30 mL  30 mL Oral Q4H PRN    aspirin delayed-release tablet 81 mg  81 mg Oral DAILY    0.9% sodium chloride infusion 250 mL  250 mL IntraVENous PRN    furosemide (LASIX) tablet 80 mg  80 mg Oral DAILY    potassium chloride (K-DUR, KLOR-CON) SR tablet 40 mEq  40 mEq Oral DAILY    magnesium oxide (MAG-OX) tablet 400 mg  400 mg Oral DAILY    central line flush (saline) syringe 10 mL  10 mL InterCATHeter Q8H    acetaminophen (TYLENOL) tablet 650 mg  650 mg Oral Q4H PRN    alcohol 62% (NOZIN) nasal  1 Ampule  1 Ampule Topical Q12H    allopurinol (ZYLOPRIM) tablet 100 mg  100 mg Oral BID    hydrocortisone (ANUSOL-HC) 2.5 % rectal cream   PeriANAL TID PRN    levothyroxine (SYNTHROID) tablet 88 mcg  88 mcg Oral ACB    metoprolol succinate (TOPROL-XL) XL tablet 25 mg  25 mg Oral DAILY    pantoprazole (PROTONIX) tablet 40 mg  40 mg Oral ACB    polyethylene glycol (MIRALAX) packet 17 g  17 g Oral DAILY    pravastatin (PRAVACHOL) tablet 40 mg  40 mg Oral DAILY    rOPINIRole (REQUIP) tablet 2 mg  2 mg Oral BID    sertraline (ZOLOFT) tablet 100 mg  100 mg Oral DAILY    spironolactone (ALDACTONE) tablet 25 mg  25 mg Oral DAILY    levalbuterol (XOPENEX) nebulizer soln 0.63 mg/3 mL  0.63 mg Nebulization Q6H PRN     Review of Systems: + weakness. Denies chest pain, shortness of breath, cough, headache, visual problems, abdominal pain, dysurea, calf pain. Pertinent positives are as noted in the medical records and unremarkable otherwise. Visit Vitals    BP 96/63    Pulse 85    Temp 97.6 °F (36.4 °C)    Resp 20    Wt 170 lb (77.1 kg)    SpO2 98%    BMI 33.2 kg/m2        Physical Exam:   General: Alert and age appropriately oriented.  Appears comfortable on 2L O2 NC. No acute cardio respiratory distress. mild lethargy. HEENT: Normocephalic,no scleral icterus  Oral mucosa moist without cyanosis, No bruit, mild +JVD. Lungs: + few r  basilar crackles. No wheezing. Respiration even and unlabored   Heart: RRR,  II/VI TERRI  No clicks, rub or gallops   Abdomen: Soft, non-tender, nondistended. Bowel sounds present. Genitourinary: defered   Neuromuscular:      PERRL, EOMI  Follows simple commands consistently. Able to identify, recall repeat. Mood appropriate. Insight, judgement intact.    LUE     Shoulder abduction   5-/5              Elbow flexion:   5-/5               Wrist extension:  5- /5              Finger flexion;  5- /5  RUE    Shoulder abduction: 5- /5                Elbow flexion:  5- /5                         Wrist extension: 5- /5                        Finger flexion:  5- /5  LLE     Hip flexion:  3+ /5              Knee extension:  4 -/5                       Ankle dorsiflexion:  5 -/5                        Ankle plantarflexion:   5/5                                                                  RLE     Hip flexion:   3+/5                        Knee extension:  4- /5                         Ankle dorsiflexion:  5- /5                        Ankle plantarflexion:  5 /5  Sensory - intact grossly   No cerebellar signs. Plantars - down going  No atrophy, no fasciculations, no tremors. Skin/extremity: No rashes, no erythema. Calf non tender BLE. 2+ BLE edema. + chronic arthritic joint changes. Mild BUE edema.                                                                                  Functional Assessment:  Gross Assessment  AROM: Generally decreased, functional (02/28/18 1400)  PROM: Generally decreased, functional (02/28/18 1400)  Strength: Generally decreased, functional (02/28/18 1400)  Coordination: Generally decreased, functional (02/28/18 1400)  Tone: Normal (02/28/18 1400)  Sensation: Intact (02/21/18 1300)       Balance  Sitting - Static: Good (unsupported) (03/06/18 1200)  Sitting - Dynamic: Fair (occasional) (03/06/18 1200)  Standing - Static: Fair (03/06/18 1200)  Standing - Dynamic : Impaired (03/06/18 1200)           Toileting  Cues: Physical assistance for pants down;Physical assistance for pants up (03/05/18 0833)  Adaptive Equipment: Walker;Elevated seat (03/05/18 0833)         Genevieve Handy Fall Risk Assessment:  Genevieve Handy Fall Risk  Mobility: Ambulates or transfers with assist devices or assistance/unsteady gait (03/06/18 1935)  Mobility Interventions: Bed/chair exit alarm (03/06/18 1935)  Mentation: Periodic confusion (03/06/18 1935)  Mentation Interventions: Adequate sleep, hydration, pain control (03/06/18 1935)  Medication: Patient receiving anticonvulsants, sedatives(tranquilizers), psychotropics or hypnotics, hypoglycemics, narcotics, sleep aids, antihypertensives, laxatives, or diuretics (03/06/18 1935)  Medication Interventions: Patient to call before getting OOB; Teach patient to arise slowly (03/06/18 1935)  Elimination: Needs assistance with toileting (03/06/18 1935)  Elimination Interventions: Call light in reach; Patient to call for help with toileting needs; Toilet paper/wipes in reach; Toileting schedule/hourly rounds (03/06/18 1935)  Prior Fall History: Unknown (03/06/18 1935)  History of Falls Interventions: Bed/chair exit alarm (03/06/18 1935)  Total Score: 4 (03/06/18 1935)  Standard Fall Precautions: Yes (03/06/18 1935)  High Fall Risk: Yes (02/28/18 2313)     Speech Assessment:         Ambulation:  Gait  Base of Support: Widened (02/28/18 1400)  Speed/Carol: Slow;Shuffled (02/28/18 1400)  Step Length: Right shortened;Left shortened (02/28/18 1400)  Distance (ft): 10 Feet (ft) (x2) (03/06/18 1200)  Assistive Device: Walker, rolling (03/06/18 1200)  Rail Use: Both (03/05/18 1500)     Labs/Studies:  Recent Results (from the past 72 hour(s))   METABOLIC PANEL, BASIC    Collection Time: 03/04/18 12:22 AM   Result Value Ref Range    Sodium 149 (H) 136 - 145 mmol/L    Potassium 4.4 3.5 - 5.1 mmol/L    Chloride 95 (L) 98 - 107 mmol/L    CO2 >45 (HH) 21 - 32 mmol/L    Anion gap Cannot be calculated 7 - 16 mmol/L    Glucose 135 (H) 65 - 100 mg/dL    BUN 64 (H) 8 - 23 MG/DL    Creatinine 1.14 (H) 0.6 - 1.0 MG/DL    GFR est AA 60 (L) >60 ml/min/1.73m2    GFR est non-AA 49 (L) >60 ml/min/1.73m2    Calcium 8.7 8.3 - 10.4 MG/DL   MAGNESIUM    Collection Time: 03/04/18 12:22 AM   Result Value Ref Range    Magnesium 2.5 (H) 1.8 - 2.4 mg/dL   METABOLIC PANEL, BASIC    Collection Time: 03/04/18  5:32 AM   Result Value Ref Range    Sodium 148 (H) 136 - 145 mmol/L    Potassium 4.5 3.5 - 5.1 mmol/L    Chloride 94 (L) 98 - 107 mmol/L    CO2 >45 (HH) 21 - 32 mmol/L    Anion gap Cannot be calculated 7 - 16 mmol/L    Glucose 114 (H) 65 - 100 mg/dL    BUN 62 (H) 8 - 23 MG/DL    Creatinine 1.14 (H) 0.6 - 1.0 MG/DL    GFR est AA 60 (L) >60 ml/min/1.73m2    GFR est non-AA 49 (L) >60 ml/min/1.73m2    Calcium 8.8 8.3 - 10.4 MG/DL   HGB & HCT    Collection Time: 03/04/18  5:32 AM   Result Value Ref Range    HGB 9.7 (L) 11.7 - 15.4 g/dL    HCT 33.5 (L) 35.8 - 46.3 %   CULTURE, URINE    Collection Time: 03/05/18  9:15 AM   Result Value Ref Range    Special Requests: NO SPECIAL REQUESTS      Culture result: (A)       >100,000 COLONIES/mL GRAM NEGATIVE RODS SUBCULTURE IN PROGRESS   URINALYSIS W/ RFLX MICROSCOPIC    Collection Time: 03/05/18 10:07 AM   Result Value Ref Range    Color YELLOW      Appearance CLOUDY      Specific gravity 1.012 1.001 - 1.023      pH (UA) 7.5 5.0 - 9.0      Protein NEGATIVE  NEG mg/dL    Glucose NEGATIVE  mg/dL    Ketone NEGATIVE  NEG mg/dL    Bilirubin NEGATIVE  NEG      Blood TRACE (A) NEG      Urobilinogen 1.0 0.2 - 1.0 EU/dL    Nitrites NEGATIVE  NEG      Leukocyte Esterase MODERATE (A) NEG      WBC 20-50 0 /hpf    RBC 0-3 0 /hpf    Epithelial cells 0-3 0 /hpf    Bacteria 4+ (H) 0 /hpf    Casts 0-3 0 /lpf   HGB & HCT    Collection Time: 03/05/18  1:04 PM   Result Value Ref Range    HGB 9.2 (L) 11.7 - 15.4 g/dL    HCT 31.3 (L) 35.8 - 46.3 %   HGB & HCT    Collection Time: 03/06/18  6:11 AM   Result Value Ref Range    HGB 9.1 (L) 11.7 - 15.4 g/dL    HCT 31.7 (L) 35.8 - 57.7 %   METABOLIC PANEL, BASIC    Collection Time: 03/06/18  6:11 AM   Result Value Ref Range    Sodium 151 (H) 136 - 145 mmol/L    Potassium 4.3 3.5 - 5.1 mmol/L    Chloride 99 98 - 107 mmol/L    CO2 >45 (HH) 21 - 32 mmol/L    Anion gap Cannot be calculated 7 - 16 mmol/L    Glucose 104 (H) 65 - 100 mg/dL    BUN 57 (H) 8 - 23 MG/DL    Creatinine 1.15 (H) 0.6 - 1.0 MG/DL    GFR est AA 59 (L) >60 ml/min/1.73m2    GFR est non-AA 49 (L) >60 ml/min/1.73m2    Calcium 9.0 8.3 - 10.4 MG/DL   BNP    Collection Time: 03/06/18  6:11 AM   Result Value Ref Range     pg/mL   BLOOD GAS, ARTERIAL    Collection Time: 03/06/18  1:25 PM   Result Value Ref Range    pH 7.43 7.35 - 7.45      PCO2 81 (H) 35 - 45 mmHg    PO2 89 80 - 105 mmHg    BICARBONATE 52 (H) 22 - 26 mmol/L    BASE EXCESS 24.1 (H) 0 - 3 mmol/L    TOTAL HEMOGLOBIN 10.0 (L) 11.7 - 15.0 GM/DL    O2 SAT 98 92 - 98.5 %    Arterial O2 Hgb 95.8 94 - 97 %    CARBOXYHEMOGLOBIN 1.8 (H) 0.5 - 1.5 %    METHEMOGLOBIN 0.1 0.0 - 1.5 %    DEOXYHEMOGLOBIN 2 0.0 - 5.0 %    SITE RB     ALLENS TEST NA     MODE NC     O2 FLOW 2.00 L/min    Respiratory comment: Dr Dewayne Cuevas at 3 6 2018 1 31 57 PM. Read back. CHEST RADIOGRAPH, 1 views, 3/5/2018     History: CHF, fatigue, and edema.     Technique: Portable frontal view of the chest.      Comparison: Chest radiograph 2/23/2018     Findings:   A stable right internal jugular line, and left-sided venous port are seen. Grossly stable post surgical changes overlie the mediastinal silhouette. Stable  moderate cardiomegaly is seen. Lungs are expanded without pneumothorax. Basilar  effusions and airspace changes are seen which are not felt to be significantly  changed from the prior study.  These are favored to represent sequela of fluid  overload given the associated cardiac megaly.     IMPRESSION  IMPRESSION:   1.  Stable findings of the chest as described above.         Assessment:     Problem List as of 3/6/2018  Date Reviewed: 2/10/2018          Codes Class Noted - Resolved    Acute on chronic respiratory failure with hypercapnia (HCC) ICD-10-CM: X35.88  ICD-9-CM: 518.84  3/6/2018 - Present        Pleural effusion, bilateral ICD-10-CM: J90  ICD-9-CM: 511.9  3/6/2018 - Present        Chronic diastolic heart failure (HCC) (Chronic) ICD-10-CM: I50.32  ICD-9-CM: 428.32  2/7/2018 - Present        S/P TAVR (transcatheter aortic valve replacement) ICD-10-CM: Z95.2  ICD-9-CM: V43.3  1/30/2018 - Present        Aortic stenosis (Chronic) ICD-10-CM: I35.0  ICD-9-CM: 424.1  1/29/2018 - Present        Peripheral arterial disease (HCC) (Chronic) ICD-10-CM: I73.9  ICD-9-CM: 443.9  1/23/2018 - Present Chronic respiratory failure with hypoxia (HCC) (Chronic) ICD-10-CM: J96.11  ICD-9-CM: 518.83, 799.02  1/23/2018 - Present    Overview Signed 1/23/2018 11:17 AM by Kenny Ceballos, FELISHA     Wears 3 to 4 liters at home             Hypoxia ICD-10-CM: R09.02  ICD-9-CM: 799.02  1/23/2018 - Present        Stenosis of prosthetic aortic valve (Chronic) ICD-10-CM: I35.0  ICD-9-CM: 396.0  1/19/2018 - Present    Overview Signed 1/19/2018  3:18 PM by Rich Lemos MD     AVR (10/5/13):  21 mm Pericardial valve.                Tricuspid valve insufficiency (Chronic) ICD-10-CM: I07.1  ICD-9-CM: 397.0  1/15/2018 - Present        Systolic CHF, chronic (HCC) (Chronic) ICD-10-CM: I50.22  ICD-9-CM: 428.22, 428.0  1/15/2018 - Present        S/P mitral valve repair (Chronic) ICD-10-CM: N26.031  ICD-9-CM: V45.89  12/4/2017 - Present        H/O atrioventricular rubin ablation (Chronic) ICD-10-CM: E14.598  ICD-9-CM: V15.1  3/22/2017 - Present        Pulmonary hypertension (Chronic) ICD-10-CM: I27.20  ICD-9-CM: 416.8  2/12/2017 - Present        Aortic valve replaced (Chronic) ICD-10-CM: Z95.2  ICD-9-CM: V43.3  1/9/2017 - Present        Chronic diastolic congestive heart failure (HCC) (Chronic) ICD-10-CM: I50.32  ICD-9-CM: 428.32, 428.0  9/9/2016 - Present        Chronic depression (Chronic) ICD-10-CM: F32.9  ICD-9-CM: 311  8/5/2016 - Present        Osteopenia (Chronic) ICD-10-CM: M85.80  ICD-9-CM: 733.90  8/5/2016 - Present        RLS (restless legs syndrome) (Chronic) ICD-10-CM: G25.81  ICD-9-CM: 333.94  8/5/2016 - Present        Hyperlipidemia (Chronic) ICD-10-CM: E78.5  ICD-9-CM: 272.4  8/5/2016 - Present        Gout (Chronic) ICD-10-CM: M10.9  ICD-9-CM: 274.9  8/5/2016 - Present        Anxiety (Chronic) ICD-10-CM: F41.9  ICD-9-CM: 300.00  8/5/2016 - Present        CAD (coronary artery disease) (Chronic) ICD-10-CM: I25.10  ICD-9-CM: 414.00  8/5/2016 - Present        HTN (hypertension) (Chronic) ICD-10-CM: I10  ICD-9-CM: 401.9  8/5/2016 - Present        GERD (gastroesophageal reflux disease) (Chronic) ICD-10-CM: K21.9  ICD-9-CM: 530.81  8/5/2016 - Present        H/O mitral valve repair, 2003 (Chronic) ICD-10-CM: V36.241  ICD-9-CM: V15.1  6/27/2016 - Present        Sick sinus syndrome (HCC) (Chronic) ICD-10-CM: I49.5  ICD-9-CM: 427.81  2/13/2016 - Present        Obesity (Chronic) ICD-10-CM: E66.9  ICD-9-CM: 278.00  11/2/2015 - Present        Mitral stenosis with insufficiency (Chronic) ICD-10-CM: O50.4  ICD-9-CM: 394.2  11/2/2015 - Present    Overview Signed 11/2/2015 11:02 AM by Breezy Alcala     Prior MV repair 2003.               Rheumatic aortic stenosis (Chronic) ICD-10-CM: I06.0  ICD-9-CM: 395.0  11/2/2015 - Present        Cardiac pacemaker (Chronic) ICD-10-CM: Z95.0  ICD-9-CM: V45.01  11/2/2015 - Present        Thrombocytopenia (HCC) (Chronic) ICD-10-CM: D69.6  ICD-9-CM: 287.5  8/22/2012 - Present        Anemia (Chronic) ICD-10-CM: D64.9  ICD-9-CM: 285.9  10/27/2011 - Present    Overview Signed 10/27/2011  8:25 AM by Anila Saucedo on chronic               Chronic atrial fibrillation (HCC) (Chronic) ICD-10-CM: I48.2  ICD-9-CM: 427.31  10/17/2011 - Present        Hypothyroidism (Chronic) ICD-10-CM: E03.9  ICD-9-CM: 244.9  12/4/2009 - Present        BOBBY (obstructive sleep apnea) (Chronic) ICD-10-CM: G47.33  ICD-9-CM: 327.23  12/4/2009 - Present        Chronic obstructive pulmonary disease (HCC) (Chronic) ICD-10-CM: J44.9  ICD-9-CM: 496  3/8/2009 - Present    Overview Signed 3/6/2018  3:19 PM by Chris Rubio NP     PFTs Jan 2018               Aortic Valve Bioprosthesis Present (Chronic) ICD-10-CM: Z95.2  ICD-9-CM: V43.3  3/7/2009 - Present        Degenerative arthritis of left knee (Chronic) ICD-10-CM: M17.12  ICD-9-CM: 715.96  2/27/2009 - Present        RESOLVED: CHF (congestive heart failure) ([facility] ICD-10-CM: I50.9  ICD-9-CM: 428.0  2/13/2018 - 2/14/2018        RESOLVED: Acute on chronic respiratory failure ([facility] ICD-10-CM: J96.20  ICD-9-CM: 518.84  2/7/2018 - 3/6/2018        RESOLVED: Leukocytosis ICD-10-CM: O07.379  ICD-9-CM: 288.60  2/7/2018 - 3/6/2018        RESOLVED: Acute on chronic systolic congestive heart failure ([facility] ICD-10-CM: I50.23  ICD-9-CM: 428.23, 428.0  2/1/2018 - 2/7/2018        RESOLVED: Acute pulmonary edema ([facility] ICD-10-CM: J81.0  ICD-9-CM: 518.4  1/25/2018 - 2/7/2018        RESOLVED: Pacemaker ICD-10-CM: Z95.0  ICD-9-CM: V45.01  12/4/2017 - 1/23/2018        RESOLVED: History of gout ICD-10-CM: Z87.39  ICD-9-CM: V12.29  1/31/2017 - 1/23/2018        RESOLVED: Warfarin anticoagulation (Chronic) ICD-10-CM: Z79.01  ICD-9-CM: V58.61  1/9/2017 - 1/23/2018        RESOLVED: Non morbid obesity due to excess calories ICD-10-CM: E66.09  ICD-9-CM: 278.00  11/14/2016 - 1/23/2018        RESOLVED: Hypoxemia ICD-10-CM: R09.02  ICD-9-CM: 799.02  11/14/2016 - 1/23/2018        RESOLVED: Localized edema ICD-10-CM: R60.0  ICD-9-CM: 782.3  9/9/2016 - 1/23/2018        RESOLVED: Iron deficiency anemia ICD-10-CM: D50.9  ICD-9-CM: 280.9  9/9/2016 - 1/23/2018        RESOLVED: CRI (chronic renal insufficiency) ICD-10-CM: N18.9  ICD-9-CM: 585.9  8/5/2016 - 1/23/2018        RESOLVED: Dyspnea ICD-10-CM: R06.00  ICD-9-CM: 786.09  8/5/2016 - 1/23/2018        RESOLVED: Right hip pain ICD-10-CM: M25.551  ICD-9-CM: 719.45  8/5/2016 - 1/23/2018        RESOLVED: Edema ICD-10-CM: R60.9  ICD-9-CM: 782.3  8/5/2016 - 1/23/2018        RESOLVED: Chest pain ICD-10-CM: R07.9  ICD-9-CM: 786.50  8/5/2016 - 1/23/2018        RESOLVED: Other long term (current) drug therapy ICD-10-CM: R76.397  ICD-9-CM: V58.69  8/5/2016 - 1/23/2018        RESOLVED: Acute encephalopathy ICD-10-CM: G93.40  ICD-9-CM: 348.30  7/8/2016 - 1/23/2018        RESOLVED: Tachycardia ICD-10-CM: R00.0  ICD-9-CM: 785.0  6/27/2016 - 1/23/2018        RESOLVED: Palpitations ICD-10-CM: R00.2  ICD-9-CM: 785.1  11/2/2015 - 1/23/2018        RESOLVED: Respiratory insufficiency ICD-10-CM: R06.89  ICD-9-CM: 786.09  11/2/2015 - 1/23/2018        RESOLVED: Bradycardia (Symptomatic) ICD-10-CM: R00.1  ICD-9-CM: 427.89  11/7/2011 - 1/23/2018        RESOLVED: Rectal bleeding ICD-10-CM: K62.5  ICD-9-CM: 569.3  10/27/2011 - 1/23/2018        RESOLVED: AV block ICD-10-CM: I44.30  ICD-9-CM: 426.10  10/17/2011 - 1/23/2018        RESOLVED: Cardiogenic shock ([facility] [address] ICD-10-CM: R57.0  ICD-9-CM: 785.51  10/17/2011 - 1/23/2018        RESOLVED: Hyperkalemia ICD-10-CM: E87.5  ICD-9-CM: 276.7  10/17/2011 - 1/23/2018        RESOLVED: Nausea and vomiting ICD-10-CM: R11.2  ICD-9-CM: 787.01  2/24/2010 - 1/23/2018        RESOLVED: Epigastric abdominal pain ICD-10-CM: R10.13  ICD-9-CM: 789.06  2/24/2010 - 1/23/2018        RESOLVED: Digoxin toxicity ICD-10-CM: T46.0X1A  ICD-9-CM: 972.1, E942.1  2/24/2010 - 1/23/2018        RESOLVED: ARF (acute renal failure) (HCC) (Chronic) ICD-10-CM: N17.9  ICD-9-CM: 584.9  2/24/2010 - 1/23/2018        RESOLVED: Hypokalemia ICD-10-CM: E87.6  ICD-9-CM: 276.8  2/24/2010 - 1/23/2018        RESOLVED: CKD (chronic kidney disease) stage 3, GFR 30-59 ml/min (Chronic) ICD-10-CM: N18.3  ICD-9-CM: 585.3  12/4/2009 - 1/23/2018        RESOLVED: Cough ICD-10-CM: R05  ICD-9-CM: 786.2  3/9/2009 - 3/12/2009        RESOLVED: Bronchitis ICD-10-CM: J40  ICD-9-CM: 490  3/9/2009 - 3/12/2009        RESOLVED: Atrial fibrillation (HCC) (Chronic) ICD-10-CM: I48.91  ICD-9-CM: 427.31  3/7/2009 - 6/27/2016        RESOLVED: Hypotension (Chronic) ICD-10-CM: I95.9  ICD-9-CM: 458.9  3/1/2009 - 1/23/2018              Plan:     The Post Assessment Physician Evaluation (RAMBO) found the current functional status to be comparable with the Pre-admission Screening. The Patient is a good candidate for acute inpatient rehabilitation.  Nothing since the Pre-admission screen has changed that determination.      Rehabilitation Plan  The patient has shown the ability to tolerate and benefit from 3 hours of therapy daily and is being admitted to a comprehensive acute inpatient rehabilitation program consisting of at least 3 hours of combined physical and occupational therapies. Resume intensive Physical Therapy for a minimum of 1.5 hours a day, at least 5 out of 7 days per week to address bed mobility, transfers, ambulation, strengthening, balance, and endurance. - pt  was ambulating independently with a rollator inside her home. reports using rollator or a wheelchair for community mobility. Will continue to focus on endurance, strengthening BLe.      Resume  intensive Occupational Therapy for a minimum of 1.5 hours a day, at least 5 out of 7 days per week to address ADL ( bathing, LE dressing, toileting) and adaptive equipment as needed.       Continue 24-hour skilled rehabilitation nursing for bowel and bladder management, skin care for decubitus ulcer prevention , pain management and ongoing medication administration      The patient may benefit from a psychology consult for depression, anxiety and adjustment disorder.     Continue daily physician medical management:    Acute respiratory failure (HCC) (2/7/2018)/ Pleural effusion (1/23/2018)/ S/P bilateral thoracentesis  S/p - Thoracenteses on 2/9 - 550 mls removed from the left and 1 liter removed from the right.   - Continue bronchodilators prn- has home nebulizer. Resume as prn;xopenex  - Patient will be on 2L O2 at therapy and at rest. May be able to wean as tolerated. Will monitor saO2. 2/15 - sao2 at 100% on 2L/ min. Feels comfortable. Continue lasix and aldactone. Cardiology gave 1x Zaroxolyn in AM prior to AM lasix. 2/19 - continue lasix 80 mg bid + aldactone. +metolazone prior to lasix each morning. Monitor. Daily weights.   2/20- cardiology recommendation appreciated. Transition to iv lasic. Will need access. Difficult due to edema. 2/21- Persistent edema. Iv lasix ordered per cardiology, however since peripheral access not yet achieved after multiple attempts due to edema.  Will have IR place CVC / ij anticipating frequent blood draws and continued iv diuretic needs. Mean time will have po lasix for every iv dose missed. 2/22 - Patient edema mildly better. Mild decreased weight; 176lbs. conitune lasix 80 bid. Increase KCl 40 to bid dose. Left IJ oozing overnight following IR procedure, appears not bleeding today. hgb decreased, likely due to blood loss. 2/23 - mildly SOB, will repeat CxR to monitor bibasilar effusions. Continue iv lasix. 2/26 - SaO2 stable on O2 @2L via NC. cpntinued on diuresis over the weekend. Cr, elevated. Will reduce lasix. 2/27 - lasix reduced. Metolazone held. BUN/ Cr. = 57/1.59-> 62/1.21.   2/28 - Edema clinically appears better, still persistent. Continue to check renal function. 68/1. 14. Moderate elevation of BUN. Cr acceptable. Continued clinical evidence of fluid retention. continue to diurese with careful monitoring of renal function  3/1- continue to diurese oral Lasix 80 daily. Cr improved. Holding eliquis, started on aspirin for anticoag/antiplatelet therapy due to suspected hx of GI bleeding. .  3/2 - weight 169lbs. Slow decrease. Conitinuing diuresis, monitor renal function. Clinically volume overloaded, CHF.   3/5 - weight 169lbs. continued on2L o2 via nc. Comfortable however easily gets winded, fatigued. CxR shows still basilar effusions not much changed form prior exam. 3/6- acute on chronic respiratory failure. ABG confirms worse hypercapnea. Pulmonary assistance, plans noted. Start bipap when sleeping. Possible transfer to ICU needed if does not improve. Possible thoracentesis. UTI - - 3/5 abnormal UA, symptomatic. Will start treatment. - 3/6 Cx pending. Continue cipro.     S/P TAVR(1/30/2018)/ Acute on chronic diastolic heart failure Pulmonary hypertension / Chronic atrial fibrillation/ HTN   - cardiac precaution, s.p TAVR. - weights and maintain a 2 liter per day fluid restriction.   - Monitor HR/BP.  conitinue Eliquis for a.fib  - continue BB-Toprol XL  - edema, chest exam appears not changed. cintiuned on diuretics. continue O2 supplements. 2/26 - continued on lower dose eliquis for now. Weight 172 lbs, reduced about 7 lbs since he admission to [facility]. still persistent edema. Cr 1.59, will reduce lasix and hold metolazone. 2/28 - continue of current dose diuretics. 3/4 - continued on laix 80mg daily, and aldactone 25. Will obtain cxr, BNP. Reduce diuretic dose? Urine reported to be dark. 3/5 - contiune on lasix 80 daily + aldactone 25. Strict I/o, monitor weight. BMP, BNP in am.    3/6- continue diuresis. Hx=[id number].     Hypothyroidism - synthroid 88 mcg     Leukocytosis (2/7/2018) on Abx last dose 7/7 rocephin administered 2/13.   - finished abx. No new s/s of infection. - 2/21 no new signs of infection.      Acute blood loss anemia/ GI bleed? -monitor clinically. May have been caused by high INR. - no melena. - hgb 9.1 (2/18) , asymptomatic.   - 2/22 - Anemia due to blood loss form IR proocedure- hgb 7.4. Check in am.  2/23 - hgb 7.7. Check labs in am and Monday. 2/26 - s/p transfusion 1 unit over the weekend. Monitor. hgb 8.2   2/27 - hgb 8.2-> 7.7, mild decrease. Will ask GI to consult on management. continue Eliquis? 2/28 -hgb 7,5. Transfuse 2 units today. GI consult appreciated. Will follow rec. Continue PPI. Use the Anusol prn for hemmorhoids  3/1 - GI recommendaiton to be followed. continue PPI. hemmorrhoidal bleeding to be treated with anusol. Transfuse prn. Monitor hgb. Monitor for s/s of acute bleed. hgb 10.2 today, s/p transfusion 2u.  3/2- no signs of acute bleed. conitnue to monitor hgb/hct, check Sunday. 3/4 - hgb 9.7 (3/4) will check periodically. 3/5 - will check hbg 3/6. No new melena. 3/6 - slight decrease, hgb 9.2, 1gm decrease since transfusion 5 days ago. Slow GIB?  conitnue monitor.       Pneumonia prophylaxis- Insentive spirometer every hour while awake     DVT risk / DVT Prophylaxis- continue daily physician exam to assess for signs and symptoms as patient is at increased risk for of thromboembolism  3/4 - no s/s of DVT. Will need for SCDs as pt off eliquis altogther  3/5 no s/s of DVT. SCDs. Wound Care: - monitor for ulcers, skin breakdown due to edema. -left elbow  edema drainage covered. -  TEDs. Fitting better. Edema control.         Potential urinary retention - no s/s of retention. Monitor.  - pt mostly continent. - needed to use bedpan over the wekend due to decline in mobility.      bowel program - as needed dulcolax, pericolace. + BM.     GERD/ GI prophylaxis - resume PPI. At times may need additional antacids, Maalox prn.          Time spent was 35 minutes with over 1/2 in direct patient care/examination, consultation and coordination of care.      Signed By: Krishna Juarez MD     March 6, 2018

## 2018-03-07 NOTE — PROGRESS NOTES
Pt resting quietly in bed. Currently on bipap. Does not arouse when nurse assesses. Lungs sounds diminished bilaterally with respirations even and unlabored. Bowel sounds active in all quadrants. Palpable pulses bilaterally in upper and lower extremities. Instructed to call for assistance. Call light in reach.

## 2018-03-07 NOTE — PROGRESS NOTES
Problem: Falls - Risk of  Goal: *Absence of Falls  Document Gregorio Fall Risk and appropriate interventions in the flowsheet.    Outcome: Progressing Towards Goal  Fall Risk Interventions:  Mobility Interventions: Bed/chair exit alarm, Communicate number of staff needed for ambulation/transfer, Patient to call before getting OOB, PT Consult for mobility concerns, PT Consult for assist device competence, Strengthening exercises (ROM-active/passive), Utilize walker, cane, or other assitive device, Utilize gait belt for transfers/ambulation    Mentation Interventions: Bed/chair exit alarm, Adequate sleep, hydration, pain control, Evaluate medications/consider consulting pharmacy, Increase mobility, More frequent rounding, Toileting rounds    Medication Interventions: Bed/chair exit alarm, Evaluate medications/consider consulting pharmacy, Patient to call before getting OOB, Teach patient to arise slowly, Utilize gait belt for transfers/ambulation    Elimination Interventions: Call light in reach, Bed/chair exit alarm, Patient to call for help with toileting needs, Toileting schedule/hourly rounds    History of Falls Interventions: Bed/chair exit alarm, Evaluate medications/consider consulting pharmacy, Utilize gait belt for transfer/ambulation

## 2018-03-07 NOTE — PROGRESS NOTES
PT WEEKLY PROGRESS NOTE   Time In: 2669   Time Out: 1347    Subjective: patient reporting she feels fine. Son reports she ate a better lunch today with him encouraging her to eat. Reports he is glad to see she is more alert and moving better. Reports he wants to be able to take her home         Objective: Vital Signs:  Patient Vitals for the past 12 hrs:   Temp Pulse Resp BP SpO2   03/07/18 0836 - - - 94/60 -   03/07/18 0751 97.9 °F (36.6 °C) 82 20 92/59 93 %     Patient on 02 at  1 lpm. 02 sat 93 to 95% during assessment    Pain level:No c/o pain  Pain location:NA  Pain interventions:NA    Patient education:Bed mobility training,transfer training, gait training, fall precautions, activity pacing, body mechanics, w/c and rollator parts management, breathing techniques, energy conservation, Patient verbalizing understanding and demonstrating partial understanding of patient education. Recommend follow up education. Interdisciplinary Communication:spoke with OT regarding progress towards goals    Other (comment) (Fall precautions)    Outcome Measures:     FIM SCORES Initial Assessment Weekly Progress Assessment 3/7/2018   Bed/Chair/Wheelchair Transfers 4 5   Wheelchair Mobility  (NT secondary to fatigue after functional assessment) NT   Walking [place] 1 2   Steps/Stairs  (NT) 2   Please see [facility] Interdisciplinary Eval: Coordination/Balance Section for details regarding FIM score description.     BED/CHAIR/WHEELCHAIR TRANSFERS Initial Assessment Weekly Progress Assessment 3/7/2018   Rolling Right 4 (Minimal assistance) 6 (Modified independent)   Rolling Left 4 (Minimal assistance) 6 (Modified independent)   Supine to Sit 4 (Minimal assistance) 5 (Supervision)   Sit to Stand Contact guard assistance Stand-by assistance   Sit to Supine 4 (Minimal assistance) 5 (Supervision)   Transfer Type SPT without device SPT without device   Comments Increased time and effort to complete bed mobility and transfers   Increased time and effort to complete bed mobility and transfers with cues for body mechanics and rollator set up     Car Transfer Not tested Minimum assistance   Car Type rehab car rehab car     GROSS ASSESSMENT Weekly Progress Assessment 3/7/2018   AROM Generally decreased, functional   Strength Generally decreased, functional (knee and ankle 4/5 to +4/5, hip -3/5 to +3/5)   Coordination Generally decreased, functional   Tone Normal   Sensation Intact   PROM Generally decreased, functional     POSTURE Weekly Progress Assessment 3/7/2018   Posture (WDL) Exceptions to [facility]   Posture Assessment Forward head;Rounded shoulders;Trunk flexion;Kyphosis     WHEELCHAIR MOBILITY/MANAGEMENT Initial Assessment Weekly Progress Assessment 3/7/2018   Able to Propel 0 feet  Not able to functional propel a manual w/c   Curbs/ramps assistance required 0 (Not tested) 0 (Not tested)   Wheelchair set up assistance required 3 (Moderate assistance)  Able to lock brakes with verbal cues   Wheelchair management Manages left brake, Manages right brake       WALKING INDEPENDENCE Initial Assessment Weekly Progress Assessment 3/7/2018   Assistive device Walker, rollator, Gait belt Walker, rollator   Ambulation assistance - level surface 4 (Contact guard assistance)  SBA with cues for posture correction, improved step clearance and breathing pattern   Distance 40 Feet (ft) (40ft x 1  30ft x 1) 70 Feet (ft) (70ft x 1  50 ft x 1)   Comments slow cont partial step through gait pattern with decreased step length and step clearance, foot flat initial contact with shuffling type steps 02 sat 94% after ambulating 70 ft on 02 at 1 lpm     GAIT Weekly Progress Assessment 3/7/2018   Gait Description (WDL)  slow cont partial step through shuffling pattern with flexed posture, tendency to keep rollator too far out in front   Gait Abnormalities  decreased hip ext,ankle PF and knee flexion at terminal stance, decreased knee ext and ankle DF at initial contact, decreased step length and gait speed      STEPS/STAIRS Initial Assessment Weekly Progress Assessment 3/7/2018   Steps/Stairs ambulated 0 4   Rail Use  (RW as simulated hand rail, ) Both   Comments Unable to ambulate up/down steps at thsi time secondary to LE weakness and decreased endurance  slow single step at a time with flexed posture and limited step clearance. Cues for improved posture and improved step clearance. 4 min sitting rest break after going up steps   Curbs/Ramps 0 (Not tested)  NT          Assessment: patient with improved progress towards goals this week. Patient more alert with improved participation and improved tolerance to treatment with less fatigue. Hemoglobin and improved and CO2 and decreased with fewer medical complications delaying progress. Patient has potential to progress to a modified independent level with bed mobility and supervision with gait and transfers if medical status cont to improve. Anticipate patient will be able to ambulate household distances on 02 with her rollator but will recommend 24 hour supervision secondary to decreased safety awareness with increased risk for falling. Recommend cont inpatient rehab with plan to D/C in 1 week. Initiated family training with son and son in process of modifying home and arranging for assistance at home. Patient returned to room at end of treatment. Patient supine in bed with head of bed elevated and bed rails up x 2. Needs placed in reach of patient. 02 at 1 lpm, posey alarm on  Plan of Care:patient seen for weekly assessment. Goals updated and revised. Please see Care Plan for updated LTGs.     Family Training: Needs to be scheduled    Sherrell Costa, PT  3/7/2018

## 2018-03-07 NOTE — PROGRESS NOTES
End Of Shift Functional Summary, Nursing      TOILETING:  Does patient need assist with clothing management and/or pericare? Yes: Comment: clothing management and pericare    TOILET TRANSFER:  Pt requires maximum assistance. Pt uses walker. BLADDER:  Pt does not have a [place] catheter that staff manages. Pt does take medication. Pt is continent. of bladder and voids in bedside commode  Pt requires staff to empty device Pt has had 0 bladder accidents during this shift.  (An accident is when the episode is not contained in a brief AND/OR the clothing/linen requires changing/cleaning up.)    BOWEL:  Pt does take medication. Pt is continent of bowel and uses bedside commode. Pt requires staff to empty device    Pt has had 0 bowel accidents during this shift. (An accident is when the episode is not contained in a brief AND/OR the clothing/linen requires changing/cleaning up.)    BED/CHAIR TRANSFER  Pt requires maximum assistance. Patient requires the assistance of 1 staff member(s). Pt uses walker      EATING  Pt requires setup. Pt wears dentures. TUBE FEEDINGS:  Pt does not  receive nutrition through tube feedings. Patient requires supervision/setup with feedings. Documentation reviewed and plan of care discussed/reviewed with   patient, oncoming nurse and patient assistant during the shift.

## 2018-03-07 NOTE — PROGRESS NOTES
PHYSICAL THERAPY DAILY NOTE  Time In: 5999  Time Out: 8590  Patient Seen For: AM;Balance activities;Gait training;Patient education; Therapeutic exercise;Transfer training; Other (see progress notes)    Subjective: patient reporting she feels better today. Reports she slept well last night but she is still not hungry. Reports she did not eat her breakfast. Reports she would like to be able to go home soon. Objective:Vital Signs:  Patient Vitals for the past 12 hrs:   Temp Pulse Resp BP SpO2   03/07/18 0836 - - - 94/60 -   03/07/18 0751 97.9 °F (36.6 °C) 82 20 92/59 93 %     Patient on 02 at 1 lpm. 02 sat 93 to 94% after ambulating 70 ft     Pain level:No c/o pain  Pain location:NA  Pain interventions:NA    Patient education:balance training,transfer training, gait training, fall precautions, activity pacing, body mechanics, energy conservation, posture correction, breathing techniques at rest and during mobility, Patient verbalizing understanding and demonstrating partial understanding of patient education. Recommend follow up education.     Interdisciplinary Communication:spoke with OT regarding 02 status and functional status    Other (comment) (Fall precautions)  GROSS ASSESSMENT Daily Assessment     NA       BED/MAT MOBILITY Daily Assessment    Rolling Right : 0 (Not tested)  Rolling Left : 0 (Not tested)  Supine to Sit : 0 (Not tested)  Sit to Supine : 0 (Not tested)       TRANSFERS Daily Assessment   Increased time and effort to complete with cues for body mechanics   Transfer Type: SPT without device (w/c to bed)  Transfer Assistance : 5 (Stand-by assistance)  Sit to Stand Assistance: Stand-by assistance  Car Transfers: Not tested       GAIT Daily Assessment   4 to 5 min rest break after each attempt Amount of Assistance: 4 (Contact guard assistance)  Distance (ft): 70 Feet (ft) (70ft x 1  50ft x 1)  Assistive Device: Walker, rollator   Gait training with cues for posture correction, rollator placement and breathing pattern. STEPS or STAIRS Daily Assessment   Slow single step at a time with increased time and effort to complete , flexed posture with decreased step clearance. 5 min sitting rest break after going up steps Steps/Stairs Ambulated (#): 4  Level of Assist : 4 (Minimal assistance)  Rail Use: Both       BALANCE Daily Assessment   Static standing balance with rollator and SBA with cues for posture and breathing pattern Sitting - Static: Good (unsupported)  Sitting - Dynamic: Fair (occasional)  Standing - Static: Fair (with rollator)  Standing - Dynamic : Impaired       WHEELCHAIR MOBILITY Daily Assessment    Curbs/Ramps Assist Required (FIM Score): 0 (Not tested)  Wheelchair Setup Assist Required : 0 (Not tested)       LOWER EXTREMITY EXERCISES Daily Assessment   Cues for breathing pattern Extremity: Both  Exercise Type #1: Other (comment) (LE motomed x 6 mins at level 1)  Level of Assist: Supervision          Assessment: Significant improvement with functional mobility and functional endurance today. Patient more alert with improved ability to follow commands but cont to require verbal cues for safe body mechanics during functional mobility. Patient returned to room at end of treatment and remained up in recliner with LEs elevated and with needs in reach. 02 at 1 lpm. Grand son in room with patient    Plan of Care: Continue with POC and progress as tolerated.      Fernie Dennison, PT  3/7/2018

## 2018-03-08 PROBLEM — B96.20 E. COLI UTI: Status: ACTIVE | Noted: 2018-01-01

## 2018-03-08 PROBLEM — N39.0 E. COLI UTI: Status: ACTIVE | Noted: 2018-01-01

## 2018-03-08 PROBLEM — J96.01 ACUTE RESPIRATORY FAILURE WITH HYPOXIA AND HYPERCARBIA (HCC): Status: ACTIVE | Noted: 2018-01-01

## 2018-03-08 PROBLEM — J96.02 ACUTE RESPIRATORY FAILURE WITH HYPOXIA AND HYPERCARBIA (HCC): Status: ACTIVE | Noted: 2018-01-01

## 2018-03-08 PROBLEM — Z66 DNR (DO NOT RESUSCITATE): Status: ACTIVE | Noted: 2018-01-01

## 2018-03-08 NOTE — PROCEDURES
Thoracentesis Procedure Note      Procedure:  Right Thoracentesis with ultrasound guidance    Indication:  Right Pleural effusion     Summary:    After informed consent was obtained, the patient was positioned upright in the usual fashion. Ultrasound was used to identify the optimal spot for pleural drainage. Large bilateral effusions were present. The lower lateral right intercostal space was anesthetized with 1% Lidocaine to achieve adequate anesthesia. The pleural space was entered with slightly serosanguinous fluid identified. Lidocaine was instilled within the pleural space as well. A small stab incision was made at the site of local anesthesia and the thoracentesis catheter was advanced into the pleural space. Approximately 1000 ml of fluid was obtained with ease. The procedure was terminated after pleural drainage stopped. Air was not aspirated during the procedure. A bandaid was placed over the incision after adequate hemostasis was achieved. A CXR is not pending as a follow up US revealed a + lung slide c/w no PTX. The pleural fluid will be sent for Gram stain and culture and sensitivity. EBL: 1 ml    Post Procedure Dx: Pleural effusion    The patient's BP post procedure was 71/37 and it was felt that she could not tolerate additional drainage at this time.         Signed By: Yasmin Seay MD

## 2018-03-08 NOTE — PROGRESS NOTES
Patient was unable to be seen by recreational therapy due to her preparation to being transferred of the unit.       ARMANI Olivas

## 2018-03-08 NOTE — H&P
HISTORY AND PHYSICAL      Jennifer Kill    3/8/2018    Date of Admission:  (Not on file)    The patient's chart is reviewed and the patient is discussed with the staff. Subjective:     Patient is a 69 y.o.  female admitted from the 9th floor rehab whee she has been cared for by Dr. Amirah Canales. She over the last few days had decompensated and has been requiring BIPAP. Today a right tap was performed, BP dropped and became more somnolent. Past medical history of recent TAVR 1/30/18, HTN, CAD, chronic AFIB, anemia, anxiety, and chronic diastolic heart failure. She underwent bilateral thoracentesis on Jan 25th (preop) removing 1200 mls from the right and 900 mls from the left.  She underwent the TAVR on Jan 30th.  She was discharged home on Feb 3 but presented to the ER on 2/6/18 via EMS with complaints of worsening swelling and shortness of breath.   Upon arrival to the ER, CXR shows cardiomegaly with finding consistent with pulmonary edema and worsening bilateral pleural effusions.  We saw her again and bilateral thoracenteses were repeated on 2/9 removing 550 mls from the left and 1000 mls from the right. She was later transferred to the 9th floor for rehab.      Since transfer to the 9th floor, cardiology has followed and adjusted diuretic therapy. Lynda Baltazar has been anemic and her stool was heme positive so the Eliquis was stopped. She has been more lethargic and her family noticed that she seemed more confused so an ABG was obtained which showed hypercapnea. She was started on bipap with improvement. Palliative Care was consulted for goals of care. Today she had right thoracentesis with 1000ml removed. She dropped BP to 70s and was placed on BIPAP. Discussion with her son and he states he understand the complexity and poor prognosis for his mother's current condition.   He states he would not want chest compressions, shock or her to be intubated and placed on vent support--advanced to DNR status. Review of Systems  Review of systems not obtained due to patient factors. Patient Active Problem List   Diagnosis Code    Degenerative arthritis of left knee M17.12    Aortic Valve Bioprosthesis Present Z95.2    Chronic obstructive pulmonary disease ([facility] [address] J44.9    Hypothyroidism E03.9    BOBBY (obstructive sleep apnea) G47.33    Chronic atrial fibrillation (HCC) I48.2    Anemia D64.9    Thrombocytopenia (HCC) D69.6    Obesity E66.9    Mitral stenosis with insufficiency I05.2    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    Cardiac pacemaker Z95.0    Sick sinus syndrome (HCC) I49.5    H/O mitral valve repair, 2003 Z98.890    Chronic depression F32.9    Osteopenia M85.80    RLS (restless legs syndrome) G25.81    Hyperlipidemia E78.5    Gout M10.9    Anxiety F41.9    CAD (coronary artery disease) I25.10    HTN (hypertension) I10    GERD (gastroesophageal reflux disease) K21.9    Chronic diastolic congestive heart failure (HCC) I50.32    Aortic valve replaced Z95.2    Pulmonary hypertension I27.20    H/O atrioventricular rubin ablation Z98.890    S/P mitral valve repair Z98.890    Tricuspid valve insufficiency I62.2    Systolic CHF, chronic (HCC) I50.22    Stenosis of prosthetic aortic valve I35.0    Peripheral arterial disease (HCC) I73.9    Chronic respiratory failure with hypoxia (HCC) J96.11    Hypoxia R09.02    Aortic stenosis I35.0    S/P TAVR (transcatheter aortic valve replacement) Z95.2    Chronic diastolic heart failure (HCC) I50.32    Acute on chronic respiratory failure with hypercapnia (HCC) J96.22    Pleural effusion, bilateral J90    E. coli UTI N39.0, B96.20    DNR (do not resuscitate) Z66    Acute respiratory failure with hypoxia and hypercarbia ([facility]) J96.01, J96.02       Cannot display prior to admission medications because the patient has not been admitted in this contact.        Past Medical History:   Diagnosis Date    Abnormal glucose 8/5/2016    Abscess 8/5/2016    Acute encephalopathy 7/8/2016    Acute renal failure ([address] 12/3/2009    Afib ([address]     Anemia     Ankle swelling 8/5/2016    Anxiety 8/5/2016    Aortic Valve Bioprosthesis Present 3/7/2009    ARF (acute renal failure) ([facility]) 2/24/2010    Arthritis     Atopic dermatitis 8/5/2016    Atrial fibrillation (HCC) 3/7/2009    Autonomic orthostatic hypotension 8/5/2016    AV block 10/17/2011    Back pain 8/5/2016    Bradycardia (Symptomatic) 11/7/2011    CAD (coronary artery disease)     Cancer (HCC) 1990    breast (left)    Cardiac pacemaker 11/2/2015    Cardiogenic shock ([address] 10/17/2011    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    Cellulitis 8/5/2016    Chest pain 8/5/2016    Chronic atrial fibrillation ([address] 10/17/2011    Chronic depression 8/5/2016    Chronic obstructive pulmonary disease ([address] 3/8/2009    Chronic pain     CKD (chronic kidney disease) stage 3, GFR 30-59 ml/min 12/4/2009    Congestive heart failure (CHF) ([address] 11/2/2015    Degeneration of cervical intervertebral disc 8/5/2016    Degenerative arthritis of left knee 2/27/2009    Dehydration 5/8/1295    Diastolic heart failure (HCC)     Digoxin toxicity 2/24/2010    Disorder of sweat glands 8/5/2016    Diverticulosis of large intestine without diverticulitis 2015    Dyspnea 8/5/2016    Eczema 8/5/2016    Embolus of femoral artery ([address] 12/4/2017    Epigastric abdominal pain 2/24/2010    GERD (gastroesophageal reflux disease)     Gout 8/5/2016    H/O mitral valve repair, 2003 6/27/2016    Heart failure ([address]     Hx of colonic polyp 2015    adenoma    Hyperkalemia 10/17/2011    Hyperlipidemia 8/5/2016    Hypertension     Hypokalemia 2/24/2010    Hypothyroidism 12/4/2009    Ill-defined condition     CARPEL TUNNEL SYNDROME, BILAT HANDS    Knee pain 8/5/2016    Leg cramps 8/5/2016    Mitral stenosis with insufficiency 11/2/2015    Prior MV repair 2003.      Nausea and vomiting 2010    Obesity 2015    BOBBY (obstructive sleep apnea) 2009    Osteoarthritis 2016    Osteopenia 2016    Other long term (current) drug therapy 2016    Palpitations 2015    Rash 2016    Rectal bleeding 10/27/2011    Rectocele 2015    Recurrent depression ([address] 2018    Rheumatic aortic stenosis 2015    Right hip pain 2016    RLS (restless legs syndrome) 2016    Sick sinus syndrome ([address] 2016    Skin infection 2016    Tachycardia 2016    Thrombocytopenia, unspecified 2012    Thromboembolus ([address]     2017    Urticaria 2016    Vertigo 2016     Past Surgical History:   Procedure Laterality Date    CARDIAC SURG PROCEDURE UNLIST      valve repair and replacement    CHEST SURGERY PROCEDURE UNLISTED      HX APPENDECTOMY      HX BREAST RECONSTRUCTION      HX CHOLECYSTECTOMY      HX GYN  1981    hysterectomy still have ovaries    HX MASTECTOMY      left mastectomy    HX ORTHOPAEDIC      knee surgery    HX PACEMAKER      VASCULAR SURGERY PROCEDURE UNLIST Right 2017    LE thrombectomy     Social History     Social History    Marital status:      Spouse name: N/A    Number of children: N/A    Years of education: N/A     Occupational History    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    cotton mill      6 years     Social History Main Topics    Smoking status: Former Smoker     Packs/day: 3.00     Years: 15.00     Types: Cigarettes     Quit date: 1996    Smokeless tobacco: Former User      Comment: quit     Alcohol use No    Drug use: No    Sexual activity: Not Currently     Other Topics Concern    Not on file     Social History Narrative    Lives with her son     Family History   Problem Relation Age of Onset    Heart Disease Mother     Cancer Father      Throat    Heart Disease Father     Cancer Sister      Breast, Colon    Heart Disease Sister     Breast Cancer Sister 79      from disease    Cancer Maternal Grandmother     Cancer Brother     Diabetes Brother     Heart Disease Brother     Psychiatric Disorder Paternal Grandfather     Cancer Maternal Aunt      Breast    Diabetes Son      Allergies   Allergen Reactions    Adhesive Tape Rash    Benadryl [Diphenhydramine Hcl] Other (comments)     Jitters      Demerol [Meperidine] Anxiety    Morphine Other (comments)     Confusion    Sulfa (Sulfonamide Antibiotics) Anxiety    Lorazepam Anxiety       Current Facility-Administered Medications   Medication Dose Route Frequency    ciprofloxacin (CIPRO) 400 mg IVPB (premix)  400 mg IntraVENous Q12H    diazePAM (VALIUM) tablet 2 mg  2 mg Oral QHS PRN    alum-mag hydroxide-simeth (MYLANTA) oral suspension 30 mL  30 mL Oral Q4H PRN    [START ON 3/9/2018] aspirin chewable tablet 81 mg  81 mg Oral DAILY    [START ON 3/9/2018] furosemide (LASIX) tablet 80 mg  80 mg Oral DAILY    [START ON 3/9/2018] potassium chloride (K-DUR, KLOR-CON) SR tablet 40 mEq  40 mEq Oral DAILY    [START ON 3/9/2018] magnesium oxide (MAG-OX) tablet 400 mg  400 mg Oral DAILY    alcohol 62% (NOZIN) nasal  1 Ampule  1 Ampule Topical Q12H    allopurinol (ZYLOPRIM) tablet 100 mg  100 mg Oral BID    hydrocortisone (ANUSOL-HC) 2.5 % rectal cream   PeriANAL TID PRN    [START ON 3/9/2018] levothyroxine (SYNTHROID) tablet 88 mcg  88 mcg Oral ACB    [START ON 3/9/2018] metoprolol succinate (TOPROL-XL) XL tablet 25 mg  25 mg Oral DAILY    [START ON 3/9/2018] pantoprazole (PROTONIX) tablet 40 mg  40 mg Oral ACB    [START ON 3/9/2018] polyethylene glycol (MIRALAX) packet 17 g  17 g Oral DAILY    [START ON 3/9/2018] pravastatin (PRAVACHOL) tablet 40 mg  40 mg Oral DAILY    rOPINIRole (REQUIP) tablet 2 mg  2 mg Oral BID    [START ON 3/9/2018] sertraline (ZOLOFT) tablet 100 mg  100 mg Oral DAILY    [START ON 3/9/2018] spironolactone (ALDACTONE) tablet 25 mg  25 mg Oral DAILY    levalbuterol (Dollene Kocher) nebulizer soln 0.63 mg/3 mL  0.63 mg Nebulization Q6H PRN    sodium chloride (NS) flush 5-10 mL  5-10 mL IntraVENous Q8H    sodium chloride (NS) flush 5-10 mL  5-10 mL IntraVENous PRN     Current Outpatient Prescriptions   Medication Sig    metoprolol succinate (TOPROL-XL) 25 mg XL tablet Take 1 Tab by mouth daily.  apixaban (ELIQUIS) 5 mg tablet Take 1 Tab by mouth every twelve (12) hours.  furosemide (LASIX) 80 mg tablet Take 1 Tab by mouth every twelve (12) hours.  rOPINIRole (REQUIP) 2 mg tablet Take  by mouth two (2) times a day.  pravastatin (PRAVACHOL) 40 mg tablet Take 1 Tab by mouth daily. Indications: hyperlipidemia    sertraline (ZOLOFT) 100 mg tablet Take 1 Tab by mouth daily.  diazePAM (VALIUM) 5 mg tablet Take 1 Tab by mouth daily. Max Daily Amount: 5 mg.  omeprazole (PRILOSEC) 20 mg capsule TAKE 1 CAPSULE BY MOUTH  DAILY    spironolactone (ALDACTONE) 25 mg tablet Take 1 Tab by mouth daily.  levothyroxine (SYNTHROID) 88 mcg tablet Take 1 Tab by mouth Daily (before breakfast).  allopurinol (ZYLOPRIM) 100 mg tablet Take 1 Tab by mouth two (2) times a day.  fluticasone (FLONASE) 50 mcg/actuation nasal spray 1 [place] by Both Nostrils route daily.  azelastine (ASTELIN) 137 mcg (0.1 %) nasal spray 1 [place] by Both Nostrils route two (2) times a day. Use in each nostril as directed    polyethylene glycol (MIRALAX) 17 gram/dose powder Take 17 g by mouth daily.  loratadine (CLARITIN) 10 mg tablet Take 10 mg by mouth as needed.  glycerin, adult, (SUPPOSITORY ADULT) suppository Insert 1 Suppository into rectum as needed.  promethazine (PHENERGAN) 25 mg tablet Take 1 Tab by mouth every six (6) hours as needed.  levalbuterol (XOPENEX) 1.25 mg/3 mL nebu 1.25 mg by Nebulization route.     hydrocortisone (ANUSOL-HC) 2.5 % rectal cream Apply small amount to hemorrhoids/perianal skin TID PRN    calcium carbonate (CALTREX) 600 mg (1,500 mg) tablet Take 600 mg by mouth nightly.  cholecalciferol (VITAMIN D3) 1,000 unit cap Take  by mouth daily.  CARBOXYMETHYLCELLULOS/GLYCERIN (REFRESH OPTIVE OP) Apply  to eye.  DOCUSATE SODIUM Take 1 Cap by mouth two (2) times a day.  OXYGEN-AIR DELIVERY SYSTEMS by Does Not Apply route. 2-2.5 lpm cont.  cyanocobalamin (VITAMIN B-12) 250 mcg tablet Take 1,000 mcg by mouth daily.  nitroglycerin (NITROSTAT) 0.4 mg SL tablet by SubLINGual route every five (5) minutes as needed for Chest Pain.      Facility-Administered Medications Ordered in Other Encounters   Medication Dose Route Frequency    acetaZOLAMIDE (DIAMOX) tablet 250 mg  250 mg Oral DAILY    furosemide (LASIX) injection 40 mg  40 mg IntraVENous BID    sodium chloride 0.9 % bolus infusion 250 mL  250 mL IntraVENous ONCE    ciprofloxacin HCl (CIPRO) tablet 500 mg  500 mg Oral Q12H    diazePAM (VALIUM) tablet 2 mg  2 mg Oral QHS PRN    alum-mag hydroxide-simeth (MYLANTA) oral suspension 30 mL  30 mL Oral Q4H PRN    aspirin delayed-release tablet 81 mg  81 mg Oral DAILY    0.9% sodium chloride infusion 250 mL  250 mL IntraVENous PRN    potassium chloride (K-DUR, KLOR-CON) SR tablet 40 mEq  40 mEq Oral DAILY    magnesium oxide (MAG-OX) tablet 400 mg  400 mg Oral DAILY    acetaminophen (TYLENOL) tablet 650 mg  650 mg Oral Q4H PRN    alcohol 62% (NOZIN) nasal  1 Ampule  1 Ampule Topical Q12H    allopurinol (ZYLOPRIM) tablet 100 mg  100 mg Oral BID    hydrocortisone (ANUSOL-HC) 2.5 % rectal cream   PeriANAL TID PRN    levothyroxine (SYNTHROID) tablet 88 mcg  88 mcg Oral ACB    metoprolol succinate (TOPROL-XL) XL tablet 25 mg  25 mg Oral DAILY    pantoprazole (PROTONIX) tablet 40 mg  40 mg Oral ACB    polyethylene glycol (MIRALAX) packet 17 g  17 g Oral DAILY    pravastatin (PRAVACHOL) tablet 40 mg  40 mg Oral DAILY    rOPINIRole (REQUIP) tablet 2 mg  2 mg Oral BID    sertraline (ZOLOFT) tablet 100 mg  100 mg Oral DAILY    levalbuterol (XOPENEX) nebulizer soln 0.63 mg/3 mL  0.63 mg Nebulization Q6H PRN           Objective: There were no vitals filed for this visit. PHYSICAL EXAM     Constitutional:  the patient is obese and in no acute distress, BIPAP 14/6, 28%  EENMT:  Sclera clear, pupils equal, oral mucosa moist  Respiratory: diminished in bases, few anterior crackles, no wheezing  Cardiovascular:  RRR without M,G,R  Gastrointestinal: soft and non-tender; with positive bowel sounds. Musculoskeletal: warm without cyanosis. There is trace to 1+ lower leg edema, CARA hose  Skin:  no jaundice or rashes, no open wounds   Neurologic: no gross neuro deficits     Psychiatric:  alert and oriented x 1    CXR:  none today      Recent Labs      03/08/18   0715  03/06/18   0611   WBC  3.6*   --    HGB  8.5*  9.1*   HCT  28.8*  31.7*   PLT  89*   --      Recent Labs      03/08/18   0715  03/06/18   0611   NA  149*  151*   K  3.7  4.3   CL  100  99   GLU  82  104*   CO2  >45*  >45*   BUN  47*  57*   CREA  1.31*  1.15*   CA  8.0*  9.0     Recent Labs      03/08/18   1408  03/07/18   0440  03/06/18   1325   PH  7.40  7.50*  7.43   PCO2  70*  66*  81*   PO2  93  83  89   HCO3  42*  51*  52*     No results for input(s): LCAD, LAC in the last 72 hours.     Assessment:  (Medical Decision Making)     Hospital Problems  Date Reviewed: 3/8/2018          Codes Class Noted POA    * (Principal)Acute on chronic respiratory failure with hypercapnia (HCC) ICD-10-CM: D04.05  ICD-9-CM: 518.84  3/6/2018 Yes        S/P TAVR (transcatheter aortic valve replacement) ICD-10-CM: Z95.2  ICD-9-CM: V43.3  1/30/2018 Yes        Pleural effusion, bilateral ICD-10-CM: J90  ICD-9-CM: 511.9  3/6/2018 Yes    Overview Signed 3/8/2018  3:12 PM by Dennis Moser NP     3/8/18 Right thoracentesis 1000ml removed             E. coli UTI ICD-10-CM: N39.0, B96.20  ICD-9-CM: 599.0, 041.49  3/8/2018 Yes        Chronic diastolic congestive heart failure (HCC) (Chronic) ICD-10-CM: I50.32  ICD-9-CM: 428.32, 428.0  9/9/2016 Yes        Chronic atrial fibrillation (HCC) (Chronic) ICD-10-CM: I48.2  ICD-9-CM: 427.31  10/17/2011 Yes        DNR (do not resuscitate) ICD-10-CM: Z66  ICD-9-CM: V49.86  3/8/2018 Yes        Acute respiratory failure with hypoxia and hypercarbia (HCC) ICD-10-CM: J96.01, J96.02  ICD-9-CM: 518.81  3/8/2018 Unknown        Hypoxia ICD-10-CM: R09.02  ICD-9-CM: 799.02  1/23/2018 Yes        Chronic obstructive pulmonary disease ([facility] [address] (Chronic) ICD-10-CM: J44.9  ICD-9-CM: 496  3/8/2009 Yes    Overview Signed 3/6/2018  3:19 PM by Smith Tsang NP     PFTs Jan 2018                     Plan:  (Medical Decision Making)     --Will admit for further medical management  --Supplemental O2 with Opti-flow   --Respiratory nebulizer treatments as needed  --Continue meds from 9th floor  --DNR status  --Cardiology will continue following--they have seen her today    More than 50% of the time documented was spent in face-to-face contact with the patient and in the care of the patient on the floor/unit where the patient is located. Jayla Rock NP     Lungs: decreased BS judy L base  Heart:  RRR with no Murmur/Rubs/Gallops    Additional Comments: Tolerated R thoracentesis poorly with drop in BP. Appears to be failing progressively due to heart failure and end stage lung disease. PC consulted. Hospice appears to be appropriate. I have spoken with and examined the patient. I agree with the above assessment and plan as documented.     Lillie Ziegler MD

## 2018-03-08 NOTE — PROGRESS NOTES
TRANSFER - OUT REPORT:    Verbal report given to Luz Maria jimenez RN on Jennifer Rivera  being transferred to 8th floor for change in patient condition(requiring bipap, thoracentesis, and excessive diuretics)       Report consisted of patients Situation, Background, Assessment and   Recommendations(SBAR). Information from the following report(s) SBAR, Kardex, MAR, Accordion and Recent Results was reviewed with the receiving nurse. Lines:   Peripheral IV 03/05/18 Right Forearm (Active)   Site Assessment Clean, dry, & intact 3/8/2018  1:30 PM   Phlebitis Assessment 0 3/8/2018  1:30 PM   Infiltration Assessment 0 3/8/2018  1:30 PM   Dressing Status Clean, dry, & intact 3/8/2018  1:30 PM   Dressing Type Tape;Transparent 3/8/2018  1:30 PM   Hub Color/Line Status Capped 3/8/2018  1:30 PM   Alcohol Cap Used Yes 3/8/2018  1:30 PM        Opportunity for questions and clarification was provided.       Patient transported with:   RN  Venturi mask  Respiratory Therapist

## 2018-03-08 NOTE — H&P (VIEW-ONLY)
Jenny Jaramillo  Admission Date: 2/13/2018             Daily Progress Note: 3/8/2018    The patient's chart is reviewed and the patient is discussed with the staff. Patient is a 69 y.o.  female seen and evaluated at the request of Dr. Alison Dinero.  Past medical history of recent TAVR, HTN, CAD, chronic AFIB, anemia, anxiety, and chronic diastolic heart failure. She underwent bilateral thoracentesis on Jan 25th (preop) removing 1200 mls from the right and 900 mls from the left.  She underwent the TAVR on Jan 30th.  She was discharged home on Feb 3 but presented to the ER on 2/6/18 via EMS with complaints of worsening swelling and shortness of breath.   Upon arrival to the ER, CXR shows cardiomegaly with finding consistent with pulmonary edema and worsening bilateral pleural effusions.  We saw her again and bilateral thoracenteses were repeated on 2/9 removing 550 mls from the left and 1000 mls from the right. She was later transferred to the 9th floor for rehab.      Since transfer, cardiology has followed and adjusted diuretic therapy. Constance Parish has been anemic and her stool was heme positive so the Eliquis was stopped. She has been more lethargic and her family noticed that she seemed more confused so an ABG was obtained which showed hypercapnea. She was started on bipap with improvement. Subjective:     Sitting up eating lunch. She reports feeling better but short of breath. Spoke with son on the phone. He noticed a remarkable difference in her mentation after first night on bipap.      Current Facility-Administered Medications   Medication Dose Route Frequency    ciprofloxacin HCl (CIPRO) tablet 500 mg  500 mg Oral Q12H    diazePAM (VALIUM) tablet 2 mg  2 mg Oral QHS PRN    alum-mag hydroxide-simeth (MYLANTA) oral suspension 30 mL  30 mL Oral Q4H PRN    aspirin delayed-release tablet 81 mg  81 mg Oral DAILY    0.9% sodium chloride infusion 250 mL  250 mL IntraVENous PRN    furosemide (LASIX) tablet 80 mg  80 mg Oral DAILY    potassium chloride (K-DUR, KLOR-CON) SR tablet 40 mEq  40 mEq Oral DAILY    magnesium oxide (MAG-OX) tablet 400 mg  400 mg Oral DAILY    acetaminophen (TYLENOL) tablet 650 mg  650 mg Oral Q4H PRN    alcohol 62% (NOZIN) nasal  1 Ampule  1 Ampule Topical Q12H    allopurinol (ZYLOPRIM) tablet 100 mg  100 mg Oral BID    hydrocortisone (ANUSOL-HC) 2.5 % rectal cream   PeriANAL TID PRN    levothyroxine (SYNTHROID) tablet 88 mcg  88 mcg Oral ACB    metoprolol succinate (TOPROL-XL) XL tablet 25 mg  25 mg Oral DAILY    pantoprazole (PROTONIX) tablet 40 mg  40 mg Oral ACB    polyethylene glycol (MIRALAX) packet 17 g  17 g Oral DAILY    pravastatin (PRAVACHOL) tablet 40 mg  40 mg Oral DAILY    rOPINIRole (REQUIP) tablet 2 mg  2 mg Oral BID    sertraline (ZOLOFT) tablet 100 mg  100 mg Oral DAILY    spironolactone (ALDACTONE) tablet 25 mg  25 mg Oral DAILY    levalbuterol (XOPENEX) nebulizer soln 0.63 mg/3 mL  0.63 mg Nebulization Q6H PRN         Objective:     Vitals:    03/07/18 2128 03/08/18 0534 03/08/18 0551 03/08/18 0731   BP:    95/64   Pulse:    82   Resp:    20   Temp:    97.6 °F (36.4 °C)   SpO2: 100%  100% 99%   Weight:  169 lb 6.4 oz (76.8 kg)       Intake and Output:   03/06 1901 - 03/08 0700  In: 240 [P.O.:240]  Out: 900 [Urine:900]  03/08 0701 - 03/08 1900  In: 240 [P.O.:240]  Out: 300 [Urine:300]    Physical Exam:   Constitutional:  the patient is well developed and in no acute distress  HEENT:  Sclera clear, pupils equal, oral mucosa moist  Lungs: Clear but decreased in both bases. Respirations even and not labored  Cardiovascular:  RRR without M,G,R  Abd/GI: soft and non-tender; with positive bowel sounds. Ext: warm without cyanosis. There is 2+ lower leg edema.   Musculoskeletal: moves all four extremities with equal strength  Skin:  no jaundice or rashes, no wounds   Neuro: no gross neuro deficits   Musculoskeletal: can ambulate but needs assistance. No deformity  Psychiatric: Calm. ROS:  Chronic dyspnea, appetite fair, weak  Lines: peripheral IV    CHEST XRAY:       LAB  Recent Labs      03/08/18   0715  03/06/18   0611  03/05/18   1304   WBC  3.6*   --    --    HGB  8.5*  9.1*  9.2*   HCT  28.8*  31.7*  31.3*   PLT  89*   --    --      Recent Labs      03/08/18   0715  03/06/18   0611   NA  149*  151*   K  3.7  4.3   CL  100  99   CO2  >45*  >45*   GLU  82  104*   BUN  47*  57*   CREA  1.31*  1.15*     Recent Labs      03/07/18   0440  03/06/18   1325   PH  7.50*  7.43   PCO2  66*  81*   PO2  83  89   HCO3  51*  52*         Assessment:  (Medical Decision Making)     Hospital Problems  Date Reviewed: 2/10/2018          Codes Class Noted POA    Acute on chronic respiratory failure with hypercapnia (HCC) ICD-10-CM: C20.95  ICD-9-CM: 518.84  3/6/2018 No    Using bipap now - CO2 better    Pleural effusion, bilateral ICD-10-CM: J90  ICD-9-CM: 511.9  3/6/2018 Yes    No recent xray but present on the 5th    Chronic diastolic heart failure (HCC) (Chronic) ICD-10-CM: I50.32  ICD-9-CM: 428.32  2/7/2018 Yes    Peripheral and pulmonary edema/effusions    S/P TAVR (transcatheter aortic valve replacement) ICD-10-CM: Z95.2  ICD-9-CM: V43.3  1/30/2018 Yes    Jan 30th    Hypoxia ICD-10-CM: R09.02  ICD-9-CM: 799.02  1/23/2018 Yes    Secondary to fluid    Chronic atrial fibrillation (HCC) (Chronic) ICD-10-CM: I48.2  ICD-9-CM: 427.31  10/17/2011 Yes    Off eliquis due to heme positive stool/anemia    Chronic obstructive pulmonary disease ([facility] [address] (Chronic) ICD-10-CM: J44.9  ICD-9-CM: 496  3/8/2009 Yes    Overview Signed 3/6/2018  3:19 PM by Smith Tsang NP     PFTs Jan 2018           Severe, stable          Plan:  (Medical Decision Making)   1. Repeat ABG now to reassess hypercapnea - ? Need to continue bipap  2. Adjust diuretics due to alkalosis - add diamox, stop aldactone, continue lasix  3. Transfer to floor bed - she can't stay in rehab due to acuity  4. Bilateral thoracenteses today - Eliquis has been stopped previously  5. Reengage cardiology to assist with goal setting - discussed with son on the phone today. Will ask palliative care to see as well. Jaxon Benavidez NP    More than 50% of time documented was spent face-to-face contact with the patient and in the care of the patient on the floor/unit where the patient is located. Lungs:  Decreased BS at bases  Heart:  RRR with no Murmur/Rubs/Gallops;m ++ edema    Additional Comments:  Pt with end stage lung disease and intractable heart failure post TAVR. She is volume overloaded and not responding to diuretic therapy with recurrent effusions and respiratory insufficiency. Her prognosis appears to be very poor. Can tap her R/L chest for temporary relief but effusions will recur and she will likely not be able to leave the hospital. Recommend PC and cardiology see her and address her prognosis as well. I have spoken with and examined the patient. I agree with the above assessment and plan as documented.     Nati Crawford MD

## 2018-03-08 NOTE — PROGRESS NOTES
Patient placed on BiPAP at documented setting for overnight while sleeping. Patient tolerating well at this time.

## 2018-03-08 NOTE — PROGRESS NOTES
Patient awake and taken off BiPAP and placed back on NC. Patient tolerated BiPAP well overnight while sleeping.

## 2018-03-08 NOTE — PROGRESS NOTES
03/08/18 0701   Time Spent With Patient   Time In 0701   Time Out 0745   Patient Seen For: AM;ADLs   Grooming   Grooming Assistance  Mod A   Comments Assist to comb hair. Upper Body Bathing   Bathing Assistance, Upper S   Position Performed Other (comment)  (semi-supine in bed)   Comments verbal cues to wash chest and abdomen   Lower Body Bathing   Bathing Assistance, Lower  Min A   Position Performed Bending forward method; Long sitting on bed   Comments Assist with washing buttocks while pt rolled side to side in bed   Upper Body Dressing    [address] with pulling down shirt. Lower Body Dressing    Dressing Assistance  Max A   Leg Crossed Method Used No   Position Performed Supine;Bending forward method   Adaptive Equipment Used Other(comment)  (bed rails)   Don/Doff Anti-Embolic Stockings Dep   Comments Pt was able to thread both legs through pull-ups. Assist with all other parts. S: \"I got to go on a trip on Saturday, I went home for the day and came right back. \" Agreeable to therapy. Focus of session was on morning ADL routine. Patient was able to roll side to side on semi-supine bed using bed rails with min assist during ADLs. Pain not indicated during this session. SpO2 level 99% on 1 L of O2. Collaborated with PT, Yarelis Ramirez and confirmed patient is on track to reach goals as documented in the care plan. Patient tolerated session well, but confusion, shortness of breath, memory, functional mobility, UE strength, and activity tolerance are still below baseline and requires skilled facilitation to successfully and safely complete ADL's and transfers. Patient ended session in bed with call remote and phone within reach.      Fam Parr, OT Student

## 2018-03-08 NOTE — PROGRESS NOTES
Pt sat up on side of bed for thoracentesis. Consent obtained. Time out performed. Pts vitals monitored throughout procedure. Bilateral ultrasound done and pic taken of pleural fluid.  ~1000 ml cloudy leda colored pleural fluid from R.  Pt tolerated procedure well with no adverse rxn. Specimens sent to the lab x 1 for cultures only and labeled appropriately. Site dressed appropriately and report given to pts AMOS Woods who was at bedside throughout procedure. R Lung sliding done and ultrasound findings reviewed by MD. CXR ordered post procedure.

## 2018-03-08 NOTE — PROGRESS NOTES
Patient arrived to room 812 via staff from 9th floor. Patient is accompanied by son. Patient is alert with confusion. Respirations labored but appears to be slowing down. Heart rate regular. IV intact and patent with no s/s of infection noted. Duel skin assessment completed with AMOS Castillo. Patient has no skin issues at this time but skin is very fragile. Patient does have swelling BLE and BUE. Patient has several bruised areas on hips and buttocks. Patient skin is ecchymotic and thin. Patient and family orientated to room and surroundings. Call light within reach and patient instructed to call if assistance is needed. Will continue to monitor.

## 2018-03-08 NOTE — PROGRESS NOTES
Problem: Falls - Risk of  Goal: *Absence of Falls  Document Gregorio Fall Risk and appropriate interventions in the flowsheet.    Outcome: Progressing Towards Goal  Fall Risk Interventions:  Mobility Interventions: Bed/chair exit alarm, Communicate number of staff needed for ambulation/transfer, OT consult for ADLs, Patient to call before getting OOB, PT Consult for mobility concerns, PT Consult for assist device competence, Strengthening exercises (ROM-active/passive), Utilize walker, cane, or other assitive device    Mentation Interventions: Bed/chair exit alarm, Evaluate medications/consider consulting pharmacy, Increase mobility, More frequent rounding, Toileting rounds    Medication Interventions: Bed/chair exit alarm, Evaluate medications/consider consulting pharmacy, Patient to call before getting OOB, Utilize gait belt for transfers/ambulation, Teach patient to arise slowly    Elimination Interventions: Bed/chair exit alarm, Call light in reach, Patient to call for help with toileting needs, Toileting schedule/hourly rounds    History of Falls Interventions: Bed/chair exit alarm, Consult care management for discharge planning, Evaluate medications/consider consulting pharmacy

## 2018-03-08 NOTE — CONSULTS
[facility] Cardiology Daily note                Date of  Admission: 2/13/2018  1:40 PM         HPI:  Aide Guerrero is a 69 y.o. female with prior h/o recent d/c from in patient status with worsening SOB/pulmonary edema. Labs show WBC on 18.1 with neutrophil 96 and procalcitonin of 1.4. While in the ER she was placed on BiPAP and given 80mg IV lasix. INR was 5.7 and HGB was 8.1 then dropping to 7.1. Per son patient had experienced a severe hemorrhoidal bleed at home. She was given vitamin K for supratheraputic INR and she was transfused with 2 units with PRBCs. Given labile INR with coumadin she was transitioned to NOAC, Eliquis. Patient was also treated with IV lasix and was -3.7 L at time of discharge. She was treated with a 7 day course of ABX for elevated WBCs. Pulmonary was consulted for pleural effusions. Once INR was <1.8 a thoracentesis was performed on left with 550 ccs removed and right sided with 1000 cc removed. Physiatrist medicine was consulted to evaluate patient for rehab. Acute rehab was recommended. Additional h/o recent TAVR1/30/18, HTN, CAD, chronic AFIB, anemia, anxiety, and chronic diastolic heart failure. She has been on 9th floor in acute rehab since 2/13/18. Dr. Hector Spangler saw patient on 3/4/18 and re: will be on standby if needed. Pulmonary saw patient today and requested cardiology to see again to discuss \"goal setting\". Also palliative care consult pending. Pending transfer to medical floor. Dr. Christopher Akdins just performed right sided thoracentesis with 1000 ml removed.      Diagnosis    Degenerative arthritis of left knee    Aortic Valve Bioprosthesis Present    Chronic obstructive pulmonary disease (HCC)    Hypothyroidism    BOBBY (obstructive sleep apnea)    Chronic atrial fibrillation (HCC)    Anemia    Thrombocytopenia (HCC)    Obesity    Mitral stenosis with insufficiency    Rheumatic aortic stenosis    Cardiac pacemaker    Sick sinus syndrome ([address]    H/O mitral valve repair, 2003  Chronic depression    Osteopenia    RLS (restless legs syndrome)    Hyperlipidemia    Gout    Anxiety    CAD (coronary artery disease)    HTN (hypertension)    GERD (gastroesophageal reflux disease)    Chronic diastolic congestive heart failure (HCC)    Aortic valve replaced    Pulmonary hypertension    H/O atrioventricular rubin ablation    S/P mitral valve repair    Tricuspid valve insufficiency    Systolic CHF, chronic (HCC)    Stenosis of prosthetic aortic valve    Peripheral arterial disease (HCC)    Chronic respiratory failure with hypoxia (HCC)    Hypoxia    Aortic stenosis    S/P TAVR (transcatheter aortic valve replacement)    Chronic diastolic heart failure (HCC)    Acute on chronic respiratory failure with hypercapnia (HCC)    Pleural effusion, bilateral    E. coli UTI       Past Medical History:   Diagnosis Date    Abnormal glucose 8/5/2016    Abscess 8/5/2016    Acute encephalopathy 7/8/2016    Acute renal failure ([address] 12/3/2009    Afib ([address]     Anemia     Ankle swelling 8/5/2016    Anxiety 8/5/2016    Aortic Valve Bioprosthesis Present 3/7/2009    ARF (acute renal failure) ([address] 2/24/2010    Arthritis     Atopic dermatitis 8/5/2016    Atrial fibrillation (HCC) 3/7/2009    Autonomic orthostatic hypotension 8/5/2016    AV block 10/17/2011    Back pain 8/5/2016    Bradycardia (Symptomatic) 11/7/2011    CAD (coronary artery disease)     Cancer (HCC) 1990    breast (left)    Cardiac pacemaker 11/2/2015    Cardiogenic shock ([address] 10/17/2011    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    Cellulitis 8/5/2016    Chest pain 8/5/2016    Chronic atrial fibrillation ([address] 10/17/2011    Chronic depression 8/5/2016    Chronic obstructive pulmonary disease ([address] 3/8/2009    Chronic pain     CKD (chronic kidney disease) stage 3, GFR 30-59 ml/min 12/4/2009    Congestive heart failure (CHF) ([address] 11/2/2015    Degeneration of cervical intervertebral disc 8/5/2016    Degenerative arthritis of left knee 2/27/2009    Dehydration 3/2/1801    Diastolic heart failure (HCC)     Digoxin toxicity 2/24/2010    Disorder of sweat glands 8/5/2016    Diverticulosis of large intestine without diverticulitis 2015    Dyspnea 8/5/2016    Eczema 8/5/2016    Embolus of femoral artery (HCC) 12/4/2017    Epigastric abdominal pain 2/24/2010    GERD (gastroesophageal reflux disease)     Gout 8/5/2016    H/O mitral valve repair, 2003 6/27/2016    Heart failure ([address]     Hx of colonic polyp 2015    adenoma    Hyperkalemia 10/17/2011    Hyperlipidemia 8/5/2016    Hypertension     Hypokalemia 2/24/2010    Hypothyroidism 12/4/2009    Ill-defined condition     CARPEL TUNNEL SYNDROME, BILAT HANDS    Knee pain 8/5/2016    Leg cramps 8/5/2016    Mitral stenosis with insufficiency 11/2/2015    Prior MV repair 2003.     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    Right hip pain 8/5/2016    RLS (restless legs syndrome) 8/5/2016    Sick sinus syndrome ([address] 2/13/2016    Skin infection 8/5/2016    Tachycardia 6/27/2016    Thrombocytopenia, unspecified 8/22/2012    Thromboembolus ([address]     02/2017    Urticaria 8/5/2016    Vertigo 8/5/2016      Past Surgical History:   Procedure Laterality Date    CARDIAC SURG PROCEDURE UNLIST      valve repair and replacement    CHEST SURGERY PROCEDURE UNLISTED      HX APPENDECTOMY      HX BREAST RECONSTRUCTION      HX CHOLECYSTECTOMY  2005    HX GYN  1981    hysterectomy still have ovaries    HX MASTECTOMY  1990    left mastectomy    HX ORTHOPAEDIC      knee surgery    HX PACEMAKER      VASCULAR SURGERY PROCEDURE UNLIST Right 2017    LE thrombectomy     Allergies   Allergen Reactions    Adhesive Tape Rash    Benadryl [Diphenhydramine Hcl] Other (comments)     Jitters      Demerol [Meperidine] Anxiety    Morphine Other (comments)     Confusion    Sulfa (Sulfonamide Antibiotics) Anxiety    Lorazepam Anxiety      Family History   Problem Relation Age of Onset    Heart Disease Mother     Cancer Father      Throat    Heart Disease Father     Cancer Sister      Breast, Colon    Heart Disease Sister     Breast Cancer Sister 79      from disease    Cancer Maternal Grandmother     Cancer Brother     Diabetes Brother     Heart Disease Brother     Psychiatric Disorder Paternal Grandfather     Cancer Maternal Aunt      Breast    Diabetes Son         Current Facility-Administered Medications   Medication Dose Route Frequency    acetaZOLAMIDE (DIAMOX) tablet 250 mg  250 mg Oral DAILY    ciprofloxacin HCl (CIPRO) tablet 500 mg  500 mg Oral Q12H    diazePAM (VALIUM) tablet 2 mg  2 mg Oral QHS PRN    alum-mag hydroxide-simeth (MYLANTA) oral suspension 30 mL  30 mL Oral Q4H PRN    aspirin delayed-release tablet 81 mg  81 mg Oral DAILY    0.9% sodium chloride infusion 250 mL  250 mL IntraVENous PRN    furosemide (LASIX) tablet 80 mg  80 mg Oral DAILY    potassium chloride (K-DUR, KLOR-CON) SR tablet 40 mEq  40 mEq Oral DAILY    magnesium oxide (MAG-OX) tablet 400 mg  400 mg Oral DAILY    acetaminophen (TYLENOL) tablet 650 mg  650 mg Oral Q4H PRN    alcohol 62% (NOZIN) nasal  1 Ampule  1 Ampule Topical Q12H    allopurinol (ZYLOPRIM) tablet 100 mg  100 mg Oral BID    hydrocortisone (ANUSOL-HC) 2.5 % rectal cream   PeriANAL TID PRN    levothyroxine (SYNTHROID) tablet 88 mcg  88 mcg Oral ACB    metoprolol succinate (TOPROL-XL) XL tablet 25 mg  25 mg Oral DAILY    pantoprazole (PROTONIX) tablet 40 mg  40 mg Oral ACB    polyethylene glycol (MIRALAX) packet 17 g  17 g Oral DAILY    pravastatin (PRAVACHOL) tablet 40 mg  40 mg Oral DAILY    rOPINIRole (REQUIP) tablet 2 mg  2 mg Oral BID    sertraline (ZOLOFT) tablet 100 mg  100 mg Oral DAILY    levalbuterol (XOPENEX) nebulizer soln 0.63 mg/3 mL  0.63 mg Nebulization Q6H PRN       Review of Systems   Constitution: Positive for weakness and malaise/fatigue. Cardiovascular: Positive for dyspnea on exertion and leg swelling. Negative for chest pain. Respiratory: Positive for shortness of breath. Limited d/t mental status     Physical Exam  Vitals:    03/07/18 2128 03/08/18 0534 03/08/18 0551 03/08/18 0731   BP:    95/64   Pulse:    82   Resp:    20   Temp:    97.6 °F (36.4 °C)   SpO2: 100%  100% 99%   Weight:  76.8 kg (169 lb 6.4 oz)         Physical Exam:  General: sleeping on BIPAP  Neck: supple, no JVD, no carotid bruits  Heart: S1S2 with RRR without murmurs or gallops  Lungs: diminished in bases   Abd: soft, nontender, nondistended, with good bowel sounds  Ext: warm, 2+ edema, calves supple/nontender, pulses 2+ bilaterally  Skin: warm and dry  Neurologic: on BIPAP, very sleepy     Cardiographics    Telemetry: NA  ECG: NA  Echocardiogram: 2/23/18 showed -  Left ventricle: Systolic function was normal. Ejection fraction was  estimated to be 55 %. This study was inadequate for the evaluation of regional wall motion. Wall thickness was mildly to moderately increased. -  Right ventricle: The ventricle was mildly dilated. -  Left atrium: The atrium was markedly dilated. -  Right atrium: The atrium was markedly dilated. -  Inferior vena cava, hepatic veins: The inferior vena cava was markedly dilated. -  Aortic valve: SVi=59 and Di=0.51. A bioprosthesis was present. The aortic valve area by the continuity equation was 1.6 cm2.-  Mitral valve: There was mild to moderate regurgitation. -  Tricuspid valve: There was moderate regurgitation.     Labs:   Recent Labs      03/08/18   0715  03/06/18   0611   NA  149*  151* K  3.7  4.3   BUN  47*  57*   CREA  1.31*  1.15*   GLU  82  104*   WBC  3.6*   --    HGB  8.5*  9.1*   HCT  28.8*  31.7*   PLT  89*   --         Assessment/Plan:     Assessment:      Active Problems:    Chronic obstructive pulmonary disease (HCC) (3/8/2009)- per pulmonary      Overview: PFTs Jan 2018            Chronic atrial fibrillation ([facility] [address] (10/17/2011)- on Toprol but eliquis stopped for thoracentesis. Can restart when OK with Pulmonary      Hypoxia (1/23/2018)      S/P TAVR (transcatheter aortic valve replacement) (1/30/2018)- s/p TAVR 1/30/18. Chronic diastolic heart failure ([facility] [address] (2/7/2018)- not controlled; can switch lasix to IV with daily labs. Palliative care to see today as well. Acute on chronic respiratory failure with hypercapnia (HCC) (3/6/2018)      Pleural effusion, bilateral (3/6/2018)- s/p thoracentesis prior to my arrival and removed 1000 ml from right side. E. coli UTI (3/8/2018)- on abx. Discussion with son at bedside. Would like palliative care to see her and he will discuss with other family members re: hospice care. For now will change lasix to IV and daily labs. Thank you very much for this referral. We appreciate the opportunity to participate in this patient's care. We will follow along with above stated plan. Manju Obrien NP  Consulting MD: Dr. Terrance Sterling    Patient seen and examined by me. Agree with above note by physician extender. Key findings are:  Progressive decline and recurrent pleural effusions. Patient is now weak and hypotensive.   She has not recovered post TAVR  CV- RRR without MRG  Lungs- Diminished bilaterally  Abdomen- soft, non-tender, normal bowel sounds  Extremities- 2+ edema    Plan:  Patient is now best served with palliative care      Eliane Owens MD

## 2018-03-08 NOTE — IP AVS SNAPSHOT
Bernice Nuñez 
 
 
 [address] [facility] [address] [facility] 
[phone] Patient: Marcio Orta MRN: [id number] DQD:[id number] About your hospitalization You were admitted on:  March 8, 2018 You last received care in the:  [facility] 8 MED SURG You were discharged on:  March 19, 2018 Why you were hospitalized Your primary diagnosis was:  Acute Respiratory Failure With Hypoxia And Hypercarbia (Hcc) Your diagnoses also included:  Chronic Obstructive Pulmonary Disease (Hcc), Chronic Atrial Fibrillation (Hcc), E. Coli Uti, Hypoxia, S/P Tavr (Transcatheter Aortic Valve Replacement), Chronic Diastolic Congestive Heart Failure (Hcc), Pleural Effusion, Left, Dnr (Do Not Resuscitate), Pleural Effusion, Right Follow-up Information Follow up With Details Comments Contact Info Delon Cordoba MD On 4/2/2018 2:45 PM at [facility] Location: [address] 400 [facility] [address] 
[phone] Jefferson Lambert NP On 4/12/2018 2:10 PM [facility] [address] [place] PULMONOLOGY [facility] [address] 
[phone] Lian Bryant MD On 3/23/2018 1:45 PM [address] [address] 
[phone] Ava Bates MD   [facility] [address] [address] 
[phone] Your Scheduled Appointments Monday March 19, 2018  3:00 PM EDT  
SN HOSPICE ADMISSION with AMOS Koroma [place] [facility] [place] [facility] Thursday March 22, 2018 11:00 AM EDT  with Matheus Cantu [place] [facility] [place] [facility] Friday March 23, 2018  1:45 PM EDT Follow Up with iLan Bryant MD  
 [place] [facility] ([facility]) [address]  
[phone] Monday March 26, 2018  9:30 AM EDT  
REMOTE DEVICE CHECK ORDERS ONLY ENCOUNTER with RYDER REMOTE PACER 34 [facility] CARDIOLOGY [place] OFFICE ([address] [place] [address]) 2 [address]  
[phone] Please remember THIS REMOTE CHECK IS COMPLETED FROM HOME - YOU WILL NOT COME TO THE OFFICE FOR THIS APPOINTMENT. In preparation for this check, please ensure your monitor box is working appropriately. If your monitor requires you to send a transmission, please make sure it is sent by 11:00AM on this day so we can have it processed and resulted to your doctor without delay. If you have a question or problem with the monitor box, please contact your respective company:   [address] [place] [address]/[place] - [phone]  [employer]/Home [employer] - [phone]  [employer] - [phone]  [employer]/[facility] [phone]  If you have any further questions or need to move this appointment, we are happy to help and can be reached at [phone]. Monday April 02, 2018  2:45 PM EDT HOSPITAL FOLLOW-UP with Corinne Fish, MD  
[facility] CARDIOLOGY [place] OFFICE ([address] [place] [address]) [address] [facility]  
[phone] Thursday April 12, 2018  2:40 PM EDT  
(Arrive by 2:10 PM) HOSPITAL with [facility] ([facility]) [address] 300 [place] [address]607 [facility]  
[phone] Discharge Orders None A check alina indicates which time of day the medication should be taken. My Medications CHANGE how you take these medications Instructions Each Dose to Equal  
 Morning Noon Evening Bedtime * allopurinol 100 mg tablet Commonly known as:  Dolly Taylor  
 What changed:  Another medication with the same name was added. Make sure you understand how and when to take each. Take 1 Tab by mouth two (2) times a day. 100 mg  
    
  
   
   
  
   
  
 * allopurinol 100 mg tablet Commonly known as:  Maryuri Doyle What changed: You were already taking a medication with the same name, and this prescription was added. Make sure you understand how and when to take each. Take 1 Tab by mouth two (2) times a day for 5 days. 100 mg  
    
   
   
   
  
 * aspirin 81 mg chewable tablet What changed:  Another medication with the same name was added. Make sure you understand how and when to take each. Take 81 mg by mouth daily. Given on 9th floor 81 mg  
    
   
   
   
  
 * aspirin 81 mg chewable tablet What changed: You were already taking a medication with the same name, and this prescription was added. Make sure you understand how and when to take each. Take 1 Tab by mouth daily for 30 days. 81 mg  
    
  
   
   
   
  
 diazePAM 5 mg tablet Commonly known as:  VALIUM What changed:  how much to take Take 1 Tab by mouth daily. Max Daily Amount: 5 mg.  
 5 mg * furosemide 80 mg tablet Commonly known as:  LASIX What changed:  when to take this Take 1 Tab by mouth every twelve (12) hours. 80 mg  
    
   
   
   
  
 * furosemide 80 mg tablet Commonly known as:  LASIX What changed: You were already taking a medication with the same name, and this prescription was added. Make sure you understand how and when to take each. Take 1 Tab by mouth daily for 30 days. 80 mg  
    
   
   
   
  
 * furosemide 40 mg tablet Commonly known as:  LASIX What changed: You were already taking a medication with the same name, and this prescription was added. Make sure you understand how and when to take each. Take 1 Tab by mouth daily for 30 days.   
 40 mg  
    
  
   
   
   
 * Notice: This list has 7 medication(s) that are the same as other medications prescribed for you. Read the directions carefully, and ask your doctor or other care provider to review them with you. CONTINUE taking these medications Instructions Each Dose to Equal  
 Morning Noon Evening Bedtime  
 azelastine 137 mcg (0.1 %) nasal spray Commonly known as:  ASTELIN  
   
 1 [place] by Both Nostrils route two (2) times a day. Use in each nostril as directed 1 Spray  
    
  
   
   
  
   
  
 calcium carbonate 600 mg calcium (1,500 mg) tablet Commonly known as:  [place] Sandifer Take 600 mg by mouth nightly. 600 mg  
    
   
   
   
  
  
 cholecalciferol 1,000 unit Cap Commonly known as:  VITAMIN D3 Take  by mouth daily. CIPRO 500 mg tablet Generic drug:  ciprofloxacin HCl Take 500 mg by mouth every twelve (12) hours. 500 mg DOCUSATE SODIUM Take 1 Cap by mouth two (2) times a day. 1 Cap  
    
  
   
   
  
   
  
 fluticasone 50 mcg/actuation nasal spray Commonly known as:  FLONASE  
   
 1 [place] by Both Nostrils route daily. 1 Spray  
    
   
   
   
  
 hydrocortisone 2.5 % rectal cream  
Commonly known as:  ANUSOL-HC Apply small amount to hemorrhoids/perianal skin TID PRN  
     
   
   
   
  
 levothyroxine 88 mcg tablet Commonly known as:  SYNTHROID Take 1 Tab by mouth Daily (before breakfast). 88 mcg  
    
  
   
   
   
  
 loratadine 10 mg tablet Commonly known as:  [place] Last Take 10 mg by mouth as needed. 10 mg  
    
   
   
   
  
 magnesium oxide 400 mg tablet Commonly known as:  MAG-OX Take 400 mg by mouth daily. 400 mg  
    
  
   
   
   
  
 metoprolol succinate 25 mg XL tablet Commonly known as:  TOPROL-XL Take 1 Tab by mouth daily. 25 mg MIRALAX 17 gram/dose powder Generic drug:  polyethylene glycol Take 17 g by mouth daily. 17 g  
    
  
   
   
   
  
 nitroglycerin 0.4 mg SL tablet Commonly known as:  NITROSTAT  
   
 by SubLINGual route every five (5) minutes as needed for Chest Pain. omeprazole 20 mg capsule Commonly known as:  PRILOSEC  
   
 TAKE 1 CAPSULE BY MOUTH  DAILY OXYGEN-AIR DELIVERY SYSTEMS  
   
 by Does Not Apply route. 2-2.5 lpm cont. potassium chloride SR 20 mEq tablet Commonly known as:  K-TAB Take 40 mEq by mouth daily. Given daily on 9th floor 40 mEq  
    
  
   
   
   
  
 pravastatin 40 mg tablet Commonly known as:  PRAVACHOL Take 1 Tab by mouth daily. Indications: hyperlipidemia 40 mg  
    
  
   
   
   
  
 promethazine 25 mg tablet Commonly known as:  PHENERGAN Take 1 Tab by mouth every six (6) hours as needed. 25 mg [address] OP Apply  to eye. REQUIP 2 mg tablet Generic drug:  rOPINIRole Take  by mouth two (2) times a day. sertraline 100 mg tablet Commonly known as:  ZOLOFT Take 1 Tab by mouth daily. 100 mg  
    
  
   
   
   
  
 spironolactone 25 mg tablet Commonly known as:  ALDACTONE Take 1 Tab by mouth daily. 25 mg  
    
  
   
   
   
  
 SUPPOSITORY ADULT suppository Generic drug:  glycerin (adult) Insert 1 Suppository into rectum as needed. 1 Suppository VITAMIN B-12 250 mcg tablet Generic drug:  cyanocobalamin Take 1,000 mcg by mouth daily. 1000 mcg XOPENEX 1.25 mg/3 mL Nebu Generic drug:  levalbuterol 1.25 mg by Nebulization route. 1.25 mg  
    
   
   
   
  
  
STOP taking these medications ELIQUIS 5 mg tablet Generic drug:  apixaban Where to Get Your Medications These medications were sent to [address] [address] Phone:  [phone]  
  aspirin 81 mg chewable tablet Information on where to get these meds will be given to you by the nurse or doctor. ! Ask your nurse or doctor about these medications  
  allopurinol 100 mg tablet  
 furosemide 40 mg tablet  
 furosemide 80 mg tablet Discharge Instructions Hospice: Care Instructions Your Care Instructions Hospice care provides medical treatment to relieve symptoms at the end of life. The goal is to keep you comfortable, not to try to cure you. Hospice care does not speed up or lengthen dying. It focuses on easing pain and other symptoms. Hospice caregivers want to enhance your quality of life. Hospice care also offers emotional help and spiritual support when you are dying. It also helps family members care for a loved one who is dying. Hospice care can help you review your life, say important things to family and friends, and explore spiritual issues. Hospice also helps your family and friends deal with their grief after you die. You can use hospice care if your illness cannot be cured and doctors believe you have no more than 6 months to live. You do not need to be confined to a bed or in a hospital to benefit from this type of care. The hospice team includes nurses, counselors, therapists, social workers, pastors, home health aides, and trained volunteers. You can get care in your own home or in a hospice center. Some hospice workers also go to nursing homes or hospitals. How can you care for yourself at home? · Prepare a list of advance directives. These are instructions to your doctor and family members about what kind of care you want if you become unable to speak or express yourself. · Find out if your health insurance covers hospice care. · Find hospice programs in your area. People who can help include your doctor, your state health department, and your insurance company. · Decide what kinds of hospice services you want. It helps to know what you want before you enter a hospice program. 
· Think about some questions when preparing for hospice care. ¨ Who do you want to make decisions about your medical care if you are not able to? Many people choose their spouse, child, or doctor. ¨ What are you most afraid of that might happen? You might be afraid of having pain or losing your independence. Let your hospice team know your fears. The team can help you deal with them. ¨ Where would you prefer to die? Choices include your home, a hospital, or a nursing home. ¨ Do you want to donate organs when you die? Make sure that your family clearly understands this. ¨ Do you want any Hoahaoism rites or practices to be done before you die? Let your hospice team know what you want. Where can you learn more? Go to http://onesimo-dasia.info/. Enter S373 in the search box to learn more about \"Hospice: Care Instructions. \" Current as of: September 24, 2016 Content Version: 11.4 © 3368-0262 [employer]. Care instructions adapted under license by [employer] (which disclaims liability or warranty for this information). If you have questions about a medical condition or this instruction, always ask your healthcare professional. [place] [address] any warranty or liability for your use of this information. Heart Failure: Care Instructions Your Care Instructions Heart failure occurs when your heart does not pump as much blood as the body needs. Failure does not mean that the heart has stopped pumping but rather that it is not pumping as well as it should. Over time, this causes fluid buildup in your lungs and other parts of your body.  Fluid buildup can cause shortness of breath, fatigue, swollen ankles, and other problems. By taking medicines regularly, reducing sodium (salt) in your diet, checking your weight every day, and making lifestyle changes, you can feel better and live longer. Follow-up care is a key part of your treatment and safety. Be sure to make and go to all appointments, and call your doctor if you are having problems. It's also a good idea to know your test results and keep a list of the medicines you take. How can you care for yourself at home? Medicines ? · Be safe with medicines. Take your medicines exactly as prescribed. Call your doctor if you think you are having a problem with your medicine. ? · Do not take any vitamins, over-the-counter medicine, or herbal products without talking to your doctor first. Nicolás English not take ibuprofen (Advil or Motrin) and naproxen (Aleve) without talking to your doctor first. They could make your heart failure worse. ? · You may be taking some of the following medicine. ¨ Beta-blockers can slow heart rate, decrease blood pressure, and improve your condition. Taking a beta-blocker may lower your chance of needing to be hospitalized. ¨ Angiotensin-converting enzyme inhibitors (ACEIs) reduce the heart's workload, lower blood pressure, and reduce swelling. Taking an ACEI may lower your chance of needing to be hospitalized again. ¨ Angiotensin II receptor blockers (ARBs) work like ACEIs. Your doctor may prescribe them instead of ACEIs. ¨ Diuretics, also called water pills, reduce swelling. ¨ Potassium supplements replace this important mineral, which is sometimes lost with diuretics. ¨ Aspirin and other blood thinners prevent blood clots, which can cause a stroke or heart attack. ? You will get more details on the specific medicines your doctor prescribes. Diet ?  · Your doctor may suggest that you limit sodium to 2,000 milligrams (mg) a day or less. That is less than 1 teaspoon of salt a day, including all the salt you eat in cooking or in packaged foods. People get most of their sodium from processed foods. Fast food and restaurant meals also tend to be very high in sodium. ? · Ask your doctor how much liquid you can drink each day. You may have to limit liquids. ?Weight ? · Weigh yourself without clothing at the same time each day. Record your weight. Call your doctor if you have a sudden weight gain, such as more than 2 to 3 pounds in a day or 5 pounds in a week. (Your doctor may suggest a different range of weight gain.) A sudden weight gain may mean that your heart failure is getting worse. ? Activity level ? · Start light exercise (if your doctor says it is okay). Even if you can only do a small amount, exercise will help you get stronger, have more energy, and manage your weight and your stress. Walking is an easy way to get exercise. Start out by walking a little more than you did before. Bit by bit, increase the amount you walk. ? · When you exercise, watch for signs that your heart is working too hard. You are pushing yourself too hard if you cannot talk while you are exercising. If you become short of breath or dizzy or have chest pain, stop, sit down, and rest.  
? · If you feel \"wiped out\" the day after you exercise, walk slower or for a shorter distance until you can work up to a better pace. ? · Get enough rest at night. Sleeping with 1 or 2 pillows under your upper body and head may help you breathe easier. ? Lifestyle changes ? · Do not smoke. Smoking can make a heart condition worse. If you need help quitting, talk to your doctor about stop-smoking programs and medicines. These can increase your chances of quitting for good. Quitting smoking may be the most important step you can take to protect your heart. ? · Limit alcohol to 2 drinks a day for men and 1 drink a day for women. Too much alcohol can cause health problems. ? · Avoid getting sick from colds and the flu. Get a pneumococcal vaccine shot. If you have had one before, ask your doctor whether you need another dose. Get a flu shot each year. If you must be around people with colds or the flu, wash your hands often. When should you call for help? Call 911 if you have symptoms of sudden heart failure such as: 
? · You have severe trouble breathing. ? · You cough up pink, foamy mucus. ? · You have a new irregular or rapid heartbeat. ?Call your doctor now or seek immediate medical care if: 
? · You have new or increased shortness of breath. ? · You are dizzy or lightheaded, or you feel like you may faint. ? · You have sudden weight gain, such as more than 2 to 3 pounds in a day or 5 pounds in a week. (Your doctor may suggest a different range of weight gain.) ? · You have increased swelling in your legs, ankles, or feet. ? · You are suddenly so tired or weak that you cannot do your usual activities. ? Watch closely for changes in your health, and be sure to contact your doctor if you develop new symptoms. Where can you learn more? Go to http://onesimo-dasia.info/. Enter L213 in the search box to learn more about \"Heart Failure: Care Instructions. \" Current as of: September 21, 2016 Content Version: 11.4 © 9145-5223 [employer]. Care instructions adapted under license by [employer] (which disclaims liability or warranty for this information). If you have questions about a medical condition or this instruction, always ask your healthcare professional. [place] [address] any warranty or liability for your use of this information. Learning About Pleural Effusion What is pleural effusion? Pleural effusion (say \"PLER-adelina [id number]\") is the buildup of fluid in the space between tissues lining the lungs and chest wall.  Because of the fluid buildup, the lungs may not be able to expand completely, which can make it hard to breathe. The lung, or part of it, may collapse. Pleural effusion has many causes, such as pneumonia, cancer, inflammation of the tissues around the lungs, and heart failure. Pleural effusion is usually diagnosed with an X-ray and a physical exam. The doctor listens to the air flow in your lungs. What are the symptoms? Symptoms of pleural effusion may include: · Trouble breathing. · Shortness of breath. · Chest pain. · Fever. · A cough. Minor pleural effusion may not cause any symptoms. How is pleural effusion treated? Doctors may need to treat the condition that is causing pleural effusion. For example, you may get medicines to treat pneumonia or congestive heart failure. Minor pleural effusion often goes away on its own without treatment. Removing fluid Pleural effusion can be treated by removing fluid from the space between the tissues around the lungs. This is done with a needle that's put into the chest (thoracentesis). A small amount of the fluid may be sent to a lab to find out what is causing the buildup of fluid. Removing the fluid may help to relieve symptoms, such as shortness of breath and chest pain. It can help the lungs to expand more fully. In some cases, if pleural effusion doesn't get better, a catheter may be placed in the chest. This is a flexible tube that allows fluid to drain from the lungs. The catheter stays in the chest until the doctor removes it. Some people may get a treatment that removes the fluid and then puts a medicine into the chest cavity. This helps to prevent too much fluid from building up again. Follow-up care is a key part of your treatment and safety. Be sure to make and go to all appointments, and call your doctor if you are having problems. It's also a good idea to know your test results and keep a list of the medicines you take. Where can you learn more? Go to http://onesimo-dasia.info/. Enter A920 in the search box to learn more about \"Learning About Pleural Effusion. \" Current as of: May 12, 2017 Content Version: 11.4 © 6879-8082 Healthwise, Incorporated. Care instructions adapted under license by [employer] (which disclaims liability or warranty for this information). If you have questions about a medical condition or this instruction, always ask your healthcare professional. [place] [address] any warranty or liability for your use of this information. ACO Transitions of Care Introducing [employer] [address] Daphne Piper offers a voluntary care coordination program to provide high quality service and care to [facility] fee-for-service beneficiaries. Jake Lathamilor was designed to help you enhance your health and well-being through the following services: ? Transitions of Care  support for individuals who are transitioning from one care setting to another (example: Hospital to home). ? Chronic and Complex Care Coordination  support for individuals and caregivers of those with serious or chronic illnesses or with more than one chronic (ongoing) condition and those who take a number of different medications. If you meet specific medical criteria, a [facility] [address] may call you directly to coordinate your care with your primary care physician and your other care providers. For questions about the [facility] programs, please, contact your physicians office. For general questions or additional information about Accountable Care Organizations: 
Please visit www.medicare.gov/acos. html or call 1-800-MEDICARE (0-549.143.3985) TTY users should call 7-890.399.5498. Abiodunhart Announcement  We are excited to announce that we are making your provider's discharge notes available to you in Inventables. You will see these notes when they are completed and signed by the physician that discharged you from your recent hospital stay. If you have any questions or concerns about any information you see in Inventables, please call the Health Information Department where you were seen or reach out to your Primary Care Provider for more information about your plan of care. Introducing [address]! Dear Roger Kingston: Thank you for requesting a Inventables account. Our records indicate that you already have an active Inventables account. You can access your account anytime at [email or web address] Did you know that you can access your hospital and ER discharge instructions at any time in Inventables? You can also review all of your test results from your hospital stay or ER visit. Additional Information If you have questions, please visit the Frequently Asked Questions section of the Inventables website at [email or web address] Remember, Inventables is NOT to be used for urgent needs. For medical emergencies, dial 911. Now available from your iPhone and Android! Providers Seen During Your Hospitalization Provider Specialty Primary office phone Ilean Dakin, MD Pulmonary Disease [phone] Your Primary Care Physician (PCP) Primary Care Physician Office Phone Office Fax [address] [facility] [phone] You are allergic to the following Allergen Reactions Adhesive Tape Rash Benadryl (Diphenhydramine Hcl) Other (comments) Jitters Demerol (Meperidine) Anxiety Morphine Other (comments) Confusion Sulfa (Sulfonamide Antibiotics) Anxiety Lorazepam Anxiety Recent Documentation Weight BMI OB Status Smoking Status 78.2 kg 33.69 kg/m2 Hysterectomy Former Smoker Emergency Contacts Name Discharge Info Relation Home Work Mobile [id number] [facility] CAREGIVER [3] Son [22] [phone] Marcy Wen(Grandghtr)  Other Relative [6] [phone] Dago Bergeron(Grandghtr)  Other Relative [6] [phone] Patient Belongings The following personal items are in your possession at time of discharge: 
     Visual Aid: Glasses      Home Medications: None Please provide this summary of care documentation to your next provider. Signatures-by signing, you are acknowledging that this After Visit Summary has been reviewed with you and you have received a copy. Patient Signature:  ____________________________________________________________ Date:  ____________________________________________________________  
  
[facility] Provider Signature:  ____________________________________________________________ Date:  ____________________________________________________________

## 2018-03-08 NOTE — DISCHARGE SUMMARY
REHABILITATION DISCHARGE SUMMARY     Patient: Adia Orozco MRN: [id number]  SSN: [id number]    YOB: 1940  Age: 69 y.o. Sex: female      Date: 3/8/2018  Admit Date: 2/13/2018  Discharge Date: 3/8/2018    Admitting Physician: Randi Napier MD   Primary Care Physician: Brigid Zaragoza MD     Admission Condition: fair    Admit Diagnosis: DEBILITY;CHF (congestive heart failure) ([address] Respiratory f*  Chief Complaint : Gait dysfunction secondary to below. Admit Diagnosis: Pleural effusion [J90]; Pleural effusion [J90]  Acute respiratory failure (HCC) (2/7/2018)  Pleural effusion (1/23/2018)/ S/P bilateral thoracentesis  Hypothyroidism   Chronic atrial fibrillation   HTN   Pulmonary hypertension   S/P TAVR(1/30/2018)  Leukocytosis (2/7/2018) on Abx   Acute on chronic diastolic heart failure   weakness  Pain  DVT risk  Acute blood loss anemia/ GI bleed? Acute Rehab Dx:  Generalized weakness. Debility    deconditioning   Mobility and ambulation deficits  Self Care/ADL deficits       HPI: [facility] November a 69 y. o. female patient at [address] [place] [address] was admitted on 2/7/2018  by Marta Ferrer MD with below mentioned medical history ,is being seen for Physical Medicine and Rehabilitation.    Cristopher Coley a history of hypertension chronic diastolic CHF chronic atrial fibrillation and TAV are on 1/30/2018 was brought to the ER with increased shortness of breath, fatigue, and generalize weakness. Chest x-ray findings were consistent with pulmonary edema, worsening bilateral effusions. Patient was placed on BiPAP, and started on diuresis. Patient also had subjective fevers, WBCs were elevated 18K at admission. Patient was started on antibiotics. Cultures are being followed. Patient was noted to have elevated INR, 6.4. Patient was given vitamin K and Inr corrected. Patient states she also noted black stools last several days. She had hemoglobin level of 7.1.  She required transfusions to correct her anemia. And patient requires continued monitoring for G.I. Bleed. patient underwent bilateral thoracentesis 2/9/18 for pleural effusions. Patient continued on Lasix for fluid overload and edema. Patient has started to participate in therapies with acute PT, OT. She shows significant generalized weakness, poor activity tolerance limiting her mobility, ambulation and ADLs to below her baseline. PT notes impaired balance, very slow, unsafe to mobilize alone. She is managing her gait short distance with min A.    Physical therapy was initiated and patient was starting to mobilize. However, she shows significant functional deficits due to prolonged immobility, hospitalization, still evidence of volume overload/ CHF. Our service was consulted for rehab needs and we recommended inpatient hospital rehabilitation is reasonable and necessary due to ongoing acute medical issues which have not changed since initial pre-admission evaluation. and rehab needs still likely best managed in IRU setting. The patient was evaluated by [facility] admissions coordinators. I reviewed the preadmission screening and have approved for admission to the [facility]. The patient was cleared for transfer to rehab today. Patient continues to have ongoing  medical issues outlined above requiring physician medical supervision and functional deficits which would benefit from continued intensive therapies. Patient was admitted to acute medical rehab unit continued on medical management for chronic atrial fib, chronic COPD, hypoxia status post TAVR and chronic diastolic heart failure. Patient was resumed on diuretics adjusted for clinical condition. Patient initially manager on IV Lasix, spirnolactone with some clinical improvement. However patient continues to demonstrate hypercapnia, continued pleural effusions and clinical evidence of heart failure. Admission ABG shows significant hypercapnia.  Pulmonary was reconsulted and she has received bilateral thoracentesis today with significant fluid removal. Patient has required BiPAP during the time she is not at therapy are performing activities. Patient has had complications with slowly declining hemoglobin. GI  was consulted and recommended continued conservative management as endoscopy is high-risk in the benefit in this patient at this time. Patient has been transfused total of three units of PRBC. Hemoglobin level needs continued to be monitoring. Eliquis has been discontinued per cardiology due to continued decline in hemoglobin. Patient will be transferred to the medical floor under pulmonary care as patient will not be able to continue to perform, tolerate acute medical rehab program due to continued complications due to respiratory failure and heart failure. Rehabiitation Course:   Functional  Level On Admission: Walk: Minimal Assist  Functional Level At Discharge: Walk: Contact Guard Assist  Home Architecture: Home Situation  Home Environment: Private residence (02/14/18 1000)  # Steps to Enter: 1 (02/14/18 1000)  Rails to Enter: No (02/14/18 1000)  Wheelchair Ramp: No (02/14/18 0836)  One/Two Story Residence: One story (02/14/18 1000)  Living Alone: No (02/14/18 1000)  Support Systems: Child(brit) (Son is primary care giver,) (02/14/18 1000)  Patient Expects to be Discharged to[de-identified] Private residence (02/14/18 1000)  Current DME Used/Available at Home: Grab bars; Shower chair;Walker, rollator (02/14/18 1000)  Tub or Shower Type:  Other (comment) (03/06/18 2481)     Past Medical History:   Diagnosis Date    Abnormal glucose 8/5/2016    Abscess 8/5/2016    Acute encephalopathy 7/8/2016    Acute renal failure ([address] 12/3/2009    Afib ([address]     Anemia     Ankle swelling 8/5/2016    Anxiety 8/5/2016    Aortic Valve Bioprosthesis Present 3/7/2009    ARF (acute renal failure) ([address] 2/24/2010    Arthritis     Atopic dermatitis 8/5/2016    Atrial fibrillation ([address] 3/7/2009    Autonomic orthostatic hypotension 8/5/2016    AV block 10/17/2011    Back pain 8/5/2016    Bradycardia (Symptomatic) 11/7/2011    CAD (coronary artery disease)     Cancer (HCC) 1990    breast (left)    Cardiac pacemaker 11/2/2015    Cardiogenic shock (HCC) 10/17/2011    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    Cellulitis 8/5/2016    Chest pain 8/5/2016    Chronic atrial fibrillation (HCC) 10/17/2011    Chronic depression 8/5/2016    Chronic obstructive pulmonary disease ([address] 3/8/2009    Chronic pain     CKD (chronic kidney disease) stage 3, GFR 30-59 ml/min 12/4/2009    Congestive heart failure (CHF) ([address] 11/2/2015    Degeneration of cervical intervertebral disc 8/5/2016    Degenerative arthritis of left knee 2/27/2009    Dehydration 3/2/4192    Diastolic heart failure (HCC)     Digoxin toxicity 2/24/2010    Disorder of sweat glands 8/5/2016    Diverticulosis of large intestine without diverticulitis 2015    Dyspnea 8/5/2016    Eczema 8/5/2016    Embolus of femoral artery ([address] 12/4/2017    Epigastric abdominal pain 2/24/2010    GERD (gastroesophageal reflux disease)     Gout 8/5/2016    H/O mitral valve repair, 2003 6/27/2016    Heart failure ([address]     Hx of colonic polyp 2015    adenoma    Hyperkalemia 10/17/2011    Hyperlipidemia 8/5/2016    Hypertension     Hypokalemia 2/24/2010    Hypothyroidism 12/4/2009    Ill-defined condition     CARPEL TUNNEL SYNDROME, BILAT HANDS    Knee pain 8/5/2016    Leg cramps 8/5/2016    Mitral stenosis with insufficiency 11/2/2015    Prior MV repair 2003.      Nausea and vomiting 2/24/2010    Obesity 11/2/2015    BOBBY (obstructive sleep apnea) 12/4/2009    Osteoarthritis 8/5/2016    Osteopenia 8/5/2016    Other long term (current) drug therapy 8/5/2016    Palpitations 11/2/2015    Rash 8/5/2016    Rectal bleeding 10/27/2011    Rectocele 2015    Recurrent depression ([address] 1/4/2018    Rheumatic aortic stenosis 2015    Right hip pain 2016    RLS (restless legs syndrome) 2016    Sick sinus syndrome ([facility] 2016    Skin infection 2016    Tachycardia 2016    Thrombocytopenia, unspecified 2012    Thromboembolus ([facility]     2017    Urticaria 2016    Vertigo 2016      Past Surgical History:   Procedure Laterality Date    CARDIAC SURG PROCEDURE UNLIST      valve repair and replacement    CHEST SURGERY PROCEDURE UNLISTED      HX APPENDECTOMY      HX BREAST RECONSTRUCTION      HX CHOLECYSTECTOMY      HX GYN  1981    hysterectomy still have ovaries    HX MASTECTOMY      left mastectomy    HX ORTHOPAEDIC      knee surgery    HX PACEMAKER      VASCULAR SURGERY PROCEDURE UNLIST Right 2017    LE thrombectomy      Family History   Problem Relation Age of Onset    Heart Disease Mother     Cancer Father      Throat    Heart Disease Father     Cancer Sister      Breast, Colon    Heart Disease Sister     Breast Cancer Sister 79      from disease    Cancer Maternal Grandmother     Cancer Brother     Diabetes Brother     Heart Disease Brother     Psychiatric Disorder Paternal Grandfather     Cancer Maternal Aunt      Breast    Diabetes Son       Social History   Substance Use Topics    Smoking status: Former Smoker     Packs/day: 3.00     Years: 15.00     Types: Cigarettes     Quit date: 1996    Smokeless tobacco: Former User      Comment: quit     Alcohol use No       Allergies   Allergen Reactions    Adhesive Tape Rash    Benadryl [Diphenhydramine Hcl] Other (comments)     Jitters      Demerol [Meperidine] Anxiety    Morphine Other (comments)     Confusion    Sulfa (Sulfonamide Antibiotics) Anxiety    Lorazepam Anxiety       Prior to Admission medications    Medication Sig Start Date End Date Taking? Authorizing Provider   metoprolol succinate (TOPROL-XL) 25 mg XL tablet Take 1 Tab by mouth daily.  18  Yes Lillie PUGA Jonnie Granados NP   apixaban (ELIQUIS) 5 mg tablet Take 1 Tab by mouth every twelve (12) hours. 2/13/18  Yes Rosario Trujillo NP   furosemide (LASIX) 80 mg tablet Take 1 Tab by mouth every twelve (12) hours. 2/13/18  Yes Rosario Trujillo NP   rOPINIRole (REQUIP) 2 mg tablet Take  by mouth two (2) times a day. Yes Historical Provider   pravastatin (PRAVACHOL) 40 mg tablet Take 1 Tab by mouth daily. Indications: hyperlipidemia 1/4/18  Yes Roland Ngo MD   sertraline (ZOLOFT) 100 mg tablet Take 1 Tab by mouth daily. 1/4/18  Yes Roland Ngo MD   diazePAM (VALIUM) 5 mg tablet Take 1 Tab by mouth daily. Max Daily Amount: 5 mg. 1/4/18  Yes Roland Ngo MD   spironolactone (ALDACTONE) 25 mg tablet Take 1 Tab by mouth daily. 9/11/17  Yes Roland Ngo MD   levothyroxine (SYNTHROID) 88 mcg tablet Take 1 Tab by mouth Daily (before breakfast). 9/11/17  Yes Roland Ngo MD   allopurinol (ZYLOPRIM) 100 mg tablet Take 1 Tab by mouth two (2) times a day. 6/19/17  Yes Foster Sullivan MD   polyethylene glycol (MIRALAX) 17 gram/dose powder Take 17 g by mouth daily. Yes Historical Provider   OXYGEN-AIR DELIVERY SYSTEMS by Does Not Apply route. 2-2.5 lpm cont. Yes Historical Provider   omeprazole (PRILOSEC) 20 mg capsule TAKE 1 CAPSULE BY MOUTH  DAILY 12/11/17   Roland Ngo MD   fluticasone (FLONASE) 50 mcg/actuation nasal spray 1 [place] by Both Nostrils route daily. Historical Provider   azelastine (ASTELIN) 137 mcg (0.1 %) nasal spray 1 [place] by Both Nostrils route two (2) times a day. Use in each nostril as directed 4/20/17   Roland Ngo MD   loratadine (CLARITIN) 10 mg tablet Take 10 mg by mouth as needed. Historical Provider   glycerin, adult, (SUPPOSITORY ADULT) suppository Insert 1 Suppository into rectum as needed.     Historical Provider   promethazine (PHENERGAN) 25 mg tablet Take 1 Tab by mouth every six (6) hours as needed. 4/3/17   Jeanette Garcia MD   levalbuterol (XOPENEX) 1.25 mg/3 mL nebu 1.25 mg by Nebulization route. Historical Provider   hydrocortisone (ANUSOL-HC) 2.5 % rectal cream Apply small amount to hemorrhoids/perianal skin TID PRN 1/17/17   Murray Hahn MD   calcium carbonate (CALTREX) 600 mg (1,500 mg) tablet Take 600 mg by mouth nightly. Historical Provider   cholecalciferol (VITAMIN D3) 1,000 unit cap Take  by mouth daily. Historical Provider   CARBOXYMETHYLCELLULOS/GLYCERIN (REFRESH OPTIVE OP) Apply  to eye. Historical Provider   DOCUSATE SODIUM Take 1 Cap by mouth two (2) times a day. Historical Provider   cyanocobalamin (VITAMIN B-12) 250 mcg tablet Take 1,000 mcg by mouth daily. Quita Shipman MD   nitroglycerin (NITROSTAT) 0.4 mg SL tablet by SubLINGual route every five (5) minutes as needed for Chest Pain.     Quita Shipman MD       Current Medications:  Current Facility-Administered Medications   Medication Dose Route Frequency Provider Last Rate Last Dose    acetaZOLAMIDE (DIAMOX) tablet 250 mg  250 mg Oral DAILY Aurelia Ochoa NP        ciprofloxacin HCl (CIPRO) tablet 500 mg  500 mg Oral Q12H Aurelia Ochoa NP   500 mg at 03/08/18 8332    diazePAM (VALIUM) tablet 2 mg  2 mg Oral QHS PRN Dany Carter MD   2 mg at 03/07/18 2103    alum-mag hydroxide-simeth (MYLANTA) oral suspension 30 mL  30 mL Oral Q4H PRN Dany Carter MD   30 mL at 03/03/18 1740    aspirin delayed-release tablet 81 mg  81 mg Oral DAILY Poli Rivera MD   81 mg at 03/08/18 2565    0.9% sodium chloride infusion 250 mL  250 mL IntraVENous PRN Mercedez Sawyer MD        furosemide (LASIX) tablet 80 mg  80 mg Oral DAILY Poli Rivera MD   80 mg at 03/08/18 5837    potassium chloride (K-DUR, KLOR-CON) SR tablet 40 mEq  40 mEq Oral DAILY Poli Rivera MD   40 mEq at 03/08/18 0807    magnesium oxide (MAG-OX) tablet 400 mg  400 mg Oral DAILY Dany Carter MD   400 mg at 03/08/18 1557  acetaminophen (TYLENOL) tablet 650 mg  650 mg Oral Q4H PRN Devi Layton MD   650 mg at 03/07/18 2103    alcohol 62% (NOZIN) nasal  1 Ampule  1 Ampule Topical Q12H Devi Layton MD   1 Ampule at 03/08/18 0810    allopurinol (ZYLOPRIM) tablet 100 mg  100 mg Oral BID Devi Layton MD   100 mg at 03/08/18 0807    hydrocortisone (ANUSOL-HC) 2.5 % rectal cream   PeriANAL TID PRN Devi Layton MD        levothyroxine (SYNTHROID) tablet 88 mcg  88 mcg Oral ACB Devi Layton MD   88 mcg at 03/08/18 0519    metoprolol succinate (TOPROL-XL) XL tablet 25 mg  25 mg Oral DAILY Devi Layton MD   Stopped at 03/07/18 0900    pantoprazole (PROTONIX) tablet 40 mg  40 mg Oral ACB Devi Layton MD   40 mg at 03/08/18 0519    polyethylene glycol (MIRALAX) packet 17 g  17 g Oral DAILY Devi Layton MD   Stopped at 03/08/18 5793    pravastatin (PRAVACHOL) tablet 40 mg  40 mg Oral DAILY Devi Layton MD   40 mg at 03/08/18 0807    rOPINIRole (REQUIP) tablet 2 mg  2 mg Oral BID Devi Layton MD   2 mg at 03/08/18 9080    sertraline (ZOLOFT) tablet 100 mg  100 mg Oral DAILY Devi Layton MD   100 mg at 03/08/18 0808    levalbuterol (XOPENEX) nebulizer soln 0.63 mg/3 mL  0.63 mg Nebulization Q6H PRN Devi Layton MD            Review of Systems: Denies chest pain, shortness of breath, cough, headache, visual problems, abdominal pain, dysurea, calf pain. Pertinent positives are as noted in the medical records and unremarkable otherwise. Vital Signs: Patient Vitals for the past 8 hrs:   BP Temp Pulse Resp SpO2   03/08/18 0731 95/64 97.6 °F (36.4 °C) 82 20 99 %        Physical Exam:   General: Alert and age appropriately oriented.  Appears comfortable on 2L O2 NC. No acute cardio respiratory distress. mild lethargy. HEENT: Normocephalic,no scleral icterus  Oral mucosa moist without cyanosis, No bruit, mild +JVD. Lungs: + few r  basilar crackles. No wheezing.    Respiration even and unlabored   Heart: RRR,  II/VI TERRI  No clicks, rub or gallops   Abdomen: Soft, non-tender, nondistended. Bowel sounds present. Genitourinary: defered   Neuromuscular:      PERRL, EOMI  Follows simple commands consistently. Able to identify, recall repeat. Mood appropriate. Insight, judgement intact. LUE     Shoulder abduction   4/5              Elbow flexion:   4/5               Wrist extension:  4+ /5              Finger flexion;  4 /5  RUE    Shoulder abduction:4 /5                Elbow flexion:  4 /5                         Wrist extension: 5- /5                        Finger flexion:  5- /5  LLE     Hip flexion:  3+ /5              Knee extension:  4 -/5                         Ankle dorsiflexion:  5 -/5                        Ankle plantarflexion:   5/5                                                                  RLE     Hip flexion:   3+/5                        Knee extension:  4- /5                         Ankle dorsiflexion:  5- /5                        Ankle plantarflexion:  5 /5  Sensory - intact grossly   No cerebellar signs. Plantars - down going  No atrophy, no fasciculations, no tremors. Skin/extremity: No rashes, no erythema. Calf non tender BLE. 2+ BLE edema. + chronic arthritic joint changes.  Mild BUE edema.           Skin Incision(s)/Wound(s):        Lab Review:   Recent Results (from the past 72 hour(s))   HGB & HCT    Collection Time: 03/06/18  6:11 AM   Result Value Ref Range    HGB 9.1 (L) 11.7 - 15.4 g/dL    HCT 31.7 (L) 35.8 - 93.3 %   METABOLIC PANEL, BASIC    Collection Time: 03/06/18  6:11 AM   Result Value Ref Range    Sodium 151 (H) 136 - 145 mmol/L    Potassium 4.3 3.5 - 5.1 mmol/L    Chloride 99 98 - 107 mmol/L    CO2 >45 (HH) 21 - 32 mmol/L    Anion gap Cannot be calculated 7 - 16 mmol/L    Glucose 104 (H) 65 - 100 mg/dL    BUN 57 (H) 8 - 23 MG/DL    Creatinine 1.15 (H) 0.6 - 1.0 MG/DL    GFR est AA 59 (L) >60 ml/min/1.73m2    GFR est non-AA 49 (L) >60 ml/min/1.73m2    Calcium 9.0 8.3 - 10.4 MG/DL BNP    Collection Time: 03/06/18  6:11 AM   Result Value Ref Range     pg/mL   BLOOD GAS, ARTERIAL    Collection Time: 03/06/18  1:25 PM   Result Value Ref Range    pH 7.43 7.35 - 7.45      PCO2 81 (H) 35 - 45 mmHg    PO2 89 80 - 105 mmHg    BICARBONATE 52 (H) 22 - 26 mmol/L    BASE EXCESS 24.1 (H) 0 - 3 mmol/L    TOTAL HEMOGLOBIN 10.0 (L) 11.7 - 15.0 GM/DL    O2 SAT 98 92 - 98.5 %    Arterial O2 Hgb 95.8 94 - 97 %    CARBOXYHEMOGLOBIN 1.8 (H) 0.5 - 1.5 %    METHEMOGLOBIN 0.1 0.0 - 1.5 %    DEOXYHEMOGLOBIN 2 0.0 - 5.0 %    SITE RB     ALLENS TEST NA     MODE NC     O2 FLOW 2.00 L/min    Respiratory comment: Dr Jesse Durant at 3 6 2018 1 31 57 PM. Read back. BLOOD GAS, ARTERIAL    Collection Time: 03/07/18  4:40 AM   Result Value Ref Range    pH 7.50 (H) 7.35 - 7.45      PCO2 66 (H) 35 - 45 mmHg    PO2 83 80 - 105 mmHg    BICARBONATE 51 (H) 22 - 26 mmol/L    BASE EXCESS 24.3 (H) 0 - 3 mmol/L    TOTAL HEMOGLOBIN 9.3 (L) 11.7 - 15.0 GM/DL    O2 SAT 96 92 - 98.5 %    Arterial O2 Hgb 94.5 94 - 97 %    CARBOXYHEMOGLOBIN 1.5 0.5 - 1.5 %    METHEMOGLOBIN 0.5 0.0 - 1.5 %    DEOXYHEMOGLOBIN 4 0.0 - 5.0 %    SITE LR     ALLENS TEST POSITIVE      MODE BIPAP 14 6     RATE 12.0      Respiratory comment: AMOS Shirley at 3 7 2018 4 49 56 AM. Read back. CBC WITH AUTOMATED DIFF    Collection Time: 03/08/18  7:15 AM   Result Value Ref Range    WBC 3.6 (L) 4.3 - 11.1 K/uL    RBC 2.87 (L) 4.05 - 5.25 M/uL    HGB 8.5 (L) 11.7 - 15.4 g/dL    HCT 28.8 (L) 35.8 - 46.3 %    .3 (H) 79.6 - 97.8 FL    MCH 29.6 26.1 - 32.9 PG    MCHC 29.5 (L) 31.4 - 35.0 g/dL    RDW 17.4 (H) 11.9 - 14.6 %    PLATELET 89 (L) 217 - 450 K/uL    MPV 11.5 10.8 - 14.1 FL    DF AUTOMATED      NEUTROPHILS 76 43 - 78 %    LYMPHOCYTES 15 13 - 44 %    MONOCYTES 3 (L) 4.0 - 12.0 %    EOSINOPHILS 5 0.5 - 7.8 %    BASOPHILS 1 0.0 - 2.0 %    IMMATURE GRANULOCYTES 0 0.0 - 5.0 %    ABS. NEUTROPHILS 2.8 1.7 - 8.2 K/UL    ABS. LYMPHOCYTES 0.5 0.5 - 4.6 K/UL    ABS. MONOCYTES 0.1 0.1 - 1.3 K/UL    ABS. EOSINOPHILS 0.2 0.0 - 0.8 K/UL    ABS. BASOPHILS 0.0 0.0 - 0.2 K/UL    ABS. IMM. GRANS. 0.0 0.0 - 0.5 K/UL   METABOLIC PANEL, BASIC    Collection Time: 03/08/18  7:15 AM   Result Value Ref Range    Sodium 149 (H) 136 - 145 mmol/L    Potassium 3.7 3.5 - 5.1 mmol/L    Chloride 100 98 - 107 mmol/L    CO2 >45 (HH) 21 - 32 mmol/L    Anion gap Cannot be calculated 7 - 16 mmol/L    Glucose 82 65 - 100 mg/dL    BUN 47 (H) 8 - 23 MG/DL    Creatinine 1.31 (H) 0.6 - 1.0 MG/DL    GFR est AA 51 (L) >60 ml/min/1.73m2    GFR est non-AA 42 (L) >60 ml/min/1.73m2    Calcium 8.0 (L) 8.3 - 10.4 MG/DL   BLOOD GAS, ARTERIAL    Collection Time: 03/08/18  2:08 PM   Result Value Ref Range    pH 7.40 7.35 - 7.45      PCO2 70 (H) 35 - 45 mmHg    PO2 93 80 - 105 mmHg    BICARBONATE 42 (H) 22 - 26 mmol/L    BASE EXCESS 14.7 (H) 0 - 3 mmol/L    TOTAL HEMOGLOBIN 9.8 (L) 11.7 - 15.0 GM/DL    O2 SAT 97 92 - 98.5 %    Arterial O2 Hgb 94.6 94 - 97 %    CARBOXYHEMOGLOBIN 1.5 0.5 - 1.5 %    METHEMOGLOBIN 0.5 0.0 - 1.5 %    DEOXYHEMOGLOBIN 3 0.0 - 5.0 %    SITE RB     ALLENS TEST NA     MODE NC     O2 FLOW 1.00 L/min    Respiratory comment: Dr. Pace Mode at 3 8 2018 2 11 11 PM. Read back.         PT Initial  PT Most Recent   AROM:  (LE decreased secondary to weakness and edema) (02/21/18 1300) Generally decreased, functional (03/07/18 1600)   PROM:  (Decreased secondary to LE edema ) (02/21/18 1300) Generally decreased, functional (03/07/18 1600)   Strength:  (bilateral hip -3/5 to +2/5,knee 4/5, ankle 4/5) (02/21/18 1300) Generally decreased, functional (knee and ankle 4/5 to +4/5, hip -3/5 to +3/5) (03/07/18 1600)   Coordination:  (Impaired) (02/21/18 1300) Generally decreased, functional (03/07/18 1600)   Tone: Normal (02/21/18 1300) Normal (03/07/18 1600)   Sensation: Intact (02/21/18 1300) Intact (03/07/18 1600)   Amount of Assistance: 4 (Contact guard assistance) (02/14/18 1500) 5 (Stand-by assistance) (03/08/18 0900)   Distance (ft): 40 Feet (ft) (40ft x 1  30ft x 1) (02/14/18 1000) 45 Feet (ft) (45 ft x 3 with 3 to 4 min rest breaks between attempts) (03/08/18 0900)   Assistive Device: [place], rollator;Gait belt (02/14/18 1000) Kath[place], rollator (03/08/18 0900)       OT Initial OT Most Recent   Feeding/Eating Assistance: 7 (Independent) (02/15/18 0753) 4 (Minimal assistance) (02/27/18 0830)   Grooming Assistance : 5 (Supervision) (02/15/18 0753) 3 (Moderate assistance ) (03/08/18 0701)   Bathing Assistance, Upper: 5 (Supervision) (02/15/18 0753) 5 (Supervision) (03/08/18 0701)   Bathing Assistance, Lower : 4 (Contact guard assistance) (02/15/18 0753) 4 (Minimal assistance) (03/08/18 0701)   Toileting Assistance (FIM Score): 3 (Moderate assistance ) (02/15/18 0753) 3 (Moderate assistance ) (03/05/18 8634)   Dressing Assistance : 4 (Minimal assistance) (02/15/18 0753) 4 (Minimal assistance) (03/08/18 0701)   Dressing Assistance : 4 (Contact guard assistance) (02/15/18 0753) 2 (Maximal assistance) (03/08/18 0701)     ST Initial ST Most Recent                        Active Problems:    Chronic obstructive pulmonary disease ([facility] (3/8/2009)      Overview: PFTs Jan 2018            Chronic atrial fibrillation ([address] (10/17/2011)      Hypoxia (1/23/2018)      S/P TAVR (transcatheter aortic valve replacement) (1/30/2018)      Chronic diastolic heart failure ([address] (2/7/2018)      Acute on chronic respiratory failure with hypercapnia (HCC) (3/6/2018)      Pleural effusion, bilateral (3/6/2018)      E. coli UTI (3/8/2018)          Condition on discharge from [facility] to pulmonary inpt. :  good  Rehabilitation  potential : fair       Discharge Instructions:  Acute respiratory failure (HCC) (2/7/2018)/ Pleural effusion (1/23/2018)/ S/P bilateral thoracentesis  S/p - Thoracenteses on 2/9 - 550 mls removed from the left and 1 liter removed from the right. - continue BipAP. ABG showing hypercapnea, respiratory failure, alkalosis.  discussed care plans with son, pulmonary. - s/p repeat thoracentesis 3/8. Continued need for BiPAP. UTI - - 3/5 abnormal UA, symptomatic.    - 3/6 Cx show e.coli, cipro sensitive. Continue cipro ( 3/5-).     S/P TAVR(1/30/2018)/ Acute on chronic diastolic heart failure Pulmonary hypertension / Chronic atrial fibrillation/ HTN   - cardiac precaution, s.p TAVR. - weights and maintain a 1.8 liter per day fluid restriction.   - Monitor HR/BP. conitinue Eliquis for a.fib  - continue BB-Toprol XL  - edema, chest exam appears not changed. continued on diuretics. continue O2 supplements. - continue lasix. , aldactone.        Hypothyroidism - synthroid 88 mcg        Acute blood loss anemia/ GI bleed? -monitor clinically. May have been caused by high INR. Steady decline in hgb since again admission to [facility]. Has received 3 umits total since admission to [facility]. - no melena.    , FOBT +   -hgb 7,5. Transfuse 2 units  2/28. Continue PPI.  Use the Anusol prn for hemmorhoids. - GI recommendaiton to be followed. continue PPI. hemmorrhoidal bleeding to be treated with anusol. Transfuse prn. Monitor hgb. Monitor for s/s of acute bleed. - Slow GIB? conitnue monitor.    - hgb slowly declining again.         DVT risk / DVT Prophylaxis- continue daily physician exam to assess for signs and symptoms as patient is at increased risk for of thromboembolism  - no s/s of DVT. Will need for SCDs as pt off eliquis altogther per cardiology. - continue SCDs. No s/s of DVT.        Wound Care: - monitor for ulcers, skin breakdown due to edema.  -  TEDs. Fitting better. Edema control.           Potential urinary retention - no s/s of retention. Monitor.  - pt mostly continent.       bowel program - as needed dulcolax, pericolace. + BM.     GERD/ GI prophylaxis - resume PPI. At times may need additional antacids, Maalox prn.          Follow up with pulmonary, cardiology, GI per instructions.          Transfer Medications: acetaZOLAMIDE (DIAMOX) tablet 250 mg   Route: Take 1 Tab by mouth daily. - Oral          polyethylene glycol (MIRALAX) packet 17 g  Route: Take 1 Packet by mouth daily. - Oral               Current Discharge Medication List      CONTINUE these medications which have NOT CHANGED    Details   metoprolol succinate (TOPROL-XL) 25 mg XL tablet Take 1 Tab by mouth daily. apixaban (ELIQUIS) 5 mg tablet HOLD. furosemide (LASIX) 80 mg tablet Take 1 Tab by mouth daily         rOPINIRole (REQUIP) 2 mg tablet Take  by mouth two (2) times a day. pravastatin (PRAVACHOL) 40 mg tablet Take 1 Tab by mouth daily. Indications: hyperlipidemia       Associated Diagnoses: Hyperlipidemia, unspecified hyperlipidemia type      sertraline (ZOLOFT) 100 mg tablet Take 1 Tab by mouth daily. Associated Diagnoses: CALI (generalized anxiety disorder)      diazePAM (VALIUM) 2.5 mg tablet Take 1 Tab by mouth nightly as needed. Associated Diagnoses: CALI (generalized anxiety disorder)      spironolactone (ALDACTONE) 25 mg tablet Take 1 Tab by mouth daily. levothyroxine (SYNTHROID) 88 mcg tablet Take 1 Tab by mouth Daily (before breakfast). Associated Diagnoses: Acquired hypothyroidism      allopurinol (ZYLOPRIM) 100 mg tablet Take 1 Tab by mouth two (2) times a day. polyethylene glycol (MIRALAX) 17 gram/dose powder Take 17 g by mouth daily. OXYGEN-AIR DELIVERY SYSTEMS by Does Not Apply route. 2-2.5 lpm cont. Associated Diagnoses: Anemia; Atrial fibrillation (HCC)      omeprazole (PRILOSEC) 20 mg capsule TAKE 1 CAPSULE BY MOUTH  DAILY       Associated Diagnoses: Gastroesophageal reflux disease without esophagitis      fluticasone (FLONASE) 50 mcg/actuation nasal spray 1 Dafter by Both Nostrils route daily. azelastine (ASTELIN) 137 mcg (0.1 %) nasal spray 1 Dafter by Both Nostrils route two (2) times a day as needed.  Use in each nostril as directed       Associated Diagnoses: Eustachian tube dysfunction, bilateral      loratadine (CLARITIN) 10 mg tablet Take 10 mg by mouth as needed. glycerin, adult, (SUPPOSITORY ADULT) suppository Insert 1 Suppository into rectum as needed. levalbuterol (XOPENEX) 1.25 mg/3 mL nebu 1.25 mg by Nebulization route as needed      hydrocortisone (ANUSOL-HC) 2.5 % rectal cream Apply small amount to hemorrhoids/perianal skin TID PRN         calcium carbonate (CALTREX) 600 mg (1,500 mg) tablet Take 600 mg by mouth nightly. cholecalciferol (VITAMIN D3) 1,000 unit cap Take  by mouth daily. CARBOXYMETHYLCELLULOS/GLYCERIN (REFRESH OPTIVE OP) Apply  to eye. DOCUSATE SODIUM Take 1 Cap by mouth two (2) times a day. cyanocobalamin (VITAMIN B-12) 250 mcg tablet Take 1,000 mcg by mouth daily. nitroglycerin (NITROSTAT) 0.4 mg SL tablet by SubLINGual route every five (5) minutes as needed for Chest Pain. O.K. Admit to sub acute rehab today. Discharge time: 35 minutes.     Signed By: Seng Garibay MD     March 8, 2018

## 2018-03-08 NOTE — CONSULTS
Palliative Care    Patient: Jarad Burnett MRN: [id number]  SSN: [id number]    YOB: 1940  Age: 69 y.o. Sex: female       Date of Request: 3/8/2018  Date of Consult:  3/8/2018  Reason for Consult:  goals of care  Requesting Physician: Ethan Solomon NP     Assessment/Plan:     Principal Diagnosis:    Dyspnea  R06.00    Additional Diagnoses:   · Debility, Unspecified  R53.81  · Fatigue, Lethargy  R53.83  · Frailty  R54  · Counseling, Encounter for Medical Advice  Z71.9  · Encounter for Palliative Care  Z51.5    Palliative Performance Scale (PPS):  PPS: 50    Medical Decision Making:   Reviewed and summarized notes from admission to present, as well as previous PC note  Discussed case with appropriate providers  Reviewed laboratory and x-ray data from admission to present     Pt sitting in chair, no distress noted. No family at bedside. Reviewed recent events and her stay at [facility]. Pt does not feel she is making much progress, mainly due to her dyspnea. Counseled on the reasons for her dyspnea, and possible thoracentesis today. Cardiology consult is also pending. She voiced understanding of transfer back to the hospital for ongoing care. Will await cardiology input before discussing goals of care. Will continue to follow. Will discuss findings with members of the interdisciplinary team.      Thank you for this referral.          .    Subjective:     History obtained from:  Patient and Chart    Chief Complaint: Dyspnea, pleural effusions   History of Present Illness:  Ms Karen Li is a 68 yo  female with PMH of atrial fibrillation, aortic valve disease s/p TAVR, CHF, CAD, CKD, HTN, HLD, and other conditions listed below, who originally presented to the ER from home on 2/7/2018 with c/o increasing dyspnea and LE edema for several days. She had a TAVR during the previous hospitalization and was discharged home on 2/3/2018.   Work up revealed cardiomegaly and pulmonary edema on CXR, as well as bilateral pleural effusions. She had bilateral thoracentesis on 2/9, and was diuresed with Lasix. She was discharged to [facility] on 2/13/2018, where she has been receiving rehab. She has had ongoing edema and dyspnea. Pulmonology was consulted today for possible transfer back to the hospital and thoracentesis. Pt endorses ongoing dyspnea. Advance Directive: No       Code Status:  Full Code            Health Care Power of : No - Patient does not have a [address]     Past Medical History:   Diagnosis Date    Abnormal glucose 8/5/2016    Abscess 8/5/2016    Acute encephalopathy 7/8/2016    Acute renal failure ([address] 12/3/2009    Afib ([address]     Anemia     Ankle swelling 8/5/2016    Anxiety 8/5/2016    Aortic Valve Bioprosthesis Present 3/7/2009    [facility]F [facility]acute renal failure) [facility]) 2/24/2010    Arthritis     Atopic dermatitis 8/5/2016    Atrial fibrillation ([facility]) 3/7/2009    Autonomic orthostatic hypotension 8/5/2016    AV block 10/17/2011    Back pain 8/5/2016    Bradycardia (Symptomatic) 11/7/2011    CAD (coronary artery disease)     Cancer ([facility]) 1990    breast (left)    Cardiac pacemaker 11/2/2015    Cardiogenic shock ([address] 10/17/2011    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    Cellulitis 8/5/2016    Chest pain 8/5/2016    Chronic atrial fibrillation ([address] 10/17/2011    Chronic depression 8/5/2016    Chronic obstructive pulmonary disease ([address] 3/8/2009    Chronic pain     CKD (chronic kidney disease) stage 3, GFR 30-59 ml/min 12/4/2009    Congestive heart failure (CHF) ([address] 11/2/2015    Degeneration of cervical intervertebral disc 8/5/2016    Degenerative arthritis of left knee 2/27/2009    Dehydration 2/3/7478    Diastolic heart failure (HCC)     Digoxin toxicity 2/24/2010    Disorder of sweat glands 8/5/2016    Diverticulosis of large intestine without diverticulitis 2015    Dyspnea 8/5/2016    Eczema 8/5/2016    Embolus of femoral artery ([address] 2017    Epigastric abdominal pain 2010    GERD (gastroesophageal reflux disease)     Gout 2016    H/O mitral valve repair, 2016    Heart failure ([address]     Hx of colonic polyp     adenoma    Hyperkalemia 10/17/2011    Hyperlipidemia 2016    Hypertension     Hypokalemia 2010    Hypothyroidism 2009    Ill-defined condition     CARPEL TUNNEL SYNDROME, BILAT HANDS    Knee pain 2016    Leg cramps 2016    Mitral stenosis with insufficiency 2015    Prior MV repair .      Nausea and vomiting 2010    Obesity 2015    BOBBY (obstructive sleep apnea) 2009    Osteoarthritis 2016    Osteopenia 2016    Other long term (current) drug therapy 2016    Palpitations 2015    Rash 2016    Rectal bleeding 10/27/2011    Rectocele 2015    Recurrent depression ([address] 2018    Rheumatic aortic stenosis 2015    Right hip pain 2016    RLS (restless legs syndrome) 2016    Sick sinus syndrome ([address] 2016    Skin infection 2016    Tachycardia 2016    Thrombocytopenia, unspecified 2012    Thromboembolus ([address]     2017    Urticaria 2016    Vertigo 2016      Past Surgical History:   Procedure Laterality Date    CARDIAC SURG PROCEDURE UNLIST      valve repair and replacement    CHEST SURGERY PROCEDURE UNLISTED      HX APPENDECTOMY      HX BREAST RECONSTRUCTION      HX CHOLECYSTECTOMY      HX GYN  1981    hysterectomy still have ovaries    HX MASTECTOMY      left mastectomy    HX ORTHOPAEDIC      knee surgery    HX PACEMAKER      VASCULAR SURGERY PROCEDURE UNLIST Right 2017    LE thrombectomy     Family History   Problem Relation Age of Onset    Heart Disease Mother     Cancer Father      Throat    Heart Disease Father     Cancer Sister      Breast, Colon    Heart Disease Sister     Breast Cancer Sister 79      from disease  Cancer Maternal Grandmother     Cancer Brother     Diabetes Brother     Heart Disease Brother     Psychiatric Disorder Paternal Grandfather     Cancer Maternal Aunt      Breast    Diabetes Son       Social History   Substance Use Topics    Smoking status: Former Smoker     Packs/day: 3.00     Years: 15.00     Types: Cigarettes     Quit date: 6/27/1996    Smokeless tobacco: Former User      Comment: quit 1996    Alcohol use No     Prior to Admission medications    Medication Sig Start Date End Date Taking? Authorizing Provider   metoprolol succinate (TOPROL-XL) 25 mg XL tablet Take 1 Tab by mouth daily. 2/14/18  Yes Ana Orozco NP   apixaban (ELIQUIS) 5 mg tablet Take 1 Tab by mouth every twelve (12) hours. 2/13/18  Yes Ana Orozco NP   furosemide (LASIX) 80 mg tablet Take 1 Tab by mouth every twelve (12) hours. 2/13/18  Yes Ana Orozco NP   rOPINIRole (REQUIP) 2 mg tablet Take  by mouth two (2) times a day. Yes Historical Provider   pravastatin (PRAVACHOL) 40 mg tablet Take 1 Tab by mouth daily. Indications: hyperlipidemia 1/4/18  Yes Zita Ballard MD   sertraline (ZOLOFT) 100 mg tablet Take 1 Tab by mouth daily. 1/4/18  Yes Zita Ballard MD   diazePAM (VALIUM) 5 mg tablet Take 1 Tab by mouth daily. Max Daily Amount: 5 mg. 1/4/18  Yes Zita Ballard MD   spironolactone (ALDACTONE) 25 mg tablet Take 1 Tab by mouth daily. 9/11/17  Yes Zita Ballard MD   levothyroxine (SYNTHROID) 88 mcg tablet Take 1 Tab by mouth Daily (before breakfast). 9/11/17  Yes Zita Ballard MD   allopurinol (ZYLOPRIM) 100 mg tablet Take 1 Tab by mouth two (2) times a day. 6/19/17  Yes Samaria Thomas MD   polyethylene glycol (MIRALAX) 17 gram/dose powder Take 17 g by mouth daily. Yes Historical Provider   OXYGEN-AIR DELIVERY SYSTEMS by Does Not Apply route. 2-2.5 lpm cont.     Yes Historical Provider   omeprazole (PRILOSEC) 20 mg capsule TAKE 1 CAPSULE BY MOUTH  DAILY 12/11/17   Kate Dewey MD   fluticasone (FLONASE) 50 mcg/actuation nasal spray 1 [place] by Both Nostrils route daily. Historical Provider   azelastine (ASTELIN) 137 mcg (0.1 %) nasal spray 1 [place] by Both Nostrils route two (2) times a day. Use in each nostril as directed 4/20/17   Kate Dewey MD   loratadine (CLARITIN) 10 mg tablet Take 10 mg by mouth as needed. Historical Provider   glycerin, adult, (SUPPOSITORY ADULT) suppository Insert 1 Suppository into rectum as needed. Historical Provider   promethazine (PHENERGAN) 25 mg tablet Take 1 Tab by mouth every six (6) hours as needed. 4/3/17   Kate Dewey MD   levalbuterol (XOPENEX) 1.25 mg/3 mL nebu 1.25 mg by Nebulization route. Historical Provider   hydrocortisone (ANUSOL-HC) 2.5 % rectal cream Apply small amount to hemorrhoids/perianal skin TID PRN 1/17/17   Sung García MD   calcium carbonate (CALTREX) 600 mg (1,500 mg) tablet Take 600 mg by mouth nightly. Historical Provider   cholecalciferol (VITAMIN D3) 1,000 unit cap Take  by mouth daily. Historical Provider   CARBOXYMETHYLCELLULOS/GLYCERIN (REFRESH OPTIVE OP) Apply  to eye. Historical Provider   DOCUSATE SODIUM Take 1 Cap by mouth two (2) times a day. Historical Provider   cyanocobalamin (VITAMIN B-12) 250 mcg tablet Take 1,000 mcg by mouth daily. Quita Shipman MD   nitroglycerin (NITROSTAT) 0.4 mg SL tablet by SubLINGual route every five (5) minutes as needed for Chest Pain. Quita Shipman MD       Allergies   Allergen Reactions    Adhesive Tape Rash    Benadryl [Diphenhydramine Hcl] Other (comments)     Jitters      Demerol [Meperidine] Anxiety    Morphine Other (comments)     Confusion    Sulfa (Sulfonamide Antibiotics) Anxiety    Lorazepam Anxiety        Review of Systems:  A comprehensive review of systems was negative except for:   Constitutional: Positive for fatigue.   Respiratory: Positive for dyspnea      Objective:     Visit Vitals    BP 95/64    Pulse 82    Temp 97.6 °F (36.4 °C)    Resp 20    Wt 76.8 kg (169 lb 6.4 oz)    SpO2 99%    BMI 33.08 kg/m2        Physical Exam:    General:  Cooperative. Debilitated. No acute distress. Eyes:  Conjunctivae/corneas clear    Nose: Nares normal. Septum midline. O2 via NC    Neck: Supple, symmetrical, trachea midline   Lungs:   Decreased bilaterally, unlabored   Heart:  Regular rate and rhythm   Abdomen:   Soft, non-tender, non-distended   Extremities: Normal, atraumatic, no cyanosis.  BLE edema   Skin: Skin color, texture, turgor normal.    Neurologic: Nonfocal   Psych: Alert and oriented      Assessment:     Hospital Problems  Date Reviewed: 2/10/2018          Codes Class Noted POA    E. coli UTI ICD-10-CM: N39.0, B96.20  ICD-9-CM: 599.0, 041.49  3/8/2018 No        Acute on chronic respiratory failure with hypercapnia (HCC) ICD-10-CM: U71.85  ICD-9-CM: 518.84  3/6/2018 No        Pleural effusion, bilateral ICD-10-CM: J90  ICD-9-CM: 511.9  3/6/2018 Yes        Chronic diastolic heart failure (HCC) (Chronic) ICD-10-CM: I50.32  ICD-9-CM: 428.32  2/7/2018 Yes        S/P TAVR (transcatheter aortic valve replacement) ICD-10-CM: Z95.2  ICD-9-CM: V43.3  1/30/2018 Yes        Hypoxia ICD-10-CM: R09.02  ICD-9-CM: 799.02  1/23/2018 Yes        Chronic atrial fibrillation (HCC) (Chronic) ICD-10-CM: I48.2  ICD-9-CM: 427.31  10/17/2011 Yes        Chronic obstructive pulmonary disease ([address] (Chronic) ICD-10-CM: J44.9  ICD-9-CM: 496  3/8/2009 Yes    Overview Signed 3/6/2018  3:19 PM by Cierra Toscano NP     PFTs Jan 2018                     Signed By: Alva Sicard, NP     March 8, 2018

## 2018-03-08 NOTE — PROGRESS NOTES
Patient resting in bed with no complaints at this time. Patient is resting in bed with family at bedside for support. Call light within reach and patient/family instructed to call if assistance is needed.   Report to be given to oncoming RN 7p-7a

## 2018-03-08 NOTE — PROGRESS NOTES
OT Daily Note    Time In  9:18   Time Out  10:05     Subjective:\"I need to use the bathroom right now. \" Agreeable to therapy. Pain:not indicated during this session. Education:on the importance of visual scanning and verbalizing steps to aid in problem-solving and functional activities. Interdisciplinary Communication: Collaborated with Noemy Amos and patient is making slow progress towards goals. Notified RN, Fly Napier of patient voiding and small BM. Precautions:  (fall precautions)    Balance/functional mobility Daily Assessment   Pt completed SPT from w/c <> BSC using RW and with CGA and verbal cues for hand and feet placement. Pt completed toileting with max assist for clothing management up/down during stance and assist with hygiene. Pt completed SPT from w/c to recliner using RW with CGA. Cognition/FMC Daily Assessment   Pt completed numerical puzzle (1-10) while seated in w/c at table to improve overall cognition, visual scanning, bilateral coordination, and problem solving. Pt was able to complete 4/10 puzzle pieces without cues and 10/10 pieces with verbal cues. Pt with deficits in memory and following instructions to complete puzzle in numerical order. Encouraged pt to verbalize steps in completing puzzle. Assessment: Pt is making slow progress towards goals. Pt requires skilled therapy to address deficits in confusion, memory, shortness of breath, edema, UE strength, functional mobility, and activity tolerance  to improve independence in ADLs and safety needed for functional transfers. Ended session: in recliner with call bell and phone within reach.     Morena Martin, OT Student

## 2018-03-08 NOTE — INTERVAL H&P NOTE
H&P Update:  Jessica Quintana was seen and examined. History and physical has been reviewed. The patient has been examined. There have been no significant clinical changes since the completion of the originally dated progress note from earlier today.     Signed By: Aneesh Davalos MD     March 8, 2018 2:10 PM

## 2018-03-08 NOTE — PROGRESS NOTES
Adia Orozco  Admission Date: 2/13/2018             Daily Progress Note: 3/8/2018    The patient's chart is reviewed and the patient is discussed with the staff. Patient is a 69 y.o.  female seen and evaluated at the request of Dr. Khurram Rome.  Past medical history of recent TAVR, HTN, CAD, chronic AFIB, anemia, anxiety, and chronic diastolic heart failure. She underwent bilateral thoracentesis on Jan 25th (preop) removing 1200 mls from the right and 900 mls from the left.  She underwent the TAVR on Jan 30th.  She was discharged home on Feb 3 but presented to the ER on 2/6/18 via EMS with complaints of worsening swelling and shortness of breath.   Upon arrival to the ER, CXR shows cardiomegaly with finding consistent with pulmonary edema and worsening bilateral pleural effusions.  We saw her again and bilateral thoracenteses were repeated on 2/9 removing 550 mls from the left and 1000 mls from the right. She was later transferred to the 9th floor for rehab.      Since transfer, cardiology has followed and adjusted diuretic therapy. Phoebe Raymundo has been anemic and her stool was heme positive so the Eliquis was stopped. She has been more lethargic and her family noticed that she seemed more confused so an ABG was obtained which showed hypercapnea. She was started on bipap with improvement. Subjective:     Sitting up eating lunch. She reports feeling better but short of breath. Spoke with son on the phone. He noticed a remarkable difference in her mentation after first night on bipap.      Current Facility-Administered Medications   Medication Dose Route Frequency    ciprofloxacin HCl (CIPRO) tablet 500 mg  500 mg Oral Q12H    diazePAM (VALIUM) tablet 2 mg  2 mg Oral QHS PRN    alum-mag hydroxide-simeth (MYLANTA) oral suspension 30 mL  30 mL Oral Q4H PRN    aspirin delayed-release tablet 81 mg  81 mg Oral DAILY    0.9% sodium chloride infusion 250 mL  250 mL IntraVENous PRN    furosemide (LASIX) tablet 80 mg  80 mg Oral DAILY    potassium chloride (K-DUR, KLOR-CON) SR tablet 40 mEq  40 mEq Oral DAILY    magnesium oxide (MAG-OX) tablet 400 mg  400 mg Oral DAILY    acetaminophen (TYLENOL) tablet 650 mg  650 mg Oral Q4H PRN    alcohol 62% (NOZIN) nasal  1 Ampule  1 Ampule Topical Q12H    allopurinol (ZYLOPRIM) tablet 100 mg  100 mg Oral BID    hydrocortisone (ANUSOL-HC) 2.5 % rectal cream   PeriANAL TID PRN    levothyroxine (SYNTHROID) tablet 88 mcg  88 mcg Oral ACB    metoprolol succinate (TOPROL-XL) XL tablet 25 mg  25 mg Oral DAILY    pantoprazole (PROTONIX) tablet 40 mg  40 mg Oral ACB    polyethylene glycol (MIRALAX) packet 17 g  17 g Oral DAILY    pravastatin (PRAVACHOL) tablet 40 mg  40 mg Oral DAILY    rOPINIRole (REQUIP) tablet 2 mg  2 mg Oral BID    sertraline (ZOLOFT) tablet 100 mg  100 mg Oral DAILY    spironolactone (ALDACTONE) tablet 25 mg  25 mg Oral DAILY    levalbuterol (XOPENEX) nebulizer soln 0.63 mg/3 mL  0.63 mg Nebulization Q6H PRN         Objective:     Vitals:    03/07/18 2128 03/08/18 0534 03/08/18 0551 03/08/18 0731   BP:    95/64   Pulse:    82   Resp:    20   Temp:    97.6 °F (36.4 °C)   SpO2: 100%  100% 99%   Weight:  169 lb 6.4 oz (76.8 kg)       Intake and Output:   03/06 1901 - 03/08 0700  In: 240 [P.O.:240]  Out: 900 [Urine:900]  03/08 0701 - 03/08 1900  In: 240 [P.O.:240]  Out: 300 [Urine:300]    Physical Exam:   Constitutional:  the patient is well developed and in no acute distress  HEENT:  Sclera clear, pupils equal, oral mucosa moist  Lungs: Clear but decreased in both bases. Respirations even and not labored  Cardiovascular:  RRR without M,G,R  Abd/GI: soft and non-tender; with positive bowel sounds. Ext: warm without cyanosis. There is 2+ lower leg edema.   Musculoskeletal: moves all four extremities with equal strength  Skin:  no jaundice or rashes, no wounds   Neuro: no gross neuro deficits   Musculoskeletal: can ambulate but needs assistance. No deformity  Psychiatric: Calm. ROS:  Chronic dyspnea, appetite fair, weak  Lines: peripheral IV    CHEST XRAY:       LAB  Recent Labs      03/08/18   0715  03/06/18   0611  03/05/18   1304   WBC  3.6*   --    --    HGB  8.5*  9.1*  9.2*   HCT  28.8*  31.7*  31.3*   PLT  89*   --    --      Recent Labs      03/08/18   0715  03/06/18   0611   NA  149*  151*   K  3.7  4.3   CL  100  99   CO2  >45*  >45*   GLU  82  104*   BUN  47*  57*   CREA  1.31*  1.15*     Recent Labs      03/07/18   0440  03/06/18   1325   PH  7.50*  7.43   PCO2  66*  81*   PO2  83  89   HCO3  51*  52*         Assessment:  (Medical Decision Making)     Hospital Problems  Date Reviewed: 2/10/2018          Codes Class Noted POA    Acute on chronic respiratory failure with hypercapnia (HCC) ICD-10-CM: L04.68  ICD-9-CM: 518.84  3/6/2018 No    Using bipap now - CO2 better    Pleural effusion, bilateral ICD-10-CM: J90  ICD-9-CM: 511.9  3/6/2018 Yes    No recent xray but present on the 5th    Chronic diastolic heart failure (HCC) (Chronic) ICD-10-CM: I50.32  ICD-9-CM: 428.32  2/7/2018 Yes    Peripheral and pulmonary edema/effusions    S/P TAVR (transcatheter aortic valve replacement) ICD-10-CM: Z95.2  ICD-9-CM: V43.3  1/30/2018 Yes    Jan 30th    Hypoxia ICD-10-CM: R09.02  ICD-9-CM: 799.02  1/23/2018 Yes    Secondary to fluid    Chronic atrial fibrillation (HCC) (Chronic) ICD-10-CM: I48.2  ICD-9-CM: 427.31  10/17/2011 Yes    Off eliquis due to heme positive stool/anemia    Chronic obstructive pulmonary disease ([facility] [address] (Chronic) ICD-10-CM: J44.9  ICD-9-CM: 496  3/8/2009 Yes    Overview Signed 3/6/2018  3:19 PM by Deisy Lobo NP     PFTs Jan 2018           Severe, stable          Plan:  (Medical Decision Making)   1. Repeat ABG now to reassess hypercapnea - ? Need to continue bipap  2. Adjust diuretics due to alkalosis - add diamox, stop aldactone, continue lasix  3. Transfer to floor bed - she can't stay in rehab due to acuity  4. Bilateral thoracenteses today - Eliquis has been stopped previously  5. Reengage cardiology to assist with goal setting - discussed with son on the phone today. Will ask palliative care to see as well. Roddy Moura NP    More than 50% of time documented was spent face-to-face contact with the patient and in the care of the patient on the floor/unit where the patient is located. Lungs:  Decreased BS at bases  Heart:  RRR with no Murmur/Rubs/Gallops;m ++ edema    Additional Comments:  Pt with end stage lung disease and intractable heart failure post TAVR. She is volume overloaded and not responding to diuretic therapy with recurrent effusions and respiratory insufficiency. Her prognosis appears to be very poor. Can tap her R/L chest for temporary relief but effusions will recur and she will likely not be able to leave the hospital. Recommend PC and cardiology see her and address her prognosis as well. I have spoken with and examined the patient. I agree with the above assessment and plan as documented.     Sakina Patel MD

## 2018-03-08 NOTE — PROGRESS NOTES
Pt resting quietly in bed. Denies needs or concerns at this time. Alert and oriented to person only. Lungs sounds diminished bilaterally with respirations even and unlabored. On bipap at night and when napping. Bowel sounds active in all quadrants. +2 pulses bilaterally in upper and lower extremities. Right arm red and swollen. Instructed to call for assistance. Call light in reach. Voiced understanding and identified call light.

## 2018-03-08 NOTE — PROGRESS NOTES
PHYSICAL THERAPY DAILY NOTE  Time In: 9067  Time Out: 0915  Patient Seen For: AM;Gait training;Patient education;Transfer training; Other (see progress notes)    Subjective: patient reporting she feels OK. Reports she guesses she slept OK. Reports she thinks she wore her \"breathing mask\" last night         Objective:Vital Signs:  Patient Vitals for the past 12 hrs:   Temp Pulse Resp BP SpO2   03/08/18 0731 97.6 °F (36.4 °C) 82 20 95/64 99 %   03/08/18 0551 - - - - 100 %     Patient on 02 at 1 lpm, 02 sat 96% after ambulating 45 feet    Pain level:No c/o pain  Pain location:NA  Pain interventions:NA    Patient education:Bed mobility training,transfer training, gait training, fall precautions, activity pacing, body mechanics, w/c and rollator parts management, energy conservation, breathing techniques during functional mobility, Patient verbalizing understanding and demonstrating partial understanding of patient education. Limited to no carry over of education. Recommend follow up education.     Interdisciplinary Communication:spoke with OT regarding functional level and CO2 level    Other (comment) (Fall precautions)  GROSS ASSESSMENT Daily Assessment     Cues for breathing pattern during functional mobility       BED/MAT MOBILITY Daily Assessment   Increased time and effort to complete    Rolling Right : 6 (Modified independent)  Rolling Left : 0 (Not tested)  Supine to Sit : 5 (Supervision)  Sit to Supine : 0 (Not tested)       TRANSFERS Daily Assessment   Increased time and effort to complete with cues for body mechanics and rollator set up and placement of 02 line   Transfer Type: SPT without device (bed to w/c)  Transfer Assistance : 5 (Stand-by assistance)  Sit to Stand Assistance: Stand-by assistance  Car Transfers: Not tested       GAIT Daily Assessment    Amount of Assistance: 5 (Stand-by assistance)  Distance (ft): 45 Feet (ft) (45 ft x 3 with 3 to 4 min rest breaks between attempts)  Assistive Device: Darrian Johnson rollator   Gait training with cues for posture correction and to improve step clearance. Emphasizing management of 02 line when making multiple turns with rollator    STEPS or STAIRS Daily Assessment   Slow single step at time with cues to improve flexed posture and step clearance. 4 min sitting rest break after going up steps Steps/Stairs Ambulated (#): 4  Level of Assist : 4 (Minimal assistance)  Rail Use: Both       BALANCE Daily Assessment   Static standing balance with rollator and SBA with cues for posture and breathing pattern    Loss of balance x 1 during gait training with min assist to regain balance. Loss of balance when attempting to manage 02 line from one side of rollator to the other Sitting - Static: Good (unsupported)  Sitting - Dynamic: Fair (occasional)  Standing - Static: Fair  Standing - Dynamic : Impaired       WHEELCHAIR MOBILITY Daily Assessment    Curbs/Ramps Assist Required (FIM Score): 0 (Not tested)  Wheelchair Setup Assist Required : 0 (Not tested)       LOWER EXTREMITY EXERCISES Daily Assessment     NA          Assessment: Functional mobility cont to slowly improve this week but has difficulty recalling safe set up of rollator and safe body mechanics during transfers. Difficulty recalling safe management of 02 line during gait and transfers. Anticipate patient will need 24 hour supervision at home due to impaired safety awareness. Patient to OT at end of treatment    Plan of Care: Continue with POC and progress as tolerated.      Federico Jacobsen, PT  3/8/2018

## 2018-03-08 NOTE — PROGRESS NOTES
Problem: Falls - Risk of  Goal: *Absence of Falls  Document Gregorio Fall Risk and appropriate interventions in the flowsheet.    Fall Risk Interventions:  Mobility Interventions: Bed/chair exit alarm, Communicate number of staff needed for ambulation/transfer, Patient to call before getting OOB, Strengthening exercises (ROM-active/passive), Utilize walker, cane, or other assitive device    Mentation Interventions: Bed/chair exit alarm, Evaluate medications/consider consulting pharmacy, Increase mobility, Toileting rounds    Medication Interventions: Bed/chair exit alarm, Evaluate medications/consider consulting pharmacy, Patient to call before getting OOB, Teach patient to arise slowly    Elimination Interventions: Call light in reach, Bed/chair exit alarm, Patient to call for help with toileting needs, Toileting schedule/hourly rounds    History of Falls Interventions: Bed/chair exit alarm, Consult care management for discharge planning, Evaluate medications/consider consulting pharmacy

## 2018-03-08 NOTE — PROGRESS NOTES
TRANSFER - IN REPORT:    Verbal report received from River Denise RN on Derrick Colby  being received from 9th floor for change in patient condition(respiratory failure)      Report consisted of patients Situation, Background, Assessment and   Recommendations(SBAR). Information from the following report(s) SBAR was reviewed with the receiving nurse. Opportunity for questions and clarification was provided. Assessment completed upon patients arrival to unit and care assumed.

## 2018-03-09 NOTE — PROGRESS NOTES
Mary Grace Chavez  Admission Date: 3/8/2018             Daily Progress Note: 3/9/2018    The patient's chart is reviewed and the patient is discussed with the staff. 69 y.o. CF admitted from the 9th floor rehab but has been hospitalized since 2/7/18. She over the last few days had decompensated and has been requiring BIPAP. Right tap 3/8 with 1000ml removed. Post procedure BP dropped to the 70s and became more somnolent. Discussion with her son and he states he understands the complexity and poor prognosis for his mother's current condition. Marine Boykin states he does not want chest compressions, shocks or intubation with vent support--advanced to DNR status. Medical history of recent TAVR 1/30/18, HTN, CAD, chronic AFIB, anemia, anxiety, and chronic diastolic heart failure. Had bilateral thoracentesis on Jan 25th (preop) removing 1200ml from Right and 900 ml from Left.  She had TAVR on Jan 30th and was discharged home on Feb 3. She presented to the ER on 2/7/18 via EMS with worsening swelling and shortness of breath.   CXR with cardiomegaly, pulmonary edema and worsening bilateral pleural effusions.  Required bilateral thoracenteses again on 2/9:  removed 550 ml from Left and 1000 ml from Right. Was later transferred to the 9th floor for rehab. On 9th floor cardiology followed adjusting diuretic therapy.  Was anemic,  stool was heme positive and Eliquis was stopped. Became more lethargic and confused and ABG showed hypercapnea.  Was started on bipap with improvement. Palliative Care was consulted for goals of care. Subjective:     Per chart became confused during the night and pulling apart BIPAP. Currently sitting up in bed, on Opti-flow, states is breathing much better and has no sputum production. Still with some confusion and rambling thoughts.       Current Facility-Administered Medications   Medication Dose Route Frequency    diazePAM (VALIUM) tablet 2 mg  2 mg Oral QHS PRN    alum-mag hydroxide-simeth (MYLANTA) oral suspension 30 mL  30 mL Oral Q4H PRN    aspirin chewable tablet 81 mg  81 mg Oral DAILY    furosemide (LASIX) tablet 80 mg  80 mg Oral DAILY    potassium chloride (K-DUR, KLOR-CON) SR tablet 40 mEq  40 mEq Oral DAILY    magnesium oxide (MAG-OX) tablet 400 mg  400 mg Oral DAILY    alcohol 62% (NOZIN) nasal  1 Ampule  1 Ampule Topical Q12H    allopurinol (ZYLOPRIM) tablet 100 mg  100 mg Oral BID    hydrocortisone (ANUSOL-HC) 2.5 % rectal cream   PeriANAL TID PRN    levothyroxine (SYNTHROID) tablet 88 mcg  88 mcg Oral ACB    metoprolol succinate (TOPROL-XL) XL tablet 25 mg  25 mg Oral DAILY    pantoprazole (PROTONIX) tablet 40 mg  40 mg Oral ACB    polyethylene glycol (MIRALAX) packet 17 g  17 g Oral DAILY    pravastatin (PRAVACHOL) tablet 40 mg  40 mg Oral DAILY    rOPINIRole (REQUIP) tablet 2 mg  2 mg Oral BID    sertraline (ZOLOFT) tablet 100 mg  100 mg Oral DAILY    spironolactone (ALDACTONE) tablet 25 mg  25 mg Oral DAILY    levalbuterol (XOPENEX) nebulizer soln 0.63 mg/3 mL  0.63 mg Nebulization Q6H PRN    sodium chloride (NS) flush 5-10 mL  5-10 mL IntraVENous Q8H    sodium chloride (NS) flush 5-10 mL  5-10 mL IntraVENous PRN       Review of Systems  Constitutional: negative for fever, chills, sweats  Cardiovascular: trace LE edema, negative for chest pain, palpitations, syncope  Gastrointestinal:  negative for dysphagia, reflux, vomiting, diarrhea, abdominal pain, or melena  Neurologic:  negative for focal weakness, numbness, headache    Objective:     Vitals:    03/08/18 2110 03/08/18 2210 03/09/18 0337 03/09/18 0735   BP:  94/61 91/69 103/57   Pulse:  79 82 83   Resp:  20 20 20   Temp:  97 °F (36.1 °C) 97.7 °F (36.5 °C) 98.4 °F (36.9 °C)   SpO2: 96% 95% 96% 98%   Weight:   170 lb 3.2 oz (77.2 kg)      Intake and Output:   03/07 1901 - 03/09 0700  In: 120 [P.O.:120]  Out: -        Physical Exam:   Constitution:  the patient is well developed and in no acute distress, Opti-flow 35L, 30%, sat 98%  EENMT:  Sclera clear, pupils equal, oral mucosa moist  Respiratory: scattered anterior and posterior crackles, no wheezing  Cardiovascular:  RRR without M,G,R  Gastrointestinal: soft and non-tender; with positive bowel sounds. Musculoskeletal: warm without cyanosis. There is trace bilateral lower leg edema. Skin:  no jaundice or rashes, no open wounds   Neurologic: no gross neuro deficits     Psychiatric:  alert and oriented x 2    CXR 3/9/18: Prominent interstitium suggesting edema from congestive failure. LAB  No results for input(s): GLUCPOC in the last 72 hours. No lab exists for component: [place]   Recent Labs      03/09/18   0737  03/08/18   0715   WBC  5.0  3.6*   HGB  8.7*  8.5*   HCT  29.3*  28.8*   PLT  103*  89*     Recent Labs      03/09/18   0737  03/08/18   0715   NA  148*  149*   K  3.7  3.7   CL  101  100   CO2  42*  >45*   GLU  89  82   BUN  45*  47*   CREA  1.40*  1.31*   CA  8.2*  8.0*     Recent Labs      03/08/18   1408  03/07/18   0440  03/06/18   1325   PH  7.40  7.50*  7.43   PCO2  70*  66*  81*   PO2  93  83  89   HCO3  42*  51*  52*     No results for input(s): LCAD, LAC in the last 72 hours.       Assessment:  (Medical Decision Making)     Hospital Problems  Date Reviewed: 3/9/2018          Codes Class Noted POA    * (Principal)Acute on chronic respiratory failure with hypercapnia (HCC) ICD-10-CM: A23.05  ICD-9-CM: 518.84  3/6/2018 Yes    Continue Opti-flow    S/P TAVR (transcatheter aortic valve replacement) ICD-10-CM: Z95.2  ICD-9-CM: V43.3  1/30/2018 Yes    Chronic 1/30/18    Pleural effusion, bilateral ICD-10-CM: J90  ICD-9-CM: 511.9  3/6/2018 Yes     3/8/18 Right thoracentesis 1000ml removed         Has required multiple    E. coli UTI ICD-10-CM: N39.0, B96.20  ICD-9-CM: 599.0, 041.49  3/8/2018 Yes    Continue Cipro    Chronic diastolic congestive heart failure (HCC) (Chronic) ICD-10-CM: I50.32  ICD-9-CM: 428.32, 428.0 9/9/2016 Yes    Chronic--per cardiology    Chronic atrial fibrillation (HCC) (Chronic) ICD-10-CM: I48.2  ICD-9-CM: 427.31  10/17/2011 Yes    Controlled rate    DNR (do not resuscitate) ICD-10-CM: Z66  ICD-9-CM: V49.86  3/8/2018 Yes    Unchanged    Acute respiratory failure with hypoxia and hypercarbia (HCC) ICD-10-CM: J96.01, J96.02  ICD-9-CM: 518.81  3/8/2018 Unknown    On Opti-flow    Hypoxia ICD-10-CM: R09.02  ICD-9-CM: 799.02  1/23/2018 Yes    Sat acceptable    Chronic obstructive pulmonary disease (HCC) (Chronic) ICD-10-CM: J44.9  ICD-9-CM: 496  3/8/2009 Yes     PFTs Jan 2018           chronic          Plan:  (Medical Decision Making)     --CXR reviewed  --Cardiology following--on Lasix 80mg daily  --Palliative Care following  --Continue Cipro for E. Coli UTI  --May need left tap    More than 50% of the time documented was spent in face-to-face contact with the patient and in the care of the patient on the floor/unit where the patient is located. Jayla Rock NP  Lungs:  Decreased breath sounds ,   Heart:  RRR with no Murmur/Rubs/Gallops    Additional Comments:  Pt. . Wants to wait on left thoracentesis at this time    I have spoken with and examined the patient. I agree with the above assessment and plan as documented.     Abe Goodson MD

## 2018-03-09 NOTE — PROGRESS NOTES
Problem: Falls - Risk of  Goal: *Absence of Falls  Document Gregorio Fall Risk and appropriate interventions in the flowsheet.    Outcome: Progressing Towards Goal  Fall Risk Interventions:  Mobility Interventions: Assess mobility with egress test, Communicate number of staff needed for ambulation/transfer, OT consult for ADLs, PT Consult for mobility concerns, Patient to call before getting OOB, PT Consult for assist device competence    Mentation Interventions: Adequate sleep, hydration, pain control, Evaluate medications/consider consulting pharmacy, Family/sitter at bedside, Increase mobility, Door open when patient unattended, Reorient patient    Medication Interventions: Evaluate medications/consider consulting pharmacy    Elimination Interventions: Bed/chair exit alarm, Call light in reach, Elevated toilet seat, Patient to call for help with toileting needs    History of Falls Interventions: Consult care management for discharge planning, Door open when patient unattended

## 2018-03-09 NOTE — PROGRESS NOTES
Bedside report received from Vickie Luis RN. Pt on optiflo at present. No distress. Resp even and unlabored at rest. Family at bedside. SR up x 3, bed low locked. Call light within reach. Will monitor.

## 2018-03-09 NOTE — PROGRESS NOTES
Patient stable with no complaints at this time. Patient is resting in bed with family at bedside. Call light within reach and patient is instructed to call if assistance is needed.   Report to be given to oncoming RN 7p-7a

## 2018-03-09 NOTE — PROGRESS NOTES
Palliative Care Progress Note    Patient: Alex Baldwin MRN: [id number]  SSN: [id number]    YOB: 1940  Age: 69 y.o. Sex: female       Assessment/Plan:     Chief Complaint/Interval History: Pleasantly confused, denies dyspnea     Principal Diagnosis:    · Debility, Unspecified  R53.81    Additional Diagnoses:   · Acute Respiratory Failure, Unspecified  J96.00  · Dyspnea  R06.00  · Frailty  R54  · Counseling, Encounter for Medical Advice  Z71.9  · Encounter for Palliative Care  Z51.5    Palliative Performance Scale (PPS)  PPS: 60    Medical Decision Making:   Reviewed and summarized notes over previous 24 hours. Discussed case with appropriate proiders: Muna Cintron, primary RN; Juan Brooks NP  Reviewed laboratory and x-ray data: CBC, BMP    Patient resting in bed, just back from [facility]. Granddaughter, Luis, is at the bedside. Patient is pleasantly confused, oriented to person and year only. Patient has difficult time focusing on and answering questions. She denies dyspnea, but can't tell if she's feeling any better or worse overall. She discusses that therapies were sometimes demanding. Validated this and agreed that our current approach does not seem to be accomplishing what everyone was hoping to accomplish. Reassured her of our ongoing care, and that we would discuss with patient and her family on how to best take care of her. Patient's son Gretta Diaz is local, and son Jeff is driving in from out of state today, will be here this evening. They are planning to discuss goals of care together. Luis understands that patient has not improved and indicates this is not surprising to family. Will be available to discuss this afternoon if Gretta Diaz is here. Will continue to follow. 1340- Met with patient's son Gretta Diaz as well as with Jeff on the phone. Reviewed patient's condition. Gretta Diaz acknowledges that patient has been in [facility] [employer] [employer]" the past few months and there has been a lack of improvement. Discussed goals and plan of care. Corina Harris would like to continue current interventions over the weekend to see if patient makes any improvement. Discussed options if patient does show improvement: either attempting rehab again vs hospice support. Answered questions about home hospice support vs hospice house. We also discussed interventions if patient were to get worse over the weekend, requiring ICU level care. Ideally, they would like to go to ICU if something can be \"easily fixed\" or stay in current room and be comfortable \"if she's just getting worse\". Counseled that it is hard to distinguish this in the moment. But that regardless of if patient was in current room or ICU, we could always readdress goals of care pending patient's condition. They verbalize understanding. Will follow-up on Monday morning. Will discuss findings with members of the interdisciplinary team.         More than 50% of this 35 minute visit was spent counseling and coordination of care as outlined above. Subjective:     Review of Systems:  A comprehensive review of systems was negative except for:   Constitutional: Positive for fatigue. Respiratory: Positive for intermittent dyspnea and cough. Objective:     Visit Vitals    /57    Pulse 83    Temp 98.4 °F (36.9 °C)    Resp 20    Wt 77.2 kg (170 lb 3.2 oz)    SpO2 98%    BMI 33.24 kg/m2       Physical Exam:    General:  Cooperative. No acute distress. Eyes:  Conjunctivae/corneas clear. Nose: Nares normal. Septum midline. O2 via Optiflow. Neck: Supple, symmetrical, trachea midline. Lungs:   Diminished to bases bilaterally with crackles, unlabored. Heart:  Regular rate and rhythm. Abdomen:   Soft, non-tender, non-distended   Extremities: Normal, atraumatic, no cyanosis. Trace BLE edema. Skin: Skin color, texture, turgor normal. No rash. Neurologic: Nonfocal.   Psych: Alert and oriented to person and year only.      Signed By: Sukumar Bazan Patra Siemens, NP     March 9, 2018

## 2018-03-09 NOTE — PROGRESS NOTES
[facility] CARDIOLOGY PROGRESS NOTE           3/9/2018 5:17 PM    Admit Date: 3/8/2018      Subjective:   Little change since last night. Clinically stable     ROS:  Cardiovascular:  As noted above    Objective:      Vitals:    03/09/18 0337 03/09/18 0735 03/09/18 1127 03/09/18 1518   BP: 91/69 103/57 95/64 93/61   Pulse: 82 83 86 85   Resp: 20 20 18 18   Temp: 97.7 °F (36.5 °C) 98.4 °F (36.9 °C) 97.7 °F (36.5 °C) 97.5 °F (36.4 °C)   SpO2: 96% 98% 97% 97%   Weight: 77.2 kg (170 lb 3.2 oz)          Physical Exam:  General-No Acute Distress  Neck- supple, no JVD  CV- regular rate and rhythm no MRG  Lung- clear bilaterally  Abd- soft, nontender, nondistended  Ext- no edema bilaterally. Skin- warm and dry    Data Review:   Recent Labs      03/09/18   0737  03/08/18   0715   NA  148*  149*   K  3.7  3.7   BUN  45*  47*   CREA  1.40*  1.31*   GLU  89  82   WBC  5.0  3.6*   HGB  8.7*  8.5*   HCT  29.3*  28.8*   PLT  103*  89*       Assessment/Plan:     Principal Problem:    Acute on chronic respiratory failure with hypercapnia (HCC) (3/6/2018)    Stable on high flow O2. This is associated with severe chronic respiratory failure and poor prognosis. Active Problems:    Chronic obstructive pulmonary disease ([facility] [address] (3/8/2009)    As above      Chronic atrial fibrillation ([facility] [address] (10/17/2011)    Stable off of anticoagulation      Chronic diastolic congestive heart failure ([facility] [address] (9/9/2016)    This is stable and on diuretics as tolerates with hypotension       Hypoxia (1/23/2018)    As abpve      S/P TAVR (transcatheter aortic valve replacement) (1/30/2018)    Stable       Pleural effusion, bilateral (3/6/2018)    Hypotension after recent thoracentesis       Prognosis is poor and discussed with son. Plan to monitor over weekend and if no improvement will consider hospice. Likely hospice house or home with hospice.           Stanislav Juan MD  3/9/2018 5:17 PM

## 2018-03-09 NOTE — PROGRESS NOTES
Problem: Self Care Deficits Care Plan (Adult)  Goal: *Therapy Goal (Edit Goal, Insert Text)  LTG 1: Patient will be modified independent with functional mobility using AE/DME PRN within 10 days.   -Progressing at [facility] 2/21/2018 2/28/2018  NEW LTG 1 Down grade to SBA. Outcome: Not Met  Variance: Patient Condition  Comments: Due to medical decline   Goal: *Therapy Goal (Edit Goal, Insert Text)  LTG 2: Patient will be modified independent with dressing using AE/DME PRN within 10 days. Progressing at minimal assist 2/21/2018. Pt demonstrates good effort despite medical setbacks, dressing requires total assist 2/28/2018  NEW LTG 2 Downgrade goal to minimal assist 3/7/2018              Variance: Patient Condition  Comments: Due to medical decline   Goal: *Therapy Goal (Edit Goal, Insert Text)  LTG 3: Patient will be modified independent with bathing using AE/DME PRN within 10 days. Patient at [facility] for safety 2/21/2018. 2/28/2018  NEW LTG 3 Down grade to CGA, patient had a setback but on track again 3/7/2018              Outcome: Not Met  Variance: Patient Condition  Comments: Due to medical decline   Goal: *Therapy Goal (Edit Goal, Insert Text)  LTG 4: Patient will be modified independent with toileting and toilet transfer using AE/DME PRN within 10 days. Patient at [facility] 2/21/2018. Pt at max assist 2/28/2018 NEW LTG 4 set to [facility] 3/7/2018    LTG 5: Patient will be modified independent with tub/shower transfer using AE/DME PRN within 10 days.   Patient at [facility] 2/21/2018. 2/28/2018  NEW LTG 5 Set to SBA 3/7/2018     Outcome: Not Met  Variance: Patient Condition  Comments: Due to medical decline     Comments: Saranya Moseley, OT Student

## 2018-03-09 NOTE — PROGRESS NOTES
Pt A&O x2 resting in bed. Tolerated bed bath well. Reports no pain. Even breathing and no signs of distress. Pt left with family at bed side. Bed in low locked position and call light within reach.

## 2018-03-09 NOTE — PROGRESS NOTES
Patient in bed resting with no complaints at this time. Patient is alert with some confusion but no distress noted. IV intact and patent with no s/s of infection. Respirations even and unlabored with heart rate regular. Patient unable to ambulate independently without assistance; needs x1 r/t weakness. Bed in low locked position with call light within. Patient instructed to call if assistance is needed. Will continue to monitor.

## 2018-03-09 NOTE — PROGRESS NOTES
Patient voices no concerns at this time. Patient resting in bed with family at bedside for support. Call light within reach and patient instructed to call if assistance is needed. Will continue to monitor.

## 2018-03-09 NOTE — PROGRESS NOTES
Pt pulling apart Bipap mask. Reoriented her to leave the mask on to help with breathing at nighttime. Betito alarm intact, SR up x 3, bed low locked. Door open.

## 2018-03-10 NOTE — PROGRESS NOTES
Patient refusing BiPap after I clearly explained to her that it is recommended by the Medical staff that she wear it as long as she can at night. She expressed that she understood and would like to not wear it.      Devonte Marinelli, RRT

## 2018-03-10 NOTE — PROGRESS NOTES
Johana Mandujano  Admission Date: 3/8/2018             Daily Progress Note: 3/10/2018    The patient's chart is reviewed and the patient is discussed with the staff. 69 y.o. CF admitted from the 9th floor rehab but has been hospitalized since 2/7/18.  She over the last few days had decompensated and has been requiring BIPAP.  Right tap 3/8 with 1000ml removed. Post procedure BP dropped to the 70s and became more somnolent. Discussion with her son and he states he understands the complexity and poor prognosis for his mother's current condition. [facility] states he does not want chest compressions, shocks or intubation with vent support--advanced to DNR status.     Medical history of recent TAVR 1/30/18, HTN, CAD, chronic AFIB, anemia, anxiety, and chronic diastolic heart failure. Had bilateral thoracentesis on Jan 25th (preop) removing 1200ml from Right and 900 ml from Left.  She had TAVR on Jan 30th and was discharged home on Feb 3. She presented to the ER on 2/7/18 via EMS with worsening swelling and shortness of breath.   CXR with cardiomegaly, pulmonary edema and worsening bilateral pleural effusions.  Required bilateral thoracenteses again on 2/9:  removed 550 ml from Left and 1000 ml from Right. Was later transferred to the 9th floor for rehab. On 9th floor cardiology followed adjusting diuretic therapy.  Was anemic,  stool was heme positive and Eliquis was stopped. Became more lethargic and confused and ABG showed hypercapnea.  Was started on bipap with improvement.  Palliative Care was consulted for goals of care.      Subjective:     Patient laying in bed. Awake and conversant. Denies any changes overnight. Wore bipap last night. Currently on 2.5L O2.      Current Facility-Administered Medications   Medication Dose Route Frequency    ciprofloxacin HCl (CIPRO) tablet 500 mg  500 mg Oral Q12H    diazePAM (VALIUM) tablet 2 mg  2 mg Oral QHS PRN    alum-mag hydroxide-simeth (MYLANTA) oral suspension 30 mL  30 mL Oral Q4H PRN    aspirin chewable tablet 81 mg  81 mg Oral DAILY    furosemide (LASIX) tablet 80 mg  80 mg Oral DAILY    potassium chloride (K-DUR, KLOR-CON) SR tablet 40 mEq  40 mEq Oral DAILY    magnesium oxide (MAG-OX) tablet 400 mg  400 mg Oral DAILY    alcohol 62% (NOZIN) nasal  1 Ampule  1 Ampule Topical Q12H    allopurinol (ZYLOPRIM) tablet 100 mg  100 mg Oral BID    hydrocortisone (ANUSOL-HC) 2.5 % rectal cream   PeriANAL TID PRN    levothyroxine (SYNTHROID) tablet 88 mcg  88 mcg Oral ACB    metoprolol succinate (TOPROL-XL) XL tablet 25 mg  25 mg Oral DAILY    pantoprazole (PROTONIX) tablet 40 mg  40 mg Oral ACB    polyethylene glycol (MIRALAX) packet 17 g  17 g Oral DAILY    pravastatin (PRAVACHOL) tablet 40 mg  40 mg Oral DAILY    rOPINIRole (REQUIP) tablet 2 mg  2 mg Oral BID    sertraline (ZOLOFT) tablet 100 mg  100 mg Oral DAILY    spironolactone (ALDACTONE) tablet 25 mg  25 mg Oral DAILY    levalbuterol (XOPENEX) nebulizer soln 0.63 mg/3 mL  0.63 mg Nebulization Q6H PRN    sodium chloride (NS) flush 5-10 mL  5-10 mL IntraVENous Q8H    sodium chloride (NS) flush 5-10 mL  5-10 mL IntraVENous PRN       Review of Systems    Constitutional: negative for fever, chills, sweats  Cardiovascular: negative for chest pain, palpitations, syncope, edema  Gastrointestinal: negative for dysphagia, reflux, vomiting, diarrhea, abdominal pain, or melena  Neurologic: negative for focal weakness, numbness, headache    Objective:     Vitals:    03/10/18 0452 03/10/18 0654 03/10/18 0844 03/10/18 1119   BP: 99/64 92/59  103/66   Pulse:  84  86   Resp: 19 18 19   Temp: 98 °F (36.7 °C) 97.9 °F (36.6 °C)  98 °F (36.7 °C)   SpO2: 95% 99% 96% 98%   Weight:         Intake and Output:   03/08 1901 - 03/10 0700  In: 360 [P.O.:360]  Out: 1000 [Urine:1000]       Physical Exam:   Constitution:  the patient is well developed and in no acute distress  EENMT:  Sclera clear, pupils equal, oral mucosa moist  Respiratory: Mild B crackles  Cardiovascular:  RRR without M,G,R  Gastrointestinal: soft and non-tender; with positive bowel sounds. Musculoskeletal: warm without cyanosis. There is trace lower leg edema. Skin:  no jaundice or rashes, no wounds   Neurologic: no gross neuro deficits     Psychiatric:  alert and oriented x ppt    CHEST XRAY:   3/9/18  IMPRESSION:  Prominent interstitium suggesting edema from congestive failure.       LAB  No lab exists for component: [place]   Recent Labs      03/09/18   0737  03/08/18   0715   WBC  5.0  3.6*   HGB  8.7*  8.5*   HCT  29.3*  28.8*   PLT  103*  89*     Recent Labs      03/10/18   0614  03/09/18   0737  03/08/18   0715   NA  146*  148*  149*   K  3.5  3.7  3.7   CL  100  101  100   CO2  37*  42*  >45*   GLU  87  89  82   BUN  40*  45*  47*   CREA  1.35*  1.40*  1.31*   CA  8.4  8.2*  8.0*     Recent Labs      03/08/18   1408   PH  7.40   PCO2  70*   PO2  93   HCO3  42*       Assessment:  (Medical Decision Making)     Hospital Problems  Date Reviewed: 3/9/2018          Codes Class Noted POA    E. coli UTI ICD-10-CM: N39.0, B96.20  ICD-9-CM: 599.0, 041.49  3/8/2018 Yes        DNR (do not resuscitate) ICD-10-CM: Z66  ICD-9-CM: V49.86  3/8/2018 Yes        Acute respiratory failure with hypoxia and hypercarbia (HCC) ICD-10-CM: J96.01, J96.02  ICD-9-CM: 518.81  3/8/2018 Unknown        * (Principal)Acute on chronic respiratory failure with hypercapnia (HCC) ICD-10-CM: F80.20  ICD-9-CM: 518.84  3/6/2018 Yes        Pleural effusion, bilateral ICD-10-CM: J90  ICD-9-CM: 511.9  3/6/2018 Yes    Overview Signed 3/8/2018  3:12 PM by Abdirahman Mendoza NP     3/8/18 Right thoracentesis 1000ml removed             S/P TAVR (transcatheter aortic valve replacement) ICD-10-CM: Z95.2  ICD-9-CM: V43.3  1/30/2018 Yes        Hypoxia ICD-10-CM: R09.02  ICD-9-CM: 799.02  1/23/2018 Yes        Chronic diastolic congestive heart failure (HCC) (Chronic) ICD-10-CM: I50.32  ICD-9-CM: 428.32, 428.0  9/9/2016 Yes        Chronic atrial fibrillation (HCC) (Chronic) ICD-10-CM: I48.2  ICD-9-CM: 427.31  10/17/2011 Yes        Chronic obstructive pulmonary disease ([facility] [address] (Chronic) ICD-10-CM: J44.9  ICD-9-CM: 496  3/8/2009 Yes    Overview Signed 3/6/2018  3:19 PM by Aurelia Ochoa NP     PFTs Jan 2018                   Patient with hypercarbia and pulmonary edema. Clinically looks better today. More awake. BP stable. Has been wearing bipap at night. Have been diuresing with lasix and aldactone. Plan:  (Medical Decision Making)   -will repeat abg now to see if pCO2 improving.   -continue lasix and repeat cxr in the am.   -may need thoracentesis if not improving with diuresis. -strict I/O.   -family not currently interested in comfort measures.    DNR      Keyon Singh MD

## 2018-03-10 NOTE — PROGRESS NOTES
Problem: Mobility Impaired (Adult and Pediatric)  Goal: *Therapy Goal (Edit Goal, Insert Text)  LTG 2. Patient transfer sit<>stand and perform SPT with rollator and modified independence in 7 to 10 days (Cont with LTG x 1 week beginning 02/21/18)   2/28/18:  Goal not met secondary to medical complications, will change goal to pt. To transfer sit<>stand & perform SPT w/ rollator w/ CGA in 1 week. (MET 03/07/18)  NEW LTG 2.  Patient transfer sit<>stand and perform SPT with rollator and supervision in 1 week (BEGIN 03/07/18)   Outcome: Not Met  Variance: Patient slowly responding

## 2018-03-10 NOTE — PROGRESS NOTES
Physical Therapy  QUALITY INDICATOR ASSIST COMMENTS   Walk 10 feet With rollator and SBA With 02   Walk 50 feet with 2 turns With rollator and SBA With 02   Walk 150 feet Unable due to decreased endurance    Walk 10 feet on uneven  Unable due to balance deficits    1 step/curb Min assist with hand rails    4 steps Min assist with hand rails    12 steps Unable due to decreased endurance     object Min assist    Wheel 48' w/2 turns Unable due to decreased endurance and UE weakness with limited ROM    Wheel 150' Unable due to decreased endurance and UE weakness with limited ROM

## 2018-03-10 NOTE — PROGRESS NOTES
[facility] CARDIOLOGY PROGRESS NOTE           3/10/2018 8:26 AM    Admit Date: 3/8/2018      Subjective:       Patient very pleasant confused. States breathing is improved no family in room exam    Review of Systems   Constitutional: Negative for weight loss. HENT: Positive for hearing loss. Respiratory: Negative for hemoptysis. Cardiovascular: Negative for chest pain. Musculoskeletal: Negative for myalgias. Psychiatric/Behavioral: Negative for hallucinations. Objective:      Vitals:    03/09/18 2026 03/10/18 0037 03/10/18 0452 03/10/18 0654   BP:  90/55 99/64 92/59   Pulse:  85  84   Resp:  18 19 18   Temp:  97.7 °F (36.5 °C) 98 °F (36.7 °C) 97.9 °F (36.6 °C)   SpO2: 98% 100% 95% 99%   Weight:             Physical Exam   Constitutional: No distress. Neck: No JVD present. Cardiovascular:   Murmur heard. Abdominal: There is no tenderness. Musculoskeletal: She exhibits no edema. Skin: No erythema. Psychiatric: She is not agitated.        Data Review:   Recent Labs      03/09/18   0737  03/08/18   0715   NA  148*  149*   K  3.7  3.7   BUN  45*  47*   CREA  1.40*  1.31*   GLU  89  82   WBC  5.0  3.6*   HGB  8.7*  8.5*   HCT  29.3*  28.8*   PLT  103*  89*         Intake/Output Summary (Last 24 hours) at 03/10/18 0826  Last data filed at 03/10/18 0037   Gross per 24 hour   Intake              120 ml   Output             1000 ml   Net             -880 ml     Current Facility-Administered Medications   Medication Dose Route Frequency    ciprofloxacin HCl (CIPRO) tablet 500 mg  500 mg Oral Q12H    diazePAM (VALIUM) tablet 2 mg  2 mg Oral QHS PRN    alum-mag hydroxide-simeth (MYLANTA) oral suspension 30 mL  30 mL Oral Q4H PRN    aspirin chewable tablet 81 mg  81 mg Oral DAILY    furosemide (LASIX) tablet 80 mg  80 mg Oral DAILY    potassium chloride (K-DUR, KLOR-CON) SR tablet 40 mEq  40 mEq Oral DAILY    magnesium oxide (MAG-OX) tablet 400 mg  400 mg Oral DAILY    alcohol 62% (NOZIN) nasal  1 Ampule  1 Ampule Topical Q12H    allopurinol (ZYLOPRIM) tablet 100 mg  100 mg Oral BID    hydrocortisone (ANUSOL-HC) 2.5 % rectal cream   PeriANAL TID PRN    levothyroxine (SYNTHROID) tablet 88 mcg  88 mcg Oral ACB    metoprolol succinate (TOPROL-XL) XL tablet 25 mg  25 mg Oral DAILY    pantoprazole (PROTONIX) tablet 40 mg  40 mg Oral ACB    polyethylene glycol (MIRALAX) packet 17 g  17 g Oral DAILY    pravastatin (PRAVACHOL) tablet 40 mg  40 mg Oral DAILY    rOPINIRole (REQUIP) tablet 2 mg  2 mg Oral BID    sertraline (ZOLOFT) tablet 100 mg  100 mg Oral DAILY    spironolactone (ALDACTONE) tablet 25 mg  25 mg Oral DAILY    levalbuterol (XOPENEX) nebulizer soln 0.63 mg/3 mL  0.63 mg Nebulization Q6H PRN    sodium chloride (NS) flush 5-10 mL  5-10 mL IntraVENous Q8H    sodium chloride (NS) flush 5-10 mL  5-10 mL IntraVENous PRN           Assessment/Plan:     1. Respiratory failure followed by pulmonary. Patient on 2.5 L of oxygen oxygen saturations greater than 95%. 2. COPD per pulmonary  3. Chronic diastolic congestive heart failure points out is improving patient on high-dose Lasix and Aldactone creatinine 1.43/10/2018 -800 cc careful use of diuretics due to hypotension  4. Pleural effusion right thoracentesis 3/8/2018 followed by pulmonary  5. Aortic stenosis status post transaortic valvular placement  6. Goals of care long discussion with previous cardiologist and care team/palliative care.   Patient clinically improving it appears ongoing goals of care discussion it is reasonable      Marylin King MD  3/10/2018 8:26 AM

## 2018-03-10 NOTE — PROGRESS NOTES
Problem: Mobility Impaired (Adult and Pediatric)  Goal: *Therapy Goal (Edit Goal, Insert Text)  LTG 4. Patient ambulate up/down 4 steps with hand rails and SBA in 7 to 10 days (Cont with LTG x 1 week beginning 02/21/18)   2/28/18:  Goal not met secondary to medical complications, change goal to pt. To ambulate up/down 4 steps w/ rails & min A in 1 week. (MET 03/07/18)  NEW LTG 4.  Patient ambulate up/down 4 steps with hand rails and SBA in 1 week (BEGIN 03/07/18)   Outcome: Not Met  Variance: Patient slowly responding

## 2018-03-10 NOTE — PROGRESS NOTES
Visit with patient and family. She is calm.   Per notes:  [address]  4th admission past 12 months  Has been on 9th floor recently  Very pleasant    Telma Burr, staff [phone]   /   [email or web address]

## 2018-03-10 NOTE — PROGRESS NOTES
Problem: Falls - Risk of  Goal: *Absence of Falls  Document Gregorio Fall Risk and appropriate interventions in the flowsheet.    Outcome: Progressing Towards Goal  Fall Risk Interventions:  Mobility Interventions: Assess mobility with egress test, Bed/chair exit alarm    Mentation Interventions: Bed/chair exit alarm, Door open when patient unattended    Medication Interventions: Bed/chair exit alarm, Teach patient to arise slowly    Elimination Interventions: Bed/chair exit alarm, Call light in reach    History of Falls Interventions: Consult care management for discharge planning, Door open when patient unattended

## 2018-03-10 NOTE — PROGRESS NOTES
Problem: Mobility Impaired (Adult and Pediatric)  Goal: *Therapy Goal (Edit Goal, Insert Text)  LTG 3. Patient ambulate 100ft with rollator and modified independence including ability to manage 02 line in 7 to 10 days (revised 02/21/18 Patient ambulate 75 ft with rollator and supervision in 1 week )   2/18/18:  Goal not met secondary to medical complications, will change goal to pt. To ambulate x50' w/ rollator & CGA in 1 week. (MET 03/07/18)  NEW LTG 3.  Patient ambulate 76 with with rollator and supervision and cues for managing 02 line in 1 week(BEGIN 03/07/18)   Outcome: Not Met  Variance: Patient slowly responding

## 2018-03-10 NOTE — PROGRESS NOTES
Patient sitting up in bed, on Opti Flow, son @ bedside, patient denies SOB, states she feels good right now, patient alert to person, state she is at [facility], edematous to Upper and lower extremities, lung sounds diminished throughout, denies needs @ this time, call light within reach.

## 2018-03-10 NOTE — PROGRESS NOTES
Problem: Mobility Impaired (Adult and Pediatric)  Goal: *Therapy Goal (Edit Goal, Insert Text)  LTG 5. Patient ambulate up/down 6 inch step with rollator and min assist in 7 to 10 days (Cont with LTG x 1 week beginning 02/21/18) (Cont with LTG x 1 week beginning 03/07/18)  2/28/18:  Goal not met secondary to medical complications, cont. W/ current goal x1 week.     Outcome: Not Met  Variance: Patient slowly responding

## 2018-03-10 NOTE — PROGRESS NOTES
Patient is resting in bed with no complaints at this time. Patient is resting in bed with family at bedside for support. Call light within reach and patient instructed to call if assistance is needed. Will continue to monitor.

## 2018-03-10 NOTE — PROGRESS NOTES
Problem: Patient Education: Go to Patient Education Activity  Goal: Patient/Family Education  Patient / [place] family will verbalize understanding of PT safety recommendations, demonstrate appropriate assist for current functional mobility status, safety, and home exercise program by time of discharge.      Outcome: Not Met  Variance: Patient slowly responding

## 2018-03-10 NOTE — PROGRESS NOTES
Patient refused BIPAP during night, was up and down throughout night, confused, however cooperative, patient on HF n/c, SOB with exertion, denies needs, call light within reach.

## 2018-03-11 NOTE — PROGRESS NOTES
Patient rested quietly throughout night on Bipap, alert this a.m. Po medication tolerated well, denies pain and discomfort, denies needs, call light within reach.

## 2018-03-11 NOTE — PROGRESS NOTES
Mrs. Celia Montalvo in bed with family at bedside. Remains on 2 lpm NC without dyspnea. Alert but with intermittent confusion. Denies complaints or needs. Bed exit alarm in place and door open.

## 2018-03-11 NOTE — PROGRESS NOTES
[facility] CARDIOLOGY PROGRESS NOTE           3/11/2018 8:26 AM    Admit Date: 3/8/2018      Subjective:       Patient states breathing is better. Review of Systems   Constitutional: Negative for weight loss. HENT: Positive for hearing loss. Respiratory: Negative for hemoptysis. Cardiovascular: Negative for chest pain. Musculoskeletal: Negative for myalgias. Psychiatric/Behavioral: Negative for hallucinations. Objective:      Vitals:    03/11/18 0344 03/11/18 0352 03/11/18 0541 03/11/18 0736   BP: 100/66   95/62   Pulse: 80   83   Resp: 16   18   Temp: 97.5 °F (36.4 °C)   98.6 °F (37 °C)   SpO2: 99% 95%  98%   Weight:   76.1 kg (167 lb 12.8 oz)          Physical Exam   Constitutional: No distress. Neck: No JVD present. Cardiovascular:   Murmur heard. Abdominal: There is no tenderness. Musculoskeletal: She exhibits no edema. Skin: No erythema. Psychiatric: She is not agitated.        Data Review:   Recent Labs      03/11/18   0724  03/10/18   0614  03/09/18   0737   NA  146*  146*  148*   K  3.6  3.5  3.7   BUN  40*  40*  45*   CREA  1.33*  1.35*  1.40*   GLU  103*  87  89   WBC   --    --   5.0   HGB   --    --   8.7*   HCT   --    --   29.3*   PLT   --    --   103*         Intake/Output Summary (Last 24 hours) at 03/11/18 1102  Last data filed at 03/10/18 1824   Gross per 24 hour   Intake              480 ml   Output                5 ml   Net              475 ml     Current Facility-Administered Medications   Medication Dose Route Frequency    ciprofloxacin HCl (CIPRO) tablet 500 mg  500 mg Oral Q24H    diazePAM (VALIUM) tablet 2 mg  2 mg Oral QHS PRN    alum-mag hydroxide-simeth (MYLANTA) oral suspension 30 mL  30 mL Oral Q4H PRN    aspirin chewable tablet 81 mg  81 mg Oral DAILY    furosemide (LASIX) tablet 80 mg  80 mg Oral DAILY    potassium chloride (K-DUR, KLOR-CON) SR tablet 40 mEq  40 mEq Oral DAILY    magnesium oxide (MAG-OX) tablet 400 mg  400 mg Oral DAILY    alcohol 62% (NOZIN) nasal  1 Ampule  1 Ampule Topical Q12H    allopurinol (ZYLOPRIM) tablet 100 mg  100 mg Oral BID    hydrocortisone (ANUSOL-HC) 2.5 % rectal cream   PeriANAL TID PRN    levothyroxine (SYNTHROID) tablet 88 mcg  88 mcg Oral ACB    metoprolol succinate (TOPROL-XL) XL tablet 25 mg  25 mg Oral DAILY    pantoprazole (PROTONIX) tablet 40 mg  40 mg Oral ACB    polyethylene glycol (MIRALAX) packet 17 g  17 g Oral DAILY    pravastatin (PRAVACHOL) tablet 40 mg  40 mg Oral DAILY    rOPINIRole (REQUIP) tablet 2 mg  2 mg Oral BID    sertraline (ZOLOFT) tablet 100 mg  100 mg Oral DAILY    spironolactone (ALDACTONE) tablet 25 mg  25 mg Oral DAILY    levalbuterol (XOPENEX) nebulizer soln 0.63 mg/3 mL  0.63 mg Nebulization Q6H PRN    sodium chloride (NS) flush 5-10 mL  5-10 mL IntraVENous Q8H    sodium chloride (NS) flush 5-10 mL  5-10 mL IntraVENous PRN           Assessment/Plan:     1. Respiratory failure followed by pulmonary. Patient on 2.5 L of oxygen oxygen saturations greater than 95%. 2. COPD per pulmonary  3. Chronic diastolic congestive heart failure points out is improving patient on high-dose Lasix and Aldactone creatinine 1.3 careful use of diuretics due to hypotension. K is low on aldactone and potassium   4. Pleural effusion right thoracentesis 3/8/2018 followed by pulmonary  5. Aortic stenosis status post transaortic valvular placement  6. Goals of care long discussion with previous cardiologist and care team/palliative care.   Patient clinically improving family not interested in comfort care at this time       Nargis Ravi MD  3/11/2018 8:26 AM

## 2018-03-11 NOTE — PROGRESS NOTES
Patient is alert and oriented to person and place, sitting up in bed, on HF n/c, dyspnea with exertion, lung sounds course, +2 BLE, patient without c/o pain or discomfort, denies needs, call light within reach.

## 2018-03-11 NOTE — PROGRESS NOTES
Alpha Lather  Admission Date: 3/8/2018             Daily Progress Note: 3/11/2018    The patient's chart is reviewed and the patient is discussed with the staff. 69 y.o. CF admitted from the 9th floor rehab but has been hospitalized since 2/7/18.  She over the last few days had decompensated and has been requiring BIPAP.  Right tap 3/8 with 1000ml removed. Post procedure BP dropped to the 70s and became more somnolent. Discussion with her son and he states he understands the complexity and poor prognosis for his mother's current condition. Macie Sanchez states he does not want chest compressions, shocks or intubation with vent support--advanced to DNR status.     Medical history of recent TAVR 1/30/18, HTN, CAD, chronic AFIB, anemia, anxiety, and chronic diastolic heart failure. Had bilateral thoracentesis on Jan 25th (preop) removing 1200ml from Right and 900 ml from Left.  She had TAVR on Jan 30th and was discharged home on Feb 3. She presented to the ER on 2/7/18 via EMS with worsening swelling and shortness of breath.   CXR with cardiomegaly, pulmonary edema and worsening bilateral pleural effusions.  Required bilateral thoracenteses again on 2/9:  removed 550 ml from Left and 1000 ml from Right. Was later transferred to the 9th floor for rehab. On 9th floor cardiology followed adjusting diuretic therapy.  Was anemic,  stool was heme positive and Eliquis was stopped. Became more lethargic and confused and ABG showed hypercapnea.  Was started on bipap with improvement.  Palliative Care was consulted for goals of care.      Subjective:     Patient laying in bed. Awake and conversant. Attempted to get ABG yesterday, RT could not and patient would not allow multiple attempts. No new issues this morning.        Current Facility-Administered Medications   Medication Dose Route Frequency    ciprofloxacin HCl (CIPRO) tablet 500 mg  500 mg Oral Q24H    diazePAM (VALIUM) tablet 2 mg  2 mg Oral QHS PRN  alum-mag hydroxide-simeth (MYLANTA) oral suspension 30 mL  30 mL Oral Q4H PRN    aspirin chewable tablet 81 mg  81 mg Oral DAILY    furosemide (LASIX) tablet 80 mg  80 mg Oral DAILY    potassium chloride (K-DUR, KLOR-CON) SR tablet 40 mEq  40 mEq Oral DAILY    magnesium oxide (MAG-OX) tablet 400 mg  400 mg Oral DAILY    alcohol 62% (NOZIN) nasal  1 Ampule  1 Ampule Topical Q12H    allopurinol (ZYLOPRIM) tablet 100 mg  100 mg Oral BID    hydrocortisone (ANUSOL-HC) 2.5 % rectal cream   PeriANAL TID PRN    levothyroxine (SYNTHROID) tablet 88 mcg  88 mcg Oral ACB    metoprolol succinate (TOPROL-XL) XL tablet 25 mg  25 mg Oral DAILY    pantoprazole (PROTONIX) tablet 40 mg  40 mg Oral ACB    polyethylene glycol (MIRALAX) packet 17 g  17 g Oral DAILY    pravastatin (PRAVACHOL) tablet 40 mg  40 mg Oral DAILY    rOPINIRole (REQUIP) tablet 2 mg  2 mg Oral BID    sertraline (ZOLOFT) tablet 100 mg  100 mg Oral DAILY    spironolactone (ALDACTONE) tablet 25 mg  25 mg Oral DAILY    levalbuterol (XOPENEX) nebulizer soln 0.63 mg/3 mL  0.63 mg Nebulization Q6H PRN    sodium chloride (NS) flush 5-10 mL  5-10 mL IntraVENous Q8H    sodium chloride (NS) flush 5-10 mL  5-10 mL IntraVENous PRN       Review of Systems    Constitutional: negative for fever, chills, sweats  Cardiovascular: negative for chest pain, palpitations, syncope, edema  Gastrointestinal: negative for dysphagia, reflux, vomiting, diarrhea, abdominal pain, or melena  Neurologic: negative for focal weakness, numbness, headache    Objective:     Vitals:    03/11/18 0344 03/11/18 0352 03/11/18 0541 03/11/18 0736   BP: 100/66   95/62   Pulse: 80   83   Resp: 16   18   Temp: 97.5 °F (36.4 °C)   98.6 °F (37 °C)   SpO2: 99% 95%  98%   Weight:   167 lb 12.8 oz (76.1 kg)      Intake and Output:   03/09 1901 - 03/11 0700  In: 720 [P.O.:720]  Out: 5        Physical Exam:   Constitution:  the patient is well developed and in no acute distress  EENMT: Sclera clear, pupils equal, oral mucosa moist  Respiratory: Mild B crackles  Cardiovascular:  RRR without M,G,R  Gastrointestinal: soft and non-tender; with positive bowel sounds. Musculoskeletal: warm without cyanosis. There is trace lower leg edema. Skin:  no jaundice or rashes, no wounds   Neurologic: no gross neuro deficits     Psychiatric:  alert and oriented x ppt    CHEST XRAY:   3/11/18  1. Bibasilar densities, likely atelectasis. Vascular congestion. 2. No significant change since the prior exam.      3/9/18  IMPRESSION:  Prominent interstitium suggesting edema from congestive failure.       LAB  No lab exists for component: [place]   Recent Labs      03/09/18   0737   WBC  5.0   HGB  8.7*   HCT  29.3*   PLT  103*     Recent Labs      03/11/18   0724  03/10/18   0614  03/09/18   0737   NA  146*  146*  148*   K  3.6  3.5  3.7   CL  102  100  101   CO2  39*  37*  42*   GLU  103*  87  89   BUN  40*  40*  45*   CREA  1.33*  1.35*  1.40*   CA  8.3  8.4  8.2*     Recent Labs      03/08/18   1408   PH  7.40   PCO2  70*   PO2  93   HCO3  42*       Assessment:  (Medical Decision Making)     Hospital Problems  Date Reviewed: 3/9/2018          Codes Class Noted POA    E. coli UTI ICD-10-CM: N39.0, B96.20  ICD-9-CM: 599.0, 041.49  3/8/2018 Yes        DNR (do not resuscitate) ICD-10-CM: Z66  ICD-9-CM: V49.86  3/8/2018 Yes        Acute respiratory failure with hypoxia and hypercarbia (HCC) ICD-10-CM: J96.01, J96.02  ICD-9-CM: 518.81  3/8/2018 Unknown        * (Principal)Acute on chronic respiratory failure with hypercapnia (HCC) ICD-10-CM: N78.27  ICD-9-CM: 518.84  3/6/2018 Yes        Pleural effusion, bilateral ICD-10-CM: J90  ICD-9-CM: 511.9  3/6/2018 Yes    Overview Signed 3/8/2018  3:12 PM by Gregorio Ba NP     3/8/18 Right thoracentesis 1000ml removed             S/P TAVR (transcatheter aortic valve replacement) ICD-10-CM: Z95.2  ICD-9-CM: V43.3  1/30/2018 Yes        Hypoxia ICD-10-CM: R09.02  ICD-9-CM: 799.02 1/23/2018 Yes        Chronic diastolic congestive heart failure (HCC) (Chronic) ICD-10-CM: I50.32  ICD-9-CM: 428.32, 428.0  9/9/2016 Yes        Chronic atrial fibrillation (HCC) (Chronic) ICD-10-CM: I48.2  ICD-9-CM: 427.31  10/17/2011 Yes        Chronic obstructive pulmonary disease ([facility] [address] (Chronic) ICD-10-CM: J44.9  ICD-9-CM: 496  3/8/2009 Yes    Overview Signed 3/6/2018  3:19 PM by Myriam Tavares NP     PFTs Jan 2018                   Patient with hypercarbia and pulmonary edema. Clinically looks better. More awake. BP stable but on the borderline. Has been wearing bipap at night. Have been diuresing with lasix and aldactone. Plan:  (Medical Decision Making)   -unable to get repeat abg. Will just plan to continue current diuretic regimen (no I/O unfortunately. Holding on increasing with borderline BP)  -strict I/O requested. -cont bipap at night  -family not currently interested in comfort measures.    DNR      Anabella Leslie MD

## 2018-03-11 NOTE — PROGRESS NOTES
Taken off BIPAP at this time and placed on 2 lpm NC. Alert and states she wants to eat breakfast. Lung sounds clear in all fields. 2+ pitting edema noted to bilateral lower extremities. Denies pain or complaints. Bed exit alarm in place. Will monitor closely.

## 2018-03-12 PROBLEM — Z95.2 S/P TAVR (TRANSCATHETER AORTIC VALVE REPLACEMENT): Chronic | Status: ACTIVE | Noted: 2018-01-01

## 2018-03-12 PROBLEM — J90 PLEURAL EFFUSION, RIGHT: Status: ACTIVE | Noted: 2018-01-01

## 2018-03-12 PROBLEM — Z66 DNR (DO NOT RESUSCITATE): Chronic | Status: ACTIVE | Noted: 2018-01-01

## 2018-03-12 NOTE — PROGRESS NOTES
Alpha Lather  Admission Date: 3/8/2018             Daily Progress Note: 3/12/2018   77 y.o. CF admitted from the 9th floor rehab but has been hospitalized since 2/7/18.  She over the last few days had decompensated and has been requiring BIPAP.  Right tap 3/8 with 1000ml removed.  Post procedure BP dropped to the 70s and became more somnolent.  Discussion with her son and he states he understands the complexity and poor prognosis for his mother's current condition. Macie Sanchez states he does not want chest compressions, shocks or intubation with vent support--advanced to DNR status.      Medical history of recent TAVR 1/30/18, HTN, CAD, chronic AFIB, anemia, anxiety, and chronic diastolic heart failure. Had bilateral thoracentesis on Jan 25th (preop) removing 1200ml from Right and 900 ml from Left.  She had TAVR on Jan 30th and was discharged home on Feb 3.  She presented to the ER on 2/7/18 via EMS with worsening swelling and shortness of breath.   CXR with cardiomegaly, pulmonary edema and worsening bilateral pleural effusions.  Required bilateral thoracenteses again on 2/9:  removed 550 ml from Left and 1000 ml from Right. Was later transferred to the 9th floor for rehab.  On 9th floor cardiology followed adjusting diuretic therapy.  Was anemic,  stool was heme positive and Eliquis was stopped. Became more lethargic and confused and ABG showed hypercapnea.  Was started on bipap with improvement.  Palliative Care was consulted for goals of care.    Subjective:     On 2 lpm, frail. Blood pressure stable.   Using BiPAP Q HS    Review of Systems  Respiratory: positive for dyspnea on exertion    Current Facility-Administered Medications   Medication Dose Route Frequency    ciprofloxacin HCl (CIPRO) tablet 500 mg  500 mg Oral Q24H    diazePAM (VALIUM) tablet 2 mg  2 mg Oral QHS PRN    alum-mag hydroxide-simeth (MYLANTA) oral suspension 30 mL  30 mL Oral Q4H PRN    aspirin chewable tablet 81 mg  81 mg Oral DAILY    furosemide (LASIX) tablet 80 mg  80 mg Oral DAILY    potassium chloride (K-DUR, KLOR-CON) SR tablet 40 mEq  40 mEq Oral DAILY    magnesium oxide (MAG-OX) tablet 400 mg  400 mg Oral DAILY    alcohol 62% (NOZIN) nasal  1 Ampule  1 Ampule Topical Q12H    allopurinol (ZYLOPRIM) tablet 100 mg  100 mg Oral BID    hydrocortisone (ANUSOL-HC) 2.5 % rectal cream   PeriANAL TID PRN    levothyroxine (SYNTHROID) tablet 88 mcg  88 mcg Oral ACB    metoprolol succinate (TOPROL-XL) XL tablet 25 mg  25 mg Oral DAILY    pantoprazole (PROTONIX) tablet 40 mg  40 mg Oral ACB    polyethylene glycol (MIRALAX) packet 17 g  17 g Oral DAILY    pravastatin (PRAVACHOL) tablet 40 mg  40 mg Oral DAILY    rOPINIRole (REQUIP) tablet 2 mg  2 mg Oral BID    sertraline (ZOLOFT) tablet 100 mg  100 mg Oral DAILY    spironolactone (ALDACTONE) tablet 25 mg  25 mg Oral DAILY    levalbuterol (XOPENEX) nebulizer soln 0.63 mg/3 mL  0.63 mg Nebulization Q6H PRN    sodium chloride (NS) flush 5-10 mL  5-10 mL IntraVENous Q8H    sodium chloride (NS) flush 5-10 mL  5-10 mL IntraVENous PRN         Objective:     Vitals:    03/11/18 2355 03/12/18 0325 03/12/18 0353 03/12/18 0733   BP: 98/63  98/64 108/63   Pulse: 80  79 78   Resp: 20  20 20   Temp: 98.5 °F (36.9 °C)  98.4 °F (36.9 °C) 97.5 °F (36.4 °C)   SpO2: 98% 95% 97% 99%   Weight:   162 lb 6.4 oz (73.7 kg)      Intake and Output:   03/10 1901 - 03/12 0700  In: 715 [P.O.:715]  Out: 800 [Urine:800]       Physical Exam:          Constitutional: the patient is weak, cachexia   HEENT: Sclera clear, pupils equal, oral mucosa moist  Lungs: bilateral basilar crackles  Cardiovascular: RRR without M,G,R  Abd/GI: soft and non-tender; with positive bowel sounds. Ext: warm without cyanosis. There is no lower leg edema.   Musculoskeletal: moves all four extremities with equal strength  Skin: no jaundice or rashes, no wounds   Neuro: no gross neuro deficits       Lines/Drains: IV  Nutrition:cardiac    CHEST XRAY:       LAB    Recent Labs      03/11/18   0724  03/10/18   0614   NA  146*  146*   K  3.6  3.5   CL  102  100   CO2  39*  37*   GLU  103*  87   BUN  40*  40*   CREA  1.33*  1.35*         Assessment:     Patient Active Problem List   Diagnosis Code    Degenerative arthritis of left knee M17.12    Aortic Valve Bioprosthesis Present Z95.2    Chronic obstructive pulmonary disease (HCC) J44.9    Hypothyroidism E03.9    BOBBY (obstructive sleep apnea) G47.33    Chronic atrial fibrillation (HCC) I48.2    Anemia D64.9    Thrombocytopenia ([facility]) D69.6    Obesity E66.9    Mitral stenosis with insufficiency I05.2    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    Cardiac pacemaker Z95.0    Sick sinus syndrome ([facility]) I49.5    H/O mitral valve repair, 2003 Z98.890    Chronic depression F32.9    Osteopenia M85.80    RLS (restless legs syndrome) G25.81    Hyperlipidemia E78.5    Gout M10.9    Anxiety F41.9    CAD (coronary artery disease) I25.10    HTN (hypertension) I10    GERD (gastroesophageal reflux disease) K21.9    Chronic diastolic congestive heart failure (HCC) I50.32    Aortic valve replaced Z95.2    Pulmonary hypertension I27.20    H/O atrioventricular rubin ablation Z98.890    S/P mitral valve repair Z98.890    Tricuspid valve insufficiency I47.0    Systolic CHF, chronic ([facility]) I50.22    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 coli UTI N39.0, B96.20    DNR (do not resuscitate) Z66    Acute respiratory failure with hypoxia and hypercarbia ([facility]) J96.01, J96.02       Plan     Hospital Problems  Date Reviewed: 3/9/2018          Codes Class Noted POA    E. coli UTI ICD-10-CM: N39.0, B96.20  ICD-9-CM: 599.0, 041.49  3/8/2018 Yes    On cipro    DNR (do not resuscitate) (Chronic) ICD-10-CM: Z66  ICD-9-CM: V49.86  3/8/2018 Yes    appropriate    Acute respiratory failure with hypoxia and hypercarbia (HCC) ICD-10-CM: J96.01, J96.02  ICD-9-CM: 518.81  3/8/2018 Yes    On 2 lpm, BiPAP Q HS    Pleural effusion, bilateral ICD-10-CM: J90  ICD-9-CM: 511.9  3/6/2018 Yes    Overview Signed 3/8/2018  3:12 PM by Negro Zarate NP     3/8/18 Right thoracentesis 1000ml removed             S/P TAVR (transcatheter aortic valve replacement) (Chronic) ICD-10-CM: Z95.2  ICD-9-CM: V43.3  1/30/2018 Yes    1/30    Hypoxia ICD-10-CM: R09.02  ICD-9-CM: 799.02  1/23/2018 Yes    Acute     Chronic diastolic congestive heart failure (HCC) (Chronic) ICD-10-CM: I50.32  ICD-9-CM: 428.32, 428.0  9/9/2016 Yes        Chronic atrial fibrillation (HCC) (Chronic) ICD-10-CM: I48.2  ICD-9-CM: 427.31  10/17/2011 Yes        Chronic obstructive pulmonary disease ([address] (Chronic) ICD-10-CM: J44.9  ICD-9-CM: 496  3/8/2009 Yes    Overview Signed 3/6/2018  3:19 PM by She Ramos NP     PFTs Jan 2018                     -- on 2 lpm and BiPAP Q HS here. Little reserve with COPD and hypercapnic respiratory failure. -- order PT/OT, previously on 9th floor vs STR. TAVR on 1/30. CM consult for assistance  -- Cipro for UTI- D 2 oral abx  -- lasix 80 mg daily, aldactone  -- limited activity tolerance with multiple chronic co-morbidities    [facility], NP    More than 50% of time documented was spent in face-to-face contact with the patient and in the care of the patient on the floor/unit where the patient is located. Lungs: decreased sounds in the bases. No wheezing  Heart S1 and S2 audible, no murmers or rubs appreciated  Other     May benefit from b/l taps. She is not sure. Agrees to let me check but will want to confirm with sons to tap -- called number and not able to reach anyone and no answering message setup.   Spoke with Palliative care and aware may eventually need hospice care. I have spoken with and examined the patient. I have reviewed the history, examination, assessment, and plan and agree with the above. Maia Son MD      This note was signed electronically.

## 2018-03-12 NOTE — PROGRESS NOTES
Chart review for home NIPPV (Bi-Level):     Patient has ABG with PaCO2 of 81 was placed on bi-level 14/6 cmH2O with improvement in pCO2 down to 66.     BOBBY and CPAP treatment has been considered and ruled out. Due to severity of COPD which is the primary cause of Chronic Respiratory Failure and hypercapnia Bi-level therapy is sufficiently treating hypercapnia at this time . Patient would benefit from home Respiratory Assist Device  Bilevel unit.       Will need Sleep Oximetry on 2 lpm oxygen or home dose which ever is greater, only the night prior to discharge.

## 2018-03-12 NOTE — PROGRESS NOTES
Patient rested quietly throughout night on CPAP, calm and cooperative, up with assist to restroom, patient states she is feeling better today, denies needs, denies pain and discomfort, call light within reach, door open to monitor,.

## 2018-03-12 NOTE — PROGRESS NOTES
Date of Surgery Update:  Joanie Underwood was seen and examined. No change since seen earlier. Son here.  See next not    Signed By: Lupe Hawk MD     March 12, 2018 12:01 PM

## 2018-03-12 NOTE — PROGRESS NOTES
Patient transferred from 9th floor when her condition worsened last week. Her condition is now improved and discharge options can now be explored. Consult to 9th floor made for possible return to acute rehab. Case Management will follow for discharge planning.      Care Management Interventions  Transition of Care Consult (CM Consult): Discharge Planning  Discharge Durable Medical Equipment: No  Physical Therapy Consult: Yes  Occupational Therapy Consult: Yes  Speech Therapy Consult: No  Current Support Network: Relative's Home (Lives with her son.)  Confirm Follow Up Transport: Family  Plan discussed with Pt/Family/Caregiver: Yes  Freedom of Choice Offered: Yes  Discharge Location  Discharge Placement: Other:

## 2018-03-12 NOTE — PROGRESS NOTES
Palliative Care Progress Note    Patient: Jarad Burnett MRN: [id number]  SSN: [id number]    YOB: 1940  Age: 69 y.o. Sex: female       Assessment/Plan:     Chief Complaint/Interval History: Pleasantly confused, denies dyspnea despite tachypnea and pursed lip breathing     Principal Diagnosis:    · Debility, Unspecified  R53.81    Additional Diagnoses:   · Acute Respiratory Failure, Unspecified  J96.00  · Edema  R60.9  · Frailty  R54  · Counseling, Encounter for Medical Advice  Z71.9  · Encounter for Palliative Care  Z51.5    Palliative Performance Scale (PPS)  PPS: 60    Medical Decision Making:   Reviewed and summarized notes and events over the weekend. Discussed case with appropriate proiders: Dr. Holland Woodall laboratory and x-ray data: BMP    Patient resting in bed, no family present. Patient is pleasantly confused, oriented to person and year only. Reoriented patient. She denies dyspnea, despite tachypnea and pursed lip breathing. Per staff, patient short of breath with laying to sitting. Informed her of potential thoracentesis later today- and that it will be discussed with her son , Odessa Perez. Expressed worry that patient may not be able to participate in therapies like we would like. Reassured her of our ongoing care, and taking things one step at a time. She states \"that's all I can do\". 1130- Met with Odessa Perez while patient was having bilateral thoracenteses. Reviewed our conversation from Friday. Patient has made some improvements over the weekend. Discussed options given this improvement, including: STR, home with home health, home with hospice. Odessa Perez understands that heart failure decompensation is inevitable, and that she may not tolerate therapies. Agreed to see how patient does with PT/OT post thoracentesis and continue to watch things day by day.   Odessa Perez reviews that patient has had 4 thoracenteses since January, and feels that if she needed this again in the coming days, that would be a sign for him and his brother that she will not get better. Discussed with Dr. Zohra Gonsalez. Will discuss findings with members of the interdisciplinary team.         More than 50% of this 35 minute visit was spent counseling and coordination of care as outlined above. Subjective:     Review of Systems:  A comprehensive review of systems was negative except for:   Constitutional: Positive for fatigue. Objective:     Visit Vitals    BP (!) 88/46    Pulse 84    Temp 98.1 °F (36.7 °C)    Resp 18    Wt 73.7 kg (162 lb 6.4 oz)    SpO2 100%    BMI 31.72 kg/m2       Physical Exam:    General:  Cooperative. No acute distress. Eyes:  Conjunctivae/corneas clear. Nose: Nares normal. Septum midline. O2 via NC. Neck: Supple, symmetrical, trachea midline. Lungs:   Diminished to bases bilaterally with crackles, unlabored. Heart:  Regular rate and rhythm. Abdomen:   Soft, non-tender, non-distended   Extremities: Normal, atraumatic, no cyanosis. 1+ BLE edema. Skin: Skin color, texture, turgor normal. No rash. Neurologic: Nonfocal.   Psych: Alert and oriented to person and year only.      Signed By: Luis Felipe Fermin NP     March 12, 2018

## 2018-03-12 NOTE — PROGRESS NOTES
Spoke with son pre and post procedure today. Post procedure did drain 550 left and 1000 ml right and stopped due to low BP. Son and his daughter Gregoria Martin from [place]" aware of fragile nature of patient. Aware that may respond to current treatment but may not as well. Aware 4th time in 2 months to hospital and patient is confused and not able to make decision. He reports as does his daughter that if fails to rally this time, agrees with likely comfort measures. All questions answered    Noted the number on the boards is his cell number -- other number is his home number.       Rosa Alonzo MD

## 2018-03-12 NOTE — PROGRESS NOTES
Morning bedside rounding report received from Vera Vilchis.AMOS. Patient resting in bed with closed eyes, BiPap on. Easily arousable, demonstrating no signs of dyspnea or distress. Voices no concerns at this time. Will continue to monitor.

## 2018-03-12 NOTE — PROGRESS NOTES
Physical Therapy Note:    Orders received, chart reviewed for physical therapy evaluation. Per chart, patient's BP is 88/46. Will hold this date and attempt as patient is able and schedule allows.     Thank you,  PORFIRIO GeorgeT

## 2018-03-12 NOTE — PROCEDURES
US Guided Thoracentesis Procedure Note--UNILATERAL    Time Out -- Correct patient identified and Everyone Agrees. Procedure:   /Left Thoracentesis with ultrasound guidance    Indication:   Left Pleural effusion     Summary:    After informed consent was obtained, the patient was positioned upright in the usual fashion.  Ultrasound was used to identify the optimal spot for pleural drainage.     The left  intercostal space was anesthetized with 1% Lidocaine to achieve adequate anesthesia.  The pleural space was entered with pink/sightly red fluid identified.  Lidocaine was instilled within the pleural space as well.  A small stab incision was made at the site of local anesthesia and the thoracentesis catheter was advanced into the pleural space. Approximately 550 ml of fluid was obtained with ease.  The procedure was terminated after  pleural drainage stopped. Air was not aspirated during the procedure.  A bandaid was placed over the incision after adequate hemostasis was achieved.  A CXR is pending.     EBL -- one drop    Patient stable post procedure    No samples sent     Signed By: Pura Carolina MD

## 2018-03-12 NOTE — PROGRESS NOTES
Patient is resting quietly in bed, on O2 n/c, dyspnea with exertion, lung sounds diminished with crackles to bases, patient edematous to U and L extremities, patient alert to person, calm and cooperative, denies needs, call light within reach, door open to monitor.

## 2018-03-12 NOTE — PROCEDURES
US Guided Thoracentesis Procedure Note--UNILATERAL    Time Out -- Correct patient identified and Everyone Agrees. Procedure:  Right Thoracentesis with ultrasound guidance    Indication:  Right Pleural effusion     Summary:    After informed consent was obtained, the patient was positioned upright in the usual fashion.  Ultrasound was used to identify the optimal spot for pleural drainage.     The right   intercostal space was anesthetized with 1% Lidocaine to achieve adequate anesthesia.  The pleural space was entered with pink fluid identified.  Lidocaine was instilled within the pleural space as well.  A small stab incision was made at the site of local anesthesia and the thoracentesis catheter was advanced into the pleural space. Approximately 1000 ml of fluid was obtained with ease.  The procedure was terminated after lower BP in 80's but asymptomatic.  Air was not aspirated during the procedure.  A bandaid was placed over the incision after adequate hemostasis was achieved.  A CXR is pending.     EBL -- 4 drops    Patient stable post procedure    The pleural fluid will be sent for :cell count and cutlures     Signed By: Declan Chavis MD

## 2018-03-12 NOTE — PROGRESS NOTES
Chart review for home BiPAP- see April Finn King's RT note. Patient needs rehab vs Hospice. Will follow with disposition.

## 2018-03-12 NOTE — PROGRESS NOTES
Care Management Interventions  PCP Verified by CM: Yes  Transition of Care Consult (CM Consult): Other (transferred to medical floor )  Physical Therapy Consult: Yes  Occupational Therapy Consult: Yes  Speech Therapy Consult: Yes  Current Support Network: Relative's Home, Other (lives with son, Gregory Aschoff- [phone])  Confirm Follow Up Transport: Family  Plan discussed with Pt/Family/Caregiver: Yes  Freedom of Choice Offered: Yes  Discharge Location  Discharge Placement: Transferred to higher level of care [facility] - medical floor)    Late note: Pt discharged to acute floor on 3/8 due to medical status. Unable to participate in rehab. Son aware of transfer.

## 2018-03-12 NOTE — PROGRESS NOTES
[facility] CARDIOLOGY PROGRESS NOTE           3/12/2018 7:04 AM    Admit Date: 3/8/2018      Subjective:   Feels better    ROS:  GEN:  No fever or chills  Cardiovascular:  As noted above:no CP or palpitations. Pulmonary:SOB improved. As noted above  Neuro:  No new focal motor or sensory loss    Objective:      Vitals:    03/11/18 2248 03/11/18 2355 03/12/18 0325 03/12/18 0353   BP:  98/63  98/64   Pulse:  80  79   Resp:  20  20   Temp:  98.5 °F (36.9 °C)  98.4 °F (36.9 °C)   SpO2: 96% 98% 95% 97%   Weight:    73.7 kg (162 lb 6.4 oz)       Physical Exam:  General-NO DISTRESS  Neck- supple, no JVD  CV- regular rate and rhythm with 1/6 TERRI  Lung- clear bilaterally  Abd- soft, nontender, nondistended  Ext- 1-2+ edema bilaterally. Skin- warm and dry  Psychiatric:  Normal mood and affect. Neurologic:  Alert and oriented X 3      Data Review:   Recent Labs      03/11/18   0724  03/10/18   0614  03/09/18   0737   NA  146*  146*  148*   K  3.6  3.5  3.7   BUN  40*  40*  45*   CREA  1.33*  1.35*  1.40*   GLU  103*  87  89   WBC   --    --   5.0   HGB   --    --   8.7*   HCT   --    --   29.3*   PLT   --    --   103*       TELEMETRY:  N/A    Assessment/Plan:     Principal Problem:    Acute on chronic respiratory failure with hypercapnia ([facility] (3/6/2018): Improved. Active Problems:    Chronic obstructive pulmonary disease ([facility] (3/8/2009)      Overview: PFTs Jan 2018           Chronic diastolic congestive heart failure ([facility] [address] (9/9/2016): Improved. Tolerating po Lasix & Aldactone & Toprol. Hypoxia (1/23/2018)      S/P TAVR (transcatheter aortic valve replacement) (1/30/2018): Stable.       Pleural effusion, bilateral (3/6/2018)      Overview: 3/8/18 Right thoracentesis 1000ml removed      E. coli UTI (3/8/2018)      DNR (do not resuscitate) (3/8/2018)                      Evy Hodgson MD  3/12/2018 7:04 AM

## 2018-03-12 NOTE — PROGRESS NOTES
Pt sat up on side of bed for thoracentesis. Consent obtained. Time out performed. Pts vitals monitored throughout procedure. Left ultrasound done and pic taken of pleural fluid. ~550 ml pleural fluid from left. Right ultrasound done and pic taken of pleural fluid. ~ 1000 ml pleural fluid from right. Pt tolerated procedure well with no adverse rxn. Specimens sent to the lab x 1 (only Gram stain and C/S per Dr Kurt Garcia) and labeled appropriately. Site dressed appropriately and report given to pts RN. CXR ordered post procedure.

## 2018-03-13 NOTE — PROGRESS NOTES
[facility] CARDIOLOGY PROGRESS NOTE           3/13/2018 9:00 AM    Admit Date: 3/8/2018      Subjective:   She had large volume bilateral thoracentesis yesterday. Overall she appears a little improved. Using BIPAP at night with success. Hypotension has resolved. ROS:  Cardiovascular:  As noted above    Objective:      Vitals:    03/12/18 2346 03/13/18 0342 03/13/18 0435 03/13/18 0756   BP: 108/56 104/53  115/66   Pulse: 84 89  80   Resp: 16 16  22   Temp: 97.6 °F (36.4 °C) 98.3 °F (36.8 °C)  97.8 °F (36.6 °C)   SpO2: 100% 100%  100%   Weight:   75 kg (165 lb 4.8 oz)        Physical Exam:  General-No Acute Distress  Neck- supple, no JVD  CV- irregular rhythm no MRG  Lung- improved bilateral breath sounds. Abd- soft, nontender, nondistended  Ext- no edema bilaterally. Skin- warm and dry    Data Review:   Recent Labs      03/11/18   0724   NA  146*   K  3.6   BUN  40*   CREA  1.33*   GLU  103*       Assessment/Plan:     Active Problems:    Chronic obstructive pulmonary disease (HCC) (3/8/2009)          Chronic atrial fibrillation (HCC) (10/17/2011)          Chronic diastolic congestive heart failure ([address] (9/9/2016)          Hypoxia (1/23/2018)          S/P TAVR (transcatheter aortic valve replacement) (1/30/2018)          Pleural effusions - s/p bilateral thoracentsis. DNR (do not resuscitate) (3/8/2018)        Acute respiratory failure with hypoxia and hypercarbia (HCC) (3/8/2018)       CV stable, improved. Functional capacity is limited. She would benefit from rehab prior to    Discharge home. Monitor for recurrent pleural effusions.        Corinne Fish, MD  3/13/2018 9:00 AM

## 2018-03-13 NOTE — PROGRESS NOTES
Patient resting in bed with no complaints at this time. Patient is alert with confusion but no distress noted. IV Intact and patent with no s/s of infection noted. Respirations even and unlabored with heart rate regular. Patient unable to ambulate independently without assistance; needs x1 r/t weakness. Bed in low locked position with call light within reach. Patient instructed to call if assistance is needed. Will continue to monitor.

## 2018-03-13 NOTE — PROGRESS NOTES
Problem: Falls - Risk of  Goal: *Absence of Falls  Document Gregorio Fall Risk and appropriate interventions in the flowsheet.    Outcome: Progressing Towards Goal  Fall Risk Interventions:  Mobility Interventions: Assess mobility with egress test, OT consult for ADLs, Strengthening exercises (ROM-active/passive), Patient to call before getting OOB, PT Consult for mobility concerns, PT Consult for assist device competence, Communicate number of staff needed for ambulation/transfer    Mentation Interventions: Adequate sleep, hydration, pain control, More frequent rounding, Update white board, Reorient patient, Door open when patient unattended, Evaluate medications/consider consulting pharmacy, Eyeglasses and hearing aids    Medication Interventions: Evaluate medications/consider consulting pharmacy    Elimination Interventions: Patient to call for help with toileting needs, Call light in reach    History of Falls Interventions: Consult care management for discharge planning, Door open when patient unattended

## 2018-03-13 NOTE — PROGRESS NOTES
Pt requests to come off Bipap and placed back on oxygen. Resp even and unlabored at rest.  Major intact, SR up x 3, bed low locked. Door open. Call light within reach. No distress. Will monitor.

## 2018-03-13 NOTE — PROGRESS NOTES
Bahman Choudhary  Admission Date: 3/8/2018             Daily Progress Note: 3/13/2018   77 y.o. CF admitted from the 9th floor rehab but has been hospitalized since 2/7/18.  She over the last few days had decompensated and has been requiring BIPAP.  Right tap 3/8 with 1000ml removed.  Post procedure BP dropped to the 70s and became more somnolent.  Discussion with her son and he states he understands the complexity and poor prognosis for his mother's current condition. Kelly Lind states he does not want chest compressions, shocks or intubation with vent support--advanced to DNR status.      Medical history of recent TAVR 1/30/18, HTN, CAD, chronic AFIB, anemia, anxiety, and chronic diastolic heart failure. Had bilateral thoracentesis on Jan 25th (preop) removing 1200ml from Right and 900 ml from Left.  She had TAVR on Jan 30th and was discharged home on Feb 3.  She presented to the ER on 2/7/18 via EMS with worsening swelling and shortness of breath.   CXR with cardiomegaly, pulmonary edema and worsening bilateral pleural effusions.  Required bilateral thoracenteses again on 2/9:  removed 550 ml from Left and 1000 ml from Right. Was later transferred to the 9th floor for rehab.  On 9th floor cardiology followed adjusting diuretic therapy.  Was anemic,  stool was heme positive and Eliquis was stopped. Became more lethargic and confused and ABG showed hypercapnea.  Was started on bipap with improvement.  Palliative Care was consulted for goals of care.  Repeat T/C on 3/12 -- 550 and 1000 removed. Subjective:     S/P bilateral thoracenteses yesterday. Near baseline and 9th floor re-evaluating. Frail on NC and BiPAP Q HS. B/P improved this morning.  Continues to wax and wane    Review of Systems  Constitutional: negative for fevers, chills and sweats  Respiratory: positive for cough, sputum, + dyspnea on exertion    Current Facility-Administered Medications   Medication Dose Route Frequency    ciprofloxacin HCl (CIPRO) tablet 500 mg  500 mg Oral Q24H    diazePAM (VALIUM) tablet 2 mg  2 mg Oral QHS PRN    alum-mag hydroxide-simeth (MYLANTA) oral suspension 30 mL  30 mL Oral Q4H PRN    aspirin chewable tablet 81 mg  81 mg Oral DAILY    furosemide (LASIX) tablet 80 mg  80 mg Oral DAILY    potassium chloride (K-DUR, KLOR-CON) SR tablet 40 mEq  40 mEq Oral DAILY    magnesium oxide (MAG-OX) tablet 400 mg  400 mg Oral DAILY    alcohol 62% (NOZIN) nasal  1 Ampule  1 Ampule Topical Q12H    allopurinol (ZYLOPRIM) tablet 100 mg  100 mg Oral BID    hydrocortisone (ANUSOL-HC) 2.5 % rectal cream   PeriANAL TID PRN    levothyroxine (SYNTHROID) tablet 88 mcg  88 mcg Oral ACB    metoprolol succinate (TOPROL-XL) XL tablet 25 mg  25 mg Oral DAILY    pantoprazole (PROTONIX) tablet 40 mg  40 mg Oral ACB    polyethylene glycol (MIRALAX) packet 17 g  17 g Oral DAILY    pravastatin (PRAVACHOL) tablet 40 mg  40 mg Oral DAILY    rOPINIRole (REQUIP) tablet 2 mg  2 mg Oral BID    sertraline (ZOLOFT) tablet 100 mg  100 mg Oral DAILY    spironolactone (ALDACTONE) tablet 25 mg  25 mg Oral DAILY    levalbuterol (XOPENEX) nebulizer soln 0.63 mg/3 mL  0.63 mg Nebulization Q6H PRN    sodium chloride (NS) flush 5-10 mL  5-10 mL IntraVENous Q8H    sodium chloride (NS) flush 5-10 mL  5-10 mL IntraVENous PRN         Objective:     Vitals:    03/12/18 2346 03/13/18 0342 03/13/18 0435 03/13/18 0756   BP: 108/56 104/53  115/66   Pulse: 84 89  80   Resp: 16 16  22   Temp: 97.6 °F (36.4 °C) 98.3 °F (36.8 °C)  97.8 °F (36.6 °C)   SpO2: 100% 100%  100%   Weight:   165 lb 4.8 oz (75 kg)      Intake and Output:   03/11 1901 - 03/13 0700  In: 600 [P.O.:600]  Out: 1701 [Urine:1700]       Physical Exam:          Constitutional: the patient is elderly  HEENT: Sclera clear, pupils equal, oral mucosa moist  Lungs: clear bilaterally, on 4 lpm. [place]  Cardiovascular: RRR without M,G,R  Abd/GI: soft and non-tender; with positive bowel sounds.   Ext: warm without cyanosis. There is no lower leg edema.   Musculoskeletal: moves all four extremities with equal strength  Skin: no jaundice or rashes, no wounds   Neuro: no gross neuro deficits       Lines/Drains: IV  Nutrition: cardiac    CHEST XRAY:       LAB    Recent Labs      03/11/18   0724   NA  146*   K  3.6   CL  102   CO2  39*   GLU  103*   BUN  40*   CREA  1.33*     Assessment:     Patient Active Problem List   Diagnosis Code    Degenerative arthritis of left knee M17.12    Aortic Valve Bioprosthesis Present Z95.2    Chronic obstructive pulmonary disease (HCC) J44.9    Hypothyroidism E03.9    BOBBY (obstructive sleep apnea) G47.33    Chronic atrial fibrillation (HCC) I48.2    Anemia D64.9    Thrombocytopenia (HCC) D69.6    Obesity E66.9    Mitral stenosis with insufficiency I05.2    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    Cardiac pacemaker Z95.0    Sick sinus syndrome (HCC) I49.5    H/O mitral valve repair, 2003 Z98.890    Chronic depression F32.9    Osteopenia M85.80    RLS (restless legs syndrome) G25.81    Hyperlipidemia E78.5    Gout M10.9    Anxiety F41.9    CAD (coronary artery disease) I25.10    HTN (hypertension) I10    GERD (gastroesophageal reflux disease) K21.9    Chronic diastolic congestive heart failure (HCC) I50.32    Aortic valve replaced Z95.2    Pulmonary hypertension I27.20    H/O atrioventricular rubin ablation Z98.890    S/P mitral valve repair Z98.890    Tricuspid valve insufficiency J29.0    Systolic CHF, chronic ([facility]) I50.22    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 coli UTI N39.0, B96.20    DNR (do not resuscitate) Z66    Acute respiratory failure with hypoxia and hypercarbia (HCC) J96.01, J96.02    Pleural effusion, right [facility] Problems  Date Reviewed: 3/9/2018          Codes Class Noted POA    Pleural effusion, right ICD-10-CM: J90  ICD-9-CM: 511.9  3/12/2018 Unknown    S/P T/C    E. coli UTI ICD-10-CM: N39.0, B96.20  ICD-9-CM: 599.0, 041.49  3/8/2018 Yes    On cipro    DNR (do not resuscitate) (Chronic) ICD-10-CM: Z66  ICD-9-CM: V49.86  3/8/2018 Yes    Unchanged with multiple co-morbidities    Acute respiratory failure with hypoxia and hypercarbia (HCC) ICD-10-CM: J96.01, J96.02  ICD-9-CM: 518.81  3/8/2018 Yes        Pleural effusion, left ICD-10-CM: J90  ICD-9-CM: 511.9  3/6/2018 Yes    Overview Signed 3/8/2018  3:12 PM by Samaria Eaton NP     3/8/18 Right thoracentesis 1000ml removed             S/P TAVR (transcatheter aortic valve replacement) (Chronic) ICD-10-CM: Z95.2  ICD-9-CM: V43.3  1/30/2018 Yes        Hypoxia ICD-10-CM: R09.02  ICD-9-CM: 799.02  1/23/2018 Yes    At baseline     Chronic diastolic congestive heart failure (HCC) (Chronic) ICD-10-CM: I50.32  ICD-9-CM: 428.32, 428.0  9/9/2016 Yes        Chronic atrial fibrillation (HCC) (Chronic) ICD-10-CM: I48.2  ICD-9-CM: 427.31  10/17/2011 Yes        Chronic obstructive pulmonary disease ([facility] [address] (Chronic) ICD-10-CM: J44.9  ICD-9-CM: 496  3/8/2009 Yes    Overview Signed 3/6/2018  3:19 PM by Tomasa Mahan NP     PFTs Jan 2018                     -- On NC during the day and BiPAP Q HS. If unable to tolerate rehab, will likely need to go home with hospice. Little reserve with COPD and hypercapnic respiratory failure. Limited activity tolerance with multiple co-morbidities  -- order PT/OT, previously on 9th floor vs STR. TAVR on 1/30. CM consult for assistance  -- Cipro for UTI- D 3 oral abx  -- lasix 80 mg daily, aldactone.  watch B/P      Renuka Castellano NP    More than 50% of time documented was spent in face-to-face contact with the patient and in the care of the patient on the floor/unit where the patient is located. Lung: CTA b/l. NO wheezing. Heart S1 and S2 audible, no murmers or rubs appreciated  Other     Has food over chest today. Reports cannot swallow today. Unclear if she is accurate. Will engage speech to check. See discussion yesterday with family, if fails to turn around family will likely purse hospice care and avoid further testing. I have spoken with and examined the patient. I have reviewed the history, examination, assessment, and plan and agree with the above. Giovanni Couch MD      This note was signed electronically. Errors are unfortunately her likely due to dictation software.

## 2018-03-13 NOTE — PROGRESS NOTES
Patient stable with no complaints at this time. Patient is resting in bed with family at bedside for support. Call light within reach and patient instructed to call if assistance is needed.   Report to be given to oncoming RN 7p-7a

## 2018-03-13 NOTE — PROGRESS NOTES
Palliative Care Progress Note    Patient: Mik Briggs MRN: [id number]  SSN: [id number]    YOB: 1940  Age: 69 y.o. Sex: female       Assessment/Plan:     Chief Complaint/Interval History: Pleasantly confused. Up on bedside commode. Speak therapy & PT completed sessions today     Principal Diagnosis:    · Debility, Unspecified  R53.81    Additional Diagnoses:   · Acute Respiratory Failure, Unspecified  J96.00  · Edema  R60.9  · Frailty  R54  · Counseling, Encounter for Medical Advice  Z71.9  · Encounter for Palliative Care  Z51.5    Palliative Performance Scale (PPS)  PPS: 60    Medical Decision Making:   Reviewed and summarized notes and events this AM  Discussed case with appropriate proiders: PT/Speech  Reviewed laboratory and x-ray data: BMP    Patient up to bedside table. Awake & alert. Patient is pleasantly confused, oriented to person and year only. Speech therapy cleared the patient's swallowing of food/fluids. This patient has had multiple hospitalizations due to exacerbations but always seems to rebound, just like this visit. The overview trajectory is sloping downward as evidenced by effusions. Will refer to outpatient PC embedded in pulmonary in hopes of seeing her there. Notified office scheduled with request to have CHAPITO JESSICA appointment with palliative care team.  More than 50% of this 25 minute visit was spent counseling and coordination of care as outlined above. Subjective:     Review of Systems:  A comprehensive review of systems was negative except for:   Constitutional: Positive for fatigue. Objective:     Visit Vitals    /69    Pulse (!) 118    Temp 98.1 °F (36.7 °C)    Resp 18    Wt 75 kg (165 lb 4.8 oz)    SpO2 94%    BMI 32.28 kg/m2       Physical Exam:    General:  Cooperative. No acute distress. Working with PT & nursing team.   Eyes:  Conjunctivae/corneas clear. Nose: Nares normal. Septum midline. O2 via NC.    Neck: Supple, symmetrical, trachea midline. Lungs:   Diminished throughout. Heart:  Regular rate and rhythm. Abdomen:   Soft, non-tender, non-distended   Extremities: Normal, atraumatic, no cyanosis. 1+ BLE edema. Skin: Skin color, texture, turgor normal. No rash. Neurologic: Nonfocal.   Psych: Alert and oriented to person and year only.      Signed By: Lars Chambers NP     March 13, 2018

## 2018-03-13 NOTE — PROGRESS NOTES
Bedside report received from [facility], RN. Resp even and unlabored. No distress on 2L via NC. Son at bedside. Will monitor. Betito intact, SR up x 3, bed low locked. Call light within reach. Instructed to call should needs arise. PT voiced understanding. Will monitor.

## 2018-03-13 NOTE — PROGRESS NOTES
Speech language pathology: bedside swallow note: Initial Assessment and Discharge    NAME/AGE/GENDER: Kortney Mckeon is a 69 y.o. female  DATE: 3/13/2018  PRIMARY DIAGNOSIS: Pleural effusion [J90]  Procedure(s) (LRB):  ULTRASOUND (Bilateral)  THORACENTESIS (Bilateral) 1 Day Post-Op  ICD-10: Treatment Diagnosis: dysphagia; oropharyngeal R13.12  INTERDISCIPLINARY COLLABORATION: Registered Nurse  PRECAUTIONS/ALLERGIES: Adhesive tape; Benadryl [diphenhydramine hcl]; Demerol [meperidine]; Morphine; Sulfa (sulfonamide antibiotics); and Lorazepam ASSESSMENT:Based on the objective data described below, Ms. David Garcia presents with a swallow grossly within functional limits. Patient is oriented x2. Dementia with noted \"yes\" preference when answering questions. No overt signs/sx of aspiration with thin liquids via cup or serial swallows by straw. Prompt initiation of the swallow with adequate laryngeal elevation and clear vocal quality. Patient fed herself half a container of mixed consistencies and regular textures with fairly timely mastication and initiation of the swallow and adequate oral clearance. RN reports patient has been tolerating current diet without issues. Recommend continue cardiac regular textures/thin liquids. No further speech therapy is indicated at this time. ?????? ? ? This section established at most recent assessment??????????  PROBLEM LIST (Impairments causing functional limitations):  1. dysphagia  REHABILITATION POTENTIAL FOR STATED GOALS: n/a  PLAN OF CARE:   Patient will benefit from skilled intervention to address the following impairments.   RECOMMENDATIONS AND PLANNED INTERVENTIONS (Benefits and precautions of therapy have been discussed with the patient.):  · continue prescribed diet  MEDICATIONS:  · With liquid  COMPENSATORY STRATEGIES/MODIFICATIONS INCLUDING:  · Small sips and bites  OTHER RECOMMENDATIONS (including follow up treatment recommendations):   · n/a  RECOMMENDED DIET MODIFICATIONS DISCUSSED WITH:  · Nursing  · Patient  FREQUENCY/DURATION: Will not continue to follow patient to address above goals. RECOMMENDED REHABILITATION/EQUIPMENT: (at time of discharge pending progress): Due to the probability of continued deficits (see above) this patient will not likely need continued skilled speech therapy after discharge. SUBJECTIVE:   Pleasantly confused. History of Present Injury/Illness: Ms. Vitaly Warren  has a past medical history of Abnormal glucose (8/5/2016); Abscess (8/5/2016); Acute encephalopathy (7/8/2016); Acute renal failure ([address] (12/3/2009); Afib ([address] Anemia; Ankle swelling (8/5/2016); Anxiety (8/5/2016); Aortic Valve Bioprosthesis Present (3/7/2009); ARF (acute renal failure) ([address] (2/24/2010); Arthritis; Atopic dermatitis (8/5/2016); Atrial fibrillation ([address] (3/7/2009); Autonomic orthostatic hypotension (8/5/2016); AV block (10/17/2011); Back pain (8/5/2016); Bradycardia (Symptomatic) (11/7/2011); CAD (coronary artery disease); Cancer ([address] (1990); Cardiac pacemaker (11/2/2015); Cardiogenic shock ([address] (10/17/2011); 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 (8/5/2016); Cellulitis (8/5/2016); Chest pain (8/5/2016); Chronic atrial fibrillation ([address] (10/17/2011); Chronic depression (8/5/2016); Chronic obstructive pulmonary disease ([address] (3/8/2009); Chronic pain; CKD (chronic kidney disease) stage 3, GFR 30-59 ml/min (12/4/2009); Congestive heart failure (CHF) ([address] (11/2/2015); Degeneration of cervical intervertebral disc (8/5/2016); Degenerative arthritis of left knee (2/27/2009); Dehydration (8/5/2016); Diastolic heart failure ([address] Digoxin toxicity (2/24/2010); Disorder of sweat glands (8/5/2016); Diverticulosis of large intestine without diverticulitis (2015); Dyspnea (8/5/2016); Eczema (8/5/2016); Embolus of femoral artery ([facility] (12/4/2017); Epigastric abdominal pain (2/24/2010); GERD (gastroesophageal reflux disease); Gout (8/5/2016);  H/O mitral valve repair, 2003 (6/27/2016); Heart failure ([facility] colonic polyp (2015); Hyperkalemia (10/17/2011); Hyperlipidemia (8/5/2016); Hypertension; Hypokalemia (2/24/2010); Hypothyroidism (12/4/2009); Ill-defined condition; Knee pain (8/5/2016); Leg cramps (8/5/2016); Mitral stenosis with insufficiency (11/2/2015); Nausea and vomiting (2/24/2010); Obesity (11/2/2015); BOBBY (obstructive sleep apnea) (12/4/2009); Osteoarthritis (8/5/2016); Osteopenia (8/5/2016); Other long term (current) drug therapy (8/5/2016); Palpitations (11/2/2015); Rash (8/5/2016); Rectal bleeding (10/27/2011); Rectocele (2015); Recurrent depression ([facility] (1/4/2018); Rheumatic aortic stenosis (11/2/2015); Right hip pain (8/5/2016); RLS (restless legs syndrome) (8/5/2016); Sick sinus syndrome ([facility] (2/13/2016); Skin infection (8/5/2016); Tachycardia (6/27/2016); Thrombocytopenia, unspecified (8/22/2012); Thromboembolus ([facility] [address] Urticaria (8/5/2016); and Vertigo (8/5/2016). She also  has a past surgical history that includes hx appendectomy; hx cholecystectomy (2005); hx gyn (1981); hx mastectomy (1990); hx pacemaker; hx breast reconstruction; pr cardiac surg procedure unlist; hx orthopaedic; vascular surgery procedure unlist (Right, 02/20/2017); and pr chest surgery procedure unlisted. Present Symptoms: n/a  Pain Intensity 1: 0  Current Medications:   No current facility-administered medications on file prior to encounter. Current Outpatient Prescriptions on File Prior to Encounter   Medication Sig Dispense Refill    metoprolol succinate (TOPROL-XL) 25 mg XL tablet Take 1 Tab by mouth daily. 1 Tab 0    furosemide (LASIX) 80 mg tablet Take 1 Tab by mouth every twelve (12) hours. (Patient taking differently: Take 80 mg by mouth daily. ) 1 Tab 0    rOPINIRole (REQUIP) 2 mg tablet Take  by mouth two (2) times a day.  pravastatin (PRAVACHOL) 40 mg tablet Take 1 Tab by mouth daily.  Indications: hyperlipidemia 90 Tab 3    sertraline (ZOLOFT) 100 mg tablet Take 1 Tab by mouth daily. 90 Tab 2    diazePAM (VALIUM) 5 mg tablet Take 1 Tab by mouth daily. Max Daily Amount: 5 mg. (Patient taking differently: Take 2 mg by mouth daily.) 30 Tab 2    omeprazole (PRILOSEC) 20 mg capsule TAKE 1 CAPSULE BY MOUTH  DAILY 90 Cap 3    spironolactone (ALDACTONE) 25 mg tablet Take 1 Tab by mouth daily. 90 Tab 3    levothyroxine (SYNTHROID) 88 mcg tablet Take 1 Tab by mouth Daily (before breakfast). 90 Tab 3    allopurinol (ZYLOPRIM) 100 mg tablet Take 1 Tab by mouth two (2) times a day. 180 Tab 3    fluticasone (FLONASE) 50 mcg/actuation nasal spray 1 [place] by Both Nostrils route daily.  azelastine (ASTELIN) 137 mcg (0.1 %) nasal spray 1 [place] by Both Nostrils route two (2) times a day. Use in each nostril as directed 1 Bottle 0    polyethylene glycol (MIRALAX) 17 gram/dose powder Take 17 g by mouth daily.  loratadine (CLARITIN) 10 mg tablet Take 10 mg by mouth as needed.  glycerin, adult, (SUPPOSITORY ADULT) suppository Insert 1 Suppository into rectum as needed.  levalbuterol (XOPENEX) 1.25 mg/3 mL nebu 1.25 mg by Nebulization route.  hydrocortisone (ANUSOL-HC) 2.5 % rectal cream Apply small amount to hemorrhoids/perianal skin TID PRN 30 g 3    calcium carbonate (CALTREX) 600 mg (1,500 mg) tablet Take 600 mg by mouth nightly.  cholecalciferol (VITAMIN D3) 1,000 unit cap Take  by mouth daily.  CARBOXYMETHYLCELLULOS/GLYCERIN (REFRESH OPTIVE OP) Apply  to eye.  DOCUSATE SODIUM Take 1 Cap by mouth two (2) times a day.  OXYGEN-AIR DELIVERY SYSTEMS by Does Not Apply route. 2-2.5 lpm cont.  cyanocobalamin (VITAMIN B-12) 250 mcg tablet Take 1,000 mcg by mouth daily.  nitroglycerin (NITROSTAT) 0.4 mg SL tablet by SubLINGual route every five (5) minutes as needed for Chest Pain.  promethazine (PHENERGAN) 25 mg tablet Take 1 Tab by mouth every six (6) hours as needed.  30 Tab 3     Current Dietary Status:  regular      Social History/Home Situation: transferred from [facility] Rehabilitation facility  One/Two Story Residence: One story  Living Alone: No  Support Systems: Family member(s)  Patient Expects to be Discharged to[de-identified] Rehabilitation facility  Current DME Used/Available at Home: Oxygen, portable  OBJECTIVE:   Respiratory Status:        CXR Results: INTERVAL REDUCTION IN VOLUME OF BILATERAL PLEURAL EFFUSIONS WITHOUT  DEFINITE PNEUMOTHORAX OR OTHER SIGNIFICANT CHANGE FROM MARCH 11. MRI/CT Results: n/a  Oral Motor Structure/Speech:  Oral-Motor Structure/Motor Speech  Dentition: Upper & lower dentures  Lingual: No impairment    Cognitive and Communication Status:  Neurologic State: Alert;Confused  Orientation Level: Oriented to person;Oriented to place; Disoriented to time;Disoriented to situation  Cognition: Follows commands;Memory loss  Perception: Appears intact  Perseveration: No perseveration noted  Safety/Judgement: Fall prevention;Home safety    BEDSIDE SWALLOW EVALUATION  Oral Assessment:  Oral Assessment  Dentition: Upper & lower dentures  Lingual: No impairment  P.O. Trials:  Patient Position: upright in bed    The patient was given tsp to straw amounts of the following:   Consistency Presented: Puree; Solid; Thin liquid;Mixed consistency  How Presented: Self-fed/presented    ORAL PHASE:  Bolus Acceptance: No impairment  Bolus Formation/Control: No impairment  Propulsion: No impairment     Oral Residue: None    PHARYNGEAL PHASE:  Initiation of Swallow: No impairment  Laryngeal Elevation: Functional  Aspiration Signs/Symptoms: None  Vocal Quality: No impairment           Pharyngeal Phase Characteristics: No impairment, issues, or problems     OTHER OBSERVATIONS:  Rate/bite size: WNL   Endurance:  Questionable     Tool Used: Dysphagia Outcome and Severity Scale (GENE)    Score Comments   Normal Diet  [] 7 With no strategies or extra time needed   Functional Swallow  [x] 6 May have mild oral or pharyngeal delay Mild Dysphagia    [] 5 Which may require one diet consistency restricted (those who demonstrate penetration which is entirely cleared on MBS would be included)   Mild-Moderate Dysphagia  [] 4 With 1-2 diet consistencies restricted       Moderate Dysphagia  [] 3 With 2 or more diet consistencies restricted       Moderately Severe Dysphagia  [] 2 With partial PO strategies (trials with ST only)       Severe Dysphagia  [] 1 With inability to tolerate any PO safely          Score:  Initial: 6 Most Recent: X (Date: -- )   Interpretation of Tool: The Dysphagia Outcome and Severity Scale (GENE) is a simple, easy-to-use, 7-point scale developed to systematically rate the functional severity of dysphagia based on objective assessment and make recommendations for diet level, independence level, and type of nutrition. Score 7 6 5 4 3 2 1   Modifier CH CI CJ CK CL CM CN   ?  Swallowing:     - CURRENT STATUS: CI - 1%-19% impaired, limited or restricted    - GOAL STATUS:  CI - 1%-19% impaired, limited or restricted    - D/C STATUS:  CI - 1%-19% impaired, limited or restricted  [place] / [place] / Product Type: Medicare /     TREATMENT:    (In addition to Assessment/Re-Assessment sessions the following treatments were rendered)  Assessment/Reassessment only, no treatment provided today    __________________________________________________________________________________________________  Safety:   After treatment position/precautions:  · RN notified  · Upright in Bed    Total Treatment Duration:  Time In: 1026  Time Out: [address]

## 2018-03-13 NOTE — PROGRESS NOTES
Patient voices no concerns at this time. Patient is resting in bed watching TV. Call light within reach and patient instructed to call if assistance is needed. Will continue to monitor.

## 2018-03-13 NOTE — PROGRESS NOTES
Problem: Mobility Impaired (Adult and Pediatric)  Goal: *Acute Goals and Plan of Care (Insert Text)  STG:  (1.)Ms. Jordan Beyer will move from supine to sit and sit to supine  and roll side to side with MODIFIED INDEPENDENCE within 5 treatment day(s). (2.)Ms. Jordan Beyer will transfer from bed to chair and chair to bed with SUPERVISION using the least restrictive device within 5 treatment day(s). (3.)Ms. Jordan Beyer will ambulate with SUPERVISION for 50-75 feet with the least restrictive device within 5 treatment day(s). PHYSICAL THERAPY: Initial Assessment, Treatment Day: Day of Assessment, AM 3/13/2018  INPATIENT: Hospital Day: 6  [place] / [place] / Product Type: Medicare /      NAME/AGE/GENDER: Radha Reynoso is a 69 y.o. female   PRIMARY DIAGNOSIS: Pleural effusion [J90] <principal problem not specified> <principal problem not specified>  Procedure(s) (LRB):  ULTRASOUND (Bilateral)  THORACENTESIS (Bilateral)  1 Day Post-Op  ICD-10: Treatment Diagnosis:   · Generalized Muscle Weakness (M62.81)   Precaution/Allergies:  Adhesive tape; Benadryl [diphenhydramine hcl]; Demerol [meperidine]; Morphine; Sulfa (sulfonamide antibiotics); and Lorazepam      ASSESSMENT:     Ms. Jordan Beyer is a 69year old WF with an admitting diagnosis of Pleural effusion s/p thoracentesis. She is a transfer from the 9th floor and presents today sitting up in the bed agreeable to have therapy. She is pleasantly confused and states she is feeling \"pretty good\" today. She is on 3L of high flow O2 with her resting and exertion sats at 98%. Her BLE AROM is WFL. Supine to sit is SBA with good sitting balance on the edge of the bed. Sit to stand is CGA/SBA with good standing balance with use of the r/walker for BUE support. She ambulated 25' with the r/walker with CGA/SBA. Her gait is steady and controlled. She returned to the bed and was CGA/SBA for sit to supine.   She was cooperative and followed commands and demonstrates good functional mobility at this time. Ms. Merry Kessler would benefit from continued skilled PT to maintain and continue to progress mobility while in the hospital.      This section established at most recent assessment   PROBLEM LIST (Impairments causing functional limitations):  1. Decreased Strength  2. Decreased Activity Tolerance  3. Increased Shortness of Breath  4. Decreased Cognition   INTERVENTIONS PLANNED: (Benefits and precautions of physical therapy have been discussed with the patient.)  1. Bed Mobility  2. Gait Training  3. Therapeutic Activites  4. Therapeutic Exercise/Strengthening  5. Transfer Training     TREATMENT PLAN: Frequency/Duration: 3 times a week for duration of hospital stay  Rehabilitation Potential For Stated Goals: Good     RECOMMENDED REHABILITATION/EQUIPMENT: (at time of discharge pending progress): Due to the probability of continued deficits (see above) this patient will likely need continued skilled physical therapy after discharge. Equipment:    None at this time              HISTORY:   History of Present Injury/Illness (Reason for Referral):  Patient is a 69 y.o.  female admitted from the 9th floor rehab whee she has been cared for by Dr. Laura Sheppard. She over the last few days had decompensated and has been requiring BIPAP. Today a right tap was performed, BP dropped and became more somnolent. Past medical history of recent TAVR 1/30/18, HTN, CAD, chronic AFIB, anemia, anxiety, and chronic diastolic heart failure.  She underwent bilateral thoracentesis on Jan 25th (preop) removing 1200 mls from the right and 900 mls from the left.  She underwent the TAVR on Jan 30th.  She was discharged home on Feb 3 but presented to the ER on 2/6/18 via EMS with complaints of worsening swelling and shortness of breath.   Upon arrival to the ER, CXR shows cardiomegaly with finding consistent with pulmonary edema and worsening bilateral pleural effusions.  We saw her again and bilateral thoracenteses were repeated on 2/9 removing 550 mls from the left and 1000 mls from the right. She was later transferred to the 9th floor for rehab.  Alleen Lennox has been more lethargic and her family noticed that she seemed more confused so an ABG was obtained which showed hypercapnea.  She was started on bipap with improvement. Palliative Care was consulted for goals of care. Today she had right thoracentesis with 1000ml removed. She dropped BP to 70s and was placed on BIPAP. DNR status.     Past Medical History/Comorbidities:   Ms. Karen Li  has a past medical history of Abnormal glucose (8/5/2016); Abscess (8/5/2016); Acute encephalopathy (7/8/2016); Acute renal failure ([address] (12/3/2009); Afib ([address] Anemia; Ankle swelling (8/5/2016); Anxiety (8/5/2016); Aortic Valve Bioprosthesis Present (3/7/2009); ARF (acute renal failure) ([address] (2/24/2010); Arthritis; Atopic dermatitis (8/5/2016); Atrial fibrillation ([address] (3/7/2009); Autonomic orthostatic hypotension (8/5/2016); AV block (10/17/2011); Back pain (8/5/2016); Bradycardia (Symptomatic) (11/7/2011); CAD (coronary artery disease); Cancer ([address] (1990); Cardiac pacemaker (11/2/2015); Cardiogenic shock ([address] (10/17/2011); 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 (8/5/2016); Cellulitis (8/5/2016); Chest pain (8/5/2016); Chronic atrial fibrillation ([address] (10/17/2011); Chronic depression (8/5/2016); Chronic obstructive pulmonary disease ([address] (3/8/2009); Chronic pain; CKD (chronic kidney disease) stage 3, GFR 30-59 ml/min (12/4/2009); Congestive heart failure (CHF) ([address] (11/2/2015); Degeneration of cervical intervertebral disc (8/5/2016); Degenerative arthritis of left knee (2/27/2009); Dehydration (8/5/2016); Diastolic heart failure ([facility] Digoxin toxicity (2/24/2010); Disorder of sweat glands (8/5/2016); Diverticulosis of large intestine without diverticulitis (2015); Dyspnea (8/5/2016); Eczema (8/5/2016);  Embolus of femoral artery ([facility] (12/4/2017); Epigastric abdominal pain (2/24/2010); GERD (gastroesophageal reflux disease); Gout (8/5/2016); H/O mitral valve repair, 2003 (6/27/2016); Heart failure ([address] colonic polyp (2015); Hyperkalemia (10/17/2011); Hyperlipidemia (8/5/2016); Hypertension; Hypokalemia (2/24/2010); Hypothyroidism (12/4/2009); Ill-defined condition; Knee pain (8/5/2016); Leg cramps (8/5/2016); Mitral stenosis with insufficiency (11/2/2015); Nausea and vomiting (2/24/2010); Obesity (11/2/2015); BOBBY (obstructive sleep apnea) (12/4/2009); Osteoarthritis (8/5/2016); Osteopenia (8/5/2016); Other long term (current) drug therapy (8/5/2016); Palpitations (11/2/2015); Rash (8/5/2016); Rectal bleeding (10/27/2011); Rectocele (2015); Recurrent depression ([address] (1/4/2018); 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 Ms. Supa Davila  has a past surgical history that includes hx appendectomy; hx cholecystectomy (2005); hx gyn (1981); hx mastectomy (1990); hx pacemaker; hx breast reconstruction; pr cardiac surg procedure unlist; hx orthopaedic; vascular surgery procedure unlist (Right, 02/20/2017); and pr chest surgery procedure unlisted. Social History/Living Environment:   Home Environment: Rehabilitation facility  One/Two Story Residence: One story  Living Alone: No  Support Systems: Family member(s)  Patient Expects to be Discharged to[de-identified] Rehabilitation facility  Current DME Used/Available at Home: Oxygen, portable  Prior Level of Function/Work/Activity:  Patient has been on the 9th floor in rehab and has been ambulating with a r/walker for 79' with SBA and O2.        Number of Personal Factors/Comorbidities that affect the Plan of Care: 3+: HIGH COMPLEXITY   EXAMINATION:   Most Recent Physical Functioning:   Gross Assessment:  AROM: Generally decreased, functional  PROM: Generally decreased, functional  Strength: Generally decreased, functional  Coordination: Generally decreased, functional  Tone: Normal  Sensation: Intact               Posture:  Posture Assessment: Forward head, Kyphosis, Rounded shoulders  Balance:  Sitting: Intact  Sitting - Static: Good (unsupported)  Sitting - Dynamic: Fair (occasional)  Standing: Impaired  Standing - Static: Good;Constant support  Standing - Dynamic : Fair  Standing - Dynamic : Impaired Bed Mobility:  Rolling: Modified independent  Supine to Sit: Stand-by assistance  Sit to Supine: Stand-by assistance  Scooting: Minimum assistance  Wheelchair Mobility:     Transfers:  Sit to Stand: Contact guard assistance/Stand by Assist  Stand to Sit: Contact guard assistance/Stand by Assist  Gait:     Base of Support: Widened  Speed/Carol: Slow  Step Length: Left shortened;Right shortened  Distance (ft): 25 Feet (ft)  Assistive Device: Walker, rolling  Ambulation - Level of Assistance: Contact guard assistance/Stand by Assist  Interventions: Safety awareness training      Body Structures Involved:  1. Lungs  2. Metabolic  3. Endocrine Body Functions Affected:  1. Respiratory  2. Movement Related  3. Metobolic/Endocrine  4. Cognitive confusion Activities and Participation Affected:  1. General Tasks and Demands  2. Mobility  3. Self Care   Number of elements that affect the Plan of Care: 3: MODERATE COMPLEXITY   CLINICAL PRESENTATION:   Presentation: Evolving clinical presentation with changing clinical characteristics: MODERATE COMPLEXITY   CLINICAL DECISION MAKIN [facility] Inpatient Short Form  How much difficulty does the patient currently have. .. Unable A Lot A Little None   1. Turning over in bed (including adjusting bedclothes, sheets and blankets)? [] 1   [] 2   [] 3   [x] 4   2.   Sitting down on and standing up from a chair with arms ( e.g., wheelchair, bedside commode, etc.)   [] 1   [] 2   [x] 3   [] 4   3. Moving from lying on back to sitting on the side of the bed? [] 1   [] 2   [] 3   [x] 4   How much help from another person does the patient currently need. .. Total A Lot A Little None   4. Moving to and from a bed to a chair (including a wheelchair)? [] 1   [] 2   [x] 3   [] 4   5. Need to walk in hospital room? [] 1   [] 2   [x] 3   [] 4   6. Climbing 3-5 steps with a railing? [] 1   [] 2   [x] 3   [] 4   © 2007, Trustees of [address] [place] [address] [address] [id number], under license to [employer]. All rights reserved      Score:  Initial: 20 Most Recent: X (Date: -- )    Interpretation of Tool:  Represents activities that are increasingly more difficult (i.e. Bed mobility, Transfers, Gait). Score 24 23 22-20 19-15 14-10 9-7 6     Modifier CH CI CJ CK CL CM CN      ? Mobility - Walking and Moving Around:     - CURRENT STATUS: CJ - 20%-39% impaired, limited or restricted    - GOAL STATUS: CI - 1%-19% impaired, limited or restricted    - D/C STATUS:  ---------------To be determined---------------  [place] / Product Type: Medicare /      Medical Necessity:     · Patient is expected to demonstrate progress in strength, balance and functional technique to increase independence with bed mobility, transfers and gait with r/walker and O2. Reason for Services/Other Comments:  · Patient continues to require skilled intervention due to medical complications. Use of outcome tool(s) and clinical judgement create a POC that gives a: Questionable prediction of patient's progress: MODERATE COMPLEXITY            TREATMENT:   (In addition to Assessment/Re-Assessment sessions the following treatments were rendered)   Pre-treatment Symptoms/Complaints:  Patient had no complaints of pain during the PT assessment today.   She was more confused but cooperative during conversation. Pain: Initial:   Pain Intensity 1: 0  Post Session:  0/10     Assessment/Reassessment only, no treatment provided today    Braces/Orthotics/Lines/Etc:   · O2 Device: Nasal cannula High flow 3L with resting and exertion sats at 98%. Treatment/Session Assessment:    · Response to Treatment:  Patient participated and tolerated therapy well today. · Interdisciplinary Collaboration:   o Physical Therapist  o Registered Nurse  · After treatment position/precautions:   o Supine in bed  o Bed alarm/tab alert on  o Bed/Chair-wheels locked  o Bed in low position  o Call light within reach  o RN notified   · Compliance with Program/Exercises: Will assess as treatment progresses. · Recommendations/Intent for next treatment session: \"Next visit will focus on advancements to more challenging activities and reduction in assistance provided\".   Total Treatment Duration:  PT Patient Time In/Time Out  Time In: 0945  Time Out: 1804 Dennis Jennings

## 2018-03-14 NOTE — PROGRESS NOTES
PM&R Consult Progress Note      Patient: Ricardo Rivera Date: 3/8/2018  Admit Diagnosis: Pleural effusion [J90]  Recommendations: Continue Acute Rehab Program, Coordination of rehab/medical care, Counseling of PM & R care issues management  -not appropriate for IRC due to poor endurance and exercise tolerance. However, she is CGA with mobility and can ambulate 60ft with RW. Fatigues quickly. Would be appropriate, from a rehab standpoint, to dc home with [facility] vs home hospice due to compromised pulm/cardiac fxn.  -this was d/w Dr. Prince Gold. pts son was NOT present  History/Subjective/Complaint:     Patient seen and examined. Records reviewed. Sleepy and confused.      Pain 1  Pain Scale 1: Numeric (0 - 10) (03/14/18 0956)  Pain Intensity 1: 0 (03/14/18 0956)  Patient Stated Pain Goal: 0 (03/14/18 0745)  Pain Reassessment 1: Patient sleeping (03/12/18 0303)     Objective:     Vitals:  Patient Vitals for the past 8 hrs:   BP Temp Pulse Resp SpO2   03/14/18 0804 111/44 98.1 °F (36.7 °C) 84 20 95 %      Intake and Output:  03/12 1901 - 03/14 0700  In: 360 [P.O.:360]  Out: 1100 [Urine:1100]    Allergies   Allergen Reactions    Adhesive Tape Rash    Benadryl [Diphenhydramine Hcl] Other (comments)     Jitters      Demerol [Meperidine] Anxiety    Morphine Other (comments)     Confusion    Sulfa (Sulfonamide Antibiotics) Anxiety    Lorazepam Anxiety     Current Facility-Administered Medications   Medication Dose Route Frequency    ciprofloxacin HCl (CIPRO) tablet 500 mg  500 mg Oral Q12H    diazePAM (VALIUM) tablet 2 mg  2 mg Oral QHS PRN    alum-mag hydroxide-simeth (MYLANTA) oral suspension 30 mL  30 mL Oral Q4H PRN    aspirin chewable tablet 81 mg  81 mg Oral DAILY    furosemide (LASIX) tablet 80 mg  80 mg Oral DAILY    potassium chloride (K-DUR, KLOR-CON) SR tablet 40 mEq  40 mEq Oral DAILY    magnesium oxide (MAG-OX) tablet 400 mg  400 mg Oral DAILY    alcohol 62% (NOZIN) nasal  1 Ampule  1 Ampule Topical Q12H    allopurinol (ZYLOPRIM) tablet 100 mg  100 mg Oral BID    hydrocortisone (ANUSOL-HC) 2.5 % rectal cream   PeriANAL TID PRN    levothyroxine (SYNTHROID) tablet 88 mcg  88 mcg Oral ACB    metoprolol succinate (TOPROL-XL) XL tablet 25 mg  25 mg Oral DAILY    pantoprazole (PROTONIX) tablet 40 mg  40 mg Oral ACB    polyethylene glycol (MIRALAX) packet 17 g  17 g Oral DAILY    pravastatin (PRAVACHOL) tablet 40 mg  40 mg Oral DAILY    rOPINIRole (REQUIP) tablet 2 mg  2 mg Oral BID    sertraline (ZOLOFT) tablet 100 mg  100 mg Oral DAILY    spironolactone (ALDACTONE) tablet 25 mg  25 mg Oral DAILY    levalbuterol (XOPENEX) nebulizer soln 0.63 mg/3 mL  0.63 mg Nebulization Q6H PRN    sodium chloride (NS) flush 5-10 mL  5-10 mL IntraVENous Q8H    sodium chloride (NS) flush 5-10 mL  5-10 mL IntraVENous PRN       Physical Exam:  NAD  CV rrr no m/g/r  LUNGS; diminished bs throughout, judy at bases  ABD; soft ntnd bs +  EXT; no edema    Incision(s)/Wound(s):       [REMOVED] Wound Leg Right (Removed)   Removed 02/03/18 1758   Number of days:348       [REMOVED] Wound Groin (Removed)   Removed 02/03/18 1758   Number of days:4       [REMOVED] Wound Groin Left;Right (Removed)   Removed 02/03/18 1758   Number of days:4          Functional Assessment:  Gross Assessment  AROM: Generally decreased, functional (03/13/18 1000)  PROM: Generally decreased, functional (03/13/18 1000)  Strength: Generally decreased, functional (03/13/18 1000)  Coordination: Generally decreased, functional (03/13/18 1000)  Tone: Normal (03/13/18 1000)  Sensation: Intact (03/13/18 1000)     Gait  Base of Support: Widened (03/14/18 1000)  Speed/Carol: Slow (03/14/18 1000)  Step Length: Left shortened;Right shortened (03/14/18 1000)  Ambulation - Level of Assistance: Contact guard assistance;Stand-by assistance (03/14/18 1000)  Distance (ft): 60 Feet (ft) (03/14/18 1000)  Assistive Device: Walker, rolling (3L of O2) (03/14/18 1000)  Interventions: Safety awareness training (03/14/18 1000)     Bed Mobility  Rolling: Modified independent (03/13/18 1000)  Supine to Sit: Stand-by assistance (03/13/18 1000)  Sit to Supine: Stand-by assistance (03/13/18 1000)  Scooting: Minimum assistance (03/13/18 1000)     Balance  Sitting: Intact (03/14/18 1000)  Sitting - Static: Good (unsupported) (03/14/18 1000)  Sitting - Dynamic: Good (unsupported) (03/14/18 1000)  Standing: Impaired (03/14/18 1000)  Standing - Static: Good;Constant support (03/14/18 1000)  Standing - Dynamic : Fair (03/14/18 1000)  Standing - Dynamic : Impaired (03/14/18 1000)                       Bed/Mat Mobility  Rolling: Modified independent (03/13/18 1000)  Supine to Sit: Stand-by assistance (03/13/18 1000)  Sit to Supine: Stand-by assistance (03/13/18 1000)  Sit to Stand: Contact guard assistance (03/14/18 1000)  Bed to Chair: Contact guard assistance (03/14/18 1000)  Scooting: Minimum assistance (03/13/18 1000)     Labs/Studies:  Recent Results (from the past 72 hour(s))   CULTURE, BODY FLUID W GRAM STAIN    Collection Time: 03/12/18 11:45 AM   Result Value Ref Range    Special Requests: NO SPECIAL REQUESTS      GRAM STAIN NO DEFINITE ORGANISM SEEN      GRAM STAIN 2 TO 10 WBC'S PER OIF     Culture result: NO GROWTH 2 DAYS     CBC W/O DIFF    Collection Time: 03/14/18  6:41 AM   Result Value Ref Range    WBC 5.1 4.3 - 11.1 K/uL    RBC 2.85 (L) 4.05 - 5.25 M/uL    HGB 8.4 (L) 11.7 - 15.4 g/dL    HCT 27.8 (L) 35.8 - 46.3 %    MCV 97.5 79.6 - 97.8 FL    MCH 29.5 26.1 - 32.9 PG    MCHC 30.2 (L) 31.4 - 35.0 g/dL    RDW 18.1 (H) 11.9 - 14.6 %    PLATELET 149 (L) 292 - 450 K/uL    MPV 11.3 10.8 - 14.1 FL   MAGNESIUM    Collection Time: 03/14/18  6:41 AM   Result Value Ref Range    Magnesium 2.4 1.8 - 2.4 mg/dL   METABOLIC PANEL, BASIC    Collection Time: 03/14/18  6:41 AM   Result Value Ref Range    Sodium 146 (H) 136 - 145 mmol/L    Potassium 4.0 3.5 - 5.1 mmol/L    Chloride 102 98 - 107 mmol/L    CO2 38 (H) 21 - 32 mmol/L    Anion gap 6 (L) 7 - 16 mmol/L    Glucose 81 65 - 100 mg/dL    BUN 36 (H) 8 - 23 MG/DL    Creatinine 1.12 (H) 0.6 - 1.0 MG/DL    GFR est AA >60 >60 ml/min/1.73m2    GFR est non-AA 50 (L) >60 ml/min/1.73m2    Calcium 8.4 8.3 - 10.4 MG/DL   PHOSPHORUS    Collection Time: 03/14/18  6:41 AM   Result Value Ref Range    Phosphorus 3.2 2.3 - 3.7 MG/DL        Assessment:     Active Problems:    Chronic obstructive pulmonary disease (HCC) (3/8/2009)      Overview: PFTs Jan 2018            Chronic atrial fibrillation (HCC) (10/17/2011)      Chronic diastolic congestive heart failure (HCC) (9/9/2016)      Hypoxia (1/23/2018)      S/P TAVR (transcatheter aortic valve replacement) (1/30/2018)      Pleural effusion, left (3/6/2018)      Overview: 3/8/18 Right thoracentesis 1000ml removed      E. coli UTI (3/8/2018)      DNR (do not resuscitate) (3/8/2018)      Acute respiratory failure with hypoxia and hypercarbia (HCC) (3/8/2018)      Pleural effusion, right (3/12/2018)        Plan:     Recommendations: Continue Acute Rehab Program  Coordination of rehab/medical care  Counseling of PM & R care issues management  Monitoring and management of medical conditions per plan of care/orders  Discussion with Family/Caregiver/Staff  Reviewed Therapies/Labs/Medications/Records    Signed By:  Kavita Castaneda MD     March 14, 2018

## 2018-03-14 NOTE — PROGRESS NOTES
Problem: Mobility Impaired (Adult and Pediatric)  Goal: *Acute Goals and Plan of Care (Insert Text)  STG:  (1.)Ms. Elizabeth Khan will move from supine to sit and sit to supine  and roll side to side with MODIFIED INDEPENDENCE within 5 treatment day(s). (2.)Ms. Elizabeth Khan will transfer from bed to chair and chair to bed with SUPERVISION using the least restrictive device within 5 treatment day(s). (3.)Ms. Elizabeth Khan will ambulate with SUPERVISION for 50-75 feet with the least restrictive device within 5 treatment day(s). (4.) Added gait goal for increased distance 150 - 200' with least restrictive device. PHYSICAL THERAPY: Daily Note, Treatment Day: 1st, AM 3/14/2018  INPATIENT: Hospital Day: 7  [place] / Product Type: Medicare /      NAME/AGE/GENDER: Aicha Antunez is a 69 y.o. female   PRIMARY DIAGNOSIS: Pleural effusion [J90] <principal problem not specified> <principal problem not specified>  Procedure(s) (LRB):  ULTRASOUND (Bilateral)  THORACENTESIS (Bilateral)  2 Days Post-Op  ICD-10: Treatment Diagnosis:   · Generalized Muscle Weakness (M62.81)   Precaution/Allergies:  Adhesive tape; Benadryl [diphenhydramine hcl]; Demerol [meperidine]; Morphine; Sulfa (sulfonamide antibiotics); and Lorazepam      ASSESSMENT:     Ms. Elizabeth Khan present sitting in the recliner chair with her granddaughter at the bedside both agreeable to have therapy. Patient remains pleasantly confused but able to participate in BLE AROM strength exercises from the recliner. She remains on 3L O2 with O2 sats at 100%. Sit to stand is CGA and patient practiced marching in place 10 step with O2 sats remaining at 100%. Patient ambulated 61' with r/walker and 3L of O2 with CGA/SBA. Her gait is steady and slow with good safety. She manages the r/walker well during gait. She returned to the recliner and was positioned for comfort. Physically the patient is functioning and progressing well.   She was less talkative today during therapy with family at the bedside providing care and support. This section established at most recent assessment   PROBLEM LIST (Impairments causing functional limitations):  1. Decreased Strength  2. Decreased Activity Tolerance  3. Increased Shortness of Breath  4. Decreased Cognition   INTERVENTIONS PLANNED: (Benefits and precautions of physical therapy have been discussed with the patient.)  1. Bed Mobility  2. Gait Training  3. Therapeutic Activites  4. Therapeutic Exercise/Strengthening  5. Transfer Training     TREATMENT PLAN: Frequency/Duration: 3 times a week for duration of hospital stay  Rehabilitation Potential For Stated Goals: Good     RECOMMENDED REHABILITATION/EQUIPMENT: (at time of discharge pending progress): Due to the probability of continued deficits (see above) this patient will likely need continued skilled physical therapy after discharge. Equipment:    None at this time              HISTORY:   History of Present Injury/Illness (Reason for Referral):  Patient is a 69 y.o.  female admitted from the 9th floor rehab whee she has been cared for by Dr. Alicia Reyes. She over the last few days had decompensated and has been requiring BIPAP. Today a right tap was performed, BP dropped and became more somnolent. Past medical history of recent TAVR 1/30/18, HTN, CAD, chronic AFIB, anemia, anxiety, and chronic diastolic heart failure.  She underwent bilateral thoracentesis on Jan 25th (preop) removing 1200 mls from the right and 900 mls from the left.  She underwent the TAVR on Jan 30th.  She was discharged home on Feb 3 but presented to the ER on 2/6/18 via EMS with complaints of worsening swelling and shortness of breath.   Upon arrival to the ER, CXR shows cardiomegaly with finding consistent with pulmonary edema and worsening bilateral pleural effusions.  We saw her again and bilateral thoracenteses were repeated on 2/9 removing 550 mls from the left and 1000 mls from the right. She was later transferred to the 9th floor for rehab.  Lars Cho has been more lethargic and her family noticed that she seemed more confused so an ABG was obtained which showed hypercapnea.  She was started on bipap with improvement. Palliative Care was consulted for goals of care. Today she had right thoracentesis with 1000ml removed. She dropped BP to 70s and was placed on BIPAP. DNR status.     Past Medical History/Comorbidities:   Ms. Dominic Alfaro  has a past medical history of Abnormal glucose (8/5/2016); Abscess (8/5/2016); Acute encephalopathy (7/8/2016); Acute renal failure ([address] (12/3/2009); Afib ([address] Anemia; Ankle swelling (8/5/2016); Anxiety (8/5/2016); Aortic Valve Bioprosthesis Present (3/7/2009); ARF (acute renal failure) ([address] (2/24/2010); Arthritis; Atopic dermatitis (8/5/2016); Atrial fibrillation ([address] (3/7/2009); Autonomic orthostatic hypotension (8/5/2016); AV block (10/17/2011); Back pain (8/5/2016); Bradycardia (Symptomatic) (11/7/2011); CAD (coronary artery disease); Cancer ([address] (1990); Cardiac pacemaker (11/2/2015); Cardiogenic shock ([address] (10/17/2011); 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 (8/5/2016); Cellulitis (8/5/2016); Chest pain (8/5/2016); Chronic atrial fibrillation ([address] (10/17/2011); Chronic depression (8/5/2016); Chronic obstructive pulmonary disease ([address] (3/8/2009); Chronic pain; CKD (chronic kidney disease) stage 3, GFR 30-59 ml/min (12/4/2009); Congestive heart failure (CHF) ([address] (11/2/2015); Degeneration of cervical intervertebral disc (8/5/2016); Degenerative arthritis of left knee (2/27/2009); Dehydration (8/5/2016); Diastolic heart failure ([facility] Digoxin toxicity (2/24/2010); Disorder of sweat glands (8/5/2016); Diverticulosis of large intestine without diverticulitis (2015); Dyspnea (8/5/2016); Eczema (8/5/2016); Embolus of femoral artery ([facility] (12/4/2017); Epigastric abdominal pain (2/24/2010); GERD (gastroesophageal reflux disease);  Gout (8/5/2016); H/O mitral valve repair, 2003 (6/27/2016); Heart failure ([facility] colonic polyp (2015); Hyperkalemia (10/17/2011); Hyperlipidemia (8/5/2016); Hypertension; Hypokalemia (2/24/2010); Hypothyroidism (12/4/2009); Ill-defined condition; Knee pain (8/5/2016); Leg cramps (8/5/2016); Mitral stenosis with insufficiency (11/2/2015); Nausea and vomiting (2/24/2010); Obesity (11/2/2015); BOBBY (obstructive sleep apnea) (12/4/2009); Osteoarthritis (8/5/2016); Osteopenia (8/5/2016); Other long term (current) drug therapy (8/5/2016); Palpitations (11/2/2015); Rash (8/5/2016); Rectal bleeding (10/27/2011); Rectocele (2015); Recurrent depression ([facility] (1/4/2018); Rheumatic aortic stenosis (11/2/2015); Right hip pain (8/5/2016); RLS (restless legs syndrome) (8/5/2016); Sick sinus syndrome ([address] (2/13/2016); Skin infection (8/5/2016); Tachycardia (6/27/2016); Thrombocytopenia, unspecified (8/22/2012); Thromboembolus ([facility] Urticaria (8/5/2016); and Vertigo (8/5/2016). Ms. Pipe Pereira  has a past surgical history that includes hx appendectomy; hx cholecystectomy (2005); hx gyn (1981); hx mastectomy (1990); hx pacemaker; hx breast reconstruction; pr cardiac surg procedure unlist; hx orthopaedic; vascular surgery procedure unlist (Right, 02/20/2017); and pr chest surgery procedure unlisted. Social History/Living Environment:   Home Environment: Rehabilitation facility  One/Two Story Residence: One story  Living Alone: No  Support Systems: Family member(s)  Patient Expects to be Discharged to[de-identified] Rehabilitation facility  Current DME Used/Available at Home: Oxygen, portable  Prior Level of Function/Work/Activity:  Patient has been on the 9th floor in rehab and has been ambulating with a r/walker for 79' with SBA and O2.        Number of Personal Factors/Comorbidities that affect the Plan of Care: 3+: HIGH COMPLEXITY   EXAMINATION:   Most Recent Physical Functioning:   Gross Assessment:  AROM: Generally decreased, functional  PROM: Generally decreased, functional  Strength: Generally decreased, functional  Coordination: Generally decreased, functional  Tone: Normal  Sensation: Intact               Posture:  Posture Assessment: Forward head, Kyphosis, Rounded shoulders  Balance:  Sitting: Intact  Sitting - Static: Good (unsupported)  Sitting - Dynamic: Good (unsupported)  Standing: Impaired  Standing - Static: Good;Constant support  Standing - Dynamic : Fair  Standing - Dynamic : Impaired Bed Mobility:     Wheelchair Mobility:     Transfers:  Sit to Stand: Contact guard assistance/Stand by Assist  Stand to Sit: Contact guard assistance/Stand by Assist  Gait:     Base of Support: Widened  Speed/Carol: Slow  Step Length: Left shortened;Right shortened  Distance (ft): 25 Feet (ft)  Assistive Device: Walker, rolling  Ambulation - Level of Assistance: Contact guard assistance/Stand by Assist  Interventions: Safety awareness training      Body Structures Involved:  1. Lungs  2. Metabolic  3. Endocrine Body Functions Affected:  1. Respiratory  2. Movement Related  3. Metobolic/Endocrine  4. Cognitive confusion Activities and Participation Affected:  1. General Tasks and Demands  2. Mobility  3. Self Care   Number of elements that affect the Plan of Care: 3: MODERATE COMPLEXITY   CLINICAL PRESENTATION:   Presentation: Evolving clinical presentation with changing clinical characteristics: MODERATE COMPLEXITY   CLINICAL DECISION MAKIN [facility] Short Form  How much difficulty does the patient currently have. .. Unable A Lot A Little None   1. Turning over in bed (including adjusting bedclothes, sheets and blankets)? [] 1   [] 2   [] 3   [x] 4   2. Sitting down on and standing up from a chair with arms ( e.g., wheelchair, bedside commode, etc.)   [] 1   [] 2   [x] 3   [] 4   3. Moving from lying on back to sitting on the side of the bed?    [] 1   [] 2   [] 3   [x] 4   How much help from another person does the patient currently need. .. Total A Lot A Little None   4. Moving to and from a bed to a chair (including a wheelchair)? [] 1   [] 2   [x] 3   [] 4   5. Need to walk in hospital room? [] 1   [] 2   [x] 3   [] 4   6. Climbing 3-5 steps with a railing? [] 1   [] 2   [x] 3   [] 4   © 2007, Trustees of [address] [place] [address] [address] [id number], under license to [employer]. All rights reserved      Score:  Initial: 20 Most Recent: X (Date: -- )    Interpretation of Tool:  Represents activities that are increasingly more difficult (i.e. Bed mobility, Transfers, Gait). Score 24 23 22-20 19-15 14-10 9-7 6     Modifier CH CI CJ CK CL CM CN      ? Mobility - Walking and Moving Around:     - CURRENT STATUS: CJ - 20%-39% impaired, limited or restricted    - GOAL STATUS: CI - 1%-19% impaired, limited or restricted    - D/C STATUS:  ---------------To be determined---------------  [place] / [place] / Product Type: Medicare /      Medical Necessity:     · Patient is expected to demonstrate progress in strength, balance and functional technique to increase independence with bed mobility, transfers and gait with r/walker and O2. Reason for Services/Other Comments:  · Patient continues to require skilled intervention due to medical complications. Use of outcome tool(s) and clinical judgement create a POC that gives a: Questionable prediction of patient's progress: MODERATE COMPLEXITY            TREATMENT:   (In addition to Assessment/Re-Assessment sessions the following treatments were rendered)   Pre-treatment Symptoms/Complaints:  Pleasant and confused with no complaints of pain. Pain: Initial:   Pain Intensity 1: 0  Post Session:  0/10     Gait Training (  20 minutes):  Gait training to improve and/or restore physical functioning as related to mobility, strength and balance.   Ambulated 60 Feet (ft) with Contact guard assistance;Stand-by assistance using a Walker, rolling (3L of O2) and minimal verbal/manual Safety awareness training related to their sit to stand or standing balance to promote proper body alignment and promote proper body breathing techniques. Therapeutic Exercise: ( 10 minutes):  Exercises per grid below to improve mobility, strength and balance. Required no visual and verbal cues to promote proper body alignment and promote proper body breathing techniques      Date:  3/14/18 Date:   Date:     Activity/Exercise Parameters Parameters Parameters   Ankle pumps 2 x 10     Heel slides 2 x 10     Hip abd/adduction 2 x 10     Seated knee extension 2 x 10     Seated hip flexion/marching 2 x 10                   Braces/Orthotics/Lines/Etc:   · O2 Device: Nasal cannula High flow 3L with resting and exertion sats at 100%. Treatment/Session Assessment:    · Response to Treatment:  Patient participated and tolerated therapy well today. · Interdisciplinary Collaboration:   o Physical Therapist  o Registered Nurse  · After treatment position/precautions:   o Up in chair  o Bed alarm/tab alert on  o Call light within reach  o RN notified  o Family at bedside   · Compliance with Program/Exercises: Will assess as treatment progresses. · Recommendations/Intent for next treatment session: \"Next visit will focus on advancements to more challenging activities and reduction in assistance provided\".   Total Treatment Duration:  PT Patient Time In/Time Out  Time In: 1025  Time Out: 102 [facility]

## 2018-03-14 NOTE — PROGRESS NOTES
Tim Whelanr  Admission Date: 3/8/2018             Daily Progress Note: 3/14/2018   77 y.o. CF admitted from the 9th floor rehab but has been hospitalized since 2/7/18.  She over the last few days had decompensated and has been requiring BIPAP.  Right tap 3/8 with 1000ml removed.  Post procedure BP dropped to the 70s and became more somnolent.  Discussion with her son and he states he understands the complexity and poor prognosis for his mother's current condition. Ethelda Check states he does not want chest compressions, shocks or intubation with vent support--advanced to DNR status.      Medical history of recent TAVR 1/30/18, HTN, CAD, chronic AFIB, anemia, anxiety, and chronic diastolic heart failure. Had bilateral thoracentesis on Jan 25th (preop) removing 1200ml from Right and 900 ml from Left.  She had TAVR on Jan 30th and was discharged home on Feb 3.  She presented to the ER on 2/7/18 via EMS with worsening swelling and shortness of breath.   CXR with cardiomegaly, pulmonary edema and worsening bilateral pleural effusions.  Required bilateral thoracenteses again on 2/9:  removed 550 ml from Left and 1000 ml from Right. Was later transferred to the 9th floor for rehab.  On 9th floor cardiology followed adjusting diuretic therapy.  Was anemic,  stool was heme positive and Eliquis was stopped. Became more lethargic and confused and ABG showed hypercapnea.  Was started on bipap with improvement.  Palliative Care was consulted for goals of care.  Repeat T/C on 3/12 -- 550 and 1000 removed. reported dysphagia and passed swallowing      Subjective:     Patient in bed and mental status overall waxing and waning. Granddaughter Mario Fitting is here and agrees. No pain. On 3 LPM and at home level.        Review of Systems  Constitutional: negative for fevers, chills and sweats  Respiratory: positive for cough, sputum, + dyspnea on exertion    Current Facility-Administered Medications   Medication Dose Route Frequency    ciprofloxacin HCl (CIPRO) tablet 500 mg  500 mg Oral Q12H    diazePAM (VALIUM) tablet 2 mg  2 mg Oral QHS PRN    alum-mag hydroxide-simeth (MYLANTA) oral suspension 30 mL  30 mL Oral Q4H PRN    aspirin chewable tablet 81 mg  81 mg Oral DAILY    furosemide (LASIX) tablet 80 mg  80 mg Oral DAILY    potassium chloride (K-DUR, KLOR-CON) SR tablet 40 mEq  40 mEq Oral DAILY    magnesium oxide (MAG-OX) tablet 400 mg  400 mg Oral DAILY    alcohol 62% (NOZIN) nasal  1 Ampule  1 Ampule Topical Q12H    allopurinol (ZYLOPRIM) tablet 100 mg  100 mg Oral BID    hydrocortisone (ANUSOL-HC) 2.5 % rectal cream   PeriANAL TID PRN    levothyroxine (SYNTHROID) tablet 88 mcg  88 mcg Oral ACB    metoprolol succinate (TOPROL-XL) XL tablet 25 mg  25 mg Oral DAILY    pantoprazole (PROTONIX) tablet 40 mg  40 mg Oral ACB    polyethylene glycol (MIRALAX) packet 17 g  17 g Oral DAILY    pravastatin (PRAVACHOL) tablet 40 mg  40 mg Oral DAILY    rOPINIRole (REQUIP) tablet 2 mg  2 mg Oral BID    sertraline (ZOLOFT) tablet 100 mg  100 mg Oral DAILY    spironolactone (ALDACTONE) tablet 25 mg  25 mg Oral DAILY    levalbuterol (XOPENEX) nebulizer soln 0.63 mg/3 mL  0.63 mg Nebulization Q6H PRN    sodium chloride (NS) flush 5-10 mL  5-10 mL IntraVENous Q8H    sodium chloride (NS) flush 5-10 mL  5-10 mL IntraVENous PRN         Objective:     Vitals:    03/14/18 0033 03/14/18 0342 03/14/18 0542 03/14/18 0804   BP: 93/59 101/57  111/44   Pulse: 82 96  84   Resp: 19 19  20   Temp: 98 °F (36.7 °C) 98 °F (36.7 °C)  98.1 °F (36.7 °C)   SpO2: 94% 95%  95%   Weight:   165 lb 14.4 oz (75.3 kg)      Intake and Output:   03/12 1901 - 03/14 0700  In: 360 [P.O.:360]  Out: 1100 [Urine:1100]       Physical Exam:          Constitutional: the patient is elderly  HEENT: Sclera clear, pupils equal, oral mucosa moist  Lungs: clear bilaterally, on 3 lpm. [place]  Cardiovascular: RRR without M,G,R  Abd/GI: soft and non-tender; with positive bowel sounds. Ext: warm without cyanosis. There is no lower leg edema. Musculoskeletal: moves all four extremities with equal strength  Skin: no jaundice or rashes, no wounds   Neuro: no gross neuro deficits       Lines/Drains: IV  Nutrition: cardiac    CHEST XRAY: pending.        LAB    Recent Labs      03/14/18   0641   NA  146*   K  4.0   CL  102   CO2  38*   GLU  81   BUN  36*   CREA  1.12*   MG  2.4   PHOS  3.2     Assessment:     Patient Active Problem List   Diagnosis Code    Degenerative arthritis of left knee M17.12    Aortic Valve Bioprosthesis Present Z95.2    Chronic obstructive pulmonary disease (HCC) J44.9    Hypothyroidism E03.9    BOBBY (obstructive sleep apnea) G47.33    Chronic atrial fibrillation ([facility]) I48.2    Anemia D64.9    Thrombocytopenia ([facility]) D69.6    Obesity E66.9    Mitral stenosis with insufficiency I05.2    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    Cardiac pacemaker Z95.0    Sick sinus syndrome ([facility]) I49.5    H/O mitral valve repair, 2003 Z98.890    Chronic depression F32.9    Osteopenia M85.80    RLS (restless legs syndrome) G25.81    Hyperlipidemia E78.5    Gout M10.9    Anxiety F41.9    CAD (coronary artery disease) I25.10    HTN (hypertension) I10    GERD (gastroesophageal reflux disease) K21.9    Chronic diastolic congestive heart failure ([facility]) I50.32    Aortic valve replaced Z95.2    Pulmonary hypertension I27.20    H/O atrioventricular rubin ablation Z98.890    S/P mitral valve repair Z98.890    Tricuspid valve insufficiency N27.4    Systolic CHF, chronic (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) I73.9    Chronic respiratory failure with hypoxia ([facility]) J96.11    Hypoxia R09.02    Aortic stenosis I35.0    S/P TAVR (transcatheter aortic valve replacement) Z95.2    Chronic diastolic heart failure ([facility]) I50.32    Pleural effusion, left J90    E. coli UTI N39.0, B96.20    DNR (do not resuscitate) Z66    Acute respiratory failure with hypoxia and hypercarbia (HCC) J96.01, J96.02    Pleural effusion, right [facility] Problems  Date Reviewed: 3/9/2018          Codes Class Noted POA    Pleural effusion, right ICD-10-CM: J90  ICD-9-CM: 511.9  3/12/2018 Unknown    S/P T/C    E. coli UTI ICD-10-CM: N39.0, B96.20  ICD-9-CM: 599.0, 041.49  3/8/2018 Yes    On cipro    DNR (do not resuscitate) (Chronic) ICD-10-CM: Z66  ICD-9-CM: V49.86  3/8/2018 Yes    Unchanged with multiple co-morbidities    Acute respiratory failure with hypoxia and hypercarbia (HCC) ICD-10-CM: J96.01, J96.02  ICD-9-CM: 518.81  3/8/2018 Yes        Pleural effusion, left ICD-10-CM: J90  ICD-9-CM: 511.9  3/6/2018 Yes    Overview Signed 3/8/2018  3:12 PM by Diane Brambila NP     3/8/18 Right thoracentesis 1000ml removed             S/P TAVR (transcatheter aortic valve replacement) (Chronic) ICD-10-CM: Z95.2  ICD-9-CM: V43.3  1/30/2018 Yes        Hypoxia ICD-10-CM: R09.02  ICD-9-CM: 799.02  1/23/2018 Yes    At baseline     Chronic diastolic congestive heart failure (HCC) (Chronic) ICD-10-CM: I50.32  ICD-9-CM: 428.32, 428.0  9/9/2016 Yes        Chronic atrial fibrillation (HCC) (Chronic) ICD-10-CM: I48.2  ICD-9-CM: 427.31  10/17/2011 Yes        Chronic obstructive pulmonary disease ([address] (Chronic) ICD-10-CM: J44.9  ICD-9-CM: 496  3/8/2009 Yes    Overview Signed 3/6/2018  3:19 PM by Milagro Lion NP     PFTs Jan 2018                     --check effusion today  --back on NC at 3 LPM daytime and using BIPAP QHS. . If unable to tolerate rehab, will likely need to go home with hospice. Little reserve with COPD and hypercapnic respiratory failure. Limited activity tolerance with multiple co-morbidities  -- continue PT/OT,f/u with 9th floor to see if they can take her back  -- Cipro for UTI- D 4 oral abx  -- lasix 80 mg daily, aldactone.  watch B/P  --family realistic with goals of care    Rina Bess MD    More than 50% of time documented was spent in face-to-face contact with the patient and in the care of the patient on the floor/unit where the patient is located.

## 2018-03-14 NOTE — PROGRESS NOTES
Patient resting quietly. BIPAP on patient. Respirations even and unlabored at 16. No distress at this time. Bed low and locked. Call light within reach. Will continue to monitor.

## 2018-03-14 NOTE — PROGRESS NOTES
Patient up in chair currently; sleeping. Posey on chair at this time. Respirations even and unlabored at 16. No distress at this time. Report will be given to oncoming RN.

## 2018-03-14 NOTE — PROGRESS NOTES
Palliative Care Progress Note    Patient: Penny Kaur MRN: [id number]  SSN: [id number]    YOB: 1940  Age: 69 y.o. Sex: female       Assessment/Plan:     Chief Complaint/Interval History: sleeping, easily awakened. Pleasantly confused        Principal Diagnosis:    · Debility, Unspecified  R53.81    Additional Diagnoses:   · Acute Respiratory Failure, Unspecified  J96.00  · Edema  R60.9  · Frailty  R54  · Counseling, Encounter for Medical Advice  Z71.9  · Encounter for Palliative Care  Z51.5    Palliative Performance Scale (PPS)  PPS: 60    Medical Decision Making:   Reviewed and summarized notes and events over last 24 hours   Discussed case with appropriate proiders: Bob Kelly CM  Reviewed laboratory and x-ray data: CBC, BMP    Pt sleeping, easily awakened. She remains pleasantly confused. She denies pain and dyspnea at present. Assured her of our ongoing care. Discussed with Bob Kelly CM. Pt was re-evaluated by [facility], but does not meet admission criteria. Unsure if STR or home with either home health or hospice will be pursued. Referral has been made for outpatient PC at [facility] post discharge for ongoing goals. Will discuss discharge options further with family when they are available. Will continue to follow. More than 50% of this 15 minute visit was spent counseling and coordination of care as outlined above. Subjective:     Review of Systems:  A comprehensive review of systems was negative except for:   Constitutional: Positive for fatigue. Objective:     Visit Vitals    /44    Pulse 84    Temp 98.1 °F (36.7 °C)    Resp 20    Wt 75.3 kg (165 lb 14.4 oz)    SpO2 95%    BMI 32.4 kg/m2       Physical Exam:    General:  Cooperative. Debilitated. No acute distress. Eyes:  Conjunctivae/corneas clear. Nose: Nares normal. Septum midline. O2 via NC. Neck: Supple, symmetrical, trachea midline. Lungs:   Diminished throughout.     Heart:  Regular rate and rhythm. Abdomen:   Soft, non-tender, non-distended   Extremities: Normal, atraumatic, no cyanosis. 1+ BLE edema. Skin: Skin color, texture, turgor normal. No rash. Neurologic: Nonfocal.   Psych: Alert and oriented to person and year only.      Signed By: Davide Bergeron NP     March 14, 2018

## 2018-03-14 NOTE — PROGRESS NOTES
Morning bedside rounding report received from Mortimer Solar., RN. Patient sitting up in recliner by window with posey alarm on. Demonstrating no signs of dyspnea or distress on 2L oxygen via nasal cannula. Patient is pleasantly confused, voicing no concerns at this time. Will continue to monitor.

## 2018-03-14 NOTE — PROGRESS NOTES
Spoke with 9th Floor Physician -- Valentina Delgado and indicates patient is not a cancidate for inpatient rehab. Should either go home with [facility] sv hospice. Patient has not had X-ray yet to check rate of re-accumulation of pleural effusion.     Kristine Khanna MD

## 2018-03-14 NOTE — PROGRESS NOTES
SBAR received from [address]. Patient is able to state name and ; patient is confused but very pleasant. Resting in bed with 4 LPM via NC. Respirations even and unlabored at 16. NO SOB, dyspnea and dizziness noted. Call light within reach. Will continue to monitor.

## 2018-03-14 NOTE — PROGRESS NOTES
[facility] CARDIOLOGY PROGRESS NOTE           3/14/2018 9:44 AM    Admit Date: 3/8/2018      Subjective:   No distress. Getting her hair washed. ROS:  GEN:  No fever or chills  Cardiovascular:  As noted above:no CP. Pulmonary:  As noted above:SOB improved. Neuro:  No new focal motor or sensory loss    Objective:      Vitals:    03/14/18 0033 03/14/18 0342 03/14/18 0542 03/14/18 0804   BP: 93/59 101/57  111/44   Pulse: 82 96  84   Resp: 19 19  20   Temp: 98 °F (36.7 °C) 98 °F (36.7 °C)  98.1 °F (36.7 °C)   SpO2: 94% 95%  95%   Weight:   75.3 kg (165 lb 14.4 oz)        Physical Exam:  General-no distress  Neck- supple, no JVD  CV- regular rate and rhythm no MRG  Lung- diminished bs. bilaterally  Abd- soft, nontender, nondistended  Ext- no edema bilaterally. Skin- warm and dry  Psychiatric:  Normal mood and affect. Neurologic:  Alert and oriented X 3      Data Review:   Recent Labs      03/14/18   0641   NA  146*   K  4.0   MG  2.4   BUN  36*   CREA  1.12*   GLU  81   WBC  5.1   HGB  8.4*   HCT  27.8*   PLT  113*       TELEMETRY:  N/A    Assessment/Plan:     Active Problems:    Chronic obstructive pulmonary disease (HCC) (3/8/2009)      Overview: PFTs Jan 2018            Chronic diastolic congestive heart failure (HCC) (9/9/2016):Continue LASIX,Aldactone,and Toprol. Thoracentesis prn. Hypoxia (1/23/2018):Remains on NC O2. S/P TAVR (transcatheter aortic valve replacement) (1/30/2018)      Pleural effusion, left (3/6/2018)      Overview: 3/8/18 Right thoracentesis 1000ml removed      E. coli UTI (3/8/2018): Patient on Cipro. DNR (do not resuscitate) (3/8/2018)          Pleural effusion, right (3/12/2018): Improved after thoracentesis.                 Jose Johnson MD  3/14/2018 9:44 AM

## 2018-03-15 NOTE — PROGRESS NOTES
Spiritual Care Visit, initial visit. Visited with patient at bedside. Prayed for patient's healing and health. Visit by Annelle Simmonds Catheryn Katos, Staff .  Sonia, Preethi.B., B.A.

## 2018-03-15 NOTE — PROGRESS NOTES
Problem: Mobility Impaired (Adult and Pediatric)  Goal: *Acute Goals and Plan of Care (Insert Text)  STG:  (1.)Ms. Shaylee Saul will move from supine to sit and sit to supine  and roll side to side with MODIFIED INDEPENDENCE within 5 treatment day(s). (2.)Ms. Shaylee Saul will transfer from bed to chair and chair to bed with SUPERVISION using the least restrictive device within 5 treatment day(s). (3.)Ms. Shaylee Saul will ambulate with SUPERVISION for 50-75 feet with the least restrictive device within 5 treatment day(s). (4.) Added gait goal for increased distance 150 - 200' with least restrictive device. PHYSICAL THERAPY: Daily Note, Treatment Day: 2nd, PM 3/15/2018  INPATIENT: Hospital Day: 8  [place] / [place] / Product Type: Medicare /      NAME/AGE/GENDER: Kelly Barker is a 69 y.o. female   PRIMARY DIAGNOSIS: Pleural effusion [J90] <principal problem not specified> <principal problem not specified>  Procedure(s) (LRB):  ULTRASOUND (Bilateral)  THORACENTESIS (Bilateral)  3 Days Post-Op  ICD-10: Treatment Diagnosis:   · Generalized Muscle Weakness (M62.81)   Precaution/Allergies:  Adhesive tape; Benadryl [diphenhydramine hcl]; Demerol [meperidine]; Morphine; Sulfa (sulfonamide antibiotics); and Lorazepam      ASSESSMENT:     Ms. Shaylee Saul present sitting in the recliner chair agreeable to have therapy. Patient remains pleasantly confused but able to participate in BLE AROM strength exercises from the recliner. She remains on 2L O2 with O2 sats at 93%. Sit to stand is CGA. Patient ambulated 61' with r/walker and 2L of O2 with CGA/SBA. Her gait is steady and slow with L foot slide more than R. She manages the r/walker well during gait. She returned to the recliner and was positioned for comfort. Pt limited buy fatigue. She could speak in 3 word breath, but after session only 2 word breaths. Physically the patient is functioning and progressing well.        This section established at most recent assessment   PROBLEM LIST (Impairments causing functional limitations):  1. Decreased Strength  2. Decreased Activity Tolerance  3. Increased Shortness of Breath  4. Decreased Cognition   INTERVENTIONS PLANNED: (Benefits and precautions of physical therapy have been discussed with the patient.)  1. Bed Mobility  2. Gait Training  3. Therapeutic Activites  4. Therapeutic Exercise/Strengthening  5. Transfer Training     TREATMENT PLAN: Frequency/Duration: 3 times a week for duration of hospital stay  Rehabilitation Potential For Stated Goals: Good     RECOMMENDED REHABILITATION/EQUIPMENT: (at time of discharge pending progress): Due to the probability of continued deficits (see above) this patient will likely need continued skilled physical therapy after discharge. Equipment:    None at this time              HISTORY:   History of Present Injury/Illness (Reason for Referral):  Patient is a 69 y.o.  female admitted from the 9th floor rehab whee she has been cared for by Dr. Abel Jeff. She over the last few days had decompensated and has been requiring BIPAP. Today a right tap was performed, BP dropped and became more somnolent. Past medical history of recent TAVR 1/30/18, HTN, CAD, chronic AFIB, anemia, anxiety, and chronic diastolic heart failure. She underwent bilateral thoracentesis on Jan 25th (preop) removing 1200 mls from the right and 900 mls from the left.  She underwent the TAVR on Jan 30th.  She was discharged home on Feb 3 but presented to the ER on 2/6/18 via EMS with complaints of worsening swelling and shortness of breath.   Upon arrival to the ER, CXR shows cardiomegaly with finding consistent with pulmonary edema and worsening bilateral pleural effusions.  We saw her again and bilateral thoracenteses were repeated on 2/9 removing 550 mls from the left and 1000 mls from the right.  She was later transferred to the 9th floor for rehab.  Alejandra Lee has been more lethargic and her family noticed that she seemed more confused so an ABG was obtained which showed hypercapnea.  She was started on bipap with improvement. Palliative Care was consulted for goals of care. Today she had right thoracentesis with 1000ml removed. She dropped BP to 70s and was placed on BIPAP. DNR status.     Past Medical History/Comorbidities:   Ms. Vitaly Warren  has a past medical history of Abnormal glucose (8/5/2016); Abscess (8/5/2016); Acute encephalopathy (7/8/2016); Acute renal failure ([address] (12/3/2009); Afib ([address] Anemia; Ankle swelling (8/5/2016); Anxiety (8/5/2016); Aortic Valve Bioprosthesis Present (3/7/2009); ARF (acute renal failure) ([address] (2/24/2010); Arthritis; Atopic dermatitis (8/5/2016); Atrial fibrillation ([address] (3/7/2009); Autonomic orthostatic hypotension (8/5/2016); AV block (10/17/2011); Back pain (8/5/2016); Bradycardia (Symptomatic) (11/7/2011); CAD (coronary artery disease); Cancer ([address] (1990); Cardiac pacemaker (11/2/2015); Cardiogenic shock ([address] (10/17/2011); 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 (8/5/2016); Cellulitis (8/5/2016); Chest pain (8/5/2016); Chronic atrial fibrillation ([address] (10/17/2011); Chronic depression (8/5/2016); Chronic obstructive pulmonary disease ([address] (3/8/2009); Chronic pain; CKD (chronic kidney disease) stage 3, GFR 30-59 ml/min (12/4/2009); Congestive heart failure (CHF) ([address] (11/2/2015); Degeneration of cervical intervertebral disc (8/5/2016); Degenerative arthritis of left knee (2/27/2009); Dehydration (8/5/2016); Diastolic heart failure ([address] Digoxin toxicity (2/24/2010); Disorder of sweat glands (8/5/2016); Diverticulosis of large intestine without diverticulitis (2015); Dyspnea (8/5/2016); Eczema (8/5/2016); Embolus of femoral artery ([facility] (12/4/2017); Epigastric abdominal pain (2/24/2010); GERD (gastroesophageal reflux disease); Gout (8/5/2016); H/O mitral valve repair, 2003 (6/27/2016);  Heart failure ([facility] colonic polyp (2015); Hyperkalemia (10/17/2011); Hyperlipidemia (8/5/2016); Hypertension; Hypokalemia (2/24/2010); Hypothyroidism (12/4/2009); Ill-defined condition; Knee pain (8/5/2016); Leg cramps (8/5/2016); Mitral stenosis with insufficiency (11/2/2015); Nausea and vomiting (2/24/2010); Obesity (11/2/2015); BOBBY (obstructive sleep apnea) (12/4/2009); Osteoarthritis (8/5/2016); Osteopenia (8/5/2016); Other long term (current) drug therapy (8/5/2016); Palpitations (11/2/2015); Rash (8/5/2016); Rectal bleeding (10/27/2011); Rectocele (2015); Recurrent depression ([facility] (1/4/2018); 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 Ms. Shira Pierre  has a past surgical history that includes hx appendectomy; hx cholecystectomy (2005); hx gyn (1981); hx mastectomy (1990); hx pacemaker; hx breast reconstruction; pr cardiac surg procedure unlist; hx orthopaedic; vascular surgery procedure unlist (Right, 02/20/2017); and pr chest surgery procedure unlisted. Social History/Living Environment:   Home Environment: Rehabilitation facility  One/Two Story Residence: One story  Living Alone: No  Support Systems: Family member(s)  Patient Expects to be Discharged to[de-identified] Rehabilitation facility  Current DME Used/Available at Home: Oxygen, portable  Prior Level of Function/Work/Activity:  Patient has been on the 9th floor in rehab and has been ambulating with a r/walker for 79' with SBA and O2.        Number of Personal Factors/Comorbidities that affect the Plan of Care: 3+: HIGH COMPLEXITY   EXAMINATION:   Most Recent Physical Functioning:   Gross Assessment:                  Posture:     Balance:  Standing: Impaired  Standing - Static: Good  Standing - Dynamic : Fair Bed Mobility:     Wheelchair Mobility: Transfers:  Sit to Stand: Contact guard assistance/Stand by Assist  Stand to Sit: Contact guard assistance/Stand by Assist  Gait:     Base of Support: Widened  Speed/Carol: Slow  Step Length: Left shortened;Right shortened  Distance (ft): 25 Feet (ft)  Assistive Device: Walker, rolling  Ambulation - Level of Assistance: Contact guard assistance/Stand by Assist  Interventions: Safety awareness training      Body Structures Involved:  1. Lungs  2. Metabolic  3. Endocrine Body Functions Affected:  1. Respiratory  2. Movement Related  3. Metobolic/Endocrine  4. Cognitive confusion Activities and Participation Affected:  1. General Tasks and Demands  2. Mobility  3. Self Care   Number of elements that affect the Plan of Care: 3: MODERATE COMPLEXITY   CLINICAL PRESENTATION:   Presentation: Evolving clinical presentation with changing clinical characteristics: MODERATE COMPLEXITY   CLINICAL DECISION MAKIN [facility] Mobility Inpatient Short Form  How much difficulty does the patient currently have. .. Unable A Lot A Little None   1. Turning over in bed (including adjusting bedclothes, sheets and blankets)? [] 1   [] 2   [] 3   [x] 4   2. Sitting down on and standing up from a chair with arms ( e.g., wheelchair, bedside commode, etc.)   [] 1   [] 2   [x] 3   [] 4   3. Moving from lying on back to sitting on the side of the bed? [] 1   [] 2   [] 3   [x] 4   How much help from another person does the patient currently need. .. Total A Lot A Little None   4. Moving to and from a bed to a chair (including a wheelchair)? [] 1   [] 2   [x] 3   [] 4   5. Need to walk in hospital room? [] 1   [] 2   [x] 3   [] 4   6. Climbing 3-5 steps with a railing? [] 1   [] 2   [x] 3   [] 4   © , [facility] of [address] [place] [address] [address] [id number], under license to [employer].  All rights reserved      Score:  Initial: 20 Most Recent: X (Date: -- )    Interpretation of Tool:  Represents activities that are increasingly more difficult (i.e. Bed mobility, Transfers, Gait). Score 24 23 22-20 19-15 14-10 9-7 6     Modifier CH CI CJ CK CL CM CN      ? Mobility - Walking and Moving Around:     - CURRENT STATUS: CJ - 20%-39% impaired, limited or restricted    - GOAL STATUS: CI - 1%-19% impaired, limited or restricted    - D/C STATUS:  ---------------To be determined---------------  [place] / [place] / Product Type: Medicare /      Medical Necessity:     · Patient is expected to demonstrate progress in strength, balance and functional technique to increase independence with bed mobility, transfers and gait with r/walker and O2. Reason for Services/Other Comments:  · Patient continues to require skilled intervention due to medical complications. Use of outcome tool(s) and clinical judgement create a POC that gives a: Questionable prediction of patient's progress: MODERATE COMPLEXITY            TREATMENT:   (In addition to Assessment/Re-Assessment sessions the following treatments were rendered)   Pre-treatment Symptoms/Complaints:  Pleasant and confused with no complaints of pain. Pain: Initial:   Pain Intensity 1: 0  Post Session:  0/10     Gait Training (  0 minutes):  Gait training to improve and/or restore physical functioning as related to mobility, strength and balance. Ambulated 60 Feet (ft) with Contact guard assistance using a Walker, rolling and minimal verbal/manual   related to their sit to stand or standing balance to promote proper body alignment and promote proper body breathing techniques. Therapeutic Activity (    24min):  Therapeutic activities per grid below to improve mobility, strength, balance and coordination. Required moderate verbal cues to promote static and dynamic balance in standing, promote coordination of bilateral, lower extremity(s) and promote motor control of bilateral, lower extremity(s).         Therapeutic Exercise: ( 10 minutes): Exercises per grid below to improve mobility, strength and balance. Required no visual and verbal cues to promote proper body alignment and promote proper body breathing techniques      Date:  3/14/18 Date:   Date:     Activity/Exercise Parameters Parameters Parameters   Ankle pumps 2 x 10     Heel slides 2 x 10     Hip abd/adduction 2 x 10     Seated knee extension 2 x 10     Seated hip flexion/marching 2 x 10                   Braces/Orthotics/Lines/Etc:   · O2 Device: Nasal cannula High flow 2L with resting and exertion sats at 95%. Treatment/Session Assessment:    · Response to Treatment:  Patient participated and tolerated therapy well today. · Interdisciplinary Collaboration:   o Physical Therapist  o Registered Nurse  · After treatment position/precautions:   o Up in chair  o Bed alarm/tab alert on  o Call light within reach  o RN notified  o Family at bedside   · Compliance with Program/Exercises: Will assess as treatment progresses. · Recommendations/Intent for next treatment session: \"Next visit will focus on advancements to more challenging activities and reduction in assistance provided\".   Total Treatment Duration:  PT Patient Time In/Time Out  Time In: 1305  Time Out: 888 Mark Isaac DPT

## 2018-03-15 NOTE — PROGRESS NOTES
Palliative Care Progress Note    Patient: Jarad Burnett MRN: [id number]  SSN: [id number]    YOB: 1940  Age: 69 y.o. Sex: female       Assessment/Plan:     Chief Complaint/Interval History: alert, sitting in chair. Remains pleasantly confused       Principal Diagnosis:    · Debility, Unspecified  R53.81    Additional Diagnoses:   · Acute Respiratory Failure, Unspecified  J96.00  · Edema  R60.9  · Frailty  R54  · Counseling, Encounter for Medical Advice  Z71.9  · Encounter for Palliative Care  Z51.5    Palliative Performance Scale (PPS)  PPS: 60    Medical Decision Making:   Reviewed and summarized notes and events over last 24 hours   Discussed case with appropriate proiders: LORIE Navarro  Reviewed laboratory and x-ray data: CBC, BMP    Pt sitting in chair, no distress noted. She remains pleasantly confused. She reports her breathing is not that good. Spoke with her son, Odessa Perez, on the phone, and advised that we needed to discuss discharge plans. He will be at the hospital after lunch- will meet with him to discuss. 1500-  Returned after lunch 3 separate times, son was not present. SHERON was at bedside, and stated she did not know where he was. Will try to meet with him tomorrow. More than 50% of this 15 minute visit was spent counseling and coordination of care as outlined above. Subjective:     Review of Systems:  A comprehensive review of systems was negative except for:   Constitutional: Positive for fatigue. Objective:     Visit Vitals    /65 (BP 1 Location: Left leg)    Pulse 88    Temp 97.2 °F (36.2 °C)    Resp 20    Wt 75.2 kg (165 lb 11.2 oz)    SpO2 96%    BMI 32.36 kg/m2       Physical Exam:    General:  Cooperative. Debilitated. No acute distress. Eyes:  Conjunctivae/corneas clear. Nose: Nares normal. Septum midline. O2 via NC. Neck: Supple, symmetrical, trachea midline. Lungs:   Diminished throughout. Heart:  Regular rate and rhythm.    Abdomen: Soft, non-tender, non-distended   Extremities: Normal, atraumatic, no cyanosis. 1+ BLE edema. Skin: Skin color, texture, turgor normal. No rash. Neurologic: Nonfocal.   Psych: Alert and oriented to person and year only.      Signed By: Odette Nolan NP     March 15, 2018

## 2018-03-15 NOTE — PROGRESS NOTES
Inpatient Case Management Referral - High risk for readmission. RRAT 28 - admitted now from inpatient rehab for COPD that has required BiPAP. 3 admissions in 2018. Patient with bilateral pleural effusions with suspected interstitial edema. Palliative Care has been consulted for this patient. Per Pulmonary progress notes, patient is appropriate for hospice.     Plan - coordinate with inpatient case management  Likely patient will DC [facility] with [facility] and outpatient Palliative Care via [facility]

## 2018-03-15 NOTE — DISCHARGE SUMMARY
Discharge Note    Lucia Blackwell  Admission date:  3/8/2018  Discharge date:  3/19/2018     Admitting Diagnosis:  Pleural effusion [J90]    Discharge Diagnoses:   Hospital Problems  Date Reviewed: 3/17/2018          Codes Class Noted POA    Pleural effusion, right ICD-10-CM: J90  ICD-9-CM: 511.9  3/12/2018 Unknown        E. coli UTI ICD-10-CM: N39.0, B96.20  ICD-9-CM: 599.0, 041.49  3/8/2018 Yes        DNR (do not resuscitate) (Chronic) ICD-10-CM: Z66  ICD-9-CM: V49.86  3/8/2018 Yes        * (Principal)Acute respiratory failure with hypoxia and hypercarbia (HCC) ICD-10-CM: J96.01, J96.02  ICD-9-CM: 518.81  3/8/2018 Yes        Pleural effusion, left ICD-10-CM: J90  ICD-9-CM: 511.9  3/6/2018 Yes    Overview Signed 3/8/2018  3:12 PM by Jayla Rock NP     3/8/18 Right thoracentesis 1000ml removed             S/P TAVR (transcatheter aortic valve replacement) (Chronic) ICD-10-CM: Z95.2  ICD-9-CM: V43.3  1/30/2018 Yes        Hypoxia ICD-10-CM: R09.02  ICD-9-CM: 799.02  1/23/2018 Yes        Chronic diastolic congestive heart failure (HCC) (Chronic) ICD-10-CM: I50.32  ICD-9-CM: 428.32, 428.0  9/9/2016 Yes        Chronic atrial fibrillation (HCC) (Chronic) ICD-10-CM: I48.2  ICD-9-CM: 427.31  10/17/2011 Yes        Chronic obstructive pulmonary disease ([facility] [address] (Chronic) ICD-10-CM: J44.9  ICD-9-CM: 496  3/8/2009 Yes    Overview Signed 3/6/2018  3:19 PM by Smith Tsang NP     PFTs Jan 2018                     Consultants:  Palliative Care  Cardiology    Studies/Procedures:  Thoracentesis    3/8 1000 ml removed from right    3/12 550ml removed from left    3/12 1000 ml removed from right         Presenting Illness: Patient is a 69 y.o.  female admitted from the 9th floor rehab where she has been cared for by Dr. Sonia Augustin. She over the last few days had decompensated and has been requiring BIPAP. Today a right thoracentesis was performed, BP dropped and became more somnolent.   Past medical history of recent TAVR 1/30/18, HTN, CAD, chronic AFIB, anemia, anxiety, and chronic diastolic heart failure. She underwent bilateral thoracentesis on Jan 25th (preop) removing 1200 mls from the right and 900 mls from the left.  She underwent the TAVR on Jan 30th.  She was discharged home on Feb 3 but presented to the ER on 2/6/18 via EMS with complaints of worsening swelling and shortness of breath.   Upon arrival to the ER, CXR shows cardiomegaly with finding consistent with pulmonary edema and worsening bilateral pleural effusions.  We saw her again and bilateral thoracenteses were repeated on 2/9 removing 550 mls from the left and 1000 mls from the right. She was later transferred to the 9th floor for rehab.      Since transfer to the 9th floor, cardiology has followed and adjusted diuretic therapy. Nico Burciaga has been anemic and her stool was heme positive so the Eliquis was stopped. She has been more lethargic and her family noticed that she seemed more confused so an ABG was obtained which showed hypercapnea.  She was started on bipap with improvement. Palliative Care was consulted for goals of care. Today she had right thoracentesis with 1000ml removed. She dropped BP to 70s and was placed on BIPAP. Discussion with her son and he states he understand the complexity and poor prognosis for his mother's current condition. [facility] states he would not want chest compressions, shock or her to be intubated and placed on vent support--advanced to DNR status. Hospital course: Patient was admitted for further medical management with supplemental oxygen with Optiflow, respiratory nebulizer treatments. Right thoracentesis with 1000ml removed. Palliative care consulted with patient progressively failing due to heart failure and end stage lung disease. She has little reserve with COPD and hypercapnic respiratory failure. Cardiology consulted to assist with chronic diastolic heart failure and diuresis for pulmonary edema.  Cipro given for E Coli UTI. She used bipap q hs. Repeat thoracentesis done on right with 1000 ml removed and 550 ml removed from left. She was seen by [facility] and not appropriate. Limited activity tolerance and was not accepted at rehab on 9th. PC followed here to discuss LT plan. She is a DNR and hospice discussed. Patient will be discharged with home hospice. Home biPAP arranged at discharge. She is at her baseline on 2-3 lpm.    Physical Exam:   Constitution:  the patient is elderly, weak, frail  EENMT:  Sclera clear, pupils equal, oral mucosa moist  Respiratory: decreased bases on NC  Cardiovascular:  RRR without M,G,R  Gastrointestinal: soft and non-tender; with positive bowel sounds. Musculoskeletal: warm without cyanosis. There is +mild pitting leg edema. Skin:  no jaundice or rashes, no wounds   Neurologic: no gross neuro deficits     Psychiatric:  alert and oriented x 3        Discharge Medications:   Current Discharge Medication List      START taking these medications    Details   !! furosemide (LASIX) 40 mg tablet Take 1 Tab by mouth daily for 30 days. Qty: 30 Tab, Refills: 1      !! furosemide (LASIX) 80 mg tablet Take 1 Tab by mouth daily for 30 days. Qty: 30 Tab, Refills: 1      !! aspirin 81 mg chewable tablet Take 1 Tab by mouth daily for 30 days. Qty: 30 Tab, Refills: 1      !! allopurinol (ZYLOPRIM) 100 mg tablet Take 1 Tab by mouth two (2) times a day for 5 days. Qty: 10 Tab, Refills: 0       !! - Potential duplicate medications found. Please discuss with provider. CONTINUE these medications which have NOT CHANGED    Details   ciprofloxacin HCl (CIPRO) 500 mg tablet Take 500 mg by mouth every twelve (12) hours. !! aspirin 81 mg chewable tablet Take 81 mg by mouth daily. Given on 9th floor      potassium chloride SR (K-TAB) 20 mEq tablet Take 40 mEq by mouth daily. Given daily on 9th floor      magnesium oxide (MAG-OX) 400 mg tablet Take 400 mg by mouth daily.       metoprolol succinate (TOPROL-XL) 25 mg XL tablet Take 1 Tab by mouth daily. Qty: 1 Tab, Refills: 0      !! furosemide (LASIX) 80 mg tablet Take 1 Tab by mouth every twelve (12) hours. Qty: 1 Tab, Refills: 0      rOPINIRole (REQUIP) 2 mg tablet Take  by mouth two (2) times a day. pravastatin (PRAVACHOL) 40 mg tablet Take 1 Tab by mouth daily. Indications: hyperlipidemia  Qty: 90 Tab, Refills: 3    Associated Diagnoses: Hyperlipidemia, unspecified hyperlipidemia type      sertraline (ZOLOFT) 100 mg tablet Take 1 Tab by mouth daily. Qty: 90 Tab, Refills: 2    Associated Diagnoses: CALI (generalized anxiety disorder)      diazePAM (VALIUM) 5 mg tablet Take 1 Tab by mouth daily. Max Daily Amount: 5 mg. Qty: 30 Tab, Refills: 2    Associated Diagnoses: CALI (generalized anxiety disorder)      omeprazole (PRILOSEC) 20 mg capsule TAKE 1 CAPSULE BY MOUTH  DAILY  Qty: 90 Cap, Refills: 3    Associated Diagnoses: Gastroesophageal reflux disease without esophagitis      spironolactone (ALDACTONE) 25 mg tablet Take 1 Tab by mouth daily. Qty: 90 Tab, Refills: 3      levothyroxine (SYNTHROID) 88 mcg tablet Take 1 Tab by mouth Daily (before breakfast). Qty: 90 Tab, Refills: 3    Associated Diagnoses: Acquired hypothyroidism      !! allopurinol (ZYLOPRIM) 100 mg tablet Take 1 Tab by mouth two (2) times a day. Qty: 180 Tab, Refills: 3      fluticasone (FLONASE) 50 mcg/actuation nasal spray 1 [place] by Both Nostrils route daily. azelastine (ASTELIN) 137 mcg (0.1 %) nasal spray 1 [place] by Both Nostrils route two (2) times a day. Use in each nostril as directed  Qty: 1 Bottle, Refills: 0    Associated Diagnoses: Eustachian tube dysfunction, bilateral      polyethylene glycol (MIRALAX) 17 gram/dose powder Take 17 g by mouth daily. loratadine (CLARITIN) 10 mg tablet Take 10 mg by mouth as needed. glycerin, adult, (SUPPOSITORY ADULT) suppository Insert 1 Suppository into rectum as needed.       levalbuterol (XOPENEX) 1.25 mg/3 mL nebu 1.25 mg by Nebulization route.      hydrocortisone (ANUSOL-HC) 2.5 % rectal cream Apply small amount to hemorrhoids/perianal skin TID PRN  Qty: 30 g, Refills: 3      calcium carbonate (CALTREX) 600 mg (1,500 mg) tablet Take 600 mg by mouth nightly. cholecalciferol (VITAMIN D3) 1,000 unit cap Take  by mouth daily. CARBOXYMETHYLCELLULOS/GLYCERIN (REFRESH OPTIVE OP) Apply  to eye. DOCUSATE SODIUM Take 1 Cap by mouth two (2) times a day. OXYGEN-AIR DELIVERY SYSTEMS by Does Not Apply route. 2-2.5 lpm cont. Associated Diagnoses: Anemia; Atrial fibrillation (HCC)      cyanocobalamin (VITAMIN B-12) 250 mcg tablet Take 1,000 mcg by mouth daily. nitroglycerin (NITROSTAT) 0.4 mg SL tablet by SubLINGual route every five (5) minutes as needed for Chest Pain. promethazine (PHENERGAN) 25 mg tablet Take 1 Tab by mouth every six (6) hours as needed. Qty: 30 Tab, Refills: 3       !! - Potential duplicate medications found. Please discuss with provider. STOP taking these medications       apixaban (ELIQUIS) 5 mg tablet Comments:   Reason for Stopping:                 Condition on Discharge:  stable    Disposition:  Stable, hospice appropriate    Followup/Outpt Studies:  --Follow up appointment with [facility] in 4 weeks. --[address] Cardiology in 2 weeks  -- the son has requested that eliquis not be restarted  --DNR  --Total discharge greater than 30 minutes in duration. More than 50% of the time documented was spent in face-to-face contact with the patient and in the care of the patient on the floor/unit where the patient is located. Jadon Chávez NP    I have spoken with and examined the patient. I agree with the above assessment and plan as documented. Gen: pleasant on 2lpm  Lungs:  Decreased in bases  Heart:  RRR with no Murmur/Rubs/Gallops  Ext: 1+ pitting    --discharge today  --continue 2lpm of O2  --off anticoagulation per discussion between cardiology/son about risks/benefits.   --hospice agency following      Juan Funk MD

## 2018-03-15 NOTE — PROGRESS NOTES
Patient slept whole evening with BIPAP on. Removed BIPAP and placed patient back of NC @ 2 LPM. Patient is alert and states, \" I am ready for some coffee and I slept good. \" Respirations even and unlabored at 16. No distress at this time. Bed low and locked. Call light within reach. Bed low and locked. Will give report to oncoming RN.

## 2018-03-15 NOTE — PROGRESS NOTES
Problem: Nutrition Deficit  Goal: *Optimize nutritional status  Nutrition LOS note: Day 7  Assessment:   Diet order(s): Cardiac, Ensure Enlive TID  Food/Nutrition Patient History:  Patient presents with no acute nutrition risk factors identified by malnutrition screening tool upon admission. The patient is noted to have a h/o COPD, CHF, SSS-pacemaker and HTN. The patient states that her appetite is very poor and has been very poor since January. States that she eats bites of meals and is full. Denies any issues with dysphagia, nausea, vomiting, constipation or diarrhea at this time. The patient reports that she likes the ensure and drinks about 2 per day. The patient is unsure of any recent weight loss and states that her UBW is around 165#. Weight history in the EMR cannot be verified as accurate due to unknown weight source (pt stated vs estimated vs measured). WT / BMI WEIGHT   3/15/2018 165 lb 11.2 oz   3/8/2018    3/8/2018 169 lb 6.4 oz   2/13/2018 2/13/2018 176 lb 9.6 oz   2/7/2018    2/3/2018 171 lb   1/29/2018 1/26/2018 162 lb 1.6 oz   1/22/2018    1/15/2018 167 lb   1/15/2018 167 lb   1/9/2018 168 lb   1/4/2018 168 lb   According to the EMR the patient has lost ~3# over the past 3 months. Anthropometrics:  Height: 5' 0\",  Weight: 75.2 kg (165 lb 11.2 oz), Weight Source: Standing scale (comment), Body mass index is 32.36 kg/(m^2). BMI class of overweight for age >71. Macronutrient needs:  EER:  9795-8569 kcal /day (20-23 kcal/kg actual BW)  EPR:  45-55 grams protein/day (1-1.2 grams/kg IBW)  Intake/Comparative Standards: Average intake for past 14 recorded meal(s): 58%. This potentially meets ~78% of kcal and ~100% of protein needs  Ensure Enlive x2 provides and additional 700 kcals and 40 grams of protein daily.  Pt is meeting her needs with supplements    Nutrition Diagnosis: Inadequate oral intake related to decreased ability to consume adequate nutrition as evidenced by patient requiring oral nutrition supplementation to help meet estimated needs. Intervention:  Meals and snacks: Continue current diet. Nutrition Supplement Therapy: Continue Ensure Enlive (strawberry)   Nutrition Discharge Plan: Continue Ensure Enlive as patient desires    Charito Lorenz.  Jazlyn Mae [address], [address],  [phone]

## 2018-03-15 NOTE — PROGRESS NOTES
Kelly Barker  Admission Date: 3/8/2018             Daily Progress Note: 3/15/2018    The patient's chart is reviewed and the patient is discussed with the staff. 69 y.o. CF admitted from the 9th floor rehab but has been hospitalized since 2/7/18.  She over the last few days had decompensated and has been requiring BIPAP.  Right tap 3/8 with 1000ml removed.  Post procedure BP dropped to the 70s and became more somnolent.  Discussion with her son and he states he understands the complexity and poor prognosis for his mother's current condition. Evelyn Fraser states he does not want chest compressions, shocks or intubation with vent support--advanced to DNR status.      Medical history of recent TAVR 1/30/18, HTN, CAD, chronic AFIB, anemia, anxiety, and chronic diastolic heart failure. Had bilateral thoracentesis on Jan 25th (preop) removing 1200ml from Right and 900 ml from Left.  She had TAVR on Jan 30th and was discharged home on Feb 3.  She presented to the ER on 2/7/18 via EMS with worsening swelling and shortness of breath.   CXR with cardiomegaly, pulmonary edema and worsening bilateral pleural effusions.  Required bilateral thoracenteses again on 2/9:  removed 550 ml from Left and 1000 ml from Right. Was later transferred to the 9th floor for rehab.  On 9th floor cardiology followed adjusting diuretic therapy.  Was anemic,  stool was heme positive and Eliquis was stopped. Became more lethargic and confused and ABG showed hypercapnea.  Was started on bipap with improvement.  Palliative Care was consulted for goals of care.  Repeat T/C on 3/12 -- 550 and 1000 removed. reported dysphagia and passed swallowing    Subjective:     On O2 2L NC, without shortness of breath  Cough with tan sputum, oriented.  No family at bedside    Current Facility-Administered Medications   Medication Dose Route Frequency    ciprofloxacin HCl (CIPRO) tablet 500 mg  500 mg Oral Q12H    diazePAM (VALIUM) tablet 2 mg  2 mg Oral QHS PRN    alum-mag hydroxide-simeth (MYLANTA) oral suspension 30 mL  30 mL Oral Q4H PRN    aspirin chewable tablet 81 mg  81 mg Oral DAILY    furosemide (LASIX) tablet 80 mg  80 mg Oral DAILY    potassium chloride (K-DUR, KLOR-CON) SR tablet 40 mEq  40 mEq Oral DAILY    magnesium oxide (MAG-OX) tablet 400 mg  400 mg Oral DAILY    alcohol 62% (NOZIN) nasal  1 Ampule  1 Ampule Topical Q12H    allopurinol (ZYLOPRIM) tablet 100 mg  100 mg Oral BID    hydrocortisone (ANUSOL-HC) 2.5 % rectal cream   PeriANAL TID PRN    levothyroxine (SYNTHROID) tablet 88 mcg  88 mcg Oral ACB    metoprolol succinate (TOPROL-XL) XL tablet 25 mg  25 mg Oral DAILY    pantoprazole (PROTONIX) tablet 40 mg  40 mg Oral ACB    polyethylene glycol (MIRALAX) packet 17 g  17 g Oral DAILY    pravastatin (PRAVACHOL) tablet 40 mg  40 mg Oral DAILY    rOPINIRole (REQUIP) tablet 2 mg  2 mg Oral BID    sertraline (ZOLOFT) tablet 100 mg  100 mg Oral DAILY    spironolactone (ALDACTONE) tablet 25 mg  25 mg Oral DAILY    levalbuterol (XOPENEX) nebulizer soln 0.63 mg/3 mL  0.63 mg Nebulization Q6H PRN    sodium chloride (NS) flush 5-10 mL  5-10 mL IntraVENous Q8H    sodium chloride (NS) flush 5-10 mL  5-10 mL IntraVENous PRN       Review of Systems  Constitutional: negative for fever, chills, sweats  Cardiovascular: negative for chest pain, palpitations, syncope, +edema  Gastrointestinal:  negative for dysphagia, reflux, vomiting, diarrhea, abdominal pain, or melena  Neurologic:  negative for focal weakness, numbness, headache    Objective:     Vitals:    03/15/18 0013 03/15/18 0358 03/15/18 0409 03/15/18 0758   BP: 94/64  102/68 108/65   Pulse: 79  82 88   Resp: 20  20 20   Temp: 98.6 °F (37 °C)  98.6 °F (37 °C) 97.2 °F (36.2 °C)   SpO2: 97% 97% 96% 96%   Weight:   165 lb 11.2 oz (75.2 kg)      Intake and Output:   03/13 1901 - 03/15 0700  In: 960 [P.O.:960]  Out: 600 [Urine:600]       Physical Exam:   Constitution:  the patient is elderly and in no acute distress  EENMT:  Sclera clear, pupils equal, oral mucosa moist  Respiratory: decreased bases R>L, left crackles  Cardiovascular:  RRR without M,G,R  Gastrointestinal: soft and non-tender; with positive bowel sounds. Musculoskeletal: warm without cyanosis. There is ++ lower leg edema. Skin:  no jaundice or rashes, no open wounds   Neurologic: no gross neuro deficits     Psychiatric:  alert and oriented x 3    CHEST XRAY: 3/14/18        LAB  No results for input(s): GLUCPOC in the last 72 hours. No lab exists for component: [place]   Recent Labs      03/14/18   0641   WBC  5.1   HGB  8.4*   HCT  27.8*   PLT  113*     Recent Labs      03/14/18   0641   NA  146*   K  4.0   CL  102   CO2  38*   GLU  81   BUN  36*   CREA  1.12*   MG  2.4   CA  8.4   PHOS  3.2     No results for input(s): PH, PCO2, PO2, HCO3 in the last 72 hours. No results for input(s): LCAD, LAC in the last 72 hours.       Assessment:  (Medical Decision Making)     Patient Active Problem List   Diagnosis Code    Degenerative arthritis of left knee M17.12    Aortic Valve Bioprosthesis Present Z95.2    Chronic obstructive pulmonary disease ([facility] [address] J44.9    Hypothyroidism E03.9    BOBBY (obstructive sleep apnea) G47.33    Chronic atrial fibrillation (HCC) I48.2    Anemia D64.9    Thrombocytopenia (HCC) D69.6    Obesity E66.9    Mitral stenosis with insufficiency I05.2    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    Cardiac pacemaker Z95.0    Sick sinus syndrome (HCC) I49.5    H/O mitral valve repair, 2003 Z98.890    Chronic depression F32.9    Osteopenia M85.80    RLS (restless legs syndrome) G25.81    Hyperlipidemia E78.5    Gout M10.9    Anxiety F41.9    CAD (coronary artery disease) I25.10    HTN (hypertension) I10    GERD (gastroesophageal reflux disease) K21.9    Chronic diastolic congestive heart failure (HCC) I50.32    Aortic valve replaced Z95.2    Pulmonary hypertension I27.20    H/O atrioventricular rubin ablation Z98.890    S/P mitral valve repair Z98.890    Tricuspid valve insufficiency L77.6    Systolic CHF, chronic (HCC) I50.22    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 coli UTI N39.0, B96.20    DNR (do not resuscitate) Z66    Acute respiratory failure with hypoxia and hypercarbia (HCC) J96.01, J96.02    Pleural effusion, right J90         Plan:  (Medical Decision Making)     Hospital Problems  Date Reviewed: 3/15/2018          Codes Class Noted POA    Pleural effusion, right ICD-10-CM: J90  ICD-9-CM: 511.9  3/12/2018 Unknown    S/p thoracentesis with 1000ml removed on 3/12     E. coli UTI ICD-10-CM: N39.0, B96.20  ICD-9-CM: 599.0, 041.49  3/8/2018 Yes    Continue cipro Day 5     DNR (do not resuscitate) (Chronic) ICD-10-CM: A83  ICD-9-CM: V49.86  3/8/2018 Yes        Acute respiratory failure with hypoxia and hypercarbia (HCC) ICD-10-CM: J96.01, J96.02  ICD-9-CM: 518.81  3/8/2018 Yes    On home flow 3L     Pleural effusion, left ICD-10-CM: J90  ICD-9-CM: 511.9  3/6/2018 Yes     3/8/18 Right thoracentesis 1000ml removed         3/12 left thoracentesis with 550 ml removed     S/P TAVR (transcatheter aortic valve replacement) (Chronic) ICD-10-CM: Z95.2  ICD-9-CM: V43.3  1/30/2018 Yes        Hypoxia ICD-10-CM: R09.02  ICD-9-CM: 799.02  1/23/2018 Yes    On home flow O2 and using bipap q hs     Chronic diastolic congestive heart failure (HCC) (Chronic) ICD-10-CM: I50.32  ICD-9-CM: 428.32, 428.0  9/9/2016 Yes    On lasix, aldactone, toprol     Chronic atrial fibrillation (HCC) (Chronic) ICD-10-CM: I48.2  ICD-9-CM: 427.31  10/17/2011 Yes    Rate controlled     Chronic obstructive pulmonary disease [facility]) (Chronic) ICD-10-CM: J44.9  ICD-9-CM: 629  3/8/2009 Yes     PFTs Jan 2018           No wheezing, On prn BD        --patient does not meet criteria for [facility], likely home with [facility] or Hospice. Appreciate PC assist.   --continue PT   --referred to outpatient palliative care at [facility]  --no acute distress and on home flow O2, continue to monitor effusions, thoracentesis prn     More than 50% of the time documented was spent in face-to-face contact with the patient and in the care of the patient on the floor/unit where the patient is located. Adrianne Gonzalez NP    The patient has been seen and examined by me personally, the chart,labs, and radiographic studies have been reviewed. Chest: CTA  Extremities: 2-3+ edema    Needs palliative care and possibly home hospice. I agree with the above assessment and plan.     Eugene Cain MD.

## 2018-03-15 NOTE — PROGRESS NOTES
Morning bedside rounding report received from Shreya Hager RN. Patient resting in bed with open eyes, demonstrating no signs of dyspnea or distress on 2L oxygen via nasal cannula. Voices no concerns at this time. Will continue to monitor.

## 2018-03-15 NOTE — PROGRESS NOTES
[facility] CARDIOLOGY PROGRESS NOTE           3/15/2018 7:28 AM    Admit Date: 3/8/2018      Subjective:   SOB earlier, better now on 2L O2, no CP.     ROS:  Cardiovascular:  As noted above    Objective:      Vitals:    03/14/18 2345 03/15/18 0013 03/15/18 0358 03/15/18 0409   BP:  94/64  102/68   Pulse:  79  82   Resp:  20  20   Temp:  98.6 °F (37 °C)  98.6 °F (37 °C)   SpO2: 99% 97% 97% 96%   Weight:    75.2 kg (165 lb 11.2 oz)       Physical Exam:  General-No Acute Distress, 2L O2 by NC  Neck- supple, no JVD  CV- regular rate and rhythm, PM in chest wall   Lung- few crackles  Abd- soft, nontender, nondistended  Ext- 1+ edema bilaterally. Skin- warm and dry    Data Review:   Recent Labs      03/14/18   0641   NA  146*   K  4.0   MG  2.4   BUN  36*   CREA  1.12*   GLU  81   WBC  5.1   HGB  8.4*   HCT  27.8*   PLT  113*       Assessment/Plan:   Acute respiratory failure with hypoxia and hypercarbia w chronic obstructive pulmonary disease - BIPAP prn, thoracentesis 3-8- deciding whether home w HH or hospice. Chronic atrial fibrillation (HCC) (10/17/2011)- S/P [facility] [place] PM.  Rate control w toprol, no anticoagulation due to bleeding risk, anemia. Chronic diastolic congestive heart failure ([address] (9/9/2016)- EF 55%, lasix po, aldactone, BB. Hypoxia (1/23/2018)- BIPAP per pulmonary. S/P TAVR (transcatheter aortic valve replacement) (1/30/2018)- Stable, cont ASA. Pleural effusion, left- 3/8/18 Right thoracentesis 1000ml removed    E. coli UTI (3/8/2018)- s/p cipro. DNR (do not resuscitate) (3/8/2018)- Considering hospice. Palliative care following. CASPER Robles  3/15/2018 7:28 AM      I have personally seen and examined patient and agree with above assessment. I agree and confirm with findings with additional details/exceptions as listed below:    Off anticoagulation and reassess-improving anemia. Improved dyspnea. Pending evaluation with likely hospice.  Last echo post TAVR with improved EF    Lilia George MD

## 2018-03-16 NOTE — HOSPICE
[facility]-    Talked to son and Reese Francis, on the phone about hospice services/support. He only had a short window that he was available to discuss hospice due to prior work arrangements. We discussed hospice philosophy of care, care team members, care at home and respite and symptom management at the [facility]. He states that his mother \"wants to be at home\" and they are ready to focus on comfort measures vs aggressive therapy at the hospital.  He states that she is a DNR and that he knows she cannot be alone at this time and if family cannot provide the 24/7 coverage, he will hire help. He states he is busy today working, and then he needs to ready the home for DME to be delivered- 02 set up and bed and OTB table,  They own: BSC, rollator, shower seat. He thinks pt can go home by car and ideally if she could be dc'd on Monday would be best for him as he is off the whole day. He will call me this afternoon if an appointment cancels so that we can meet in person and completed hospice consents or we can meet him this weekend prn.   We will call Dr Ricardo Soto and discuss pt with him and get routine hospice approval.    Thank you-  Addy Serna RN  [facility] liaison  [phone] IV

## 2018-03-16 NOTE — PROGRESS NOTES
RN received report from [facility] after JAIME.  RN waiting for patient to return to room

## 2018-03-16 NOTE — PROGRESS NOTES
Received bedside shift report from [facility], RN. Pt lying in bed. No apparent distress. Respirations even and unlabored. Instructed to call for assistance with needs, as they arise. Pt voiced understanding.

## 2018-03-16 NOTE — PROGRESS NOTES
End of shift report given to Tracey Brantley RN. In bed, resting quietly, NAD. Pt safety maintained throughout shift.

## 2018-03-16 NOTE — PROGRESS NOTES
Lucia Blackwell  Admission Date: 3/8/2018             Daily Progress Note: 3/16/2018    The patient's chart is reviewed and the patient is discussed with the staff. 69 y.o. CF admitted from the 9th floor rehab but has been hospitalized since 2/7/18.  She over the last few days had decompensated and has been requiring BIPAP.  Right tap 3/8 with 1000ml removed.  Post procedure BP dropped to the 70s and became more somnolent.  Discussion with her son and he states he understands the complexity and poor prognosis for his mother's current condition. Abdirahman Kong states he does not want chest compressions, shocks or intubation with vent support--advanced to DNR status.      Medical history of recent TAVR 1/30/18, HTN, CAD, chronic AFIB, anemia, anxiety, and chronic diastolic heart failure. Had bilateral thoracentesis on Jan 25th (preop) removing 1200ml from Right and 900 ml from Left.  She had TAVR on Jan 30th and was discharged home on Feb 3.  She presented to the ER on 2/7/18 via EMS with worsening swelling and shortness of breath.   CXR with cardiomegaly, pulmonary edema and worsening bilateral pleural effusions.  Required bilateral thoracenteses again on 2/9:  removed 550 ml from Left and 1000 ml from Right. Was later transferred to the 9th floor for rehab.  On 9th floor cardiology followed adjusting diuretic therapy.  Was anemic,  stool was heme positive and Eliquis was stopped. Became more lethargic and confused and ABG showed hypercapnea.  Was started on bipap with improvement.  Palliative Care was consulted for goals of care.  Repeat T/C on 3/12 -- 550 and 1000 removed. reported dysphagia and passed swallowing    Subjective:     On O2 2L NC, without shortness of breath  Cough with tan sputum, oriented.  No family at bedside    Current Facility-Administered Medications   Medication Dose Route Frequency    ciprofloxacin HCl (CIPRO) tablet 500 mg  500 mg Oral Q12H    diazePAM (VALIUM) tablet 2 mg  2 mg Oral QHS PRN    alum-mag hydroxide-simeth (MYLANTA) oral suspension 30 mL  30 mL Oral Q4H PRN    aspirin chewable tablet 81 mg  81 mg Oral DAILY    furosemide (LASIX) tablet 80 mg  80 mg Oral DAILY    potassium chloride (K-DUR, KLOR-CON) SR tablet 40 mEq  40 mEq Oral DAILY    magnesium oxide (MAG-OX) tablet 400 mg  400 mg Oral DAILY    alcohol 62% (NOZIN) nasal  1 Ampule  1 Ampule Topical Q12H    allopurinol (ZYLOPRIM) tablet 100 mg  100 mg Oral BID    hydrocortisone (ANUSOL-HC) 2.5 % rectal cream   PeriANAL TID PRN    levothyroxine (SYNTHROID) tablet 88 mcg  88 mcg Oral ACB    metoprolol succinate (TOPROL-XL) XL tablet 25 mg  25 mg Oral DAILY    pantoprazole (PROTONIX) tablet 40 mg  40 mg Oral ACB    polyethylene glycol (MIRALAX) packet 17 g  17 g Oral DAILY    pravastatin (PRAVACHOL) tablet 40 mg  40 mg Oral DAILY    rOPINIRole (REQUIP) tablet 2 mg  2 mg Oral BID    sertraline (ZOLOFT) tablet 100 mg  100 mg Oral DAILY    spironolactone (ALDACTONE) tablet 25 mg  25 mg Oral DAILY    levalbuterol (XOPENEX) nebulizer soln 0.63 mg/3 mL  0.63 mg Nebulization Q6H PRN    sodium chloride (NS) flush 5-10 mL  5-10 mL IntraVENous Q8H    sodium chloride (NS) flush 5-10 mL  5-10 mL IntraVENous PRN       Review of Systems  Constitutional: negative for fever, chills, sweats  Cardiovascular: negative for chest pain, palpitations, syncope, +edema  Gastrointestinal:  negative for dysphagia, reflux, vomiting, diarrhea, abdominal pain, or melena  Neurologic:  negative for focal weakness, numbness, headache    Objective:     Vitals:    03/15/18 1900 03/15/18 2300 03/16/18 0300 03/16/18 0400   BP: 99/64 103/67 91/58    Pulse: 83 81 83    Resp: 20 20 20    Temp: 97.9 °F (36.6 °C) 97.9 °F (36.6 °C) 97.8 °F (36.6 °C)    SpO2: 96% 98% 97%    Weight:    167 lb 12.8 oz (76.1 kg)     Intake and Output:   03/14 1901 - 03/16 0700  In: 480 [P.O.:480]  Out: -        Physical Exam:   Constitution:  the patient is elderly and in no acute distress  EENMT:  Sclera clear, pupils equal, oral mucosa moist  Respiratory: clear anteriorly, diminished bases on 3lpm  Cardiovascular:  RRR without M,G,R  Gastrointestinal: soft and non-tender; with positive bowel sounds. Musculoskeletal: warm without cyanosis. There is ++ lower leg edema.   Skin:  no jaundice or rashes, no open wounds   Neurologic: no gross neuro deficits     Psychiatric:  alert and oriented x 3    CHEST XRAY: 3/14/18        LAB     Recent Labs      03/14/18   0641   WBC  5.1   HGB  8.4*   HCT  27.8*   PLT  113*     Recent Labs      03/14/18   0641   NA  146*   K  4.0   CL  102   CO2  38*   GLU  81   BUN  36*   CREA  1.12*   MG  2.4   CA  8.4   PHOS  3.2         Assessment:  (Medical Decision Making)     Patient Active Problem List   Diagnosis Code    Degenerative arthritis of left knee M17.12    Aortic Valve Bioprosthesis Present Z95.2    Chronic obstructive pulmonary disease (HCC) J44.9    Hypothyroidism E03.9    BOBBY (obstructive sleep apnea) G47.33    Chronic atrial fibrillation (HCC) I48.2    Anemia D64.9    Thrombocytopenia (HCC) D69.6    Obesity E66.9    Mitral stenosis with insufficiency I05.2    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    Cardiac pacemaker Z95.0    Sick sinus syndrome (HCC) I49.5    H/O mitral valve repair, 2003 Z98.890    Chronic depression F32.9    Osteopenia M85.80    RLS (restless legs syndrome) G25.81    Hyperlipidemia E78.5    Gout M10.9    Anxiety F41.9    CAD (coronary artery disease) I25.10    HTN (hypertension) I10    GERD (gastroesophageal reflux disease) K21.9    Chronic diastolic congestive heart failure (HCC) I50.32    Aortic valve replaced Z95.2    Pulmonary hypertension I27.20    H/O atrioventricular rubin ablation Z98.890    S/P mitral valve repair Z98.890    Tricuspid valve insufficiency H92.1    Systolic CHF, chronic (HCC) I50.22    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 coli UTI N39.0, B96.20    DNR (do not resuscitate) Z66    Acute respiratory failure with hypoxia and hypercarbia (HCC) J96.01, J96.02    Pleural effusion, right J90         Plan:  (Medical Decision Making)     Hospital Problems  Date Reviewed: 3/15/2018          Codes Class Noted POA    Pleural effusion, right ICD-10-CM: J90  ICD-9-CM: 511.9  3/12/2018 Unknown    S/p thoracentesis with 1000ml removed on 3/12     E. coli UTI ICD-10-CM: N39.0, B96.20  ICD-9-CM: 599.0, 041.49  3/8/2018 Yes    Continue cipro Day 5     DNR (do not resuscitate) (Chronic) ICD-10-CM: X32  ICD-9-CM: V49.86  3/8/2018 Yes        Acute respiratory failure with hypoxia and hypercarbia (HCC) ICD-10-CM: J96.01, J96.02  ICD-9-CM: 518.81  3/8/2018 Yes    On home flow 3L     Pleural effusion, left ICD-10-CM: J90  ICD-9-CM: 511.9  3/6/2018 Yes     3/8/18 Right thoracentesis 1000ml removed         3/12 left thoracentesis with 550 ml removed     S/P TAVR (transcatheter aortic valve replacement) (Chronic) ICD-10-CM: Z95.2  ICD-9-CM: V43.3  1/30/2018 Yes        Hypoxia ICD-10-CM: R09.02  ICD-9-CM: 799.02  1/23/2018 Yes    On home flow O2 and using bipap q hs     Chronic diastolic congestive heart failure (HCC) (Chronic) ICD-10-CM: I50.32  ICD-9-CM: 428.32, 428.0  9/9/2016 Yes    On lasix, aldactone, toprol     Chronic atrial fibrillation (HCC) (Chronic) ICD-10-CM: I48.2  ICD-9-CM: 427.31  10/17/2011 Yes    Rate controlled     Chronic obstructive pulmonary disease (HCC) (Chronic) ICD-10-CM: J44.9  ICD-9-CM: 496  3/8/2009 Yes     PFTs Jan 2018           No wheezing, On prn BD        -- son was not available to meet yesterday and only here for an hour this am. Open to hospice.  Hospice consulted but unable to see until tomorrow. -- plan to discharge home with hospice tomorrow. Sharif Moreno NP      The patient has been seen and examined by me personally, the chart,labs, and radiographic studies have been reviewed. Chest: CTA  Extremities: 3+ edema    I agree with the above assessment and plan.     Yeimi Gonzalez MD.

## 2018-03-16 NOTE — PROGRESS NOTES
[facility] CARDIOLOGY PROGRESS NOTE           3/16/2018 9:28 AM    Admit Date: 3/8/2018      Subjective:     No o/e; on 2L which appears her baseline. Pending hospice evaluation    ROS:  Cardiovascular:  As noted above    Objective:      Vitals:    03/15/18 1900 03/15/18 2300 03/16/18 0300 03/16/18 0400   BP: 99/64 103/67 91/58    Pulse: 83 81 83    Resp: 20 20 20    Temp: 97.9 °F (36.6 °C) 97.9 °F (36.6 °C) 97.8 °F (36.6 °C)    SpO2: 96% 98% 97%    Weight:    76.1 kg (167 lb 12.8 oz)       Physical Exam:  General-No Acute Distress, 1L O2 by NC  Neck- supple, no JVD  CV- regular rate and rhythm   Lung- min basilar rales; dec at bases  Abd- soft, nontender, nondistended  Ext- 1+ edema bilaterally. Skin- warm and dry    Data Review:   Recent Labs      03/14/18   0641   NA  146*   K  4.0   MG  2.4   BUN  36*   CREA  1.12*   GLU  81   WBC  5.1   HGB  8.4*   HCT  27.8*   PLT  113*       Assessment/Plan:   Acute respiratory failure with hypoxia and hypercarbia w chronic obstructive pulmonary disease - BIPAP prn, thoracentesis 3-8. Chronic atrial fibrillation (HCC) (10/17/2011)- S/P [facility] PM.  Rate control w toprol, off anticoagulation due to bleeding risk, anemia-son also not interested in anticoagulation (risks discussed)    Chronic diastolic congestive heart failure ([facility] [address] (9/9/2016)- EF 55%, lasix po, aldactone, BB. Hypoxia (1/23/2018)- improved; per pulmonary. S/P TAVR (transcatheter aortic valve replacement) (1/30/2018)- Stable, cont ASA. Pleural effusion, left- 3/8/18 Right thoracentesis 1000ml removed    E. coli UTI (3/8/2018)- s/p cipro. DNR (do not resuscitate) (3/8/2018)- Pnding hospice. Pending hospice evaluation; recurrent thoracentesis. Likely contributed by low albumin (last noted at 2.0); also contributing to edema. Post TAVR echo with preserved EF. Some lower BPs and dec lasix to 40mg daily.  Off eliquis prior with anemia; discussed restarting but son not interested. Will hold off and continue ASA. Also plans for hospice.        Walt Olivia MD  3/16/2018 9:28 AM

## 2018-03-16 NOTE — PROGRESS NOTES
Patient is alert to self. Patient keeps trying to wander and bed/chair alarm with ring. RN  reorientate patient. to place time and situation. Patient is calm when family present. Patient denies co pain. Patient takes meds without difficulty. Call light with in reach but patient forgets to use it. Lung sounds diminish no cough noted. No s/s of CVR distress. RN will continue to monitor.

## 2018-03-17 NOTE — PROGRESS NOTES
Patient in bed resting with no complaints at this time. Patient is alert and orientated with no distress noted, but patient does have some periodic confusion at times. IV Intact and patent with no s/s of infection noted. Respirations even and unlabored with heart rate regular. Patient unable to ambulate independently without assistance; needs x1 assist.  Bed in low locked position with call light within reach. Patient instructed to call if assistance is needed. Will continue to monitor.

## 2018-03-17 NOTE — PROGRESS NOTES
[facility] CARDIOLOGY PROGRESS NOTE           3/17/2018 11:02 AM    Admit Date: 3/8/2018      Subjective:     No o/e; on 2L which appears her baseline. Pending hospice evaluation. Denies any CP; stable dyspnea    ROS:  Cardiovascular:  As noted above    Objective:      Vitals:    03/16/18 2340 03/17/18 0341 03/17/18 0605 03/17/18 0813   BP:  96/60  102/63   Pulse:  82  83   Resp:  18  20   Temp:  98.1 °F (36.7 °C)  98 °F (36.7 °C)   SpO2: 94% 98%  97%   Weight:   75.8 kg (167 lb)        Physical Exam:  General-No Acute Distress, 2L O2 by NC  Neck- supple, no JVD  CV- regular rate and rhythm   Lung- min basilar rales; dec at bases  Abd- soft, nontender, nondistended  Ext- 1-2+ edema bilaterally. Skin- warm and dry    Data Review:   No results for input(s): NA, K, MG, BUN, CREA, GLU, WBC, HGB, HCT, PLT, INR, TROIQ, CHOL, LDLC, HDL, HGBEXT, HCTEXT, PLTEXT, HGBEXT, HCTEXT, PLTEXT in the last 72 hours. No lab exists for component: TROIP, TGL, HDLC, TRP, INREXT, INREXT    Assessment/Plan:   Acute respiratory failure with hypoxia and hypercarbia w chronic obstructive pulmonary disease - BIPAP prn, thoracentesis 3-8. Chronic atrial fibrillation (HCC) (10/17/2011)- S/P [facility] PM.  Rate control w toprol, off anticoagulation due to bleeding risk, anemia-son also not interested in anticoagulation (risks discussed)    Chronic diastolic congestive heart failure ([facility] [address] (9/9/2016)- EF 55%, lasix po, aldactone, BB. Hypoxia (1/23/2018)- improved; per pulmonary. S/P TAVR (transcatheter aortic valve replacement) (1/30/2018)- Stable, cont ASA. Pleural effusion, left- 3/8/18 Right thoracentesis 1000ml removed    E. coli UTI (3/8/2018)- s/p cipro. DNR (do not resuscitate) (3/8/2018)- Pnding hospice. Pending hospice evaluation; recurrent thoracentesis. Likely contributed by low albumin (last noted at 2.0); also contributing to edema. Post TAVR echo with preserved EF.   Some lower BPs and decreased lasix to 40mg daily. Off eliquis prior with anemia; discussed restarting but son not interested. Will hold off and continue ASA. Also plans for hospice.        Daniel Begum MD  3/17/2018 11:02 AM

## 2018-03-17 NOTE — PROGRESS NOTES
Patient was in good spirits. Hoping to get home soon. Noted hospice consult. Encouraged.     Hayden Shetty, staff [address], [phone] [facility]  /   [email or web address]

## 2018-03-17 NOTE — PROGRESS NOTES
Received bedside shift report from [facility]. Patient in bed, in its low and locked position with call bell within reach. Patient alert and oriented x 1 with respirations regular and non-labored.

## 2018-03-17 NOTE — PROGRESS NOTES
Patient tolerated BiPAP well all night. Her breathing is normal and without s/s of distress. Her bed is low and locked and the call bell is within reach. I will continue to monitor patient.

## 2018-03-17 NOTE — PROGRESS NOTES
Patient voices no concerns at this time. Patient is resting in bed after being up in recliner all day. Call light within reach and patient instructed to call if assistance is needed. Will continue to monitor.

## 2018-03-17 NOTE — PROGRESS NOTES
Festus Fuchs  Admission Date: 3/8/2018             Daily Progress Note: 3/17/2018    The patient's chart is reviewed and the patient is discussed with the staff. 70 yo female with a history of HTN, CAD, chronic AFIB, anemia, anxiety, and chronic diastolic heart failure. Had bilateral thoracentesis on Jan 25th (preop) removing 1200ml from Right and 900 ml from Left.  She underwent  TAVR on 1/30/18, and was discharged home on Feb 3.  She presented to the ER on 2/7/18 via EMS with worsening swelling and shortness of breath.   CXR with cardiomegaly, pulmonary edema and worsening bilateral pleural effusions.  Required bilateral thoracenteses again on 2/9:  removed 550 ml from Left and 1000 ml from Right. Was later transferred to the 9th floor for rehab.  On 9th floor cardiology followed adjusting diuretic therapy.  Was anemic,  stool was heme positive and Eliquis was stopped. Became more lethargic and confused and ABG showed hypercapnea.  Was started on bipap with improvement.  Palliative Care was consulted for goals of care.  Repeat T/C on 3/12 -- 550 and 1000 removed and again on 3/8 with removal of 1000 ml. Post procedure BP dropped to the 70s and became more somnolent. Patient is now DNR and Hospice was consulted    Subjective:     Wearing BIPAP with sleep and 2 lpm during the daytime as at home with o2 sat 96%. Patient repeatedly voicing her frustration with not being able to go home today.       Current Facility-Administered Medications   Medication Dose Route Frequency    furosemide (LASIX) tablet 40 mg  40 mg Oral DAILY    ciprofloxacin HCl (CIPRO) tablet 500 mg  500 mg Oral Q12H    diazePAM (VALIUM) tablet 2 mg  2 mg Oral QHS PRN    alum-mag hydroxide-simeth (MYLANTA) oral suspension 30 mL  30 mL Oral Q4H PRN    aspirin chewable tablet 81 mg  81 mg Oral DAILY    potassium chloride (K-DUR, KLOR-CON) SR tablet 40 mEq  40 mEq Oral DAILY    magnesium oxide (MAG-OX) tablet 400 mg  400 mg Oral DAILY    alcohol 62% (NOZIN) nasal  1 Ampule  1 Ampule Topical Q12H    allopurinol (ZYLOPRIM) tablet 100 mg  100 mg Oral BID    hydrocortisone (ANUSOL-HC) 2.5 % rectal cream   PeriANAL TID PRN    levothyroxine (SYNTHROID) tablet 88 mcg  88 mcg Oral ACB    metoprolol succinate (TOPROL-XL) XL tablet 25 mg  25 mg Oral DAILY    pantoprazole (PROTONIX) tablet 40 mg  40 mg Oral ACB    polyethylene glycol (MIRALAX) packet 17 g  17 g Oral DAILY    pravastatin (PRAVACHOL) tablet 40 mg  40 mg Oral DAILY    rOPINIRole (REQUIP) tablet 2 mg  2 mg Oral BID    sertraline (ZOLOFT) tablet 100 mg  100 mg Oral DAILY    spironolactone (ALDACTONE) tablet 25 mg  25 mg Oral DAILY    levalbuterol (XOPENEX) nebulizer soln 0.63 mg/3 mL  0.63 mg Nebulization Q6H PRN    sodium chloride (NS) flush 5-10 mL  5-10 mL IntraVENous Q8H    sodium chloride (NS) flush 5-10 mL  5-10 mL IntraVENous PRN       Review of Systems  Constitutional: negative for fever, chills, sweats  Cardiovascular: negative for chest pain, palpitations, syncope, edema  Gastrointestinal:  negative for dysphagia, reflux, vomiting, diarrhea, abdominal pain, or melena  Neurologic:  negative for focal weakness, numbness, headache    Objective:     Vitals:    03/17/18 0341 03/17/18 0605 03/17/18 0813 03/17/18 1347   BP: 96/60  102/63    Pulse: 82  83    Resp: 18  20    Temp: 98.1 °F (36.7 °C)  98 °F (36.7 °C)    SpO2: 98%  97% 96%   Weight:  167 lb (75.8 kg)       Intake and Output:   03/15 1901 - 03/17 0700  In: 770 [P.O.:770]  Out: 200 [Urine:200]  03/17 0701 - 03/17 1900  In: 240 [P.O.:240]  Out: -     Physical Exam:   Constitution:  the patient is well developed and in no acute distress  EENMT:  Sclera clear, pupils equal, oral mucosa moist  Respiratory: diminished with crackles bilateral posterior, o2 at 2 lpm  Cardiovascular:  iRRR without M,G,R  Gastrointestinal: soft and non-tender; with positive bowel sounds.   Musculoskeletal: warm without cyanosis. There is 2+ lower leg edema. Skin:  no jaundice or rashes, no open wounds   Neurologic: no gross neuro deficits     Psychiatric:  alert and oriented x 3    CXR:   3/14            LAB  No results for input(s): GLUCPOC in the last 72 hours. No lab exists for component: GLPOC   No results for input(s): WBC, HGB, HCT, PLT, INR, HGBEXT, HCTEXT, PLTEXT in the last 72 hours. No lab exists for component: INREXT  No results for input(s): NA, K, CL, CO2, GLU, BUN, CREA, MG, CA, PHOS, TROIQ, ALB, TBIL, TBILI, GPT, ALT, SGOT, BNPP in the last 72 hours. No lab exists for component: TROIP  No results for input(s): PH, PCO2, PO2, HCO3 in the last 72 hours. No results for input(s): LCAD, LAC in the last 72 hours.       Assessment:  (Medical Decision Making)     Hospital Problems  Date Reviewed: 3/17/2018          Codes Class Noted POA    Pleural effusion, right ICD-10-CM: J90  ICD-9-CM: 511.9  3/12/2018 Unknown        E. coli UTI ICD-10-CM: N39.0, B96.20  ICD-9-CM: 599.0, 041.49  3/8/2018 Yes    On Cipro      DNR (do not resuscitate) (Chronic) ICD-10-CM: U92  ICD-9-CM: V49.86  3/8/2018 Yes    Planning discharge home with Hospice      * (Principal)Acute respiratory failure with hypoxia and hypercarbia (HCC) ICD-10-CM: J96.01, J96.02  ICD-9-CM: 518.81  3/8/2018 Yes    Currently on o2 at 2 lpm  Wearing BIPAP at night with sleep      Pleural effusion, left ICD-10-CM: J90  ICD-9-CM: 511.9  3/6/2018 Yes     3/8/18 Right thoracentesis 1000ml removed         S/P TAVR (transcatheter aortic valve replacement) (Chronic) ICD-10-CM: Z95.2  ICD-9-CM: V43.3  1/30/2018 Yes        Hypoxia ICD-10-CM: R09.02  ICD-9-CM: 799.02  1/23/2018 Yes        Chronic diastolic congestive heart failure (HCC) (Chronic) ICD-10-CM: I50.32  ICD-9-CM: 428.32, 428.0  9/9/2016 Yes        Chronic atrial fibrillation (HCC) (Chronic) ICD-10-CM: I48.2  ICD-9-CM: 427.31  10/17/2011 Yes        Chronic obstructive pulmonary disease (HCC) (Chronic) ICD-10-CM: J44.9  ICD-9-CM: 301  3/8/2009 Yes     PFTs Jan 2018              Plan:  (Medical Decision Making)     --Cipro 6 - for UTI  --lasix 40 mg po daily / aldactone  --planning for discharge home with Hospice on Monday    More than 50% of the time documented was spent in face-to-face contact with the patient and in the care of the patient on the floor/unit where the patient is located. Sebastien Cabrera NP          Lungs:  Diminished   Heart:  RRR with no Murmur/Rubs/Gallops    Additional Comments:  Home with hospice on Monday   I have spoken with and examined the patient. I agree with the above assessment and plan as documented.     Sanya Vieyra MD

## 2018-03-18 NOTE — PROGRESS NOTES
Patient has slept well most of the night. She woke up and had to go to the bathroom a few minutes ago and was slightly confused but reoriented easily. She has tolerated her BiPAP all night except that she expressed it hurting her nose. Her breathing is increased in rate and effort on her way back from the bathroom but otherwise is normal and non-labored. Her bed is low and locked, bed alarm on, and call bell within reach. I will continue to monitor.

## 2018-03-18 NOTE — PROGRESS NOTES
Kolby Vann  Admission Date: 3/8/2018             Daily Progress Note: 3/18/2018    The patient's chart is reviewed and the patient is discussed with the staff. 70 yo female with a history of HTN, CAD, chronic AFIB, anemia, anxiety, and chronic diastolic heart failure. Had bilateral thoracentesis on Jan 25th (preop) removing 1200ml from Right and 900 ml from Left.  She underwent  TAVR on 1/30/18, and was discharged home on Feb 3.  She presented to the ER on 2/7/18 via EMS with worsening swelling and shortness of breath.   CXR with cardiomegaly, pulmonary edema and worsening bilateral pleural effusions.  Required bilateral thoracenteses again on 2/9:  removed 550 ml from Left and 1000 ml from Right. Was later transferred to the 9th floor for rehab.  On 9th floor cardiology followed adjusting diuretic therapy.  Was anemic,  stool was heme positive and Eliquis was stopped. Became more lethargic and confused and ABG showed hypercapnea.  Was started on bipap with improvement.  Palliative Care was consulted for goals of care.  Repeat T/C on 3/12 -- 550 and 1000 removed and again on 3/8 with removal of 1000 ml. Post procedure BP dropped to the 70s and became more somnolent. Patient is now DNR and Hospice was consulted    Subjective:   Awake and alert in no distress  No events overnight.       Current Facility-Administered Medications   Medication Dose Route Frequency    furosemide (LASIX) tablet 40 mg  40 mg Oral DAILY    ciprofloxacin HCl (CIPRO) tablet 500 mg  500 mg Oral Q12H    diazePAM (VALIUM) tablet 2 mg  2 mg Oral QHS PRN    alum-mag hydroxide-simeth (MYLANTA) oral suspension 30 mL  30 mL Oral Q4H PRN    aspirin chewable tablet 81 mg  81 mg Oral DAILY    potassium chloride (K-DUR, KLOR-CON) SR tablet 40 mEq  40 mEq Oral DAILY    magnesium oxide (MAG-OX) tablet 400 mg  400 mg Oral DAILY    alcohol 62% (NOZIN) nasal  1 Ampule  1 Ampule Topical Q12H    allopurinol (ZYLOPRIM) tablet 100 mg  100 mg Oral BID    hydrocortisone (ANUSOL-HC) 2.5 % rectal cream   PeriANAL TID PRN    levothyroxine (SYNTHROID) tablet 88 mcg  88 mcg Oral ACB    metoprolol succinate (TOPROL-XL) XL tablet 25 mg  25 mg Oral DAILY    pantoprazole (PROTONIX) tablet 40 mg  40 mg Oral ACB    polyethylene glycol (MIRALAX) packet 17 g  17 g Oral DAILY    pravastatin (PRAVACHOL) tablet 40 mg  40 mg Oral DAILY    rOPINIRole (REQUIP) tablet 2 mg  2 mg Oral BID    sertraline (ZOLOFT) tablet 100 mg  100 mg Oral DAILY    spironolactone (ALDACTONE) tablet 25 mg  25 mg Oral DAILY    levalbuterol (XOPENEX) nebulizer soln 0.63 mg/3 mL  0.63 mg Nebulization Q6H PRN    sodium chloride (NS) flush 5-10 mL  5-10 mL IntraVENous Q8H    sodium chloride (NS) flush 5-10 mL  5-10 mL IntraVENous PRN       Review of Systems  Constitutional: negative for fever, chills, sweats  Cardiovascular: negative for chest pain, palpitations, syncope, edema  Gastrointestinal:  negative for dysphagia, reflux, vomiting, diarrhea, abdominal pain, or melena  Neurologic:  negative for focal weakness, numbness, headache    Objective:     Vitals:    03/18/18 0404 03/18/18 0517 03/18/18 0710 03/18/18 1108   BP: 98/60  90/57 112/72   Pulse: 85  86 82   Resp: 16 18 18   Temp: 98.2 °F (36.8 °C)  98 °F (36.7 °C) 98 °F (36.7 °C)   SpO2: 97%  96% 100%   Weight:  167 lb 11.2 oz (76.1 kg)       Intake and Output:   03/16 1901 - 03/18 0700  In: 290 [P.O.:290]  Out: 200 [Urine:200]  03/18 0701 - 03/18 1900  In: 120 [P.O.:120]  Out: -     Physical Exam:   Constitution:  the patient is well developed and in no acute distress  EENMT:  Sclera clear, pupils equal, oral mucosa moist  Respiratory: diminished with crackles bilateral posterior, O2 at 2 lpm  Cardiovascular:  iRRR without M,G,R  Gastrointestinal: soft and non-tender; with positive bowel sounds. Musculoskeletal: warm without cyanosis. There is 2+ lower leg edema.   Skin:  no jaundice or rashes, no open wounds   Neurologic: no gross neuro deficits     Psychiatric:  alert and oriented x 3    CXR:   3/14            LAB  No results for input(s): GLUCPOC in the last 72 hours. No lab exists for component: GLPOC   No results for input(s): WBC, HGB, HCT, PLT, INR, HGBEXT, HCTEXT, PLTEXT, HGBEXT, HCTEXT, PLTEXT in the last 72 hours. No lab exists for component: INREXT, INREXT  No results for input(s): NA, K, CL, CO2, GLU, BUN, CREA, MG, CA, PHOS, TROIQ, ALB, TBIL, TBILI, GPT, ALT, SGOT, BNPP in the last 72 hours. No lab exists for component: TROIP  No results for input(s): PH, PCO2, PO2, HCO3 in the last 72 hours. No results for input(s): LCAD, LAC in the last 72 hours.       Assessment:  (Medical Decision Making)     Hospital Problems  Date Reviewed: 3/17/2018          Codes Class Noted POA    Pleural effusion, right ICD-10-CM: J90  ICD-9-CM: 511.9  3/12/2018 Unknown        E. coli UTI ICD-10-CM: N39.0, B96.20  ICD-9-CM: 599.0, 041.49  3/8/2018 Yes    On Cipro      DNR (do not resuscitate) (Chronic) ICD-10-CM: Z66  ICD-9-CM: V49.86  3/8/2018 Yes    Planning discharge home with Hospice      * (Principal)Acute respiratory failure with hypoxia and hypercarbia (HCC) ICD-10-CM: J96.01, J96.02  ICD-9-CM: 518.81  3/8/2018 Yes    Currently on O2 at 2 lpm  Wearing BIPAP at night with sleep      Pleural effusion, left ICD-10-CM: J90  ICD-9-CM: 511.9  3/6/2018 Yes     3/8/18 Right thoracentesis 1000ml removed         S/P TAVR (transcatheter aortic valve replacement) (Chronic) ICD-10-CM: Z95.2  ICD-9-CM: V43.3  1/30/2018 Yes        Hypoxia ICD-10-CM: R09.02  ICD-9-CM: 799.02  1/23/2018 Yes        Chronic diastolic congestive heart failure (HCC) (Chronic) ICD-10-CM: I50.32  ICD-9-CM: 428.32, 428.0  9/9/2016 Yes        Chronic atrial fibrillation (HCC) (Chronic) ICD-10-CM: I48.2  ICD-9-CM: 427.31  10/17/2011 Yes        Chronic obstructive pulmonary disease (HCC) (Chronic) ICD-10-CM: J44.9  ICD-9-CM: 496  3/8/2009 Yes     PFTs Jan 2018              Plan:  (Medical Decision Making)     --Cipro 7 - for UTI stop in AM  --lasix 40 mg po daily / aldactone  --planning for discharge home with Hospice in AM    More than 50% of the time documented was spent in face-to-face contact with the patient and in the care of the patient on the floor/unit where the patient is located.     Kaylie Block MD

## 2018-03-18 NOTE — PROGRESS NOTES
Received bedside shift report from [place]. Patient in window seat with family in room, with call bell within reach. Patient alert and oriented x 3 with respirations regular and non-labored.

## 2018-03-18 NOTE — PROGRESS NOTES
Patient sitting quietly in bed. Bed alarm on. Patient voiding qs. Patient denies co pain. No distress. Family into visit. RN will continue to monitor.

## 2018-03-18 NOTE — PROGRESS NOTES
Report received from Radha L[facility]pez [facility]. Patient is pleasantly confused. Was informed by day nurse that patient is having difficulty with not wanting to take medications. Family member at bedside is aware of this. Respirations even and unlabored at 16. No distress at this time. Will continue to monitor.

## 2018-03-18 NOTE — PROGRESS NOTES
RN received patient in stable condition. Patient is anxious keeps getting out of bed and walking. Bed alarm on. RN explained fall precautions and importance of calling nurse for assistance but patient has a poor memory and can not remember conversations. Patient moved close to nurses station with bed alarm on. Patient has ronnie edema 2 plus no breakdown. No s/s of distress. RN will continue to monitor.

## 2018-03-18 NOTE — PROGRESS NOTES
[facility] CARDIOLOGY PROGRESS NOTE           3/18/2018 12:02 PM    Admit Date: 3/8/2018      Subjective:     No o/e; plans for home with hospice in am. No CP; on 2L which appears her baseline. Stable dyspnea    ROS:  Cardiovascular:  As noted above    Objective:      Vitals:    03/18/18 0404 03/18/18 0517 03/18/18 0710 03/18/18 1108   BP: 98/60  90/57 112/72   Pulse: 85  86 82   Resp: 16 18 18   Temp: 98.2 °F (36.8 °C)  98 °F (36.7 °C) 98 °F (36.7 °C)   SpO2: 97%  96% 100%   Weight:  76.1 kg (167 lb 11.2 oz)         Physical Exam:  General-No Acute Distress, 2L O2 by NC  Neck- supple, no JVD  CV- regular rate and rhythm   Lung- min basilar rales; dec at bases  Abd- soft, nontender, nondistended  Ext- 1-2+ edema bilaterally. Skin- warm and dry    Data Review:   No results for input(s): NA, K, MG, BUN, CREA, GLU, WBC, HGB, HCT, PLT, INR, TROIQ, CHOL, LDLC, HDL, HGBEXT, HCTEXT, PLTEXT, HGBEXT, HCTEXT, PLTEXT in the last 72 hours. No lab exists for component: TROIP, TGL, HDLC, TRP, INREXT, INREXT    Assessment/Plan:   Acute respiratory failure with hypoxia and hypercarbia w chronic obstructive pulmonary disease - BIPAP prn, thoracentesis 3-8. Chronic atrial fibrillation (HCC) (10/17/2011)- S/P [facility].  Rate control w toprol, off anticoagulation due to bleeding risk, anemia-son also not interested in anticoagulation (risks discussed)    Chronic diastolic congestive heart failure ([facility] [address] (9/9/2016)- EF 55%, lasix po, aldactone, BB. Hypoxia (1/23/2018)- improved; per pulmonary. S/P TAVR (transcatheter aortic valve replacement) (1/30/2018)- Stable, cont ASA. Pleural effusion, left- 3/8/18 Right thoracentesis 1000ml removed    E. coli UTI (3/8/2018)- s/p cipro. DNR (do not resuscitate) (3/8/2018)- Pnding hospice. Plans for home in am with hospice. Recurrent thoracentesis. Likely contributed by low albumin (last noted at 2.0); also contributing to edema.  Post TAVR echo with preserved EF. Some lower BPs and decreased lasix to 40mg daily. Off eliquis prior with anemia; discussed restarting but son not interested. Will hold off and continue ASA. Also plans for hospice.        Isaura Ceballos MD  3/18/2018 12:02 PM

## 2018-03-18 NOTE — PROGRESS NOTES
Very pleasant this morning  Said she slept well last night  Sons have been very supportive of her  Talked about the Lord and heaven  Patient said she is ready when the [address] calls her  Prayer  Will continue to follow    Tomi Pineda, staff [address], [address] [facility]  /   [email or web address]

## 2018-03-18 NOTE — PROGRESS NOTES
Patient calm and cooperative patient denies co pain. Patient tolerating diet. Patient voiding qs. Patient tried spitting out her pills and patient swallowed with encouragement. No distress. Call light with in reach. Bed alarm on fall precautions maintained.  RN will continue to monitorl

## 2018-03-19 NOTE — PROGRESS NOTES
[facility] CARDIOLOGY PROGRESS NOTE           3/19/2018 8:26 AM    Admit Date: 3/8/2018      Subjective:       Patient states breathing is better. Review of Systems   Constitutional: Negative for weight loss. HENT: Positive for hearing loss. Respiratory: Negative for hemoptysis. Cardiovascular: Negative for chest pain. Musculoskeletal: Negative for myalgias. Psychiatric/Behavioral: Negative for hallucinations. Objective:      Vitals:    03/18/18 2322 03/19/18 0022 03/19/18 0417 03/19/18 0730   BP:  111/73 98/60 106/60   Pulse:  83 81 83   Resp:  18 18 20   Temp:  98 °F (36.7 °C) 97.7 °F (36.5 °C) 98 °F (36.7 °C)   SpO2: 94% 95% 98% 100%   Weight:             Physical Exam   Constitutional: No distress. Neck: No JVD present. Cardiovascular:   Murmur heard. Abdominal: There is no tenderness. Musculoskeletal: She exhibits no edema. Skin: No erythema. Psychiatric: She is not agitated. Data Review:   No results for input(s): NA, K, MG, BUN, CREA, GLU, WBC, HGB, HCT, PLT, INR, TROIQ, CHOL, LDLC, HDL, HGBEXT, HCTEXT, PLTEXT, HGBEXT, HCTEXT, PLTEXT in the last 72 hours.     No lab exists for component: TROIP, TGL, HDLC, TRP, INREXT, INREXT      Intake/Output Summary (Last 24 hours) at 03/19/18 1054  Last data filed at 03/19/18 0417   Gross per 24 hour   Intake              120 ml   Output                0 ml   Net              120 ml     Current Facility-Administered Medications   Medication Dose Route Frequency    HYDROcodone-acetaminophen (NORCO) 5-325 mg per tablet 1 Tab  1 Tab Oral Q4H PRN    furosemide (LASIX) tablet 40 mg  40 mg Oral DAILY    ciprofloxacin HCl (CIPRO) tablet 500 mg  500 mg Oral Q12H    diazePAM (VALIUM) tablet 2 mg  2 mg Oral QHS PRN    alum-mag hydroxide-simeth (MYLANTA) oral suspension 30 mL  30 mL Oral Q4H PRN    aspirin chewable tablet 81 mg  81 mg Oral DAILY    potassium chloride (K-DUR, KLOR-CON) SR tablet 40 mEq  40 mEq Oral DAILY    magnesium oxide (MAG-OX) tablet 400 mg  400 mg Oral DAILY    alcohol 62% (NOZIN) nasal  1 Ampule  1 Ampule Topical Q12H    allopurinol (ZYLOPRIM) tablet 100 mg  100 mg Oral BID    hydrocortisone (ANUSOL-HC) 2.5 % rectal cream   PeriANAL TID PRN    levothyroxine (SYNTHROID) tablet 88 mcg  88 mcg Oral ACB    metoprolol succinate (TOPROL-XL) XL tablet 25 mg  25 mg Oral DAILY    pantoprazole (PROTONIX) tablet 40 mg  40 mg Oral ACB    polyethylene glycol (MIRALAX) packet 17 g  17 g Oral DAILY    pravastatin (PRAVACHOL) tablet 40 mg  40 mg Oral DAILY    rOPINIRole (REQUIP) tablet 2 mg  2 mg Oral BID    sertraline (ZOLOFT) tablet 100 mg  100 mg Oral DAILY    spironolactone (ALDACTONE) tablet 25 mg  25 mg Oral DAILY    levalbuterol (XOPENEX) nebulizer soln 0.63 mg/3 mL  0.63 mg Nebulization Q6H PRN    sodium chloride (NS) flush 5-10 mL  5-10 mL IntraVENous Q8H    sodium chloride (NS) flush 5-10 mL  5-10 mL IntraVENous PRN           Assessment/Plan:     1. Respiratory failure followed by pulmonary. 2. COPD per pulmonary  3. Chronic diastolic congestive heart failure points out is improving patient on high-dose Lasix and Aldactone creatinine careful use of diuretics due to hypotension. K is low on aldactone and potassium   4. Pleural effusion right thoracentesis 3/8/2018 followed by pulmonary  5. Aortic stenosis status post transaortic valvular placement  6. Goals of care long discussion ongoing.        Fabiola Ronquillo MD  3/19/2018 8:26 AM

## 2018-03-19 NOTE — PROGRESS NOTES
Discharge instructions and prescriptions provided and explained to the pt's son. Med side effect sheet reviewed. Opportunity for questions provided. Pt leaving floor via wheelchair.

## 2018-03-19 NOTE — PROGRESS NOTES
Shift assessment complete. Pt resting on bed on phone. Pt handed me phone and [facility] was on the line. Patient had called the emergency number on her personal cell phone. Informed EMS that pt was in hospital. Pt alert and oriented x 4, but not sure as to why she called EMS. Pt may have pressed emergency button on phone by mistake? No other complaints. Safety measures in place. Call light in reach.

## 2018-03-19 NOTE — HOSPICE
[facility]-     Received call from pt's son to verify that the plan was for pt to DC this am with hospice support. He mentioned pt has been using BiPAP at night in the hospital and he would like to continue that. Called respiratory therapy and was told pt's settings are 12/6 and [facility] will deliver a BIPAP with auto set that will take care of those settings. Sheridan Vargas be coming to the hospital after he receives the DME and plans to take pt home via car. 19 Carmita Christianson RN to admit at 1500 today.     Thank you-  Tameka Lou Aniya  [phone]

## 2018-03-19 NOTE — PROGRESS NOTES
SBAR report given to AMOS Pugh. Patient alert to name and . Respirations even and unlabored at 18. Bed low and locked; Call light within reach.

## 2018-03-20 NOTE — PROGRESS NOTES
Please note this patient was IP d/c to home with Hospice in place. Please refer to [facility] notes and Hospice notes in chart. Zonia Dickerson LPN/ Care Coordinator  [address] Jayy Jennifer Wu  [address] [place] [address] / [address]  [email or web address]. [facility] note will not be viewable in [address]

## 2018-06-20 NOTE — PROGRESS NOTES
[facility] CARDIOLOGY PROGRESS NOTE           2/24/2018   Admit Date: 2/13/2018      Subjective:   Dyspnea stable. Edema improved. BP stable. Hgb decreased. NYHA class II    ROS:  Cardiovascular:  As noted above    Objective:      Vitals:    02/23/18 1918 02/23/18 1938 02/24/18 0459 02/24/18 0752   BP: (!) 82/50 98/52  129/85   Pulse: 83 80  84   Resp: 16   16   Temp: 97.3 °F (36.3 °C)   97.9 °F (36.6 °C)   SpO2: 100%   99%   Weight:   78 kg (172 lb)        Physical Exam:  General-No Acute Distress  Neck- supple, no JVD  CV- regular rate and rhythm Grade II/VI TERRI  Lung- clear bilaterally  Abd- soft, nontender, nondistended  Ext- 1+ edema bilaterally. Skin- warm and dry      Data Review:   Recent Labs      02/24/18   0451  02/23/18   0530  02/22/18   0720  02/22/18   0600   NA  143   --   143  141   K  3.7   --   3.0*  3.0*   MG   --    --    --   2.0   BUN  47*   --   41*  40*   CREA  1.34*   --   1.19*  1.23*   GLU  106*   --   83  84   WBC  4.3   --   4.7  5.2   HGB  7.4*  7.7*  7.4*  7.5*   HCT  25.1*  24.9*  23.7*  24.8*   PLT  83*   --   70*  74*      No results found for: FARHAD Cha    Assessment/Plan:     Active Problems:    Chronic atrial fibrillation ([facility] [address] (10/17/2011)    On Eliquis      S/P TAVR (transcatheter aortic valve replacement) (1/30/2018)    Stable. On Eliquis. Chronic diastolic heart failure ([facility] [address] (2/7/2018)    Stable on PO lasix. Respiratory failure ([facility] [address] (2/13/2018)    Stable on nasal cannula. Anemia:  Transfuse 1 unit today.   Recheck Hgb in Betito Morgan MD  2/24/2018 Statement Selected

## 2018-09-20 PROBLEM — I35.9 AORTIC VALVE DISEASE: Status: ACTIVE | Noted: 2018-01-01

## 2018-09-20 NOTE — IP AVS SNAPSHOT
[address] [place] [address] 
 
 
 [id number] [facility] 
[id number] Patient: Sayra Stern MRN: [id number]:[id number] About your hospitalization You were admitted on:  September 20, 2018 You last received care in the:  [facility] You were discharged on:  September 25, 2018 Why you were hospitalized Your primary diagnosis was: Aortic Valve Disease Your diagnoses also included: Aortic Valve Prosthesis Present, Chronic Obstructive Pulmonary Disease (Hcc), Chronic Atrial Fibrillation (Hcc), Rheumatic Aortic Stenosis, Anxiety, Systolic Chf, Chronic (Hcc) Follow-up Information Follow up With Details Comments Contact Info Garland Montes MD   [place] [address] 
[phone] Jaye Painter MD   [address] [place] [id number] 
[phone] Your Scheduled Appointments Wednesday September 26, 2018 To Be Determined SN HOSPICE VISIT with Sohail Bravo RN  
[facility] ([address] [place] [address]) [facility] ([address] [place] [address]) Thursday September 27, 2018 11:00 AM EDT YOSEPHICE AIDE VISIT with ALEXANDER Goode [facility] ([facility]) [facility] ([address] [place] [address]) Sunday September 30, 2018 12:00 PM EDT  
MSW HOSPICE VISIT with Pradeep Gross [facility] ([facility]) [facility] ([address] [place] [address]) Sunday September 30, 2018  2:00 PM EDT  
MSW HOSPICE VISIT with Pradeep Gross [facility] ([facility]) [facility] ([address] [place] [address]) Monday October 01, 2018 To Be Determined HOSICE AIDE VISIT with [place] Stallings [facility] ([address]) [facility] ([address]) Wednesday October 03, 2018 To Be Determined SN HOSPICE VISIT with AMOS Albright [facility] ([address]) [facility] ([address]) Thursday October 04, 2018 To Be Determined HOSICE AIDE VISIT with ALEXANDER Ramirez [facility] ([address]) [facility] ([address]) Monday October 08, 2018 To Be Determined HOSICE AIDE VISIT with [place] Briggs [facility] ([address]) [facility] ([address]) Wednesday October 10, 2018 To Be Determined SN HOSPICE VISIT with AMOS Albright [facility] ([address]) [facility] ([address]) Thursday October 11, 2018 To Be Determined HOSICE AIDE VISIT with ALEXANDER Ramirez [facility] ([address]) [facility] ([address]) Monday October 15, 2018 To Be Determined HOSICE AIDE VISIT with FionaALEXANDER Briggs [facility] ([address]) [facility] ([address]) Wednesday October 17, 2018 To Be Determined SN HOSPICE VISIT with AMOS Albright [facility] ([address]) [facility] ([address]) Thursday October 18, 2018 To Be Determined HOSICE AIDE VISIT with ALEXANDER [facility]rawal [facility] ([address]) [facility] ([address]) Monday October 22, 2018 To Be Determined HOSICE AIDE VISIT with ALEXANDER Agrawal [facility] ([address]) [facility] ([address]) Wednesday October 24, 2018 To Be Determined SN HOSPICE VISIT with AMOS Pena [facility] ([address]) [facility] ([address]) Thursday October 25, 2018 To Be Determined HOSICE AIDE VISIT with ALEXANDER Agrawal [facility] ([address]) [facility] ([address]) Monday October 29, 2018 To Be Determined HOSICE AIDE VISIT with ALEXANDER Agrawal [facility] ([address]) [facility] ([address]) Wednesday October 31, 2018 To Be Determined SN HOSPICE VISIT with AMOS Pena [facility] ([address]) [facility] ([address]) Thursday November 01, 2018 To Be Determined HOSICE AIDE VISIT with ALEXANDER Agrawal [facility] ([address]) [facility] ([address]) Monday November 05, 2018 To Be Determined MSW HOSPICE VISIT with Theron Holley [facility] ([address]) [address] [facility] (1 [facility]) Monday November 05, 2018 To Be Determined HOSICE AIDE VISIT with Bharat Partida CNA  
 [facility] ([facility]) [address] [facility] ([facility]) Wednesday November 07, 2018 To Be Determined SN HOSPICE VISIT with Osbaldo Orozco RN  
 [facility] ([facility]) [address] [facility] (1 [facility]) Thursday November 08, 2018 To Be Determined HOSICE AIDE VISIT with Bharat Partida CNA  
 [facility] ([facility]) [address] [facility] (1 [facility]) Monday November 12, 2018 To Be Determined HOSICE AIDE VISIT with Bharat Partida CNA  
 [facility] ([facility]) [address] [facility] ([facility]) Discharge Orders None A check alina indicates which time of day the medication should be taken. My Medications START taking these medications Instructions Each Dose to Equal  
 Morning Noon Evening Bedtime  
 albuterol-ipratropium 2.5 mg-0.5 mg/3 ml Nebu Commonly known as:  Hayden Josh Your last dose was: Your next dose is:    
   
   
 3 mL by Nebulization route every six (6) hours as needed. Current supply to be returned at time of DC.  
 3 mL  
    
   
   
   
  
 magic mouthwash Susp Commonly known as:  Gama Urenaalam Replaces:  magic mouthwash solution Your last dose was: Your next dose is: Take 15 mL by mouth three (3) times daily as needed for Other (stomatitis). Current supply to be returned at time of DC. 15 mL  
    
   
   
   
  
 nystatin powder Commonly known as:  MYCOSTATIN Your last dose was: Your next dose is:    
   
   
 Apply  to affected area two (2) times a day. Apply to groin/periarea. Current supply to be returned at time of DC. CHANGE how you take these medications Instructions Each Dose to Equal  
 Morning Noon Evening Bedtime  
 lidocaine 4 % patch What changed:   
- how much to take 
- how to take this - when to take this 
- additional instructions Your last dose was: Your next dose is:    
   
   
 Apply to left shoulder PRN pain. Apply for 12 hours and remove for 12 hours. Current supply to be returned at time of DC. CONTINUE taking these medications Instructions Each Dose to Equal  
 Morning Noon Evening Bedtime  
 acetaminophen 500 mg tablet Commonly known as:  TYLENOL Your last dose was: Your next dose is: Take 500 mg by mouth every six (6) hours. SWELLING/SHOULDER PAIN  
 500 mg  
    
   
   
   
  
 allopurinol 100 mg tablet Commonly known as:  Yashira Spencer Your last dose was: Your next dose is: Take 100 mg by mouth daily. 100 mg  
    
   
   
   
  
 aspirin delayed-release 81 mg tablet Your last dose was: Your next dose is: Take 81 mg by mouth daily. 81 mg  
    
   
   
   
  
 benzonatate 100 mg capsule Commonly known as:  TESSALON Your last dose was: Your next dose is: Take 100 mg by mouth every four (4) hours as needed for Cough. 100 mg  
    
   
   
   
  
 diazePAM 5 mg tablet Commonly known as:  VALIUM Your last dose was: Your next dose is: Take 5 mg by mouth daily. TAKE 1/2 TABLET EVERY MORNING. TAKE 1 WHOLE TABLET AT BEDTIME. 5 mg  
    
   
   
   
  
 furosemide 40 mg tablet Commonly known as:  LASIX Your last dose was: Your next dose is: Take 40 mg by mouth two (2) times a day. 40 mg  
    
   
   
   
  
 hydrocortisone 2.5 % rectal cream  
Commonly known as:  ANUSOL-HC Your last dose was: Your next dose is: Insert 1 Each into rectum three (3) times daily as needed for Hemorrhoids. 1 Each HYDROmorphone 1 mg/mL Liqd oral solution Commonly known as:  DILAUDID Your last dose was: Your next dose is: Take 0.5 mg by mouth every four (4) hours as needed (pain/dyspnea). 0.5 mg  
    
   
   
   
  
 hydrOXYzine pamoate 25 mg capsule Commonly known as:  VISTARIL Your last dose was: Your next dose is: Take 12.5 mg by mouth every six (6) hours as needed for Itching or Anxiety. 12.5 mg  
    
   
   
   
  
 levothyroxine 88 mcg tablet Commonly known as:  SYNTHROID Your last dose was: Your next dose is: Take 88 mcg by mouth Daily (before breakfast). 88 mcg  
    
   
   
   
  
 metOLazone 5 mg tablet Commonly known as:  Pura Cyr Your last dose was: Your next dose is: Take 5 mg by mouth daily. GIVE ONE TABLET BY MOUTH EVERY DAY - HOLD FOR SYSTOLIC BP LESS THEN 965.  
 5 mg  
    
   
   
   
  
 metoprolol succinate 25 mg XL tablet Commonly known as:  TOPROL-XL Your last dose was: Your next dose is: Take 25 mg by mouth daily. 25 mg Omeprazole delayed release 20 mg tablet Commonly known as:  PRILOSEC D/R Your last dose was: Your next dose is: Take 20 mg by mouth daily. 20 mg OXYGEN-AIR DELIVERY SYSTEMS Your last dose was: Your next dose is:    
   
   
 3 L/min by Nasal route continuous. 3 L/min  
    
   
   
   
  
 potassium chloride SR 20 mEq tablet Commonly known as:  K-TAB Your last dose was: Your next dose is: Take 20 mEq by mouth two (2) times a day. 20 mEq  
    
   
   
   
  
 predniSONE 10 mg tablet Commonly known as:  Nanine Knife Your last dose was: Your next dose is: Take 10 mg by mouth daily. 10 mg  
    
   
   
   
  
 REQUIP 2 mg tablet Generic drug:  rOPINIRole Your last dose was: Your next dose is: Take 2 mg by mouth two (2) times a day. 2 mg Senna 8.6 mg tablet Generic drug:  senna Your last dose was: Your next dose is: Take 1 Tab by mouth two (2) times a day. 1 Tab  
    
   
   
   
  
 sertraline 50 mg tablet Commonly known as:  ZOLOFT Your last dose was: Your next dose is: Take 100 mg by mouth daily. 100 mg  
    
   
   
   
  
 spironolactone 25 mg tablet Commonly known as:  ALDACTONE Your last dose was: Your next dose is: Take 25 mg by mouth daily. Indications: Edema 25 mg  
    
   
   
   
  
  
STOP taking these medications   
 cyanocobalamin 500 mcg tablet Commonly known as:  VITAMIN B12  
   
  
 loratadine 10 mg tablet Commonly known as:  CLARITIN  
   
  
 magic mouthwash solution Replaced by:  magic mouthwash Susp SUPPOSITORY ADULT suppository Generic drug:  glycerin (adult) Where to Get Your Medications Information on where to get these meds will be given to you by the nurse or doctor. ! Ask your nurse or doctor about these medications  
  albuterol-ipratropium 2.5 mg-0.5 mg/3 ml Nebu  
 lidocaine 4 % patch  
 magic mouthwash Susp  
 nystatin powder Opioid Education Prescription Opioids: What You Need to Know: 
 
  
Code Status:DNR 
  
Hospital Course: Discharge from respite stay at [facility] to home with [facility]. DC  9/25/18 at noon via ambulance. Status at time of discharge is routine. 
  
Consults: None 
  
Significant Diagnostic Studies: None 
  
Disposition: home Activity: Activity as tolerated with assist. 
Diet: Regular Diet Wound Care: As directed Oxygen: 3 L/min via NC PRN SOB. Equipment: As determined by [facility] Team. 
Misc: Highly recommend insertion of salamanca catheter to protect skin integrity and preserve patient energy level. Additionally, medication compliance with administration of medications as prescribed is critical in the management of this patient.  
  
     
Follow-up Appointments Procedures  FOLLOW UP VISIT Appointment in: Other (Specify) As determined by [facility] Team. Highly recommend insertion of salamanca catheter to protect skin integrity and preserve patient energy level. Additionally, medication compliance with administration of me. ..  
    As determined by [facility]. Highly recommend insertion of salamanca catheter to protect skin integrity and preserve patient energy level. Additionally, medication compliance with administration of medications as prescribed is critical in the management of this patient.  
    Standing Status:   Standing  
    Number of Occurrences:   1  
    Order Specific Question:   Appointment in  
    Answer: Other (Specify)  
  
 
 
ACO Transitions of Care [facility] offers a voluntary care coordination program to provide high quality service and care to [facility] fee-for-service beneficiaries. Alberta Cabrales was designed to help you enhance your health and well-being through the following services: ? Transitions of Care  support for individuals who are transitioning from one care setting to another (example: Hospital to home). ? Chronic and Complex Care Coordination  support for individuals and caregivers of those with serious or chronic illnesses or with more than one chronic (ongoing) condition and those who take a number of different medications. If you meet specific medical criteria, a [facility] may call you directly to coordinate your care with your primary care physician and your other care providers. For questions about the [facility] programs, please, contact your physicians office. For general questions or additional information about Accountable Care Organizations: 
Please visit www.medicare.gov/acos. html or call 1-800-MEDICARE (2-396.100.8401) TTY users should call 5-400.655.8061. [employer] Announcement We are excited to announce that we are making your provider's discharge notes available to you in [employer]. You will see these notes when they are completed and signed by the physician that discharged you from your recent hospital stay. If you have any questions or concerns about any information you see in [employer], please call the Health Information Department where you were seen or reach out to your Primary Care Provider for more information about your plan of care. Introducing [facility]! Dear Andres Shah: Thank you for requesting a [employer] account. Our records indicate that you already have an active [employer] account. You can access your account anytime at [email or web address] Did you know that you can access your hospital and ER discharge instructions at any time in [employer]? You can also review all of your test results from your hospital stay or ER visit. Additional Information If you have questions, please visit the Frequently Asked Questions section of the [employer] website at [email or web address] Remember, [employer] is NOT to be used for urgent needs. For medical emergencies, dial 911. Now available from your iPhone and Android! Introducing Shiva Hagen As a [facility] patient, I wanted to make you aware of our electronic visit tool called Shiva Hagen. Viddyad 24/7 allows you to connect within minutes with a medical provider 24 hours a day, seven days a week via a mobile device or tablet or logging into a secure website from your computer. You can access Shiva Hagen from anywhere in the [place]. A virtual visit might be right for you when you have a simple condition and feel like you just dont want to get out of bed, or cant get away from work for an appointment, when your regular [facility] provider is not available (evenings, weekends or holidays), or when youre out of town and need minor care. Electronic visits cost only $49 and if the [facility] 24/7 provider determines a prescription is needed to treat your condition, one can be electronically transmitted to a nearby pharmacy*. Please take a moment to enroll today if you have not already done so. The enrollment process is free and takes just a few minutes. To enroll, please download the [facility] 24/7 maria del rosario to your tablet or phone, or visit [email or web address] to enroll on your computer. And, as an [address] [place] [address] patient with a Shyp account, the results of your visits will be scanned into your electronic medical record and your primary care provider will be able to view the scanned results. We urge you to continue to see your regular [facility] provider for your ongoing medical care. And while your primary care provider may not be the one available when you seek a Shiva Hagen virtual visit, the peace of mind you get from getting a real diagnosis real time can be priceless. For more information on Shiva Duranusman, view our Frequently Asked Questions (FAQs) at [email or web address]. Sincerely, 
 
Coretta Lance MD 
Chief Medical Officer [employer] *:  certain medications cannot be prescribed via Shiva Hagen Providers Seen During Your Hospitalization Provider Specialty Primary office phone Magnus Pineda MD Geriatric Medicine [phone] Your Primary Care Physician (PCP) Primary Care Physician Office Phone Office Fax [address] [phone] You are allergic to the following Allergen Reactions Magnesium Rash Codeine Not Noted Adhesive Tape Rash Benadryl (Diphenhydramine Hcl) Other (comments) Jitters Demerol (Meperidine) Anxiety Meperidine Hcl Unknown (comments) Morphine Other (comments) Confusion Sulfa (Sulfonamide Antibiotics) Anxiety Lorazepam Anxiety Recent Documentation OB Status Smoking Status Hysterectomy Former Smoker Emergency Contacts Name Discharge Info Relation Home Work Mobile [id number] [employer] [place] CAREGIVER [3] Son [22] [phone] ShondaMarcy cruz(Grandghtr)  Other Relative [6] [phone] DEANBEN MORRELL(GRANDGHTR)  Other Relative [6] [phone] Arminda Salvador  Son [22]   [phone] Patient Belongings The following personal items are in your possession at time of discharge: 
                   Clothing:  (7 pajama,1 house coat. ) Please provide this summary of care documentation to your next provider. Signatures-by signing, you are acknowledging that this After Visit Summary has been reviewed with you and you have received a copy. Patient Signature:  ____________________________________________________________ Date:  ____________________________________________________________  
  
Vivian Moulds Provider Signature:  ____________________________________________________________ Date:  ____________________________________________________________

## 2018-09-20 NOTE — IP AVS SNAPSHOT
[address] [facility] 
 
 
 [id number] [place] [facility] 
[phone] Patient: Lucia Peace MRN: [id number] YPM:[id number] A check alnia indicates which time of day the medication should be taken. My Medications START taking these medications Instructions Each Dose to Equal  
 Morning Noon Evening Bedtime  
 albuterol-ipratropium 2.5 mg-0.5 mg/3 ml Nebu Commonly known as:  Onelia Enter Your last dose was: Your next dose is:    
   
   
 3 mL by Nebulization route every six (6) hours as needed. Current supply to be returned at time of DC.  
 3 mL  
    
   
   
   
  
 magic mouthwash Susp Commonly known as:  Gama Boudreaux Replaces:  magic mouthwash solution Your last dose was: Your next dose is: Take 15 mL by mouth three (3) times daily as needed for Other (stomatitis). Current supply to be returned at time of DC. 15 mL  
    
   
   
   
  
 nystatin powder Commonly known as:  MYCOSTATIN Your last dose was: Your next dose is:    
   
   
 Apply  to affected area two (2) times a day. Apply to groin/periarea. Current supply to be returned at time of DC. CHANGE how you take these medications Instructions Each Dose to Equal  
 Morning Noon Evening Bedtime  
 lidocaine 4 % patch What changed:   
- how much to take 
- how to take this - when to take this 
- additional instructions Your last dose was: Your next dose is:    
   
   
 Apply to left shoulder PRN pain. Apply for 12 hours and remove for 12 hours. Current supply to be returned at time of DC. CONTINUE taking these medications Instructions Each Dose to Equal  
 Morning Noon Evening Bedtime  
 acetaminophen 500 mg tablet Commonly known as:  TYLENOL Your last dose was: Your next dose is: Take 500 mg by mouth every six (6) hours. SWELLING/SHOULDER PAIN  
 500 mg  
    
   
   
   
  
 allopurinol 100 mg tablet Commonly known as:  Helaisai De Oliveirar Your last dose was: Your next dose is: Take 100 mg by mouth daily. 100 mg  
    
   
   
   
  
 aspirin delayed-release 81 mg tablet Your last dose was: Your next dose is: Take 81 mg by mouth daily. 81 mg  
    
   
   
   
  
 benzonatate 100 mg capsule Commonly known as:  TESSALON Your last dose was: Your next dose is: Take 100 mg by mouth every four (4) hours as needed for Cough. 100 mg  
    
   
   
   
  
 diazePAM 5 mg tablet Commonly known as:  VALIUM Your last dose was: Your next dose is: Take 5 mg by mouth daily. TAKE 1/2 TABLET EVERY MORNING. TAKE 1 WHOLE TABLET AT BEDTIME. 5 mg  
    
   
   
   
  
 furosemide 40 mg tablet Commonly known as:  LASIX Your last dose was: Your next dose is: Take 40 mg by mouth two (2) times a day. 40 mg  
    
   
   
   
  
 hydrocortisone 2.5 % rectal cream  
Commonly known as:  ANUSOL-HC Your last dose was: Your next dose is: Insert 1 Each into rectum three (3) times daily as needed for Hemorrhoids. 1 Each HYDROmorphone 1 mg/mL Liqd oral solution Commonly known as:  DILAUDID Your last dose was: Your next dose is: Take 0.5 mg by mouth every four (4) hours as needed (pain/dyspnea). 0.5 mg  
    
   
   
   
  
 hydrOXYzine pamoate 25 mg capsule Commonly known as:  VISTARIL Your last dose was: Your next dose is: Take 12.5 mg by mouth every six (6) hours as needed for Itching or Anxiety. 12.5 mg  
    
   
   
   
  
 levothyroxine 88 mcg tablet Commonly known as:  SYNTHROID Your last dose was: Your next dose is: Take 88 mcg by mouth Daily (before breakfast). 88 mcg  
    
   
   
   
  
 metOLazone 5 mg tablet Commonly known as:  Harrold Cool Your last dose was: Your next dose is: Take 5 mg by mouth daily. GIVE ONE TABLET BY MOUTH EVERY DAY - HOLD FOR SYSTOLIC BP LESS THEN 961.  
 5 mg  
    
   
   
   
  
 metoprolol succinate 25 mg XL tablet Commonly known as:  TOPROL-XL Your last dose was: Your next dose is: Take 25 mg by mouth daily. 25 mg Omeprazole delayed release 20 mg tablet Commonly known as:  PRILOSEC D/R Your last dose was: Your next dose is: Take 20 mg by mouth daily. 20 mg OXYGEN-AIR DELIVERY SYSTEMS Your last dose was: Your next dose is:    
   
   
 3 L/min by Nasal route continuous. 3 L/min  
    
   
   
   
  
 potassium chloride SR 20 mEq tablet Commonly known as:  K-TAB Your last dose was: Your next dose is: Take 20 mEq by mouth two (2) times a day. 20 mEq  
    
   
   
   
  
 predniSONE 10 mg tablet Commonly known as:  Sam Menon Your last dose was: Your next dose is: Take 10 mg by mouth daily. 10 mg  
    
   
   
   
  
 REQUIP 2 mg tablet Generic drug:  rOPINIRole Your last dose was: Your next dose is: Take 2 mg by mouth two (2) times a day. 2 mg Senna 8.6 mg tablet Generic drug:  senna Your last dose was: Your next dose is: Take 1 Tab by mouth two (2) times a day. 1 Tab  
    
   
   
   
  
 sertraline 50 mg tablet Commonly known as:  ZOLOFT Your last dose was: Your next dose is: Take 100 mg by mouth daily. 100 mg  
    
   
   
   
  
 spironolactone 25 mg tablet Commonly known as:  ALDACTONE Your last dose was: Your next dose is: Take 25 mg by mouth daily. Indications: Edema 25 mg  
    
   
   
   
  
  
STOP taking these medications   
 cyanocobalamin 500 mcg tablet Commonly known as:  VITAMIN B12  
   
  
 loratadine 10 mg tablet Commonly known as:  CLARITIN  
   
  
 magic mouthwash solution Replaced by:  magic mouthwash Susp SUPPOSITORY ADULT suppository Generic drug:  glycerin (adult) Where to Get Your Medications Information on where to get these meds will be given to you by the nurse or doctor. ! Ask your nurse or doctor about these medications  
  albuterol-ipratropium 2.5 mg-0.5 mg/3 ml Nebu  
 lidocaine 4 % patch  
 magic mouthwash Susp  
 nystatin powder

## 2018-09-21 NOTE — [facility] IDG CHAPLAIN NOTES
Patient: Js Murray    Date: 09/21/18  Time: 11:36 AM    [facility] [place]  Notes  Intervention: Patient comes from the In Home Program.  Her In home  Akash Cartwright will follow. Maritza Lindsay  available during her stay with Maritza Lindsay.         Signed by: Gilma Ramirez

## 2018-09-21 NOTE — [facility] IDG SOCIAL WORKER NOTES
Patient: Genoveva Joy    Date: 09/21/18  Time: 10:34 AM    [facility]  Notes  Pt is here from the in home program.  She is here for respite. LMSW will review the in home care plan. LMSW will coordinate with the in home program.  LMSW will provide psychosocial support as needed. She will be discharging home on 9-25-18 at noon via family car.            Signed by: Juanito Sanchez

## 2018-09-21 NOTE — [facility] IDG VOLUNTEER NOTES
[address] Review     Status Codes I = Initiated C=Continued R=Revised RS = Resolved     I.   Volunteer     Goal: Hospice house volunteer (s) enhances the quality of remaining life while patient is at the hospice house. Interventions: Candance Adilsonr Candance Huger Clayton Macleod Volunteer (s) will provide companionship to the patient and/or family by visiting at the hospice house       . Candance Adilsonverito Chao Volunteer (s) will provide respite as needed when requested by patient and/or family. Candance Hugeverito Spann  Volunteer will provide activities such as music, reading, pet therapy, etc. as requested. Candance Adilsonverito Spann  Comfort bag delivered. Any other special requests or information regarding volunteer services:    One visit for comfort bag delivery and companionship is recorded. No further needs identified at this time. These notes have been discussed in [facility] meeting.           Signed by: Tristan Major

## 2018-09-21 NOTE — [facility] IDG SOCIAL WORKER NOTES
Patient: Ron Arboleda    Date: 09/21/18  Time:1100  \[facility] [place] [facility]\""  Notes  1st IDG: Pt is 50-year-old female with aortic valve disease [facility] for a planned respite stay. Plan: Comprehensive plan of care reviewed. POC from home has been reviewed and modified as necessary to meet patients needs. IDG and pt./family in agreement with plan of care. The IDG identifies through on-going assessment when a change is needed to the POC; the pt/family will receive care and services necessitated by changes in POC. Medications reviewed by the pharmacist and Medical Director.         Signed by: Chuyita Juarez RN

## 2018-09-21 NOTE — PROGRESS NOTES
Report received. Pt round. Pt identified by name. In bed with eyes open. No s/s of pain, agitation or dyspnea. Bed low and SR up with tab alerts on.

## 2018-09-21 NOTE — PROGRESS NOTES
SPOKE WITH PATIENT'S SON YESTERDAY WHEN HE BROUGHT HIS MOTHER INTO [facility]. I STOPPED BY HER ROOM THIS MORNING TO CHECK IN ON HER. SHE WAS ASLEEP AND APPEARED TO BE COMFORTABLE. I OFFERED A PRAYER AT BEDSIDE AND WILL CONTINUE TO CHECK IN ON HER DURING HER RESPITE STAY.

## 2018-09-21 NOTE — [facility] IDG MASTER NOTE
[facility]ospice Interdisciplinary Group Collaborative  Date: 09/21/18  Time: 2:29 PM    ___________________  ___________________    Diagnoses: There were no encounter diagnoses.     Current Medications:    Current Facility-Administered Medications:     hydrOXYzine pamoate (VISTARIL) capsule 25 mg (Patient Supplied), 25 mg, Oral, Q6H PRN, Suella Clarity, NP    magic mouthwash (BALDEMAR) oral suspension 15 mL (Patient Supplied), 15 mL, Oral, TID PRN, Suella Clarity, NP    acetaminophen (TYLENOL) tablet 487.5 mg, 487.5 mg, Oral, Q6H, Suella Clarity, NP, 487.5 mg at 09/21/18 1357    allopurinol (ZYLOPRIM) tablet 100 mg (Patient Supplied), 100 mg, Oral, DAILY, Suella Clarity, NP, 100 mg at 09/21/18 0810    aspirin delayed-release tablet 81 mg (Patient Supplied), 81 mg, Oral, DAILY, Suella Clarity, NP, 81 mg at 09/21/18 0817    benzonatate (TESSALON) capsule 100 mg, 100 mg, Oral, Q4H PRN, Suella Clarity, NP    diazePAM (VALIUM) tablet 5 mg, 5 mg, Oral, DAILY, Suella Clarity, NP, 5 mg at 09/21/18 0481    furosemide (LASIX) tablet 40 mg, 40 mg, Oral, BID, Suella Clarity, NP, 40 mg at 09/21/18 1357    hydrocortisone (ANUSOL-HC) 2.5 % rectal cream 1 Each (Patient Supplied), 1 Each, Rectal, TID PRN, Suella Clarity, NP    HYDROmorphone (DILAUDID) 1 mg/mL oral solution 0.5 mg (Patient Supplied), 0.5 mg, Oral, Q4H PRN, Suella Clarity, NP, 0.5 mg at 09/20/18 1302    levothyroxine (SYNTHROID) tablet 88 mcg (Patient Supplied), 88 mcg, Oral, ACB, Suella Clarity, NP, 88 mcg at 09/21/18 0817    metOLazone (ZAROXOLYN) tablet 5 mg (Patient Supplied), 5 mg, Oral, DAILY, Suella Clarity, NP, 5 mg at 09/21/18 0818    metoprolol succinate (TOPROL-XL) XL tablet 25 mg (Patient Supplied), 25 mg, Oral, DAILY, Suella Clarity, NP, 25 mg at 09/21/18 0819    Omeprazole delayed release (PRILOSEC D/R) tablet 20 mg (Patient Supplied), 20 mg, Oral, DAILY, Suella Clarity, NP, 20 mg at 09/21/18 0819    potassium chloride SR (K-TAB) tablet 20 mEq (Patient Supplied), 20 mEq, Oral, BID, Missy Loera, NP, Stopped at 09/21/18 0930    predniSONE (DELTASONE) tablet 10 mg, 10 mg, Oral, DAILY, Missy Loera, NP, 10 mg at 09/21/18 9308    rOPINIRole (REQUIP) tablet 2 mg (Patient Supplied), 2 mg, Oral, BID, Missy Loera NP, Stopped at 09/21/18 0930    senna (SENOKOT) tablet 8.6 mg, 1 Tab, Oral, BID, Missy Loera NP, Stopped at 09/21/18 0930    sertraline (ZOLOFT) tablet 100 mg, 100 mg, Oral, DAILY, Missy Loera NP, 100 mg at 09/21/18 0813    spironolactone (ALDACTONE) tablet 25 mg, 25 mg, Oral, DAILY, Missy Loera, NP, 25 mg at 09/21/18 0811    lidocaine 4 % patch 1 Patch (Patient Supplied), 1 Patch, TransDERmal, DAILY PRN, Missy Loera NP    albuterol-ipratropium (DUO-NEB) 2.5 MG-0.5 MG/3 ML, 3 mL, Nebulization, Q6H PRN, Missy Loera NP    fluconazole (DIFLUCAN) tablet 200 mg, 200 mg, Oral, DAILY, Missy Loera NP, 200 mg at 09/21/18 2720    nystatin (MYCOSTATIN) 100,000 unit/gram powder, , Topical, BID, Missy Loera NP    Orders:  Orders Placed This Encounter    IP CONSULT TO SPIRITUAL CARE     Standing Status:   Standing     Number of Occurrences:   1     Order Specific Question:   Reason for Consult: Answer: Once on week one, then PRN. For [facility] Only. For contracted patients, their primary hospice will continue to manage spiritual care needs.     DIET REGULAR     Standing Status:   Standing     Number of Occurrences:   1    VITAL SIGNS     Standing Status:   Standing     Number of Occurrences:   1    VITAL SIGNS     Standing Status:   Standing     Number of Occurrences:   1    NURSING-MISCELLANEOUS: Comfort Care Measures CONTINUOUS     Standing Status:   Standing     Number of Occurrences:   1     Order Specific Question:   Description of Order:     Answer:   Comfort Care Measures    BLADDER CHECKS     May scan bladder PRN for urinary retention and or patient discomfort     Standing Status:   Standing     Number of Occurrences:   1    NURSING ASSESSMENT:  SPECIFY Assess for GIP, routine, or respite level of care. Q SHIFT Routine     Standing Status:   Standing     Number of Occurrences:   1     Order Specific Question:   Please describe the test or procedure you would like to order. Answer:   Assess for GIP, routine, or respite level of care.  PAIN ASSESSMENT Pain and Symptoms: Assess ever 4 hours and PRN, for GIP level of care. PRN Routine     Standing Status:   Standing     Number of Occurrences:   1     Order Specific Question:   Please describe the test or procedure you would like to order. Answer:   Pain and Symptoms: Assess ever 4 hours and PRN, for GIP level of care.  ACTIVITY AS TOLERATED W/ASSIST     USES WALKER. Standing Status:   Standing     Number of Occurrences:   1    NURSING-MISCELLANEOUS: ADMISSION/DC INFORMATION Admit for respite level of care for caregiver fatigue. Primary dx is aortic valve disease. Primary RN is Luz Maria with [address]. DC home on 9/25 at noon via car. CONTINUOUS     Admit for respite level of care for caregiver fatigue. Primary dx is aortic valve disease. Primary RN is Luz Maria with [address]. DC home on 9/25 at noon via car. Standing Status:   Standing     Number of Occurrences:   1     Order Specific Question:   Description of Order:     Answer:   ADMISSION/DC INFORMATION    WOUND CARE, DRESSING CHANGE     Wound Care:  Location: right forearm  Skin Tear: Cleanse skin with wound cleanser. Cover with non stick dressing and wrap with guaze wrap and secure with tape. Change every 3 days. Assess every shift and PRN. Standing Status:   Standing     Number of Occurrences:   5    WOUND CARE, DRESSING CHANGE     Wound Care:  Location: BILATERAL LOWER EXTREMITY WEEPING EDEMA  Weeping Edema:  Cleanse skin with wound cleanser. Cover with chux pads to absorb drainage. Change PRN soiled.  I the event that patient desires ambulation, may secure with tape or ace wrap. Assess every shift and PRN. Standing Status:   Standing     Number of Occurrences:   1    DO NOT RESUSCITATE     Standing Status:   Standing     Number of Occurrences:   1     Order Specific Question:   Comfort Measures Only? Answer: Yes    OXYGEN CANNULA Liters per minute: 3; Indications for O2 therapy: RESPIRATORY DISTRESS CONTINUOUS Routine     Standing Status:   Standing     Number of Occurrences:   1     Order Specific Question:   Liters per minute: Answer:   3     Order Specific Question:   Indications for O2 therapy     Answer:   RESPIRATORY DISTRESS    acetaminophen (TYLENOL) tablet 487.5 mg     OP SIG:Take 500 mg by mouth every six (6) hours. SWELLING/SHOULDER PAIN      allopurinol (ZYLOPRIM) tablet 100 mg (Patient Supplied)     OP SIG:Take 100 mg by mouth daily.  aspirin delayed-release tablet 81 mg (Patient Supplied)     OP SIG:Take 81 mg by mouth daily.  benzonatate (TESSALON) capsule 100 mg     OP SIG:Take 100 mg by mouth every four (4) hours as needed for Cough.  DISCONTD: cyanocobalamin (VITAMIN B12) tablet 1,000 mcg (Patient Supplied)     OP SIG:Take 1,000 mcg by mouth daily.  diazePAM (VALIUM) tablet 5 mg     OP SIG:Take 5 mg by mouth daily. TAKE 1/2 TABLET EVERY MORNING. TAKE 1 WHOLE TABLET AT BEDTIME.  furosemide (LASIX) tablet 40 mg     OP SIG:Take 40 mg by mouth two (2) times a day.  DISCONTD: glycerin (adult) suppository 1 Suppository (Patient Supplied)     OP SIG:Insert 1 Suppository into rectum daily as needed (for consitpation).  hydrocortisone (ANUSOL-HC) 2.5 % rectal cream 1 Each (Patient Supplied)     OP SIG:Insert 1 Each into rectum three (3) times daily as needed for Hemorrhoids.  HYDROmorphone (DILAUDID) 1 mg/mL oral solution 0.5 mg (Patient Supplied)     OP SIG:Take 0.5 mg by mouth every four (4) hours as needed (pain/dyspnea).       DISCONTD: hydrOXYzine pamoate (VISTARIL) capsule 25 mg (Patient Supplied)     OP SIG:Take 12.5 mg by mouth every six (6) hours as needed for Itching or Anxiety.  levothyroxine (SYNTHROID) tablet 88 mcg (Patient Supplied)     OP SIG:Take 88 mcg by mouth Daily (before breakfast).  DISCONTD: lidocaine 4 % patch 1 Patch (Patient Supplied)     OP SI Patch by TransDERmal route every twelve (12) hours every twelve (12) hours.  DISCONTD: loratadine (CLARITIN) tablet 10 mg     OP SIG:Take 10 mg by mouth daily.  DISCONTD: MAGIC MOUTHWASH AMBULATORY SIMPLE MEDICATION 15ml (Patient Supplied)     OP SIG:Take 15 mL by mouth three (3) times daily. SWISH AND SPIT 15 ML DAILY      metOLazone (ZAROXOLYN) tablet 5 mg (Patient Supplied)     OP SIG:Take 5 mg by mouth daily. GIVE ONE TABLET BY MOUTH EVERY DAY - HOLD FOR SYSTOLIC BP LESS THEN 529.  metoprolol succinate (TOPROL-XL) XL tablet 25 mg (Patient Supplied)     OP SIG:Take 25 mg by mouth daily.  Omeprazole delayed release (PRILOSEC D/R) tablet 20 mg (Patient Supplied)     OP SIG:Take 20 mg by mouth daily. Order Specific Question:   PPI INDICATION     Answer:   Symptomatic GERD    potassium chloride SR (K-TAB) tablet 20 mEq (Patient Supplied)     OP SIG:Take 20 mEq by mouth two (2) times a day.  predniSONE (DELTASONE) tablet 10 mg     OP SIG:Take 10 mg by mouth daily.  rOPINIRole (REQUIP) tablet 2 mg (Patient Supplied)     OP SIG:Take 2 mg by mouth two (2) times a day.  senna (SENOKOT) tablet 8.6 mg     OP SIG:Take 1 Tab by mouth two (2) times a day.  sertraline (ZOLOFT) tablet 100 mg     OP SIG:Take 100 mg by mouth daily.  spironolactone (ALDACTONE) tablet 25 mg     OP SIG:Take 25 mg by mouth daily. Indications: Edema      lidocaine 4 % patch 1 Patch (Patient Supplied)     OP SI Patch by TransDERmal route every twelve (12) hours every twelve (12) hours.       albuterol-ipratropium (DUO-NEB) 2.5 MG-0.5 MG/3 ML     Order Specific Question: MODE OF DELIVERY     Answer:   Nebulizer    fluconazole (DIFLUCAN) tablet 200 mg     Order Specific Question:   Antibiotic Indications     Answer:   Skin and Soft Tissue Infection     Order Specific Question:   Skin duration of therapy     Answer: Other     Comments:   3 days    nystatin (MYCOSTATIN) 100,000 unit/gram powder    hydrOXYzine pamoate (VISTARIL) capsule 25 mg (Patient Supplied)    magic mouthwash (BALDEMAR) oral suspension 15 mL (Patient Supplied)     OP SIG:Take 15 mL by mouth three (3) times daily. SWISH AND SPIT 15 ML DAILY      INITIAL PHYSICIAN ORDER: HOSPICE Level Of Care: Respite; Reason for Admission: Admit for respite level of care for caregiver fatigue. Primary dx is aortic valve disease. Primary RN is Luz Maria with [facility]. DC home on 9/25 at noon via car. Standing Status:   Standing     Number of Occurrences:   1     Order Specific Question:   Status     Answer:   Hospice     Order Specific Question:   Level Of Care     Answer:   Respite     Order Specific Question:   Reason for Admission     Answer:   Admit for respite level of care for caregiver fatigue. Primary dx is aortic valve disease. Primary RN is Luz Maria with [facility]. DC home on 9/25 at noon via car. Order Specific Question:   Inpatient Hospitalization Certified Necessary for the Following Reasons     Answer:   9. Other (further clarification in H&P documentation)     Order Specific Question:   Admitting Diagnosis     Answer:    Aortic valve disease [790591]     Order Specific Question:   Terminal Prognosis Diagnosis(es)     Answer:   Oxygen dependent [889032]     Order Specific Question:   Terminal Prognosis Diagnosis(es)     Answer:   [facility] [467645]     Order Specific Question:   Admitting Physician     Answer:   Chapin Deshpande     Order Specific Question:   Attending Physician     Answer:   Chapin Deshpande     Order Specific Question:   Discharge Plan:     Answer:   Home with Home Hospice    IP CONSULT TO SOCIAL WORK     Standing Status:   Standing     Number of Occurrences:   1     Order Specific Question:   Reason for Consult: Answer: For [facility] Only. For contracted patients, their primary hospice will continue to manage social work needs. Allergies: Allergies   Allergen Reactions    Magnesium Rash    Codeine     Adhesive Tape Rash    Benadryl [Diphenhydramine Hcl] Other (comments)     Jitters      Demerol [Meperidine] Anxiety    Meperidine Hcl Unknown (comments)    Morphine Other (comments)     Confusion    Sulfa (Sulfonamide Antibiotics) Anxiety    Lorazepam Anxiety       Care Plan:       Multidisciplinary Problems (Active)           Problem: Falls - Risk of     Dates: Start: 09/20/18       Disciplines: Interdisciplinary    Goal: *Absence of Falls     Dates: Start: 09/20/18      Description: Document Braden Hair Fall Risk and appropriate interventions in the flowsheet. Disciplines: Interdisciplinary         Problem: Patient Education: Go to Patient Education Activity     Dates: Start: 09/20/18       Disciplines: Interdisciplinary    Goal: Patient/Family Education     Dates: Start: 09/20/18      Disciplines: Interdisciplinary           ___________________    Care Team Notes          POC/IDG Notes      [facility] IDG  Notes by Luis Beth RN at 09/21/18 1100  Version 1 of 1    Author:  Luis Beth RN Service:  Pily Rascon Author Type:  Registered Nurse    Filed:  09/21/18 1416 Date of Service:  09/21/18 1100 Status:  Signed    :  Luis Beth RN (Registered Nurse)           Patient: Arelis Choi    Date: 09/21/18  Time:1100  [facility]  Notes  1st IDG: Pt is 71-year-old female with aortic valve disease who is from [facility] in home program who is here for a planned respite stay. Plan: Comprehensive plan of care reviewed. POC from home has been reviewed and modified as necessary to meet patients needs.  IDG and pt./family in agreement with plan of care. The IDG identifies through on-going assessment when a change is needed to the POC; the pt/family will receive care and services necessitated by changes in POC. Medications reviewed by the pharmacist and Medical Director. Signed by: AMOS FloresCRISTELA [facility] IDG Volunteer Notes by Boo Bailey at 09/21/18 1244  Version 1 of 1    Author:  Boo Bailey Service:  Rosi Louise Author Type:  Hospice Volunteer/    Filed:  09/21/18 1244 Date of Service:  09/21/18 1244 Status:  Signed    :  Boo Bailey (Hospice Volunteer/)               [facility] Interdisciplinary Plan of Care Review     Status Codes I = Initiated C=Continued R=Revised RS = Resolved     I.   Volunteer     Goal: Hospice house volunteer (s) enhances the quality of remaining life while patient is at the hospice house. Interventions: [employer] [place] Volunteer (s) will provide companionship to the patient and/or family by visiting at the hospice house       . [employer] [place] Volunteer (s) will provide respite as needed when requested by patient and/or family. [employer] Jeov[employer]  Volunteer will provide activities such as music, reading, pet therapy, etc. as requested. [employer]  Comfort bag delivered. Any other special requests or information regarding volunteer services:    One visit for comfort bag delivery and companionship is recorded. No further needs identified at this time. These notes have been discussed in [facility] meeting.           Signed by: Rosa Colin IDG  Notes by Geoffrey Castelan at 09/21/18 1034  Version 1 of 1    Author:  Geoffrey Castelan Service:  Licensed Clinical  Author Type:      Filed:  09/21/18 1146 Date of Service:  09/21/18 1034 Status:  Signed    :  Geoffrey Castelan ()           Patient: Leydi Gavin    Date: 09/21/18  Time: 10:34 AM    [facility]  Notes  Pt is here from the in home program.  She is here for respite. LMSW will review the in home care plan. LMSW will coordinate with the in home program.  LMSW will provide psychosocial support as needed. She will be discharging home on 9-25-18 at noon via family car. Signed by: Tashi Wakefield       [facility] IDG  Notes by Jose Ramon Russell at 09/21/18 1136  Version 1 of 1    Author:  Jose Ramon Russell Service:  Spiritual Care Author Type:  Pastoral Care    Filed:  09/21/18 1141 Date of Service:  09/21/18 1136 Status:  Signed    :  Jose Ramon Russell (Pastoral Care)           Patient: Makayla Betts    Date: 09/21/18  Time: 11:36 AM    [facility]  Notes  Intervention: Patient comes from the In Home Program.  Her In home  [facility] will follow. [facility]  available during her stay with [facility].         Signed by: Jose Ramon Russell

## 2018-09-21 NOTE — PROGRESS NOTES
Pt arrived via private vehicle with POA/son for respite stay into room 109. Pt is alert, oriented to person, place, and situation, with intermittent confusion noted. Son reports fall last night, and skin tear noted to right arm. Orders received from NP [facility], and wound care provided per order. Son does report that pt \"roams at night\", and tab alerts in place, with frequent rounding for safety. Meds reconciled by Ghanshyam Bowers, charge nurse. Pt and family oriented to room and facility. Confirmed pickup day/time with pt's son. Son reports pt has had all of meds for am, and that takes meds at approx 0930 and 2130. Med time changes requested to pharmacy. Pt is alert and in good spirits. Does report pain following transport and getting settled into bed, and prn given by AMOS Woody. Pt able to feed self independently with setup, and is O2 dependent, via NC, so O2 applied per order. Son makes trip home to retrieve meds that were left and returns. Contact information confirmed and new DNR signed. Pt and family deny further needs at present.

## 2018-09-21 NOTE — PROGRESS NOTES
Report received. Walking rounds made. Patient tab alert going off when RN entered room, was trying to get up to answer telephone. Tab alert placed back on  Patient. Patient appears to be in good spirits. Bed in low, locked position, tab alert in place, door opened for continuous monitoring.

## 2018-09-21 NOTE — PROGRESS NOTES
LMSW stopped in to see the pt. She was sitting upright in her bed watching tv. LMSW introduced myself to her and inquired if she needed anything. She said her feet were like ice cubes. LMSW provided for her no slip socks and a warm blanket. LMSW got her all situated and warm. LMSW asked her how she has been feeling sionce being here she says she feels good. She just got off of the phone with her son who she said \"called to see how I have been treated here\"  She told me \"you have been taking very good care of me. Pt is noticeably short of breath when she talks. LMSW  Encouraged her to rest a little and catch her breath. She continued to watch tv as I was leaving the room.

## 2018-09-22 NOTE — PROGRESS NOTES
Pt I'd by name and . Pt calm. A&O x 3. No distress. Denies pain at this time. No facial grimace. Flacc =0-1. Resp non labored on 3L n/c. Lungs diminished. BS diminished. HR reg. No edema noted at this time. Pt up to BR with assist.   SR up x2. Bed low/locked. Call light with in reach. Door opened. Tab alerts on.

## 2018-09-22 NOTE — PROGRESS NOTES
Pt is alert and oriented to person and situation. Periodic confusion noted throughout shift, with long term memory better than short term. Pt is able to make needs known, and responds to questions appropriately. Pt's spirits are good, and pt has had many visitors today, grandchildren, great grands, friends, and more. Pt does show fatigue following all visits, and needs time to recover. Pt shows dyspnea with exertion and conversation, but does not desire meds, rather wishes to rest to recover. Pt does express that she wishes she did not have to use all her energy to get up to go to the bathroom, as she needs to rest and nap following each time, and states that she wishes that didn't take up so much of her time. In report received, stated \"no salamanca\". Will need to clarify. Pt states that she feels it would help improve her quality and allow her to use her time to do more of what she wants to be doing and have more time to visit. Pt is more confused following ambulation as well, due to desats. Son stated when he dropped her off that he did not want her to have a salamanca catheter, as he feels she would \"not get up and walk as much\". Will pass along to night shift RN to please follow up with pt and family, as this was just expressed by pt at 1815. Pt reports that \"pain patch\" has provided relief to shoulder, and that hemorrhoids feel better following application of cream.  Dressing to right arm skin tear changed following pt's shower, and pt tolerated well. No s/sx of infection noted. CNJARRED Rodriguez reports that pt has become so dyspneic with ambulation to bathroom, that she has needed to wheel her back to bed at times. Will continue to monitor. Tab alerts and other safety measures in place. Pt denies further needs at this time. Frequent rounding due to high fall risk. Continue to remind pt to call for help prior to ambulation.

## 2018-09-22 NOTE — PROGRESS NOTES
Pt I'd by name and . Pt calm. No distress. No facial grimace. Flacc =0-1. Resp non labored on 3L n/c. Lungs with wheezing through out. BS diminished. HR reg. No edema noted at this time. SR up x2. Bed low/locked. Call light with in reach. Door opened. Tab alerts on.

## 2018-09-23 NOTE — PROGRESS NOTES
Pt has been in good spirits. Volunteer in to see pt, and pt reports that she very much enjoyed visit. Pt is clear and oriented when lying in bed, but becomes very confused and disoriented with ambulation to BR, and is very dyspneic and unsteady when returning back to bed. Recovers is approx 10-15 minutes at rest.  Pt does drink pepsi frequently throughout the day, and reports that she is surprised at how often she is needing to use the BR here. Pt reports no difficulty with urination, no burning or dysuria, and no odor noted. Urine appears clear and yellow. Pt is continent with incontinent episodes, and does have BM many times when up to urinate. Pt states that has been her routine since she was dx with diverticulitis. Pt also verbalizes that she is surprised her leg edema has decreased so dramatically, and that she is glad they are no longer weeping. Pt is able to make needs known and answer questions appropriately. Pt has not required any prns this shift, as denies need/does not want when experiencing dyspnea, just wants to rest.  Meal setup provided, and pt feeds self independently. Pt has remembered to use call light most of the time. Safety measures and tab alerts in place, along with frequent rounding. Pt denies further needs at present.

## 2018-09-23 NOTE — PROGRESS NOTES
Problem: Falls - Risk of  Goal: *Absence of Falls  Document Gregorio Fall Risk and appropriate interventions in the flowsheet. Outcome: Progressing Towards Goal  Fall Risk Interventions:  Mobility Interventions: Bed/chair exit alarm    Mentation Interventions: Bed/chair exit alarm, Door open when patient unattended, Evaluate medications/consider consulting pharmacy    Medication Interventions: Bed/chair exit alarm, Evaluate medications/consider consulting pharmacy, Patient to call before getting OOB    Elimination Interventions: Call light in reach, Bed/chair exit alarm    History of Falls Interventions: Bed/chair exit alarm, Door open when patient unattended, Evaluate medications/consider consulting pharmacy, Room close to nurse's station        Problem: Patient Education: Go to Patient Education Activity  Goal: Patient/Family Education  Outcome: Progressing Towards Goal  Encourage pt each time into room to call for assistance with ambulation. Pt does become very dyspneic with any exertion, and confusion noted following ambulation. Will continue to monitor. Problem: Pressure Injury - Risk of  Goal: *Prevention of pressure injury  Document Justo Scale and appropriate interventions in the flowsheet. Outcome: Progressing Towards Goal  Pressure Injury Interventions:  Sensory Interventions: Assess changes in LOC, Check visual cues for pain    Moisture Interventions: Absorbent underpads, Limit adult briefs, Moisture barrier    Activity Interventions: Pressure redistribution bed/mattress(bed type)    Mobility Interventions: Pressure redistribution bed/mattress (bed type)    Nutrition Interventions: Document food/fluid/supplement intake    Friction and Shear Interventions: Lift sheet               Problem: Patient Education: Go to Patient Education Activity  Goal: Patient/Family Education  Outcome: Progressing Towards Goal  Pt ambulates very frequently, approx Q 30 minutes to BR.   Pt does like to drink many pepsis throughout day. No pressure areas noted or reported at present.

## 2018-09-23 NOTE — ROUTINE PROCESS
Pt I'd by name and . Pt calm. No distress. No facial grimace. Denies pain at this time. Flacc =0-1. Resp non labored on 3L n/c. Lungs with scattered wheezing. BS diminished. HR reg. No edema noted at this time. Pt up with assist.  SR up x2. Bed low/locked. Call light with in reach. Door opened. Tab alerts on. Family at the bedside.

## 2018-09-24 NOTE — [facility] IDG SOCIAL WORKER NOTES
Patient: Olivia Mills    Date: 09/24/18  Time: 10:40 AM    \[facility] [place] [facility]\""  Notes  Pt is here from the in home program for a scheduled respite. She will be going home 9/25/18 at noon via car. LMSW will coordinate care with the in home program staff.         Signed by: Sabrina Sethi

## 2018-09-24 NOTE — [facility] IDG MASTER NOTE
[facility] Inter[facility] Group Collaborative  Date: 09/24/18  Time: 4:33 PM    Diagnoses: There were no encounter diagnoses.     Current Medications:    Current Facility-Administered Medications:     aspirin chewable tablet 81 mg, 81 mg, Oral, DAILY, Steve Ponce NP, 81 mg at 09/24/18 4065    hydrOXYzine pamoate (VISTARIL) capsule 25 mg (Patient Supplied), 25 mg, Oral, Q6H PRN, Steve Ponce NP    magic mouthwash (BALDEMAR) oral suspension 15 mL (Patient Supplied), 15 mL, Oral, TID PRN, Steve Ponce NP    acetaminophen (TYLENOL) tablet 487.5 mg, 487.5 mg, Oral, Q6H, Steve Ponce NP, 487.5 mg at 09/24/18 1406    allopurinol (ZYLOPRIM) tablet 100 mg (Patient Supplied), 100 mg, Oral, DAILY, Steve Ponce NP, 100 mg at 09/24/18 0930    benzonatate (TESSALON) capsule 100 mg, 100 mg, Oral, Q4H PRN, Steve Ponce NP    diazePAM (VALIUM) tablet 5 mg, 5 mg, Oral, DAILY, Steve Ponce NP, 5 mg at 09/24/18 9014    furosemide (LASIX) tablet 40 mg, 40 mg, Oral, BID, Steve Ponce NP, 40 mg at 09/24/18 1405    hydrocortisone (ANUSOL-HC) 2.5 % rectal cream 1 Each (Patient Supplied), 1 Each, Rectal, TID PRN, Steve Ponce NP, 1 Each at 09/22/18 1550    HYDROmorphone (DILAUDID) 1 mg/mL oral solution 0.5 mg (Patient Supplied), 0.5 mg, Oral, Q4H PRN, Steve Ponce NP, 0.5 mg at 09/20/18 1302    levothyroxine (SYNTHROID) tablet 88 mcg (Patient Supplied), 88 mcg, Oral, ACB, Steve Ponce NP, 88 mcg at 09/24/18 0929    metOLazone (ZAROXOLYN) tablet 5 mg (Patient Supplied), 5 mg, Oral, DAILY, Steve Ponce NP, 5 mg at 09/24/18 6378    metoprolol succinate (TOPROL-XL) XL tablet 25 mg (Patient Supplied), 25 mg, Oral, DAILY, Steve Ponce NP, 25 mg at 09/24/18 7721    Omeprazole delayed release (PRILOSEC D/R) tablet 20 mg (Patient Supplied), 20 mg, Oral, DAILY, Steve Ponce NP, 20 mg at 09/24/18 0927    potassium chloride SR (K-TAB) tablet 20 mEq (Patient Supplied), 20 mEq, Oral, BID, Abington Fine, NP, 20 mEq at 09/24/18 7727    predniSONE (DELTASONE) tablet 10 mg, 10 mg, Oral, DAILY, Abington Fine, NP, 10 mg at 09/24/18 0925    rOPINIRole (REQUIP) tablet 2 mg (Patient Supplied), 2 mg, Oral, BID, Abington Fine, NP, 2 mg at 09/24/18 7351    senna (SENOKOT) tablet 8.6 mg, 1 Tab, Oral, BID, Abington Fine, NP, 8.6 mg at 09/24/18 3108    sertraline (ZOLOFT) tablet 100 mg, 100 mg, Oral, DAILY, Abington Fine, NP, 100 mg at 09/24/18 1864    spironolactone (ALDACTONE) tablet 25 mg, 25 mg, Oral, DAILY, Abington Fine, NP, 25 mg at 09/24/18 8779    lidocaine 4 % patch 1 Patch (Patient Supplied), 1 Patch, TransDERmal, DAILY PRN, Coleman Nino NP, 1 Patch at 09/22/18 1533    albuterol-ipratropium (DUO-NEB) 2.5 MG-0.5 MG/3 ML, 3 mL, Nebulization, Q6H PRN, Coleman Nino NP    nystatin (MYCOSTATIN) 100,000 unit/gram powder, , Topical, BID, Coleman Nino NP    Orders:  Orders Placed This Encounter    IP CONSULT TO SPIRITUAL CARE     Standing Status:   Standing     Number of Occurrences:   1     Order Specific Question:   Reason for Consult: Answer: Once on week one, then PRN. For [facility] Only. For contracted patients, their primary hospice will continue to manage spiritual care needs.     DIET REGULAR     Standing Status:   Standing     Number of Occurrences:   1     Order Specific Question:   Likes/Dislikes/Preferences     Answer:   PLEASE PROVIDE BREAKFAST BTWN 0900 AND 0930 PER PATIENT REQUEST.    VITAL SIGNS     Standing Status:   Standing     Number of Occurrences:   1    VITAL SIGNS     Standing Status:   Standing     Number of Occurrences:   1    NURSING-MISCELLANEOUS: Comfort Care Measures CONTINUOUS     Standing Status:   Standing     Number of Occurrences:   1     Order Specific Question:   Description of Order:     Answer:   Comfort Care Measures    BLADDER CHECKS     May scan bladder PRN for urinary retention and or patient discomfort     Standing Status:   Standing     Number of Occurrences:   1    NURSING ASSESSMENT:  SPECIFY Assess for GIP, routine, or respite level of care. Q SHIFT Routine     Standing Status:   Standing     Number of Occurrences:   1     Order Specific Question:   Please describe the test or procedure you would like to order. Answer:   Assess for GIP, routine, or respite level of care.  PAIN ASSESSMENT Pain and Symptoms: Assess ever 4 hours and PRN, for GIP level of care. PRN Routine     Standing Status:   Standing     Number of Occurrences:   1     Order Specific Question:   Please describe the test or procedure you would like to order. Answer:   Pain and Symptoms: Assess ever 4 hours and PRN, for GIP level of care.  ACTIVITY AS TOLERATED W/ASSIST     USES WALKER. Standing Status:   Standing     Number of Occurrences:   1    NURSING-MISCELLANEOUS: ADMISSION/DC INFORMATION Admit for respite level of care for caregiver fatigue. Primary dx is aortic valve disease. Primary RN is Luz Maria with [facility]. DC home on 9/25 at noon via car. CONTINUOUS     Admit for respite level of care for caregiver fatigue. Primary dx is aortic valve disease. Primary RN is Luz Maria with [facility]. DC home on 9/25 at noon via car. Standing Status:   Standing     Number of Occurrences:   1     Order Specific Question:   Description of Order:     Answer:   ADMISSION/DC INFORMATION    WOUND CARE, DRESSING CHANGE     Wound Care:  Location: right forearm  Skin Tear: Cleanse skin with wound cleanser. Cover with non stick dressing and wrap with guaze wrap and secure with tape. Change every 3 days. Assess every shift and PRN. Standing Status:   Standing     Number of Occurrences:   5    WOUND CARE, DRESSING CHANGE     Wound Care:  Location: BILATERAL LOWER EXTREMITY WEEPING EDEMA  Weeping Edema:  Cleanse skin with wound cleanser. Cover with chux pads to absorb drainage. Change PRN soiled.  I the event that patient desires ambulation, may secure with tape or ace wrap. Assess every shift and PRN. Standing Status:   Standing     Number of Occurrences:   1    NURSE TO COLLECT LABS     Please collect in AM and notify  for . Thank you. Standing Status:   Standing     Number of Occurrences:   1     Order Specific Question:   Please collect the following labs     Answer: BMP     Order Specific Question:   Admitting Diagnosis     Answer:   Heart disease [646456]     Order Specific Question:   Admitting Diagnosis     Answer:   [facility] [706527]    DO NOT RESUSCITATE     Standing Status:   Standing     Number of Occurrences:   1     Order Specific Question:   Comfort Measures Only? Answer: Yes    OXYGEN CANNULA Liters per minute: 3; Indications for O2 therapy: RESPIRATORY DISTRESS CONTINUOUS Routine     Standing Status:   Standing     Number of Occurrences:   1     Order Specific Question:   Liters per minute: Answer:   3     Order Specific Question:   Indications for O2 therapy     Answer:   RESPIRATORY DISTRESS    acetaminophen (TYLENOL) tablet 487.5 mg     OP SIG:Take 500 mg by mouth every six (6) hours. SWELLING/SHOULDER PAIN      allopurinol (ZYLOPRIM) tablet 100 mg (Patient Supplied)     OP SIG:Take 100 mg by mouth daily.  DISCONTD: aspirin delayed-release tablet 81 mg (Patient Supplied)     OP SIG:Take 81 mg by mouth daily.  benzonatate (TESSALON) capsule 100 mg     OP SIG:Take 100 mg by mouth every four (4) hours as needed for Cough.  DISCONTD: cyanocobalamin (VITAMIN B12) tablet 1,000 mcg (Patient Supplied)     OP SIG:Take 1,000 mcg by mouth daily.  diazePAM (VALIUM) tablet 5 mg     OP SIG:Take 5 mg by mouth daily. TAKE 1/2 TABLET EVERY MORNING. TAKE 1 WHOLE TABLET AT BEDTIME.  furosemide (LASIX) tablet 40 mg     OP SIG:Take 40 mg by mouth two (2) times a day.       DISCONTD: glycerin (adult) suppository 1 Suppository (Patient Supplied)     OP SIG:Insert 1 Suppository into rectum daily as needed (for consitpation).  hydrocortisone (ANUSOL-HC) 2.5 % rectal cream 1 Each (Patient Supplied)     OP SIG:Insert 1 Each into rectum three (3) times daily as needed for Hemorrhoids.  HYDROmorphone (DILAUDID) 1 mg/mL oral solution 0.5 mg (Patient Supplied)     OP SIG:Take 0.5 mg by mouth every four (4) hours as needed (pain/dyspnea).  DISCONTD: hydrOXYzine pamoate (VISTARIL) capsule 25 mg (Patient Supplied)     OP SIG:Take 12.5 mg by mouth every six (6) hours as needed for Itching or Anxiety.  levothyroxine (SYNTHROID) tablet 88 mcg (Patient Supplied)     OP SIG:Take 88 mcg by mouth Daily (before breakfast).  DISCONTD: lidocaine 4 % patch 1 Patch (Patient Supplied)     OP SI Patch by TransDERmal route every twelve (12) hours every twelve (12) hours.  DISCONTD: loratadine (CLARITIN) tablet 10 mg     OP SIG:Take 10 mg by mouth daily.  DISCONTD: MAGIC MOUTHWASH AMBULATORY SIMPLE MEDICATION 15ml (Patient Supplied)     OP SIG:Take 15 mL by mouth three (3) times daily. SWISH AND SPIT 15 ML DAILY      metOLazone (ZAROXOLYN) tablet 5 mg (Patient Supplied)     OP SIG:Take 5 mg by mouth daily. GIVE ONE TABLET BY MOUTH EVERY DAY - HOLD FOR SYSTOLIC BP LESS THEN 843.  metoprolol succinate (TOPROL-XL) XL tablet 25 mg (Patient Supplied)     OP SIG:Take 25 mg by mouth daily.  Omeprazole delayed release (PRILOSEC D/R) tablet 20 mg (Patient Supplied)     OP SIG:Take 20 mg by mouth daily. Order Specific Question:   PPI INDICATION     Answer:   Symptomatic GERD    potassium chloride SR (K-TAB) tablet 20 mEq (Patient Supplied)     OP SIG:Take 20 mEq by mouth two (2) times a day.  predniSONE (DELTASONE) tablet 10 mg     OP SIG:Take 10 mg by mouth daily.  rOPINIRole (REQUIP) tablet 2 mg (Patient Supplied)     OP SIG:Take 2 mg by mouth two (2) times a day.       senna (SENOKOT) tablet 8.6 mg     OP SIG:Take 1 Tab by mouth two (2) times a day.  sertraline (ZOLOFT) tablet 100 mg     OP SIG:Take 100 mg by mouth daily.  spironolactone (ALDACTONE) tablet 25 mg     OP SIG:Take 25 mg by mouth daily. Indications: Edema      lidocaine 4 % patch 1 Patch (Patient Supplied)     OP SI Patch by TransDERmal route every twelve (12) hours every twelve (12) hours.  albuterol-ipratropium (DUO-NEB) 2.5 MG-0.5 MG/3 ML     Order Specific Question:   MODE OF DELIVERY     Answer:   Nebulizer    fluconazole (DIFLUCAN) tablet 200 mg     Order Specific Question:   Antibiotic Indications     Answer:   Skin and Soft Tissue Infection     Order Specific Question:   Skin duration of therapy     Answer: Other     Comments:   3 days    nystatin (MYCOSTATIN) 100,000 unit/gram powder    hydrOXYzine pamoate (VISTARIL) capsule 25 mg (Patient Supplied)    magic mouthwash (BALDEMAR) oral suspension 15 mL (Patient Supplied)     OP SIG:Take 15 mL by mouth three (3) times daily. SWISH AND SPIT 15 ML DAILY      aspirin chewable tablet 81 mg    INITIAL PHYSICIAN ORDER: HOSPICE Level Of Care: Respite; Reason for Admission: Admit for respite level of care for caregiver fatigue. Primary dx is aortic valve disease. Primary RN is Luz Maria with [address]. DC home on  at noon via car. Standing Status:   Standing     Number of Occurrences:   1     Order Specific Question:   Status     Answer:   Hospice     Order Specific Question:   Level Of Care     Answer:   Respite     Order Specific Question:   Reason for Admission     Answer:   Admit for respite level of care for caregiver fatigue. Primary dx is aortic valve disease. Primary RN is Luz Maria with [address]. DC home on  at noon via car. Order Specific Question:   Inpatient Hospitalization Certified Necessary for the Following Reasons     Answer:   9. Other (further clarification in H&P documentation)     Order Specific Question:   Admitting Diagnosis     Answer:    Aortic valve disease [[id number]]     Order Specific Question:   Terminal Prognosis Diagnosis(es)     Answer:   Oxygen dependent [084206]     Order Specific Question:   Terminal Prognosis Diagnosis(es)     Answer:   [facility] [[id number]]     Order Specific Question:   Admitting Physician     Answer:   Genita Stade     Order Specific Question:   Attending Physician     Answer:   Genita Stade     Order Specific Question:   Discharge Plan:     Answer:   Home with Home Hospice    IP CONSULT TO SOCIAL WORK     Standing Status:   Standing     Number of Occurrences:   1     Order Specific Question:   Reason for Consult: Answer: For [facility] Patients Only. For contracted patients, their primary hospice will continue to manage social work needs. Allergies: Allergies   Allergen Reactions    Magnesium Rash    Codeine     Adhesive Tape Rash    Benadryl [Diphenhydramine Hcl] Other (comments)     Jitters      Demerol [Meperidine] Anxiety    Meperidine Hcl Unknown (comments)    Morphine Other (comments)     Confusion    Sulfa (Sulfonamide Antibiotics) Anxiety    Lorazepam Anxiety       Care Plan:       Multidisciplinary Problems (Active)           Problem: Falls - Risk of     Dates: Start: 09/20/18       Disciplines: Interdisciplinary    Goal: *Absence of Falls     Dates: Start: 09/20/18   Expected End: 09/29/18      Description: Document Ronal Hill Fall Risk and appropriate interventions in the flowsheet.     Disciplines: Interdisciplinary         Problem: Patient Education: Go to Patient Education Activity     Dates: Start: 09/20/18       Disciplines: Interdisciplinary    Goal: Patient/Family Education     Dates: Start: 09/20/18   Expected End: 09/29/18      Disciplines: Interdisciplinary         Problem: Patient Education: Go to Patient Education Activity     Dates: Start: 09/21/18       Disciplines: Interdisciplinary    Goal: Patient/Family Education     Dates: Start: 09/21/18   Expected End: 09/29/18      Disciplines: Interdisciplinary         Problem: Pressure Injury - Risk of     Dates: Start: 09/21/18       Disciplines: Interdisciplinary    Goal: *Prevention of pressure injury     Dates: Start: 09/21/18   Expected End: 09/29/18      Description: Document Justo Scale and appropriate interventions in the flowsheet. Disciplines: Interdisciplinary           ___________________    Care Team Notes          POC/IDG Notes      [facility] IDG Nurse Notes by Roddy Owens RN at 09/24/18 1100  Version 1 of 1    Author:  Rdody Owens RN Service:  Justin Rajan Author Type:  Registered Nurse    Filed:  09/24/18 1428 Date of Service:  09/24/18 1100 Status:  Signed    :  Roddy Owens RN (Registered Nurse)           Patient: Marlise Junk    Date: 09/24/18  Time:1100  [facility] Nurse Notes  F/U IDG: Pt is an Aneudy Christianson in home pt here for respite stay. Meds have been slightly adjusted while at hospice house. Pt has improved with groin rash and weeping edema in legs with care at hospice house. Continues to be dyspneic with exertion. Plan: Will d/c tomorrow at 15 Noon via family car. Draw BMP tomorrow prior to d/c. Comprehensive plan of care reviewed. POC from home has been reviewed and modified as necessary to meet patients needs. IDG and pt./family in agreement with plan of care. The IDG identifies through on-going assessment when a change is needed to the POC; the pt/family will receive care and services necessitated by changes in POC. Medications reviewed by the pharmacist and Medical Director.         Signed by: Roddy Owens RN       [facility] IDG  Notes by Kenia Thomas at 09/24/18 1040  Version 1 of 1    Author:  Kenia Thomas Service:  Licensed Clinical  Author Type:      Filed:  09/24/18 1242 Date of Service:  09/24/18 1040 Status:  Signed    :  Kenia Thomas ()           Patient: Marlise Junk    Date: 09/24/18  Time: 10:40 AM    [facility]  Notes  Pt is here from the in home program for a scheduled respite. She will be going home 9/25/18 at noon via car. LMSW will coordinate care with the in home program staff. Signed by: Gene Sanches       [facility] IDG  Notes by Beth Laughlin at 09/24/18 1222  Version 1 of 1    Author:  Beth Laughlin Service:  Spiritual Care Author Type:  Pastoral Care    Filed:  09/24/18 1235 Date of Service:  09/24/18 1222 Status:  Signed    :  Bteh Laughlin (Pastoral Care)           Patient: Beverly Carmona    Date: 09/24/18  Time: 12:22 PM    [facility]  Notes    Intervention: Patient comes from the In Home Program where Bobo Chan is providing care. Plan:  Yessy Watkins will continue to provide support. [facility] available as needed, requested or referred. Signed by: Beth Laughlin       [facility] IDG  Notes by Jana Silverman RN at 09/21/18 1100  Version 1 of 1    Author:  Jana Silverman RN Service:  Sherry Jean-Baptiste Author Type:  Registered Nurse    Filed:  09/21/18 1416 Date of Service:  09/21/18 1100 Status:  Signed    :  Jana Silverman RN (Registered Nurse)           Patient: Beverly Carmona    Date: 09/21/18  Time:1100  [facility]  Notes  1st IDG: Pt is 81-year-old female with aortic valve disease who is from [facility] in home program who is here for a planned respite stay. Plan: Comprehensive plan of care reviewed. POC from home has been reviewed and modified as necessary to meet patients needs. IDG and pt./family in agreement with plan of care. The IDG identifies through on-going assessment when a change is needed to the POC; the pt/family will receive care and services necessitated by changes in POC. Medications reviewed by the pharmacist and Medical Director.         Signed by: Jana Silverman RN       [facility] IDG Volunteer Notes by Karla Piedra at 09/21/18 1244  Version 1 of 1    Author:  Juanita Cutler Sachi Service:  Patricia Spencer Author Type:  Hospice Volunteer/    Filed:  09/21/18 1244 Date of Service:  09/21/18 1244 Status:  Signed    :  Levi Lopez (Hospice Volunteer/)               [address] [place] [address] Interdisciplinary Plan of Care Review     Status Codes I = Initiated C=Continued R=Revised RS = Resolved     I.   Volunteer     Goal: Hospice house volunteer (s) enhances the quality of remaining life while patient is at the hospice house. Interventions: Ashlyn Rogers Volunteer (s) will provide companionship to the patient and/or family by visiting at the hospice house       . Ashlyn Combs [place] Volunteer (s) will provide respite as needed when requested by patient and/or family. Lianne Barrio Leisa Barthel  Volunteer will provide activities such as music, reading, pet therapy, etc. as requested. Lianne Barrio Leisa Barthel  Comfort bag delivered. Any other special requests or information regarding volunteer services:    One visit for comfort bag delivery and companionship is recorded. No further needs identified at this time. These notes have been discussed in [facility] meeting. Signed by: Levi Lopez       [facility] IDG  Notes by Arthurine Cranker at 09/21/18 1034  Version 1 of 1    Author:  Arthurine Cranker Service:  Licensed Clinical  Author Type:      Filed:  09/21/18 1146 Date of Service:  09/21/18 1034 Status:  Signed    :  Arthurine Cranker ()           Patient: Jovan Lincoln    Date: 09/21/18  Time: 10:34 AM    [facility]  Notes  Pt is here from the in home program.  She is here for respite. LMSW will review the in home care plan. LMSW will coordinate with the in home program.  LMSW will provide psychosocial support as needed. She will be discharging home on 9-25-18 at noon via family car.            Signed by: Arthurine Cranker       [facility] IDG  Notes by Nhan Dumont at 09/21/18 [phone] Version 1 of 1    Author:  Jose Ramon Russell Service:  Spiritual Care Author Type:  Pastoral Care    Filed:  09/21/18 1141 Date of Service:  09/21/18 1136 Status:  Signed    :  Jose Ramon Russell (Pastoral Care)           Patient: Carolyn Okeefe    Date: 09/21/18  Time: 11:36 AM    [facility]  Notes  Intervention: Patient comes from the In Home Program.  Her In home  Perez P[facility]as will follow. [facility]  available during her stay with [facility].         Signed by: Jose Ramon Russell

## 2018-09-24 NOTE — [facility] IDG CHAPLAIN NOTES
Patient: Jovan Seay    Date: 09/24/18  Time: 12:22 PM    [facility]  Notes    Intervention: Patient comes from the In Home Program where Rocky Cai is providing care. [facility]  [facility]. [facility] available as needed, requested or referred.            Signed by: Marie Harmon

## 2018-09-24 NOTE — [facility] IDG NURSE NOTES
Patient: Germain Brown    Date: 09/24/18  Time:1100  [facility] Nurse Notes  F/U IDG: Pt is an OAH in home pt here for respite stay. Meds have been slightly adjusted while at hospice house. Pt has improved with groin rash and weeping edema in legs with care at hospice house. Continues to be dyspneic with exertion. Plan: Will d/c tomorrow at 15 Noon via family car. Draw BMP tomorrow prior to d/c. Comprehensive plan of care reviewed. POC from home has been reviewed and modified as necessary to meet patients needs. IDG and pt./family in agreement with plan of care. The IDG identifies through on-going assessment when a change is needed to the POC; the pt/family will receive care and services necessitated by changes in POC. Medications reviewed by the pharmacist and Medical Director.         Signed by: Mery Mills RN

## 2018-09-25 NOTE — PROGRESS NOTES
Bedside Report taken from off going RN. Pt identified. Pt in bed with eyes closed; displaying no signs or symptoms of pain, dyspnea, agitation,nausea, or vomiting. Bed locked and low, side rails up, tabs/bed alarm in place for pt. safety. Call light with in reach, and door opened for continued monitoring.

## 2018-09-25 NOTE — PROGRESS NOTES
Venous draw LAC 1 attempt. Pt tolerated well and  here at this time for . DSD after pressure at site x 3 minutes.

## 2018-09-25 NOTE — DISCHARGE SUMMARY
Discharge Summary     Patient: Jewel Lubin MRN: [id number]  SSN: [id number]    YOB: 1940  Age: 69 y.o.   Sex: female       Admit Date: 9/20/2018    Discharge Date: 9/25/2018      Admission Diagnoses: Respite  Aortic valve disease  Oxygen dependent  COPD (chronic obstructive pulmonary disease) ([facility])  Heart disease  CHF (congestive heart failure) ([facility]    Discharge Diagnoses:   Problem List as of 9/25/2018  Date Reviewed: 9/25/2018          Codes Class Noted - Resolved    * (Principal)Aortic valve disease ICD-10-CM: I35.9  ICD-9-CM: 424.1  9/20/2018 - Present        Pleural effusion, right ICD-10-CM: J90  ICD-9-CM: 511.9  3/12/2018 - Present        E. coli UTI ICD-10-CM: N39.0, B96.20  ICD-9-CM: 599.0, 041.49  3/8/2018 - Present        DNR (do not resuscitate) (Chronic) ICD-10-CM: G32  ICD-9-CM: V49.86  3/8/2018 - Present        Acute respiratory failure with hypoxia and hypercarbia (HCC) ICD-10-CM: J96.01, J96.02  ICD-9-CM: 518.81  3/8/2018 - Present        Pleural effusion, left ICD-10-CM: J90  ICD-9-CM: 511.9  3/6/2018 - Present    Overview Signed 3/8/2018  3:12 PM by Tracey Galarza NP     3/8/18 Right thoracentesis 1000ml removed             Chronic diastolic heart failure (HCC) (Chronic) ICD-10-CM: I50.32  ICD-9-CM: 428.32  2/7/2018 - Present        S/P TAVR (transcatheter aortic valve replacement) (Chronic) ICD-10-CM: Z95.2  ICD-9-CM: V43.3  1/30/2018 - Present        Aortic stenosis (Chronic) ICD-10-CM: I35.0  ICD-9-CM: 424.1  1/29/2018 - Present        Peripheral arterial disease (HCC) (Chronic) ICD-10-CM: I73.9  ICD-9-CM: 443.9  1/23/2018 - Present        Chronic respiratory failure with hypoxia (HCC) (Chronic) ICD-10-CM: J96.11  ICD-9-CM: 518.83, 799.02  1/23/2018 - Present    Overview Signed 1/23/2018 11:17 AM by Lara Boo, NP     Wears 3 to 4 liters at home             Hypoxia ICD-10-CM: R09.02  ICD-9-CM: 799.02  1/23/2018 - Present        Stenosis of prosthetic aortic valve (Chronic) ICD-10-CM: I35.0  ICD-9-CM: 396.0  1/19/2018 - Present    Overview Signed 1/19/2018  3:18 PM by Tashia Milligan MD     AVR (10/5/13):  21 mm Pericardial valve.                Tricuspid valve insufficiency (Chronic) ICD-10-CM: I07.1  ICD-9-CM: 397.0  1/15/2018 - Present        Systolic CHF, chronic (HCC) (Chronic) ICD-10-CM: I50.22  ICD-9-CM: 428.22, 428.0  1/15/2018 - Present        S/P mitral valve repair (Chronic) ICD-10-CM: A86.237  ICD-9-CM: V45.89  12/4/2017 - Present        H/O atrioventricular rubin ablation (Chronic) ICD-10-CM: N39.762  ICD-9-CM: V15.1  3/22/2017 - Present        Pulmonary hypertension (Chronic) ICD-10-CM: I27.20  ICD-9-CM: 416.8  2/12/2017 - Present        Aortic valve replaced (Chronic) ICD-10-CM: Z95.2  ICD-9-CM: V43.3  1/9/2017 - Present        Chronic diastolic congestive heart failure (HCC) (Chronic) ICD-10-CM: I50.32  ICD-9-CM: 428.32, 428.0  9/9/2016 - Present        Chronic depression (Chronic) ICD-10-CM: F32.9  ICD-9-CM: 311  8/5/2016 - Present        Osteopenia (Chronic) ICD-10-CM: M85.80  ICD-9-CM: 733.90  8/5/2016 - Present        RLS (restless legs syndrome) (Chronic) ICD-10-CM: G25.81  ICD-9-CM: 333.94  8/5/2016 - Present        Hyperlipidemia (Chronic) ICD-10-CM: E78.5  ICD-9-CM: 272.4  8/5/2016 - Present        Gout (Chronic) ICD-10-CM: M10.9  ICD-9-CM: 274.9  8/5/2016 - Present        Anxiety (Chronic) ICD-10-CM: F41.9  ICD-9-CM: 300.00  8/5/2016 - Present        CAD (coronary artery disease) (Chronic) ICD-10-CM: I25.10  ICD-9-CM: 414.00  8/5/2016 - Present        HTN (hypertension) (Chronic) ICD-10-CM: I10  ICD-9-CM: 401.9  8/5/2016 - Present        GERD (gastroesophageal reflux disease) (Chronic) ICD-10-CM: K21.9  ICD-9-CM: 530.81  8/5/2016 - Present        H/O mitral valve repair, 2003 (Chronic) ICD-10-CM: H72.965  ICD-9-CM: V15.1  6/27/2016 - Present        Sick sinus syndrome (HCC) (Chronic) ICD-10-CM: I49.5  ICD-9-CM: 427.81  2/13/2016 - Present Obesity (Chronic) ICD-10-CM: E66.9  ICD-9-CM: 278.00  11/2/2015 - Present        Mitral stenosis with insufficiency (Chronic) ICD-10-CM: G48.9  ICD-9-CM: 394.2  11/2/2015 - Present    Overview Signed 11/2/2015 11:02 AM by Jos Alcala     Prior MV repair 2003.               Rheumatic aortic stenosis (Chronic) ICD-10-CM: I06.0  ICD-9-CM: 395.0  11/2/2015 - Present        Cardiac pacemaker (Chronic) ICD-10-CM: Z95.0  ICD-9-CM: V45.01  11/2/2015 - Present        Thrombocytopenia (HCC) (Chronic) ICD-10-CM: D69.6  ICD-9-CM: 287.5  8/22/2012 - Present        Anemia (Chronic) ICD-10-CM: D64.9  ICD-9-CM: 285.9  10/27/2011 - Present    Overview Addendum 9/20/2018 11:22 AM by Kannan Castaneda NP     Acute on chronic               Chronic atrial fibrillation (HCC) (Chronic) ICD-10-CM: I48.2  ICD-9-CM: 427.31  10/17/2011 - Present        Benign neoplasm of colon ICD-10-CM: D12.6  ICD-9-CM: 211.3  1/26/2010 - Present        Diverticulosis of colon ICD-10-CM: K57.30  ICD-9-CM: 562.10  1/26/2010 - Present        Internal hemorrhoids ICD-10-CM: K64.8  ICD-9-CM: 455.0  1/26/2010 - Present        Constipation ICD-10-CM: K59.00  ICD-9-CM: 564.00  12/14/2009 - Present        Nonspecific abnormal finding in stool contents ICD-10-CM: R19.5  ICD-9-CM: 792.1  12/14/2009 - Present        History of colonic polyps ICD-10-CM: Z86.010  ICD-9-CM: V12.72  12/14/2009 - Present        Hypothyroidism (Chronic) ICD-10-CM: E03.9  ICD-9-CM: 244.9  12/4/2009 - Present        BOBBY (obstructive sleep apnea) (Chronic) ICD-10-CM: G47.33  ICD-9-CM: 327.23  12/4/2009 - Present        Chronic obstructive pulmonary disease (HCC) (Chronic) ICD-10-CM: J44.9  ICD-9-CM: 035  3/8/2009 - Present    Overview Signed 3/6/2018  3:19 PM by Efrain Reyes NP     PFTs Jan 2018               Aortic Valve Bioprosthesis Present (Chronic) ICD-10-CM: Z95.2  ICD-9-CM: V43.3  3/7/2009 - Present        Degenerative arthritis of left knee (Chronic) ICD-10-CM: M17.12  ICD-9-CM: 715.96  2/27/2009 - Present        RESOLVED: CHF (congestive heart failure) ([facility] ICD-10-CM: I50.9  ICD-9-CM: 428.0  2/13/2018 - 2/14/2018        RESOLVED: Acute on chronic respiratory failure ([facility] ICD-10-CM: J96.20  ICD-9-CM: 518.84  2/7/2018 - 3/6/2018        RESOLVED: Leukocytosis ICD-10-CM: F01.382  ICD-9-CM: 288.60  2/7/2018 - 3/6/2018        RESOLVED: Acute on chronic systolic congestive heart failure ([facility] ICD-10-CM: I50.23  ICD-9-CM: 428.23, 428.0  2/1/2018 - 2/7/2018        RESOLVED: Acute pulmonary edema ([facility] ICD-10-CM: J81.0  ICD-9-CM: 518.4  1/25/2018 - 2/7/2018        RESOLVED: Pacemaker ICD-10-CM: Z95.0  ICD-9-CM: V45.01  12/4/2017 - 1/23/2018        RESOLVED: History of gout ICD-10-CM: Z87.39  ICD-9-CM: V12.29  1/31/2017 - 1/23/2018        RESOLVED: Warfarin anticoagulation (Chronic) ICD-10-CM: Z79.01  ICD-9-CM: V58.61  1/9/2017 - 1/23/2018        RESOLVED: Non morbid obesity due to excess calories ICD-10-CM: E66.09  ICD-9-CM: 278.00  11/14/2016 - 1/23/2018        RESOLVED: Hypoxemia ICD-10-CM: R09.02  ICD-9-CM: 799.02  11/14/2016 - 1/23/2018        RESOLVED: Localized edema ICD-10-CM: R60.0  ICD-9-CM: 782.3  9/9/2016 - 1/23/2018        RESOLVED: Iron deficiency anemia ICD-10-CM: D50.9  ICD-9-CM: 280.9  9/9/2016 - 1/23/2018        RESOLVED: CRI (chronic renal insufficiency) ICD-10-CM: N18.9  ICD-9-CM: 585.9  8/5/2016 - 1/23/2018        RESOLVED: Dyspnea ICD-10-CM: R06.00  ICD-9-CM: 786.09  8/5/2016 - 1/23/2018        RESOLVED: Right hip pain ICD-10-CM: M25.551  ICD-9-CM: 719.45  8/5/2016 - 1/23/2018        RESOLVED: Edema ICD-10-CM: R60.9  ICD-9-CM: 782.3  8/5/2016 - 1/23/2018        RESOLVED: Chest pain ICD-10-CM: R07.9  ICD-9-CM: 786.50  8/5/2016 - 1/23/2018        RESOLVED: Other long term (current) drug therapy ICD-10-CM: K63.496  ICD-9-CM: V58.69  8/5/2016 - 1/23/2018        RESOLVED: Acute encephalopathy ICD-10-CM: G93.40  ICD-9-CM: 348.30  7/8/2016 - 1/23/2018        RESOLVED: Tachycardia ICD-10-CM: R00.0  ICD-9-CM: 785.0  6/27/2016 - 1/23/2018        RESOLVED: Palpitations ICD-10-CM: R00.2  ICD-9-CM: 785.1  11/2/2015 - 1/23/2018        RESOLVED: Respiratory insufficiency ICD-10-CM: R06.89  ICD-9-CM: 786.09  11/2/2015 - 1/23/2018        RESOLVED: Bradycardia (Symptomatic) ICD-10-CM: R00.1  ICD-9-CM: 427.89  11/7/2011 - 1/23/2018        RESOLVED: Rectal bleeding ICD-10-CM: K62.5  ICD-9-CM: 569.3  10/27/2011 - 1/23/2018        RESOLVED: AV block ICD-10-CM: I44.30  ICD-9-CM: 426.10  10/17/2011 - 1/23/2018        RESOLVED: Cardiogenic shock ([facility] ICD-10-CM: R57.0  ICD-9-CM: 785.51  10/17/2011 - 1/23/2018        RESOLVED: Hyperkalemia ICD-10-CM: E87.5  ICD-9-CM: 276.7  10/17/2011 - 1/23/2018        RESOLVED: Nausea and vomiting ICD-10-CM: R11.2  ICD-9-CM: 787.01  2/24/2010 - 1/23/2018        RESOLVED: Epigastric abdominal pain ICD-10-CM: R10.13  ICD-9-CM: 789.06  2/24/2010 - 1/23/2018        RESOLVED: Digoxin toxicity ICD-10-CM: T46.0X1A  ICD-9-CM: 972.1, E942.1  2/24/2010 - 1/23/2018        RESOLVED: ARF (acute renal failure) (HCC) (Chronic) ICD-10-CM: N17.9  ICD-9-CM: 584.9  2/24/2010 - 1/23/2018        RESOLVED: Hypokalemia ICD-10-CM: E87.6  ICD-9-CM: 276.8  2/24/2010 - 1/23/2018        RESOLVED: CKD (chronic kidney disease) stage 3, GFR 30-59 ml/min (Chronic) ICD-10-CM: N18.3  ICD-9-CM: 585.3  12/4/2009 - 1/23/2018        RESOLVED: Cough ICD-10-CM: R05  ICD-9-CM: 786.2  3/9/2009 - 3/12/2009        RESOLVED: Bronchitis ICD-10-CM: J40  ICD-9-CM: 616  3/9/2009 - 3/12/2009        RESOLVED: Atrial fibrillation (HCC) (Chronic) ICD-10-CM: I48.91  ICD-9-CM: 427.31  3/7/2009 - 6/27/2016        RESOLVED: Hypotension (Chronic) ICD-10-CM: I95.9  ICD-9-CM: 458.9  3/1/2009 - 1/23/2018               Discharge Condition: Stable    Code Status:DNR    Hospital Course: Discharge from respite stay at [facility] to home with [facility]. DC  9/25/18 at noon via ambulance.  Status at time of discharge is routine. Consults: None    Significant Diagnostic Studies: None    Disposition: home    Discharge Medications:   Current Discharge Medication List      START taking these medications    Details   magic mouthwash (BALDEMAR) susp Take 15 mL by mouth three (3) times daily as needed for Other (stomatitis). Current supply to be returned at time of DC. Qty: 1 Bottle, Refills: 0      nystatin (MYCOSTATIN) powder Apply  to affected area two (2) times a day. Apply to groin/periarea. Current supply to be returned at time of DC. Qty: 1 Bottle, Refills: 0      albuterol-ipratropium (DUO-NEB) 2.5 mg-0.5 mg/3 ml nebu 3 mL by Nebulization route every six (6) hours as needed. Current supply to be returned at time of DC. Qty: 30 Nebule, Refills: 0         CONTINUE these medications which have CHANGED    Details   lidocaine 4 % patch Apply to left shoulder PRN pain. Apply for 12 hours and remove for 12 hours. Current supply to be returned at time of DC. Qty: 1 Patch, Refills: 0    Associated Diagnoses: Chronic left shoulder pain         CONTINUE these medications which have NOT CHANGED    Details   senna (SENNA) 8.6 mg tablet Take 1 Tab by mouth two (2) times a day. allopurinol (ZYLOPRIM) 100 mg tablet Take 100 mg by mouth daily. spironolactone (ALDACTONE) 25 mg tablet Take 25 mg by mouth daily. Indications: Edema      diazePAM (VALIUM) 5 mg tablet Take 5 mg by mouth daily. TAKE 1/2 TABLET EVERY MORNING. TAKE 1 WHOLE TABLET AT BEDTIME.      metOLazone (ZAROXOLYN) 5 mg tablet Take 5 mg by mouth daily. GIVE ONE TABLET BY MOUTH EVERY DAY - HOLD FOR SYSTOLIC BP LESS THEN 257. HYDROmorphone (DILAUDID) 1 mg/mL liqd oral solution Take 0.5 mg by mouth every four (4) hours as needed (pain/dyspnea). acetaminophen (TYLENOL) 500 mg tablet Take 500 mg by mouth every six (6) hours. SWELLING/SHOULDER PAIN      predniSONE (DELTASONE) 10 mg tablet Take 10 mg by mouth daily.       furosemide (LASIX) 40 mg tablet Take 40 mg by mouth two (2) times a day. OXYGEN-AIR DELIVERY SYSTEMS 3 L/min by Nasal route continuous. benzonatate (TESSALON) 100 mg capsule Take 100 mg by mouth every four (4) hours as needed for Cough. hydrOXYzine pamoate (VISTARIL) 25 mg capsule Take 12.5 mg by mouth every six (6) hours as needed for Itching or Anxiety. Omeprazole delayed release (PRILOSEC D/R) 20 mg tablet Take 20 mg by mouth daily. aspirin delayed-release 81 mg tablet Take 81 mg by mouth daily. sertraline (ZOLOFT) 50 mg tablet Take 100 mg by mouth daily. hydrocortisone (ANUSOL-HC) 2.5 % rectal cream Insert 1 Each into rectum three (3) times daily as needed for Hemorrhoids. levothyroxine (SYNTHROID) 88 mcg tablet Take 88 mcg by mouth Daily (before breakfast). metoprolol succinate (TOPROL-XL) 25 mg XL tablet Take 25 mg by mouth daily. potassium chloride SR (K-TAB) 20 mEq tablet Take 20 mEq by mouth two (2) times a day. rOPINIRole (REQUIP) 2 mg tablet Take 2 mg by mouth two (2) times a day. STOP taking these medications       cyanocobalamin (VITAMIN B12) 500 mcg tablet Comments:   Reason for Stopping:         magic mouthwash solution Comments:   Reason for Stopping:         loratadine (CLARITIN) 10 mg tablet Comments:   Reason for Stopping:         glycerin, adult, (SUPPOSITORY ADULT) suppository Comments:   Reason for Stopping:               Activity: Activity as tolerated with assist.  Diet: Regular Diet  Wound Care: As directed  Oxygen: 3 L/min via NC PRN SOB. [facility]  Misc: Highly recommend insertion of salamanca catheter to protect skin integrity and preserve patient energy level. Additionally, medication compliance with administration of medications as prescribed is critical in the management of this patient.      Follow-up Appointments   Procedures    FOLLOW UP VISIT Appointment in: Other (Specify) As determined by [facility]. Highly recommend insertion of salamanca catheter to protect skin integrity and preserve patient energy level. Additionally, medication compliance with administration of me. .. [facility] Highly recommend insertion of salamanca catheter to protect skin integrity and preserve patient energy level. Additionally, medication compliance with administration of medications as prescribed is critical in the management of this patient. Standing Status:   Standing     Number of Occurrences:   1     Order Specific Question:   Appointment in     Answer:    Other (Specify)       Signed By: Alonso Payton NP     September 25, 2018

## 2018-09-25 NOTE — PROGRESS NOTES
Discharge to home with son via family car. Discharge instructions given to son. Education regarding catheter care and jose care. Instructions on elevating legs when sitting. Discussed dressing change and informed that [facility] in home nurse would be instructing on dressing change to skin tear on right arm. Discussed importance of giving meds according to schedule. Reviewed med list with son. Son verbalizes understanding of all instructions/education given. Reinforcement will be needed by in home nurse.

## 2018-09-25 NOTE — DISCHARGE INSTRUCTIONS
PLEASE SEE DC SUMMARY. Discharge Condition: Stable     Code Status:DNR     Hospital Course: Discharge from respite stay at [facility] to home with [facility]. DC  9/25/18 at noon via ambulance. Status at time of discharge is routine.     Consults: None     Significant Diagnostic Studies: None     Disposition: home    Activity: Activity as tolerated with assist.  Diet: Regular Diet  Wound Care: As directed  Oxygen: 3 L/min via NC PRN SOB. [facility]: [facility]  Misc: Highly recommend insertion of salamanca catheter to protect skin integrity and preserve patient energy level. Additionally, medication compliance with administration of medications as prescribed is critical in the management of this patient.            Follow-up Appointments   Procedures    FOLLOW UP VISIT Appointment in: Other (Specify) [facility] Highly recommend insertion of salamanca catheter to protect skin integrity and preserve patient energy level. Additionally, medication compliance with administration of me. ..       [facility]. Highly recommend insertion of salamanca catheter to protect skin integrity and preserve patient energy level. Additionally, medication compliance with administration of medications as prescribed is critical in the management of this patient.       Standing Status:   Standing       Number of Occurrences:   1       Order Specific Question:   Appointment in       Answer:    Other (Specify)

## 2018-09-25 NOTE — PROGRESS NOTES
Pt is about the same as yesterday. Only change noted was that pt had less visitors and thus was more awake and alert, as did not need as much time to recover. Report given to hospice home care nurse Andi Bustillo. Pt has required no prns this shift, and has been in good spirits. Safety measures in place.

## 2018-09-25 NOTE — PROGRESS NOTES
Ballard catheter inserted using  16 Fr. silicone catheter. Pt agreed to catheter insertion after education regarding jose care and having to get up less to BR with catheter. Tolerated procedure well.

## 2018-11-08 NOTE — PROGRESS NOTES
[place] [facility] [place] [place] while caregiver has home emergency. Pt is alert but confused. Oxygen on via nasal canula and salamanca cath draining yellow urine. Skin is intact. No s/s of pain, agitation or dyspnea at this time. Bed low with SR up and tab alert on.

## 2018-11-09 NOTE — [facility] IDG VOLUNTEER NOTES
[address] Review     Status Codes I = Initiated C=Continued R=Revised RS = Resolved     I.  Volunteer     Goal: Hospice house volunteer (s) enhances the quality of remaining life while patient is at the hospice house. Interventions: Priscillaelíassimon Aburtomaria r [place]era Volunteer (s) will provide companionship to the patient and/or family by visiting at the hospice house       . Ananthsimon Ribera Volunteer (s) will provide respite as needed when requested by patient and/or family. Priscillaelíassimon Barbosa Simbamiguel Ba  Volunteer will provide activities such as music, reading, pet therapy, etc. as requested. Axel Forrester [place]  Comfort bag delivered. Any other special requests or information regarding volunteer services:     No further needs identified at this time. These notes have been discussed in [facility] meeting.           Signed by: Oneida Cummings

## 2018-11-09 NOTE — [facility] IDG NURSE NOTES
Patient: Simba Blake    Date: 11/09/18  Time: 80    \[facility] [place] [facility]\"" Nurse Notes  1st IDG: Pt is an [address] in home patient admitted on 11/8 for an emergency respite stay due to caregiver emergency. Plan: Pt will need a face to face for benefit period 4 on 11/13. Comprehensive plan of care reviewed. POC from home has been reviewed and modified as necessary to meet patients needs. IDG and pt./family in agreement with plan of care. The IDG identifies through on-going assessment when a change is needed to the POC; the pt/family will receive care and services necessitated by changes in POC. Medications reviewed by the pharmacist and Medical Director.           Signed by: Rosey Nava RN

## 2018-11-09 NOTE — PROGRESS NOTES
Problem: Falls - Risk of  Goal: *Absence of Falls  Document Gregorio Fall Risk and appropriate interventions in the flowsheet. Outcome: Progressing Towards Goal  Fall Risk Interventions:   No falls reported since admission; safety measures in place. Teaching on safety and fall precautions continuing and ongoing. Mentation Interventions: Bed/chair exit alarm, Adequate sleep, hydration, pain control, Door open when patient unattended, Reorient patient    Medication Interventions: Bed/chair exit alarm    Elimination Interventions: Bed/chair exit alarm, Call light in reach             Problem: Pressure Injury - Risk of  Goal: *Prevention of pressure injury  Document Justo Scale and appropriate interventions in the flowsheet.   Outcome: Progressing Towards Goal  Pressure Injury Interventions:  Sensory Interventions: Assess changes in LOC, Check visual cues for pain, Keep linens dry and wrinkle-free, Minimize linen layers    Moisture Interventions: Absorbent underpads, Apply protective barrier, creams and emollients, Internal/External urinary devices, Minimize layers, Moisture barrier, Maintain skin hydration (lotion/cream)    Activity Interventions: Pressure redistribution bed/mattress(bed type)    Mobility Interventions: Float heels    Nutrition Interventions: Offer support with meals,snacks and hydration, Document food/fluid/supplement intake    Friction and Shear Interventions: Feet elevated on foot rest, Minimize layers, Apply protective barrier, creams and emollients

## 2018-11-09 NOTE — [facility] IDG MASTER NOTE
Hospice Interdisciplinary Group Collaborative  Date: 11/09/18  Time: 5:45 PM    ___________________    Patient: Marcio Turner    ___________________    Diagnoses: There were no encounter diagnoses.     Current Medications:    Current Facility-Administered Medications:     [START ON 11/10/2018] aspirin delayed-release tablet 81 mg, 81 mg, Oral, DAILY, Sproat, Jerrye Radish, NP    allopurinol (ZYLOPRIM) tablet 100 mg (Patient Supplied), 100 mg, Oral, DAILY, Sproat, Jerrye Radish, NP, 100 mg at 11/09/18 0846    benzonatate (TESSALON) capsule 100 mg, 100 mg, Oral, Q4H PRN, Sproat, Jerrye Radish, NP    diazePAM (VALIUM) tablet 2.5 mg, 2.5 mg, Oral, DAILY, Sproat, Jerrye Radish, NP, 2.5 mg at 11/09/18 0841    furosemide (LASIX) tablet 40 mg, 40 mg, Oral, BID, Sproat, Jerrye Radish, NP, 40 mg at 11/09/18 0843    hydrocortisone (ANUSOL-HC) 2.5 % rectal cream 1 Each (Patient Supplied), 1 Each, Rectal, TID PRN, Sproat, Jerrye Radish, NP    levothyroxine (SYNTHROID) tablet 88 mcg (Patient Supplied), 88 mcg, Oral, ACB, Sproat, Jerrye Radish, NP, 88 mcg at 11/09/18 0839    metOLazone (ZAROXOLYN) tablet 5 mg (Patient Supplied), 5 mg, Oral, DAILY, Sproat, Jerrye Radish, NP, 5 mg at 11/09/18 8298    metoprolol succinate (TOPROL-XL) XL tablet 25 mg (Patient Supplied), 25 mg, Oral, DAILY, Sproat, Jerrye Radish, NP, 25 mg at 11/09/18 0840    Omeprazole delayed release (PRILOSEC D/R) tablet 20 mg (Patient Supplied), 20 mg, Oral, DAILY, Sproat, Jerrye Radish, NP, 20 mg at 11/09/18 0900    potassium chloride SR (K-TAB) tablet 20 mEq (Patient Supplied), 20 mEq, Oral, BID, Sproat, Howardrye Farzad, NP, 20 mEq at 11/09/18 1740    predniSONE (DELTASONE) tablet 10 mg, 10 mg, Oral, DAILY, Sproat, Jerrye Farzad, NP, 10 mg at 11/09/18 0843    rOPINIRole (REQUIP) tablet 2 mg (Patient Supplied), 2 mg, Oral, BID, Sproat, Riki Panda, NP, 2 mg at 11/09/18 1740    senna (SENOKOT) tablet 8.6 mg, 1 Tab, Oral, BID, Sproat, Jeralde Farzad, NP, 8.6 mg at 11/09/18 1742    sertraline (ZOLOFT) tablet 100 mg, 100 mg, Oral, DAILY, Sproat, Sherlie Band, NP, 100 mg at 11/09/18 1302    spironolactone (ALDACTONE) tablet 25 mg, 25 mg, Oral, DAILY, Sproat, Sherlie Band, NP, 25 mg at 11/09/18 0842    HYDROmorphone (DILAUDID) tablet 0.5 mg, 0.5 mg, Oral, Q4H PRN, Sproat, Sherlie Band, NP    hydrOXYzine HCl (ATARAX) tablet 12.5 mg, 12.5 mg, Oral, Q6H PRN, Sproat, Sherlie Band, NP    diazePAM (VALIUM) tablet 5 mg, 5 mg, Oral, QHS, Sproat, Sherlie Band, NP, 5 mg at 11/08/18 2147    acetaminophen (TYLENOL) tablet 500 mg (Patient Supplied), 500 mg, Oral, Q6H, Sproat, Sherlie Band, NP, 500 mg at 11/09/18 1739    Orders:  Orders Placed This Encounter    IP CONSULT TO SPIRITUAL CARE     Standing Status:   Standing     Number of Occurrences:   1     Order Specific Question:   Reason for Consult: Answer: Once on week one, then PRN. For [facility] Patients Only. For contracted patients, their primary hospice will continue to manage spiritual care needs.  DIET REGULAR     Standing Status:   Standing     Number of Occurrences:   1    VITAL SIGNS     Standing Status:   Standing     Number of Occurrences:   1    VITAL SIGNS     Standing Status:   Standing     Number of Occurrences:   1    NURSING-MISCELLANEOUS: Comfort Care Measures CONTINUOUS     Standing Status:   Standing     Number of Occurrences:   1     Order Specific Question:   Description of Order:     Answer:   Comfort Care Measures    ACOSTA CATHETER, CARE     1. Acosta Catheter care every shift and PRN  2. Notify Physician of Acosta Catheter leakage, occlusion, gross adherent sediment or accidental removal  3. Change Acosta 30 days after insertion. Standing Status:   Standing     Number of Occurrences:   1    BLADDER CHECKS     May scan bladder PRN for urinary retention and or patient discomfort     Standing Status:   Standing     Number of Occurrences:   1    NURSING ASSESSMENT:  SPECIFY Assess for GIP, routine, or respite level of care.  Q SHIFT Routine     Standing Status:   Standing     Number of Occurrences:   1     Order Specific Question:   Please describe the test or procedure you would like to order. Answer:   Assess for GIP, routine, or respite level of care.  PAIN ASSESSMENT Pain and Symptoms: Assess ever 4 hours and PRN, for GIP level of care. PRN Routine     Standing Status:   Standing     Number of Occurrences:   1     Order Specific Question:   Please describe the test or procedure you would like to order. Answer:   Pain and Symptoms: Assess ever 4 hours and PRN, for GIP level of care.  ACTIVITY AS TOLERATED W/ASSIST     Standing Status:   Standing     Number of Occurrences:   1    NURSING-MISCELLANEOUS: ADMISSION/DC INFORMATION Admit for emergency respite stay for caregiver fatigue due accident requiring hospitalization. Admit for aortic valve disease. DC 11/13 at noon via ambulance. [address] CONTINUOUS     Admit for emergency respite stay for caregiver fatigue due accident requiring hospitalization. Admit for aortic valve disease. DC 11/13 at noon via ambulance. [address]. Standing Status:   Standing     Number of Occurrences:   1     Order Specific Question:   Description of Order:     Answer:   ADMISSION/DC INFORMATION    DO NOT RESUSCITATE     Standing Status:   Standing     Number of Occurrences:   1     Order Specific Question:   Comfort Measures Only? Answer: Yes    OXYGEN CANNULA Liters per minute: 4; Indications for O2 therapy: RESPIRATORY DISTRESS PRN Routine     Standing Status:   Standing     Number of Occurrences:   1     Order Specific Question:   Liters per minute: Answer:   4     Order Specific Question:   Indications for O2 therapy     Answer:   RESPIRATORY DISTRESS    DISCONTD: acetaminophen (TYLENOL) tablet 487.5 mg     OP SIG:Take 500 mg by mouth every six (6) hours.  SWELLING/SHOULDER PAIN      allopurinol (ZYLOPRIM) tablet 100 mg (Patient Supplied)     OP SIG:Take 100 mg by mouth daily.  DISCONTD: aspirin delayed-release tablet 81 mg (Patient Supplied)     OP SIG:Take 81 mg by mouth daily.  benzonatate (TESSALON) capsule 100 mg     OP SIG:Take 100 mg by mouth every four (4) hours as needed for Cough.  diazePAM (VALIUM) tablet 2.5 mg     OP SIG:Take 5 mg by mouth daily. TAKE 1/2 TABLET EVERY MORNING. TAKE 1 WHOLE TABLET AT BEDTIME.  furosemide (LASIX) tablet 40 mg     OP SIG:Take 40 mg by mouth two (2) times a day.  hydrocortisone (ANUSOL-HC) 2.5 % rectal cream 1 Each (Patient Supplied)     OP SIG:Insert 1 Each into rectum three (3) times daily as needed for Hemorrhoids.  levothyroxine (SYNTHROID) tablet 88 mcg (Patient Supplied)     OP SIG:Take 88 mcg by mouth Daily (before breakfast).  metOLazone (ZAROXOLYN) tablet 5 mg (Patient Supplied)     OP SIG:Take 5 mg by mouth daily. GIVE ONE TABLET BY MOUTH EVERY DAY - HOLD FOR SYSTOLIC BP LESS THEN 801.  metoprolol succinate (TOPROL-XL) XL tablet 25 mg (Patient Supplied)     OP SIG:Take 25 mg by mouth daily.  Omeprazole delayed release (PRILOSEC D/R) tablet 20 mg (Patient Supplied)     OP SIG:Take 20 mg by mouth daily. Order Specific Question:   PPI INDICATION     Answer:   Symptomatic GERD    potassium chloride SR (K-TAB) tablet 20 mEq (Patient Supplied)     OP SIG:Take 20 mEq by mouth two (2) times a day.  predniSONE (DELTASONE) tablet 10 mg     OP SIG:Take 10 mg by mouth daily.  rOPINIRole (REQUIP) tablet 2 mg (Patient Supplied)     OP SIG:Take 2 mg by mouth two (2) times a day.  senna (SENOKOT) tablet 8.6 mg     OP SIG:Take 1 Tab by mouth two (2) times a day.  sertraline (ZOLOFT) tablet 100 mg     OP SIG:Take 100 mg by mouth daily.  spironolactone (ALDACTONE) tablet 25 mg     OP SIG:Take 25 mg by mouth daily. Indications: Edema      HYDROmorphone (DILAUDID) tablet 0.5 mg     OP SIG:Take 0.5 mg by mouth every four (4) hours as needed (pain/dyspnea).  hydrOXYzine HCl (ATARAX) tablet 12.5 mg    diazePAM (VALIUM) tablet 5 mg    acetaminophen (TYLENOL) tablet 500 mg (Patient Supplied)     OP SIG:Take 500 mg by mouth every six (6) hours. SWELLING/SHOULDER PAIN      aspirin delayed-release tablet 81 mg     OP SIG:Take 81 mg by mouth daily.  INITIAL PHYSICIAN ORDER: HOSPICE Level Of Care: Respite; Reason for Admission: Admit for emergency respite stay for caregiver fatigue due accident requiring hospitalization. Admit for aortic valve disease. DC 11/13 at noon via ambulance. [address] [place] [address] . .. Standing Status:   Standing     Number of Occurrences:   1     Order Specific Question:   Status     Answer:   Hospice     Order Specific Question:   Level Of Care     Answer:   Respite     Order Specific Question:   Reason for Admission     Answer:   Admit for emergency respite stay for caregiver fatigue due accident requiring hospitalization. Admit for aortic valve disease. DC 11/13 at noon via ambulance. [address]. Order Specific Question:   Inpatient Hospitalization Certified Necessary for the Following Reasons     Answer:   9. Other (further clarification in H&P documentation)     Order Specific Question:   Admitting Diagnosis     Answer: Aortic valve disease [494352]     Order Specific Question:   Terminal Prognosis Diagnosis(es)     Answer:   COPD (chronic obstructive pulmonary disease) ([facility] [[id number]]     Order Specific Question:   Terminal Prognosis Diagnosis(es)     Answer:   [facility] [[id number]]     Order Specific Question:   Admitting Physician     Answer:   Teri Antes     Order Specific Question:   Attending Physician     Answer:   Teri Antes     Order Specific Question:   Discharge Plan:     Answer:   Home with Home Hospice    IP CONSULT TO SOCIAL WORK     Standing Status:   Standing     Number of Occurrences:   1     Order Specific Question:   Reason for Consult: Answer:    For [facility] Patients Only. For contracted patients, their primary hospice will continue to manage social work needs. Allergies: Allergies   Allergen Reactions    Magnesium Rash    Codeine     Adhesive Tape Rash    Benadryl [Diphenhydramine Hcl] Other (comments)     Jitters      Demerol [Meperidine] Anxiety    Meperidine Hcl Unknown (comments)    Morphine Other (comments)     Confusion    Sulfa (Sulfonamide Antibiotics) Anxiety    Lorazepam Anxiety       Care Plan:  Multidisciplinary Problems (Active)     Problem: Falls - Risk of     Dates: Start: 11/08/18       Description:     Disciplines: Interdisciplinary    Goal: *Absence of Falls     Dates: Start: 11/08/18   Expected End: 11/22/18       Description: Document [place] Fall Risk and appropriate interventions in the flowsheet. Disciplines: Interdisciplinary                Problem: Patient Education: Go to Patient Education Activity     Dates: Start: 11/08/18       Description:     Disciplines: Interdisciplinary    Goal: Patient/Family Education     Dates: Start: 11/08/18   Expected End: 11/22/18       Description:     Disciplines: Interdisciplinary                Problem: Patient Education: Go to Patient Education Activity     Dates: Start: 11/08/18       Description:     Disciplines: Interdisciplinary    Goal: Patient/Family Education     Dates: Start: 11/08/18   Expected End: 11/22/18       Description:     Disciplines: Interdisciplinary                Problem: Pressure Injury - Risk of     Dates: Start: 11/08/18       Description:     Disciplines: Interdisciplinary    Goal: *Prevention of pressure injury     Dates: Start: 11/08/18   Expected End: 11/22/18       Description: Document Justo Scale and appropriate interventions in the flowsheet.     Disciplines: Interdisciplinary                    ___________________    Care Team Notes          POC/IDG Notes      [facility] Notes by Oscar Gonzalez RN at 11/09/18 1100  Version 1 of 1    Author: Dafne Maxwell RN Service:  Jerrod Richardson Author Type:  Registered Nurse    Filed:  11/09/18 1636 Date of Service:  11/09/18 1100 Status:  Signed    :  Dafne Maxwell RN (Registered Nurse)       Patient: Anthony Martinez    Date: 11/09/18  Time: 80    [facility] Nurse Notes  1st IDG: Pt is an [address] in home patient admitted on 11/8 for an emergency respite stay due to caregiver emergency. Plan: Pt will need a face to face for benefit period 4 on 11/13. Comprehensive plan of care reviewed. POC from home has been reviewed and modified as necessary to meet patients needs. IDG and pt./family in agreement with plan of care. The IDG identifies through on-going assessment when a change is needed to the POC; the pt/family will receive care and services necessitated by changes in POC. Medications reviewed by the pharmacist and Medical Director. Signed by: Tan Copeland RN       [facility] IDG  Notes by Radhika Larose at 11/09/18 1623  Version 1 of 1    Author:  Radhika Larose Service:  HOSPICE Author Type:  Pastoral Care    Filed:  11/09/18 1623 Date of Service:  11/09/18 1623 Status:  Signed    :  Radhika Larose (Pastoral Care)       Patient: Anthony Martinez    Date: 11/09/18  Time: 4:23 PM    [facility]  Notes  Spiritual assessment pending. Spiritual care to be provided as appropriate.         Signed by: Daniel Matthews       [facility] IDG Volunteer Notes by Karina Corley at 11/09/18 1453  Version 1 of 1    Author:  Karina Corley Service:  Jerrod Richardson Author Type:  Hospice Volunteer/    Filed:  11/09/18 1453 Date of Service:  11/09/18 1453 Status:  Signed    :  Karina Corley (Hospice Volunteer/)           [address] Review     Status Codes I = Initiated C=Continued R=Revised RS = Resolved     I.  Volunteer     Goal: Hospice house volunteer (s) enhances the quality of remaining life while patient is at the hospice house. Interventions: Ayanna Soto Volunteer (s) will provide companionship to the patient and/or family by visiting at the hospice house       . Ayanna Soto Volunteer (s) will provide respite as needed when requested by patient and/or family. Ayanna Hinojosa  Volunteer will provide activities such as music, reading, pet therapy, etc. as requested. Ayanna Hinojosa  Comfort bag delivered. Any other special requests or information regarding volunteer services:     No further needs identified at this time. These notes have been discussed in [facility] meeting. Signed by: Reji MARION  Notes by Javed Shipman at 11/09/18 0912  Version 1 of 1    Author:  Javed Shipman Service:  Licensed Clinical  Author Type:      Filed:  11/09/18 1218 Date of Service:  11/09/18 0912 Status:  Signed    :  Javed Shipman ()       Patient: Mago Yarbrough    Date: 11/09/18  Time: 9:12 AM    [facility]  Notes  Pt is here for an emergency respite. Pt had a family member that was in an accident and in the hospital and the caregiver needed to be there.   LMSW will coordinate with the in home program.  LMSW will review care plan from the in home program.        Signed by: Cintia Patel

## 2018-11-09 NOTE — PROGRESS NOTES
Report taken from off going RN. Pt identified. Pt in bed with eyes closed; displaying no signs or symptoms of pain, dyspnea, agitation,nausea, or vomiting. Bed locked and low, side rails up, tabs/bed alarm in place for pt. Safety. Family at bedside. Call light with in reach, and door opened for continued monitoring. RN will continue to monitor.

## 2018-11-09 NOTE — PROGRESS NOTES
Progress Note    Patient: Nancy Esposito MRN: [id number]  SSN: [id number]    YOB: 1940  Age: 69 y.o. Sex: female      Admit Date: 11/8/2018    LOS: 1 day     Subjective: This note will serve as documentation of F2F performed on Mrs. Tanya Torres on 11/9/18 at 1430 by Shannon Camilo DNP. This information has been utilized to inform the continued appropriateness for hospice routine level of care to be provided in home by her family and assisted by [facility]. Patient is here for respite stay due to caregiver fatigue for emergency related to accident requiring hospitalization. Today she denies pain, N/V/D, SOB, anxiety. Per nursing, she has been able to take her oral meds fairly well with an occasional cough. She is tolerating regular diet. Nursing reports she has been sleeping more this admission and remains bed bound requiring assistance for most ADLs due to generalized weakness, fatigue, and SOB with exertion whilst in bed. No family at bedside. Review of Systems:  Pertinent items are noted in the History of Present Illness. Objective:     Vitals:    11/08/18 1717 11/08/18 2147 11/09/18 0635   BP: 113/70 107/63 111/62   Pulse: 81 80 82   Resp: 14  16   Temp:   97.8 °F (36.6 °C)        Intake and Output:  Current Shift: No intake/output data recorded. Last three shifts: 11/07 1901 - 11/09 0700  In: 240 [P.O.:240]  Out: 1700 [Urine:1700]    Physical Exam:   GENERAL: alert, fatigued, cooperative, no distress, mild distress, appears stated age  LUNG: diminished breath sounds. Oxygen on at 4 L/min via NC. Dyspnea with extensive conversation and repositioning. HEART: regularly irregular rhythm + murmur  ABDOMEN: soft, non-tender. Bowel sounds normal. No masses,  no organomegaly  : Ballard catheter insitu and patent for moderate amount of clear yellow urine. EXTREMITIES:  extremities normal, atraumatic, no cyanosis. +1-2 bilateral lower extremity edema.    SKIN: Normal and no rash or abnormalities except for perineal redness which will likely improve with good jose care and frequent repositioning. NEUROLOGIC: AOx1-2. Bed bound. Reflexes and motor strength symmetric. Generally weak. Cranial nerves 2-12 and sensation grossly intact. PSYCHIATRIC: non focal    Lab/Data Review:  No new labs resulted in the last 24 hours. Assessment:     Principal Problem:     Aortic valve disease (9/20/2018)    Active Problems:    Chronic obstructive pulmonary disease ([facility] [facility] (3/8/2009)      Overview: PFTs Jan 2018            [facility] (10/17/2011)      Chronic diastolic congestive heart failure ([facility] (9/9/2016)        Plan:     Current Facility-Administered Medications   Medication Dose Route Frequency    allopurinol (ZYLOPRIM) tablet 100 mg (Patient Supplied)  100 mg Oral DAILY    aspirin delayed-release tablet 81 mg (Patient Supplied)  81 mg Oral DAILY    benzonatate (TESSALON) capsule 100 mg  100 mg Oral Q4H PRN    diazePAM (VALIUM) tablet 2.5 mg  2.5 mg Oral DAILY    furosemide (LASIX) tablet 40 mg  40 mg Oral BID    hydrocortisone (ANUSOL-HC) 2.5 % rectal cream 1 Each (Patient Supplied)  1 Each Rectal TID PRN    levothyroxine (SYNTHROID) tablet 88 mcg (Patient Supplied)  88 mcg Oral ACB    metOLazone (ZAROXOLYN) tablet 5 mg (Patient Supplied)  5 mg Oral DAILY    metoprolol succinate (TOPROL-XL) XL tablet 25 mg (Patient Supplied)  25 mg Oral DAILY    Omeprazole delayed release (PRILOSEC D/R) tablet 20 mg (Patient Supplied)  20 mg Oral DAILY    potassium chloride SR (K-TAB) tablet 20 mEq (Patient Supplied)  20 mEq Oral BID    predniSONE (DELTASONE) tablet 10 mg  10 mg Oral DAILY    rOPINIRole (REQUIP) tablet 2 mg (Patient Supplied)  2 mg Oral BID    senna (SENOKOT) tablet 8.6 mg  1 Tab Oral BID    sertraline (ZOLOFT) tablet 100 mg  100 mg Oral DAILY    spironolactone (ALDACTONE) tablet 25 mg  25 mg Oral DAILY    HYDROmorphone (DILAUDID) tablet 0.5 mg  0.5 mg Oral Q4H PRN    hydrOXYzine HCl (ATARAX) tablet 12.5 mg  12.5 mg Oral Q6H PRN    diazePAM (VALIUM) tablet 5 mg  5 mg Oral QHS    acetaminophen (TYLENOL) tablet 500 mg (Patient Supplied)  500 mg Oral Q6H     Plan: DC home likely 11/13/18 at noon via ambulance at conclusion of respite stay to home with [facility]. Patient remains appropriate for hospice care at routine level to be managed at home with family and assisted by [facility]. Her ongoing hospice dx is aortic valve disease. She is bedbound and incontinent of bowel and bladder and requires salamanca catheter for skin integrity preservation and due to numerous diuretics. We will maintain her current medications including oxygen for respiratory distress and SOB. Meds have been reconciled and continue to be appropriate and effective in managing her symptoms as they arise. Life expectancy less than 6 months. PPS 30%.      Signed By: Oleg Odonnell NP     November 9, 2018

## 2018-11-09 NOTE — [facility] IDG SOCIAL WORKER NOTES
Patient: Zuly Perea    Date: 11/09/18  Time: 9:12 AM    [facility]  Notes  Pt is here for an emergency respite. Pt had a family member that was in an accident and in the hospital and the caregiver needed to be there.   LMSW will coordinate with the in home program.  LMSW will review care plan from the in home program.        Signed by: Torres Mccauley

## 2018-11-09 NOTE — [facility] IDG CHAPLAIN NOTES
Patient: Daphney Coffey    Date: 11/09/18  Time: 4:23 PM    [facility]  Notes  Spiritual assessment pending. Spiritual care to be provided as appropriate.         Signed by: Lili Jackson

## 2018-11-10 NOTE — PROGRESS NOTES
Report received from off-going nurse, visual identification made, assumed care of pt. Pt resting quietly with eyes opened, no agitation or restlessness, no grimacing or groaning. Pt respirations unlabored. Tab alert in place, rails up x 2, bed in lowest position, safety maintained. FLACC 0. Bed alarm in place. Oxygen on per nasal cannula at 4 liters. 0730 oral care completed. Set pt breakfast tray up so she could eat. Pt responding appropriately to questions. States she slept well last night.   0821 physical assessment completed,pt denies discomfort. Pt ate over 75% of her meal.  Pt able to take all of her oral medication whole without difficulty. Pt states she wants to rest in bed for a while longer  1015 assist up to [facility] with two assist pt tolerated with weakened stance. 1029 pt states she has to go to the bathroom. With two assist transferred to bedside commode. 1100 with two assist transferred to [facility] chair. Pt able to stand with unsteady. 1130 son at bedside. Discussed medication needs to be brought from home. 1800 pt up in kenneth chair, pt had an uneventful day. She was up a couple of times to bedside commode with two assist. Pt confused and attempts to answer questions but her answers do not make any sense. Pt had family at bedside throughout the day.  She ate well and took all of her oral pills without difficutly

## 2018-11-10 NOTE — PROGRESS NOTES
1955 Pt sitting up in bed watching TV with family. Alert to person with confusion. Bed alarm placed on mattress patient disconnected tab alert and was ambulating unassisted to the BR. Pt had a BM this shift as well. Ballard is patent and intact. Pt up around 11pm confused regarding time of day. Pt takes medications by mouth with no difficulty. Safety measures in place and pt room at nursing station, door open and tab alert and bed alarm are intact.

## 2018-11-10 NOTE — PROGRESS NOTES
Pt is pleasantly confused, and able to make needs known. Pt does not know why she is here. Pt visits pleasantly with staff and appears to be in good spirits. Pt takes meds whole without difficulty, and no difficulty chewing or swallowing noted. Pt able to feed self with setup, and sometimes needs cueing. This nurse took care of pt at last respite stay, and notes increase in weakness, endurance, and more frequent naps during the day. Pt denies pain or needs, and is able to use call light. Pt was incontinent of bowel, extra large brown formed, and was not aware that she had already gone. Safety measures remain in place, as well as frequent rounding. No further needs noted at present.

## 2018-11-10 NOTE — PROGRESS NOTES
Pt slept for 6 hours, she is alert with confusion. High risk for falls. She got up unassisted last night and she has enough dexterity to remove tab alert. Bed alarm was placed on her bed and it is in the low lock position. Safety measures are in place, tab alert is intact. She has an unsteady gait. Will pass falls assessment to oncoming shift. Pt midnight dose of Tylenol was held related to she had difficulty settling down for bedtime and she was asleep at midnight.

## 2018-11-11 NOTE — PROGRESS NOTES
Report received from off-going nurse, visual identification made, assumed care of pt. Pt resting quietly with eyes opened,states she rested well last night  no agitation or restlessness, no grimacing or groaning. Pt respirations unlabored. Tab alert in place, rails up x 2, bed in lowest position, safety maintained. FLACC 0.  0816 physical assessment completed. Pt took all medication whole without difficulty. 1000 pt son at bedside. Pt trying to talk to nurse and son, she attempts to explain something but ends up not making sense. Pt is pleasantly confused  1240 pt states she's ready to get out of bed. With two assist transferred to chair. Pt sitting at the nurses station working on a puzzle. 1420 assisted pt up to bedside commode, pt had very small bowel movement, assisted back to kenneth chair  1800 with two assist, transferred to bed. Bed alarm and tab alerts activated. Pt asking for her son. Explained he will come to see her tomorrow.

## 2018-11-12 NOTE — [facility] IDG SOCIAL WORKER NOTES
Patient: Nicole Sherwood    Date: 11/12/18  Time: 1:06 PM    [facility]  Notes  Pt is here for an emergency respite due to the caregiver needing to be at the hospital with his son. LMSW will contact family to see if her DC plan in place still stands. Pt is to dc on 11-13-18 via ambulance. LMSW coordinated care with the in home RN.           Signed by: Rahel Montaño

## 2018-11-12 NOTE — PROGRESS NOTES
Problem: Falls - Risk of  Goal: *Absence of Falls  Document Gregorio Fall Risk and appropriate interventions in the flowsheet.   Fall Risk Interventions:  Mobility Interventions: Bed/chair exit alarm    Mentation Interventions: Bed/chair exit alarm, Door open when patient unattended    Medication Interventions: Bed/chair exit alarm    Elimination Interventions: Bed/chair exit alarm

## 2018-11-12 NOTE — PROGRESS NOTES
LMSW coordinated care with the in home team.  Mrs. Zeinab cOhoa will be going home on Friday via family car. Per the in home . LMSw will call and cancel transport.
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
## 2018-11-12 NOTE — PROGRESS NOTES
Walking rounds completed with off going RN. Pt identified by name/. Watching TV. Tab alarm and mattress alarm in use and functional. Denies pain,nausea,anxiety or distress. Bed in low and locked position with side rails Door left open as pt is unaccompanied and a high falls risk.
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
## 2018-11-12 NOTE — PROGRESS NOTES
Report received from off-going nurse, walking rounds completed. visual identification made, assumed care of pt. Pt resting quietly with eyes opened states she had a good night's sleep no agitation or restlessness, no grimacing or groaning. Pt respirations unlabored. Tab alert in place, rails up x 2, bed in lowest position, safety maintained. FLACC 0.  1300 assist pt up into kenneth chair. Sitting up at nurses station  1800 administered atarax for itching especially on the forehead. Pt assisted back to bed. With one assist. Pt son, Miranda Wood, at bedside. Explained to pt that she would not be coming home until Friday. Pt appeared to understand.

## 2018-11-12 NOTE — [facility] IDG NURSE NOTES
Patient: Daphney Coffey    Date: 11/12/18  Time: 1:07 PM    [facility] Nurse Notes  70 yo female patient here for respite secondary to caregiver with emergency in immediate family. Patient without any acute changes at this time. Plan for discharge 11/13/18. MSW following up with family for plan for returning home. F2F has been completed for upcoming BP period. Comprehensive plan of care reviewed. POC from home has been reviewed and modified as necessary to meet patients needs. IDG and pt./family in agreement with plan of care. The IDG identifies through on-going assessment when a change is needed to the POC; the pt/family will receive care and services necessitated by changes in POC. Medications reviewed by the pharmacist and Medical Director.             Signed by: Bryant Lim RN
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
## 2018-11-13 NOTE — PROGRESS NOTES
Report received from off-going nurse, visual identification made, assumed care of pt. Pt resting quietly with eyes closed, no agitation or restlessness, no grimacing or groaning. Pt respirations unlabored. Tab alert in place, rails up x 2, bed in lowest position, safety maintained. FLACC 0.  1100 two attempts per nurse, unable, NP student Marsha Rome able draw BNP.  here to take lab to downtown. 1800 pt was more drowsy today and did not spend time in the chair. She ate well and took all of her medications whole and her son visited her in the evening. She was attempting to have a bowel movement and was unable.

## 2018-11-13 NOTE — PROGRESS NOTES
Report received from [facility]. Patient rounding made. Patient resting quietly at this time. Appears to be asleep. Bed in low locked position, tab alert in place and door left open for continuous monitoring.

## 2018-11-14 NOTE — HOSPICE
Pt prefers to take ALL of her pills whole and ALL at one time with OJ. Pt swallowed well without difficulty. Needs prompting to finish breakfast as she keeps falling asleep. Pt loves Lieutenant Carbajal and CD has been requested to our volunteer coord.

## 2018-11-14 NOTE — PROGRESS NOTES
Report received from Mery Moseley, [facility]. Nursing rounds made. Patient sitting up in bed eating dessert by no assistance. Spoke with son at beside. Patient does not appear to be in any pain or distress at this time. Will continue to monitor when son leaves. Door left open.

## 2018-11-15 PROBLEM — B96.20 E. COLI UTI: Status: RESOLVED | Noted: 2018-01-01 | Resolved: 2018-01-01

## 2018-11-15 PROBLEM — N39.0 E. COLI UTI: Status: RESOLVED | Noted: 2018-01-01 | Resolved: 2018-01-01

## 2018-11-15 NOTE — PROGRESS NOTES
Report received. Patient resting quietly in no apparent pain or distress at this time. Safety measures in place and door left open for continuous monitoring.

## 2018-11-15 NOTE — PROGRESS NOTES
Pt awake and alert, oriented to person and situation. Pt's spirits good; son was in to visit this shift. Son reports that he will be taking pt home tomorrow via private vehicle as scheduled, & was waiting for SW to come in and finalize plans at time of discussion. This nurse and NP Kelly discussed this am in report about pt's increased sleeping this respite stay, appetite decreasing, and requiring more cues for feeding, even though able to feed self independently with setup. Pt denies pain, nausea, or any other needs, and reports that scheduled tylenol provides relief from any pain that she experiences. At time of initial assessment this am, pt was uncomfortable, reporting that she felt like she needed to have a bowel movement. Approx 30 minutes later, ALEXANDER Knight reports that pt successfully was able to have a bowel movement, and is feeling better. Pt is able to respond to questions appropriately and make needs known. Pt's O2 cannula often found on forehead during rounding, and pt smiles when asked about it. Pt willingly allows nurse to replace O2 cannula on nose, as does not seem to have the coordination anymore to replace correctly. Pt denies any further needs at present, and is resting following dinner at end of shift. Safety measures in place.

## 2018-11-15 NOTE — PROGRESS NOTES
Problem: Falls - Risk of  Goal: *Absence of Falls  Document Gregorio Fall Risk and appropriate interventions in the flowsheet. Outcome: Progressing Towards Goal  Fall Risk Interventions:  Mobility Interventions: Bed/chair exit alarm, Communicate number of staff needed for ambulation/transfer    Mentation Interventions: Bed/chair exit alarm, Door open when patient unattended, Evaluate medications/consider consulting pharmacy    Medication Interventions: Bed/chair exit alarm, Evaluate medications/consider consulting pharmacy    Elimination Interventions: Call light in reach, Bed/chair exit alarm             Problem: Pressure Injury - Risk of  Goal: *Prevention of pressure injury  Document Justo Scale and appropriate interventions in the flowsheet.   Outcome: Progressing Towards Goal  Pressure Injury Interventions:  Sensory Interventions: Assess changes in LOC, Avoid rigorous massage over bony prominences, Minimize linen layers    Moisture Interventions: Apply protective barrier, creams and emollients, Absorbent underpads, Limit adult briefs, Minimize layers, Moisture barrier    Activity Interventions: Assess need for specialty bed, Pressure redistribution bed/mattress(bed type), Increase time out of bed    Mobility Interventions: HOB 30 degrees or less, Pressure redistribution bed/mattress (bed type)    Nutrition Interventions: Document food/fluid/supplement intake    Friction and Shear Interventions: Apply protective barrier, creams and emollients, Minimize layers
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
## 2018-11-15 NOTE — DISCHARGE SUMMARY
Discharge Summary     Patient: Zuly Perea MRN: [id number]  SSN: [id number]    YOB: 1940  Age: 69 y.o.   Sex: female       Admit Date: 11/8/2018    Discharge Date: 11/16/18    Admission Diagnoses: RESPITE  Aortic valve disease  COPD (chronic obstructive pulmonary disease) (HCC)  Congestive heart failure (CHF) (HCC)  Aortic valve disease    Discharge Diagnoses:   Problem List as of 11/15/2018 Date Reviewed: 11/15/2018          Codes Class Noted - Resolved    * (Principal) Aortic valve disease ICD-10-CM: I35.9  ICD-9-CM: 424.1  9/20/2018 - Present        Pleural effusion, right ICD-10-CM: J90  ICD-9-CM: 511.9  3/12/2018 - Present        DNR (do not resuscitate) (Chronic) ICD-10-CM: T67  ICD-9-CM: V49.86  3/8/2018 - Present        Acute respiratory failure with hypoxia and hypercarbia (HCC) ICD-10-CM: J96.01, J96.02  ICD-9-CM: 518.81  3/8/2018 - Present        Pleural effusion, left ICD-10-CM: J90  ICD-9-CM: 511.9  3/6/2018 - Present    Overview Signed 3/8/2018  3:12 PM by Zee Alicea NP     3/8/18 Right thoracentesis 1000ml removed             Chronic diastolic heart failure (HCC) (Chronic) ICD-10-CM: I50.32  ICD-9-CM: 428.32  2/7/2018 - Present        S/P TAVR (transcatheter aortic valve replacement) (Chronic) ICD-10-CM: Z95.2  ICD-9-CM: V43.3  1/30/2018 - Present        Aortic stenosis (Chronic) ICD-10-CM: I35.0  ICD-9-CM: 424.1  1/29/2018 - Present        Peripheral arterial disease ([address] (Chronic) ICD-10-CM: I73.9  ICD-9-CM: 443.9  1/23/2018 - Present        Chronic respiratory failure with hypoxia (HCC) (Chronic) ICD-10-CM: J96.11  ICD-9-CM: 518.83, 799.02  1/23/2018 - Present    Overview Signed 1/23/2018 11:17 AM by Emiliana Canseco NP     Wears 3 to 4 liters at home             Hypoxia ICD-10-CM: R09.02  ICD-9-CM: 799.02  1/23/2018 - Present        Stenosis of prosthetic aortic valve (Chronic) ICD-10-CM: I35.0  ICD-9-CM: 396.0  1/19/2018 - Present    Overview Signed 1/19/2018  3:18 PM by Lelo El MD     AVR (10/5/13):  21 mm Pericardial valve.                Tricuspid valve insufficiency (Chronic) ICD-10-CM: I07.1  ICD-9-CM: 397.0  1/15/2018 - Present        Systolic CHF, chronic (HCC) (Chronic) ICD-10-CM: I50.22  ICD-9-CM: 428.22, 428.0  1/15/2018 - Present        S/P mitral valve repair (Chronic) ICD-10-CM: A07.531  ICD-9-CM: V45.89  12/4/2017 - Present        H/O atrioventricular rubin ablation (Chronic) ICD-10-CM: C63.137  ICD-9-CM: V15.1  3/22/2017 - Present        Pulmonary hypertension (Chronic) ICD-10-CM: I27.20  ICD-9-CM: 416.8  2/12/2017 - Present        Aortic valve replaced (Chronic) ICD-10-CM: Z95.2  ICD-9-CM: V43.3  1/9/2017 - Present        Chronic diastolic congestive heart failure (HCC) (Chronic) ICD-10-CM: I50.32  ICD-9-CM: 428.32, 428.0  9/9/2016 - Present        Chronic depression (Chronic) ICD-10-CM: F32.9  ICD-9-CM: 311  8/5/2016 - Present        Osteopenia (Chronic) ICD-10-CM: M85.80  ICD-9-CM: 733.90  8/5/2016 - Present        RLS (restless legs syndrome) (Chronic) ICD-10-CM: G25.81  ICD-9-CM: 333.94  8/5/2016 - Present        Hyperlipidemia (Chronic) ICD-10-CM: E78.5  ICD-9-CM: 272.4  8/5/2016 - Present        Gout (Chronic) ICD-10-CM: M10.9  ICD-9-CM: 274.9  8/5/2016 - Present        Anxiety (Chronic) ICD-10-CM: F41.9  ICD-9-CM: 300.00  8/5/2016 - Present        CAD (coronary artery disease) (Chronic) ICD-10-CM: I25.10  ICD-9-CM: 414.00  8/5/2016 - Present        HTN (hypertension) (Chronic) ICD-10-CM: I10  ICD-9-CM: 401.9  8/5/2016 - Present        GERD (gastroesophageal reflux disease) (Chronic) ICD-10-CM: K21.9  ICD-9-CM: 530.81  8/5/2016 - Present        H/O mitral valve repair, 2003 (Chronic) ICD-10-CM: L89.255  ICD-9-CM: V15.1  6/27/2016 - Present        Sick sinus syndrome (HCC) (Chronic) ICD-10-CM: I49.5  ICD-9-CM: 427.81  2/13/2016 - Present        Obesity (Chronic) ICD-10-CM: X76.6  ICD-9-CM: 278.00  11/2/2015 - Present        Mitral stenosis with insufficiency (Chronic) ICD-10-CM: M71.8  ICD-9-CM: 394.2  11/2/2015 - Present    Overview Signed 11/2/2015 11:02 AM by Cordie Hashimoto E     Prior MV repair 2003.               Rheumatic aortic stenosis (Chronic) ICD-10-CM: I06.0  ICD-9-CM: 395.0  11/2/2015 - Present        Cardiac pacemaker (Chronic) ICD-10-CM: Z95.0  ICD-9-CM: V45.01  11/2/2015 - Present        Thrombocytopenia (HCC) (Chronic) ICD-10-CM: D69.6  ICD-9-CM: 287.5  8/22/2012 - Present        Anemia (Chronic) ICD-10-CM: D64.9  ICD-9-CM: 285.9  10/27/2011 - Present    Overview Addendum 9/20/2018 11:22 AM by Lila Kinney NP     Acute on chronic               Chronic atrial fibrillation (HCC) (Chronic) ICD-10-CM: I48.2  ICD-9-CM: 427.31  10/17/2011 - Present        Benign neoplasm of colon ICD-10-CM: D12.6  ICD-9-CM: 211.3  1/26/2010 - Present        Diverticulosis of colon ICD-10-CM: K57.30  ICD-9-CM: 562.10  1/26/2010 - Present        Internal hemorrhoids ICD-10-CM: K64.8  ICD-9-CM: 455.0  1/26/2010 - Present        Constipation ICD-10-CM: K59.00  ICD-9-CM: 564.00  12/14/2009 - Present        Nonspecific abnormal finding in stool contents ICD-10-CM: R19.5  ICD-9-CM: 792.1  12/14/2009 - Present        History of colonic polyps ICD-10-CM: Z86.010  ICD-9-CM: V12.72  12/14/2009 - Present        Hypothyroidism (Chronic) ICD-10-CM: E03.9  ICD-9-CM: 244.9  12/4/2009 - Present        BOBBY (obstructive sleep apnea) (Chronic) ICD-10-CM: G47.33  ICD-9-CM: 327.23  12/4/2009 - Present        Chronic obstructive pulmonary disease (HCC) (Chronic) ICD-10-CM: J44.9  ICD-9-CM: 077  3/8/2009 - Present    Overview Signed 3/6/2018  3:19 PM by Debby Patel NP     PFTs Jan 2018               Aortic Valve Bioprosthesis Present (Chronic) ICD-10-CM: Z95.2  ICD-9-CM: V43.3  3/7/2009 - Present        Degenerative arthritis of left knee (Chronic) ICD-10-CM: M17.12  ICD-9-CM: 715.96  2/27/2009 - Present        RESOLVED: E. coli UTI ICD-10-CM: N39.0, B96.20  ICD-9-CM: 599.0, 041.49  3/8/2018 - 11/15/2018        RESOLVED: CHF (congestive heart failure) ([facility] ICD-10-CM: I50.9  ICD-9-CM: 428.0  2/13/2018 - 2/14/2018        RESOLVED: Acute on chronic respiratory failure ([facility] ICD-10-CM: J96.20  ICD-9-CM: 518.84  2/7/2018 - 3/6/2018        RESOLVED: Leukocytosis ICD-10-CM: N84.561  ICD-9-CM: 288.60  2/7/2018 - 3/6/2018        RESOLVED: Acute on chronic systolic congestive heart failure ([facility] ICD-10-CM: I50.23  ICD-9-CM: 428.23, 428.0  2/1/2018 - 2/7/2018        RESOLVED: Acute pulmonary edema ([facility] ICD-10-CM: J81.0  ICD-9-CM: 518.4  1/25/2018 - 2/7/2018        RESOLVED: Pacemaker ICD-10-CM: Z95.0  ICD-9-CM: V45.01  12/4/2017 - 1/23/2018        RESOLVED: History of gout ICD-10-CM: Z87.39  ICD-9-CM: V12.29  1/31/2017 - 1/23/2018        RESOLVED: Warfarin anticoagulation (Chronic) ICD-10-CM: Z79.01  ICD-9-CM: V58.61  1/9/2017 - 1/23/2018        RESOLVED: Non morbid obesity due to excess calories ICD-10-CM: E66.09  ICD-9-CM: 278.00  11/14/2016 - 1/23/2018        RESOLVED: Hypoxemia ICD-10-CM: R09.02  ICD-9-CM: 799.02  11/14/2016 - 1/23/2018        RESOLVED: Localized edema ICD-10-CM: R60.0  ICD-9-CM: 782.3  9/9/2016 - 1/23/2018        RESOLVED: Iron deficiency anemia ICD-10-CM: D50.9  ICD-9-CM: 280.9  9/9/2016 - 1/23/2018        RESOLVED: CRI (chronic renal insufficiency) ICD-10-CM: N18.9  ICD-9-CM: 585.9  8/5/2016 - 1/23/2018        RESOLVED: Dyspnea ICD-10-CM: R06.00  ICD-9-CM: 786.09  8/5/2016 - 1/23/2018        RESOLVED: Right hip pain ICD-10-CM: M25.551  ICD-9-CM: 719.45  8/5/2016 - 1/23/2018        RESOLVED: Edema ICD-10-CM: R60.9  ICD-9-CM: 782.3  8/5/2016 - 1/23/2018        RESOLVED: Chest pain ICD-10-CM: R07.9  ICD-9-CM: 786.50  8/5/2016 - 1/23/2018        RESOLVED: Other long term (current) drug therapy ICD-10-CM: S80.058  ICD-9-CM: V58.69  8/5/2016 - 1/23/2018        RESOLVED: Acute encephalopathy ICD-10-CM: G93.40  ICD-9-CM: 348.30  7/8/2016 - 1/23/2018        RESOLVED: Tachycardia ICD-10-CM: R00.0  ICD-9-CM: 785.0  6/27/2016 - 1/23/2018        RESOLVED: Palpitations ICD-10-CM: R00.2  ICD-9-CM: 785.1  11/2/2015 - 1/23/2018        RESOLVED: Respiratory insufficiency ICD-10-CM: R06.89  ICD-9-CM: 786.09  11/2/2015 - 1/23/2018        RESOLVED: Bradycardia (Symptomatic) ICD-10-CM: R00.1  ICD-9-CM: 427.89  11/7/2011 - 1/23/2018        RESOLVED: Rectal bleeding ICD-10-CM: K62.5  ICD-9-CM: 569.3  10/27/2011 - 1/23/2018        RESOLVED: AV block ICD-10-CM: I44.30  ICD-9-CM: 426.10  10/17/2011 - 1/23/2018        RESOLVED: Cardiogenic shock ([facility] ICD-10-CM: R57.0  ICD-9-CM: 785.51  10/17/2011 - 1/23/2018        RESOLVED: Hyperkalemia ICD-10-CM: E87.5  ICD-9-CM: 276.7  10/17/2011 - 1/23/2018        RESOLVED: Nausea and vomiting ICD-10-CM: R11.2  ICD-9-CM: 787.01  2/24/2010 - 1/23/2018        RESOLVED: Epigastric abdominal pain ICD-10-CM: R10.13  ICD-9-CM: 789.06  2/24/2010 - 1/23/2018        RESOLVED: Digoxin toxicity ICD-10-CM: T46.0X1A  ICD-9-CM: 972.1, E942.1  2/24/2010 - 1/23/2018        RESOLVED: ARF (acute renal failure) (HCC) (Chronic) ICD-10-CM: N17.9  ICD-9-CM: 584.9  2/24/2010 - 1/23/2018        RESOLVED: Hypokalemia ICD-10-CM: E87.6  ICD-9-CM: 276.8  2/24/2010 - 1/23/2018        RESOLVED: CKD (chronic kidney disease) stage 3, GFR 30-59 ml/min (HCC) (Chronic) ICD-10-CM: N18.3  ICD-9-CM: 585.3  12/4/2009 - 1/23/2018        RESOLVED: Cough ICD-10-CM: R05  ICD-9-CM: 786.2  3/9/2009 - 3/12/2009        RESOLVED: Bronchitis ICD-10-CM: J40  ICD-9-CM: 285  3/9/2009 - 3/12/2009        RESOLVED: Atrial fibrillation (HCC) (Chronic) ICD-10-CM: I48.91  ICD-9-CM: 427.31  3/7/2009 - 6/27/2016        RESOLVED: Hypotension (Chronic) ICD-10-CM: I95.9  ICD-9-CM: 458.9  3/1/2009 - 1/23/2018               Discharge Condition: Stable    Code Status:DNR    Hospital Course: Discharge from respite stay at [facility] to home with [facility]. DC 11/16/18 at noon via ambulance.  Status at time of discharge is routine. Consults: None    Significant Diagnostic Studies: None    Disposition: home    Discharge Medications:   Current Discharge Medication List      START taking these medications    Details   acetaminophen (TYLENOL) 500 mg capsule Take 1 Cap by mouth every twelve (12) hours. Patient with adequate supply of med from home. Qty: 1 Cap, Refills: 0      hydrOXYzine HCl (ATARAX) 25 mg tablet Take 0.5 Tabs by mouth every six (6) hours as needed for Itching for up to 10 days. Patient with adequate supply of med from home. Qty: 1 Tab, Refills: 0    Associated Diagnoses: Anxiety         CONTINUE these medications which have CHANGED    Details   !! diazePAM (VALIUM) 5 mg tablet Take 0.5 Tabs by mouth daily. Max Daily Amount: 2.5 mg. Patient with adequate supply of med from home. Qty: 1 Tab, Refills: 0    Associated Diagnoses: Anxiety      !! diazePAM (VALIUM) 5 mg tablet Take 1 Tab by mouth nightly. Max Daily Amount: 5 mg. Patient with adequate supply of med from home. Qty: 1 Tab, Refills: 0    Associated Diagnoses: Anxiety      HYDROmorphone (DILAUDID) 1 mg/mL liqd oral solution Take 0.5 mL by mouth every four (4) hours as needed (pain/dyspnea). Max Daily Amount: 3 mg. Patient with adequate supply of med from home. Qty: 1 mL, Refills: 0    Associated Diagnoses: Coronary artery disease involving native coronary artery of native heart without angina pectoris; Peripheral arterial disease ([facility] Chronic respiratory failure with hypoxia ([facility]       ! ! - Potential duplicate medications found. Please discuss with provider. CONTINUE these medications which have NOT CHANGED    Details   senna (SENNA) 8.6 mg tablet Take 1 Tab by mouth two (2) times a day. allopurinol (ZYLOPRIM) 100 mg tablet Take 100 mg by mouth daily. spironolactone (ALDACTONE) 25 mg tablet Take 25 mg by mouth daily. Indications: Edema      metOLazone (ZAROXOLYN) 5 mg tablet Take 5 mg by mouth daily.  GIVE ONE TABLET BY MOUTH EVERY DAY - HOLD FOR SYSTOLIC BP LESS THEN 710.      predniSONE (DELTASONE) 10 mg tablet Take 10 mg by mouth daily. furosemide (LASIX) 40 mg tablet Take 40 mg by mouth two (2) times a day. OXYGEN-AIR DELIVERY SYSTEMS 3 L/min by Nasal route continuous. benzonatate (TESSALON) 100 mg capsule Take 100 mg by mouth every four (4) hours as needed for Cough. Omeprazole delayed release (PRILOSEC D/R) 20 mg tablet Take 20 mg by mouth daily. aspirin delayed-release 81 mg tablet Take 81 mg by mouth daily. sertraline (ZOLOFT) 50 mg tablet Take 100 mg by mouth daily. hydrocortisone (ANUSOL-HC) 2.5 % rectal cream Insert 1 Each into rectum three (3) times daily as needed for Hemorrhoids. levothyroxine (SYNTHROID) 88 mcg tablet Take 88 mcg by mouth Daily (before breakfast). metoprolol succinate (TOPROL-XL) 25 mg XL tablet Take 25 mg by mouth daily. potassium chloride SR (K-TAB) 20 mEq tablet Take 20 mEq by mouth two (2) times a day. rOPINIRole (REQUIP) 2 mg tablet Take 2 mg by mouth two (2) times a day. STOP taking these medications       acetaminophen (TYLENOL) 500 mg tablet Comments:   Reason for Stopping:         hydrOXYzine pamoate (VISTARIL) 25 mg capsule Comments:   Reason for Stopping:               Activity: Activity as tolerated with assistance. Diet: Regular Diet  Wound Care: None needed  Oxygen: 4 L/min via NC PRN SOB. Equipment: As previously determined by [facility] Team.   Meds: Please be mindful that lopressor XL was used prior to admission but while in hospice house lopressor was used. Please make sure of strength in case it has changed from home supply. Follow-up Appointments   Procedures    FOLLOW UP VISIT Appointment in: Other (Specify) As determined by [facility] Team. Primary RN off this week, please advise RN Supervisor instead. As determined by [facility] Team. Primary RN off this week, please advise RN Supervisor instead.      Standing Status:   Standing Number of Occurrences:   1     Order Specific Question:   Appointment in     Answer:    Other (Specify)       Signed By: Diana Ramirez NP     November 15, 2018

## 2018-11-16 NOTE — PROGRESS NOTES
Report received. Pt round. Pt identified by name. In bed with eyes open. No s/s of pain, agitation or dyspnea. Bed low and SR up with tab alerts on.   1123 Pt is for d/c to home today via family car. Son to be in after lunch to  pt. Belongings packed by CNA. 46 Pt discharged to home with son. Discharge instructions given to son who verbalizes understanding.  Report given earlier to Diana Watson RN for in home team.

## 2018-11-16 NOTE — PROGRESS NOTES
Report received and patient rounding made. Patient in bed resting quietly sitting up in bed. Does not appear to be in any distress or pain at this time. Safety measures in place and door left open for continuous monitoring.

## 2018-11-16 NOTE — [facility] IDG VOLUNTEER NOTES
[address] Review     Status Codes I = Initiated C=Continued R=Revised RS = Resolved     C. Volunteer     Goal: Hospice house volunteer (s) enhances the quality of remaining life while patient is at the hospice house. Interventions: Bam Barron Volunteer (s) will provide companionship to the patient and/or family by visiting at the hospice house       . Bam Barron Volunteer (s) will provide respite as needed when requested by patient and/or family. Bam To  Volunteer will provide activities such as music, reading, pet therapy, etc. as requested. Bam To  Comfort bag delivered. Any other special requests or information regarding volunteer services: Three visits for pet therapy and volunteer nursing assistance are recorded. No further needs identified at this time. These notes have been discussed in [facility] meeting.         Signed by: Ananya Miramontes

## 2018-11-16 NOTE — PROGRESS NOTES
PT salamanca noted to have new bloody urine.  After continuing to monitor, and working with the tubing, urine has cleared back up to yellow/clear and cloudy

## 2018-11-16 NOTE — DISCHARGE INSTRUCTIONS
SEE DC SUMMARY. Activity: Activity as tolerated with assistance. Diet: Regular Diet  Wound Care: None needed  Oxygen: 4 L/min via NC PRN SOB. Equipment: As previously determined by [facility] Team.   Meds: Please be mindful that lopressor XL was used prior to admission but while in hospice house lopressor was used. Please make sure of strength in case it has changed from home supply.            Follow-up Appointments   Procedures    FOLLOW UP VISIT Appointment in: Other (Specify) As determined by [facility] Team. Primary RN off this week, please advise RN Supervisor instead.       As determined by [facility] Team. Primary RN off this week, please advise RN Supervisor instead.       Standing Status:   Standing       Number of Occurrences:   1       Order Specific Question:   Appointment in       Answer:    Other (Specify)
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
## 2019-01-01 ENCOUNTER — HOME CARE VISIT (OUTPATIENT)
Dept: HOSPICE | Facility: HOSPICE | Age: 79
End: 2019-01-01
Payer: MEDICARE

## 2019-01-01 ENCOUNTER — HOME CARE VISIT (OUTPATIENT)
Dept: SCHEDULING | Facility: HOME HEALTH | Age: 79
End: 2019-01-01
Payer: MEDICARE

## 2019-01-01 VITALS
TEMPERATURE: 97.8 F | SYSTOLIC BLOOD PRESSURE: 115 MMHG | RESPIRATION RATE: 16 BRPM | HEART RATE: 88 BPM | DIASTOLIC BLOOD PRESSURE: 66 MMHG

## 2019-01-01 VITALS
TEMPERATURE: 98.2 F | SYSTOLIC BLOOD PRESSURE: 104 MMHG | HEART RATE: 80 BPM | DIASTOLIC BLOOD PRESSURE: 60 MMHG | RESPIRATION RATE: 24 BRPM | OXYGEN SATURATION: 99 %

## 2019-01-01 VITALS
WEIGHT: 149.8 LBS | SYSTOLIC BLOOD PRESSURE: 96 MMHG | OXYGEN SATURATION: 96 % | TEMPERATURE: 97.9 F | RESPIRATION RATE: 22 BRPM | DIASTOLIC BLOOD PRESSURE: 48 MMHG | HEART RATE: 81 BPM | BODY MASS INDEX: 29.26 KG/M2

## 2019-01-01 VITALS
TEMPERATURE: 98.3 F | HEART RATE: 86 BPM | SYSTOLIC BLOOD PRESSURE: 124 MMHG | DIASTOLIC BLOOD PRESSURE: 70 MMHG | RESPIRATION RATE: 16 BRPM

## 2019-01-01 VITALS
RESPIRATION RATE: 24 BRPM | SYSTOLIC BLOOD PRESSURE: 110 MMHG | DIASTOLIC BLOOD PRESSURE: 64 MMHG | HEART RATE: 84 BPM | TEMPERATURE: 100.6 F

## 2019-01-01 VITALS
HEART RATE: 82 BPM | OXYGEN SATURATION: 98 % | SYSTOLIC BLOOD PRESSURE: 108 MMHG | DIASTOLIC BLOOD PRESSURE: 60 MMHG | TEMPERATURE: 98.2 F | RESPIRATION RATE: 21 BRPM

## 2019-01-01 VITALS
DIASTOLIC BLOOD PRESSURE: 50 MMHG | OXYGEN SATURATION: 99 % | SYSTOLIC BLOOD PRESSURE: 110 MMHG | TEMPERATURE: 97.4 F | RESPIRATION RATE: 24 BRPM | HEART RATE: 74 BPM

## 2019-01-01 VITALS
HEART RATE: 85 BPM | DIASTOLIC BLOOD PRESSURE: 70 MMHG | SYSTOLIC BLOOD PRESSURE: 116 MMHG | TEMPERATURE: 97.9 F | RESPIRATION RATE: 21 BRPM

## 2019-01-01 PROCEDURE — 0651 HSPC ROUTINE HOME CARE

## 2019-01-01 PROCEDURE — G0299 HHS/HOSPICE OF RN EA 15 MIN: HCPCS

## 2019-01-01 PROCEDURE — G0156 HHCP-SVS OF AIDE,EA 15 MIN: HCPCS

## 2019-01-01 PROCEDURE — T4541 LARGE DISPOSABLE UNDERPAD: HCPCS

## 2019-01-01 PROCEDURE — HOSPICE MEDICATION HC HH HOSPICE MEDICATION

## 2019-01-01 PROCEDURE — A4927 NON-STERILE GLOVES: HCPCS

## 2019-01-01 PROCEDURE — A9270 NON-COVERED ITEM OR SERVICE: HCPCS

## 2019-01-01 PROCEDURE — G0155 HHCP-SVS OF CSW,EA 15 MIN: HCPCS

## 2020-09-11 NOTE — PROGRESS NOTES
Patient had a quiet night; slept well without complaint. Changed dressing on L arm at HS. All other dressings are CDI. Pivoted to the [facility] during the night; urinated and had a loose BM. Hourly rounds were performed throughout the shift. All needs met at this time. Report given to oncoming nurse. Pt declined need for intervention.

## 2021-09-01 NOTE — PROGRESS NOTES
Please note this patient was an IP Rehab d/c 3/8 to Med Surg IP admission 3/8 due to change of medical status. TOM outreach inappropriate at this time. Please see CC documentation. Zahraa Brooks LPN/ Care Coordinator  6 Carmita Wu  [address] [place] [address] / [place], [facility]  [email or web address]. [facility]his note will not be viewable in [address]
No heavy lifting/straining

## 2022-01-01 NOTE — PROCEDURES
Oral feeding change: Nipple: from Dr. Brown's Preemie to Dr. Knowles's Ultra Preemie; increased FiO2  Reason for change: Significant fluid loss during feeding; continuing oxygen desaturations during feeding  Team members aware of change/ change discussed with: RN  Identified outcome of change: Decrease in the amount of fluid loss during feeding; improvement in oxygen saturations during feeding  Continue oral feeding plan of care with change: Yes  Care Plan updated: Yes   US Guided Thoracentesis Procedure Note--UNILATERAL    Time Out -- Correct patient identified and Everyone Agrees. Procedure:   /Left Thoracentesis with ultrasound guidance    Indication:  Left Pleural effusion     Summary:    After informed consent was obtained, the patient was positioned upright in the usual fashion.  Ultrasound was used to identify the optimal spot for pleural drainage.     The  eft  intercostal space was anesthetized with 1% Lidocaine to achieve adequate anesthesia.  The pleural space was entered with yellow fluid identified.  Lidocaine was instilled within the pleural space as well.  A small stab incision was made at the site of local anesthesia and the thoracentesis catheter was advanced into the pleural space. Approximately 550 ml of fluid was obtained with ease.  The procedure was terminated after  pleural drainage stopped. Air was aspirated during the procedure at the end of the procedure. .  A bandaid was placed over the incision after adequate hemostasis was achieved.  A CXR ist pending. EBL -- 5 drops    Patient stable post procedure    No samples sent. Family present during procedure and aware of air -- told we will follow and if large air pocket will need chest drain.  They are all aware       Signed By: Jose Durant MD

## 2022-07-01 NOTE — PROGRESS NOTES
CHF teaching held; breakfast, will follow. Patricia Huff [phone]     Son aware of next day here. 27.2

## 2022-07-12 NOTE — PROGRESS NOTES
Declination of volunteer services per LMSW. [facility]ia  [address] [facility], [place] [address]

## 2022-12-24 NOTE — PROGRESS NOTES
Pt resting comfortably in bed at this time, denies complaints    Transition of Care Discharge Follow-up Questionnaire   Date/Time of Call:   2/10/17 2:52p   What was the patient hospitalized for? Dehydration   Does the patient understand his/her diagnosis and/or treatment and what happened during the hospitalization? Yes, patient states she understands her treatment and what happened during the hospitalization. Did the patient receive discharge instructions? Yes   Review any discharge instructions (see notes in [facility]). Ask patient if they understand these. Do they have any questions? Yes   Were home services ordered (nursing, PT, OT, ST, etc.)? No   If so, has the first visit occurred? If not, why? (Assist with coordination of services if necessary.) n/a   Was any DME ordered? No   If so, has it been received? If not, why?  (Assist with coordination of arranging DME orders if necessary.) n/a   Complete a review of all medications (new, continued and discontinued meds per the D/C instructions and medication tab in [facility]). Per chart review with patient, medications are current. Were all new prescriptions filled? If not, why?  (Assist with obtainment of medications if necessary.)  No. Patient states her son will pick medications up today. Does the patient understand the purpose and dosing instructions for all medications? (If patient has questions, provide explanation and education.) Yes   Does the patient have any problems in performing ADLs? (If patient is unable to perform ADLs  what is the limiting factor(s)? Do they have a support system that can assist? If no support system is present, discuss possible assistance that they may be able to obtain.) Patient indicates that she is independent with ADLs. Patient states that she does not need stand-by assistance. Care coordinator spoke to patients sister who indicated patient does need assistance and family would be staying with her until patient recovers.  Patient verbalizes feeling as weak as water but does not need anyone to help with bathing.     Does the patient have all follow-up appointments scheduled? Has transportation been arranged? [facility] follow-up should be within 7 days of discharge; all others should have PCP follow-up within 7 days of discharge; follow-ups with other specialists as appropriate or ordered.) Patient has f/u appointments scheduled next week with Cardiologist. Patient states she will keep appointment and family will provide transportation. Any other questions or concerns expressed by the patient? Patient did not express any concerns. Patients family members expressed concerns that she was weak. Patient states that she is weak, a little sick and okay and will follow-up with doctor as instructed. Care coordinator advised patient to take prescribed medication as soon as possible to help with nausea. Patient indicates she will take medication as soon as son drops it off. Care coordinator advised patient to contact PCP or go to ER if problems worsen. Patient thanked care coordinator for the call. TOM Call Completed By: Aylin Becker LPN  [facility] [address] Coordinator          This note will not be viewable in [address]

## 2023-01-10 NOTE — PROGRESS NOTES
Francine Mcnulty  Admission Date: 2/7/2018             Daily Progress Note: 2/11/2018    The patient's chart is reviewed and the patient is discussed with the staff. 68 yo female with a history of HTN, hypothyroid, chronic dCHF, chronic a.fib, CAD, SSS s/p PPM, COPD, chronic respiratory failure maintained on o2 at 3 lpm, and TAVR on 1/30/18. She presented to the ER with increased sob, CXR findings consistent with pulmonary edema and worsening bilateral effusions. She required BIPAP in the ER but diuresed with lasix and was weaned to [place]. She reported subjective fevers, WBC elevated at 18 and abx were started. We were consulted for possible thoracentesis. Coumadin was held    Subjective:     Currently on O2 at 3 lpm with o2 sat 100%. Cough is only minimally productive - does not feel she has much to cough up. Denies chest pain.       Current Facility-Administered Medications   Medication Dose Route Frequency    apixaban (ELIQUIS) tablet 5 mg  5 mg Oral Q12H    furosemide (LASIX) tablet 80 mg  80 mg Oral Q12H    metoprolol succinate (TOPROL-XL) XL tablet 50 mg  50 mg Oral DAILY    sodium chloride (NS) flush 5 mL  5 mL InterCATHeter Q8H    sodium chloride (NS) flush 5-10 mL  5-10 mL InterCATHeter PRN    allopurinol (ZYLOPRIM) tablet 100 mg  100 mg Oral BID    hydrocortisone (ANUSOL-HC) 2.5 % rectal cream   PeriANAL TID PRN    levothyroxine (SYNTHROID) tablet 88 mcg  88 mcg Oral ACB    pantoprazole (PROTONIX) tablet 40 mg  40 mg Oral ACB    polyethylene glycol (MIRALAX) packet 17 g  17 g Oral DAILY    pravastatin (PRAVACHOL) tablet 40 mg  40 mg Oral DAILY    rOPINIRole (REQUIP) tablet 2 mg  2 mg Oral BID    sertraline (ZOLOFT) tablet 100 mg  100 mg Oral DAILY    spironolactone (ALDACTONE) tablet 25 mg  25 mg Oral DAILY    alcohol 62% (NOZIN) nasal  1 Ampule  1 Ampule Topical Q12H    0.9% sodium chloride infusion 250 mL  250 mL IntraVENous PRN    diazePAM (VALIUM) tablet 5 mg  5 mg Oral QHS    acetaminophen (TYLENOL) tablet 650 mg  650 mg Oral Q4H PRN    sodium chloride (NS) flush 5-10 mL  5-10 mL IntraVENous Q8H    sodium chloride (NS) flush 5-10 mL  5-10 mL IntraVENous PRN    cefTRIAXone (ROCEPHIN) 1 g in 0.9% sodium chloride (MBP/ADV) 50 mL  1 g IntraVENous Q24H     Facility-Administered Medications Ordered in Other Encounters   Medication Dose Route Frequency    0.9% sodium chloride infusion 250 mL  250 mL IntraVENous PRN       Review of Systems  Constitutional: negative for fever, chills, sweats  Cardiovascular: negative for chest pain, palpitations, syncope, +++edema  Gastrointestinal:  negative for dysphagia, reflux, vomiting, diarrhea, abdominal pain, or melena  Neurologic:  negative for focal weakness, numbness, headache    Objective:     Vitals:    02/10/18 1638 02/10/18 2100 02/11/18 0022 02/11/18 0451   BP: 97/54 130/82 105/67 114/77   Pulse: 84 84 83 83   Resp: 18 20 18 17   Temp: 97.4 °F (36.3 °C) 97.3 °F (36.3 °C) 97.5 °F (36.4 °C) 97.5 °F (36.4 °C)   SpO2: 100% 100% 100% 100%   Weight:    174 lb (78.9 kg)   Height:         Intake and Output:   02/09 1901 - 02/11 0700  In: 500 [P.O.:500]  Out: 2000 [Urine:2000]       Physical Exam:   Constitution:  the patient is well developed and in no acute distress  EENMT:  Sclera clear, pupils equal, oral mucosa moist  Respiratory: diminished with crackles bilateral posterior bases, o2 at 3 lpm  Cardiovascular:  RRR without M,G,R  Gastrointestinal: soft and non-tender; with positive bowel sounds. Musculoskeletal: warm without cyanosis.  There is 2+ lower leg edema. / 2+ UE edema  Skin:  no jaundice or rashes, no open wounds   Neurologic: no gross neuro deficits     Psychiatric:  alert and oriented x 3    CXR:   2/9      LAB   Recent Labs      02/11/18   0354  02/10/18   0406  02/09/18   0408   WBC   --   5.9  8.5   HGB   --   9.8*  10.3*   HCT   --   32.5*  34.3*   PLT   --   99*  78*   INR  1.5  1.2  1.7     Recent Labs      02/11/18   0354  02/10/18   0406  02/09/18   0408   NA  143  146*  146*   K  3.1*  3.5  3.9   CL  100  102  101   CO2  39*  39*  39*   GLU  90  91  83   BUN  38*  39*  48*   CREA  0.96  0.98  1.06*   MG  2.1  2.1  2.1   CA  8.1*  7.7*  8.4     No results for input(s): PH, PCO2, PO2, HCO3 in the last 72 hours. No results for input(s): LCAD, LAC in the last 72 hours.       Assessment:  (Medical Decision Making)     Hospital Problems  Date Reviewed: 2/9/2018          Codes Class Noted POA    * (Principal)Acute on chronic respiratory failure ([facility] [address] ICD-10-CM: J96.20  ICD-9-CM: 518.84  2/7/2018 Yes    Currently on home dose o2      Leukocytosis ICD-10-CM: D72.829  ICD-9-CM: 288.60  2/7/2018 Yes    WBC 18.1 >>> 5.9      Acute on chronic diastolic heart failure (HCC) ICD-10-CM: I50.33  ICD-9-CM: 428.33  2/7/2018 Yes    Remains edematous  Lasix / aldactone  -1350 ml / 24 hours - has salamanca catheter  Albumin 2        S/P TAVR (transcatheter aortic valve replacement) ICD-10-CM: Z95.2  ICD-9-CM: V43.3  1/30/2018 Yes    TAVR on 1/30/18      Pleural effusion ICD-10-CM: J90  ICD-9-CM: 511.9  1/23/2018 Yes     Right thoracentesis 1/25/18 - 1200 mls removed  Left thoracentesis 1/25/2018 - 900 mls removed  Bilateral thoracentesis 2/9/18 - L 550 ml (air aspirated during and at the end of procedure) / R 1000 ml and R creamy pink            Pulmonary hypertension (Chronic) ICD-10-CM: I27.20  ICD-9-CM: 416.8  2/12/2017 Yes        HTN (hypertension) (Chronic) ICD-10-CM: I10  ICD-9-CM: 401.9  8/5/2016 Yes        Anemia (Chronic) ICD-10-CM: D64.9  ICD-9-CM: 285.9  10/27/2011 Yes     Acute on chronic  Improved to 9.8 post transfusion         Chronic atrial fibrillation (HCC) (Chronic) ICD-10-CM: I48.2  ICD-9-CM: 427.31  10/17/2011 Yes    Rate controlled      Hypothyroidism (Chronic) ICD-10-CM: E03.9  ICD-9-CM: 244.9  12/4/2009 Yes         Plt 99    Plan:  (Medical Decision Making)     --Rocephin 5 / Vanc - pleural fluid transudative and Vanc discontinued 2/10   WBC 18.1 >>> 5.9   BC/UC NGTD   Pleural fluid cx NGTD  --lasix 80 mg po q 12 hours / aldactone   1150 ml/24 hours with overall negative fluid balance  --supplement K+   --transitioned to Eliquis per Cardiology    More than 50% of the time documented was spent in face-to-face contact with the patient and in the care of the patient on the floor/unit where the patient is located. Sebastien Cabrera, NP       Lungs:  decreased BS in the bases  Heart:  RRR with no Murmur/Rubs/Gallops    Additional Comments:  Cont. Diuresis and finish 5-7 days of Rocephin. CXR in am    I have spoken with and examined the patient. I agree with the above assessment and plan as documented.     Yuki Rivera MD 4 = No assist / stand by assistance

## 2023-02-21 NOTE — PROGRESS NOTES
Spiritual Care Visit, Initial visit,    Patient visited by Asif Garcia. Entered by Rakan Diaz M.Ed., .B., Staff . Alcohol/Cannabis Cannabis

## 2023-05-18 NOTE — DISCHARGE INSTRUCTIONS
Hospice: Care Instructions  Your Care Instructions    Hospice care provides medical treatment to relieve symptoms at the end of life. The goal is to keep you comfortable, not to try to cure you. Hospice care does not speed up or lengthen dying. It focuses on easing pain and other symptoms. Hospice caregivers want to enhance your quality of life. Hospice care also offers emotional help and spiritual support when you are dying. It also helps family members care for a loved one who is dying. Hospice care can help you review your life, say important things to family and friends, and explore spiritual issues. Hospice also helps your family and friends deal with their grief after you die. You can use hospice care if your illness cannot be cured and doctors believe you have no more than 6 months to live. You do not need to be confined to a bed or in a hospital to benefit from this type of care. The hospice team includes nurses, counselors, therapists, social workers, pastors, home health aides, and trained volunteers. You can get care in your own home or in a hospice center. Some hospice workers also go to nursing homes or hospitals. How can you care for yourself at home? · Prepare a list of advance directives. These are instructions to your doctor and family members about what kind of care you want if you become unable to speak or express yourself. · Find out if your health insurance covers hospice care. · Find hospice programs in your area. People who can help include your doctor, your state health department, and your insurance company. · Decide what kinds of hospice services you want. It helps to know what you want before you enter a hospice program.  · Think about some questions when preparing for hospice care. ¨ Who do you want to make decisions about your medical care if you are not able to? Many people choose their spouse, child, or doctor. ¨ What are you most afraid of that might happen?  You To schedule your imaging, please call scheduling for [facility] at [phone]. To schedule your colonoscopy, please call  [phone].Thank you!     might be afraid of having pain or losing your independence. Let your hospice team know your fears. The team can help you deal with them. ¨ Where would you prefer to die? Choices include your home, a hospital, or a nursing home. ¨ Do you want to donate organs when you die? Make sure that your family clearly understands this. ¨ Do you want any Gnosticism rites or practices to be done before you die? Let your hospice team know what you want. Where can you learn more? Go to http://onesimo-dasia.info/. Enter M448 in the search box to learn more about \"Hospice: Care Instructions. \"  Current as of: September 24, 2016  Content Version: 11.4  © 3317-8480 [employer]. Care instructions adapted under license by [employer] (which disclaims liability or warranty for this information). If you have questions about a medical condition or this instruction, always ask your healthcare professional. [place] [address] any warranty or liability for your use of this information. Heart Failure: Care Instructions  Your Care Instructions    Heart failure occurs when your heart does not pump as much blood as the body needs. Failure does not mean that the heart has stopped pumping but rather that it is not pumping as well as it should. Over time, this causes fluid buildup in your lungs and other parts of your body. Fluid buildup can cause shortness of breath, fatigue, swollen ankles, and other problems. By taking medicines regularly, reducing sodium (salt) in your diet, checking your weight every day, and making lifestyle changes, you can feel better and live longer. Follow-up care is a key part of your treatment and safety. Be sure to make and go to all appointments, and call your doctor if you are having problems. It's also a good idea to know your test results and keep a list of the medicines you take. How can you care for yourself at home? Medicines  ?  · Be safe with medicines. Take your medicines exactly as prescribed. Call your doctor if you think you are having a problem with your medicine. ? · Do not take any vitamins, over-the-counter medicine, or herbal products without talking to your doctor first. Anita Brumfield not take ibuprofen (Advil or Motrin) and naproxen (Aleve) without talking to your doctor first. They could make your heart failure worse. ? · You may be taking some of the following medicine. ¨ Beta-blockers can slow heart rate, decrease blood pressure, and improve your condition. Taking a beta-blocker may lower your chance of needing to be hospitalized. ¨ Angiotensin-converting enzyme inhibitors (ACEIs) reduce the heart's workload, lower blood pressure, and reduce swelling. Taking an ACEI may lower your chance of needing to be hospitalized again. ¨ Angiotensin II receptor blockers (ARBs) work like ACEIs. Your doctor may prescribe them instead of ACEIs. ¨ Diuretics, also called water pills, reduce swelling. ¨ Potassium supplements replace this important mineral, which is sometimes lost with diuretics. ¨ Aspirin and other blood thinners prevent blood clots, which can cause a stroke or heart attack. ? You will get more details on the specific medicines your doctor prescribes. Diet  ? · Your doctor may suggest that you limit sodium to 2,000 milligrams (mg) a day or less. That is less than 1 teaspoon of salt a day, including all the salt you eat in cooking or in packaged foods. People get most of their sodium from processed foods. Fast food and restaurant meals also tend to be very high in sodium. ? · Ask your doctor how much liquid you can drink each day. You may have to limit liquids. ?Weight  ? · Weigh yourself without clothing at the same time each day. Record your weight. Call your doctor if you have a sudden weight gain, such as more than 2 to 3 pounds in a day or 5 pounds in a week.  (Your doctor may suggest a different range of weight gain.) A sudden weight gain may mean that your heart failure is getting worse. ? Activity level  ? · Start light exercise (if your doctor says it is okay). Even if you can only do a small amount, exercise will help you get stronger, have more energy, and manage your weight and your stress. Walking is an easy way to get exercise. Start out by walking a little more than you did before. Bit by bit, increase the amount you walk. ? · When you exercise, watch for signs that your heart is working too hard. You are pushing yourself too hard if you cannot talk while you are exercising. If you become short of breath or dizzy or have chest pain, stop, sit down, and rest.   ? · If you feel \"wiped out\" the day after you exercise, walk slower or for a shorter distance until you can work up to a better pace. ? · Get enough rest at night. Sleeping with 1 or 2 pillows under your upper body and head may help you breathe easier. ? Lifestyle changes  ? · Do not smoke. Smoking can make a heart condition worse. If you need help quitting, talk to your doctor about stop-smoking programs and medicines. These can increase your chances of quitting for good. Quitting smoking may be the most important step you can take to protect your heart. ? · Limit alcohol to 2 drinks a day for men and 1 drink a day for women. Too much alcohol can cause health problems. ? · Avoid getting sick from colds and the flu. Get a pneumococcal vaccine shot. If you have had one before, ask your doctor whether you need another dose. Get a flu shot each year. If you must be around people with colds or the flu, wash your hands often. When should you call for help? Call 911 if you have symptoms of sudden heart failure such as:  ? · You have severe trouble breathing. ? · You cough up pink, foamy mucus. ? · You have a new irregular or rapid heartbeat. ?Call your doctor now or seek immediate medical care if:  ? · You have new or increased shortness of breath.    ? · You are dizzy or lightheaded, or you feel like you may faint. ? · You have sudden weight gain, such as more than 2 to 3 pounds in a day or 5 pounds in a week. (Your doctor may suggest a different range of weight gain.)   ? · You have increased swelling in your legs, ankles, or feet. ? · You are suddenly so tired or weak that you cannot do your usual activities. ? Watch closely for changes in your health, and be sure to contact your doctor if you develop new symptoms. Where can you learn more? Go to http://onesimo-dasia.info/. Enter K398 in the search box to learn more about \"Heart Failure: Care Instructions. \"  Current as of: September 21, 2016  Content Version: 11.4  © 4729-5637 [employer]. Care instructions adapted under license by [employer] (which disclaims liability or warranty for this information). If you have questions about a medical condition or this instruction, always ask your healthcare professional. [place] [address] any warranty or liability for your use of this information. Learning About Pleural Effusion  What is pleural effusion? Pleural effusion (say \"PLER-adelina aw-DBYU-gipt\") is the buildup of fluid in the space between tissues lining the lungs and chest wall. Because of the fluid buildup, the lungs may not be able to expand completely, which can make it hard to breathe. The lung, or part of it, may collapse. Pleural effusion has many causes, such as pneumonia, cancer, inflammation of the tissues around the lungs, and heart failure. Pleural effusion is usually diagnosed with an X-ray and a physical exam. The doctor listens to the air flow in your lungs. What are the symptoms? Symptoms of pleural effusion may include:  · Trouble breathing. · Shortness of breath. · Chest pain. · Fever. · A cough. Minor pleural effusion may not cause any symptoms. How is pleural effusion treated?   Doctors may need to treat the condition that is causing pleural effusion. For example, you may get medicines to treat pneumonia or congestive heart failure. Minor pleural effusion often goes away on its own without treatment. Removing fluid  Pleural effusion can be treated by removing fluid from the space between the tissues around the lungs. This is done with a needle that's put into the chest (thoracentesis). A small amount of the fluid may be sent to a lab to find out what is causing the buildup of fluid. Removing the fluid may help to relieve symptoms, such as shortness of breath and chest pain. It can help the lungs to expand more fully. In some cases, if pleural effusion doesn't get better, a catheter may be placed in the chest. This is a flexible tube that allows fluid to drain from the lungs. The catheter stays in the chest until the doctor removes it. Some people may get a treatment that removes the fluid and then puts a medicine into the chest cavity. This helps to prevent too much fluid from building up again. Follow-up care is a key part of your treatment and safety. Be sure to make and go to all appointments, and call your doctor if you are having problems. It's also a good idea to know your test results and keep a list of the medicines you take. Where can you learn more? Go to http://onesimo-dasia.info/. Enter A920 in the search box to learn more about \"Learning About Pleural Effusion. \"  Current as of: May 12, 2017  Content Version: 11.4  © 1622-2947 [employer]. Care instructions adapted under license by [employer] (which disclaims liability or warranty for this information). If you have questions about a medical condition or this instruction, always ask your healthcare professional. [address] any warranty or liability for your use of this information.

## 2025-04-29 NOTE — Clinical Note
Status[de-identified] Inpatient [101] Type of Bed: Intensive Care [6] Inpatient Hospitalization Certified Necessary for the Following Reasons: 4. Patient requires ICU level of care interventions (further clarification in H&P documentation) Admitting [facility]: [facility] [518.81. ICD-9-CM] Admitting Physician: Radha Lawson [[id number]] Attending Physician: Radha Lawson [[id number]] Estimated Length of Stay: 2 Midnights Discharge Plan[de-identified] Home with Office Follow-up 0 (no pain/absence of nonverbal indicators of pain)

## (undated) DEVICE — DRAPE XR C ARM 41X74IN LF --

## (undated) DEVICE — SUTURE VCRL SZ 3-0 L27IN ABSRB UD L26MM SH 1/2 CIR J416H

## (undated) DEVICE — 12 FOOT DISPOSABLE EXTENSION CABLE WITH SAFE CONNECT / SCREW-DOWN

## (undated) DEVICE — DISH PTRI STRL --

## (undated) DEVICE — GEL MEDC ULTRASOUND 5L -- REPLACED BY 326862

## (undated) DEVICE — GOWN,PREVENTION PLUS,2XL,ST,22/CS: Brand: MEDLINE

## (undated) DEVICE — COVER,TABLE,HEAVY DUTY,79"X110",STRL: Brand: MEDLINE

## (undated) DEVICE — SYR LR LCK 1ML GRAD NSAF 30ML --

## (undated) DEVICE — ELECTRODE DEFIB CARD W/ LVP GEL MULTFUNC PRO-PADZ

## (undated) DEVICE — (D)PREP SKN CHLRAPRP APPL 26ML -- CONVERT TO ITEM 371833

## (undated) DEVICE — STERILE POLYISOPRENE POWDER-FREE SURGICAL GLOVES: Brand: PROTEXIS

## (undated) DEVICE — 2000CC GUARDIAN II: Brand: GUARDIAN

## (undated) DEVICE — DRAPE TWL SURG 16X26IN BLU ORB04] ALLCARE INC]

## (undated) DEVICE — INTRODUCER SHTH 8FR CANN L5.5CM DIL TIP 35MM BLU TUNGSTEN

## (undated) DEVICE — FOGARTY ARTERIAL EMBOLECTOMY CATHETER 4F 80CM: Brand: FOGARTY

## (undated) DEVICE — SYRINGE ANGIO CNTRST DEL 20 CC POLYCARB DK GRN MEDALLION

## (undated) DEVICE — BASIC SINGLE BASIN-LF: Brand: MEDLINE INDUSTRIES, INC.

## (undated) DEVICE — REM POLYHESIVE ADULT PATIENT RETURN ELECTRODE: Brand: VALLEYLAB

## (undated) DEVICE — SUTURE NONABSORBABLE MONOFILAMENT 5-0 C-1 1X24 IN PROLENE 8725H

## (undated) DEVICE — GUIDEWIRE VASC L260CM DIA0.035IN TAPR L11CM FLPY TIP L4CM

## (undated) DEVICE — INTENDED TO BE USED TO OCCLUDE, RETRACT AND IDENTIFY ARTERIES, VEINS, TENDONS AND NERVES IN SURGICAL PROCEDURES: Brand: STERION®  VESSEL LOOP

## (undated) DEVICE — SYRINGE WITH HYPODERMIC SAFETY NEEDLE: Brand: MAGELLAN

## (undated) DEVICE — GOWN,REINF,POLY,ECL,PP SLV,XL: Brand: MEDLINE

## (undated) DEVICE — KIT THORCENT 8FR L5IN POLYUR W/ 18/22/25GA NDL 3 W STPCOCK

## (undated) DEVICE — UNIVERSAL DRAPES: Brand: MEDLINE INDUSTRIES, INC.

## (undated) DEVICE — APPLIER CLP LIG SM TI PREM SURGCLP SUPER INTLOK 20 DISP

## (undated) DEVICE — BUTTON SWITCH PENCIL BLADE ELECTRODE, HOLSTER: Brand: EDGE

## (undated) DEVICE — STOCKINETTE,DOUBLE PLY,6X48,STERILE: Brand: MEDLINE

## (undated) DEVICE — AMD ANTIMICROBIAL NON-ADHERENT PAD,0.2% POLYHEXAMETHYLENE BIGUANIDE HCI (PHMB): Brand: TELFA

## (undated) DEVICE — SUTURE PROL SZ 5-0 L24IN NONABSORBABLE BLU L13MM C-1 3/8 M8725

## (undated) DEVICE — 3M™ IOBAN™ 2 ANTIMICROBIAL INCISE DRAPE 6650EZ: Brand: IOBAN™ 2

## (undated) DEVICE — SOLUTION IV 1000ML 0.9% SOD CHL

## (undated) DEVICE — WIPE INSTR HIGH ABSORBENT FAST WICKING LINT FREE COUNT 20

## (undated) DEVICE — GEL US 20GM NONIRRITATING OVERWRAPPED FILE PCH TRNSMIT

## (undated) DEVICE — SUT SLK 0 30IN CT1 BLK --

## (undated) DEVICE — BENTSON WIRE GUIDE 20CM DISTAL FLEXIBILITY WITH SOFTENED TIP: Brand: BENTSON

## (undated) DEVICE — SUTURE PERMAHAND SZ 2-0 L12X18IN NONABSORBABLE BLK SILK A185H

## (undated) DEVICE — SUTURE MCRYL SZ 4-0 L27IN ABSRB UD L19MM PS-2 1/2 CIR PRIM Y426H

## (undated) DEVICE — SUTURE PROL SZ 6-0 L24IN NONABSORBABLE BLU BV-1 L9.3MM 3/8 M8805

## (undated) DEVICE — SYRINGE MED 20ML WHT PLUNG CLR POLYCARB BRL FIX M LUER CONN

## (undated) DEVICE — DISH PETRI W/LID STRL -- MIN CASE ORDER IS 2 CA

## (undated) DEVICE — SUTURE PERMAHAND SZ 3-0 L18IN NONABSORBABLE BLK SILK BRAID A184H

## (undated) DEVICE — Device

## (undated) DEVICE — 3M™ TEGADERM™ TRANSPARENT FILM DRESSING FRAME STYLE, 1626W, 4 IN X 4-3/4 IN (10 CM X 12 CM), 50/CT 4CT/CASE: Brand: 3M™ TEGADERM™